# Patient Record
Sex: FEMALE | Race: WHITE | Employment: OTHER | URBAN - METROPOLITAN AREA
[De-identification: names, ages, dates, MRNs, and addresses within clinical notes are randomized per-mention and may not be internally consistent; named-entity substitution may affect disease eponyms.]

---

## 2017-01-05 ENCOUNTER — TELEPHONE (OUTPATIENT)
Dept: ORTHOPEDIC SURGERY | Age: 67
End: 2017-01-05

## 2017-01-12 ENCOUNTER — TELEPHONE (OUTPATIENT)
Dept: CARDIOLOGY CLINIC | Age: 67
End: 2017-01-12

## 2017-01-12 ENCOUNTER — TELEPHONE (OUTPATIENT)
Dept: ORTHOPEDIC SURGERY | Age: 67
End: 2017-01-12

## 2017-01-25 ENCOUNTER — HOSPITAL ENCOUNTER (OUTPATIENT)
Dept: OTHER | Age: 67
Discharge: HOME | End: 2017-01-25
Attending: PHYSICAL MEDICINE & REHABILITATION | Admitting: PHYSICAL MEDICINE & REHABILITATION

## 2017-01-25 DIAGNOSIS — M62.838 SPASM OF MUSCLE: ICD-10-CM

## 2017-01-25 DIAGNOSIS — M48.061 BILATERAL STENOSIS OF LATERAL RECESS OF LUMBAR SPINE: ICD-10-CM

## 2017-02-20 ENCOUNTER — OFFICE VISIT (OUTPATIENT)
Dept: INTERNAL MEDICINE CLINIC | Age: 67
End: 2017-02-20

## 2017-02-20 VITALS
HEART RATE: 84 BPM | WEIGHT: 253 LBS | BODY MASS INDEX: 42.15 KG/M2 | SYSTOLIC BLOOD PRESSURE: 122 MMHG | HEIGHT: 65 IN | DIASTOLIC BLOOD PRESSURE: 70 MMHG

## 2017-02-20 DIAGNOSIS — G47.33 OSA (OBSTRUCTIVE SLEEP APNEA): Chronic | ICD-10-CM

## 2017-02-20 DIAGNOSIS — M17.12 PRIMARY OSTEOARTHRITIS OF LEFT KNEE: Chronic | ICD-10-CM

## 2017-02-20 DIAGNOSIS — I27.24 CTEPH (CHRONIC THROMBOEMBOLIC PULMONARY HYPERTENSION) (HCC): ICD-10-CM

## 2017-02-20 DIAGNOSIS — M51.37 DISC DISEASE, DEGENERATIVE, LUMBAR OR LUMBOSACRAL: Chronic | ICD-10-CM

## 2017-02-20 DIAGNOSIS — M62.838 MUSCLE SPASMS OF BOTH LOWER EXTREMITIES: ICD-10-CM

## 2017-02-20 DIAGNOSIS — I27.20 PULMONARY HTN (HCC): ICD-10-CM

## 2017-02-20 DIAGNOSIS — I10 ESSENTIAL HYPERTENSION: Primary | Chronic | ICD-10-CM

## 2017-02-20 DIAGNOSIS — G25.81 RESTLESS LEG SYNDROME: ICD-10-CM

## 2017-02-20 DIAGNOSIS — E66.01 OBESITY, CLASS III, BMI 40-49.9 (MORBID OBESITY) (HCC): ICD-10-CM

## 2017-02-20 DIAGNOSIS — E03.9 ACQUIRED HYPOTHYROIDISM: Chronic | ICD-10-CM

## 2017-02-20 DIAGNOSIS — I26.99 PULMONARY EMBOLISM, OTHER: ICD-10-CM

## 2017-02-20 PROBLEM — M51.379 DISC DISEASE, DEGENERATIVE, LUMBAR OR LUMBOSACRAL: Chronic | Status: ACTIVE | Noted: 2017-02-20

## 2017-02-20 PROCEDURE — 99214 OFFICE O/P EST MOD 30 MIN: CPT | Performed by: INTERNAL MEDICINE

## 2017-02-20 RX ORDER — LEVOTHYROXINE AND LIOTHYRONINE 38; 9 UG/1; UG/1
TABLET ORAL
Qty: 90 TABLET | Refills: 3 | Status: SHIPPED | OUTPATIENT
Start: 2017-02-20 | End: 2017-03-20 | Stop reason: SDUPTHER

## 2017-02-20 RX ORDER — AMOXICILLIN 500 MG
1 CAPSULE ORAL DAILY
COMMUNITY
End: 2018-08-01

## 2017-02-20 RX ORDER — VITAMIN B COMPLEX
1 TABLET ORAL DAILY
COMMUNITY
End: 2020-01-23

## 2017-02-20 RX ORDER — TIOTROPIUM BROMIDE INHALATION SPRAY 1.56 UG/1
SPRAY, METERED RESPIRATORY (INHALATION)
Refills: 6 | COMMUNITY
Start: 2017-02-12 | End: 2017-05-01 | Stop reason: SINTOL

## 2017-02-20 RX ORDER — LISINOPRIL AND HYDROCHLOROTHIAZIDE 25; 20 MG/1; MG/1
TABLET ORAL
Qty: 90 TABLET | Refills: 3 | Status: SHIPPED | OUTPATIENT
Start: 2017-02-20 | End: 2017-08-04 | Stop reason: SDUPTHER

## 2017-02-20 RX ORDER — ROPINIROLE 0.5 MG/1
0.5 TABLET, FILM COATED ORAL DAILY
Qty: 90 TABLET | Refills: 3 | Status: SHIPPED | OUTPATIENT
Start: 2017-02-20 | End: 2017-02-20 | Stop reason: SDUPTHER

## 2017-02-20 RX ORDER — ROPINIROLE 1 MG/1
2 TABLET, FILM COATED ORAL NIGHTLY
Qty: 180 TABLET | Refills: 3 | Status: SHIPPED | OUTPATIENT
Start: 2017-02-20 | End: 2017-07-11 | Stop reason: SDUPTHER

## 2017-02-20 RX ORDER — AVOCADO OIL
OIL (ML) MISCELLANEOUS
COMMUNITY
End: 2018-05-16 | Stop reason: ALTCHOICE

## 2017-02-20 RX ORDER — TIZANIDINE 4 MG/1
2 TABLET ORAL
Refills: 1 | COMMUNITY
Start: 2017-01-18 | End: 2017-08-04 | Stop reason: ALTCHOICE

## 2017-02-20 RX ORDER — PRAZOSIN HYDROCHLORIDE 2 MG/1
2 CAPSULE ORAL NIGHTLY
COMMUNITY
End: 2018-05-23 | Stop reason: ALTCHOICE

## 2017-02-20 ASSESSMENT — ENCOUNTER SYMPTOMS: BACK PAIN: 1

## 2017-02-21 ENCOUNTER — OFFICE VISIT (OUTPATIENT)
Dept: SLEEP MEDICINE | Age: 67
End: 2017-02-21

## 2017-02-21 VITALS
HEART RATE: 97 BPM | WEIGHT: 251 LBS | DIASTOLIC BLOOD PRESSURE: 68 MMHG | SYSTOLIC BLOOD PRESSURE: 130 MMHG | OXYGEN SATURATION: 98 % | HEIGHT: 65 IN | BODY MASS INDEX: 41.82 KG/M2

## 2017-02-21 DIAGNOSIS — G25.81 RLS (RESTLESS LEGS SYNDROME): Chronic | ICD-10-CM

## 2017-02-21 DIAGNOSIS — G47.33 OSA (OBSTRUCTIVE SLEEP APNEA): Primary | Chronic | ICD-10-CM

## 2017-02-21 DIAGNOSIS — E66.01 MORBID OBESITY, UNSPECIFIED OBESITY TYPE (HCC): Chronic | ICD-10-CM

## 2017-02-21 DIAGNOSIS — J45.30 MILD PERSISTENT ASTHMA WITHOUT COMPLICATION: Chronic | ICD-10-CM

## 2017-02-21 DIAGNOSIS — K21.9 GERD WITHOUT ESOPHAGITIS: Chronic | ICD-10-CM

## 2017-02-21 DIAGNOSIS — E03.9 HYPOTHYROIDISM, UNSPECIFIED TYPE: Chronic | ICD-10-CM

## 2017-02-21 DIAGNOSIS — I27.20 PULMONARY HTN (HCC): Chronic | ICD-10-CM

## 2017-02-21 PROCEDURE — 99214 OFFICE O/P EST MOD 30 MIN: CPT | Performed by: NURSE PRACTITIONER

## 2017-02-21 ASSESSMENT — ENCOUNTER SYMPTOMS
ABDOMINAL DISTENTION: 0
APNEA: 0
SHORTNESS OF BREATH: 0
ABDOMINAL PAIN: 0
COUGH: 0
RHINORRHEA: 0
SINUS PRESSURE: 0

## 2017-02-21 ASSESSMENT — SLEEP AND FATIGUE QUESTIONNAIRES
HOW LIKELY ARE YOU TO NOD OFF OR FALL ASLEEP WHILE WATCHING TV: 1
HOW LIKELY ARE YOU TO NOD OFF OR FALL ASLEEP WHILE SITTING QUIETLY AFTER LUNCH WITHOUT ALCOHOL: 1
HOW LIKELY ARE YOU TO NOD OFF OR FALL ASLEEP WHILE SITTING INACTIVE IN A PUBLIC PLACE: 1
HOW LIKELY ARE YOU TO NOD OFF OR FALL ASLEEP WHILE LYING DOWN TO REST IN THE AFTERNOON WHEN CIRCUMSTANCES PERMIT: 2
HOW LIKELY ARE YOU TO NOD OFF OR FALL ASLEEP IN A CAR, WHILE STOPPED FOR A FEW MINUTES IN TRAFFIC: 0
HOW LIKELY ARE YOU TO NOD OFF OR FALL ASLEEP WHILE SITTING AND READING: 1
HOW LIKELY ARE YOU TO NOD OFF OR FALL ASLEEP WHILE SITTING AND TALKING TO SOMEONE: 0
ESS TOTAL SCORE: 9
HOW LIKELY ARE YOU TO NOD OFF OR FALL ASLEEP WHEN YOU ARE A PASSENGER IN A CAR FOR AN HOUR WITHOUT A BREAK: 3

## 2017-02-24 ENCOUNTER — HOSPITAL ENCOUNTER (OUTPATIENT)
Dept: PHYSICAL THERAPY | Age: 67
Discharge: HOME | End: 2017-02-28
Admitting: PHYSICAL MEDICINE & REHABILITATION

## 2017-02-24 DIAGNOSIS — M48.061 SPINAL STENOSIS OF LUMBAR REGION: ICD-10-CM

## 2017-02-27 ENCOUNTER — OFFICE VISIT (OUTPATIENT)
Dept: ORTHOPEDIC SURGERY | Age: 67
End: 2017-02-27

## 2017-02-27 VITALS
BODY MASS INDEX: 41.82 KG/M2 | HEIGHT: 65 IN | DIASTOLIC BLOOD PRESSURE: 76 MMHG | HEART RATE: 83 BPM | SYSTOLIC BLOOD PRESSURE: 124 MMHG | WEIGHT: 251 LBS

## 2017-02-27 DIAGNOSIS — Z96.643 HISTORY OF TOTAL HIP ARTHROPLASTY, BILATERAL: Primary | ICD-10-CM

## 2017-02-27 PROCEDURE — 73522 X-RAY EXAM HIPS BI 3-4 VIEWS: CPT | Performed by: PHYSICIAN ASSISTANT

## 2017-02-27 PROCEDURE — 99212 OFFICE O/P EST SF 10 MIN: CPT | Performed by: PHYSICIAN ASSISTANT

## 2017-02-28 PROBLEM — Z96.649 HISTORY OF TOTAL HIP ARTHROPLASTY: Status: ACTIVE | Noted: 2017-02-28

## 2017-03-02 ENCOUNTER — HOSPITAL ENCOUNTER (OUTPATIENT)
Dept: OTHER | Age: 67
Discharge: HOME | End: 2017-03-02
Attending: INTERNAL MEDICINE | Admitting: INTERNAL MEDICINE

## 2017-03-02 ENCOUNTER — OFFICE VISIT (OUTPATIENT)
Dept: PULMONOLOGY | Age: 67
End: 2017-03-02

## 2017-03-02 ENCOUNTER — TELEPHONE (OUTPATIENT)
Dept: PULMONOLOGY | Age: 67
End: 2017-03-02

## 2017-03-02 VITALS
WEIGHT: 250 LBS | DIASTOLIC BLOOD PRESSURE: 61 MMHG | OXYGEN SATURATION: 96 % | HEART RATE: 83 BPM | RESPIRATION RATE: 96 BRPM | HEIGHT: 65 IN | SYSTOLIC BLOOD PRESSURE: 113 MMHG | BODY MASS INDEX: 41.65 KG/M2

## 2017-03-02 DIAGNOSIS — R06.02 SOB (SHORTNESS OF BREATH): ICD-10-CM

## 2017-03-02 DIAGNOSIS — G47.33 OSA (OBSTRUCTIVE SLEEP APNEA): Chronic | ICD-10-CM

## 2017-03-02 DIAGNOSIS — I26.99 PULMONARY EMBOLISM, OTHER: Primary | ICD-10-CM

## 2017-03-02 LAB
ALBUMIN SERPL-MCNC: 4.3 G/DL (ref 3.4–5)
ANION GAP SERPL CALCULATED.3IONS-SCNC: 20 MMOL/L (ref 3–16)
BUN BLDV-MCNC: 22 MG/DL (ref 7–20)
CALCIUM SERPL-MCNC: 9.4 MG/DL (ref 8.3–10.6)
CHLORIDE BLD-SCNC: 99 MMOL/L (ref 99–110)
CO2: 18 MMOL/L (ref 21–32)
CREAT SERPL-MCNC: 0.9 MG/DL (ref 0.6–1.2)
GFR AFRICAN AMERICAN: >60
GFR NON-AFRICAN AMERICAN: >60
GLUCOSE BLD-MCNC: 106 MG/DL (ref 70–99)
PHOSPHORUS: 3.6 MG/DL (ref 2.5–4.9)
POTASSIUM SERPL-SCNC: 3.5 MMOL/L (ref 3.5–5.1)
SODIUM BLD-SCNC: 137 MMOL/L (ref 136–145)
T4 FREE: 1.1 NG/DL (ref 0.9–1.8)
TSH SERPL DL<=0.05 MIU/L-ACNC: 2.21 UIU/ML (ref 0.27–4.2)

## 2017-03-02 PROCEDURE — 99214 OFFICE O/P EST MOD 30 MIN: CPT | Performed by: INTERNAL MEDICINE

## 2017-03-02 RX ORDER — COCONUT OIL
OIL (ML) MISCELLANEOUS
COMMUNITY
End: 2017-08-28

## 2017-03-02 RX ORDER — ASCORBIC ACID 500 MG
1000 TABLET ORAL 4 TIMES DAILY
COMMUNITY
End: 2018-05-16 | Stop reason: ALTCHOICE

## 2017-03-02 RX ORDER — GYMNEMA LEAF 100 %
POWDER (GRAM) MISCELLANEOUS
COMMUNITY
End: 2019-05-14

## 2017-03-20 ENCOUNTER — TELEPHONE (OUTPATIENT)
Dept: CARDIOLOGY CLINIC | Age: 67
End: 2017-03-20

## 2017-03-20 DIAGNOSIS — E03.9 ACQUIRED HYPOTHYROIDISM: Chronic | ICD-10-CM

## 2017-03-20 RX ORDER — LEVOTHYROXINE AND LIOTHYRONINE 38; 9 UG/1; UG/1
TABLET ORAL
Qty: 90 TABLET | Refills: 0 | Status: SHIPPED | OUTPATIENT
Start: 2017-03-20 | End: 2018-04-24 | Stop reason: SDUPTHER

## 2017-03-21 ENCOUNTER — OFFICE VISIT (OUTPATIENT)
Dept: CARDIOLOGY CLINIC | Age: 67
End: 2017-03-21

## 2017-03-21 VITALS
HEART RATE: 72 BPM | SYSTOLIC BLOOD PRESSURE: 134 MMHG | DIASTOLIC BLOOD PRESSURE: 68 MMHG | WEIGHT: 248 LBS | RESPIRATION RATE: 16 BRPM | OXYGEN SATURATION: 98 % | BODY MASS INDEX: 41.32 KG/M2 | HEIGHT: 65 IN

## 2017-03-21 DIAGNOSIS — Z86.711 HISTORY OF PULMONARY EMBOLISM: ICD-10-CM

## 2017-03-21 DIAGNOSIS — G47.33 OSA (OBSTRUCTIVE SLEEP APNEA): Chronic | ICD-10-CM

## 2017-03-21 DIAGNOSIS — I10 ESSENTIAL HYPERTENSION: Chronic | ICD-10-CM

## 2017-03-21 DIAGNOSIS — R00.0 TACHYCARDIA: Primary | ICD-10-CM

## 2017-03-21 PROCEDURE — 99214 OFFICE O/P EST MOD 30 MIN: CPT | Performed by: NURSE PRACTITIONER

## 2017-03-23 ENCOUNTER — TELEPHONE (OUTPATIENT)
Dept: CARDIOLOGY CLINIC | Age: 67
End: 2017-03-23

## 2017-03-31 ENCOUNTER — OFFICE VISIT (OUTPATIENT)
Dept: CARDIOLOGY CLINIC | Age: 67
End: 2017-03-31

## 2017-03-31 VITALS
BODY MASS INDEX: 41.07 KG/M2 | HEART RATE: 70 BPM | DIASTOLIC BLOOD PRESSURE: 70 MMHG | SYSTOLIC BLOOD PRESSURE: 116 MMHG | WEIGHT: 246.5 LBS | OXYGEN SATURATION: 92 % | HEIGHT: 65 IN

## 2017-03-31 DIAGNOSIS — I27.20 PULMONARY HTN (HCC): ICD-10-CM

## 2017-03-31 DIAGNOSIS — I10 ESSENTIAL HYPERTENSION: Chronic | ICD-10-CM

## 2017-03-31 DIAGNOSIS — R00.0 TACHYCARDIA: ICD-10-CM

## 2017-03-31 DIAGNOSIS — E66.01 OBESITY, CLASS III, BMI 40-49.9 (MORBID OBESITY) (HCC): ICD-10-CM

## 2017-03-31 DIAGNOSIS — I27.24 CTEPH (CHRONIC THROMBOEMBOLIC PULMONARY HYPERTENSION) (HCC): Primary | ICD-10-CM

## 2017-03-31 DIAGNOSIS — G47.33 OSA (OBSTRUCTIVE SLEEP APNEA): Chronic | ICD-10-CM

## 2017-03-31 PROCEDURE — 99214 OFFICE O/P EST MOD 30 MIN: CPT | Performed by: INTERNAL MEDICINE

## 2017-04-06 ENCOUNTER — TELEPHONE (OUTPATIENT)
Dept: PULMONOLOGY | Age: 67
End: 2017-04-06

## 2017-04-06 ENCOUNTER — NURSE ONLY (OUTPATIENT)
Dept: CARDIOLOGY CLINIC | Age: 67
End: 2017-04-06

## 2017-04-06 DIAGNOSIS — R00.0 TACHYCARDIA: Primary | ICD-10-CM

## 2017-04-06 DIAGNOSIS — R06.02 SHORTNESS OF BREATH: Primary | ICD-10-CM

## 2017-04-06 DIAGNOSIS — I26.99 PULMONARY EMBOLISM, OTHER: ICD-10-CM

## 2017-04-13 ENCOUNTER — TELEPHONE (OUTPATIENT)
Dept: CARDIOLOGY CLINIC | Age: 67
End: 2017-04-13

## 2017-04-14 ENCOUNTER — TELEPHONE (OUTPATIENT)
Dept: PULMONOLOGY | Age: 67
End: 2017-04-14

## 2017-04-14 ENCOUNTER — HOSPITAL ENCOUNTER (OUTPATIENT)
Dept: CT IMAGING | Age: 67
Discharge: HOME | End: 2017-04-14
Attending: INTERNAL MEDICINE | Admitting: INTERNAL MEDICINE

## 2017-04-14 ENCOUNTER — HOSPITAL ENCOUNTER (OUTPATIENT)
Dept: OTHER | Age: 67
Discharge: HOME | End: 2017-04-14
Attending: NEUROLOGICAL SURGERY | Admitting: NEUROLOGICAL SURGERY

## 2017-04-14 DIAGNOSIS — R06.02 SOB (SHORTNESS OF BREATH): ICD-10-CM

## 2017-04-14 DIAGNOSIS — I26.99 PULMONARY EMBOLISM, OTHER: ICD-10-CM

## 2017-04-14 LAB
ALBUMIN SERPL-MCNC: 4.6 G/DL (ref 3.4–5)
ANION GAP SERPL CALCULATED.3IONS-SCNC: 17 MMOL/L (ref 3–16)
BUN BLDV-MCNC: 21 MG/DL (ref 7–20)
CALCIUM SERPL-MCNC: 9.7 MG/DL (ref 8.3–10.6)
CHLORIDE BLD-SCNC: 99 MMOL/L (ref 99–110)
CO2: 20 MMOL/L (ref 21–32)
CREAT SERPL-MCNC: 0.7 MG/DL (ref 0.6–1.2)
GFR AFRICAN AMERICAN: >60
GFR NON-AFRICAN AMERICAN: >60
GLUCOSE BLD-MCNC: 119 MG/DL (ref 70–99)
PHOSPHORUS: 2.7 MG/DL (ref 2.5–4.9)
POTASSIUM SERPL-SCNC: 3.7 MMOL/L (ref 3.5–5.1)
SODIUM BLD-SCNC: 136 MMOL/L (ref 136–145)

## 2017-04-20 PROCEDURE — 93224 XTRNL ECG REC UP TO 48 HRS: CPT | Performed by: INTERNAL MEDICINE

## 2017-05-01 ENCOUNTER — HOSPITAL ENCOUNTER (OUTPATIENT)
Dept: OTHER | Age: 67
Discharge: HOME | End: 2017-05-01
Attending: OBSTETRICS & GYNECOLOGY | Admitting: OBSTETRICS & GYNECOLOGY

## 2017-05-01 VITALS
WEIGHT: 238 LBS | RESPIRATION RATE: 14 BRPM | DIASTOLIC BLOOD PRESSURE: 68 MMHG | BODY MASS INDEX: 39.65 KG/M2 | SYSTOLIC BLOOD PRESSURE: 145 MMHG | HEIGHT: 65 IN | HEART RATE: 83 BPM

## 2017-05-04 ENCOUNTER — OFFICE VISIT (OUTPATIENT)
Dept: INTERNAL MEDICINE CLINIC | Age: 67
End: 2017-05-04

## 2017-05-04 ENCOUNTER — OFFICE VISIT (OUTPATIENT)
Dept: CARDIOLOGY CLINIC | Age: 67
End: 2017-05-04

## 2017-05-04 VITALS
HEIGHT: 65 IN | HEART RATE: 76 BPM | DIASTOLIC BLOOD PRESSURE: 62 MMHG | SYSTOLIC BLOOD PRESSURE: 120 MMHG | BODY MASS INDEX: 41.15 KG/M2 | WEIGHT: 247 LBS

## 2017-05-04 VITALS
HEART RATE: 76 BPM | HEIGHT: 65 IN | WEIGHT: 245 LBS | TEMPERATURE: 97.6 F | BODY MASS INDEX: 40.82 KG/M2 | SYSTOLIC BLOOD PRESSURE: 112 MMHG | DIASTOLIC BLOOD PRESSURE: 68 MMHG

## 2017-05-04 DIAGNOSIS — I27.24 CTEPH (CHRONIC THROMBOEMBOLIC PULMONARY HYPERTENSION) (HCC): Primary | ICD-10-CM

## 2017-05-04 DIAGNOSIS — G47.33 OSA (OBSTRUCTIVE SLEEP APNEA): Chronic | ICD-10-CM

## 2017-05-04 DIAGNOSIS — I10 ESSENTIAL HYPERTENSION: Primary | Chronic | ICD-10-CM

## 2017-05-04 DIAGNOSIS — I26.99 PULMONARY EMBOLISM, OTHER: ICD-10-CM

## 2017-05-04 DIAGNOSIS — E03.9 ACQUIRED HYPOTHYROIDISM: Chronic | ICD-10-CM

## 2017-05-04 DIAGNOSIS — I10 ESSENTIAL HYPERTENSION: Chronic | ICD-10-CM

## 2017-05-04 DIAGNOSIS — J40 BRONCHITIS: ICD-10-CM

## 2017-05-04 DIAGNOSIS — E66.01 OBESITY, CLASS III, BMI 40-49.9 (MORBID OBESITY) (HCC): ICD-10-CM

## 2017-05-04 DIAGNOSIS — I27.20 PULMONARY HTN (HCC): ICD-10-CM

## 2017-05-04 PROBLEM — R00.0 TACHYCARDIA: Status: RESOLVED | Noted: 2017-03-31 | Resolved: 2017-05-04

## 2017-05-04 PROBLEM — M51.37 DISC DISEASE, DEGENERATIVE, LUMBAR OR LUMBOSACRAL: Chronic | Status: RESOLVED | Noted: 2017-02-20 | Resolved: 2017-05-04

## 2017-05-04 PROBLEM — M51.379 DISC DISEASE, DEGENERATIVE, LUMBAR OR LUMBOSACRAL: Chronic | Status: RESOLVED | Noted: 2017-02-20 | Resolved: 2017-05-04

## 2017-05-04 PROBLEM — Z96.649 HISTORY OF TOTAL HIP ARTHROPLASTY: Status: RESOLVED | Noted: 2017-02-28 | Resolved: 2017-05-04

## 2017-05-04 PROCEDURE — 94640 AIRWAY INHALATION TREATMENT: CPT | Performed by: INTERNAL MEDICINE

## 2017-05-04 PROCEDURE — 99213 OFFICE O/P EST LOW 20 MIN: CPT | Performed by: INTERNAL MEDICINE

## 2017-05-04 PROCEDURE — 96372 THER/PROPH/DIAG INJ SC/IM: CPT | Performed by: INTERNAL MEDICINE

## 2017-05-04 PROCEDURE — 99214 OFFICE O/P EST MOD 30 MIN: CPT | Performed by: INTERNAL MEDICINE

## 2017-05-04 RX ORDER — METHYLPREDNISOLONE ACETATE 80 MG/ML
80 INJECTION, SUSPENSION INTRA-ARTICULAR; INTRALESIONAL; INTRAMUSCULAR; SOFT TISSUE ONCE
Status: COMPLETED | OUTPATIENT
Start: 2017-05-04 | End: 2017-05-04

## 2017-05-04 RX ORDER — AZITHROMYCIN 250 MG/1
TABLET, FILM COATED ORAL
Qty: 6 TABLET | Refills: 0 | Status: SHIPPED | OUTPATIENT
Start: 2017-05-04 | End: 2017-05-24 | Stop reason: ALTCHOICE

## 2017-05-04 RX ORDER — ALBUTEROL SULFATE 2.5 MG/3ML
2.5 SOLUTION RESPIRATORY (INHALATION) EVERY 4 HOURS PRN
Qty: 25 VIAL | Refills: 2 | Status: SHIPPED | OUTPATIENT
Start: 2017-05-04 | End: 2017-09-27 | Stop reason: SDUPTHER

## 2017-05-04 RX ORDER — ALBUTEROL SULFATE 2.5 MG/3ML
2.5 SOLUTION RESPIRATORY (INHALATION) ONCE
Status: COMPLETED | OUTPATIENT
Start: 2017-05-04 | End: 2017-05-04

## 2017-05-04 RX ADMIN — METHYLPREDNISOLONE ACETATE 80 MG: 80 INJECTION, SUSPENSION INTRA-ARTICULAR; INTRALESIONAL; INTRAMUSCULAR; SOFT TISSUE at 12:07

## 2017-05-04 RX ADMIN — ALBUTEROL SULFATE 2.5 MG: 2.5 SOLUTION RESPIRATORY (INHALATION) at 12:06

## 2017-05-11 ENCOUNTER — TELEPHONE (OUTPATIENT)
Dept: INTERNAL MEDICINE CLINIC | Age: 67
End: 2017-05-11

## 2017-05-12 RX ORDER — POTASSIUM CHLORIDE 750 MG/1
10 TABLET, EXTENDED RELEASE ORAL DAILY
Qty: 90 TABLET | Refills: 1 | Status: SHIPPED | OUTPATIENT
Start: 2017-05-12 | End: 2017-08-28 | Stop reason: SDUPTHER

## 2017-05-12 RX ORDER — POTASSIUM CHLORIDE 750 MG/1
10 TABLET, EXTENDED RELEASE ORAL DAILY
COMMUNITY
End: 2017-05-12 | Stop reason: SDUPTHER

## 2017-05-12 RX ORDER — AZITHROMYCIN 250 MG/1
TABLET, FILM COATED ORAL
Qty: 6 TABLET | Refills: 0 | Status: SHIPPED | OUTPATIENT
Start: 2017-05-12 | End: 2017-05-24 | Stop reason: ALTCHOICE

## 2017-05-19 ENCOUNTER — PATIENT MESSAGE (OUTPATIENT)
Dept: PULMONOLOGY | Age: 67
End: 2017-05-19

## 2017-05-19 DIAGNOSIS — G25.81 RLS (RESTLESS LEGS SYNDROME): Primary | ICD-10-CM

## 2017-05-23 ENCOUNTER — TELEPHONE (OUTPATIENT)
Dept: INTERNAL MEDICINE CLINIC | Age: 67
End: 2017-05-23

## 2017-05-23 ENCOUNTER — TELEPHONE (OUTPATIENT)
Dept: CARDIOLOGY CLINIC | Age: 67
End: 2017-05-23

## 2017-05-24 ENCOUNTER — TELEPHONE (OUTPATIENT)
Dept: INTERNAL MEDICINE CLINIC | Age: 67
End: 2017-05-24

## 2017-05-24 ENCOUNTER — OFFICE VISIT (OUTPATIENT)
Dept: INTERNAL MEDICINE CLINIC | Age: 67
End: 2017-05-24

## 2017-05-24 VITALS
HEART RATE: 96 BPM | WEIGHT: 248 LBS | DIASTOLIC BLOOD PRESSURE: 80 MMHG | BODY MASS INDEX: 41.32 KG/M2 | SYSTOLIC BLOOD PRESSURE: 126 MMHG | HEIGHT: 65 IN

## 2017-05-24 DIAGNOSIS — I10 ESSENTIAL HYPERTENSION: Primary | Chronic | ICD-10-CM

## 2017-05-24 DIAGNOSIS — H69.82 EUSTACHIAN TUBE DYSFUNCTION, LEFT: Primary | ICD-10-CM

## 2017-05-24 DIAGNOSIS — J32.0 CHRONIC MAXILLARY SINUSITIS: Chronic | ICD-10-CM

## 2017-05-24 PROCEDURE — 99213 OFFICE O/P EST LOW 20 MIN: CPT | Performed by: INTERNAL MEDICINE

## 2017-05-24 PROCEDURE — 96372 THER/PROPH/DIAG INJ SC/IM: CPT | Performed by: INTERNAL MEDICINE

## 2017-05-24 RX ORDER — MONTELUKAST SODIUM 10 MG/1
10 TABLET ORAL DAILY
Qty: 30 TABLET | Refills: 3 | Status: SHIPPED | OUTPATIENT
Start: 2017-05-24 | End: 2017-08-04 | Stop reason: SDUPTHER

## 2017-05-24 RX ORDER — METHYLPREDNISOLONE ACETATE 80 MG/ML
80 INJECTION, SUSPENSION INTRA-ARTICULAR; INTRALESIONAL; INTRAMUSCULAR; SOFT TISSUE ONCE
Status: COMPLETED | OUTPATIENT
Start: 2017-05-24 | End: 2017-05-24

## 2017-05-24 RX ORDER — METHYLPREDNISOLONE ACETATE 80 MG/ML
80 INJECTION, SUSPENSION INTRA-ARTICULAR; INTRALESIONAL; INTRAMUSCULAR; SOFT TISSUE ONCE
Qty: 1 ML | Refills: 0
Start: 2017-05-24 | End: 2017-05-24 | Stop reason: CLARIF

## 2017-05-24 RX ADMIN — METHYLPREDNISOLONE ACETATE 80 MG: 80 INJECTION, SUSPENSION INTRA-ARTICULAR; INTRALESIONAL; INTRAMUSCULAR; SOFT TISSUE at 12:11

## 2017-06-08 ENCOUNTER — OFFICE VISIT (OUTPATIENT)
Dept: PULMONOLOGY | Age: 67
End: 2017-06-08

## 2017-06-08 VITALS
RESPIRATION RATE: 18 BRPM | WEIGHT: 245 LBS | DIASTOLIC BLOOD PRESSURE: 64 MMHG | SYSTOLIC BLOOD PRESSURE: 116 MMHG | HEIGHT: 65 IN | BODY MASS INDEX: 40.82 KG/M2 | OXYGEN SATURATION: 96 % | HEART RATE: 71 BPM

## 2017-06-08 DIAGNOSIS — I27.20 PULMONARY HTN (HCC): ICD-10-CM

## 2017-06-08 DIAGNOSIS — I26.99 PULMONARY EMBOLISM, OTHER: Primary | ICD-10-CM

## 2017-06-08 PROCEDURE — 99214 OFFICE O/P EST MOD 30 MIN: CPT | Performed by: INTERNAL MEDICINE

## 2017-06-13 ENCOUNTER — TELEPHONE (OUTPATIENT)
Dept: RHEUMATOLOGY | Age: 67
End: 2017-06-13

## 2017-06-13 DIAGNOSIS — I10 ESSENTIAL HYPERTENSION: Primary | Chronic | ICD-10-CM

## 2017-06-20 ENCOUNTER — HOSPITAL ENCOUNTER (OUTPATIENT)
Dept: OTHER | Age: 67
Discharge: HOME | End: 2017-06-20
Attending: INTERNAL MEDICINE | Admitting: INTERNAL MEDICINE

## 2017-06-20 ENCOUNTER — HOSPITAL ENCOUNTER (OUTPATIENT)
Dept: CT IMAGING | Age: 67
Discharge: HOME | End: 2017-06-20
Attending: INTERNAL MEDICINE | Admitting: INTERNAL MEDICINE

## 2017-06-20 DIAGNOSIS — J32.0 CHRONIC MAXILLARY SINUSITIS: ICD-10-CM

## 2017-06-20 DIAGNOSIS — G25.81 RLS (RESTLESS LEGS SYNDROME): ICD-10-CM

## 2017-06-20 LAB
A/G RATIO: 1.6 (ref 1.1–2.2)
ALBUMIN SERPL-MCNC: 4.2 G/DL (ref 3.4–5)
ALP BLD-CCNC: 76 U/L (ref 40–129)
ALT SERPL-CCNC: 25 U/L (ref 10–40)
ANION GAP SERPL CALCULATED.3IONS-SCNC: 15 MMOL/L (ref 3–16)
AST SERPL-CCNC: 16 U/L (ref 15–37)
BILIRUB SERPL-MCNC: 0.4 MG/DL (ref 0–1)
BUN BLDV-MCNC: 22 MG/DL (ref 7–20)
CALCIUM SERPL-MCNC: 9.4 MG/DL (ref 8.3–10.6)
CHLORIDE BLD-SCNC: 98 MMOL/L (ref 99–110)
CO2: 23 MMOL/L (ref 21–32)
CREAT SERPL-MCNC: 0.8 MG/DL (ref 0.6–1.2)
FERRITIN: 138.1 NG/ML (ref 15–150)
GFR AFRICAN AMERICAN: >60
GFR NON-AFRICAN AMERICAN: >60
GLOBULIN: 2.6 G/DL
GLUCOSE BLD-MCNC: 126 MG/DL (ref 70–99)
IRON SATURATION: 25 % (ref 15–50)
IRON: 81 UG/DL (ref 37–145)
POTASSIUM SERPL-SCNC: 3.7 MMOL/L (ref 3.5–5.1)
SODIUM BLD-SCNC: 136 MMOL/L (ref 136–145)
TOTAL IRON BINDING CAPACITY: 328 UG/DL (ref 260–445)
TOTAL PROTEIN: 6.8 G/DL (ref 6.4–8.2)

## 2017-06-21 ENCOUNTER — HOSPITAL ENCOUNTER (OUTPATIENT)
Dept: MAMMOGRAPHY | Age: 67
Discharge: HOME | End: 2017-06-21
Attending: OBSTETRICS & GYNECOLOGY | Admitting: OBSTETRICS & GYNECOLOGY

## 2017-06-21 ENCOUNTER — HOSPITAL ENCOUNTER (OUTPATIENT)
Dept: PHYSICAL THERAPY | Age: 67
Discharge: HOME | End: 2017-06-30
Admitting: PHYSICAL MEDICINE & REHABILITATION

## 2017-06-21 DIAGNOSIS — M48.061 SPINAL STENOSIS OF LUMBAR REGION: ICD-10-CM

## 2017-06-21 DIAGNOSIS — Z12.31 VISIT FOR SCREENING MAMMOGRAM: ICD-10-CM

## 2017-06-23 ENCOUNTER — OFFICE VISIT (OUTPATIENT)
Dept: INTERNAL MEDICINE CLINIC | Age: 67
End: 2017-06-23

## 2017-06-23 VITALS
WEIGHT: 242 LBS | SYSTOLIC BLOOD PRESSURE: 110 MMHG | BODY MASS INDEX: 39.96 KG/M2 | DIASTOLIC BLOOD PRESSURE: 66 MMHG | HEART RATE: 80 BPM

## 2017-06-23 DIAGNOSIS — J32.0 CHRONIC MAXILLARY SINUSITIS: Primary | ICD-10-CM

## 2017-06-23 PROCEDURE — 99212 OFFICE O/P EST SF 10 MIN: CPT | Performed by: INTERNAL MEDICINE

## 2017-07-11 DIAGNOSIS — G25.81 RESTLESS LEG SYNDROME: ICD-10-CM

## 2017-07-11 RX ORDER — ROPINIROLE 1 MG/1
2 TABLET, FILM COATED ORAL NIGHTLY
Qty: 180 TABLET | Refills: 0 | Status: SHIPPED | OUTPATIENT
Start: 2017-07-11 | End: 2017-07-17 | Stop reason: SDUPTHER

## 2017-07-12 ENCOUNTER — HOSPITAL ENCOUNTER (OUTPATIENT)
Dept: OTHER | Age: 67
Discharge: HOME | End: 2017-07-12
Attending: OBSTETRICS & GYNECOLOGY | Admitting: OBSTETRICS & GYNECOLOGY

## 2017-07-12 VITALS — TEMPERATURE: 97.8 F | SYSTOLIC BLOOD PRESSURE: 104 MMHG | DIASTOLIC BLOOD PRESSURE: 68 MMHG

## 2017-07-14 ENCOUNTER — TELEPHONE (OUTPATIENT)
Dept: SLEEP MEDICINE | Age: 67
End: 2017-07-14

## 2017-07-14 DIAGNOSIS — G25.81 RESTLESS LEG SYNDROME: ICD-10-CM

## 2017-07-17 RX ORDER — ROPINIROLE 1 MG/1
2 TABLET, FILM COATED ORAL NIGHTLY
Qty: 180 TABLET | Refills: 0 | Status: SHIPPED | OUTPATIENT
Start: 2017-07-17 | End: 2017-09-11 | Stop reason: SDUPTHER

## 2017-07-31 ENCOUNTER — TELEPHONE (OUTPATIENT)
Dept: SLEEP MEDICINE | Age: 67
End: 2017-07-31

## 2017-08-04 ENCOUNTER — OFFICE VISIT (OUTPATIENT)
Dept: INTERNAL MEDICINE CLINIC | Age: 67
End: 2017-08-04

## 2017-08-04 VITALS
HEART RATE: 72 BPM | WEIGHT: 240 LBS | HEIGHT: 65 IN | BODY MASS INDEX: 39.99 KG/M2 | DIASTOLIC BLOOD PRESSURE: 76 MMHG | SYSTOLIC BLOOD PRESSURE: 128 MMHG

## 2017-08-04 DIAGNOSIS — M17.12 PRIMARY OSTEOARTHRITIS OF LEFT KNEE: Chronic | ICD-10-CM

## 2017-08-04 DIAGNOSIS — H69.82 EUSTACHIAN TUBE DYSFUNCTION, LEFT: ICD-10-CM

## 2017-08-04 DIAGNOSIS — J32.0 CHRONIC MAXILLARY SINUSITIS: Chronic | ICD-10-CM

## 2017-08-04 DIAGNOSIS — E66.01 OBESITY, CLASS III, BMI 40-49.9 (MORBID OBESITY) (HCC): Primary | ICD-10-CM

## 2017-08-04 DIAGNOSIS — I10 ESSENTIAL HYPERTENSION: Chronic | ICD-10-CM

## 2017-08-04 DIAGNOSIS — M25.561 CHRONIC PAIN OF RIGHT KNEE: ICD-10-CM

## 2017-08-04 DIAGNOSIS — G89.29 CHRONIC PAIN OF RIGHT KNEE: ICD-10-CM

## 2017-08-04 DIAGNOSIS — E03.9 ACQUIRED HYPOTHYROIDISM: Chronic | ICD-10-CM

## 2017-08-04 LAB
CHOLESTEROL, TOTAL: 212 MG/DL (ref 0–199)
HDLC SERPL-MCNC: 55 MG/DL (ref 40–60)
LDL CHOLESTEROL CALCULATED: 116 MG/DL
T4 FREE: 1 NG/DL (ref 0.9–1.8)
TRIGL SERPL-MCNC: 203 MG/DL (ref 0–150)
TSH SERPL DL<=0.05 MIU/L-ACNC: 0.55 UIU/ML (ref 0.27–4.2)
VLDLC SERPL CALC-MCNC: 41 MG/DL

## 2017-08-04 PROCEDURE — 99214 OFFICE O/P EST MOD 30 MIN: CPT | Performed by: INTERNAL MEDICINE

## 2017-08-04 RX ORDER — CELECOXIB 200 MG/1
CAPSULE ORAL
Qty: 90 CAPSULE | Refills: 3 | Status: SHIPPED | OUTPATIENT
Start: 2017-08-04 | End: 2018-05-16 | Stop reason: ALTCHOICE

## 2017-08-04 RX ORDER — MAGNESIUM 200 MG
TABLET ORAL
COMMUNITY
End: 2017-08-28

## 2017-08-04 RX ORDER — LISINOPRIL AND HYDROCHLOROTHIAZIDE 25; 20 MG/1; MG/1
TABLET ORAL
Qty: 90 TABLET | Refills: 3 | Status: SHIPPED | OUTPATIENT
Start: 2017-08-04 | End: 2018-01-16 | Stop reason: SDUPTHER

## 2017-08-04 RX ORDER — MONTELUKAST SODIUM 10 MG/1
10 TABLET ORAL DAILY
Qty: 90 TABLET | Refills: 3 | Status: SHIPPED | OUTPATIENT
Start: 2017-08-04 | End: 2018-04-13 | Stop reason: SDUPTHER

## 2017-08-04 RX ORDER — METHOCARBAMOL 500 MG/1
500 TABLET, FILM COATED ORAL 2 TIMES DAILY PRN
COMMUNITY
End: 2018-05-23 | Stop reason: ALTCHOICE

## 2017-08-06 RX ORDER — ATORVASTATIN CALCIUM 20 MG/1
20 TABLET, FILM COATED ORAL DAILY
Qty: 30 TABLET | Refills: 11 | Status: ON HOLD | OUTPATIENT
Start: 2017-08-06 | End: 2017-09-18

## 2017-08-10 ENCOUNTER — HOSPITAL ENCOUNTER (OUTPATIENT)
Dept: OTHER | Age: 67
Discharge: HOME | End: 2017-08-10
Attending: INTERNAL MEDICINE | Admitting: INTERNAL MEDICINE

## 2017-08-10 DIAGNOSIS — G89.29 CHRONIC PAIN OF RIGHT KNEE: ICD-10-CM

## 2017-08-10 DIAGNOSIS — M25.561 CHRONIC PAIN OF RIGHT KNEE: ICD-10-CM

## 2017-08-10 DIAGNOSIS — M17.12 PRIMARY OSTEOARTHRITIS OF LEFT KNEE: Chronic | ICD-10-CM

## 2017-08-21 ENCOUNTER — TELEPHONE (OUTPATIENT)
Dept: SLEEP MEDICINE | Age: 67
End: 2017-08-21

## 2017-08-28 ENCOUNTER — HOSPITAL ENCOUNTER (OUTPATIENT)
Dept: NON INVASIVE DIAGNOSTICS | Age: 67
Discharge: HOME | End: 2017-08-28
Attending: INTERNAL MEDICINE | Admitting: INTERNAL MEDICINE

## 2017-08-28 ENCOUNTER — OFFICE VISIT (OUTPATIENT)
Dept: CARDIOLOGY CLINIC | Age: 67
End: 2017-08-28

## 2017-08-28 VITALS
HEIGHT: 65 IN | WEIGHT: 243 LBS | DIASTOLIC BLOOD PRESSURE: 56 MMHG | SYSTOLIC BLOOD PRESSURE: 110 MMHG | BODY MASS INDEX: 40.48 KG/M2 | HEART RATE: 72 BPM

## 2017-08-28 DIAGNOSIS — I27.82 CHRONIC PULMONARY EMBOLISM (HCC): ICD-10-CM

## 2017-08-28 DIAGNOSIS — I10 ESSENTIAL HYPERTENSION: Chronic | ICD-10-CM

## 2017-08-28 DIAGNOSIS — I27.20 PULMONARY HTN (HCC): Primary | ICD-10-CM

## 2017-08-28 DIAGNOSIS — I26.99 OTHER PULMONARY EMBOLISM WITHOUT ACUTE COR PULMONALE, UNSPECIFIED CHRONICITY (HCC): ICD-10-CM

## 2017-08-28 LAB
LV EF: 60 %
LVEF MODALITY: NORMAL

## 2017-08-28 PROCEDURE — 99215 OFFICE O/P EST HI 40 MIN: CPT | Performed by: INTERNAL MEDICINE

## 2017-08-28 RX ORDER — POTASSIUM CHLORIDE 750 MG/1
10 TABLET, EXTENDED RELEASE ORAL DAILY
Qty: 90 TABLET | Refills: 3 | Status: SHIPPED | OUTPATIENT
Start: 2017-08-28 | End: 2017-12-01 | Stop reason: SDUPTHER

## 2017-08-30 ENCOUNTER — HOSPITAL ENCOUNTER (OUTPATIENT)
Dept: GENERAL RADIOLOGY | Age: 67
Discharge: HOME | End: 2017-08-30

## 2017-08-30 DIAGNOSIS — I27.20 PULMONARY HTN (HCC): ICD-10-CM

## 2017-08-30 DIAGNOSIS — R52 PAIN: ICD-10-CM

## 2017-08-30 DIAGNOSIS — I26.99 OTHER PULMONARY EMBOLISM WITHOUT ACUTE COR PULMONALE, UNSPECIFIED CHRONICITY (HCC): ICD-10-CM

## 2017-08-30 DIAGNOSIS — R06.02 SHORTNESS OF BREATH: ICD-10-CM

## 2017-08-30 DIAGNOSIS — I27.20 PULMONARY HTN (HCC): Primary | ICD-10-CM

## 2017-08-30 DIAGNOSIS — I10 ESSENTIAL HYPERTENSION: Chronic | ICD-10-CM

## 2017-08-30 LAB
A/G RATIO: 1.6 (ref 1.1–2.2)
ALBUMIN SERPL-MCNC: 4.2 G/DL (ref 3.4–5)
ALP BLD-CCNC: 81 U/L (ref 40–129)
ALT SERPL-CCNC: 23 U/L (ref 10–40)
ANION GAP SERPL CALCULATED.3IONS-SCNC: 17 MMOL/L (ref 3–16)
AST SERPL-CCNC: 15 U/L (ref 15–37)
BILIRUB SERPL-MCNC: <0.2 MG/DL (ref 0–1)
BUN BLDV-MCNC: 19 MG/DL (ref 7–20)
CALCIUM SERPL-MCNC: 9.3 MG/DL (ref 8.3–10.6)
CHLORIDE BLD-SCNC: 100 MMOL/L (ref 99–110)
CO2: 20 MMOL/L (ref 21–32)
CREAT SERPL-MCNC: 0.7 MG/DL (ref 0.6–1.2)
GFR AFRICAN AMERICAN: >60
GFR NON-AFRICAN AMERICAN: >60
GLOBULIN: 2.6 G/DL
GLUCOSE BLD-MCNC: 108 MG/DL (ref 70–99)
HCT VFR BLD CALC: 37.8 % (ref 36–48)
HEMOGLOBIN: 12.6 G/DL (ref 12–16)
MCH RBC QN AUTO: 32.8 PG (ref 26–34)
MCHC RBC AUTO-ENTMCNC: 33.4 G/DL (ref 31–36)
MCV RBC AUTO: 98.3 FL (ref 80–100)
PDW BLD-RTO: 13.4 % (ref 12.4–15.4)
PLATELET # BLD: 239 K/UL (ref 135–450)
PMV BLD AUTO: 9.7 FL (ref 5–10.5)
POTASSIUM SERPL-SCNC: 4.2 MMOL/L (ref 3.5–5.1)
PRO-BNP: 102 PG/ML (ref 0–124)
RBC # BLD: 3.85 M/UL (ref 4–5.2)
SODIUM BLD-SCNC: 137 MMOL/L (ref 136–145)
TOTAL PROTEIN: 6.8 G/DL (ref 6.4–8.2)
WBC # BLD: 5.8 K/UL (ref 4–11)

## 2017-08-30 RX ADMIN — Medication 10 MILLICURIE: at 13:15

## 2017-08-30 RX ADMIN — Medication 6 MILLICURIE: at 13:15

## 2017-09-11 ENCOUNTER — OFFICE VISIT (OUTPATIENT)
Dept: SLEEP MEDICINE | Age: 67
End: 2017-09-11

## 2017-09-11 VITALS
BODY MASS INDEX: 41.32 KG/M2 | SYSTOLIC BLOOD PRESSURE: 113 MMHG | WEIGHT: 242 LBS | HEIGHT: 64 IN | OXYGEN SATURATION: 97 % | HEART RATE: 90 BPM | DIASTOLIC BLOOD PRESSURE: 69 MMHG

## 2017-09-11 DIAGNOSIS — J45.30 MILD PERSISTENT ASTHMA WITHOUT COMPLICATION: Chronic | ICD-10-CM

## 2017-09-11 DIAGNOSIS — G47.33 OSA (OBSTRUCTIVE SLEEP APNEA): Primary | Chronic | ICD-10-CM

## 2017-09-11 DIAGNOSIS — E03.9 HYPOTHYROIDISM, UNSPECIFIED TYPE: Chronic | ICD-10-CM

## 2017-09-11 DIAGNOSIS — I27.20 PULMONARY HTN (HCC): Chronic | ICD-10-CM

## 2017-09-11 DIAGNOSIS — G25.81 RESTLESS LEG SYNDROME: Chronic | ICD-10-CM

## 2017-09-11 DIAGNOSIS — K21.9 GERD WITHOUT ESOPHAGITIS: Chronic | ICD-10-CM

## 2017-09-11 DIAGNOSIS — E66.01 MORBID OBESITY, UNSPECIFIED OBESITY TYPE (HCC): Chronic | ICD-10-CM

## 2017-09-11 PROCEDURE — 99214 OFFICE O/P EST MOD 30 MIN: CPT | Performed by: NURSE PRACTITIONER

## 2017-09-11 RX ORDER — ROPINIROLE 0.5 MG/1
0.5 TABLET, FILM COATED ORAL NIGHTLY
Qty: 90 TABLET | Refills: 1 | Status: SHIPPED | OUTPATIENT
Start: 2017-09-11 | End: 2018-03-12 | Stop reason: SDUPTHER

## 2017-09-11 RX ORDER — ROPINIROLE 1 MG/1
1 TABLET, FILM COATED ORAL NIGHTLY
Qty: 90 TABLET | Refills: 1 | Status: SHIPPED | OUTPATIENT
Start: 2017-09-11 | End: 2018-03-12 | Stop reason: SDUPTHER

## 2017-09-11 ASSESSMENT — ENCOUNTER SYMPTOMS
APNEA: 0
RHINORRHEA: 0
SHORTNESS OF BREATH: 0
ABDOMINAL PAIN: 0
SINUS PRESSURE: 0
ABDOMINAL DISTENTION: 0
COUGH: 0

## 2017-09-11 ASSESSMENT — SLEEP AND FATIGUE QUESTIONNAIRES
HOW LIKELY ARE YOU TO NOD OFF OR FALL ASLEEP WHILE SITTING QUIETLY AFTER LUNCH WITHOUT ALCOHOL: 0
HOW LIKELY ARE YOU TO NOD OFF OR FALL ASLEEP WHILE LYING DOWN TO REST IN THE AFTERNOON WHEN CIRCUMSTANCES PERMIT: 0
HOW LIKELY ARE YOU TO NOD OFF OR FALL ASLEEP WHILE WATCHING TV: 0
HOW LIKELY ARE YOU TO NOD OFF OR FALL ASLEEP WHILE SITTING AND TALKING TO SOMEONE: 0
HOW LIKELY ARE YOU TO NOD OFF OR FALL ASLEEP IN A CAR, WHILE STOPPED FOR A FEW MINUTES IN TRAFFIC: 0
HOW LIKELY ARE YOU TO NOD OFF OR FALL ASLEEP WHILE SITTING INACTIVE IN A PUBLIC PLACE: 0
HOW LIKELY ARE YOU TO NOD OFF OR FALL ASLEEP WHILE SITTING AND READING: 0
HOW LIKELY ARE YOU TO NOD OFF OR FALL ASLEEP WHEN YOU ARE A PASSENGER IN A CAR FOR AN HOUR WITHOUT A BREAK: 0
NECK CIRCUMFERENCE (INCHES): 0
ESS TOTAL SCORE: 0

## 2017-09-14 ENCOUNTER — OFFICE VISIT (OUTPATIENT)
Dept: ORTHOPEDIC SURGERY | Age: 67
End: 2017-09-14

## 2017-09-14 ENCOUNTER — HOSPITAL ENCOUNTER (OUTPATIENT)
Dept: SLEEP MEDICINE | Age: 67
Discharge: HOME | End: 2017-09-15
Attending: NURSE PRACTITIONER | Admitting: NURSE PRACTITIONER

## 2017-09-14 VITALS
TEMPERATURE: 97.6 F | HEIGHT: 64 IN | DIASTOLIC BLOOD PRESSURE: 76 MMHG | HEART RATE: 75 BPM | WEIGHT: 242 LBS | SYSTOLIC BLOOD PRESSURE: 121 MMHG | BODY MASS INDEX: 41.32 KG/M2 | RESPIRATION RATE: 16 BRPM

## 2017-09-14 DIAGNOSIS — M25.561 PAIN IN BOTH KNEES, UNSPECIFIED CHRONICITY: Primary | ICD-10-CM

## 2017-09-14 DIAGNOSIS — E03.9 HYPOTHYROIDISM, UNSPECIFIED TYPE: Chronic | ICD-10-CM

## 2017-09-14 DIAGNOSIS — G25.81 RESTLESS LEG SYNDROME: Chronic | ICD-10-CM

## 2017-09-14 DIAGNOSIS — M17.0 PRIMARY OSTEOARTHRITIS OF BOTH KNEES: ICD-10-CM

## 2017-09-14 DIAGNOSIS — E66.01 MORBID OBESITY, UNSPECIFIED OBESITY TYPE (HCC): Chronic | ICD-10-CM

## 2017-09-14 DIAGNOSIS — I27.20 PULMONARY HTN (HCC): Chronic | ICD-10-CM

## 2017-09-14 DIAGNOSIS — G47.33 OSA (OBSTRUCTIVE SLEEP APNEA): Chronic | ICD-10-CM

## 2017-09-14 DIAGNOSIS — K21.9 GERD WITHOUT ESOPHAGITIS: Chronic | ICD-10-CM

## 2017-09-14 DIAGNOSIS — M25.562 PAIN IN BOTH KNEES, UNSPECIFIED CHRONICITY: Primary | ICD-10-CM

## 2017-09-14 DIAGNOSIS — J45.30 MILD PERSISTENT ASTHMA WITHOUT COMPLICATION: Chronic | ICD-10-CM

## 2017-09-14 PROCEDURE — 20610 DRAIN/INJ JOINT/BURSA W/O US: CPT | Performed by: PHYSICIAN ASSISTANT

## 2017-09-14 PROCEDURE — 95810 POLYSOM 6/> YRS 4/> PARAM: CPT | Performed by: INTERNAL MEDICINE

## 2017-09-14 PROCEDURE — 99214 OFFICE O/P EST MOD 30 MIN: CPT | Performed by: PHYSICIAN ASSISTANT

## 2017-09-19 ENCOUNTER — TELEPHONE (OUTPATIENT)
Dept: SLEEP MEDICINE | Age: 67
End: 2017-09-19

## 2017-09-19 PROBLEM — M17.0 PRIMARY OSTEOARTHRITIS OF BOTH KNEES: Status: ACTIVE | Noted: 2017-09-19

## 2017-09-27 ENCOUNTER — TELEPHONE (OUTPATIENT)
Dept: INTERNAL MEDICINE CLINIC | Age: 67
End: 2017-09-27

## 2017-09-27 RX ORDER — ALBUTEROL SULFATE 2.5 MG/3ML
2.5 SOLUTION RESPIRATORY (INHALATION) EVERY 4 HOURS PRN
Qty: 25 VIAL | Refills: 2 | Status: SHIPPED | OUTPATIENT
Start: 2017-09-27 | End: 2018-06-27

## 2017-10-02 ENCOUNTER — HOSPITAL ENCOUNTER (OUTPATIENT)
Dept: OTHER | Age: 67
Discharge: HOME | End: 2017-10-02
Admitting: OBSTETRICS & GYNECOLOGY

## 2017-10-02 VITALS — HEART RATE: 78 BPM | DIASTOLIC BLOOD PRESSURE: 82 MMHG | TEMPERATURE: 98 F | SYSTOLIC BLOOD PRESSURE: 150 MMHG

## 2017-10-02 NOTE — IP AVS SNAPSHOT
After Visit Summary  (Discharge Instructions)    Medication List for Home    Based on the information you provided to us as well as any changes during this visit, the following is your updated medication list.  Compare this with your prescription bottles at home. If you have any questions or concerns, contact your primary care physician's office.              Daily Medication List (This medication list can be shared with any healthcare provider who is helping you manage your medications)      ASK your doctor about these medications if you have questions        Last Dose    Next Dose Due AM NOON PM NIGHT    ADRENAL PO   Take by mouth Raw Adrenal Complex 350 mg 6 x's a day                                         AFRIN NASAL SPRAY NA   by Nasal route                                         albuterol (2.5 MG/3ML) 0.083% nebulizer solution   Commonly known as:  PROVENTIL   Take 3 mLs by nebulization every 4 hours as needed for Wheezing                                         ALIGN 4 MG Caps   Take by mouth daily                                         Avocado Oil Oil   by Does not apply route                                         azelastine 0.1 % nasal spray   Commonly known as:  ASTELIN   1 spray by Nasal route 2 times daily Use in each nostril as directed                                         beclomethasone 80 MCG/ACT inhaler   Commonly known as:  QVAR   Inhale 1 puff into the lungs 2 times daily                                         celecoxib 200 MG capsule   Commonly known as:  CELEBREX   TAKE 1 CAPSULE BY MOUTH DAILY                                         clonazePAM 1 MG tablet   Commonly known as:  KLONOPIN   TAKE 1 TABLET BY MOUTH TWICE DAILY AS NEEDED                                         CoQ10 100 MG Caps   Take by mouth                                         EPIPEN 2-MIR 0.3 MG/0.3ML Soaj injection   Generic drug:  EPINEPHrine                                         Fish Oil 1200 MG Caps rOPINIRole 1 MG tablet   Commonly known as:  REQUIP   Take 1 tablet by mouth nightly                                         thyroid 60 MG tablet   Commonly known as:  ARMOUR THYROID   TAKE 1 TABLET BY MOUTH DAILY                                         UNABLE TO FIND   Strontium Citrate 340 mg                                         VIOS AEROSOL DELIVERY SYSTEM Misc   USE EVERY 4 HOURS AS NEEDED FOR WHEEZING                                         VITAMIN B COMPLEX PO   Take by mouth                                         vitamin C 500 MG tablet   Take 1,000 mg by mouth 4 times daily                                         Vitamin D3 5000 units Tabs   Take 1 each by mouth daily                                                 Allergies as of 10/2/2017        Reactions    Cephalexin Hives, Itching    Cephalosporins Hives, Itching    Food Diarrhea    Milk , shellfish , gluten. ..may have bouts of diarrhea, constipation or flatulus    Other     Environmental -cats,dogs,dust    Sulfa Antibiotics     Can take Celebrex      Immunizations as of 10/2/2017     Name Date Dose VIS Date Route    Influenza Virus Vaccine 10/5/2011 -- -- --    Influenza, Intradermal, Preservative free 10/8/2015 0.1 mL 8/7/2015 Intradermal    Influenza, Intradermal, Preservative free 10/7/2014 0.5 mL 6/30/2011 Intradermal    Influenza, Intradermal, Preservative free 9/16/2013 0.1ml 6/30/2011 Intradermal    Influenza, Intradermal, Preservative free 10/2/2012 0.5ml 6/30/2011 Intradermal    Influenza, Quadv, 3 yrs and older, IM, Preservative Free 10/5/2016 0.5 mL 8/7/2015 Intramuscular    Pneumococcal Conjugate 7-valent 10/20/2012 -- -- --    Pneumococcal Polysaccharide (Edhrrmcmx68) 8/5/2016 0.5 mL 4/24/2015 Intramuscular    Td 7/30/2007 -- -- --    Tdap (Boostrix, Adacel) 10/2/2012 0.5 mL 1/24/2012 Intramuscular      Last Vitals          Most Recent Value    Temp  98 °F (36.7 °C)    Pulse  78    Resp      BP  (!)  150/82 After Visit Summary    This summary was created for you. Thank you for entrusting your care to us. The following information includes details about your hospital/visit stay along with steps you should take to help with your recovery once you leave the hospital.  In this packet, you will find information about the topics listed below:    · Instructions about your medications including a list of your home medications  · A summary of your hospital visit  · Follow-up appointments once you have left the hospital  · Your care plan at home      You may receive a survey regarding the care you received during your stay. Your input is valuable to us. We encourage you to complete and return your survey in the envelope provided. We hope you will choose us in the future for your healthcare needs. Patient Information     Patient Name ZAY Callahan 1950      Care Provided at:     Name Address Phone       [address] [address] [phone]            Your Visit    Here you will find information about your visit, including the reason for your visit. Please take this sheet with you when you visit your doctor or other health care provider in the future. It will help determine the best possible medical care for you at that time. If you have any questions once you leave the hospital, please call the department phone number listed below. Why you were here     Your primary diagnosis was:  Not on File      Visit Information     Date & Time Department Dept. Phone    10/2/2017 812 [facility] [phone]       Follow-up Appointments    Below is a list of your follow-up and future appointments. This may not be a complete list as you may have made appointments directly with providers that we are not aware of or your providers may have made some for you. Please call your providers to confirm appointments. It is important to keep your appointments. Please bring your current insurance card, photo ID, co-pay, and all medication bottles to your appointment. If self-pay, payment is expected at the time of service. Future Appointments     10/4/2017 1:30 PM     Appointment with Garo Saunders PT at [address] ([phone])   [address] [facility] [address] [facility]       10/6/2017 1:30 PM     Appointment with Uday Vasquez PT at [address] ([phone])   [address] [facility] [address] [facility]       10/9/2017 1:30 PM     Appointment with Uday Vasquez PT at [address] ([phone])   [phone]       10/13/2017 1:30 PM     Appointment with Garo Saunders PT at [address] ([phone])   [phone]       10/16/2017 1:30 PM     Appointment with Garo Saunders PT at [address] ([phone])   [phone]       10/18/2017 2:00 PM     Appointment with Uday Vasquez PT at [address] ([phone])   [address] [address]       10/23/2017 1:30 PM     Appointment with Garo Saunders PT at [address] ([phone])   [address] [address]       10/26/2017 9:00 AM     Appointment with Bert Jones MD at [address] ([phone])   Please arrive 15 minutes prior to appointment time, bring insurance card and photo ID.    [address] [address]       10/26/2017 11:25 AM     Appointment with Almaz Dueñas CNP; SCHEDULE, [facility] at [address] ([phone])   Please arrive 15 minutes prior to appointment time, bring insurance card and photo ID.    [address]   [address]       10/27/2017 2:00 PM     Appointment with Uday Vasquez PT at [address] [facility] [address] ([phone])   [phone] 10/30/2017 2:00 PM     Appointment with Prince Son PT at [address] ([phone])   [phone]       11/3/2017 2:00 PM     Appointment with Prince Son PT at [address] [phone])   [address] [address]       11/6/2017 2:00 PM     Appointment with Prince Son PT at [address] ([phone])   [address]       2/2/2018 10:45 AM     Appointment with Columba Tanner MD at [facility] Internal Medicine ([phone])   Please arrive 15 minutes prior to appointment, bring photo ID and insurance card. [address] [place] [address]       3/12/2018 11:40 AM     Appointment with Nahun Still CNP at [facility] Sleep Medicine ([phone])   Please arrive 15 minutes prior to appointment, bring photo ID and insurance card. [address] [facility]         Preventive Care        Date Due    Colonoscopy 6/4/2012    Zoster Vaccine 10/5/2012    Yearly Flu Vaccine (1) 9/1/2017    Diabetes Screening 4/12/2019    Mammograms are recommended every 2 years for low/average risk patients aged 48 - 69, and every year for high risk patients per updated national guidelines. However these guidelines can be individualized by your provider. 6/21/2019    Pap Smear 3/24/2020    Cholesterol Screening 8/4/2022    Tetanus Combination Vaccine (2 - Td) 10/2/2022                 Care Plan Once You Return Home    This section includes instructions you will need to follow once you leave the hospital.  Your care team will discuss these with you, so you and those caring for you know how to best care for your health needs at home. This section may also include educational information about certain health topics that may be of help to you.           Discharge [address] Physician Orders and Discharge Instructions  [facility] [address] [address]  Telephone: [phone]    NAME:  Ten Yo OF BIRTH:  1950  MEDICAL RECORD NUMBER:  [id number]  DATE:  10/2/2017    You have completed treatment number: 3    Discharge Instructions for [facility] Internal Procedure      Do not insert anything into the vagina for 48 hours after treatment. Refrain from vaginal sexual activity for 48 hours after treatment. You may resume normal activity as tolerated immediately after procedure.     No heavy lifting or strenuous exercise for 48 hours after treatment. [facility] Information: Should you experience any significant changes in your health or have questions about your care or procedure, please contact the [facility] at [phone] option #4 Ger AMBROSE, RN) [address] [place] [address] 8:00 am - 4:30 pm.  If you need help with your care outside these hours and cannot wait until we are again available, contact your Primary Care Physician or go to the hospital emergency room. Patient verbalized understanding of Discharge Instructions    Return Appointment:      Your treatments are completed no follow treatments are scheduled at this time   You may contact your physician if further follow-up is needed. Electronically signed by Helayne Closs, RN on 10/2/2017 at 12:37 PM     Important information for a smoker       SMOKING: QUIT SMOKING. THIS IS THE MOST IMPORTANT ACTION YOU CAN TAKE TO IMPROVE YOUR CURRENT AND FUTURE HEALTH. Call the [address] [place] [address] at [place] NOW ([phone])    Smoking harms nonsmokers. When nonsmokers are around people who smoke, they absorb nicotine, carbon monoxide, and other ingredients of tobacco smoke.      DO NOT SMOKE AROUND CHILDREN     Children exposed to secondhand smoke are at an increased risk of:  Sudden Infant Death Syndrome (SIDS), acute respiratory infections, inflammation of the middle ear, and severe asthma. Over a longer time, it causes heart disease and lung cancer. There is no safe level of exposure to secondhand smoke. MyChart Signup     Our records indicate that you have an active MyChart account. You can view your After Visit Summary by going to [email or web address] and logging in with your [employer] username and password. If you don't have a [employer] username and password but a parent or guardian has access to your record, the parent or guardian should login with their own [employer]meeet username and password and access your record to view the After Visit Summary. Additional Information  If you have questions, please contact the physician practice where you receive care. Remember, [employer]et is NOT to be used for urgent needs. For medical emergencies, dial 911. For questions regarding your [employer] account call [phone]. If you have a clinical question, please call your doctor's office. View your information online  ? Review your current list of  medications, immunization, and allergies. ? Review your future test results online . ? Review your discharge instructions provided by your caregivers at discharge    Certain functionality such as prescription refills, scheduling appointments or sending messages to your provider are not activated if your provider does not use [employer] in his/her office    For questions regarding your MyChart account call [phone]. If you have a clinical question, please call your doctor's office. The information on all pages of the After Visit Summary has been reviewed with me, the patient and/or responsible adult, by my health care provider(s). I had the opportunity to ask questions regarding this information. I understand I should dispose of my armband safely at home to protect my health information.  A complete copy of the After Visit Summary

## 2017-10-02 NOTE — IP AVS SNAPSHOT
After Visit Summary  (Discharge Instructions)    Medication List for Home    Based on the information you provided to us as well as any changes during this visit, the following is your updated medication list.  Compare this with your prescription bottles at home. If you have any questions or concerns, contact your primary care physician's office.              Daily Medication List (This medication list can be shared with any healthcare provider who is helping you manage your medications)      ASK your doctor about these medications if you have questions        Last Dose    Next Dose Due AM NOON PM NIGHT    ADRENAL PO   Take by mouth Raw Adrenal Complex 350 mg 6 x's a day                                         AFRIN NASAL SPRAY NA   by Nasal route                                         albuterol (2.5 MG/3ML) 0.083% nebulizer solution   Commonly known as:  PROVENTIL   Take 3 mLs by nebulization every 4 hours as needed for Wheezing                                         ALIGN 4 MG Caps   Take by mouth daily                                         Avocado Oil Oil   by Does not apply route                                         azelastine 0.1 % nasal spray   Commonly known as:  ASTELIN   1 spray by Nasal route 2 times daily Use in each nostril as directed                                         beclomethasone 80 MCG/ACT inhaler   Commonly known as:  QVAR   Inhale 1 puff into the lungs 2 times daily                                         celecoxib 200 MG capsule   Commonly known as:  CELEBREX   TAKE 1 CAPSULE BY MOUTH DAILY                                         clonazePAM 1 MG tablet   Commonly known as:  KLONOPIN   TAKE 1 TABLET BY MOUTH TWICE DAILY AS NEEDED                                         CoQ10 100 MG Caps   Take by mouth                                         EPIPEN 2-MIR 0.3 MG/0.3ML Soaj injection   Generic drug:  EPINEPHrine                                         Fish Oil 1200 MG Caps Take by mouth                                         fluticasone 50 MCG/ACT nasal spray   Commonly known as:  FLONASE   1 spray by Nasal route 2 times daily                                         furosemide 20 MG tablet   Commonly known as:  LASIX   Take 1 tablet by mouth daily as needed (Ankle swelling)                                         GINGER PO   Take by mouth                                         Gymnema Sylvestris Leaf Powd   by Does not apply route 400 mg am/pm                                         Licorice Extr   by Does not apply route Licorice root liquid 1 qtts qid                                         lisinopril-hydrochlorothiazide 20-25 MG per tablet   Commonly known as:  PRINZIDE;ZESTORETIC   TAKE 1 TABLET BY MOUTH DAILY                                         Lutein-Zeaxanthin 25-5 MG Caps   Take 1 tablet by mouth daily.                                          Magnesium Malate Powd   by Does not apply route daily                                         methocarbamol 500 MG tablet   Commonly known as:  ROBAXIN   Take 500 mg by mouth 4 times daily                                         montelukast 10 MG tablet   Commonly known as:  SINGULAIR   Take 1 tablet by mouth daily                                         NATTOKINASE PO   Take by mouth daily                                         potassium chloride 10 MEQ extended release tablet   Commonly known as:  KLOR-CON M   Take 1 tablet by mouth daily                                         prazosin 2 MG capsule   Commonly known as:  MINIPRESS   Take 1 mg by mouth nightly                                         Progesterone 40 % Crea   by Does not apply route                                         rivaroxaban 20 MG Tabs tablet   Commonly known as:  XARELTO   Take 1 tablet by mouth daily                                         rOPINIRole 0.5 MG tablet   Commonly known as:  REQUIP   Take 1 tablet by mouth nightly rOPINIRole 1 MG tablet   Commonly known as:  REQUIP   Take 1 tablet by mouth nightly                                         thyroid 60 MG tablet   Commonly known as:  ARMOUR THYROID   TAKE 1 TABLET BY MOUTH DAILY                                         UNABLE TO FIND   Strontium Citrate 340 mg                                         VIOS AEROSOL DELIVERY SYSTEM Misc   USE EVERY 4 HOURS AS NEEDED FOR WHEEZING                                         VITAMIN B COMPLEX PO   Take by mouth                                         vitamin C 500 MG tablet   Take 1,000 mg by mouth 4 times daily                                         Vitamin D3 5000 units Tabs   Take 1 each by mouth daily                                                 Allergies as of 10/2/2017        Reactions    Cephalexin Hives, Itching    Cephalosporins Hives, Itching    Food Diarrhea    Milk , shellfish , gluten. ..may have bouts of diarrhea, constipation or flatulus    Other     Environmental -cats,dogs,dust    Sulfa Antibiotics     Can take Celebrex      Immunizations as of 10/2/2017     Name Date Dose VIS Date Route    Influenza Virus Vaccine 10/5/2011 -- -- --    Influenza, Intradermal, Preservative free 10/8/2015 0.1 mL 8/7/2015 Intradermal    Influenza, Intradermal, Preservative free 10/7/2014 0.5 mL 6/30/2011 Intradermal    Influenza, Intradermal, Preservative free 9/16/2013 0.1ml 6/30/2011 Intradermal    Influenza, Intradermal, Preservative free 10/2/2012 0.5ml 6/30/2011 Intradermal    Influenza, Quadv, 3 yrs and older, IM, Preservative Free 10/5/2016 0.5 mL 8/7/2015 Intramuscular    Pneumococcal Conjugate 7-valent 10/20/2012 -- -- --    Pneumococcal Polysaccharide (Fonazsrfm99) 8/5/2016 0.5 mL 4/24/2015 Intramuscular    Td 7/30/2007 -- -- --    Tdap (Boostrix, Adacel) 10/2/2012 0.5 mL 1/24/2012 Intramuscular      Last Vitals          Most Recent Value    Temp  98 °F (36.7 °C)    Pulse  78    Resp      BP  (!)  150/82 It is important to keep your appointments. Please bring your current insurance card, photo ID, co-pay, and all medication bottles to your appointment. If self-pay, payment is expected at the time of service. Future Appointments     10/4/2017 1:30 PM     Appointment with Ernie Guo, PT at [address] ([phone])   [address] [facility]       10/6/2017 1:30 PM     Appointment with William Harris, PT at [address] ([phone])   [address] [facility] [address]       10/9/2017 1:30 PM     Appointment with William Harris, PT at [address] ([phone])   [phone]       10/13/2017 1:30 PM     Appointment with Ernie Guo, PT at [address] ([phone])   [phone]       10/16/2017 1:30 PM     Appointment with Ernie Guo, PT at [address] ([phone])   [address] [address]       10/18/2017 2:00 PM     Appointment with William Harris, PT at [address] ([phone])   [phone]       10/23/2017 1:30 PM     Appointment with Ernie Guo PT at [address] ([phone])   [phone]       10/26/2017 9:00 AM     Appointment with Mae Gray MD at [address] ([phone])   Please arrive 15 minutes prior to appointment time, bring insurance card and photo ID.    [address] [address]       10/26/2017 11:25 AM     Appointment with Jewell Elkins CNP; SCHEDULE, [facility] at [address] ([phone])   Please arrive 15 minutes prior to appointment time, bring insurance card and photo ID.    [address]   [address]       10/27/2017 2:00 PM     Appointment with Wililam Harris PT at [address] [facility] [address] ([phone])   [phone]

## 2017-10-26 ENCOUNTER — OFFICE VISIT (OUTPATIENT)
Dept: ORTHOPEDIC SURGERY | Age: 67
End: 2017-10-26

## 2017-10-26 VITALS
RESPIRATION RATE: 15 BRPM | HEART RATE: 67 BPM | TEMPERATURE: 98.1 F | WEIGHT: 242 LBS | SYSTOLIC BLOOD PRESSURE: 104 MMHG | DIASTOLIC BLOOD PRESSURE: 64 MMHG | BODY MASS INDEX: 41.32 KG/M2 | HEIGHT: 64 IN

## 2017-10-26 DIAGNOSIS — M17.0 PRIMARY OSTEOARTHRITIS OF BOTH KNEES: Primary | ICD-10-CM

## 2017-10-26 PROCEDURE — 1123F ACP DISCUSS/DSCN MKR DOCD: CPT | Performed by: PHYSICIAN ASSISTANT

## 2017-10-26 PROCEDURE — 1090F PRES/ABSN URINE INCON ASSESS: CPT | Performed by: PHYSICIAN ASSISTANT

## 2017-10-26 PROCEDURE — G8484 FLU IMMUNIZE NO ADMIN: HCPCS | Performed by: PHYSICIAN ASSISTANT

## 2017-10-26 PROCEDURE — 99212 OFFICE O/P EST SF 10 MIN: CPT | Performed by: PHYSICIAN ASSISTANT

## 2017-10-26 PROCEDURE — 4040F PNEUMOC VAC/ADMIN/RCVD: CPT | Performed by: PHYSICIAN ASSISTANT

## 2017-10-26 PROCEDURE — 1036F TOBACCO NON-USER: CPT | Performed by: PHYSICIAN ASSISTANT

## 2017-10-26 PROCEDURE — G8427 DOCREV CUR MEDS BY ELIG CLIN: HCPCS | Performed by: PHYSICIAN ASSISTANT

## 2017-10-26 PROCEDURE — G8417 CALC BMI ABV UP PARAM F/U: HCPCS | Performed by: PHYSICIAN ASSISTANT

## 2017-10-26 PROCEDURE — 3014F SCREEN MAMMO DOC REV: CPT | Performed by: PHYSICIAN ASSISTANT

## 2017-10-26 PROCEDURE — G8400 PT W/DXA NO RESULTS DOC: HCPCS | Performed by: PHYSICIAN ASSISTANT

## 2017-10-26 PROCEDURE — 3017F COLORECTAL CA SCREEN DOC REV: CPT | Performed by: PHYSICIAN ASSISTANT

## 2017-10-27 NOTE — PROGRESS NOTES
Subjective:      Patient ID: Rao Finch is a 74 y.o.  female. Chief Complaint   Patient presents with    Follow-up     bilateral knee pain. last injection 09/19/2017        HPI: She is here for follow up on bilateral knee(s). She states symptoms have improved with the recent cortisone injection performed on 9/19/2017. Pain is on average 0/10 on the right hand 2/10 on the left. Pain is worse with increased activity/ weight bearing. Pain improves with rest/ elevation. Review of Systems:   Negative for fever or chills. Negative for significant numbness or tingling in lower extremity.       Past Medical History:   Diagnosis Date    Allergic     Anesthesia complication     family hx-father-at at age 80 having hip replacement revision surgery-had tia's x2 while under anes-but also had hx chf-had dificuly with speech when coming out from anes    Anxiety     Arthritis     left ankle, bilateral hands    Arthritis of left hip 2/2/2016    Arthritis of right hip 4/19/2016    Asthma     At risk for falls     uses a cane    CTEPH (chronic thromboembolic pulmonary hypertension) 8/26/2016    Depression     pt stated r/t bad marriage/alcoholic spouse    Disc disease, degenerative, lumbar or lumbosacral 2/20/2017    History of total hip arthroplasty 2/28/2017    Hypertension     Leg cramps     patient states very severe, requires immediate potassium administration    Migraines, basilar     last migraine 10 years ago    Mild persistent asthma without complication 9/84/8355    HUSSAIN (obstructive sleep apnea) 10/14/2013    Osteoarthritis, hip, bilateral 2016    Bilateral hip arthroplasty    Panic attacks     Pneumonia 2015    Primary osteoarthritis of left knee 12/15/2016    Pulmonary emboli ([address] 8/4/2016    Pulmonary HTN 7/21/2016    Restless leg syndrome     Rhinitis, chronic 3/26/2014    Sleep apnea     wears CPAP @11    Stress incontinence     Thyroid disease     hypothyroid    Wears glasses periodically. The patient expresses understanding of these issues and questions were answered. Discussed Visco supplementation. She will call if she decides to proceed with this option for her knees. Follow Up:  Call or return to clinic prn if these symptoms worsen or fail to improve as anticipated.

## 2017-10-31 ENCOUNTER — TELEPHONE (OUTPATIENT)
Dept: ORTHOPEDIC SURGERY | Age: 67
End: 2017-10-31

## 2017-10-31 NOTE — TELEPHONE ENCOUNTER
Pt is calling about getting the injections set up for her knee in November.   I was not sure if we have to order the Euflexa and approve it thru insurance  If we could just let her know  [phone]

## 2017-11-01 ENCOUNTER — HOSPITAL ENCOUNTER (OUTPATIENT)
Dept: OTHER | Age: 67
Discharge: HOME | End: 2017-11-30
Attending: PHYSICAL MEDICINE & REHABILITATION | Admitting: PHYSICAL MEDICINE & REHABILITATION

## 2017-11-01 ENCOUNTER — HOSPITAL ENCOUNTER (OUTPATIENT)
Dept: OTHER | Age: 67
Discharge: HOME | End: 2017-11-30
Attending: INTERNAL MEDICINE | Admitting: INTERNAL MEDICINE

## 2017-11-02 RX ORDER — RIVAROXABAN 20 MG/1
20 TABLET, FILM COATED ORAL DAILY
Qty: 90 TABLET | Refills: 1 | Status: SHIPPED | OUTPATIENT
Start: 2017-11-02 | End: 2018-02-23 | Stop reason: HOSPADM

## 2017-11-07 ENCOUNTER — OFFICE VISIT (OUTPATIENT)
Dept: INTERNAL MEDICINE CLINIC | Age: 67
End: 2017-11-07

## 2017-11-07 ENCOUNTER — TELEPHONE (OUTPATIENT)
Dept: CARDIOLOGY CLINIC | Age: 67
End: 2017-11-07

## 2017-11-07 ENCOUNTER — HOSPITAL ENCOUNTER (OUTPATIENT)
Dept: OTHER | Age: 67
Discharge: HOME | End: 2017-11-07
Attending: INTERNAL MEDICINE | Admitting: INTERNAL MEDICINE

## 2017-11-07 VITALS
HEART RATE: 76 BPM | WEIGHT: 248 LBS | DIASTOLIC BLOOD PRESSURE: 82 MMHG | BODY MASS INDEX: 42.57 KG/M2 | SYSTOLIC BLOOD PRESSURE: 134 MMHG

## 2017-11-07 DIAGNOSIS — Z79.01 ANTICOAGULANT LONG-TERM USE: ICD-10-CM

## 2017-11-07 DIAGNOSIS — K62.5 RECTAL BLEEDING: Primary | ICD-10-CM

## 2017-11-07 DIAGNOSIS — I26.99 OTHER PULMONARY EMBOLISM WITHOUT ACUTE COR PULMONALE, UNSPECIFIED CHRONICITY (HCC): ICD-10-CM

## 2017-11-07 LAB
ANION GAP SERPL CALCULATED.3IONS-SCNC: 15 MMOL/L (ref 3–16)
BASOPHILS ABSOLUTE: 0.1 K/UL (ref 0–0.2)
BASOPHILS RELATIVE PERCENT: 0.9 %
BUN BLDV-MCNC: 21 MG/DL (ref 7–20)
CALCIUM SERPL-MCNC: 9.9 MG/DL (ref 8.3–10.6)
CHLORIDE BLD-SCNC: 102 MMOL/L (ref 99–110)
CO2: 22 MMOL/L (ref 21–32)
CREAT SERPL-MCNC: 0.7 MG/DL (ref 0.6–1.2)
EOSINOPHILS ABSOLUTE: 0.1 K/UL (ref 0–0.6)
EOSINOPHILS RELATIVE PERCENT: 1.9 %
GFR AFRICAN AMERICAN: >60
GFR NON-AFRICAN AMERICAN: >60
GLUCOSE BLD-MCNC: 93 MG/DL (ref 70–99)
HCT VFR BLD CALC: 39.3 % (ref 36–48)
HEMOGLOBIN: 13.1 G/DL (ref 12–16)
LYMPHOCYTES ABSOLUTE: 1.5 K/UL (ref 1–5.1)
LYMPHOCYTES RELATIVE PERCENT: 22.8 %
MCH RBC QN AUTO: 31.9 PG (ref 26–34)
MCHC RBC AUTO-ENTMCNC: 33.3 G/DL (ref 31–36)
MCV RBC AUTO: 95.9 FL (ref 80–100)
MONOCYTES ABSOLUTE: 0.7 K/UL (ref 0–1.3)
MONOCYTES RELATIVE PERCENT: 9.7 %
NEUTROPHILS ABSOLUTE: 4.4 K/UL (ref 1.7–7.7)
NEUTROPHILS RELATIVE PERCENT: 64.7 %
PDW BLD-RTO: 14 % (ref 12.4–15.4)
PLATELET # BLD: 240 K/UL (ref 135–450)
PMV BLD AUTO: 9.4 FL (ref 5–10.5)
POTASSIUM SERPL-SCNC: 4.3 MMOL/L (ref 3.5–5.1)
RBC # BLD: 4.1 M/UL (ref 4–5.2)
SODIUM BLD-SCNC: 139 MMOL/L (ref 136–145)
WBC # BLD: 6.7 K/UL (ref 4–11)

## 2017-11-07 PROCEDURE — 3014F SCREEN MAMMO DOC REV: CPT | Performed by: INTERNAL MEDICINE

## 2017-11-07 PROCEDURE — 3017F COLORECTAL CA SCREEN DOC REV: CPT | Performed by: INTERNAL MEDICINE

## 2017-11-07 PROCEDURE — 1036F TOBACCO NON-USER: CPT | Performed by: INTERNAL MEDICINE

## 2017-11-07 PROCEDURE — G8417 CALC BMI ABV UP PARAM F/U: HCPCS | Performed by: INTERNAL MEDICINE

## 2017-11-07 PROCEDURE — 1123F ACP DISCUSS/DSCN MKR DOCD: CPT | Performed by: INTERNAL MEDICINE

## 2017-11-07 PROCEDURE — 99213 OFFICE O/P EST LOW 20 MIN: CPT | Performed by: INTERNAL MEDICINE

## 2017-11-07 PROCEDURE — 1090F PRES/ABSN URINE INCON ASSESS: CPT | Performed by: INTERNAL MEDICINE

## 2017-11-07 PROCEDURE — G8400 PT W/DXA NO RESULTS DOC: HCPCS | Performed by: INTERNAL MEDICINE

## 2017-11-07 PROCEDURE — G8427 DOCREV CUR MEDS BY ELIG CLIN: HCPCS | Performed by: INTERNAL MEDICINE

## 2017-11-07 PROCEDURE — 4040F PNEUMOC VAC/ADMIN/RCVD: CPT | Performed by: INTERNAL MEDICINE

## 2017-11-07 PROCEDURE — G8484 FLU IMMUNIZE NO ADMIN: HCPCS | Performed by: INTERNAL MEDICINE

## 2017-11-07 ASSESSMENT — ENCOUNTER SYMPTOMS
ABDOMINAL PAIN: 1
HEARTBURN: 0
VOMITING: 0
CONSTIPATION: 0
BLOOD IN STOOL: 1
DIARRHEA: 1
NAUSEA: 0

## 2017-11-07 NOTE — PROGRESS NOTES
3/26/2014    Sleep apnea     wears CPAP @11    Stress incontinence     Thyroid disease     hypothyroid    Wears glasses        Review of Systems   Constitutional: Negative for malaise/fatigue and weight loss. Gastrointestinal: Positive for abdominal pain, blood in stool, diarrhea and melena. Negative for constipation, heartburn, nausea and vomiting. Neurological: Negative for weakness. Physical Exam   Constitutional: She is oriented to person, place, and time. She appears well-developed and well-nourished. No distress. HENT:   Head: Normocephalic and atraumatic. Pulmonary/Chest: Effort normal.   Abdominal: Soft. Bowel sounds are normal. She exhibits no distension and no mass. There is tenderness (Diffuse). There is no rebound and no guarding. No hernia. Neurological: She is alert and oriented to person, place, and time. No cranial nerve deficit. Skin: She is not diaphoretic. Psychiatric: She has a normal mood and affect. Her behavior is normal. Judgment and thought content normal.   Vitals reviewed. Assessment/Plan:  Mykel Mcgraw was seen today for rectal bleeding.     Diagnoses and all orders for this visit:    Rectal bleeding  -     CBC Auto Differential  -     Basic Metabolic Panel    Anticoagulant long-term use  -     CBC Auto Differential  -     Basic Metabolic Panel    Other pulmonary embolism without acute cor pulmonale, unspecified chronicity (HCC)      Adelina Hart MD

## 2017-11-07 NOTE — TELEPHONE ENCOUNTER
Last night she started with diarrhea about 9pm and had it every hour for about 5 hours and it was bloody . It stopped about 2am and she hasnt had bloody stool since . Her last dose of xarelto was Sunday night because she was scared to take it last night . Should she take her xarelto tonight ?  Please call patient to advise

## 2017-11-08 ENCOUNTER — OFFICE VISIT (OUTPATIENT)
Dept: ORTHOPEDIC SURGERY | Age: 67
End: 2017-11-08

## 2017-11-08 ENCOUNTER — TELEPHONE (OUTPATIENT)
Dept: INTERNAL MEDICINE CLINIC | Age: 67
End: 2017-11-08

## 2017-11-08 VITALS
SYSTOLIC BLOOD PRESSURE: 134 MMHG | HEART RATE: 66 BPM | BODY MASS INDEX: 42.34 KG/M2 | HEIGHT: 64 IN | RESPIRATION RATE: 16 BRPM | TEMPERATURE: 98 F | DIASTOLIC BLOOD PRESSURE: 78 MMHG | WEIGHT: 248 LBS

## 2017-11-08 DIAGNOSIS — M17.0 PRIMARY OSTEOARTHRITIS OF BOTH KNEES: Primary | ICD-10-CM

## 2017-11-08 PROCEDURE — 20610 DRAIN/INJ JOINT/BURSA W/O US: CPT | Performed by: PHYSICIAN ASSISTANT

## 2017-11-10 ENCOUNTER — TELEPHONE (OUTPATIENT)
Dept: CARDIOLOGY CLINIC | Age: 67
End: 2017-11-10

## 2017-11-10 DIAGNOSIS — I27.20 PULMONARY HTN (HCC): Primary | ICD-10-CM

## 2017-11-13 NOTE — TELEPHONE ENCOUNTER
Took her xarelto for 2 days and started bleeding again . She has not taken xarelto since Friday . Is still waiting to get a call from [facility] about what to do and what to take .

## 2017-11-14 NOTE — TELEPHONE ENCOUNTER
I spoke with pt and relayed message per ERICKA. She verbalized understanding. She said that she had those tests on 11/7/2017 , does she still need them in 3-4 weeks?

## 2017-11-15 ENCOUNTER — OFFICE VISIT (OUTPATIENT)
Dept: ORTHOPEDIC SURGERY | Age: 67
End: 2017-11-15

## 2017-11-15 DIAGNOSIS — M17.0 PRIMARY OSTEOARTHRITIS OF BOTH KNEES: Primary | ICD-10-CM

## 2017-11-15 PROCEDURE — 20610 DRAIN/INJ JOINT/BURSA W/O US: CPT | Performed by: PHYSICIAN ASSISTANT

## 2017-11-15 NOTE — PROGRESS NOTES
Subjective:    She  is here for repeat Euflexxa injection #2 for  Bilateral  knees. Objective:   not currently breastfeeding. There is a mild joint effusion noted of the left and right knee. There is mild pain with range of motion testing. There is no significant  instability noted. Assessment:    ICD-10-CM ICD-9-CM    1. Primary osteoarthritis of both knees M17.0 715.16 08601 - MD DRAIN/INJECT LARGE JOINT/BURSA      EUFLEXXA INJ PER DOSE       Plan:  Proceed with repeat injection in the series of 3 injections. PROCEDURE NOTE:  PRE-PROCEDURE DIAGNOSIS: DJD knee  POST-PROCEDURE DIAGNOSIS: DJD knee  With the patient's permission, her left and right knee was prepped in standard sterile fashion with  Alcohol. The prefilled injection of the preferred visco supplementation ( Euflexxa 20 mg/ 2 ML ) was injected into the left and right  lateral joint space compartment without difficulty. The patient tolerated the procedure(s) well without difficulty. A band-aid was applied. POST-PROCEDURE INSTRUCTIONS GIVEN TO PATIENT:   She was advised to ice the knee for 15-20 minutes to relieve any injection site related pain. Decrease activity for the next 24 to 48 hours. May use prescription or OTC pain relievers as needed    FOLLOW-UP:   As directed or call or return to clinic if these symptoms worsen or fail to improve as anticipated. If at any time you are concerned you may contact the office for further instructions or care.

## 2017-11-16 ENCOUNTER — TELEPHONE (OUTPATIENT)
Dept: INTERNAL MEDICINE CLINIC | Age: 67
End: 2017-11-16

## 2017-11-16 NOTE — TELEPHONE ENCOUNTER
Offered patient appointment in the afternoon. Patient was not friendly about it and expected us to work her in during a time she felt was convenient for her. Appointment was made for Wednesday.

## 2017-11-16 NOTE — TELEPHONE ENCOUNTER
Patient states she was seen at [facility] yesterday. She has 11 stitches in forehead. She is asking to come in for f/u and stitch removal on Tuesday. She has another appointment that day 10 am at [facility] and asked to come after that. Patient also asked if she should put neosporin on forehead wound.

## 2017-11-20 ENCOUNTER — TELEPHONE (OUTPATIENT)
Dept: ORTHOPEDIC SURGERY | Age: 67
End: 2017-11-20

## 2017-11-20 NOTE — TELEPHONE ENCOUNTER
Patient states that she had a fall last Weds and suffered a concussion and deep bruising ( where she went to try and break the fall) on both knees, and hands. She states that she is due for her 3rd Euflexxa injection tomorrow- she wants to know of she should have the injection done tomorrow considering the deep bruising ( she takes xarelto also)    She wants to know what SDK thinks- if he wants her to wait that is fine, if he thinks she should get the injection still she will have to get transport because she cannot drive right now do to the concussion.     Pls call patient at [phone]

## 2017-11-22 ENCOUNTER — OFFICE VISIT (OUTPATIENT)
Dept: INTERNAL MEDICINE CLINIC | Age: 67
End: 2017-11-22

## 2017-11-22 VITALS
BODY MASS INDEX: 40.6 KG/M2 | DIASTOLIC BLOOD PRESSURE: 82 MMHG | SYSTOLIC BLOOD PRESSURE: 136 MMHG | HEART RATE: 76 BPM | WEIGHT: 244 LBS

## 2017-11-22 DIAGNOSIS — S09.90XD TRAUMATIC INJURY OF HEAD, SUBSEQUENT ENCOUNTER: Primary | ICD-10-CM

## 2017-11-22 DIAGNOSIS — Z48.89 SUTURE CHECK: ICD-10-CM

## 2017-11-22 PROCEDURE — 3017F COLORECTAL CA SCREEN DOC REV: CPT | Performed by: INTERNAL MEDICINE

## 2017-11-22 PROCEDURE — 4040F PNEUMOC VAC/ADMIN/RCVD: CPT | Performed by: INTERNAL MEDICINE

## 2017-11-22 PROCEDURE — G8484 FLU IMMUNIZE NO ADMIN: HCPCS | Performed by: INTERNAL MEDICINE

## 2017-11-22 PROCEDURE — 1090F PRES/ABSN URINE INCON ASSESS: CPT | Performed by: INTERNAL MEDICINE

## 2017-11-22 PROCEDURE — G8417 CALC BMI ABV UP PARAM F/U: HCPCS | Performed by: INTERNAL MEDICINE

## 2017-11-22 PROCEDURE — G8427 DOCREV CUR MEDS BY ELIG CLIN: HCPCS | Performed by: INTERNAL MEDICINE

## 2017-11-22 PROCEDURE — G8400 PT W/DXA NO RESULTS DOC: HCPCS | Performed by: INTERNAL MEDICINE

## 2017-11-22 PROCEDURE — 99212 OFFICE O/P EST SF 10 MIN: CPT | Performed by: INTERNAL MEDICINE

## 2017-11-22 PROCEDURE — 1123F ACP DISCUSS/DSCN MKR DOCD: CPT | Performed by: INTERNAL MEDICINE

## 2017-11-22 PROCEDURE — 1036F TOBACCO NON-USER: CPT | Performed by: INTERNAL MEDICINE

## 2017-11-22 PROCEDURE — 3014F SCREEN MAMMO DOC REV: CPT | Performed by: INTERNAL MEDICINE

## 2017-11-27 ENCOUNTER — NURSE ONLY (OUTPATIENT)
Dept: INTERNAL MEDICINE CLINIC | Age: 67
End: 2017-11-27

## 2017-11-27 DIAGNOSIS — Z48.02 VISIT FOR SUTURE REMOVAL: Primary | ICD-10-CM

## 2017-11-29 ENCOUNTER — OFFICE VISIT (OUTPATIENT)
Dept: ORTHOPEDIC SURGERY | Age: 67
End: 2017-11-29

## 2017-11-29 DIAGNOSIS — M17.0 PRIMARY OSTEOARTHRITIS OF BOTH KNEES: Primary | ICD-10-CM

## 2017-11-29 PROCEDURE — 20610 DRAIN/INJ JOINT/BURSA W/O US: CPT | Performed by: PHYSICIAN ASSISTANT

## 2017-12-01 ENCOUNTER — HOSPITAL ENCOUNTER (OUTPATIENT)
Dept: PHYSICAL THERAPY | Age: 67
Discharge: HOME | End: 2017-12-31
Admitting: PHYSICAL MEDICINE & REHABILITATION

## 2017-12-01 ENCOUNTER — HOSPITAL ENCOUNTER (OUTPATIENT)
Dept: OTHER | Age: 67
Discharge: HOME | End: 2017-12-31
Attending: INTERNAL MEDICINE | Admitting: INTERNAL MEDICINE

## 2017-12-01 ENCOUNTER — HOSPITAL ENCOUNTER (OUTPATIENT)
Dept: OTHER | Age: 67
Discharge: HOME | End: 2017-12-01
Attending: PHYSICAL MEDICINE & REHABILITATION | Admitting: PHYSICAL MEDICINE & REHABILITATION

## 2017-12-01 DIAGNOSIS — M48.061 SPINAL STENOSIS OF LUMBAR REGION: ICD-10-CM

## 2017-12-01 DIAGNOSIS — I27.20 PULMONARY HTN (HCC): ICD-10-CM

## 2017-12-01 DIAGNOSIS — I26.99 OTHER PULMONARY EMBOLISM WITHOUT ACUTE COR PULMONALE, UNSPECIFIED CHRONICITY (HCC): ICD-10-CM

## 2017-12-01 DIAGNOSIS — I10 ESSENTIAL HYPERTENSION: Chronic | ICD-10-CM

## 2017-12-01 RX ORDER — POTASSIUM CHLORIDE 750 MG/1
10 TABLET, EXTENDED RELEASE ORAL DAILY
Qty: 90 TABLET | Refills: 3 | Status: SHIPPED | OUTPATIENT
Start: 2017-12-01 | End: 2018-05-03 | Stop reason: SDUPTHER

## 2017-12-01 NOTE — PROGRESS NOTES
Physical Therapy  Cancellation/No-show Note  Patient Name:  Memory Dakin  :  1950   Date:  2017  Cancelled visits to date: 5  No-shows to date: 1    Patient status for today's appointment patient:  []  Cancelled 3/27, 3/29, 4/10, 17, , ,  pool, ,   []  Rescheduled appointment  [x]  No-show      Reason given by patient:  []  Patient ill  []  Conflicting appointment  []  No transportation    []  Conflict with work  [x]  No reason given  []  Other:     Comments:   Received injection in her knee from orthopedic and not from MD says to reduce physical activity for 24-48 hour.  appointment canceled by PT  Phone call information:   []  Phone call made today to patient at _ time at number provided:      []  Patient answered, conversation as follows:    []  Patient did not answer, message left as follows:  [x]  Phone call not made today    Electronically signed by:  Urbano Garcia, PT DPT

## 2017-12-01 NOTE — TELEPHONE ENCOUNTER
Per pt request due to change in prescription coverage    RX APPROVAL:  Last OV  8/28/17; plan, and labs reviewed

## 2017-12-21 ENCOUNTER — TELEPHONE (OUTPATIENT)
Dept: INTERNAL MEDICINE CLINIC | Age: 67
End: 2017-12-21

## 2017-12-21 NOTE — TELEPHONE ENCOUNTER
Patient is requesting an appointment today for cough, wheezing and congestion that she has had since before Thanksgiving. She states she has no fever. Patient has been taking singulair and using inhalers. Patient has been advised that Dr Jayy Flaherty is not in the office. Patient uses [address] [place] [address] on [address] [place] [address] #[phone]. There are no appointments available with nurse practitioner today. Please advise.

## 2018-01-01 ENCOUNTER — HOSPITAL ENCOUNTER (OUTPATIENT)
Dept: OTHER | Age: 68
Discharge: HOME | End: 2018-01-31
Attending: PHYSICAL MEDICINE & REHABILITATION | Admitting: PHYSICAL MEDICINE & REHABILITATION

## 2018-01-01 ENCOUNTER — HOSPITAL ENCOUNTER (OUTPATIENT)
Dept: OTHER | Age: 68
Discharge: HOME | End: 2018-01-31
Attending: INTERNAL MEDICINE | Admitting: INTERNAL MEDICINE

## 2018-01-16 ENCOUNTER — TELEPHONE (OUTPATIENT)
Dept: INTERNAL MEDICINE CLINIC | Age: 68
End: 2018-01-16

## 2018-01-16 DIAGNOSIS — I10 ESSENTIAL HYPERTENSION: Chronic | ICD-10-CM

## 2018-01-16 RX ORDER — LISINOPRIL AND HYDROCHLOROTHIAZIDE 25; 20 MG/1; MG/1
TABLET ORAL
Qty: 90 TABLET | Refills: 3 | Status: SHIPPED | OUTPATIENT
Start: 2018-01-16 | End: 2018-05-16 | Stop reason: ALTCHOICE

## 2018-01-16 NOTE — TELEPHONE ENCOUNTER
Pt called requesting refill on Lisinopril-hydrochlorothiazide due to changing insurance.     [facility] n#[phone]

## 2018-01-23 ENCOUNTER — OFFICE VISIT (OUTPATIENT)
Dept: ORTHOPEDIC SURGERY | Age: 68
End: 2018-01-23

## 2018-01-23 VITALS
TEMPERATURE: 98.1 F | HEART RATE: 78 BPM | WEIGHT: 248 LBS | DIASTOLIC BLOOD PRESSURE: 78 MMHG | SYSTOLIC BLOOD PRESSURE: 131 MMHG | HEIGHT: 64 IN | BODY MASS INDEX: 42.34 KG/M2

## 2018-01-23 DIAGNOSIS — M25.562 ACUTE PAIN OF BOTH KNEES: ICD-10-CM

## 2018-01-23 DIAGNOSIS — M75.102 TEAR OF LEFT ROTATOR CUFF, UNSPECIFIED TEAR EXTENT: ICD-10-CM

## 2018-01-23 DIAGNOSIS — M17.12 ARTHRITIS OF LEFT KNEE: ICD-10-CM

## 2018-01-23 DIAGNOSIS — M17.11 ARTHRITIS OF KNEE, RIGHT: Primary | ICD-10-CM

## 2018-01-23 DIAGNOSIS — M25.512 ACUTE PAIN OF LEFT SHOULDER: ICD-10-CM

## 2018-01-23 DIAGNOSIS — M25.561 ACUTE PAIN OF BOTH KNEES: ICD-10-CM

## 2018-01-23 PROCEDURE — 4040F PNEUMOC VAC/ADMIN/RCVD: CPT | Performed by: PHYSICIAN ASSISTANT

## 2018-01-23 PROCEDURE — G8484 FLU IMMUNIZE NO ADMIN: HCPCS | Performed by: PHYSICIAN ASSISTANT

## 2018-01-23 PROCEDURE — 3017F COLORECTAL CA SCREEN DOC REV: CPT | Performed by: PHYSICIAN ASSISTANT

## 2018-01-23 PROCEDURE — G8427 DOCREV CUR MEDS BY ELIG CLIN: HCPCS | Performed by: PHYSICIAN ASSISTANT

## 2018-01-23 PROCEDURE — 1036F TOBACCO NON-USER: CPT | Performed by: PHYSICIAN ASSISTANT

## 2018-01-23 PROCEDURE — 1090F PRES/ABSN URINE INCON ASSESS: CPT | Performed by: PHYSICIAN ASSISTANT

## 2018-01-23 PROCEDURE — G8400 PT W/DXA NO RESULTS DOC: HCPCS | Performed by: PHYSICIAN ASSISTANT

## 2018-01-23 PROCEDURE — G8417 CALC BMI ABV UP PARAM F/U: HCPCS | Performed by: PHYSICIAN ASSISTANT

## 2018-01-23 PROCEDURE — 3014F SCREEN MAMMO DOC REV: CPT | Performed by: PHYSICIAN ASSISTANT

## 2018-01-23 PROCEDURE — 99214 OFFICE O/P EST MOD 30 MIN: CPT | Performed by: PHYSICIAN ASSISTANT

## 2018-01-23 PROCEDURE — 1123F ACP DISCUSS/DSCN MKR DOCD: CPT | Performed by: PHYSICIAN ASSISTANT

## 2018-01-23 RX ORDER — DIAZEPAM 5 MG/1
10 TABLET ORAL PRN
Qty: 1 TABLET | Refills: 0 | Status: SHIPPED | OUTPATIENT
Start: 2018-01-23 | End: 2018-01-24

## 2018-01-23 NOTE — PROGRESS NOTES
narrowed. There is evidence of AC joint arthritis. 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 Diagnosis:        ICD-10-CM ICD-9-CM    1. Arthritis of knee, right M17.11 716.96    2. Arthritis of left knee M17.12 716.96    3. Tear of left rotator cuff, unspecified tear extent M75.102 840.4 MRI Shoulder Left W JT WO Contrast   4. Acute pain of both knees M25.561 338.19 XR KNEE LEFT (1-2 VIEWS)    M25.562 719.46 XR KNEE RIGHT (1-2 VIEWS)      XR Knee Bilateral Standing   5. Acute pain of left shoulder M25.512 719.41 XR SHOULDER LEFT (MIN 2 VIEWS)        Assessment/ Plan:      The natural history of the patient's diagnosis as well as the treatment options were discussed in full and questions were answered. Risks and benefits of the treatment options also reviewed in detail. She has stable hip arthroplasties which are doing well. She has advanced osteoarthritis of both knees. Inserted treatment has not been successful in long-term pain relief. This point time she is interested in pursuing a right total knee replacement. I did spend 30 minutes in the office today with greater than 50% of this time reviewing diagnostic tests,  discussion concerning their diagnosis and treatment options both surgical and non surgical. The standard recovery time, need for probable physical therapy post op, risks and benefits of the proposed surgical procedure- right total knee arthroplasty. Risks of the proposed procedure were discussed including but not limited to infection, bleeding, neurovascular injury, failure of the surgical procedure, continued pain, deep vein thrombosis, pulmonary embolism and the need for future surgery. She will need to see her pulmonologist as well as her primary care physician for preoperative clearance as she is at high risk for undergoing joint replacement surgery with a prior history of DVT and PE. She probably has a huge rotator cuff tear related to her fall on 11/15/2017.   I

## 2018-01-23 NOTE — LETTER
[facility]  Surgery Scheduling Form:  [facility]  DEMOGRAPHICS:                                                                                                                Patient Name:  Brenda David  Patient :  1950   Patient SS#:      Patient Phone:  [phone] (home) [phone] (work)                         Patient Address:  [address]  [address] [facility]    PCP:  Madhuri Hatch MD  Insurance:    Payor/Plan Subscr  Sex Relation Sub. Ins. ID Effective Group Num   1. [address] P 1950 Female  [id number] 10/1/17                                    [address] [address]   2. [address] P 1950 Female  [id number] 10/1/17 [id number]                                   [address]     DIAGNOSIS & PROCEDURE:                                                                                              Diagnosis:   Right arthritis knee  Operation:  Right Total Knee Replacement  Location:  [facility]  Surgeon:  Javon Johnston. Amanda Gaming MD    SCHEDULING INFORMATION:                                                                                         .  Surgeon's Scheduling Instruction:  elective    Requested Date:    OR Time:   Patient Arrival Time:      OR Time Required:  90  Minutes  Anesthesia:  Choice  Equipment:  Cassandra gender flex, advanced  Mini C-Arm:  No   Standard C-Arm:  No  Status:  Same day admit  PAT Required:  Yes  Comments: NO femoral nerve block   ALLERGIES:Cephalexin; Cephalosporins; Food; Other; and Sulfa antibiotics                     Kenton Joaquin MD      18 11:54 AM  BILLING INFORMATION:                                                                                                   Procedure:       CPT Code Modifier                                      H&P AT:       PCP  XX                      URGENT CARE                    Kira Alexander  RIGHT TOTAL KNEE REPLACEMENT 1950     PHYSICIANS ORDERS  HEIGHT:   5'4       WEIGHT:   248      ALLERGIES:Cephalexin; Cephalosporins; Food; Other; and Sulfa antibiotics                                                       SURG                                  JET   __________________________________________________________________  PRE-OP ORDERS:  ? CBC WITH DIFFERENTIAL                                                ? TYPE AND SCREEN                                                            ? HgB A1C                                                                               ? EKG                                                                                        ? NASAL CULUTRE MRSA  ? UAR/if positive repeat UAR on admission  ? BMP           ALBUMIN AND PREALBUMIN           VITAMIN D LEVELS  ? COAG PROFILE  ? SED RATE  ? PT/OT EVAL AND TEACHING  ? INSTRUCT PT TO STOP ALL NSAIDS, ASPIRIN, BLOOD THINNERS 7 DAYS PRIOR SURGERY  DAY OF SURGERY  ? CEFAZOLIN 2 GM IVPB; IF PATIENT WEIGHS > 80 KG AND SERUM CREATININE <2.5 mg/dl, GIVE 2 GM DOSE WITHIN 1 HOUR OF INCISION. ? IF THE PRE-OP NASAL CULTURE FOR MRSA WAS POSITIVE:   REPEAT NASAL SWAB ON ADMISSION AND ADMINISTER VANCOMYCIN 1 GM IVPB, REDUCE THE DOSE OF VANCOMYCIN  MG IVPB IF PT < 55 KG OR SERUM CREATININE > 2mg/dl; also to get Cefazolin 2 GM or wt based  ? All patients will receive preop Cefazolin 2 GM unless patient has a documented anaphylactic response to Cefazolin, if so then vancomycin wt based and clindamycin 900 mg IVPB  ? APPLY KNEE HIGH ANTI-EMBOLIC AND PNEUMO-BOOTS TO UNOPERATIVE  LEG  ? CELEBREX  200 MG  ORALLY  DAY OF SURGERY  ?  ROXICODONE 10MG  ORALLY DAY OF SURGERY  OTHER ORDERS:_______________________________________________________PHYSICIAN SIGNATURE: __ 1/23/18                                                                                                       11:54 AM  ________________________DATE: Supplemental Confidentiality & Payment Agreement & Supplemental HIPAA Notice for Shared Medical Appointments        During shared medical appointments, you will hear about other participants health issues and personal information. As a matter of trust, it is your duty to keep everything you hear confidential.  Nothing that identifies a participant in any way (including job, ethnicity, [employer], etc.) can be shared outside of this group setting. I have read and agree to the following statements:        · I agree to meet with a group of patients and my doctor. I understand that I have the choice to be seen by my physician in this group or individually and that I may leave the group visit anytime I feel uncomfortable. · I understand that discussions will occur regarding individual identifiable health information during the shared medical appointment and I will maintain the confidentiality of [facility] health information or other information heard during the appointment. · I am committed to maintaining this confidentiality even if I am no longer participating in shared medical appointments. · I understand that the information I share with the physician and staff will be heard by the other participants and there is a possibility that the information disclosed in the shared medical appointment may be re-disclosed by other participants. I have been notified of this potential disclosure and I voluntarily agree to participate in the shared medical appointment. · I know that I dont have to share any personal information with the group or health care providers unless, I choose to do so. · Like any doctors appointment, I agree to be responsible for the bill and / or co-payment associated with this physician appointment.               Shared Medical Appointment              THIS SUPPLEMENTAL NOTICE SUPPLEMENTS AND DOES NOT REPLACE THE [facility] CONSENT, PATIENT RESPONSIBILITY AND NOTICE OF PRIVACY PRACTICE. Patrice Nyhan    1950        Patients Signature: ____________________________________________________         DATE: ____/____/___      Merlin Sitter / Support Persons Signature (if applicable) _________________________     DATE: __/__/__    **Each person will be asked to sign this commitment before each Shared Medical Appointment. Thank You ! SURGERY SCHEDULING TIMES                                                                                                                                                      Ravindra Nyhan                                                                                       1950                                                                           SURGERY DATE: ___/___/___                                                                      SURGERY TIME:  ___:___ AM/PM                                                                      ARRIVAL TIME:   ___:___  AM/PM                                                                                                                        **PLEASE REPORT TO THE                                                       INFORMATION DESK IN THE MAIN LOBBY                                                          OF THE HOSPITAL.         [facility]

## 2018-01-24 ENCOUNTER — TELEPHONE (OUTPATIENT)
Dept: ORTHOPEDIC SURGERY | Age: 68
End: 2018-01-24

## 2018-01-25 ENCOUNTER — TELEPHONE (OUTPATIENT)
Dept: ORTHOPEDIC SURGERY | Age: 68
End: 2018-01-25

## 2018-01-25 NOTE — TELEPHONE ENCOUNTER
Called pt to schedule surg and she said she wanted to see Lashanda Sheridan first for an inj and then possibly schedule for June, but she was a little apprehensive. I told her when she comes in for inj,, I will schedule the surg.

## 2018-01-26 ENCOUNTER — HOSPITAL ENCOUNTER (OUTPATIENT)
Dept: OTHER | Age: 68
Discharge: HOME | End: 2018-01-26
Attending: ALLERGY & IMMUNOLOGY | Admitting: ALLERGY & IMMUNOLOGY

## 2018-01-26 DIAGNOSIS — J45.901 EXACERBATION OF ASTHMA, UNSPECIFIED ASTHMA SEVERITY, UNSPECIFIED WHETHER PERSISTENT: ICD-10-CM

## 2018-01-30 ENCOUNTER — OFFICE VISIT (OUTPATIENT)
Dept: ORTHOPEDIC SURGERY | Age: 68
End: 2018-01-30

## 2018-01-30 VITALS
TEMPERATURE: 98.5 F | HEART RATE: 68 BPM | DIASTOLIC BLOOD PRESSURE: 70 MMHG | WEIGHT: 248 LBS | BODY MASS INDEX: 42.34 KG/M2 | HEIGHT: 64 IN | SYSTOLIC BLOOD PRESSURE: 113 MMHG

## 2018-01-30 DIAGNOSIS — M17.0 PRIMARY OSTEOARTHRITIS OF BOTH KNEES: Primary | ICD-10-CM

## 2018-01-30 DIAGNOSIS — M75.102 TEAR OF LEFT ROTATOR CUFF, UNSPECIFIED TEAR EXTENT: ICD-10-CM

## 2018-01-30 PROCEDURE — 3014F SCREEN MAMMO DOC REV: CPT | Performed by: PHYSICIAN ASSISTANT

## 2018-01-30 PROCEDURE — 3017F COLORECTAL CA SCREEN DOC REV: CPT | Performed by: PHYSICIAN ASSISTANT

## 2018-01-30 PROCEDURE — 1090F PRES/ABSN URINE INCON ASSESS: CPT | Performed by: PHYSICIAN ASSISTANT

## 2018-01-30 PROCEDURE — 20610 DRAIN/INJ JOINT/BURSA W/O US: CPT | Performed by: PHYSICIAN ASSISTANT

## 2018-01-30 PROCEDURE — 1036F TOBACCO NON-USER: CPT | Performed by: PHYSICIAN ASSISTANT

## 2018-01-30 PROCEDURE — 99213 OFFICE O/P EST LOW 20 MIN: CPT | Performed by: PHYSICIAN ASSISTANT

## 2018-01-30 PROCEDURE — G8427 DOCREV CUR MEDS BY ELIG CLIN: HCPCS | Performed by: PHYSICIAN ASSISTANT

## 2018-01-30 PROCEDURE — 1123F ACP DISCUSS/DSCN MKR DOCD: CPT | Performed by: PHYSICIAN ASSISTANT

## 2018-01-30 PROCEDURE — G8417 CALC BMI ABV UP PARAM F/U: HCPCS | Performed by: PHYSICIAN ASSISTANT

## 2018-01-30 PROCEDURE — 4040F PNEUMOC VAC/ADMIN/RCVD: CPT | Performed by: PHYSICIAN ASSISTANT

## 2018-01-30 PROCEDURE — G8400 PT W/DXA NO RESULTS DOC: HCPCS | Performed by: PHYSICIAN ASSISTANT

## 2018-01-30 PROCEDURE — G8484 FLU IMMUNIZE NO ADMIN: HCPCS | Performed by: PHYSICIAN ASSISTANT

## 2018-01-30 NOTE — PROGRESS NOTES
Misc Natural Products (ADRENAL PO) Take by mouth Raw Adrenal Complex 350 mg 6 x's a day      Licorice EXTR by Does not apply route Licorice root liquid 1 qtts qid      NATTOKINASE PO Take by mouth daily      Progesterone 40 % CREA by Does not apply route      B Complex Vitamins (VITAMIN B COMPLEX PO) Take by mouth      Coenzyme Q10 (COQ10) 100 MG CAPS Take by mouth      Omega-3 Fatty Acids (FISH OIL) 1200 MG CAPS Take by mouth      Avocado Oil OIL by Does not apply route      prazosin (MINIPRESS) 2 MG capsule Take 1 mg by mouth nightly       furosemide (LASIX) 20 MG tablet Take 1 tablet by mouth daily as needed (Ankle swelling) 30 tablet 3    Magnesium Malate POWD by Does not apply route daily      EPIPEN 2-MIR 0.3 MG/0.3ML SOAJ injection       Probiotic Product (ALIGN) 4 MG CAPS Take by mouth daily      clonazePAM (KLONOPIN) 1 MG tablet TAKE 1 TABLET BY MOUTH TWICE DAILY AS NEEDED (Patient taking differently: 1/2 tablet) 60 tablet 0    fluticasone (FLONASE) 50 MCG/ACT nasal spray 1 spray by Nasal route 2 times daily      azelastine (ASTELIN) 0.1 % nasal spray 1 spray by Nasal route 2 times daily Use in each nostril as directed      beclomethasone (QVAR) 80 MCG/ACT inhaler Inhale 1 puff into the lungs 2 times daily       Cholecalciferol (VITAMIN D3) 5000 UNITS TABS Take 1 each by mouth daily       Lutein-Zeaxanthin 25-5 MG CAPS Take 1 tablet by mouth daily. No current facility-administered medications for this visit. Objective:   She is alert, oriented x 3, pleasant, well nourished, developed and in no acute distress. /70   Pulse 68   Temp 98.5 °F (36.9 °C) (Temporal)   Ht 5' 4\" (1.626 m)   Wt 248 lb (112.5 kg)   BMI 42.57 kg/m²      KNEE EXAM:  Examination of the bilateral knee shows: The alignment of the knee is significant varus. There is not erythema. There mild soft tissue swelling. There is mild effusion.   ROM-  Extension 0          -   Flexion  120 There moderate pain associated with ROM testing. Medial joint line is tender to palpation. Lateral joint line is somewhat tender to palpation. Retro patellar crepitus is present. There is is crepitus along the joint line with ROM testing. Varus Stress testing does produce pain,                                     does not show laxity. Valgus Stress testing does not produce pain,                                       does not show laxity. Patellar Compression testing does produce pain. Extensor Mechanism is  intact. Examination of the left shoulder shows: There is no deformity. There is no erythema. There is no  soft tissue swelling. Deltoid region is  tender to palpation. Shoulder AROM-      FF 80  ABD 80      Examination of the upper extremities are intact with sensation to light touch. Motor testing  5/5 in all major motor groups including hand intrinsics. Radial, Median and Ulnar nerves are intact. Vasquez's Sign absent. Examination of the upper extremities shows intact perfusion to all extremities. No cyanosis. Digits are warm to touch. Capillary refill is less than 2 seconds. No edema noted. Examination of the skin over the upper extremities:  Reveals the skin to be intact without lacerations or abrasions. There are no significant erythema, rashes or skin lesions. X Rays: not performed in the office today:   Diagnosis:        ICD-10-CM ICD-9-CM    1. Primary osteoarthritis of both knees M17.0 715.16 71324 - MT DRAIN/INJECT LARGE JOINT/BURSA      MT TRIAMCINOLONE ACETONIDE INJ   2. Tear of left rotator cuff, unspecified tear extent M75.102 840.4         Assessment/ Plan:      The natural history of the patient's diagnosis as well as the treatment options were discussed in full and questions were answered. Risks and benefits of the treatment options also reviewed in detail. Plan is to repeat cortisone injection to both knees today.   She understands that she needs to be a at least 3 months out from her injection before knee arthroplasty can be performed. She will call central scheduling at [facility] to set up open MRI of the shoulder. Risk and benefits of corticosteroid intra-articular injection was discussed today. All questions were answered to her satisfaction. She verbally consented to proceed with intra-articular injection today. Cortisone Injection                                                       PROCEDURE NOTE:     Pre op Diagnosis:  bilateral knee pain     Post op Diagnosis: Same  With the patient's permission, her bilateral knee was prepped  in standard sterile fashion with  Alcohol and 2 cc of 0.25% Marcaine and 1 cc of Kenalog 40 mg was injected into the bilateral lateral compartment  without difficulty. The patient tolerated this well without difficulty. A band-aid was applied. The patient was advised to ice the knee for 15-20 minutes to relieve any injection site related pain. She has agreed to discuss elective total knee arthroplasty with her pulmonologist.          Follow Up: With Dr. Navjot Alfaro after MRI of the shoulder. Call or return to clinic prn if these symptoms worsen or fail to improve as anticipated.

## 2018-02-01 ENCOUNTER — HOSPITAL ENCOUNTER (OUTPATIENT)
Dept: OTHER | Age: 68
Discharge: HOME | End: 2018-02-28
Attending: PHYSICAL MEDICINE & REHABILITATION | Admitting: PHYSICAL MEDICINE & REHABILITATION

## 2018-02-02 ENCOUNTER — TELEPHONE (OUTPATIENT)
Dept: INTERNAL MEDICINE CLINIC | Age: 68
End: 2018-02-02

## 2018-02-15 ENCOUNTER — OFFICE VISIT (OUTPATIENT)
Dept: ORTHOPEDIC SURGERY | Age: 68
End: 2018-02-15

## 2018-02-15 VITALS
HEIGHT: 64 IN | WEIGHT: 248 LBS | DIASTOLIC BLOOD PRESSURE: 75 MMHG | BODY MASS INDEX: 42.34 KG/M2 | SYSTOLIC BLOOD PRESSURE: 116 MMHG | HEART RATE: 69 BPM | TEMPERATURE: 98.1 F

## 2018-02-15 DIAGNOSIS — M75.102 TEAR OF LEFT ROTATOR CUFF, UNSPECIFIED TEAR EXTENT: Primary | ICD-10-CM

## 2018-02-15 PROCEDURE — 3014F SCREEN MAMMO DOC REV: CPT | Performed by: ORTHOPAEDIC SURGERY

## 2018-02-15 PROCEDURE — 1123F ACP DISCUSS/DSCN MKR DOCD: CPT | Performed by: ORTHOPAEDIC SURGERY

## 2018-02-15 PROCEDURE — G8427 DOCREV CUR MEDS BY ELIG CLIN: HCPCS | Performed by: ORTHOPAEDIC SURGERY

## 2018-02-15 PROCEDURE — 1036F TOBACCO NON-USER: CPT | Performed by: ORTHOPAEDIC SURGERY

## 2018-02-15 PROCEDURE — G8417 CALC BMI ABV UP PARAM F/U: HCPCS | Performed by: ORTHOPAEDIC SURGERY

## 2018-02-15 PROCEDURE — 4040F PNEUMOC VAC/ADMIN/RCVD: CPT | Performed by: ORTHOPAEDIC SURGERY

## 2018-02-15 PROCEDURE — G8400 PT W/DXA NO RESULTS DOC: HCPCS | Performed by: ORTHOPAEDIC SURGERY

## 2018-02-15 PROCEDURE — 3017F COLORECTAL CA SCREEN DOC REV: CPT | Performed by: ORTHOPAEDIC SURGERY

## 2018-02-15 PROCEDURE — 99212 OFFICE O/P EST SF 10 MIN: CPT | Performed by: ORTHOPAEDIC SURGERY

## 2018-02-15 PROCEDURE — 1090F PRES/ABSN URINE INCON ASSESS: CPT | Performed by: ORTHOPAEDIC SURGERY

## 2018-02-15 PROCEDURE — G8484 FLU IMMUNIZE NO ADMIN: HCPCS | Performed by: ORTHOPAEDIC SURGERY

## 2018-02-18 NOTE — PROGRESS NOTES
Patient is 72-year-old female we follow for left shoulder pain who was sent for MRI to rule out rotator cuff tear. Patient obtained an MRI on 2/8/2018. She's here for further evaluation and follow-up. Patient continues to have pain and symptoms related to rotator cuff pathology. She has no history of injury or surgery to her left shoulder in the past.    Patient Active Problem List   Diagnosis    Acquired hypothyroidism    Essential hypertension    HUSSAIN (obstructive sleep apnea)    Obesity, Class III, BMI 40-49.9 (morbid obesity) ([facility] [place] [address]    Pulmonary HTN    Pulmonary emboli (HCC)    Chronic maxillary sinusitis    Primary osteoarthritis of both knees    Arthritis of knee, right    Arthritis of left knee    Tear of left rotator cuff     Prior to Visit Medications    Medication Sig Taking? Authorizing Provider   lisinopril-hydrochlorothiazide (PRINZIDE;ZESTORETIC) 20-25 MG per tablet TAKE 1 TABLET BY MOUTH DAILY  Julio Burgos MD   potassium chloride (KLOR-CON M) 10 MEQ extended release tablet Take 1 tablet by mouth daily  Francisco Priest MD   HYDROcodone-acetaminophen (NORCO) 5-325 MG per tablet Take 1 tablet by mouth every 8 hours as needed for Pain .   sEter Jonas PA-C   montelukast (SINGULAIR) 10 MG tablet TAKE 1 TABLET BY MOUTH DAILY  Julio Burgos MD   ARMOUR THYROID 60 MG tablet TAKE 1 TABLET BY MOUTH DAILY  Julio Burgos MD   XARELTO 20 MG TABS tablet TAKE 1 TABLET BY MOUTH DAILY  Francisco Priest MD   albuterol (PROVENTIL) (2.5 MG/3ML) 0.083% nebulizer solution Take 3 mLs by nebulization every 4 hours as needed for Wheezing  Julio Burgos MD   Nebulizers ([facility]) [facility] USE EVERY 4 HOURS AS NEEDED FOR WHEEZING  Julio Burgos MD   rOPINIRole (REQUIP) 0.5 MG tablet Take 1 tablet by mouth nightly  Patient taking differently: Take 0.5 mg by mouth nightly   Rena Navas CNP   rOPINIRole (REQUIP) 1 MG tablet Take 1 tablet by mouth nightly  Rena Pickard CNP

## 2018-02-22 ENCOUNTER — OFFICE VISIT (OUTPATIENT)
Dept: ORTHOPEDIC SURGERY | Age: 68
End: 2018-02-22

## 2018-02-22 VITALS
HEIGHT: 64 IN | BODY MASS INDEX: 42.34 KG/M2 | SYSTOLIC BLOOD PRESSURE: 143 MMHG | WEIGHT: 248 LBS | HEART RATE: 71 BPM | DIASTOLIC BLOOD PRESSURE: 77 MMHG

## 2018-02-22 DIAGNOSIS — G89.29 CHRONIC LEFT SHOULDER PAIN: ICD-10-CM

## 2018-02-22 DIAGNOSIS — M25.512 CHRONIC LEFT SHOULDER PAIN: ICD-10-CM

## 2018-02-22 DIAGNOSIS — M75.102 TEAR OF LEFT ROTATOR CUFF, UNSPECIFIED TEAR EXTENT: Primary | ICD-10-CM

## 2018-02-22 PROCEDURE — 99214 OFFICE O/P EST MOD 30 MIN: CPT | Performed by: ORTHOPAEDIC SURGERY

## 2018-02-22 PROCEDURE — G8427 DOCREV CUR MEDS BY ELIG CLIN: HCPCS | Performed by: ORTHOPAEDIC SURGERY

## 2018-02-22 PROCEDURE — 4040F PNEUMOC VAC/ADMIN/RCVD: CPT | Performed by: ORTHOPAEDIC SURGERY

## 2018-02-22 PROCEDURE — 1123F ACP DISCUSS/DSCN MKR DOCD: CPT | Performed by: ORTHOPAEDIC SURGERY

## 2018-02-22 PROCEDURE — 3014F SCREEN MAMMO DOC REV: CPT | Performed by: ORTHOPAEDIC SURGERY

## 2018-02-22 PROCEDURE — G8484 FLU IMMUNIZE NO ADMIN: HCPCS | Performed by: ORTHOPAEDIC SURGERY

## 2018-02-22 PROCEDURE — G8417 CALC BMI ABV UP PARAM F/U: HCPCS | Performed by: ORTHOPAEDIC SURGERY

## 2018-02-22 PROCEDURE — 1090F PRES/ABSN URINE INCON ASSESS: CPT | Performed by: ORTHOPAEDIC SURGERY

## 2018-02-22 PROCEDURE — G8400 PT W/DXA NO RESULTS DOC: HCPCS | Performed by: ORTHOPAEDIC SURGERY

## 2018-02-22 PROCEDURE — 3017F COLORECTAL CA SCREEN DOC REV: CPT | Performed by: ORTHOPAEDIC SURGERY

## 2018-02-22 PROCEDURE — 20610 DRAIN/INJ JOINT/BURSA W/O US: CPT | Performed by: ORTHOPAEDIC SURGERY

## 2018-02-22 PROCEDURE — 1036F TOBACCO NON-USER: CPT | Performed by: ORTHOPAEDIC SURGERY

## 2018-02-22 ASSESSMENT — ENCOUNTER SYMPTOMS
GASTROINTESTINAL NEGATIVE: 1
WHEEZING: 1
SHORTNESS OF BREATH: 1
EYES NEGATIVE: 1

## 2018-02-22 NOTE — PROGRESS NOTES
Subjective:      Patient ID: Jim Vasquez is a 74 y.o. female. HPI  Jim Vasquez presents today for evaluation of her left shoulder. She was sent over by Dr. Tala Benitez. She initially injured her shoulder back in November. She slipped and fell. She has had an MRI scan. She has not had other treatment. She is in pain management and takes Robaxin and Celebrex. She has pain at about 4 out of 10. It's worse at the end of the day. She has to sleep on pillows at night. She routinely has to sleep in a recliner also. She is left-hand dominant. After her hip arthroplasty she did get a pulmonary embolus. She's hoping to come off her blood thinner soon. Review of Systems   Constitutional: Negative. HENT: Negative. Eyes: Negative. Respiratory: Positive for shortness of breath and wheezing. States she has been cleared from a blood clot in her lung. Cardiovascular: Negative. Gastrointestinal: Negative. Endocrine: Negative. Genitourinary: Negative. Musculoskeletal: Positive for arthralgias and neck pain. Allergic/Immunologic: Positive for environmental allergies. Negative for food allergies. Hematological: Bruises/bleeds easily. Psychiatric/Behavioral:        States she recently lost her mom and has been dealing with that loss. Objective:   Physical Exam  General Exam:    Vitals: Blood pressure (!) 143/77, pulse 71, height 5' 4\" (1.626 m), weight 248 lb (112.5 kg), not currently breastfeeding. She is morbidly obese. Constitutional: Patient is adequately groomed with no evidence of malnutrition  Mental Status: The patient is oriented to time, place and person. The patient's mood and affect are appropriate. Gait:  Patient walks with a waddling, antalgic gait  Lymphatic: The lymphatic examination bilaterally reveals all areas to be without enlargement or induration. Vascular: Examination reveals no swelling or calf tenderness.   Peripheral pulses are palpable and complex medical history it is reasonable to start with a cortisone injection and therapy. If that is not successful we can debate whether surgery is indicated. Procedure: Under sterile technique, the left shoulder was injected into the subacromial space with 80 mg of Depo-Medrol and 40 mg of lidocaine. She tolerated that well. She'll start therapy. Follow-up with me in a couple of months. This note was created using voice recognition software. It has been proofread, but occasionally errors remain. Please disregard these errors. They will be corrected as they are noted.

## 2018-02-23 ENCOUNTER — OFFICE VISIT (OUTPATIENT)
Dept: CARDIOLOGY CLINIC | Age: 68
End: 2018-02-23

## 2018-02-23 VITALS
HEIGHT: 65 IN | BODY MASS INDEX: 41.15 KG/M2 | HEART RATE: 64 BPM | WEIGHT: 247 LBS | SYSTOLIC BLOOD PRESSURE: 116 MMHG | DIASTOLIC BLOOD PRESSURE: 64 MMHG

## 2018-02-23 DIAGNOSIS — I26.99 OTHER PULMONARY EMBOLISM WITHOUT ACUTE COR PULMONALE, UNSPECIFIED CHRONICITY (HCC): Primary | ICD-10-CM

## 2018-02-23 DIAGNOSIS — I10 ESSENTIAL HYPERTENSION: Chronic | ICD-10-CM

## 2018-02-23 DIAGNOSIS — G47.33 OSA (OBSTRUCTIVE SLEEP APNEA): Chronic | ICD-10-CM

## 2018-02-23 PROCEDURE — 99214 OFFICE O/P EST MOD 30 MIN: CPT | Performed by: INTERNAL MEDICINE

## 2018-02-23 PROCEDURE — 4040F PNEUMOC VAC/ADMIN/RCVD: CPT | Performed by: INTERNAL MEDICINE

## 2018-02-23 PROCEDURE — G8417 CALC BMI ABV UP PARAM F/U: HCPCS | Performed by: INTERNAL MEDICINE

## 2018-02-23 PROCEDURE — 3014F SCREEN MAMMO DOC REV: CPT | Performed by: INTERNAL MEDICINE

## 2018-02-23 PROCEDURE — G8484 FLU IMMUNIZE NO ADMIN: HCPCS | Performed by: INTERNAL MEDICINE

## 2018-02-23 PROCEDURE — 1123F ACP DISCUSS/DSCN MKR DOCD: CPT | Performed by: INTERNAL MEDICINE

## 2018-02-23 PROCEDURE — 3017F COLORECTAL CA SCREEN DOC REV: CPT | Performed by: INTERNAL MEDICINE

## 2018-02-23 PROCEDURE — 1036F TOBACCO NON-USER: CPT | Performed by: INTERNAL MEDICINE

## 2018-02-23 PROCEDURE — G8400 PT W/DXA NO RESULTS DOC: HCPCS | Performed by: INTERNAL MEDICINE

## 2018-02-23 PROCEDURE — 1090F PRES/ABSN URINE INCON ASSESS: CPT | Performed by: INTERNAL MEDICINE

## 2018-02-23 PROCEDURE — G8427 DOCREV CUR MEDS BY ELIG CLIN: HCPCS | Performed by: INTERNAL MEDICINE

## 2018-03-01 ENCOUNTER — HOSPITAL ENCOUNTER (OUTPATIENT)
Dept: OTHER | Age: 68
Discharge: HOME | End: 2018-03-31
Attending: INTERNAL MEDICINE | Admitting: INTERNAL MEDICINE

## 2018-03-01 ENCOUNTER — HOSPITAL ENCOUNTER (OUTPATIENT)
Dept: OTHER | Age: 68
Discharge: HOME | End: 2018-03-31
Attending: PHYSICAL MEDICINE & REHABILITATION | Admitting: PHYSICAL MEDICINE & REHABILITATION

## 2018-03-02 ENCOUNTER — HOSPITAL ENCOUNTER (OUTPATIENT)
Dept: PHYSICAL THERAPY | Age: 68
Discharge: HOME | End: 2018-03-31
Attending: ORTHOPAEDIC SURGERY | Admitting: ORTHOPAEDIC SURGERY

## 2018-03-12 ENCOUNTER — OFFICE VISIT (OUTPATIENT)
Dept: SLEEP MEDICINE | Age: 68
End: 2018-03-12

## 2018-03-12 VITALS
HEART RATE: 66 BPM | DIASTOLIC BLOOD PRESSURE: 64 MMHG | BODY MASS INDEX: 40.82 KG/M2 | SYSTOLIC BLOOD PRESSURE: 128 MMHG | HEIGHT: 65 IN | WEIGHT: 245 LBS | OXYGEN SATURATION: 97 %

## 2018-03-12 DIAGNOSIS — I27.20 PULMONARY HTN (HCC): Chronic | ICD-10-CM

## 2018-03-12 DIAGNOSIS — G25.81 RESTLESS LEG SYNDROME: Chronic | ICD-10-CM

## 2018-03-12 DIAGNOSIS — J45.30 MILD PERSISTENT ASTHMA WITHOUT COMPLICATION: Chronic | ICD-10-CM

## 2018-03-12 DIAGNOSIS — K21.9 GERD WITHOUT ESOPHAGITIS: Chronic | ICD-10-CM

## 2018-03-12 DIAGNOSIS — G47.33 OSA (OBSTRUCTIVE SLEEP APNEA): Primary | Chronic | ICD-10-CM

## 2018-03-12 DIAGNOSIS — E66.01 MORBID OBESITY, UNSPECIFIED OBESITY TYPE (HCC): Chronic | ICD-10-CM

## 2018-03-12 DIAGNOSIS — E03.9 HYPOTHYROIDISM, UNSPECIFIED TYPE: Chronic | ICD-10-CM

## 2018-03-12 PROCEDURE — G8484 FLU IMMUNIZE NO ADMIN: HCPCS | Performed by: NURSE PRACTITIONER

## 2018-03-12 PROCEDURE — 1123F ACP DISCUSS/DSCN MKR DOCD: CPT | Performed by: NURSE PRACTITIONER

## 2018-03-12 PROCEDURE — G8417 CALC BMI ABV UP PARAM F/U: HCPCS | Performed by: NURSE PRACTITIONER

## 2018-03-12 PROCEDURE — 4040F PNEUMOC VAC/ADMIN/RCVD: CPT | Performed by: NURSE PRACTITIONER

## 2018-03-12 PROCEDURE — G8427 DOCREV CUR MEDS BY ELIG CLIN: HCPCS | Performed by: NURSE PRACTITIONER

## 2018-03-12 PROCEDURE — 3014F SCREEN MAMMO DOC REV: CPT | Performed by: NURSE PRACTITIONER

## 2018-03-12 PROCEDURE — 1036F TOBACCO NON-USER: CPT | Performed by: NURSE PRACTITIONER

## 2018-03-12 PROCEDURE — 99214 OFFICE O/P EST MOD 30 MIN: CPT | Performed by: NURSE PRACTITIONER

## 2018-03-12 PROCEDURE — 1090F PRES/ABSN URINE INCON ASSESS: CPT | Performed by: NURSE PRACTITIONER

## 2018-03-12 PROCEDURE — 3017F COLORECTAL CA SCREEN DOC REV: CPT | Performed by: NURSE PRACTITIONER

## 2018-03-12 PROCEDURE — G8400 PT W/DXA NO RESULTS DOC: HCPCS | Performed by: NURSE PRACTITIONER

## 2018-03-12 RX ORDER — ROPINIROLE 1 MG/1
1 TABLET, FILM COATED ORAL NIGHTLY
Qty: 90 TABLET | Refills: 1 | Status: SHIPPED | OUTPATIENT
Start: 2018-03-12 | End: 2018-09-18 | Stop reason: SDUPTHER

## 2018-03-12 RX ORDER — ROPINIROLE 0.5 MG/1
0.5 TABLET, FILM COATED ORAL NIGHTLY
Qty: 90 TABLET | Refills: 1 | Status: ON HOLD | OUTPATIENT
Start: 2018-03-12 | End: 2018-04-26 | Stop reason: HOSPADM

## 2018-03-12 ASSESSMENT — SLEEP AND FATIGUE QUESTIONNAIRES
HOW LIKELY ARE YOU TO NOD OFF OR FALL ASLEEP WHILE SITTING INACTIVE IN A PUBLIC PLACE: 1
HOW LIKELY ARE YOU TO NOD OFF OR FALL ASLEEP WHILE SITTING AND READING: 1
HOW LIKELY ARE YOU TO NOD OFF OR FALL ASLEEP WHILE SITTING AND TALKING TO SOMEONE: 0
HOW LIKELY ARE YOU TO NOD OFF OR FALL ASLEEP WHILE SITTING QUIETLY AFTER LUNCH WITHOUT ALCOHOL: 1
HOW LIKELY ARE YOU TO NOD OFF OR FALL ASLEEP WHEN YOU ARE A PASSENGER IN A CAR FOR AN HOUR WITHOUT A BREAK: 2
HOW LIKELY ARE YOU TO NOD OFF OR FALL ASLEEP WHILE WATCHING TV: 1
ESS TOTAL SCORE: 6
HOW LIKELY ARE YOU TO NOD OFF OR FALL ASLEEP IN A CAR, WHILE STOPPED FOR A FEW MINUTES IN TRAFFIC: 0
HOW LIKELY ARE YOU TO NOD OFF OR FALL ASLEEP WHILE LYING DOWN TO REST IN THE AFTERNOON WHEN CIRCUMSTANCES PERMIT: 0

## 2018-03-12 ASSESSMENT — ENCOUNTER SYMPTOMS
RHINORRHEA: 0
ABDOMINAL PAIN: 0
COUGH: 0
APNEA: 0
SHORTNESS OF BREATH: 0
ABDOMINAL DISTENTION: 0
SINUS PRESSURE: 0

## 2018-03-12 NOTE — PROGRESS NOTES
hip    TONSILLECTOMY  1972       Family History   Problem Relation Age of Onset    Alcohol Abuse Sister     Arthritis Sister     Stroke Maternal Grandmother     Thyroid Disease Maternal Grandmother     Thyroid Disease Maternal Grandfather     Heart Disease Maternal Grandfather     Alcohol Abuse Maternal Grandfather     Substance Abuse Maternal Grandfather     Hypertension Mother     Thyroid Disease Mother     Anxiety Disorder Mother     Arthritis Mother     Cataracts Mother     Heart Disease Mother     Asthma Mother     Thyroid Disease Father     Heart Failure Father     Heart Disease Father     Arthritis Brother     High Cholesterol Brother     Heart Disease Maternal Uncle     Heart Disease Paternal Uncle     Cancer Paternal Uncle     Alcohol Abuse Paternal Grandfather     Substance Abuse Paternal Grandfather        Vitals:  Weight BMI   Wt Readings from Last 3 Encounters:   03/12/18 245 lb (111.1 kg)   02/23/18 247 lb (112 kg)   02/22/18 248 lb (112.5 kg)    Body mass index is 40.77 kg/m². BP HR SaO2   BP Readings from Last 3 Encounters:   03/12/18 128/64   02/23/18 116/64   02/22/18 (!) 143/77    Pulse Readings from Last 3 Encounters:   03/12/18 66   02/23/18 64   02/22/18 71    SpO2 Readings from Last 3 Encounters:   03/12/18 97%   11/15/17 97%   09/11/17 97%        Assessment:     1. HUSSAIN (obstructive sleep apnea) Stable    2. Pulmonary HTN Stable    3. Mild persistent asthma without complication Stable    4. Hypothyroidism, unspecified type Stable    5. GERD without esophagitis Stable    6. Morbid obesity, unspecified obesity type ([address] Stable    7. Restless leg syndrome Stable        The chronic medical conditions listed are directly related to the primary diagnosis listed above. The management of the primary diagnosis affects the secondary diagnosis and vice versa.   Plan:   - Educated patient and reviewed compliance download with pt.    -Supplies and parts as needed for her

## 2018-04-01 ENCOUNTER — HOSPITAL ENCOUNTER (OUTPATIENT)
Dept: OTHER | Age: 68
Discharge: HOME | End: 2018-04-30
Attending: ORTHOPAEDIC SURGERY | Admitting: ORTHOPAEDIC SURGERY

## 2018-04-01 ENCOUNTER — HOSPITAL ENCOUNTER (OUTPATIENT)
Dept: OTHER | Age: 68
Discharge: HOME | End: 2018-04-30
Attending: INTERNAL MEDICINE | Admitting: INTERNAL MEDICINE

## 2018-04-13 ENCOUNTER — TELEPHONE (OUTPATIENT)
Dept: RHEUMATOLOGY | Age: 68
End: 2018-04-13

## 2018-04-13 DIAGNOSIS — J32.0 CHRONIC MAXILLARY SINUSITIS: Chronic | ICD-10-CM

## 2018-04-13 DIAGNOSIS — H69.82 EUSTACHIAN TUBE DYSFUNCTION, LEFT: ICD-10-CM

## 2018-04-13 RX ORDER — MONTELUKAST SODIUM 10 MG/1
10 TABLET ORAL DAILY
Qty: 90 TABLET | Refills: 1 | Status: SHIPPED | OUTPATIENT
Start: 2018-04-13 | End: 2019-04-01

## 2018-04-24 ENCOUNTER — OFFICE VISIT (OUTPATIENT)
Dept: ORTHOPEDIC SURGERY | Age: 68
End: 2018-04-24

## 2018-04-24 ENCOUNTER — TELEPHONE (OUTPATIENT)
Dept: INTERNAL MEDICINE CLINIC | Age: 68
End: 2018-04-24

## 2018-04-24 VITALS
HEIGHT: 64 IN | WEIGHT: 248 LBS | HEART RATE: 80 BPM | SYSTOLIC BLOOD PRESSURE: 150 MMHG | BODY MASS INDEX: 42.34 KG/M2 | DIASTOLIC BLOOD PRESSURE: 80 MMHG

## 2018-04-24 DIAGNOSIS — G89.29 CHRONIC LEFT SHOULDER PAIN: ICD-10-CM

## 2018-04-24 DIAGNOSIS — M75.102 TEAR OF LEFT ROTATOR CUFF, UNSPECIFIED TEAR EXTENT: Primary | ICD-10-CM

## 2018-04-24 DIAGNOSIS — M25.512 CHRONIC LEFT SHOULDER PAIN: ICD-10-CM

## 2018-04-24 PROCEDURE — G8417 CALC BMI ABV UP PARAM F/U: HCPCS | Performed by: ORTHOPAEDIC SURGERY

## 2018-04-24 PROCEDURE — G8400 PT W/DXA NO RESULTS DOC: HCPCS | Performed by: ORTHOPAEDIC SURGERY

## 2018-04-24 PROCEDURE — 99212 OFFICE O/P EST SF 10 MIN: CPT | Performed by: ORTHOPAEDIC SURGERY

## 2018-04-24 PROCEDURE — 4040F PNEUMOC VAC/ADMIN/RCVD: CPT | Performed by: ORTHOPAEDIC SURGERY

## 2018-04-24 PROCEDURE — 1036F TOBACCO NON-USER: CPT | Performed by: ORTHOPAEDIC SURGERY

## 2018-04-24 PROCEDURE — 1123F ACP DISCUSS/DSCN MKR DOCD: CPT | Performed by: ORTHOPAEDIC SURGERY

## 2018-04-24 PROCEDURE — G8427 DOCREV CUR MEDS BY ELIG CLIN: HCPCS | Performed by: ORTHOPAEDIC SURGERY

## 2018-04-24 PROCEDURE — 3017F COLORECTAL CA SCREEN DOC REV: CPT | Performed by: ORTHOPAEDIC SURGERY

## 2018-04-24 PROCEDURE — 1090F PRES/ABSN URINE INCON ASSESS: CPT | Performed by: ORTHOPAEDIC SURGERY

## 2018-04-24 RX ORDER — LEVOTHYROXINE AND LIOTHYRONINE 38; 9 UG/1; UG/1
TABLET ORAL
Qty: 30 TABLET | Refills: 5 | Status: SHIPPED | OUTPATIENT
Start: 2018-04-24 | End: 2018-04-24 | Stop reason: SDUPTHER

## 2018-04-24 RX ORDER — LEVOTHYROXINE AND LIOTHYRONINE 38; 9 UG/1; UG/1
TABLET ORAL
Qty: 90 TABLET | Refills: 1 | Status: SHIPPED | OUTPATIENT
Start: 2018-04-24 | End: 2018-10-01

## 2018-04-25 PROBLEM — I48.92 ATRIAL FLUTTER (HCC): Status: ACTIVE | Noted: 2018-04-25

## 2018-05-01 ENCOUNTER — HOSPITAL ENCOUNTER (OUTPATIENT)
Dept: OTHER | Age: 68
Discharge: HOME | End: 2018-05-31
Attending: INTERNAL MEDICINE | Admitting: INTERNAL MEDICINE

## 2018-05-01 ENCOUNTER — HOSPITAL ENCOUNTER (OUTPATIENT)
Dept: OTHER | Age: 68
Discharge: HOME | End: 2018-05-31
Attending: ORTHOPAEDIC SURGERY | Admitting: ORTHOPAEDIC SURGERY

## 2018-05-03 ENCOUNTER — OFFICE VISIT (OUTPATIENT)
Dept: CARDIOLOGY CLINIC | Age: 68
End: 2018-05-03

## 2018-05-03 VITALS
HEART RATE: 68 BPM | HEIGHT: 65 IN | BODY MASS INDEX: 39.79 KG/M2 | SYSTOLIC BLOOD PRESSURE: 106 MMHG | DIASTOLIC BLOOD PRESSURE: 64 MMHG | WEIGHT: 238.8 LBS

## 2018-05-03 DIAGNOSIS — E66.01 OBESITY, CLASS III, BMI 40-49.9 (MORBID OBESITY) (HCC): ICD-10-CM

## 2018-05-03 DIAGNOSIS — I10 ESSENTIAL HYPERTENSION: Chronic | ICD-10-CM

## 2018-05-03 DIAGNOSIS — I48.91 ATRIAL FIBRILLATION WITH RAPID VENTRICULAR RESPONSE (HCC): ICD-10-CM

## 2018-05-03 DIAGNOSIS — I27.20 PULMONARY HTN (HCC): Chronic | ICD-10-CM

## 2018-05-03 DIAGNOSIS — I26.99 OTHER PULMONARY EMBOLISM WITHOUT ACUTE COR PULMONALE, UNSPECIFIED CHRONICITY (HCC): ICD-10-CM

## 2018-05-03 DIAGNOSIS — G47.33 OSA (OBSTRUCTIVE SLEEP APNEA): Chronic | ICD-10-CM

## 2018-05-03 DIAGNOSIS — R06.02 SOB (SHORTNESS OF BREATH): ICD-10-CM

## 2018-05-03 DIAGNOSIS — I48.91 ATRIAL FIBRILLATION WITH RAPID VENTRICULAR RESPONSE (HCC): Primary | ICD-10-CM

## 2018-05-03 LAB
ANION GAP SERPL CALCULATED.3IONS-SCNC: 17 MMOL/L (ref 3–16)
BUN BLDV-MCNC: 19 MG/DL (ref 7–20)
CALCIUM SERPL-MCNC: 9.6 MG/DL (ref 8.3–10.6)
CHLORIDE BLD-SCNC: 97 MMOL/L (ref 99–110)
CO2: 23 MMOL/L (ref 21–32)
CREAT SERPL-MCNC: <0.5 MG/DL (ref 0.6–1.2)
GFR AFRICAN AMERICAN: >60
GFR NON-AFRICAN AMERICAN: >60
GLUCOSE BLD-MCNC: 107 MG/DL (ref 70–99)
POTASSIUM SERPL-SCNC: 4.1 MMOL/L (ref 3.5–5.1)
PRO-BNP: 76 PG/ML (ref 0–124)
SODIUM BLD-SCNC: 137 MMOL/L (ref 136–145)

## 2018-05-03 PROCEDURE — 1036F TOBACCO NON-USER: CPT | Performed by: INTERNAL MEDICINE

## 2018-05-03 PROCEDURE — 99215 OFFICE O/P EST HI 40 MIN: CPT | Performed by: INTERNAL MEDICINE

## 2018-05-03 PROCEDURE — 1090F PRES/ABSN URINE INCON ASSESS: CPT | Performed by: INTERNAL MEDICINE

## 2018-05-03 PROCEDURE — 1123F ACP DISCUSS/DSCN MKR DOCD: CPT | Performed by: INTERNAL MEDICINE

## 2018-05-03 PROCEDURE — G8427 DOCREV CUR MEDS BY ELIG CLIN: HCPCS | Performed by: INTERNAL MEDICINE

## 2018-05-03 PROCEDURE — 3017F COLORECTAL CA SCREEN DOC REV: CPT | Performed by: INTERNAL MEDICINE

## 2018-05-03 PROCEDURE — G8417 CALC BMI ABV UP PARAM F/U: HCPCS | Performed by: INTERNAL MEDICINE

## 2018-05-03 PROCEDURE — 4040F PNEUMOC VAC/ADMIN/RCVD: CPT | Performed by: INTERNAL MEDICINE

## 2018-05-03 PROCEDURE — G8400 PT W/DXA NO RESULTS DOC: HCPCS | Performed by: INTERNAL MEDICINE

## 2018-05-03 RX ORDER — POTASSIUM CHLORIDE 750 MG/1
TABLET, EXTENDED RELEASE ORAL
Qty: 120 TABLET | Refills: 3 | Status: SHIPPED | OUTPATIENT
Start: 2018-05-03 | End: 2018-05-16 | Stop reason: SDUPTHER

## 2018-05-03 RX ORDER — FUROSEMIDE 20 MG/1
TABLET ORAL
Qty: 30 TABLET | Refills: 30 | Status: SHIPPED | OUTPATIENT
Start: 2018-05-03 | End: 2018-05-16 | Stop reason: SDUPTHER

## 2018-05-04 RX ORDER — FUROSEMIDE 20 MG/1
TABLET ORAL
Qty: 12 TABLET | Refills: 30 | OUTPATIENT
Start: 2018-05-04

## 2018-05-04 RX ORDER — POTASSIUM CHLORIDE 750 MG/1
TABLET, EXTENDED RELEASE ORAL
Qty: 180 TABLET | Refills: 3 | OUTPATIENT
Start: 2018-05-04

## 2018-05-11 ENCOUNTER — OFFICE VISIT (OUTPATIENT)
Dept: INTERNAL MEDICINE CLINIC | Age: 68
End: 2018-05-11

## 2018-05-11 VITALS
BODY MASS INDEX: 39.49 KG/M2 | SYSTOLIC BLOOD PRESSURE: 114 MMHG | HEART RATE: 76 BPM | DIASTOLIC BLOOD PRESSURE: 70 MMHG | HEIGHT: 65 IN | WEIGHT: 237 LBS

## 2018-05-11 DIAGNOSIS — R60.0 LOCALIZED EDEMA: ICD-10-CM

## 2018-05-11 DIAGNOSIS — E03.9 ACQUIRED HYPOTHYROIDISM: Chronic | ICD-10-CM

## 2018-05-11 DIAGNOSIS — I48.91 ATRIAL FIBRILLATION WITH RAPID VENTRICULAR RESPONSE (HCC): Primary | ICD-10-CM

## 2018-05-11 DIAGNOSIS — I10 ESSENTIAL HYPERTENSION: Chronic | ICD-10-CM

## 2018-05-11 PROBLEM — M17.12 ARTHRITIS OF LEFT KNEE: Status: RESOLVED | Noted: 2018-01-23 | Resolved: 2018-05-11

## 2018-05-11 PROBLEM — M17.0 PRIMARY OSTEOARTHRITIS OF BOTH KNEES: Status: RESOLVED | Noted: 2017-09-19 | Resolved: 2018-05-11

## 2018-05-11 PROBLEM — M75.102 TEAR OF LEFT ROTATOR CUFF: Status: RESOLVED | Noted: 2018-01-23 | Resolved: 2018-05-11

## 2018-05-11 PROBLEM — M17.11 ARTHRITIS OF KNEE, RIGHT: Status: RESOLVED | Noted: 2018-01-23 | Resolved: 2018-05-11

## 2018-05-11 PROBLEM — J32.0 CHRONIC MAXILLARY SINUSITIS: Chronic | Status: RESOLVED | Noted: 2017-05-24 | Resolved: 2018-05-11

## 2018-05-11 PROBLEM — I48.92 ATRIAL FLUTTER (HCC): Status: RESOLVED | Noted: 2018-04-25 | Resolved: 2018-05-11

## 2018-05-11 PROCEDURE — G8400 PT W/DXA NO RESULTS DOC: HCPCS | Performed by: INTERNAL MEDICINE

## 2018-05-11 PROCEDURE — G8417 CALC BMI ABV UP PARAM F/U: HCPCS | Performed by: INTERNAL MEDICINE

## 2018-05-11 PROCEDURE — 1123F ACP DISCUSS/DSCN MKR DOCD: CPT | Performed by: INTERNAL MEDICINE

## 2018-05-11 PROCEDURE — G8427 DOCREV CUR MEDS BY ELIG CLIN: HCPCS | Performed by: INTERNAL MEDICINE

## 2018-05-11 PROCEDURE — 1036F TOBACCO NON-USER: CPT | Performed by: INTERNAL MEDICINE

## 2018-05-11 PROCEDURE — 1090F PRES/ABSN URINE INCON ASSESS: CPT | Performed by: INTERNAL MEDICINE

## 2018-05-11 PROCEDURE — 3017F COLORECTAL CA SCREEN DOC REV: CPT | Performed by: INTERNAL MEDICINE

## 2018-05-11 PROCEDURE — 99214 OFFICE O/P EST MOD 30 MIN: CPT | Performed by: INTERNAL MEDICINE

## 2018-05-11 PROCEDURE — 4040F PNEUMOC VAC/ADMIN/RCVD: CPT | Performed by: INTERNAL MEDICINE

## 2018-05-11 ASSESSMENT — ENCOUNTER SYMPTOMS
SHORTNESS OF BREATH: 0
CHEST TIGHTNESS: 0

## 2018-05-11 ASSESSMENT — PATIENT HEALTH QUESTIONNAIRE - PHQ9
SUM OF ALL RESPONSES TO PHQ9 QUESTIONS 1 & 2: 0
1. LITTLE INTEREST OR PLEASURE IN DOING THINGS: 0
2. FEELING DOWN, DEPRESSED OR HOPELESS: 0
SUM OF ALL RESPONSES TO PHQ QUESTIONS 1-9: 0

## 2018-05-16 ENCOUNTER — TELEPHONE (OUTPATIENT)
Dept: CARDIOLOGY CLINIC | Age: 68
End: 2018-05-16

## 2018-05-16 DIAGNOSIS — I48.91 ATRIAL FIBRILLATION WITH RAPID VENTRICULAR RESPONSE (HCC): ICD-10-CM

## 2018-05-16 DIAGNOSIS — I10 ESSENTIAL HYPERTENSION: Chronic | ICD-10-CM

## 2018-05-16 DIAGNOSIS — I27.20 PULMONARY HTN (HCC): Chronic | ICD-10-CM

## 2018-05-16 DIAGNOSIS — G47.33 OSA (OBSTRUCTIVE SLEEP APNEA): Chronic | ICD-10-CM

## 2018-05-16 DIAGNOSIS — E66.01 OBESITY, CLASS III, BMI 40-49.9 (MORBID OBESITY) (HCC): ICD-10-CM

## 2018-05-16 DIAGNOSIS — I26.99 OTHER PULMONARY EMBOLISM WITHOUT ACUTE COR PULMONALE, UNSPECIFIED CHRONICITY (HCC): ICD-10-CM

## 2018-05-16 DIAGNOSIS — R06.02 SOB (SHORTNESS OF BREATH): ICD-10-CM

## 2018-05-16 RX ORDER — LISINOPRIL 10 MG/1
10 TABLET ORAL DAILY
Qty: 90 TABLET | Refills: 3
Start: 2018-05-16 | End: 2018-05-18 | Stop reason: SDUPTHER

## 2018-05-16 RX ORDER — FUROSEMIDE 20 MG/1
TABLET ORAL
Qty: 60 TABLET | Refills: 60
Start: 2018-05-16 | End: 2018-05-18 | Stop reason: SDUPTHER

## 2018-05-16 RX ORDER — DILTIAZEM HYDROCHLORIDE 120 MG/1
120 CAPSULE, COATED, EXTENDED RELEASE ORAL NIGHTLY
Qty: 30 CAPSULE | Refills: 3
Start: 2018-05-16 | End: 2018-05-18 | Stop reason: SDUPTHER

## 2018-05-16 RX ORDER — HYDROCHLOROTHIAZIDE 25 MG/1
25 TABLET ORAL DAILY
Qty: 90 TABLET | Refills: 3
Start: 2018-05-16 | End: 2019-05-31 | Stop reason: SDUPTHER

## 2018-05-16 RX ORDER — DILTIAZEM HYDROCHLORIDE 120 MG/1
120 CAPSULE, EXTENDED RELEASE ORAL DAILY
Qty: 90 CAPSULE | Refills: 3 | OUTPATIENT
Start: 2018-05-16

## 2018-05-16 RX ORDER — POTASSIUM CHLORIDE 750 MG/1
TABLET, EXTENDED RELEASE ORAL
Qty: 120 TABLET | Refills: 3
Start: 2018-05-16 | End: 2018-05-18 | Stop reason: SDUPTHER

## 2018-05-16 RX ORDER — DILTIAZEM HYDROCHLORIDE 120 MG/1
120 CAPSULE, COATED, EXTENDED RELEASE ORAL DAILY
Qty: 30 CAPSULE | Refills: 3 | Status: SHIPPED | OUTPATIENT
Start: 2018-05-16 | End: 2018-05-16 | Stop reason: SDUPTHER

## 2018-05-17 ENCOUNTER — TELEPHONE (OUTPATIENT)
Dept: CARDIOLOGY CLINIC | Age: 68
End: 2018-05-17

## 2018-05-18 DIAGNOSIS — R06.02 SOB (SHORTNESS OF BREATH): ICD-10-CM

## 2018-05-18 DIAGNOSIS — I48.91 ATRIAL FIBRILLATION WITH RAPID VENTRICULAR RESPONSE (HCC): ICD-10-CM

## 2018-05-18 DIAGNOSIS — G47.33 OSA (OBSTRUCTIVE SLEEP APNEA): Chronic | ICD-10-CM

## 2018-05-18 DIAGNOSIS — I27.20 PULMONARY HTN (HCC): Chronic | ICD-10-CM

## 2018-05-18 DIAGNOSIS — I10 ESSENTIAL HYPERTENSION: Chronic | ICD-10-CM

## 2018-05-18 DIAGNOSIS — E66.01 OBESITY, CLASS III, BMI 40-49.9 (MORBID OBESITY) (HCC): ICD-10-CM

## 2018-05-18 DIAGNOSIS — I26.99 OTHER PULMONARY EMBOLISM WITHOUT ACUTE COR PULMONALE, UNSPECIFIED CHRONICITY (HCC): ICD-10-CM

## 2018-05-18 RX ORDER — DILTIAZEM HYDROCHLORIDE 120 MG/1
120 CAPSULE, COATED, EXTENDED RELEASE ORAL NIGHTLY
Qty: 90 CAPSULE | Refills: 1 | Status: SHIPPED | OUTPATIENT
Start: 2018-05-18 | End: 2018-10-01

## 2018-05-18 RX ORDER — POTASSIUM CHLORIDE 750 MG/1
TABLET, EXTENDED RELEASE ORAL
Qty: 45 TABLET | Refills: 1 | Status: SHIPPED | OUTPATIENT
Start: 2018-05-18 | End: 2018-06-28 | Stop reason: SDUPTHER

## 2018-05-18 RX ORDER — LISINOPRIL 10 MG/1
10 TABLET ORAL DAILY
Qty: 90 TABLET | Refills: 1 | Status: SHIPPED | OUTPATIENT
Start: 2018-05-18 | End: 2018-10-29 | Stop reason: SDUPTHER

## 2018-05-18 RX ORDER — FUROSEMIDE 20 MG/1
TABLET ORAL
Qty: 45 TABLET | Refills: 1 | Status: SHIPPED | OUTPATIENT
Start: 2018-05-18 | End: 2018-05-23 | Stop reason: SDUPTHER

## 2018-05-23 ENCOUNTER — OFFICE VISIT (OUTPATIENT)
Dept: CARDIOLOGY CLINIC | Age: 68
End: 2018-05-23

## 2018-05-23 VITALS
SYSTOLIC BLOOD PRESSURE: 130 MMHG | BODY MASS INDEX: 39.65 KG/M2 | DIASTOLIC BLOOD PRESSURE: 70 MMHG | HEART RATE: 68 BPM | HEIGHT: 65 IN | WEIGHT: 238 LBS

## 2018-05-23 DIAGNOSIS — I10 ESSENTIAL HYPERTENSION: Chronic | ICD-10-CM

## 2018-05-23 DIAGNOSIS — I26.99 OTHER PULMONARY EMBOLISM WITHOUT ACUTE COR PULMONALE, UNSPECIFIED CHRONICITY (HCC): ICD-10-CM

## 2018-05-23 DIAGNOSIS — I10 BENIGN ESSENTIAL HTN: Chronic | ICD-10-CM

## 2018-05-23 DIAGNOSIS — I48.91 ATRIAL FIBRILLATION WITH RAPID VENTRICULAR RESPONSE (HCC): ICD-10-CM

## 2018-05-23 DIAGNOSIS — E66.01 OBESITY, CLASS III, BMI 40-49.9 (MORBID OBESITY) (HCC): ICD-10-CM

## 2018-05-23 DIAGNOSIS — I48.91 ATRIAL FIBRILLATION WITH RVR (HCC): Primary | ICD-10-CM

## 2018-05-23 DIAGNOSIS — G47.33 OSA (OBSTRUCTIVE SLEEP APNEA): Chronic | ICD-10-CM

## 2018-05-23 DIAGNOSIS — I27.20 PULMONARY HTN (HCC): Chronic | ICD-10-CM

## 2018-05-23 DIAGNOSIS — R06.02 SOB (SHORTNESS OF BREATH): ICD-10-CM

## 2018-05-23 LAB
GLUCOSE BLD-MCNC: 98 MG/DL (ref 70–99)
PERFORMED ON: NORMAL

## 2018-05-23 PROCEDURE — G8400 PT W/DXA NO RESULTS DOC: HCPCS | Performed by: INTERNAL MEDICINE

## 2018-05-23 PROCEDURE — 4040F PNEUMOC VAC/ADMIN/RCVD: CPT | Performed by: INTERNAL MEDICINE

## 2018-05-23 PROCEDURE — 3017F COLORECTAL CA SCREEN DOC REV: CPT | Performed by: INTERNAL MEDICINE

## 2018-05-23 PROCEDURE — 93225 XTRNL ECG REC<48 HRS REC: CPT | Performed by: INTERNAL MEDICINE

## 2018-05-23 PROCEDURE — 1090F PRES/ABSN URINE INCON ASSESS: CPT | Performed by: INTERNAL MEDICINE

## 2018-05-23 PROCEDURE — 1123F ACP DISCUSS/DSCN MKR DOCD: CPT | Performed by: INTERNAL MEDICINE

## 2018-05-23 PROCEDURE — G8417 CALC BMI ABV UP PARAM F/U: HCPCS | Performed by: INTERNAL MEDICINE

## 2018-05-23 PROCEDURE — 1036F TOBACCO NON-USER: CPT | Performed by: INTERNAL MEDICINE

## 2018-05-23 PROCEDURE — G8427 DOCREV CUR MEDS BY ELIG CLIN: HCPCS | Performed by: INTERNAL MEDICINE

## 2018-05-23 PROCEDURE — 99214 OFFICE O/P EST MOD 30 MIN: CPT | Performed by: INTERNAL MEDICINE

## 2018-05-23 RX ORDER — FUROSEMIDE 20 MG/1
TABLET ORAL
Qty: 45 TABLET | Refills: 1
Start: 2018-05-23 | End: 2019-02-21 | Stop reason: SDUPTHER

## 2018-05-23 RX ORDER — FLUTICASONE PROPIONATE 50 MCG
1 SPRAY, SUSPENSION (ML) NASAL DAILY
COMMUNITY
End: 2018-05-23 | Stop reason: ALTCHOICE

## 2018-06-01 ENCOUNTER — HOSPITAL ENCOUNTER (OUTPATIENT)
Dept: OTHER | Age: 68
Discharge: HOME | End: 2018-06-30
Attending: ORTHOPAEDIC SURGERY | Admitting: ORTHOPAEDIC SURGERY

## 2018-06-01 ENCOUNTER — HOSPITAL ENCOUNTER (OUTPATIENT)
Dept: OTHER | Age: 68
Discharge: HOME | End: 2018-06-30
Attending: INTERNAL MEDICINE | Admitting: INTERNAL MEDICINE

## 2018-06-01 ENCOUNTER — HOSPITAL ENCOUNTER (OUTPATIENT)
Dept: OTHER | Age: 68
Discharge: HOME | End: 2018-06-01
Attending: PHYSICAL MEDICINE & REHABILITATION | Admitting: PHYSICAL MEDICINE & REHABILITATION

## 2018-06-01 ENCOUNTER — HOSPITAL ENCOUNTER (OUTPATIENT)
Dept: PHYSICAL THERAPY | Age: 68
Discharge: HOME | End: 2018-05-31
Admitting: PHYSICAL MEDICINE & REHABILITATION

## 2018-06-01 ENCOUNTER — HOSPITAL ENCOUNTER (OUTPATIENT)
Dept: PHYSICAL THERAPY | Age: 68
Discharge: HOME | End: 2018-06-30
Admitting: PHYSICAL MEDICINE & REHABILITATION

## 2018-06-01 ASSESSMENT — PAIN DESCRIPTION - DESCRIPTORS: DESCRIPTORS: ACHING;SHARP

## 2018-06-01 ASSESSMENT — PAIN DESCRIPTION - LOCATION: LOCATION: LEG;KNEE

## 2018-06-01 ASSESSMENT — PAIN DESCRIPTION - FREQUENCY: FREQUENCY: CONTINUOUS

## 2018-06-01 ASSESSMENT — PAIN SCALES - GENERAL: PAINLEVEL_OUTOF10: 2

## 2018-06-01 ASSESSMENT — PAIN DESCRIPTION - PROGRESSION: CLINICAL_PROGRESSION: NOT CHANGED

## 2018-06-01 ASSESSMENT — PAIN DESCRIPTION - ORIENTATION: ORIENTATION: RIGHT;LEFT

## 2018-06-01 ASSESSMENT — PAIN DESCRIPTION - PAIN TYPE: TYPE: CHRONIC PAIN

## 2018-06-01 ASSESSMENT — PAIN DESCRIPTION - ONSET: ONSET: AWAKENED FROM SLEEP

## 2018-06-01 NOTE — FLOWSHEET NOTE
pain  [] Patient limited by other medical complications  [] Other:     Prognosis: [x] Good [] Fair  [] Poor    Patient Requires Follow-up: [x] Yes  [] No    Plan:   [] Continue per plan of care [] Alter current plan (see comments)  [x] Plan of care initiated [] Hold pending MD visit [] Discharge    Plan for Next Session:  LE endurance, spinal stability, core strength, stair training/gait training, flexibility, balance, AROM knees    Electronically signed by:  Roberta Tolliver PT, DPT
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
## 2018-06-04 ENCOUNTER — OFFICE VISIT (OUTPATIENT)
Dept: SLEEP MEDICINE | Age: 68
End: 2018-06-04

## 2018-06-04 VITALS
OXYGEN SATURATION: 98 % | SYSTOLIC BLOOD PRESSURE: 119 MMHG | BODY MASS INDEX: 39.65 KG/M2 | HEIGHT: 65 IN | HEART RATE: 78 BPM | WEIGHT: 238 LBS | DIASTOLIC BLOOD PRESSURE: 61 MMHG

## 2018-06-04 DIAGNOSIS — E03.9 ACQUIRED HYPOTHYROIDISM: Chronic | ICD-10-CM

## 2018-06-04 DIAGNOSIS — E66.9 NON MORBID OBESITY, UNSPECIFIED OBESITY TYPE: ICD-10-CM

## 2018-06-04 DIAGNOSIS — G25.81 RESTLESS LEG SYNDROME: Chronic | ICD-10-CM

## 2018-06-04 DIAGNOSIS — G47.33 OSA (OBSTRUCTIVE SLEEP APNEA): Primary | Chronic | ICD-10-CM

## 2018-06-04 DIAGNOSIS — I10 BENIGN ESSENTIAL HTN: Chronic | ICD-10-CM

## 2018-06-04 LAB
ANION GAP SERPL CALCULATED.3IONS-SCNC: 21 MMOL/L (ref 3–16)
BUN BLDV-MCNC: 24 MG/DL (ref 7–20)
CALCIUM SERPL-MCNC: 9.7 MG/DL (ref 8.3–10.6)
CHLORIDE BLD-SCNC: 97 MMOL/L (ref 99–110)
CO2: 19 MMOL/L (ref 21–32)
CREAT SERPL-MCNC: 0.5 MG/DL (ref 0.6–1.2)
GFR AFRICAN AMERICAN: >60
GFR NON-AFRICAN AMERICAN: >60
GLUCOSE BLD-MCNC: 102 MG/DL (ref 70–99)
POTASSIUM SERPL-SCNC: 3.6 MMOL/L (ref 3.5–5.1)
PRO-BNP: 68 PG/ML (ref 0–124)
SODIUM BLD-SCNC: 137 MMOL/L (ref 136–145)

## 2018-06-04 PROCEDURE — 3017F COLORECTAL CA SCREEN DOC REV: CPT | Performed by: INTERNAL MEDICINE

## 2018-06-04 PROCEDURE — 1036F TOBACCO NON-USER: CPT | Performed by: INTERNAL MEDICINE

## 2018-06-04 PROCEDURE — 1123F ACP DISCUSS/DSCN MKR DOCD: CPT | Performed by: INTERNAL MEDICINE

## 2018-06-04 PROCEDURE — G8427 DOCREV CUR MEDS BY ELIG CLIN: HCPCS | Performed by: INTERNAL MEDICINE

## 2018-06-04 PROCEDURE — 4040F PNEUMOC VAC/ADMIN/RCVD: CPT | Performed by: INTERNAL MEDICINE

## 2018-06-04 PROCEDURE — 1090F PRES/ABSN URINE INCON ASSESS: CPT | Performed by: INTERNAL MEDICINE

## 2018-06-04 PROCEDURE — G8417 CALC BMI ABV UP PARAM F/U: HCPCS | Performed by: INTERNAL MEDICINE

## 2018-06-04 PROCEDURE — 99214 OFFICE O/P EST MOD 30 MIN: CPT | Performed by: INTERNAL MEDICINE

## 2018-06-04 PROCEDURE — G8400 PT W/DXA NO RESULTS DOC: HCPCS | Performed by: INTERNAL MEDICINE

## 2018-06-04 ASSESSMENT — ENCOUNTER SYMPTOMS
CHOKING: 0
APNEA: 0
SHORTNESS OF BREATH: 0
RHINORRHEA: 0
COUGH: 0

## 2018-06-04 ASSESSMENT — SLEEP AND FATIGUE QUESTIONNAIRES
HOW LIKELY ARE YOU TO NOD OFF OR FALL ASLEEP WHILE SITTING INACTIVE IN A PUBLIC PLACE: 1
HOW LIKELY ARE YOU TO NOD OFF OR FALL ASLEEP WHILE LYING DOWN TO REST IN THE AFTERNOON WHEN CIRCUMSTANCES PERMIT: 0
HOW LIKELY ARE YOU TO NOD OFF OR FALL ASLEEP WHILE SITTING AND READING: 1
HOW LIKELY ARE YOU TO NOD OFF OR FALL ASLEEP IN A CAR, WHILE STOPPED FOR A FEW MINUTES IN TRAFFIC: 0
HOW LIKELY ARE YOU TO NOD OFF OR FALL ASLEEP WHILE SITTING AND TALKING TO SOMEONE: 0
HOW LIKELY ARE YOU TO NOD OFF OR FALL ASLEEP WHILE WATCHING TV: 2
HOW LIKELY ARE YOU TO NOD OFF OR FALL ASLEEP WHILE SITTING QUIETLY AFTER LUNCH WITHOUT ALCOHOL: 2
HOW LIKELY ARE YOU TO NOD OFF OR FALL ASLEEP WHEN YOU ARE A PASSENGER IN A CAR FOR AN HOUR WITHOUT A BREAK: 2
ESS TOTAL SCORE: 8

## 2018-06-05 PROCEDURE — 93227 XTRNL ECG REC<48 HR R&I: CPT | Performed by: INTERNAL MEDICINE

## 2018-06-13 ENCOUNTER — TELEPHONE (OUTPATIENT)
Dept: CARDIOLOGY CLINIC | Age: 68
End: 2018-06-13

## 2018-06-13 NOTE — TELEPHONE ENCOUNTER
Nothing alarming was seen on the holter monitor  I will have Dr Franky Putnam review the holter on her return next week

## 2018-06-13 NOTE — TELEPHONE ENCOUNTER
Holter monitor results have been scanned and under \"Cardiology tab\".     Please advise message for patient

## 2018-06-22 ENCOUNTER — TELEPHONE (OUTPATIENT)
Dept: INTERNAL MEDICINE CLINIC | Age: 68
End: 2018-06-22

## 2018-06-22 ENCOUNTER — OFFICE VISIT (OUTPATIENT)
Dept: INTERNAL MEDICINE CLINIC | Age: 68
End: 2018-06-22

## 2018-06-22 VITALS
BODY MASS INDEX: 39.15 KG/M2 | HEIGHT: 65 IN | HEART RATE: 80 BPM | WEIGHT: 235 LBS | TEMPERATURE: 98.2 F | DIASTOLIC BLOOD PRESSURE: 80 MMHG | SYSTOLIC BLOOD PRESSURE: 136 MMHG

## 2018-06-22 DIAGNOSIS — J40 BRONCHITIS: Primary | ICD-10-CM

## 2018-06-22 DIAGNOSIS — R05.9 COUGH: ICD-10-CM

## 2018-06-22 PROCEDURE — 4040F PNEUMOC VAC/ADMIN/RCVD: CPT | Performed by: INTERNAL MEDICINE

## 2018-06-22 PROCEDURE — 1090F PRES/ABSN URINE INCON ASSESS: CPT | Performed by: INTERNAL MEDICINE

## 2018-06-22 PROCEDURE — G8417 CALC BMI ABV UP PARAM F/U: HCPCS | Performed by: INTERNAL MEDICINE

## 2018-06-22 PROCEDURE — G8400 PT W/DXA NO RESULTS DOC: HCPCS | Performed by: INTERNAL MEDICINE

## 2018-06-22 PROCEDURE — 99213 OFFICE O/P EST LOW 20 MIN: CPT | Performed by: INTERNAL MEDICINE

## 2018-06-22 PROCEDURE — G8427 DOCREV CUR MEDS BY ELIG CLIN: HCPCS | Performed by: INTERNAL MEDICINE

## 2018-06-22 PROCEDURE — 1036F TOBACCO NON-USER: CPT | Performed by: INTERNAL MEDICINE

## 2018-06-22 PROCEDURE — 3017F COLORECTAL CA SCREEN DOC REV: CPT | Performed by: INTERNAL MEDICINE

## 2018-06-22 PROCEDURE — 1123F ACP DISCUSS/DSCN MKR DOCD: CPT | Performed by: INTERNAL MEDICINE

## 2018-06-22 RX ORDER — LEVALBUTEROL INHALATION SOLUTION 0.63 MG/3ML
1 SOLUTION RESPIRATORY (INHALATION) EVERY 8 HOURS PRN
Qty: 25 VIAL | Refills: 2 | Status: SHIPPED | OUTPATIENT
Start: 2018-06-22 | End: 2018-06-22 | Stop reason: SDUPTHER

## 2018-06-22 RX ORDER — LEVOFLOXACIN 500 MG/1
500 TABLET, FILM COATED ORAL DAILY
Qty: 10 TABLET | Refills: 0 | Status: SHIPPED | OUTPATIENT
Start: 2018-06-22 | End: 2018-06-27

## 2018-06-22 RX ORDER — PROMETHAZINE HYDROCHLORIDE AND CODEINE PHOSPHATE 6.25; 1 MG/5ML; MG/5ML
5 SYRUP ORAL NIGHTLY PRN
Qty: 120 ML | Refills: 0 | Status: SHIPPED | OUTPATIENT
Start: 2018-06-22 | End: 2018-06-27

## 2018-06-26 ENCOUNTER — TELEPHONE (OUTPATIENT)
Dept: RHEUMATOLOGY | Age: 68
End: 2018-06-26

## 2018-06-28 ENCOUNTER — OFFICE VISIT (OUTPATIENT)
Dept: CARDIOLOGY CLINIC | Age: 68
End: 2018-06-28

## 2018-06-28 VITALS
HEIGHT: 65 IN | RESPIRATION RATE: 16 BRPM | SYSTOLIC BLOOD PRESSURE: 124 MMHG | WEIGHT: 234.12 LBS | OXYGEN SATURATION: 96 % | DIASTOLIC BLOOD PRESSURE: 64 MMHG | HEART RATE: 64 BPM | BODY MASS INDEX: 39 KG/M2

## 2018-06-28 DIAGNOSIS — I48.91 ATRIAL FIBRILLATION WITH RVR (HCC): Primary | ICD-10-CM

## 2018-06-28 DIAGNOSIS — I48.91 ATRIAL FIBRILLATION WITH RAPID VENTRICULAR RESPONSE (HCC): ICD-10-CM

## 2018-06-28 DIAGNOSIS — I10 BENIGN ESSENTIAL HTN: Chronic | ICD-10-CM

## 2018-06-28 DIAGNOSIS — I27.20 PULMONARY HTN (HCC): Chronic | ICD-10-CM

## 2018-06-28 DIAGNOSIS — I10 ESSENTIAL HYPERTENSION: Chronic | ICD-10-CM

## 2018-06-28 DIAGNOSIS — G47.33 OSA (OBSTRUCTIVE SLEEP APNEA): Chronic | ICD-10-CM

## 2018-06-28 DIAGNOSIS — R06.02 SOB (SHORTNESS OF BREATH): ICD-10-CM

## 2018-06-28 DIAGNOSIS — E66.01 OBESITY, CLASS III, BMI 40-49.9 (MORBID OBESITY) (HCC): ICD-10-CM

## 2018-06-28 DIAGNOSIS — R07.9 CHEST PAIN, UNSPECIFIED TYPE: ICD-10-CM

## 2018-06-28 DIAGNOSIS — E66.9 NON MORBID OBESITY, UNSPECIFIED OBESITY TYPE: ICD-10-CM

## 2018-06-28 DIAGNOSIS — I26.99 OTHER PULMONARY EMBOLISM WITHOUT ACUTE COR PULMONALE, UNSPECIFIED CHRONICITY (HCC): ICD-10-CM

## 2018-06-28 PROCEDURE — 3017F COLORECTAL CA SCREEN DOC REV: CPT | Performed by: INTERNAL MEDICINE

## 2018-06-28 PROCEDURE — 93000 ELECTROCARDIOGRAM COMPLETE: CPT | Performed by: INTERNAL MEDICINE

## 2018-06-28 PROCEDURE — 99215 OFFICE O/P EST HI 40 MIN: CPT | Performed by: INTERNAL MEDICINE

## 2018-06-28 PROCEDURE — 1090F PRES/ABSN URINE INCON ASSESS: CPT | Performed by: INTERNAL MEDICINE

## 2018-06-28 PROCEDURE — 1123F ACP DISCUSS/DSCN MKR DOCD: CPT | Performed by: INTERNAL MEDICINE

## 2018-06-28 PROCEDURE — 1036F TOBACCO NON-USER: CPT | Performed by: INTERNAL MEDICINE

## 2018-06-28 PROCEDURE — G8417 CALC BMI ABV UP PARAM F/U: HCPCS | Performed by: INTERNAL MEDICINE

## 2018-06-28 PROCEDURE — G8427 DOCREV CUR MEDS BY ELIG CLIN: HCPCS | Performed by: INTERNAL MEDICINE

## 2018-06-28 PROCEDURE — G8400 PT W/DXA NO RESULTS DOC: HCPCS | Performed by: INTERNAL MEDICINE

## 2018-06-28 PROCEDURE — 4040F PNEUMOC VAC/ADMIN/RCVD: CPT | Performed by: INTERNAL MEDICINE

## 2018-06-28 RX ORDER — CALCIUM CARBONATE 260MG(650)
1 TABLET,CHEWABLE ORAL DAILY
Qty: 90 TABLET | Refills: 3
Start: 2018-06-28

## 2018-06-28 RX ORDER — MAGNESIUM 200 MG
1000 TABLET ORAL DAILY
COMMUNITY
End: 2018-10-01

## 2018-06-28 RX ORDER — POTASSIUM CHLORIDE 750 MG/1
TABLET, EXTENDED RELEASE ORAL
Qty: 45 TABLET | Refills: 3 | Status: SHIPPED | OUTPATIENT
Start: 2018-06-28 | End: 2018-11-09 | Stop reason: SDUPTHER

## 2018-06-29 ENCOUNTER — HOSPITAL ENCOUNTER (OUTPATIENT)
Dept: PHYSICAL THERAPY | Age: 68
Discharge: HOME | End: 2018-07-31
Admitting: PHYSICAL MEDICINE & REHABILITATION

## 2018-06-29 NOTE — FLOWSHEET NOTE
Physical Therapy Daily Treatment Note  Date:  2018    Patient Name:  Rock Aj    :  1950  MRN: [id number]  Restrictions/Precautions:  B hip replacement, anterior hip replacements, precautions still in place. Medical/Treatment Diagnosis Information:   Diagnosis: lumbar stenosis, muscle spasm, scoliosis  Treatment Diagnosis: decreased hip strength, impaired posture, poor endurance in B LEs, decreased core strength, and impaired gait mechanics    Tracking Information:  Physician Information Referring Practitioner: Dr. Rocco Arroyo of Care Sent Date: 18 Signed Received: Y, 18   Visit Count / Total Visits      Insurance Approved Visits  [employer] Barbette Torres Approved Dates:     Insurance Information PT Insurance Information: Medicare (med Barbette Torres), secondary: Ryan   Progress Note/G-codes   []  Yes  [x]  No Next Due: #10     Pain level: 3/10 right knee, 2/10 widespread     Subjective:  Pt states she went to the ER on Wednesday night with a lot of A-Fib issues, and is very upset about it. Says that they want to do an ablation, but is scared because the last time she underwent anesthesia she got blood clots in lungs. Pt states she has been doing a lot of swimming, and says she can move her knees better (demonstrated alternating hip flexion), and can do it without pain. Also notices that her posture is a lot better. Says she wakes up with LBP and does standing lat stretch and heat pad for 15 mins, and it loosens up     Objective:      Observation: . Pt greeted in waiting room as she was being consoled by a cardiac nurse, pt was crying and very upset. : Pt 7 min late to session. Test measurements:      Exercises:  Exercise/Equipment Resistance/Repetitions Other comments   Nustep  . Not done 2* NUSTEP not available   : Pt unable to complete bike d/t knee ROM.     mat 90/90 knee ext 2 x 5 HEP   Stairs  HSS 2x45\" B  Hip flexor stretching 2 x 45 sec    IB/HR     Stair endurance, spinal stability, core strength, stair training/gait training, flexibility, balance, AROM knees.  Update HEP for aquatic exercises when able    Electronically signed by:  Sinan Lockhart PT

## 2018-07-01 ENCOUNTER — HOSPITAL ENCOUNTER (OUTPATIENT)
Dept: OTHER | Age: 68
Discharge: HOME | End: 2018-07-31
Attending: INTERNAL MEDICINE | Admitting: INTERNAL MEDICINE

## 2018-07-01 ENCOUNTER — HOSPITAL ENCOUNTER (OUTPATIENT)
Dept: OTHER | Age: 68
Discharge: HOME | End: 2018-07-01
Attending: PHYSICAL MEDICINE & REHABILITATION | Admitting: PHYSICAL MEDICINE & REHABILITATION

## 2018-07-02 ENCOUNTER — TELEPHONE (OUTPATIENT)
Dept: ENDOCRINOLOGY | Age: 68
End: 2018-07-02

## 2018-07-02 DIAGNOSIS — E55.9 VITAMIN D DEFICIENCY: ICD-10-CM

## 2018-07-02 DIAGNOSIS — E03.9 HYPOTHYROIDISM, UNSPECIFIED TYPE: Primary | ICD-10-CM

## 2018-07-02 NOTE — TELEPHONE ENCOUNTER
Please advise she can have blood work dne I have placed orders   She has seen prior endo at  ( not me )

## 2018-07-18 ENCOUNTER — TELEPHONE (OUTPATIENT)
Dept: CARDIOLOGY CLINIC | Age: 68
End: 2018-07-18

## 2018-07-25 ENCOUNTER — HOSPITAL ENCOUNTER (OUTPATIENT)
Dept: OTHER | Age: 68
Discharge: HOME | End: 2018-07-25
Attending: INTERNAL MEDICINE | Admitting: INTERNAL MEDICINE

## 2018-07-25 DIAGNOSIS — E03.9 HYPOTHYROIDISM, UNSPECIFIED TYPE: ICD-10-CM

## 2018-07-25 DIAGNOSIS — E55.9 VITAMIN D DEFICIENCY: ICD-10-CM

## 2018-07-25 LAB
A/G RATIO: 1.6 (ref 1.1–2.2)
ALBUMIN SERPL-MCNC: 4.3 G/DL (ref 3.4–5)
ALP BLD-CCNC: 96 U/L (ref 40–129)
ALT SERPL-CCNC: 18 U/L (ref 10–40)
ANION GAP SERPL CALCULATED.3IONS-SCNC: 14 MMOL/L (ref 3–16)
ANTI-THYROGLOB ABS: 13 IU/ML
AST SERPL-CCNC: 17 U/L (ref 15–37)
BILIRUB SERPL-MCNC: 0.4 MG/DL (ref 0–1)
BUN BLDV-MCNC: 14 MG/DL (ref 7–20)
CALCIUM SERPL-MCNC: 9.6 MG/DL (ref 8.3–10.6)
CHLORIDE BLD-SCNC: 102 MMOL/L (ref 99–110)
CO2: 23 MMOL/L (ref 21–32)
CREAT SERPL-MCNC: 0.6 MG/DL (ref 0.6–1.2)
GFR AFRICAN AMERICAN: >60
GFR NON-AFRICAN AMERICAN: >60
GLOBULIN: 2.7 G/DL
GLUCOSE BLD-MCNC: 112 MG/DL (ref 70–99)
POTASSIUM SERPL-SCNC: 4.1 MMOL/L (ref 3.5–5.1)
PRO-BNP: 74 PG/ML (ref 0–124)
SODIUM BLD-SCNC: 139 MMOL/L (ref 136–145)
T3 TOTAL: 1.34 NG/ML (ref 0.8–2)
T4 FREE: 1.2 NG/DL (ref 0.9–1.8)
THYROID PEROXIDASE (TPO) ABS: 20 IU/ML
TOTAL PROTEIN: 7 G/DL (ref 6.4–8.2)
TSH SERPL DL<=0.05 MIU/L-ACNC: 1.92 UIU/ML (ref 0.27–4.2)
VITAMIN D 25-HYDROXY: 76.4 NG/ML

## 2018-08-01 ENCOUNTER — OFFICE VISIT (OUTPATIENT)
Dept: ENDOCRINOLOGY | Age: 68
End: 2018-08-01

## 2018-08-01 ENCOUNTER — HOSPITAL ENCOUNTER (OUTPATIENT)
Dept: OTHER | Age: 68
Discharge: HOME | End: 2018-08-31
Attending: INTERNAL MEDICINE | Admitting: INTERNAL MEDICINE

## 2018-08-01 ENCOUNTER — HOSPITAL ENCOUNTER (OUTPATIENT)
Dept: OTHER | Age: 68
Discharge: HOME | End: 2018-08-31
Attending: PHYSICAL MEDICINE & REHABILITATION | Admitting: PHYSICAL MEDICINE & REHABILITATION

## 2018-08-01 VITALS
HEART RATE: 76 BPM | OXYGEN SATURATION: 100 % | DIASTOLIC BLOOD PRESSURE: 82 MMHG | HEIGHT: 65 IN | BODY MASS INDEX: 39.39 KG/M2 | WEIGHT: 236.4 LBS | SYSTOLIC BLOOD PRESSURE: 124 MMHG

## 2018-08-01 DIAGNOSIS — G47.33 OSA (OBSTRUCTIVE SLEEP APNEA): Chronic | ICD-10-CM

## 2018-08-01 DIAGNOSIS — I26.99 OTHER PULMONARY EMBOLISM WITHOUT ACUTE COR PULMONALE, UNSPECIFIED CHRONICITY (HCC): ICD-10-CM

## 2018-08-01 DIAGNOSIS — I48.91 ATRIAL FIBRILLATION WITH RVR (HCC): ICD-10-CM

## 2018-08-01 DIAGNOSIS — I10 BENIGN ESSENTIAL HTN: Chronic | ICD-10-CM

## 2018-08-01 DIAGNOSIS — E03.9 HYPOTHYROIDISM, UNSPECIFIED TYPE: Primary | ICD-10-CM

## 2018-08-01 DIAGNOSIS — R73.03 PREDIABETES: ICD-10-CM

## 2018-08-01 PROCEDURE — 1101F PT FALLS ASSESS-DOCD LE1/YR: CPT | Performed by: INTERNAL MEDICINE

## 2018-08-01 PROCEDURE — 3017F COLORECTAL CA SCREEN DOC REV: CPT | Performed by: INTERNAL MEDICINE

## 2018-08-01 PROCEDURE — 99204 OFFICE O/P NEW MOD 45 MIN: CPT | Performed by: INTERNAL MEDICINE

## 2018-08-01 PROCEDURE — 1036F TOBACCO NON-USER: CPT | Performed by: INTERNAL MEDICINE

## 2018-08-01 PROCEDURE — G8417 CALC BMI ABV UP PARAM F/U: HCPCS | Performed by: INTERNAL MEDICINE

## 2018-08-01 PROCEDURE — 1090F PRES/ABSN URINE INCON ASSESS: CPT | Performed by: INTERNAL MEDICINE

## 2018-08-01 PROCEDURE — G8427 DOCREV CUR MEDS BY ELIG CLIN: HCPCS | Performed by: INTERNAL MEDICINE

## 2018-08-01 PROCEDURE — 1123F ACP DISCUSS/DSCN MKR DOCD: CPT | Performed by: INTERNAL MEDICINE

## 2018-08-01 PROCEDURE — G8400 PT W/DXA NO RESULTS DOC: HCPCS | Performed by: INTERNAL MEDICINE

## 2018-08-01 PROCEDURE — 4040F PNEUMOC VAC/ADMIN/RCVD: CPT | Performed by: INTERNAL MEDICINE

## 2018-08-01 RX ORDER — FLUTICASONE PROPIONATE 50 MCG
1 SPRAY, SUSPENSION (ML) NASAL DAILY
COMMUNITY
End: 2018-11-09
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
## 2018-08-02 ENCOUNTER — TELEPHONE (OUTPATIENT)
Dept: CARDIOLOGY CLINIC | Age: 68
End: 2018-08-02

## 2018-08-06 ENCOUNTER — TELEPHONE (OUTPATIENT)
Dept: CARDIOLOGY CLINIC | Age: 68
End: 2018-08-06

## 2018-08-06 NOTE — TELEPHONE ENCOUNTER
She saw an endocrinologist about her thyroid , and was told her thyroid med she had been taking was too strong . She was told to hold her thyroid med for 2 months and then come back for another blood test . She is cancelling her ablation until she gets her thyroid thing under control.

## 2018-08-07 ENCOUNTER — TELEPHONE (OUTPATIENT)
Dept: CARDIOLOGY CLINIC | Age: 68
End: 2018-08-07

## 2018-08-24 ENCOUNTER — OFFICE VISIT (OUTPATIENT)
Dept: CARDIOLOGY CLINIC | Age: 68
End: 2018-08-24

## 2018-08-24 VITALS
HEIGHT: 65 IN | BODY MASS INDEX: 38.82 KG/M2 | HEART RATE: 76 BPM | WEIGHT: 233 LBS | SYSTOLIC BLOOD PRESSURE: 116 MMHG | DIASTOLIC BLOOD PRESSURE: 60 MMHG

## 2018-08-24 DIAGNOSIS — I10 BENIGN ESSENTIAL HTN: Chronic | ICD-10-CM

## 2018-08-24 DIAGNOSIS — I48.91 ATRIAL FIBRILLATION WITH RVR (HCC): Primary | ICD-10-CM

## 2018-08-24 DIAGNOSIS — I26.99 OTHER PULMONARY EMBOLISM WITHOUT ACUTE COR PULMONALE, UNSPECIFIED CHRONICITY (HCC): ICD-10-CM

## 2018-08-24 DIAGNOSIS — G47.33 OSA (OBSTRUCTIVE SLEEP APNEA): Chronic | ICD-10-CM

## 2018-08-24 PROCEDURE — G8427 DOCREV CUR MEDS BY ELIG CLIN: HCPCS | Performed by: INTERNAL MEDICINE

## 2018-08-24 PROCEDURE — 3017F COLORECTAL CA SCREEN DOC REV: CPT | Performed by: INTERNAL MEDICINE

## 2018-08-24 PROCEDURE — G8417 CALC BMI ABV UP PARAM F/U: HCPCS | Performed by: INTERNAL MEDICINE

## 2018-08-24 PROCEDURE — 99214 OFFICE O/P EST MOD 30 MIN: CPT | Performed by: INTERNAL MEDICINE

## 2018-08-24 PROCEDURE — 1101F PT FALLS ASSESS-DOCD LE1/YR: CPT | Performed by: INTERNAL MEDICINE

## 2018-08-24 PROCEDURE — G8400 PT W/DXA NO RESULTS DOC: HCPCS | Performed by: INTERNAL MEDICINE

## 2018-08-24 PROCEDURE — 1036F TOBACCO NON-USER: CPT | Performed by: INTERNAL MEDICINE

## 2018-08-24 PROCEDURE — 1123F ACP DISCUSS/DSCN MKR DOCD: CPT | Performed by: INTERNAL MEDICINE

## 2018-08-24 PROCEDURE — 1090F PRES/ABSN URINE INCON ASSESS: CPT | Performed by: INTERNAL MEDICINE

## 2018-08-24 PROCEDURE — 4040F PNEUMOC VAC/ADMIN/RCVD: CPT | Performed by: INTERNAL MEDICINE

## 2018-08-24 NOTE — PROGRESS NOTES
[facility]   Advanced Heart Failure/Pulmonary Hypertension  Cardiac Follow up       Mykel Calzada  YOB: 1950   Date of Visit:  8/24/18  Chief Complaint   Patient presents with    Congestive Heart Failure     No cardiac complaints     History of present illness: Mykel Calzada is a 74 y.o. female with past medical history significant for HTN, HUSSAIN on CPAP, multiple PE on Xarelto (8/2016). She  Original had a PE was treated with xarelto for recommended time and then off it and her RHC revealed pressures no RH elevated pressures. Afterwards she developed AFib and restarted on xarelto. She continues on xarelto and treatment for afib. Echos in July and August (2016) showed RVSP 106-112 without evidence of enlargement in right side of heart. A RHC was done 9/2/16 and PA pressure was 24, no evidence of pulmonary hypertension. Since then her RVSP has decreased and got back to normal. We discussed that we are questioning if the elevated ones by echo may have been due to a false positive test for her. She was newly seen by Dr [place] Larkin 8/1 and he is managing her hypothyroidism. [place]ell the thyroid medication was stopped. She was noted to have negative thyroid Abs ( July 2018)  Her TSH has not been suppressed on the current dose of [place] thyroid and the endocrinologist (Dr. [place] Larkin) doesn't think that too much thyroid hormone replacement is the cause of her irregular heart rhythm but the patient wanted to stop thyroid hormone replacement. Today, since off Lowell thyroid medication her O2 sats are better and she is sleeping better. She wore a cardiac monitor in June. She denies chest ,pain, sob, edema it's improved. Overall she is doing pretty well. She has completed cardiac rehab abd she goes to exercise phase 3. She has not had any atrial fib. She reports that she wears her cpap religiously.     Past Medical History:   Diagnosis Date    Allergic     Anesthesia complication     family TONSILLECTOMY  1972     Social History   Substance Use Topics    Smoking status: Former Smoker     Packs/day: 0.50     Years: 5.00     Quit date: 10/5/2013    Smokeless tobacco: Never Used      Comment: smoked for a total of 5yr total    Alcohol use No       Review of Systems:   Constitutional: No significant change in weight, fatigue or weakness. HEENT: No change in vision or ringing in the ears. Respiratory: No LUGO, PND, orthopnea or cough. Cardiovascular: See HPI   GI: No n/v, abdominal pain or changes in bowel habits. No melena, no hematochezia  : No dysuria or hematuria. Skin: No rash or new skin lesions. Musculoskeletal:   Neurological: No lightheadedness, dizziness, syncope or TIA-like symptoms. Psychiatric: No anxiety, insomnia or depression    Physical Exam:  Vitals:    08/24/18 1333   BP: 116/60   Pulse: 76   Weight: 233 lb (105.7 kg)   Height: 5' 5\" (1.651 m)   Stable BP. General:  Awake, alert, oriented in NAD  Skin:  Warm and dry. No unusual bruising or rash  HEENT: Normocephalic and atraumatic. Oral mucosa moist, no cyanosis. Neck:  Supple. No JVP appreciated  Chest:  Normal effort. Clear to auscultation  Cardiovascular:  RRR, S1/S2, no murmur/gallop/rub  Abdomen:  Soft, nontender, +bowel sounds  Extremities:  No edema  Neurological: No focal deficits  Psychological: Normal mood and affect    Current Outpatient Prescriptions   Medication Sig Dispense Refill    NONFORMULARY       NONFORMULARY       NONFORMULARY       NONFORMULARY       Cyanocobalamin (VITAMIN B-12) 1000 MCG SUBL Place 1,000 mcg under the tongue daily      Magnesium Citrate 100 MG TABS Take 1 tablet by mouth daily 90 tablet 3    rivaroxaban (XARELTO) 20 MG TABS tablet Take 1 tablet by mouth daily (with breakfast) 90 tablet 3    potassium chloride (KLOR-CON M) 10 MEQ extended release tablet Take 1 tab twice weekly with lasix.  (Patient taking differently: 10 mEq daily Take 1 tab twice weekly with lasix.) 45 CALCIUM 9.6 07/25/2018      Lab Results   Component Value Date    TRIG 203 08/04/2017    HDL 55 08/04/2017    LDLCALC 116 08/04/2017    LABVLDL 41 08/04/2017       Diagnostics:  Echo 4/26/18  Left ventricular cavity size is normal.  There is mild left ventricular hypertrophy. Ejection fraction is visually estimated to be 55-60%. Normal diastolic function. Mild tricuspid regurgitation with RVSP of 36 mm/hg    VQ scan 8/30/17  Low Probability for Pulmonary Embolus. Echo 8/28/17  Normalleft ventricle size and systolic function with an estimated ejection fraction of 60%. Mild mitral regurgitation is present. There is mild-moderate tricuspid regurgitation with RVSP estimated at 88 mmHg. This is suggestive of severe pulmonary hypertension. Mild pulmonic regurgitation present. Echo 9/1/2016:  Technically limited examination to evaluate RVSP. Overall left ventricular function is normal.  Normal right ventricular size and function. Mild tricuspid regurgitation with RVSP estimated at 95 mmHg. No evidence of any pericardial effusion. [address] 9/1/2016:  Pressures were as follows:  RA: 9 mmHg  RV:  38/12 mmHg  PA:  33/16, mean 24 mmHg  PCWP:  14 mmHg  Thermodilution CO: 7.45   Thermodilution CI:  3.42  Adiel CO:  9.72  Adiel CI:  4.46  PA Sat:  74%  PVR:  107 dynes     Impression:  1. Minimal pulmonary hypertension with mean PA pressure 24  2. Normal filling pressures  3. Normal cardiac output  4. No evidence of PAH at this time. Echo 8/28/17  Normal left ventricle size and systolic function with an estimated ejection  fraction of 60%. Mild mitral regurgitation is present. There is mild-moderate tricuspid regurgitation with RVSP estimated at 88  mmHg. This is suggestive of severe pulmonary hypertension. Mild pulmonic regurgitation present. Assessment:  1. Tachycardia. Prolonged episode of tachycardia (100s). Asymptomatic. Regular rhythm on exam:  Resolved. Regular rhythm and rate.     2. History of

## 2018-08-27 ENCOUNTER — TELEPHONE (OUTPATIENT)
Dept: SLEEP MEDICINE | Age: 68
End: 2018-08-27

## 2018-08-27 NOTE — TELEPHONE ENCOUNTER
Patient called today. Said she switched to [facility] for DME in January, was given small order of supplies @ that time when picked up machine but has been unable to get additional supplies. When she spoke w/[facility]E, was told that they do not have rx for supplies from our office and her order is on hold. Would like the rx to be sent or sent again if previously sent and also would like the order to include the \"Griffith FX mask for her\" as part of her supplies. If she needs to be called back, she can be reached @ [phone] (home) and [phone] (cell).

## 2018-09-01 ENCOUNTER — HOSPITAL ENCOUNTER (OUTPATIENT)
Dept: OTHER | Age: 68
Discharge: HOME | End: 2018-09-01
Attending: INTERNAL MEDICINE | Admitting: INTERNAL MEDICINE

## 2018-09-05 RX ORDER — DILTIAZEM HYDROCHLORIDE 120 MG/1
120 CAPSULE, EXTENDED RELEASE ORAL DAILY
Qty: 90 CAPSULE | Refills: 3 | Status: SHIPPED | OUTPATIENT
Start: 2018-09-05 | End: 2019-09-05 | Stop reason: SDUPTHER

## 2018-09-10 ENCOUNTER — HOSPITAL ENCOUNTER (OUTPATIENT)
Dept: OTHER | Age: 68
Discharge: HOME | End: 2018-09-10
Attending: CHIROPRACTOR | Admitting: CHIROPRACTOR

## 2018-09-10 DIAGNOSIS — M99.03 LUMBAR REGION SOMATIC DYSFUNCTION: ICD-10-CM

## 2018-09-10 DIAGNOSIS — M54.2 CERVICALGIA: ICD-10-CM

## 2018-09-10 DIAGNOSIS — M54.5 LOW BACK PAIN, UNSPECIFIED BACK PAIN LATERALITY, UNSPECIFIED CHRONICITY, WITH SCIATICA PRESENCE UNSPECIFIED: ICD-10-CM

## 2018-09-10 DIAGNOSIS — M99.01 CERVICAL (NECK) REGION SOMATIC DYSFUNCTION: ICD-10-CM

## 2018-09-10 DIAGNOSIS — M99.02 SEGMENTAL AND SOMATIC DYSFUNCTION OF THORACIC REGION: ICD-10-CM

## 2018-09-10 DIAGNOSIS — M54.6 PAIN IN THORACIC SPINE: ICD-10-CM

## 2018-09-17 ENCOUNTER — TELEPHONE (OUTPATIENT)
Dept: SLEEP MEDICINE | Age: 68
End: 2018-09-17

## 2018-09-18 RX ORDER — ROPINIROLE 1 MG/1
1 TABLET, FILM COATED ORAL NIGHTLY
Qty: 90 TABLET | Refills: 1 | Status: SHIPPED | OUTPATIENT
Start: 2018-09-18 | End: 2018-10-08 | Stop reason: SDUPTHER

## 2018-09-27 ENCOUNTER — HOSPITAL ENCOUNTER (OUTPATIENT)
Age: 68
Discharge: HOME | End: 2018-09-27
Payer: MEDICARE

## 2018-09-27 DIAGNOSIS — E03.9 HYPOTHYROIDISM, UNSPECIFIED TYPE: ICD-10-CM

## 2018-09-27 DIAGNOSIS — R73.03 PREDIABETES: ICD-10-CM

## 2018-09-27 DIAGNOSIS — I27.20 PULMONARY HTN (HCC): Chronic | ICD-10-CM

## 2018-09-27 DIAGNOSIS — R06.02 SOB (SHORTNESS OF BREATH): ICD-10-CM

## 2018-09-27 DIAGNOSIS — G47.33 OSA (OBSTRUCTIVE SLEEP APNEA): Chronic | ICD-10-CM

## 2018-09-27 DIAGNOSIS — I48.91 ATRIAL FIBRILLATION WITH RAPID VENTRICULAR RESPONSE (HCC): ICD-10-CM

## 2018-09-27 DIAGNOSIS — I10 ESSENTIAL HYPERTENSION: Chronic | ICD-10-CM

## 2018-09-27 DIAGNOSIS — E66.01 OBESITY, CLASS III, BMI 40-49.9 (MORBID OBESITY) (HCC): ICD-10-CM

## 2018-09-27 LAB
ANION GAP SERPL CALCULATED.3IONS-SCNC: 17 MMOL/L (ref 3–16)
ANTI-THYROGLOB ABS: 11 IU/ML
BUN BLDV-MCNC: 18 MG/DL (ref 7–20)
CALCIUM SERPL-MCNC: 9.9 MG/DL (ref 8.3–10.6)
CHLORIDE BLD-SCNC: 96 MMOL/L (ref 99–110)
CHOLESTEROL, TOTAL: 220 MG/DL (ref 0–199)
CO2: 24 MMOL/L (ref 21–32)
CREAT SERPL-MCNC: 0.6 MG/DL (ref 0.6–1.2)
GFR AFRICAN AMERICAN: >60
GFR NON-AFRICAN AMERICAN: >60
GLUCOSE BLD-MCNC: 96 MG/DL (ref 70–99)
HDLC SERPL-MCNC: 64 MG/DL (ref 40–60)
LDL CHOLESTEROL CALCULATED: 125 MG/DL
POTASSIUM SERPL-SCNC: 4.1 MMOL/L (ref 3.5–5.1)
PRO-BNP: 71 PG/ML (ref 0–124)
SODIUM BLD-SCNC: 137 MMOL/L (ref 136–145)
T3 TOTAL: 1.36 NG/ML (ref 0.8–2)
T4 FREE: 1.1 NG/DL (ref 0.9–1.8)
THYROID PEROXIDASE (TPO) ABS: 10 IU/ML
TRIGL SERPL-MCNC: 155 MG/DL (ref 0–150)
TSH SERPL DL<=0.05 MIU/L-ACNC: 1.96 UIU/ML (ref 0.27–4.2)
VLDLC SERPL CALC-MCNC: 31 MG/DL

## 2018-09-27 PROCEDURE — 86376 MICROSOMAL ANTIBODY EACH: CPT

## 2018-09-27 PROCEDURE — 84439 ASSAY OF FREE THYROXINE: CPT

## 2018-09-27 PROCEDURE — 83036 HEMOGLOBIN GLYCOSYLATED A1C: CPT

## 2018-09-27 PROCEDURE — 83880 ASSAY OF NATRIURETIC PEPTIDE: CPT

## 2018-09-27 PROCEDURE — 86800 THYROGLOBULIN ANTIBODY: CPT

## 2018-09-27 PROCEDURE — 80048 BASIC METABOLIC PNL TOTAL CA: CPT

## 2018-09-27 PROCEDURE — 84443 ASSAY THYROID STIM HORMONE: CPT

## 2018-09-27 PROCEDURE — 84480 ASSAY TRIIODOTHYRONINE (T3): CPT

## 2018-09-27 PROCEDURE — 80061 LIPID PANEL: CPT

## 2018-09-27 PROCEDURE — 36415 COLL VENOUS BLD VENIPUNCTURE: CPT

## 2018-09-28 LAB
ESTIMATED AVERAGE GLUCOSE: 122.6 MG/DL
HBA1C MFR BLD: 5.9 %

## 2018-10-01 ENCOUNTER — OFFICE VISIT (OUTPATIENT)
Dept: ENDOCRINOLOGY | Age: 68
End: 2018-10-01
Payer: MEDICARE

## 2018-10-01 VITALS
DIASTOLIC BLOOD PRESSURE: 86 MMHG | SYSTOLIC BLOOD PRESSURE: 116 MMHG | HEART RATE: 73 BPM | OXYGEN SATURATION: 98 % | BODY MASS INDEX: 39.45 KG/M2 | HEIGHT: 65 IN | WEIGHT: 236.8 LBS

## 2018-10-01 DIAGNOSIS — E55.9 VITAMIN D DEFICIENCY: ICD-10-CM

## 2018-10-01 DIAGNOSIS — E04.1 BENIGN THYROID CYST: ICD-10-CM

## 2018-10-01 DIAGNOSIS — R94.6 ABNORMAL THYROID FUNCTION TEST: Primary | ICD-10-CM

## 2018-10-01 DIAGNOSIS — R73.03 PREDIABETES: ICD-10-CM

## 2018-10-01 PROCEDURE — 4040F PNEUMOC VAC/ADMIN/RCVD: CPT | Performed by: INTERNAL MEDICINE

## 2018-10-01 PROCEDURE — 1090F PRES/ABSN URINE INCON ASSESS: CPT | Performed by: INTERNAL MEDICINE

## 2018-10-01 PROCEDURE — 1123F ACP DISCUSS/DSCN MKR DOCD: CPT | Performed by: INTERNAL MEDICINE

## 2018-10-01 PROCEDURE — G8484 FLU IMMUNIZE NO ADMIN: HCPCS | Performed by: INTERNAL MEDICINE

## 2018-10-01 PROCEDURE — G8400 PT W/DXA NO RESULTS DOC: HCPCS | Performed by: INTERNAL MEDICINE

## 2018-10-01 PROCEDURE — 1101F PT FALLS ASSESS-DOCD LE1/YR: CPT | Performed by: INTERNAL MEDICINE

## 2018-10-01 PROCEDURE — G8427 DOCREV CUR MEDS BY ELIG CLIN: HCPCS | Performed by: INTERNAL MEDICINE

## 2018-10-01 PROCEDURE — 1036F TOBACCO NON-USER: CPT | Performed by: INTERNAL MEDICINE

## 2018-10-01 PROCEDURE — 99214 OFFICE O/P EST MOD 30 MIN: CPT | Performed by: INTERNAL MEDICINE

## 2018-10-01 PROCEDURE — 3017F COLORECTAL CA SCREEN DOC REV: CPT | Performed by: INTERNAL MEDICINE

## 2018-10-01 PROCEDURE — G8417 CALC BMI ABV UP PARAM F/U: HCPCS | Performed by: INTERNAL MEDICINE

## 2018-10-01 NOTE — PROGRESS NOTES
SUBJECTIVE:  Aldo Mcbride is a 74 y.o. female was initially referred for  hypothyroidism. Patient was diagnosed with Hypothyroidism in 2007 at her routine blood work she doesn't want to be on synthetic compounds so she has been on [place] her thyroid fx tests are within normal limits but she feels that her recent episodes of afib were due to taking thyroid hormone supplements When Y reviewed her labs at the time of diagnosis her thyroid fx tests were with normal limits . I am not sure of Thyroid hormone replacement was started due to symptoms as there is no abnormal elevated TSH noted in the labs  Available to me   I reviewed her labs from  since 2006  Current complaints: fatigue, palpitations, goiter  History of obstructive symptoms: difficulty swallowing No, changes in voice/hoarseness No.  History of radiation to patient's neck: NO  Recent iodine exposure: No  Family history includes no thyroid abnormalities.   Family history of thyroid cancer: No    She has sleep apnea and has been wearing CPAP and she felt better on it   She has restless leg syndrome and is on Requip   She has hypertension and is on Lisinopril , cardizem ,HCTZ she also takes Lasix for CHF after her lung clot   She has blood clots after her hip surgery 2016 and now is on Xeralto   She had A Fib in 2017 she had 3 episodes which she reverted back in normal sinus rhythm after diltiazem     She had holter monitor which didn't show any arrythmia since she stopped [place] thyroid   She follows with Dr Darlene Moses     Past Medical History:   Diagnosis Date    Allergic     Anesthesia complication     family hx-father-at at age 80 having hip replacement revision surgery-had tia's x2 while under anes-but also had hx chf-had dificuly with speech when coming out from anes    Anxiety     Arthritis     left ankle, bilateral hands    Arthritis of left hip 2/2/2016    Arthritis of right hip 4/19/2016    Asthma     At risk for falls     uses a cane   

## 2018-10-05 ENCOUNTER — TELEPHONE (OUTPATIENT)
Dept: CARDIOLOGY CLINIC | Age: 68
End: 2018-10-05

## 2018-10-05 NOTE — TELEPHONE ENCOUNTER
Today at rehab they noticed she had gained 4 pounds in the last 2 days so the told her to call ERICKA. Her left ankle is visibly swollen so she thinks she should take a lasix. Since it is after 3pm can she wait until tomorrow to take the lasix ? She doesn't want to be up all night and she isnt having any SOB. Her b/p 120/70 HR 70 and oxygen level is at 98%.  Please call to advise

## 2018-10-08 RX ORDER — ROPINIROLE 1 MG/1
1 TABLET, FILM COATED ORAL NIGHTLY
Qty: 90 TABLET | Refills: 1 | Status: SHIPPED | OUTPATIENT
Start: 2018-10-08 | End: 2019-03-04 | Stop reason: ALTCHOICE

## 2018-10-19 ENCOUNTER — HOSPITAL ENCOUNTER (OUTPATIENT)
Age: 68
Discharge: HOME | End: 2018-10-19

## 2018-10-19 PROCEDURE — 9900000038 HC CARDIAC REHAB PHASE 3 - MONTHLY

## 2018-10-26 ENCOUNTER — OFFICE VISIT (OUTPATIENT)
Dept: INTERNAL MEDICINE CLINIC | Age: 68
End: 2018-10-26
Payer: MEDICARE

## 2018-10-26 VITALS
DIASTOLIC BLOOD PRESSURE: 70 MMHG | HEART RATE: 66 BPM | SYSTOLIC BLOOD PRESSURE: 120 MMHG | RESPIRATION RATE: 16 BRPM | WEIGHT: 234.6 LBS | TEMPERATURE: 98 F | BODY MASS INDEX: 39.04 KG/M2 | OXYGEN SATURATION: 98 %

## 2018-10-26 DIAGNOSIS — R49.0 HOARSENESS OF VOICE: ICD-10-CM

## 2018-10-26 DIAGNOSIS — Z12.11 COLON CANCER SCREENING: ICD-10-CM

## 2018-10-26 DIAGNOSIS — E03.9 ACQUIRED HYPOTHYROIDISM: Chronic | ICD-10-CM

## 2018-10-26 DIAGNOSIS — I48.91 ATRIAL FIBRILLATION WITH RVR (HCC): ICD-10-CM

## 2018-10-26 DIAGNOSIS — Z78.0 POST-MENOPAUSAL: ICD-10-CM

## 2018-10-26 DIAGNOSIS — G25.81 RESTLESS LEG SYNDROME: Chronic | ICD-10-CM

## 2018-10-26 DIAGNOSIS — Z23 NEED FOR SHINGLES VACCINE: ICD-10-CM

## 2018-10-26 DIAGNOSIS — I10 BENIGN ESSENTIAL HTN: Primary | Chronic | ICD-10-CM

## 2018-10-26 PROCEDURE — 1090F PRES/ABSN URINE INCON ASSESS: CPT | Performed by: INTERNAL MEDICINE

## 2018-10-26 PROCEDURE — G8427 DOCREV CUR MEDS BY ELIG CLIN: HCPCS | Performed by: INTERNAL MEDICINE

## 2018-10-26 PROCEDURE — G8484 FLU IMMUNIZE NO ADMIN: HCPCS | Performed by: INTERNAL MEDICINE

## 2018-10-26 PROCEDURE — G8400 PT W/DXA NO RESULTS DOC: HCPCS | Performed by: INTERNAL MEDICINE

## 2018-10-26 PROCEDURE — G8417 CALC BMI ABV UP PARAM F/U: HCPCS | Performed by: INTERNAL MEDICINE

## 2018-10-26 PROCEDURE — 1101F PT FALLS ASSESS-DOCD LE1/YR: CPT | Performed by: INTERNAL MEDICINE

## 2018-10-26 PROCEDURE — 99214 OFFICE O/P EST MOD 30 MIN: CPT | Performed by: INTERNAL MEDICINE

## 2018-10-26 PROCEDURE — 4040F PNEUMOC VAC/ADMIN/RCVD: CPT | Performed by: INTERNAL MEDICINE

## 2018-10-26 PROCEDURE — 1123F ACP DISCUSS/DSCN MKR DOCD: CPT | Performed by: INTERNAL MEDICINE

## 2018-10-26 PROCEDURE — 3017F COLORECTAL CA SCREEN DOC REV: CPT | Performed by: INTERNAL MEDICINE

## 2018-10-26 PROCEDURE — 1036F TOBACCO NON-USER: CPT | Performed by: INTERNAL MEDICINE

## 2018-10-26 RX ORDER — ROPINIROLE 0.5 MG/1
0.5 TABLET, FILM COATED ORAL 3 TIMES DAILY
Qty: 90 TABLET | Refills: 3 | Status: SHIPPED | OUTPATIENT
Start: 2018-10-26 | End: 2018-10-30 | Stop reason: SDUPTHER

## 2018-10-26 RX ORDER — CETIRIZINE HYDROCHLORIDE, PSEUDOEPHEDRINE HYDROCHLORIDE 5; 120 MG/1; MG/1
1 TABLET, FILM COATED, EXTENDED RELEASE ORAL 2 TIMES DAILY
Qty: 20 TABLET | Refills: 0 | Status: SHIPPED | OUTPATIENT
Start: 2018-10-26 | End: 2018-11-05

## 2018-10-26 ASSESSMENT — PATIENT HEALTH QUESTIONNAIRE - PHQ9
1. LITTLE INTEREST OR PLEASURE IN DOING THINGS: 0
SUM OF ALL RESPONSES TO PHQ9 QUESTIONS 1 & 2: 0
SUM OF ALL RESPONSES TO PHQ QUESTIONS 1-9: 0
SUM OF ALL RESPONSES TO PHQ QUESTIONS 1-9: 0
2. FEELING DOWN, DEPRESSED OR HOPELESS: 0

## 2018-10-26 NOTE — PROGRESS NOTES
SUBJECTIVE:    Cherelle Lynch returns for follow up of Her hypothyroidism. She has not been having symptoms related to Her thyroid condition. She is  taking Her medications as prescribed. Side effects related to Her condition are none. Her condition severity is mild. She has had Her condition for years. Currently, the patient is seeing endocrinology for management of her hypothyroidism. PHQ Scores 10/26/2018 5/11/2018 4/27/2017   PHQ2 Score 0 0 0   PHQ9 Score 0 0 0     Interpretation of Total Score Depression Severity: 1-4 = Minimal depression, 5-9 = Mild depression, 10-14 = Moderate depression, 15-19 = Moderately severe depression, 20-27 = Severe depression    OBJECTIVE:    Vitals:    10/26/18 1108   BP: 120/70   Pulse: 66   Resp: 16   Temp: 98 °F (36.7 °C)   SpO2: 98%     Physical Exam   Constitutional: She is oriented to person, place, and time. She appears well-developed and well-nourished. No distress. HENT:   Head: Normocephalic and atraumatic. Pulmonary/Chest: Effort normal.   Neurological: She is alert and oriented to person, place, and time. No cranial nerve deficit. Skin: She is not diaphoretic. Psychiatric: She has a normal mood and affect. Her behavior is normal. Judgment and thought content normal.   Vitals reviewed.       Current Outpatient Prescriptions:     rOPINIRole (REQUIP) 1 MG tablet, Take 1 tablet by mouth nightly, Disp: 90 tablet, Rfl: 1    CARTIA  MG extended release capsule, TAKE 1 CAPSULE BY MOUTH DAILY, Disp: 90 capsule, Rfl: 3    fluticasone (FLONASE) 50 MCG/ACT nasal spray, 1 spray by Nasal route daily, Disp: , Rfl:     NONFORMULARY, , Disp: , Rfl:     NONFORMULARY, , Disp: , Rfl:     NONFORMULARY, , Disp: , Rfl:     NONFORMULARY, , Disp: , Rfl:     Magnesium Citrate 100 MG TABS, Take 1 tablet by mouth daily, Disp: 90 tablet, Rfl: 3    rivaroxaban (XARELTO) 20 MG TABS tablet, Take 1 tablet by mouth daily (with breakfast), Disp: 90 tablet, Rfl: 3   (ZYRTEC-D ALLERGY & CONGESTION) 5-120 MG per extended release tablet; Take 1 tablet by mouth 2 times daily for 10 days    Need for shingles vaccine  -     zoster recombinant adjuvanted vaccine [facility]) 50 MCG/0.5ML SUSR injection;  Inject 0.5 mLs into the muscle once for 1 dose      David Pritchard MD

## 2018-10-29 ENCOUNTER — NURSE ONLY (OUTPATIENT)
Dept: CARDIOLOGY CLINIC | Age: 68
End: 2018-10-29
Payer: MEDICARE

## 2018-10-29 DIAGNOSIS — I48.91 ATRIAL FIBRILLATION WITH RVR (HCC): Primary | ICD-10-CM

## 2018-10-29 DIAGNOSIS — I10 BENIGN ESSENTIAL HTN: Chronic | ICD-10-CM

## 2018-10-29 PROCEDURE — 93225 XTRNL ECG REC<48 HRS REC: CPT | Performed by: INTERNAL MEDICINE

## 2018-10-30 DIAGNOSIS — G25.81 RESTLESS LEG SYNDROME: Chronic | ICD-10-CM

## 2018-10-30 NOTE — TELEPHONE ENCOUNTER
Last OV   10/26/2018    Next OV   4/26/2019    RX written at visit  10/26/2018  For #[phone]   Pt requesting #[phone]

## 2018-10-31 LAB
CONTROL: NORMAL
HEMOCCULT STL QL: NEGATIVE

## 2018-10-31 RX ORDER — ROPINIROLE 0.5 MG/1
TABLET, FILM COATED ORAL
Qty: 270 TABLET | Refills: 3 | Status: SHIPPED | OUTPATIENT
Start: 2018-10-31 | End: 2019-03-04 | Stop reason: ALTCHOICE

## 2018-11-01 DIAGNOSIS — Z12.11 COLON CANCER SCREENING: ICD-10-CM

## 2018-11-01 PROCEDURE — 82274 ASSAY TEST FOR BLOOD FECAL: CPT | Performed by: INTERNAL MEDICINE

## 2018-11-06 PROCEDURE — 93227 XTRNL ECG REC<48 HR R&I: CPT | Performed by: INTERNAL MEDICINE

## 2018-11-08 ENCOUNTER — TELEPHONE (OUTPATIENT)
Dept: CARDIOLOGY CLINIC | Age: 68
End: 2018-11-08

## 2018-11-09 ENCOUNTER — HOSPITAL ENCOUNTER (OUTPATIENT)
Age: 68
Discharge: HOME | End: 2018-11-09
Payer: MEDICARE

## 2018-11-09 ENCOUNTER — OFFICE VISIT (OUTPATIENT)
Dept: CARDIOLOGY CLINIC | Age: 68
End: 2018-11-09
Payer: MEDICARE

## 2018-11-09 VITALS
BODY MASS INDEX: 38.29 KG/M2 | DIASTOLIC BLOOD PRESSURE: 60 MMHG | OXYGEN SATURATION: 96 % | HEIGHT: 65 IN | SYSTOLIC BLOOD PRESSURE: 126 MMHG | RESPIRATION RATE: 15 BRPM | HEART RATE: 64 BPM | WEIGHT: 229.8 LBS

## 2018-11-09 DIAGNOSIS — I48.91 ATRIAL FIBRILLATION WITH RVR (HCC): ICD-10-CM

## 2018-11-09 DIAGNOSIS — R06.02 SOB (SHORTNESS OF BREATH): ICD-10-CM

## 2018-11-09 DIAGNOSIS — G47.33 OSA (OBSTRUCTIVE SLEEP APNEA): Chronic | ICD-10-CM

## 2018-11-09 DIAGNOSIS — I10 ESSENTIAL HYPERTENSION: Chronic | ICD-10-CM

## 2018-11-09 DIAGNOSIS — I48.91 ATRIAL FIBRILLATION WITH RAPID VENTRICULAR RESPONSE (HCC): ICD-10-CM

## 2018-11-09 DIAGNOSIS — I26.99 OTHER PULMONARY EMBOLISM WITHOUT ACUTE COR PULMONALE, UNSPECIFIED CHRONICITY (HCC): ICD-10-CM

## 2018-11-09 DIAGNOSIS — E66.01 OBESITY, CLASS III, BMI 40-49.9 (MORBID OBESITY) (HCC): ICD-10-CM

## 2018-11-09 DIAGNOSIS — I10 BENIGN ESSENTIAL HTN: Chronic | ICD-10-CM

## 2018-11-09 DIAGNOSIS — I48.0 PAROXYSMAL ATRIAL FIBRILLATION (HCC): Primary | ICD-10-CM

## 2018-11-09 DIAGNOSIS — I27.20 PULMONARY HTN (HCC): Chronic | ICD-10-CM

## 2018-11-09 DIAGNOSIS — I48.0 PAROXYSMAL ATRIAL FIBRILLATION (HCC): ICD-10-CM

## 2018-11-09 DIAGNOSIS — I27.20 PULMONARY HYPERTENSION (HCC): Chronic | ICD-10-CM

## 2018-11-09 DIAGNOSIS — R07.9 CHEST PAIN, UNSPECIFIED TYPE: ICD-10-CM

## 2018-11-09 DIAGNOSIS — E66.9 NON MORBID OBESITY, UNSPECIFIED OBESITY TYPE: ICD-10-CM

## 2018-11-09 LAB
A/G RATIO: 1.6 (ref 1.1–2.2)
ALBUMIN SERPL-MCNC: 4.5 G/DL (ref 3.4–5)
ALP BLD-CCNC: 94 U/L (ref 40–129)
ALT SERPL-CCNC: 22 U/L (ref 10–40)
ANION GAP SERPL CALCULATED.3IONS-SCNC: 15 MMOL/L (ref 3–16)
AST SERPL-CCNC: 18 U/L (ref 15–37)
BASOPHILS ABSOLUTE: 0.1 K/UL (ref 0–0.2)
BASOPHILS RELATIVE PERCENT: 1.3 %
BILIRUB SERPL-MCNC: 0.4 MG/DL (ref 0–1)
BUN BLDV-MCNC: 20 MG/DL (ref 7–20)
CALCIUM SERPL-MCNC: 9.7 MG/DL (ref 8.3–10.6)
CHLORIDE BLD-SCNC: 100 MMOL/L (ref 99–110)
CHOLESTEROL, TOTAL: 217 MG/DL (ref 0–199)
CO2: 22 MMOL/L (ref 21–32)
CREAT SERPL-MCNC: 0.6 MG/DL (ref 0.6–1.2)
EOSINOPHILS ABSOLUTE: 0.1 K/UL (ref 0–0.6)
EOSINOPHILS RELATIVE PERCENT: 1.3 %
GFR AFRICAN AMERICAN: >60
GFR NON-AFRICAN AMERICAN: >60
GLOBULIN: 2.8 G/DL
GLUCOSE BLD-MCNC: 108 MG/DL (ref 70–99)
HCT VFR BLD CALC: 40.9 % (ref 36–48)
HDLC SERPL-MCNC: 67 MG/DL (ref 40–60)
HEMOGLOBIN: 13.9 G/DL (ref 12–16)
LDL CHOLESTEROL CALCULATED: 132 MG/DL
LYMPHOCYTES ABSOLUTE: 1.3 K/UL (ref 1–5.1)
LYMPHOCYTES RELATIVE PERCENT: 24.2 %
MCH RBC QN AUTO: 32.8 PG (ref 26–34)
MCHC RBC AUTO-ENTMCNC: 34 G/DL (ref 31–36)
MCV RBC AUTO: 96.4 FL (ref 80–100)
MONOCYTES ABSOLUTE: 0.6 K/UL (ref 0–1.3)
MONOCYTES RELATIVE PERCENT: 10.5 %
NEUTROPHILS ABSOLUTE: 3.3 K/UL (ref 1.7–7.7)
NEUTROPHILS RELATIVE PERCENT: 62.7 %
PDW BLD-RTO: 13.8 % (ref 12.4–15.4)
PLATELET # BLD: 247 K/UL (ref 135–450)
PMV BLD AUTO: 9.5 FL (ref 5–10.5)
POTASSIUM SERPL-SCNC: 4.1 MMOL/L (ref 3.5–5.1)
PRO-BNP: 52 PG/ML (ref 0–124)
RBC # BLD: 4.24 M/UL (ref 4–5.2)
SODIUM BLD-SCNC: 137 MMOL/L (ref 136–145)
TOTAL PROTEIN: 7.3 G/DL (ref 6.4–8.2)
TRIGL SERPL-MCNC: 91 MG/DL (ref 0–150)
VLDLC SERPL CALC-MCNC: 18 MG/DL
WBC # BLD: 5.3 K/UL (ref 4–11)

## 2018-11-09 PROCEDURE — 3017F COLORECTAL CA SCREEN DOC REV: CPT | Performed by: INTERNAL MEDICINE

## 2018-11-09 PROCEDURE — 80061 LIPID PANEL: CPT

## 2018-11-09 PROCEDURE — G8400 PT W/DXA NO RESULTS DOC: HCPCS | Performed by: INTERNAL MEDICINE

## 2018-11-09 PROCEDURE — 1123F ACP DISCUSS/DSCN MKR DOCD: CPT | Performed by: INTERNAL MEDICINE

## 2018-11-09 PROCEDURE — 83880 ASSAY OF NATRIURETIC PEPTIDE: CPT

## 2018-11-09 PROCEDURE — G8427 DOCREV CUR MEDS BY ELIG CLIN: HCPCS | Performed by: INTERNAL MEDICINE

## 2018-11-09 PROCEDURE — G8417 CALC BMI ABV UP PARAM F/U: HCPCS | Performed by: INTERNAL MEDICINE

## 2018-11-09 PROCEDURE — G8484 FLU IMMUNIZE NO ADMIN: HCPCS | Performed by: INTERNAL MEDICINE

## 2018-11-09 PROCEDURE — 85025 COMPLETE CBC W/AUTO DIFF WBC: CPT

## 2018-11-09 PROCEDURE — 1090F PRES/ABSN URINE INCON ASSESS: CPT | Performed by: INTERNAL MEDICINE

## 2018-11-09 PROCEDURE — 36415 COLL VENOUS BLD VENIPUNCTURE: CPT

## 2018-11-09 PROCEDURE — 1101F PT FALLS ASSESS-DOCD LE1/YR: CPT | Performed by: INTERNAL MEDICINE

## 2018-11-09 PROCEDURE — 99214 OFFICE O/P EST MOD 30 MIN: CPT | Performed by: INTERNAL MEDICINE

## 2018-11-09 PROCEDURE — 4040F PNEUMOC VAC/ADMIN/RCVD: CPT | Performed by: INTERNAL MEDICINE

## 2018-11-09 PROCEDURE — 1036F TOBACCO NON-USER: CPT | Performed by: INTERNAL MEDICINE

## 2018-11-09 PROCEDURE — 80053 COMPREHEN METABOLIC PANEL: CPT

## 2018-11-09 RX ORDER — POTASSIUM CHLORIDE 750 MG/1
TABLET, EXTENDED RELEASE ORAL
Qty: 120 TABLET | Refills: 3 | Status: SHIPPED | OUTPATIENT
Start: 2018-11-09 | End: 2018-11-09 | Stop reason: SDUPTHER

## 2018-11-09 RX ORDER — POTASSIUM CHLORIDE 750 MG/1
TABLET, EXTENDED RELEASE ORAL
Qty: 180 TABLET | Refills: 3 | Status: SHIPPED | OUTPATIENT
Start: 2018-11-09 | End: 2019-12-15 | Stop reason: SDUPTHER

## 2018-11-09 NOTE — PROGRESS NOTES
blood work seems to remain stable. She reports that she wears her cpap consistently. Denies sob, orthopnea, PND, edema or palpitations.  .    Past Medical History:   Diagnosis Date    Allergic     Anesthesia complication     family hx-father-at at age 80 having hip replacement revision surgery-had tia's x2 while under anes-but also had hx chf-had dificuly with speech when coming out from anes    Anxiety     Arthritis     left ankle, bilateral hands    Arthritis of left hip 2/2/2016    Arthritis of right hip 4/19/2016    Asthma     At risk for falls     uses a cane    Atrial fibrillation with rapid ventricular response (HCC)     Atrial flutter ([address] 4/25/2018    Chronic maxillary sinusitis 5/24/2017    CTEPH (chronic thromboembolic pulmonary hypertension) ([address] 8/26/2016    Depression     pt stated r/t bad marriage/alcoholic spouse    Disc disease, degenerative, lumbar or lumbosacral 2/20/2017    History of total hip arthroplasty 2/28/2017    Hypertension     Leg cramps     patient states very severe, requires immediate potassium administration    Migraines, basilar     last migraine 10 years ago    Mild persistent asthma without complication 1/61/6057    Obesity, Class III, BMI 40-49.9 (morbid obesity) ([address] 4/19/2016    HUSSAIN (obstructive sleep apnea) 10/14/2013    Osteoarthritis, hip, bilateral 2016    Bilateral hip arthroplasty    Panic attacks     Pneumonia 2015    Primary osteoarthritis of both knees 9/19/2017    Primary osteoarthritis of left knee 12/15/2016    Pulmonary emboli ([address] 8/4/2016    Pulmonary HTN ([address] 7/21/2016    Restless leg syndrome     Rhinitis, chronic 3/26/2014    Sleep apnea     wears CPAP @11    Stress incontinence     Tear of left rotator cuff 1/23/2018    Thyroid disease     hypothyroid    Wears glasses      Past Surgical History:   Procedure Laterality Date    BACK SURGERY  2004    LUMBAR FUSION    CATARACT REMOVAL Bilateral     COLONOSCOPY      EYE  09/27/2018    K 4.1 09/27/2018    CL 96 09/27/2018    CO2 24 09/27/2018    BUN 18 09/27/2018    CREATININE 0.6 09/27/2018    GLUCOSE 96 09/27/2018    GLUCOSE 100 03/27/2012    CALCIUM 9.9 09/27/2018      Lab Results   Component Value Date    TRIG 155 09/27/2018    HDL 64 09/27/2018    LDLCALC 125 09/27/2018    LABVLDL 31 09/27/2018       Diagnostics:  Echo 4/26/18  Left ventricular cavity size is normal.  There is mild left ventricular hypertrophy. Ejection fraction is visually estimated to be 55-60%. Normal diastolic function. Mild tricuspid regurgitation with RVSP of 36 mm/hg    VQ scan 8/30/17  Low Probability for Pulmonary Embolus. Echo 8/28/17  Normalleft ventricle size and systolic function with an estimated ejection fraction of 60%. Mild mitral regurgitation is present. There is mild-moderate tricuspid regurgitation with RVSP estimated at 88 mmHg. This is suggestive of severe pulmonary hypertension. Mild pulmonic regurgitation present. Echo 9/1/2016:  Technically limited examination to evaluate RVSP. Overall left ventricular function is normal.  Normal right ventricular size and function. Mild tricuspid regurgitation with RVSP estimated at 95 mmHg. No evidence of any pericardial effusion. [facility] 9/1/2016:  Pressures were as follows:  RA: 9 mmHg  RV:  38/12 mmHg  PA:  33/16, mean 24 mmHg  PCWP:  14 mmHg  Thermodilution CO: 7.45   Thermodilution CI:  3.42  Adiel CO:  9.72  Adiel CI:  4.46  PA Sat:  74%  PVR:  107 dynes     Impression:  1. Minimal pulmonary hypertension with mean PA pressure 24  2. Normal filling pressures  3. Normal cardiac output  4. No evidence of PAH at this time. Echo 8/28/17  Normal left ventricle size and systolic function with an estimated ejection  fraction of 60%. Mild mitral regurgitation is present. There is mild-moderate tricuspid regurgitation with RVSP estimated at 88  mmHg. This is suggestive of severe pulmonary hypertension.   Mild pulmonic

## 2018-11-20 ENCOUNTER — OFFICE VISIT (OUTPATIENT)
Dept: ORTHOPEDIC SURGERY | Age: 68
End: 2018-11-20
Payer: MEDICARE

## 2018-11-20 VITALS
WEIGHT: 229 LBS | SYSTOLIC BLOOD PRESSURE: 135 MMHG | DIASTOLIC BLOOD PRESSURE: 67 MMHG | BODY MASS INDEX: 38.15 KG/M2 | HEIGHT: 65 IN | TEMPERATURE: 97.7 F | HEART RATE: 65 BPM

## 2018-11-20 DIAGNOSIS — M17.0 PRIMARY OSTEOARTHRITIS OF BOTH KNEES: Primary | ICD-10-CM

## 2018-11-20 DIAGNOSIS — Z96.643 HISTORY OF TOTAL HIP ARTHROPLASTY, BILATERAL: ICD-10-CM

## 2018-11-20 PROCEDURE — G8484 FLU IMMUNIZE NO ADMIN: HCPCS | Performed by: PHYSICIAN ASSISTANT

## 2018-11-20 PROCEDURE — 4040F PNEUMOC VAC/ADMIN/RCVD: CPT | Performed by: PHYSICIAN ASSISTANT

## 2018-11-20 PROCEDURE — 3017F COLORECTAL CA SCREEN DOC REV: CPT | Performed by: PHYSICIAN ASSISTANT

## 2018-11-20 PROCEDURE — 1090F PRES/ABSN URINE INCON ASSESS: CPT | Performed by: PHYSICIAN ASSISTANT

## 2018-11-20 PROCEDURE — G8400 PT W/DXA NO RESULTS DOC: HCPCS | Performed by: PHYSICIAN ASSISTANT

## 2018-11-20 PROCEDURE — 99212 OFFICE O/P EST SF 10 MIN: CPT | Performed by: PHYSICIAN ASSISTANT

## 2018-11-20 PROCEDURE — 1123F ACP DISCUSS/DSCN MKR DOCD: CPT | Performed by: PHYSICIAN ASSISTANT

## 2018-11-20 PROCEDURE — G8427 DOCREV CUR MEDS BY ELIG CLIN: HCPCS | Performed by: PHYSICIAN ASSISTANT

## 2018-11-20 PROCEDURE — 1036F TOBACCO NON-USER: CPT | Performed by: PHYSICIAN ASSISTANT

## 2018-11-20 PROCEDURE — G8417 CALC BMI ABV UP PARAM F/U: HCPCS | Performed by: PHYSICIAN ASSISTANT

## 2018-11-20 PROCEDURE — 1101F PT FALLS ASSESS-DOCD LE1/YR: CPT | Performed by: PHYSICIAN ASSISTANT

## 2018-11-20 NOTE — PROGRESS NOTES
Ht 5' 5\" (1.651 m)   Wt 229 lb (103.9 kg)   BMI 38.11 kg/m²      Examination left and right knee:  Mild varus deformity. Mild discomfort with active and passive range of motion. No varus, valgus or anterior instability. Examination of the left and right hip:  No pain, crepitus or instability with range of motion. No pain with weight bearing. X Rays: performed in the office today:   AP standing, lateral and sunrise x-ray left and right knee shows significant medial compartment narrowing, near bone-on-bone bilaterally. Moderate patellofemoral degenerative changes. AP Pelvis, AP and Frog Leg Lateral  Left and Right Hip: There is a bilateral prosthetic total hip arthroplasty present. The alignment is satisfactory. There are no signs of failure, dislocation or loosening. Diagnosis:        ICD-10-CM    1. Primary osteoarthritis of both knees M17.0 XR KNEE LEFT (1-2 VIEWS)     XR KNEE RIGHT (1-2 VIEWS)     XR Knee Bilateral Standing   2. History of total hip arthroplasty, bilateral Z96.643 XR HIP 3-4 VW W PELVIS BILATERAL        Assessment/ Plan:      The natural history of the patient's diagnosis as well as the treatment options were discussed in full and questions were answered. Risks and benefits of the treatment options also reviewed in detail. She has end-stage osteoarthritis of both knees. Symptoms improved with weight loss, but her eating habits, herbal anti-inflammatory medications. She has stable bilateral hip arthroplasties. Follow Up: Annually with x-rays and clinical exam of both hips. P.r.n. for the knees. Call or return to clinic prn if these symptoms worsen or fail to improve as anticipated.

## 2018-12-03 ENCOUNTER — OFFICE VISIT (OUTPATIENT)
Dept: PULMONOLOGY | Age: 68
End: 2018-12-03
Payer: MEDICARE

## 2018-12-03 VITALS
DIASTOLIC BLOOD PRESSURE: 70 MMHG | BODY MASS INDEX: 38.32 KG/M2 | HEIGHT: 65 IN | WEIGHT: 230 LBS | OXYGEN SATURATION: 97 % | SYSTOLIC BLOOD PRESSURE: 122 MMHG | HEART RATE: 88 BPM

## 2018-12-03 DIAGNOSIS — I10 BENIGN ESSENTIAL HTN: Chronic | ICD-10-CM

## 2018-12-03 DIAGNOSIS — I48.0 PAROXYSMAL ATRIAL FIBRILLATION (HCC): ICD-10-CM

## 2018-12-03 DIAGNOSIS — G25.81 RESTLESS LEG SYNDROME: Chronic | ICD-10-CM

## 2018-12-03 DIAGNOSIS — E66.9 NON MORBID OBESITY, UNSPECIFIED OBESITY TYPE: Chronic | ICD-10-CM

## 2018-12-03 DIAGNOSIS — G47.33 OSA (OBSTRUCTIVE SLEEP APNEA): Chronic | ICD-10-CM

## 2018-12-03 PROCEDURE — G8417 CALC BMI ABV UP PARAM F/U: HCPCS | Performed by: INTERNAL MEDICINE

## 2018-12-03 PROCEDURE — G8427 DOCREV CUR MEDS BY ELIG CLIN: HCPCS | Performed by: INTERNAL MEDICINE

## 2018-12-03 PROCEDURE — 99214 OFFICE O/P EST MOD 30 MIN: CPT | Performed by: INTERNAL MEDICINE

## 2018-12-03 PROCEDURE — G8484 FLU IMMUNIZE NO ADMIN: HCPCS | Performed by: INTERNAL MEDICINE

## 2018-12-03 PROCEDURE — 3017F COLORECTAL CA SCREEN DOC REV: CPT | Performed by: INTERNAL MEDICINE

## 2018-12-03 PROCEDURE — 1101F PT FALLS ASSESS-DOCD LE1/YR: CPT | Performed by: INTERNAL MEDICINE

## 2018-12-03 PROCEDURE — 4040F PNEUMOC VAC/ADMIN/RCVD: CPT | Performed by: INTERNAL MEDICINE

## 2018-12-03 PROCEDURE — 1036F TOBACCO NON-USER: CPT | Performed by: INTERNAL MEDICINE

## 2018-12-03 PROCEDURE — 1123F ACP DISCUSS/DSCN MKR DOCD: CPT | Performed by: INTERNAL MEDICINE

## 2018-12-03 PROCEDURE — G8400 PT W/DXA NO RESULTS DOC: HCPCS | Performed by: INTERNAL MEDICINE

## 2018-12-03 PROCEDURE — 1090F PRES/ABSN URINE INCON ASSESS: CPT | Performed by: INTERNAL MEDICINE

## 2018-12-03 RX ORDER — PRAMIPEXOLE DIHYDROCHLORIDE 0.5 MG/1
TABLET ORAL
Qty: 60 TABLET | Refills: 3 | Status: SHIPPED | OUTPATIENT
Start: 2018-12-03 | End: 2019-03-04 | Stop reason: SDUPTHER

## 2018-12-03 ASSESSMENT — ENCOUNTER SYMPTOMS
RHINORRHEA: 0
COUGH: 0
SHORTNESS OF BREATH: 0
APNEA: 0
CHOKING: 0

## 2018-12-03 ASSESSMENT — SLEEP AND FATIGUE QUESTIONNAIRES
HOW LIKELY ARE YOU TO NOD OFF OR FALL ASLEEP WHILE SITTING AND TALKING TO SOMEONE: 0
HOW LIKELY ARE YOU TO NOD OFF OR FALL ASLEEP WHILE WATCHING TV: 1
HOW LIKELY ARE YOU TO NOD OFF OR FALL ASLEEP WHEN YOU ARE A PASSENGER IN A CAR FOR AN HOUR WITHOUT A BREAK: 1
HOW LIKELY ARE YOU TO NOD OFF OR FALL ASLEEP WHILE SITTING QUIETLY AFTER LUNCH WITHOUT ALCOHOL: 0
HOW LIKELY ARE YOU TO NOD OFF OR FALL ASLEEP WHILE SITTING INACTIVE IN A PUBLIC PLACE: 0
HOW LIKELY ARE YOU TO NOD OFF OR FALL ASLEEP IN A CAR, WHILE STOPPED FOR A FEW MINUTES IN TRAFFIC: 0
ESS TOTAL SCORE: 3
HOW LIKELY ARE YOU TO NOD OFF OR FALL ASLEEP WHILE LYING DOWN TO REST IN THE AFTERNOON WHEN CIRCUMSTANCES PERMIT: 0
HOW LIKELY ARE YOU TO NOD OFF OR FALL ASLEEP WHILE SITTING AND READING: 1

## 2018-12-03 NOTE — LETTER
[facility]  [address] [facility] [address]  Phone: [phone]  Fax: [phone]    December 3, 2018       Patient: Prateek Martinez   MR Number: [id number]   YOB: 1950   Date of Visit: 12/3/2018       Brennon Corado was seen for a follow up visit today. Here is my assessment and plan as well as an attached copy of her visit today:    HUSSAIN (obstructive sleep apnea)  Chronic- Stable. Reviewed compliance download with pt. Supplies and parts as needed for her machine. These are medically necessary. Continue medications per her PCP and other physicians. Limit caffeine use after 3pm.       Benign essential HTN  Chronic- Stable. Cont meds per PCP and other physicians. Acquired hypothyroidism  Chronic- Stable. Cont meds per PCP and other physicians. Restless leg syndrome  Chronic- worsening. Will try changing to mirapex . 5 mg twice a night. May need higher dose      Non morbid obesity, unspecified obesity type  Chronic-Stable. Encouraged her to work on weight loss through diet and exercise. Paroxysmal atrial fibrillation (HCC)  Chronic- Stable. Cont meds per PCP and other physicians. If you have questions or concerns, please do not hesitate to call me. I look forward to following Elma Carney along with you.     Sincerely,    Kena Sharma MD    CC providers:  Eduar Garvin MD  [address]  [address] [facility] [address] , [place]  [address] [place], [place] [address]a  In [address] [place] [phone]  VIA Mail

## 2019-01-14 ENCOUNTER — PATIENT MESSAGE (OUTPATIENT)
Dept: CARDIOLOGY CLINIC | Age: 69
End: 2019-01-14

## 2019-01-14 ENCOUNTER — TELEPHONE (OUTPATIENT)
Dept: CARDIOLOGY CLINIC | Age: 69
End: 2019-01-14

## 2019-01-27 DIAGNOSIS — R06.02 SOB (SHORTNESS OF BREATH): ICD-10-CM

## 2019-01-27 DIAGNOSIS — I10 ESSENTIAL HYPERTENSION: Chronic | ICD-10-CM

## 2019-01-27 DIAGNOSIS — I48.91 ATRIAL FIBRILLATION WITH RAPID VENTRICULAR RESPONSE (HCC): ICD-10-CM

## 2019-01-27 DIAGNOSIS — E66.01 OBESITY, CLASS III, BMI 40-49.9 (MORBID OBESITY) (HCC): ICD-10-CM

## 2019-01-27 DIAGNOSIS — G47.33 OSA (OBSTRUCTIVE SLEEP APNEA): Chronic | ICD-10-CM

## 2019-01-27 DIAGNOSIS — I26.99 OTHER PULMONARY EMBOLISM WITHOUT ACUTE COR PULMONALE, UNSPECIFIED CHRONICITY (HCC): ICD-10-CM

## 2019-01-27 DIAGNOSIS — I27.20 PULMONARY HTN (HCC): Chronic | ICD-10-CM

## 2019-01-28 RX ORDER — LISINOPRIL 10 MG/1
10 TABLET ORAL DAILY
Qty: 90 TABLET | Refills: 1 | Status: SHIPPED | OUTPATIENT
Start: 2019-01-28 | End: 2019-08-20

## 2019-01-31 ENCOUNTER — OFFICE VISIT (OUTPATIENT)
Dept: INTERNAL MEDICINE CLINIC | Age: 69
End: 2019-01-31
Payer: MEDICARE

## 2019-01-31 VITALS
SYSTOLIC BLOOD PRESSURE: 132 MMHG | DIASTOLIC BLOOD PRESSURE: 68 MMHG | OXYGEN SATURATION: 99 % | HEART RATE: 77 BPM | TEMPERATURE: 97.6 F | BODY MASS INDEX: 40.1 KG/M2 | WEIGHT: 241 LBS

## 2019-01-31 DIAGNOSIS — J01.40 ACUTE NON-RECURRENT PANSINUSITIS: Primary | ICD-10-CM

## 2019-01-31 DIAGNOSIS — R05.9 COUGH: ICD-10-CM

## 2019-01-31 PROCEDURE — G8427 DOCREV CUR MEDS BY ELIG CLIN: HCPCS | Performed by: NURSE PRACTITIONER

## 2019-01-31 PROCEDURE — G8417 CALC BMI ABV UP PARAM F/U: HCPCS | Performed by: NURSE PRACTITIONER

## 2019-01-31 PROCEDURE — G8400 PT W/DXA NO RESULTS DOC: HCPCS | Performed by: NURSE PRACTITIONER

## 2019-01-31 PROCEDURE — 1036F TOBACCO NON-USER: CPT | Performed by: NURSE PRACTITIONER

## 2019-01-31 PROCEDURE — 1090F PRES/ABSN URINE INCON ASSESS: CPT | Performed by: NURSE PRACTITIONER

## 2019-01-31 PROCEDURE — 3017F COLORECTAL CA SCREEN DOC REV: CPT | Performed by: NURSE PRACTITIONER

## 2019-01-31 PROCEDURE — 1101F PT FALLS ASSESS-DOCD LE1/YR: CPT | Performed by: NURSE PRACTITIONER

## 2019-01-31 PROCEDURE — G8484 FLU IMMUNIZE NO ADMIN: HCPCS | Performed by: NURSE PRACTITIONER

## 2019-01-31 PROCEDURE — 99213 OFFICE O/P EST LOW 20 MIN: CPT | Performed by: NURSE PRACTITIONER

## 2019-01-31 PROCEDURE — 4040F PNEUMOC VAC/ADMIN/RCVD: CPT | Performed by: NURSE PRACTITIONER

## 2019-01-31 PROCEDURE — 1123F ACP DISCUSS/DSCN MKR DOCD: CPT | Performed by: NURSE PRACTITIONER

## 2019-01-31 RX ORDER — PROMETHAZINE HYDROCHLORIDE AND CODEINE PHOSPHATE 6.25; 1 MG/5ML; MG/5ML
5 SYRUP ORAL EVERY 4 HOURS PRN
Qty: 118 ML | Refills: 0 | Status: SHIPPED | OUTPATIENT
Start: 2019-01-31 | End: 2019-02-03

## 2019-01-31 RX ORDER — AMOXICILLIN 875 MG/1
875 TABLET, COATED ORAL 2 TIMES DAILY
Qty: 20 TABLET | Refills: 0 | Status: SHIPPED | OUTPATIENT
Start: 2019-01-31 | End: 2019-01-31 | Stop reason: SDUPTHER

## 2019-01-31 RX ORDER — AMOXICILLIN 875 MG/1
875 TABLET, COATED ORAL 2 TIMES DAILY
Qty: 20 TABLET | Refills: 0 | Status: SHIPPED | OUTPATIENT
Start: 2019-01-31 | End: 2019-02-13 | Stop reason: ALTCHOICE

## 2019-01-31 ASSESSMENT — ENCOUNTER SYMPTOMS
SHORTNESS OF BREATH: 1
EYES NEGATIVE: 1
RHINORRHEA: 1
COUGH: 1

## 2019-02-04 ENCOUNTER — PATIENT MESSAGE (OUTPATIENT)
Dept: CARDIOLOGY CLINIC | Age: 69
End: 2019-02-04

## 2019-02-04 DIAGNOSIS — E78.5 HYPERLIPIDEMIA, UNSPECIFIED HYPERLIPIDEMIA TYPE: ICD-10-CM

## 2019-02-04 DIAGNOSIS — I48.0 PAROXYSMAL ATRIAL FIBRILLATION (HCC): ICD-10-CM

## 2019-02-04 DIAGNOSIS — R06.02 SHORTNESS OF BREATH: ICD-10-CM

## 2019-02-04 DIAGNOSIS — I27.20 PULMONARY HYPERTENSION (HCC): Primary | Chronic | ICD-10-CM

## 2019-02-11 ENCOUNTER — HOSPITAL ENCOUNTER (OUTPATIENT)
Age: 69
Discharge: HOME | End: 2019-02-11
Payer: MEDICARE

## 2019-02-11 DIAGNOSIS — R06.02 SHORTNESS OF BREATH: ICD-10-CM

## 2019-02-11 DIAGNOSIS — I48.0 PAROXYSMAL ATRIAL FIBRILLATION (HCC): ICD-10-CM

## 2019-02-11 DIAGNOSIS — I27.20 PULMONARY HYPERTENSION (HCC): Chronic | ICD-10-CM

## 2019-02-11 DIAGNOSIS — E78.5 HYPERLIPIDEMIA, UNSPECIFIED HYPERLIPIDEMIA TYPE: ICD-10-CM

## 2019-02-11 LAB
A/G RATIO: 1.4 (ref 1.1–2.2)
ALBUMIN SERPL-MCNC: 4.3 G/DL (ref 3.4–5)
ALP BLD-CCNC: 88 U/L (ref 40–129)
ALT SERPL-CCNC: 21 U/L (ref 10–40)
ANION GAP SERPL CALCULATED.3IONS-SCNC: 17 MMOL/L (ref 3–16)
AST SERPL-CCNC: 16 U/L (ref 15–37)
BILIRUB SERPL-MCNC: 0.3 MG/DL (ref 0–1)
BUN BLDV-MCNC: 17 MG/DL (ref 7–20)
CALCIUM SERPL-MCNC: 10.1 MG/DL (ref 8.3–10.6)
CHLORIDE BLD-SCNC: 96 MMOL/L (ref 99–110)
CHOLESTEROL, TOTAL: 230 MG/DL (ref 0–199)
CO2: 21 MMOL/L (ref 21–32)
CREAT SERPL-MCNC: 0.5 MG/DL (ref 0.6–1.2)
GFR AFRICAN AMERICAN: >60
GFR NON-AFRICAN AMERICAN: >60
GLOBULIN: 3 G/DL
GLUCOSE BLD-MCNC: 103 MG/DL (ref 70–99)
HCT VFR BLD CALC: 40.9 % (ref 36–48)
HDLC SERPL-MCNC: 64 MG/DL (ref 40–60)
HEMOGLOBIN: 13.7 G/DL (ref 12–16)
LDL CHOLESTEROL CALCULATED: 136 MG/DL
MCH RBC QN AUTO: 32.1 PG (ref 26–34)
MCHC RBC AUTO-ENTMCNC: 33.4 G/DL (ref 31–36)
MCV RBC AUTO: 96.1 FL (ref 80–100)
PDW BLD-RTO: 14 % (ref 12.4–15.4)
PLATELET # BLD: 260 K/UL (ref 135–450)
PMV BLD AUTO: 9.5 FL (ref 5–10.5)
POTASSIUM SERPL-SCNC: 4.1 MMOL/L (ref 3.5–5.1)
PRO-BNP: 130 PG/ML (ref 0–124)
RBC # BLD: 4.26 M/UL (ref 4–5.2)
SODIUM BLD-SCNC: 134 MMOL/L (ref 136–145)
TOTAL PROTEIN: 7.3 G/DL (ref 6.4–8.2)
TRIGL SERPL-MCNC: 150 MG/DL (ref 0–150)
VLDLC SERPL CALC-MCNC: 30 MG/DL
WBC # BLD: 6.3 K/UL (ref 4–11)

## 2019-02-11 PROCEDURE — 80061 LIPID PANEL: CPT

## 2019-02-11 PROCEDURE — 80053 COMPREHEN METABOLIC PANEL: CPT

## 2019-02-11 PROCEDURE — 36415 COLL VENOUS BLD VENIPUNCTURE: CPT

## 2019-02-11 PROCEDURE — 83880 ASSAY OF NATRIURETIC PEPTIDE: CPT

## 2019-02-11 PROCEDURE — 85027 COMPLETE CBC AUTOMATED: CPT

## 2019-02-12 ENCOUNTER — TELEPHONE (OUTPATIENT)
Dept: INTERNAL MEDICINE CLINIC | Age: 69
End: 2019-02-12

## 2019-02-12 RX ORDER — PREDNISONE 10 MG/1
10 TABLET ORAL DAILY
Qty: 5 TABLET | Refills: 0 | Status: SHIPPED | OUTPATIENT
Start: 2019-02-12 | End: 2019-02-13

## 2019-02-13 ENCOUNTER — OFFICE VISIT (OUTPATIENT)
Dept: CARDIOLOGY CLINIC | Age: 69
End: 2019-02-13
Payer: MEDICARE

## 2019-02-13 VITALS
WEIGHT: 238 LBS | DIASTOLIC BLOOD PRESSURE: 64 MMHG | HEART RATE: 68 BPM | HEIGHT: 66 IN | BODY MASS INDEX: 38.25 KG/M2 | SYSTOLIC BLOOD PRESSURE: 120 MMHG

## 2019-02-13 DIAGNOSIS — R06.02 SOB (SHORTNESS OF BREATH): ICD-10-CM

## 2019-02-13 DIAGNOSIS — G47.33 OSA (OBSTRUCTIVE SLEEP APNEA): Chronic | ICD-10-CM

## 2019-02-13 DIAGNOSIS — I10 BENIGN ESSENTIAL HTN: Chronic | ICD-10-CM

## 2019-02-13 DIAGNOSIS — E78.00 PURE HYPERCHOLESTEROLEMIA: ICD-10-CM

## 2019-02-13 DIAGNOSIS — I48.0 PAROXYSMAL ATRIAL FIBRILLATION (HCC): Primary | ICD-10-CM

## 2019-02-13 PROCEDURE — 1090F PRES/ABSN URINE INCON ASSESS: CPT | Performed by: INTERNAL MEDICINE

## 2019-02-13 PROCEDURE — 4040F PNEUMOC VAC/ADMIN/RCVD: CPT | Performed by: INTERNAL MEDICINE

## 2019-02-13 PROCEDURE — 1036F TOBACCO NON-USER: CPT | Performed by: INTERNAL MEDICINE

## 2019-02-13 PROCEDURE — 99214 OFFICE O/P EST MOD 30 MIN: CPT | Performed by: INTERNAL MEDICINE

## 2019-02-13 PROCEDURE — 1101F PT FALLS ASSESS-DOCD LE1/YR: CPT | Performed by: INTERNAL MEDICINE

## 2019-02-13 PROCEDURE — 3017F COLORECTAL CA SCREEN DOC REV: CPT | Performed by: INTERNAL MEDICINE

## 2019-02-13 PROCEDURE — 1123F ACP DISCUSS/DSCN MKR DOCD: CPT | Performed by: INTERNAL MEDICINE

## 2019-02-13 PROCEDURE — G8417 CALC BMI ABV UP PARAM F/U: HCPCS | Performed by: INTERNAL MEDICINE

## 2019-02-13 PROCEDURE — G8400 PT W/DXA NO RESULTS DOC: HCPCS | Performed by: INTERNAL MEDICINE

## 2019-02-13 PROCEDURE — G8427 DOCREV CUR MEDS BY ELIG CLIN: HCPCS | Performed by: INTERNAL MEDICINE

## 2019-02-13 PROCEDURE — G8484 FLU IMMUNIZE NO ADMIN: HCPCS | Performed by: INTERNAL MEDICINE

## 2019-02-17 ENCOUNTER — PATIENT MESSAGE (OUTPATIENT)
Dept: CARDIOLOGY CLINIC | Age: 69
End: 2019-02-17

## 2019-02-19 ENCOUNTER — HOSPITAL ENCOUNTER (OUTPATIENT)
Dept: CT IMAGING | Age: 69
Discharge: HOME | End: 2019-02-19
Payer: MEDICARE

## 2019-02-19 DIAGNOSIS — G47.33 OSA (OBSTRUCTIVE SLEEP APNEA): Chronic | ICD-10-CM

## 2019-02-19 DIAGNOSIS — I48.0 PAROXYSMAL ATRIAL FIBRILLATION (HCC): ICD-10-CM

## 2019-02-19 DIAGNOSIS — E78.00 PURE HYPERCHOLESTEROLEMIA: ICD-10-CM

## 2019-02-19 PROCEDURE — 75571 CT HRT W/O DYE W/CA TEST: CPT

## 2019-02-21 DIAGNOSIS — R06.02 SOB (SHORTNESS OF BREATH): ICD-10-CM

## 2019-02-21 DIAGNOSIS — E66.01 OBESITY, CLASS III, BMI 40-49.9 (MORBID OBESITY) (HCC): ICD-10-CM

## 2019-02-21 DIAGNOSIS — I10 ESSENTIAL HYPERTENSION: Chronic | ICD-10-CM

## 2019-02-21 DIAGNOSIS — G47.33 OSA (OBSTRUCTIVE SLEEP APNEA): Chronic | ICD-10-CM

## 2019-02-21 DIAGNOSIS — I48.91 ATRIAL FIBRILLATION WITH RAPID VENTRICULAR RESPONSE (HCC): ICD-10-CM

## 2019-02-21 DIAGNOSIS — I26.99 OTHER PULMONARY EMBOLISM WITHOUT ACUTE COR PULMONALE, UNSPECIFIED CHRONICITY (HCC): ICD-10-CM

## 2019-02-21 DIAGNOSIS — I27.20 PULMONARY HTN (HCC): Chronic | ICD-10-CM

## 2019-02-21 RX ORDER — FUROSEMIDE 20 MG/1
TABLET ORAL
Qty: 90 TABLET | Refills: 3 | Status: ON HOLD | OUTPATIENT
Start: 2019-02-21 | End: 2020-01-13 | Stop reason: HOSPADM

## 2019-02-22 ENCOUNTER — TELEPHONE (OUTPATIENT)
Dept: CARDIOLOGY CLINIC | Age: 69
End: 2019-02-22

## 2019-02-22 RX ORDER — SIMVASTATIN 20 MG
20 TABLET ORAL NIGHTLY
Qty: 30 TABLET | Refills: 0 | Status: SHIPPED | OUTPATIENT
Start: 2019-02-22 | End: 2019-02-22 | Stop reason: SDUPTHER

## 2019-02-25 RX ORDER — SIMVASTATIN 20 MG
TABLET ORAL
Qty: 90 TABLET | Refills: 3 | Status: SHIPPED | OUTPATIENT
Start: 2019-02-25 | End: 2019-03-04 | Stop reason: SINTOL

## 2019-02-26 ENCOUNTER — PATIENT MESSAGE (OUTPATIENT)
Dept: CARDIOLOGY CLINIC | Age: 69
End: 2019-02-26

## 2019-03-04 ENCOUNTER — OFFICE VISIT (OUTPATIENT)
Dept: PULMONOLOGY | Age: 69
End: 2019-03-04
Payer: MEDICARE

## 2019-03-04 VITALS
OXYGEN SATURATION: 99 % | HEIGHT: 66 IN | DIASTOLIC BLOOD PRESSURE: 80 MMHG | WEIGHT: 242 LBS | HEART RATE: 79 BPM | BODY MASS INDEX: 38.89 KG/M2 | SYSTOLIC BLOOD PRESSURE: 148 MMHG

## 2019-03-04 DIAGNOSIS — G25.81 RESTLESS LEG SYNDROME: Chronic | ICD-10-CM

## 2019-03-04 DIAGNOSIS — E66.9 NON MORBID OBESITY, UNSPECIFIED OBESITY TYPE: Chronic | ICD-10-CM

## 2019-03-04 DIAGNOSIS — I48.0 PAROXYSMAL ATRIAL FIBRILLATION (HCC): Chronic | ICD-10-CM

## 2019-03-04 DIAGNOSIS — G47.33 OSA (OBSTRUCTIVE SLEEP APNEA): Chronic | ICD-10-CM

## 2019-03-04 DIAGNOSIS — I10 BENIGN ESSENTIAL HTN: Chronic | ICD-10-CM

## 2019-03-04 PROBLEM — E78.00 PURE HYPERCHOLESTEROLEMIA: Chronic | Status: ACTIVE | Noted: 2019-02-13

## 2019-03-04 PROBLEM — Z96.643 HISTORY OF TOTAL HIP ARTHROPLASTY, BILATERAL: Status: RESOLVED | Noted: 2017-02-28 | Resolved: 2019-03-04

## 2019-03-04 PROCEDURE — G8400 PT W/DXA NO RESULTS DOC: HCPCS | Performed by: INTERNAL MEDICINE

## 2019-03-04 PROCEDURE — 1036F TOBACCO NON-USER: CPT | Performed by: INTERNAL MEDICINE

## 2019-03-04 PROCEDURE — 1123F ACP DISCUSS/DSCN MKR DOCD: CPT | Performed by: INTERNAL MEDICINE

## 2019-03-04 PROCEDURE — 1090F PRES/ABSN URINE INCON ASSESS: CPT | Performed by: INTERNAL MEDICINE

## 2019-03-04 PROCEDURE — G8427 DOCREV CUR MEDS BY ELIG CLIN: HCPCS | Performed by: INTERNAL MEDICINE

## 2019-03-04 PROCEDURE — 3017F COLORECTAL CA SCREEN DOC REV: CPT | Performed by: INTERNAL MEDICINE

## 2019-03-04 PROCEDURE — G8417 CALC BMI ABV UP PARAM F/U: HCPCS | Performed by: INTERNAL MEDICINE

## 2019-03-04 PROCEDURE — 99214 OFFICE O/P EST MOD 30 MIN: CPT | Performed by: INTERNAL MEDICINE

## 2019-03-04 PROCEDURE — G8484 FLU IMMUNIZE NO ADMIN: HCPCS | Performed by: INTERNAL MEDICINE

## 2019-03-04 PROCEDURE — 4040F PNEUMOC VAC/ADMIN/RCVD: CPT | Performed by: INTERNAL MEDICINE

## 2019-03-04 PROCEDURE — 1101F PT FALLS ASSESS-DOCD LE1/YR: CPT | Performed by: INTERNAL MEDICINE

## 2019-03-04 RX ORDER — PRAMIPEXOLE DIHYDROCHLORIDE 0.5 MG/1
TABLET ORAL
Qty: 60 TABLET | Refills: 5 | Status: SHIPPED | OUTPATIENT
Start: 2019-03-04 | End: 2019-09-03

## 2019-03-04 ASSESSMENT — SLEEP AND FATIGUE QUESTIONNAIRES
HOW LIKELY ARE YOU TO NOD OFF OR FALL ASLEEP WHILE SITTING AND TALKING TO SOMEONE: 0
HOW LIKELY ARE YOU TO NOD OFF OR FALL ASLEEP WHILE SITTING AND READING: 2
HOW LIKELY ARE YOU TO NOD OFF OR FALL ASLEEP WHILE LYING DOWN TO REST IN THE AFTERNOON WHEN CIRCUMSTANCES PERMIT: 0
HOW LIKELY ARE YOU TO NOD OFF OR FALL ASLEEP WHILE WATCHING TV: 2
ESS TOTAL SCORE: 8
HOW LIKELY ARE YOU TO NOD OFF OR FALL ASLEEP IN A CAR, WHILE STOPPED FOR A FEW MINUTES IN TRAFFIC: 1
HOW LIKELY ARE YOU TO NOD OFF OR FALL ASLEEP WHILE SITTING INACTIVE IN A PUBLIC PLACE: 0
HOW LIKELY ARE YOU TO NOD OFF OR FALL ASLEEP WHEN YOU ARE A PASSENGER IN A CAR FOR AN HOUR WITHOUT A BREAK: 2
HOW LIKELY ARE YOU TO NOD OFF OR FALL ASLEEP WHILE SITTING QUIETLY AFTER LUNCH WITHOUT ALCOHOL: 1

## 2019-03-04 ASSESSMENT — ENCOUNTER SYMPTOMS
CHOKING: 0
COUGH: 0
RHINORRHEA: 0
APNEA: 0
SHORTNESS OF BREATH: 0

## 2019-03-25 ENCOUNTER — TELEPHONE (OUTPATIENT)
Dept: ENDOCRINOLOGY | Age: 69
End: 2019-03-25

## 2019-03-25 DIAGNOSIS — E78.2 MIXED HYPERLIPIDEMIA: Primary | ICD-10-CM

## 2019-03-29 ENCOUNTER — HOSPITAL ENCOUNTER (OUTPATIENT)
Age: 69
Discharge: HOME | End: 2019-03-29
Payer: MEDICARE

## 2019-03-29 DIAGNOSIS — E03.9 HYPOTHYROIDISM, UNSPECIFIED TYPE: ICD-10-CM

## 2019-03-29 DIAGNOSIS — I10 ESSENTIAL HYPERTENSION: Chronic | ICD-10-CM

## 2019-03-29 DIAGNOSIS — R06.02 SOB (SHORTNESS OF BREATH): ICD-10-CM

## 2019-03-29 DIAGNOSIS — G47.33 OSA (OBSTRUCTIVE SLEEP APNEA): Chronic | ICD-10-CM

## 2019-03-29 DIAGNOSIS — I27.20 PULMONARY HTN (HCC): Chronic | ICD-10-CM

## 2019-03-29 DIAGNOSIS — R94.6 ABNORMAL THYROID FUNCTION TEST: ICD-10-CM

## 2019-03-29 DIAGNOSIS — E78.00 PURE HYPERCHOLESTEROLEMIA: ICD-10-CM

## 2019-03-29 DIAGNOSIS — I48.91 ATRIAL FIBRILLATION WITH RAPID VENTRICULAR RESPONSE (HCC): ICD-10-CM

## 2019-03-29 DIAGNOSIS — R73.03 PREDIABETES: ICD-10-CM

## 2019-03-29 DIAGNOSIS — E55.9 VITAMIN D DEFICIENCY: ICD-10-CM

## 2019-03-29 DIAGNOSIS — E78.2 MIXED HYPERLIPIDEMIA: ICD-10-CM

## 2019-03-29 DIAGNOSIS — E66.01 OBESITY, CLASS III, BMI 40-49.9 (MORBID OBESITY) (HCC): ICD-10-CM

## 2019-03-29 LAB
A/G RATIO: 1.5 (ref 1.1–2.2)
ALBUMIN SERPL-MCNC: 4 G/DL (ref 3.4–5)
ALP BLD-CCNC: 83 U/L (ref 40–129)
ALT SERPL-CCNC: 21 U/L (ref 10–40)
ANION GAP SERPL CALCULATED.3IONS-SCNC: 11 MMOL/L (ref 3–16)
AST SERPL-CCNC: 17 U/L (ref 15–37)
BILIRUB SERPL-MCNC: <0.2 MG/DL (ref 0–1)
BUN BLDV-MCNC: 17 MG/DL (ref 7–20)
C-REACTIVE PROTEIN: 2.5 MG/L (ref 0–5.1)
CALCIUM SERPL-MCNC: 9 MG/DL (ref 8.3–10.6)
CHLORIDE BLD-SCNC: 105 MMOL/L (ref 99–110)
CHOLESTEROL, FASTING: 168 MG/DL (ref 0–199)
CO2: 21 MMOL/L (ref 21–32)
CREAT SERPL-MCNC: 0.5 MG/DL (ref 0.6–1.2)
ESTIMATED AVERAGE GLUCOSE: 128.4 MG/DL
FOLATE: >20 NG/ML (ref 4.78–24.2)
GFR AFRICAN AMERICAN: >60
GFR NON-AFRICAN AMERICAN: >60
GLOBULIN: 2.6 G/DL
GLUCOSE BLD-MCNC: 100 MG/DL (ref 70–99)
HBA1C MFR BLD: 6.1 %
HDLC SERPL-MCNC: 55 MG/DL (ref 40–60)
HOMOCYSTEINE: 9 UMOL/L (ref 0–10)
LDL CHOLESTEROL CALCULATED: 96 MG/DL
POTASSIUM SERPL-SCNC: 4.3 MMOL/L (ref 3.5–5.1)
PRO-BNP: 83 PG/ML (ref 0–124)
SODIUM BLD-SCNC: 137 MMOL/L (ref 136–145)
T3 TOTAL: 1.22 NG/ML (ref 0.8–2)
T4 FREE: 1.1 NG/DL (ref 0.9–1.8)
TOTAL PROTEIN: 6.6 G/DL (ref 6.4–8.2)
TRIGLYCERIDE, FASTING: 83 MG/DL (ref 0–150)
TSH SERPL DL<=0.05 MIU/L-ACNC: 1.53 UIU/ML (ref 0.27–4.2)
VITAMIN D 25-HYDROXY: 71.4 NG/ML
VLDLC SERPL CALC-MCNC: 17 MG/DL

## 2019-03-29 PROCEDURE — 83880 ASSAY OF NATRIURETIC PEPTIDE: CPT

## 2019-03-29 PROCEDURE — 86140 C-REACTIVE PROTEIN: CPT

## 2019-03-29 PROCEDURE — 82746 ASSAY OF FOLIC ACID SERUM: CPT

## 2019-03-29 PROCEDURE — 36415 COLL VENOUS BLD VENIPUNCTURE: CPT

## 2019-03-29 PROCEDURE — 82306 VITAMIN D 25 HYDROXY: CPT

## 2019-03-29 PROCEDURE — 83036 HEMOGLOBIN GLYCOSYLATED A1C: CPT

## 2019-03-29 PROCEDURE — 83090 ASSAY OF HOMOCYSTEINE: CPT

## 2019-03-29 PROCEDURE — 84443 ASSAY THYROID STIM HORMONE: CPT

## 2019-03-29 PROCEDURE — 84439 ASSAY OF FREE THYROXINE: CPT

## 2019-03-29 PROCEDURE — 80061 LIPID PANEL: CPT

## 2019-03-29 PROCEDURE — 84480 ASSAY TRIIODOTHYRONINE (T3): CPT

## 2019-03-29 PROCEDURE — 80053 COMPREHEN METABOLIC PANEL: CPT

## 2019-04-01 ENCOUNTER — OFFICE VISIT (OUTPATIENT)
Dept: ENDOCRINOLOGY | Age: 69
End: 2019-04-01
Payer: MEDICARE

## 2019-04-01 ENCOUNTER — PROCEDURE VISIT (OUTPATIENT)
Dept: ENDOCRINOLOGY | Age: 69
End: 2019-04-01
Payer: MEDICARE

## 2019-04-01 VITALS
DIASTOLIC BLOOD PRESSURE: 80 MMHG | SYSTOLIC BLOOD PRESSURE: 134 MMHG | WEIGHT: 230 LBS | HEART RATE: 68 BPM | OXYGEN SATURATION: 98 % | BODY MASS INDEX: 36.96 KG/M2 | HEIGHT: 66 IN

## 2019-04-01 DIAGNOSIS — M17.0 PRIMARY OSTEOARTHRITIS OF BOTH KNEES: ICD-10-CM

## 2019-04-01 DIAGNOSIS — R73.03 PREDIABETES: ICD-10-CM

## 2019-04-01 DIAGNOSIS — E04.1 THYROID NODULE: Primary | ICD-10-CM

## 2019-04-01 DIAGNOSIS — E78.2 MIXED HYPERLIPIDEMIA: ICD-10-CM

## 2019-04-01 PROCEDURE — G8417 CALC BMI ABV UP PARAM F/U: HCPCS | Performed by: INTERNAL MEDICINE

## 2019-04-01 PROCEDURE — 4040F PNEUMOC VAC/ADMIN/RCVD: CPT | Performed by: INTERNAL MEDICINE

## 2019-04-01 PROCEDURE — G8427 DOCREV CUR MEDS BY ELIG CLIN: HCPCS | Performed by: INTERNAL MEDICINE

## 2019-04-01 PROCEDURE — 1090F PRES/ABSN URINE INCON ASSESS: CPT | Performed by: INTERNAL MEDICINE

## 2019-04-01 PROCEDURE — 99214 OFFICE O/P EST MOD 30 MIN: CPT | Performed by: INTERNAL MEDICINE

## 2019-04-01 PROCEDURE — 1123F ACP DISCUSS/DSCN MKR DOCD: CPT | Performed by: INTERNAL MEDICINE

## 2019-04-01 PROCEDURE — 76536 US EXAM OF HEAD AND NECK: CPT | Performed by: INTERNAL MEDICINE

## 2019-04-01 PROCEDURE — 1036F TOBACCO NON-USER: CPT | Performed by: INTERNAL MEDICINE

## 2019-04-01 PROCEDURE — G8400 PT W/DXA NO RESULTS DOC: HCPCS | Performed by: INTERNAL MEDICINE

## 2019-04-01 PROCEDURE — 3017F COLORECTAL CA SCREEN DOC REV: CPT | Performed by: INTERNAL MEDICINE

## 2019-04-01 RX ORDER — CETIRIZINE HYDROCHLORIDE 10 MG/1
10 TABLET ORAL EVERY OTHER DAY
COMMUNITY
End: 2020-01-23

## 2019-04-01 NOTE — PROGRESS NOTES
SUBJECTIVE:  Dayanara Doty is a 79 y.o. female was initially referred for  Hypothyroidism but she was taken off [place] thyroid in august 2018 and she stayed euthyroid  She also has prediabetes and left lobe thyroid cyst .    Patient was diagnosed with Hypothyroidism in 2007 at her routine blood work she doesn't want to be on synthetic compounds so was  on [place] her thyroid fx tests are within normal limits but she feels that her recent episodes of afib were due to taking thyroid hormone supplements. At the time of diagnosis her thyroid fx tests were with normal limits . I am not sure of why Thyroid hormone replacement was started due to symptoms as there is no abnormal elevated TSH noted in the labs  Available to me   I reviewed her labs from  since 2006    Current complaints: fatigue, palpitations, goiter  History of obstructive symptoms: difficulty swallowing No, changes in voice/hoarseness No.  History of radiation to patient's neck: NO  Recent iodine exposure: No  Family history includes no thyroid abnormalities.   Family history of thyroid cancer: No    She has sleep apnea and has been wearing CPAP and she felt better on it   She has restless leg syndrome and is on Requip   She has hypertension and is on Lisinopril , cardizem ,HCTZ she also takes Lasix for CHF after her lung clot   She has blood clots after her hip surgery 2016 and now is on Xeralto   She had A Fib in 2017 she had 3 episodes which she reverted back in normal sinus rhythm after diltiazem     She had holter monitor which didn't show any arrythmia since she stopped [place] thyroid   She follows with Dr Giuseppe Bhandari for her cardiac issues  Since last visit she has been working on her diet and exercise and was able to use 10 pounds and her cholesterol has improved since then    Past Medical History:   Diagnosis Date    Allergic     Anesthesia complication     family hx-father-at at age 80 having hip replacement revision surgery-had tia's x2 while under anes-but also had hx chf-had dificuly with speech when coming out from [address]     left ankle, bilateral hands    Arthritis of left hip 2/2/2016    Arthritis of right hip 4/19/2016    Asthma     At risk for falls     uses a cane    Atrial fibrillation with rapid ventricular response (HCC)     Atrial flutter (HCC) 4/25/2018    Chronic maxillary sinusitis 5/24/2017    CTEPH (chronic thromboembolic pulmonary hypertension) ([address] 8/26/2016    Depression     pt stated r/t bad marriage/alcoholic spouse    Disc disease, degenerative, lumbar or lumbosacral 2/20/2017    History of total hip arthroplasty 2/28/2017    Hypertension     Leg cramps     patient states very severe, requires immediate potassium administration    Migraines, basilar     last migraine 10 years ago    Mild persistent asthma without complication 4/90/2136    Obesity, Class III, BMI 40-49.9 (morbid obesity) ([address] 4/19/2016    HUSSAIN (obstructive sleep apnea) 10/14/2013    Osteoarthritis, hip, bilateral 2016    Bilateral hip arthroplasty    Panic attacks     Pneumonia 2015    Primary osteoarthritis of both knees 9/19/2017    Primary osteoarthritis of left knee 12/15/2016    Pulmonary emboli ([address] 8/4/2016    Pulmonary HTN ([address] 7/21/2016    Pure hypercholesterolemia 2/13/2019    Restless leg syndrome     Rhinitis, chronic 3/26/2014    Sleep apnea     wears CPAP @11    Stress incontinence     Tear of left rotator cuff 1/23/2018    Thyroid disease     hypothyroid    Wears glasses      Patient Active Problem List    Diagnosis Date Noted    Pure hypercholesterolemia 02/13/2019    Paroxysmal atrial fibrillation ([address] 11/09/2018    Primary osteoarthritis of both knees 09/19/2017    Non morbid obesity, unspecified obesity type 04/19/2016    Benign essential HTN 10/14/2013    HUSSAIN (obstructive sleep apnea) 10/14/2013    Restless leg syndrome 07/11/2011    Acquired hypothyroidism 07/11/2011     Past Surgical History:   Procedure Laterality Date    BACK SURGERY      LUMBAR FUSION    CATARACT REMOVAL Bilateral     COLONOSCOPY      EYE SURGERY Right 2010    retinal tear repaired   [facility]    right ring finger severe laceration, fracture    HIP ARTHROPLASTY Right 16    HYSTERECTOMY      JOINT REPLACEMENT Left 16    left hip    TONSILLECTOMY  1972     Family History   Problem Relation Age of Onset    Alcohol Abuse Sister     Arthritis Sister     Stroke Maternal Grandmother     Thyroid Disease Maternal Grandmother     Thyroid Disease Maternal Grandfather     Heart Disease Maternal Grandfather     Alcohol Abuse Maternal Grandfather     Substance Abuse Maternal Grandfather     Hypertension Mother     Thyroid Disease Mother     Anxiety Disorder Mother     Arthritis Mother     Cataracts Mother     Heart Disease Mother     Asthma Mother     Thyroid Disease Father     Heart Failure Father     Heart Disease Father     Arthritis Brother     High Cholesterol Brother     Heart Disease Maternal Uncle     Heart Disease Paternal Uncle     Cancer Paternal Uncle     Alcohol Abuse Paternal Grandfather     Substance Abuse Paternal Grandfather      Social History     Socioeconomic History    Marital status:      Spouse name: None    Number of children: 3    Years of education: 15    Highest education level: None   Occupational History    Occupation: retired    Social Needs    Financial resource strain: None    Food insecurity:     Worry: None     Inability: None    Transportation needs:     Medical: None     Non-medical: None   Tobacco Use    Smoking status: Former Smoker     Packs/day: 0.50     Years: 5.00     Pack years: 2.50     Last attempt to quit: 10/5/2013     Years since quittin.4    Smokeless tobacco: Never Used    Tobacco comment: smoked for a total of 5yr total   Substance and Sexual Activity    Alcohol use: No    Drug use:  No  Sexual activity: Never   Lifestyle    Physical activity:     Days per week: None     Minutes per session: None    Stress: None   Relationships    Social connections:     Talks on phone: None     Gets together: None     Attends [employer] service: None     Active member of club or organization: None     Attends meetings of clubs or organizations: None     Relationship status: None    Intimate partner violence:     Fear of current or ex partner: None     Emotionally abused: None     Physically abused: None     Forced sexual activity: None   Other Topics Concern    None   Social History Narrative    None     Current Outpatient Medications   Medication Sig Dispense Refill    cetirizine (ZYRTEC) 10 MG tablet Take 10 mg by mouth daily      NONFORMULARY Indications: collagen       pramipexole (MIRAPEX) 0.5 MG tablet 1 tab q 5 PM and 1 tab qHS 60 tablet 5    furosemide (LASIX) 20 MG tablet 1 tablet (20mg) up to three times weekly as needed.  90 tablet 3    rivaroxaban (XARELTO) 20 MG TABS tablet Take 1 tablet by mouth Daily with supper 90 tablet 3    lisinopril (PRINIVIL;ZESTRIL) 10 MG tablet TAKE 1 TABLET BY MOUTH DAILY 90 tablet 1    potassium chloride (KLOR-CON M) 10 MEQ extended release tablet TAKE 1 TABLET BY MOUTH DAILY AND 1 EXTRA FOR LEG CRAMPS 180 tablet 3    CARTIA  MG extended release capsule TAKE 1 CAPSULE BY MOUTH DAILY 90 capsule 3    NONFORMULARY       NONFORMULARY       NONFORMULARY       Magnesium Citrate 100 MG TABS Take 1 tablet by mouth daily 90 tablet 3    rivaroxaban (XARELTO) 20 MG TABS tablet Take 1 tablet by mouth daily (with breakfast) 90 tablet 3    hydrochlorothiazide (HYDRODIURIL) 25 MG tablet Take 1 tablet by mouth daily 90 tablet 3    beclomethasone (QVAR) 80 MCG/ACT inhaler Inhale 2 puffs into the lungs 2 times daily (Patient taking differently: Inhale 1 puff into the lungs as needed ) 1 Inhaler 3    Gymnema Sylvestris Leaf POWD by Does not apply route 400 mg am/pm      Licorice EXTR by Does not apply route Licorice root liquid 1 qtts qid      Coenzyme Q10 (COQ10) 100 MG CAPS Take 1 capsule by mouth daily       EPIPEN 2-MIR 0.3 MG/0.3ML SOAJ injection        No current facility-administered medications for this visit. Allergies   Allergen Reactions    Atorvastatin Other (See Comments)     Muscle Pain, all statins     Simvastatin Other (See Comments)     Muscle Pain    Cephalexin Hives and Itching    Cephalosporins Hives and Itching    Food Diarrhea     Milk , shellfish , gluten. ..may have bouts of diarrhea, constipation or flatulus    Other      Environmental -cats,dogs,dust    Sulfa Antibiotics      Can take Celebrex, Pt denies 8/1/18         Review of Systems:  Constitutional: no fatigue, no fever, no recent weight gain, no recent weight loss, no changes in appetite  Eyes: no eye pain, no change in vision, no eye redness, no eye irritation, no double vision  Ears, nose, throat: no nasal congestion, no sore throat, no earache, no decrease in hearing, no hoarseness, no dry mouth, no sinus problems, no difficulty swallowing, no neck lumps, no dental problems, no mouth sores, no ringing in ears  Pulmonary: no shortness of breath, no wheezing, no dyspnea on exertion, no cough  Cardiovascular: no chest pain, no lower extremity edema, no orthopnea, no intermittent leg claudication, no palpitations  Gastrointestinal: no abdominal pain, no nausea, no vomiting, no diarrhea, no constipation, no heartburn, no bloating  Genitourinary: no dysuria, no urinary incontinence, no urinary hesitancy, no change in urinary frequency, no feelings of urinary urgency, no nocturia  Musculoskeletal: no joint swelling, no joint stiffness, no joint pain, no muscle cramps, no muscle pain, no bone pain, no fractures  Integument/Breast: no hair loss, but no skin rashes, no skin lesions, no itching, no dry skin, no breast pain, no breast mass, no skin hives, no skin discoloration, no nipple discharge  Neurological: no numbness, no tingling, no weakness, no confusion, no headaches, no dizziness, no fainting, no tremors, no decrease in memory, no balance problems  Psychiatric: no anxiety, no depression, no insomnia, no change in personality, no emotional problems, no stress  Hematologic/Lymphatic: no tendency for easy bleeding, no swollen lymph nodes, no tendency for easy bruising  Immunology: no seasonal allergies, no frequent infections, no frequent illnesses  Endocrine: no temperature intolerance, no hot flashes, no hand tremor    OBJECTIVE:   /80   Pulse 68   Ht 5' 6\" (1.676 m)   Wt 230 lb (104.3 kg)   SpO2 98%   BMI 37.12 kg/m²   Wt Readings from Last 3 Encounters:   04/01/19 230 lb (104.3 kg)   03/04/19 242 lb (109.8 kg)   02/13/19 238 lb (108 kg)       Physical Exam:  Constitutional: no acute distress, well appearing, well nourished  Psychiatric: oriented to person, place and time, judgement, insight and normal, recent and remote memory and intact and mood, affect are normal  Skin: skin and subcutaneous tissue is normal without mass, normal turgor  Head and Face: examination of head and face revealed no abnormalities  Eyes: no lid or conjunctival swelling, no erythema or discharge, pupils are normal, equal, round, and reactive to light  Ears/Nose: external inspection of ears and nose revealed no abnormalities, hearing is grossly normal  Oropharynx/Mouth/Face: lips, tongue and gums are normal with no lesions, the voice quality was normal  Neck: neck is supple and symmetric, with midline trachea and no masses, thyroid is normal   Pulmonary: no increased work of breathing or signs of respiratory distress, lungs are clear to auscultation  Cardiovascular: normal heart rate and rhythm, normal S1   Musculoskeletal: normal gait and station, exam of the digits and nails are normal  Neurological: normal coordination, normal general cortical function    Lab Review:  Lab Results   Component Value Date    TSH 1.53 03/29/2019    TSH 1.96 09/27/2018    TSH 1.92 07/25/2018     Lab Results   Component Value Date    T4FREE 1.1 03/29/2019        ASSESSMENT/PLAN:  1. Hypothyroidism, unspecified type  She is noted to have negative thyroid Abs ( July 2018)   She has been off [place] since august 1 , 2018 and her thyroid functions test have stayed stable  She will have repeat labs in 6 months   Call us sooner if notes worsening of symptoms       Left lobe thyroid cyst left middle lobe cyst 0.5 cm aprox  She had a detailed ultrasound done today separate report to follow. Her left lobe thyroid cyst has stayed stable in size from when it was initially diagnosed in October 2018  Repeat uls in one year    2. Prediabetes Aic 5.9 >>6.1  Reviewed recent labs some of the levels are diabetic range   Advised to start with carb restriction and repeat labs in 6 months   We discussed weight loss and crab restriction in detail   --Patient had requested CRP, folic acid and homocystine level to be drawn all these results were within normal limits and were discussed with the patient in detail    Obesity   I had a lengthy discussion with the patient regarding caloric restriction as well as stepping up exercise. We discussed caloric goal in detail. Also discussed with patient the utility of mobile phone apps like \" my fitness Pal \" or \"Lose It \". Patient verbalized understanding and agrees to work on this aspect. she has no stigma of  [place] disease or untreated thyroid disorder. Encouraged patient to continue working on diet and exercise as she was able to lose 10 pounds since her last visit    3. Atrial fibrillation with RVR ([address]  Follows with Dr Jose Gaffney and Dr Alejandra Jin   Had 3 episodes and was not hyperthyroid in any one of those episodes   She is scheduled to have ablation in a few weeks     4.  Benign essential HTN  Stable   Continue with current regimen     HYPERLIPIDEMIA ldl less than 100 now  ldl improved since she has been watchingher dite and exercise   She couldn't tolerate statins     5. HUSSAIN (obstructive sleep apnea)  Feels improved with CPAP   Continue     6. Other pulmonary embolism without acute cor pulmonale, unspecified chronicity (HCC)  On Anticoagulation       Reviewed and/or ordered clinical lab results Yes  Reviewed and/or ordered radiology tests No   Reviewed and/or ordered other diagnostic tests Yes  Made a decision to obtain old records Yes  Reviewed and summarized old records Yes      Deepali Degrooto was counseled regarding symptoms of current diagnosis, course and complications of disease if inadequately treated, side effects of medications, diagnosis, treatment options, and prognosis, risks, benefits, complications, and alternatives of treatment, labs, imaging and other studies and treatment targets and goals. She understands instructions and counseling. Total time spent 40 + minutes , more than 50 % if this time was spent on face to face counseling. Patient had a lot of questions about risk factor modification and the diagnosis of prediabetes was discussed in detail with the patient as well we discussed carb restriction    Return in about 3 months (around 7/1/2019).

## 2019-04-02 ENCOUNTER — HOSPITAL ENCOUNTER (OUTPATIENT)
Dept: GENERAL RADIOLOGY | Age: 69
Discharge: HOME | End: 2019-04-02
Payer: MEDICARE

## 2019-04-02 ENCOUNTER — TELEPHONE (OUTPATIENT)
Dept: CARDIOLOGY CLINIC | Age: 69
End: 2019-04-02

## 2019-04-02 DIAGNOSIS — Z78.0 POST-MENOPAUSAL: ICD-10-CM

## 2019-04-02 PROCEDURE — 77080 DXA BONE DENSITY AXIAL: CPT

## 2019-04-04 NOTE — TELEPHONE ENCOUNTER
Jimi Boyd from [address] [place] [address] Ne calling again to see is we received paperwork. Please call him to verify that we received it.

## 2019-04-26 ENCOUNTER — OFFICE VISIT (OUTPATIENT)
Dept: INTERNAL MEDICINE CLINIC | Age: 69
End: 2019-04-26
Payer: MEDICARE

## 2019-04-26 VITALS
SYSTOLIC BLOOD PRESSURE: 126 MMHG | HEART RATE: 64 BPM | WEIGHT: 237 LBS | HEIGHT: 66 IN | DIASTOLIC BLOOD PRESSURE: 80 MMHG | BODY MASS INDEX: 38.09 KG/M2

## 2019-04-26 DIAGNOSIS — Z23 NEED FOR PNEUMOCOCCAL VACCINE: ICD-10-CM

## 2019-04-26 DIAGNOSIS — E78.00 PURE HYPERCHOLESTEROLEMIA: Chronic | ICD-10-CM

## 2019-04-26 DIAGNOSIS — J30.1 SEASONAL ALLERGIC RHINITIS DUE TO POLLEN: ICD-10-CM

## 2019-04-26 DIAGNOSIS — E03.9 ACQUIRED HYPOTHYROIDISM: Chronic | ICD-10-CM

## 2019-04-26 DIAGNOSIS — I10 BENIGN ESSENTIAL HTN: Primary | Chronic | ICD-10-CM

## 2019-04-26 DIAGNOSIS — I48.0 PAROXYSMAL ATRIAL FIBRILLATION (HCC): Chronic | ICD-10-CM

## 2019-04-26 DIAGNOSIS — K76.0 HEPATIC STEATOSIS: ICD-10-CM

## 2019-04-26 DIAGNOSIS — G47.33 OSA (OBSTRUCTIVE SLEEP APNEA): Chronic | ICD-10-CM

## 2019-04-26 PROCEDURE — G8399 PT W/DXA RESULTS DOCUMENT: HCPCS | Performed by: INTERNAL MEDICINE

## 2019-04-26 PROCEDURE — 99214 OFFICE O/P EST MOD 30 MIN: CPT | Performed by: INTERNAL MEDICINE

## 2019-04-26 PROCEDURE — G8427 DOCREV CUR MEDS BY ELIG CLIN: HCPCS | Performed by: INTERNAL MEDICINE

## 2019-04-26 PROCEDURE — 3017F COLORECTAL CA SCREEN DOC REV: CPT | Performed by: INTERNAL MEDICINE

## 2019-04-26 PROCEDURE — 1123F ACP DISCUSS/DSCN MKR DOCD: CPT | Performed by: INTERNAL MEDICINE

## 2019-04-26 PROCEDURE — G8417 CALC BMI ABV UP PARAM F/U: HCPCS | Performed by: INTERNAL MEDICINE

## 2019-04-26 PROCEDURE — 1090F PRES/ABSN URINE INCON ASSESS: CPT | Performed by: INTERNAL MEDICINE

## 2019-04-26 PROCEDURE — 4040F PNEUMOC VAC/ADMIN/RCVD: CPT | Performed by: INTERNAL MEDICINE

## 2019-04-26 PROCEDURE — 1036F TOBACCO NON-USER: CPT | Performed by: INTERNAL MEDICINE

## 2019-04-26 RX ORDER — AZELASTINE 1 MG/ML
1 SPRAY, METERED NASAL 2 TIMES DAILY
Qty: 2 BOTTLE | Refills: 1 | Status: SHIPPED | OUTPATIENT
Start: 2019-04-26 | End: 2021-02-22 | Stop reason: SDUPTHER

## 2019-04-26 RX ORDER — SIMVASTATIN 20 MG
TABLET ORAL
COMMUNITY
Start: 2019-03-19 | End: 2019-04-26 | Stop reason: CLARIF

## 2019-04-26 ASSESSMENT — PATIENT HEALTH QUESTIONNAIRE - PHQ9
SUM OF ALL RESPONSES TO PHQ9 QUESTIONS 1 & 2: 0
SUM OF ALL RESPONSES TO PHQ QUESTIONS 1-9: 0
2. FEELING DOWN, DEPRESSED OR HOPELESS: 0
SUM OF ALL RESPONSES TO PHQ QUESTIONS 1-9: 0
1. LITTLE INTEREST OR PLEASURE IN DOING THINGS: 0

## 2019-04-26 NOTE — PROGRESS NOTES
3    lisinopril (PRINIVIL;ZESTRIL) 10 MG tablet, TAKE 1 TABLET BY MOUTH DAILY, Disp: 90 tablet, Rfl: 1    potassium chloride (KLOR-CON M) 10 MEQ extended release tablet, TAKE 1 TABLET BY MOUTH DAILY AND 1 EXTRA FOR LEG CRAMPS, Disp: 180 tablet, Rfl: 3    CARTIA  MG extended release capsule, TAKE 1 CAPSULE BY MOUTH DAILY, Disp: 90 capsule, Rfl: 3    NONFORMULARY, , Disp: , Rfl:     NONFORMULARY, , Disp: , Rfl:     NONFORMULARY, , Disp: , Rfl:     Magnesium Citrate 100 MG TABS, Take 1 tablet by mouth daily, Disp: 90 tablet, Rfl: 3    hydrochlorothiazide (HYDRODIURIL) 25 MG tablet, Take 1 tablet by mouth daily, Disp: 90 tablet, Rfl: 3    Gymnema Sylvestris Leaf POWD, by Does not apply route 400 mg am/pm, Disp: , Rfl:     Licorice EXTR, by Does not apply route Licorice root liquid 1 qtts qid, Disp: , Rfl:     Coenzyme Q10 (COQ10) 100 MG CAPS, Take 1 capsule by mouth daily , Disp: , Rfl:     EPIPEN 2-MIR 0.3 MG/0.3ML SOAJ injection, , Disp: , Rfl:     beclomethasone (QVAR) 80 MCG/ACT inhaler, Inhale 2 puffs into the lungs 2 times daily (Patient taking differently: Inhale 1 puff into the lungs as needed ), Disp: 1 Inhaler, Rfl: 3    Lab Results   Component Value Date    WBC 6.3 02/11/2019    HGB 13.7 02/11/2019    HCT 40.9 02/11/2019    MCV 96.1 02/11/2019     02/11/2019     Lab Results   Component Value Date     03/29/2019    K 4.3 03/29/2019     03/29/2019    CO2 21 03/29/2019    BUN 17 03/29/2019    CREATININE 0.5 (L) 03/29/2019    GLUCOSE 100 (H) 03/29/2019    CALCIUM 9.0 03/29/2019    PROT 6.6 03/29/2019    LABALBU 4.0 03/29/2019    BILITOT <0.2 03/29/2019    ALKPHOS 83 03/29/2019    AST 17 03/29/2019    ALT 21 03/29/2019    LABGLOM >60 03/29/2019    GFRAA >60 03/29/2019    AGRATIO 1.5 03/29/2019    GLOB 2.6 03/29/2019     Lab Results   Component Value Date    CHOL 230 (H) 02/11/2019    CHOL 217 (H) 11/09/2018    CHOL 220 (H) 09/27/2018     Lab Results   Component Value Date TRIG 150 02/11/2019    TRIG 91 11/09/2018    TRIG 155 (H) 09/27/2018     Lab Results   Component Value Date    HDL 55 03/29/2019    HDL 64 (H) 02/11/2019    HDL 67 (H) 11/09/2018     Lab Results   Component Value Date    LDLCALC 96 03/29/2019    LDLCALC 136 (H) 02/11/2019    LDLCALC 132 (H) 11/09/2018     Lab Results   Component Value Date    LABVLDL 17 03/29/2019    LABVLDL 30 02/11/2019    LABVLDL 18 11/09/2018     No results found for: CHOLHDLRATIO    Assessment/Plan:  Evelyne Maza was seen today for hypothyroidism and hypertension. Diagnoses and all orders for this visit:    Benign essential HTN  Comments:  Chronic, controlled. Acquired hypothyroidism  Comments:  Her thyroid function is normal.    Pure hypercholesterolemia  Comments:  Chronic, controlled. Hepatic steatosis  Comments: This is related to the fact that she has morbid obesity but the patient has done a great job at losing weight. HUSSAIN (obstructive sleep apnea)  Comments: This is a contributor to her obesity.     Paroxysmal atrial fibrillation (HCC)    Need for pneumococcal vaccine  -     PREVNAR 13 IM (Pneumococcal conjugate vaccine 13-valent)    Seasonal allergic rhinitis due to pollen  -     azelastine (ASTELIN) 0.1 % nasal spray; 1 spray by Nasal route 2 times daily 1 Spray in each nostril      Naveed Rivas MD

## 2019-04-29 ENCOUNTER — PATIENT MESSAGE (OUTPATIENT)
Dept: INTERNAL MEDICINE CLINIC | Age: 69
End: 2019-04-29

## 2019-05-08 ENCOUNTER — HOSPITAL ENCOUNTER (OUTPATIENT)
Age: 69
Discharge: HOME | End: 2019-05-08
Payer: MEDICARE

## 2019-05-08 DIAGNOSIS — R73.03 PREDIABETES: ICD-10-CM

## 2019-05-08 DIAGNOSIS — E78.2 MIXED HYPERLIPIDEMIA: ICD-10-CM

## 2019-05-08 DIAGNOSIS — E04.1 THYROID NODULE: ICD-10-CM

## 2019-05-08 DIAGNOSIS — I10 BENIGN ESSENTIAL HTN: Chronic | ICD-10-CM

## 2019-05-08 DIAGNOSIS — R06.02 SOB (SHORTNESS OF BREATH): ICD-10-CM

## 2019-05-08 LAB
A/G RATIO: 1.7 (ref 1.1–2.2)
ALBUMIN SERPL-MCNC: 4.5 G/DL (ref 3.4–5)
ALP BLD-CCNC: 89 U/L (ref 40–129)
ALT SERPL-CCNC: 22 U/L (ref 10–40)
ANION GAP SERPL CALCULATED.3IONS-SCNC: 15 MMOL/L (ref 3–16)
AST SERPL-CCNC: 17 U/L (ref 15–37)
BILIRUB SERPL-MCNC: 0.5 MG/DL (ref 0–1)
BUN BLDV-MCNC: 21 MG/DL (ref 7–20)
CALCIUM SERPL-MCNC: 9.2 MG/DL (ref 8.3–10.6)
CHLORIDE BLD-SCNC: 101 MMOL/L (ref 99–110)
CHOLESTEROL, TOTAL: 205 MG/DL (ref 0–199)
CO2: 21 MMOL/L (ref 21–32)
CREAT SERPL-MCNC: 0.5 MG/DL (ref 0.6–1.2)
ESTIMATED AVERAGE GLUCOSE: 125.5 MG/DL
GFR AFRICAN AMERICAN: >60
GFR NON-AFRICAN AMERICAN: >60
GLOBULIN: 2.7 G/DL
GLUCOSE BLD-MCNC: 117 MG/DL (ref 70–99)
HBA1C MFR BLD: 6 %
HDLC SERPL-MCNC: 69 MG/DL (ref 40–60)
LDL CHOLESTEROL CALCULATED: 115 MG/DL
POTASSIUM SERPL-SCNC: 4.1 MMOL/L (ref 3.5–5.1)
PRO-BNP: 46 PG/ML (ref 0–124)
SODIUM BLD-SCNC: 137 MMOL/L (ref 136–145)
TOTAL PROTEIN: 7.2 G/DL (ref 6.4–8.2)
TRIGL SERPL-MCNC: 104 MG/DL (ref 0–150)
TSH SERPL DL<=0.05 MIU/L-ACNC: 1.86 UIU/ML (ref 0.27–4.2)
VITAMIN B-12: 946 PG/ML (ref 211–911)
VLDLC SERPL CALC-MCNC: 21 MG/DL

## 2019-05-08 PROCEDURE — 83880 ASSAY OF NATRIURETIC PEPTIDE: CPT

## 2019-05-08 PROCEDURE — 80053 COMPREHEN METABOLIC PANEL: CPT

## 2019-05-08 PROCEDURE — 83036 HEMOGLOBIN GLYCOSYLATED A1C: CPT

## 2019-05-08 PROCEDURE — 80061 LIPID PANEL: CPT

## 2019-05-08 PROCEDURE — 82607 VITAMIN B-12: CPT

## 2019-05-08 PROCEDURE — 36415 COLL VENOUS BLD VENIPUNCTURE: CPT

## 2019-05-08 PROCEDURE — 84443 ASSAY THYROID STIM HORMONE: CPT

## 2019-05-14 ENCOUNTER — OFFICE VISIT (OUTPATIENT)
Dept: CARDIOLOGY CLINIC | Age: 69
End: 2019-05-14
Payer: MEDICARE

## 2019-05-14 VITALS
WEIGHT: 238.4 LBS | HEART RATE: 60 BPM | SYSTOLIC BLOOD PRESSURE: 114 MMHG | OXYGEN SATURATION: 96 % | BODY MASS INDEX: 39.72 KG/M2 | DIASTOLIC BLOOD PRESSURE: 60 MMHG | HEIGHT: 65 IN

## 2019-05-14 DIAGNOSIS — I48.0 PAROXYSMAL ATRIAL FIBRILLATION (HCC): Chronic | ICD-10-CM

## 2019-05-14 DIAGNOSIS — E78.00 PURE HYPERCHOLESTEROLEMIA: Primary | Chronic | ICD-10-CM

## 2019-05-14 DIAGNOSIS — I50.32 CHRONIC DIASTOLIC HEART FAILURE (HCC): ICD-10-CM

## 2019-05-14 DIAGNOSIS — Z86.711 HX OF PULMONARY EMBOLUS: ICD-10-CM

## 2019-05-14 DIAGNOSIS — I10 BENIGN ESSENTIAL HTN: Chronic | ICD-10-CM

## 2019-05-14 PROCEDURE — 1036F TOBACCO NON-USER: CPT | Performed by: INTERNAL MEDICINE

## 2019-05-14 PROCEDURE — 1123F ACP DISCUSS/DSCN MKR DOCD: CPT | Performed by: INTERNAL MEDICINE

## 2019-05-14 PROCEDURE — 1090F PRES/ABSN URINE INCON ASSESS: CPT | Performed by: INTERNAL MEDICINE

## 2019-05-14 PROCEDURE — G8427 DOCREV CUR MEDS BY ELIG CLIN: HCPCS | Performed by: INTERNAL MEDICINE

## 2019-05-14 PROCEDURE — 3017F COLORECTAL CA SCREEN DOC REV: CPT | Performed by: INTERNAL MEDICINE

## 2019-05-14 PROCEDURE — G8417 CALC BMI ABV UP PARAM F/U: HCPCS | Performed by: INTERNAL MEDICINE

## 2019-05-14 PROCEDURE — 99214 OFFICE O/P EST MOD 30 MIN: CPT | Performed by: INTERNAL MEDICINE

## 2019-05-14 PROCEDURE — G8399 PT W/DXA RESULTS DOCUMENT: HCPCS | Performed by: INTERNAL MEDICINE

## 2019-05-14 PROCEDURE — 4040F PNEUMOC VAC/ADMIN/RCVD: CPT | Performed by: INTERNAL MEDICINE

## 2019-05-14 NOTE — PATIENT INSTRUCTIONS
Plan:  1. Labs before next visit with ECHO  2. Research Zetia and let us know if you want a script. 3.  See Dr. Rachael Gould in 4-6 months.

## 2019-05-14 NOTE — PROGRESS NOTES
[address]   Advanced Heart Failure/Pulmonary Hypertension  Cardiac Follow up       Aly Watt  YOB: 1950   Date of Visit:  5/14/19  Chief Complaint   Patient presents with    Hypertension     History of present illness: Aly Watt is a 79 y.o. female with past medical history significant for HTN, HUSSAIN on CPAP, multiple PE on Xarelto (8/2016). She original had a PE was treated with xarelto for recommended time and then off it and her RHC revealed pressures no RH elevated pressures. Afterwards she developed AFib and restarted on xarelto. She continues on xarelto and treatment for afib. Echos in July and August (2016) showed RVSP 106-112 without evidence of enlargement in right side of heart. RHC was done 9/2/16 and PA pressure was 24, no evidence of pulmonary hypertension. Since then, her RVSP has decreased and got back to normal. We discussed that we are questioning if the elevated ones by echo may have been due to a false positive test for her. She was seen by Dr Albert Riojas 8/1 and he is managing her hypothyroidism. [place], the thyroid medication was stopped. She was noted to have negative thyroid ultrasound ( July 2018). She wears her CPAP consistently for her HUSSAIN. Today she states she had a reaction to Simvastatin and Livalo. States she had muscle pain. Discussed adding Zetia to her current statin therapy and she will take some time to research it and get back with me. She is wearing her CPAP. Her ankles are not swollen. She is taking [place] everyday and a second one if needed. She has Lasix to use instead of HCTZ as a PRN. She states she is Prediabetic and has not lost weight. She has not felt any more A-fib episodes.         Past Medical History:   Diagnosis Date    Allergic     Anesthesia complication     family hx-father-at at age 80 having hip replacement revision surgery-had tia's x2 while under anes-but also had hx chf-had dificuly with speech when coming out Packs/day: 0.50     Years: 5.00     Pack years: 2.50     Last attempt to quit: 10/5/2013     Years since quittin.6    Smokeless tobacco: Never Used    Tobacco comment: smoked for a total of 5yr total   Substance Use Topics    Alcohol use: No       Review of Systems:   Constitutional: No significant change in weight, fatigue or weakness. HEENT: No change in vision or ringing in the ears. Respiratory: No LUGO, PND, orthopnea or cough. Cardiovascular: See HPI   GI: No n/v, abdominal pain or changes in bowel habits. No melena, no hematochezia  : No dysuria or hematuria. Skin: No rash or new skin lesions. Musculoskeletal:   Neurological: No lightheadedness, dizziness, syncope or TIA-like symptoms. Psychiatric: No anxiety, insomnia or depression    Physical Exam:  Vitals:    19 0918   BP: 114/60   Pulse: 60   SpO2: 96%   Weight: 238 lb 6.4 oz (108.1 kg)   Height: 5' 5\" (1.651 m)     Constitutional and General Appearance:   WD/WN in NAD  HEENT:  NC/AT  GENET  No problems with hearing  Respiratory:  · Normal excursion and expansion without use of accessory muscles  · Resp Auscultation: Normal breath sounds without dullness  Cardiovascular:  · The apical impulses not displaced  · Heart tones are crisp and normal  · Cervical veins are not engorged  · The carotid upstroke is normal in amplitude and contour without delay or bruit  · JVP less than 8 cm H2O  RRR with nl S1 and S2 without m,r,g  · Peripheral pulses are symmetrical and full  · There is no clubbing, cyanosis of the extremities.   · No edema  · Femoral Arteries: 2+ and equal  · Pedal Pulses: 2+ and equal   Abdomen:  · No masses or tenderness  · Liver/Spleen: No Abnormalities Noted  Neurological/Psychiatric:  · Alert and oriented in all spheres  · Moves all extremities well  · Exhibits normal gait balance and coordination  · No abnormalities of mood, affect, memory, mentation, or behavior are noted      Current Outpatient Medications Medication Sig Dispense Refill    diclofenac (VOLTAREN) 50 MG EC tablet Take 1 tablet by mouth 3 times daily (with meals) (Patient taking differently: Take 50 mg by mouth daily ) 60 tablet 3    azelastine (ASTELIN) 0.1 % nasal spray 1 spray by Nasal route 2 times daily 1 Spray in each nostril 2 Bottle 1    cetirizine (ZYRTEC) 10 MG tablet Take 10 mg by mouth every other day       pramipexole (MIRAPEX) 0.5 MG tablet 1 tab q 5 PM and 1 tab qHS 60 tablet 5    rivaroxaban (XARELTO) 20 MG TABS tablet Take 1 tablet by mouth Daily with supper 90 tablet 3    lisinopril (PRINIVIL;ZESTRIL) 10 MG tablet TAKE 1 TABLET BY MOUTH DAILY 90 tablet 1    potassium chloride (KLOR-CON M) 10 MEQ extended release tablet TAKE 1 TABLET BY MOUTH DAILY AND 1 EXTRA FOR LEG CRAMPS 180 tablet 3    CARTIA  MG extended release capsule TAKE 1 CAPSULE BY MOUTH DAILY 90 capsule 3    NONFORMULARY       Magnesium Citrate 100 MG TABS Take 1 tablet by mouth daily 90 tablet 3    hydrochlorothiazide (HYDRODIURIL) 25 MG tablet Take 1 tablet by mouth daily 90 tablet 3    beclomethasone (QVAR) 80 MCG/ACT inhaler Inhale 2 puffs into the lungs 2 times daily (Patient taking differently: Inhale 1 puff into the lungs as needed ) 1 Inhaler 3    Licorice EXTR by Does not apply route Licorice root liquid 1 qtts qid      Coenzyme Q10 (COQ10) 100 MG CAPS Take 1 capsule by mouth daily       diclofenac (VOLTAREN) 50 MG EC tablet Take 1 tablet by mouth 3 times daily (with meals) 60 tablet 3    furosemide (LASIX) 20 MG tablet 1 tablet (20mg) up to three times weekly as needed. 90 tablet 3    EPIPEN 2-MIR 0.3 MG/0.3ML SOAJ injection        No current facility-administered medications for this visit.         Labs:   Lab Results   Component Value Date    WBC 6.3 02/11/2019    HGB 13.7 02/11/2019    HCT 40.9 02/11/2019    MCV 96.1 02/11/2019     02/11/2019     Lab Results   Component Value Date     05/08/2019    K 4.1 05/08/2019     88  mmHg. This is suggestive of severe pulmonary hypertension. Mild pulmonic regurgitation present. 1-20-15 Nuclear Stress test  Conclusions        Summary    Normal myocardial perfusion study.    Normal LV function. Labs were reviewed including labs from other hospital systems through [facility]. Cardiac testing was reviewed including echos, nuclear scans, cardiac catheterization, including from other hospital systems through [facility]. Assessment:  1. Pure hypercholesterolemia    2. Benign essential HTN    3. Paroxysmal atrial fibrillation (HCC)    4. Hx of pulmonary embolus    5. Chronic diastolic heart failure ([facility] [address]        1. Pure hypercholesterolemia 5-8-19  . Discussed adding Zetia. Patient will research it. Total Chol  205, HDL 69. Trig  104. Mother had CAD. CT calcium score 51. Could not tolerate statin or Livalo. 2. Benign essential HTN : /60   Pulse 60   Ht 5' 5\" (1.651 m)   Wt 238 lb 6.4 oz (108.1 kg)   SpO2 96%   BMI 39.67 kg/m²   Controlled. 3. Paroxysmal atrial fibrillation [facility]): Has intermittent AF episodes. May take a second Diltiazem 120mg if needed. Continue Xarelto. She has not felt any new episodes. 4. Hx of pulmonary embolus:  Continues on xarelto      5. Chronic diastolic heart failure (HCC) :  Compensated by exam.  No SOB or chest pain. Plan:  1. Labs before next visit with ECHO  2. Research Zetia and let us know if you want a script. 3.  See Dr. Chaim Ruiz in 4-6 months. Scribe's attestation: This note was scribed in the presence of Moni Ventura M.D. By Damion March RN     The scribe's documentation has been prepared under my direction and personally reviewed by me in its entirety. I confirm that the note above accurately reflects all work, treatment, procedures, and medical decision making performed by me.         1.  May take an extra Diltiazem 120mg if heart rate is uncontrolled one hour after

## 2019-05-23 ENCOUNTER — OFFICE VISIT (OUTPATIENT)
Dept: ENDOCRINOLOGY | Age: 69
End: 2019-05-23
Payer: MEDICARE

## 2019-05-23 DIAGNOSIS — R73.03 PREDIABETES: Primary | ICD-10-CM

## 2019-05-23 PROCEDURE — 97802 MEDICAL NUTRITION INDIV IN: CPT | Performed by: DIETITIAN, REGISTERED

## 2019-05-23 NOTE — PROGRESS NOTES
 Hx of pulmonary embolus    Chronic diastolic heart failure (HCC)       Current Outpatient Medications   Medication Sig Dispense Refill    diclofenac (VOLTAREN) 50 MG EC tablet Take 1 tablet by mouth 3 times daily (with meals) (Patient taking differently: Take 50 mg by mouth daily ) 60 tablet 3    diclofenac (VOLTAREN) 50 MG EC tablet Take 1 tablet by mouth 3 times daily (with meals) 60 tablet 3    azelastine (ASTELIN) 0.1 % nasal spray 1 spray by Nasal route 2 times daily 1 Spray in each nostril 2 Bottle 1    cetirizine (ZYRTEC) 10 MG tablet Take 10 mg by mouth every other day       pramipexole (MIRAPEX) 0.5 MG tablet 1 tab q 5 PM and 1 tab qHS 60 tablet 5    furosemide (LASIX) 20 MG tablet 1 tablet (20mg) up to three times weekly as needed. 90 tablet 3    rivaroxaban (XARELTO) 20 MG TABS tablet Take 1 tablet by mouth Daily with supper 90 tablet 3    lisinopril (PRINIVIL;ZESTRIL) 10 MG tablet TAKE 1 TABLET BY MOUTH DAILY 90 tablet 1    potassium chloride (KLOR-CON M) 10 MEQ extended release tablet TAKE 1 TABLET BY MOUTH DAILY AND 1 EXTRA FOR LEG CRAMPS 180 tablet 3    CARTIA  MG extended release capsule TAKE 1 CAPSULE BY MOUTH DAILY 90 capsule 3    NONFORMULARY       Magnesium Citrate 100 MG TABS Take 1 tablet by mouth daily 90 tablet 3    hydrochlorothiazide (HYDRODIURIL) 25 MG tablet Take 1 tablet by mouth daily 90 tablet 3    beclomethasone (QVAR) 80 MCG/ACT inhaler Inhale 2 puffs into the lungs 2 times daily (Patient taking differently: Inhale 1 puff into the lungs as needed ) 1 Inhaler 3    Licorice EXTR by Does not apply route Licorice root liquid 1 qtts qid      Coenzyme Q10 (COQ10) 100 MG CAPS Take 1 capsule by mouth daily       EPIPEN 2-MIR 0.3 MG/0.3ML SOAJ injection        No current facility-administered medications for this visit.           NUTRITION ASSESSMENT    Biochemical Data:    Lab Results   Component Value Date    LABA1C 6.0 05/08/2019     Lab Results   Component Value Date    .5 05/08/2019       Lab Results   Component Value Date    CHOL 205 (H) 05/08/2019    CHOL 230 (H) 02/11/2019    CHOL 217 (H) 11/09/2018     Lab Results   Component Value Date    TRIG 104 05/08/2019    TRIG 150 02/11/2019    TRIG 91 11/09/2018     Lab Results   Component Value Date    HDL 69 (H) 05/08/2019    HDL 55 03/29/2019    HDL 64 (H) 02/11/2019     Lab Results   Component Value Date    LDLCALC 115 (H) 05/08/2019    LDLCALC 96 03/29/2019    LDLCALC 136 (H) 02/11/2019     Lab Results   Component Value Date    LABVLDL 21 05/08/2019    LABVLDL 17 03/29/2019    LABVLDL 30 02/11/2019     No results found for: CHOLHDLRATIO    Lab Results   Component Value Date    WBC 6.3 02/11/2019    HGB 13.7 02/11/2019    HCT 40.9 02/11/2019    MCV 96.1 02/11/2019     02/11/2019       Lab Results   Component Value Date    CREATININE 0.5 (L) 05/08/2019    BUN 21 (H) 05/08/2019     05/08/2019    K 4.1 05/08/2019     05/08/2019    CO2 21 05/08/2019       Anthropometric Measurements: Wt: 238 lb  Wt Readings from Last 3 Encounters:   05/14/19 238 lb 6.4 oz (108.1 kg)   04/26/19 237 lb (107.5 kg)   04/01/19 230 lb (104.3 kg)      BMI: 39.67 kg/m2  BMI Readings from Last 3 Encounters:   05/14/19 39.67 kg/m²   04/26/19 38.25 kg/m²   04/01/19 37.12 kg/m²     Patient's stated goal weight: 175 lb  7% Weight loss goal weight: 221 lb.     Food and Nutrition History:   Nutrition Awareness/Previous DSMES: none  Beverage consumption: unsweet tea  Frequency of Meals Eaten away from home:weekly  Alcohol consumption: no  Food Availability Problems: secure  Usual Food consumption: 3 meals with a irregular meals schedule, small and large portions     Physical Activity History:   Physical activity: walking 2 miles daily, meditates and stretches with a   Frequency of activity: daily/weekly  Duration of activity:   Obstacles to activity: none    Diabetes Medications:   Knows name and dose of prescribed medications

## 2019-05-31 DIAGNOSIS — I26.99 OTHER PULMONARY EMBOLISM WITHOUT ACUTE COR PULMONALE, UNSPECIFIED CHRONICITY (HCC): ICD-10-CM

## 2019-05-31 DIAGNOSIS — E66.01 OBESITY, CLASS III, BMI 40-49.9 (MORBID OBESITY) (HCC): ICD-10-CM

## 2019-05-31 DIAGNOSIS — I48.91 ATRIAL FIBRILLATION WITH RAPID VENTRICULAR RESPONSE (HCC): ICD-10-CM

## 2019-05-31 DIAGNOSIS — R06.02 SOB (SHORTNESS OF BREATH): ICD-10-CM

## 2019-05-31 DIAGNOSIS — I10 ESSENTIAL HYPERTENSION: Chronic | ICD-10-CM

## 2019-05-31 DIAGNOSIS — G47.33 OSA (OBSTRUCTIVE SLEEP APNEA): Chronic | ICD-10-CM

## 2019-05-31 DIAGNOSIS — I27.20 PULMONARY HTN (HCC): Chronic | ICD-10-CM

## 2019-05-31 RX ORDER — HYDROCHLOROTHIAZIDE 25 MG/1
25 TABLET ORAL DAILY
Qty: 90 TABLET | Refills: 0 | Status: ON HOLD | OUTPATIENT
Start: 2019-05-31 | End: 2020-01-13 | Stop reason: HOSPADM

## 2019-07-21 ENCOUNTER — PATIENT MESSAGE (OUTPATIENT)
Dept: CARDIOLOGY CLINIC | Age: 69
End: 2019-07-21

## 2019-08-01 ENCOUNTER — HOSPITAL ENCOUNTER (OUTPATIENT)
Age: 69
Discharge: HOME | End: 2019-08-01
Payer: MEDICARE

## 2019-08-01 DIAGNOSIS — E78.00 PURE HYPERCHOLESTEROLEMIA: Chronic | ICD-10-CM

## 2019-08-01 DIAGNOSIS — I50.32 CHRONIC DIASTOLIC HEART FAILURE (HCC): ICD-10-CM

## 2019-08-01 DIAGNOSIS — I10 BENIGN ESSENTIAL HTN: Chronic | ICD-10-CM

## 2019-08-01 LAB
A/G RATIO: 1.9 (ref 1.1–2.2)
ALBUMIN SERPL-MCNC: 4.3 G/DL (ref 3.4–5)
ALP BLD-CCNC: 89 U/L (ref 40–129)
ALT SERPL-CCNC: 24 U/L (ref 10–40)
ANION GAP SERPL CALCULATED.3IONS-SCNC: 13 MMOL/L (ref 3–16)
AST SERPL-CCNC: 16 U/L (ref 15–37)
BILIRUB SERPL-MCNC: 0.3 MG/DL (ref 0–1)
BUN BLDV-MCNC: 14 MG/DL (ref 7–20)
CALCIUM SERPL-MCNC: 9.6 MG/DL (ref 8.3–10.6)
CHLORIDE BLD-SCNC: 103 MMOL/L (ref 99–110)
CHOLESTEROL, TOTAL: 175 MG/DL (ref 0–199)
CO2: 22 MMOL/L (ref 21–32)
CREAT SERPL-MCNC: 0.6 MG/DL (ref 0.6–1.2)
GFR AFRICAN AMERICAN: >60
GFR NON-AFRICAN AMERICAN: >60
GLOBULIN: 2.3 G/DL
GLUCOSE BLD-MCNC: 123 MG/DL (ref 70–99)
HCT VFR BLD CALC: 38.9 % (ref 36–48)
HDLC SERPL-MCNC: 55 MG/DL (ref 40–60)
HEMOGLOBIN: 13.2 G/DL (ref 12–16)
LDL CHOLESTEROL CALCULATED: 89 MG/DL
MCH RBC QN AUTO: 32.7 PG (ref 26–34)
MCHC RBC AUTO-ENTMCNC: 34 G/DL (ref 31–36)
MCV RBC AUTO: 96.3 FL (ref 80–100)
PDW BLD-RTO: 14.3 % (ref 12.4–15.4)
PLATELET # BLD: 246 K/UL (ref 135–450)
PMV BLD AUTO: 9 FL (ref 5–10.5)
POTASSIUM SERPL-SCNC: 4.3 MMOL/L (ref 3.5–5.1)
PRO-BNP: 189 PG/ML (ref 0–124)
RBC # BLD: 4.04 M/UL (ref 4–5.2)
SODIUM BLD-SCNC: 138 MMOL/L (ref 136–145)
TOTAL PROTEIN: 6.6 G/DL (ref 6.4–8.2)
TRIGL SERPL-MCNC: 153 MG/DL (ref 0–150)
VLDLC SERPL CALC-MCNC: 31 MG/DL
WBC # BLD: 5.4 K/UL (ref 4–11)

## 2019-08-01 PROCEDURE — 83880 ASSAY OF NATRIURETIC PEPTIDE: CPT

## 2019-08-01 PROCEDURE — 85027 COMPLETE CBC AUTOMATED: CPT

## 2019-08-01 PROCEDURE — 80061 LIPID PANEL: CPT

## 2019-08-01 PROCEDURE — 80053 COMPREHEN METABOLIC PANEL: CPT

## 2019-08-01 PROCEDURE — 36415 COLL VENOUS BLD VENIPUNCTURE: CPT

## 2019-08-16 NOTE — PROGRESS NOTES
filling pressures  3. Normal cardiac output  4. No evidence of PAH at this time. Echo 8/28/17  Normal left ventricle size and systolic function with an estimated ejection  fraction of 60%. Mild mitral regurgitation is present. There is mild-moderate tricuspid regurgitation with RVSP estimated at 88  mmHg. This is suggestive of severe pulmonary hypertension. Mild pulmonic regurgitation present. 1-20-15 Nuclear Stress test  Conclusions        Summary    Normal myocardial perfusion study.    Normal LV function. Labs were reviewed including labs from other hospital systems through [facility]. Cardiac testing was reviewed including echos, nuclear scans, cardiac catheterization, including from other hospital systems through [facility]. Assessment:  1. Chronic diastolic heart failure ([address]    2. Benign essential HTN    3. HUSSAIN (obstructive sleep apnea)    4. Paroxysmal atrial fibrillation (HCC)    5. Pure hypercholesterolemia        1. Chronic diastolic heart failure (HCC) :  EF today 55% and RVSP 40mmHg. Compensated by exam.  No SOB or chest pain. She has increase her daily exercise. 2. Benign essential HTN:  BP (!) 98/54   Pulse 66   Ht 5' 4\" (1.626 m)   Wt 230 lb (104.3 kg)   SpO2 98%   BMI 39.48 kg/m²  Decrease lisinopril to 5mg daily. 3. HUSSAIN (obstructive sleep apnea):  Wearing CPAP every night. 4. Paroxysmal atrial fibrillation ([address]):  Taking Xarelto daily. May take a second Diltiazem 120mg if needed. Continue Xarelto. She has not felt any new episodes. 5. Pure hypercholesterolemia:  8-1-19 , HDL 55, LDL 89, . She has been eating better and exercising in the pool 5000 steps 5 days a week. She decided not to start Zetia. Mother had CAD. CT calcium score 51. Could not tolerate statin or Livalo. Plan:  1. Decrease Lisinopril to 5mg daily. 2.  Continue to exercise and eat healthy. 3.  See Dr. Dexter Rodriguez in 6 months   4. Continue Labs once a month. Scribe's attestation: This note was scribed in the presence of Jo Ann Gomez M.D. By Sara Mccann RN      The scribe's documentation has been prepared under my direction and personally reviewed by me in its entirety. I confirm that the note above accurately reflects all work, treatment, procedures, and medical decision making performed by me. May take an extra Diltiazem 120mg if heart rate is uncontrolled one hour after first dose. QUALITY MEASURES  1. Tobacco Cessation Counseling: N/A  2. BP retake if >140/90: N/A  3. Communication to PCP: Office note forwarded/faxed to PCP  4. CAD antiplatelet therapy: N/A  5. CAD lipid lowering therapy: N/A  6. HF A. Fib on anticoagulation: N/A     Thank you for allowing me to participate in the care of your patient.     Jo Ann Gomez M.D., [facility] [place]

## 2019-08-20 ENCOUNTER — OFFICE VISIT (OUTPATIENT)
Dept: CARDIOLOGY CLINIC | Age: 69
End: 2019-08-20
Payer: MEDICARE

## 2019-08-20 ENCOUNTER — HOSPITAL ENCOUNTER (OUTPATIENT)
Dept: NON INVASIVE DIAGNOSTICS | Age: 69
Discharge: HOME | End: 2019-08-20
Payer: MEDICARE

## 2019-08-20 VITALS
DIASTOLIC BLOOD PRESSURE: 54 MMHG | BODY MASS INDEX: 39.27 KG/M2 | HEIGHT: 64 IN | HEART RATE: 66 BPM | WEIGHT: 230 LBS | SYSTOLIC BLOOD PRESSURE: 98 MMHG | OXYGEN SATURATION: 98 %

## 2019-08-20 DIAGNOSIS — I50.32 CHRONIC DIASTOLIC HEART FAILURE (HCC): ICD-10-CM

## 2019-08-20 DIAGNOSIS — I26.99 OTHER PULMONARY EMBOLISM WITHOUT ACUTE COR PULMONALE, UNSPECIFIED CHRONICITY (HCC): ICD-10-CM

## 2019-08-20 DIAGNOSIS — I10 BENIGN ESSENTIAL HTN: Chronic | ICD-10-CM

## 2019-08-20 DIAGNOSIS — E78.00 PURE HYPERCHOLESTEROLEMIA: Chronic | ICD-10-CM

## 2019-08-20 DIAGNOSIS — I50.32 CHRONIC DIASTOLIC HEART FAILURE (HCC): Primary | ICD-10-CM

## 2019-08-20 DIAGNOSIS — E66.01 OBESITY, CLASS III, BMI 40-49.9 (MORBID OBESITY) (HCC): ICD-10-CM

## 2019-08-20 DIAGNOSIS — G47.33 OSA (OBSTRUCTIVE SLEEP APNEA): Chronic | ICD-10-CM

## 2019-08-20 DIAGNOSIS — R06.02 SOB (SHORTNESS OF BREATH): ICD-10-CM

## 2019-08-20 DIAGNOSIS — I48.0 PAROXYSMAL ATRIAL FIBRILLATION (HCC): Chronic | ICD-10-CM

## 2019-08-20 LAB
LEFT VENTRICULAR EJECTION FRACTION HIGH VALUE: 60 %
LEFT VENTRICULAR EJECTION FRACTION HIGH VALUE: NORMAL %
LEFT VENTRICULAR EJECTION FRACTION MODE: NORMAL
LEFT VENTRICULAR EJECTION FRACTION MODE: NORMAL
LV EF: 55 %
LV EF: 58 %
LVEF MODALITY: NORMAL

## 2019-08-20 PROCEDURE — 4040F PNEUMOC VAC/ADMIN/RCVD: CPT | Performed by: INTERNAL MEDICINE

## 2019-08-20 PROCEDURE — 1123F ACP DISCUSS/DSCN MKR DOCD: CPT | Performed by: INTERNAL MEDICINE

## 2019-08-20 PROCEDURE — 99214 OFFICE O/P EST MOD 30 MIN: CPT | Performed by: INTERNAL MEDICINE

## 2019-08-20 PROCEDURE — 1036F TOBACCO NON-USER: CPT | Performed by: INTERNAL MEDICINE

## 2019-08-20 PROCEDURE — 93306 TTE W/DOPPLER COMPLETE: CPT

## 2019-08-20 PROCEDURE — G8417 CALC BMI ABV UP PARAM F/U: HCPCS | Performed by: INTERNAL MEDICINE

## 2019-08-20 PROCEDURE — G8427 DOCREV CUR MEDS BY ELIG CLIN: HCPCS | Performed by: INTERNAL MEDICINE

## 2019-08-20 PROCEDURE — 1090F PRES/ABSN URINE INCON ASSESS: CPT | Performed by: INTERNAL MEDICINE

## 2019-08-20 PROCEDURE — G8399 PT W/DXA RESULTS DOCUMENT: HCPCS | Performed by: INTERNAL MEDICINE

## 2019-08-20 PROCEDURE — 3017F COLORECTAL CA SCREEN DOC REV: CPT | Performed by: INTERNAL MEDICINE

## 2019-08-20 RX ORDER — LISINOPRIL 5 MG/1
5 TABLET ORAL DAILY
Qty: 90 TABLET | Refills: 3 | Status: ON HOLD | OUTPATIENT
Start: 2019-08-20 | End: 2020-01-13 | Stop reason: HOSPADM

## 2019-08-22 PROBLEM — I26.99 OTHER PULMONARY EMBOLISM WITHOUT ACUTE COR PULMONALE (HCC): Status: ACTIVE | Noted: 2019-08-22

## 2019-08-28 ENCOUNTER — TELEPHONE (OUTPATIENT)
Dept: CARDIOLOGY CLINIC | Age: 69
End: 2019-08-28

## 2019-09-03 ENCOUNTER — OFFICE VISIT (OUTPATIENT)
Dept: PULMONOLOGY | Age: 69
End: 2019-09-03
Payer: MEDICARE

## 2019-09-03 VITALS
HEART RATE: 67 BPM | WEIGHT: 238 LBS | DIASTOLIC BLOOD PRESSURE: 80 MMHG | HEIGHT: 66 IN | BODY MASS INDEX: 38.25 KG/M2 | SYSTOLIC BLOOD PRESSURE: 132 MMHG | OXYGEN SATURATION: 97 %

## 2019-09-03 DIAGNOSIS — I10 BENIGN ESSENTIAL HTN: Chronic | ICD-10-CM

## 2019-09-03 DIAGNOSIS — G25.81 RESTLESS LEG SYNDROME: Chronic | ICD-10-CM

## 2019-09-03 DIAGNOSIS — G47.33 OSA (OBSTRUCTIVE SLEEP APNEA): Primary | Chronic | ICD-10-CM

## 2019-09-03 DIAGNOSIS — I48.0 PAROXYSMAL ATRIAL FIBRILLATION (HCC): Chronic | ICD-10-CM

## 2019-09-03 DIAGNOSIS — E66.9 NON MORBID OBESITY, UNSPECIFIED OBESITY TYPE: Chronic | ICD-10-CM

## 2019-09-03 PROCEDURE — 4040F PNEUMOC VAC/ADMIN/RCVD: CPT | Performed by: INTERNAL MEDICINE

## 2019-09-03 PROCEDURE — 3017F COLORECTAL CA SCREEN DOC REV: CPT | Performed by: INTERNAL MEDICINE

## 2019-09-03 PROCEDURE — G8427 DOCREV CUR MEDS BY ELIG CLIN: HCPCS | Performed by: INTERNAL MEDICINE

## 2019-09-03 PROCEDURE — G8399 PT W/DXA RESULTS DOCUMENT: HCPCS | Performed by: INTERNAL MEDICINE

## 2019-09-03 PROCEDURE — 1123F ACP DISCUSS/DSCN MKR DOCD: CPT | Performed by: INTERNAL MEDICINE

## 2019-09-03 PROCEDURE — G8417 CALC BMI ABV UP PARAM F/U: HCPCS | Performed by: INTERNAL MEDICINE

## 2019-09-03 PROCEDURE — 1090F PRES/ABSN URINE INCON ASSESS: CPT | Performed by: INTERNAL MEDICINE

## 2019-09-03 PROCEDURE — 99214 OFFICE O/P EST MOD 30 MIN: CPT | Performed by: INTERNAL MEDICINE

## 2019-09-03 PROCEDURE — 1036F TOBACCO NON-USER: CPT | Performed by: INTERNAL MEDICINE

## 2019-09-03 RX ORDER — PRAMIPEXOLE DIHYDROCHLORIDE 0.5 MG/1
TABLET ORAL
Qty: 90 TABLET | Refills: 5 | Status: SHIPPED | OUTPATIENT
Start: 2019-09-03 | End: 2020-02-09 | Stop reason: SDUPTHER

## 2019-09-03 ASSESSMENT — ENCOUNTER SYMPTOMS
CHOKING: 0
APNEA: 0
COUGH: 0
RHINORRHEA: 0
SHORTNESS OF BREATH: 0

## 2019-09-03 ASSESSMENT — SLEEP AND FATIGUE QUESTIONNAIRES
HOW LIKELY ARE YOU TO NOD OFF OR FALL ASLEEP WHILE SITTING QUIETLY AFTER LUNCH WITHOUT ALCOHOL: 1
HOW LIKELY ARE YOU TO NOD OFF OR FALL ASLEEP WHILE SITTING AND TALKING TO SOMEONE: 0
HOW LIKELY ARE YOU TO NOD OFF OR FALL ASLEEP WHEN YOU ARE A PASSENGER IN A CAR FOR AN HOUR WITHOUT A BREAK: 1
HOW LIKELY ARE YOU TO NOD OFF OR FALL ASLEEP WHILE LYING DOWN TO REST IN THE AFTERNOON WHEN CIRCUMSTANCES PERMIT: 0
HOW LIKELY ARE YOU TO NOD OFF OR FALL ASLEEP WHILE SITTING AND READING: 2
HOW LIKELY ARE YOU TO NOD OFF OR FALL ASLEEP WHILE SITTING INACTIVE IN A PUBLIC PLACE: 0
ESS TOTAL SCORE: 6
HOW LIKELY ARE YOU TO NOD OFF OR FALL ASLEEP IN A CAR, WHILE STOPPED FOR A FEW MINUTES IN TRAFFIC: 0
HOW LIKELY ARE YOU TO NOD OFF OR FALL ASLEEP WHILE WATCHING TV: 2

## 2019-09-03 NOTE — PROGRESS NOTES
Tate Appiah MD, [facility], [address]  [facility]  3rd Floor, [facility], [facility] Kwadwo GRANADOS [phone]   [address] [place] [address] [address], . [address] [phone]       [place] [id number] [phone]    Assessment/Plan:     HUSSAIN (obstructive sleep apnea)  Chronic- Stable. Reviewed compliance download with pt. Supplies and parts as needed for her machine. These are medically necessary. Continue medications per her PCP and other physicians. Limit caffeine use after 3pm.    Paroxysmal atrial fibrillation (HCC)  Chronic- Stable. Cont meds per PCP and other physicians. Benign essential HTN  Chronic- Stable. Cont meds per PCP and other physicians. Restless leg syndrome  Chronic- Stable. Cont mirapex. Can try playing with dosing and timing. Will trying increasing dose of mirapex as needed. Non morbid obesity, unspecified obesity type  Chronic-Stable. Encouraged her to work on weight loss through diet and exercise. Diagnoses of HUSSAIN (obstructive sleep apnea), Paroxysmal atrial fibrillation (HCC), Benign essential HTN, Restless leg syndrome, and Non morbid obesity, unspecified obesity type were pertinent to this visit. The chronic medical conditions listed are directly related to the primary diagnosis listed above. The management of the primary diagnosis affects the secondary diagnosis and vice versa. 6month f/u. Orders Placed This Encounter   Medications    pramipexole (MIRAPEX) 0.5 MG tablet     Si.5 tab q 5 PM and 1.5 tab qHS     Dispense:  90 tablet     Refill:  5     Subjective:     Patient ID: Odessa Bowles is a 79 y.o. female.     Chief Complaint   Patient presents with    Sleep Apnea     Subjective   HPI:    Machine Modem/Download Info:  Compliance (hours/night): 6.9 hrs/night  Download AHI (/hour): 0.4 /HR  Average CPAP 2/28/2017    Hypertension     Leg cramps     patient states very severe, requires immediate potassium administration    Migraines, basilar     last migraine 10 years ago    Mild persistent asthma without complication 9/37/7567    Obesity, Class III, BMI 40-49.9 (morbid obesity) ([address] 4/19/2016    HUSSAIN (obstructive sleep apnea) 10/14/2013    Osteoarthritis, hip, bilateral 2016    Bilateral hip arthroplasty    Panic attacks     Pneumonia 2015    Primary osteoarthritis of both knees 9/19/2017    Primary osteoarthritis of left knee 12/15/2016    Pulmonary emboli ([address] 8/4/2016    Pulmonary HTN ([address] 7/21/2016    Pure hypercholesterolemia 2/13/2019    Restless leg syndrome     Rhinitis, chronic 3/26/2014    Sleep apnea     wears CPAP @11    Stress incontinence     Tear of left rotator cuff 1/23/2018    Thyroid disease     hypothyroid    Wears glasses        Past Surgical History:   Procedure Laterality Date    BACK SURGERY  2004    LUMBAR FUSION    CATARACT REMOVAL Bilateral     COLONOSCOPY      EYE SURGERY Right 2010    retinal tear repaired   [facility]    right ring finger severe laceration, fracture    HIP ARTHROPLASTY Right 4-19-16    HYSTERECTOMY  1993    JOINT REPLACEMENT Left 2/2/16    left hip    TONSILLECTOMY  1972       Family History   Problem Relation Age of Onset    Alcohol Abuse Sister     Arthritis Sister     Stroke Maternal Grandmother     Thyroid Disease Maternal Grandmother     Thyroid Disease Maternal Grandfather     Heart Disease Maternal Grandfather     Alcohol Abuse Maternal Grandfather     Substance Abuse Maternal Grandfather     Hypertension Mother     Thyroid Disease Mother     Anxiety Disorder Mother     Arthritis Mother     Cataracts Mother     Heart Disease Mother     Asthma Mother     Thyroid Disease Father     Heart Failure Father     Heart Disease Father     Arthritis Brother     High Cholesterol Brother     Heart Disease Maternal Uncle     Heart Disease Paternal Uncle     Cancer Paternal Uncle     Alcohol Abuse Paternal Grandfather     Substance Abuse Paternal Grandfather            ROS:  Review of Systems   Constitutional: Negative. HENT: Negative for congestion, ear pain and rhinorrhea. Respiratory: Negative for apnea, cough, choking and shortness of breath. Cardiovascular: Negative for chest pain, palpitations and leg swelling. Musculoskeletal: Negative for neck pain. Vitals:  Weight BMI   Wt Readings from Last 3 Encounters:   09/03/19 238 lb (108 kg)   08/20/19 230 lb (104.3 kg)   05/14/19 238 lb 6.4 oz (108.1 kg)    Body mass index is 38.41 kg/m².      BP HR SaO2   BP Readings from Last 3 Encounters:   09/03/19 132/80   08/20/19 (!) 98/54   05/14/19 114/60    Pulse Readings from Last 3 Encounters:   09/03/19 67   08/20/19 66   05/14/19 60    SpO2 Readings from Last 3 Encounters:   09/03/19 97%   08/20/19 98%   05/14/19 96%          Electronically signed by Nathaniel Leavitt MD on 9/3/2019 at 11:21 AM

## 2019-09-09 RX ORDER — DILTIAZEM HYDROCHLORIDE 120 MG/1
120 CAPSULE, COATED, EXTENDED RELEASE ORAL DAILY
Qty: 135 CAPSULE | Refills: 3 | Status: ON HOLD | OUTPATIENT
Start: 2019-09-09 | End: 2020-01-13 | Stop reason: HOSPADM

## 2019-09-13 ENCOUNTER — TELEPHONE (OUTPATIENT)
Dept: CARDIOLOGY CLINIC | Age: 69
End: 2019-09-13

## 2019-09-16 ENCOUNTER — TELEPHONE (OUTPATIENT)
Dept: ENDOCRINOLOGY | Age: 69
End: 2019-09-16

## 2019-09-16 DIAGNOSIS — E03.9 ACQUIRED HYPOTHYROIDISM: Primary | Chronic | ICD-10-CM

## 2019-09-19 ENCOUNTER — OFFICE VISIT (OUTPATIENT)
Dept: ENDOCRINOLOGY | Age: 69
End: 2019-09-19
Payer: MEDICARE

## 2019-09-19 DIAGNOSIS — R73.03 PREDIABETES: ICD-10-CM

## 2019-09-19 PROCEDURE — 97803 MED NUTRITION INDIV SUBSEQ: CPT | Performed by: DIETITIAN, REGISTERED

## 2019-09-19 NOTE — PROGRESS NOTES
Medical Nutrition Therapy for Diabetes    Helena Wan  September 19, 2019      Patient Care Team:  Lupillo Sanders MD as PCP - General (Internal Medicine)  Vale Jade MD as PCP - Hematology/Oncology (Hematology and Oncology)  Lupillo Sanders MD as PCP - [facility] Empaneled Provider  Avelina Huerta MD as Consulting Physician (Sleep Medicine)  Jessica Crooks MD as Referring Physician (Cardiology)  BRICE Barger CNP as Nurse Practitioner (Sleep Medicine)    Reason for visit: PreDiabetes    ASSESSMENT/PLAN:   NUTRITION DIAGNOSIS    #1 Problem: Altered Nutrition-Related Laboratory Values (NC-2.2)  Related to: Endocrine/Diabetes   As Evidenced by: Elevated Plasma glucose and/or HgbA1c levels         #2 Problem: Overweight/Obesity (NC-3.3)  Related to: Excessive energy intake or physical inactivity  As Evidenced by: BMI more than normative standard for age and sex (BMI=38.41kg/m2)    #3 Problem: Problem: Inconsistent Carbohydrate and Fiber Intake  Related to: Food- and nutrition-related knowledge deficit concerning appropriate amount of carbohydrate and fiber intake  As evidenced by: patient food recall        NUTRITION INTERVENTION  Nutrition Prescription: 30 grams carbohydrate per meal with protein and non-starch vegetables    Diabetes Education/Counseling included:  Reviewed pathophysiology of pre-diabetes and diabetes. Related this info to rational for 3 meals and consistent eating schedule and pattern. Interventions:  Encouraged [place] guidelines for food selection and portion limitation. Recommended improved activity schedule for remainder of the year and winter/spring. Suggested patient begin to make herself her priority.     NUTRITION MONITORING AND EVALUATION  30 grams carbohydrate per meal  Increase activity       Patient Active Problem List   Diagnosis    Restless leg syndrome    Acquired hypothyroidism    Benign essential HTN    HUSSAIN (obstructive sleep apnea)    Obesity, Class III, BMI

## 2019-10-16 ENCOUNTER — OFFICE VISIT (OUTPATIENT)
Dept: INTERNAL MEDICINE CLINIC | Age: 69
End: 2019-10-16
Payer: MEDICARE

## 2019-10-16 VITALS
HEART RATE: 64 BPM | DIASTOLIC BLOOD PRESSURE: 68 MMHG | SYSTOLIC BLOOD PRESSURE: 128 MMHG | BODY MASS INDEX: 39.05 KG/M2 | HEIGHT: 66 IN | WEIGHT: 243 LBS

## 2019-10-16 DIAGNOSIS — Z12.11 COLON CANCER SCREENING: ICD-10-CM

## 2019-10-16 DIAGNOSIS — Z12.39 BREAST CANCER SCREENING: ICD-10-CM

## 2019-10-16 DIAGNOSIS — Z00.00 ROUTINE GENERAL MEDICAL EXAMINATION AT A HEALTH CARE FACILITY: Primary | ICD-10-CM

## 2019-10-16 DIAGNOSIS — Z23 NEED FOR SHINGLES VACCINE: ICD-10-CM

## 2019-10-16 PROCEDURE — G8482 FLU IMMUNIZE ORDER/ADMIN: HCPCS | Performed by: INTERNAL MEDICINE

## 2019-10-16 PROCEDURE — 4040F PNEUMOC VAC/ADMIN/RCVD: CPT | Performed by: INTERNAL MEDICINE

## 2019-10-16 PROCEDURE — 3017F COLORECTAL CA SCREEN DOC REV: CPT | Performed by: INTERNAL MEDICINE

## 2019-10-16 PROCEDURE — 1123F ACP DISCUSS/DSCN MKR DOCD: CPT | Performed by: INTERNAL MEDICINE

## 2019-10-16 PROCEDURE — G0438 PPPS, INITIAL VISIT: HCPCS | Performed by: INTERNAL MEDICINE

## 2019-10-16 ASSESSMENT — LIFESTYLE VARIABLES
HAS A RELATIVE, FRIEND, DOCTOR, OR ANOTHER HEALTH PROFESSIONAL EXPRESSED CONCERN ABOUT YOUR DRINKING OR SUGGESTED YOU CUT DOWN: 0
HOW MANY STANDARD DRINKS CONTAINING ALCOHOL DO YOU HAVE ON A TYPICAL DAY: 0
AUDIT-C TOTAL SCORE: 1
HOW OFTEN DURING THE LAST YEAR HAVE YOU NEEDED AN ALCOHOLIC DRINK FIRST THING IN THE MORNING TO GET YOURSELF GOING AFTER A NIGHT OF HEAVY DRINKING: 0
HAVE YOU OR SOMEONE ELSE BEEN INJURED AS A RESULT OF YOUR DRINKING: 0
HOW OFTEN DO YOU HAVE SIX OR MORE DRINKS ON ONE OCCASION: 0
HOW OFTEN DURING THE LAST YEAR HAVE YOU FOUND THAT YOU WERE NOT ABLE TO STOP DRINKING ONCE YOU HAD STARTED: 0
HOW OFTEN DO YOU HAVE A DRINK CONTAINING ALCOHOL: 1
HOW OFTEN DURING THE LAST YEAR HAVE YOU HAD A FEELING OF GUILT OR REMORSE AFTER DRINKING: 0
AUDIT TOTAL SCORE: 1
HOW OFTEN DURING THE LAST YEAR HAVE YOU BEEN UNABLE TO REMEMBER WHAT HAPPENED THE NIGHT BEFORE BECAUSE YOU HAD BEEN DRINKING: 0
HOW OFTEN DURING THE LAST YEAR HAVE YOU FAILED TO DO WHAT WAS NORMALLY EXPECTED FROM YOU BECAUSE OF DRINKING: 0

## 2019-10-16 ASSESSMENT — PATIENT HEALTH QUESTIONNAIRE - PHQ9
SUM OF ALL RESPONSES TO PHQ QUESTIONS 1-9: 0
SUM OF ALL RESPONSES TO PHQ QUESTIONS 1-9: 0

## 2019-10-23 ENCOUNTER — HOSPITAL ENCOUNTER (OUTPATIENT)
Age: 69
Discharge: HOME | End: 2019-10-23
Payer: MEDICARE

## 2019-10-23 DIAGNOSIS — E03.9 ACQUIRED HYPOTHYROIDISM: Chronic | ICD-10-CM

## 2019-10-23 LAB
C-REACTIVE PROTEIN: 2.9 MG/L (ref 0–5.1)
HOMOCYSTEINE: 8 UMOL/L (ref 0–10)
TSH SERPL DL<=0.05 MIU/L-ACNC: 2.37 UIU/ML (ref 0.27–4.2)
VITAMIN D 25-HYDROXY: 71.6 NG/ML

## 2019-10-23 PROCEDURE — 36415 COLL VENOUS BLD VENIPUNCTURE: CPT

## 2019-10-23 PROCEDURE — 86140 C-REACTIVE PROTEIN: CPT

## 2019-10-23 PROCEDURE — 83036 HEMOGLOBIN GLYCOSYLATED A1C: CPT

## 2019-10-23 PROCEDURE — 82306 VITAMIN D 25 HYDROXY: CPT

## 2019-10-23 PROCEDURE — 84443 ASSAY THYROID STIM HORMONE: CPT

## 2019-10-23 PROCEDURE — 83090 ASSAY OF HOMOCYSTEINE: CPT

## 2019-10-24 LAB
ESTIMATED AVERAGE GLUCOSE: 116.9 MG/DL
HBA1C MFR BLD: 5.7 %

## 2019-11-11 ENCOUNTER — PATIENT MESSAGE (OUTPATIENT)
Dept: CARDIOLOGY CLINIC | Age: 69
End: 2019-11-11

## 2019-11-14 RX ORDER — LISINOPRIL 10 MG/1
10 TABLET ORAL DAILY
Qty: 90 TABLET | Refills: 1 | Status: ON HOLD | OUTPATIENT
Start: 2019-11-14 | End: 2020-01-13 | Stop reason: HOSPADM

## 2019-11-19 ENCOUNTER — HOSPITAL ENCOUNTER (EMERGENCY)
Age: 69
Discharge: HOME | End: 2019-11-19
Attending: EMERGENCY MEDICINE
Payer: MEDICARE

## 2019-11-19 ENCOUNTER — TELEPHONE (OUTPATIENT)
Dept: CARDIOLOGY CLINIC | Age: 69
End: 2019-11-19

## 2019-11-19 VITALS
HEIGHT: 65 IN | OXYGEN SATURATION: 97 % | RESPIRATION RATE: 10 BRPM | WEIGHT: 241 LBS | TEMPERATURE: 97.9 F | BODY MASS INDEX: 40.15 KG/M2 | DIASTOLIC BLOOD PRESSURE: 51 MMHG | SYSTOLIC BLOOD PRESSURE: 120 MMHG | HEART RATE: 89 BPM

## 2019-11-19 DIAGNOSIS — I48.0 PAROXYSMAL ATRIAL FIBRILLATION (HCC): Primary | ICD-10-CM

## 2019-11-19 LAB
ANION GAP SERPL CALCULATED.3IONS-SCNC: 16 MMOL/L (ref 3–16)
BASOPHILS ABSOLUTE: 0.1 K/UL (ref 0–0.2)
BASOPHILS RELATIVE PERCENT: 1.1 %
BUN BLDV-MCNC: 18 MG/DL (ref 7–20)
CALCIUM SERPL-MCNC: 9.4 MG/DL (ref 8.3–10.6)
CHLORIDE BLD-SCNC: 100 MMOL/L (ref 99–110)
CO2: 20 MMOL/L (ref 21–32)
CREAT SERPL-MCNC: 0.7 MG/DL (ref 0.6–1.2)
EKG ATRIAL RATE: 122 BPM
EKG ATRIAL RATE: 84 BPM
EKG DIAGNOSIS: NORMAL
EKG DIAGNOSIS: NORMAL
EKG P AXIS: 61 DEGREES
EKG P-R INTERVAL: 148 MS
EKG Q-T INTERVAL: 324 MS
EKG Q-T INTERVAL: 372 MS
EKG QRS DURATION: 78 MS
EKG QRS DURATION: 80 MS
EKG QTC CALCULATION (BAZETT): 432 MS
EKG QTC CALCULATION (BAZETT): 439 MS
EKG R AXIS: 11 DEGREES
EKG R AXIS: 4 DEGREES
EKG T AXIS: 53 DEGREES
EKG T AXIS: 57 DEGREES
EKG VENTRICULAR RATE: 107 BPM
EKG VENTRICULAR RATE: 84 BPM
EOSINOPHILS ABSOLUTE: 0.1 K/UL (ref 0–0.6)
EOSINOPHILS RELATIVE PERCENT: 1.1 %
GFR AFRICAN AMERICAN: >60
GFR NON-AFRICAN AMERICAN: >60
GLUCOSE BLD-MCNC: 118 MG/DL (ref 70–99)
HCT VFR BLD CALC: 40.8 % (ref 36–48)
HEMOGLOBIN: 13.8 G/DL (ref 12–16)
LYMPHOCYTES ABSOLUTE: 1.7 K/UL (ref 1–5.1)
LYMPHOCYTES RELATIVE PERCENT: 27.7 %
MAGNESIUM: 2.3 MG/DL (ref 1.8–2.4)
MCH RBC QN AUTO: 32.1 PG (ref 26–34)
MCHC RBC AUTO-ENTMCNC: 33.7 G/DL (ref 31–36)
MCV RBC AUTO: 95.2 FL (ref 80–100)
MONOCYTES ABSOLUTE: 0.6 K/UL (ref 0–1.3)
MONOCYTES RELATIVE PERCENT: 9.5 %
NEUTROPHILS ABSOLUTE: 3.8 K/UL (ref 1.7–7.7)
NEUTROPHILS RELATIVE PERCENT: 60.6 %
PDW BLD-RTO: 13.8 % (ref 12.4–15.4)
PLATELET # BLD: 256 K/UL (ref 135–450)
PMV BLD AUTO: 9 FL (ref 5–10.5)
POTASSIUM REFLEX MAGNESIUM: 3.5 MMOL/L (ref 3.5–5.1)
RBC # BLD: 4.29 M/UL (ref 4–5.2)
SODIUM BLD-SCNC: 136 MMOL/L (ref 136–145)
TROPONIN: <0.01 NG/ML
WBC # BLD: 6.3 K/UL (ref 4–11)

## 2019-11-19 PROCEDURE — 80048 BASIC METABOLIC PNL TOTAL CA: CPT

## 2019-11-19 PROCEDURE — 83735 ASSAY OF MAGNESIUM: CPT

## 2019-11-19 PROCEDURE — 93010 ELECTROCARDIOGRAM REPORT: CPT | Performed by: INTERNAL MEDICINE

## 2019-11-19 PROCEDURE — 84484 ASSAY OF TROPONIN QUANT: CPT

## 2019-11-19 PROCEDURE — 99284 EMERGENCY DEPT VISIT MOD MDM: CPT

## 2019-11-19 PROCEDURE — 93005 ELECTROCARDIOGRAM TRACING: CPT | Performed by: EMERGENCY MEDICINE

## 2019-11-19 PROCEDURE — 85025 COMPLETE CBC W/AUTO DIFF WBC: CPT

## 2019-11-19 ASSESSMENT — PAIN SCALES - GENERAL: PAINLEVEL_OUTOF10: 6

## 2019-11-21 ENCOUNTER — OFFICE VISIT (OUTPATIENT)
Dept: ENDOCRINOLOGY | Age: 69
End: 2019-11-21
Payer: MEDICARE

## 2019-11-21 VITALS
HEART RATE: 75 BPM | OXYGEN SATURATION: 98 % | SYSTOLIC BLOOD PRESSURE: 138 MMHG | DIASTOLIC BLOOD PRESSURE: 86 MMHG | WEIGHT: 246 LBS | HEIGHT: 65 IN | BODY MASS INDEX: 40.98 KG/M2

## 2019-11-21 DIAGNOSIS — I48.0 PAROXYSMAL ATRIAL FIBRILLATION (HCC): Chronic | ICD-10-CM

## 2019-11-21 DIAGNOSIS — I50.32 CHRONIC DIASTOLIC HEART FAILURE (HCC): ICD-10-CM

## 2019-11-21 DIAGNOSIS — E03.9 ACQUIRED HYPOTHYROIDISM: Primary | Chronic | ICD-10-CM

## 2019-11-21 DIAGNOSIS — R73.03 PREDIABETES: Primary | ICD-10-CM

## 2019-11-21 DIAGNOSIS — R73.03 PREDIABETES: ICD-10-CM

## 2019-11-21 DIAGNOSIS — E66.01 OBESITY, CLASS III, BMI 40-49.9 (MORBID OBESITY) (HCC): ICD-10-CM

## 2019-11-21 PROCEDURE — 3017F COLORECTAL CA SCREEN DOC REV: CPT | Performed by: INTERNAL MEDICINE

## 2019-11-21 PROCEDURE — 1036F TOBACCO NON-USER: CPT | Performed by: INTERNAL MEDICINE

## 2019-11-21 PROCEDURE — 1090F PRES/ABSN URINE INCON ASSESS: CPT | Performed by: INTERNAL MEDICINE

## 2019-11-21 PROCEDURE — G8417 CALC BMI ABV UP PARAM F/U: HCPCS | Performed by: INTERNAL MEDICINE

## 2019-11-21 PROCEDURE — 97803 MED NUTRITION INDIV SUBSEQ: CPT | Performed by: DIETITIAN, REGISTERED

## 2019-11-21 PROCEDURE — 4040F PNEUMOC VAC/ADMIN/RCVD: CPT | Performed by: INTERNAL MEDICINE

## 2019-11-21 PROCEDURE — G8399 PT W/DXA RESULTS DOCUMENT: HCPCS | Performed by: INTERNAL MEDICINE

## 2019-11-21 PROCEDURE — G8482 FLU IMMUNIZE ORDER/ADMIN: HCPCS | Performed by: INTERNAL MEDICINE

## 2019-11-21 PROCEDURE — 99215 OFFICE O/P EST HI 40 MIN: CPT | Performed by: INTERNAL MEDICINE

## 2019-11-21 PROCEDURE — 1123F ACP DISCUSS/DSCN MKR DOCD: CPT | Performed by: INTERNAL MEDICINE

## 2019-11-21 PROCEDURE — G8427 DOCREV CUR MEDS BY ELIG CLIN: HCPCS | Performed by: INTERNAL MEDICINE

## 2019-12-15 DIAGNOSIS — I48.91 ATRIAL FIBRILLATION WITH RAPID VENTRICULAR RESPONSE (HCC): ICD-10-CM

## 2019-12-15 DIAGNOSIS — I27.20 PULMONARY HTN (HCC): Chronic | ICD-10-CM

## 2019-12-15 DIAGNOSIS — G47.33 OSA (OBSTRUCTIVE SLEEP APNEA): Chronic | ICD-10-CM

## 2019-12-15 DIAGNOSIS — R07.9 CHEST PAIN, UNSPECIFIED TYPE: ICD-10-CM

## 2019-12-15 DIAGNOSIS — E66.9 NON MORBID OBESITY, UNSPECIFIED OBESITY TYPE: ICD-10-CM

## 2019-12-15 DIAGNOSIS — I48.0 PAROXYSMAL ATRIAL FIBRILLATION (HCC): ICD-10-CM

## 2019-12-15 DIAGNOSIS — I10 BENIGN ESSENTIAL HTN: Chronic | ICD-10-CM

## 2019-12-15 DIAGNOSIS — I10 ESSENTIAL HYPERTENSION: Chronic | ICD-10-CM

## 2019-12-15 DIAGNOSIS — E66.01 OBESITY, CLASS III, BMI 40-49.9 (MORBID OBESITY) (HCC): ICD-10-CM

## 2019-12-15 DIAGNOSIS — R06.02 SOB (SHORTNESS OF BREATH): ICD-10-CM

## 2019-12-15 DIAGNOSIS — I26.99 OTHER PULMONARY EMBOLISM WITHOUT ACUTE COR PULMONALE, UNSPECIFIED CHRONICITY (HCC): ICD-10-CM

## 2019-12-15 DIAGNOSIS — I48.91 ATRIAL FIBRILLATION WITH RVR (HCC): ICD-10-CM

## 2019-12-17 RX ORDER — POTASSIUM CHLORIDE 750 MG/1
TABLET, EXTENDED RELEASE ORAL
Qty: 180 TABLET | Refills: 0 | OUTPATIENT
Start: 2019-12-17

## 2020-01-12 ENCOUNTER — APPOINTMENT (OUTPATIENT)
Dept: GENERAL RADIOLOGY | Age: 70
End: 2020-01-12
Payer: MEDICARE

## 2020-01-12 ENCOUNTER — HOSPITAL ENCOUNTER (OUTPATIENT)
Age: 70
Setting detail: OBSERVATION
Discharge: HOME | End: 2020-01-13
Attending: EMERGENCY MEDICINE | Admitting: INTERNAL MEDICINE
Payer: MEDICARE

## 2020-01-12 PROBLEM — I48.91 ATRIAL FIBRILLATION (HCC): Status: ACTIVE | Noted: 2020-01-12

## 2020-01-12 LAB
ANION GAP SERPL CALCULATED.3IONS-SCNC: 16 MMOL/L (ref 3–16)
BASOPHILS ABSOLUTE: 0.1 K/UL (ref 0–0.2)
BASOPHILS RELATIVE PERCENT: 0.8 %
BUN BLDV-MCNC: 21 MG/DL (ref 7–20)
CALCIUM SERPL-MCNC: 9.3 MG/DL (ref 8.3–10.6)
CHLORIDE BLD-SCNC: 99 MMOL/L (ref 99–110)
CO2: 20 MMOL/L (ref 21–32)
CREAT SERPL-MCNC: 0.6 MG/DL (ref 0.6–1.2)
EKG ATRIAL RATE: 91 BPM
EKG DIAGNOSIS: NORMAL
EKG Q-T INTERVAL: 320 MS
EKG QRS DURATION: 74 MS
EKG QTC CALCULATION (BAZETT): 452 MS
EKG R AXIS: 3 DEGREES
EKG T AXIS: 69 DEGREES
EKG VENTRICULAR RATE: 120 BPM
EOSINOPHILS ABSOLUTE: 0 K/UL (ref 0–0.6)
EOSINOPHILS RELATIVE PERCENT: 0.4 %
GFR AFRICAN AMERICAN: >60
GFR NON-AFRICAN AMERICAN: >60
GLUCOSE BLD-MCNC: 119 MG/DL (ref 70–99)
HCT VFR BLD CALC: 43.6 % (ref 36–48)
HEMOGLOBIN: 14.6 G/DL (ref 12–16)
LYMPHOCYTES ABSOLUTE: 2.8 K/UL (ref 1–5.1)
LYMPHOCYTES RELATIVE PERCENT: 30.3 %
MCH RBC QN AUTO: 31.5 PG (ref 26–34)
MCHC RBC AUTO-ENTMCNC: 33.5 G/DL (ref 31–36)
MCV RBC AUTO: 94.1 FL (ref 80–100)
MONOCYTES ABSOLUTE: 0.8 K/UL (ref 0–1.3)
MONOCYTES RELATIVE PERCENT: 9.1 %
NEUTROPHILS ABSOLUTE: 5.5 K/UL (ref 1.7–7.7)
NEUTROPHILS RELATIVE PERCENT: 59.4 %
PDW BLD-RTO: 14.4 % (ref 12.4–15.4)
PLATELET # BLD: 279 K/UL (ref 135–450)
PMV BLD AUTO: 8.3 FL (ref 5–10.5)
POTASSIUM SERPL-SCNC: 3.8 MMOL/L (ref 3.5–5.1)
RBC # BLD: 4.63 M/UL (ref 4–5.2)
SODIUM BLD-SCNC: 135 MMOL/L (ref 136–145)
TROPONIN: <0.01 NG/ML
WBC # BLD: 9.3 K/UL (ref 4–11)

## 2020-01-12 PROCEDURE — 6370000000 HC RX 637 (ALT 250 FOR IP): Performed by: INTERNAL MEDICINE

## 2020-01-12 PROCEDURE — 96366 THER/PROPH/DIAG IV INF ADDON: CPT

## 2020-01-12 PROCEDURE — 85025 COMPLETE CBC W/AUTO DIFF WBC: CPT

## 2020-01-12 PROCEDURE — 80048 BASIC METABOLIC PNL TOTAL CA: CPT

## 2020-01-12 PROCEDURE — G0378 HOSPITAL OBSERVATION PER HR: HCPCS

## 2020-01-12 PROCEDURE — 2580000003 HC RX 258: Performed by: EMERGENCY MEDICINE

## 2020-01-12 PROCEDURE — 71045 X-RAY EXAM CHEST 1 VIEW: CPT

## 2020-01-12 PROCEDURE — 96365 THER/PROPH/DIAG IV INF INIT: CPT

## 2020-01-12 PROCEDURE — 94761 N-INVAS EAR/PLS OXIMETRY MLT: CPT

## 2020-01-12 PROCEDURE — 94640 AIRWAY INHALATION TREATMENT: CPT

## 2020-01-12 PROCEDURE — 93005 ELECTROCARDIOGRAM TRACING: CPT | Performed by: EMERGENCY MEDICINE

## 2020-01-12 PROCEDURE — 2500000003 HC RX 250 WO HCPCS: Performed by: EMERGENCY MEDICINE

## 2020-01-12 PROCEDURE — 99285 EMERGENCY DEPT VISIT HI MDM: CPT

## 2020-01-12 PROCEDURE — 84484 ASSAY OF TROPONIN QUANT: CPT

## 2020-01-12 PROCEDURE — 93010 ELECTROCARDIOGRAM REPORT: CPT | Performed by: INTERNAL MEDICINE

## 2020-01-12 PROCEDURE — 96376 TX/PRO/DX INJ SAME DRUG ADON: CPT

## 2020-01-12 RX ORDER — AZELASTINE 1 MG/ML
1 SPRAY, METERED NASAL 2 TIMES DAILY
Status: DISCONTINUED | OUTPATIENT
Start: 2020-01-12 | End: 2020-01-13 | Stop reason: HOSPADM

## 2020-01-12 RX ORDER — FLUTICASONE PROPIONATE 110 UG/1
4 AEROSOL, METERED RESPIRATORY (INHALATION) 2 TIMES DAILY
Status: DISCONTINUED | OUTPATIENT
Start: 2020-01-12 | End: 2020-01-13 | Stop reason: HOSPADM

## 2020-01-12 RX ORDER — CALCIUM CARBONATE 260MG(650)
1 TABLET,CHEWABLE ORAL DAILY
Status: DISCONTINUED | OUTPATIENT
Start: 2020-01-12 | End: 2020-01-12

## 2020-01-12 RX ORDER — PREDNISONE 20 MG/1
20 TABLET ORAL DAILY
Status: DISCONTINUED | OUTPATIENT
Start: 2020-01-13 | End: 2020-01-13 | Stop reason: HOSPADM

## 2020-01-12 RX ORDER — VITAMIN B COMPLEX
1 TABLET ORAL DAILY
Status: DISCONTINUED | OUTPATIENT
Start: 2020-01-12 | End: 2020-01-12

## 2020-01-12 RX ORDER — PREDNISONE 1 MG/1
5 TABLET ORAL DAILY
COMMUNITY
End: 2020-01-23

## 2020-01-12 RX ORDER — SODIUM CHLORIDE 0.9 % (FLUSH) 0.9 %
10 SYRINGE (ML) INJECTION PRN
Status: DISCONTINUED | OUTPATIENT
Start: 2020-01-12 | End: 2020-01-13 | Stop reason: HOSPADM

## 2020-01-12 RX ORDER — POTASSIUM CHLORIDE 20 MEQ/1
10 TABLET, EXTENDED RELEASE ORAL DAILY
Status: DISCONTINUED | OUTPATIENT
Start: 2020-01-12 | End: 2020-01-13 | Stop reason: HOSPADM

## 2020-01-12 RX ORDER — ALBUTEROL SULFATE 90 UG/1
2 AEROSOL, METERED RESPIRATORY (INHALATION) EVERY 6 HOURS PRN
Status: DISCONTINUED | OUTPATIENT
Start: 2020-01-12 | End: 2020-01-13 | Stop reason: HOSPADM

## 2020-01-12 RX ORDER — SODIUM CHLORIDE 0.9 % (FLUSH) 0.9 %
10 SYRINGE (ML) INJECTION EVERY 12 HOURS SCHEDULED
Status: DISCONTINUED | OUTPATIENT
Start: 2020-01-12 | End: 2020-01-13 | Stop reason: HOSPADM

## 2020-01-12 RX ORDER — ONDANSETRON 2 MG/ML
4 INJECTION INTRAMUSCULAR; INTRAVENOUS EVERY 6 HOURS PRN
Status: DISCONTINUED | OUTPATIENT
Start: 2020-01-12 | End: 2020-01-13 | Stop reason: HOSPADM

## 2020-01-12 RX ORDER — PRAMIPEXOLE DIHYDROCHLORIDE 0.25 MG/1
0.75 TABLET ORAL
Status: DISCONTINUED | OUTPATIENT
Start: 2020-01-12 | End: 2020-01-13 | Stop reason: HOSPADM

## 2020-01-12 RX ORDER — CETIRIZINE HYDROCHLORIDE 10 MG/1
10 TABLET ORAL EVERY OTHER DAY
Status: DISCONTINUED | OUTPATIENT
Start: 2020-01-12 | End: 2020-01-13 | Stop reason: HOSPADM

## 2020-01-12 RX ORDER — DILTIAZEM HYDROCHLORIDE 5 MG/ML
10 INJECTION INTRAVENOUS ONCE
Status: COMPLETED | OUTPATIENT
Start: 2020-01-12 | End: 2020-01-12

## 2020-01-12 RX ORDER — DILTIAZEM HYDROCHLORIDE 240 MG/1
240 CAPSULE, COATED, EXTENDED RELEASE ORAL DAILY
Status: DISCONTINUED | OUTPATIENT
Start: 2020-01-12 | End: 2020-01-13 | Stop reason: HOSPADM

## 2020-01-12 RX ORDER — LISINOPRIL 10 MG/1
10 TABLET ORAL DAILY
Status: DISCONTINUED | OUTPATIENT
Start: 2020-01-12 | End: 2020-01-13 | Stop reason: HOSPADM

## 2020-01-12 RX ADMIN — Medication 4 PUFF: at 21:21

## 2020-01-12 RX ADMIN — PREDNISONE 25 MG: 20 TABLET ORAL at 15:05

## 2020-01-12 RX ADMIN — CETIRIZINE HYDROCHLORIDE 10 MG: 10 TABLET, FILM COATED ORAL at 15:06

## 2020-01-12 RX ADMIN — LISINOPRIL 10 MG: 10 TABLET ORAL at 15:06

## 2020-01-12 RX ADMIN — RIVAROXABAN 20 MG: 20 TABLET, FILM COATED ORAL at 17:59

## 2020-01-12 RX ADMIN — DILTIAZEM HYDROCHLORIDE 10 MG: 5 INJECTION INTRAVENOUS at 10:08

## 2020-01-12 RX ADMIN — AZELASTINE HYDROCHLORIDE 1 SPRAY: 137 SPRAY, METERED NASAL at 20:44

## 2020-01-12 RX ADMIN — AZELASTINE HYDROCHLORIDE 1 SPRAY: 137 SPRAY, METERED NASAL at 15:05

## 2020-01-12 RX ADMIN — POTASSIUM CHLORIDE 10 MEQ: 1500 TABLET, EXTENDED RELEASE ORAL at 15:07

## 2020-01-12 RX ADMIN — PRAMIPEXOLE DIHYDROCHLORIDE 0.75 MG: 0.25 TABLET ORAL at 15:05

## 2020-01-12 RX ADMIN — PRAMIPEXOLE DIHYDROCHLORIDE 0.75 MG: 0.25 TABLET ORAL at 20:44

## 2020-01-12 RX ADMIN — DILTIAZEM HYDROCHLORIDE 5 MG/HR: 5 INJECTION INTRAVENOUS at 10:29

## 2020-01-12 RX ADMIN — DILTIAZEM HYDROCHLORIDE 240 MG: 240 CAPSULE, COATED, EXTENDED RELEASE ORAL at 15:05

## 2020-01-12 ASSESSMENT — PAIN DESCRIPTION - LOCATION: LOCATION: CHEST

## 2020-01-12 ASSESSMENT — PAIN SCALES - GENERAL
PAINLEVEL_OUTOF10: 0
PAINLEVEL_OUTOF10: 2
PAINLEVEL_OUTOF10: 0
PAINLEVEL_OUTOF10: 0

## 2020-01-12 ASSESSMENT — PAIN DESCRIPTION - PAIN TYPE: TYPE: ACUTE PAIN

## 2020-01-12 ASSESSMENT — PAIN DESCRIPTION - DESCRIPTORS: DESCRIPTORS: PRESSURE

## 2020-01-12 ASSESSMENT — PAIN DESCRIPTION - ORIENTATION: ORIENTATION: MID

## 2020-01-12 NOTE — ED NOTES
Patient is awake in bed, she is alert and oriented, her respirations are easy and unlabored at rest and with conversation. Patient states sensation of chest pressure has decreased. IV cardizem infusing as ordered, patient heart rate atrial fib with a rate in the 80-90's. Patient stated she feels dehydrated, she was at an event yesterday and only had 16 oz of water in an eight hour period along with 2 glasses of wine. Patient has call light within reach, she denies needs at this time.      China Nicholson RN  01/12/20 [id number]

## 2020-01-12 NOTE — ED NOTES
Patient ambulated to bathroom and back to bed. Patient heart rate in 120's while ambulating, returning to 80's-100's upon returning to bed. Patient reports dizziness with position changes, nurse remained with patient while out of bed for patient safety. Patient provided a warm blanket and cup of water. IV diltiazem infusing as ordered. Patient denies further needs at this time.      Brandy Carter RN  01/12/20 [id number]

## 2020-01-12 NOTE — H&P
HOSPITALISTS HISTORY AND PHYSICAL    1/12/2020 1:42 PM    Patient Information:  Beth Salinas is a 81 y.o. female [id number]  PCP:  Cyril Harrell MD (Tel: [phone] )    Chief complaint:    Chief Complaint   Patient presents with    Dizziness     WITH RAPID HEART RATE SINCE 0300. HX OF A-FIB        History of Present Illness:  Sandy Whitley is a 81 y.o. female who presents because of palpitations. She has a history of afib/flutter, PE, chronic CHF, obstructive sleep apnea and is on Xarelto and [place]. She noticed the palpitations at 3 AM.  She had gone to an event in [address] [facility]. She had 2 glasses of wine over the course of about 4 hours. She says her chest does not hurt but there is a heavy sensation to it. She has some shortness of breath. She has had A. fib multiple times in the past but typically will convert on its own or with the addition of a extra dose of Cartia. She says this is the longest she has ever had A. fib. She did sleep for several hours during the night and woke up with persistent symptoms. She needed to sit up because of the pressure feeling in her chest while she was sleeping. Her last episode of A. fib was a month ago and was brief. She has spoken with Dr. Ray Mckenzie about the possibility of ablation but at the time of their conversation, she had too many stressful things going on in her life with family to consider procedure like that.     Started on Cardizem drip preceded by bolus heart rate controlled but continues to be in atrial fibrillation     Patient is currently on tapering dose of steroids for asthma exacerbation          REVIEW OF SYSTEMS:       14 point system analysis completed and negative unless noted above      Past Medical History:   has a past medical history of Allergic, Anesthesia complication, Anxiety, tablet by mouth Daily with supper 90 tablet 3    hydrochlorothiazide (HYDRODIURIL) 25 MG tablet Take 1 tablet by mouth daily 90 tablet 0    diclofenac (VOLTAREN) 50 MG EC tablet Take 1 tablet by mouth 3 times daily (with meals) 60 tablet 3    azelastine (ASTELIN) 0.1 % nasal spray 1 spray by Nasal route 2 times daily 1 Spray in each nostril 2 Bottle 1    cetirizine (ZYRTEC) 10 MG tablet Take 10 mg by mouth every other day       furosemide (LASIX) 20 MG tablet 1 tablet (20mg) up to three times weekly as needed. (Patient taking differently: Take 20 mg by mouth twice a week ) 90 tablet 3    NONFORMULARY       Magnesium Citrate 100 MG TABS Take 1 tablet by mouth daily 90 tablet 3    beclomethasone (QVAR) 80 MCG/ACT inhaler Inhale 2 puffs into the lungs 2 times daily (Patient taking differently: Inhale 1 puff into the lungs as needed ) 1 Inhaler 3    Coenzyme Q10 (COQ10) 100 MG CAPS Take 1 capsule by mouth daily       EPIPEN 2-MIR 0.3 MG/0.3ML SOAJ injection       lisinopril (PRINIVIL;ZESTRIL) 5 MG tablet Take 1 tablet by mouth daily 90 tablet 3    diclofenac (VOLTAREN) 50 MG EC tablet Take 1 tablet by mouth 3 times daily (with meals) (Patient taking differently: Take 50 mg by mouth daily ) 60 tablet 3       Allergies: Allergies   Allergen Reactions    Atorvastatin Other (See Comments)     Muscle Pain, all statins     Simvastatin Other (See Comments)     Muscle Pain    Cephalexin Hives and Itching    Cephalosporins Hives and Itching    Food Diarrhea     Milk , shellfish , gluten. ..may have bouts of diarrhea, constipation or flatulus    Other      Environmental -cats,dogs,dust    Sulfa Antibiotics      Can take Celebrex, Pt denies 8/1/18        Social History:  Patient Lives    reports that she quit smoking about 6 years ago. She has a 2.50 pack-year smoking history. She has never used smokeless tobacco. She reports current alcohol use. She reports that she does not use drugs.      Family History:  family history includes Alcohol Abuse in her maternal grandfather, paternal grandfather, and sister; Anxiety Disorder in her mother; Arthritis in her brother, mother, and sister; Asthma in her mother; Cancer in her paternal uncle; Cataracts in her mother; Heart Disease in her father, maternal grandfather, maternal uncle, mother, and paternal uncle; Heart Failure in her father; High Cholesterol in her brother; Hypertension in her mother; Stroke in her maternal grandmother; Substance Abuse in her maternal grandfather and paternal grandfather; Thyroid Disease in her father, maternal grandfather, maternal grandmother, and mother. ,     Physical Exam:  /73   Pulse 108   Temp 97.2 °F (36.2 °C) (Temporal)   Resp 18   Ht 5' 4\" (1.626 m)   Wt 240 lb 8 oz (109.1 kg)   SpO2 97%   BMI 41.28 kg/m²     General appearance:  Appears comfortable. Well nourished  Eyes: Sclera clear, pupils equal  ENT: Moist mucus membranes, no thrush. Trachea midline. Cardiovascular: Irregular regular heart rate  Respiratory: Clear to auscultation bilaterally, no wheeze, good inspiratory effort  Gastrointestinal: Abdomen soft, non-tender, not distended, normal bowel sounds  Musculoskeletal: No cyanosis in digits, neck supple  Neurology: Cranial nerves grossly intact. Alert and oriented in time, place and person. No speech or motor deficits  Psychiatry: Appropriate affect.  Not agitated  Skin: Warm, dry, normal turgor, no rash  Brisk capillary refill, peripheral pulses palpable   Labs:  CBC:   Lab Results   Component Value Date    WBC 9.3 01/12/2020    RBC 4.63 01/12/2020    HGB 14.6 01/12/2020    HCT 43.6 01/12/2020    MCV 94.1 01/12/2020    MCH 31.5 01/12/2020    MCHC 33.5 01/12/2020    RDW 14.4 01/12/2020     01/12/2020    MPV 8.3 01/12/2020     BMP:    Lab Results   Component Value Date     01/12/2020    K 3.8 01/12/2020    K 3.5 11/19/2019    CL 99 01/12/2020    CO2 20 01/12/2020    BUN 21 01/12/2020 CREATININE 0.6 01/12/2020    CALCIUM 9.3 01/12/2020    GFRAA >60 01/12/2020    GFRAA >60 04/22/2013    LABGLOM >60 01/12/2020    LABGLOM 87 03/27/2012    GLUCOSE 119 01/12/2020    GLUCOSE 100 03/27/2012     XR CHEST PORTABLE   Final Result   No acute findings. EKG: Atrial  Fibrillation. Problem List  Active Problems:    Atrial fibrillation ([address]  Resolved Problems:    * No resolved hospital problems. *        Assessment/Plan:     Atrial fibrillation with rapid ventricular rate patient has chronic atrial fibrillation.   Increase Cardizem CD to 240 mg taper and stop Cardizem drip cardiology consulted patient on anticoagulation    History of pulmonary embolism in  the past.    History of pulmonary hypertension resolved post right heart cath    Rest of the comorbid condition including hypertension and Parkinson's disease stable    Treated for asthma exacerbation on tapering dose of steroids will continue    No history of CAD    DVT prophylaxis xarelto  Code status Full   Diet cardiac     Kimberly Sales MD    1/12/2020 1:42 PM

## 2020-01-12 NOTE — ED PROVIDER NOTES
[facility] Emergency Department      Pt Name: Lilia Salcedo  MRN: [id number]  [place] 1950  Date of evaluation: 1/12/2020  Provider: Miriam Barajas MD  CHIEF COMPLAINT  Chief Complaint   Patient presents with    Dizziness     WITH RAPID HEART RATE SINCE 0300. HX OF A-FIB     HPI  Lilia Salcedo is a 81 y.o. female who presents because of palpitations. She has a history of afib/flutter, PE, chronic CHF, obstructive sleep apnea and is on Xarelto and [place]. She noticed the palpitations at 3 AM.  She had gone to an [facility] [address] [facility]. She had 2 glasses of wine over the course of about 4 hours. She says her chest does not hurt but there is a heavy sensation to it. She has some shortness of breath. She has had A. fib multiple times in the past but typically will convert on its own or with the addition of a extra dose of Cartia. She says this is the longest she has ever had A. fib. She did sleep for several hours during the night and woke up with persistent symptoms. She needed to sit up because of the pressure feeling in her chest while she was sleeping. Her last episode of A. fib was a month ago and was brief. She has spoken with Dr. Dewey Galeana about the possibility of ablation but at the time of their conversation, she had too many stressful things going on in her life with family to consider procedure like that. REVIEW OF SYSTEMS:  No fever, no new cough,  chronic mild pedal edema, no abdominal pain Pertinent positives and negatives as per the HPI. All other review of systems reviewed and negative. Nursing notes reviewed.     PAST MEDICAL HISTORY  Past Medical History:   Diagnosis Date    Allergic     Anesthesia complication     family hx-father-at at age 80 having hip replacement revision surgery-had tia's x2 while under anes-but also had hx chf-had dificuly with speech when coming out from anes    Anxiety     Arthritis     left ankle, bilateral hands    Arthritis of left hip 2/2/2016    Arthritis of right hip 4/19/2016    Asthma     At risk for falls     uses a cane    Atrial fibrillation with rapid ventricular response (HCC)     Atrial flutter ([address] 4/25/2018    Chronic maxillary sinusitis 5/24/2017    CTEPH (chronic thromboembolic pulmonary hypertension) ([address] 8/26/2016    Depression     pt stated r/t bad marriage/alcoholic spouse    Disc disease, degenerative, lumbar or lumbosacral 2/20/2017    Hepatic steatosis 4/26/2019    History of total hip arthroplasty 2/28/2017    Hypertension     Leg cramps     patient states very severe, requires immediate potassium administration    Migraines, basilar     last migraine 10 years ago    Mild persistent asthma without complication 7/79/1503    Obesity, Class III, BMI 40-49.9 (morbid obesity) ([address] 4/19/2016    HUSSAIN (obstructive sleep apnea) 10/14/2013    Osteoarthritis, hip, bilateral 2016    Bilateral hip arthroplasty    Panic attacks     Pneumonia 2015    Primary osteoarthritis of both knees 9/19/2017    Primary osteoarthritis of left knee 12/15/2016    Pulmonary emboli ([address] 8/4/2016    Pulmonary HTN ([address] 7/21/2016    Pure hypercholesterolemia 2/13/2019    Restless leg syndrome     Rhinitis, chronic 3/26/2014    Sleep apnea     wears CPAP @11    Stress incontinence     Tear of left rotator cuff 1/23/2018    Thyroid disease     hypothyroid    Wears glasses      SURGICAL HISTORY  Past Surgical History:   Procedure Laterality Date    BACK SURGERY  2004    LUMBAR FUSION    CATARACT REMOVAL Bilateral     COLONOSCOPY      EYE SURGERY Right 2010    retinal tear repaired   [facility]    right ring finger severe laceration, fracture    HIP ARTHROPLASTY Right 4-19-16    HYSTERECTOMY  1993    JOINT REPLACEMENT Left 2/2/16    left hip    TONSILLECTOMY  1972     MEDICATIONS:  No current facility-administered medications on file prior to encounter.       Current Outpatient Medications on File Prior to Encounter   Medication Sig Dispense Refill    potassium chloride (KLOR-CON M) 10 MEQ extended release tablet Take 1 tablet by mouth daily 180 tablet 0    lisinopril (PRINIVIL;ZESTRIL) 10 MG tablet Take 1 tablet by mouth daily 90 tablet 1    diltiazem (CARTIA XT) 120 MG extended release capsule Take 1 capsule by mouth daily Pt may take one additional tablet if needed for tachycardia 135 capsule 3    pramipexole (MIRAPEX) 0.5 MG tablet 1.5 tab q 5 PM and 1.5 tab qHS 90 tablet 5    rivaroxaban (XARELTO) 20 MG TABS tablet Take 1 tablet by mouth Daily with supper 90 tablet 3    hydrochlorothiazide (HYDRODIURIL) 25 MG tablet Take 1 tablet by mouth daily 90 tablet 0    diclofenac (VOLTAREN) 50 MG EC tablet Take 1 tablet by mouth 3 times daily (with meals) 60 tablet 3    azelastine (ASTELIN) 0.1 % nasal spray 1 spray by Nasal route 2 times daily 1 Spray in each nostril 2 Bottle 1    cetirizine (ZYRTEC) 10 MG tablet Take 10 mg by mouth every other day       furosemide (LASIX) 20 MG tablet 1 tablet (20mg) up to three times weekly as needed. (Patient taking differently: Take 20 mg by mouth twice a week ) 90 tablet 3    NONFORMULARY       Magnesium Citrate 100 MG TABS Take 1 tablet by mouth daily 90 tablet 3    beclomethasone (QVAR) 80 MCG/ACT inhaler Inhale 2 puffs into the lungs 2 times daily (Patient taking differently: Inhale 1 puff into the lungs as needed ) 1 Inhaler 3    Coenzyme Q10 (COQ10) 100 MG CAPS Take 1 capsule by mouth daily       EPIPEN 2-MIR 0.3 MG/0.3ML SOAJ injection       lisinopril (PRINIVIL;ZESTRIL) 5 MG tablet Take 1 tablet by mouth daily 90 tablet 3    diclofenac (VOLTAREN) 50 MG EC tablet Take 1 tablet by mouth 3 times daily (with meals) (Patient taking differently: Take 50 mg by mouth daily ) 60 tablet 3     ALLERGIES  Atorvastatin; Simvastatin; Cephalexin; Cephalosporins; Food;  Other; and Sulfa antibiotics  FAMILY HISTORY:  Family History review, discussions about patient condition, consultation time, documentation time. Critical care due to the patient's arrhythmia. CONSULTATIONS:  Hospitalist Dr Melissa Krishnamurthy: Debi De La Cruz is a 81 y.o. female who presented because of palpitations. She has atrial fibrillation with RVR. She received bolus and is on a drip. Her rate is improving. She is otherwise stable, oxygenating well. I discussed the case in full with the admitting physician. Differential Diagnosis: arrhythmia, dehydration, electrolyte abnormality, adverse medication reaction, other    FINAL IMPRESSION:    1. Atrial fibrillation with RVR ([address]      (Please note that I used voice recognition software to generate this note.   Occasionally words are mistranscribed despite my efforts to edit errors.)        Gaby Gould MD  01/12/20 5941

## 2020-01-13 VITALS
SYSTOLIC BLOOD PRESSURE: 100 MMHG | HEIGHT: 64 IN | OXYGEN SATURATION: 97 % | TEMPERATURE: 97.5 F | HEART RATE: 55 BPM | DIASTOLIC BLOOD PRESSURE: 57 MMHG | BODY MASS INDEX: 41.79 KG/M2 | RESPIRATION RATE: 18 BRPM | WEIGHT: 244.8 LBS

## 2020-01-13 LAB
A/G RATIO: 1.6 (ref 1.1–2.2)
ALBUMIN SERPL-MCNC: 3.6 G/DL (ref 3.4–5)
ALP BLD-CCNC: 66 U/L (ref 40–129)
ALT SERPL-CCNC: 24 U/L (ref 10–40)
ANION GAP SERPL CALCULATED.3IONS-SCNC: 12 MMOL/L (ref 3–16)
AST SERPL-CCNC: 14 U/L (ref 15–37)
BILIRUB SERPL-MCNC: 0.3 MG/DL (ref 0–1)
BUN BLDV-MCNC: 20 MG/DL (ref 7–20)
CALCIUM SERPL-MCNC: 8.7 MG/DL (ref 8.3–10.6)
CHLORIDE BLD-SCNC: 100 MMOL/L (ref 99–110)
CO2: 22 MMOL/L (ref 21–32)
CREAT SERPL-MCNC: 0.6 MG/DL (ref 0.6–1.2)
EKG ATRIAL RATE: 62 BPM
EKG DIAGNOSIS: NORMAL
EKG P AXIS: 41 DEGREES
EKG P-R INTERVAL: 138 MS
EKG Q-T INTERVAL: 414 MS
EKG QRS DURATION: 74 MS
EKG QTC CALCULATION (BAZETT): 420 MS
EKG R AXIS: 18 DEGREES
EKG T AXIS: 62 DEGREES
EKG VENTRICULAR RATE: 62 BPM
GFR AFRICAN AMERICAN: >60
GFR NON-AFRICAN AMERICAN: >60
GLOBULIN: 2.2 G/DL
GLUCOSE BLD-MCNC: 108 MG/DL (ref 70–99)
HCT VFR BLD CALC: 37.9 % (ref 36–48)
HEMOGLOBIN: 12.8 G/DL (ref 12–16)
MCH RBC QN AUTO: 32.1 PG (ref 26–34)
MCHC RBC AUTO-ENTMCNC: 33.7 G/DL (ref 31–36)
MCV RBC AUTO: 95.2 FL (ref 80–100)
PDW BLD-RTO: 14.5 % (ref 12.4–15.4)
PLATELET # BLD: 237 K/UL (ref 135–450)
PMV BLD AUTO: 8.6 FL (ref 5–10.5)
POTASSIUM REFLEX MAGNESIUM: 4 MMOL/L (ref 3.5–5.1)
RBC # BLD: 3.98 M/UL (ref 4–5.2)
SODIUM BLD-SCNC: 134 MMOL/L (ref 136–145)
TOTAL PROTEIN: 5.8 G/DL (ref 6.4–8.2)
WBC # BLD: 7.6 K/UL (ref 4–11)

## 2020-01-13 PROCEDURE — G0378 HOSPITAL OBSERVATION PER HR: HCPCS

## 2020-01-13 PROCEDURE — 93005 ELECTROCARDIOGRAM TRACING: CPT | Performed by: INTERNAL MEDICINE

## 2020-01-13 PROCEDURE — 6370000000 HC RX 637 (ALT 250 FOR IP): Performed by: INTERNAL MEDICINE

## 2020-01-13 PROCEDURE — 93010 ELECTROCARDIOGRAM REPORT: CPT | Performed by: INTERNAL MEDICINE

## 2020-01-13 PROCEDURE — 80053 COMPREHEN METABOLIC PANEL: CPT

## 2020-01-13 PROCEDURE — 96366 THER/PROPH/DIAG IV INF ADDON: CPT

## 2020-01-13 PROCEDURE — 85027 COMPLETE CBC AUTOMATED: CPT

## 2020-01-13 PROCEDURE — 92960 CARDIOVERSION ELECTRIC EXT: CPT | Performed by: INTERNAL MEDICINE

## 2020-01-13 PROCEDURE — 94761 N-INVAS EAR/PLS OXIMETRY MLT: CPT

## 2020-01-13 PROCEDURE — 94640 AIRWAY INHALATION TREATMENT: CPT

## 2020-01-13 PROCEDURE — 2700000000 HC OXYGEN THERAPY PER DAY

## 2020-01-13 PROCEDURE — 92960 CARDIOVERSION ELECTRIC EXT: CPT

## 2020-01-13 PROCEDURE — 36415 COLL VENOUS BLD VENIPUNCTURE: CPT

## 2020-01-13 PROCEDURE — 99220 PR INITIAL OBSERVATION CARE/DAY 70 MINUTES: CPT | Performed by: INTERNAL MEDICINE

## 2020-01-13 PROCEDURE — 2500000003 HC RX 250 WO HCPCS

## 2020-01-13 PROCEDURE — 7100000010 HC PHASE II RECOVERY - FIRST 15 MIN

## 2020-01-13 PROCEDURE — 2580000003 HC RX 258: Performed by: INTERNAL MEDICINE

## 2020-01-13 RX ORDER — ALBUTEROL SULFATE 90 UG/1
2 AEROSOL, METERED RESPIRATORY (INHALATION) EVERY 6 HOURS PRN
Qty: 1 INHALER | Refills: 3 | Status: SHIPPED | OUTPATIENT
Start: 2020-01-13 | End: 2020-02-20

## 2020-01-13 RX ORDER — PROPAFENONE HYDROCHLORIDE 225 MG/1
225 CAPSULE, EXTENDED RELEASE ORAL EVERY 12 HOURS SCHEDULED
Status: DISCONTINUED | OUTPATIENT
Start: 2020-01-13 | End: 2020-01-13

## 2020-01-13 RX ORDER — FLUTICASONE PROPIONATE 110 UG/1
4 AEROSOL, METERED RESPIRATORY (INHALATION) 2 TIMES DAILY
Qty: 1 INHALER | Refills: 3 | Status: SHIPPED | OUTPATIENT
Start: 2020-01-13 | End: 2020-02-20

## 2020-01-13 RX ORDER — DILTIAZEM HYDROCHLORIDE 240 MG/1
240 CAPSULE, COATED, EXTENDED RELEASE ORAL DAILY
Qty: 30 CAPSULE | Refills: 3 | Status: SHIPPED | OUTPATIENT
Start: 2020-01-14 | End: 2020-01-23

## 2020-01-13 RX ADMIN — PROPAFENONE HYDROCHLORIDE 225 MG: 225 CAPSULE, EXTENDED RELEASE ORAL at 11:26

## 2020-01-13 RX ADMIN — DILTIAZEM HYDROCHLORIDE 240 MG: 240 CAPSULE, COATED, EXTENDED RELEASE ORAL at 08:34

## 2020-01-13 RX ADMIN — Medication 10 ML: at 08:35

## 2020-01-13 RX ADMIN — POTASSIUM CHLORIDE 10 MEQ: 1500 TABLET, EXTENDED RELEASE ORAL at 08:34

## 2020-01-13 RX ADMIN — Medication 2 PUFF: at 09:06

## 2020-01-13 RX ADMIN — AZELASTINE HYDROCHLORIDE 1 SPRAY: 137 SPRAY, METERED NASAL at 08:34

## 2020-01-13 RX ADMIN — PREDNISONE 20 MG: 20 TABLET ORAL at 08:34

## 2020-01-13 RX ADMIN — MAGNESIUM HYDROXIDE 30 ML: 400 SUSPENSION ORAL at 05:36

## 2020-01-13 ASSESSMENT — PAIN SCALES - GENERAL: PAINLEVEL_OUTOF10: 0

## 2020-01-13 NOTE — PROGRESS NOTES
Patient remained bradycardic with heart rates 50s. Probably cannot tolerate Rythmol. D/c Rythmol. If heart rates improve, will consider Rythmol.      Kapil Shetty MD, MPH  [facility]   Office: [phone]

## 2020-01-13 NOTE — CONSULTS
cramps     patient states very severe, requires immediate potassium administration    Migraines, basilar     last migraine 10 years ago    Mild persistent asthma without complication 6/00/8920    Obesity, Class III, BMI 40-49.9 (morbid obesity) ([address] 4/19/2016    HUSSAIN (obstructive sleep apnea) 10/14/2013    Osteoarthritis, hip, bilateral 2016    Bilateral hip arthroplasty    Panic attacks     Pneumonia 2015    Primary osteoarthritis of both knees 9/19/2017    Primary osteoarthritis of left knee 12/15/2016    Pulmonary emboli ([address] 8/4/2016    Pulmonary HTN ([address] 7/21/2016    Pure hypercholesterolemia 2/13/2019    Restless leg syndrome     Rhinitis, chronic 3/26/2014    Sleep apnea     wears CPAP @11    Stress incontinence     Tear of left rotator cuff 1/23/2018    Thyroid disease     hypothyroid    Wears glasses         Past Surgical History:   Procedure Laterality Date    BACK SURGERY  2004    LUMBAR FUSION    CATARACT REMOVAL Bilateral     COLONOSCOPY      EYE SURGERY Right 2010    retinal tear repaired   [facility]    right ring finger severe laceration, fracture    HIP ARTHROPLASTY Right 4-19-16    HYSTERECTOMY  1993    JOINT REPLACEMENT Left 2/2/16    left hip    TONSILLECTOMY  1972       Allergies   Allergen Reactions    Atorvastatin Other (See Comments)     Muscle Pain, all statins     Simvastatin Other (See Comments)     Muscle Pain    Cephalexin Hives and Itching    Cephalosporins Hives and Itching    Food Diarrhea     Milk , shellfish , gluten. ..may have bouts of diarrhea, constipation or flatulus    Other      Environmental -cats,dogs,dust    Sulfa Antibiotics      Can take Celebrex, Pt denies 8/1/18       Social History:  Reviewed. reports that she quit smoking about 6 years ago. She has a 2.50 pack-year smoking history. She has never used smokeless tobacco. She reports current alcohol use. She reports that she does not use drugs.      Family

## 2020-01-13 NOTE — DISCHARGE SUMMARY
[facility]    Patient Demographics    PatientLinda Fu  Date of Birth. 1950  MRN. [id number]     Primary care provider. Danna Muñoz MD  (Tel: [phone])    Admit date: 1/12/2020    Discharge date (blank if same as Note Date): Note Date: 1/13/2020     Reason for Hospitalization. Chief Complaint   Patient presents with    Dizziness     WITH RAPID HEART RATE SINCE 0300. HX OF A-FIB         Significant Findings. Active Problems:    Atrial fibrillation ([address]  Resolved Problems:    * No resolved hospital problems. *       Problems and results from this hospitalization that need follow up. 1. None     Significant test results and incidental findings. 1.   XR CHEST PORTABLE   Final Result   No acute findings. Invasive procedures and treatments. 1. None    Problem-based Hospital Course. Atrial fibrillation with rapid ventricular rate patient has chronic atrial fibrillation. Increase Cardizem CD to 240 mg  cardiology consulted patient on anticoagulation patient had cardioversion done today. Currently in sinus rhythm Rythmol not started secondary to bradycardia patient needs outpatient ablation     History of pulmonary embolism in  the past.     History of pulmonary hypertension resolved post right heart cath     Rest of the comorbid condition including hypertension and Parkinson's disease stable     Treated for asthma exacerbation on tapering dose of steroids will continue     No history of CAD       Consults. IP CONSULT TO HOSPITALIST  IP CONSULT TO CARDIOLOGY    Physical examination on discharge day. BP (!) 100/57   Pulse 52   Temp 97.5 °F (36.4 °C) (Temporal)   Resp 18   Ht 5' 4\" (1.626 m)   Wt 244 lb 12.8 oz (111 kg)   SpO2 97%   BMI 42.02 kg/m²   General appearance. Alert. Looks comfortable. HEENT. Sclera clear. Moist mucus membranes. Cardiovascular. Regular rate and rhythm, normal S1, S2. No murmur. Respiratory.  Not using accessory muscles. Clear to auscultation bilaterally, no wheeze. Gastrointestinal. Abdomen soft, non-tender, not distended, normal bowel sounds  Neurology. Facial symmetry. No speech deficits. Moving all extremities equally. Extremities. No edema in lower extremities. Skin. Warm, dry, normal turgor        Condition at time of discharge stable    Medication instructions provided to patient at discharge. Medication List      START taking these medications    albuterol sulfate  (90 Base) MCG/ACT inhaler  Inhale 2 puffs into the lungs every 6 hours as needed for Wheezing     fluticasone 110 MCG/ACT inhaler  Commonly known as:  FLOVENT HFA  Inhale 4 puffs into the lungs 2 times daily  Replaces:  beclomethasone 80 MCG/ACT inhaler        CHANGE how you take these medications    diclofenac 50 MG EC tablet  Commonly known as:  VOLTAREN  Take 1 tablet by mouth 3 times daily (with meals)  What changed:    · when to take this  · Another medication with the same name was removed. Continue taking this medication, and follow the directions you see here.      diltiazem 240 MG extended release capsule  Commonly known as:  CARDIZEM CD  Take 1 capsule by mouth daily  Start taking on:  January 14, 2020  What changed:    · medication strength  · how much to take  · additional instructions        CONTINUE taking these medications    azelastine 0.1 % nasal spray  Commonly known as:  ASTELIN  1 spray by Nasal route 2 times daily 1 Spray in each nostril     cetirizine 10 MG tablet  Commonly known as:  ZYRTEC     CoQ10 100 MG Caps     EPIPEN 2-MIR 0.3 MG/0.3ML [place] injection  Generic drug:  EPINEPHrine     Magnesium Citrate 100 MG Tabs  Take 1 tablet by mouth daily     NONFORMULARY     potassium chloride 10 MEQ extended release tablet  Commonly known as:  KLOR-CON M  Take 1 tablet by mouth daily     pramipexole 0.5 MG tablet  Commonly known as:  MIRAPEX  1.5 tab q 5 PM and 1.5 tab qHS     predniSONE 5 MG tablet  Commonly known as:  DELTASONE     rivaroxaban 20 MG Tabs tablet  Commonly known as:  XARELTO  Take 1 tablet by mouth Daily with supper     tiotropium 18 MCG inhalation capsule  Commonly known as:  SPIRIVA        STOP taking these medications    beclomethasone 80 MCG/ACT inhaler  Commonly known as:  QVAR  Replaced by:  fluticasone 110 MCG/ACT inhaler     furosemide 20 MG tablet  Commonly known as:  LASIX     hydrochlorothiazide 25 MG tablet  Commonly known as:  HYDRODIURIL     lisinopril 10 MG tablet  Commonly known as:  PRINIVIL;ZESTRIL     lisinopril 5 MG tablet  Commonly known as:  PRINIVIL;ZESTRIL           Where to Get Your Medications      These medications were sent to [address], [address] [place] [address]    Phone:  [phone]   · albuterol sulfate  (90 Base) MCG/ACT inhaler  · diltiazem 240 MG extended release capsule  · fluticasone 110 MCG/ACT inhaler         Discharge recommendations given to patient. Follow Up. pcp  in 1 week   Disposition. home  Activity. activity as tolerated  Diet: DIET CARDIAC;      Spent 27  minutes in discharge process.     Brant Carver MD     1/13/2020 2:09 PM

## 2020-01-13 NOTE — PROGRESS NOTES
MCOT device instructions given to patient and family. Device not placed on patient per their wishes- to be placed by patient/family after discharge. Pt verbalized understanding.

## 2020-01-13 NOTE — PROGRESS NOTES
Pt admitted to 3 A from ED. Assessment complete. A&o x4. resp even/unlabored on ra, no sob. Tele reading afib 110. cardizem drip infusing at 5 ml/hr. VSS. Pt up to br with assist x1, pt c/o light headedness upon standing. Pt denies pain, pt needs met.  Bed low, call bell in reach, instructed to call for assist.

## 2020-01-13 NOTE — PLAN OF CARE
Problem: Falls - Risk of:  Goal: Will remain free from falls  Description  Will remain free from falls  1/13/2020 0813 by Janette Lezama RN  Outcome: Ongoing  Note:   High fall risk. Patient refusing bed alarm. Patient becomes dizzy upon standing.  Needs steadying assist.      Problem: Pain:  Goal: Control of acute pain  Description  Control of acute pain  Outcome: Ongoing

## 2020-01-13 NOTE — PROGRESS NOTES
CLINICAL PHARMACY NOTE: MEDS TO [address] [facility] Select Patient?: No  Total # of Prescriptions Filled: 2   The following medications were delivered to the patient:  · flovent 110mcg  · Diltiazem ER Coated 240mg  Total # of Interventions Completed: 2  Time Spent (min): 30    Additional Documentation:  Transferred RX's for flovent, cardizem and albuterol from [facility].  Patient refused albuterol  Medications picked up by Je Meneses in [address] [facility]

## 2020-01-13 NOTE — CARE COORDINATION
Patient admitted as Observation/OPIB with an anticipated short hospitalization length of stay. Chart reviewed and it appears that patient has minimal needs for discharge at this time. Discussed with patients nurse and requested that case management be notified if discharge needs are identified. *Case management will continue to follow progress and update discharge plan as needed.     Adrienne Magallanes RN, BSN  [phone]

## 2020-01-13 NOTE — PROGRESS NOTES
Patient bradycardic on tele monitor. RN checked on patient, patient dizzy with generalized \"weird\" feeling. Cardiology notified, waiting for response.

## 2020-01-13 NOTE — FLOWSHEET NOTE
Patient back to room from cath lab cardioversion. AOx4, VSS. Sinus rhythm on tele.       01/13/20 1015   Vital Signs   Pulse 61   Heart Rate Source Monitor   Resp 18   BP (!) 100/56   BP Location Right upper arm   Oxygen Therapy   SpO2 93 %   O2 Device None (Room air)

## 2020-01-13 NOTE — PROGRESS NOTES
The pt has refused to wear our CPAP and doesn't even want it in the room, pt has agreed to wear a nasal cannula at night while sleeping.

## 2020-01-13 NOTE — PROGRESS NOTES
tablet 1 tablet (20mg) up to three times weekly as needed.   Patient taking differently: Take 20 mg by mouth twice a week  2/21/19  Yes Tammi Hernandez MD   NONFORMULARY    Yes Historical Provider, MD   Magnesium Citrate 100 MG TABS Take 1 tablet by mouth daily 6/28/18  Yes Tammi Hernandez MD   beclomethasone (QVAR) 80 MCG/ACT inhaler Inhale 2 puffs into the lungs 2 times daily  Patient taking differently: Inhale 1 puff into the lungs as needed  4/26/18  Yes Donald Banks MD   Coenzyme Q10 (COQ10) 100 MG CAPS Take 1 capsule by mouth daily    Yes Historical Provider, MD   EPIPEN 2-MIR 0.3 MG/0.3ML SOAJ injection  9/29/16  Yes Historical Provider, MD   lisinopril (PRINIVIL;ZESTRIL) 5 MG tablet Take 1 tablet by mouth daily 8/20/19   Tammi Hernandez MD   diclofenac (VOLTAREN) 50 MG EC tablet Take 1 tablet by mouth 3 times daily (with meals)  Patient taking differently: Take 50 mg by mouth daily  5/1/19   Alondra Krishnamurthy MD     Past Medical History:   Diagnosis Date    Allergic     Anesthesia complication     family hx-father-at at age 80 having hip replacement revision surgery-had tia's x2 while under anes-but also had hx chf-had dificuly with speech when coming out from [address]     left ankle, bilateral hands    Arthritis of left hip 2/2/2016    Arthritis of right hip 4/19/2016    Asthma     At risk for falls     uses a cane    Atrial fibrillation with rapid ventricular response ([address]     Atrial flutter ([address] 4/25/2018    Chronic maxillary sinusitis 5/24/2017    CTEPH (chronic thromboembolic pulmonary hypertension) ([address] 8/26/2016    Depression     pt stated r/t bad marriage/alcoholic spouse    Disc disease, degenerative, lumbar or lumbosacral 2/20/2017    Hepatic steatosis 4/26/2019    History of total hip arthroplasty 2/28/2017    Hypertension     Leg cramps     patient states very severe, requires immediate potassium administration    Migraines, basilar     last migraine 10 years ago    Mild persistent asthma without complication 0/63/2990    Obesity, Class III, BMI 40-49.9 (morbid obesity) ([facility] 4/19/2016    HUSSAIN (obstructive sleep apnea) 10/14/2013    Osteoarthritis, hip, bilateral 2016    Bilateral hip arthroplasty    Panic attacks     Pneumonia 2015    Primary osteoarthritis of both knees 9/19/2017    Primary osteoarthritis of left knee 12/15/2016    Pulmonary emboli ([address] 8/4/2016    Pulmonary HTN ([address] 7/21/2016    Pure hypercholesterolemia 2/13/2019    Restless leg syndrome     Rhinitis, chronic 3/26/2014    Sleep apnea     wears CPAP @11    Stress incontinence     Tear of left rotator cuff 1/23/2018    Thyroid disease     hypothyroid    Wears glasses      Past Surgical History:   Procedure Laterality Date    BACK SURGERY  2004    LUMBAR FUSION    CATARACT REMOVAL Bilateral     COLONOSCOPY      EYE SURGERY Right 2010    retinal tear repaired   [facility]    right ring finger severe laceration, fracture    HIP ARTHROPLASTY Right 4-19-16    HYSTERECTOMY  1993    JOINT REPLACEMENT Left 2/2/16    left hip    TONSILLECTOMY  1972     Allergies   Allergen Reactions    Atorvastatin Other (See Comments)     Muscle Pain, all statins     Simvastatin Other (See Comments)     Muscle Pain    Cephalexin Hives and Itching    Cephalosporins Hives and Itching    Food Diarrhea     Milk , shellfish , gluten. ..may have bouts of diarrhea, constipation or flatulus    Other      Environmental -cats,dogs,dust    Sulfa Antibiotics      Can take Celebrex, Pt denies 8/1/18       Pre-Sedation Documentation and Exam:   I have personally completed a history, physical exam & review of systems for this patient (see notes).     Mallampati Airway Assessment:  Class II     Prior History of Anesthesia Complications:   None    ASA Classification:  Class 3 - A patient with severe systemic disease that limits activity but is not incapacitating    Sedation/ Anesthesia Plan:

## 2020-01-13 NOTE — PROGRESS NOTES
Shift assessment complete. Evening medications given, see MAR for details. Pt alert and oriented x4. Pt refuses bed alarm. Non-skid socks on. Pt denies any pain or needs at this time. Pt resting in bed with no signs of distress. Cardizem gtt infusing. Will continue to monitor. Call light within reach.

## 2020-01-13 NOTE — PROGRESS NOTES
Patient s/p successful cardioversion. Patient had long post conversion pauses. She also has history of syncope after conversion at home, suggesting sick sinus syndrome    Discussed PPM with patient. Plan for discharge home with monitoring. Follow up in EP office.      Vonnie Gandhi MD, MPH  [place] [address]   Office: [phone]

## 2020-01-14 ENCOUNTER — CARE COORDINATION (OUTPATIENT)
Dept: CARE COORDINATION | Age: 70
End: 2020-01-14

## 2020-01-14 ENCOUNTER — TELEPHONE (OUTPATIENT)
Dept: CARDIOLOGY CLINIC | Age: 70
End: 2020-01-14

## 2020-01-14 ENCOUNTER — TELEPHONE (OUTPATIENT)
Dept: INTERNAL MEDICINE CLINIC | Age: 70
End: 2020-01-14

## 2020-01-14 NOTE — TELEPHONE ENCOUNTER
Pt was also d/c Lisinopril. Her blood pressure this morning was 167/92. Was started on 240mg Diltiazem QD--evening (hasn't taken yet and doesn't want to take anything until advised. Please contact pt this evening.

## 2020-01-14 NOTE — TELEPHONE ENCOUNTER
Patient requested a call back to go over medications that she was instructed to stop taking per hospital stay and is concerned about the reactions it's causing. Patient is now holding fluid and her blood pressure is still on the low side. Patient had a cardioversion recently to correct Afib. She is on the wait list for her HFU appointment and would like to see Dr. Lilli Khan sooner than Wed. 1/22/20. If able to accommodate please contact patient.  Thanks

## 2020-01-14 NOTE — TELEPHONE ENCOUNTER
Pt calling to speak to ERICKA welch and RN regarding recent hospital d/c instructions. The pt was taken off the HCTZ while IP. Now, the pt is home, still not on the medication and has increased weight gain of 3 lbs. Pt wants to know if she should re-start the medication. Please call the pt to advise.

## 2020-01-15 ENCOUNTER — TELEPHONE (OUTPATIENT)
Dept: CARDIOLOGY CLINIC | Age: 70
End: 2020-01-15

## 2020-01-16 ENCOUNTER — TELEPHONE (OUTPATIENT)
Dept: CARDIOLOGY CLINIC | Age: 70
End: 2020-01-16

## 2020-01-20 NOTE — PROGRESS NOTES
[facility]   Advanced Heart Failure/Pulmonary Hypertension  Cardiac Follow up       Berlin Bangura  YOB: 1950     Date of Visit:  1/23/20     Chief Complaint   Patient presents with    Congestive Heart Failure    Dizziness     History of present illness: Berlin Bangura is a 81 y.o. female with past medical history significant for HTN, HUSSAIN on CPAP, multiple PE on Xarelto (8/2016). She original had a PE was treated with xarelto for recommended time and then off it and her RHC revealed pressures no RH elevated pressures. Afterwards she developed AFib and restarted on xarelto. She continues on xarelto and treatment for afib. Echos in July and August (2016) showed RVSP 106-112 without evidence of enlargement in right side of heart. RHC was done 9/2/16 and PA pressure was 24, no evidence of pulmonary hypertension. Since then, her RVSP has decreased and got back to normal. We discussed that we are questioning if the elevated ones by echo may have been due to a false positive test for her. She was seen by Dr Kayla Lira 8/1 and he is managing her hypothyroidism. [place], the thyroid medication was stopped. She was noted to have negative thyroid ultrasound ( July 2018). She wears her CPAP consistently for her HUSSAIN. States she had a reaction to Simvastatin and Livalo with muscle pain. She states she is Prediabetic and has not lost weight. She was admitted 1/13/2020 with palpitations/light-headedness along with SOB and chest pressure. She underwent successful DCCV and was put on Rhythmol -- this caused her HR to drop into the 40's which made her feel bad. She was discharged on diltiazem 240mg qd which causes her to feel fatigued and dizzy. She remains on Xarelto. Her lisinopril was stopped. Today, she is here to discuss symptoms that she believes is caused from diltiazem. She c/o ongoing  symptoms of dizziness and fatigue; she is using her cane because she feels off balance at times.  She had been taking Lasix twice a week prn and HCTZ every day but these were stopped when she left the hospital. She states she has actually been taking the lisinopril although they wanted her to stop it. She is wearing an event monitor. She has chronic SOB with activity. She denies exertional chest pain, PND, palpitations. She has LE edema; she is up 5# since 1/13/2020 but believes she is holding a lot of water.     Past Medical History:   Diagnosis Date    Allergic     Anesthesia complication     family hx-father-at at age 80 having hip replacement revision surgery-had tia's x2 while under anes-but also had hx chf-had dificuly with speech when coming out from [address]     left ankle, bilateral hands    Arthritis of left hip 2/2/2016    Arthritis of right hip 4/19/2016    Asthma     At risk for falls     uses a cane    Atrial fibrillation with rapid ventricular response (HCC)     Atrial flutter ([address] 4/25/2018    Chronic maxillary sinusitis 5/24/2017    CTEPH (chronic thromboembolic pulmonary hypertension) ([address] 8/26/2016    Depression     pt stated r/t bad marriage/alcoholic spouse    Disc disease, degenerative, lumbar or lumbosacral 2/20/2017    Hepatic steatosis 4/26/2019    History of total hip arthroplasty 2/28/2017    Hypertension     Leg cramps     patient states very severe, requires immediate potassium administration    Migraines, basilar     last migraine 10 years ago    Mild persistent asthma without complication 1/01/0281    Obesity, Class III, BMI 40-49.9 (morbid obesity) ([address] 4/19/2016    HUSSAIN (obstructive sleep apnea) 10/14/2013    Osteoarthritis, hip, bilateral 2016    Bilateral hip arthroplasty    Panic attacks     Pneumonia 2015    Primary osteoarthritis of both knees 9/19/2017    Primary osteoarthritis of left knee 12/15/2016    Pulmonary emboli ([address] 8/4/2016    Pulmonary HTN ([address] 7/21/2016    Pure hypercholesterolemia 2/13/2019    Restless leg syndrome engorged  · The carotid upstroke is normal in amplitude and contour without delay or bruit  · JVP 10-11 cm H2O  RRR with nl S1 and S2 without m,r,g  · Peripheral pulses are symmetrical and full  · There is no clubbing, cyanosis of the extremities.   · Trace bilateral edema  · Femoral Arteries: 2+ and equal  · Pedal Pulses: 2+ and equal   Abdomen:  · No masses or tenderness  · Liver/Spleen: No Abnormalities Noted  Neurological/Psychiatric:  · Alert and oriented in all spheres  · Moves all extremities well  · Exhibits normal gait balance and coordination  · No abnormalities of mood, affect, memory, mentation, or behavior are noted      Current Outpatient Medications   Medication Sig Dispense Refill    lisinopril (PRINIVIL;ZESTRIL) 10 MG tablet Take 10 mg by mouth daily      hydrochlorothiazide (HYDRODIURIL) 25 MG tablet Take 25 mg by mouth daily      furosemide (LASIX) 20 MG tablet Take 10 mg by mouth Twice a Week      CPAP Machine MISC by Does not apply route      albuterol sulfate  (90 Base) MCG/ACT inhaler Inhale 2 puffs into the lungs every 6 hours as needed for Wheezing 1 Inhaler 3    fluticasone (FLOVENT HFA) 110 MCG/ACT inhaler Inhale 4 puffs into the lungs 2 times daily 1 Inhaler 3    diltiazem (CARDIZEM CD) 240 MG extended release capsule Take 1 capsule by mouth daily 30 capsule 3    tiotropium (SPIRIVA) 18 MCG inhalation capsule Inhale 18 mcg into the lungs daily X 10 days      potassium chloride (KLOR-CON M) 10 MEQ extended release tablet Take 1 tablet by mouth daily (Patient taking differently: Take 10 mEq by mouth daily One additional tablet when taking furosemide) 180 tablet 0    pramipexole (MIRAPEX) 0.5 MG tablet 1.5 tab q 5 PM and 1.5 tab qHS 90 tablet 5    rivaroxaban (XARELTO) 20 MG TABS tablet Take 1 tablet by mouth Daily with supper 90 tablet 3    azelastine (ASTELIN) 0.1 % nasal spray 1 spray by Nasal route 2 times daily 1 Spray in each nostril 2 Bottle 1    Magnesium Citrate

## 2020-01-22 ENCOUNTER — NURSE TRIAGE (OUTPATIENT)
Dept: OTHER | Facility: CLINIC | Age: 70
End: 2020-01-22

## 2020-01-22 NOTE — PROGRESS NOTES
[address]   Electrophysiology  [facility] Molly, APRN-CNP  Attending EP: Dr. Perez Carias    Date: 2/20/2020  I had the privilege of visiting Lilia Salcedo in the office. Chief Complaint:   Chief Complaint   Patient presents with    1 Month Follow-Up     hospital 3 weeks ago    Atrial Fibrillation     having medication issues    Hypertension     History of Present Illness: History obtained from patient and medical record. Lilia Salcedo is 81 y.o. female with a past medical history of atrial fibrillation, HUSSAIN, obesity, dCHF, HTN, and PE. In January of 2020, she presented to the hospital with palpitations and was found to be in atrial fibrillation with RVR. She has PAF and normally converts to NSR spontaneously. She is symptomatic with her afib with symptoms of lightheadedness, SOB, and chest pressure. On 1/13/20, she was cardioverted and found to have long conversion pauses post procedure (Hx of syncope after conversion at home). Discharged with home monitoring.     -Interval history: Today, Lilia Salcedo is being seen for follow-up. She is in sinus rhythm per EKG. She has numerous questions/concerns regarding her medications, vital signs, and upcoming ablation. She feels well overall. No recent episodes of atrial fibrillation. Her blood pressure has been mildly elevated since reducing her cardizem dose due to bradycardia. Today it is 138/74. She is anxious about the ablation and the recovery process. She is compliant with her medications and CPAP. She had her mask adjusted this morning by sleep medicine. Pt recently began using a phone jewel called \"Noom\" to help assist in her weight loss and has lost 6 lbs. Denies having chest pain, palpitations, shortness of breath, orthopnea/PND, cough, or dizziness at the time of this visit. With regard to medication therapy the patient has been compliant with prescribed regimen. They have tolerated therapy to date. Allergies:   Allergies   Allergen Reactions History:  Past Medical History:   Diagnosis Date    Allergic     Anesthesia complication     family hx-father-at at age 80 having hip replacement revision surgery-had tia's x2 while under anes-but also had hx chf-had dificuly with speech when coming out from [address]     left ankle, bilateral hands    Arthritis of left hip 2/2/2016    Arthritis of right hip 4/19/2016    Asthma     At risk for falls     uses a cane    Atrial fibrillation with rapid ventricular response (HCC)     Atrial flutter ([address] 4/25/2018    Chronic maxillary sinusitis 5/24/2017    CTEPH (chronic thromboembolic pulmonary hypertension) ([address] 8/26/2016    Depression     pt stated r/t bad marriage/alcoholic spouse    Disc disease, degenerative, lumbar or lumbosacral 2/20/2017    Hepatic steatosis 4/26/2019    History of total hip arthroplasty 2/28/2017    Hypertension     Leg cramps     patient states very severe, requires immediate potassium administration    Migraines, basilar     last migraine 10 years ago    Mild persistent asthma without complication 9/29/9251    Obesity, Class III, BMI 40-49.9 (morbid obesity) ([address] 4/19/2016    HUSSAIN (obstructive sleep apnea) 10/14/2013    Osteoarthritis, hip, bilateral 2016    Bilateral hip arthroplasty    Panic attacks     Pneumonia 2015    Primary osteoarthritis of both knees 9/19/2017    Primary osteoarthritis of left knee 12/15/2016    Pulmonary emboli ([address] 8/4/2016    Pulmonary HTN ([address] 7/21/2016    Pure hypercholesterolemia 2/13/2019    Restless leg syndrome     Rhinitis, chronic 3/26/2014    Sleep apnea     wears CPAP @11    Stress incontinence     Tear of left rotator cuff 1/23/2018    Thyroid disease     hypothyroid    Wears glasses      Past Surgical History:    has a past surgical history that includes Cataract removal (Bilateral); Finger surgery (1988); eye surgery (Right, 2010); back surgery (2004); Tonsillectomy (1972);  Hysterectomy (1993); Colonoscopy; joint replacement (Left, 2/2/16); and Hip Arthroplasty (Right, 4-19-16). Social History:  Reviewed. reports that she quit smoking about 6 years ago. She has a 2.50 pack-year smoking history. She has never used smokeless tobacco. She reports current alcohol use. She reports that she does not use drugs. Family History:  Reviewed. family history includes Alcohol Abuse in her maternal grandfather, paternal grandfather, and sister; Anxiety Disorder in her mother; Arthritis in her brother, mother, and sister; Asthma in her mother; Cancer in her paternal uncle; Cataracts in her mother; Heart Disease in her father, maternal grandfather, maternal uncle, mother, and paternal uncle; Heart Failure in her father; High Cholesterol in her brother; Hypertension in her mother; Stroke in her maternal grandmother; Substance Abuse in her maternal grandfather and paternal grandfather; Thyroid Disease in her father, maternal grandfather, maternal grandmother, and mother. Review of System:  · Constitutional: Negative for fever, night sweats, chills, weight changes, or weakness  · Skin: Negative for rash, dry skin, pruritus, bruising, bleeding, blood clots, or changes in skin pigment  · HEENT: Negative for vision changes, ringing in the ears, sore throat, dysphagia, or swollen lymph nodes  · Respiratory: Reviewed in HPI  · Cardiovascular: Reviewed in HPI  · Gastrointestinal: Negative for abdominal pain, N/V/D, constipation, or black/tarry stools  · Genito-Urinary: Negative for dysuria, incontinence, urgency, or hematuria  · Musculoskeletal: Negative for joint swelling, muscle pain, or injuries  · Neurological/Psych: Negative for confusion, seizures, dizziness, headaches, balance issues or TIA-like symptoms. No anxiety, depression, or insomnia    Physical Examination:  Vitals:    02/20/20 1007   BP: 138/74   Pulse:        Wt Readings from Last 3 Encounters:   02/20/20 241 lb (109.3 kg)   02/03/20 239 lb 9.6 prevent long term effects of untreated HUSSAIN    4. Obesity  - Body mass index is 41.37 kg/m². - Complicating assessment and treatment. Placing patient at risk for multiple co-morbidities as well as early death and contributing to the patient's presentation  - Discussed weight loss and patient was encouraged to reduce calorie intake and increase exercise    5. Hx of PE   - On Xarelto    Plan:  1. Increase lisinopril 20 mg every night  2. Continue to monitor blood pressure  3. Scheduled for ablation on 4/1/20. Do not eat or drink after midnight. Do not take xarelto the night prior to your ablation    F/U: Follow-up with NPSR in 3 months  -[place] [address] at [phone] with any questions    Diet & Exercise:   The patient is counseled to follow a low salt diet to assure blood pressure remains controlled for cardiovascular risk factor modification   The patient is counseled to avoid excess caffeine, and energy drinks as this may exacerbated ectopy and arrhythmia   The patient is counseled to lose weight to control cardiovascular risk factors   Exercise program discussed: To improve overall cardiovascular health, the patient is instructed to increase cardiovascular related activities with a goal of 150 min/week of moderate level activity or 10,000 steps per day. Encouraged to perform as much activity as tolerated    Quality Metrics  1. Tobacco Cessation Counseling: N/A  2. Retake of BP if >140/90: Yes   3. Documentation to PCP: Note sent to PCP office visit  4. CAD patient on anti-platelet: N/A   5.   CAD patient on STATIN therapy: N/A   6. Patient with history of CHF and atrial fibrillation on anticoagulation: Yes (Eliquis)      I have addressed the patient's cardiac risk factors and adjusted pharmacologic treatment as needed. In addition, I have reinforced the need for patient directed risk factor modification.     I independently reviewed the MCOT and ECG    All questions and concerns were

## 2020-01-22 NOTE — TELEPHONE ENCOUNTER
Reason for Disposition   Health Information question, no triage required and triager able to answer question    Protocols used: INFORMATION ONLY CALL-ADULT-AH    Received call from Evans in [facility]. Patient calling to cancel her appointment to see the PCP today due to feeling nauseated and light headed since her medications were changed when she was hospitalized on 1/12/20 for A Fib. The patient has been contacting the cardiology office. She is having such lightheadedness that she is using a cane She does have an appointment to see her Cardiologist Dr. Leonard Hale tomorrow. This writer did encouraged the patient to call her Cardiologist office to speak to a provider prior to her appointment tomorrow. The patient voices understanding of the need to speak to the MD who ordered the cardiac medication change. Call lamar Coronado in [facility] to schedule appointment for after her cardiology appointment as a follow up for her ED visit.

## 2020-01-23 ENCOUNTER — OFFICE VISIT (OUTPATIENT)
Dept: CARDIOLOGY CLINIC | Age: 70
End: 2020-01-23
Payer: MEDICARE

## 2020-01-23 ENCOUNTER — HOSPITAL ENCOUNTER (OUTPATIENT)
Age: 70
Discharge: HOME | End: 2020-01-23
Payer: MEDICARE

## 2020-01-23 VITALS
HEART RATE: 58 BPM | HEIGHT: 64 IN | BODY MASS INDEX: 42.51 KG/M2 | WEIGHT: 249 LBS | OXYGEN SATURATION: 98 % | SYSTOLIC BLOOD PRESSURE: 114 MMHG | DIASTOLIC BLOOD PRESSURE: 66 MMHG

## 2020-01-23 LAB
ANION GAP SERPL CALCULATED.3IONS-SCNC: 14 MMOL/L (ref 3–16)
BUN BLDV-MCNC: 13 MG/DL (ref 7–20)
CALCIUM SERPL-MCNC: 9.3 MG/DL (ref 8.3–10.6)
CHLORIDE BLD-SCNC: 103 MMOL/L (ref 99–110)
CO2: 20 MMOL/L (ref 21–32)
CREAT SERPL-MCNC: <0.5 MG/DL (ref 0.6–1.2)
GFR AFRICAN AMERICAN: >60
GFR NON-AFRICAN AMERICAN: >60
GLUCOSE BLD-MCNC: 104 MG/DL (ref 70–99)
POTASSIUM SERPL-SCNC: 4 MMOL/L (ref 3.5–5.1)
PRO-BNP: 200 PG/ML (ref 0–124)
SODIUM BLD-SCNC: 137 MMOL/L (ref 136–145)

## 2020-01-23 PROCEDURE — 1036F TOBACCO NON-USER: CPT | Performed by: INTERNAL MEDICINE

## 2020-01-23 PROCEDURE — 83880 ASSAY OF NATRIURETIC PEPTIDE: CPT

## 2020-01-23 PROCEDURE — 1123F ACP DISCUSS/DSCN MKR DOCD: CPT | Performed by: INTERNAL MEDICINE

## 2020-01-23 PROCEDURE — G8399 PT W/DXA RESULTS DOCUMENT: HCPCS | Performed by: INTERNAL MEDICINE

## 2020-01-23 PROCEDURE — G8427 DOCREV CUR MEDS BY ELIG CLIN: HCPCS | Performed by: INTERNAL MEDICINE

## 2020-01-23 PROCEDURE — 36415 COLL VENOUS BLD VENIPUNCTURE: CPT

## 2020-01-23 PROCEDURE — G8482 FLU IMMUNIZE ORDER/ADMIN: HCPCS | Performed by: INTERNAL MEDICINE

## 2020-01-23 PROCEDURE — 1090F PRES/ABSN URINE INCON ASSESS: CPT | Performed by: INTERNAL MEDICINE

## 2020-01-23 PROCEDURE — 4040F PNEUMOC VAC/ADMIN/RCVD: CPT | Performed by: INTERNAL MEDICINE

## 2020-01-23 PROCEDURE — 3017F COLORECTAL CA SCREEN DOC REV: CPT | Performed by: INTERNAL MEDICINE

## 2020-01-23 PROCEDURE — 99215 OFFICE O/P EST HI 40 MIN: CPT | Performed by: INTERNAL MEDICINE

## 2020-01-23 PROCEDURE — G8417 CALC BMI ABV UP PARAM F/U: HCPCS | Performed by: INTERNAL MEDICINE

## 2020-01-23 PROCEDURE — 80048 BASIC METABOLIC PNL TOTAL CA: CPT

## 2020-01-23 RX ORDER — DILTIAZEM HYDROCHLORIDE 60 MG/1
60 TABLET, FILM COATED ORAL DAILY PRN
Qty: 30 TABLET | Refills: 5 | Status: SHIPPED | OUTPATIENT
Start: 2020-01-23 | End: 2020-04-15

## 2020-01-23 RX ORDER — HYDROCHLOROTHIAZIDE 25 MG/1
25 TABLET ORAL DAILY
COMMUNITY
End: 2020-03-09

## 2020-01-23 RX ORDER — DILTIAZEM HYDROCHLORIDE 180 MG/1
180 CAPSULE, COATED, EXTENDED RELEASE ORAL DAILY
Qty: 90 CAPSULE | Refills: 3 | Status: ON HOLD
Start: 2020-01-23 | End: 2020-03-29 | Stop reason: HOSPADM

## 2020-01-23 RX ORDER — DILTIAZEM HYDROCHLORIDE 60 MG/1
60 TABLET, FILM COATED ORAL DAILY PRN
Qty: 30 TABLET | Refills: 5
Start: 2020-01-23 | End: 2020-01-23 | Stop reason: SDUPTHER

## 2020-01-23 RX ORDER — LISINOPRIL 10 MG/1
10 TABLET ORAL NIGHTLY
COMMUNITY
End: 2020-02-20 | Stop reason: DRUGHIGH

## 2020-01-23 RX ORDER — FUROSEMIDE 20 MG/1
10 TABLET ORAL
COMMUNITY
End: 2020-01-31

## 2020-01-23 RX ORDER — DILTIAZEM HYDROCHLORIDE 180 MG/1
180 CAPSULE, COATED, EXTENDED RELEASE ORAL DAILY
Qty: 90 CAPSULE | Refills: 3
Start: 2020-01-23 | End: 2020-01-23 | Stop reason: SDUPTHER

## 2020-01-27 ENCOUNTER — CARE COORDINATION (OUTPATIENT)
Dept: CARE COORDINATION | Age: 70
End: 2020-01-27

## 2020-01-27 NOTE — CARE COORDINATION
medications from pt or caregiver? No   Questions addressed? Not Applicable                Has all of medication and/or prescriptions   Yes  Taking Medications? Yes  Can you swallow your pills? Yes    Is the patient having any trouble with ADL's or IADL's? Yes Patient says she is experiencing dizziness  Is the patient able to move around as expected? No: Patient says she is using her cane in the home (this is new per patient)  Needs Equipment?   No    Link to services in community?:  No   Which services:  N/A  Plan:  Follow at next appointment 1/31/2020

## 2020-01-31 ENCOUNTER — OFFICE VISIT (OUTPATIENT)
Dept: INTERNAL MEDICINE CLINIC | Age: 70
End: 2020-01-31
Payer: MEDICARE

## 2020-01-31 VITALS
WEIGHT: 247 LBS | HEART RATE: 81 BPM | DIASTOLIC BLOOD PRESSURE: 72 MMHG | BODY MASS INDEX: 42.4 KG/M2 | SYSTOLIC BLOOD PRESSURE: 126 MMHG

## 2020-01-31 PROCEDURE — G8510 SCR DEP NEG, NO PLAN REQD: HCPCS | Performed by: INTERNAL MEDICINE

## 2020-01-31 PROCEDURE — 3017F COLORECTAL CA SCREEN DOC REV: CPT | Performed by: INTERNAL MEDICINE

## 2020-01-31 PROCEDURE — G8482 FLU IMMUNIZE ORDER/ADMIN: HCPCS | Performed by: INTERNAL MEDICINE

## 2020-01-31 PROCEDURE — G8399 PT W/DXA RESULTS DOCUMENT: HCPCS | Performed by: INTERNAL MEDICINE

## 2020-01-31 PROCEDURE — 1090F PRES/ABSN URINE INCON ASSESS: CPT | Performed by: INTERNAL MEDICINE

## 2020-01-31 PROCEDURE — 1036F TOBACCO NON-USER: CPT | Performed by: INTERNAL MEDICINE

## 2020-01-31 PROCEDURE — 4040F PNEUMOC VAC/ADMIN/RCVD: CPT | Performed by: INTERNAL MEDICINE

## 2020-01-31 PROCEDURE — 99213 OFFICE O/P EST LOW 20 MIN: CPT | Performed by: INTERNAL MEDICINE

## 2020-01-31 PROCEDURE — G8417 CALC BMI ABV UP PARAM F/U: HCPCS | Performed by: INTERNAL MEDICINE

## 2020-01-31 PROCEDURE — G8427 DOCREV CUR MEDS BY ELIG CLIN: HCPCS | Performed by: INTERNAL MEDICINE

## 2020-01-31 PROCEDURE — 1123F ACP DISCUSS/DSCN MKR DOCD: CPT | Performed by: INTERNAL MEDICINE

## 2020-01-31 RX ORDER — FUROSEMIDE 20 MG/1
20 TABLET ORAL DAILY
Qty: 30 TABLET | Refills: 3 | Status: ON HOLD | OUTPATIENT
Start: 2020-01-31 | End: 2020-03-29 | Stop reason: SDUPTHER

## 2020-01-31 RX ORDER — LEVOFLOXACIN 500 MG/1
500 TABLET, FILM COATED ORAL DAILY
Qty: 10 TABLET | Refills: 0 | Status: ON HOLD | OUTPATIENT
Start: 2020-01-31 | End: 2020-02-03 | Stop reason: HOSPADM

## 2020-01-31 ASSESSMENT — PATIENT HEALTH QUESTIONNAIRE - PHQ9
1. LITTLE INTEREST OR PLEASURE IN DOING THINGS: 0
SUM OF ALL RESPONSES TO PHQ9 QUESTIONS 1 & 2: 1
2. FEELING DOWN, DEPRESSED OR HOPELESS: 1
SUM OF ALL RESPONSES TO PHQ QUESTIONS 1-9: 1
SUM OF ALL RESPONSES TO PHQ QUESTIONS 1-9: 1

## 2020-02-02 ENCOUNTER — HOSPITAL ENCOUNTER (OUTPATIENT)
Age: 70
Setting detail: OBSERVATION
Discharge: HOME | End: 2020-02-03
Attending: EMERGENCY MEDICINE | Admitting: INTERNAL MEDICINE
Payer: MEDICARE

## 2020-02-02 ENCOUNTER — APPOINTMENT (OUTPATIENT)
Dept: GENERAL RADIOLOGY | Age: 70
End: 2020-02-02
Payer: MEDICARE

## 2020-02-02 PROBLEM — I48.91 A-FIB (HCC): Status: ACTIVE | Noted: 2020-02-02

## 2020-02-02 LAB
A/G RATIO: 1.5 (ref 1.1–2.2)
ALBUMIN SERPL-MCNC: 4 G/DL (ref 3.4–5)
ALP BLD-CCNC: 79 U/L (ref 40–129)
ALT SERPL-CCNC: 24 U/L (ref 10–40)
ANION GAP SERPL CALCULATED.3IONS-SCNC: 17 MMOL/L (ref 3–16)
AST SERPL-CCNC: 19 U/L (ref 15–37)
BILIRUB SERPL-MCNC: <0.2 MG/DL (ref 0–1)
BUN BLDV-MCNC: 20 MG/DL (ref 7–20)
CALCIUM SERPL-MCNC: 9.5 MG/DL (ref 8.3–10.6)
CHLORIDE BLD-SCNC: 100 MMOL/L (ref 99–110)
CO2: 21 MMOL/L (ref 21–32)
CREAT SERPL-MCNC: 0.5 MG/DL (ref 0.6–1.2)
EKG ATRIAL RATE: 234 BPM
EKG DIAGNOSIS: NORMAL
EKG Q-T INTERVAL: 338 MS
EKG QRS DURATION: 84 MS
EKG QTC CALCULATION (BAZETT): 408 MS
EKG R AXIS: 2 DEGREES
EKG T AXIS: 44 DEGREES
EKG VENTRICULAR RATE: 88 BPM
GFR AFRICAN AMERICAN: >60
GFR NON-AFRICAN AMERICAN: >60
GLOBULIN: 2.6 G/DL
GLUCOSE BLD-MCNC: 132 MG/DL (ref 70–99)
HCT VFR BLD CALC: 42.1 % (ref 36–48)
HEMOGLOBIN: 14.1 G/DL (ref 12–16)
MCH RBC QN AUTO: 32.3 PG (ref 26–34)
MCHC RBC AUTO-ENTMCNC: 33.4 G/DL (ref 31–36)
MCV RBC AUTO: 96.7 FL (ref 80–100)
PDW BLD-RTO: 14.9 % (ref 12.4–15.4)
PLATELET # BLD: 277 K/UL (ref 135–450)
PMV BLD AUTO: 9.1 FL (ref 5–10.5)
POTASSIUM REFLEX MAGNESIUM: 3.7 MMOL/L (ref 3.5–5.1)
RBC # BLD: 4.36 M/UL (ref 4–5.2)
REASON FOR REJECTION: NORMAL
REJECTED TEST: NORMAL
SODIUM BLD-SCNC: 138 MMOL/L (ref 136–145)
TOTAL PROTEIN: 6.6 G/DL (ref 6.4–8.2)
TROPONIN: <0.01 NG/ML
WBC # BLD: 7.1 K/UL (ref 4–11)

## 2020-02-02 PROCEDURE — 71045 X-RAY EXAM CHEST 1 VIEW: CPT

## 2020-02-02 PROCEDURE — 85027 COMPLETE CBC AUTOMATED: CPT

## 2020-02-02 PROCEDURE — 93005 ELECTROCARDIOGRAM TRACING: CPT | Performed by: EMERGENCY MEDICINE

## 2020-02-02 PROCEDURE — G0378 HOSPITAL OBSERVATION PER HR: HCPCS

## 2020-02-02 PROCEDURE — 94640 AIRWAY INHALATION TREATMENT: CPT

## 2020-02-02 PROCEDURE — 93010 ELECTROCARDIOGRAM REPORT: CPT | Performed by: INTERNAL MEDICINE

## 2020-02-02 PROCEDURE — 2580000003 HC RX 258: Performed by: INTERNAL MEDICINE

## 2020-02-02 PROCEDURE — 99285 EMERGENCY DEPT VISIT HI MDM: CPT

## 2020-02-02 PROCEDURE — 99215 OFFICE O/P EST HI 40 MIN: CPT | Performed by: INTERNAL MEDICINE

## 2020-02-02 PROCEDURE — 6370000000 HC RX 637 (ALT 250 FOR IP): Performed by: INTERNAL MEDICINE

## 2020-02-02 PROCEDURE — 80053 COMPREHEN METABOLIC PANEL: CPT

## 2020-02-02 PROCEDURE — 94761 N-INVAS EAR/PLS OXIMETRY MLT: CPT

## 2020-02-02 PROCEDURE — 84484 ASSAY OF TROPONIN QUANT: CPT

## 2020-02-02 PROCEDURE — 6360000002 HC RX W HCPCS: Performed by: INTERNAL MEDICINE

## 2020-02-02 PROCEDURE — 36415 COLL VENOUS BLD VENIPUNCTURE: CPT

## 2020-02-02 RX ORDER — HYDROCHLOROTHIAZIDE 25 MG/1
25 TABLET ORAL DAILY
Status: DISCONTINUED | OUTPATIENT
Start: 2020-02-02 | End: 2020-02-02

## 2020-02-02 RX ORDER — AMIODARONE HYDROCHLORIDE 200 MG/1
200 TABLET ORAL 3 TIMES DAILY
Status: DISCONTINUED | OUTPATIENT
Start: 2020-02-02 | End: 2020-02-03 | Stop reason: HOSPADM

## 2020-02-02 RX ORDER — ONDANSETRON 2 MG/ML
4 INJECTION INTRAMUSCULAR; INTRAVENOUS EVERY 6 HOURS PRN
Status: DISCONTINUED | OUTPATIENT
Start: 2020-02-02 | End: 2020-02-03 | Stop reason: HOSPADM

## 2020-02-02 RX ORDER — SODIUM CHLORIDE 0.9 % (FLUSH) 0.9 %
10 SYRINGE (ML) INJECTION EVERY 12 HOURS SCHEDULED
Status: DISCONTINUED | OUTPATIENT
Start: 2020-02-02 | End: 2020-02-03 | Stop reason: HOSPADM

## 2020-02-02 RX ORDER — FUROSEMIDE 20 MG/1
20 TABLET ORAL DAILY
Status: DISCONTINUED | OUTPATIENT
Start: 2020-02-02 | End: 2020-02-03 | Stop reason: HOSPADM

## 2020-02-02 RX ORDER — LISINOPRIL 10 MG/1
10 TABLET ORAL NIGHTLY
Status: DISCONTINUED | OUTPATIENT
Start: 2020-02-02 | End: 2020-02-03 | Stop reason: HOSPADM

## 2020-02-02 RX ORDER — SODIUM CHLORIDE 0.9 % (FLUSH) 0.9 %
10 SYRINGE (ML) INJECTION PRN
Status: DISCONTINUED | OUTPATIENT
Start: 2020-02-02 | End: 2020-02-03 | Stop reason: HOSPADM

## 2020-02-02 RX ORDER — DILTIAZEM HYDROCHLORIDE 180 MG/1
180 CAPSULE, COATED, EXTENDED RELEASE ORAL DAILY
Status: DISCONTINUED | OUTPATIENT
Start: 2020-02-02 | End: 2020-02-02

## 2020-02-02 RX ORDER — POLYETHYLENE GLYCOL 3350 17 G/17G
17 POWDER, FOR SOLUTION ORAL DAILY PRN
Status: DISCONTINUED | OUTPATIENT
Start: 2020-02-02 | End: 2020-02-03 | Stop reason: HOSPADM

## 2020-02-02 RX ORDER — POTASSIUM CHLORIDE 20 MEQ/1
10 TABLET, EXTENDED RELEASE ORAL DAILY
Status: DISCONTINUED | OUTPATIENT
Start: 2020-02-02 | End: 2020-02-03 | Stop reason: HOSPADM

## 2020-02-02 RX ORDER — BUDESONIDE 0.5 MG/2ML
1 INHALANT ORAL 2 TIMES DAILY
Status: DISCONTINUED | OUTPATIENT
Start: 2020-02-02 | End: 2020-02-03 | Stop reason: HOSPADM

## 2020-02-02 RX ORDER — PRAMIPEXOLE DIHYDROCHLORIDE 0.25 MG/1
0.75 TABLET ORAL NIGHTLY
Status: DISCONTINUED | OUTPATIENT
Start: 2020-02-02 | End: 2020-02-03 | Stop reason: HOSPADM

## 2020-02-02 RX ORDER — DILTIAZEM HYDROCHLORIDE 180 MG/1
180 CAPSULE, COATED, EXTENDED RELEASE ORAL
Status: DISCONTINUED | OUTPATIENT
Start: 2020-02-02 | End: 2020-02-03 | Stop reason: HOSPADM

## 2020-02-02 RX ORDER — AZELASTINE 1 MG/ML
1 SPRAY, METERED NASAL 2 TIMES DAILY
Status: DISCONTINUED | OUTPATIENT
Start: 2020-02-02 | End: 2020-02-03 | Stop reason: HOSPADM

## 2020-02-02 RX ORDER — HYDROCHLOROTHIAZIDE 25 MG/1
25 TABLET ORAL NIGHTLY
Status: DISCONTINUED | OUTPATIENT
Start: 2020-02-02 | End: 2020-02-03 | Stop reason: HOSPADM

## 2020-02-02 RX ORDER — FLUTICASONE PROPIONATE 220 UG/1
2 AEROSOL, METERED RESPIRATORY (INHALATION) 2 TIMES DAILY
Status: DISCONTINUED | OUTPATIENT
Start: 2020-02-02 | End: 2020-02-02 | Stop reason: CLARIF

## 2020-02-02 RX ORDER — PRAMIPEXOLE DIHYDROCHLORIDE 0.25 MG/1
0.75 TABLET ORAL
Status: DISCONTINUED | OUTPATIENT
Start: 2020-02-02 | End: 2020-02-03 | Stop reason: HOSPADM

## 2020-02-02 RX ORDER — SENNA PLUS 8.6 MG/1
1 TABLET ORAL DAILY PRN
Status: DISCONTINUED | OUTPATIENT
Start: 2020-02-02 | End: 2020-02-03 | Stop reason: HOSPADM

## 2020-02-02 RX ADMIN — BUDESONIDE 1000 MCG: 0.5 SUSPENSION RESPIRATORY (INHALATION) at 20:57

## 2020-02-02 RX ADMIN — PRAMIPEXOLE DIHYDROCHLORIDE 0.75 MG: 0.25 TABLET ORAL at 21:32

## 2020-02-02 RX ADMIN — Medication 10 ML: at 10:33

## 2020-02-02 RX ADMIN — AMIODARONE HYDROCHLORIDE 200 MG: 200 TABLET ORAL at 14:58

## 2020-02-02 RX ADMIN — POTASSIUM CHLORIDE 10 MEQ: 1500 TABLET, EXTENDED RELEASE ORAL at 10:27

## 2020-02-02 RX ADMIN — FUROSEMIDE 20 MG: 20 TABLET ORAL at 10:28

## 2020-02-02 RX ADMIN — AZELASTINE HYDROCHLORIDE 1 SPRAY: 137 SPRAY, METERED NASAL at 21:35

## 2020-02-02 RX ADMIN — AMIODARONE HYDROCHLORIDE 200 MG: 200 TABLET ORAL at 10:27

## 2020-02-02 RX ADMIN — LISINOPRIL 10 MG: 10 TABLET ORAL at 21:32

## 2020-02-02 RX ADMIN — HYDROCHLOROTHIAZIDE 25 MG: 25 TABLET ORAL at 21:33

## 2020-02-02 RX ADMIN — RIVAROXABAN 20 MG: 20 TABLET, FILM COATED ORAL at 18:42

## 2020-02-02 RX ADMIN — TIOTROPIUM BROMIDE INHALATION SPRAY 2 PUFF: 3.12 SPRAY, METERED RESPIRATORY (INHALATION) at 20:57

## 2020-02-02 RX ADMIN — Medication 10 ML: at 21:33

## 2020-02-02 RX ADMIN — DILTIAZEM HYDROCHLORIDE 180 MG: 180 CAPSULE, COATED, EXTENDED RELEASE ORAL at 18:41

## 2020-02-02 RX ADMIN — PRAMIPEXOLE DIHYDROCHLORIDE 0.75 MG: 0.25 TABLET ORAL at 19:33

## 2020-02-02 RX ADMIN — AMIODARONE HYDROCHLORIDE 200 MG: 200 TABLET ORAL at 21:33

## 2020-02-02 ASSESSMENT — PAIN SCALES - GENERAL
PAINLEVEL_OUTOF10: 0
PAINLEVEL_OUTOF10: 6
PAINLEVEL_OUTOF10: 5

## 2020-02-02 ASSESSMENT — PAIN DESCRIPTION - DESCRIPTORS: DESCRIPTORS: PRESSURE

## 2020-02-02 ASSESSMENT — PAIN DESCRIPTION - LOCATION
LOCATION: CHEST
LOCATION: CHEST

## 2020-02-02 ASSESSMENT — PAIN DESCRIPTION - ORIENTATION: ORIENTATION: MID

## 2020-02-02 ASSESSMENT — PAIN DESCRIPTION - PAIN TYPE: TYPE: ACUTE PAIN

## 2020-02-02 NOTE — PROGRESS NOTES
Patient found to be back in SR on telemetry, patient feels better as well no longer with chest pressure, tele strip on chart.

## 2020-02-02 NOTE — ED NOTES
RN to pt room for discharge of pt after pt marked ready for discharge. Pt states she is still dizzy, concerned to be at home with ongoing dizziness, wants to speak to doctor about her continued concerns. Dr. Brando Baez asked to speak with pt. After MD speaks with pt states she will now be admitted to hospital. RN to pt room; pt wishing to walk to bathroom; pt states dizziness is minimal at this time. Pt ambulated to restroom with stand by assist, gait steady. Pt returned to room and placed back on monitor, bed in low position, side rails up x 2. Pt/ family updated on poc.       Lizandro Damon RN  02/02/20 [id number]

## 2020-02-02 NOTE — CONSULTS
[facility]   Electrophysiology Consultation   Date: 2/2/2020  Reason for Consultation: Atrial fibrillation   Consult Requesting Physician: Sahil Diaz MD     Chief Complaint   Patient presents with    Chest Pain     Pt feels she is in AFib d/t to pressure in her chest.  Feels like people standing on her chest     HPI: Piero Gonzalez is a 81 y.o. female with Hx of paroxysmal atrial fibrillation who I saw in 2018 and was fine until a few weeks ago when she came back with Atrial fibrillation and was cardioverted .  Did not tolerate propafenone due to bradycardia    Last night she had another episode of Atrial fibrillation, symptomatic    She has Hx of PE and on xarelto for it    Past Medical History:   Diagnosis Date    Allergic     Anesthesia complication     family hx-father-at at age 80 having hip replacement revision surgery-had tia's x2 while under anes-but also had hx chf-had dificuly with speech when coming out from [address]     left ankle, bilateral hands    Arthritis of left hip 2/2/2016    Arthritis of right hip 4/19/2016    Asthma     At risk for falls     uses a cane    Atrial fibrillation with rapid ventricular response (HCC)     Atrial flutter ([address] 4/25/2018    Chronic maxillary sinusitis 5/24/2017    CTEPH (chronic thromboembolic pulmonary hypertension) ([address] 8/26/2016    Depression     pt stated r/t bad marriage/alcoholic spouse    Disc disease, degenerative, lumbar or lumbosacral 2/20/2017    Hepatic steatosis 4/26/2019    History of total hip arthroplasty 2/28/2017    Hypertension     Leg cramps     patient states very severe, requires immediate potassium administration    Migraines, basilar     last migraine 10 years ago    Mild persistent asthma without complication 2/95/5551    Obesity, Class III, BMI 40-49.9 (morbid obesity) ([address] 4/19/2016    HUSSAIN (obstructive sleep apnea) 10/14/2013    Osteoarthritis, hip, bilateral 2016    Bilateral hip arthroplasty    Panic attacks     Pneumonia 2015    Primary osteoarthritis of both knees 9/19/2017    Primary osteoarthritis of left knee 12/15/2016    Pulmonary emboli (HCC) 8/4/2016    Pulmonary HTN ([address] 7/21/2016    Pure hypercholesterolemia 2/13/2019    Restless leg syndrome     Rhinitis, chronic 3/26/2014    Sleep apnea     wears CPAP @11    Stress incontinence     Tear of left rotator cuff 1/23/2018    Thyroid disease     hypothyroid    Wears glasses         Past Surgical History:   Procedure Laterality Date    BACK SURGERY  2004    LUMBAR FUSION    CATARACT REMOVAL Bilateral     COLONOSCOPY      EYE SURGERY Right 2010    retinal tear repaired   [facility]    right ring finger severe laceration, fracture    HIP ARTHROPLASTY Right 4-19-16    HYSTERECTOMY  1993    JOINT REPLACEMENT Left 2/2/16    left hip    TONSILLECTOMY  1972       Allergies   Allergen Reactions    Atorvastatin Other (See Comments)     Muscle Pain, all statins     Simvastatin Other (See Comments)     Muscle Pain    Cephalexin Hives and Itching    Cephalosporins Hives and Itching    Food Diarrhea     Milk , shellfish , gluten. ..may have bouts of diarrhea, constipation or flatulus    Other      Environmental -cats,dogs,dust    Sulfa Antibiotics      Can take Celebrex, Pt denies 8/1/18       Social History:  Reviewed. reports that she quit smoking about 6 years ago. She has a 2.50 pack-year smoking history. She has never used smokeless tobacco. She reports current alcohol use. She reports that she does not use drugs. Family History:  Reviewed. family history includes Alcohol Abuse in her maternal grandfather, paternal grandfather, and sister; Anxiety Disorder in her mother; Arthritis in her brother, mother, and sister; Asthma in her mother; Cancer in her paternal uncle; Cataracts in her mother; Heart Disease in her father, maternal grandfather, maternal uncle, mother, and paternal uncle;  Heart Failure in her father; High Cholesterol in her brother; Hypertension in her mother; Stroke in her maternal grandmother; Substance Abuse in her maternal grandfather and paternal grandfather; Thyroid Disease in her father, maternal grandfather, maternal grandmother, and mother. Review of System:  All other systems reviewed and are negative except for that noted above. Pertinent negatives are:     · General: negative for fever, chills   · Ophthalmic ROS: negative for - eye pain or loss of vision  · ENT ROS: negative for - headaches, sore throat   · Respiratory: negative for - cough, sputum  · Cardiovascular: Reviewed in HPI  · Gastrointestinal: negative for - abdominal pain, diarrhea, N/V  · Hematology: negative for - bleeding, blood clots, bruising or jaundice  · Genito-Urinary:  negative for - Dysuria or incontinence  · Musculoskeletal: negative for - Joint swelling, muscle pain  · Neurological: negative for - confusion, dizziness, headaches   · Psychiatric: No anxiety, no depression. · Dermatological: negative for - rash    Physical Examination:  Vitals:    20 1445   BP: 137/79   Pulse: 120   Resp: 16   Temp: 97.2 °F (36.2 °C)   SpO2: 98%      No intake/output data recorded. Wt Readings from Last 3 Encounters:   20 239 lb (108.4 kg)   20 247 lb (112 kg)   20 249 lb (112.9 kg)     Temp  Av.5 °F (36.4 °C)  Min: 97 °F (36.1 °C)  Max: 98 °F (36.7 °C)  Pulse  Av.2  Min: 80  Max: 130  BP  Min: 104/75  Max: 203/109  SpO2  Av.8 %  Min: 95 %  Max: 98 %  No intake or output data in the 24 hours ending 20 1513    · Telemetry: Atrial fibrillation   · Constitutional: Oriented. No distress. · Head: Normocephalic and atraumatic. · Mouth/Throat: Oropharynx is clear and moist.   · Eyes: Conjunctivae normal. EOM are normal.   · Neck: Neck supple. No rigidity. No JVD present. · Cardiovascular: Normal rate, irregular rhythm, S1&S2.     · Pulmonary/Chest: Bilateral respiratory sounds. No wheezes, No rhonchi. · Abdominal: Soft. Bowel sounds present. No distension, No tenderness. · Musculoskeletal: No tenderness. No edema    · Lymphadenopathy: Has no cervical adenopathy. · Neurological: Alert and oriented. Cranial nerve appears intact, No Gross deficit   · Skin: Skin is warm and dry. No rash noted. · Psychiatric: Has a normal behavior     Labs, diagnostic and imaging results reviewed. Reviewed. Recent Labs     02/02/20  0501      K 3.7      CO2 21   BUN 20   CREATININE 0.5*     Recent Labs     02/02/20  0424   WBC 7.1   HGB 14.1   HCT 42.1   MCV 96.7        Lab Results   Component Value Date    CKTOTAL 145 09/23/2013    TROPONINI <0.01 02/02/2020     No results found for: BNP  Lab Results   Component Value Date    PROTIME 21.5 06/27/2018    PROTIME 20.0 05/16/2018    PROTIME 10.7 04/25/2018    INR 1.89 06/27/2018    INR 1.77 05/16/2018    INR 0.95 04/25/2018     Lab Results   Component Value Date    CHOL 175 08/01/2019    HDL 55 08/01/2019    TRIG 153 08/01/2019       ECG: Atrial fibrillation   Echo: Conclusions      Summary   -Left ventricular cavity size is normal.   -There is mild left ventricular hypertrophy.   -Ejection fraction is visually estimated to be 55-60%.    -No wall motion abnormalities.   -Normal diastolic function.   -Trivial mitral regurgitation.   -Mild tricuspid regurgitation       Cath:     Scheduled Meds:   furosemide  20 mg Oral Daily    lisinopril  10 mg Oral Nightly    potassium chloride  10 mEq Oral Daily    rivaroxaban  20 mg Oral Dinner    sodium chloride flush  10 mL Intravenous 2 times per day    amiodarone  200 mg Oral TID    hydrochlorothiazide  25 mg Oral Nightly    diltiazem  180 mg Oral Dinner     Continuous Infusions:  PRN Meds:.sodium chloride flush, ondansetron, senna     Patient Active Problem List    Diagnosis Date Noted    A-fib [facility]) 02/02/2020    Atrial fibrillation ([facility] 01/12/2020    Other pulmonary embolism without acute cor pulmonale (HCC) 08/22/2019    Hx of pulmonary embolus 05/14/2019    Chronic diastolic heart failure ([facility] 05/14/2019    Hepatic steatosis 04/26/2019    Pure hypercholesterolemia 02/13/2019    [facility]ox[facility]mal [facility] fibrillation ([facility] 11/09/2018    Primary osteoarthritis of both knees 09/19/2017    SOB (shortness of breath) 09/29/2016    Non morbid obesity, unspecified obesity type 04/19/2016    Obesity, Class III, BMI 40-49.9 (morbid obesity) ([facility] 02/02/2016    Benign essential HTN 10/14/2013    HUSSAIN (obstructive sleep apnea) 10/14/2013    Restless leg syndrome 07/11/2011    Acquired hypothyroidism 07/11/2011      Active Hospital Problems    Diagnosis Date Noted   [facility]) [I48.91] 02/02/2020       Assessment:       Plan:    - paroxysmal atrial fibrillation     Recurrent and symptomatic   Will start on amiodarone for now to help maintain sinus    Will do cardioversion tomorrow   We had previously discussed options and she wants to proceed with ablation    - HTN   BP has been ok mostly   Will adjust meds    - PE   On xarelto    - HUSSAIN   CPAP    - Obesity   Excessive weight is complicating assessment and treatment. It is placing patient at risk for multiple co-morbidities as well as early death and contributing to the patient's presentation. - discussed weight management with diet and exercise        Thank you for allowing me to participate in the care of Louella Angelucci     NOTE: This report was transcribed using voice recognition software. Every effort was made to ensure accuracy, however, inadvertent computerized transcription errors may be present.

## 2020-02-02 NOTE — H&P
for itching,rash  Psychiatric: Negative for depression,anxiety, agitation. Endocrine: Negative for polydipsia,polyuria,heat /cold intolerance. Past Medical History:   has a past medical history of Allergic, Anesthesia complication, Anxiety, Arthritis, Arthritis of left hip, Arthritis of right hip, Asthma, At risk for falls, Atrial fibrillation with rapid ventricular response (HCC), Atrial flutter ([address] Chronic maxillary sinusitis, CTEPH (chronic thromboembolic pulmonary hypertension) ([address] Depression, Disc disease, degenerative, lumbar or lumbosacral, Hepatic steatosis, History of total hip arthroplasty, Hypertension, Leg cramps, Migraines, basilar, Mild persistent asthma without complication, Obesity, Class III, BMI 40-49.9 (morbid obesity) ([address] HUSSAIN (obstructive sleep apnea), Osteoarthritis, hip, bilateral, Panic attacks, Pneumonia, Primary osteoarthritis of both knees, Primary osteoarthritis of left knee, Pulmonary emboli ([address] Pulmonary HTN ([address] Pure hypercholesterolemia, Restless leg syndrome, Rhinitis, chronic, Sleep apnea, Stress incontinence, Tear of left rotator cuff, Thyroid disease, and Wears glasses. Past Surgical History:   has a past surgical history that includes Cataract removal (Bilateral); Finger surgery (1988); eye surgery (Right, 2010); back surgery (2004); Tonsillectomy (1972); Hysterectomy (1993); Colonoscopy; joint replacement (Left, 2/2/16); and Hip Arthroplasty (Right, 4-19-16). Medications:  No current facility-administered medications on file prior to encounter.       Current Outpatient Medications on File Prior to Encounter   Medication Sig Dispense Refill    furosemide (LASIX) 20 MG tablet Take 1 tablet by mouth daily 30 tablet 3    lisinopril (PRINIVIL;ZESTRIL) 10 MG tablet Take 10 mg by mouth nightly       hydrochlorothiazide (HYDRODIURIL) 25 MG tablet Take 25 mg by mouth daily      CPAP Machine MISC by Does not apply route      diltiazem (CARDIZEM CD) 180 MG extended release capsule Take 1 capsule by mouth daily 90 capsule 3    diltiazem (CARDIZEM) 60 MG tablet Take 1 tablet by mouth daily as needed (for palpitations) 30 tablet 5    fluticasone (FLOVENT HFA) 110 MCG/ACT inhaler Inhale 4 puffs into the lungs 2 times daily 1 Inhaler 3    tiotropium (SPIRIVA) 18 MCG inhalation capsule Inhale 18 mcg into the lungs daily X 10 days      potassium chloride (KLOR-CON M) 10 MEQ extended release tablet Take 1 tablet by mouth daily (Patient taking differently: Take 10 mEq by mouth daily One additional tablet when taking furosemide) 180 tablet 0    pramipexole (MIRAPEX) 0.5 MG tablet 1.5 tab q 5 PM and 1.5 tab qHS 90 tablet 5    rivaroxaban (XARELTO) 20 MG TABS tablet Take 1 tablet by mouth Daily with supper 90 tablet 3    azelastine (ASTELIN) 0.1 % nasal spray 1 spray by Nasal route 2 times daily 1 Spray in each nostril 2 Bottle 1    Magnesium Citrate 100 MG TABS Take 1 tablet by mouth daily 90 tablet 3    EPIPEN 2-MIR 0.3 MG/0.3ML SOAJ injection       levofloxacin (LEVAQUIN) 500 MG tablet Take 1 tablet by mouth daily for 10 days 10 tablet 0    albuterol sulfate  (90 Base) MCG/ACT inhaler Inhale 2 puffs into the lungs every 6 hours as needed for Wheezing 1 Inhaler 3       Allergies: Allergies   Allergen Reactions    Atorvastatin Other (See Comments)     Muscle Pain, all statins     Simvastatin Other (See Comments)     Muscle Pain    Cephalexin Hives and Itching    Cephalosporins Hives and Itching    Food Diarrhea     Milk , shellfish , gluten. ..may have bouts of diarrhea, constipation or flatulus    Other      Environmental -cats,dogs,dust    Sulfa Antibiotics      Can take Celebrex, Pt denies 8/1/18        Social History:  Patient Lives at home   reports that she quit smoking about 6 years ago. She has a 2.50 pack-year smoking history. She has never used smokeless tobacco. She reports current alcohol use. She reports that she does not use drugs.

## 2020-02-02 NOTE — ED PROVIDER NOTES
eMERGENCY dEPARTMENT eNCOUnter      [facility]    Chief Complaint   Patient presents with    Chest Pain     Pt feels she is in AFib d/t to pressure in her chest.  Feels like people standing on her chest       HPI    Dessa Schlatter is a 81 y.o. female who presents with fluttering in her chest.  This patient has known atrial fibrillation. She took several doses of Cardizem before leaving the house tonight. She did not have any lightheadedness, dizziness, radiation of any discomfort, or diaphoresis. Sensation not worse with exertion or better with rest.  No exacerbating or relieving factors.   She does not have a history of coronary disease    PAST MEDICAL HISTORY    Past Medical History:   Diagnosis Date    Allergic     Anesthesia complication     family hx-father-at at age 80 having hip replacement revision surgery-had tia's x2 while under anes-but also had hx chf-had dificuly with speech when coming out from anes    Anxiety     Arthritis     left ankle, bilateral hands    Arthritis of left hip 2/2/2016    Arthritis of right hip 4/19/2016    Asthma     At risk for falls     uses a cane    Atrial fibrillation with rapid ventricular response (HCC)     Atrial flutter ([address] 4/25/2018    Chronic maxillary sinusitis 5/24/2017    CTEPH (chronic thromboembolic pulmonary hypertension) ([address] 8/26/2016    Depression     pt stated r/t bad marriage/alcoholic spouse    Disc disease, degenerative, lumbar or lumbosacral 2/20/2017    Hepatic steatosis 4/26/2019    History of total hip arthroplasty 2/28/2017    Hypertension     Leg cramps     patient states very severe, requires immediate potassium administration    Migraines, basilar     last migraine 10 years ago    Mild persistent asthma without complication 6/97/7644    Obesity, Class III, BMI 40-49.9 (morbid obesity) ([address] 4/19/2016    HUSSAIN (obstructive sleep apnea) 10/14/2013    Osteoarthritis, hip, bilateral 2016    Bilateral hip arthroplasty    Panic attacks     Pneumonia 2015    Primary osteoarthritis of both knees 9/19/2017    Primary osteoarthritis of left knee 12/15/2016    Pulmonary emboli ([facility] 8/4/2016    Pulmonary HTN ([facility] 7/21/2016    Pure hypercholesterolemia 2/13/2019    Restless leg syndrome     Rhinitis, chronic 3/26/2014    Sleep apnea     wears CPAP @11    Stress incontinence     Tear of left rotator cuff 1/23/2018    Thyroid disease     hypothyroid    Wears glasses        SURGICAL HISTORY    Past Surgical History:   Procedure Laterality Date    BACK SURGERY  2004    LUMBAR FUSION    CATARACT REMOVAL Bilateral     COLONOSCOPY      EYE SURGERY Right 2010    retinal tear repaired   [facility]    right ring finger severe laceration, fracture    HIP ARTHROPLASTY Right 4-19-16    HYSTERECTOMY  1993    JOINT REPLACEMENT Left 2/2/16    left hip    TONSILLECTOMY  1972       CURRENT MEDICATIONS    Current Outpatient Rx   Medication Sig Dispense Refill    furosemide (LASIX) 20 MG tablet Take 1 tablet by mouth daily 30 tablet 3    lisinopril (PRINIVIL;ZESTRIL) 10 MG tablet Take 10 mg by mouth nightly       hydrochlorothiazide (HYDRODIURIL) 25 MG tablet Take 25 mg by mouth daily      CPAP Machine MISC by Does not apply route      diltiazem (CARDIZEM CD) 180 MG extended release capsule Take 1 capsule by mouth daily 90 capsule 3    diltiazem (CARDIZEM) 60 MG tablet Take 1 tablet by mouth daily as needed (for palpitations) 30 tablet 5    fluticasone (FLOVENT HFA) 110 MCG/ACT inhaler Inhale 4 puffs into the lungs 2 times daily 1 Inhaler 3    tiotropium (SPIRIVA) 18 MCG inhalation capsule Inhale 18 mcg into the lungs daily X 10 days      potassium chloride (KLOR-CON M) 10 MEQ extended release tablet Take 1 tablet by mouth daily (Patient taking differently: Take 10 mEq by mouth daily One additional tablet when taking furosemide) 180 tablet 0    pramipexole (MIRAPEX) 0.5 MG tablet 1.5 tab q 5 PM and 1.5 tab qHS 90 PHYSICAL EXAM    VITAL SIGNS: BP (!) 140/71   Pulse 90   Temp 98 °F (36.7 °C) (Infrared)   Resp 16   Wt 240 lb (108.9 kg)   SpO2 97%   BMI 41.20 kg/m²    Constitutional:  Well developed, Well nourished, No acute distress, Non-toxic appearance. HENT:  Normocephalic, Atraumatic, Bilateral external ears normal, Oropharynx moist, No oral exudates, Nose normal. Neck- Normal range of motion, No tenderness, Supple, No stridor. Eyes:  PERRL, EOMI, Conjunctiva normal, No discharge. Respiratory:  Normal breath sounds, No respiratory distress, No wheezing, No chest tenderness. Cardiovascular: Irregularly irregular with no murmur gallop or rub  GI:  Bowel sounds normal, Soft, No tenderness, No masses, No pulsatile masses. Musculoskeletal:  Intact distal pulses, No edema, No tenderness, No cyanosis, No clubbing. Good range of motion in all major joints. No tenderness to palpation or major deformities noted. Back- No tenderness. Integument:  Warm, Dry, No erythema, No rash. Lymphatic:  No lymphadenopathy noted. Neurologic:  Alert & oriented x 3, Normal motor function, Normal sensory function, No focal deficits noted.    Psychiatric:  Affect normal, Judgment normal, Mood normal.     EKG    Atrial fibrillation with no ST elevation or depression    RADIOLOGY    No orders to display     Labs Reviewed   COMPREHENSIVE METABOLIC PANEL W/ REFLEX TO MG FOR LOW K - Abnormal; Notable for the following components:       Result Value    Anion Gap 17 (*)     Glucose 132 (*)     CREATININE 0.5 (*)     All other components within normal limits    Narrative:     Performed at:  [facility] [address], [place], [address]   Phone [phone]   CBC    Narrative:     Filomena Jordi  [facility] tel. [phone],  Rejected Test CMPX/Called to:Tegan Velazquez RN, 02/02/2020 04:39, by Nicky Sandhu  Performed at:  [facility] [address], [place], [facility] [place]   Phone [phone]    Narrative:     Cheryl Le  [facility] tel. [phone],  Rejected Test CMPX/Called to:Tegan Velazquez RN, 02/02/2020 04:39, by Toan Smith  Performed at:  [facility]  [address] [facility],  [place], [address]   Phone [phone]   TROPONIN    Narrative:     Performed at:  [facility]  [address] [facility],  [place], [address]   Phone [phone]         PROCEDURES        ED Lizet Rain [address] studies reviewed. (See chart for details)  This patient comes in with atrial fibrillation. She does not have any other anginal equivalent symptoms starting this does not represent acute coronary syndrome. Her rate is controlled and she is anticoagulated so no intervention is indicated for her atrial fibrillation. I want her to follow-up with her cardiologist and return should she have increased chest pain, shortness of breath, or any other symptoms. Particularly if it is worse with exertion. She does not complain of shortness of breath I do not suspect pulmonary embolism. Again, she is already anticoagulated as well. She does not have infectious symptoms to suggest pneumonia. At the time of discharge, the patient stated that she was feeling dizzy and was still very concerned about going home because she lives alone and she still occasionally has some pressure in her chest.  I am talking to the hospitalist for admission. FINAL IMPRESSION    1.  Paroxysmal atrial fibrillation ([facility]            Gabriela Orellana MD  02/02/20 4560       Gabriela Orellana MD  02/02/20 1048

## 2020-02-03 VITALS
TEMPERATURE: 97.6 F | SYSTOLIC BLOOD PRESSURE: 102 MMHG | OXYGEN SATURATION: 97 % | BODY MASS INDEX: 41.13 KG/M2 | RESPIRATION RATE: 18 BRPM | HEART RATE: 66 BPM | WEIGHT: 239.6 LBS | DIASTOLIC BLOOD PRESSURE: 58 MMHG

## 2020-02-03 PROCEDURE — 94761 N-INVAS EAR/PLS OXIMETRY MLT: CPT

## 2020-02-03 PROCEDURE — 6370000000 HC RX 637 (ALT 250 FOR IP): Performed by: INTERNAL MEDICINE

## 2020-02-03 PROCEDURE — 94640 AIRWAY INHALATION TREATMENT: CPT

## 2020-02-03 PROCEDURE — 99226 PR SBSQ OBSERVATION CARE/DAY 35 MINUTES: CPT | Performed by: INTERNAL MEDICINE

## 2020-02-03 PROCEDURE — G0378 HOSPITAL OBSERVATION PER HR: HCPCS

## 2020-02-03 PROCEDURE — 2580000003 HC RX 258: Performed by: INTERNAL MEDICINE

## 2020-02-03 PROCEDURE — 6360000002 HC RX W HCPCS: Performed by: INTERNAL MEDICINE

## 2020-02-03 RX ORDER — AMIODARONE HYDROCHLORIDE 200 MG/1
TABLET ORAL
Qty: 90 TABLET | Refills: 3 | Status: SHIPPED | OUTPATIENT
Start: 2020-02-03 | End: 2020-02-20 | Stop reason: DRUGHIGH

## 2020-02-03 RX ADMIN — AZELASTINE HYDROCHLORIDE 1 SPRAY: 137 SPRAY, METERED NASAL at 09:56

## 2020-02-03 RX ADMIN — POTASSIUM CHLORIDE 10 MEQ: 1500 TABLET, EXTENDED RELEASE ORAL at 09:54

## 2020-02-03 RX ADMIN — AMIODARONE HYDROCHLORIDE 200 MG: 200 TABLET ORAL at 09:54

## 2020-02-03 RX ADMIN — BUDESONIDE 1000 MCG: 0.5 SUSPENSION RESPIRATORY (INHALATION) at 09:21

## 2020-02-03 RX ADMIN — FUROSEMIDE 20 MG: 20 TABLET ORAL at 09:54

## 2020-02-03 RX ADMIN — Medication 10 ML: at 09:55

## 2020-02-03 RX ADMIN — TIOTROPIUM BROMIDE INHALATION SPRAY 2 PUFF: 3.12 SPRAY, METERED RESPIRATORY (INHALATION) at 09:21

## 2020-02-03 RX ADMIN — POLYETHYLENE GLYCOL 3350 17 G: 17 POWDER, FOR SOLUTION ORAL at 05:32

## 2020-02-03 ASSESSMENT — PAIN SCALES - GENERAL
PAINLEVEL_OUTOF10: 0

## 2020-02-03 NOTE — PROGRESS NOTES
CLINICAL PHARMACY NOTE: MEDS TO [address] [facility] Patient?: No  Total # of Prescriptions Filled: 1   The following medications were delivered to the patient:  · Amiodarone 200mg  Total # of Interventions Completed: 0  Time Spent (min): 30    Additional Documentation:  Medication picked up by friend Jefferson Amaya in [id number] [facility]

## 2020-02-03 NOTE — PROGRESS NOTES
[address]   Electrophysiology Progress Note     Admit Date: 2020     Reason for follow up: Atrial fibrillation      HPI and Interval History:   Patient seen and examined. Clinical notes reviewed. Telemetry reviewed. No new complaint today. No major events overnight. Denies having chest pain, shortness of breath, dyspnea on exertion, Orthopnea, PND at the time of this visit. Review of System:  All other systems reviewed and are negative except for that noted above. Pertinent negatives are:     · General: negative for fever, chills   · Ophthalmic ROS: negative for - eye pain or loss of vision  · ENT ROS: negative for - headaches, sore throat   · Respiratory: negative for - cough, sputum  · Cardiovascular: Reviewed in HPI  · Gastrointestinal: negative for - abdominal pain, diarrhea, N/V  · Hematology: negative for - bleeding, blood clots, bruising or jaundice  · Genito-Urinary:  negative for - Dysuria or incontinence  · Musculoskeletal: negative for - Joint swelling, muscle pain  · Neurological: negative for - confusion, dizziness, headaches   · Psychiatric: No anxiety, no depression. · Dermatological: negative for - rash      Physical Examination:  Vitals:    20 0521   BP: 130/69   Pulse: 72   Resp: 18   Temp: 97.4 °F (36.3 °C)   SpO2: 98%      No intake/output data recorded. Wt Readings from Last 3 Encounters:   20 239 lb 9.6 oz (108.7 kg)   20 247 lb (112 kg)   20 249 lb (112.9 kg)     Temp  Av.3 °F (36.3 °C)  Min: 97 °F (36.1 °C)  Max: 97.8 °F (36.6 °C)  Pulse  Av.8  Min: 68  Max: 130  BP  Min: 96/57  Max: 203/109  SpO2  Av.8 %  Min: 97 %  Max: 98 %  No intake or output data in the 24 hours ending 20 0718    · Telemetry: Sinus rhythm   · Constitutional: Oriented. No distress. · Head: Normocephalic and atraumatic. · Mouth/Throat: Oropharynx is clear and moist.   · Eyes: Conjunctivae normal. EOM are normal.   · Neck: Neck supple.  No 30-39. 9)     Atrial fibrillation ([facility] 01/12/2020    Other pulmonary embolism without acute cor pulmonale (HCC) 08/22/2019    Hx of pulmonary embolus 05/14/2019    Chronic diastolic heart failure ([facility] 05/14/2019    Hepatic steatosis 04/26/2019    Pure hypercholesterolemia 02/13/2019    Paroxysmal atrial fibrillation ([facility] 11/09/2018    Primary osteoarthritis of both knees 09/19/2017    SOB (shortness of breath) 09/29/2016    Non morbid obesity, unspecified obesity type 04/19/2016    Obesity, Class III, BMI 40-49.9 (morbid obesity) ([facility] 02/02/2016    Benign essential HTN 10/14/2013    HUSSAIN (obstructive sleep apnea) 10/14/2013    Restless leg syndrome 07/11/2011    Acquired hypothyroidism 07/11/2011      Active Hospital Problems    Diagnosis Date Noted    A-fib [facility]) [I48.91] 02/02/2020    Obesity (BMI 30-39. 9) [E66.9]        Assessment:       Plan:     - paroxysmal atrial fibrillation                 Recurrent and symptomatic             converted to sinus   amiodarone 200 mg tid for 1 week, then 200 mg bid for 1 week and then 200 mg qd afterwards              We had previously discussed options and she wants to proceed with ablation     - HTN              BP has been ok mostly              Will adjust meds     - PE              On xarelto     - HUSSAIN              CPAP     - Obesity              Excessive weight is complicating assessment and treatment. It is placing patient at risk for multiple co-morbidities as well as early death and contributing to the patient's presentation. - discussed weight management with diet and exercise       She can be discharged home          NOTE: This report was transcribed using voice recognition software. Every effort was made to ensure accuracy, however, inadvertent computerized transcription errors may be present.

## 2020-02-03 NOTE — DISCHARGE SUMMARY
Hospital Medicine Discharge Summary    Patient: Delia Strauss     Gender: female  : 1950   Age: 81 y.o. MRN: [id number]    Admitting Physician: Kristi Oswald MD  Discharge Physician: Kristi Oswald MD     Code Status: Full Code     Admit Date: 2020   Discharge Date:   2/3/20    Disposition:  Home      Discharge Diagnoses: Active Hospital Problems    Diagnosis Date Noted   [facility]) [I48.91] 2020    Obesity (BMI 30-39. 9) [E66.9]    Chest pain with PAF with RVR:      Condition at Discharge:  Stable    Hospital Course:   Was admitted to the hospital with A. fib with rapid ventricular rate associated with chest pain. Patient enzymes remain negative. Patient was started on amiodarone and converted to sinus rhythm. Patient chest pain resolved. Patient was seen by EP today and was discharged on a amnio drawn loading dose. Patient will follow up with EP for possible ablation in the future. Patient is on Xarelto for anticoagulation    Discharge Exam:    BP (!) 143/78   Pulse 60   Temp 97.6 °F (36.4 °C) (Oral)   Resp 18   Wt 239 lb 9.6 oz (108.7 kg)   SpO2 98%   BMI 41.13 kg/m²   General appearance:  Appears comfortable. AAOx3  HEENT: atraumatic, Pupils equal, muscous membranes moist, no masses appreciated  Cardiovascular: Regular rate and rhythm no murmurs appreciated  Respiratory: CTAB no wheezing  Gastrointestinal: Abdomen soft, non-tender, BS+  EXT: no edema  Neurology: no gross focal deficts  Psychiatry: Appropriate affect.  Not agitated  Skin: Warm, dry, no rashes appreciated    Discharge Medications:   Current Discharge Medication List      START taking these medications    Details   amiodarone (CORDARONE) 200 MG tablet 200 mg TID x 7 days then 200mg BID x 7 days then 200mg Daily  Qty: 90 tablet, Refills: 3           Current Discharge Medication List        Current Discharge Medication List      CONTINUE these medications which have NOT CHANGED    Details furosemide (LASIX) 20 MG tablet Take 1 tablet by mouth daily  Qty: 30 tablet, Refills: 3    Associated Diagnoses: Benign essential HTN      lisinopril (PRINIVIL;ZESTRIL) 10 MG tablet Take 10 mg by mouth nightly       hydrochlorothiazide (HYDRODIURIL) 25 MG tablet Take 25 mg by mouth daily      CPAP Machine MISC by Does not apply route      diltiazem (CARDIZEM CD) 180 MG extended release capsule Take 1 capsule by mouth daily  Qty: 90 capsule, Refills: 3      diltiazem (CARDIZEM) 60 MG tablet Take 1 tablet by mouth daily as needed (for palpitations)  Qty: 30 tablet, Refills: 5      fluticasone (FLOVENT HFA) 110 MCG/ACT inhaler Inhale 4 puffs into the lungs 2 times daily  Qty: 1 Inhaler, Refills: 3      tiotropium (SPIRIVA) 18 MCG inhalation capsule Inhale 18 mcg into the lungs daily X 10 days      potassium chloride (KLOR-CON M) 10 MEQ extended release tablet Take 1 tablet by mouth daily  Qty: 180 tablet, Refills: 0    Comments: **Patient requests 90 days supply**  Associated Diagnoses: Essential hypertension; Pulmonary HTN ([facility] Other pulmonary embolism without acute cor pulmonale, unspecified chronicity ([facility] HUSSAIN (obstructive sleep apnea); Obesity, Class III, BMI 40-49.9 (morbid obesity) ([facility] Atrial fibrillation with rapid ventricular response (HCC); SOB (shortness of breath); Atrial fibrillation with RVR ([facility] Chest pain, unspecified type;  Benign essential HTN; Non morbid obesity, unspecified obesity type; Paroxysmal atrial fibrillation (HCC)      pramipexole (MIRAPEX) 0.5 MG tablet 1.5 tab q 5 PM and 1.5 tab qHS  Qty: 90 tablet, Refills: 5    Associated Diagnoses: Restless leg syndrome      rivaroxaban (XARELTO) 20 MG TABS tablet Take 1 tablet by mouth Daily with supper  Qty: 90 tablet, Refills: 3      azelastine (ASTELIN) 0.1 % nasal spray 1 spray by Nasal route 2 times daily 1 Spray in each nostril  Qty: 2 Bottle, Refills: 1    Associated Diagnoses: Seasonal allergic rhinitis due to pollen      Magnesium pneumothorax. The heart and mediastinal contours are within normal limits. No acute bony findings are identified. No acute process. Xr Chest Portable    Result Date: 1/12/2020  EXAMINATION: ONE XRAY VIEW OF THE CHEST 1/12/2020 10:08 am COMPARISON: 5/16/2018 HISTORY: ORDERING SYSTEM PROVIDED HISTORY: CP TECHNOLOGIST PROVIDED HISTORY: Reason for exam:->CP Reason for Exam: Dizziness (WITH RAPID HEART RATE SINCE 0300. HX OF A-FIB Acuity: Acute Type of Exam: Initial FINDINGS: Lungs are clear. Borderline cardiomegaly. No pulmonary edema. No acute findings.          Signed:    Anibal Lowe MD   2/3/2020

## 2020-02-03 NOTE — PLAN OF CARE
Problem: Falls - Risk of:  Goal: Will remain free from falls  Description  Will remain free from falls  Outcome: Ongoing     Problem: Cardiac:  Goal: Ability to maintain an adequate cardiac output will improve  Description  Ability to maintain an adequate cardiac output will improve  Outcome: Ongoing  Goal: Complications related to the disease process, condition or treatment will be avoided or minimized  Description  Complications related to the disease process, condition or treatment will be avoided or minimized  Outcome: Ongoing

## 2020-02-04 ENCOUNTER — CARE COORDINATION (OUTPATIENT)
Dept: CARE COORDINATION | Age: 70
End: 2020-02-04

## 2020-02-04 ENCOUNTER — TELEPHONE (OUTPATIENT)
Dept: INTERNAL MEDICINE CLINIC | Age: 70
End: 2020-02-04

## 2020-02-04 DIAGNOSIS — K11.20 SIALOADENITIS: Primary | ICD-10-CM

## 2020-02-04 DIAGNOSIS — H66.003 NON-RECURRENT ACUTE SUPPURATIVE OTITIS MEDIA OF BOTH EARS WITHOUT SPONTANEOUS RUPTURE OF TYMPANIC MEMBRANES: ICD-10-CM

## 2020-02-04 RX ORDER — BENZONATATE 200 MG/1
200 CAPSULE ORAL 3 TIMES DAILY PRN
Qty: 30 CAPSULE | Refills: 0 | Status: SHIPPED | OUTPATIENT
Start: 2020-02-04 | End: 2020-02-11

## 2020-02-04 NOTE — CARE COORDINATION
Danae Hadd[address] IP Discharge Follow up Call    Date of discharge: 2/3/2020  Facility: [facility]  Non-face-to-face services provided:  Scheduled appointment with Specialist-Cardiologist 2/20/2020    Reason for Hospital Visit:  A-Fibb Chest Pain  Discharged with Home Health?:  No    Date of first visit after discharge:  N/A  Status:     improved    Did you receive a discharge summary with list of medication from the hospital? Yes  Review of Instructions:     Understands what to report/when to return?:  Yes   Understands discharge instructions?:  Yes   Following discharge instructions?:  Yes   If not why? Is there any lingering symptoms? No  Are you eating and drinking OK? Yes  Any other problems i.e. Constipation, other symptoms? No  Are there any new complaints of pain? No  If you have a wound is the dressing clean, dry, and intact? N/A  Understands wound care regimen? N/A  Are there any other complaints/concerns that you wish to tell your provider? Needs antibiotic compatible with amidarone  FU appts/Provider:    Future Appointments   Date Time Provider Abhishek Uribe   2/20/2020 10:00 AM BRICE Colvin CNP FF Cardio [facility]   3/12/2020  9:00 AM Jett Sanchez MD FF Cardio [facility]   4/15/2020 11:15 AM MD KANU Tovar IM MMA   4/24/2020 11:00 AM BRICE Gutierrez CNP FF SLEEP MED [facility]   4/27/2020  8:15 AM MD KANU Tovar IM MMA   5/14/2020 10:00 AM Rachel Landry MD FF ENDO [facility]   5/14/2020 10:30 AM Maurizio Cronin RD, LD FF ENDO [facility]       New Medications at discharge?:     Yes                      Medication Reconciliation by phone -     Each medication was reviewed (both pre and post hospitalization)  Yes    Were there discrepancies in medications? No  If YES, were discrepancies addressed? Not Applicable    Understands Medications? Yes   Questions about medications from pt or caregiver? No   Questions addressed?  Not Applicable

## 2020-02-06 ENCOUNTER — TELEPHONE (OUTPATIENT)
Dept: CARDIOLOGY CLINIC | Age: 70
End: 2020-02-06

## 2020-02-06 NOTE — TELEPHONE ENCOUNTER
Letter completed and forwarded to Destiney Deleon to schedule patient for ablation with Dr. Hailee Jimenes.

## 2020-02-10 RX ORDER — POTASSIUM CHLORIDE 750 MG/1
10 TABLET, EXTENDED RELEASE ORAL DAILY
Qty: 180 TABLET | Refills: 0 | Status: SHIPPED | OUTPATIENT
Start: 2020-02-10 | End: 2020-03-09 | Stop reason: ALTCHOICE

## 2020-02-10 RX ORDER — PRAMIPEXOLE DIHYDROCHLORIDE 0.5 MG/1
TABLET ORAL
Qty: 90 TABLET | Refills: 5 | Status: SHIPPED | OUTPATIENT
Start: 2020-02-10 | End: 2020-03-31 | Stop reason: SDUPTHER

## 2020-02-11 ENCOUNTER — TELEPHONE (OUTPATIENT)
Dept: CARDIOLOGY CLINIC | Age: 70
End: 2020-02-11

## 2020-02-11 NOTE — TELEPHONE ENCOUNTER
She is wearing an MCOT that is due to be taken off on Thursday. She is asking to take monitor off today because it is really bothering her skin. Please call today.

## 2020-02-12 ENCOUNTER — TELEPHONE (OUTPATIENT)
Dept: CARDIOLOGY CLINIC | Age: 70
End: 2020-02-12

## 2020-02-14 ENCOUNTER — TELEPHONE (OUTPATIENT)
Dept: CARDIOLOGY CLINIC | Age: 70
End: 2020-02-14

## 2020-02-17 PROCEDURE — 93228 REMOTE 30 DAY ECG REV/REPORT: CPT | Performed by: INTERNAL MEDICINE

## 2020-02-20 ENCOUNTER — OFFICE VISIT (OUTPATIENT)
Dept: CARDIOLOGY CLINIC | Age: 70
End: 2020-02-20
Payer: MEDICARE

## 2020-02-20 VITALS
DIASTOLIC BLOOD PRESSURE: 74 MMHG | SYSTOLIC BLOOD PRESSURE: 138 MMHG | HEART RATE: 68 BPM | HEIGHT: 64 IN | BODY MASS INDEX: 41.15 KG/M2 | WEIGHT: 241 LBS

## 2020-02-20 PROCEDURE — 3017F COLORECTAL CA SCREEN DOC REV: CPT | Performed by: NURSE PRACTITIONER

## 2020-02-20 PROCEDURE — 93000 ELECTROCARDIOGRAM COMPLETE: CPT | Performed by: NURSE PRACTITIONER

## 2020-02-20 PROCEDURE — 4040F PNEUMOC VAC/ADMIN/RCVD: CPT | Performed by: NURSE PRACTITIONER

## 2020-02-20 PROCEDURE — 99214 OFFICE O/P EST MOD 30 MIN: CPT | Performed by: NURSE PRACTITIONER

## 2020-02-20 PROCEDURE — 1123F ACP DISCUSS/DSCN MKR DOCD: CPT | Performed by: NURSE PRACTITIONER

## 2020-02-20 PROCEDURE — G8417 CALC BMI ABV UP PARAM F/U: HCPCS | Performed by: NURSE PRACTITIONER

## 2020-02-20 PROCEDURE — 1036F TOBACCO NON-USER: CPT | Performed by: NURSE PRACTITIONER

## 2020-02-20 PROCEDURE — G8399 PT W/DXA RESULTS DOCUMENT: HCPCS | Performed by: NURSE PRACTITIONER

## 2020-02-20 PROCEDURE — G8427 DOCREV CUR MEDS BY ELIG CLIN: HCPCS | Performed by: NURSE PRACTITIONER

## 2020-02-20 PROCEDURE — 1090F PRES/ABSN URINE INCON ASSESS: CPT | Performed by: NURSE PRACTITIONER

## 2020-02-20 PROCEDURE — G8482 FLU IMMUNIZE ORDER/ADMIN: HCPCS | Performed by: NURSE PRACTITIONER

## 2020-02-20 RX ORDER — AMIODARONE HYDROCHLORIDE 200 MG/1
200 TABLET ORAL DAILY
Qty: 30 TABLET | Refills: 3 | Status: ON HOLD
Start: 2020-02-20 | End: 2020-03-29 | Stop reason: HOSPADM

## 2020-02-20 RX ORDER — LISINOPRIL 10 MG/1
20 TABLET ORAL DAILY
Qty: 30 TABLET | Refills: 1 | Status: SHIPPED
Start: 2020-02-20 | End: 2020-03-11 | Stop reason: SDUPTHER

## 2020-02-26 ENCOUNTER — CARE COORDINATION (OUTPATIENT)
Dept: CARE COORDINATION | Age: 70
End: 2020-02-26

## 2020-02-26 NOTE — CARE COORDINATION
daily 1/23/20  Yes Mel Becker MD   diltiazem (CARDIZEM) 60 MG tablet Take 1 tablet by mouth daily as needed (for palpitations) 1/23/20  Yes Mel Becker MD   tiotropium (SPIRIVA) 18 MCG inhalation capsule Inhale 18 mcg into the lungs daily X 10 days   Yes Historical Provider, MD   rivaroxaban (XARELTO) 20 MG TABS tablet Take 1 tablet by mouth Daily with supper 8/20/19  Yes Mel Becker MD   azelastine (ASTELIN) 0.1 % nasal spray 1 spray by Nasal route 2 times daily 1 Spray in each nostril 4/26/19  Yes Stalin Miranda MD   Magnesium Citrate 100 MG TABS Take 1 tablet by mouth daily 6/28/18  Yes Mel Becker MD   EPIPEN 2-MIR 0.3 MG/0.3ML SOAJ injection  9/29/16  Yes Historical Provider, MD       Future Appointments   Date Time Provider [facility]   3/12/2020  9:00 AM Mel Becker MD FF Cardio [facility]   4/1/2020  8:00 AM [facility] CARDIAC CATH LAB ROOM 3 [facility] CATH [facility]   4/15/2020 11:15 AM MD KANU Valentine IM MMA   4/24/2020 11:00 AM BRICE Kimball CNP FF SLEEP MED [facility]   4/27/2020  8:15 AM MD KANU Valentine IM MMA   5/14/2020 10:00 AM Rolando Renee MD FF ENDO [facility]   5/14/2020 10:30 AM Jayda Cronin RD, LD FF ENDO [facility]   5/15/2020 11:30 AM BRICE Robledo CNP FF Cardio MMA

## 2020-03-04 NOTE — TELEPHONE ENCOUNTER
Sample requested:  XARELTO    Strength: 20 MG    Dosage: Once daily    Patient's call back number: [phone]     Would like to  on Monday while here for an appt.  Please call to advise

## 2020-03-05 ENCOUNTER — HOSPITAL ENCOUNTER (OUTPATIENT)
Age: 70
Discharge: HOME | End: 2020-03-05
Payer: MEDICARE

## 2020-03-05 LAB
ANION GAP SERPL CALCULATED.3IONS-SCNC: 14 MMOL/L (ref 3–16)
BUN BLDV-MCNC: 15 MG/DL (ref 7–20)
CALCIUM SERPL-MCNC: 9.7 MG/DL (ref 8.3–10.6)
CHLORIDE BLD-SCNC: 101 MMOL/L (ref 99–110)
CO2: 23 MMOL/L (ref 21–32)
CREAT SERPL-MCNC: 0.7 MG/DL (ref 0.6–1.2)
GFR AFRICAN AMERICAN: >60
GFR NON-AFRICAN AMERICAN: >60
GLUCOSE BLD-MCNC: 96 MG/DL (ref 70–99)
POTASSIUM SERPL-SCNC: 4.1 MMOL/L (ref 3.5–5.1)
PRO-BNP: 336 PG/ML (ref 0–124)
SODIUM BLD-SCNC: 138 MMOL/L (ref 136–145)
T4 FREE: 1.2 NG/DL (ref 0.9–1.8)
TSH SERPL DL<=0.05 MIU/L-ACNC: 2.37 UIU/ML (ref 0.27–4.2)

## 2020-03-05 PROCEDURE — 84439 ASSAY OF FREE THYROXINE: CPT

## 2020-03-05 PROCEDURE — 80048 BASIC METABOLIC PNL TOTAL CA: CPT

## 2020-03-05 PROCEDURE — 36415 COLL VENOUS BLD VENIPUNCTURE: CPT

## 2020-03-05 PROCEDURE — 84443 ASSAY THYROID STIM HORMONE: CPT

## 2020-03-05 PROCEDURE — 83880 ASSAY OF NATRIURETIC PEPTIDE: CPT

## 2020-03-05 NOTE — PROGRESS NOTES
[address]   Advanced Heart Failure/Pulmonary Hypertension  Cardiac Follow up       Hayde Gatica  YOB: 1950     Date of Visit:  3/9/20     Chief Complaint   Patient presents with    Hypertension     History of present illness: Hayde Gatica is a 81 y.o. female with past medical history significant for HTN, HUSSAIN on CPAP, multiple PE on Xarelto (8/2016). She original had a PE was treated with xarelto for recommended time and then off it and her RHC revealed pressures no RH elevated pressures. Afterwards she developed AFib and restarted on xarelto. She continues on xarelto and treatment for afib. Echos in July and August (2016) showed RVSP 106-112 without evidence of enlargement in right side of heart. RHC was done 9/2/16 and PA pressure was 24, no evidence of pulmonary hypertension. Since then, her RVSP has decreased and got back to normal. We discussed that we are questioning if the elevated ones by echo may have been due to a false positive test for her. She was seen by Dr Mitchel Chase 8/1 and he is managing her hypothyroidism. [place], the thyroid medication was stopped. She was noted to have negative thyroid ultrasound ( July 2018). She wears her CPAP consistently for her HUSSAIN. States she had a reaction to Simvastatin and Livalo with muscle pain. She states she is Prediabetic and has not lost weight. She was admitted 1/13/2020 with palpitations/light-headedness along with SOB and chest pressure. She underwent successful DCCV and was put on Rhythmol -- this caused her HR to drop into the 40's which made her feel bad. She was discharged on diltiazem 240mg qd which causes her to feel fatigued and dizzy. She remains on Xarelto. Her lisinopril was stopped. Today she states she is getting an ablation on April 1st per Dr. Cassius Arzola. She is getting comfortable with the idea of the procedure. She is taking Cardizem 60mg and Amiodarone. Labs reviewed.   Now taking Lasix everyday for the last 2 weeks and HCTZ. She is taking potassium daily. ProBNP 200>336. She has many questions that were answered today.           Past Medical History:   Diagnosis Date    Allergic     Anesthesia complication     family hx-father-at at age 80 having hip replacement revision surgery-had tia's x2 while under anes-but also had hx chf-had dificuly with speech when coming out from [address]     left ankle, bilateral hands    Arthritis of left hip 2/2/2016    Arthritis of right hip 4/19/2016    Asthma     At risk for falls     uses a cane    Atrial fibrillation with rapid ventricular response (HCC)     Atrial flutter ([address] 4/25/2018    Chronic maxillary sinusitis 5/24/2017    CTEPH (chronic thromboembolic pulmonary hypertension) ([address] 8/26/2016    Depression     pt stated r/t bad marriage/alcoholic spouse    Disc disease, degenerative, lumbar or lumbosacral 2/20/2017    Hepatic steatosis 4/26/2019    History of total hip arthroplasty 2/28/2017    Hypertension     Leg cramps     patient states very severe, requires immediate potassium administration    Migraines, basilar     last migraine 10 years ago    Mild persistent asthma without complication 8/35/3225    Obesity, Class III, BMI 40-49.9 (morbid obesity) ([address] 4/19/2016    HUSSAIN (obstructive sleep apnea) 10/14/2013    Osteoarthritis, hip, bilateral 2016    Bilateral hip arthroplasty    Panic attacks     Pneumonia 2015    Primary osteoarthritis of both knees 9/19/2017    Primary osteoarthritis of left knee 12/15/2016    Pulmonary emboli ([address] 8/4/2016    Pulmonary HTN ([address] 7/21/2016    Pure hypercholesterolemia 2/13/2019    Restless leg syndrome     Rhinitis, chronic 3/26/2014    Sleep apnea     wears CPAP @11    Stress incontinence     Tear of left rotator cuff 1/23/2018    Thyroid disease     hypothyroid    Wears glasses      Past Surgical History:   Procedure Laterality Date    BACK SURGERY  2004    LUMBAR FUSION  CATARACT REMOVAL Bilateral     COLONOSCOPY      EYE SURGERY Right     retinal tear repaired    FINGER SURGERY  1988    right ring finger severe laceration, fracture    HIP ARTHROPLASTY Right 16    HYSTERECTOMY      JOINT REPLACEMENT Left 16    left hip    TONSILLECTOMY  1972     Social History     Tobacco Use    Smoking status: Former Smoker     Packs/day: 0.50     Years: 5.00     Pack years: 2.50     Last attempt to quit: 10/5/2013     Years since quittin.4    Smokeless tobacco: Never Used    Tobacco comment: smoked for a total of 5yr total   Substance Use Topics    Alcohol use: Yes     Comment: RARE       Review of Systems:   Constitutional: No significant change in weight, fatigue or weakness. HEENT: No change in vision or ringing in the ears. Respiratory: No LUGO, PND, orthopnea or cough. Cardiovascular: See HPI   GI: No n/v, abdominal pain or changes in bowel habits. No melena, no hematochezia  : No dysuria or hematuria. Skin: No rash or new skin lesions. Musculoskeletal:   Neurological: No lightheadedness, dizziness, syncope or TIA-like symptoms. Psychiatric: No anxiety, insomnia or depression    Physical Exam:  Vitals:    20 0751   BP: 110/62   Pulse: 67   SpO2: 96%   Weight: 250 lb (113.4 kg)   Height: 5' 4\" (1.626 m)     Constitutional and General Appearance:   WD/WN in NAD  HEENT:  NC/AT  GENET  No problems with hearing  Respiratory:  · Normal excursion and expansion without use of accessory muscles  · Resp Auscultation: Normal breath sounds without dullness  Cardiovascular:  · The apical impulses not displaced  · Heart tones are crisp and normal  · Cervical veins are not engorged  · The carotid upstroke is normal in amplitude and contour without delay or bruit  · JVP 10-11 cm H2O  RRR with nl S1 and S2 without m,r,g  · Peripheral pulses are symmetrical and full  · There is no clubbing, cyanosis of the extremities.   · bilateral edema 1+  · Femoral 96.7 02/02/2020     02/02/2020     Lab Results   Component Value Date     03/05/2020    K 4.1 03/05/2020    K 3.7 02/02/2020     03/05/2020    CO2 23 03/05/2020    BUN 15 03/05/2020    CREATININE 0.7 03/05/2020    GLUCOSE 96 03/05/2020    GLUCOSE 100 03/27/2012    CALCIUM 9.7 03/05/2020      Lab Results   Component Value Date    TRIG 153 08/01/2019    HDL 55 08/01/2019    LDLCALC 89 08/01/2019    LABVLDL 31 08/01/2019     Diagnostic Testing:    ECHO 8/20/2019   -Left ventricular cavity size is normal.   -There is mild left ventricular hypertrophy.   -Ejection fraction is visually estimated to be 55-60%. -No wall motion abnormalities.   -Normal diastolic function.   -Trivial mitral regurgitation.   -Mild tricuspid regurgitation     Calcium CT Score 2/19/2019   Total Agatston calcium score of 51.    24 hour holter and it revealed SR/PVCs, no afib or arrhythmias. Echo 4/26/18  Left ventricular cavity size is normal.  There is mild left ventricular hypertrophy. Ejection fraction is visually estimated to be 55-60%. Normal diastolic function. Mild tricuspid regurgitation with RVSP of 36 mm/hg    VQ scan 8/30/17  Low Probability for Pulmonary Embolus. Echo 8/28/17  Normalleft ventricle size and systolic function with an estimated ejection fraction of 60%. Mild mitral regurgitation is present. There is mild-moderate tricuspid regurgitation with RVSP estimated at 88 mmHg. This is suggestive of severe pulmonary hypertension. Mild pulmonic regurgitation present. Echo 9/1/2016:  Technically limited examination to evaluate RVSP. Overall left ventricular function is normal.  Normal right ventricular size and function. Mild tricuspid regurgitation with RVSP estimated at 95 mmHg. No evidence of any pericardial effusion.     [address] 9/1/2016:  Pressures were as follows:  RA: 9 mmHg  RV:  38/12 mmHg  PA:  33/16, mean 24 mmHg  PCWP:  14 mmHg  Thermodilution CO: 7.45   Thermodilution CI:  3.42  Adiel CO: 9.72  Adiel CI:  4.46  PA Sat:  74%  PVR:  107 dynes     Impression:  1. Minimal pulmonary hypertension with mean PA pressure 24  2. Normal filling pressures  3. Normal cardiac output  4. No evidence of PAH at this time. Echo 8/28/17  Normal left ventricle size and systolic function with an estimated ejection  fraction of 60%. Mild mitral regurgitation is present. There is mild-moderate tricuspid regurgitation with RVSP estimated at 88  mmHg. This is suggestive of severe pulmonary hypertension. Mild pulmonic regurgitation present. 1-20-15 Nuclear Stress test  Conclusions        Summary    Normal myocardial perfusion study.    Normal LV function. Labs were reviewed including labs from other hospital systems through [facility]. Cardiac testing was reviewed including echos, nuclear scans, cardiac catheterization, including from other hospital systems through [facility]. Assessment:  1. Chronic diastolic heart failure (HCC)    2. Paroxysmal atrial fibrillation ([address]    3. Benign essential HTN    4. HUSSAIN (obstructive sleep apnea)    5. Essential hypertension    6. SOB (shortness of breath)        1. Chronic diastolic heart failure ([address]):  Stable, compensated. ECHO 8/2019> EF 55-60% and RVSP 40mmHg. Remains on lisinopril, Lasix. Add spironolactone and take HCTZ prn.       2. Paroxysmal atrial fibrillation ([facility]):  Rate controlled on Xarelto. DCCV 1/13/2020 (Dr. Hyun Ponce)      3. Benign essential HTN: /62   Pulse 67   Ht 5' 4\" (1.626 m)   Wt 250 lb (113.4 kg)   SpO2 96%   BMI 42.91 kg/m²   Controlled. 4. HUSSAIN (obstructive sleep apnea): On CPAP. SOB:  Denies SOB today. Pure hypercholesterolemia:  8/1/19> , HDL 55, LDL 89, . She has been eating better and exercising in the pool 5000 steps 5 days a week. She decided not to start Zetia. Mother had CAD. CT calcium score 51. Could not tolerate statin or Livalo. Plan:  1.   Take to drink no more than 64oz a day. 2.  Continue Lasix daily until your Ablation. Take Lasix 40mg x 3 days. 3.  Start Spironolactone 25mg daily. 4.  Stop HCTZ and Potassium. 5.  If you feel like you are holding onto Fluid, take one HCTZ tablet daily until you feel like fluid is back down. 6.  See Dr. Pablo Adan in 6-8 weeks       Scribe's attestation: This note was scribed in the presence of Brennon Petersen M.D. by Ahmet Alexandre RN     The scribe's documentation has been prepared under my direction and personally reviewed by me in its entirety. I confirm that the note above accurately reflects all work, treatment, procedures, and medical decision making performed by me. QUALITY MEASURES  1. Tobacco Cessation Counseling: N/A  2. BP retake if >140/90: N/A  3. Communication to PCP: Office note forwarded/faxed to PCP  4. CAD antiplatelet therapy: N/A  5. CAD lipid lowering therapy: N/A  6. HF A. Fib on anticoagulation: N/A     Thank you for allowing me to participate in the care of your patient.     Brennon Petersen M.D., [facility]

## 2020-03-09 ENCOUNTER — OFFICE VISIT (OUTPATIENT)
Dept: CARDIOLOGY CLINIC | Age: 70
End: 2020-03-09
Payer: MEDICARE

## 2020-03-09 VITALS
BODY MASS INDEX: 42.68 KG/M2 | HEIGHT: 64 IN | HEART RATE: 67 BPM | DIASTOLIC BLOOD PRESSURE: 62 MMHG | WEIGHT: 250 LBS | SYSTOLIC BLOOD PRESSURE: 110 MMHG | OXYGEN SATURATION: 96 %

## 2020-03-09 PROCEDURE — 1090F PRES/ABSN URINE INCON ASSESS: CPT | Performed by: INTERNAL MEDICINE

## 2020-03-09 PROCEDURE — 99214 OFFICE O/P EST MOD 30 MIN: CPT | Performed by: INTERNAL MEDICINE

## 2020-03-09 PROCEDURE — 1036F TOBACCO NON-USER: CPT | Performed by: INTERNAL MEDICINE

## 2020-03-09 PROCEDURE — G8427 DOCREV CUR MEDS BY ELIG CLIN: HCPCS | Performed by: INTERNAL MEDICINE

## 2020-03-09 PROCEDURE — 4040F PNEUMOC VAC/ADMIN/RCVD: CPT | Performed by: INTERNAL MEDICINE

## 2020-03-09 PROCEDURE — G8417 CALC BMI ABV UP PARAM F/U: HCPCS | Performed by: INTERNAL MEDICINE

## 2020-03-09 PROCEDURE — G8399 PT W/DXA RESULTS DOCUMENT: HCPCS | Performed by: INTERNAL MEDICINE

## 2020-03-09 PROCEDURE — G8482 FLU IMMUNIZE ORDER/ADMIN: HCPCS | Performed by: INTERNAL MEDICINE

## 2020-03-09 PROCEDURE — 1123F ACP DISCUSS/DSCN MKR DOCD: CPT | Performed by: INTERNAL MEDICINE

## 2020-03-09 PROCEDURE — 3017F COLORECTAL CA SCREEN DOC REV: CPT | Performed by: INTERNAL MEDICINE

## 2020-03-09 RX ORDER — FLUTICASONE PROPIONATE 110 UG/1
1 AEROSOL, METERED RESPIRATORY (INHALATION) 2 TIMES DAILY
COMMUNITY
End: 2021-02-22 | Stop reason: SDUPTHER

## 2020-03-09 RX ORDER — HYDROCHLOROTHIAZIDE 25 MG/1
25 TABLET ORAL DAILY PRN
Qty: 90 TABLET | Refills: 3
Start: 2020-03-09 | End: 2020-04-02 | Stop reason: ALTCHOICE

## 2020-03-09 RX ORDER — SPIRONOLACTONE 25 MG/1
25 TABLET ORAL DAILY
Qty: 90 TABLET | Refills: 3 | Status: SHIPPED | OUTPATIENT
Start: 2020-03-09 | End: 2020-04-23 | Stop reason: SDUPTHER

## 2020-03-11 ENCOUNTER — PATIENT MESSAGE (OUTPATIENT)
Dept: CARDIOLOGY CLINIC | Age: 70
End: 2020-03-11

## 2020-03-11 NOTE — TELEPHONE ENCOUNTER
Pended the correct mg (20mg) for a 90 day fill per pt request     Please advise   Last appt on 03/09/2020

## 2020-03-12 RX ORDER — LISINOPRIL 20 MG/1
20 TABLET ORAL DAILY
Qty: 90 TABLET | Refills: 1 | Status: ON HOLD | OUTPATIENT
Start: 2020-03-12 | End: 2020-03-29 | Stop reason: SDUPTHER

## 2020-03-25 ENCOUNTER — TELEPHONE (OUTPATIENT)
Dept: CARDIOLOGY CLINIC | Age: 70
End: 2020-03-25

## 2020-03-25 NOTE — TELEPHONE ENCOUNTER
Lenny Potter, can you please address this?     Appointment notes on her scheduled procedure:  STEPHANE,AFIB ABLATION W/aNESTHESIA / DR. HELTON//hospital calling to move up appt

## 2020-03-25 NOTE — TELEPHONE ENCOUNTER
Has ablation schedule for this Friday with ALISSON. She wants to be called to let her know that her procedure is going to go on as planned or not .

## 2020-03-27 ENCOUNTER — ANESTHESIA EVENT (OUTPATIENT)
Dept: CARDIAC CATH/INVASIVE PROCEDURES | Age: 70
DRG: 274 | End: 2020-03-27
Payer: MEDICARE

## 2020-03-27 ENCOUNTER — ANESTHESIA (OUTPATIENT)
Dept: CARDIAC CATH/INVASIVE PROCEDURES | Age: 70
DRG: 274 | End: 2020-03-27
Payer: MEDICARE

## 2020-03-27 VITALS
TEMPERATURE: 96.8 F | RESPIRATION RATE: 18 BRPM | OXYGEN SATURATION: 99 % | SYSTOLIC BLOOD PRESSURE: 100 MMHG | DIASTOLIC BLOOD PRESSURE: 54 MMHG

## 2020-03-27 PROBLEM — I48.0 PAF (PAROXYSMAL ATRIAL FIBRILLATION) (HCC): Status: ACTIVE | Noted: 2020-03-27

## 2020-03-27 PROCEDURE — 2580000003 HC RX 258: Performed by: NURSE ANESTHETIST, CERTIFIED REGISTERED

## 2020-03-27 PROCEDURE — 6360000002 HC RX W HCPCS: Performed by: NURSE ANESTHETIST, CERTIFIED REGISTERED

## 2020-03-27 PROCEDURE — 6370000000 HC RX 637 (ALT 250 FOR IP): Performed by: NURSE ANESTHETIST, CERTIFIED REGISTERED

## 2020-03-27 PROCEDURE — 2500000003 HC RX 250 WO HCPCS: Performed by: NURSE ANESTHETIST, CERTIFIED REGISTERED

## 2020-03-27 RX ORDER — LIDOCAINE HYDROCHLORIDE 40 MG/ML
SOLUTION TOPICAL PRN
Status: DISCONTINUED | OUTPATIENT
Start: 2020-03-27 | End: 2020-03-27 | Stop reason: SDUPTHER

## 2020-03-27 RX ORDER — ATROPINE SULFATE 0.4 MG/ML
AMPUL (ML) INJECTION PRN
Status: DISCONTINUED | OUTPATIENT
Start: 2020-03-27 | End: 2020-03-27 | Stop reason: SDUPTHER

## 2020-03-27 RX ORDER — SODIUM CHLORIDE 9 MG/ML
INJECTION, SOLUTION INTRAVENOUS CONTINUOUS PRN
Status: DISCONTINUED | OUTPATIENT
Start: 2020-03-27 | End: 2020-03-27 | Stop reason: SDUPTHER

## 2020-03-27 RX ORDER — NEOSTIGMINE METHYLSULFATE 5 MG/5 ML
SYRINGE (ML) INTRAVENOUS PRN
Status: DISCONTINUED | OUTPATIENT
Start: 2020-03-27 | End: 2020-03-27 | Stop reason: SDUPTHER

## 2020-03-27 RX ORDER — ONDANSETRON 2 MG/ML
INJECTION INTRAMUSCULAR; INTRAVENOUS PRN
Status: DISCONTINUED | OUTPATIENT
Start: 2020-03-27 | End: 2020-03-27 | Stop reason: SDUPTHER

## 2020-03-27 RX ORDER — DEXAMETHASONE SODIUM PHOSPHATE 4 MG/ML
INJECTION, SOLUTION INTRA-ARTICULAR; INTRALESIONAL; INTRAMUSCULAR; INTRAVENOUS; SOFT TISSUE PRN
Status: DISCONTINUED | OUTPATIENT
Start: 2020-03-27 | End: 2020-03-27 | Stop reason: SDUPTHER

## 2020-03-27 RX ORDER — HEPARIN SODIUM 1000 [USP'U]/ML
INJECTION, SOLUTION INTRAVENOUS; SUBCUTANEOUS PRN
Status: DISCONTINUED | OUTPATIENT
Start: 2020-03-27 | End: 2020-03-27 | Stop reason: SDUPTHER

## 2020-03-27 RX ORDER — GLYCOPYRROLATE 1 MG/5 ML
SYRINGE (ML) INTRAVENOUS PRN
Status: DISCONTINUED | OUTPATIENT
Start: 2020-03-27 | End: 2020-03-27 | Stop reason: SDUPTHER

## 2020-03-27 RX ORDER — FENTANYL CITRATE 50 UG/ML
INJECTION, SOLUTION INTRAMUSCULAR; INTRAVENOUS PRN
Status: DISCONTINUED | OUTPATIENT
Start: 2020-03-27 | End: 2020-03-27 | Stop reason: SDUPTHER

## 2020-03-27 RX ORDER — ROCURONIUM BROMIDE 10 MG/ML
INJECTION, SOLUTION INTRAVENOUS PRN
Status: DISCONTINUED | OUTPATIENT
Start: 2020-03-27 | End: 2020-03-27 | Stop reason: SDUPTHER

## 2020-03-27 RX ORDER — FUROSEMIDE 10 MG/ML
INJECTION INTRAMUSCULAR; INTRAVENOUS PRN
Status: DISCONTINUED | OUTPATIENT
Start: 2020-03-27 | End: 2020-03-27 | Stop reason: SDUPTHER

## 2020-03-27 RX ORDER — SUCCINYLCHOLINE/SOD CL,ISO/PF 200MG/10ML
SYRINGE (ML) INTRAVENOUS PRN
Status: DISCONTINUED | OUTPATIENT
Start: 2020-03-27 | End: 2020-03-27 | Stop reason: SDUPTHER

## 2020-03-27 RX ORDER — MIDAZOLAM HYDROCHLORIDE 1 MG/ML
INJECTION INTRAMUSCULAR; INTRAVENOUS PRN
Status: DISCONTINUED | OUTPATIENT
Start: 2020-03-27 | End: 2020-03-27 | Stop reason: SDUPTHER

## 2020-03-27 RX ORDER — LIDOCAINE HYDROCHLORIDE 20 MG/ML
INJECTION, SOLUTION EPIDURAL; INFILTRATION; INTRACAUDAL; PERINEURAL PRN
Status: DISCONTINUED | OUTPATIENT
Start: 2020-03-27 | End: 2020-03-27 | Stop reason: SDUPTHER

## 2020-03-27 RX ORDER — PROPOFOL 10 MG/ML
INJECTION, EMULSION INTRAVENOUS PRN
Status: DISCONTINUED | OUTPATIENT
Start: 2020-03-27 | End: 2020-03-27 | Stop reason: SDUPTHER

## 2020-03-27 RX ADMIN — FUROSEMIDE 10 MG: 10 INJECTION, SOLUTION INTRAMUSCULAR; INTRAVENOUS at 13:27

## 2020-03-27 RX ADMIN — SODIUM CHLORIDE: 9 INJECTION, SOLUTION INTRAVENOUS at 11:08

## 2020-03-27 RX ADMIN — PHENYLEPHRINE HYDROCHLORIDE 20 MCG/MIN: 10 INJECTION INTRAVENOUS at 11:44

## 2020-03-27 RX ADMIN — MIDAZOLAM 2 MG: 1 INJECTION INTRAMUSCULAR; INTRAVENOUS at 11:23

## 2020-03-27 RX ADMIN — PHENYLEPHRINE HYDROCHLORIDE 100 MCG: 10 INJECTION INTRAVENOUS at 14:21

## 2020-03-27 RX ADMIN — ROCURONIUM BROMIDE 20 MG: 10 INJECTION, SOLUTION INTRAVENOUS at 11:39

## 2020-03-27 RX ADMIN — PHENYLEPHRINE HYDROCHLORIDE 100 MCG: 10 INJECTION INTRAVENOUS at 11:29

## 2020-03-27 RX ADMIN — PHENYLEPHRINE HYDROCHLORIDE 100 MCG: 10 INJECTION INTRAVENOUS at 12:31

## 2020-03-27 RX ADMIN — HEPARIN SODIUM 3000 UNITS: 1000 INJECTION, SOLUTION INTRAVENOUS; SUBCUTANEOUS at 12:27

## 2020-03-27 RX ADMIN — PHENYLEPHRINE HYDROCHLORIDE 100 MCG: 10 INJECTION INTRAVENOUS at 11:45

## 2020-03-27 RX ADMIN — HEPARIN SODIUM 13000 UNITS: 1000 INJECTION, SOLUTION INTRAVENOUS; SUBCUTANEOUS at 12:09

## 2020-03-27 RX ADMIN — Medication 0.2 MG: at 14:17

## 2020-03-27 RX ADMIN — PHENYLEPHRINE HYDROCHLORIDE 100 MCG: 10 INJECTION INTRAVENOUS at 11:38

## 2020-03-27 RX ADMIN — ROCURONIUM BROMIDE 10 MG: 10 INJECTION, SOLUTION INTRAVENOUS at 11:28

## 2020-03-27 RX ADMIN — PHENYLEPHRINE HYDROCHLORIDE 100 MCG: 10 INJECTION INTRAVENOUS at 14:15

## 2020-03-27 RX ADMIN — FENTANYL CITRATE 50 MCG: 50 INJECTION INTRAMUSCULAR; INTRAVENOUS at 11:28

## 2020-03-27 RX ADMIN — Medication 120 MG: at 11:30

## 2020-03-27 RX ADMIN — PHENYLEPHRINE HYDROCHLORIDE 100 MCG: 10 INJECTION INTRAVENOUS at 14:23

## 2020-03-27 RX ADMIN — FUROSEMIDE 10 MG: 10 INJECTION, SOLUTION INTRAMUSCULAR; INTRAVENOUS at 14:19

## 2020-03-27 RX ADMIN — ATROPINE SULFATE 0.1 MG: 0.4 INJECTION, SOLUTION INTRAMUSCULAR; INTRAVENOUS; SUBCUTANEOUS at 14:26

## 2020-03-27 RX ADMIN — DEXAMETHASONE SODIUM PHOSPHATE 4 MG: 4 INJECTION, SOLUTION INTRAMUSCULAR; INTRAVENOUS at 11:37

## 2020-03-27 RX ADMIN — PROPOFOL 20 MG: 10 INJECTION, EMULSION INTRAVENOUS at 12:36

## 2020-03-27 RX ADMIN — ATROPINE SULFATE 0.1 MG: 0.4 INJECTION, SOLUTION INTRAMUSCULAR; INTRAVENOUS; SUBCUTANEOUS at 14:23

## 2020-03-27 RX ADMIN — LIDOCAINE HYDROCHLORIDE 4 ML: 40 SOLUTION TOPICAL at 11:31

## 2020-03-27 RX ADMIN — Medication 1 MG: at 14:17

## 2020-03-27 RX ADMIN — PROPOFOL 30 MG: 10 INJECTION, EMULSION INTRAVENOUS at 13:55

## 2020-03-27 RX ADMIN — Medication 0.2 MG: at 14:14

## 2020-03-27 RX ADMIN — FENTANYL CITRATE 50 MCG: 50 INJECTION INTRAMUSCULAR; INTRAVENOUS at 11:35

## 2020-03-27 RX ADMIN — HEPARIN SODIUM 4000 UNITS: 1000 INJECTION, SOLUTION INTRAVENOUS; SUBCUTANEOUS at 12:45

## 2020-03-27 RX ADMIN — ISOPROTERENOL HYDROCHLORIDE 10 MCG/MIN: 0.2 INJECTION, SOLUTION INTRAMUSCULAR; INTRAVENOUS at 13:51

## 2020-03-27 RX ADMIN — Medication 2 MG: at 14:14

## 2020-03-27 RX ADMIN — ONDANSETRON 4 MG: 2 INJECTION INTRAMUSCULAR; INTRAVENOUS at 11:37

## 2020-03-27 RX ADMIN — HEPARIN SODIUM 2000 UNITS: 1000 INJECTION, SOLUTION INTRAVENOUS; SUBCUTANEOUS at 13:26

## 2020-03-27 RX ADMIN — PHENYLEPHRINE HYDROCHLORIDE 100 MCG: 10 INJECTION INTRAVENOUS at 14:14

## 2020-03-27 RX ADMIN — SODIUM CHLORIDE: 9 INJECTION, SOLUTION INTRAVENOUS at 12:14

## 2020-03-27 RX ADMIN — LIDOCAINE HYDROCHLORIDE 100 MG: 20 INJECTION, SOLUTION EPIDURAL; INFILTRATION; INTRACAUDAL; PERINEURAL at 11:28

## 2020-03-27 RX ADMIN — PHENYLEPHRINE HYDROCHLORIDE 100 MCG: 10 INJECTION INTRAVENOUS at 12:41

## 2020-03-27 RX ADMIN — ATROPINE SULFATE 0.1 MG: 0.4 INJECTION, SOLUTION INTRAMUSCULAR; INTRAVENOUS; SUBCUTANEOUS at 14:29

## 2020-03-27 RX ADMIN — PHENYLEPHRINE HYDROCHLORIDE 50 MCG: 10 INJECTION INTRAVENOUS at 13:53

## 2020-03-27 RX ADMIN — ATROPINE SULFATE 0.1 MG: 0.4 INJECTION, SOLUTION INTRAMUSCULAR; INTRAVENOUS; SUBCUTANEOUS at 14:21

## 2020-03-27 RX ADMIN — PROPOFOL 200 MG: 10 INJECTION, EMULSION INTRAVENOUS at 11:28

## 2020-03-27 ASSESSMENT — PULMONARY FUNCTION TESTS
PIF_VALUE: 19
PIF_VALUE: 19
PIF_VALUE: 9
PIF_VALUE: 19
PIF_VALUE: 17
PIF_VALUE: 18
PIF_VALUE: 20
PIF_VALUE: 19
PIF_VALUE: 22
PIF_VALUE: 20
PIF_VALUE: 19
PIF_VALUE: 19
PIF_VALUE: 22
PIF_VALUE: 18
PIF_VALUE: 7
PIF_VALUE: 1
PIF_VALUE: 16
PIF_VALUE: 20
PIF_VALUE: 18
PIF_VALUE: 19
PIF_VALUE: 19
PIF_VALUE: 18
PIF_VALUE: 16
PIF_VALUE: 16
PIF_VALUE: 17
PIF_VALUE: 2
PIF_VALUE: 19
PIF_VALUE: 19
PIF_VALUE: 18
PIF_VALUE: 18
PIF_VALUE: 19
PIF_VALUE: 31
PIF_VALUE: 19
PIF_VALUE: 18
PIF_VALUE: 19
PIF_VALUE: 20
PIF_VALUE: 19
PIF_VALUE: 2
PIF_VALUE: 18
PIF_VALUE: 20
PIF_VALUE: 18
PIF_VALUE: 20
PIF_VALUE: 19
PIF_VALUE: 2
PIF_VALUE: 19
PIF_VALUE: 3
PIF_VALUE: 17
PIF_VALUE: 19
PIF_VALUE: 20
PIF_VALUE: 19
PIF_VALUE: 20
PIF_VALUE: 17
PIF_VALUE: 19
PIF_VALUE: 17
PIF_VALUE: 18
PIF_VALUE: 18
PIF_VALUE: 17
PIF_VALUE: 18
PIF_VALUE: 1
PIF_VALUE: 19
PIF_VALUE: 18
PIF_VALUE: 19
PIF_VALUE: 20
PIF_VALUE: 1
PIF_VALUE: 17
PIF_VALUE: 17
PIF_VALUE: 19
PIF_VALUE: 18
PIF_VALUE: 17
PIF_VALUE: 19
PIF_VALUE: 20
PIF_VALUE: 19
PIF_VALUE: 5
PIF_VALUE: 3
PIF_VALUE: 19
PIF_VALUE: 17
PIF_VALUE: 19
PIF_VALUE: 20
PIF_VALUE: 18
PIF_VALUE: 18
PIF_VALUE: 19
PIF_VALUE: 9
PIF_VALUE: 0
PIF_VALUE: 19
PIF_VALUE: 17
PIF_VALUE: 19
PIF_VALUE: 16
PIF_VALUE: 18
PIF_VALUE: 19
PIF_VALUE: 18
PIF_VALUE: 18
PIF_VALUE: 19
PIF_VALUE: 17
PIF_VALUE: 18
PIF_VALUE: 19
PIF_VALUE: 17
PIF_VALUE: 20
PIF_VALUE: 17
PIF_VALUE: 19
PIF_VALUE: 18
PIF_VALUE: 19
PIF_VALUE: 19
PIF_VALUE: 18
PIF_VALUE: 19
PIF_VALUE: 26
PIF_VALUE: 5
PIF_VALUE: 19
PIF_VALUE: 19
PIF_VALUE: 20
PIF_VALUE: 16
PIF_VALUE: 19
PIF_VALUE: 19
PIF_VALUE: 18
PIF_VALUE: 19
PIF_VALUE: 18
PIF_VALUE: 21
PIF_VALUE: 19
PIF_VALUE: 18
PIF_VALUE: 17
PIF_VALUE: 18
PIF_VALUE: 19
PIF_VALUE: 19
PIF_VALUE: 18
PIF_VALUE: 19
PIF_VALUE: 3
PIF_VALUE: 3
PIF_VALUE: 20
PIF_VALUE: 19
PIF_VALUE: 19
PIF_VALUE: 18
PIF_VALUE: 19
PIF_VALUE: 19
PIF_VALUE: 17
PIF_VALUE: 19
PIF_VALUE: 19
PIF_VALUE: 4
PIF_VALUE: 16
PIF_VALUE: 19
PIF_VALUE: 20
PIF_VALUE: 19
PIF_VALUE: 19
PIF_VALUE: 17
PIF_VALUE: 17
PIF_VALUE: 4
PIF_VALUE: 19
PIF_VALUE: 18
PIF_VALUE: 19

## 2020-03-27 ASSESSMENT — LIFESTYLE VARIABLES: SMOKING_STATUS: 0

## 2020-03-27 NOTE — ANESTHESIA POSTPROCEDURE EVALUATION
Department of Anesthesiology  Postprocedure Note    Patient: Corrine Rg  MRN: [id number]  YOB: 1950  Date of evaluation: 3/27/2020  Time:  3:02 PM     Procedure Summary     Date:  03/27/20 Room / Location:  [facility] Cath Lab; [facility] Echocardiography    Anesthesia Start:  2168 Anesthesia Stop:  2960    Procedure:  ECHO STEPHANE IN CARDIAC CATH Diagnosis:       Paroxysmal atrial fibrillation      PAF (paroxysmal atrial fibrillation) ([facility])      PAF (paroxysmal atrial fibrillation) ([address]    Scheduled Providers:   Responsible Provider:  Katya Saldivar MD    Anesthesia Type:  general ASA Status:  3          Anesthesia Type: general    Roxie Phase I: Roxie Score: 8    Roxie Phase II:      Last vitals: Reviewed and per EMR flowsheets.        Anesthesia Post Evaluation    Patient location during evaluation: PACU  Patient participation: complete - patient participated  Level of consciousness: awake and alert  Airway patency: patent  Nausea & Vomiting: no vomiting and no nausea  Complications: no  Cardiovascular status: hemodynamically stable  Respiratory status: acceptable  Hydration status: stable

## 2020-03-27 NOTE — ANESTHESIA PRE PROCEDURE
Department of Anesthesiology  Preprocedure Note       Name:  Nasir Arevalo   Age:  81 y.o.  :  1950                                          MRN:  [id number]         Date:  3/27/2020      Surgeon: * Surgery not found *    Procedure: ECHO STEPHANE IN CARDIAC CATH    Medications prior to admission:   Prior to Admission medications    Medication Sig Start Date End Date Taking?  Authorizing Provider   lisinopril (PRINIVIL;ZESTRIL) 20 MG tablet Take 1 tablet by mouth daily 3/12/20   Tobias Smallwood MD   fluticasone (FLOVENT HFA) 110 MCG/ACT inhaler Inhale 1 puff into the lungs 2 times daily    Historical Provider, MD   spironolactone (ALDACTONE) 25 MG tablet Take 1 tablet by mouth daily 3/9/20   Tobias Smallwood MD   hydroCHLOROthiazide (HYDRODIURIL) 25 MG tablet Take 1 tablet by mouth daily as needed (Take with Lasix for leg swelling and SOB) 3/9/20   Tobias Smallwood MD   amiodarone (CORDARONE) 200 MG tablet 1 tablet daily 20   Rolley Bamberger, APRN - CNP   pramipexole (MIRAPEX) 0.5 MG tablet 1.5 tab q 5 PM and 1.5 tab qHS 2/10/20   Jordana Street MD   furosemide (LASIX) 20 MG tablet Take 1 tablet by mouth daily 20   Maty Tolbert MD   CPAP Machine MISC by Does not apply route    Historical Provider, MD   diltiazem (CARDIZEM CD) 180 MG extended release capsule Take 1 capsule by mouth daily  Patient not taking: Reported on 3/9/2020 1/23/20   Tobias Smallwood MD   diltiazem (CARDIZEM) 60 MG tablet Take 1 tablet by mouth daily as needed (for palpitations) 20   Tobias Smallwood MD   tiotropium (SPIRIVA) 18 MCG inhalation capsule Inhale 18 mcg into the lungs daily X 10 days    Historical Provider, MD   rivaroxaban (XARELTO) 20 MG TABS tablet Take 1 tablet by mouth Daily with supper 19   Tobias Smallwood MD   azelastine (ASTELIN) 0.1 % nasal spray 1 spray by Nasal route 2 times daily 1 Spray in each nostril 19   Maty Tolbert MD   Magnesium Citrate 100 MG TABS Take 1 tablet by mouth daily 18   Yessica Lugo constipation or flatulus    Other      Environmental -cats,dogs,dust    Sulfa Antibiotics      Can take Celebrex, Pt denies 8/1/18       Problem List:    Patient Active Problem List   Diagnosis Code    Restless leg syndrome G25.81    Acquired hypothyroidism E03.9    Benign essential HTN I10    HUSSAIN (obstructive sleep apnea) G47.33    Obesity, Class III, BMI 40-49.9 (morbid obesity) (HCC) E66.01    Non morbid obesity, unspecified obesity type E66.9    SOB (shortness of breath) R06.02    Primary osteoarthritis of both knees M17.0    Paroxysmal atrial fibrillation (HCC) I48.0    Pure hypercholesterolemia E78.00    Hepatic steatosis K76.0    Hx of pulmonary embolus Z86.711    Chronic diastolic heart failure (HCC) I50.32    Other pulmonary embolism without acute cor pulmonale (HCC) I26.99    Atrial fibrillation (HCC) I48.91    A-fib (HCC) I48.91    Obesity (BMI 30-39. 9) E66.9       Past Medical History:        Diagnosis Date    Allergic     Anesthesia complication     family hx-father-at at age 80 having hip replacement revision surgery-had tia's x2 while under anes-but also had hx chf-had dificuly with speech when coming out from anes    Anxiety     Arthritis     left ankle, bilateral hands    Arthritis of left hip 2/2/2016    Arthritis of right hip 4/19/2016    Asthma     At risk for falls     uses a cane    Atrial fibrillation with rapid ventricular response (HCC)     Atrial flutter ([address] 4/25/2018    Chronic maxillary sinusitis 5/24/2017    CTEPH (chronic thromboembolic pulmonary hypertension) ([address] 8/26/2016    Depression     pt stated r/t bad marriage/alcoholic spouse    Disc disease, degenerative, lumbar or lumbosacral 2/20/2017    Hepatic steatosis 4/26/2019    History of total hip arthroplasty 2/28/2017    Hypertension     Leg cramps     patient states very severe, requires immediate potassium administration    Migraines, basilar     last migraine 10 years ago    Mild 819, Ef 55-60, no RWMA, currently well compensated, no s/s of vol overload): diastolic, pulmonary hypertension (cor pulm hx 2/2 mult pes, RVSP back to wnl):, hyperlipidemia    (-) past MI, CAD (neg stress 2015), CABG/stent and  angina    ECG reviewed  Rhythm: regular  Rate: normal  Echocardiogram reviewed                  Neuro/Psych:   (+) headaches: migraine headaches, depression/anxiety    (-) seizures, TIA and CVA           GI/Hepatic/Renal:   (+) liver disease (steatosis):, morbid obesity     (-) GERD and no renal disease       Endo/Other:    (+) hypothyroidism: arthritis: OA., .    (-) diabetes mellitus               Abdominal:           Vascular:   + PE (h/o multiple, maintained on xarelto as outpt). Anesthesia Plan      general     ASA 3       Induction: intravenous. MIPS: Postoperative opioids intended and Prophylactic antiemetics administered. Anesthetic plan and risks discussed with patient. Plan discussed with CRNA.                   Aidee Goode MD   3/27/2020

## 2020-03-28 PROBLEM — R00.1 SYMPTOMATIC BRADYCARDIA: Status: ACTIVE | Noted: 2020-03-28

## 2020-03-28 PROCEDURE — 2500000003 HC RX 250 WO HCPCS

## 2020-03-30 ENCOUNTER — CARE COORDINATION (OUTPATIENT)
Dept: CASE MANAGEMENT | Age: 70
End: 2020-03-30

## 2020-03-30 PROCEDURE — 1111F DSCHRG MED/CURRENT MED MERGE: CPT | Performed by: INTERNAL MEDICINE

## 2020-03-30 NOTE — CARE COORDINATION
[facility] Transitions Initial Follow Up Call    Call within 2 business days of discharge: Yes    Patient: Naomy Benitez Patient : 1950   MRN: [id number]  Reason for Admission: s/p ablation  Discharge Date: 3/29/20 RARS: Readmission Risk Score: 14      Last Discharge [address] [address]       Complaint Diagnosis Description Type Department Provider    3/27/20  Benign essential HTN Admission (Discharged) Sabino Ahn MD           Spoke with: [address] [place] [address] [address] [facility]    Non-face-to-face services provided:  Obtained and reviewed discharge summary and/or continuity of care documents    Care Transitions 24 Hour Call    Do you have any ongoing symptoms?:  No  Do you have a copy of your discharge instructions?:  Yes  Do you have all of your prescriptions and are they filled?:  Yes  Have you been contacted by a [address]?:  No  Have you scheduled your follow up appointment?:  Yes  How are you going to get to your appointment?:  Car - family or friend to transport  Were you discharged with any Home Care or Post Acute Services:  No  Patient Home Equipment:  CPAP  Do you have support at home?:  Alone  Do you feel like you have everything you need to keep you well at home?:  Yes  Are you an active caregiver in your home?:  No  Care Transitions Interventions  No Identified Needs       Patient contacted regarding recent discharge and COVID-19 risk   Care Transition Nurse contacted the patient by telephone to perform post discharge assessment. Verified name and  with patient as identifiers. Patient has following risk factors of: CHF. CTN reviewed discharge instructions, medical action plan and red flags related to discharge diagnosis. Reviewed and educated them on any new and changed medications related to discharge diagnosis as well as reviewed all other meds. Advised obtaining a 90-day supply of all daily and as-needed medications.      Education provided regarding infection

## 2020-03-31 ENCOUNTER — TELEPHONE (OUTPATIENT)
Dept: CARDIOLOGY CLINIC | Age: 70
End: 2020-03-31

## 2020-03-31 RX ORDER — PRAMIPEXOLE DIHYDROCHLORIDE 0.5 MG/1
TABLET ORAL
Qty: 90 TABLET | Refills: 5 | Status: SHIPPED | OUTPATIENT
Start: 2020-03-31 | End: 2020-10-15

## 2020-04-01 ENCOUNTER — TELEPHONE (OUTPATIENT)
Dept: INTERNAL MEDICINE CLINIC | Age: 70
End: 2020-04-01

## 2020-04-01 ENCOUNTER — TELEPHONE (OUTPATIENT)
Dept: CARDIOLOGY CLINIC | Age: 70
End: 2020-04-01

## 2020-04-01 RX ORDER — DOXYCYCLINE HYCLATE 100 MG
100 TABLET ORAL 2 TIMES DAILY
Qty: 6 TABLET | Refills: 0 | Status: SHIPPED | OUTPATIENT
Start: 2020-04-01 | End: 2020-04-04

## 2020-04-01 RX ORDER — DOXYCYCLINE HYCLATE 100 MG
100 TABLET ORAL 2 TIMES DAILY
Qty: 6 TABLET | Refills: 0 | Status: SHIPPED | OUTPATIENT
Start: 2020-04-01 | End: 2020-04-01 | Stop reason: SDUPTHER

## 2020-04-01 NOTE — TELEPHONE ENCOUNTER
She had an ablation last Friday and thinks she has a bladder infection from the catheter . She is having frequency, urgency, urine is cloudy,and  has an odor . Will ALISSON call in ATB for her ? She uses [facility] [place] [facility] . Please call patient to let her know if ALISSON will call in rx .

## 2020-04-01 NOTE — TELEPHONE ENCOUNTER
UTI symptoms. Pt needs medications.     Pharmacy:  [facility] [address], [address] [phone] Ko Joshi [phone]    Please call pt

## 2020-04-02 ENCOUNTER — TELEPHONE (OUTPATIENT)
Dept: CARDIOLOGY CLINIC | Age: 70
End: 2020-04-02

## 2020-04-02 ENCOUNTER — TELEMEDICINE (OUTPATIENT)
Dept: CARDIOLOGY CLINIC | Age: 70
End: 2020-04-02
Payer: MEDICARE

## 2020-04-02 VITALS
BODY MASS INDEX: 41.5 KG/M2 | HEART RATE: 72 BPM | SYSTOLIC BLOOD PRESSURE: 115 MMHG | HEIGHT: 64 IN | WEIGHT: 243.1 LBS | OXYGEN SATURATION: 98 % | DIASTOLIC BLOOD PRESSURE: 71 MMHG

## 2020-04-02 PROBLEM — I48.0 PAF (PAROXYSMAL ATRIAL FIBRILLATION) (HCC): Status: RESOLVED | Noted: 2020-03-27 | Resolved: 2020-04-02

## 2020-04-02 PROBLEM — I48.91 ATRIAL FIBRILLATION (HCC): Status: RESOLVED | Noted: 2020-01-12 | Resolved: 2020-04-02

## 2020-04-02 PROBLEM — I48.91 A-FIB (HCC): Status: RESOLVED | Noted: 2020-02-02 | Resolved: 2020-04-02

## 2020-04-02 PROCEDURE — 99214 OFFICE O/P EST MOD 30 MIN: CPT | Performed by: NURSE PRACTITIONER

## 2020-04-02 ASSESSMENT — ENCOUNTER SYMPTOMS
GASTROINTESTINAL NEGATIVE: 1
SHORTNESS OF BREATH: 1

## 2020-04-02 NOTE — PATIENT INSTRUCTIONS
Instructions:   1. Medications: continue lasix and spironolactone everyday and stop potassium, call if you gain or lose 3lb or more overnight  2. Labs: this week  3.  Follow up: Jarod Howard on 4/23, Dr. Jerry Kennedy in May

## 2020-04-03 ENCOUNTER — VIRTUAL VISIT (OUTPATIENT)
Dept: CARDIOLOGY CLINIC | Age: 70
End: 2020-04-03
Payer: MEDICARE

## 2020-04-03 VITALS
WEIGHT: 244.7 LBS | HEART RATE: 65 BPM | TEMPERATURE: 97.3 F | OXYGEN SATURATION: 99 % | BODY MASS INDEX: 42 KG/M2 | DIASTOLIC BLOOD PRESSURE: 76 MMHG | SYSTOLIC BLOOD PRESSURE: 137 MMHG

## 2020-04-03 PROCEDURE — 99214 OFFICE O/P EST MOD 30 MIN: CPT | Performed by: INTERNAL MEDICINE

## 2020-04-03 NOTE — PROGRESS NOTES
[address]   Electrophysiology    Date: 4/3/2020     No chief complaint on file. HPI: Satish Tristan is a 81 y.o. with a history of paroxsymal atrial fib, PE and is on xarelto, dCHF and HUSSAIN. She has had multiple admissions in the past for atrial fib, last cardioverted on 1/13/20. She was discharged on Rythmol which caused her HR to drop into the 40's and was symptomatic. Her most recent admission was 2/2/20. She presented to the ED after taking several doses of cardizem, but remained in atrial fib. She was started on amiodarone and converted to sinus rhythm.      3/27/20 EP study with RF ablation of atrial fib and ablation of CTI dependent flutter as a separate mechanism. On 4/2/20 she reported that she was back in atrial fib with rates ranging from 80 to 130. She did not want to go to the ED. She was instructed to resume her amiodarone and cardizem. Interval history:  Deuce Capellan states she took her medication as directed yesterday. Her HR now is 65 and she feels she is in regular rhythm. She reports that at 1:33 am her heart rate dropped to 47 and could feel she converted at that time. Through out the night she was going in and out of atrial fib. She decided to take just 100mg of amiodarone at 6:00am this morning.       She feels she is in Atrial fibrillation and in the middle of visit she converted again      Past Medical History:   Diagnosis Date    Allergic     Anesthesia complication     family hx-father-at at age 80 having hip replacement revision surgery-had tia's x2 while under anes-but also had hx chf-had dificuly with speech when coming out from [address]     left ankle, bilateral hands    Arthritis of left hip 2/2/2016    Arthritis of right hip 4/19/2016    Asthma     At risk for falls     uses a cane    Atrial fibrillation with rapid ventricular response ([address]     Atrial flutter ([address] 4/25/2018    Chronic maxillary sinusitis 5/24/2017    allergic to milk. She does not drink milk, but consumes milk in other products and consumes dairy products. Had one reactions to shellfish years ago and now can tolerate one serving of shellfish once per week. Avoids gluten as much as she can, but does not have celiac disease.)    Other      Environmental -cats,dogs,dust    Shellfish-Derived Products     Sulfa Antibiotics      Can take Celebrex, Pt denies 8/1/18       Social History:   reports that she quit smoking about 6 years ago. She has a 2.50 pack-year smoking history. She has never used smokeless tobacco. She reports current alcohol use. She reports that she does not use drugs. Family History:  family history includes Alcohol Abuse in her maternal grandfather, paternal grandfather, and sister; Anxiety Disorder in her mother; Arthritis in her brother, mother, and sister; Asthma in her mother; Cancer in her paternal uncle; Cataracts in her mother; Heart Disease in her father, maternal grandfather, maternal uncle, mother, and paternal uncle; Heart Failure in her father; High Cholesterol in her brother; Hypertension in her mother; Stroke in her maternal grandmother; Substance Abuse in her maternal grandfather and paternal grandfather; Thyroid Disease in her father, maternal grandfather, maternal grandmother, and mother. Reviewed. Denies family history of sudden cardiac death, arrhythmia, premature CAD    Review of System:  All other systems reviewed and are negative except for that noted above.  Pertinent negatives are:   General: negative for fever, chills   Ophthalmic ROS: negative for - eye pain or loss of vision  ENT ROS: negative for - headaches, sore throat   Respiratory: negative for - cough, sputum  Cardiovascular: Reviewed in HPI  Gastrointestinal: negative for - abdominal pain, diarrhea, N/V  Hematology: negative for - bleeding, blood clots, bruising or jaundice  Genito-Urinary:  negative for - Dysuria or incontinence  Musculoskeletal: This report was transcribed using voice recognition software. Every effort was made to ensure accuracy, however, inadvertent computerized transcription errors may be present. Rachelle Alberts MD, [facility]   Office: [phone]    Scribe attestation: This note was scribed in the presence of Rachelle Alberts MD by Kwadwo Jimenez RN    I, Dr. Rachelle Alberts personally performed the services described in this documentation as scribed by RN in my presence, and it is both accurate and complete.       Ivy Darling is a 81 y.o. female being evaluated by a Virtual Visit (video visit, Doxseverino) encounter to address concerns as mentioned above. A caregiver was present when appropriate. Due to this being a TeleHealth encounter ([facility] emergency), evaluation of the following organ systems was limited: Vitals/Constitutional/EENT/Resp/CV/GI//MS/Neuro/Skin/Heme-Lymph-Imm. Pursuant to the emergency declaration under the [address] [place] [address] authority and the [employer] Act, this Virtual Visit was conducted with patient's (and/or legal guardian's) consent, to reduce the patient's risk of exposure to COVID-19 and provide necessary medical care. The patient (and/or legal guardian) has also been advised to contact this office for worsening conditions or problems, and seek emergency medical treatment and/or call 911 if deemed necessary. Services were provided through a video synchronous discussion virtually to substitute for in-person clinic visit. Patient and provider were located at their individual homes. --Rachelle Alberts MD on 4/3/2020 at 10:37 AM    An electronic signature was used to authenticate this note.

## 2020-04-06 ENCOUNTER — TELEPHONE (OUTPATIENT)
Dept: CARDIOLOGY CLINIC | Age: 70
End: 2020-04-06

## 2020-04-06 ENCOUNTER — CARE COORDINATION (OUTPATIENT)
Dept: CASE MANAGEMENT | Age: 70
End: 2020-04-06

## 2020-04-07 NOTE — TELEPHONE ENCOUNTER
Last OV with MXA 4/3/2020     1. Paroxysmal atrial fibrillation (HCC)                 S/p ablation of Atrial fibrillation and atrial flutter              Was doing well but has had some paroxysms over the last 24 hours.                 We resumed cardizem and amiodarone and will monitor.  Will stop them in 1-2 months to see if there is any recurrence

## 2020-04-07 NOTE — TELEPHONE ENCOUNTER
Pt asking to adjust medications states over the weekend junctional rhythm, if she holds the medication she goes into afib. Pt is having a lot of dizziness. Please call pt to discuss.

## 2020-04-13 ENCOUNTER — CARE COORDINATION (OUTPATIENT)
Dept: CASE MANAGEMENT | Age: 70
End: 2020-04-13

## 2020-04-13 NOTE — CARE COORDINATION
[facility] Transitions Follow Up Call    2020    Patient: Man Baird  Patient : 1950   MRN: [id number]  Reason for Admission: COVIDRISK atrial fib with RVR;s/p ablation  Discharge Date: 3/29/20 RARS: Readmission Risk Score: 15         Spoke with: [address] [facility] Transitions Subsequent and Final Call    Subsequent and Final Calls  Do you have any ongoing symptoms?:  No  Have your medications changed?:  No  Do you have any questions related to your medications?:  No  Do you currently have any active services?:  No  Do you have any needs or concerns that I can assist you with?:  No  Identified Barriers:  None  Care Transitions Interventions  Other Interventions: Follow Up: Patient reports that she has not had any afib or junctional rhythm. She is following up with MD's as ordered and taking all of her medications as prescribed. CTN will continue with outreach follow up calls.      Future Appointments   Date Time Provider Abhishek Uribe   4/15/2020 11:15 AM MD KANU Garcia [place] [facility]   2020 10:00 AM BRICE Yoder - CNP [facility] Cardio [facility]   2020 11:00 AM Tiffanie R Francene Pallas, APRN - CNP [facility] SLEEP [facility]   2020  8:30 AM Kala Knox MD [facility] Cardio [facility]   2020 10:00 AM Thiago Brumfield MD [facility] ENDO [facility]   2020 10:30 AM Shauna Cronin RD, LD [facility] ENDO [facility]   2020 11:15 AM Katarina Manuel APRN - CNP [facility] Cardio [facility]       Carlie Galicia RN

## 2020-04-15 ENCOUNTER — VIRTUAL VISIT (OUTPATIENT)
Dept: INTERNAL MEDICINE CLINIC | Age: 70
End: 2020-04-15
Payer: MEDICARE

## 2020-04-15 VITALS
WEIGHT: 246.2 LBS | TEMPERATURE: 97.2 F | SYSTOLIC BLOOD PRESSURE: 119 MMHG | DIASTOLIC BLOOD PRESSURE: 64 MMHG | HEART RATE: 66 BPM | BODY MASS INDEX: 42.26 KG/M2

## 2020-04-15 PROCEDURE — 3017F COLORECTAL CA SCREEN DOC REV: CPT | Performed by: INTERNAL MEDICINE

## 2020-04-15 PROCEDURE — 1090F PRES/ABSN URINE INCON ASSESS: CPT | Performed by: INTERNAL MEDICINE

## 2020-04-15 PROCEDURE — G8427 DOCREV CUR MEDS BY ELIG CLIN: HCPCS | Performed by: INTERNAL MEDICINE

## 2020-04-15 PROCEDURE — 1123F ACP DISCUSS/DSCN MKR DOCD: CPT | Performed by: INTERNAL MEDICINE

## 2020-04-15 PROCEDURE — 99213 OFFICE O/P EST LOW 20 MIN: CPT | Performed by: INTERNAL MEDICINE

## 2020-04-15 PROCEDURE — 1111F DSCHRG MED/CURRENT MED MERGE: CPT | Performed by: INTERNAL MEDICINE

## 2020-04-15 PROCEDURE — G8399 PT W/DXA RESULTS DOCUMENT: HCPCS | Performed by: INTERNAL MEDICINE

## 2020-04-15 PROCEDURE — 4040F PNEUMOC VAC/ADMIN/RCVD: CPT | Performed by: INTERNAL MEDICINE

## 2020-04-15 RX ORDER — AMIODARONE HYDROCHLORIDE 200 MG/1
100 TABLET ORAL DAILY
COMMUNITY
Start: 2020-02-20 | End: 2020-04-23 | Stop reason: SINTOL

## 2020-04-20 ENCOUNTER — TELEPHONE (OUTPATIENT)
Dept: CARDIOLOGY CLINIC | Age: 70
End: 2020-04-20

## 2020-04-20 NOTE — TELEPHONE ENCOUNTER
Pt states yesterday morning HR dropped in low to middle 40s . After supper HR dropped and stayed in the 40's and had extreme dizziness. Currently  it is 52 with dizziness.  Please call pt to advise

## 2020-04-21 ENCOUNTER — CARE COORDINATION (OUTPATIENT)
Dept: CASE MANAGEMENT | Age: 70
End: 2020-04-21

## 2020-04-22 NOTE — PROGRESS NOTES
[address]   Electrophysiology  BRICE Castro-CNP  Attending EP: Dr. Adiel Lugo    Date: 4/23/2020  I had the privilege of visiting Rohini Shankar in the office. Chief Complaint:   Chief Complaint   Patient presents with    Atrial Fibrillation    Dizziness     History of Present Illness: History obtained from patient and medical record. Rohini Shankar is 81 y.o. female with a past medical history of HUSSAIN, obesity, dCHF, HTN, PE, and atrial fibrillation. In January of 2020, she presented to the hospital with palpitations and was found to be in atrial fibrillation with RVR. She has PAF and normally converts to NSR spontaneously. She is symptomatic with her afib with symptoms of lightheadedness, SOB, and chest pressure. On 1/13/20, she was cardioverted and found to have long conversion pauses post procedure (Hx of syncope after conversion at home). Elected for afib ablation. She is s/p RFA of afib with PVI and ablation of CTI flutter (3/27/20, Dr. Abraham Howard). She had junctional rhtyhm with dizziness and hypotension after ablation and Cardizem and amiodarone were stopped. She had recurrence of afib and her amiodarone was resumed. Interval history: Today, Rohini Shankar is being seen for afib follow up. Complaining of dizziness and low HR earlier this week and her amiodarone was stopped. HR was 40's intermittently over the weekend and accompanied by dizziness. Last dose of amiodarone was Sunday 4/20. Instructed to resume Cardizem when HR >70. First dose of diltiazem 120 mg was last night at 5 pm for HR over 70. Symptoms have improved slowly. She remains dizzy with position changes only and gets up slowly. Denies palpitations, syncope, or CP. Reports increase BLE edema, weight up a few lbs at home, she had salty meal last night. She has had more energy and felt better since ablation despite dizziness.     Denies having chest pain, palpitations, shortness of breath, orthopnea/PND, cough, or dizziness at the time of this visit. With regard to medication therapy the patient has been compliant with prescribed regimen. She has not tolerated therapy to date. Allergies: Allergies   Allergen Reactions    Atorvastatin Other (See Comments)     Muscle Pain, all statins     Simvastatin Other (See Comments)     Muscle Pain    Cephalexin Hives and Itching    Cephalosporins Hives and Itching    Food Diarrhea     Milk , shellfish , gluten. ..may have bouts of diarrhea, constipation or flatulus. (RD spoke to pt regarding \"milk allergy\", pt is not allergic to milk. She does not drink milk, but consumes milk in other products and consumes dairy products. Had one reactions to shellfish years ago and now can tolerate one serving of shellfish once per week. Avoids gluten as much as she can, but does not have celiac disease.)    Other      Environmental -cats,dogs,dust    Shellfish-Derived Products     Sulfa Antibiotics      Can take Celebrex, Pt denies 8/1/18     Home Medications:  Prior to Visit Medications    Medication Sig Taking?  Authorizing Provider   amiodarone (CORDARONE) 200 MG tablet 100 mg   Historical Provider, MD   pramipexole (MIRAPEX) 0.5 MG tablet 1.5 tab q 5 PM and 1.5 tab qHS  Portia Huff MD   lisinopril (PRINIVIL;ZESTRIL) 20 MG tablet Take 0.5 tablets by mouth nightly  BRICE Brumfield CNP   furosemide (LASIX) 20 MG tablet Take 1 tablet (20 mg) every other day  Patient taking differently: Take 20 mg by mouth   BRICE Brumfield CNP   pantoprazole (PROTONIX) 40 MG tablet Take 1 tablet by mouth every morning (before breakfast)  BRICE Brumfield CNP   fluticasone (FLOVENT HFA) 110 MCG/ACT inhaler Inhale 1 puff into the lungs 2 times daily  Historical Provider, MD   spironolactone (ALDACTONE) 25 MG tablet Take 1 tablet by mouth daily  Ann Pickard MD   CPAP Machine MISC by Does not apply route  Historical Provider, MD   tiotropium (SPIRIVA) 18 MCG inhalation capsule Pure hypercholesterolemia 2/13/2019    Restless leg syndrome     Rhinitis, chronic 3/26/2014    Sleep apnea     wears CPAP @11    Stress incontinence     Tear of left rotator cuff 1/23/2018    Thyroid disease     hypothyroid    Wears glasses      Past Surgical History:    has a past surgical history that includes Cataract removal (Bilateral); Finger surgery (1988); eye surgery (Right, 2010); back surgery (2004); Tonsillectomy (1972); Hysterectomy (1993); Colonoscopy; joint replacement (Left, 2/2/16); and Hip Arthroplasty (Right, 4-19-16). Social History:  Reviewed. reports that she quit smoking about 6 years ago. She has a 2.50 pack-year smoking history. She has never used smokeless tobacco. She reports current alcohol use. She reports that she does not use drugs. Family History:  Reviewed. family history includes Alcohol Abuse in her maternal grandfather, paternal grandfather, and sister; Anxiety Disorder in her mother; Arthritis in her brother, mother, and sister; Asthma in her mother; Cancer in her paternal uncle; Cataracts in her mother; Heart Disease in her father, maternal grandfather, maternal uncle, mother, and paternal uncle; Heart Failure in her father; High Cholesterol in her brother; Hypertension in her mother; Stroke in her maternal grandmother; Substance Abuse in her maternal grandfather and paternal grandfather; Thyroid Disease in her father, maternal grandfather, maternal grandmother, and mother. Denies family history of sudden cardiac death, arrhythmia, premature CAD    Review of System:  · Constitutional: No fevers, chills, weight changes or weakness  · HEENT: No visual changes. No mouth sores or sore throat. · Cardiovascular: denies chest pain, denies dyspnea on exertion, denies palpitations or denies loss of consciousness. No cough, hemoptysis, denies pleuritic pain, or phlebitis. + dizziness, bradycardia  · Respiratory: denies cough or wheezing. No hematemesis. · Gastrointestinal: No abdominal pain, blood in stools. · Genitourinary: No dysuria, urgency or hematuria. · Musculoskeletal: denies gait disturbance, No muscle weakness. · Integumentary: No rash or pruritis. · Neurological: No headache, change in muscle strength, numbness or tingling. · Psychiatric: No confusion, anxiety, or depression. · Endocrine: No temperature intolerance. No excessive thirst, fluid intake, or urination. · Hem/Lymph: No abnormal bruising or bleeding, blood clots or swollen lymph nodes. Physical Examination:  There were no vitals filed for this visit. Wt Readings from Last 3 Encounters:   04/15/20 246 lb 3.2 oz (111.7 kg)   04/03/20 244 lb 11.2 oz (111 kg)   04/02/20 243 lb 1.6 oz (110.3 kg)       Constitutional: Cooperative and in no apparent distress, and appears well nourished  Skin: Warm and pink; no pallor, cyanosis, bruising, or clubbing  HEENT: Symmetric and normocephalic. PERRL, EOM intact. Conjunctiva pink with clear sclera. Mucus membranes pink and moist. Teeth intact. Thyroid smooth without nodules or goiter  Respiratory: Respirations symmetric and unlabored. Lungs clear to auscultation bilaterally, no wheezing, rhonchi, or crackles  Cardiovascular:  regular rate and rhythm. S1 & S2 present without murmurs, rubs, or gallops. Peripheral pulses 2+, capillary refill < 3 seconds. negative elevation of JVP. No peripheral edema  Gastrointestinal: Abdomen soft and round. Bowel sounds normoactive in all quadrants without tenderness or masses. Musculoskeletal: Bilateral upper and lower extremity strength 5/5 with full ROM. Neurological/Psych: Awake and orientated to person, place and time. Calm affect, appropriate mood.      Pertinent labs, diagnostic, device, and imaging results reviewed as a part of this visit    LABS    CBC:   Lab Results   Component Value Date    WBC 10.5 03/28/2020    HGB 13.0 03/28/2020    HCT 38.7 03/28/2020    MCV 96.1 03/28/2020     03/28/2020 BMP:   Lab Results   Component Value Date    CREATININE 0.9 2020    BUN 21 (H) 2020     (L) 2020    K 4.4 2020    CL 99 2020    CO2 20 (L) 2020     CrCl cannot be calculated (Unknown ideal weight.). No results found for: BNP    Thyroid:   Lab Results   Component Value Date    TSH 2.37 2020    X1BXLCI 1.22 2019     Lipid Panel:   Lab Results   Component Value Date    CHOL 175 2019    HDL 55 2019    TRIG 153 2019     LFTs:  Lab Results   Component Value Date    ALT 33 2020    AST 20 2020    ALKPHOS 78 2020    BILITOT 0.5 2020     Coags:   Lab Results   Component Value Date    PROTIME 12.0 2020    INR 1.03 2020    APTT 48.3 (H) 2018     EC2020 SR HR 60, , QRS 96, QTc 412    STEPHANE: 3/27/20  Summary   No thrombus noted   Overall left ventricular function is normal.   The left atrial size is normal.   There is no evidence of mass or thrombus in the left atrium or appendage. No evidence of tricuspid regurgitation. Moderate tricuspid regurgitation. TV gradient 25 mmHg  Limited Echo: 3/28/20  Summary   Normal left ventricle size, wall thickness and systolic function with an   estimated ejection fraction of 55-60%. No evidence of pericardial effusion. Echo: 19  Summary   -Left ventricular cavity size is normal.   -There is mild left ventricular hypertrophy.   -Ejection fraction is visually estimated to be 55-60%. -No wall motion abnormalities.   -Normal diastolic function.   -Trivial mitral regurgitation.   -Mild tricuspid regurgitation  GXT: 1/20/15  Summary    Normal myocardial perfusion study.    Normal LV function.      Assessment:  Paroxysmal Atrial Fibrillation  - ECG shows SR  - Amiodarone stopped  and Cardizem 120 mg daily started yesterday  - Dizziness improved since stopping amiodarone  - Reports improved energy level since ablaiton  - [id number] Vasc score and is instructed to increase cardiovascular related activities with a goal of 150 min/week of moderate level activity or 10,000 steps per day. Encouraged to perform as much activity as tolerated    Quality Metrics  1. Tobacco Cessation Counseling: Nonsmoker, N/A   2. Retake of BP if >140/90: N/A  3. Documentation to PCP: Note sent to PCP office visit  4. CAD patient on anti-platelet: N/A  5.   CAD patient on STATIN therapy: N/A  6. Patient with history of CHF and atrial fibrillation on anticoagulation: yes     I have addressed the patient's cardiac risk factors and adjusted pharmacologic treatment as needed. In addition, I have reinforced the need for patient directed risk factor modification. I independently reviewed the ECG    All questions and concerns were addressed with the patient. Alternatives to treatment were discussed. Thank you for allowing to us to participate in the care of Stalin Saavedra.     BRICE Tovar-CNP  [address]   Office: [phone]

## 2020-04-23 ENCOUNTER — OFFICE VISIT (OUTPATIENT)
Dept: CARDIOLOGY CLINIC | Age: 70
End: 2020-04-23
Payer: MEDICARE

## 2020-04-23 VITALS
SYSTOLIC BLOOD PRESSURE: 126 MMHG | BODY MASS INDEX: 42.1 KG/M2 | DIASTOLIC BLOOD PRESSURE: 72 MMHG | WEIGHT: 246.6 LBS | HEIGHT: 64 IN

## 2020-04-23 PROCEDURE — G8427 DOCREV CUR MEDS BY ELIG CLIN: HCPCS | Performed by: NURSE PRACTITIONER

## 2020-04-23 PROCEDURE — 99214 OFFICE O/P EST MOD 30 MIN: CPT | Performed by: NURSE PRACTITIONER

## 2020-04-23 PROCEDURE — G8399 PT W/DXA RESULTS DOCUMENT: HCPCS | Performed by: NURSE PRACTITIONER

## 2020-04-23 PROCEDURE — 1123F ACP DISCUSS/DSCN MKR DOCD: CPT | Performed by: NURSE PRACTITIONER

## 2020-04-23 PROCEDURE — 93000 ELECTROCARDIOGRAM COMPLETE: CPT | Performed by: NURSE PRACTITIONER

## 2020-04-23 PROCEDURE — G8417 CALC BMI ABV UP PARAM F/U: HCPCS | Performed by: NURSE PRACTITIONER

## 2020-04-23 PROCEDURE — 3017F COLORECTAL CA SCREEN DOC REV: CPT | Performed by: NURSE PRACTITIONER

## 2020-04-23 PROCEDURE — 4040F PNEUMOC VAC/ADMIN/RCVD: CPT | Performed by: NURSE PRACTITIONER

## 2020-04-23 PROCEDURE — 1036F TOBACCO NON-USER: CPT | Performed by: NURSE PRACTITIONER

## 2020-04-23 PROCEDURE — 1090F PRES/ABSN URINE INCON ASSESS: CPT | Performed by: NURSE PRACTITIONER

## 2020-04-23 PROCEDURE — 1111F DSCHRG MED/CURRENT MED MERGE: CPT | Performed by: NURSE PRACTITIONER

## 2020-04-23 RX ORDER — FUROSEMIDE 20 MG/1
20 TABLET ORAL DAILY
Qty: 90 TABLET | Refills: 3 | Status: SHIPPED | OUTPATIENT
Start: 2020-04-23 | End: 2020-07-23 | Stop reason: SDUPTHER

## 2020-04-23 RX ORDER — SPIRONOLACTONE 25 MG/1
25 TABLET ORAL DAILY
Qty: 90 TABLET | Refills: 3 | Status: SHIPPED | OUTPATIENT
Start: 2020-04-23 | End: 2021-03-30

## 2020-04-23 RX ORDER — DILTIAZEM HYDROCHLORIDE 120 MG/1
120 CAPSULE, COATED, EXTENDED RELEASE ORAL DAILY
Qty: 30 CAPSULE | Refills: 3 | Status: SHIPPED
Start: 2020-04-23 | End: 2020-05-04 | Stop reason: ALTCHOICE

## 2020-04-24 ENCOUNTER — TELEPHONE (OUTPATIENT)
Dept: PULMONOLOGY | Age: 70
End: 2020-04-24

## 2020-04-24 ENCOUNTER — CARE COORDINATION (OUTPATIENT)
Dept: CASE MANAGEMENT | Age: 70
End: 2020-04-24

## 2020-04-24 ENCOUNTER — VIRTUAL VISIT (OUTPATIENT)
Dept: PULMONOLOGY | Age: 70
End: 2020-04-24
Payer: MEDICARE

## 2020-04-24 PROCEDURE — G8427 DOCREV CUR MEDS BY ELIG CLIN: HCPCS | Performed by: NURSE PRACTITIONER

## 2020-04-24 PROCEDURE — 3017F COLORECTAL CA SCREEN DOC REV: CPT | Performed by: NURSE PRACTITIONER

## 2020-04-24 PROCEDURE — 99442 PR PHYS/QHP TELEPHONE EVALUATION 11-20 MIN: CPT | Performed by: NURSE PRACTITIONER

## 2020-04-24 PROCEDURE — 1123F ACP DISCUSS/DSCN MKR DOCD: CPT | Performed by: NURSE PRACTITIONER

## 2020-04-24 PROCEDURE — G8399 PT W/DXA RESULTS DOCUMENT: HCPCS | Performed by: NURSE PRACTITIONER

## 2020-04-24 PROCEDURE — 4040F PNEUMOC VAC/ADMIN/RCVD: CPT | Performed by: NURSE PRACTITIONER

## 2020-04-24 PROCEDURE — 1090F PRES/ABSN URINE INCON ASSESS: CPT | Performed by: NURSE PRACTITIONER

## 2020-04-24 PROCEDURE — 1111F DSCHRG MED/CURRENT MED MERGE: CPT | Performed by: NURSE PRACTITIONER

## 2020-04-24 ASSESSMENT — SLEEP AND FATIGUE QUESTIONNAIRES
HOW LIKELY ARE YOU TO NOD OFF OR FALL ASLEEP WHILE SITTING AND TALKING TO SOMEONE: 1
HOW LIKELY ARE YOU TO NOD OFF OR FALL ASLEEP IN A CAR, WHILE STOPPED FOR A FEW MINUTES IN TRAFFIC: 0
HOW LIKELY ARE YOU TO NOD OFF OR FALL ASLEEP WHILE SITTING INACTIVE IN A PUBLIC PLACE: 0
HOW LIKELY ARE YOU TO NOD OFF OR FALL ASLEEP WHEN YOU ARE A PASSENGER IN A CAR FOR AN HOUR WITHOUT A BREAK: 1
HOW LIKELY ARE YOU TO NOD OFF OR FALL ASLEEP WHILE WATCHING TV: 1
HOW LIKELY ARE YOU TO NOD OFF OR FALL ASLEEP WHILE SITTING AND READING: 1
HOW LIKELY ARE YOU TO NOD OFF OR FALL ASLEEP WHILE SITTING QUIETLY AFTER LUNCH WITHOUT ALCOHOL: 1
HOW LIKELY ARE YOU TO NOD OFF OR FALL ASLEEP WHILE LYING DOWN TO REST IN THE AFTERNOON WHEN CIRCUMSTANCES PERMIT: 0
ESS TOTAL SCORE: 5

## 2020-04-24 NOTE — PROGRESS NOTES
Brenda Mariano MD, FAASM, [facility]  Salinas Ortiz, MSN, RN, CNP     [address] [place] [address] SLEEP MEDICINE  [address] [address]  Dept: [phone]  Dept Fax: : [phone] [facility],[address] [facility] MEDICINE  [address] [place] [id number] [phone]    Subjective:     Patient ID: Adarsh Solomon is a 81 y.o. female. Chief Complaint   Patient presents with    Sleep Apnea       HPI:      Sleep Medicine Video Visit    Pursuant to the emergency declaration under the [address] [place] [address], [facility] waiver authority and the [employer] Act this Telephone Visit was insisted, with patient's consent, to reduce the patient's risk of exposure to COVID-19 and provide continuity of care for an established patient. Services were provided through a synchronous discussion over a telephone and/or Video chat to substitute for in-person clinic visit, and coded as such. Machine Modem/Download Info:                                        PAP Mask  Mask Type: Nasal pillows     [place] - Total score: 5    Follow-up :     Last Visit : September 2019      Patient reports the listed chronic Co-morbidities: Obesity, CHF, Afib, HTN, RLS   are well controlled and stable at this time. Subjective Health Changes: Ablation 3/27/2020     Over Night Oximetry: [] Yes  [] No  [x] NA [] WNL   Using O2: [] Yes  [] No  [x] NA   Patient is compliant with the machine  [x] Yes  [] No per patient    Feeling rested when using the machine   [x] Yes  [] No     Pressure is comfortable with inspiration and expiration  [x] Yes  [] No     Noticed changes in pressure   [] Yes  [] No  [x] NA   Mask is fitting well  [x] Yes  [] No   Noting Mask Air Leak  [] Yes  [x] No   Having painful Aerophagia  [] Yes  [x] No   Nocturia   0-1  per night.    Having  HA upon waking  [] Yes  [x] No   Dry mouth upon waking   Dry Nose  Dry Eyes  [x] Yes  [] No   Congestion upon waking   [] Yes  [x] No    Nose Bleeds  [] Yes  [x] No   Using Sleep Aides  Mirapex A   Understands how to change humidification and/or tubing temperature for comfort while at home  [x] Yes  [] No     Difficulties falling asleep  [] Yes  [x] No   Difficulties staying asleep  [] Yes  [x] No   Approximate time to bed  11pm-12am   Approximate wake time  7-8am   Taking Naps  occasional   If taking naps usual length  10-20 min    If taking naps using the machine  [] Yes  [x] No  [] NA [] With and With out    Drowsy when driving  [] Yes  [x] No     Does patient carry a DOT/CDL  [] Yes  [x] No     Does patient carry FAA/[employer] License   [] Yes  [x] No      Any concerns noted with the machine at this time  [] Yes  [x] No        Diagnosis Orders   1. HUSSAIN (obstructive sleep apnea)     2. Obesity (BMI 30-39.9)     3. Chronic diastolic heart failure (HCC)     4. Paroxysmal atrial fibrillation ([facility] [address]     5. Benign essential HTN     6. Restless leg syndrome         The chronic medical conditions listed are directly related to the primary diagnosis listed above. The management of the primary diagnosis affects the secondary diagnosis and vice versa. Assessment/Plan:     Obesity (BMI 30-39. 9)  Chronic-Stable. Encouraged her to work on weight loss through diet and exercise. Chronic diastolic heart failure (HCC)  Chronic- Stable. Cont meds per PCP and other physicians. Paroxysmal atrial fibrillation (HCC)  Chronic- Stable. Cont meds per PCP and other physicians. Benign essential HTN  Chronic- Stable. Cont meds per PCP and other physicians. Restless leg syndrome  Chronic- Stable. Cont meds per PCP and other physicians. HUSSAIN (obstructive sleep apnea)  Not able Review compliance download with pt. Supplies and parts as needed for her machine. These are medically necessary.   Continue medications per her

## 2020-04-24 NOTE — ASSESSMENT & PLAN NOTE
Not able Review compliance download with pt. Supplies and parts as needed for her machine. These are medically necessary. Continue medications per her PCP and other physicians. Limit caffeine use after 3pm.  Encouraged her to work on weight loss through diet and exercise. Diagnoses of Obesity (BMI 30-39.9), Chronic diastolic heart failure ([address] Paroxysmal atrial fibrillation ([address] Benign essential HTN, and Restless leg syndrome were pertinent to this visit. The chronic medical conditions listed are directly related to the primary diagnosis listed above. The management of the primary diagnosis affects the secondary diagnosis and vice versa.

## 2020-04-27 ENCOUNTER — TELEPHONE (OUTPATIENT)
Dept: CARDIOLOGY CLINIC | Age: 70
End: 2020-04-27

## 2020-04-27 ENCOUNTER — CARE COORDINATION (OUTPATIENT)
Dept: CASE MANAGEMENT | Age: 70
End: 2020-04-27

## 2020-04-27 NOTE — TELEPHONE ENCOUNTER
Spoke to the pt's with NPAL message below. She verbalized understanding and agreed. The pt would like to be seen by NPAL on 5/4/2020. She has an OV with ERICKA at 8:30 that day. The pt states with her dizziness she only wants to come to the hospital one time. She is having someone bring her to the OV with ERICKA. Can she be added on to the beginning of your schedule on 5/4/2020?

## 2020-04-29 ENCOUNTER — CARE COORDINATION (OUTPATIENT)
Dept: CASE MANAGEMENT | Age: 70
End: 2020-04-29

## 2020-04-30 NOTE — PROGRESS NOTES
Jose Maria Roche MD   azelastine (ASTELIN) 0.1 % nasal spray 1 spray by Nasal route 2 times daily 1 Spray in each nostril  Sena De La Cruz MD   Magnesium Citrate 100 MG TABS Take 1 tablet by mouth daily  Tiffanie Obrien MD   EPIPEN 2-MIR 0.3 MG/0.3ML SOAJ injection   Historical Provider, MD      Past Medical History:  Past Medical History:   Diagnosis Date    Allergic     Anesthesia complication     family hx-father-at at age 80 having hip replacement revision surgery-had tia's x2 while under anes-but also had hx chf-had dificuly with speech when coming out from [address]     left ankle, bilateral hands    Arthritis of left hip 2/2/2016    Arthritis of right hip 4/19/2016    Asthma     At risk for falls     uses a cane    Atrial fibrillation with rapid ventricular response ([address]     Atrial flutter ([address] 4/25/2018    Chronic maxillary sinusitis 5/24/2017    CTEPH (chronic thromboembolic pulmonary hypertension) ([address] 8/26/2016    Depression     pt stated r/t bad marriage/alcoholic spouse    Disc disease, degenerative, lumbar or lumbosacral 2/20/2017    Hepatic steatosis 4/26/2019    History of total hip arthroplasty 2/28/2017    Hypertension     Leg cramps     patient states very severe, requires immediate potassium administration    Migraines, basilar     last migraine 10 years ago    Mild persistent asthma without complication 6/47/8983    Obesity, Class III, BMI 40-49.9 (morbid obesity) ([address] 4/19/2016    HUSSAIN (obstructive sleep apnea) 10/14/2013    Osteoarthritis, hip, bilateral 2016    Bilateral hip arthroplasty    Panic attacks     Pneumonia 2015    Primary osteoarthritis of both knees 9/19/2017    Primary osteoarthritis of left knee 12/15/2016    Pulmonary emboli ([address] 8/4/2016    Pulmonary HTN ([address] 7/21/2016    Pure hypercholesterolemia 2/13/2019    Restless leg syndrome     Rhinitis, chronic 3/26/2014    Sleep apnea     wears CPAP @11    Stress incontinence     walker at times due to dizziness. No muscle weakness. · Integumentary: No rash or pruritis. · Neurological: No headache, change in muscle strength, numbness or tingling. · Psychiatric: No confusion, anxiety, or depression. · Endocrine: No temperature intolerance. No excessive thirst, fluid intake, or urination. · Hem/Lymph: No abnormal bruising or bleeding, blood clots or swollen lymph nodes. Physical Examination:  There were no vitals filed for this visit. Wt Readings from Last 3 Encounters:   04/23/20 246 lb 9.6 oz (111.9 kg)   04/15/20 246 lb 3.2 oz (111.7 kg)   04/03/20 244 lb 11.2 oz (111 kg)     Constitutional: Cooperative and in no apparent distress, and appears well nourished  Skin: Warm and pink; no pallor, cyanosis, bruising, or clubbing  HEENT: Symmetric and normocephalic. PERRL, EOM intact. Conjunctiva pink with clear sclera. Mucus membranes pink and moist. Teeth intact. Thyroid smooth without nodules or goiter  Respiratory: Respirations symmetric and unlabored. Lungs clear to auscultation bilaterally, no wheezing, rhonchi, or crackles  Cardiovascular:  regular rate and rhythm. S1 & S2 present without murmurs, rubs, or gallops. Peripheral pulses 2+, capillary refill < 3 seconds. negative elevation of JVP. Trace peripheral edema. Gastrointestinal: Abdomen soft and round. Bowel sounds normoactive in all quadrants without tenderness or masses. Musculoskeletal: Bilateral upper and lower extremity strength 5/5 with full ROM. Neurological/Psych: Awake and orientated to person, place and time. Calm affect, appropriate mood.      Pertinent labs, diagnostic, device, and imaging results reviewed as a part of this visit    LABS    CBC:   Lab Results   Component Value Date    WBC 10.5 03/28/2020    HGB 13.0 03/28/2020    HCT 38.7 03/28/2020    MCV 96.1 03/28/2020     03/28/2020     BMP:   Lab Results   Component Value Date    CREATININE 0.9 03/28/2020    BUN 21 (H) 03/28/2020     (L) discussed with patient. Risk factor modification recommended              - S/p RFA of afib w/ PVI (3/27/20, Dr. Panda)   - Discussed possible need for repeat ablation dur to intolerance of rate/rythm medicaitons  Dizziness   - SR on ECG   - Bradycardia improved at home and HR typically >60   - Continues to have intermittent dizziness with position changes, however it is significantly improved   - Amiodarone stopped 2 weeks ago  HFpEF              - Follows with Dr. Sergo Lyles  HTN-goal <130/80   - Controlled   - Continue current mediations    - Encouraged patient to check BP at home, log and bring to office visits  - Discussed lifestyle modifications, weight loss, low sodium diet  Hx of PE              - On AC  HUSSAIN   - Adherent and wears CPAP nightly    - Discussed long term effects of unmanaged HUSSAIN on heart   Plan  - PRN Cardizem 30 mg for tachycardia/palpitations  - Advance activity with walker  - Check your heart rate at home, if it is <120 or >45 please call the office     F/U: Follow-up with [facility] as scheduled  -Follow up with device clinic as scheduled  -Call [facility] at [phone] with any questions    Diet & Exercise:   The patient is counseled to follow a low salt diet to assure blood pressure remains controlled for cardiovascular risk factor modification   The patient is counseled to avoid excess caffeine, and energy drinks as this may exacerbated ectopy and arrhythmia   The patient is counseled to lose weight to control cardiovascular risk factors   Exercise program discussed: To improve overall cardiovascular health, the patient is instructed to increase cardiovascular related activities with a goal of 150 min/week of moderate level activity or 10,000 steps per day. Encouraged to perform as much activity as tolerated    Quality Metrics  1. Tobacco Cessation Counseling: Nonsmoker, N/A   2. Retake of BP if >140/90: N/A  3. Documentation to PCP: Note sent to PCP office visit  4.    CAD

## 2020-05-01 ENCOUNTER — PATIENT MESSAGE (OUTPATIENT)
Dept: ENDOCRINOLOGY | Age: 70
End: 2020-05-01

## 2020-05-01 ENCOUNTER — TELEPHONE (OUTPATIENT)
Dept: CARDIOLOGY CLINIC | Age: 70
End: 2020-05-01

## 2020-05-04 ENCOUNTER — HOSPITAL ENCOUNTER (OUTPATIENT)
Age: 70
Discharge: HOME | End: 2020-05-04
Payer: MEDICARE

## 2020-05-04 ENCOUNTER — OFFICE VISIT (OUTPATIENT)
Dept: CARDIOLOGY CLINIC | Age: 70
End: 2020-05-04
Payer: MEDICARE

## 2020-05-04 VITALS
BODY MASS INDEX: 42.34 KG/M2 | WEIGHT: 248 LBS | HEIGHT: 64 IN | OXYGEN SATURATION: 98 % | HEART RATE: 64 BPM | DIASTOLIC BLOOD PRESSURE: 66 MMHG | SYSTOLIC BLOOD PRESSURE: 104 MMHG

## 2020-05-04 VITALS
DIASTOLIC BLOOD PRESSURE: 66 MMHG | SYSTOLIC BLOOD PRESSURE: 104 MMHG | HEIGHT: 64 IN | HEART RATE: 64 BPM | WEIGHT: 248 LBS | BODY MASS INDEX: 42.34 KG/M2

## 2020-05-04 LAB
A/G RATIO: 1.8 (ref 1.1–2.2)
ALBUMIN SERPL-MCNC: 4.8 G/DL (ref 3.4–5)
ALP BLD-CCNC: 80 U/L (ref 40–129)
ALT SERPL-CCNC: 28 U/L (ref 10–40)
ANION GAP SERPL CALCULATED.3IONS-SCNC: 15 MMOL/L (ref 3–16)
AST SERPL-CCNC: 18 U/L (ref 15–37)
BILIRUB SERPL-MCNC: 0.4 MG/DL (ref 0–1)
BUN BLDV-MCNC: 19 MG/DL (ref 7–20)
CALCIUM SERPL-MCNC: 10.3 MG/DL (ref 8.3–10.6)
CHLORIDE BLD-SCNC: 99 MMOL/L (ref 99–110)
CHOLESTEROL, TOTAL: 208 MG/DL (ref 0–199)
CO2: 25 MMOL/L (ref 21–32)
CREAT SERPL-MCNC: 0.6 MG/DL (ref 0.6–1.2)
GFR AFRICAN AMERICAN: >60
GFR NON-AFRICAN AMERICAN: >60
GLOBULIN: 2.6 G/DL
GLUCOSE BLD-MCNC: 119 MG/DL (ref 70–99)
HCT VFR BLD CALC: 41.6 % (ref 36–48)
HDLC SERPL-MCNC: 66 MG/DL (ref 40–60)
HEMOGLOBIN: 13.7 G/DL (ref 12–16)
LDL CHOLESTEROL CALCULATED: 124 MG/DL
MCH RBC QN AUTO: 31.8 PG (ref 26–34)
MCHC RBC AUTO-ENTMCNC: 33 G/DL (ref 31–36)
MCV RBC AUTO: 96.1 FL (ref 80–100)
PDW BLD-RTO: 14.1 % (ref 12.4–15.4)
PLATELET # BLD: 235 K/UL (ref 135–450)
PMV BLD AUTO: 9.3 FL (ref 5–10.5)
POTASSIUM SERPL-SCNC: 4.6 MMOL/L (ref 3.5–5.1)
PRO-BNP: 66 PG/ML (ref 0–124)
RBC # BLD: 4.33 M/UL (ref 4–5.2)
SODIUM BLD-SCNC: 139 MMOL/L (ref 136–145)
TOTAL PROTEIN: 7.4 G/DL (ref 6.4–8.2)
TRIGL SERPL-MCNC: 92 MG/DL (ref 0–150)
TSH REFLEX: 1.8 UIU/ML (ref 0.27–4.2)
VITAMIN D 25-HYDROXY: 82.3 NG/ML
VLDLC SERPL CALC-MCNC: 18 MG/DL
WBC # BLD: 5.6 K/UL (ref 4–11)

## 2020-05-04 PROCEDURE — 99214 OFFICE O/P EST MOD 30 MIN: CPT | Performed by: NURSE PRACTITIONER

## 2020-05-04 PROCEDURE — G8427 DOCREV CUR MEDS BY ELIG CLIN: HCPCS | Performed by: NURSE PRACTITIONER

## 2020-05-04 PROCEDURE — 82306 VITAMIN D 25 HYDROXY: CPT

## 2020-05-04 PROCEDURE — 85027 COMPLETE CBC AUTOMATED: CPT

## 2020-05-04 PROCEDURE — 83036 HEMOGLOBIN GLYCOSYLATED A1C: CPT

## 2020-05-04 PROCEDURE — 1036F TOBACCO NON-USER: CPT | Performed by: NURSE PRACTITIONER

## 2020-05-04 PROCEDURE — 1090F PRES/ABSN URINE INCON ASSESS: CPT | Performed by: NURSE PRACTITIONER

## 2020-05-04 PROCEDURE — 1123F ACP DISCUSS/DSCN MKR DOCD: CPT | Performed by: NURSE PRACTITIONER

## 2020-05-04 PROCEDURE — 80053 COMPREHEN METABOLIC PANEL: CPT

## 2020-05-04 PROCEDURE — 80061 LIPID PANEL: CPT

## 2020-05-04 PROCEDURE — 99215 OFFICE O/P EST HI 40 MIN: CPT | Performed by: INTERNAL MEDICINE

## 2020-05-04 PROCEDURE — 4040F PNEUMOC VAC/ADMIN/RCVD: CPT | Performed by: INTERNAL MEDICINE

## 2020-05-04 PROCEDURE — 3017F COLORECTAL CA SCREEN DOC REV: CPT | Performed by: INTERNAL MEDICINE

## 2020-05-04 PROCEDURE — 83880 ASSAY OF NATRIURETIC PEPTIDE: CPT

## 2020-05-04 PROCEDURE — G8417 CALC BMI ABV UP PARAM F/U: HCPCS | Performed by: INTERNAL MEDICINE

## 2020-05-04 PROCEDURE — G8427 DOCREV CUR MEDS BY ELIG CLIN: HCPCS | Performed by: INTERNAL MEDICINE

## 2020-05-04 PROCEDURE — 1090F PRES/ABSN URINE INCON ASSESS: CPT | Performed by: INTERNAL MEDICINE

## 2020-05-04 PROCEDURE — G8399 PT W/DXA RESULTS DOCUMENT: HCPCS | Performed by: INTERNAL MEDICINE

## 2020-05-04 PROCEDURE — 3017F COLORECTAL CA SCREEN DOC REV: CPT | Performed by: NURSE PRACTITIONER

## 2020-05-04 PROCEDURE — G8417 CALC BMI ABV UP PARAM F/U: HCPCS | Performed by: NURSE PRACTITIONER

## 2020-05-04 PROCEDURE — 4040F PNEUMOC VAC/ADMIN/RCVD: CPT | Performed by: NURSE PRACTITIONER

## 2020-05-04 PROCEDURE — 1123F ACP DISCUSS/DSCN MKR DOCD: CPT | Performed by: INTERNAL MEDICINE

## 2020-05-04 PROCEDURE — G8399 PT W/DXA RESULTS DOCUMENT: HCPCS | Performed by: NURSE PRACTITIONER

## 2020-05-04 PROCEDURE — 1036F TOBACCO NON-USER: CPT | Performed by: INTERNAL MEDICINE

## 2020-05-04 PROCEDURE — 36415 COLL VENOUS BLD VENIPUNCTURE: CPT

## 2020-05-04 PROCEDURE — 84443 ASSAY THYROID STIM HORMONE: CPT

## 2020-05-04 RX ORDER — M-VIT,TX,IRON,MINS/CALC/FOLIC 27MG-0.4MG
1 TABLET ORAL DAILY
COMMUNITY

## 2020-05-04 NOTE — PROGRESS NOTES
[address]   Advanced Heart Failure/Pulmonary Hypertension  Cardiac Follow up       Neil De La Torre  YOB: 1950     Date of Visit:  5/4/20     Chief Complaint   Patient presents with    Congestive Heart Failure    Dizziness     History of present illness: Neil De La Torre is a 81 y.o. female with past medical history significant for HTN, HUSSAIN on CPAP, multiple PE on Xarelto (8/2016). She original had a PE was treated with xarelto for recommended time and then off it and her RHC revealed pressures no RH elevated pressures. Afterwards she developed AFib and restarted on xarelto. She continues on xarelto and treatment for afib. Echos in July and August (2016) showed RVSP 106-112 without evidence of enlargement in right side of heart. RHC was done 9/2/16 and PA pressure was 24, no evidence of pulmonary hypertension. Since then, her RVSP has decreased and got back to normal. We discussed that we are questioning if the elevated ones by echo may have been due to a false positive test for her. She was seen by Dr Tate Rebolledo 8/1 and he is managing her hypothyroidism. [place], the thyroid medication was stopped. She was noted to have negative thyroid ultrasound ( July 2018). She wears her CPAP consistently for her HUSSAIN. States she had a reaction to Simvastatin and Livalo with muscle pain. She states she is Prediabetic and has not lost weight. She was admitted 1/13/2020 with palpitations/light-headedness along with SOB and chest pressure. She underwent successful DCCV and was put on Rhythmol -- this caused her HR to drop into the 40's which made her feel bad. She was discharged on diltiazem 240mg qd which causes her to feel fatigued and dizzy. She remains on Xarelto. Her lisinopril was stopped. She is taking Cardizem 60mg and Amiodarone. Labs reviewed. She is taking potassium daily. ProBNP 200>336. She has many questions that were answered today. She had an AF ablation on 3-.   On 4/2/20 limited examination to evaluate RVSP. Overall left ventricular function is normal.  Normal right ventricular size and function. Mild tricuspid regurgitation with RVSP estimated at 95 mmHg. No evidence of any pericardial effusion. [address] 9/1/2016:  Pressures were as follows:  RA: 9 mmHg  RV:  38/12 mmHg  PA:  33/16, mean 24 mmHg  PCWP:  14 mmHg  Thermodilution CO: 7.45   Thermodilution CI:  3.42  Adiel CO:  9.72  Adiel CI:  4.46  PA Sat:  74%  PVR:  107 dynes     Impression:  1. Minimal pulmonary hypertension with mean PA pressure 24  2. Normal filling pressures  3. Normal cardiac output  4. No evidence of PAH at this time. Echo 8/28/17  Normal left ventricle size and systolic function with an estimated ejection  fraction of 60%. Mild mitral regurgitation is present. There is mild-moderate tricuspid regurgitation with RVSP estimated at 88  mmHg. This is suggestive of severe pulmonary hypertension. Mild pulmonic regurgitation present. 1-20-15 Nuclear Stress test  Conclusions        Summary    Normal myocardial perfusion study.    Normal LV function. Labs were reviewed including labs from other hospital systems through [facility]. Cardiac testing was reviewed including echos, nuclear scans, cardiac catheterization, including from other hospital systems through [facility]. Assessment:  1. Chronic heart failure with preserved ejection fraction (HCC)    2. Paroxysmal atrial fibrillation ([address]    3. Benign essential HTN    4. HUSSAIN (obstructive sleep apnea)    5. Symptomatic bradycardia    6. SOB (shortness of breath)    7. High risk medication use    8. Pure hypercholesterolemia    9. Vitamin D deficiency    10. Screening for diabetes mellitus    11. Hyperglycemia        1. Chronic heart failure with preserved ejection fraction (HCC) :  Compensated by exam.  No heart failure symptoms. -ECHO 8/2019> EF 55-60% and RVSP 40mmHg.     Remains on lisinopril, Lasix, spironolactone and take HCTZ

## 2020-05-05 LAB
ESTIMATED AVERAGE GLUCOSE: 119.8 MG/DL
HBA1C MFR BLD: 5.8 %

## 2020-05-12 NOTE — TELEPHONE ENCOUNTER
Spoke to patient. Patient states she is not currently experiencing dizzy spells and they have gotten much better since her cardiologist adjusted her meds after her surgery. Pt stated her BP was dropping with the meds and now that she is not taking certain meds she is feeling much better. Pt also states she is in frequent contact with her cardiologist regarding this issue. Pt made aware of normal TSH and Vit D levels. Pt also rescheduled appt with Dr. Jd Damno for the end of June. Pt stated she would like to wait until she can have the [address] done before her appt. Pt also cancelled her appt with Gardenia Mullins (diabetes educator). Pt states she feels good and she does not need to meet with her. Pt declines making a f/u appt with Gardenia Mullins at this time. Also explained to patient if feeling dizzy or if any other medical issue arises to call the office immediately. [employer] messages should be used for non-emergent questions. Patient verbalized understanding and no other questions or concerns at this time.

## 2020-05-27 ENCOUNTER — TELEPHONE (OUTPATIENT)
Dept: CARDIOLOGY CLINIC | Age: 70
End: 2020-05-27

## 2020-05-27 NOTE — TELEPHONE ENCOUNTER
Increase lasix to 40 mg daily until she looses 5 pounds  She should get some labs done in a week  Fluids <64 and <2000 mg sodium  thanks

## 2020-06-10 ENCOUNTER — HOSPITAL ENCOUNTER (OUTPATIENT)
Age: 70
Discharge: HOME | End: 2020-06-10
Payer: MEDICARE

## 2020-06-10 LAB
ANION GAP SERPL CALCULATED.3IONS-SCNC: 17 MMOL/L (ref 3–16)
BUN BLDV-MCNC: 17 MG/DL (ref 7–20)
CALCIUM SERPL-MCNC: 9.9 MG/DL (ref 8.3–10.6)
CHLORIDE BLD-SCNC: 97 MMOL/L (ref 99–110)
CO2: 20 MMOL/L (ref 21–32)
CREAT SERPL-MCNC: 0.8 MG/DL (ref 0.6–1.2)
GFR AFRICAN AMERICAN: >60
GFR NON-AFRICAN AMERICAN: >60
GLUCOSE BLD-MCNC: 118 MG/DL (ref 70–99)
POTASSIUM SERPL-SCNC: 4 MMOL/L (ref 3.5–5.1)
PRO-BNP: 109 PG/ML (ref 0–124)
SODIUM BLD-SCNC: 134 MMOL/L (ref 136–145)

## 2020-06-10 PROCEDURE — 36415 COLL VENOUS BLD VENIPUNCTURE: CPT

## 2020-06-10 PROCEDURE — 83880 ASSAY OF NATRIURETIC PEPTIDE: CPT

## 2020-06-10 PROCEDURE — 80048 BASIC METABOLIC PNL TOTAL CA: CPT

## 2020-06-18 ENCOUNTER — TELEPHONE (OUTPATIENT)
Dept: CARDIOLOGY CLINIC | Age: 70
End: 2020-06-18

## 2020-06-19 ENCOUNTER — TELEPHONE (OUTPATIENT)
Dept: CARDIOLOGY CLINIC | Age: 70
End: 2020-06-19

## 2020-06-19 NOTE — TELEPHONE ENCOUNTER
She is going in and out of afib. Rate is controlled. She shaky, nauseated and chest pressure. This is how she felt in the past when she was in afib. She reports her BP is 110/60 and HR is 94. Instructed to take 30 mg Cardizem now and then up to 30 mg Cardizem tid if symptomatic in afib. Hold if HR <70 or SBP <100. If symptoms become severe then go to emergency room.

## 2020-06-24 ENCOUNTER — OFFICE VISIT (OUTPATIENT)
Dept: ENDOCRINOLOGY | Age: 70
End: 2020-06-24
Payer: MEDICARE

## 2020-06-24 PROCEDURE — 99214 OFFICE O/P EST MOD 30 MIN: CPT | Performed by: INTERNAL MEDICINE

## 2020-06-24 PROCEDURE — G8399 PT W/DXA RESULTS DOCUMENT: HCPCS | Performed by: INTERNAL MEDICINE

## 2020-06-24 PROCEDURE — 3017F COLORECTAL CA SCREEN DOC REV: CPT | Performed by: INTERNAL MEDICINE

## 2020-06-24 PROCEDURE — 1123F ACP DISCUSS/DSCN MKR DOCD: CPT | Performed by: INTERNAL MEDICINE

## 2020-06-24 PROCEDURE — 1090F PRES/ABSN URINE INCON ASSESS: CPT | Performed by: INTERNAL MEDICINE

## 2020-06-24 PROCEDURE — G8427 DOCREV CUR MEDS BY ELIG CLIN: HCPCS | Performed by: INTERNAL MEDICINE

## 2020-06-24 PROCEDURE — 4040F PNEUMOC VAC/ADMIN/RCVD: CPT | Performed by: INTERNAL MEDICINE

## 2020-06-24 NOTE — PATIENT INSTRUCTIONS
INTRODUCTION -- The following instructions will guide you in the proper collection of a 24-hour urine specimen. In some instances, you will be asked to collect two or three consecutive 24-hour urine samples. INSTRUCTIONS  ? You should collect every drop of urine during each 24-hour period. It does not matter how much or little urine is passed each time, as long as every drop is collected. ? Begin the urine collection in the morning after you wake up, after you have emptied your bladder for the first time. ? Urinate (empty the bladder) for the first time and flush it down the toilet. Note the exact time (eg, 6:15 AM). You will begin the urine collection at this time. ?Collect every drop of urine during the day and night in an empty collection bottle. Store the bottle at room temperature or in the refrigerator. ?If you need to have a bowel movement, any urine passed with the bowel movement should be collected. Try not to include feces with the urine collection. If feces does get mixed in, do not try to remove the feces from the urine collection bottle. ? Finish by collecting the first urine passed the next morning, adding it to the collection bottle. This should be within ten minutes before or after the time of the first morning void on the first day (which was flushed). In this example, you would try to void between 6:05 and 6:25 on the second day. If you need to urinate one hour before the final collection time, drink a full glass of water so that you can void again at the appropriate time. If you have to urinate 20 minutes before, try to hold the urine until the proper time. Please note the exact time of the final collection, even if it is not the same time as when collection began on day 1. STORAGE -- The bottle(s) may be kept at room temperature for a day or two, but should be kept cool or refrigerated for longer periods of time.   WHERE TO GET MORE INFORMATION -- Your healthcare provider is the best source

## 2020-06-24 NOTE — PROGRESS NOTES
SUBJECTIVE:  Haydee Rivas is a 81 y.o. female seen for prediabetes, thyroid cyst, hyperlipidemia and history of hypothyroidism but she was taken off Benson thyroid in august 2018 and she stayed euthyroid  She also has prediabetes and left lobe thyroid cyst .    Patient was diagnosed with Hypothyroidism in 2007 at her routine blood work she doesn't want to be on synthetic compounds so was  on Benson her thyroid fx tests are within normal limits but she feels that her recent episodes of afib were due to taking thyroid hormone supplements. At the time of diagnosis her thyroid fx tests were with normal limits . I am not sure of why Thyroid hormone replacement was started due to symptoms as there is no abnormal elevated TSH noted in the labs  Available to me   I reviewed her labs from  since 2006    Current complaints: fatigue, palpitations, goiter  History of obstructive symptoms: difficulty swallowing No, changes in voice/hoarseness No.  History of radiation to patient's neck: NO  Recent iodine exposure: No  Family history includes no thyroid abnormalities.   Family history of thyroid cancer: No    She has sleep apnea and has been wearing CPAP and she felt better on it   She has restless leg syndrome and is on Requip   She has hypertension and is on Lisinopril , cardizem ,HCTZ she also takes Lasix for CHF after her lung clot   She has blood clots after her hip surgery 2016 and now is on Xeralto   She had A Fib in 2017 she had 3 episodes which she reverted back in normal sinus rhythm after diltiazem     Interim history  She started agree diet where she monitored her carbohydrates and reduce the amount of carbs and was able to bring down her A1c from 6.0-5.7  She has not been able to lose any weight  She worries about her weight gain  She went through ablation for A fib in march 2020   Past Medical History:   Diagnosis Date    Allergic     Anesthesia complication     family hx-father-at at age 80 having hip replacement revision surgery-had tia's x2 while under anes-but also had hx chf-had dificuly with speech when coming out from [address]     left ankle, bilateral hands    Arthritis of left hip 2/2/2016    Arthritis of right hip 4/19/2016    Asthma     At risk for falls     uses a cane    Atrial fibrillation with rapid ventricular response (HCC)     Atrial flutter (HCC) 4/25/2018    Chronic maxillary sinusitis 5/24/2017    CTEPH (chronic thromboembolic pulmonary hypertension) ([address] 8/26/2016    Depression     pt stated r/t bad marriage/alcoholic spouse    Disc disease, degenerative, lumbar or lumbosacral 2/20/2017    Hepatic steatosis 4/26/2019    History of total hip arthroplasty 2/28/2017    Hypertension     Leg cramps     patient states very severe, requires immediate potassium administration    Migraines, basilar     last migraine 10 years ago    Mild persistent asthma without complication 3/48/9177    Obesity, Class III, BMI 40-49.9 (morbid obesity) ([address] 4/19/2016    HUSSAIN (obstructive sleep apnea) 10/14/2013    Osteoarthritis, hip, bilateral 2016    Bilateral hip arthroplasty    Panic attacks     Pneumonia 2015    Primary osteoarthritis of both knees 9/19/2017    Primary osteoarthritis of left knee 12/15/2016    Pulmonary emboli ([address] 8/4/2016    Pulmonary HTN ([address] 7/21/2016    Pure hypercholesterolemia 2/13/2019    Restless leg syndrome     Rhinitis, chronic 3/26/2014    Sleep apnea     wears CPAP @11    Stress incontinence     Tear of left rotator cuff 1/23/2018    Thyroid disease     hypothyroid    Wears glasses      Patient Active Problem List    Diagnosis Date Noted    Symptomatic bradycardia 03/28/2020    Obesity (BMI 30-39. 9)     Other pulmonary embolism without acute cor pulmonale (HCC) 08/22/2019    Hx of pulmonary embolus 05/14/2019    Chronic diastolic heart failure ([address] 05/14/2019    Hepatic steatosis 04/26/2019    Pure None   Tobacco Use    Smoking status: Former Smoker     Packs/day: 0.50     Years: 5.00     Pack years: 2.50     Last attempt to quit: 10/5/2013     Years since quittin.7    Smokeless tobacco: Never Used    Tobacco comment: smoked for a total of 5yr total   Substance and Sexual Activity    Alcohol use: Yes     Comment: RARE    Drug use: No    Sexual activity: Never   Lifestyle    Physical activity     Days per week: None     Minutes per session: None    Stress: None   Relationships    Social connections     Talks on phone: None     Gets together: None     Attends Jainism service: None     Active member of club or organization: None     Attends meetings of clubs or organizations: None     Relationship status: None    Intimate partner violence     Fear of current or ex partner: None     Emotionally abused: None     Physically abused: None     Forced sexual activity: None   Other Topics Concern    None   Social History Narrative    None     Current Outpatient Medications   Medication Sig Dispense Refill    Multiple Vitamins-Minerals (THERAPEUTIC MULTIVITAMIN-MINERALS) tablet Take 1 tablet by mouth daily      dilTIAZem (CARDIZEM) 30 MG tablet Take 1 tablet by mouth 4 times daily as needed (Fast heart beat >100 and palpitations) 120 tablet 1    furosemide (LASIX) 20 MG tablet Take 1 tablet by mouth daily (Patient taking differently: Take 40 mg by mouth daily Pt taking 20 mg daily.) 90 tablet 3    spironolactone (ALDACTONE) 25 MG tablet Take 1 tablet by mouth daily 90 tablet 3    pramipexole (MIRAPEX) 0.5 MG tablet 1.5 tab q 5 PM and 1.5 tab qHS 90 tablet 5    lisinopril (PRINIVIL;ZESTRIL) 20 MG tablet Take 0.5 tablets by mouth nightly (Patient taking differently: Take 20 mg by mouth nightly Pt takes 1 tab daily.) 90 tablet 1    fluticasone (FLOVENT HFA) 110 MCG/ACT inhaler Inhale 1 puff into the lungs 2 times daily      CPAP Machine MISC by Does not apply route      tiotropium (SPIRIVA) 18 MCG

## 2020-07-10 ENCOUNTER — OFFICE VISIT (OUTPATIENT)
Dept: CARDIOLOGY CLINIC | Age: 70
End: 2020-07-10
Payer: MEDICARE

## 2020-07-10 ENCOUNTER — HOSPITAL ENCOUNTER (OUTPATIENT)
Age: 70
Discharge: HOME | End: 2020-07-10
Payer: MEDICARE

## 2020-07-10 VITALS
WEIGHT: 248 LBS | DIASTOLIC BLOOD PRESSURE: 70 MMHG | HEART RATE: 76 BPM | BODY MASS INDEX: 42.34 KG/M2 | HEIGHT: 64 IN | SYSTOLIC BLOOD PRESSURE: 126 MMHG

## 2020-07-10 LAB
A/G RATIO: 1.7 (ref 1.1–2.2)
ALBUMIN SERPL-MCNC: 4.5 G/DL (ref 3.4–5)
ALP BLD-CCNC: 78 U/L (ref 40–129)
ALT SERPL-CCNC: 27 U/L (ref 10–40)
ANION GAP SERPL CALCULATED.3IONS-SCNC: 16 MMOL/L (ref 3–16)
ANTI-THYROGLOB ABS: <10 IU/ML
AST SERPL-CCNC: 21 U/L (ref 15–37)
BILIRUB SERPL-MCNC: 0.3 MG/DL (ref 0–1)
BUN BLDV-MCNC: 22 MG/DL (ref 7–20)
CALCIUM SERPL-MCNC: 9.6 MG/DL (ref 8.3–10.6)
CHLORIDE BLD-SCNC: 98 MMOL/L (ref 99–110)
CO2: 21 MMOL/L (ref 21–32)
CORTISOL - AM: 11.4 UG/DL (ref 4.3–22.4)
CREAT SERPL-MCNC: 0.7 MG/DL (ref 0.6–1.2)
GFR AFRICAN AMERICAN: >60
GFR NON-AFRICAN AMERICAN: >60
GLOBULIN: 2.7 G/DL
GLUCOSE BLD-MCNC: 114 MG/DL (ref 70–99)
HCT VFR BLD CALC: 39.6 % (ref 36–48)
HEMOGLOBIN: 13.3 G/DL (ref 12–16)
IRON SATURATION: 17 % (ref 15–50)
IRON: 61 UG/DL (ref 37–145)
MAGNESIUM: 2.1 MG/DL (ref 1.8–2.4)
MCH RBC QN AUTO: 32.2 PG (ref 26–34)
MCHC RBC AUTO-ENTMCNC: 33.6 G/DL (ref 31–36)
MCV RBC AUTO: 95.9 FL (ref 80–100)
PDW BLD-RTO: 14.2 % (ref 12.4–15.4)
PLATELET # BLD: 223 K/UL (ref 135–450)
PMV BLD AUTO: 9.5 FL (ref 5–10.5)
POTASSIUM SERPL-SCNC: 4.3 MMOL/L (ref 3.5–5.1)
PRO-BNP: 85 PG/ML (ref 0–124)
RBC # BLD: 4.12 M/UL (ref 4–5.2)
SODIUM BLD-SCNC: 135 MMOL/L (ref 136–145)
T3 TOTAL: 1.28 NG/ML (ref 0.8–2)
THYROID PEROXIDASE (TPO) ABS: 7 IU/ML
TOTAL IRON BINDING CAPACITY: 369 UG/DL (ref 260–445)
TOTAL PROTEIN: 7.2 G/DL (ref 6.4–8.2)
WBC # BLD: 5.3 K/UL (ref 4–11)

## 2020-07-10 PROCEDURE — 82024 ASSAY OF ACTH: CPT

## 2020-07-10 PROCEDURE — 83880 ASSAY OF NATRIURETIC PEPTIDE: CPT

## 2020-07-10 PROCEDURE — 85027 COMPLETE CBC AUTOMATED: CPT

## 2020-07-10 PROCEDURE — 1123F ACP DISCUSS/DSCN MKR DOCD: CPT | Performed by: INTERNAL MEDICINE

## 2020-07-10 PROCEDURE — 83550 IRON BINDING TEST: CPT

## 2020-07-10 PROCEDURE — 83540 ASSAY OF IRON: CPT

## 2020-07-10 PROCEDURE — 99214 OFFICE O/P EST MOD 30 MIN: CPT | Performed by: INTERNAL MEDICINE

## 2020-07-10 PROCEDURE — 80053 COMPREHEN METABOLIC PANEL: CPT

## 2020-07-10 PROCEDURE — 4040F PNEUMOC VAC/ADMIN/RCVD: CPT | Performed by: INTERNAL MEDICINE

## 2020-07-10 PROCEDURE — 36415 COLL VENOUS BLD VENIPUNCTURE: CPT

## 2020-07-10 PROCEDURE — 83735 ASSAY OF MAGNESIUM: CPT

## 2020-07-10 PROCEDURE — G8399 PT W/DXA RESULTS DOCUMENT: HCPCS | Performed by: INTERNAL MEDICINE

## 2020-07-10 PROCEDURE — 1090F PRES/ABSN URINE INCON ASSESS: CPT | Performed by: INTERNAL MEDICINE

## 2020-07-10 PROCEDURE — G8427 DOCREV CUR MEDS BY ELIG CLIN: HCPCS | Performed by: INTERNAL MEDICINE

## 2020-07-10 PROCEDURE — 86800 THYROGLOBULIN ANTIBODY: CPT

## 2020-07-10 PROCEDURE — G8417 CALC BMI ABV UP PARAM F/U: HCPCS | Performed by: INTERNAL MEDICINE

## 2020-07-10 PROCEDURE — 1036F TOBACCO NON-USER: CPT | Performed by: INTERNAL MEDICINE

## 2020-07-10 PROCEDURE — 86376 MICROSOMAL ANTIBODY EACH: CPT

## 2020-07-10 PROCEDURE — 84480 ASSAY TRIIODOTHYRONINE (T3): CPT

## 2020-07-10 PROCEDURE — 3017F COLORECTAL CA SCREEN DOC REV: CPT | Performed by: INTERNAL MEDICINE

## 2020-07-10 PROCEDURE — 82533 TOTAL CORTISOL: CPT

## 2020-07-10 PROCEDURE — 82530 CORTISOL FREE: CPT

## 2020-07-10 NOTE — PROGRESS NOTES
amiodarone and cardizem. Today she is here for follow up of chronic systolic heart failure. She had one episode of A fib after her last visit. The episode lasted about 20 minutes. She now takes Diltiazem 30 mg in the evening with Xarelto but she can take it up to four times a day as needed. Her shortness of breath improved after her PE resolved, however she has noticed more shortness of breath since ablation for Atrial fib and atrial flutter by Dr. Andrew Elaine on 3/27/20. She does not have ILR currently. She admits she has not been monitoring edema as much as has not taken additional furosemide 20 mg as needed for worsening edema. She is compliant with CPAP machine. Prior to Visit Medications    Medication Sig Taking? Authorizing Provider   dilTIAZem (CARDIZEM) 30 MG tablet Take 1 tablet by mouth 4 times daily as needed (Fast heart beat >100 and palpitations) Yes BRICE Wheatley CNP   furosemide (LASIX) 20 MG tablet Take 1 tablet by mouth daily  Patient taking differently: Take 20 mg by mouth daily Pt taking 20 mg daily.  Yes BRICE Wheatley CNP   spironolactone (ALDACTONE) 25 MG tablet Take 1 tablet by mouth daily Yes BRICE Wheatley CNP   pramipexole (MIRAPEX) 0.5 MG tablet 1.5 tab q 5 PM and 1.5 tab qHS Yes Rajani Chase MD   lisinopril (PRINIVIL;ZESTRIL) 20 MG tablet Take 0.5 tablets by mouth nightly  Patient taking differently: Take 20 mg by mouth nightly  Yes BRICE Wheatley CNP   fluticasone (FLOVENT HFA) 110 MCG/ACT inhaler Inhale 1 puff into the lungs 2 times daily Yes Historical Provider, MD   CPAP Machine MISC by Does not apply route Yes Historical Provider, MD   tiotropium (SPIRIVA) 18 MCG inhalation capsule Inhale 18 mcg into the lungs daily X 10 days Yes Historical Provider, MD   rivaroxaban (XARELTO) 20 MG TABS tablet Take 1 tablet by mouth Daily with supper Yes Valerie Brady MD   azelastine (ASTELIN) 0.1 % nasal spray 1 spray by Nasal route 2 times daily 1 Spray in each nostril Yes Barnie Libman, MD   Magnesium Citrate 100 MG TABS Take 1 tablet by mouth daily Yes Yaneth Roe MD   Multiple Vitamins-Minerals (THERAPEUTIC MULTIVITAMIN-MINERALS) tablet Take 1 tablet by mouth daily  Historical Provider, MD Zenaida Kellogg 2-MIR 0.3 MG/0.3ML SOAJ injection   Historical Provider, MD       Social History     Tobacco Use    Smoking status: Former Smoker     Packs/day: 0.50     Years: 5.00     Pack years: 2.50     Last attempt to quit: 10/5/2013     Years since quittin.7    Smokeless tobacco: Never Used    Tobacco comment: smoked for a total of 5yr total   Substance Use Topics    Alcohol use: Yes     Comment: RARE    Drug use:  No            Past Medical History:   Diagnosis Date    Allergic     Anesthesia complication     family hx-father-at at age 80 having hip replacement revision surgery-had tia's x2 while under anes-but also had hx chf-had dificuly with speech when coming out from anes    Anxiety     Arthritis     left ankle, bilateral hands    Arthritis of left hip 2016    Arthritis of right hip 2016    Asthma     At risk for falls     uses a cane    Atrial fibrillation with rapid ventricular response (HCC)     Atrial flutter ([address] 2018    Chronic maxillary sinusitis 2017    CTEPH (chronic thromboembolic pulmonary hypertension) ([address] 2016    Depression     pt stated r/t bad marriage/alcoholic spouse    Disc disease, degenerative, lumbar or lumbosacral 2017    Hepatic steatosis 2019    History of total hip arthroplasty 2017    Hypertension     Leg cramps     patient states very severe, requires immediate potassium administration    Migraines, basilar     last migraine 10 years ago    Mild persistent asthma without complication     Obesity, Class III, BMI 40-49.9 (morbid obesity) ([address] 2016    HUSSAIN (obstructive sleep apnea) 10/14/2013    Osteoarthritis, hip, bilateral 2016    Bilateral hip arthroplasty    Panic attacks     Pneumonia     Primary osteoarthritis of both knees 2017    Primary osteoarthritis of left knee 12/15/2016    [facility]monary emboli [facility] 2016    Pulmonary HTN ([facility] 2016    Pure hypercholesterolemia 2019    Restless leg syndrome     Rhinitis, chronic 3/26/2014    Sleep apnea     wears CPAP @11    Stress incontinence     Tear of left rotator cuff 2018    Thyroid disease     hypothyroid    Wears glasses      Past Surgical History:   Procedure Laterality Date    ATRIAL ABLATION SURGERY      BACK SURGERY      LUMBAR FUSION    CATARACT REMOVAL Bilateral     COLONOSCOPY      EYE SURGERY Right     retinal tear repaired   [facility]    right ring finger severe laceration, fracture    HIP ARTHROPLASTY Right 16    HYSTERECTOMY      JOINT REPLACEMENT Left 16    left hip    TONSILLECTOMY  1972     Social History     Tobacco Use    Smoking status: Former Smoker     Packs/day: 0.50     Years: 5.00     Pack years: 2.50     Last attempt to quit: 10/5/2013     Years since quittin.7    Smokeless tobacco: Never Used    Tobacco comment: smoked for a total of 5yr total   Substance Use Topics    Alcohol use: Yes     Comment: RARE       Review of Systems:   Constitutional: No significant change in weight, fatigue or weakness. HEENT: No change in vision or ringing in the ears. Respiratory: +LUGO, no PND, orthopnea or cough. Cardiovascular: See HPI   GI: No n/v, abdominal pain or changes in bowel habits. No melena, no hematochezia  : No dysuria or hematuria. Skin: No rash or new skin lesions. Musculoskeletal:   Neurological: No lightheadedness, dizziness, syncope or TIA-like symptoms.   Psychiatric: No anxiety, insomnia or depression    Physical Exam:  Vitals:    07/10/20 0751   BP: 126/70   Pulse: 76   Weight: 248 lb (112.5 kg)   Height: 5' 4\" (1.626 m)     Constitutional and General Appearance: Stable   WD/WN in NAD  HEENT:  NC/AT  GENET  No problems with hearing  Respiratory:  · Normal excursion and expansion without use of accessory muscles  · Resp Auscultation: Normal breath sounds without dullness  Cardiovascular:  · The apical impulses not displaced  · Heart tones are crisp and normal  · Cervical veins are not engorged  · The carotid upstroke is normal in amplitude and contour without delay or bruit  · JVP < 8 cm H2O  RRR with nl S1 and S2 without m,r,g  · Peripheral pulses are symmetrical and full  · There is no clubbing, cyanosis of the extremities. · Trace edema 1+  · Femoral Arteries: 2+ and equal  · Pedal Pulses: 2+ and equal   Abdomen:  · No masses or tenderness  · Liver/Spleen: No Abnormalities Noted  Neurological/Psychiatric:  · Alert and oriented in all spheres  · Moves all extremities well  · Exhibits normal gait balance and coordination  · No abnormalities of mood, affect, memory, mentation, or behavior are noted    Labs were reviewed including labs from other hospital systems through [facility]. Cardiac testing was reviewed including echos, nuclear scans, cardiac catheterization, including from other hospital systems through [facility]. Labs:   Lab Results   Component Value Date    WBC 5.6 05/04/2020    HGB 13.7 05/04/2020    HCT 41.6 05/04/2020    MCV 96.1 05/04/2020     05/04/2020     Lab Results   Component Value Date     06/10/2020    K 4.0 06/10/2020    K 3.7 02/02/2020    CL 97 06/10/2020    CO2 20 06/10/2020    BUN 17 06/10/2020    CREATININE 0.8 06/10/2020    GLUCOSE 118 06/10/2020    GLUCOSE 100 03/27/2012    CALCIUM 9.9 06/10/2020      Lab Results   Component Value Date    TRIG 92 05/04/2020    HDL 66 05/04/2020    [address] 124 05/04/2020    LABVLDL 18 05/04/2020     Diagnostic Testing:  Procedure performed:  3-  · Electrophysiology study with left atrial recording and mapping   · 3-D electroanatomical mapping of the left atrium and  right atium. · Electrophysiological study(EPS) and induction after IV drug infusion  · Transseptal puncture through an intact septum . · Intracardiac echocardiography. · Radiofrequency ablation of atrial fibrillation and pulmonary veins isolation   · Ablation of CTI depndent flutter as a separate mechanism  · Ultrasound for access    ECHO 8/20/2019   -Left ventricular cavity size is normal.   -There is mild left ventricular hypertrophy.   -Ejection fraction is visually estimated to be 55-60%. -No wall motion abnormalities.   -Normal diastolic function.   -Trivial mitral regurgitation.   -Mild tricuspid regurgitation     Calcium CT Score 2/19/2019   Total Agatston calcium score of 51.    24 hour holter and it revealed SR/PVCs, no afib or arrhythmias. Echo 4/26/18  Left ventricular cavity size is normal.  There is mild left ventricular hypertrophy. Ejection fraction is visually estimated to be 55-60%. Normal diastolic function. Mild tricuspid regurgitation with RVSP of 36 mm/hg    VQ scan 8/30/17  Low Probability for Pulmonary Embolus. Echo 8/28/17  Normalleft ventricle size and systolic function with an estimated ejection fraction of 60%. Mild mitral regurgitation is present. There is mild-moderate tricuspid regurgitation with RVSP estimated at 88 mmHg. This is suggestive of severe pulmonary hypertension. Mild pulmonic regurgitation present. Echo 9/1/2016:  Technically limited examination to evaluate RVSP. Overall left ventricular function is normal.  Normal right ventricular size and function. Mild tricuspid regurgitation with RVSP estimated at 95 mmHg. No evidence of any pericardial effusion. [address] 9/1/2016:  Pressures were as follows:  RA: 9 mmHg  RV:  38/12 mmHg  PA:  33/16, mean 24 mmHg  PCWP:  14 mmHg  Thermodilution CO: 7.45   Thermodilution CI:  3.42  Adiel CO:  9.72  Adiel CI:  4.46  PA Sat:  74%  PVR:  107 dynes     Impression:  1. Minimal pulmonary hypertension with mean PA pressure 24  2. Normal filling pressures  3. Normal cardiac output  4. No evidence of PAH at this time. Echo 8/28/17  Normal left ventricle size and systolic function with an estimated ejection  fraction of 60%. Mild mitral regurgitation is present. There is mild-moderate tricuspid regurgitation with RVSP estimated at 88  mmHg. This is suggestive of severe pulmonary hypertension. Mild pulmonic regurgitation present. 1-20-15 Nuclear Stress test  Conclusions        Summary    Normal myocardial perfusion study.    Normal LV function. Labs were reviewed including labs from other hospital systems through [facility]. Cardiac testing was reviewed including echos, nuclear scans, cardiac catheterization, including from other hospital systems through [facility]. Assessment:  1. Chronic heart failure with preserved ejection fraction (HCC)    2. Paroxysmal atrial fibrillation ([address]    3. Essential hypertension    4. HUSSAIN (obstructive sleep apnea)    5. SOB (shortness of breath)        1. Chronic heart failure with preserved ejection fraction (HCC) :  Compensated by exam.    Has worsening shortness of breath although appears compensated on exam.   Check labs - CBC, Mg++, Fe-TIBC, CMP, BNP     -ECHO 8/2019> EF 55-60% and RVSP 40mmHg. Remains on lisinopril, Lasix 20 mg daily with extra dose as needed, spironolactone      2. Paroxysmal atrial fibrillation ([address]):  Continues on Xarelto. DCCV 1/13/2020 (Dr. Blayne Sal)   -Had an Ablation of atrial fib on 3- per Dr. Urszula Kathleen.   - noticed more short of breath since Ablation. Unclear if SOB is related to recurrent AF episodes. Recommend that she discuss ILR implant with Dr. Urszula Kathleen  - rhythm is regular on exam      3. Benign essential HTN:  /70   Pulse 76   Ht 5' 4\" (1.626 m)   Wt 248 lb (112.5 kg)   BMI 42.57 kg/m²   Controlled. 4. HUSSAIN (obstructive sleep apnea) :  Wearing CPAP faithfully.        5. Symptomatic bradycardia:  NSR today rate of 76 bpm today         Pure hypercholesterolemia:  5/4/20 > , HDL 66, , TG 92. She decided not to start Zetia. Mother had CAD. CT calcium score 51. Could not tolerate statin or Livalo. Plan:  1. No change in medications. May adjust meds based on lab results  2. Monitor edema closely and take additional furosemide 20 mg if needed for worsening edema  3. Labs today - Mg++, CBC, Fe & TIBC, CMP, BNP  4. Discuss loop recorder implant with Dr. Pepper Rivas  5. Follow up in four months       QUALITY MEASURES  1. Tobacco Cessation Counseling: N/A  2. BP retake if >140/90: N/A  3. Communication to PCP: Office note forwarded/faxed to PCP  4. CAD antiplatelet therapy: N/A  5. CAD lipid lowering therapy: N/A  6. HF A. Fib on anticoagulation: N/A     Thank you for allowing me to participate in the care of your patient. Deepali Corral M.D., [facility]    Scribe's attestation: This note was scribed in the presence of Deepali Corral M.D. by Kendrick Garner RN    The scribe's documentation has been prepared under my direction and personally reviewed by me in its entirety. I confirm that the note above accurately reflects all work, treatment, procedures, and medical decision making performed by me.

## 2020-07-10 NOTE — LETTER
[address] [place] [address] [address]  Phone: [phone]  Fax: [phone]    Lei Jose MD        August 5, 2020     Colette Nissen, [address]    Patient: Mariya Cowan  MR Number: [id number]  YOB: 1950  Date of Visit: 7/10/2020    Dear Dr. Colette Nissen:    Below are the relevant portions of my assessment and plan of care. [address]   Advanced Heart Failure/Pulmonary Hypertension  Cardiac Follow up       Mariya Cowan  YOB: 1950     Date of Visit:  7/10/20     Chief Complaint   Patient presents with    Congestive Heart Failure    Shortness of Breath     History of present illness: Mariya Cowan is a 81 y.o. female with past medical history significant for HTN, HUSSAIN on CPAP, multiple PE on Xarelto (8/2016). She original had a PE was treated with xarelto for recommended time and then off it and her RHC revealed pressures no RH elevated pressures. Afterwards she developed AFib and restarted on xarelto. She continues on xarelto and treatment for afib. Echos in July and August (2016) showed RVSP 106-112 without evidence of enlargement in right side of heart. RHC was done 9/2/16 and PA pressure was 24, no evidence of pulmonary hypertension. Since then, her RVSP has decreased and got back to normal. We discussed that we are questioning if the elevated ones by echo may have been due to a false positive test for her. She was seen by Dr Morgan Frazier 8/1 and he is managing her hypothyroidism. Gill, the thyroid medication was stopped. She was noted to have negative thyroid ultrasound ( July 2018). She wears her CPAP consistently for her HUSSAIN. States she had a reaction to Simvastatin and Livalo with muscle pain. She states she is Prediabetic and has not lost weight.       She was admitted 1/13/2020 with palpitations/light-headedness along with SOB and chest pressure. She underwent successful DCCV and was put on Rhythmol -- this caused her HR to drop into the 40's which made her feel bad. She was discharged on diltiazem 240mg qd which causes her to feel fatigued and dizzy. She remains on Xarelto. Her lisinopril was stopped. She had an AF ablation on 3-. On 4/2/20 she reported that she was back in atrial fib with rates ranging from 80 to 130. She did not want to go to the ED. She was instructed to resume her amiodarone and cardizem. Today she is here for follow up of chronic systolic heart failure. She had one episode of A fib after her last visit. The episode lasted about 20 minutes. She now takes Diltiazem 30 mg in the evening with Xarelto but she can take it up to four times a day as needed. Her shortness of breath improved after her PE resolved, however she has noticed more shortness of breath since ablation for Atrial fib and atrial flutter by Dr. Lauren Gunn on 3/27/20. She does not have ILR currently. She admits she has not been monitoring edema as much as has not taken additional furosemide 20 mg as needed for worsening edema. She is compliant with CPAP machine. Prior to Visit Medications    Medication Sig Taking? Authorizing Provider   dilTIAZem (CARDIZEM) 30 MG tablet Take 1 tablet by mouth 4 times daily as needed (Fast heart beat >100 and palpitations) Yes BRICE Moreno CNP   furosemide (LASIX) 20 MG tablet Take 1 tablet by mouth daily  Patient taking differently: Take 20 mg by mouth daily Pt taking 20 mg daily.  Yes BRICE Moreno CNP   spironolactone (ALDACTONE) 25 MG tablet Take 1 tablet by mouth daily Yes BRICE Moreno CNP   pramipexole (MIRAPEX) 0.5 MG tablet 1.5 tab q 5 PM and 1.5 tab qHS Yes Coty Cole MD   lisinopril (PRINIVIL;ZESTRIL) 20 MG tablet Take 0.5 tablets by mouth nightly  Patient taking differently: Take 20 mg by mouth nightly  Yes BRICE Moreno CNP  Disc disease, degenerative, lumbar or lumbosacral 2017    Hepatic steatosis 2019    History of total hip arthroplasty 2017    Hypertension     Leg cramps     patient states very severe, requires immediate potassium administration    Migraines, basilar     last migraine 10 years ago    Mild persistent asthma without complication     Obesity, Class III, BMI 40-49.9 (morbid obesity) ([address] 2016    HUSSAIN (obstructive sleep apnea) 10/14/2013    Osteoarthritis, hip, bilateral     Bilateral hip arthroplasty    Panic attacks     Pneumonia     Primary osteoarthritis of both knees 2017    Primary osteoarthritis of left knee 12/15/2016    Pulmonary emboli ([address] 2016    Pulmonary HTN ([address] 2016    Pure hypercholesterolemia 2019    Restless leg syndrome     Rhinitis, chronic 3/26/2014    Sleep apnea     wears CPAP @11    Stress incontinence     Tear of left rotator cuff 2018    Thyroid disease     hypothyroid    Wears glasses      Past Surgical History:   Procedure Laterality Date    ATRIAL ABLATION SURGERY      BACK SURGERY      LUMBAR FUSION    CATARACT REMOVAL Bilateral     COLONOSCOPY      EYE SURGERY Right     retinal tear repaired   [facility]    right ring finger severe laceration, fracture    HIP ARTHROPLASTY Right 16    HYSTERECTOMY  1993    JOINT REPLACEMENT Left 16    left hip    TONSILLECTOMY  1972     Social History     Tobacco Use    Smoking status: Former Smoker     Packs/day: 0.50     Years: 5.00     Pack years: 2.50     Last attempt to quit: 10/5/2013     Years since quittin.7    Smokeless tobacco: Never Used    Tobacco comment: smoked for a total of 5yr total   Substance Use Topics    Alcohol use: Yes     Comment: RARE       Review of Systems:   Constitutional: No significant change in weight, fatigue or weakness. HEENT: No change in vision or ringing in the ears.  06/10/2020    K 4.0 06/10/2020    K 3.7 02/02/2020    CL 97 06/10/2020    CO2 20 06/10/2020    BUN 17 06/10/2020    CREATININE 0.8 06/10/2020    GLUCOSE 118 06/10/2020    GLUCOSE 100 03/27/2012    CALCIUM 9.9 06/10/2020      Lab Results   Component Value Date    TRIG 92 05/04/2020    HDL 66 05/04/2020    LDLCALC 124 05/04/2020    LABVLDL 18 05/04/2020     Diagnostic Testing:  Procedure performed:  3-  · Electrophysiology study with left atrial recording and mapping   · 3-D electroanatomical mapping of the left atrium and  right atium. · Electrophysiological study(EPS) and induction after IV drug infusion  · Transseptal puncture through an intact septum . · Intracardiac echocardiography. · Radiofrequency ablation of atrial fibrillation and pulmonary veins isolation   · Ablation of CTI depndent flutter as a separate mechanism  · Ultrasound for access    ECHO 8/20/2019   -Left ventricular cavity size is normal.   -There is mild left ventricular hypertrophy.   -Ejection fraction is visually estimated to be 55-60%. -No wall motion abnormalities.   -Normal diastolic function.   -Trivial mitral regurgitation.   -Mild tricuspid regurgitation     Calcium CT Score 2/19/2019   Total Agatston calcium score of 51.    24 hour holter and it revealed SR/PVCs, no afib or arrhythmias. Echo 4/26/18  Left ventricular cavity size is normal.  There is mild left ventricular hypertrophy. Ejection fraction is visually estimated to be 55-60%. Normal diastolic function. Mild tricuspid regurgitation with RVSP of 36 mm/hg    VQ scan 8/30/17  Low Probability for Pulmonary Embolus. Echo 8/28/17  Normalleft ventricle size and systolic function with an estimated ejection fraction of 60%. Mild mitral regurgitation is present. There is mild-moderate tricuspid regurgitation with RVSP estimated at 88 mmHg. This is suggestive of severe pulmonary hypertension. Mild pulmonic regurgitation present.     Echo 9/1/2016: Technically limited examination to evaluate RVSP. Overall left ventricular function is normal.  Normal right ventricular size and function. Mild tricuspid regurgitation with RVSP estimated at 95 mmHg. No evidence of any pericardial effusion. [address] 9/1/2016:  Pressures were as follows:  RA: 9 mmHg  RV:  38/12 mmHg  PA:  33/16, mean 24 mmHg  PCWP:  14 mmHg  Thermodilution CO: 7.45   Thermodilution CI:  3.42  Adiel CO:  9.72  Adiel CI:  4.46  PA Sat:  74%  PVR:  107 dynes     Impression:  1. Minimal pulmonary hypertension with mean PA pressure 24  2. Normal filling pressures  3. Normal cardiac output  4. No evidence of PAH at this time. Echo 8/28/17  Normal left ventricle size and systolic function with an estimated ejection  fraction of 60%. Mild mitral regurgitation is present. There is mild-moderate tricuspid regurgitation with RVSP estimated at 88  mmHg. This is suggestive of severe pulmonary hypertension. Mild pulmonic regurgitation present. 1-20-15 Nuclear Stress test  Conclusions        Summary    Normal myocardial perfusion study.    Normal LV function. Labs were reviewed including labs from other hospital systems through [facility]. Cardiac testing was reviewed including echos, nuclear scans, cardiac catheterization, including from other hospital systems through [facility]. Assessment:  1. Chronic heart failure with preserved ejection fraction (HCC)    2. Paroxysmal atrial fibrillation ([address]    3. Essential hypertension    4. HUSSAIN (obstructive sleep apnea)    5. SOB (shortness of breath)        1. Chronic heart failure with preserved ejection fraction (HCC) :  Compensated by exam.    Has worsening shortness of breath although appears compensated on exam.   Check labs - CBC, Mg++, Fe-TIBC, CMP, BNP     -ECHO 8/2019> EF 55-60% and RVSP 40mmHg.     Remains on lisinopril, Lasix 20 mg daily with extra dose as needed, spironolactone 2. Paroxysmal atrial fibrillation ([facility]):  Continues on Xarelto. DCCV 1/13/2020 (Dr. Buzz Kaufman)   -Had an Ablation of atrial fib on 3- per Dr. Lisa John.   - noticed more short of breath since Ablation. Unclear if SOB is related to recurrent AF episodes. Recommend that she discuss ILR implant with Dr. Lisa John  - rhythm is regular on exam      3. Benign essential HTN:  /70   Pulse 76   Ht 5' 4\" (1.626 m)   Wt 248 lb (112.5 kg)   BMI 42.57 kg/m²   Controlled. 4. HUSSAIN (obstructive sleep apnea) :  Wearing CPAP faithfully. 5. Symptomatic bradycardia:  NSR today rate of 76 bpm today         Pure hypercholesterolemia:  5/4/20 > , HDL 66, , TG 92. She decided not to start Zetia. Mother had CAD. CT calcium score 51. Could not tolerate statin or Livalo. Plan:  1. No change in medications. May adjust meds based on lab results  2. Monitor edema closely and take additional furosemide 20 mg if needed for worsening edema  3. Labs today - Mg++, CBC, Fe & TIBC, CMP, BNP  4. Discuss loop recorder implant with Dr. Lisa John  5. Follow up in four months       QUALITY MEASURES  1. Tobacco Cessation Counseling: N/A  2. BP retake if >140/90: N/A  3. Communication to PCP: Office note forwarded/faxed to PCP  4. CAD antiplatelet therapy: N/A  5. CAD lipid lowering therapy: N/A  6. HF A. Fib on anticoagulation: N/A     Thank you for allowing me to participate in the care of your patient. Alison Schofield M.D., [facility]    Scribe's attestation: This note was scribed in the presence of Alison Schofield M.D. by Juwan Hutchins RN    The scribe's documentation has been prepared under my direction and personally reviewed by me in its entirety. I confirm that the note above accurately reflects all work, treatment, procedures, and medical decision making performed by me. If you have questions, please do not hesitate to call me. I look forward to following Nicolasa Asencio along with you.     Sincerely, Henry Bailey MD

## 2020-07-14 LAB
ADRENOCORTICOTROPIC HORMONE: 9 PG/ML (ref 6–58)
CORTISOL (UR), FREE: 21.7 UG/D
CORTISOL URINE, FREE (/G CRT): 13.86 UG/G CRT
CORTISOL,F,UG/L,U: 7.76 UG/L
CREATININE 24 HOUR URINE: 1568 MG/D (ref 500–1400)
CREATININE URINE: 56 MG/DL
HOURS COLLECTED: 2800
INTERPRETATION: ABNORMAL
URINE TOTAL VOLUME: 44

## 2020-07-23 ENCOUNTER — OFFICE VISIT (OUTPATIENT)
Dept: CARDIOLOGY CLINIC | Age: 70
End: 2020-07-23
Payer: MEDICARE

## 2020-07-23 VITALS
DIASTOLIC BLOOD PRESSURE: 80 MMHG | RESPIRATION RATE: 18 BRPM | BODY MASS INDEX: 42.14 KG/M2 | WEIGHT: 246.8 LBS | HEART RATE: 83 BPM | SYSTOLIC BLOOD PRESSURE: 128 MMHG | HEIGHT: 64 IN

## 2020-07-23 PROCEDURE — G8417 CALC BMI ABV UP PARAM F/U: HCPCS | Performed by: NURSE PRACTITIONER

## 2020-07-23 PROCEDURE — 1123F ACP DISCUSS/DSCN MKR DOCD: CPT | Performed by: NURSE PRACTITIONER

## 2020-07-23 PROCEDURE — 3017F COLORECTAL CA SCREEN DOC REV: CPT | Performed by: NURSE PRACTITIONER

## 2020-07-23 PROCEDURE — 93000 ELECTROCARDIOGRAM COMPLETE: CPT | Performed by: NURSE PRACTITIONER

## 2020-07-23 PROCEDURE — G8427 DOCREV CUR MEDS BY ELIG CLIN: HCPCS | Performed by: NURSE PRACTITIONER

## 2020-07-23 PROCEDURE — 4040F PNEUMOC VAC/ADMIN/RCVD: CPT | Performed by: NURSE PRACTITIONER

## 2020-07-23 PROCEDURE — 99214 OFFICE O/P EST MOD 30 MIN: CPT | Performed by: NURSE PRACTITIONER

## 2020-07-23 PROCEDURE — 1036F TOBACCO NON-USER: CPT | Performed by: NURSE PRACTITIONER

## 2020-07-23 PROCEDURE — 1090F PRES/ABSN URINE INCON ASSESS: CPT | Performed by: NURSE PRACTITIONER

## 2020-07-23 PROCEDURE — G8399 PT W/DXA RESULTS DOCUMENT: HCPCS | Performed by: NURSE PRACTITIONER

## 2020-07-23 RX ORDER — FUROSEMIDE 40 MG/1
40 TABLET ORAL DAILY
Qty: 90 TABLET | Refills: 0 | Status: SHIPPED | OUTPATIENT
Start: 2020-07-23 | End: 2020-11-16

## 2020-07-23 NOTE — PATIENT INSTRUCTIONS
- PRN Cardizem 30 mg for tachycardia/palpitations  - Check your heart rate at home, if it is <120 or >45 please call the office   - Increase lasix 40 mg daily  - BMP 1-2 weeks  - Follow up with heart failure nurse practitioner first available  - Call office if symptoms not improved or go to ED if symptoms become severe  - Weigh yourself daily, if you gain more than 3 lbs in a day or 5 lbs in a week call the office

## 2020-07-23 NOTE — PROGRESS NOTES
[facility]   Electrophysiology  BRICE Bocanegra-CNP  Attending EP: Dr. Mena Stroud  Date: 7/23/2020  I had the privilege of visiting Toño Kim in the office. Chief Complaint:   Chief Complaint   Patient presents with    Atrial Fibrillation    3 Month Follow-Up     History of Present Illness: History obtained from patient and medical record. Toño Kim is 81 y.o. female with a past medical history of HUSSAIN, obesity, dCHF, HTN, PE, and atrial fibrillation. In January of 2020, she presented to the hospital with palpitations and was found to be in atrial fibrillation with RVR. She has PAF and normally converts to NSR spontaneously. She is symptomatic with her afib with symptoms of lightheadedness, SOB, and chest pressure. On 1/13/20, she was cardioverted and found to have long conversion pauses post procedure (Hx of syncope after conversion at home). Elected for afib ablation. She is s/p RFA of afib with PVI and ablation of CTI flutter (3/27/20, Dr. Luz Turner). She had junctional rhtyhm with dizziness and hypotension after ablation and Cardizem and amiodarone were stopped. She had recurrence of afib and her amiodarone was resumed and later d/c'd due to recurrent dizziness. Interval history: Today, Toño Kim is being seen for afib and CHF. In the past she has had junctional rhythm and dizziness with amiodarone and Cardizem and her medications have been adjusted. Now she is doing well with diltiazem as needed for HR >70 and palpitations, she takes 1-2 time daily without recurrent dizziness. She is in SR on ECG today. She has had one episode pf afib in mid-June lasting about 15 minutes. No other known recurrence. Today, she is complaining of gradually worsening SOB  x 1-2 months accompanied with increased BLE swelling. She feels her activity level is limited and had a hard time walking in to her apt today. She follows with Dr. Frida Lovett for pulmonary HTN and dCHF.   She sometimes misses a dose of lasix if she has appts and then she will take it 2 times daily the next day. She used to follow with pulmonology, however she has not in years. Denies having chest pain, palpitations, , or dizziness at the time of this visit. With regard to medication therapy the patient has not been compliant with prescribed regimen. She has tolerated therapy to date. Allergies: Allergies   Allergen Reactions    Atorvastatin Other (See Comments)     Muscle Pain, all statins     Simvastatin Other (See Comments)     Muscle Pain    Cephalexin Hives and Itching    Cephalosporins Hives and Itching    Food Diarrhea     Milk , shellfish , gluten. ..may have bouts of diarrhea, constipation or flatulus. (RD spoke to pt regarding \"milk allergy\", pt is not allergic to milk. She does not drink milk, but consumes milk in other products and consumes dairy products. Had one reactions to shellfish years ago and now can tolerate one serving of shellfish once per week. Avoids gluten as much as she can, but does not have celiac disease.)    Other      Environmental -cats,dogs,dust    Shellfish-Derived Products     Sulfa Antibiotics      Can take Celebrex, Pt denies 8/1/18     Home Medications:  Prior to Visit Medications    Medication Sig Taking? Authorizing Provider   Multiple Vitamins-Minerals (THERAPEUTIC MULTIVITAMIN-MINERALS) tablet Take 1 tablet by mouth daily  Historical Provider, MD   dilTIAZem (CARDIZEM) 30 MG tablet Take 1 tablet by mouth 4 times daily as needed (Fast heart beat >100 and palpitations)  BRICE French CNP   furosemide (LASIX) 20 MG tablet Take 1 tablet by mouth daily  Patient taking differently: Take 20 mg by mouth daily Pt taking 20 mg daily.   BRICE French CNP   spironolactone (ALDACTONE) 25 MG tablet Take 1 tablet by mouth daily  BRICE French CNP   pramipexole (MIRAPEX) 0.5 MG tablet 1.5 tab q 5 PM and 1.5 tab qHS  Shaylee Song MD   lisinopril (PRINIVIL;ZESTRIL) 20 MG tablet Take 0.5 tablets by mouth nightly  Patient taking differently: Take 20 mg by mouth nightly   Josesito Elmore APRN - CNP   fluticasone (FLOVENT HFA) 110 MCG/ACT inhaler Inhale 1 puff into the lungs 2 times daily  Historical Provider, MD   CPAP Machine MISC by Does not apply route  Historical Provider, MD   tiotropium (SPIRIVA) 18 MCG inhalation capsule Inhale 18 mcg into the lungs daily X 10 days  Historical Provider, MD   rivaroxaban (XARELTO) 20 MG TABS tablet Take 1 tablet by mouth Daily with supper  Maxx Monson MD   azelastine (ASTELIN) 0.1 % nasal spray 1 spray by Nasal route 2 times daily 1 Spray in each nostril  Andreia Evans MD   Magnesium Citrate 100 MG TABS Take 1 tablet by mouth daily  Maxx Monson MD   EPIPEN 2-MIR 0.3 MG/0.3ML SOAJ injection   Historical Provider, MD      Past Medical History:  Past Medical History:   Diagnosis Date    Allergic     Anesthesia complication     family hx-father-at at age 80 having hip replacement revision surgery-had tia's x2 while under anes-but also had hx chf-had dificuly with speech when coming out from [address]     left ankle, bilateral hands    Arthritis of left hip 2/2/2016    Arthritis of right hip 4/19/2016    Asthma     At risk for falls     uses a cane    Atrial fibrillation with rapid ventricular response ([address]     Atrial flutter ([address] 4/25/2018    Chronic maxillary sinusitis 5/24/2017    CTEPH (chronic thromboembolic pulmonary hypertension) ([address] 8/26/2016    Depression     pt stated r/t bad marriage/alcoholic spouse    Disc disease, degenerative, lumbar or lumbosacral 2/20/2017    Hepatic steatosis 4/26/2019    History of total hip arthroplasty 2/28/2017    Hypertension     Leg cramps     patient states very severe, requires immediate potassium administration    Migraines, basilar     last migraine 10 years ago    Mild persistent asthma without complication 0/58/2580    Obesity, Class III, BMI 40-49.9 (morbid obesity) ([facility] 4/19/2016    HUSSAIN (obstructive sleep apnea) 10/14/2013    Osteoarthritis, hip, bilateral 2016    Bilateral hip arthroplasty    Panic attacks     Pneumonia 2015    Primary osteoarthritis of both knees 9/19/2017    Primary osteoarthritis of left knee 12/15/2016    Pulmonary emboli ([facility] 8/4/2016    Pulmonary HTN ([facility] 7/21/2016    Pure hypercholesterolemia 2/13/2019    Restless leg syndrome     Rhinitis, chronic 3/26/2014    Sleep apnea     wears CPAP @11    Stress incontinence     Tear of left rotator cuff 1/23/2018    Thyroid disease     hypothyroid    Wears glasses      Past Surgical History:    has a past surgical history that includes Cataract removal (Bilateral); Finger surgery (1988); eye surgery (Right, 2010); back surgery (2004); Tonsillectomy (1972); Hysterectomy (1993); Colonoscopy; joint replacement (Left, 2/2/16); Hip Arthroplasty (Right, 4-19-16); and Atrial ablation surgery. Social History:  Reviewed. reports that she quit smoking about 6 years ago. She has a 2.50 pack-year smoking history. She has never used smokeless tobacco. She reports current alcohol use. She reports that she does not use drugs. Family History:  Reviewed. family history includes Alcohol Abuse in her maternal grandfather, paternal grandfather, and sister; Anxiety Disorder in her mother; Arthritis in her brother, mother, and sister; Asthma in her mother; Cancer in her paternal uncle; Cataracts in her mother; Heart Disease in her father, maternal grandfather, maternal uncle, mother, and paternal uncle; Heart Failure in her father; High Cholesterol in her brother; Hypertension in her mother; Stroke in her maternal grandmother; Substance Abuse in her maternal grandfather and paternal grandfather; Thyroid Disease in her father, maternal grandfather, maternal grandmother, and mother.    Denies family history of sudden cardiac death, arrhythmia, premature CAD    Review of System:  · Constitutional: No fevers, chills, weight changes or weakness  · HEENT: No visual changes. No mouth sores or sore throat. · Cardiovascular: denies chest pain, reports dyspnea on exertion, admits to palpitations or denies loss of consciousness. No cough, hemoptysis, denies pleuritic pain, or phlebitis. · Respiratory: denies cough or wheezing. No hematemesis. · Gastrointestinal: No abdominal pain, blood in stools. · Genitourinary: No dysuria, urgency or hematuria. · Musculoskeletal: denies gait disturbance, No muscle weakness. · Integumentary: No rash or pruritis. · Neurological: No headache, change in muscle strength, numbness or tingling. · Psychiatric: No confusion, anxiety, or depression. · Endocrine: No temperature intolerance. No excessive thirst, fluid intake, or urination. · Hem/Lymph: No abnormal bruising or bleeding, blood clots or swollen lymph nodes. Physical Examination:  There were no vitals filed for this visit. Wt Readings from Last 3 Encounters:   07/10/20 248 lb (112.5 kg)   05/04/20 248 lb (112.5 kg)   05/04/20 248 lb (112.5 kg)     Constitutional: Cooperative and in no apparent distress, and appears well nourished  Skin: Warm and pink; no pallor, cyanosis, bruising, or clubbing  HEENT: Symmetric and normocephalic. PERRL, EOM intact. Conjunctiva pink with clear sclera. Mucus membranes pink and moist. Teeth intact. Thyroid smooth without nodules or goiter  Respiratory: Respirations symmetric and unlabored. Lungs clear to auscultation bilaterally, no wheezing, rhonchi, or crackles  Cardiovascular:  regular rate and rhythm. S1 & S2 present without murmurs, rubs, or gallops. Peripheral pulses 2+, capillary refill < 3 seconds. negative elevation of JVP. + 1+ peripheral edema  Gastrointestinal: Abdomen soft and round. Bowel sounds normoactive in all quadrants without tenderness or masses.   Musculoskeletal: Bilateral upper and lower extremity strength 5/5 with full ROM.  Neurological/Psych: Awake and orientated to person, place and time. Calm affect, appropriate mood. Pertinent labs, diagnostic, device, and imaging results reviewed as a part of this visit    LABS    CBC:   Lab Results   Component Value Date    WBC 5.3 07/10/2020    HGB 13.3 07/10/2020    HCT 39.6 07/10/2020    MCV 95.9 07/10/2020     07/10/2020     BMP:   Lab Results   Component Value Date    CREATININE 0.7 07/10/2020    BUN 22 (H) 07/10/2020     (L) 07/10/2020    K 4.3 07/10/2020    CL 98 (L) 07/10/2020    CO2 21 07/10/2020     CrCl cannot be calculated (Unknown ideal weight.). No results found for: BNP    Thyroid:   Lab Results   Component Value Date    TSH 2.37 2020    M9OZIFS 1.28 07/10/2020     Lipid Panel:   Lab Results   Component Value Date    CHOL 208 2020    HDL 66 2020    TRIG 92 2020     LFTs:  Lab Results   Component Value Date    ALT 27 07/10/2020    AST 21 07/10/2020    ALKPHOS 78 07/10/2020    BILITOT 0.3 07/10/2020     Coags:   Lab Results   Component Value Date    PROTIME 12.0 2020    INR 1.03 2020    APTT 48.3 (H) 2018     EC2020 SR HR 76, , QRS 88, QTc 378    STEPHANE: 3/27/20  Summary   No thrombus noted   Overall left ventricular function is normal.   The left atrial size is normal.   There is no evidence of mass or thrombus in the left atrium or appendage. No evidence of tricuspid regurgitation. Moderate tricuspid regurgitation. TV gradient 25 mmHg  Limited Echo: 3/28/20  Summary   Normal left ventricle size, wall thickness and systolic function with an   estimated ejection fraction of 55-60%. No evidence of pericardial effusion. Echo: 19  Summary   -Left ventricular cavity size is normal.   -There is mild left ventricular hypertrophy.   -Ejection fraction is visually estimated to be 55-60%.    -No wall motion abnormalities.   -Normal diastolic function.   -Trivial mitral regurgitation.   -Mild tricuspid regurgitation  GXT: 1/20/15  Summary    Normal myocardial perfusion study.    Normal LV function. Assessment:  Paroxysmal Atrial Fibrillation  - ECG today shows SR  - Did not tolerate amiodarone or Cardizem due to dizziness and bradycardia  - [id number] Vasc score and anticoagulation discussed. High risk for stroke and thromboembolism. Anticoagulation is recommended.   ~ On Xarelto, no s/s of bleeding  - Afib risk factors including age, HTN, obesity, inactivity and HUSSAIN were discussed with patient.  Risk factor modification recommended              - S/p RFA of afib w/ PVI (3/27/20, Dr. Tim Sampson)   - Discussed possible need for repeat ablation dur to intolerance of rate/rythm medicaitons  Dizziness   - SR on ECG   - Denies recurrence, and tolerating prn Cardizem  Chronic HFpEF/Pulmonary HTN   - Reports increasing SOB and BLE edema, lungs CTA   - On lasix 20 mg daily and aldactone              - Follows with Dr. Shonna Love  HTN-goal <130/80   - Controlled   - Continue current mediations    - Encouraged patient to check BP at home, log and bring to office visits  - Discussed lifestyle modifications, weight loss, low sodium diet  Hx of PE              - On AC  HUSSAIN   - Adherent to therapy and Wears device 6-8 hours per night   - Discussed long term effects of unmanaged HUSSAIN on heart   Plan  - Continue PRN Cardizem 30 mg for tachycardia/palpitations  - Check your heart rate at home, if it is <120 or >45 please call the office   - Increase lasix 40 mg daily  - BMP 1-2 weeks  - Follow up with HF NP 1-2 weeks    F/U: Follow-up with [facility] 6 months  -Follow up with device clinic as scheduled  -Call Priscila Daly at [phone] with any questions    Diet & Exercise:   The patient is counseled to follow a low salt diet to assure blood pressure remains controlled for cardiovascular risk factor modification   The patient is counseled to avoid excess caffeine, and energy drinks as this may exacerbated ectopy and arrhythmia   The patient is counseled to lose weight to control cardiovascular risk factors   Exercise program discussed: To improve overall cardiovascular health, the patient is instructed to increase cardiovascular related activities with a goal of 150 min/week of moderate level activity or 10,000 steps per day. Encouraged to perform as much activity as tolerated    Quality Metrics  1. Tobacco Cessation Counseling: Nonsmoker, N/A   2. Retake of BP if >140/90: N/A  3. Documentation to PCP: Note sent to PCP office visit  4. CAD patient on anti-platelet: N/A  5.   CAD patient on STATIN therapy: N/A  6. Patient with history of CHF and atrial fibrillation on anticoagulation: yes     I have addressed the patient's cardiac risk factors and adjusted pharmacologic treatment as needed. In addition, I have reinforced the need for patient directed risk factor modification. I independently reviewed the ECG    All questions and concerns were addressed with the patient. Alternatives to treatment were discussed. Thank you for allowing to us to participate in the care of Griselda Chapman.     Danielle Garcias, BRICE-CNP  [address]   Office: [phone]

## 2020-07-28 ENCOUNTER — PATIENT MESSAGE (OUTPATIENT)
Dept: CARDIOLOGY CLINIC | Age: 70
End: 2020-07-28

## 2020-07-28 NOTE — TELEPHONE ENCOUNTER
From: Melinda Acosta  To: Toni Snowden, APRN - CNP  Sent: 7/28/2020 12:25 PM EDT  Subject: Non-Urgent Medical Question    Debbi Cai,   When i was there to see you last week I did not ask about a \"loop\" recorder being installed in my chest, that I remember. Dr. Karo Oneal suggested this when she and I spoke. I don't know if this is a good idea or not. I can tell you that I get \"weird\" stuff in my chest from time to time. it goes away and i ignore it. Don't knwo what it is but it makes me feel heavy and sluggish and scared. What do you think?    Thanks, Lorin Arnold

## 2020-07-29 ENCOUNTER — TELEPHONE (OUTPATIENT)
Dept: CARDIOLOGY CLINIC | Age: 70
End: 2020-07-29

## 2020-07-29 NOTE — TELEPHONE ENCOUNTER
Pt states she started back in junctional  rythem this morning. Her HR is in the upper 50's. Please call to discuss.

## 2020-07-29 NOTE — LETTER
[facility]  [facility] Procedure Sheet    7/29/2020  Julio Vega  1950    EP Procedures     Pacemaker implant (single/dual)  EP Study    ICD implant (single/dual)  Atrial flutter ablation (STEPHANE Y/N)    Biv implant ICD  Cryoablation    Biv implant PPM  Atrial fibrillation ablation (STEPHANE Yes)    Generator Change (PPM/ICD/BiV)  SVT ablation    Lead revision (RV/LA/RA) (<1 month)  VT ablation      Lead extraction +/- upgrade (BiV/PPM/ICD)  VT Ischemic/ non-ischemic   XXX Loop implant  VT RVOT    Cardioversion  VT Left sided    STEPHANE  AVN ablation     Equipment     Medtronic   [place] Mapping System    [employer]  [address] [address] [employer]  CryoAblation    [employer]ron[employer]  Laser Lead Extraction    Special Equipment       EP Procedures Scheduling Request    # hours Requested     Specific Day    Anesthesia    CT surgery backup    Location [facility]     Pre-Procedure Labs / Imaging     PT/INR  Type & cross    CBC  Units PRBC    BMP/Mg  Units FFP    Venogram  CXR    Echo  Cardiac CTA for Pulmonary vein mapping     NP INITIALS: SR  Patient Instructions    Dx:PAF, Junctional rhythm    You will be contacted by scheduling in 7-10 business days to schedule your procedure

## 2020-07-29 NOTE — TELEPHONE ENCOUNTER
Called pt. She reports she went into JR with HR in the 50s this morning. Pt reports she felt very sluggish and tired this morning. She felt dizzy and lightheaded, particularly with standing. She reports her lowest HR was 47 BPM.     Instructed her to remain off cardizem unless HR >100. Will discuss event monitor vs ILR implant with Dr. Reji Ayoub. She is scheduled for follow up with [facility] tomorrow, will have labs drawn tomorrow. Pt VU with POC.      BRICE Tsai-CNP

## 2020-07-29 NOTE — PROGRESS NOTES
[address]   Congestive Heart Failure    PrimaryCare Doctor:  Dileep Sanchez MD    Chief Complaint:  edema    History of Present Illness:  Bertram Fuentes is a 81 y.o. female with PMH HTN, HUSSAIN on CPAP, multiple PE on Xarelto, AF, DCCV,ablation and HFpEF who presents today for CHF f/u. EPOV 7/23: may need repeat ablation, lasix increased to 40mg/day, but she is now alternating 20 and 40 due to severe leg cramps  Today she c/o intermittent dyspnea, continued edema and reports her wt has been  243-252 this week  she denies chest pain, palpitations, orthopnea, PND, exertional chest pressure/discomfort, fatigue, early saiety, syncope. We spent some time today discussing sodium and fluid guidelines for fluid management and diuretic dosing. ER Visit: No  Recent Hospitalization: No    Baseline Weight: 237-240  Wt Readings from Last 3 Encounters:   07/23/20 246 lb 12.8 oz (111.9 kg)   07/10/20 248 lb (112.5 kg)   05/04/20 248 lb (112.5 kg)          EF: 55-60%  Cardiac Imaging:  Procedure performed:  3-  · Electrophysiology study with left atrial recording and mapping   · 3-D electroanatomical mapping of the left atrium and  right atium.    · Electrophysiological study(EPS) and induction after IV drug infusion  · Transseptal puncture through an intact septum . · Intracardiac echocardiography. · Radiofrequency ablation of atrial fibrillation and pulmonary veins isolation   · Ablation of CTI depndent flutter as a separate mechanism  · Ultrasound for access     ECHO 8/20/2019   -Left ventricular cavity size is normal.   -There is mild left ventricular hypertrophy.   -Ejection fraction is visually estimated to be 55-60%.  -No wall motion abnormalities.   -Normal diastolic function.   -Trivial mitral regurgitation.   -Mild tricuspid regurgitation     Calcium CT Score 2/19/2019   Total Agatston calcium score of 51.     24 hour holter and it revealed SR/PVCs, no afib or arrhythmias.    [facility] 9/1/2016:  Pressures were as follows:  RA: 9 mmHg  RV:  38/12 mmHg  PA:  33/16, mean 24 mmHg  PCWP:  14 mmHg  Thermodilution CO: 7.45   Thermodilution CI:  3.42  Adiel CO:  9.72  Adiel CI:  4.46  PA Sat:  74%  PVR:  107 dynes      Impression:  1.  Minimal pulmonary hypertension with mean PA pressure 24  2.  Normal filling pressures  3.  Normal cardiac output  4.  No evidence of PAH at this time. Activity: at baseline  Can you walk 1-2 blocks or do a moderate amount of house/yard work? Yes      NYHA Class: II       Sodium Restrictions: 2g  Fluid Restrictions: 48-64 oz/day  Sodium and fluid restriction compliance: poor, educated today, drinks lots of fluids to prevent leg cramps    Pt Education: The patient has received education on the following topics: dietary sodium restriction, heart failure medications, the importance of physical activity, symptom management and weight monitoring     HUSSAIN Yes Treated: Yes  Referral:  No      Past Medical History:   has a past medical history of Allergic, Anesthesia complication, Anxiety, Arthritis, Arthritis of left hip, Arthritis of right hip, Asthma, At risk for falls, Atrial fibrillation with rapid ventricular response ([address] Atrial flutter ([address] Chronic maxillary sinusitis, CTEPH (chronic thromboembolic pulmonary hypertension) ([address] Depression, Disc disease, degenerative, lumbar or lumbosacral, Hepatic steatosis, History of total hip arthroplasty, Hypertension, Leg cramps, Migraines, basilar, Mild persistent asthma without complication, Obesity, Class III, BMI 40-49.9 (morbid obesity) ([address] HUSSAIN (obstructive sleep apnea), Osteoarthritis, hip, bilateral, Panic attacks, Pneumonia, Primary osteoarthritis of both knees, Primary osteoarthritis of left knee, Pulmonary emboli ([address] Pulmonary HTN ([address] Pure hypercholesterolemia, Restless leg syndrome, Rhinitis, chronic, Sleep apnea, Stress incontinence, Tear of left rotator cuff, Thyroid disease, and Wears glasses.   Surgical History:   has a past surgical history that includes Cataract removal (Bilateral); Finger surgery (1988); eye surgery (Right, 2010); back surgery (2004); Tonsillectomy (1972); Hysterectomy (1993); Colonoscopy; joint replacement (Left, 2/2/16); Hip Arthroplasty (Right, 4-19-16); and Atrial ablation surgery. Social History:   reports that she quit smoking about 6 years ago. She has a 2.50 pack-year smoking history. She has never used smokeless tobacco. She reports current alcohol use. She reports that she does not use drugs. Family History:   Family History   Problem Relation Age of Onset    Alcohol Abuse Sister     Arthritis Sister     Stroke Maternal Grandmother     Thyroid Disease Maternal Grandmother     Thyroid Disease Maternal Grandfather     Heart Disease Maternal Grandfather     Alcohol Abuse Maternal Grandfather     Substance Abuse Maternal Grandfather     Hypertension Mother     Thyroid Disease Mother     Anxiety Disorder Mother     Arthritis Mother     Cataracts Mother     Heart Disease Mother     Asthma Mother     Thyroid Disease Father     Heart Failure Father     Heart Disease Father     Arthritis Brother     High Cholesterol Brother     Heart Disease Maternal Uncle     Heart Disease Paternal Uncle     Cancer Paternal Uncle     Alcohol Abuse Paternal Grandfather     Substance Abuse Paternal Grandfather        HomeMedications:  Prior to Admission medications    Medication Sig Start Date End Date Taking?  Authorizing Provider   furosemide (LASIX) 40 MG tablet Take 1 tablet by mouth daily 7/23/20   Dohunter White APRN - CNP   Multiple Vitamins-Minerals (THERAPEUTIC MULTIVITAMIN-MINERALS) tablet Take 1 tablet by mouth daily    Historical Provider, MD   dilTIAZem (CARDIZEM) 30 MG tablet Take 1 tablet by mouth 4 times daily as needed (Fast heart beat >100 and palpitations) 5/4/20   DoBRICE Ernandez - CNP   spironolactone (ALDACTONE) 25 MG tablet Take 1 tablet by mouth daily 4/23/20   BRICE Casiano CNP   pramipexole (MIRAPEX) 0.5 MG tablet 1.5 tab q 5 PM and 1.5 tab qHS 3/31/20   Dante Mae MD   lisinopril (PRINIVIL;ZESTRIL) 20 MG tablet Take 0.5 tablets by mouth nightly  Patient taking differently: Take 20 mg by mouth nightly  3/29/20   BRICE Casiano CNP   fluticasone (FLOVENT HFA) 110 MCG/ACT inhaler Inhale 1 puff into the lungs 2 times daily    Historical Provider, MD   CPAP Machine MISC by Does not apply route    Historical Provider, MD   tiotropium (SPIRIVA) 18 MCG inhalation capsule Inhale 18 mcg into the lungs daily X 10 days    Historical Provider, MD   rivaroxaban (XARELTO) 20 MG TABS tablet Take 1 tablet by mouth Daily with supper 8/20/19   Sarbjit Llanes MD   azelastine (ASTELIN) 0.1 % nasal spray 1 spray by Nasal route 2 times daily 1 Spray in each nostril 4/26/19   Dileep Sanchez MD   Magnesium Citrate 100 MG TABS Take 1 tablet by mouth daily 6/28/18   Sarbjit Llanes MD   EPIPEN 2-MIR 0.3 MG/0.3ML SOAJ injection  9/29/16   Historical Provider, MD        Allergies:  Atorvastatin; Simvastatin; Cephalexin; Cephalosporins; Food; Other; Shellfish-derived products; and Sulfa antibiotics     ROS:   Review of Systems   Constitutional: Negative. Respiratory: Positive for shortness of breath. Cardiovascular: Positive for leg swelling. Gastrointestinal: Negative. Genitourinary: Negative. Musculoskeletal: Positive for myalgias. Skin: Negative. Neurological: Negative. Hematological: Negative. Psychiatric/Behavioral: Negative. Physical Examination:    Vitals:    07/30/20 0947   BP: 120/62   Site: Left Upper Arm   Position: Sitting   Cuff Size: Large Adult   Pulse: 81   SpO2: 97%   Weight: 246 lb (111.6 kg)   Height: 5' 4\" (1.626 m)           Physical Exam  Constitutional:       Appearance: Normal appearance. Cardiovascular:      Rate and Rhythm: Normal rate and regular rhythm. Pulses: Normal pulses.       Heart sounds: Normal heart sounds. Pulmonary:      Effort: Pulmonary effort is normal.      Breath sounds: Normal breath sounds. Musculoskeletal:      Right lower leg: Edema present. Left lower leg: Edema present. Comments: Trace edema   Skin:     General: Skin is warm and dry. Neurological:      Mental Status: She is alert. Psychiatric:         Mood and Affect: Mood normal.         Behavior: Behavior normal.         Thought Content:  Thought content normal.         Judgment: Judgment normal.         Lab Data:    CBC:   Lab Results   Component Value Date    WBC 5.3 07/10/2020    WBC 5.6 05/04/2020    WBC 10.5 03/28/2020    RBC 4.12 07/10/2020    RBC 4.33 05/04/2020    RBC 4.03 03/28/2020    HGB 13.3 07/10/2020    HGB 13.7 05/04/2020    HGB 13.0 03/28/2020    HCT 39.6 07/10/2020    HCT 41.6 05/04/2020    HCT 38.7 03/28/2020    MCV 95.9 07/10/2020    MCV 96.1 05/04/2020    MCV 96.1 03/28/2020    RDW 14.2 07/10/2020    RDW 14.1 05/04/2020    RDW 14.6 03/28/2020     07/10/2020     05/04/2020     03/28/2020     BMP:  Lab Results   Component Value Date     07/10/2020     06/10/2020     05/04/2020    K 4.3 07/10/2020    K 4.0 06/10/2020    K 4.6 05/04/2020    K 3.7 02/02/2020    K 4.0 01/13/2020    K 3.5 11/19/2019    CL 98 07/10/2020    CL 97 06/10/2020    CL 99 05/04/2020    CO2 21 07/10/2020    CO2 20 06/10/2020    CO2 25 05/04/2020    PHOS 2.7 04/14/2017    PHOS 3.6 03/02/2017    PHOS 4.4 03/27/2012    BUN 22 07/10/2020    BUN 17 06/10/2020    BUN 19 05/04/2020    CREATININE 0.7 07/10/2020    CREATININE 0.8 06/10/2020    CREATININE 0.6 05/04/2020     BNP:   Lab Results   Component Value Date    PROBNP 85 07/10/2020    PROBNP 109 06/10/2020    PROBNP 66 05/04/2020     Iron Studies:  No components found for: FE,  TIBC,  FERRITIN  Iron Deficiency Anemia:  No IV Iron Therapy:  No  2017 ACC/AHA HF Guidelines:   intravenous iron replacement in patients with New York Heart Association (NYHA) class II and III HF and iron deficiency (ferritin <100 ng/ml or 100-300 ng/ml if transferrin saturation <20%), to improve functional status and QoL. Assessment/Plan:    1. Chronic diastolic heart failure (HCC) - continue lasix and arturo, try bid dosing at smaller mg to see if leg cramps less, check labs today   2. Benign essential HTN - controlled on ACE and cardizem   3. Paroxysmal atrial fibrillation (HCC) - s/p ablation, pt to have ILR implant when insurance clears. contitue cardizem and AC     *more than 50 percent of the face-to-face portion of the office visit was spent counseling and coordinating care including:   Diagnostic results, impressions, or recommended diagnostic studies  Prognosis  Risks and benefits of management (treatment) options  Risk factor reduction  Patient and family education. Instructions:   1. Medications: continue current medications, try alternating dosages of lasix with BID dosing  2. Labs:today  3. Lifestyle Recommendations: Weigh yourself every day in the morning after urination, call [employer] if wt increases 2-3lb in one day or 5lb in one week, Limit sodium to 3000mg/day and fluids to 2L or 64oz/day. 4. Follow up: as scheduled        [place] CHF Resource Line: [phone]  Heart Failure Websites:   www.heart. org  Www.cardiosmart. org    Websites with Low Sodium Recipes:   www.mayoclinic.org/healthy-lifestyle/recipes/low-sodium-recipes  www.[facility] [facility]/recipes    Smartphone fritz's that can help you keep track of symptoms, weights, and medications:  HF Storylines  HF Path  My HF  CareZone  Point of Care Heart Failure  WOWME  H2O Overload    Nutrition Fritz to track calories, carbohydrates and sodium content of food:   CalorieKing  MyFitnessPal    I appreciate the opportunity of cooperating in the care of this individual.    Charley Khalil CNP, 7/29/2020,1:27 PM    QUALITY MEASURES  1. Tobacco Cessation Counseling: NA  2. Retake of BP if >140/90:   NA  3.  Documentation to PCP/referring for new patient:  Sent to PCP at close of office visit  4. CAD patient on anti-platelet: NA  5. CAD patient on STATIN therapy:  NA  6. Patient with CHF and aFib on anticoagulation:  Yes   7. Patient Education:Yes   8. BB for LVSD :  NA   9. ACE/ARB for LVSD:  NA   10.  Left Ventricular Ejection Fraction (LVEF) Assessment:  Yes

## 2020-07-29 NOTE — TELEPHONE ENCOUNTER
Discussed with Dr. Andrew Elaine. Diagnostic options including event monitor, loop recorder implant and/or EP study were discussed with patient. Risks, benefits and alternative of each treatment option were explained. All questions answered. She opted for implantation of loop recorder for long-term monitoring.     Will forward to EP scheduling    Bettye Martinez, APRN-CNP

## 2020-07-30 ENCOUNTER — OFFICE VISIT (OUTPATIENT)
Dept: CARDIOLOGY CLINIC | Age: 70
End: 2020-07-30
Payer: MEDICARE

## 2020-07-30 VITALS
HEIGHT: 64 IN | BODY MASS INDEX: 42 KG/M2 | OXYGEN SATURATION: 97 % | DIASTOLIC BLOOD PRESSURE: 62 MMHG | SYSTOLIC BLOOD PRESSURE: 120 MMHG | HEART RATE: 81 BPM | WEIGHT: 246 LBS

## 2020-07-30 PROCEDURE — G8399 PT W/DXA RESULTS DOCUMENT: HCPCS | Performed by: NURSE PRACTITIONER

## 2020-07-30 PROCEDURE — 1090F PRES/ABSN URINE INCON ASSESS: CPT | Performed by: NURSE PRACTITIONER

## 2020-07-30 PROCEDURE — 4040F PNEUMOC VAC/ADMIN/RCVD: CPT | Performed by: NURSE PRACTITIONER

## 2020-07-30 PROCEDURE — G8427 DOCREV CUR MEDS BY ELIG CLIN: HCPCS | Performed by: NURSE PRACTITIONER

## 2020-07-30 PROCEDURE — 1123F ACP DISCUSS/DSCN MKR DOCD: CPT | Performed by: NURSE PRACTITIONER

## 2020-07-30 PROCEDURE — 1036F TOBACCO NON-USER: CPT | Performed by: NURSE PRACTITIONER

## 2020-07-30 PROCEDURE — G8417 CALC BMI ABV UP PARAM F/U: HCPCS | Performed by: NURSE PRACTITIONER

## 2020-07-30 PROCEDURE — 3017F COLORECTAL CA SCREEN DOC REV: CPT | Performed by: NURSE PRACTITIONER

## 2020-07-30 PROCEDURE — 99214 OFFICE O/P EST MOD 30 MIN: CPT | Performed by: NURSE PRACTITIONER

## 2020-07-30 ASSESSMENT — ENCOUNTER SYMPTOMS
GASTROINTESTINAL NEGATIVE: 1
SHORTNESS OF BREATH: 1

## 2020-07-30 NOTE — PATIENT INSTRUCTIONS
Instructions:   1. Medications: continue current medications, try alternating dosages of lasix  2. Labs: 2 weeks  3. Lifestyle Recommendations: Weigh yourself every day in the morning after urination, call Silvestre Cordero if wt increases 2-3lb in one day or 5lb in one week, Limit sodium to 3000mg/day and fluids to 2L or 64oz/day. 4. Follow up: as scheduled        [place] CHF Resource Line: [phone]  Heart Failure Websites:   www.heart. org  Www.cardiosmart. org    Websites with Low Sodium Recipes:   www.mayoclinic.org/healthy-lifestyle/recipes/low-sodium-recipes  www.[employer]/recipes    Smartphone fritz's that can help you keep track of symptoms, weights, and medications:  HF Storylines  HF Path  My HF  CareZone  Point of Care Heart Failure  WOWME  H2O Overload    Nutrition Fritz to track calories, carbohydrates and sodium content of food:   CalorieKing  MyRichard

## 2020-08-10 ENCOUNTER — TELEPHONE (OUTPATIENT)
Dept: CARDIOLOGY CLINIC | Age: 70
End: 2020-08-10

## 2020-08-10 NOTE — TELEPHONE ENCOUNTER
Pt calling wants to know if we have any samples of Xarelto 20 mg? If not she needs it called in for a refill along with the the other Rx listed.  Pls call to advise Thank you        Medication Refill    Medication needing refilled:lisinopril (PRINIVIL;ZESTRIL) 20 MG tablet, rivaroxaban (XARELTO) 20 MG TABS tablet    Doseage of the medication:    How are you taking this medication (QD, BID, TID, QID, PRN):    30 or 90 day supply called in:90    Which Pharmacy are we sending the medication to?: [address], [address] [facility]

## 2020-08-17 ENCOUNTER — TELEPHONE (OUTPATIENT)
Dept: CARDIOLOGY CLINIC | Age: 70
End: 2020-08-17

## 2020-08-17 ENCOUNTER — HOSPITAL ENCOUNTER (OUTPATIENT)
Dept: CARDIAC CATH/INVASIVE PROCEDURES | Age: 70
Discharge: HOME | End: 2020-08-17
Attending: INTERNAL MEDICINE | Admitting: INTERNAL MEDICINE
Payer: MEDICARE

## 2020-08-17 VITALS
BODY MASS INDEX: 42.23 KG/M2 | TEMPERATURE: 97.8 F | DIASTOLIC BLOOD PRESSURE: 75 MMHG | RESPIRATION RATE: 19 BRPM | HEIGHT: 64 IN | SYSTOLIC BLOOD PRESSURE: 150 MMHG | HEART RATE: 75 BPM

## 2020-08-17 PROCEDURE — C1764 EVENT RECORDER, CARDIAC: HCPCS

## 2020-08-17 PROCEDURE — 33285 INSJ SUBQ CAR RHYTHM MNTR: CPT | Performed by: INTERNAL MEDICINE

## 2020-08-17 PROCEDURE — 33285 INSJ SUBQ CAR RHYTHM MNTR: CPT

## 2020-08-17 NOTE — PROCEDURES
[address]     Electrophysiology Procedure Note       Date of Procedure: 8/17/2020  Patient's Name: Neto Alonzo  YOB: 1950   Medical Record Number: [id number]  Procedure Performed by: Humberto Guillaume MD    Procedures performed:    · Loop recorder implantation      Indication of the procedure:  , Palpitation   Neto Alonzo is a 81 y.o. female with   palpitations , Atrial fibrillation     Details of procedure:   Procedure's risks, benefits and alternatives of procedure were explained to patient. All questions were answered. Patient understood and informed consent was obtained. The patient was brought to the electrophysiology lab in a fasting nonsedated state. Patient was prepped and draped in usual sterile fashion. After injection of 2% lidocaine in the left 4th intercostal area, a 5 mm small incision was made. Using ILR insertion device, a small subcutaneous tunnel was created and the loop recorder was inserted under skin. The skin was covered with Steri-Strips. Blood loss<5 cc  No complications. Device information:   The device is Reveal LINQ SN# [id number] Med[employer] Implant date: 8/17/2020  R wave 0.37mv  The device was programmed to detect:   VT: 380 ms 16 beats   Bradycardia: 2000 ms     Plan:   The patient will have usual post-implant care. Patient can be discharged home if remains stable with follow up in arrhythmia clinic.

## 2020-08-17 NOTE — H&P
[facility]   Electrophysiology  Reason for follow up: Atrial fibrillation      HPI and Interval History:   Patient seen and examined. Clinical notes reviewed. Telemetry reviewed. No new complaint today. No major events overnight. Denies having chest pain, shortness of breath, dyspnea on exertion, Orthopnea, PND at the time of this visit. Review of System:  All other systems reviewed and are negative except for that noted above. Pertinent negatives are:     · General: negative for fever, chills   · Ophthalmic ROS: negative for - eye pain or loss of vision  · ENT ROS: negative for - headaches, sore throat   · Respiratory: negative for - cough, sputum  · Cardiovascular: Reviewed in HPI  · Gastrointestinal: negative for - abdominal pain, diarrhea, N/V  · Hematology: negative for - bleeding, blood clots, bruising or jaundice  · Genito-Urinary:  negative for - Dysuria or incontinence  · Musculoskeletal: negative for - Joint swelling, muscle pain  · Neurological: negative for - confusion, dizziness, headaches   · Psychiatric: No anxiety, no depression. · Dermatological: negative for - rash      Physical Examination:  Vitals:    20 0521   BP: 130/69   Pulse: 72   Resp: 18   Temp: 97.4 °F (36.3 °C)   SpO2: 98%      No intake/output data recorded. Wt Readings from Last 3 Encounters:   20 239 lb 9.6 oz (108.7 kg)   20 247 lb (112 kg)   20 249 lb (112.9 kg)     Temp  Av.3 °F (36.3 °C)  Min: 97 °F (36.1 °C)  Max: 97.8 °F (36.6 °C)  Pulse  Av.8  Min: 68  Max: 130  BP  Min: 96/57  Max: 203/109  SpO2  Av.8 %  Min: 97 %  Max: 98 %  No intake or output data in the 24 hours ending 20 0718    · Telemetry: Sinus rhythm   · Constitutional: Oriented. No distress. · Head: Normocephalic and atraumatic. · Mouth/Throat: Oropharynx is clear and moist.   · Eyes: Conjunctivae normal. EOM are normal.   · Neck: Neck supple. No rigidity. No JVD present.     · Cardiovascular: Normal rate, regular rhythm, S1&S2. · Pulmonary/Chest: Bilateral respiratory sounds. No wheezes, No rhonchi. · Abdominal: Soft. Bowel sounds present. No distension, No tenderness. · Musculoskeletal: No tenderness. No edema    · Lymphadenopathy: Has no cervical adenopathy. · Neurological: Alert and oriented. Cranial nerve appears intact, No Gross deficit   · Skin: Skin is warm and dry. No rash noted. · Psychiatric: Has a normal behavior     Labs, diagnostic and imaging results reviewed. Reviewed. Recent Labs     02/02/20  0501      K 3.7      CO2 21   BUN 20   CREATININE 0.5*     Recent Labs     02/02/20  0424   WBC 7.1   HGB 14.1   HCT 42.1   MCV 96.7        Lab Results   Component Value Date    CKTOTAL 145 09/23/2013    TROPONINI <0.01 02/02/2020     CrCl cannot be calculated (Unknown ideal weight.). No results found for: BNP  Lab Results   Component Value Date    PROTIME 21.5 06/27/2018    PROTIME 20.0 05/16/2018    PROTIME 10.7 04/25/2018    INR 1.89 06/27/2018    INR 1.77 05/16/2018    INR 0.95 04/25/2018     Lab Results   Component Value Date    CHOL 175 08/01/2019    HDL 55 08/01/2019    TRIG 153 08/01/2019       Scheduled Meds:   furosemide  20 mg Oral Daily    lisinopril  10 mg Oral Nightly    potassium chloride  10 mEq Oral Daily    rivaroxaban  20 mg Oral Dinner    sodium chloride flush  10 mL Intravenous 2 times per day    amiodarone  200 mg Oral TID    hydrochlorothiazide  25 mg Oral Nightly    diltiazem  180 mg Oral Dinner    tiotropium  2 puff Inhalation Daily    budesonide  1 mg Nebulization BID    pramipexole  0.75 mg Oral Dinner    pramipexole  0.75 mg Oral Nightly    azelastine  1 spray Each Nostril BID     Continuous Infusions:  PRN Meds:sodium chloride flush, ondansetron, senna, polyethylene glycol     Patient Active Problem List    Diagnosis Date Noted    A-fib ([facility] 02/02/2020    Obesity (BMI 30-39. 9)     [facility] 01/12/2020  Other pulmonary embolism without acute cor pulmonale (HCC) 08/22/2019    Hx of pulmonary embolus 05/14/2019    Chronic diastolic heart failure ([facility] 05/14/2019    Hepatic steatosis 04/26/2019    Pure hypercholesterolemia 02/13/2019    Paroxysmal atrial fibrillation (HCC) 11/09/2018    Primary osteoarthritis of both knees 09/19/2017    SOB (shortness of breath) 09/29/2016    Non morbid obesity, unspecified obesity type 04/19/2016    Obesity, Class III, BMI 40-49.9 (morbid obesity) ([facility] 02/02/2016    Benign essential HTN 10/14/2013    HUSSAIN (obstructive sleep apnea) 10/14/2013    Restless leg syndrome 07/11/2011    Acquired hypothyroidism 07/11/2011      Active Hospital Problems    Diagnosis Date Noted    A-fib [facility]) [I48.91] 02/02/2020    Obesity (BMI 30-39. 9) [E66.9]        Assessment:       Plan:     - paroxysmal atrial fibrillation                 Implantable Loop recorder          - HTN              BP has been ok mostly              Will adjust meds     - PE              On xarelto     - HUSSAIN              CPAP

## 2020-08-17 NOTE — TELEPHONE ENCOUNTER
Please see message from 8/10. Lex Marcial was in the office today. I checked for samples up here and there were none. She only has enough xarelto left for today and tomorrow. .  Thanks

## 2020-08-20 PROBLEM — Z45.09 ENCOUNTER FOR ELECTRONIC ANALYSIS OF REVEAL EVENT RECORDER: Status: ACTIVE | Noted: 2020-08-20

## 2020-08-24 ENCOUNTER — TELEPHONE (OUTPATIENT)
Dept: CARDIOLOGY CLINIC | Age: 70
End: 2020-08-24

## 2020-08-24 ENCOUNTER — HOSPITAL ENCOUNTER (OUTPATIENT)
Age: 70
Discharge: HOME | End: 2020-08-24
Payer: MEDICARE

## 2020-08-24 ENCOUNTER — NURSE ONLY (OUTPATIENT)
Dept: CARDIOLOGY CLINIC | Age: 70
End: 2020-08-24
Payer: MEDICARE

## 2020-08-24 LAB
ANION GAP SERPL CALCULATED.3IONS-SCNC: 14 MMOL/L (ref 3–16)
BUN BLDV-MCNC: 18 MG/DL (ref 7–20)
CALCIUM SERPL-MCNC: 9.8 MG/DL (ref 8.3–10.6)
CHLORIDE BLD-SCNC: 102 MMOL/L (ref 99–110)
CO2: 21 MMOL/L (ref 21–32)
CREAT SERPL-MCNC: 0.7 MG/DL (ref 0.6–1.2)
GFR AFRICAN AMERICAN: >60
GFR NON-AFRICAN AMERICAN: >60
GLUCOSE BLD-MCNC: 107 MG/DL (ref 70–99)
POTASSIUM SERPL-SCNC: 4.3 MMOL/L (ref 3.5–5.1)
SODIUM BLD-SCNC: 137 MMOL/L (ref 136–145)

## 2020-08-24 PROCEDURE — 93291 INTERROG DEV EVAL SCRMS IP: CPT | Performed by: INTERNAL MEDICINE

## 2020-08-24 PROCEDURE — 36415 COLL VENOUS BLD VENIPUNCTURE: CPT

## 2020-08-24 PROCEDURE — 80048 BASIC METABOLIC PNL TOTAL CA: CPT

## 2020-08-24 NOTE — TELEPHONE ENCOUNTER
Spoke to patient about xarelto samples. She will try her PCP office and we will put her on a list to call back when we get samples she pays $300 out of pocket for a 30 day supply.

## 2020-08-24 NOTE — PROGRESS NOTES
Patient comes in for interrogation of their implanted loop recorder. Interrogation shows NSR. Observation stated to reenter all patient information shown as ???, information entered. All parameters turned on. Pt remains on Xarelto. Patients incision is healing nicely. We will follow the patient remotely.

## 2020-08-24 NOTE — TELEPHONE ENCOUNTER
Sample requested: rivaroxaban (XARELTO)       Strength: 20 MG TABS tablet      Dosage:  Take 1 tablet by mouth Daily with supper     Patient's call back number: [phone]    [facility]

## 2020-08-25 ENCOUNTER — TELEPHONE (OUTPATIENT)
Dept: CARDIOLOGY CLINIC | Age: 70
End: 2020-08-25

## 2020-08-25 NOTE — TELEPHONE ENCOUNTER
Please call patient and let her know that her blood work was normal.      Danielle Garcias, BRICE-CNP

## 2020-08-25 NOTE — TELEPHONE ENCOUNTER
----- Message from BRICE Olvera CNP sent at 8/25/2020  8:57 AM EDT -----  Labs look good, no change in meds.  Brenda Holland

## 2020-09-01 ENCOUNTER — TELEPHONE (OUTPATIENT)
Dept: CARDIOLOGY CLINIC | Age: 70
End: 2020-09-01

## 2020-09-01 NOTE — TELEPHONE ENCOUNTER
Spoke to the pt-advised to send a transmission, bur she said she didn't know how. Will ask anca EP clfi, to help the pt with this.

## 2020-09-01 NOTE — TELEPHONE ENCOUNTER
Her interrogation was normal no episodes.   She is short of breath and having decrease in O2 sats may need to be evaluated by heart failure exasperation

## 2020-09-01 NOTE — TELEPHONE ENCOUNTER
For most of August she has been having increased SOB and her oxygen has been in the low 90's instead of high 90's. When she gets SOB she gets a little lightheaded and feels her heart pounding . What should she do ?

## 2020-09-04 ASSESSMENT — ENCOUNTER SYMPTOMS
SHORTNESS OF BREATH: 1
GASTROINTESTINAL NEGATIVE: 1

## 2020-09-04 NOTE — PROGRESS NOTES
[facility]   Congestive Heart Failure    PrimaryCare Doctor:  Becki Walsh MD    Chief Complaint:  edema    History of Present Illness:  Addison Schaefer is a 81 y.o. female with PMH HTN, HUSSAIN on CPAP, multiple PE on Xarelto, AF, DCCV,ablation and HFpEF who presents today for CHF f/u. Today she c/o increased LUGO over the past month, increased edema and wt. For the past 3 days she took 40/day with some improvement in sx. She denies chest pain, palpitations, orthopnea, PND, exertional chest pressure/discomfort, fatigue, early saiety, syncope. Has been taking kcl 10/day    ER Visit: No  Recent Hospitalization: No    Baseline Weight: 237-240  Wt Readings from Last 3 Encounters:   07/30/20 246 lb (111.6 kg)   07/23/20 246 lb 12.8 oz (111.9 kg)   07/10/20 248 lb (112.5 kg)          EF: 55-60%  Cardiac Imaging:  Procedure performed:  3-  · Electrophysiology study with left atrial recording and mapping   · 3-D electroanatomical mapping of the left atrium and  right atium.    · Electrophysiological study(EPS) and induction after IV drug infusion  · Transseptal puncture through an intact septum . · Intracardiac echocardiography. · Radiofrequency ablation of atrial fibrillation and pulmonary veins isolation   · Ablation of CTI depndent flutter as a separate mechanism  · Ultrasound for access     ECHO 8/20/2019   -Left ventricular cavity size is normal.   -There is mild left ventricular hypertrophy.   -Ejection fraction is visually estimated to be 55-60%.  -No wall motion abnormalities.   -Normal diastolic function.   -Trivial mitral regurgitation.   -Mild tricuspid regurgitation     Calcium CT Score 2/19/2019   Total Agatston calcium score of 51.     24 hour holter and it revealed SR/PVCs, no afib or arrhythmias.    [facility] 9/1/2016:  Pressures were as follows:  RA: 9 mmHg  RV:  38/12 mmHg  PA:  33/16, mean 24 mmHg  PCWP:  14 mmHg  Thermodilution CO: 7.45   Thermodilution CI:  3.42  Adiel CO:  9.72  Adiel CI:  4.46  PA Sat:  74%  PVR:  107 dynes      Impression:  1.  Minimal pulmonary hypertension with mean PA pressure 24  2.  Normal filling pressures  3.  Normal cardiac output  4.  No evidence of PAH at this time. Activity: at baseline  Can you walk 1-2 blocks or do a moderate amount of house/yard work? Yes      NYHA Class: II       Sodium Restrictions: 2g  Fluid Restrictions: 48-64 oz/day  Sodium and fluid restriction compliance: poor, educated today, drinks lots of fluids to prevent leg cramps    Pt Education: The patient has received education on the following topics: dietary sodium restriction, heart failure medications, the importance of physical activity, symptom management and weight monitoring     HUSSAIN Yes Treated: Yes  Referral:  No      Past Medical History:   has a past medical history of Allergic, Anesthesia complication, Anxiety, Arthritis, Arthritis of left hip, Arthritis of right hip, Asthma, At risk for falls, Atrial fibrillation with rapid ventricular response ([address] Atrial flutter ([address] Chronic maxillary sinusitis, CTEPH (chronic thromboembolic pulmonary hypertension) ([address] Depression, Disc disease, degenerative, lumbar or lumbosacral, Hepatic steatosis, History of total hip arthroplasty, Hypertension, Leg cramps, Migraines, basilar, Mild persistent asthma without complication, Obesity, Class III, BMI 40-49.9 (morbid obesity) ([address] HUSSAIN (obstructive sleep apnea), Osteoarthritis, hip, bilateral, Panic attacks, Pneumonia, Primary osteoarthritis of both knees, Primary osteoarthritis of left knee, Pulmonary emboli ([address] Pulmonary HTN ([address] Pure hypercholesterolemia, Restless leg syndrome, Rhinitis, chronic, Sleep apnea, Stress incontinence, Tear of left rotator cuff, Thyroid disease, and Wears glasses. Surgical History:   has a past surgical history that includes Cataract removal (Bilateral); Finger surgery (1988); eye surgery (Right, 2010); back surgery (2004);  Tonsillectomy (1972); Hysterectomy (1993); Colonoscopy; joint replacement (Left, 2/2/16); Hip Arthroplasty (Right, 4-19-16); and Atrial ablation surgery. Social History:   reports that she quit smoking about 6 years ago. She has a 2.50 pack-year smoking history. She has never used smokeless tobacco. She reports current alcohol use. She reports that she does not use drugs. Family History:   Family History   Problem Relation Age of Onset    Alcohol Abuse Sister     Arthritis Sister     Stroke Maternal Grandmother     Thyroid Disease Maternal Grandmother     Thyroid Disease Maternal Grandfather     Heart Disease Maternal Grandfather     Alcohol Abuse Maternal Grandfather     Substance Abuse Maternal Grandfather     Hypertension Mother     Thyroid Disease Mother     Anxiety Disorder Mother     Arthritis Mother     Cataracts Mother     Heart Disease Mother     Asthma Mother     Thyroid Disease Father     Heart Failure Father     Heart Disease Father     Arthritis Brother     High Cholesterol Brother     Heart Disease Maternal Uncle     Heart Disease Paternal Uncle     Cancer Paternal Uncle     Alcohol Abuse Paternal Grandfather     Substance Abuse Paternal Grandfather        HomeMedications:  Prior to Admission medications    Medication Sig Start Date End Date Taking?  Authorizing Provider   rivaroxaban (XARELTO) 20 MG TABS tablet Take 1 tablet by mouth Daily with supper 8/17/20   BRICE Georges CNP   Handicap Placard MISC by Does not apply route Exp 5 years 7/30/20   BRICE Singh CNP   furosemide (LASIX) 40 MG tablet Take 1 tablet by mouth daily  Patient taking differently: Take 40 mg by mouth every other day  7/23/20   BRICE Huitron CNP   Multiple Vitamins-Minerals (THERAPEUTIC MULTIVITAMIN-MINERALS) tablet Take 1 tablet by mouth daily    Historical Provider, MD   dilTIAZem (CARDIZEM) 30 MG tablet Take 1 tablet by mouth 4 times daily as needed (Fast heart beat >100 and palpitations) 5/4/20   BRICE Farias CNP   spironolactone (ALDACTONE) 25 MG tablet Take 1 tablet by mouth daily 4/23/20   BRICE Farias CNP   pramipexole (MIRAPEX) 0.5 MG tablet 1.5 tab q 5 PM and 1.5 tab qHS 3/31/20   Maria Alejandra Singh MD   lisinopril (PRINIVIL;ZESTRIL) 20 MG tablet Take 0.5 tablets by mouth nightly  Patient taking differently: Take 20 mg by mouth nightly  3/29/20   BRICE Farias CNP   fluticasone (FLOVENT HFA) 110 MCG/ACT inhaler Inhale 1 puff into the lungs 2 times daily    Historical Provider, MD   CPAP Machine MISC by Does not apply route    Historical Provider, MD   tiotropium (SPIRIVA) 18 MCG inhalation capsule Inhale 18 mcg into the lungs daily X 10 days    Historical Provider, MD   azelastine (ASTELIN) 0.1 % nasal spray 1 spray by Nasal route 2 times daily 1 Spray in each nostril 4/26/19   Barnie Libman, MD   Magnesium Citrate 100 MG TABS Take 1 tablet by mouth daily 6/28/18   Yaneth Roe MD   EPIPEN 2-MIR 0.3 MG/0.3ML SOAJ injection  9/29/16   Historical Provider, MD        Allergies:  Atorvastatin; Simvastatin; Cephalexin; Cephalosporins; Food; Other; Shellfish-derived products; and Sulfa antibiotics     ROS:   Review of Systems   Constitutional: Negative. Respiratory: Positive for shortness of breath. Cardiovascular: Positive for leg swelling. Gastrointestinal: Negative. Genitourinary: Negative. Musculoskeletal: Positive for myalgias. Skin: Negative. Neurological: Negative. Hematological: Negative. Psychiatric/Behavioral: Negative. Physical Examination:    Vitals:    09/08/20 1302   BP: 110/68   Site: Left Upper Arm   Position: Sitting   Cuff Size: Large Adult   Pulse: 93   SpO2: 98%   Weight: 244 lb (110.7 kg)   Height: 5' 4\" (1.626 m)           Physical Exam  Constitutional:       Appearance: Normal appearance. Cardiovascular:      Rate and Rhythm: Normal rate and regular rhythm. Pulses: Normal pulses.       Heart sounds: Normal heart sounds. Pulmonary:      Effort: Pulmonary effort is normal.      Breath sounds: Normal breath sounds. Musculoskeletal:      Right lower leg: Edema present. Left lower leg: Edema present. Comments: Trace edema   Skin:     General: Skin is warm and dry. Neurological:      Mental Status: She is alert. Psychiatric:         Mood and Affect: Mood normal.         Behavior: Behavior normal.         Thought Content:  Thought content normal.         Judgment: Judgment normal.         Lab Data:    CBC:   Lab Results   Component Value Date    WBC 5.3 07/10/2020    WBC 5.6 05/04/2020    WBC 10.5 03/28/2020    RBC 4.12 07/10/2020    RBC 4.33 05/04/2020    RBC 4.03 03/28/2020    HGB 13.3 07/10/2020    HGB 13.7 05/04/2020    HGB 13.0 03/28/2020    HCT 39.6 07/10/2020    HCT 41.6 05/04/2020    HCT 38.7 03/28/2020    MCV 95.9 07/10/2020    MCV 96.1 05/04/2020    MCV 96.1 03/28/2020    RDW 14.2 07/10/2020    RDW 14.1 05/04/2020    RDW 14.6 03/28/2020     07/10/2020     05/04/2020     03/28/2020     BMP:  Lab Results   Component Value Date     08/24/2020     07/10/2020     06/10/2020    K 4.3 08/24/2020    K 4.3 07/10/2020    K 4.0 06/10/2020    K 3.7 02/02/2020    K 4.0 01/13/2020    K 3.5 11/19/2019     08/24/2020    CL 98 07/10/2020    CL 97 06/10/2020    CO2 21 08/24/2020    CO2 21 07/10/2020    CO2 20 06/10/2020    PHOS 2.7 04/14/2017    PHOS 3.6 03/02/2017    PHOS 4.4 03/27/2012    BUN 18 08/24/2020    BUN 22 07/10/2020    BUN 17 06/10/2020    CREATININE 0.7 08/24/2020    CREATININE 0.7 07/10/2020    CREATININE 0.8 06/10/2020     BNP:   Lab Results   Component Value Date    PROBNP 85 07/10/2020    PROBNP 109 06/10/2020    PROBNP 66 05/04/2020     Iron Studies:  No components found for: FE,  TIBC,  FERRITIN  Iron Deficiency Anemia:  No    IV Iron Therapy:  No  2017 ACC/AHA HF Guidelines:   intravenous iron replacement in patients with New York Heart Association (NYHA) class II and III HF and iron deficiency (ferritin <100 ng/ml or 100-300 ng/ml if transferrin saturation <20%), to improve functional status and QoL. Assessment/Plan:    1. Chronic diastolic heart failure ([address] - continue higher dose of lasix and kcl, labs in one week   2. Benign essential HTN - controlled on ACE - may try lower dose           Instructions:   1. Medications: take lasix 40/day, decrease lisinopril to 10/day, keep taking potassium 10 once a day until I call you about labs  2. Labs:one week  3. Lifestyle Recommendations: Weigh yourself every day in the morning after urination, call Nicole De Leon if wt increases 2-3lb in one day or 5lb in one week, Limit sodium to 3000mg/day and fluids to 2L or 64oz/day. 4. Follow up: as scheduled      Rod: [phone]      I appreciate the opportunity of cooperating in the care of this individual.    Karthikeyan White CNP, 9/4/2020,12:40 PM    QUALITY MEASURES  1. Tobacco Cessation Counseling: NA  2. Retake of BP if >140/90:   NA  3. Documentation to PCP/referring for new patient:  Sent to PCP at close of office visit  4. CAD patient on anti-platelet: NA  5. CAD patient on STATIN therapy:  NA  6. Patient with CHF and aFib on anticoagulation:  Yes   7. Patient Education:Yes   8. BB for LVSD :  NA   9. ACE/ARB for LVSD:  NA   10.  Left Ventricular Ejection Fraction (LVEF) Assessment:  Yes

## 2020-09-08 ENCOUNTER — OFFICE VISIT (OUTPATIENT)
Dept: CARDIOLOGY CLINIC | Age: 70
End: 2020-09-08
Payer: MEDICARE

## 2020-09-08 ENCOUNTER — HOSPITAL ENCOUNTER (OUTPATIENT)
Dept: GENERAL RADIOLOGY | Age: 70
Discharge: HOME | End: 2020-09-08
Payer: MEDICARE

## 2020-09-08 ENCOUNTER — HOSPITAL ENCOUNTER (OUTPATIENT)
Age: 70
Discharge: HOME | End: 2020-09-08
Payer: MEDICARE

## 2020-09-08 VITALS
BODY MASS INDEX: 41.66 KG/M2 | HEART RATE: 93 BPM | OXYGEN SATURATION: 98 % | DIASTOLIC BLOOD PRESSURE: 68 MMHG | SYSTOLIC BLOOD PRESSURE: 110 MMHG | WEIGHT: 244 LBS | HEIGHT: 64 IN

## 2020-09-08 PROCEDURE — 1123F ACP DISCUSS/DSCN MKR DOCD: CPT | Performed by: NURSE PRACTITIONER

## 2020-09-08 PROCEDURE — 71046 X-RAY EXAM CHEST 2 VIEWS: CPT

## 2020-09-08 PROCEDURE — 4040F PNEUMOC VAC/ADMIN/RCVD: CPT | Performed by: NURSE PRACTITIONER

## 2020-09-08 PROCEDURE — G8417 CALC BMI ABV UP PARAM F/U: HCPCS | Performed by: NURSE PRACTITIONER

## 2020-09-08 PROCEDURE — 3017F COLORECTAL CA SCREEN DOC REV: CPT | Performed by: NURSE PRACTITIONER

## 2020-09-08 PROCEDURE — G8399 PT W/DXA RESULTS DOCUMENT: HCPCS | Performed by: NURSE PRACTITIONER

## 2020-09-08 PROCEDURE — 1090F PRES/ABSN URINE INCON ASSESS: CPT | Performed by: NURSE PRACTITIONER

## 2020-09-08 PROCEDURE — 99214 OFFICE O/P EST MOD 30 MIN: CPT | Performed by: NURSE PRACTITIONER

## 2020-09-08 PROCEDURE — G8427 DOCREV CUR MEDS BY ELIG CLIN: HCPCS | Performed by: NURSE PRACTITIONER

## 2020-09-08 PROCEDURE — 1036F TOBACCO NON-USER: CPT | Performed by: NURSE PRACTITIONER

## 2020-09-17 ENCOUNTER — TELEPHONE (OUTPATIENT)
Dept: INTERNAL MEDICINE CLINIC | Age: 70
End: 2020-09-17

## 2020-09-17 NOTE — TELEPHONE ENCOUNTER
----- Message from Augie Leach sent at 9/16/2020  4:24 PM EDT -----  Subject: Message to Provider    QUESTIONS  Information for Provider? pt would like to know if there are any samples   of a Marjorje Crosser and if so would she be able to get any. She is currently   paying out of pocket for them.  ---------------------------------------------------------------------------  --------------  CALL BACK INFO  What is the best way for the office to contact you? OK to leave message on   voicemail  Preferred Call Back Phone Number? [phone]  ---------------------------------------------------------------------------  --------------  SCRIPT ANSWERS  Relationship to Patient?  Self

## 2020-09-18 ENCOUNTER — HOSPITAL ENCOUNTER (OUTPATIENT)
Age: 70
Discharge: HOME | End: 2020-09-18
Payer: MEDICARE

## 2020-09-18 LAB
ANION GAP SERPL CALCULATED.3IONS-SCNC: 16 MMOL/L (ref 3–16)
BUN BLDV-MCNC: 28 MG/DL (ref 7–20)
CALCIUM SERPL-MCNC: 9.8 MG/DL (ref 8.3–10.6)
CHLORIDE BLD-SCNC: 104 MMOL/L (ref 99–110)
CO2: 17 MMOL/L (ref 21–32)
CREAT SERPL-MCNC: 0.9 MG/DL (ref 0.6–1.2)
GFR AFRICAN AMERICAN: >60
GFR NON-AFRICAN AMERICAN: >60
GLUCOSE BLD-MCNC: 99 MG/DL (ref 70–99)
POTASSIUM SERPL-SCNC: 4.3 MMOL/L (ref 3.5–5.1)
PRO-BNP: 119 PG/ML (ref 0–124)
SODIUM BLD-SCNC: 137 MMOL/L (ref 136–145)

## 2020-09-18 PROCEDURE — 83880 ASSAY OF NATRIURETIC PEPTIDE: CPT

## 2020-09-18 PROCEDURE — 80048 BASIC METABOLIC PNL TOTAL CA: CPT

## 2020-09-18 PROCEDURE — 36415 COLL VENOUS BLD VENIPUNCTURE: CPT

## 2020-09-21 ENCOUNTER — TELEPHONE (OUTPATIENT)
Dept: CARDIOLOGY CLINIC | Age: 70
End: 2020-09-21

## 2020-09-21 NOTE — TELEPHONE ENCOUNTER
She was talking to MA today and call was dropped . [place] tower . Kennyth Parson call back after 4:15?

## 2020-09-28 ENCOUNTER — VIRTUAL VISIT (OUTPATIENT)
Dept: INTERNAL MEDICINE CLINIC | Age: 70
End: 2020-09-28
Payer: MEDICARE

## 2020-09-28 ENCOUNTER — NURSE ONLY (OUTPATIENT)
Dept: CARDIOLOGY CLINIC | Age: 70
End: 2020-09-28
Payer: MEDICARE

## 2020-09-28 ENCOUNTER — NURSE TRIAGE (OUTPATIENT)
Dept: OTHER | Facility: CLINIC | Age: 70
End: 2020-09-28

## 2020-09-28 PROBLEM — J45.30 MILD PERSISTENT ASTHMA WITHOUT COMPLICATION: Status: ACTIVE | Noted: 2020-09-28

## 2020-09-28 PROCEDURE — 3017F COLORECTAL CA SCREEN DOC REV: CPT | Performed by: INTERNAL MEDICINE

## 2020-09-28 PROCEDURE — G8427 DOCREV CUR MEDS BY ELIG CLIN: HCPCS | Performed by: INTERNAL MEDICINE

## 2020-09-28 PROCEDURE — 1123F ACP DISCUSS/DSCN MKR DOCD: CPT | Performed by: INTERNAL MEDICINE

## 2020-09-28 PROCEDURE — G8417 CALC BMI ABV UP PARAM F/U: HCPCS | Performed by: INTERNAL MEDICINE

## 2020-09-28 PROCEDURE — 1090F PRES/ABSN URINE INCON ASSESS: CPT | Performed by: INTERNAL MEDICINE

## 2020-09-28 PROCEDURE — G8399 PT W/DXA RESULTS DOCUMENT: HCPCS | Performed by: INTERNAL MEDICINE

## 2020-09-28 PROCEDURE — G2066 INTER DEVC REMOTE 30D: HCPCS | Performed by: INTERNAL MEDICINE

## 2020-09-28 PROCEDURE — 1036F TOBACCO NON-USER: CPT | Performed by: INTERNAL MEDICINE

## 2020-09-28 PROCEDURE — 4040F PNEUMOC VAC/ADMIN/RCVD: CPT | Performed by: INTERNAL MEDICINE

## 2020-09-28 PROCEDURE — 99213 OFFICE O/P EST LOW 20 MIN: CPT | Performed by: INTERNAL MEDICINE

## 2020-09-28 PROCEDURE — 93298 REM INTERROG DEV EVAL SCRMS: CPT | Performed by: INTERNAL MEDICINE

## 2020-09-28 RX ORDER — PREDNISONE 20 MG/1
20 TABLET ORAL DAILY
Qty: 5 TABLET | Refills: 0 | Status: SHIPPED | OUTPATIENT
Start: 2020-09-28 | End: 2020-10-02 | Stop reason: ALTCHOICE

## 2020-09-28 RX ORDER — AZITHROMYCIN 250 MG/1
250 TABLET, FILM COATED ORAL SEE ADMIN INSTRUCTIONS
Qty: 6 TABLET | Refills: 0 | Status: SHIPPED | OUTPATIENT
Start: 2020-09-28 | End: 2020-10-02 | Stop reason: ALTCHOICE

## 2020-09-28 ASSESSMENT — ENCOUNTER SYMPTOMS
SHORTNESS OF BREATH: 0
COUGH: 0

## 2020-09-28 NOTE — TELEPHONE ENCOUNTER
Patient called [address] contact center [facility]) to schedule appointment with red flag complaint; left message for  Nurse Access for triage by (Cold transfer). Pt calls to report symptoms of sinus pain/pressure. Pt states pain started on Thursday. Rates pain as severe. Pain is on forehead, cheeks, nasal, teeth and neck glands. Reports nasal discharge is green in color. States she is feeling unsteady on her feet and having ear pain as well. Denies fevers at this time. Informed of disposition. Care advice as documented. Instructed to call back with worsening symptoms. [facility] appointment as recommended. Attention Provider: Thank you for allowing me to participate in the care of your patient. The patient was connected to triage in response to information provided to the [facility]. Please do not respond through this encounter as the response is not directed to a shared pool. Reason for Disposition   SEVERE sinus pain    Answer Assessment - Initial Assessment Questions  1. LOCATION: \"Where does it hurt? \"       Forehead, cheeks, teeth, neck glands (right)  2. ONSET: \"When did the sinus pain start? \"  (e.g., hours, days)       9/24/20  3. SEVERITY: \"How bad is the pain? \"   (Scale 1-10; mild, moderate or severe)    - MILD (1-3): doesn't interfere with normal activities     - MODERATE (4-7): interferes with normal activities (e.g., work or school) or awakens from sleep    - SEVERE (8-10): excruciating pain and patient unable to do any normal activities         severe  4. RECURRENT SYMPTOM: \"Have you ever had sinus problems before? \" If so, ask: \"When was the last time? \" and \"What happened that time? \"       Yes  5. NASAL CONGESTION: \"Is the nose blocked? \" If so, ask, \"Can you open it or must you breathe through the mouth? \"      Blocked nose  6. NASAL DISCHARGE: \"Do you have discharge from your nose? \" If so ask, \"What color? \"      Green and thick  7.  FEVER: \"Do you have a

## 2020-09-28 NOTE — PROGRESS NOTES
2020      TELEHEALTH EVALUATION -- Audio/Visual (During DNC-60 public health emergency)    HPI:    Addison Schaefer (:  1950) has requested an audio/video evaluation for the following concern(s):    Sinus congestion and drainage which is green in color. There is no associated fever or chills. Patient has no cough associated with her symptoms. She has been having symptoms for the last several days. She has been using over-the-counter medications without relief of her symptoms. PHQ Scores 2020 10/16/2019 2019 10/26/2018 2018 2017   PHQ2 Score 1 0 0 0 0 0   PHQ9 Score 1 0 0 0 0 0     Interpretation of Total Score Depression Severity: 1-4 = Minimal depression, 5-9 = Mild depression, 10-14 = Moderate depression, 15-19 = Moderately severe depression, 20-27 = Severe depression    Review of Systems   Constitutional: Negative for chills and fatigue. HENT: Negative for congestion. Respiratory: Negative for cough and shortness of breath. Cardiovascular: Negative for chest pain and palpitations. Prior to Visit Medications    Medication Sig Taking?  Authorizing Provider   rivaroxaban (XARELTO) 20 MG TABS tablet Take 1 tablet by mouth daily (with breakfast) Yes Becki Walsh MD   rivaroxaban (XARELTO) 20 MG TABS tablet Take 1 tablet by mouth Daily with supper Yes BRICE Hernandez CNP   Handicap Placard MISC by Does not apply route Exp 5 years Yes BRICE Lauren CNP   furosemide (LASIX) 40 MG tablet Take 1 tablet by mouth daily  Patient taking differently: Take 40 mg by mouth every other day  Yes BRICE Bingham CNP   Multiple Vitamins-Minerals (THERAPEUTIC MULTIVITAMIN-MINERALS) tablet Take 1 tablet by mouth daily Yes Historical Provider, MD   dilTIAZem (CARDIZEM) 30 MG tablet Take 1 tablet by mouth 4 times daily as needed (Fast heart beat >100 and palpitations) Yes BRICE Bingham CNP   spironolactone (ALDACTONE) 25 MG tablet Take 1 tablet by mouth daily Yes BRICE Solorio CNP   pramipexole (MIRAPEX) 0.5 MG tablet 1.5 tab q 5 PM and 1.5 tab qHS Yes Hernesto Delatorre MD   lisinopril (PRINIVIL;ZESTRIL) 20 MG tablet Take 0.5 tablets by mouth nightly  Patient taking differently: Take 20 mg by mouth nightly  Yes BRICE Solorio CNP   fluticasone (FLOVENT HFA) 110 MCG/ACT inhaler Inhale 1 puff into the lungs 2 times daily Yes Historical Provider, MD   CPAP Machine MISC by Does not apply route Yes Historical Provider, MD   tiotropium (SPIRIVA) 18 MCG inhalation capsule Inhale 18 mcg into the lungs daily X 10 days Yes Historical Provider, MD   azelastine (ASTELIN) 0.1 % nasal spray 1 spray by Nasal route 2 times daily 1 Spray in each nostril Yes Anju Reddy MD   Magnesium Citrate 100 MG TABS Take 1 tablet by mouth daily Yes Sridevi Rosales MD   EPIPEN 2-MIR 0.3 MG/0.3ML SOAJ injection  Yes Historical Provider, MD       Social History     Tobacco Use    Smoking status: Former Smoker     Packs/day: 0.50     Years: 5.00     Pack years: 2.50     Last attempt to quit: 10/5/2013     Years since quittin.9    Smokeless tobacco: Never Used    Tobacco comment: smoked for a total of 5yr total   Substance Use Topics    Alcohol use: Yes     Comment: RARE    Drug use: No          PHYSICAL EXAMINATION:  There were no vitals filed for this visit. Physical Exam  Vitals signs reviewed. Constitutional:       Appearance: Normal appearance. HENT:      Head: Normocephalic and atraumatic. Eyes:      Extraocular Movements: Extraocular movements intact. Conjunctiva/sclera: Conjunctivae normal.      Pupils: Pupils are equal, round, and reactive to light. Pulmonary:      Effort: Pulmonary effort is normal.   Neurological:      General: No focal deficit present. Mental Status: She is alert and oriented to person, place, and time. Cranial Nerves: No cranial nerve deficit.    Psychiatric:         Mood and Affect: Mood normal.         Behavior: Behavior normal.         Thought Content: Thought content normal.         Judgment: Judgment normal.         ASSESSMENT/PLAN:    Smooth Cole was seen today for otalgia, head congestion and sinusitis. Diagnoses and all orders for this visit:    Acute non-recurrent maxillary sinusitis  -     azithromycin (ZITHROMAX) 250 MG tablet; Take 1 tablet by mouth See Admin Instructions for 5 days 500mg on day 1 followed by 250mg on days 2 - 5  -     predniSONE (DELTASONE) 20 MG tablet; Take 1 tablet by mouth daily for 5 days    Mild persistent asthma without complication  -     predniSONE (DELTASONE) 20 MG tablet; Take 1 tablet by mouth daily for 5 days      Return for As scheduled. Ana M Escudero is a 81 y.o. female being evaluated by a Virtual Visit (video visit) encounter to address concerns as mentioned above. A caregiver was present when appropriate. Due to this being a TeleHealth encounter (During QZIRK-14 public health emergency), evaluation of the following organ systems was limited: Vitals/Constitutional/EENT/Resp/CV/GI//MS/Neuro/Skin/Heme-Lymph-Imm. Pursuant to the emergency declaration under the [address] [place] [address], [address] [place] [address] authority and the [employer] and [employer] Act, this Virtual Visit was conducted with patient's (and/or legal guardian's) consent, to reduce the patient's risk of exposure to COVID-19 and provide necessary medical care. The patient (and/or legal guardian) has also been advised to contact this office for worsening conditions or problems, and seek emergency medical treatment and/or call 911 if deemed necessary. Patient identification was verified at the start of the visit: Yes    Total time spent on this encounter: Not billed by time    Services were provided through a video synchronous discussion virtually to substitute for in-person clinic visit. Patient and provider were located at their individual homes.     --Cayla Allan Jenny Nye MD on 9/28/2020 at 3:47 PM    An electronic signature was used to authenticate this note.

## 2020-09-29 ENCOUNTER — TELEPHONE (OUTPATIENT)
Dept: CARDIOLOGY CLINIC | Age: 70
End: 2020-09-29

## 2020-09-29 NOTE — TELEPHONE ENCOUNTER
Yes, should be fine. Just skip them tomorrow. Drink more fluid today if she gets dizzy or lightheaded.   ERICKA

## 2020-09-29 NOTE — TELEPHONE ENCOUNTER
Pt just realized she had her morning pills twice today.  Was the Lasix 40 mg and spironolactone 25 mg pls  Pt wants to know if this is going to be okay? call to advise thank you

## 2020-10-01 RX ORDER — POTASSIUM CHLORIDE 750 MG/1
TABLET, EXTENDED RELEASE ORAL
Qty: 90 TABLET | Refills: 0 | Status: SHIPPED | OUTPATIENT
Start: 2020-10-01 | End: 2021-01-05

## 2020-10-02 ENCOUNTER — OFFICE VISIT (OUTPATIENT)
Dept: INTERNAL MEDICINE CLINIC | Age: 70
End: 2020-10-02
Payer: MEDICARE

## 2020-10-02 ENCOUNTER — TELEPHONE (OUTPATIENT)
Dept: INTERNAL MEDICINE CLINIC | Age: 70
End: 2020-10-02

## 2020-10-02 VITALS
DIASTOLIC BLOOD PRESSURE: 82 MMHG | TEMPERATURE: 97.3 F | HEART RATE: 72 BPM | WEIGHT: 245 LBS | SYSTOLIC BLOOD PRESSURE: 120 MMHG | BODY MASS INDEX: 42.05 KG/M2 | OXYGEN SATURATION: 99 %

## 2020-10-02 PROCEDURE — G8484 FLU IMMUNIZE NO ADMIN: HCPCS | Performed by: INTERNAL MEDICINE

## 2020-10-02 PROCEDURE — 1036F TOBACCO NON-USER: CPT | Performed by: INTERNAL MEDICINE

## 2020-10-02 PROCEDURE — 3017F COLORECTAL CA SCREEN DOC REV: CPT | Performed by: INTERNAL MEDICINE

## 2020-10-02 PROCEDURE — G8427 DOCREV CUR MEDS BY ELIG CLIN: HCPCS | Performed by: INTERNAL MEDICINE

## 2020-10-02 PROCEDURE — 1090F PRES/ABSN URINE INCON ASSESS: CPT | Performed by: INTERNAL MEDICINE

## 2020-10-02 PROCEDURE — G8417 CALC BMI ABV UP PARAM F/U: HCPCS | Performed by: INTERNAL MEDICINE

## 2020-10-02 PROCEDURE — 4040F PNEUMOC VAC/ADMIN/RCVD: CPT | Performed by: INTERNAL MEDICINE

## 2020-10-02 PROCEDURE — G8399 PT W/DXA RESULTS DOCUMENT: HCPCS | Performed by: INTERNAL MEDICINE

## 2020-10-02 PROCEDURE — 99213 OFFICE O/P EST LOW 20 MIN: CPT | Performed by: INTERNAL MEDICINE

## 2020-10-02 PROCEDURE — 1123F ACP DISCUSS/DSCN MKR DOCD: CPT | Performed by: INTERNAL MEDICINE

## 2020-10-02 RX ORDER — AMOXICILLIN AND CLAVULANATE POTASSIUM 875; 125 MG/1; MG/1
1 TABLET, FILM COATED ORAL 2 TIMES DAILY
Qty: 20 TABLET | Refills: 0 | Status: SHIPPED | OUTPATIENT
Start: 2020-10-02 | End: 2020-10-12 | Stop reason: ALTCHOICE

## 2020-10-02 RX ORDER — PREDNISONE 10 MG/1
TABLET ORAL
Qty: 30 TABLET | Refills: 0 | Status: SHIPPED | OUTPATIENT
Start: 2020-10-02 | End: 2020-10-12 | Stop reason: ALTCHOICE

## 2020-10-02 RX ORDER — LEVOFLOXACIN 500 MG/1
500 TABLET, FILM COATED ORAL DAILY
Qty: 7 TABLET | Refills: 0 | Status: SHIPPED | OUTPATIENT
Start: 2020-10-02 | End: 2020-10-02

## 2020-10-02 NOTE — TELEPHONE ENCOUNTER
----- Message from Renetta Arnold sent at 10/1/2020  4:04 PM EDT -----  Subject: Message to Provider    QUESTIONS  Information for Provider? Pt says she has been taking medication   prescribed since Monday for her sinus infection but is not feeling any   better. Her medication runs out tomorrow and she would like to know if Dr. Bora Aguilar would refill. Ear pain   sore throat   head pressure   low fever.  ---------------------------------------------------------------------------  --------------  CALL BACK INFO  What is the best way for the office to contact you? OK to leave message on   voicemail  Preferred Call Back Phone Number? [phone]  ---------------------------------------------------------------------------  --------------  SCRIPT ANSWERS  Relationship to Patient?  Self

## 2020-10-02 NOTE — PROGRESS NOTES
SUBJECTIVE:    HPI:     Joni Peterson comes to the office today for sore throat and right ear pain. She has been having symptoms for the last  3 weeks. Associated symptoms include no fever but she has sinus pressure. She has been taking prednisone and azithromycin for relief of her symptoms. However, they did not help her. OBJECTIVE:    Review of Systems  Vitals:    10/02/20 1541   BP: 120/82   Pulse: 72   Temp: 97.3 °F (36.3 °C)   SpO2: 99%     Physical Exam  Vitals signs reviewed. Constitutional:       General: She is not in acute distress. Appearance: She is well-developed. She is not diaphoretic. HENT:      Head: Normocephalic and atraumatic. Right Ear: Ear canal and external ear normal. There is no impacted cerumen. Left Ear: Tympanic membrane, ear canal and external ear normal.      Ears:      Comments: Dull TM on the right     Nose: Congestion and rhinorrhea present. Mouth/Throat:      Mouth: Mucous membranes are moist.      Pharynx: No oropharyngeal exudate. Pulmonary:      Effort: Pulmonary effort is normal.   Lymphadenopathy:      Cervical: Cervical adenopathy present. Neurological:      Mental Status: She is alert and oriented to person, place, and time. Cranial Nerves: No cranial nerve deficit. Psychiatric:         Behavior: Behavior normal.         Thought Content: Thought content normal.         Judgment: Judgment normal.         Current Outpatient Medications:     levoFLOXacin (LEVAQUIN) 500 MG tablet, Take 1 tablet by mouth daily for 7 days, Disp: 7 tablet, Rfl: 0    predniSONE (DELTASONE) 10 MG tablet, Take 4 tablets daily for 3 days, then 3 tablets daily for 3 days, then 2 tablets daily for 3 days, then 1 tablet daily for 3 days. , Disp: 30 tablet, Rfl: 0    potassium chloride (KLOR-CON M) 10 MEQ extended release tablet, TAKE ONE TABLET BY MOUTH DAILY, Disp: 90 tablet, Rfl: 0    rivaroxaban (XARELTO) 20 MG TABS tablet, Take 1 tablet by mouth Daily with supper, Disp: 90 tablet, Rfl: 3    Handicap Placard [facility], by Does not apply route Exp 5 years, Disp: 1 each, Rfl: 0    furosemide (LASIX) 40 MG tablet, Take 1 tablet by mouth daily (Patient taking differently: Take 40 mg by mouth every other day ), Disp: 90 tablet, Rfl: 0    Multiple Vitamins-Minerals (THERAPEUTIC MULTIVITAMIN-MINERALS) tablet, Take 1 tablet by mouth daily, Disp: , Rfl:     dilTIAZem (CARDIZEM) 30 MG tablet, Take 1 tablet by mouth 4 times daily as needed (Fast heart beat >100 and palpitations), Disp: 120 tablet, Rfl: 1    spironolactone (ALDACTONE) 25 MG tablet, Take 1 tablet by mouth daily, Disp: 90 tablet, Rfl: 3    pramipexole (MIRAPEX) 0.5 MG tablet, 1.5 tab q 5 PM and 1.5 tab qHS, Disp: 90 tablet, Rfl: 5    lisinopril (PRINIVIL;ZESTRIL) 20 MG tablet, Take 0.5 tablets by mouth nightly (Patient taking differently: Take 20 mg by mouth nightly ), Disp: 90 tablet, Rfl: 1    fluticasone (FLOVENT HFA) 110 MCG/ACT inhaler, Inhale 1 puff into the lungs 2 times daily, Disp: , Rfl:     CPAP Machine MISC, by Does not apply route, Disp: , Rfl:     tiotropium (SPIRIVA) 18 MCG inhalation capsule, Inhale 18 mcg into the lungs daily X 10 days, Disp: , Rfl:     azelastine (ASTELIN) 0.1 % nasal spray, 1 spray by Nasal route 2 times daily 1 Spray in each nostril, Disp: 2 Bottle, Rfl: 1    Magnesium Citrate 100 MG TABS, Take 1 tablet by mouth daily, Disp: 90 tablet, Rfl: 3    EPIPEN 2-MIR 0.3 MG/0.3ML SOAJ injection, , Disp: , Rfl:     Assessment/Plan:  Sahil Graham was seen today for head congestion and otalgia. Diagnoses and all orders for this visit:    Acute non-recurrent maxillary sinusitis  -     levoFLOXacin (LEVAQUIN) 500 MG tablet; Take 1 tablet by mouth daily for 7 days  -     predniSONE (DELTASONE) 10 MG tablet; Take 4 tablets daily for 3 days, then 3 tablets daily for 3 days, then 2 tablets daily for 3 days, then 1 tablet daily for 3 days. -     CT SINUS WO CONTRAST;  Future        Trev Humphries Edwin Jaeegr MD

## 2020-10-02 NOTE — TELEPHONE ENCOUNTER
No.  She needs to be seen. It was a stretch the other day on giving her antibiotics. She looked great and I need to examine her.      Gallito Flower MD  FACP

## 2020-10-07 ENCOUNTER — HOSPITAL ENCOUNTER (OUTPATIENT)
Dept: CT IMAGING | Age: 70
Discharge: HOME | End: 2020-10-07
Payer: MEDICARE

## 2020-10-07 PROCEDURE — 70486 CT MAXILLOFACIAL W/O DYE: CPT

## 2020-10-12 ENCOUNTER — OFFICE VISIT (OUTPATIENT)
Dept: INTERNAL MEDICINE CLINIC | Age: 70
End: 2020-10-12
Payer: MEDICARE

## 2020-10-12 VITALS
TEMPERATURE: 97.5 F | WEIGHT: 248 LBS | OXYGEN SATURATION: 98 % | BODY MASS INDEX: 42.57 KG/M2 | DIASTOLIC BLOOD PRESSURE: 62 MMHG | HEART RATE: 76 BPM | SYSTOLIC BLOOD PRESSURE: 122 MMHG

## 2020-10-12 PROCEDURE — 90694 VACC AIIV4 NO PRSRV 0.5ML IM: CPT | Performed by: INTERNAL MEDICINE

## 2020-10-12 PROCEDURE — G0008 ADMIN INFLUENZA VIRUS VAC: HCPCS | Performed by: INTERNAL MEDICINE

## 2020-10-12 RX ORDER — PREDNISONE 10 MG/1
10 TABLET ORAL DAILY
Qty: 7 TABLET | Refills: 0 | Status: SHIPPED | OUTPATIENT
Start: 2020-10-12 | End: 2020-10-19

## 2020-10-12 RX ORDER — AMOXICILLIN AND CLAVULANATE POTASSIUM 875; 125 MG/1; MG/1
1 TABLET, FILM COATED ORAL 2 TIMES DAILY
Qty: 14 TABLET | Refills: 0 | Status: SHIPPED | OUTPATIENT
Start: 2020-10-12 | End: 2020-10-19

## 2020-10-12 NOTE — PROGRESS NOTES
SUBJECTIVE:    HPI:     Kristian Chino comes to the office today for upper respiratory infection. She has been having symptoms for the last  2 weeks. Associated symptoms include headache. She has been taking Augmentin and prednisone for relief of her symptoms. Her recent CT of the sinuses was negative for significant disease. OBJECTIVE:    Review of Systems  Vitals:    10/12/20 0915   BP: 122/62   Pulse: 76   Temp: 97.5 °F (36.4 °C)   SpO2: 98%     Physical Exam  Vitals signs reviewed. Constitutional:       General: She is not in acute distress. Appearance: She is well-developed. She is not diaphoretic. HENT:      Head: Normocephalic and atraumatic. Pulmonary:      Effort: Pulmonary effort is normal.   Neurological:      Mental Status: She is alert and oriented to person, place, and time. Cranial Nerves: No cranial nerve deficit. Psychiatric:         Behavior: Behavior normal.         Thought Content:  Thought content normal.         Judgment: Judgment normal.         Current Outpatient Medications:     dilTIAZem (CARDIZEM) 30 MG tablet, TAKE ONE TABLET BY MOUTH FOUR TIMES A DAY AS NEEDED FOR FAST HEARTBEAT GREATER THAN 100 AND PALPITATIONS, Disp: 120 tablet, Rfl: 0    potassium chloride (KLOR-CON M) 10 MEQ extended release tablet, TAKE ONE TABLET BY MOUTH DAILY, Disp: 90 tablet, Rfl: 0    rivaroxaban (XARELTO) 20 MG TABS tablet, Take 1 tablet by mouth Daily with supper, Disp: 90 tablet, Rfl: 3    Handicap Placard MISC, by Does not apply route Exp 5 years, Disp: 1 each, Rfl: 0    furosemide (LASIX) 40 MG tablet, Take 1 tablet by mouth daily (Patient taking differently: Take 40 mg by mouth every other day ), Disp: 90 tablet, Rfl: 0    Multiple Vitamins-Minerals (THERAPEUTIC MULTIVITAMIN-MINERALS) tablet, Take 1 tablet by mouth daily, Disp: , Rfl:     spironolactone (ALDACTONE) 25 MG tablet, Take 1 tablet by mouth daily, Disp: 90 tablet, Rfl: 3    pramipexole (MIRAPEX) 0.5 MG tablet, 1.5 tab q 5 PM and 1.5 tab qHS, Disp: 90 tablet, Rfl: 5    lisinopril (PRINIVIL;ZESTRIL) 20 MG tablet, Take 0.5 tablets by mouth nightly (Patient taking differently: Take 20 mg by mouth nightly ), Disp: 90 tablet, Rfl: 1    fluticasone (FLOVENT HFA) 110 MCG/ACT inhaler, Inhale 1 puff into the lungs 2 times daily, Disp: , Rfl:     CPAP Machine MISC, by Does not apply route, Disp: , Rfl:     tiotropium (SPIRIVA) 18 MCG inhalation capsule, Inhale 18 mcg into the lungs daily X 10 days, Disp: , Rfl:     azelastine (ASTELIN) 0.1 % nasal spray, 1 spray by Nasal route 2 times daily 1 Spray in each nostril, Disp: 2 Bottle, Rfl: 1    Magnesium Citrate 100 MG TABS, Take 1 tablet by mouth daily, Disp: 90 tablet, Rfl: 3    EPIPEN 2-MIR 0.3 MG/0.3ML SOAJ injection, , Disp: , Rfl:     Assessment/Plan:  Katherine Saldaña was seen today for hyperlipidemia and hypertension. Diagnoses and all orders for this visit:    Chronic maxillary sinusitis  -     amoxicillin-clavulanate (AUGMENTIN) 875-125 MG per tablet; Take 1 tablet by mouth 2 times daily for 7 days  -     predniSONE (DELTASONE) 10 MG tablet;  Take 1 tablet by mouth daily for 7 days        Ayan Priest MD

## 2020-10-15 ENCOUNTER — TELEPHONE (OUTPATIENT)
Dept: PULMONOLOGY | Age: 70
End: 2020-10-15

## 2020-10-15 RX ORDER — PRAMIPEXOLE DIHYDROCHLORIDE 0.5 MG/1
TABLET ORAL
Qty: 45 TABLET | Refills: 0 | Status: SHIPPED | OUTPATIENT
Start: 2020-10-15 | End: 2020-10-30 | Stop reason: SDUPTHER

## 2020-10-15 NOTE — LETTER
[address] [facility]   Phone: [phone]  Fax: [phone]    Gabby Puentes MD        10/16/2020    Patient: Chadd Aleman   YOB: 1950           To Whom It May Concern: It is my medical opinion that Chadd Aleman requires a disability parking placard for the following reasons:  She cannot walk 200 feet without stopping to rest.  Duration of need: 5 years    If you have any questions or concerns, please don't hesitate to call.     Sincerely,             Gabby Puentes MD

## 2020-10-15 NOTE — TELEPHONE ENCOUNTER
Patient needs a refill on her Mirapex faxed to [facility] on [facility] [address] Kait Lee 4/24/20  Next appt 10/30/20

## 2020-10-15 NOTE — TELEPHONE ENCOUNTER
Pt would like a script for a placard said hers never came in mail so she would like another one so she can take it to the [address]

## 2020-10-19 ENCOUNTER — TELEPHONE (OUTPATIENT)
Dept: INTERNAL MEDICINE CLINIC | Age: 70
End: 2020-10-19

## 2020-10-19 NOTE — TELEPHONE ENCOUNTER
----- Message from Susan Dow sent at 10/19/2020  9:25 AM EDT -----  Subject: Message to Provider    QUESTIONS  Information for Provider? pt says the meds she was given for sinus   infection aren't strong enough . pt will start antibiotic that he had   recommended and pt will be needing chacho script for prednisone and she   will need it by tomorrow morning for the inflammation/sinus in ears. ---------------------------------------------------------------------------  --------------  Jennifer MESSER  What is the best way for the office to contact you? OK to leave message on   voicemail  Preferred Call Back Phone Number? [phone]  ---------------------------------------------------------------------------  --------------  SCRIPT ANSWERS  Relationship to Patient?  Self

## 2020-10-20 NOTE — TELEPHONE ENCOUNTER
Discussed with patient. She hadn't seen the my chart message sent to her. Advised for her to respond to Dr. Nya Escobar through there for discussion.

## 2020-10-30 ENCOUNTER — VIRTUAL VISIT (OUTPATIENT)
Dept: PULMONOLOGY | Age: 70
End: 2020-10-30
Payer: MEDICARE

## 2020-10-30 PROCEDURE — G8399 PT W/DXA RESULTS DOCUMENT: HCPCS | Performed by: NURSE PRACTITIONER

## 2020-10-30 PROCEDURE — G8427 DOCREV CUR MEDS BY ELIG CLIN: HCPCS | Performed by: NURSE PRACTITIONER

## 2020-10-30 PROCEDURE — 1123F ACP DISCUSS/DSCN MKR DOCD: CPT | Performed by: NURSE PRACTITIONER

## 2020-10-30 PROCEDURE — 3017F COLORECTAL CA SCREEN DOC REV: CPT | Performed by: NURSE PRACTITIONER

## 2020-10-30 PROCEDURE — 99214 OFFICE O/P EST MOD 30 MIN: CPT | Performed by: NURSE PRACTITIONER

## 2020-10-30 PROCEDURE — 1090F PRES/ABSN URINE INCON ASSESS: CPT | Performed by: NURSE PRACTITIONER

## 2020-10-30 PROCEDURE — 4040F PNEUMOC VAC/ADMIN/RCVD: CPT | Performed by: NURSE PRACTITIONER

## 2020-10-30 RX ORDER — PRAMIPEXOLE DIHYDROCHLORIDE 0.5 MG/1
TABLET ORAL
Qty: 135 TABLET | Refills: 1 | Status: SHIPPED | OUTPATIENT
Start: 2020-10-30 | End: 2021-01-26 | Stop reason: SDUPTHER

## 2020-10-30 ASSESSMENT — SLEEP AND FATIGUE QUESTIONNAIRES
HOW LIKELY ARE YOU TO NOD OFF OR FALL ASLEEP WHEN YOU ARE A PASSENGER IN A CAR FOR AN HOUR WITHOUT A BREAK: 3
HOW LIKELY ARE YOU TO NOD OFF OR FALL ASLEEP WHILE SITTING AND READING: 2
HOW LIKELY ARE YOU TO NOD OFF OR FALL ASLEEP WHILE SITTING QUIETLY AFTER LUNCH WITHOUT ALCOHOL: 1
HOW LIKELY ARE YOU TO NOD OFF OR FALL ASLEEP IN A CAR, WHILE STOPPED FOR A FEW MINUTES IN TRAFFIC: 0
HOW LIKELY ARE YOU TO NOD OFF OR FALL ASLEEP WHILE WATCHING TV: 1
HOW LIKELY ARE YOU TO NOD OFF OR FALL ASLEEP WHILE LYING DOWN TO REST IN THE AFTERNOON WHEN CIRCUMSTANCES PERMIT: 1
HOW LIKELY ARE YOU TO NOD OFF OR FALL ASLEEP WHILE SITTING INACTIVE IN A PUBLIC PLACE: 0
HOW LIKELY ARE YOU TO NOD OFF OR FALL ASLEEP WHILE SITTING AND TALKING TO SOMEONE: 0
ESS TOTAL SCORE: 8

## 2020-10-30 NOTE — PROGRESS NOTES
Rick Meneses MD, FAASM, [facility]  Emre Solorio, MSN, RN, CNP     [address] [facility] SLEEP MEDICINE  [address] [facility]  [place] [address]  Dept: [phone]  Dept Fax: : [address] MEDICINE  [address] [place] [id number] [phone]    Subjective:     Patient ID: Brenna Kay is a 78 y.o. female. Chief Complaint   Patient presents with    Sleep Apnea       HPI:      Sleep Medicine Video Visit    Pursuant to the emergency declaration under the [facility], [facility] waiver authority and the [employer] Act this Telephone Visit was insisted, with patient's consent, to reduce the patient's risk of exposure to COVID-19 and provide continuity of care for an established patient. Services were provided through a synchronous discussion over a telephone and/or Video chat to substitute for in-person clinic visit, and coded as such. While patient is at home. Machine Modem/Download Info:  Compliance (hours/night): 5 hrs/night  Download AHI (/hour): 0.4 /HR  Average CPAP Pressure : 12.5 cmH2O           APAP - Settings  Pressure Min: 11 cmH2O  Pressure Max: 20 cmH2O                 Comfort Settings  Humidity Level (0-8): 3  Flex/EPR (0-3): 2 PAP Mask  Mask Type: Nasal pillows     Carpenter - Total score: 8    Follow-up :     Last Visit : April 2020      Patient reports the listed chronic Co-morbidities: RLS, Afib, CHF, HTN, Asthma, Obesity    are well controlled and stable at this time.      Subjective Health Changes: None      Over Night Oximetry: [] Yes  [] No  [x] NA [] WNL   Using O2: [] Yes  [] No  [x] NA   Patient is compliant with the machine  [x] Yes  [] No   Feeling rested when using the machine   [x] Yes  [] No     Pressure is comfortable with inspiration and expiration  [x] Yes  [] No Noticed changes in pressure   [] Yes  [] No  [x] NA   Mask is fitting well  [x] Yes  [] No   Noting Mask Air Leak  [] Yes  [x] No   Having painful Aerophagia  [] Yes  [x] No   Nocturia   1-2  per night. Having  HA upon waking  [x] Yes  [] No   Dry mouth upon waking   Dry Nose  Dry Eyes  [] Yes  [x] No   Congestion upon waking   [x] Yes  [] No    Nose Bleeds  [] Yes  [x] No   Using Sleep Aides  Mirapex nightly    Understands how to change humidification and/or tubing temperature for comfort while at home  [x] Yes  [] No     Difficulties falling asleep  [] Yes  [x] No   Difficulties staying asleep  [] Yes  [x] No   Approximate time to bed  11pm-12am   Approximate wake time  6:30-8am   Taking Naps  occasional   If taking naps usual length  120 minutes   If taking naps using the machine  [] Yes  [x] No  [] NA [] With and With out    Drowsy when driving  [] Yes  [x] No     Does patient carry a DOT/CDL  [] Yes  [x] No     Does patient carry FAA/[employer] License   [] Yes  [x] No      Any concerns noted with the machine at this time  [] Yes  [x] No        Diagnosis Orders   1. HUSSAIN (obstructive sleep apnea)     2. Restless leg syndrome     3. Paroxysmal atrial fibrillation (HCC)     4. Obesity (BMI 30-39.9)     5. Chronic diastolic heart failure ([address]     6. Benign essential HTN     7. Mild persistent asthma without complication         The chronic medical conditions listed are directly related to the primary diagnosis listed above. The management of the primary diagnosis affects the secondary diagnosis and vice versa. Assessment/Plan:     Restless leg syndrome  Chronic- Stable. Cont meds per PCP and other physicians. Paroxysmal atrial fibrillation (HCC)  Chronic- Stable. Cont meds per PCP and other physicians. Obesity (BMI 30-39. 9)  Chronic-Stable. Encouraged her to work on weight loss through diet and exercise. Chronic diastolic heart failure (HCC)  Chronic- Stable.   Cont meds per PCP and other physicians. Benign essential HTN  Chronic- Stable. Cont meds per PCP and other physicians. Mild persistent asthma without complication  Chronic- Stable. Cont meds per PCP and other physicians. HUSSAIN (obstructive sleep apnea)  Reviewed compliance download with pt. Supplies and parts as needed for her machine. These are medically necessary. Continue medications per her PCP and other physicians. Limit caffeine use after 3pm.  Encouraged her to work on weight loss through diet and exercise. Diagnoses of Restless leg syndrome, Paroxysmal atrial fibrillation (HCC), Obesity (BMI 30-39.9), Chronic diastolic heart failure ([address] Benign essential HTN, and Mild persistent asthma without complication were pertinent to this visit. The chronic medical conditions listed are directly related to the primary diagnosis listed above. The management of the primary diagnosis affects the secondary diagnosis and vice versa. The primary encounter diagnosis was HUSSAIN (obstructive sleep apnea). Diagnoses of Restless leg syndrome, Paroxysmal atrial fibrillation (HCC), Obesity (BMI 30-39.9), Chronic diastolic heart failure ([address] Benign essential HTN, and Mild persistent asthma without complication were also pertinent to this visit. The chronic medical conditions listed are directly related to the primary diagnosis listed above. The management of the primary diagnosis affects the secondary diagnosis and vice versa. - Educated patient and reviewed compliance download with pt.    -Supplies and parts as needed for her machine, these are medically necessary.    - Patient using [address] for supplies  -Continue medications per her PCP and other physicians.   -Limit caffeine use after 3pm.    -Encouraged her to work on weight loss through diet and exercise. -  Patient able to access video feed. Visit completed via video chat communications.  25 min spent with patient.   - Education on wearing with naps, refills, and cleaning the machine   -F/U: 6 month. No orders of the defined types were placed in this encounter. Orders Placed This Encounter   Medications    pramipexole (MIRAPEX) 0.5 MG tablet     Si.5 tab q 5 PM and 1.5 tab qHS     Dispense:  135 tablet     Refill:  1       No orders of the defined types were placed in this encounter.       Cathryn Donovan, SEAN, RN, CNP

## 2020-10-30 NOTE — ASSESSMENT & PLAN NOTE
Reviewed compliance download with pt. Supplies and parts as needed for her machine. These are medically necessary. Continue medications per her PCP and other physicians. Limit caffeine use after 3pm.  Encouraged her to work on weight loss through diet and exercise. Diagnoses of Restless leg syndrome, Paroxysmal atrial fibrillation (HCC), Obesity (BMI 30-39.9), Chronic diastolic heart failure ([address] Benign essential HTN, and Mild persistent asthma without complication were pertinent to this visit. The chronic medical conditions listed are directly related to the primary diagnosis listed above. The management of the primary diagnosis affects the secondary diagnosis and vice versa.

## 2020-10-30 NOTE — PROGRESS NOTES
Brenna Kay         : 1950    Diagnosis: [x] HUSSAIN (G47.33) [] CSA (G47.31) [] Apnea (G47.30)   Length of Need: [x] 12 Months [] 99 Months [] Other:    Machine (MANDI!): [x] Respironics Dream Station      Auto [] ResMed AirSense     Auto [] Other:     []  CPAP () [] Bilevel ()   Mode: [] Auto [] Spontaneous    Mode: [] Auto [] Spontaneous                            Comfort Settings:   - Ramp Pressure:  cmH2O                                        - Ramp time: 15 min                                     -  Flex/EPR - 3 full time                                    - For ResMed Bilevel (TiMax-4 sec   TiMin- 0.2 sec)     Humidifier: [x] Heated ()        [x] Water chamber replacement ()/ 1 per 6 months        Mask:   [x] Nasal () /1 per 3 months [] Full Face () /1 per 3 months   [x] Patient choice -Size and fit mask [] Patient Choice - Size and fit mask   [] Dispense:  [] Dispense:    [x] Headgear () / 1 per 3 months [] Headgear () / 1 per 3 months   [] Replacement Nasal Cushion ()/2 per month [] Interface Replacement ()/1 per month   [x] Replacement Nasal Pillows ()/2 per month         Tubing: [x] Heated ()/1 per 3 months    [] Standard ()/1 per 3 months [] Other:           Filters: [x] Non-disposable ()/1 per 6 months     [x] Ultra-Fine, Disposable ()/2 per month        Miscellaneous: [] Chin Strap ()/ 1 per 6 months [] O2 bleed-in:       LPM   [] Oximetry on CPAP/Bilevel []  Other:    [x] Modem: ()         Start Order Date: 10/30/20    MEDICAL JUSTIFICATION:  I, the undersigned, certify that the above prescribed supplies are medically necessary for this patients wellbeing. In my opinion, the supplies are both reasonable and necessary in reference to accepted standards of medicalpractice in treatment of this patients condition.     BRICE Burr - CNP      NPI: [id number]       Order Signed Date:

## 2020-11-02 ENCOUNTER — TELEPHONE (OUTPATIENT)
Dept: CARDIOLOGY CLINIC | Age: 70
End: 2020-11-02

## 2020-11-02 ENCOUNTER — NURSE ONLY (OUTPATIENT)
Dept: CARDIOLOGY CLINIC | Age: 70
End: 2020-11-02
Payer: MEDICARE

## 2020-11-02 PROCEDURE — 93298 REM INTERROG DEV EVAL SCRMS: CPT | Performed by: INTERNAL MEDICINE

## 2020-11-02 PROCEDURE — G2066 INTER DEVC REMOTE 30D: HCPCS | Performed by: INTERNAL MEDICINE

## 2020-11-02 NOTE — TELEPHONE ENCOUNTER
Spoke to patient and she is in the donut hole, she would have to pay out of pocket about $200 and is need of samples. She would like to pick them up on 11/11/20 appointment date with ERICKA. Would like 1 month. At this time we are out of samples. I left patient a detailed message.

## 2020-11-02 NOTE — TELEPHONE ENCOUNTER
Medication Samples    Medication: Xarelto    Dosage of the medication:20mg    How are you taking this medication (QD, BID, TID, QID, PRN): QD     in the office or Mail to your home?  in office    Please advise patient. [phone]. Thank you.

## 2020-11-03 ENCOUNTER — TELEPHONE (OUTPATIENT)
Dept: INTERNAL MEDICINE CLINIC | Age: 70
End: 2020-11-03

## 2020-11-04 NOTE — PROGRESS NOTES
[employer]   Advanced Heart Failure/Pulmonary Hypertension  Cardiac Follow up       Manny Colindres  YOB: 1950     Date of Visit:  11/11/20     Chief Complaint   Patient presents with    Follow-up     4 month f/u; device check    Congestive Heart Failure     low 98/55    Other     knee pain both legs, not sleeping due to pain    Shortness of Breath     since ablation, weight gain at least 20 pds. History of present illness: Manny Colindres is a 78 y.o. female with past medical history significant for HTN, HUSSAIN on CPAP, multiple PE on Xarelto (8/2016). She original had a PE was treated with xarelto for recommended time and then off it and her RHC revealed pressures no RH elevated pressures. Afterwards she developed AFib and restarted on xarelto. She continues on xarelto and treatment for afib. Echos in July and August (2016) showed RVSP 106-112 without evidence of enlargement in right side of heart. RHC was done 9/2/16 and PA pressure was 24, no evidence of pulmonary hypertension. Since then, her RVSP has decreased and got back to normal. We discussed that we are questioning if the elevated ones by echo may have been due to a false positive test for her. She was seen by Dr Yvette Chou 8/1 and he is managing her hypothyroidism. Ophelia, the thyroid medication was stopped. She was noted to have negative thyroid ultrasound ( July 2018). She wears her CPAP consistently for her HUSSAIN. States she had a reaction to Simvastatin and Livalo with muscle pain. She states she is Prediabetic and has not lost weight. She was admitted 1/13/2020 with palpitations/light-headedness along with SOB and chest pressure. She underwent successful DCCV and was put on Rhythmol -- this caused her HR to drop into the 40's which made her feel bad. She was discharged on diltiazem 240mg qd which causes her to feel fatigued and dizzy. She remains on Xarelto. Her lisinopril was stopped.     She had an AF ablation on 3/27/2020. On 4/2/20 she reported that she was back in atrial fib with rates ranging from 80 to 130. She did not want to go to the ED. She was instructed to resume her amiodarone and cardizem. ILR implanted 8/17/20. Today, she is here for regular follow up. She reports that her breathing seems more labored especially at night when she \"slows down\"; also her B/P is low in the evenings 90's/60's. Her O2 sats run low 90's/high 80's. This has all been since the ablation. She is on steroid inhalers for her asthma. Sometimes she is SOB with walking any distance, breathes heavy. Her weight is stable by our scale. She denies exertional chest pain, PND, palpitations, light-headedness, edema. She states she has had no a. fib recently. She reports bad pains in both knees and is unsure what she can take to help alleviate it. Prior to Visit Medications    Medication Sig Taking?  Authorizing Provider   rivaroxaban (XARELTO) 20 MG TABS tablet Take 1 tablet by mouth daily (with breakfast) Yes Alf Joshua MD   pramipexole (MIRAPEX) 0.5 MG tablet 1.5 tab q 5 PM and 1.5 tab qHS Yes Tiffanie R Kehrt, APRN - CNP   dilTIAZem (CARDIZEM) 30 MG tablet TAKE ONE TABLET BY MOUTH FOUR TIMES A DAY AS NEEDED FOR FAST HEARTBEAT GREATER THAN 100 AND PALPITATIONS Yes BRICE Presley CNP   potassium chloride (KLOR-CON M) 10 MEQ extended release tablet TAKE ONE TABLET BY MOUTH DAILY Yes BRICE Kilgore CNP   rivaroxaban (XARELTO) 20 MG TABS tablet Take 1 tablet by mouth Daily with supper Yes BRICE Presley CNP   Handicap Placard MISC by Does not apply route Exp 5 years Yes BRICE Kilgore CNP   furosemide (LASIX) 40 MG tablet Take 1 tablet by mouth daily Yes BRICE Fu CNP   Multiple Vitamins-Minerals (THERAPEUTIC MULTIVITAMIN-MINERALS) tablet Take 1 tablet by mouth daily Yes Historical Provider, MD   spironolactone (ALDACTONE) 25 MG tablet Take 1 tablet by mouth daily Yes Oswaldo Menjivar, APRN - CNP lisinopril (PRINIVIL;ZESTRIL) 20 MG tablet Take 0.5 tablets by mouth nightly  Patient taking differently: Take 20 mg by mouth nightly  Yes BRICE Lester CNP   fluticasone (FLOVENT HFA) 110 MCG/ACT inhaler Inhale 1 puff into the lungs 2 times daily Yes Historical Provider, MD   CPAP Machine MISC by Does not apply route Yes Historical Provider, MD   tiotropium (SPIRIVA) 18 MCG inhalation capsule Inhale 18 mcg into the lungs daily X 10 days Yes Historical Provider, MD   azelastine (ASTELIN) 0.1 % nasal spray 1 spray by Nasal route 2 times daily 1 Spray in each nostril Yes Camron Ledesma MD   Magnesium Citrate 100 MG TABS Take 1 tablet by mouth daily Yes Wally Arrington MD   EPIPEN 2-MIR 0.3 MG/0.3ML SOAJ injection  Yes Historical Provider, MD       Social History     Tobacco Use    Smoking status: Former Smoker     Packs/day: 0.50     Years: 5.00     Pack years: 2.50     Last attempt to quit: 10/5/2013     Years since quittin.1    Smokeless tobacco: Never Used    Tobacco comment: smoked for a total of 5yr total   Substance Use Topics    Alcohol use: Yes     Comment: RARE    Drug use:  No            Past Medical History:   Diagnosis Date    Allergic     Anesthesia complication     family hx-father-at at age 80 having hip replacement revision surgery-had tia's x2 while under anes-but also had hx chf-had dificuly with speech when coming out from anes    Anxiety     Arthritis     left ankle, bilateral hands    Arthritis of left hip 2016    Arthritis of right hip 2016    Asthma     At risk for falls     uses a cane    Atrial fibrillation with rapid ventricular response (HCC)     Atrial flutter ([address] 2018    Chronic maxillary sinusitis 2017    CTEPH (chronic thromboembolic pulmonary hypertension) ([address] 2016    Depression     pt stated r/t bad marriage/alcoholic spouse    Disc disease, degenerative, lumbar or lumbosacral 2017    Hepatic steatosis 2019    History of total hip arthroplasty 2017    Hypertension     Leg cramps     patient states very severe, requires immediate potassium administration    Migraines, basilar     last migraine 10 years ago    Mild persistent asthma without complication 2532    Obesity, Class III, BMI 40-49.9 (morbid obesity) ([address] 2016    HUSSAIN (obstructive sleep apnea) 10/14/2013    Osteoarthritis, hip, bilateral     Bilateral hip arthroplasty    Panic attacks     Pneumonia     Primary osteoarthritis of both knees 2017    Primary osteoarthritis of left knee 12/15/2016    Pulmonary emboli ([address] 2016    Pulmonary HTN ([address] 2016    Pure hypercholesterolemia 2019    Restless leg syndrome     Rhinitis, chronic 3/26/2014    Sleep apnea     wears CPAP @11    Stress incontinence     Tear of left rotator cuff 2018    Thyroid disease     hypothyroid    Wears glasses      Past Surgical History:   Procedure Laterality Date    ATRIAL ABLATION SURGERY      BACK SURGERY      LUMBAR FUSION    CATARACT REMOVAL Bilateral     COLONOSCOPY      EYE SURGERY Right     retinal tear repaired   [facility]    right ring finger severe laceration, fracture    HIP ARTHROPLASTY Right 16    HYSTERECTOMY      JOINT REPLACEMENT Left 16    left hip    TONSILLECTOMY  1972     Social History     Tobacco Use    Smoking status: Former Smoker     Packs/day: 0.50     Years: 5.00     Pack years: 2.50     Last attempt to quit: 10/5/2013     Years since quittin.1    Smokeless tobacco: Never Used    Tobacco comment: smoked for a total of 5yr total   Substance Use Topics    Alcohol use: Yes     Comment: RARE       Review of Systems:   Constitutional: No significant change in weight, fatigue or weakness. HEENT: No change in vision or ringing in the ears. Respiratory: +LUGO, no PND, orthopnea or cough.    Cardiovascular: See HPI   GI: No n/v, abdominal pain or changes in bowel habits. No melena, no hematochezia  : No dysuria or hematuria. Skin: No rash or new skin lesions. Musculoskeletal:   Neurological: No lightheadedness, dizziness, syncope or TIA-like symptoms. Psychiatric: No anxiety, insomnia or depression    Physical Exam:  Vitals:    11/11/20 0941   BP: 110/64   Site: Right Upper Arm   Position: Sitting   Cuff Size: Large Adult   Pulse: 84   SpO2: 96%   Weight: 248 lb (112.5 kg)   Height: 5' 4\" (1.626 m)     Constitutional and General Appearance: Stable   WD/WN in NAD  HEENT:  NC/AT  GENET  No problems with hearing  Respiratory:  · Normal excursion and expansion without use of accessory muscles  · Resp Auscultation: Normal breath sounds without dullness  Cardiovascular:  · The apical impulses not displaced  · Heart tones are crisp and normal  · Cervical veins are not engorged  · The carotid upstroke is normal in amplitude and contour without delay or bruit  · JVP < 8 cm H2O  RRR with nl S1 and S2 without m,r,g  · Peripheral pulses are symmetrical and full  · There is no clubbing, cyanosis of the extremities. · Trace edema 1+  · Femoral Arteries: 2+ and equal  · Pedal Pulses: 2+ and equal   Abdomen:  · No masses or tenderness  · Liver/Spleen: No Abnormalities Noted  Neurological/Psychiatric:  · Alert and oriented in all spheres  · Moves all extremities well  · Exhibits normal gait balance and coordination  · No abnormalities of mood, affect, memory, mentation, or behavior are noted    Labs were reviewed including labs from other hospital systems through [facility]. Cardiac testing was reviewed including echos, nuclear scans, cardiac catheterization, including from other hospital systems through [facility].       Labs:   Lab Results   Component Value Date    WBC 5.3 07/10/2020    HGB 13.3 07/10/2020    HCT 39.6 07/10/2020    MCV 95.9 07/10/2020     07/10/2020     Lab Results   Component Value Date     09/18/2020    K 4.3 09/18/2020    K 3.7 02/02/2020     09/18/2020    CO2 17 09/18/2020    BUN 28 09/18/2020    CREATININE 0.9 09/18/2020    GLUCOSE 99 09/18/2020    GLUCOSE 100 03/27/2012    CALCIUM 9.8 09/18/2020      Lab Results   Component Value Date    TRIG 92 05/04/2020    HDL 66 05/04/2020    LDLCALC 124 05/04/2020    LABVLDL 18 05/04/2020     Diagnostic Testing:  Procedure performed:  3-  · Electrophysiology study with left atrial recording and mapping   · 3-D electroanatomical mapping of the left atrium and  right atium. · Electrophysiological study(EPS) and induction after IV drug infusion  · Transseptal puncture through an intact septum . · Intracardiac echocardiography. · Radiofrequency ablation of atrial fibrillation and pulmonary veins isolation   · Ablation of CTI depndent flutter as a separate mechanism  · Ultrasound for access    ECHO 8/20/2019   -Left ventricular cavity size is normal.   -There is mild left ventricular hypertrophy.   -Ejection fraction is visually estimated to be 55-60%. -No wall motion abnormalities.   -Normal diastolic function.   -Trivial mitral regurgitation.   -Mild tricuspid regurgitation     Calcium CT Score 2/19/2019   Total Agatston calcium score of 51.    24 hour holter and it revealed SR/PVCs, no afib or arrhythmias. Echo 4/26/18  Left ventricular cavity size is normal.  There is mild left ventricular hypertrophy. Ejection fraction is visually estimated to be 55-60%. Normal diastolic function. Mild tricuspid regurgitation with RVSP of 36 mm/hg    VQ scan 8/30/17  Low Probability for Pulmonary Embolus. Echo 8/28/17  Normalleft ventricle size and systolic function with an estimated ejection fraction of 60%. Mild mitral regurgitation is present. There is mild-moderate tricuspid regurgitation with RVSP estimated at 88 mmHg. This is suggestive of severe pulmonary hypertension. Mild pulmonic regurgitation present.     Echo 9/1/2016:  Technically limited examination to evaluate 2. Paroxysmal atrial fibrillation: HR regular & controlled. Continues on Xarelto. -ILR placed 8/17/20  -Had an Ablation of atrial fib on 3/27/2020 per Dr. Vini Moran. -DCCV 1/13/2020 (Dr. Layo Benitez)   -She has noticed more short of breath since ablation. Unclear if SOB is related to recurrent AF episodes. 3. Benign essential HTN: Stable. She reports 90's/60's in the evenings. Reduce Lisinopril from 20mg to 10mg qd. /64 (Site: Right Upper Arm, Position: Sitting, Cuff Size: Large Adult)   Pulse 84   Ht 5' 4\" (1.626 m)   Wt 248 lb (112.5 kg)   SpO2 96%   BMI 42.57 kg/m²          4. HUSSAIN (obstructive sleep apnea):  Wearing CPAP faithfully. 5. Symptomatic bradycardia:  NSR today rate of  84. Pure hypercholesterolemia:  5/4/20 > , HDL 66, , TG 92. She decided not to start Zetia. Mother had CAD. CT calcium score 51. Could not tolerate statin or Livalo. Plan:  1. Reduce Lisinopril from 20mg to 10mg qd given lowish blood pressure  2. BMP/BNP/CBC q2 months. Also needs lipid panel/A1c with next blood draw. 3. Recommend ice packs for her knee pain (she already has done this). Recommend discussing with PCP about taking gabapentin or non-cardiac pain meds. 4. RTO March 2021 with ECHO same day      QUALITY MEASURES  1. Tobacco Cessation Counseling: N/A  2. BP retake if >140/90: N/A  3. Communication to PCP: Office note forwarded/faxed to PCP  4. CAD antiplatelet therapy: N/A  5. CAD lipid lowering therapy: N/A  6. HF A. Fib on anticoagulation: N/A     Thank you for allowing me to participate in the care of your patient. Jayda Vidal M.D., [facility] [place]    Scribe's attestation: This note was scribed in the presence of Dr. Marissa Quinones MD, by Marlon Contreras RN. The scribe's documentation has been prepared under my direction and personally reviewed by me in its entirety.   I confirm that the note above accurately reflects all work, treatment, procedures, and medical decision making performed by me. Spent 50 minutes with patient reviewing chart, examining patient, and developing plan of care. I have spent  40  minutes of face to face time with the patient with more than 50%  spent  counseling and coordinating care for atrial fibrillation, diastolic HF, sleep apnea, and planning for echo next OV.

## 2020-11-11 ENCOUNTER — NURSE ONLY (OUTPATIENT)
Dept: CARDIOLOGY CLINIC | Age: 70
End: 2020-11-11
Payer: MEDICARE

## 2020-11-11 ENCOUNTER — OFFICE VISIT (OUTPATIENT)
Dept: CARDIOLOGY CLINIC | Age: 70
End: 2020-11-11
Payer: MEDICARE

## 2020-11-11 VITALS
DIASTOLIC BLOOD PRESSURE: 64 MMHG | HEART RATE: 84 BPM | OXYGEN SATURATION: 96 % | HEIGHT: 64 IN | WEIGHT: 248 LBS | SYSTOLIC BLOOD PRESSURE: 110 MMHG | BODY MASS INDEX: 42.34 KG/M2

## 2020-11-11 PROCEDURE — 4040F PNEUMOC VAC/ADMIN/RCVD: CPT | Performed by: INTERNAL MEDICINE

## 2020-11-11 PROCEDURE — 3017F COLORECTAL CA SCREEN DOC REV: CPT | Performed by: INTERNAL MEDICINE

## 2020-11-11 PROCEDURE — G8427 DOCREV CUR MEDS BY ELIG CLIN: HCPCS | Performed by: INTERNAL MEDICINE

## 2020-11-11 PROCEDURE — G8399 PT W/DXA RESULTS DOCUMENT: HCPCS | Performed by: INTERNAL MEDICINE

## 2020-11-11 PROCEDURE — 1090F PRES/ABSN URINE INCON ASSESS: CPT | Performed by: INTERNAL MEDICINE

## 2020-11-11 PROCEDURE — 93285 PRGRMG DEV EVAL SCRMS IP: CPT | Performed by: INTERNAL MEDICINE

## 2020-11-11 PROCEDURE — 1036F TOBACCO NON-USER: CPT | Performed by: INTERNAL MEDICINE

## 2020-11-11 PROCEDURE — G8484 FLU IMMUNIZE NO ADMIN: HCPCS | Performed by: INTERNAL MEDICINE

## 2020-11-11 PROCEDURE — 99215 OFFICE O/P EST HI 40 MIN: CPT | Performed by: INTERNAL MEDICINE

## 2020-11-11 PROCEDURE — G8417 CALC BMI ABV UP PARAM F/U: HCPCS | Performed by: INTERNAL MEDICINE

## 2020-11-11 PROCEDURE — 1123F ACP DISCUSS/DSCN MKR DOCD: CPT | Performed by: INTERNAL MEDICINE

## 2020-11-11 RX ORDER — LISINOPRIL 10 MG/1
10 TABLET ORAL NIGHTLY
Qty: 90 TABLET | Refills: 3
Start: 2020-11-11 | End: 2021-01-04 | Stop reason: SDUPTHER

## 2020-11-11 NOTE — PROGRESS NOTES
Patient comes in for interrogation of their implanted loop recorder. Interrogation shows no arrhythmias. Implanted for AF management/palpitations. Patient remains on Xarelto and Cardizem. Today we turned AT/AF detection to AF only and episodes to All. Please see interrogation for more detail. We will continue to follow the Patient remotely.

## 2020-11-11 NOTE — PATIENT INSTRUCTIONS
1. Decrease Lisinopril to 10mg every day. 2. Lab work every 2 months. Needs fasting labs with next blood draw. 3. Ask Dr. Shena Quinteros about taking gabapentin for knee pain.

## 2020-11-13 ENCOUNTER — OFFICE VISIT (OUTPATIENT)
Dept: INTERNAL MEDICINE CLINIC | Age: 70
End: 2020-11-13
Payer: MEDICARE

## 2020-11-13 VITALS
SYSTOLIC BLOOD PRESSURE: 102 MMHG | HEIGHT: 64 IN | BODY MASS INDEX: 42.17 KG/M2 | DIASTOLIC BLOOD PRESSURE: 60 MMHG | HEART RATE: 76 BPM | OXYGEN SATURATION: 98 % | TEMPERATURE: 97.7 F | WEIGHT: 247 LBS

## 2020-11-13 DIAGNOSIS — I50.32 CHRONIC DIASTOLIC HEART FAILURE (HCC): ICD-10-CM

## 2020-11-13 DIAGNOSIS — E78.00 PURE HYPERCHOLESTEROLEMIA: Chronic | ICD-10-CM

## 2020-11-13 DIAGNOSIS — R06.02 SOB (SHORTNESS OF BREATH): ICD-10-CM

## 2020-11-13 DIAGNOSIS — I10 BENIGN ESSENTIAL HTN: ICD-10-CM

## 2020-11-13 DIAGNOSIS — I48.0 PAROXYSMAL ATRIAL FIBRILLATION (HCC): ICD-10-CM

## 2020-11-13 DIAGNOSIS — Z13.1 DIABETES MELLITUS SCREENING: ICD-10-CM

## 2020-11-13 LAB
ANION GAP SERPL CALCULATED.3IONS-SCNC: 10 MMOL/L (ref 3–16)
BASOPHILS ABSOLUTE: 0.1 K/UL (ref 0–0.2)
BASOPHILS RELATIVE PERCENT: 0.8 %
BUN BLDV-MCNC: 26 MG/DL (ref 7–20)
CALCIUM SERPL-MCNC: 9.5 MG/DL (ref 8.3–10.6)
CHLORIDE BLD-SCNC: 101 MMOL/L (ref 99–110)
CHOLESTEROL, TOTAL: 191 MG/DL (ref 0–199)
CO2: 21 MMOL/L (ref 21–32)
CREAT SERPL-MCNC: 0.7 MG/DL (ref 0.6–1.2)
EOSINOPHILS ABSOLUTE: 0.1 K/UL (ref 0–0.6)
EOSINOPHILS RELATIVE PERCENT: 1.1 %
GFR AFRICAN AMERICAN: >60
GFR NON-AFRICAN AMERICAN: >60
GLUCOSE BLD-MCNC: 112 MG/DL (ref 70–99)
HCT VFR BLD CALC: 38.9 % (ref 36–48)
HDLC SERPL-MCNC: 61 MG/DL (ref 40–60)
HEMOGLOBIN: 13 G/DL (ref 12–16)
LDL CHOLESTEROL CALCULATED: 107 MG/DL
LYMPHOCYTES ABSOLUTE: 1.2 K/UL (ref 1–5.1)
LYMPHOCYTES RELATIVE PERCENT: 19.5 %
MCH RBC QN AUTO: 31.8 PG (ref 26–34)
MCHC RBC AUTO-ENTMCNC: 33.5 G/DL (ref 31–36)
MCV RBC AUTO: 95.2 FL (ref 80–100)
MONOCYTES ABSOLUTE: 0.6 K/UL (ref 0–1.3)
MONOCYTES RELATIVE PERCENT: 9.3 %
NEUTROPHILS ABSOLUTE: 4.4 K/UL (ref 1.7–7.7)
NEUTROPHILS RELATIVE PERCENT: 69.3 %
PDW BLD-RTO: 14 % (ref 12.4–15.4)
PLATELET # BLD: 269 K/UL (ref 135–450)
PMV BLD AUTO: 9.1 FL (ref 5–10.5)
POTASSIUM SERPL-SCNC: 4.4 MMOL/L (ref 3.5–5.1)
RBC # BLD: 4.08 M/UL (ref 4–5.2)
SODIUM BLD-SCNC: 132 MMOL/L (ref 136–145)
TRIGL SERPL-MCNC: 113 MG/DL (ref 0–150)
VLDLC SERPL CALC-MCNC: 23 MG/DL
WBC # BLD: 6.3 K/UL (ref 4–11)

## 2020-11-13 PROCEDURE — G8431 POS CLIN DEPRES SCRN F/U DOC: HCPCS | Performed by: INTERNAL MEDICINE

## 2020-11-13 PROCEDURE — G8484 FLU IMMUNIZE NO ADMIN: HCPCS | Performed by: INTERNAL MEDICINE

## 2020-11-13 PROCEDURE — 1123F ACP DISCUSS/DSCN MKR DOCD: CPT | Performed by: INTERNAL MEDICINE

## 2020-11-13 PROCEDURE — 3017F COLORECTAL CA SCREEN DOC REV: CPT | Performed by: INTERNAL MEDICINE

## 2020-11-13 PROCEDURE — 4040F PNEUMOC VAC/ADMIN/RCVD: CPT | Performed by: INTERNAL MEDICINE

## 2020-11-13 PROCEDURE — G0439 PPPS, SUBSEQ VISIT: HCPCS | Performed by: INTERNAL MEDICINE

## 2020-11-13 RX ORDER — SEMAGLUTIDE 1.34 MG/ML
0.5 INJECTION, SOLUTION SUBCUTANEOUS WEEKLY
Qty: 1 PEN | Refills: 0 | COMMUNITY
Start: 2020-11-13 | End: 2021-01-24

## 2020-11-13 RX ORDER — SEMAGLUTIDE 1.34 MG/ML
0.5 INJECTION, SOLUTION SUBCUTANEOUS WEEKLY
Qty: 1 PEN | Refills: 0
Start: 2020-11-13 | End: 2021-04-12

## 2020-11-13 RX ORDER — BUPROPION HYDROCHLORIDE 150 MG/1
150 TABLET ORAL EVERY MORNING
Qty: 30 TABLET | Refills: 5 | Status: SHIPPED | OUTPATIENT
Start: 2020-11-13 | End: 2020-12-28

## 2020-11-13 ASSESSMENT — COLUMBIA-SUICIDE SEVERITY RATING SCALE - C-SSRS
1. WITHIN THE PAST MONTH, HAVE YOU WISHED YOU WERE DEAD OR WISHED YOU COULD GO TO SLEEP AND NOT WAKE UP?: NO
2. HAVE YOU ACTUALLY HAD ANY THOUGHTS OF KILLING YOURSELF?: NO
6. HAVE YOU EVER DONE ANYTHING, STARTED TO DO ANYTHING, OR PREPARED TO DO ANYTHING TO END YOUR LIFE?: NO

## 2020-11-13 ASSESSMENT — PATIENT HEALTH QUESTIONNAIRE - PHQ9
SUM OF ALL RESPONSES TO PHQ QUESTIONS 1-9: 13
4. FEELING TIRED OR HAVING LITTLE ENERGY: 3
1. LITTLE INTEREST OR PLEASURE IN DOING THINGS: 0
9. THOUGHTS THAT YOU WOULD BE BETTER OFF DEAD, OR OF HURTING YOURSELF: 0
2. FEELING DOWN, DEPRESSED OR HOPELESS: 3
SUM OF ALL RESPONSES TO PHQ QUESTIONS 1-9: 13
5. POOR APPETITE OR OVEREATING: 2
3. TROUBLE FALLING OR STAYING ASLEEP: 3
SUM OF ALL RESPONSES TO PHQ9 QUESTIONS 1 & 2: 3
7. TROUBLE CONCENTRATING ON THINGS, SUCH AS READING THE NEWSPAPER OR WATCHING TELEVISION: 1
SUM OF ALL RESPONSES TO PHQ QUESTIONS 1-9: 13
8. MOVING OR SPEAKING SO SLOWLY THAT OTHER PEOPLE COULD HAVE NOTICED. OR THE OPPOSITE, BEING SO FIGETY OR RESTLESS THAT YOU HAVE BEEN MOVING AROUND A LOT MORE THAN USUAL: 1
10. IF YOU CHECKED OFF ANY PROBLEMS, HOW DIFFICULT HAVE THESE PROBLEMS MADE IT FOR YOU TO DO YOUR WORK, TAKE CARE OF THINGS AT HOME, OR GET ALONG WITH OTHER PEOPLE: 2
6. FEELING BAD ABOUT YOURSELF - OR THAT YOU ARE A FAILURE OR HAVE LET YOURSELF OR YOUR FAMILY DOWN: 0

## 2020-11-13 ASSESSMENT — LIFESTYLE VARIABLES
HAS A RELATIVE, FRIEND, DOCTOR, OR ANOTHER HEALTH PROFESSIONAL EXPRESSED CONCERN ABOUT YOUR DRINKING OR SUGGESTED YOU CUT DOWN: 0
AUDIT TOTAL SCORE: 2
HOW OFTEN DURING THE LAST YEAR HAVE YOU NEEDED AN ALCOHOLIC DRINK FIRST THING IN THE MORNING TO GET YOURSELF GOING AFTER A NIGHT OF HEAVY DRINKING: 0
HAVE YOU OR SOMEONE ELSE BEEN INJURED AS A RESULT OF YOUR DRINKING: 0
HOW OFTEN DURING THE LAST YEAR HAVE YOU FOUND THAT YOU WERE NOT ABLE TO STOP DRINKING ONCE YOU HAD STARTED: 0
HOW OFTEN DURING THE LAST YEAR HAVE YOU BEEN UNABLE TO REMEMBER WHAT HAPPENED THE NIGHT BEFORE BECAUSE YOU HAD BEEN DRINKING: 0
AUDIT-C TOTAL SCORE: 2
HOW OFTEN DURING THE LAST YEAR HAVE YOU HAD A FEELING OF GUILT OR REMORSE AFTER DRINKING: 0
HOW OFTEN DO YOU HAVE A DRINK CONTAINING ALCOHOL: 2
HOW OFTEN DO YOU HAVE SIX OR MORE DRINKS ON ONE OCCASION: 0
HOW MANY STANDARD DRINKS CONTAINING ALCOHOL DO YOU HAVE ON A TYPICAL DAY: 0
HOW OFTEN DURING THE LAST YEAR HAVE YOU FAILED TO DO WHAT WAS NORMALLY EXPECTED FROM YOU BECAUSE OF DRINKING: 0

## 2020-11-13 NOTE — PATIENT INSTRUCTIONS
Personalized Preventive Plan for Stef Mcmahon - 11/13/2020  Medicare offers a range of preventive health benefits. Some of the tests and screenings are paid in full while other may be subject to a deductible, co-insurance, and/or copay. Some of these benefits include a comprehensive review of your medical history including lifestyle, illnesses that may run in your family, and various assessments and screenings as appropriate. After reviewing your medical record and screening and assessments performed today your provider may have ordered immunizations, labs, imaging, and/or referrals for you. A list of these orders (if applicable) as well as your Preventive Care list are included within your After Visit Summary for your review. Other Preventive Recommendations:    · A preventive eye exam performed by an eye specialist is recommended every 1-2 years to screen for glaucoma; cataracts, macular degeneration, and other eye disorders. · A preventive dental visit is recommended every 6 months. · Try to get at least 150 minutes of exercise per week or 10,000 steps per day on a pedometer . · Order or download the FREE \"Exercise & Physical Activity: Your Everyday Guide\" from [employer]. Call 3-443.473.2206 or search [employer] online. · You need 8792-3305 mg of calcium and 6122-1672 IU of vitamin D per day. It is possible to meet your calcium requirement with diet alone, but a vitamin D supplement is usually necessary to meet this goal.  · When exposed to the sun, use a sunscreen that protects against both UVA and UVB radiation with an SPF of 30 or greater. Reapply every 2 to 3 hours or after sweating, drying off with a towel, or swimming. · Always wear a seat belt when traveling in a car. Always wear a helmet when riding a bicycle or motorcycle.

## 2020-11-13 NOTE — PROGRESS NOTES
8/26/2016    Depression     pt stated r/t bad marriage/alcoholic spouse    Disc disease, degenerative, lumbar or lumbosacral 2/20/2017    Hepatic steatosis 4/26/2019    History of total hip arthroplasty 2/28/2017    Hypertension     Leg cramps     patient states very severe, requires immediate potassium administration    Migraines, basilar     last migraine 10 years ago    Mild persistent asthma without complication 7/60/2732    Obesity, Class III, BMI 40-49.9 (morbid obesity) ([address] 4/19/2016    HUSSAIN (obstructive sleep apnea) 10/14/2013    Osteoarthritis, hip, bilateral 2016    Bilateral hip arthroplasty    Panic attacks     Pneumonia 2015    Primary osteoarthritis of both knees 9/19/2017    Primary osteoarthritis of left knee 12/15/2016    Pulmonary emboli ([address] 8/4/2016    Pulmonary HTN ([address] 7/21/2016    Pure hypercholesterolemia 2/13/2019    Restless leg syndrome     Rhinitis, chronic 3/26/2014    Sleep apnea     wears CPAP @11    Stress incontinence     Tear of left rotator cuff 1/23/2018    Thyroid disease     hypothyroid    Wears glasses        Past Surgical History:   Procedure Laterality Date    ATRIAL ABLATION SURGERY      BACK SURGERY  2004    LUMBAR FUSION    CATARACT REMOVAL Bilateral     COLONOSCOPY      EYE SURGERY Right 2010    retinal tear repaired   [facility]    right ring finger severe laceration, fracture    HIP ARTHROPLASTY Right 4-19-16    HYSTERECTOMY  1993    JOINT REPLACEMENT Left 2/2/16    left hip    TONSILLECTOMY  1972       Family History   Problem Relation Age of Onset    Alcohol Abuse Sister     Arthritis Sister     Stroke Maternal Grandmother     Thyroid Disease Maternal Grandmother     Thyroid Disease Maternal Grandfather     Heart Disease Maternal Grandfather     Alcohol Abuse Maternal Grandfather     Substance Abuse Maternal Grandfather     Hypertension Mother     Thyroid Disease Mother     Anxiety Disorder Mother    Leroy Arthritis Mother     Cataracts Mother     Heart Disease Mother     Asthma Mother     Thyroid Disease Father     Heart Failure Father     Heart Disease Father     Arthritis Brother     High Cholesterol Brother     Heart Disease Maternal Uncle     Heart Disease Paternal Uncle     Cancer Paternal Uncle     Alcohol Abuse Paternal Grandfather     Substance Abuse Paternal Grandfather        CareTeam (Including outside providers/suppliers regularly involved in providing care):   Patient Care Team:  Feliciano Bull MD as PCP - General (Internal Medicine)  Brianna Lan MD as PCP - Hematology/Oncology (Hematology and Oncology)  Feliciano Bull MD as PCP - [facility]  Alondra Villafana MD as Consulting Physician (Sleep Medicine)  Leno Thornton MD as Referring Physician (Cardiology)  BRICE Burr - CNP as Nurse Practitioner (Sleep Medicine)  Constantino Ma RN as [address] [place] [address] Readings from Last 3 Encounters:   11/13/20 247 lb (112 kg)   11/11/20 248 lb (112.5 kg)   10/12/20 248 lb (112.5 kg)     Vitals:    11/13/20 1438   BP: 102/60   Pulse: 76   Temp: 97.7 °F (36.5 °C)   TempSrc: Infrared   SpO2: 98%   Weight: 247 lb (112 kg)   Height: 5' 4\" (1.626 m)     Body mass index is 42.4 kg/m². Based upon direct observation of the patient, evaluation of cognition reveals recent and remote memory intact. Physical Exam  Vitals signs reviewed. Constitutional:       General: She is not in acute distress. Appearance: She is well-developed. She is not diaphoretic. HENT:      Head: Normocephalic and atraumatic. Cardiovascular:      Rate and Rhythm: Normal rate and regular rhythm. Pulses: Normal pulses. Heart sounds: Normal heart sounds. No murmur. No friction rub. No gallop. Pulmonary:      Effort: Pulmonary effort is normal. No respiratory distress. Breath sounds: Normal breath sounds. No stridor. No wheezing, rhonchi or rales.    Chest:      Chest wall: No tenderness. Neurological:      Mental Status: She is alert and oriented to person, place, and time. Cranial Nerves: No cranial nerve deficit. Psychiatric:         Behavior: Behavior normal.         Thought Content: Thought content normal.         Judgment: Judgment normal.       Patient's complete Health Risk Assessment and screening values have been reviewed and are found in Flowsheets. The following problems were reviewed today and where indicated follow up appointments were made and/or referrals ordered. Positive Risk Factor Screenings with Interventions:     Depression:  PHQ-2 Score: 3  PHQ-9 Total Score: 13    Severity:1-4 = minimal depression, 5-9 = mild depression, 10-14 = moderate depression, 15-19 = moderately severe depression, 20-27 = severe depression  Depression Interventions:  · Medication prescribed- Wellbutrin    General Health and ACP:  General  In general, how would you say your health is?: Good  In the past 7 days, have you experienced any of the following?  New or Increased Pain, New or Increased Fatigue, Loneliness, Social Isolation, Stress or Anger?: (!) New or Increased Pain, Loneliness, Anger, Stress, Social Isolation  Do you get the social and emotional support that you need?: Yes  Do you have a Living Will?: Yes  Advance Directives     Power of  Living Will ACP-Advance Directive ACP-Power of     Not on File [place] on 03/27/20 Filed [address] [facility] Risk Interventions:  · Pain issues: home exercises provided, physical therapy referral ordered  · Loneliness: patient's comments regarding inadequate social support: Pandemic related  · Social isolation: patient's comments regarding inadequate social support: Pandemic related  · Stress: patient's comments regarding reasons for stress and/or anger: Pandemic Related  · Anger: patient's comments regarding reasons for stress and/or anger: Pandemic related    Health Habits/Nutrition:  Health Habits/Nutrition  Do you exercise for at least 20 minutes 2-3 times per week?: (!) No  Have you lost any weight without trying in the past 3 months?: No  Do you eat fewer than 2 meals per day?: No  Have you seen a dentist within the past year?: Yes  Body mass index: (!) 42.39  Health Habits/Nutrition Interventions:  · Inadequate physical activity:  patient is not ready to increase his/her physical activity level at this time    ADL:  ADLs  In the past 7 days, did you need help from others to perform any of the following everyday activities? Eating, dressing, grooming, bathing, toileting, or walking/balance?: None  In the past 7 days, did you need help from others to take care of any of the following?  Laundry, housekeeping, banking/finances, shopping, telephone use, food preparation, transportation, or taking medications?: (!) Housekeeping  ADL Interventions:  · Patient declines any further evaluation/treatment for this issue    Personalized Preventive Plan   Current Health Maintenance Status  Immunization History   Administered Date(s) Administered    Influenza Virus Vaccine 10/05/2011    Influenza, High Dose (Fluzone 65 yrs and older) 10/04/2019    Influenza, Intradermal, Preservative free 10/02/2012, 09/16/2013, 10/07/2014, 10/08/2015    Influenza, Quadv, IM, PF (6 mo and older Fluzone, Flulaval, Fluarix, and 3 yrs and older Afluria) 10/05/2016    Influenza, Quadv, adjuvanted, 65 yrs +, IM, PF (Fluad) 10/12/2020    Pneumococcal Conjugate 13-valent ([id number]) 09/01/2017    Pneumococcal Conjugate 7-valent (Prevnar7) 10/20/2012    Pneumococcal Polysaccharide ([id number]) 08/05/2016    Td, unspecified formulation 07/30/2007    Tdap (Boostrix, Adacel) 10/02/2012    Zoster Live (Zostavax) 07/19/2017, 08/01/2017        Health Maintenance   Topic Date Due    Shingles Vaccine (2 of 3) 09/26/2017    Breast cancer screen  06/21/2019    Annual Wellness Visit (AWV)  08/20/2019    A1C test (Diabetic or Prediabetic)  05/04/2021    TSH testing  05/04/2021    Pneumococcal 65+ years Vaccine (2 of 2 - PPSV23) 08/05/2021    Potassium monitoring  09/18/2021    Creatinine monitoring  09/18/2021    DTaP/Tdap/Td vaccine (2 - Td) 10/02/2022    Colon cancer screen fecal DNA test (Cologuard)  11/14/2022    Lipid screen  05/04/2025    DEXA (modify frequency per FRAX score)  Completed    Flu vaccine  Completed    Hepatitis C screen  Completed    Hepatitis A vaccine  Aged Out    Hepatitis B vaccine  Aged Out    Hib vaccine  Aged Out    Meningococcal (ACWY) vaccine  Aged Out     Recommendations for [place] Due: see orders and patient instructions/AVS.  . Recommended screening schedule for the next 5-10 years is provided to the patient in written form: see Patient Angela Tristan was seen today for medicare awv. Diagnoses and all orders for this visit:    Routine general medical examination at a health care facility    Morbid obesity ([facility]  -     Semaglutide,0.25 or 0.5MG/DOS, (OZEMPIC, 0.25 OR 0.5 MG/DOSE,) 2 MG/1.5ML SOPN; Inject 0.5 mg into the skin once a week Sample  -     Semaglutide,0.25 or 0.5MG/DOS, (OZEMPIC, 0.25 OR 0.5 MG/DOSE,) 2 MG/1.5ML SOPN; Inject 0.5 mg into the skin once a week    Benign essential HTN  Comments:  Chronic, controlled. Paroxysmal atrial fibrillation (HCC)  Comments:  On Xarelto    Orders:  -     rivaroxaban (XARELTO) 20 MG TABS tablet; Take 1 tablet by mouth daily (with breakfast)    Chronic diastolic heart failure [facility])  Comments:  Patient is seeing cardiology. Acquired hypothyroidism  Comments:  Chronic, controlled. Positive depression screening  -     Positive Screen for Clinical Depression with a Documented Follow-up Plan   -     buPROPion (WELLBUTRIN XL) 150 MG extended release tablet; Take 1 tablet by mouth every morning    Primary osteoarthritis of both knees  -     Cancel: XR KNEE LEFT (1-2 VIEWS); Future  -     XR KNEE LEFT (1-2 VIEWS);  Future  -     XR KNEE RIGHT

## 2020-11-14 LAB
ESTIMATED AVERAGE GLUCOSE: 128.4 MG/DL
HBA1C MFR BLD: 6.1 %
PRO-BNP: 132 PG/ML (ref 0–124)

## 2020-11-16 ENCOUNTER — TELEPHONE (OUTPATIENT)
Dept: CARDIOLOGY CLINIC | Age: 70
End: 2020-11-16

## 2020-11-16 NOTE — TELEPHONE ENCOUNTER
----- Message from BRICE Tate CNP sent at 11/16/2020  8:39 AM EST -----  Labs look good, no change in meds.  Melba Almanza

## 2020-12-03 ENCOUNTER — NURSE ONLY (OUTPATIENT)
Dept: CARDIOLOGY CLINIC | Age: 70
End: 2020-12-03

## 2020-12-03 ENCOUNTER — TELEPHONE (OUTPATIENT)
Dept: CARDIOLOGY CLINIC | Age: 70
End: 2020-12-03

## 2020-12-03 ENCOUNTER — VIRTUAL VISIT (OUTPATIENT)
Dept: CARDIOLOGY CLINIC | Age: 70
End: 2020-12-03
Payer: MEDICARE

## 2020-12-03 PROCEDURE — 1123F ACP DISCUSS/DSCN MKR DOCD: CPT | Performed by: INTERNAL MEDICINE

## 2020-12-03 PROCEDURE — 99214 OFFICE O/P EST MOD 30 MIN: CPT | Performed by: INTERNAL MEDICINE

## 2020-12-03 PROCEDURE — G8399 PT W/DXA RESULTS DOCUMENT: HCPCS | Performed by: INTERNAL MEDICINE

## 2020-12-03 PROCEDURE — G8427 DOCREV CUR MEDS BY ELIG CLIN: HCPCS | Performed by: INTERNAL MEDICINE

## 2020-12-03 PROCEDURE — 3017F COLORECTAL CA SCREEN DOC REV: CPT | Performed by: INTERNAL MEDICINE

## 2020-12-03 PROCEDURE — 4040F PNEUMOC VAC/ADMIN/RCVD: CPT | Performed by: INTERNAL MEDICINE

## 2020-12-03 PROCEDURE — 1090F PRES/ABSN URINE INCON ASSESS: CPT | Performed by: INTERNAL MEDICINE

## 2020-12-03 NOTE — PROGRESS NOTES
Patient sends manual remote transmission for VV with Dr. Duane Cruz. Carelink remote Linq report shows no arrhythmias/episodes. Implanted for AF management. Patient remains on Xarelto. Please see interrogation for more detail. We will continue to follow the Patient remotely.

## 2020-12-03 NOTE — PROGRESS NOTES
[address]   Electrophysiology    Date: 12/3/2020     Chief Complaint   Patient presents with    3 Month Follow-Up        HPI: Mich Jimenez is a 78 y.o. with a history of paroxsymal atrial fib, PE and is on xarelto, dCHF and HUSSAIN. She has had multiple admissions in the past for atrial fib, last cardioverted on 1/13/20. She was discharged on Rythmol which caused her HR to drop into the 40's and was symptomatic. Her most recent admission was 2/2/20. She presented to the ED after taking several doses of cardizem, but remained in atrial fib. She was started on amiodarone and converted to sinus rhythm.      3/27/20 EP study with RF ablation of atrial fib and ablation of CTI dependent flutter as a separate mechanism. On 4/2/20 she reported that she was back in atrial fib with rates ranging from 80 to 130. She did not want to go to the ED. She was instructed to resume her amiodarone and cardizem. 08/07/20 loop recorder implant. Interval history:    Peter Quezada presents today for virtual visit to follow for her atrial fibrillation. She has had no symptoms of atrial fibrillation. She only feels her heart rate go up when she is carrying heavy stops such as bags of groceries .     Past Medical History:   Diagnosis Date    Allergic     Anesthesia complication     family hx-father-at at age 80 having hip replacement revision surgery-had tia's x2 while under anes-but also had hx chf-had dificuly with speech when coming out from anes    Anxiety     Arthritis     left ankle, bilateral hands    Arthritis of left hip 2/2/2016    Arthritis of right hip 4/19/2016    Asthma     At risk for falls     uses a cane    Atrial fibrillation with rapid ventricular response (HCC)     Atrial flutter ([address] 4/25/2018    Chronic maxillary sinusitis 5/24/2017    CTEPH (chronic thromboembolic pulmonary hypertension) ([address] 8/26/2016    Depression     pt stated r/t bad marriage/alcoholic spouse    Disc disease, degenerative, lumbar or lumbosacral 2/20/2017    Hepatic steatosis 4/26/2019    History of total hip arthroplasty 2/28/2017    Hypertension     Leg cramps     patient states very severe, requires immediate potassium administration    Migraines, basilar     last migraine 10 years ago    Mild persistent asthma without complication 4/20/2994    Obesity, Class III, BMI 40-49.9 (morbid obesity) ([address] 4/19/2016    HUSSAIN (obstructive sleep apnea) 10/14/2013    Osteoarthritis, hip, bilateral 2016    Bilateral hip arthroplasty    Panic attacks     Pneumonia 2015    Primary osteoarthritis of both knees 9/19/2017    Primary osteoarthritis of left knee 12/15/2016    Pulmonary emboli ([address] 8/4/2016    Pulmonary HTN ([address] 7/21/2016    Pure hypercholesterolemia 2/13/2019    Restless leg syndrome     Rhinitis, chronic 3/26/2014    Sleep apnea     wears CPAP @11    Stress incontinence     Tear of left rotator cuff 1/23/2018    Thyroid disease     hypothyroid    Wears glasses         Past Surgical History:   Procedure Laterality Date    ATRIAL ABLATION SURGERY      BACK SURGERY  2004    LUMBAR FUSION    CATARACT REMOVAL Bilateral     COLONOSCOPY      EYE SURGERY Right 2010    retinal tear repaired   [facility]    right ring finger severe laceration, fracture    HIP ARTHROPLASTY Right 4-19-16    HYSTERECTOMY  1993    JOINT REPLACEMENT Left 2/2/16    left hip    TONSILLECTOMY  1972       Allergies: Allergies   Allergen Reactions    Atorvastatin Other (See Comments)     Muscle Pain, all statins     Simvastatin Other (See Comments)     Muscle Pain    Cephalexin Hives and Itching    Cephalosporins Hives and Itching    Food Diarrhea     Milk , shellfish , gluten. ..may have bouts of diarrhea, constipation or flatulus. (RD spoke to pt regarding \"milk allergy\", pt is not allergic to milk. She does not drink milk, but consumes milk in other products and consumes dairy products.   Had one reactions to shellfish years ago and now can tolerate one serving of shellfish once per week. Avoids gluten as much as she can, but does not have celiac disease.)    Other      Environmental -cats,dogs,dust    Shellfish-Derived Products     Sulfa Antibiotics      Can take Celebrex, Pt denies 8/1/18       Social History:   reports that she quit smoking about 7 years ago. She has a 2.50 pack-year smoking history. She has never used smokeless tobacco. She reports current alcohol use. She reports that she does not use drugs. Family History:  family history includes Alcohol Abuse in her maternal grandfather, paternal grandfather, and sister; Anxiety Disorder in her mother; Arthritis in her brother, mother, and sister; Asthma in her mother; Cancer in her paternal uncle; Cataracts in her mother; Heart Disease in her father, maternal grandfather, maternal uncle, mother, and paternal uncle; Heart Failure in her father; High Cholesterol in her brother; Hypertension in her mother; Stroke in her maternal grandmother; Substance Abuse in her maternal grandfather and paternal grandfather; Thyroid Disease in her father, maternal grandfather, maternal grandmother, and mother. Reviewed. Denies family history of sudden cardiac death, arrhythmia, premature CAD    Review of System:  All other systems reviewed and are negative except for that noted above.  Pertinent negatives are:   General: negative for fever, chills   Ophthalmic ROS: negative for - eye pain or loss of vision  ENT ROS: negative for - headaches, sore throat   Respiratory: negative for - cough, sputum  Cardiovascular: Reviewed in HPI  Gastrointestinal: negative for - abdominal pain, diarrhea, N/V  Hematology: negative for - bleeding, blood clots, bruising or jaundice  Genito-Urinary:  negative for - Dysuria or incontinence  Musculoskeletal: negative for - Joint swelling, muscle pain  Neurological: negative for - confusion, dizziness, headaches   Psychiatric: No anxiety, no depression. Dermatological: negative for - rash    Physical Examination:  There were no vitals filed for this visit. Wt Readings from Last 3 Encounters:   11/13/20 247 lb (112 kg)   11/11/20 248 lb (112.5 kg)   10/12/20 248 lb (112.5 kg)       Constitutional: Oriented. No distress. Head: Normocephalic and atraumatic. Eyes: Conjunctivae normal. EOM are normal.      Neurological: Alert and oriented. No rash noted. Psychiatric: Has a normal behavior     Labs, diagnostic and imaging results reviewed. Lab Results   Component Value Date    TSHREFLEX 1.80 05/04/2020    TSH 2.37 03/05/2020    TSH 2.37 10/23/2019    CREATININE 0.7 11/13/2020    CREATININE 0.9 09/18/2020    AST 21 07/10/2020    ALT 27 07/10/2020     Reviewed. Loop recorder interrogation 12/3/20      ECG: none today. Virtual visit          Echo: 3/28/20  Summary   Normal left ventricle size, wall thickness and systolic function with an   estimated ejection fraction of 55-60%. No evidence of pericardial effusion. Echo:  8/20/19  Summary   -Left ventricular cavity size is normal.   -There is mild left ventricular hypertrophy.   -Ejection fraction is visually estimated to be 55-60%. -No wall motion abnormalities.   -Normal diastolic function.   -Trivial mitral regurgitation.   -Mild tricuspid regurgitation    Nuclear stress:  1/2/15  Summary    Normal myocardial perfusion study.    Normal LV function.          Medication:  Current Outpatient Medications   Medication Sig Dispense Refill    furosemide (LASIX) 40 MG tablet TAKE ONE TABLET BY MOUTH DAILY 90 tablet 1    buPROPion (WELLBUTRIN XL) 150 MG extended release tablet Take 1 tablet by mouth every morning 30 tablet 5    Semaglutide,0.25 or 0.5MG/DOS, (OZEMPIC, 0.25 OR 0.5 MG/DOSE,) 2 MG/1.5ML SOPN Inject 0.5 mg into the skin once a week Sample 1 pen 0    lisinopril (PRINIVIL;ZESTRIL) 10 MG tablet Take 1 tablet by mouth nightly 90 tablet 3    pramipexole (MIRAPEX) 0.5 MG tablet 1.5 tab q 5 PM and 1.5 tab qHS 135 tablet 1    dilTIAZem (CARDIZEM) 30 MG tablet TAKE ONE TABLET BY MOUTH FOUR TIMES A DAY AS NEEDED FOR FAST HEARTBEAT GREATER THAN 100 AND PALPITATIONS 120 tablet 0    potassium chloride (KLOR-CON M) 10 MEQ extended release tablet TAKE ONE TABLET BY MOUTH DAILY 90 tablet 0    rivaroxaban (XARELTO) 20 MG TABS tablet Take 1 tablet by mouth Daily with supper 90 tablet 3    Handicap Placard MISC by Does not apply route Exp 5 years 1 each 0    Multiple Vitamins-Minerals (THERAPEUTIC MULTIVITAMIN-MINERALS) tablet Take 1 tablet by mouth daily      spironolactone (ALDACTONE) 25 MG tablet Take 1 tablet by mouth daily 90 tablet 3    fluticasone (FLOVENT HFA) 110 MCG/ACT inhaler Inhale 1 puff into the lungs 2 times daily      CPAP Machine MISC by Does not apply route      tiotropium (SPIRIVA) 18 MCG inhalation capsule Inhale 18 mcg into the lungs daily X 10 days      azelastine (ASTELIN) 0.1 % nasal spray 1 spray by Nasal route 2 times daily 1 Spray in each nostril 2 Bottle 1    Magnesium Citrate 100 MG TABS Take 1 tablet by mouth daily (Patient taking differently: Take 250 tablets by mouth daily ) 90 tablet 3    EPIPEN 2-MIR 0.3 MG/0.3ML SOAJ injection       rivaroxaban (XARELTO) 20 MG TABS tablet Take 1 tablet by mouth daily (with breakfast) (Patient not taking: Reported on 12/3/2020) 21 tablet 0    Semaglutide,0.25 or 0.5MG/DOS, (OZEMPIC, 0.25 OR 0.5 MG/DOSE,) 2 MG/1.5ML SOPN Inject 0.5 mg into the skin once a week (Patient not taking: Reported on 12/3/2020) 1 pen 0    rivaroxaban (XARELTO) 20 MG TABS tablet Take 1 tablet by mouth daily (with breakfast) (Patient not taking: Reported on 12/3/2020) 42 tablet 0     No current facility-administered medications for this visit. Assessment and plan:     Paroxysmal atrial fibrillation   EKG 12/3/20  None today. virtual visit  S/p Loop recorder implant 08/17/2020.  Today shows sinus rhythm 0% AF burden  S/p ablation of Atrial fibrillation and atrial flutter 03/27/2020    Xarelto 20 mg BID  Cardizem 30mg. Taking one a day. Symptomatic bradycardia  Usually post conversion. Some junctional rhythm was seen after ablation      HUSSAIN (obstructive sleep apnea)  Compliant with CPAP      Hx of pulmonary embolus  xarelto 20mg daily     HTN  Lisinopril 10mg daily  Spironalactone 25mg daily    Implantable Loop recorder   The CIED was interrogated and programmed and I supervised and reviewed all the data. All findings and changes are in device interrogation sheet and reflect my personal interpretation and changes and is scanned to Epic. All recorded episodes of sinus tachycardia      Plan  No changes to medication  FU in 6 months   Echo scheduled 02/11/2021              Thank you for allowing me to participate in the care of Amada Alamo. Further evaluation will be based upon the patient's clinical course and testing results. All questions and concerns were addressed to the patient/family. Alternatives to my treatment were discussed. I have discussed the above stated plan and the patient verbalized understanding and agreed with the plan. NOTE: This report was transcribed using voice recognition software. Every effort was made to ensure accuracy, however, inadvertent computerized transcription errors may be present. Gladis Don MD, Siddhartha Blevins [address] [facility]   Office: [phone]      Scribe attestation: This note was scribed in the presence of Gladis Don MD by Wolf Land RN    Amada Alamo is a 78 y.o. female being evaluated by a Virtual Visit (video visit) encounter to address concerns as mentioned above. A caregiver was present when appropriate. Due to this being a TeleHealth encounter (During [place] [id number] public [facility] emergency), evaluation of the following organ systems was limited: Vitals/Constitutional/EENT/Resp/CV/GI//MS/Neuro/Skin/Heme-Lymph-Imm.   Pursuant to the emergency declaration under the [facility], [address] [place] [address] authority and the [employer] Act, this Virtual Visit was conducted with patient's (and/or legal guardian's) consent, to reduce the patient's risk of exposure to COVID-19 and provide necessary medical care. The patient (and/or legal guardian) has also been advised to contact this office for worsening conditions or problems, and seek emergency medical treatment and/or call 911 if deemed necessary. Patient identification was verified at the start of the visit: Yes    Total time spent for this encounter: Not billed by time    Services were provided through a video synchronous discussion virtually to substitute for in-person clinic visit. Patient and provider were located at their individual homes. --Lora Stubbs MD on 12/3/2020 at 4:01 PM    An electronic signature was used to authenticate this note.     I, Dr. Lora Stubbs personally performed the services described in this documentation as scribed by RN in my presence, and it is both accurate and complete.

## 2020-12-03 NOTE — TELEPHONE ENCOUNTER
Asking if her appt today can be changed to a virtual appt ?  Not comfortable coming to the hospital . Please call

## 2020-12-07 ENCOUNTER — TELEPHONE (OUTPATIENT)
Dept: CARDIOLOGY CLINIC | Age: 70
End: 2020-12-07

## 2020-12-07 NOTE — TELEPHONE ENCOUNTER
Calling to see if the form for  Her genetic testing was received ? She is fAXING IT AGAIN TO ma ROOM.

## 2020-12-08 RX ORDER — LISINOPRIL 10 MG/1
TABLET ORAL
Qty: 90 TABLET | Refills: 0 | OUTPATIENT
Start: 2020-12-08

## 2020-12-15 ENCOUNTER — TELEPHONE (OUTPATIENT)
Dept: INTERNAL MEDICINE CLINIC | Age: 70
End: 2020-12-15

## 2020-12-15 NOTE — TELEPHONE ENCOUNTER
Pt requesting samples of rivaroxaban (XARELTO) 20 MG. Her friend, Estela Primrose, has an appt in the office today and can pick the up for her.

## 2020-12-28 ENCOUNTER — OFFICE VISIT (OUTPATIENT)
Dept: INTERNAL MEDICINE CLINIC | Age: 70
End: 2020-12-28
Payer: MEDICARE

## 2020-12-28 VITALS
HEIGHT: 64 IN | HEART RATE: 78 BPM | OXYGEN SATURATION: 99 % | BODY MASS INDEX: 41.48 KG/M2 | SYSTOLIC BLOOD PRESSURE: 112 MMHG | WEIGHT: 243 LBS | DIASTOLIC BLOOD PRESSURE: 62 MMHG | TEMPERATURE: 97.3 F

## 2020-12-28 DIAGNOSIS — M17.0 PRIMARY OSTEOARTHRITIS OF BOTH KNEES: ICD-10-CM

## 2020-12-28 LAB
AMPHETAMINE SCREEN, URINE: NORMAL
BARBITURATE SCREEN URINE: NORMAL
BENZODIAZEPINE SCREEN, URINE: NORMAL
CANNABINOID SCREEN URINE: NORMAL
COCAINE METABOLITE SCREEN URINE: NORMAL
Lab: NORMAL
METHADONE SCREEN, URINE: NORMAL
OPIATE SCREEN URINE: NORMAL
OXYCODONE URINE: NORMAL
PH UA: 6
PHENCYCLIDINE SCREEN URINE: NORMAL
PROPOXYPHENE SCREEN: NORMAL

## 2020-12-28 PROCEDURE — G8427 DOCREV CUR MEDS BY ELIG CLIN: HCPCS | Performed by: INTERNAL MEDICINE

## 2020-12-28 PROCEDURE — 99213 OFFICE O/P EST LOW 20 MIN: CPT | Performed by: INTERNAL MEDICINE

## 2020-12-28 PROCEDURE — G8399 PT W/DXA RESULTS DOCUMENT: HCPCS | Performed by: INTERNAL MEDICINE

## 2020-12-28 PROCEDURE — 1123F ACP DISCUSS/DSCN MKR DOCD: CPT | Performed by: INTERNAL MEDICINE

## 2020-12-28 PROCEDURE — G8510 SCR DEP NEG, NO PLAN REQD: HCPCS | Performed by: INTERNAL MEDICINE

## 2020-12-28 PROCEDURE — 4040F PNEUMOC VAC/ADMIN/RCVD: CPT | Performed by: INTERNAL MEDICINE

## 2020-12-28 PROCEDURE — 3017F COLORECTAL CA SCREEN DOC REV: CPT | Performed by: INTERNAL MEDICINE

## 2020-12-28 PROCEDURE — G8484 FLU IMMUNIZE NO ADMIN: HCPCS | Performed by: INTERNAL MEDICINE

## 2020-12-28 PROCEDURE — 1090F PRES/ABSN URINE INCON ASSESS: CPT | Performed by: INTERNAL MEDICINE

## 2020-12-28 PROCEDURE — G8417 CALC BMI ABV UP PARAM F/U: HCPCS | Performed by: INTERNAL MEDICINE

## 2020-12-28 PROCEDURE — 1036F TOBACCO NON-USER: CPT | Performed by: INTERNAL MEDICINE

## 2020-12-28 RX ORDER — HYDROCODONE BITARTRATE AND ACETAMINOPHEN 5; 325 MG/1; MG/1
1 TABLET ORAL EVERY 4 HOURS PRN
Qty: 30 TABLET | Refills: 0 | Status: SHIPPED | OUTPATIENT
Start: 2020-12-28 | End: 2021-01-02

## 2020-12-28 ASSESSMENT — PATIENT HEALTH QUESTIONNAIRE - PHQ9
2. FEELING DOWN, DEPRESSED OR HOPELESS: 0
SUM OF ALL RESPONSES TO PHQ QUESTIONS 1-9: 0
1. LITTLE INTEREST OR PLEASURE IN DOING THINGS: 0
SUM OF ALL RESPONSES TO PHQ QUESTIONS 1-9: 0
SUM OF ALL RESPONSES TO PHQ9 QUESTIONS 1 & 2: 0
SUM OF ALL RESPONSES TO PHQ QUESTIONS 1-9: 0

## 2020-12-28 NOTE — PROGRESS NOTES
SUBJECTIVE:  Patient Shabnam Kellogg is a 78 y.o. y.o. female     HPI    Mirian Sosa returns for follow up of hypertension. Patient has been taking Her medications as prescribed. Patient's blood pressureis  controlled. Side effects related to taking the medications include orthostatic lightheadedness on occasion. Review of Systems   Musculoskeletal: Positive for arthralgias (left shoulder and both knees. ). OBJECTIVE:    /62   Pulse 78   Temp 97.3 °F (36.3 °C) (Infrared)   Ht 5' 4\" (1.626 m)   Wt 243 lb (110.2 kg)   SpO2 99%   BMI 41.71 kg/m²      Physical Exam  Vitals signs reviewed. Constitutional:       General: She is not in acute distress. Appearance: She is well-developed. She is not diaphoretic. HENT:      Head: Normocephalic and atraumatic. Pulmonary:      Effort: Pulmonary effort is normal.   Musculoskeletal: Normal range of motion. General: No swelling. Right lower leg: No edema. Left lower leg: No edema. Neurological:      Mental Status: She is alert and oriented to person, place, and time. Cranial Nerves: No cranial nerve deficit. Psychiatric:         Behavior: Behavior normal.         Thought Content:  Thought content normal.         Judgment: Judgment normal.          Current Outpatient Medications:     rivaroxaban (XARELTO) 20 MG TABS tablet, Take 1 tablet by mouth daily (with breakfast), Disp: 28 tablet, Rfl: 0    furosemide (LASIX) 40 MG tablet, TAKE ONE TABLET BY MOUTH DAILY, Disp: 90 tablet, Rfl: 1    Semaglutide,0.25 or 0.5MG/DOS, (OZEMPIC, 0.25 OR 0.5 MG/DOSE,) 2 MG/1.5ML SOPN, Inject 0.5 mg into the skin once a week Sample, Disp: 1 pen, Rfl: 0    rivaroxaban (XARELTO) 20 MG TABS tablet, Take 1 tablet by mouth daily (with breakfast), Disp: 21 tablet, Rfl: 0    Semaglutide,0.25 or 0.5MG/DOS, (OZEMPIC, 0.25 OR 0.5 MG/DOSE,) 2 MG/1.5ML SOPN, Inject 0.5 mg into the skin once a week, Disp: 1 pen, Rfl: 0   lisinopril (PRINIVIL;ZESTRIL) 10 MG tablet, Take 1 tablet by mouth nightly, Disp: 90 tablet, Rfl: 3    rivaroxaban (XARELTO) 20 MG TABS tablet, Take 1 tablet by mouth daily (with breakfast), Disp: 42 tablet, Rfl: 0    pramipexole (MIRAPEX) 0.5 MG tablet, 1.5 tab q 5 PM and 1.5 tab qHS, Disp: 135 tablet, Rfl: 1    dilTIAZem (CARDIZEM) 30 MG tablet, TAKE ONE TABLET BY MOUTH FOUR TIMES A DAY AS NEEDED FOR FAST HEARTBEAT GREATER THAN 100 AND PALPITATIONS, Disp: 120 tablet, Rfl: 0    potassium chloride (KLOR-CON M) 10 MEQ extended release tablet, TAKE ONE TABLET BY MOUTH DAILY, Disp: 90 tablet, Rfl: 0    rivaroxaban (XARELTO) 20 MG TABS tablet, Take 1 tablet by mouth Daily with supper, Disp: 90 tablet, Rfl: 3    Handicap Placard MISC, by Does not apply route Exp 5 years, Disp: 1 each, Rfl: 0    Multiple Vitamins-Minerals (THERAPEUTIC MULTIVITAMIN-MINERALS) tablet, Take 1 tablet by mouth daily, Disp: , Rfl:     spironolactone (ALDACTONE) 25 MG tablet, Take 1 tablet by mouth daily, Disp: 90 tablet, Rfl: 3    fluticasone (FLOVENT HFA) 110 MCG/ACT inhaler, Inhale 1 puff into the lungs 2 times daily, Disp: , Rfl:     CPAP Machine MISC, by Does not apply route, Disp: , Rfl:     tiotropium (SPIRIVA) 18 MCG inhalation capsule, Inhale 18 mcg into the lungs daily X 10 days, Disp: , Rfl:     azelastine (ASTELIN) 0.1 % nasal spray, 1 spray by Nasal route 2 times daily 1 Spray in each nostril, Disp: 2 Bottle, Rfl: 1    Magnesium Citrate 100 MG TABS, Take 1 tablet by mouth daily (Patient taking differently: Take 250 tablets by mouth daily ), Disp: 90 tablet, Rfl: 3    EPIPEN 2-MIR 0.3 MG/0.3ML SOAJ injection, , Disp: , Rfl:     Assessment/Plan:  Reg Yates was seen today for depression and shoulder pain. Diagnoses and all orders for this visit:    Benign essential HTN  Comments:  Chronic, controlled. Obesity, Class III, BMI 40-49.9 (morbid obesity) ([facility] [address]  Comments:  Continue Semaglutide. Primary osteoarthritis of both knees  -     HYDROcodone-acetaminophen (NORCO) 5-325 MG per tablet; Take 1 tablet by mouth every 4 hours as needed for Pain for up to 5 days. Intended supply: 5 days. Take lowest dose possible to manage pain  -     DRUG SCREEN MULTI URINE;  Future  -     Jayne Krishnamurthy MD, Pain Management, [facility]  -     [facility] [place]    Osteoarthritis of left shoulder due to rotator cuff injury  -     [facility] Physical [facility] - Adrian Denton MD

## 2021-01-04 ENCOUNTER — TELEPHONE (OUTPATIENT)
Dept: CARDIOLOGY CLINIC | Age: 71
End: 2021-01-04

## 2021-01-04 RX ORDER — LISINOPRIL 10 MG/1
10 TABLET ORAL NIGHTLY
Qty: 90 TABLET | Refills: 3 | Status: SHIPPED | OUTPATIENT
Start: 2021-01-04 | End: 2021-07-09 | Stop reason: SDUPTHER

## 2021-01-04 NOTE — TELEPHONE ENCOUNTER
Called and spoke to patient. She was taking the Diltiazem daily instead of taking it PRN as instructed. I explained the correct instruction for the medication and assured her that we could get her a refill as she is completely out of the medication. She is also out of the Lisinopril 10 MG. Please see pended meds.

## 2021-01-04 NOTE — TELEPHONE ENCOUNTER
Spoke to the pt-advised to  two sample bottles of Xarelto 20 mg, Lot [id number], Exp 9/'22, at the [facility].

## 2021-01-04 NOTE — TELEPHONE ENCOUNTER
Sample requested: Xarelto    Strength: 20 mg     Dosage: 1 tab daily    Patient's call back number: [phone]

## 2021-01-05 RX ORDER — LISINOPRIL 10 MG/1
TABLET ORAL
Qty: 90 TABLET | Refills: 0 | OUTPATIENT
Start: 2021-01-05

## 2021-01-05 RX ORDER — POTASSIUM CHLORIDE 750 MG/1
TABLET, EXTENDED RELEASE ORAL
Qty: 90 TABLET | Refills: 0 | Status: SHIPPED | OUTPATIENT
Start: 2021-01-05 | End: 2021-03-30

## 2021-01-05 NOTE — TELEPHONE ENCOUNTER
Prescription refill    Last OV:12/03/20    Last Refill:10/01/20    Labs:11/13/20    Future Appt:03/15/21

## 2021-01-06 ENCOUNTER — HOSPITAL ENCOUNTER (OUTPATIENT)
Dept: PHYSICAL THERAPY | Age: 71
Setting detail: THERAPIES SERIES
Discharge: HOME | End: 2021-01-06
Payer: MEDICARE

## 2021-01-06 PROCEDURE — 97530 THERAPEUTIC ACTIVITIES: CPT

## 2021-01-06 PROCEDURE — 97161 PT EVAL LOW COMPLEX 20 MIN: CPT

## 2021-01-06 PROCEDURE — 97110 THERAPEUTIC EXERCISES: CPT

## 2021-01-06 NOTE — FLOWSHEET NOTE
[facility]  Outpatient Physical Therapy  Phone: [phone]   Fax: [phone]    Physical Therapy Daily Treatment Note  Date:  2021    Patient Name:  Jung Cooper    :  1950  MRN: [id number]  Medical/Treatment Diagnosis Information:  · Diagnosis: Primary osteoarthritis of both knees; Osteoarthritis of left shoulder due to rotator cuff injury  · Treatment Diagnosis: B knee pain, decreased ROM and strength L Shoulder, impaired posture, impaired balance, core and hip weakness, decreased LE flexibility  Insurance/Certification information:  PT Insurance Information: Medicare  Physician Information:  Referring Practitioner: Arnoldo Echavarria MD  Plan of care signed (Y/N): []  Yes [x]  No     Date of Patient follow up with Physician:      Progress Report: []  Yes  [x]  No     Date Range for reporting period:  Beginnin21  Ending:     Progress report due (10 Rx/or 30 days whichever is less): visit #10 or 82     Recertification due (POC duration/ or 90 days whichever is less): visit #12 or 21     Visit # Insurance Allowable Auth required?  Date Range    Medical necessity []  Yes  [x]  No      Latex Allergy:  [x]NO      []YES  Preferred Language for Healthcare:   [x]English       []other:    Functional Scale:        Date assessed:  LEFS: raw score = 26/80; dysfunction = 67%  21   Oswestry raw score = 38; dysfunction = 61%    Pain level: 4-8/10     SUBJECTIVE:  See eval    OBJECTIVE: See eval      RESTRICTIONS/PRECAUTIONS: Asthma, A-fib, Prior JENNIFER, Lumbar fusions    Exercises/Interventions:     Therapeutic Exercises (75657) Resistance / level Sets/sec Reps Notes   Nustep       IB       HSS  HFS       Pulleys       Standing hip 3 way                                   Therapeutic Activities (99462)       Cabinet reaching                                   Neuromuscular Re-ed (30341)       Static balance       Walking on uneven surfaces       [employer] [] (24749) Therapist is in constant attendance of 2 or more patients providing skilled therapy interventions, but not providing any significant amount of measurable one-on-one time to either patient, for improvements in  [] LE / lumbar: LE, proximal hip, and core control in self care, mobility, lifting, ambulation and eccentric single leg control. [] UE / cervical: cervical, scapular, scapulothoracic and UE control with self care, reaching, carrying, lifting, house/yardwork, driving, computer work. NMR and Therapeutic Activities:    [] (69249 or 98239) Provided verbal/tactile cueing for activities related to improving balance, coordination, kinesthetic sense, posture, motor skill, proprioception and motor activation to allow for proper function of   [] LE: / Lumbar core, proximal hip and LE with self care and ADLs  [] UE / Cervical: cervical, postural, scapular, scapulothoracic and UE control with self care, carrying, lifting, driving, computer work.   [] (22006) Gait Re-education- Provided training and instruction to the patient for proper LE, core and proximal hip recruitment and positioning and eccentric body weight control with ambulation re-education including up and down stairs     Home Management Training / Self Care:  [x] (18114) Provided self-care/home management training related to activities of daily living and compensatory training, and/or use of adaptive equipment for improvement with: ADLs and compensatory training, meal preparation, safety procedures and instruction in use of adaptive equipment, including bathing, grooming, dressing, personal hygiene, basic household cleaning and chores.      Home Exercise Program:    [x] (45829) Reviewed/Progressed HEP activities related to strengthening, flexibility, endurance, ROM of   [] LE / Lumbar: core, proximal hip and LE for functional self-care, mobility, lifting and ambulation/stair navigation [] Progression has been slowed due to co-morbidities. [x] Plan just implemented, too soon to assess goals progression <30days   [] Goals require adjustment due to lack of progress  [] Patient is not progressing as expected and requires additional follow up with physician  [] Other    Persisting Functional Limitations/Impairments:  [x]Sleeping [x]Sitting               [x]Standing [x]Transfers        [x]Walking [x]Kneeling               [x]Stairs [x]Squatting / bending   [x]ADLs [x]Reaching  [x]Lifting  [x]Housework  [x]Driving []Job related tasks  []Sports/Recreation []Other:        ASSESSMENT:  Pt is a 78year old female referred to therapy for L RTC tear/shoulder OA and OA in B knees. Pt presents to therapy with multiple areas of deficit, including impaired posture, decreased ROM and strength of the L shoulder, LE weakness, impaired gait and balance. These deficits are all contributing to a decrease in the patient's ability to perform her normal activities without pain or difficulty. Extensive education today including importance of hydration, stretching frequently while sewing, a more appropriate step stool that has a handle, and possible neural tension in UEs. Pt to benefit from skilled PT services to address these deficits and return pt to PLOF. Treatment/Activity Tolerance:  [x] Patient able to complete tx [] Patient limited by fatigue  [] Patient limited by pain  [] Patient limited by other medical complications  [] Other:     Prognosis: [x] Good [] Fair  [] Poor    Patient Requires Follow-up: [x] Yes  [] No    Plan for next treatment session: neural tension testing and stretching!!! PLAN: See eval. PT 2x / week for 6 weeks.    [] Continue per plan of care [] Alter current plan (see comments)  [x] Plan of care initiated [] Hold pending MD visit [] Discharge    Electronically signed by: Geena Holloway PT, DPT Note: If patient does not return for scheduled/ recommended follow up visits, this note will serve as a discharge from care along with most recent update on progress.
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
## 2021-01-08 ENCOUNTER — HOSPITAL ENCOUNTER (OUTPATIENT)
Dept: PHYSICAL THERAPY | Age: 71
Setting detail: THERAPIES SERIES
Discharge: HOME | End: 2021-01-08
Payer: MEDICARE

## 2021-01-08 PROCEDURE — 97110 THERAPEUTIC EXERCISES: CPT

## 2021-01-08 PROCEDURE — 97112 NEUROMUSCULAR REEDUCATION: CPT

## 2021-01-08 NOTE — FLOWSHEET NOTE
[facility]  Outpatient Physical Therapy  Phone: [phone]   Fax: [phone]    Physical Therapy Daily Treatment Note  Date:  2021    Patient Name:  James Copeland    :  1950  MRN: [id number]  Medical/Treatment Diagnosis Information:  · Diagnosis: Primary osteoarthritis of both knees; Osteoarthritis of left shoulder due to rotator cuff injury  · Treatment Diagnosis: B knee pain, decreased ROM and strength L Shoulder, impaired posture, impaired balance, core and hip weakness, decreased LE flexibility  Insurance/Certification information:  PT Insurance Information: Medicare  Physician Information:  Referring Practitioner: Alejandro Rene MD  Plan of care signed (Y/N): []  Yes [x]  No     Date of Patient follow up with Physician:      Progress Report: []  Yes  [x]  No     Date Range for reporting period:  Beginnin21  Ending:     Progress report due (10 Rx/or 30 days whichever is less): visit #10 or      Recertification due (POC duration/ or 90 days whichever is less): visit #12 or 21     Visit # Insurance Allowable Auth required? Date Range    Medical necessity []  Yes  [x]  No      Latex Allergy:  [x]NO      []YES  Preferred Language for Healthcare:   [x]English       []other:    Functional Scale:        Date assessed:  LEFS: raw score = 26/80; dysfunction = 67%  21   Oswestry raw score = 38; dysfunction = 61%    Pain level: 4/10     SUBJECTIVE:  Pt reports she has been completing her HEP. When she woke up today, pain was 6/10 but most mornings it is 7-8/10.      OBJECTIVE:     RESTRICTIONS/PRECAUTIONS: Asthma, A-fib, Prior JENNIFER, Lumbar fusions    Exercises/Interventions:     Therapeutic Exercises (26242) Resistance / level Sets/sec Reps Notes   Nustep L 4  6 min      IB       HSS  HFS  2/30 sec B  2/30 sec B     Pulleys - flexion  3 min      Standing hip 3 way - 1 10 R/L Standing on 2 in step for extension Therapeutic Activities (50958)       Cabinet reaching                                   Neuromuscular Re-ed (41258)       Static balance:  DLS  NBOS  NBOS EC  Tandem    Airex  Airex  Airex  Airex   15 sec  30 sec  30 sec  30 sec       2  2   No UE  No UE  Fingertip support  Fingertip support   Walking on uneven surfaces       Biodex                            Manual Intervention (68689)                                                     Pt. Education:  -patient educated on diagnosis, prognosis and expectations for rehab  -all patient questions were answered  1/6: Extensive education today including importance of hydration, stretching frequently while sewing, a more appropriate step stool that has a handle, and possible neural tension in UEs. Home Exercise Program:  Access Code: [email or web address]   URL: [email or web address]   Date: 01/06/2021   Prepared by: Alvia La Grange     Exercises   · Hooklying Gluteal Sets - 10 reps - 2 sets - 2x daily - 7x weekly   · Seated Scapular Retraction - 10 reps - 2 sets - 3-4x daily - 7x weekly   · Seated Hamstring Stretch - 3 reps - 3 sets - 30 seconds hold - 3x daily - 7x weekly   · Shoulder Flexion Wall Slide with Towel - 10 reps - 2 sets - 2x daily - 7x weekly   · Hip Flexor Stretch on Step - 2 reps - 1 sets - 30 seconds hold - 2x daily - 7x weekly      Access Code: [email or web address]   URL: [employer]/   Date: 01/08/2021   Prepared by: Alvia La Grange     Exercises   Standing Hip Flexion AROM - 10 reps - 2 sets - 1x daily - 7x weekly   Standing Hip Abduction - 10 reps - 2 sets - 1x daily - 7x weekly   Standing Hip Extension - 10 reps - 2 sets - 1x daily - 7x weekly       Therapeutic Exercise and NMR EXR  [x] (83640) Provided verbal/tactile cueing for activities related to strengthening, flexibility, endurance, ROM for improvements in  [x] LE / Lumbar: LE, proximal hip, and core control with self care, mobility, lifting, ambulation. [x] UE / Cervical: cervical, postural, scapular, scapulothoracic and UE control with self care, reaching, carrying, lifting, house/yardwork, driving, computer work. [x] (79091) Provided verbal/tactile cueing for activities related to improving balance, coordination, kinesthetic sense, posture, motor skill, proprioception to assist with   [x] LE / lumbar: LE, proximal hip, and core control in self care, mobility, lifting, ambulation and eccentric single leg control. [x] UE / cervical: cervical, scapular, scapulothoracic and UE control with self care, reaching, carrying, lifting, house/yardwork, driving, computer work.   [] (68711) Therapist is in constant attendance of 2 or more patients providing skilled therapy interventions, but not providing any significant amount of measurable one-on-one time to either patient, for improvements in  [] LE / lumbar: LE, proximal hip, and core control in self care, mobility, lifting, ambulation and eccentric single leg control. [] UE / cervical: cervical, scapular, scapulothoracic and UE control with self care, reaching, carrying, lifting, house/yardwork, driving, computer work.      NMR and Therapeutic Activities:    [] (34651 or 85487) Provided verbal/tactile cueing for activities related to improving balance, coordination, kinesthetic sense, posture, motor skill, proprioception and motor activation to allow for proper function of   [] LE: / Lumbar core, proximal hip and LE with self care and ADLs  [] UE / Cervical: cervical, postural, scapular, scapulothoracic and UE control with self care, carrying, lifting, driving, computer work.   [] (78477) Gait Re-education- Provided training and instruction to the patient for proper LE, core and proximal hip recruitment and positioning and eccentric body weight control with ambulation re-education including up and down stairs     Home Management Training / Self Care: [x] (78319) Provided self-care/home management training related to activities of daily living and compensatory training, and/or use of adaptive equipment for improvement with: ADLs and compensatory training, meal preparation, safety procedures and instruction in use of adaptive equipment, including bathing, grooming, dressing, personal hygiene, basic household cleaning and chores. Home Exercise Program:    [x] (24584) Reviewed/Progressed HEP activities related to strengthening, flexibility, endurance, ROM of   [x] LE / Lumbar: core, proximal hip and LE for functional self-care, mobility, lifting and ambulation/stair navigation   [] UE / Cervical: cervical, postural, scapular, scapulothoracic and UE control with self care, reaching, carrying, lifting, house/yardwork, driving, computer work  [] (59551)Reviewed/Progressed HEP activities related to improving balance, coordination, kinesthetic sense, posture, motor skill, proprioception of   [] LE: core, proximal hip and LE for self care, mobility, lifting, and ambulation/stair navigation    [] UE / Cervical: cervical, postural,  scapular, scapulothoracic and UE control with self care, reaching, carrying, lifting, house/yardwork, driving, computer work    Manual Treatments:  PROM / STM / Oscillations-Mobs:  G-I, II, III, IV (PA's, Inf., Post.)  [] (30136) Provided manual therapy to mobilize LE, proximal hip and/or LS spine soft tissue/joints for the purpose of modulating pain, promoting relaxation,  increasing ROM, reducing/eliminating soft tissue swelling/inflammation/restriction, improving soft tissue extensibility and allowing for proper ROM for normal function with   [] LE / lumbar: self care, mobility, lifting and ambulation. [] UE / Cervical: self care, reaching, carrying, lifting, house/yardwork, driving, computer work.      Modalities: [] (69364) Vasopneumatic compression: Utilized vasopneumatic compression to decrease edema / swelling for the purpose of improving mobility and quad tone / recruitment which will allow for increased overall function including but not limited to self-care, transfers, ambulation, and ascending / descending stairs. Modalities:      Charges:  Timed Code Treatment Minutes: 40   Total Treatment Minutes: 40     [] EVAL - LOW (68376)   [] EVAL - MOD (31615)  [] EVAL - HIGH (82616)  [] RE-EVAL (91294)  [x] JH(05523) x   2    [] Ionto  [x] NMR (15368) x 1      [] Vaso  [] Manual (73992) x       [] Ultrasound  [] TA x        [] Mech Traction (32530)  [] Aquatic Therapy x     [] ES (un) (42849):   [] Home Management Training x   [] ES(attended) (75524)   [] Dry Needling 1-2 muscles (18002):  [] Dry Needling 3+ muscles (998544)  [] Group:      [] Other:     GOALS:   Patient stated goal: move better and move correctly. []? Progressing: []? Met: []? Not Met: []? Adjusted     Therapist goals for Patient:   Short Term Goals: To be achieved in: 2 weeks  1. Independent in HEP and progression per patient tolerance, in order to prevent re-injury. []? Progressing: []? Met: []? Not Met: []? Adjusted  2. Patient will have a decrease in pain to facilitate improvement in movement, function, and ADLs as indicated by Functional Deficits. []? Progressing: []? Met: []? Not Met: []? Adjusted     Long Term Goals: To be achieved in: 6-8 weeks  1. Disability index score of 30% or less for the LEFS and QuickDASH to assist with reaching prior level of function. []? Progressing: []? Met: []? Not Met: []? Adjusted  2. Patient will demonstrate increased shoulder flexion and abduction AROM to 120 deg to allow for pt to put items away in cabinets. []? Progressing: []? Met: []? Not Met: []?  Adjusted 3. Patient will demonstrate an increase in Strength to at least 4+/5 as well as good proximal hip strength and control to allow for proper functional mobility as indicated by patients Functional Deficits. []? Progressing: []? Met: []? Not Met: []? Adjusted  4. Patient will return to functional activities including maintained upright posture with ambulation during 6 MWT without increased symptoms or restriction. []? Progressing: []? Met: []? Not Met: []? Adjusted  5. Patient to report improved ability to complete all ADLs and household chores without rest breaks due to pain or fatigue. []? Progressing: []? Met: []? Not Met: []? Adjusted         Overall Progression Towards Functional goals/ Treatment Progress Update:  [] Patient is progressing as expected towards functional goals listed. [] Progression is slowed due to complexities/Impairments listed. [] Progression has been slowed due to co-morbidities. [x] Plan just implemented, too soon to assess goals progression <30days   [] Goals require adjustment due to lack of progress  [] Patient is not progressing as expected and requires additional follow up with physician  [] Other    Persisting Functional Limitations/Impairments:  [x]Sleeping [x]Sitting               [x]Standing [x]Transfers        [x]Walking [x]Kneeling               [x]Stairs [x]Squatting / bending   [x]ADLs [x]Reaching  [x]Lifting  [x]Housework  [x]Driving []Job related tasks  []Sports/Recreation []Other:        ASSESSMENT:  Pt took 2 rest breaks this session but was able to complete all tasks. Discussed ways to adjust HEP at home to reduce low back strain and hamstring cramping. Will assess neural tension next visit.    Treatment/Activity Tolerance:  [x] Patient able to complete tx [] Patient limited by fatigue  [] Patient limited by pain  [] Patient limited by other medical complications  [] Other:     Prognosis: [x] Good [] Fair  [] Poor    Patient Requires Follow-up: [x] Yes  [] No Plan for next treatment session: neural tension testing and stretching!!! PLAN: See eval. PT 2x / week for 6 weeks. [x] Continue per plan of care [] Alter current plan (see comments)  [] Plan of care initiated [] Hold pending MD visit [] Discharge    Electronically signed by: Laura Donald PT, DPT    Note: If patient does not return for scheduled/ recommended follow up visits, this note will serve as a discharge from care along with most recent update on progress.

## 2021-01-12 ENCOUNTER — HOSPITAL ENCOUNTER (OUTPATIENT)
Dept: PHYSICAL THERAPY | Age: 71
Setting detail: THERAPIES SERIES
Discharge: HOME | End: 2021-01-12
Payer: MEDICARE

## 2021-01-12 PROCEDURE — 97112 NEUROMUSCULAR REEDUCATION: CPT

## 2021-01-12 PROCEDURE — 97530 THERAPEUTIC ACTIVITIES: CPT

## 2021-01-12 PROCEDURE — 97110 THERAPEUTIC EXERCISES: CPT

## 2021-01-12 NOTE — FLOWSHEET NOTE
[facility]  Outpatient Physical Therapy  Phone: [phone]   Fax: [phone]    Physical Therapy Daily Treatment Note  Date:  2021    Patient Name:  Rashawn Augustine    :  1950  MRN: [id number]  Medical/Treatment Diagnosis Information:  · Diagnosis: Primary osteoarthritis of both knees; Osteoarthritis of left shoulder due to rotator cuff injury  · Treatment Diagnosis: B knee pain, decreased ROM and strength L Shoulder, impaired posture, impaired balance, core and hip weakness, decreased LE flexibility  Insurance/Certification information:  PT Insurance Information: Medicare  Physician Information:  Referring Practitioner: Dyllan Hurley MD  Plan of care signed (Y/N): []  Yes [x]  No     Date of Patient follow up with Physician:      Progress Report: []  Yes  [x]  No     Date Range for reporting period:  Beginnin21  Ending:     Progress report due (10 Rx/or 30 days whichever is less): visit #10 or      Recertification due (POC duration/ or 90 days whichever is less): visit #12 or 21     Visit # Insurance Allowable Auth required? Date Range   3/12 Medical necessity []  Yes  [x]  No      Latex Allergy:  [x]NO      []YES  Preferred Language for Healthcare:   [x]English       []other:    Functional Scale:        Date assessed:  LEFS: raw score = 26/80; dysfunction = 67%  21   Oswestry raw score = 38; dysfunction = 61%    Pain level: 4/10     SUBJECTIVE:  Pt has noticed that things are getting easier at home.      OBJECTIVE:     RESTRICTIONS/PRECAUTIONS: Asthma, A-fib, Prior JENNIFER, Lumbar fusions    Exercises/Interventions:     Therapeutic Exercises (53246) Resistance / level Sets/sec Reps Notes   Nustep L 5  6 min      IB       HSS  HFS  2/30 sec B  2/30 sec B     Pulleys - flexion  3 min      Standing hip 3 way - Standing on 2 in step for extension                               Therapeutic Activities (61237)       Cabinet reaching 1st shelf    2nd shelf 1 2 7    7 7 cones used   Forward step ups  6 in 1 10 B     Lateral step ups 6 in 1 10 B                  Neuromuscular Re-ed (39518)       Static balance:  DLS  NBOS  NBOS EC  Tandem   NBOS + head nods  NBOS + head shakes   Airex  Airex  Airex  Airex  Airex  Airex     30 sec  30 sec  30 sec  30 sec       2  2   No UE  No UE  Fingertip support  Fingertip support   Walking on uneven surfaces       Biodex       W>Y at wall   5     Push ups At wall  10                   Manual Intervention (70857)                                                     Pt. Education:  -patient educated on diagnosis, prognosis and expectations for rehab  -all patient questions were answered  1/6: Extensive education today including importance of hydration, stretching frequently while sewing, a more appropriate step stool that has a handle, and possible neural tension in UEs. Home Exercise Program:  Access Code: [email or web address]   URL: [employer]/   Date: 01/06/2021   Prepared by: Kaley Sandra     Exercises   · Hooklying Gluteal Sets - 10 reps - 2 sets - 2x daily - 7x weekly   · Seated Scapular Retraction - 10 reps - 2 sets - 3-4x daily - 7x weekly   · Seated Hamstring Stretch - 3 reps - 3 sets - 30 seconds hold - 3x daily - 7x weekly   · Shoulder Flexion Wall Slide with Towel - 10 reps - 2 sets - 2x daily - 7x weekly   · Hip Flexor Stretch on Step - 2 reps - 1 sets - 30 seconds hold - 2x daily - 7x weekly      Access Code: [email or web address]   URL: [employer] [address] [employer]/   Date: 01/08/2021   Prepared by: Kaley Sandra     Exercises   Standing Hip Flexion AROM - 10 reps - 2 sets - 1x daily - 7x weekly   Standing Hip Abduction - 10 reps - 2 sets - 1x daily - 7x weekly   Standing Hip Extension - 10 reps - 2 sets - 1x daily - 7x weekly       Therapeutic Exercise and NMR EXR  [x] (55971) Provided verbal/tactile cueing for activities related to strengthening, flexibility, endurance, ROM for improvements in [x] LE / Lumbar: LE, proximal hip, and core control with self care, mobility, lifting, ambulation. [x] UE / Cervical: cervical, postural, scapular, scapulothoracic and UE control with self care, reaching, carrying, lifting, house/yardwork, driving, computer work. [x] (96993) Provided verbal/tactile cueing for activities related to improving balance, coordination, kinesthetic sense, posture, motor skill, proprioception to assist with   [x] LE / lumbar: LE, proximal hip, and core control in self care, mobility, lifting, ambulation and eccentric single leg control. [x] UE / cervical: cervical, scapular, scapulothoracic and UE control with self care, reaching, carrying, lifting, house/yardwork, driving, computer work.   [] (81739) Therapist is in constant attendance of 2 or more patients providing skilled therapy interventions, but not providing any significant amount of measurable one-on-one time to either patient, for improvements in  [] LE / lumbar: LE, proximal hip, and core control in self care, mobility, lifting, ambulation and eccentric single leg control. [] UE / cervical: cervical, scapular, scapulothoracic and UE control with self care, reaching, carrying, lifting, house/yardwork, driving, computer work.      NMR and Therapeutic Activities:    [x] (00007 or 33720) Provided verbal/tactile cueing for activities related to improving balance, coordination, kinesthetic sense, posture, motor skill, proprioception and motor activation to allow for proper function of   [x] LE: / Lumbar core, proximal hip and LE with self care and ADLs  [x] UE / Cervical: cervical, postural, scapular, scapulothoracic and UE control with self care, carrying, lifting, driving, computer work.   [] (55631) Gait Re-education- Provided training and instruction to the patient for proper LE, core and proximal hip recruitment and positioning and eccentric body weight control with ambulation re-education including up and down stairs [] (31530) Vasopneumatic compression: Utilized vasopneumatic compression to decrease edema / swelling for the purpose of improving mobility and quad tone / recruitment which will allow for increased overall function including but not limited to self-care, transfers, ambulation, and ascending / descending stairs. Modalities:      Charges:  Timed Code Treatment Minutes: 45   Total Treatment Minutes: 45     [] EVAL - LOW (88173)   [] EVAL - MOD (06139)  [] EVAL - HIGH (39750)  [] RE-EVAL (70429)  [x] PG(46969) x   1    [] Ionto  [x] NMR (31045) x 1      [] Vaso  [] Manual (49675) x       [] Ultrasound  [x] TA x  1      [] Mech Traction (79201)  [] Aquatic Therapy x     [] ES (un) (16274):   [] Home Management Training x   [] ES(attended) (79488)   [] Dry Needling 1-2 muscles (12816):  [] Dry Needling 3+ muscles (269850)  [] Group:      [] Other:     GOALS:   Patient stated goal: move better and move correctly. []? Progressing: []? Met: []? Not Met: []? Adjusted     Therapist goals for Patient:   Short Term Goals: To be achieved in: 2 weeks  1. Independent in HEP and progression per patient tolerance, in order to prevent re-injury. []? Progressing: []? Met: []? Not Met: []? Adjusted  2. Patient will have a decrease in pain to facilitate improvement in movement, function, and ADLs as indicated by Functional Deficits. []? Progressing: []? Met: []? Not Met: []? Adjusted     Long Term Goals: To be achieved in: 6-8 weeks  1. Disability index score of 30% or less for the LEFS and QuickDASH to assist with reaching prior level of function. []? Progressing: []? Met: []? Not Met: []? Adjusted  2. Patient will demonstrate increased shoulder flexion and abduction AROM to 120 deg to allow for pt to put items away in cabinets. []? Progressing: []? Met: []? Not Met: []?  Adjusted 3. Patient will demonstrate an increase in Strength to at least 4+/5 as well as good proximal hip strength and control to allow for proper functional mobility as indicated by patients Functional Deficits. []? Progressing: []? Met: []? Not Met: []? Adjusted  4. Patient will return to functional activities including maintained upright posture with ambulation during 6 MWT without increased symptoms or restriction. []? Progressing: []? Met: []? Not Met: []? Adjusted  5. Patient to report improved ability to complete all ADLs and household chores without rest breaks due to pain or fatigue. []? Progressing: []? Met: []? Not Met: []? Adjusted         Overall Progression Towards Functional goals/ Treatment Progress Update:  [] Patient is progressing as expected towards functional goals listed. [] Progression is slowed due to complexities/Impairments listed. [] Progression has been slowed due to co-morbidities. [x] Plan just implemented, too soon to assess goals progression <30days   [] Goals require adjustment due to lack of progress  [] Patient is not progressing as expected and requires additional follow up with physician  [] Other    Persisting Functional Limitations/Impairments:  [x]Sleeping [x]Sitting               [x]Standing [x]Transfers        [x]Walking [x]Kneeling               [x]Stairs [x]Squatting / bending   [x]ADLs [x]Reaching  [x]Lifting  [x]Housework  [x]Driving []Job related tasks  []Sports/Recreation []Other:        ASSESSMENT:  Pt reports overall pain decreased a little after the session but her shoulder pain was elevated. She demo's decreased L shoulder OH ROM for functional acitivites but she has found some ways to compensate. Pt to continue to benefit from skilled therapy to address her remaining deficits and improve function.    Treatment/Activity Tolerance:  [x] Patient able to complete tx [] Patient limited by fatigue

## 2021-01-14 ENCOUNTER — HOSPITAL ENCOUNTER (OUTPATIENT)
Dept: PHYSICAL THERAPY | Age: 71
Setting detail: THERAPIES SERIES
Discharge: HOME | End: 2021-01-14
Payer: MEDICARE

## 2021-01-14 PROCEDURE — 97110 THERAPEUTIC EXERCISES: CPT

## 2021-01-14 PROCEDURE — 97530 THERAPEUTIC ACTIVITIES: CPT

## 2021-01-14 PROCEDURE — 97112 NEUROMUSCULAR REEDUCATION: CPT

## 2021-01-14 NOTE — FLOWSHEET NOTE
[facility]  Outpatient Physical Therapy  Phone: [phone]   Fax: [phone]    Physical Therapy Daily Treatment Note  Date:  2021    Patient Name:  Katherine Diamond    :  1950  MRN: [id number]  Medical/Treatment Diagnosis Information:  · Diagnosis: Primary osteoarthritis of both knees; Osteoarthritis of left shoulder due to rotator cuff injury  · Treatment Diagnosis: B knee pain, decreased ROM and strength L Shoulder, impaired posture, impaired balance, core and hip weakness, decreased LE flexibility  Insurance/Certification information:  PT Insurance Information: Medicare  Physician Information:  Referring Practitioner: Amada Almanza MD  Plan of care signed (Y/N): []  Yes [x]  No     Date of Patient follow up with Physician:      Progress Report: []  Yes  [x]  No     Date Range for reporting period:  Beginnin21  Ending:     Progress report due (10 Rx/or 30 days whichever is less): visit #10 or 42     Recertification due (POC duration/ or 90 days whichever is less): visit #12 or 21     Visit # Insurance Allowable Auth required? Date Range    Medical necessity []  Yes  [x]  No      Latex Allergy:  [x]NO      []YES  Preferred Language for Healthcare:   [x]English       []other:    Functional Scale:        Date assessed:  LEFS: raw score = 26/80; dysfunction = 67%  21   Oswestry raw score = 38; dysfunction = 61%    Pain level: 4/10     SUBJECTIVE:  Pt ambulates to OP PT and demonstrates improved gait mechanics with upright posture and decreased LE ER. Pt states she had increased energy after previous session and was able to go to grocery store. Has been using pulleys at home to improve L UE ROM.  F/u with MD Hetal tomorrow     OBJECTIVE:     RESTRICTIONS/PRECAUTIONS: Asthma, A-fib, Prior JENNIFER, Lumbar fusions    Exercises/Interventions:     Therapeutic Exercises (78406) Resistance / level Sets/sec Reps Notes   Nustep L 5  6 min Standing Hip Abduction - 10 reps - 2 sets - 1x daily - 7x weekly   Standing Hip Extension - 10 reps - 2 sets - 1x daily - 7x weekly       Therapeutic Exercise and NMR EXR  [x] (04108) Provided verbal/tactile cueing for activities related to strengthening, flexibility, endurance, ROM for improvements in  [x] LE / Lumbar: LE, proximal hip, and core control with self care, mobility, lifting, ambulation. [x] UE / Cervical: cervical, postural, scapular, scapulothoracic and UE control with self care, reaching, carrying, lifting, house/yardwork, driving, computer work. [x] (12169) Provided verbal/tactile cueing for activities related to improving balance, coordination, kinesthetic sense, posture, motor skill, proprioception to assist with   [x] LE / lumbar: LE, proximal hip, and core control in self care, mobility, lifting, ambulation and eccentric single leg control. [x] UE / cervical: cervical, scapular, scapulothoracic and UE control with self care, reaching, carrying, lifting, house/yardwork, driving, computer work.   [] (43367) Therapist is in constant attendance of 2 or more patients providing skilled therapy interventions, but not providing any significant amount of measurable one-on-one time to either patient, for improvements in  [] LE / lumbar: LE, proximal hip, and core control in self care, mobility, lifting, ambulation and eccentric single leg control. [] UE / cervical: cervical, scapular, scapulothoracic and UE control with self care, reaching, carrying, lifting, house/yardwork, driving, computer work.      NMR and Therapeutic Activities:    [x] (43879 or 39385) Provided verbal/tactile cueing for activities related to improving balance, coordination, kinesthetic sense, posture, motor skill, proprioception and motor activation to allow for proper function of   [x] LE: / Lumbar core, proximal hip and LE with self care and ADLs [x] UE / Cervical: cervical, postural, scapular, scapulothoracic and UE control with self care, carrying, lifting, driving, computer work.   [] (24173) Gait Re-education- Provided training and instruction to the patient for proper LE, core and proximal hip recruitment and positioning and eccentric body weight control with ambulation re-education including up and down stairs     Home Management Training / Self Care:  [] (80763) Provided self-care/home management training related to activities of daily living and compensatory training, and/or use of adaptive equipment for improvement with: ADLs and compensatory training, meal preparation, safety procedures and instruction in use of adaptive equipment, including bathing, grooming, dressing, personal hygiene, basic household cleaning and chores.      Home Exercise Program:    [x] (97626) Reviewed/Progressed HEP activities related to strengthening, flexibility, endurance, ROM of   [] LE / Lumbar: core, proximal hip and LE for functional self-care, mobility, lifting and ambulation/stair navigation   [] UE / Cervical: cervical, postural, scapular, scapulothoracic and UE control with self care, reaching, carrying, lifting, house/yardwork, driving, computer work  [] (55441)Reviewed/Progressed HEP activities related to improving balance, coordination, kinesthetic sense, posture, motor skill, proprioception of   [] LE: core, proximal hip and LE for self care, mobility, lifting, and ambulation/stair navigation    [] UE / Cervical: cervical, postural,  scapular, scapulothoracic and UE control with self care, reaching, carrying, lifting, house/yardwork, driving, computer work    Manual Treatments:  PROM / STM / Oscillations-Mobs:  G-I, II, III, IV (PA's, Inf., Post.) [] (25692) Provided manual therapy to mobilize LE, proximal hip and/or LS spine soft tissue/joints for the purpose of modulating pain, promoting relaxation,  increasing ROM, reducing/eliminating soft tissue swelling/inflammation/restriction, improving soft tissue extensibility and allowing for proper ROM for normal function with   [] LE / lumbar: self care, mobility, lifting and ambulation. [] UE / Cervical: self care, reaching, carrying, lifting, house/yardwork, driving, computer work. Modalities:  [] (85951) Vasopneumatic compression: Utilized vasopneumatic compression to decrease edema / swelling for the purpose of improving mobility and quad tone / recruitment which will allow for increased overall function including but not limited to self-care, transfers, ambulation, and ascending / descending stairs. Modalities:      Charges:  Timed Code Treatment Minutes: 45   Total Treatment Minutes: 45     [] EVAL - LOW (32874)   [] EVAL - MOD (90832)  [] EVAL - HIGH (72663)  [] RE-EVAL (68141)  [x] GL(09221) x   1    [] Ionto  [x] NMR (26410) x 1      [] Vaso  [] Manual (33921) x       [] Ultrasound  [x] TA x  1      [] Mech Traction (62535)  [] Aquatic Therapy x     [] ES (un) (37136):   [] Home Management Training x   [] ES(attended) (84463)   [] Dry Needling 1-2 muscles (22440):  [] Dry Needling 3+ muscles (206589)  [] Group:      [] Other:     GOALS:   Patient stated goal: move better and move correctly. []? Progressing: []? Met: []? Not Met: []? Adjusted     Therapist goals for Patient:   Short Term Goals: To be achieved in: 2 weeks  1. Independent in HEP and progression per patient tolerance, in order to prevent re-injury. []? Progressing: []? Met: []? Not Met: []? Adjusted  2. Patient will have a decrease in pain to facilitate improvement in movement, function, and ADLs as indicated by Functional Deficits. []? Progressing: []? Met: []? Not Met: []?  Adjusted    Long Term Goals: To be achieved in: 6-8 weeks  1. Disability index score of 30% or less for the LEFS and QuickDASH to assist with reaching prior level of function. []? Progressing: []? Met: []? Not Met: []? Adjusted  2. Patient will demonstrate increased shoulder flexion and abduction AROM to 120 deg to allow for pt to put items away in cabinets. []? Progressing: []? Met: []? Not Met: []? Adjusted  3. Patient will demonstrate an increase in Strength to at least 4+/5 as well as good proximal hip strength and control to allow for proper functional mobility as indicated by patients Functional Deficits. []? Progressing: []? Met: []? Not Met: []? Adjusted  4. Patient will return to functional activities including maintained upright posture with ambulation during 6 MWT without increased symptoms or restriction. []? Progressing: []? Met: []? Not Met: []? Adjusted  5. Patient to report improved ability to complete all ADLs and household chores without rest breaks due to pain or fatigue. []? Progressing: []? Met: []? Not Met: []? Adjusted         Overall Progression Towards Functional goals/ Treatment Progress Update:  [] Patient is progressing as expected towards functional goals listed. [] Progression is slowed due to complexities/Impairments listed. [] Progression has been slowed due to co-morbidities.   [x] Plan just implemented, too soon to assess goals progression <30days   [] Goals require adjustment due to lack of progress  [] Patient is not progressing as expected and requires additional follow up with physician  [] Other    Persisting Functional Limitations/Impairments:  [x]Sleeping [x]Sitting               [x]Standing [x]Transfers        [x]Walking [x]Kneeling               [x]Stairs [x]Squatting / bending   [x]ADLs [x]Reaching  [x]Lifting  [x]Housework  [x]Driving []Job related tasks  []Sports/Recreation []Other: ASSESSMENT:  Pt given HEP handout for Wall Push ups with special instruction to keep elbows closer to her body as well as instruction for appropriate ROM to perform to improve outcomes. Pt had mild increase in R knee pain on last rep of FSU and required increased rest break to decrease symptoms. Pt was able to complete rest of exercise Rx after break was taken. Pt demonstrates improved gait mechanics with improved trunk posture as well as decreased B LE ER compensation this date. Pt pleased with response to skilled therapy. Pt demonstrates L GH AROM flexion to 145 degrees this date and Manual stretching was held 2/2 improvements in OH motion. Pt to continue to benefit from skilled therapy to address her remaining deficits and improve function. Treatment/Activity Tolerance:  [x] Patient able to complete tx  [] Patient limited by fatigue  [] Patient limited by pain  [] Patient limited by other medical complications  [] Other:     Prognosis: [x] Good [] Fair  [] Poor    Patient Requires Follow-up: [x] Yes  [] No    Plan for next treatment session: neural tension testing and stretching!!! PLAN: See eval. PT 2x / week for 6 weeks. [x] Continue per plan of care [] Alter current plan (see comments)  [] Plan of care initiated [] Hold pending MD visit [] Discharge    Electronically signed by: Deric Vasquez PTA, [id number]     Note: If patient does not return for scheduled/ recommended follow up visits, this note will serve as a discharge from care along with most recent update on progress.

## 2021-01-15 ENCOUNTER — OFFICE VISIT (OUTPATIENT)
Dept: ORTHOPEDIC SURGERY | Age: 71
End: 2021-01-15
Payer: MEDICARE

## 2021-01-15 VITALS — HEIGHT: 64 IN | BODY MASS INDEX: 40.46 KG/M2 | TEMPERATURE: 97.3 F | WEIGHT: 237 LBS

## 2021-01-15 DIAGNOSIS — M17.0 PRIMARY OSTEOARTHRITIS OF BOTH KNEES: ICD-10-CM

## 2021-01-15 DIAGNOSIS — M25.562 CHRONIC PAIN OF BOTH KNEES: Primary | ICD-10-CM

## 2021-01-15 DIAGNOSIS — Z01.818 PRE-OP TESTING: ICD-10-CM

## 2021-01-15 DIAGNOSIS — G89.29 CHRONIC PAIN OF BOTH KNEES: Primary | ICD-10-CM

## 2021-01-15 DIAGNOSIS — M17.11 PRIMARY OSTEOARTHRITIS OF RIGHT KNEE: ICD-10-CM

## 2021-01-15 DIAGNOSIS — M25.561 CHRONIC PAIN OF BOTH KNEES: Primary | ICD-10-CM

## 2021-01-15 PROCEDURE — 99214 OFFICE O/P EST MOD 30 MIN: CPT | Performed by: ORTHOPAEDIC SURGERY

## 2021-01-15 PROCEDURE — 1090F PRES/ABSN URINE INCON ASSESS: CPT | Performed by: ORTHOPAEDIC SURGERY

## 2021-01-15 PROCEDURE — 4040F PNEUMOC VAC/ADMIN/RCVD: CPT | Performed by: ORTHOPAEDIC SURGERY

## 2021-01-15 PROCEDURE — G8417 CALC BMI ABV UP PARAM F/U: HCPCS | Performed by: ORTHOPAEDIC SURGERY

## 2021-01-15 PROCEDURE — 1123F ACP DISCUSS/DSCN MKR DOCD: CPT | Performed by: ORTHOPAEDIC SURGERY

## 2021-01-15 PROCEDURE — 1036F TOBACCO NON-USER: CPT | Performed by: ORTHOPAEDIC SURGERY

## 2021-01-15 PROCEDURE — G8484 FLU IMMUNIZE NO ADMIN: HCPCS | Performed by: ORTHOPAEDIC SURGERY

## 2021-01-15 PROCEDURE — G8399 PT W/DXA RESULTS DOCUMENT: HCPCS | Performed by: ORTHOPAEDIC SURGERY

## 2021-01-15 PROCEDURE — G8427 DOCREV CUR MEDS BY ELIG CLIN: HCPCS | Performed by: ORTHOPAEDIC SURGERY

## 2021-01-15 PROCEDURE — 3017F COLORECTAL CA SCREEN DOC REV: CPT | Performed by: ORTHOPAEDIC SURGERY

## 2021-01-15 RX ORDER — HYDROCODONE BITARTRATE AND ACETAMINOPHEN 5; 325 MG/1; MG/1
1 TABLET ORAL PRN
Status: ON HOLD | COMMUNITY
End: 2021-03-17 | Stop reason: HOSPADM
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
## 2021-01-15 NOTE — LETTER
[facility]  Surgery Scheduling Form:  [facility]    DEMOGRAPHICS:                                                                                                              .    Patient Name:  Kaitlynn Song  Patient :  1950   Patient SS#:      Patient Phone:  [phone] (home)  Alt. Patient Phone:    Patient Address:  [address] [address]  [address] [address]    PCP:  Romayne Riser, MD  Payor/Plan Subscr  Sex Relation Sub. Ins. ID Effective Group Num   1. [address] P 1950 Female Self [id number] 10/1/17                                    [address] [address]   2. [address] P 1950 Female Self [id number] 10/1/17 [id number]                                   [address]     DIAGNOSIS & PROCEDURE:                                                                                            .    Diagnosis:   Right knee osteoarthritis M17.11  Operation:  Right Robotic assisted total knee arthroplasty [id number]  Location:  [facility]   Provider:  Moiz Varela M.D.    [facility] INFORMATION:                                                                                         .    Requested Date:  3-16-21    OR Time:  8:30                      Patient Arrival Time:  6:30  OR Time Required:  90 Minutes  Anesthesia:  general   Equipment: Demetria Lesches and Nephestefanía ADVANCED  Mini C-Arm:  No   Standard C-Arm:  No  Status: OP 23 hours  PAT Required:  Yes  Comments: Allergies: Atorvastatin, Simvastatin, Cephalexin, Cephalosporins, Food, Other, Shellfish-derived products, and Sulfa antibiotics   Body mass index is 40.68 kg/m².           01/15/21            BILLING INFORMATION:                                                                                                    .    Procedure:       CPT Code Modifier  Right Robotic assisted total knee arthroplasty                   ORTHOPAEDIC SURGERY PRE-SURGICAL PHYSICIAN Palmira Collard Marton Galeazzi  1950  Date of Surgery: 3-16-21 Right Robotic assisted total knee arthroplasty   History & Physical:  [ ] By Surgeon   By PCP Annamarie ]  Referrals:  [ ] Medical/Cardiac Clearance by _____________________________  Annamarie ] Joint Replacement Class  Annamarie ] Physical Therapy  [ ] Crutch Walking Instructions   Weight Bearing Status _____________  Annamarie ] Occupational Therapy  [ ] Smoking Cessation Instructions  [ ] Other ________________________________________________    Pre Admission Testing:  [ ] Hemoglobin & Hematocrit      Annamarie ] Comprehensive Metabolic Panel  Annamarie ] CBC with differential              Annamarie ] Type & Screen  [ ] CBC without differential       [ ] Type & Crossmatch 2 units-if total hip  Annamarie ] PT/INR                                   [ ] Autologous Blood _____ units  Annamarie ] PTT                                      Annamarie ]  EKG  [ ] Urinalysis                               Annamarie ]  25 Hydroxy Vitamin D  Annamarie ] Urinalysis with C & S    [X] Hemoglobin A1C   [ ] Basic Metabolic Panel         Annamarie ] MRSA-nasal  Annamarie  ] Other Anesthesia Guidelines     Orders to be initiated upon admission to same day surgery:  Annamarie ] Fasting blood sugar by fingerstick [ Dixon Boom  Annamarie ] PT/INR (pt takes Warafin/Coumadin)     [ ] Interscalene Right or Left  Annamarie ] HCG __X__ Urine _____ Serum              [ ] Femoral   Right or Left  [ ] Other ______________________________________________    IV Fluids and Medications:  1. Place IV catherer for IV access. The RN may use 0.1ml of 1% Lidocaine SQ      Per site for IV starts up to maximum of 0.5ml.  2. Infuse Lactated Ringers IV fluid at 50ml per hour. For diabetic or has renal             impaired patient, infuse 0.9% Normal Saline at 50ml/hr. 3. Cefazolin 1g IV if <80kg OR 2g if 80-120kg OR 3g if >120kg, given within one hour of incision time. For those allergic to Cephalosporins, give Clindamycin 600mg IV within 1 hour of incision time.  If the pre-op nasal culture for MRSA/MSSA was positive, add Vancomycin 1 gram IVPB, reduce dose of Vancomycin to 500 mg IVPB if PT < 55 kg or serum creatinine > 2 mg/dl (Vancomycin must be administered over 1 hour).   4. Other medications: Celebrex 400mg pre-op    Additional Orders:  Doyle.Lick ] Knee high anti-embolism stockings and antithrombic compression pumps (apply in Pre-op)                            Physican Signature:Date: 1/15/2021 Time: 4:11 PM

## 2021-01-19 ENCOUNTER — HOSPITAL ENCOUNTER (OUTPATIENT)
Dept: PHYSICAL THERAPY | Age: 71
Setting detail: THERAPIES SERIES
Discharge: HOME | End: 2021-01-19
Payer: MEDICARE

## 2021-01-19 PROCEDURE — 97530 THERAPEUTIC ACTIVITIES: CPT

## 2021-01-19 PROCEDURE — 97110 THERAPEUTIC EXERCISES: CPT

## 2021-01-19 PROCEDURE — 97112 NEUROMUSCULAR REEDUCATION: CPT

## 2021-01-19 NOTE — FLOWSHEET NOTE
[facility]  Outpatient Physical Therapy  Phone: [phone]   Fax: [phone]    Physical Therapy Daily Treatment Note  Date:  2021    Patient Name:  Katherine Diamond    :  1950  MRN: [id number]  Medical/Treatment Diagnosis Information:  · Diagnosis: Primary osteoarthritis of both knees; Osteoarthritis of left shoulder due to rotator cuff injury  · Treatment Diagnosis: B knee pain, decreased ROM and strength L Shoulder, impaired posture, impaired balance, core and hip weakness, decreased LE flexibility  Insurance/Certification information:  PT Insurance Information: Medicare  Physician Information:  Referring Practitioner: Amada Almanza MD  Plan of care signed (Y/N): []  Yes [x]  No     Date of Patient follow up with Physician:      Progress Report: []  Yes  [x]  No     Date Range for reporting period:  Beginnin21  Ending:     Progress report due (10 Rx/or 30 days whichever is less): visit #10 or 3/2/29     Recertification due (POC duration/ or 90 days whichever is less): visit #12 or 21     Visit # Insurance Allowable Auth required? Date Range    Medical necessity []  Yes  [x]  No      Latex Allergy:  [x]NO      []YES  Preferred Language for Healthcare:   [x]English       []other:    Functional Scale:        Date assessed:  LEFS: raw score = 26/80; dysfunction = 67%  21   Oswestry raw score = 38; dysfunction = 61%    Pain level: 5/10     SUBJECTIVE:  Pt reports she is tired from watching her grandchildren. Pt reports pain is up a little today because she didn't sleep well. Pt states the knees have been very sore.      OBJECTIVE:     RESTRICTIONS/PRECAUTIONS: Asthma, A-fib, Prior JENNIFER, Lumbar fusions  *Pt scheduled R knee replacement for 3/16/21*    Exercises/Interventions:     Therapeutic Exercises (88168) Resistance / level Sets/sec Reps Notes   Nustep L 5  5 min      IB  2/30 sec B      HSS  HFS  2/30 sec B  2/30 sec B Standing Hip Abduction - 10 reps - 2 sets - 1x daily - 7x weekly   Standing Hip Extension - 10 reps - 2 sets - 1x daily - 7x weekly       Therapeutic Exercise and NMR EXR  [x] (66609) Provided verbal/tactile cueing for activities related to strengthening, flexibility, endurance, ROM for improvements in  [x] LE / Lumbar: LE, proximal hip, and core control with self care, mobility, lifting, ambulation. [x] UE / Cervical: cervical, postural, scapular, scapulothoracic and UE control with self care, reaching, carrying, lifting, house/yardwork, driving, computer work. [x] (44572) Provided verbal/tactile cueing for activities related to improving balance, coordination, kinesthetic sense, posture, motor skill, proprioception to assist with   [x] LE / lumbar: LE, proximal hip, and core control in self care, mobility, lifting, ambulation and eccentric single leg control. [x] UE / cervical: cervical, scapular, scapulothoracic and UE control with self care, reaching, carrying, lifting, house/yardwork, driving, computer work.   [] (39795) Therapist is in constant attendance of 2 or more patients providing skilled therapy interventions, but not providing any significant amount of measurable one-on-one time to either patient, for improvements in  [] LE / lumbar: LE, proximal hip, and core control in self care, mobility, lifting, ambulation and eccentric single leg control. [] UE / cervical: cervical, scapular, scapulothoracic and UE control with self care, reaching, carrying, lifting, house/yardwork, driving, computer work.      NMR and Therapeutic Activities:    [x] (76944 or 99819) Provided verbal/tactile cueing for activities related to improving balance, coordination, kinesthetic sense, posture, motor skill, proprioception and motor activation to allow for proper function of   [x] LE: / Lumbar core, proximal hip and LE with self care and ADLs [x] UE / Cervical: cervical, postural, scapular, scapulothoracic and UE control with self care, carrying, lifting, driving, computer work.   [] (79584) Gait Re-education- Provided training and instruction to the patient for proper LE, core and proximal hip recruitment and positioning and eccentric body weight control with ambulation re-education including up and down stairs     Home Management Training / Self Care:  [] (23024) Provided self-care/home management training related to activities of daily living and compensatory training, and/or use of adaptive equipment for improvement with: ADLs and compensatory training, meal preparation, safety procedures and instruction in use of adaptive equipment, including bathing, grooming, dressing, personal hygiene, basic household cleaning and chores.      Home Exercise Program:    [x] (48862) Reviewed/Progressed HEP activities related to strengthening, flexibility, endurance, ROM of   [x] LE / Lumbar: core, proximal hip and LE for functional self-care, mobility, lifting and ambulation/stair navigation   [] UE / Cervical: cervical, postural, scapular, scapulothoracic and UE control with self care, reaching, carrying, lifting, house/yardwork, driving, computer work  [] (82993)Reviewed/Progressed HEP activities related to improving balance, coordination, kinesthetic sense, posture, motor skill, proprioception of   [] LE: core, proximal hip and LE for self care, mobility, lifting, and ambulation/stair navigation    [] UE / Cervical: cervical, postural,  scapular, scapulothoracic and UE control with self care, reaching, carrying, lifting, house/yardwork, driving, computer work    Manual Treatments:  PROM / STM / Oscillations-Mobs:  G-I, II, III, IV (PA's, Inf., Post.) [] (35768) Provided manual therapy to mobilize LE, proximal hip and/or LS spine soft tissue/joints for the purpose of modulating pain, promoting relaxation,  increasing ROM, reducing/eliminating soft tissue swelling/inflammation/restriction, improving soft tissue extensibility and allowing for proper ROM for normal function with   [] LE / lumbar: self care, mobility, lifting and ambulation. [] UE / Cervical: self care, reaching, carrying, lifting, house/yardwork, driving, computer work. Modalities:  [] (82674) Vasopneumatic compression: Utilized vasopneumatic compression to decrease edema / swelling for the purpose of improving mobility and quad tone / recruitment which will allow for increased overall function including but not limited to self-care, transfers, ambulation, and ascending / descending stairs. Modalities:      Charges:  Timed Code Treatment Minutes: 45   Total Treatment Minutes: 45     [] EVAL - LOW (19560)   [] EVAL - MOD (84105)  [] EVAL - HIGH (81207)  [] RE-EVAL (23629)  [x] OV(04283) x   1    [] Ionto  [x] NMR (22498) x 1      [] Vaso  [] Manual (02008) x       [] Ultrasound  [x] TA x  1      [] Mech Traction (17780)  [] Aquatic Therapy x     [] ES (un) (02592):   [] Home Management Training x   [] ES(attended) (37004)   [] Dry Needling 1-2 muscles (66939):  [] Dry Needling 3+ muscles (905222)  [] Group:      [] Other:     GOALS:   Patient stated goal: move better and move correctly. []? Progressing: []? Met: []? Not Met: []? Adjusted     Therapist goals for Patient:   Short Term Goals: To be achieved in: 2 weeks  1. Independent in HEP and progression per patient tolerance, in order to prevent re-injury. []? Progressing: []? Met: []? Not Met: []? Adjusted  2. Patient will have a decrease in pain to facilitate improvement in movement, function, and ADLs as indicated by Functional Deficits. []? Progressing: []? Met: []? Not Met: []?  Adjusted    Long Term Goals: To be achieved in: 6-8 weeks  1. Disability index score of 30% or less for the LEFS and QuickDASH to assist with reaching prior level of function. []? Progressing: []? Met: []? Not Met: []? Adjusted  2. Patient will demonstrate increased shoulder flexion and abduction AROM to 120 deg to allow for pt to put items away in cabinets. []? Progressing: []? Met: []? Not Met: []? Adjusted  3. Patient will demonstrate an increase in Strength to at least 4+/5 as well as good proximal hip strength and control to allow for proper functional mobility as indicated by patients Functional Deficits. []? Progressing: []? Met: []? Not Met: []? Adjusted  4. Patient will return to functional activities including maintained upright posture with ambulation during 6 MWT without increased symptoms or restriction. []? Progressing: []? Met: []? Not Met: []? Adjusted  5. Patient to report improved ability to complete all ADLs and household chores without rest breaks due to pain or fatigue. []? Progressing: []? Met: []? Not Met: []? Adjusted         Overall Progression Towards Functional goals/ Treatment Progress Update:  [] Patient is progressing as expected towards functional goals listed. [] Progression is slowed due to complexities/Impairments listed. [] Progression has been slowed due to co-morbidities.   [x] Plan just implemented, too soon to assess goals progression <30days   [] Goals require adjustment due to lack of progress  [] Patient is not progressing as expected and requires additional follow up with physician  [] Other    Persisting Functional Limitations/Impairments:  [x]Sleeping [x]Sitting               [x]Standing [x]Transfers        [x]Walking [x]Kneeling               [x]Stairs [x]Squatting / bending   [x]ADLs [x]Reaching  [x]Lifting  [x]Housework  [x]Driving []Job related tasks  []Sports/Recreation []Other: ASSESSMENT:  Pt encouraged to begin a walking program prior to surgery. PT also suggested to ascend/descend stairs facing forward, even if she has to use a step-to pattern. Pt already reporting improvements in endurance since beginning therapy. Will continue with strengthening and balance training to improve functional mobility. Treatment/Activity Tolerance:  [x] Patient able to complete tx  [] Patient limited by fatigue  [] Patient limited by pain  [] Patient limited by other medical complications  [] Other:     Prognosis: [x] Good [] Fair  [] Poor    Patient Requires Follow-up: [x] Yes  [] No    Plan for next treatment session: neural tension testing and stretching!!! PLAN: See eval. PT 2x / week for 6 weeks. [x] Continue per plan of care [] Alter current plan (see comments)  [] Plan of care initiated [] Hold pending MD visit [] Discharge    Electronically signed by: Amira Ramirez PT DPT     Note: If patient does not return for scheduled/ recommended follow up visits, this note will serve as a discharge from care along with most recent update on progress.

## 2021-01-20 ENCOUNTER — TELEPHONE (OUTPATIENT)
Dept: CARDIOLOGY CLINIC | Age: 71
End: 2021-01-20

## 2021-01-20 NOTE — TELEPHONE ENCOUNTER
Spoke to the pt-advised we are out of Xarelto 20 mg samples. We will call back when available. 1/26/21  Left a message for the pt-advised to  two saample bottles of Xarelto 20 mg, Lot [id number], Exp. 12/'22, at the [place] office.

## 2021-01-20 NOTE — TELEPHONE ENCOUNTER
Sample requested: xarelto    Strength: 20    Dosage:     Patient's call back number: asking to  tomorrow 1/21/21

## 2021-01-21 ENCOUNTER — HOSPITAL ENCOUNTER (OUTPATIENT)
Dept: PHYSICAL THERAPY | Age: 71
Setting detail: THERAPIES SERIES
Discharge: HOME | End: 2021-01-21
Payer: MEDICARE

## 2021-01-21 NOTE — PROGRESS NOTES
Physical Therapy  Cancellation/No-show Note  Patient Name:  Farshad Barnhart  :  1950   Date:  2021  Cancelled visits to date: 1  No-shows to date: 0    Patient status for today's appointment patient:  [x]  Cancelled 21  []  Rescheduled appointment  []  No-show     Reason given by patient:  [x]  Patient ill  []  Conflicting appointment  []  No transportation    []  Conflict with work  []  No reason given  []  Other:     Comments:      Phone call information:   []  Phone call made today to patient at _ time at number provided:      []  Patient answered, conversation as follows:    []  Patient did not answer, message left as follows:  [x]  Phone call not made today    Electronically signed by:  Renu Kelley PT

## 2021-01-22 ENCOUNTER — TELEPHONE (OUTPATIENT)
Dept: CARDIOLOGY CLINIC | Age: 71
End: 2021-01-22

## 2021-01-22 NOTE — TELEPHONE ENCOUNTER
We have not received the results. I notified [employer] ([phone]) and the rep sent a message to [facility] to refax. She had used [phone]; I also gave them [phone].

## 2021-01-22 NOTE — TELEPHONE ENCOUNTER
Mika Johnston calling wanting to know if ERICKA received the fax sent earlier today of the results of the lab for the Genetic Testing?  Pls call to advise Thank you

## 2021-01-25 ENCOUNTER — TELEPHONE (OUTPATIENT)
Dept: PULMONOLOGY | Age: 71
End: 2021-01-25

## 2021-01-25 NOTE — TELEPHONE ENCOUNTER
You can tell her that I am passing it on to her EP physician, as it has more relevance for them than for me.   ERICKA

## 2021-01-26 ENCOUNTER — HOSPITAL ENCOUNTER (OUTPATIENT)
Dept: PHYSICAL THERAPY | Age: 71
Setting detail: THERAPIES SERIES
Discharge: HOME | End: 2021-01-26
Payer: MEDICARE

## 2021-01-26 ENCOUNTER — TELEPHONE (OUTPATIENT)
Dept: INTERNAL MEDICINE CLINIC | Age: 71
End: 2021-01-26

## 2021-01-26 ENCOUNTER — TELEPHONE (OUTPATIENT)
Dept: CARDIOLOGY CLINIC | Age: 71
End: 2021-01-26

## 2021-01-26 PROCEDURE — 97530 THERAPEUTIC ACTIVITIES: CPT

## 2021-01-26 PROCEDURE — 97110 THERAPEUTIC EXERCISES: CPT

## 2021-01-26 PROCEDURE — 97161 PT EVAL LOW COMPLEX 20 MIN: CPT

## 2021-01-26 RX ORDER — PRAMIPEXOLE DIHYDROCHLORIDE 0.5 MG/1
TABLET ORAL
Qty: 180 TABLET | Refills: 0 | Status: SHIPPED | OUTPATIENT
Start: 2021-01-26 | End: 2021-04-02 | Stop reason: SDUPTHER

## 2021-01-26 NOTE — TELEPHONE ENCOUNTER
Tried returning call. No answer. Voicemail is full.   Offering appointment for pre-op on 2/22 at 11:15 am.

## 2021-01-26 NOTE — PLAN OF CARE
[facility]  [address], [address] [place] [address]  Phone: [phone]   Fax: [phone]                                                       Physical Therapy Certification    Dear Referring Practitioner: Elizabeth Roberson MD,    We had the pleasure of evaluating the following patient for physical therapy services at [address] [place] [address]. A summary of our findings can be found in the initial assessment below. This includes our plan of care. If you have any questions or concerns regarding these findings, please do not hesitate to contact me at the office phone number checked above. Thank you for the referral.       Physician Signature:_______________________________Date:__________________  By signing above (or electronic signature), therapists plan is approved by physician      Patient: Brittany Gusman   : 1950   MRN: [id number]  Referring Physician: Referring Practitioner: Elizabeth Roberson MD      Evaluation Date: 2021      Medical Diagnosis Information:  Diagnosis: M17.11 (ICD-10-CM) - Primary osteoarthritis of right knee; Z01.818 (ICD-10-CM) - Pre-op testing   Treatment Diagnosis: Bilat knee pain (R more affected), decreased knee ROM, decreased LE strength and flexiblity, decreased balance, abnormal gait, decreased walking endurance                                         Insurance information: PT Insurance Information: Medicare     Precautions/ Contra-indications:  PMHx: Asthma, A-fib, HTN, Anxiety, Depression, Bilat JENNIFER 2016, Lumbar fusions , pulmonary embolism 2016, L rotator cuff tear  Latex Allergy:  [x]NO      []YES  Preferred Language for Healthcare:   [x]English       []Other:    C-SSRS Triggered by Intake questionnaire (Past 2 wk assessment ):   [x] No, Questionnaire did not trigger screening.   [] Yes, Patient intake triggered C-SSRS Screening [] Completed, no further action required. [] Completed, PCP notified via Epic    SUBJECTIVE: Patient reports she is ready to get her R TKA. She also has a history of L knee pain as well. She may do the L knee, but will wait at least a year. Is currently working in therapy on her hip and shoulders. Plans to stay with her friend, Zachery Longoria, post operatively. TKA scheduled for 3/16/21    Relevant Medical History: PMHx: Asthma, A-fib, HTN, Anxiety, Depression, Bilat JENNIFER 2016, Lumbar fusions 2004, pulmonary embolism 2016, L rotator cuff tear  Functional Outcome: LEFS: raw score = 31; dysfunction = 61.25%    Pain Scale: 5/10  Easing factors: seated rest, Advil  Provocative factors: prolonged standing and walking    Type: [x]Constant   []Intermittent  []Radiating []Localized []other:     Numbness/Tingling: Denies    Occupation/School: Retired, enjoys spending time with friends and grandchildren    Living Status: Will you live with someone who can care for you after surgery? Pt lives alone; plans to stay with friend Zachery Longoria post op   Where is the bathroom located in your post-surgery place of residence? 1st floor; handicap height toilet   Where is the bedroom located in your post-surgery place of residence? st floor   Do you use community supports (home help, meals-on-wheels, district nurse)? No;  0-1 x/wk/2+ x/wk   How may stairs will you have to climb to get in to your place of residence? 6 stairs at front entrance, level entry   Have you had a fall in the past year? No    Prior Level of Function:Prior to this injury / incident, pt was independent with ADLs and IADLs,    Do you use ambulatory aids?  No AD; has SPC, walking sticks, RW, Rolator   How far on average can you walk? 2 blocks+/1-2 blocks/mostly house bound; is able to with walker   What is you physical activity level?  highly active/active/somewhat active/sedentary      OBJECTIVE:   Palpation: NA    Quad: Fair to good quad set Functional Mobility/Transfers: pain and difficulty with supine to/from sit transfers and bed mobility    Posture:  Forward trunk lean, decreased R LE WB, R knee flexion in standing    Bandages/Dressings/Incisions: NA    Gait: (include devices/WB status): no AD, forward trunk lean throughout, decreased R stance time, R trunk lean in R stance phase, excessive knee flexion in R stance, decreased stride length and gait speed, observed SOB with 100 ft of walking    Dermatomes Normal Abnormal Comments-Not Tested   inguinal area (L1)       anterior mid-thigh (L2)      distal ant thigh/med knee (L3)      medial lower leg and foot (L4)      lateral lower leg and foot (L5)      posterior calf (S1)      medial calcaneus (S2)          Myotomes Normal Abnormal Comments- Not tested    Hip flexion (L1-L2)      Knee extension (L2-L4)      Dorsiflexion (L4-L5)      Great Toe Ext (L5)      Ankle Eversion (S1-S2)      Ankle PF(S1-S2)          Reflexes Normal Abnormal Comments-Not tested   S1-2 Seated achilles      S1-2 Prone knee bend      L3-4 Patellar tendon      Clonus      Babinski           PROM AROM    L R L R   Knee Flexion   4 deg from neutral 12 deg from neutral   Knee Extension   102 deg 105 deg       Strength (0-5) Left Right   Hip Flexion - supine     Hip Flexion - seated 4+ 4   Hip Abduction - sidelyling 4-/# 4-/#   Hip Adduction     Hip Extension     Quads 5# 4*#   Hamstrings 5 4+*        Flexibility     Hamstrings (90/90) Mod restirctions Mod restrictions   ITB Glorianne Alec)     Quads (Ely's)     Hip Flexor Farheen Kaska)          Girth     Mid patella     Suprapatellar         Joint mobility: patellofemoral    []Normal    [x]Hypo   []Hyper         Functional Testing  Sx Date 3/16/21 Prehab Date 1/26/21 Post-op Re-Eval Date  4 week F/U date  8 week F/U date  D/C Date       TUG (sec) 11.8 sec       30 second sit to stand (reps) 11 reps 6 minute walk (m) 121.9 m (test discontinued at 4:02 remaining secondary to SOB and knee pain)          Balance:    Narrowed HENRRY (sec) 10+ sec       Semi Tandem (sec)   10+ B             Tandem (sec)  3 sec                 SLS (sec)  3 sec            Knee AROM L R L R L R L R L R   Flexion 4 12           Extension 102 105              Knee Ext: L R L R L R L R L R   MMT (of 5) 5 4           Knee Ext (#)                Hip Abd:  L R L R L R L R L R   MMT (of 5) 4- 4-           Hip Abd (#)                LEFS (raw) 31                              [x] Patient history, allergies, meds reviewed. Medical chart reviewed. See intake form. Review Of Systems (ROS):  [x]Performed Review of systems (Integumentary, CardioPulmonary, Neurological) by intake and observation. Intake form has been scanned into medical record. Patient has been instructed to contact their primary care physician regarding ROS issues if not already being addressed at this time.       Co-morbidities/Complexities (which will affect course of rehabilitation):   []None           Arthritic conditions   []Rheumatoid arthritis (M05.9)  [x]Osteoarthritis (M19.91)   Cardiovascular conditions   [x]Hypertension (I10)  []Hyperlipidemia (E78.5)  []Angina pectoris (I20)  []Atherosclerosis (I70)   Musculoskeletal conditions   []Disc pathology   []Congenital spine pathologies   []Prior surgical intervention  []Osteoporosis (M81.8)  []Osteopenia (M85.8)   Endocrine conditions   []Hypothyroid (E03.9)  []Hyperthyroid Gastrointestinal conditions   []Constipation (I20.33)   Metabolic conditions   []Morbid obesity (E66.01)  []Diabetes type 1(E10.65) or 2 (E11.65)   []Neuropathy (G60.9)     Pulmonary conditions   [x]Asthma (J45)  []Coughing   []COPD (J44.9)   Psychological Disorders  [x]Anxiety (F41.9)  [x]Depression (F32.9)   []Other:   [x]Other:  Lumbar fusion 2004  Bilat hip JENNIFER 2016  Hx of PE with JENNIFER Barriers to/and or personal factors that will affect rehab potential:              []Age  []Sex    []Smoker              []Motivation/Lack of Motivation                        [x]Co-Morbidities              []Cognitive Function, education/learning barriers              []Environmental, home barriers              []profession/work barriers  []past PT/medical experience  []other:  Justification:     Falls Risk Assessment (30 days):   [x] Falls Risk assessed and no intervention required. [] Falls Risk assessed and Patient requires intervention due to being higher risk   TUG score (>12s at risk):     [] Falls education provided, including         ASSESSMENT: Pt is a 77 yo female referred to PT for pre-hab R TKA scheduled to be performed 3/16/21. She presents with deficits in extension and flexion ROM, posterior lateral hip and quad strength, gross LE flexibility, patellar jt mobility, balance, gait pattern, and walking endurance. She will benefit from skilled therapy prior to TKA to address these deficits, achieve goals, and maximize post op status.      Functional Impairments:     [x]Noted lumbar/proximal hip/LE joint hypomobility   [x]Decreased LE functional ROM   [x]Decreased core/proximal hip strength and neuromuscular control   [x]Decreased LE functional strength   [x]Reduced balance/proprioceptive control   []other:      Functional Activity Limitations (from functional questionnaire and intake)   [x]Reduced ability to tolerate prolonged functional positions   [x]Reduced ability or difficulty with changes of positions or transfers between positions   [x]Reduced ability to maintain good posture and demonstrate good body mechanics with sitting, bending, and lifting   [x]Reduced ability to sleep   [] Reduced ability or tolerance with driving and/or computer work   [x]Reduced ability to perform lifting, carrying tasks   [x]Reduced ability to squat   [x]Reduced ability to forward bend [x]Reduced ability to ambulate prolonged functional periods/distances/surfaces   [x]Reduced ability to ascend/descend stairs   [x]Reduced ability to run, hop, cut or jump   []other:    Participation Restrictions   [x]Reduced participation in self care activities   [x]Reduced participation in home management activities   []Reduced participation in work activities   [x]Reduced participation in social activities. [x]Reduced participation in sport/recreation activities. Classification :    []Signs/symptoms consistent with post-surgical status including decreased ROM, strength and function.    []Signs/symptoms consistent with joint sprain/strain   []Signs/symptoms consistent with patella-femoral syndrome   [x]Signs/symptoms consistent with knee OA/hip OA   []Signs/symptoms consistent with internal derangement of knee/Hip   []Signs/symptoms consistent with functional hip weakness/NMR control      []Signs/symptoms consistent with tendinitis/tendinosis    []signs/symptoms consistent with pathology which may benefit from Dry needling      []other:      Prognosis/Rehab Potential:      []Excellent   [x]Good    []Fair   []Poor    Tolerance of evaluation/treatment:    []Excellent   [x]Good    []Fair   []Poor    Physical Therapy Evaluation Complexity Justification  [x] A history of present problem with:  [] no personal factors and/or comorbidities that impact the plan of care;  []1-2 personal factors and/or comorbidities that impact the plan of care  [x]3 personal factors and/or comorbidities that impact the plan of care  [x] An examination of body systems using standardized tests and measures addressing any of the following: body structures and functions (impairments), activity limitations, and/or participation restrictions;:  [] a total of 1-2 or more elements   [x] a total of 3 or more elements   [] a total of 4 or more elements   [x] A clinical presentation with:  [x] stable and/or uncomplicated characteristics [] evolving clinical presentation with changing characteristics  [] unstable and unpredictable characteristics;   [x] Clinical decision making of [x] low , [] moderate, [] high complexity using standardized patient assessment instrument and/or measurable assessment of functional outcome. [x] EVAL (LOW) 15756 (typically 30 minutes face-to-face)  [] EVAL (MOD) 56705 (typically 30 minutes face-to-face)  [] EVAL (HIGH) 46746 (typically 45 minutes face-to-face)  [] RE-EVAL     PLAN:   Frequency/Duration:  1-2 days per week for 4 Weeks:  Interventions:  [x]  Therapeutic exercise including: strength training, ROM, for Lower extremity and core   [x]  NMR activation and proprioception for LE, Glutes and Core   [x]  Manual therapy as indicated for LE, Hip and spine to include: Dry Needling/IASTM, STM, PROM, Gr I-IV mobilizations, manipulation. [x] Modalities as needed that may include: thermal agents, E-stim, Biofeedback, US, iontophoresis as indicated  [x] Patient education on joint protection, postural re-education, activity modification, progression of HEP. HEP instruction: written HEP instructions provided and discussed. Patient education:  Patient was thoroughly educated on this date regarding prehabilitation goals, importance of PT sessions in improving overall ROM, strength and stability prior to surgery, and how prehabilitation will facilitate improved post-operative outcomes. The patient was educated on and instructed in HEP as listed. The patient was given a detailed handout for exercises to initiate in the hospital post-operatively as well as at home. The discharge plan from the hospital was reviewed with the patient; specifically, to reduce length of hospital stay and to minimize time before reinitiating outpatient physical therapy after surgery. Education regarding early mobility post-operatively in the hospital and emphasis on working with both physical therapy and nursing staff to achieve ambulation goal of 150 feet was provided. The patient was highly encouraged to attend joint class in hospital prior to surgery for further instructions on pre and post-surgical care. Also, education regarding goals and expectations was provided; specifically, knee flexion range of motion greater than or equal to 90 degrees and 0 degrees knee extension to promote improved gait mechanics and ambulation. The patient was educated about all applicable post-op restrictions and precautions. The patient was encouraged to utilize ice/cold pack after surgery to address pain, minimize swelling as often as possible. It is in my medical opinion that this patient is clear from all physical barriers prior to consideration for surgery, activity modifications prior to and post operatively have been discussed with this patient as well as discharge planning and is cleared for surgery from physical therapy perspective. GOALS:  Patient stated goal: \"prepare for surgery\"  [] Progressing: [] Met: [] Not Met: [] Adjusted    Therapist goals for Patient:   Short Term Goals:  To be achieved in: 1 visit 1. Independent in HEP and progression per patient tolerance, in order to prevent re-injury. [] Progressing: [] Met: [] Not Met: [] Adjusted  2. Patient will have a decrease in pain to facilitate improvement in movement, function, and ADLs as indicated by Functional Deficits. [] Progressing: [] Met: [] Not Met: [] Adjusted  3. All patient questions regarding expectations for rehab following upcoming surgery are answered.    [] Progressing: [] Met: [] Not Met: [] Adjusted    Long Term Goals: long term goals to be determined at re-eval following upcoming surgery    Electronically signed by:  Nishi Alonzo, PT, DPT

## 2021-01-26 NOTE — TELEPHONE ENCOUNTER
----- Message from Vidyamonica Rico sent at 1/26/2021 11:17 AM EST -----  Subject: Message to Provider    QUESTIONS  Information for Provider? pt is requesting samples of Xarelto because the   pt is having a hard time trying to afford the medicine.  ---------------------------------------------------------------------------  --------------  CALL BACK INFO  What is the best way for the office to contact you? OK to leave message on   voicemail  Preferred Call Back Phone Number? [phone]  ---------------------------------------------------------------------------  --------------  SCRIPT ANSWERS  Relationship to Patient?  Self

## 2021-01-26 NOTE — TELEPHONE ENCOUNTER
----- Message from Michelle Mcnulty sent at 1/26/2021 11:14 AM EST -----  Subject: Appointment Request    Reason for Call: Routine Pre-Op    QUESTIONS  Type of Appointment? Established Patient  Reason for appointment request? No appointments available during search  Additional Information for Provider? pt needs to schedule pre op   appointment for surgery on March 16th with Dr. Cyndi Lemus at [facility]   on Right knee replacement. EKG is not needed going to cardiologist. please   advise.  ---------------------------------------------------------------------------  --------------  [address]  What is the best way for the office to contact you? OK to leave message on   voicemail  Preferred Call Back Phone Number? [phone]  ---------------------------------------------------------------------------  --------------  SCRIPT ANSWERS  Relationship to Patient? Self  Appointment reason? Symptomatic  Select script based on patient symptoms? Adult Pre-Op   Do you have question for your provider that need to be answered prior to   scheduling your pre-op appointment? No  Have you been diagnosed with   tested for   or told that you are suspected of having COVID-19 (Coronavirus)? No  Have you had a fever or taken medication to treat a fever within the past   3 days? No  Have you had a cough   shortness of breath or flu-like symptoms within the past 3 days? No  Do you currently have flu-like symptoms including fever or chills   cough   shortness of breath   or difficulty breathing   or new loss of taste or smell? No  (Service Expert  click yes below to proceed with [employer] As Usual   Scheduling)?  Yes

## 2021-01-26 NOTE — FLOWSHEET NOTE
[facility]  Outpatient Physical Therapy  Phone: [phone]   Fax: [phone]    Physical Therapy Daily Treatment Note  Date:  2021    Patient Name:  Steve Ram    :  1950  MRN: [id number]  Medical/Treatment Diagnosis Information:  · Diagnosis: Primary osteoarthritis of both knees; Osteoarthritis of left shoulder due to rotator cuff injury  · Treatment Diagnosis: B knee pain, decreased ROM and strength L Shoulder, impaired posture, impaired balance, core and hip weakness, decreased LE flexibility  Insurance/Certification information:  PT Insurance Information: Medicare  Physician Information:  Referring Practitioner: Zully Chance MD  Plan of care signed (Y/N): []  Yes [x]  No     Date of Patient follow up with Physician:      Progress Report: []  Yes  [x]  No     Date Range for reporting period:  Beginnin21  Ending:     Progress report due (10 Rx/or 30 days whichever is less): visit #10 or      Recertification due (POC duration/ or 90 days whichever is less): visit #12 or 21     Visit # Insurance Allowable Auth required? Date Range    Medical necessity []  Yes  [x]  No      Latex Allergy:  [x]NO      []YES  Preferred Language for Healthcare:   [x]English       []other:    Functional Scale:        Date assessed:  LEFS: raw score = 26/80; dysfunction = 67%  21   Oswestry raw score = 38; dysfunction = 61%    Pain level: 4/10, 6/10 R knee      SUBJECTIVE: Pt is in a little more pain today, which could be from just having her Prehab eval for the R knee replacement. She does feel like she is getting more strength. She does feel like she is sleeping better when doing the lunge stretch before bed.       OBJECTIVE:     RESTRICTIONS/PRECAUTIONS: Asthma, A-fib, Prior JENNIFER, Lumbar fusions  *Pt scheduled R knee replacement for 3/16/21*    Exercises/Interventions:     Therapeutic Exercises (96675) Resistance / level Sets/sec Reps Notes Nustep L 5  5 min      IB  3/30 sec B      HSS  HFS  2/30 sec B  2/30 sec B     Pulleys - flexion/scaption   3 min   1/14- Scaption performed standing    Standing hip 3 way - Standing on 2 in step for extension   Wall walks    X 5 L                          Therapeutic Activities (77381)       Cabinet reaching 7 cones used   Forward step ups  6 in 1 10 L; 8 R    Lateral step ups 6 in L only    Stair climbing           Neuromuscular Re-ed (35875)       Static balance:  DLS  NBOS  NBOS EC  Tandem EO and EC   NBOS + head nods  NBOS + head shakes   Airex  Airex  Airex  Airex  Airex  Airex   No UE  No UE  Fingertip support  Fingertip support   Walking on uneven surfaces       Biodex       W>Y at wall   5    Push ups At wall  10                   Manual Intervention (28457)                                                     Pt. Education:  -patient educated on diagnosis, prognosis and expectations for rehab  -all patient questions were answered  1/6: Extensive education today including importance of hydration, stretching frequently while sewing, a more appropriate step stool that has a handle, and possible neural tension in UEs.  1/19: Pt encouraged to begin a walking program prior to surgery    Home Exercise Program:  Access Code: [email or web address]   URL: [email or web address]   Date: 01/06/2021   Prepared by: Lei Mahmood     Exercises   · Hooklying Gluteal Sets - 10 reps - 2 sets - 2x daily - 7x weekly   · Seated Scapular Retraction - 10 reps - 2 sets - 3-4x daily - 7x weekly   · Seated Hamstring Stretch - 3 reps - 3 sets - 30 seconds hold - 3x daily - 7x weekly   · Shoulder Flexion Wall Slide with Towel - 10 reps - 2 sets - 2x daily - 7x weekly   · Hip Flexor Stretch on Step - 2 reps - 1 sets - 30 seconds hold - 2x daily - 7x weekly      Access Code: [email or web address]   URL: [email or web address]   Date: 01/08/2021   Prepared by: Lei Mahmood     Exercises Standing Hip Flexion AROM - 10 reps - 2 sets - 1x daily - 7x weekly   Standing Hip Abduction - 10 reps - 2 sets - 1x daily - 7x weekly   Standing Hip Extension - 10 reps - 2 sets - 1x daily - 7x weekly       Therapeutic Exercise and NMR EXR  [x] (11102) Provided verbal/tactile cueing for activities related to strengthening, flexibility, endurance, ROM for improvements in  [x] LE / Lumbar: LE, proximal hip, and core control with self care, mobility, lifting, ambulation. [x] UE / Cervical: cervical, postural, scapular, scapulothoracic and UE control with self care, reaching, carrying, lifting, house/yardwork, driving, computer work. [x] (09267) Provided verbal/tactile cueing for activities related to improving balance, coordination, kinesthetic sense, posture, motor skill, proprioception to assist with   [x] LE / lumbar: LE, proximal hip, and core control in self care, mobility, lifting, ambulation and eccentric single leg control. [x] UE / cervical: cervical, scapular, scapulothoracic and UE control with self care, reaching, carrying, lifting, house/yardwork, driving, computer work.   [] (16583) Therapist is in constant attendance of 2 or more patients providing skilled therapy interventions, but not providing any significant amount of measurable one-on-one time to either patient, for improvements in  [] LE / lumbar: LE, proximal hip, and core control in self care, mobility, lifting, ambulation and eccentric single leg control. [] UE / cervical: cervical, scapular, scapulothoracic and UE control with self care, reaching, carrying, lifting, house/yardwork, driving, computer work.      NMR and Therapeutic Activities:    [x] (03239 or 59908) Provided verbal/tactile cueing for activities related to improving balance, coordination, kinesthetic sense, posture, motor skill, proprioception and motor activation to allow for proper function of   [x] LE: / Lumbar core, proximal hip and LE with self care and ADLs [x] UE / Cervical: cervical, postural, scapular, scapulothoracic and UE control with self care, carrying, lifting, driving, computer work.   [] (03229) Gait Re-education- Provided training and instruction to the patient for proper LE, core and proximal hip recruitment and positioning and eccentric body weight control with ambulation re-education including up and down stairs     Home Management Training / Self Care:  [] (54473) Provided self-care/home management training related to activities of daily living and compensatory training, and/or use of adaptive equipment for improvement with: ADLs and compensatory training, meal preparation, safety procedures and instruction in use of adaptive equipment, including bathing, grooming, dressing, personal hygiene, basic household cleaning and chores.      Home Exercise Program:    [x] (75685) Reviewed/Progressed HEP activities related to strengthening, flexibility, endurance, ROM of   [x] LE / Lumbar: core, proximal hip and LE for functional self-care, mobility, lifting and ambulation/stair navigation   [] UE / Cervical: cervical, postural, scapular, scapulothoracic and UE control with self care, reaching, carrying, lifting, house/yardwork, driving, computer work  [] (85269)Reviewed/Progressed HEP activities related to improving balance, coordination, kinesthetic sense, posture, motor skill, proprioception of   [] LE: core, proximal hip and LE for self care, mobility, lifting, and ambulation/stair navigation    [] UE / Cervical: cervical, postural,  scapular, scapulothoracic and UE control with self care, reaching, carrying, lifting, house/yardwork, driving, computer work    Manual Treatments:  PROM / STM / Oscillations-Mobs:  G-I, II, III, IV (PA's, Inf., Post.) [] (06049) Provided manual therapy to mobilize LE, proximal hip and/or LS spine soft tissue/joints for the purpose of modulating pain, promoting relaxation,  increasing ROM, reducing/eliminating soft tissue swelling/inflammation/restriction, improving soft tissue extensibility and allowing for proper ROM for normal function with   [] LE / lumbar: self care, mobility, lifting and ambulation. [] UE / Cervical: self care, reaching, carrying, lifting, house/yardwork, driving, computer work. Modalities:  [] (82541) Vasopneumatic compression: Utilized vasopneumatic compression to decrease edema / swelling for the purpose of improving mobility and quad tone / recruitment which will allow for increased overall function including but not limited to self-care, transfers, ambulation, and ascending / descending stairs. Modalities:      Charges:  Timed Code Treatment Minutes: 36   Total Treatment Minutes: 36     [] EVAL - LOW (72399)   [] EVAL - MOD (01182)  [] EVAL - HIGH (85170)  [] RE-EVAL (24792)  [x] SANDEEP(66675) x   1    [] Ionto  [] NMR (91977) x       [] Vaso  [] Manual (63830) x       [] Ultrasound  [x] TA x  1      [] Mech Traction (04841)  [] Aquatic Therapy x     [] ES (un) (57896):   [] Home Management Training x   [] ES(attended) (05517)   [] Dry Needling 1-2 muscles (57160):  [] Dry Needling 3+ muscles (352733)  [] Group:      [] Other:     GOALS:   Patient stated goal: move better and move correctly. []? Progressing: []? Met: []? Not Met: []? Adjusted     Therapist goals for Patient:   Short Term Goals: To be achieved in: 2 weeks  1. Independent in HEP and progression per patient tolerance, in order to prevent re-injury. []? Progressing: []? Met: []? Not Met: []? Adjusted  2. Patient will have a decrease in pain to facilitate improvement in movement, function, and ADLs as indicated by Functional Deficits. []? Progressing: []? Met: []? Not Met: []?  Adjusted    Long Term Goals: To be achieved in: 6-8 weeks  1. Disability index score of 30% or less for the LEFS and QuickDASH to assist with reaching prior level of function. []? Progressing: []? Met: []? Not Met: []? Adjusted  2. Patient will demonstrate increased shoulder flexion and abduction AROM to 120 deg to allow for pt to put items away in cabinets. []? Progressing: []? Met: []? Not Met: []? Adjusted  3. Patient will demonstrate an increase in Strength to at least 4+/5 as well as good proximal hip strength and control to allow for proper functional mobility as indicated by patients Functional Deficits. []? Progressing: []? Met: []? Not Met: []? Adjusted  4. Patient will return to functional activities including maintained upright posture with ambulation during 6 MWT without increased symptoms or restriction. []? Progressing: []? Met: []? Not Met: []? Adjusted  5. Patient to report improved ability to complete all ADLs and household chores without rest breaks due to pain or fatigue. []? Progressing: []? Met: []? Not Met: []? Adjusted         Overall Progression Towards Functional goals/ Treatment Progress Update:  [] Patient is progressing as expected towards functional goals listed. [] Progression is slowed due to complexities/Impairments listed. [] Progression has been slowed due to co-morbidities.   [x] Plan just implemented, too soon to assess goals progression <30days   [] Goals require adjustment due to lack of progress  [] Patient is not progressing as expected and requires additional follow up with physician  [] Other    Persisting Functional Limitations/Impairments:  [x]Sleeping [x]Sitting               [x]Standing [x]Transfers        [x]Walking [x]Kneeling               [x]Stairs [x]Squatting / bending   [x]ADLs [x]Reaching  [x]Lifting  [x]Housework  [x]Driving []Job related tasks  []Sports/Recreation []Other: ASSESSMENT:  Pt requested shorter session due to fatigue from prehab eval. Endurance continues to be a big limiting factor for patient, and she tends to LOWER [facility]. Will continue to progress patient as able with UE/LE strength and ROM. Treatment/Activity Tolerance:  [x] Patient able to complete tx  [] Patient limited by fatigue  [] Patient limited by pain  [] Patient limited by other medical complications  [] Other:     Prognosis: [x] Good [] Fair  [] Poor    Patient Requires Follow-up: [x] Yes  [] No    Plan for next treatment session: neural tension testing and stretching!!! PLAN: See eval. PT 2x / week for 6 weeks. [x] Continue per plan of care [] Alter current plan (see comments)  [] Plan of care initiated [] Hold pending MD visit [] Discharge    Electronically signed by: Edgard Montero PT DPT     Note: If patient does not return for scheduled/ recommended follow up visits, this note will serve as a discharge from care along with most recent update on progress.

## 2021-01-26 NOTE — FLOWSHEET NOTE
[facility] Outpatient Physical Therapy  Phone: [phone]   Fax: [phone]    Physical Therapy Daily Treatment Note  Date:  2021    Patient Name:  Shiela Mcgee    :  1950  MRN: [id number]  Medical/Treatment Diagnosis Information:  Diagnosis: M17.11 (ICD-10-CM) - Primary osteoarthritis of right knee; Z01.818 (ICD-10-CM) - Pre-op testing  Treatment Diagnosis: Bilat knee pain (R more affected), decreased knee ROM, decreased LE strength and flexiblity, decreased balance, abnormal gait, decreased walking endurance  Insurance/Certification information:  PT Insurance Information: Medicare  Physician Information:  Referring Practitioner: Adolphus Babinski, MD  Plan of care signed (Y/N):     Date of Patient follow up with Physician: pending, no follow up    Functional scale[de-identified] LEFS raw score = 31; dysfunction = 61.25%    Progress Report: []  Yes  [x]  No     Date Range for reporting period:  Beginning 21  Ending    Progress report due (10 Rx/or 30 days whichever is less): visit #10 or  (date)     Recertification due (POC duration/ or 90 days whichever is less): visit #8 or 21 (date)     Visit # Insurance Allowable Auth required?  Date Range   1 Med nec []  Yes  [x]  No         Latex Allergy:  [x]NO      []YES  Preferred Language for Healthcare:   [x]English       []other:      Pain level:  5/10     SUBJECTIVE:  See eval    OBJECTIVE: See eval      RESTRICTIONS/PRECAUTIONS: PMHx: Asthma, A-fib, HTN, Anxiety, Depression, Bilat JENNIFER 2016, Lumbar fusions 2004, pulmonary embolism 2016, L rotator cuff tear    Exercises/Interventions:     Therapeutic Exercises (08375) Resistance / level Sets/sec Reps Notes   Bike or Stepper       IB gastroc stretch  HR       Gastroc stretch  30\" 2    Seated hamstring stretch  30\" 2    Supine heel slide    Pt unwilling to attempt at eval dt hamstring cramping   Quad Set  2/10\" 10    SLR    Unable to perform   LAQ  2 10 Hip abduction       Clamshell              Therapeutic Activities (64462)                                          Neuromuscular Re-ed (99382)                                                 Manual Intervention ([id number])                                                     Pt. Education:  -patient educated on diagnosis, prognosis and expectations for rehab  -all patient questions were answered    HEP instruction:  Access Code: [email or web address]   URL: [email or web address]   Date: 01/26/2021   Prepared by: Iwona Mendes     Exercises   Gastroc Stretch on Wall - 3 reps - 1 sets - 30 hold - 1x daily - 7x weekly   Seated Hamstring Stretch - 3 reps - 1 sets - 30 hold - 1x daily - 7x weekly   Supine Quad Set - 10 reps - 2 sets - 10 hold - 1x daily - 7x weekly   Supine Heel Slides - 10 reps - 2 sets - 10 hold - 1x daily - 7x weekly   Seated Long Arc Quad - 10 reps - 2 sets - 1x daily - 7x weekly       Therapeutic Exercise and NMR EXR  [x] (20268) Provided verbal/tactile cueing for activities related to strengthening, flexibility, endurance, ROM for improvements in  [x] LE / Lumbar: LE, proximal hip, and core control with self care, mobility, lifting, ambulation. [] UE / Cervical: cervical, postural, scapular, scapulothoracic and UE control with self care, reaching, carrying, lifting, house/yardwork, driving, computer work.  [] (09811) Provided verbal/tactile cueing for activities related to improving balance, coordination, kinesthetic sense, posture, motor skill, proprioception to assist with   [] LE / lumbar: LE, proximal hip, and core control in self care, mobility, lifting, ambulation and eccentric single leg control. [] UE / cervical: cervical, scapular, scapulothoracic and UE control with self care, reaching, carrying, lifting, house/yardwork, driving, computer work. [] (86940) Therapist is in constant attendance of 2 or more patients providing skilled therapy interventions, but not providing any significant amount of measurable one-on-one time to either patient, for improvements in  [] LE / lumbar: LE, proximal hip, and core control in self care, mobility, lifting, ambulation and eccentric single leg control. [] UE / cervical: cervical, scapular, scapulothoracic and UE control with self care, reaching, carrying, lifting, house/yardwork, driving, computer work. NMR and Therapeutic Activities:    [] (49121 or 99974) Provided verbal/tactile cueing for activities related to improving balance, coordination, kinesthetic sense, posture, motor skill, proprioception and motor activation to allow for proper function of   [] LE: / Lumbar core, proximal hip and LE with self care and ADLs  [] UE / Cervical: cervical, postural, scapular, scapulothoracic and UE control with self care, carrying, lifting, driving, computer work.   [] (01198) Gait Re-education- Provided training and instruction to the patient for proper LE, core and proximal hip recruitment and positioning and eccentric body weight control with ambulation re-education including up and down stairs     Home Management Training / Self Care:  [] (55252) Provided self-care/home management training related to activities of daily living and compensatory training, and/or use of adaptive equipment for improvement with: ADLs and compensatory training, meal preparation, safety procedures and instruction in use of adaptive equipment, including bathing, grooming, dressing, personal hygiene, basic household cleaning and chores.      Home Exercise Program:    [x] (38059) Reviewed/Progressed HEP activities related to strengthening, flexibility, endurance, ROM of   [] LE / Lumbar: core, proximal hip and LE for functional self-care, mobility, lifting and ambulation/stair navigation [] UE / Cervical: cervical, postural, scapular, scapulothoracic and UE control with self care, reaching, carrying, lifting, house/yardwork, driving, computer work  [] (36844)Reviewed/Progressed HEP activities related to improving balance, coordination, kinesthetic sense, posture, motor skill, proprioception of   [] LE: core, proximal hip and LE for self care, mobility, lifting, and ambulation/stair navigation    [] UE / Cervical: cervical, postural,  scapular, scapulothoracic and UE control with self care, reaching, carrying, lifting, house/yardwork, driving, computer work    Manual Treatments:  PROM / STM / Oscillations-Mobs:  G-I, II, III, IV (PA's, Inf., Post.)  [] (23057) Provided manual therapy to mobilize LE, proximal hip and/or LS spine soft tissue/joints for the purpose of modulating pain, promoting relaxation,  increasing ROM, reducing/eliminating soft tissue swelling/inflammation/restriction, improving soft tissue extensibility and allowing for proper ROM for normal function with   [] LE / lumbar: self care, mobility, lifting and ambulation. [] UE / Cervical: self care, reaching, carrying, lifting, house/yardwork, driving, computer work. Modalities:  [] (28635) Vasopneumatic compression: Utilized vasopneumatic compression to decrease edema / swelling for the purpose of improving mobility and quad tone / recruitment which will allow for increased overall function including but not limited to self-care, transfers, ambulation, and ascending / descending stairs.        Modalities:      Charges:  Timed Code Treatment Minutes: 15   Total Treatment Minutes: 45     [x] EVAL - LOW (40118)   [] EVAL - MOD (21025)  [] EVAL - HIGH (05806)  [] RE-EVAL (74644)  [x] GB(75399) x  1     [] Ionto  [] NMR (85525) x       [] Vaso  [] Manual (58247) x       [] Ultrasound  [] TA x        [] Mech Traction (93326)  [] Aquatic Therapy x     [] ES (un) (14360):   [] Home Management Training x  [] ES(attended) (43609) Plan for next treatment session:    PLAN: See eval. PT 1-2x / week for 4 weeks. [] Continue per plan of care [] Alter current plan (see comments)  [x] Plan of care initiated [] Hold pending MD visit [] Discharge    Electronically signed by: Librado Sanders PT, DPT    Note: If patient does not return for scheduled/ recommended follow up visits, this note will serve as a discharge from care along with most recent update on progress.

## 2021-01-28 ENCOUNTER — HOSPITAL ENCOUNTER (OUTPATIENT)
Dept: PHYSICAL THERAPY | Age: 71
Setting detail: THERAPIES SERIES
Discharge: HOME | End: 2021-01-28
Payer: MEDICARE

## 2021-01-28 NOTE — PROGRESS NOTES
Physical Therapy  Cancellation/No-show Note-Prehab Episode  Patient Name:  Maria G Huang  :  1950   Date:  2021  Cancelled visits to date: 1  No-shows to date: 0    Patient status for today's appointment patient:  [x]  Cancelled 21  []  Rescheduled appointment  []  No-show     Reason given by patient:  []  Patient ill  []  Conflicting appointment  []  No transportation    []  Conflict with work  []  No reason given  [x]  Other:     Comments:  Weather    Phone call information:   []  Phone call made today to patient at _ time at number provided:      []  Patient answered, conversation as follows:    []  Patient did not answer, message left as follows:  [x]  Phone call not made today    Electronically signed by:  Angel Escobedo PT

## 2021-02-02 ENCOUNTER — HOSPITAL ENCOUNTER (OUTPATIENT)
Dept: PHYSICAL THERAPY | Age: 71
Setting detail: THERAPIES SERIES
Discharge: HOME | End: 2021-02-02
Payer: MEDICARE

## 2021-02-02 NOTE — PROGRESS NOTES
Physical Therapy  Cancellation/No-show Note-Prehab Episode  Patient Name:  Marjan Fox  :  1950   Date:  2021  Cancelled visits to date: 2  No-shows to date: 0    Patient status for today's appointment patient:  [x]  Cancelled 21, 21  []  Rescheduled appointment  []  No-show     Reason given by patient:  []  Patient ill  []  Conflicting appointment  []  No transportation    []  Conflict with work  []  No reason given  [x]  Other:     Comments:  Pt very tired today, she did not sleep last night    Phone call information:   []  Phone call made today to patient at _ time at number provided:      []  Patient answered, conversation as follows:    []  Patient did not answer, message left as follows:  [x]  Phone call not made today    Electronically signed by:  Edgard Montero, PT DPT

## 2021-02-04 ENCOUNTER — HOSPITAL ENCOUNTER (OUTPATIENT)
Dept: PHYSICAL THERAPY | Age: 71
Setting detail: THERAPIES SERIES
Discharge: HOME | End: 2021-02-04
Payer: MEDICARE

## 2021-02-04 PROCEDURE — 97112 NEUROMUSCULAR REEDUCATION: CPT

## 2021-02-04 PROCEDURE — 97530 THERAPEUTIC ACTIVITIES: CPT

## 2021-02-04 PROCEDURE — 97110 THERAPEUTIC EXERCISES: CPT

## 2021-02-04 NOTE — FLOWSHEET NOTE
[facility]  Outpatient Physical Therapy  Phone: [phone]   Fax: [phone]    Physical Therapy Daily Treatment Note  Date:  2021    Patient Name:  Chris Palacios    :  1950  MRN: [id number]  Medical/Treatment Diagnosis Information:  · Diagnosis: Primary osteoarthritis of both knees; Osteoarthritis of left shoulder due to rotator cuff injury  · Treatment Diagnosis: B knee pain, decreased ROM and strength L Shoulder, impaired posture, impaired balance, core and hip weakness, decreased LE flexibility  Insurance/Certification information:  PT Insurance Information: Medicare  Physician Information:  Referring Practitioner: Evangelina Chahal MD  Plan of care signed (Y/N): []  Yes [x]  No     Date of Patient follow up with Physician:      Progress Report: []  Yes  [x]  No     Date Range for reporting period:  Beginnin21  Ending:     Progress report due (10 Rx/or 30 days whichever is less): visit #10 or 3/9/56     Recertification due (POC duration/ or 90 days whichever is less): visit #12 or 21     Visit # Insurance Allowable Auth required? Date Range    Medical necessity []  Yes  [x]  No      Latex Allergy:  [x]NO      []YES  Preferred Language for Healthcare:   [x]English       []other:    Functional Scale:        Date assessed:  LEFS: raw score = 26/80; dysfunction = 67%  21   Oswestry raw score = 38; dysfunction = 61%    Pain level: 4/10, 6/10 R knee      SUBJECTIVE: Pt has been using topical analgesic on bilateral knees over the past week. Pt feels like LE endurance is gradually improving, though not much improvements in pain.    OBJECTIVE:     RESTRICTIONS/PRECAUTIONS: Asthma, A-fib, Prior JENNIFER, Lumbar fusions  *Pt scheduled R knee replacement for 3/16/21*    Exercises/Interventions:     Therapeutic Exercises (87887) Resistance / level Sets/sec Reps Notes   Nustep L 5  5 min      IB  3/30 sec B      HSS  HFS  2/30 sec B  2/30 sec B Pulleys - flexion/scaption   3 min   1/14- Scaption performed standing    Standing hip 3 way - Standing on 2 in step for extension   Wall walks    X 5 L     Quantum Knee Ext  20#  2 10 x     Quantum Hamstring Curl  20# 2 10 x  Small ROM          Therapeutic Activities (44123)       Cabinet reaching 7 cones used   Forward step ups  6 in 1 10 L;  2/4- Pain in R knee, added quad ext strength   Lateral step ups 6 in L only    Stair climbing           Neuromuscular Re-ed (45430)       Static balance:  DLS  NBOS  NBOS EC  Tandem EO and EC   NBOS + head nods  NBOS + head shakes   Airex  Airex  Airex  Airex  Airex  Airex 30 sec30 sec30 sec22  No UE  No UE  Fingertip support  Fingertip support   Walking on uneven surfaces       Biodex       W>Y at wall   5    Push ups At wall  10                   Manual Intervention (39126)                                                     Pt. Education:  -patient educated on diagnosis, prognosis and expectations for rehab  -all patient questions were answered  1/6: Extensive education today including importance of hydration, stretching frequently while sewing, a more appropriate step stool that has a handle, and possible neural tension in UEs.  1/19: Pt encouraged to begin a walking program prior to surgery    Home Exercise Program:  Access Code: [email or web address]   URL: [email or web address]   Date: 01/06/2021   Prepared by: Halina Hatchet     Exercises   · Hooklying Gluteal Sets - 10 reps - 2 sets - 2x daily - 7x weekly   · Seated Scapular Retraction - 10 reps - 2 sets - 3-4x daily - 7x weekly   · Seated Hamstring Stretch - 3 reps - 3 sets - 30 seconds hold - 3x daily - 7x weekly   · Shoulder Flexion Wall Slide with Towel - 10 reps - 2 sets - 2x daily - 7x weekly   · Hip Flexor Stretch on Step - 2 reps - 1 sets - 30 seconds hold - 2x daily - 7x weekly      Access Code: [email or web address]   URL: [employer]/   Date: 01/08/2021   Prepared by: Halina Hatchet     Exercises [x] UE / Cervical: cervical, postural, scapular, scapulothoracic and UE control with self care, carrying, lifting, driving, computer work.   [] (98706) Gait Re-education- Provided training and instruction to the patient for proper LE, core and proximal hip recruitment and positioning and eccentric body weight control with ambulation re-education including up and down stairs     Home Management Training / Self Care:  [] (81825) Provided self-care/home management training related to activities of daily living and compensatory training, and/or use of adaptive equipment for improvement with: ADLs and compensatory training, meal preparation, safety procedures and instruction in use of adaptive equipment, including bathing, grooming, dressing, personal hygiene, basic household cleaning and chores.      Home Exercise Program:    [x] (73624) Reviewed/Progressed HEP activities related to strengthening, flexibility, endurance, ROM of   [x] LE / Lumbar: core, proximal hip and LE for functional self-care, mobility, lifting and ambulation/stair navigation   [] UE / Cervical: cervical, postural, scapular, scapulothoracic and UE control with self care, reaching, carrying, lifting, house/yardwork, driving, computer work  [] (59515)Reviewed/Progressed HEP activities related to improving balance, coordination, kinesthetic sense, posture, motor skill, proprioception of   [] LE: core, proximal hip and LE for self care, mobility, lifting, and ambulation/stair navigation    [] UE / Cervical: cervical, postural,  scapular, scapulothoracic and UE control with self care, reaching, carrying, lifting, house/yardwork, driving, computer work    Manual Treatments:  PROM / STM / Oscillations-Mobs:  G-I, II, III, IV (PA's, Inf., Post.) [] (08640) Provided manual therapy to mobilize LE, proximal hip and/or LS spine soft tissue/joints for the purpose of modulating pain, promoting relaxation,  increasing ROM, reducing/eliminating soft tissue swelling/inflammation/restriction, improving soft tissue extensibility and allowing for proper ROM for normal function with   [] LE / lumbar: self care, mobility, lifting and ambulation. [] UE / Cervical: self care, reaching, carrying, lifting, house/yardwork, driving, computer work. Modalities:  [] (92645) Vasopneumatic compression: Utilized vasopneumatic compression to decrease edema / swelling for the purpose of improving mobility and quad tone / recruitment which will allow for increased overall function including but not limited to self-care, transfers, ambulation, and ascending / descending stairs. Modalities:      Charges:  Timed Code Treatment Minutes: 40   Total Treatment Minutes: 40     [] EVAL - LOW (34489)   [] EVAL - MOD (23459)  [] EVAL - HIGH (22437)  [] RE-EVAL (48649)  [x] LE(76399) x   1    [] Ionto  [x] NMR (39874) x 1      [] Vaso  [] Manual (14033) x       [] Ultrasound  [x] TA x  1      [] Mech Traction (60918)  [] Aquatic Therapy x     [] ES (un) (42840):   [] Home Management Training x   [] ES(attended) (56776)   [] Dry Needling 1-2 muscles (96976):  [] Dry Needling 3+ muscles (883332)  [] Group:      [] Other:     GOALS:   Patient stated goal: move better and move correctly. []? Progressing: []? Met: []? Not Met: []? Adjusted     Therapist goals for Patient:   Short Term Goals: To be achieved in: 2 weeks  1. Independent in HEP and progression per patient tolerance, in order to prevent re-injury. []? Progressing: []? Met: []? Not Met: []? Adjusted  2. Patient will have a decrease in pain to facilitate improvement in movement, function, and ADLs as indicated by Functional Deficits. []? Progressing: []? Met: []? Not Met: []?  Adjusted    Long Term Goals: To be achieved in: 6-8 weeks  1. Disability index score of 30% or less for the LEFS and QuickDASH to assist with reaching prior level of function. []? Progressing: []? Met: []? Not Met: []? Adjusted  2. Patient will demonstrate increased shoulder flexion and abduction AROM to 120 deg to allow for pt to put items away in cabinets. []? Progressing: []? Met: []? Not Met: []? Adjusted  3. Patient will demonstrate an increase in Strength to at least 4+/5 as well as good proximal hip strength and control to allow for proper functional mobility as indicated by patients Functional Deficits. []? Progressing: []? Met: []? Not Met: []? Adjusted  4. Patient will return to functional activities including maintained upright posture with ambulation during 6 MWT without increased symptoms or restriction. []? Progressing: []? Met: []? Not Met: []? Adjusted  5. Patient to report improved ability to complete all ADLs and household chores without rest breaks due to pain or fatigue. []? Progressing: []? Met: []? Not Met: []? Adjusted         Overall Progression Towards Functional goals/ Treatment Progress Update:  [] Patient is progressing as expected towards functional goals listed. [] Progression is slowed due to complexities/Impairments listed. [] Progression has been slowed due to co-morbidities.   [x] Plan just implemented, too soon to assess goals progression <30days   [] Goals require adjustment due to lack of progress  [] Patient is not progressing as expected and requires additional follow up with physician  [] Other    Persisting Functional Limitations/Impairments:  [x]Sleeping [x]Sitting               [x]Standing [x]Transfers        [x]Walking [x]Kneeling               [x]Stairs [x]Squatting / bending   [x]ADLs [x]Reaching  [x]Lifting  [x]Housework  [x]Driving []Job related tasks  []Sports/Recreation []Other: ASSESSMENT:  Initiated machine strengthening this date to improve bilateral quad and hamstring strength and endurance as FSU caused increased pain on R knee. Pt demonstrates improved LE stability on uneven surfaces this date and required min HHA with EC balance activities 2/2 proprioceptive deficits present. Endurance continues to be a big limiting factor for patient, and she tends to [employer]. Will continue to progress patient as able with UE/LE strength and ROM. Treatment/Activity Tolerance:  [x] Patient able to complete tx  [] Patient limited by fatigue  [] Patient limited by pain  [] Patient limited by other medical complications  [] Other:     Prognosis: [x] Good [] Fair  [] Poor    Patient Requires Follow-up: [x] Yes  [] No    Plan for next treatment session: neural tension testing and stretching!!! PLAN: See eval. PT 2x / week for 6 weeks. [x] Continue per plan of care [] Alter current plan (see comments)  [] Plan of care initiated [] Hold pending MD visit [] Discharge    Electronically signed by: Mir Spann PTA, [id number]     Note: If patient does not return for scheduled/ recommended follow up visits, this note will serve as a discharge from care along with most recent update on progress.

## 2021-02-08 ENCOUNTER — HOSPITAL ENCOUNTER (OUTPATIENT)
Dept: PHYSICAL THERAPY | Age: 71
Setting detail: THERAPIES SERIES
Discharge: HOME | End: 2021-02-08
Payer: MEDICARE

## 2021-02-08 PROCEDURE — 97112 NEUROMUSCULAR REEDUCATION: CPT

## 2021-02-08 PROCEDURE — 97530 THERAPEUTIC ACTIVITIES: CPT

## 2021-02-08 PROCEDURE — 97110 THERAPEUTIC EXERCISES: CPT

## 2021-02-08 NOTE — PROGRESS NOTES
The patient will attend class on___virtual  email sent to ST____________  The patent will get ordered PATs on_week of 2/22__________________________________  The patient will see PCP within 30 days of scheduled surgery__britany 2/22____________  COVID_3/10________      Dr Gretchen Barron 3/1  ECHO 3/1

## 2021-02-08 NOTE — PROGRESS NOTES
Name_______________________________________Printed:_______________1100______________   1. The instructions given regarding when and if a patient needs to stop oral intake prior to surgery varies. Follow the specific instructions you were given                  _x__Nothing to eat or to drink after Midnight the night before. no eras                   ____Carbo loading or ERAS instructions will be given to select patients-if you have been given those instructions -please do the following                           The evening before your surgery after dinner before midnight drink 40 ounces of gatorade. If you are diabetic use sugar free. The morning of surgery drink 40 ounces of water. This needs to be finished 3 hours prior to your surgery start time. 2. Take the following pills with a small sip of water on the morning of surgery____pain med as needed  inhaler cardiazem_______________________________________________                  Do not take blood pressure medications ending in pril or sartan the jessica prior to surgery or the morning of surgery_LISINOPRIL   3. Aspirin, Ibuprofen, Advil, Naproxen, Vitamin E and other Anti-inflammatory products and supplements should be stopped for 5 -7days before surgery or as directed by your physician. 4. Check with your Doctor regarding stopping Plavix, Coumadin,Eliquis, Lovenox,Effient,Pradaxa,Xarelto, Fragmin or other blood thinners and follow their instructions. Follow DRs instructions on XARELTO   5. Do not smoke, and do not drink any alcoholic beverages 24 hours prior to surgery. This includes NA Beer. Refrain from the usage of any recreational drugs. 6. You may brush your teeth and gargle the morning of surgery.   DO NOT SWALLOW WATER 7. You MUST make arrangements for a responsible adult to stay on site while you are here and take you home after your surgery. You will not be allowed to leave alone or drive yourself home. It is strongly suggested someone stay with you the first 24 hrs. Your surgery will be cancelled if you do not have a ride home. 8. A parent/legal guardian must accompany a child scheduled for surgery and plan to stay at the hospital until the child is discharged. Please do not bring other children with you. 9. Please wear simple, loose fitting clothing to the hospital.  Hector Baker not bring valuables (money, credit cards, checkbooks, etc.) Do not wear any makeup (including no eye makeup) or nail polish on your fingers or toes. 10. DO NOT wear any jewelry or piercings on day of surgery. All body piercing jewelry must be removed. 11. If you have ___dentures, they will be removed before going to the OR; we will provide you a container. If you wear ___contact lenses or ___glasses, they will be removed; please bring a case for them. 12. Please see your family doctor/pediatrician for a history & physical and/or concerning medications. Bring any test results/reports from your physician's office. PCP__________________Phone___________H&P Appt. Date________             13 If you  have a Living Will and Durable Power of  for Healthcare, please bring in a copy. 15. Notify your Surgeon if you develop any illness between now and surgery  time, cough, cold, fever, sore throat, nausea, vomiting, etc.  Please notify your surgeon if you experience dizziness, shortness of breath or blurred vision between now & the time of your surgery             15. DO NOT shave your operative site 96 hours prior to surgery. For face & neck surgery, men may use an electric razor 48 hours prior to surgery. 16. Shower the night before or morning of surgery using an antibacterial soap or as you have been instructed. 17. To provide excellent care visitors will be limited to one in the room at any given time. 18.  Please bring picture ID and insurance card. 19.  Visit our web site for additional information:  [email or web address]              20.During flu season no children under the age of 15 are permitted in the hospital for the safety of all patients. 21. If you take a long acting insulin in the evening only  take half of your usual  dose the night  before your procedure              22. If you use a c-pap please bring DOS if staying overnight,             23.For your convenience [facility] has a pharmacy on site to fill your prescriptions. 24. If you use oxygen and have a portable tank please bring it  with you the DOS             25. Bring a complete list of all your medications with name and dose include any supplements. 26. Other___will  soap when comes for PATs_______________________________________   *Please call pre admission testing if you any further questions   [facility] [address]    [phone]  [place]  [phone]   [facility]    __ Mathieu Priest _____  _x_ Scheduled _3/10__ Where mff___   __ Other __________      PVWFSLD POLICY(subject to change) There is a one visitor policy at [facility] for all surgeries and endoscopies. Whether the visitor can stay or will be asked to wait in the car will depend on the current policy and if social distancing can be maintained. The policy is subject to change at any time. Please make sure the visitor has a cell phone that is on,charged and able to accept calls, as this may be the way that the staff communicates with them. Pain management is NO VISITOR policyThe patients ride is expected to remain in the car with a cell phone for communication. If the ride is leaving the hospital grounds please make sure they are back in time for pickup. Have the patient inform the staff on arrival what their rides plans are while the patient is in the facility. At the MAIN there is one visitor allowed. Please note that the visitor policy is subject to change. All above information reviewed with patient in person or by phone. Patient verbalizes understanding. All questions and concerns addressed.                                                                                                  Patient/Rep__per phone/pt__________________                                                                                                                                    PRE OP INSTRUCTIONS

## 2021-02-08 NOTE — FLOWSHEET NOTE
[facility]  Outpatient Physical Therapy  Phone: [phone]   Fax: [phone]    Physical Therapy Daily Treatment Note  Date:  2021    Patient Name:  Jung Cooper    :  1950  MRN: [id number]  Medical/Treatment Diagnosis Information:  · Diagnosis: Primary osteoarthritis of both knees; Osteoarthritis of left shoulder due to rotator cuff injury  · Treatment Diagnosis: B knee pain, decreased ROM and strength L Shoulder, impaired posture, impaired balance, core and hip weakness, decreased LE flexibility  Insurance/Certification information:  PT Insurance Information: Medicare  Physician Information:  Referring Practitioner: Arnoldo Echavarria MD  Plan of care signed (Y/N): []  Yes [x]  No     Date of Patient follow up with Physician:      Progress Report: []  Yes  [x]  No     Date Range for reporting period:  Beginnin21  Ending:     Progress report due (10 Rx/or 30 days whichever is less): visit #10 or 5/3/75     Recertification due (POC duration/ or 90 days whichever is less): visit #12 or 21     Visit # Insurance Allowable Auth required? Date Range    Medical necessity []  Yes  [x]  No      Latex Allergy:  [x]NO      []YES  Preferred Language for Healthcare:   [x]English       []other:    Functional Scale:        Date assessed:  LEFS: raw score = 26/80; dysfunction = 67%  21   Oswestry raw score = 38; dysfunction = 61%    Pain level: 4/10, 6/10 R knee      SUBJECTIVE: Pt has noticed that she is able to consistently do about 2 miles of walking now and she is also standing up much straighter. She still can't say that her pain levels are lower.        OBJECTIVE:     RESTRICTIONS/PRECAUTIONS: Asthma, A-fib, Prior JENNIFER, Lumbar fusions  *Pt scheduled R knee replacement for 3/16/21*    Exercises/Interventions:     Therapeutic Exercises (81930) Resistance / level Sets/sec Reps Notes   Nustep L 5  5 min      IB  HR  2/30 sec B   2   15    HSS  HFS  2/30 sec B  30 sec B     Pulleys - flexion/scaption   3 min   1/14- Scaption performed standing    Standing hip 3 way - Standing on 2 in step for extension   Wall walks    X 5 L     Quantum Knee Ext  20#     Quantum Hamstring Curl  20# Small ROM   TG - squats  2 10                  Therapeutic Activities (20351)       Cabinet reaching 7 cones used   Forward step ups  6 in    4 in 1    1 10 L    10 R 2/4- Pain in R knee, added quad ext strength   Lateral step ups 4 in  4 in 1  1 10   10 L only   R only    Stair climbing                         Neuromuscular Re-ed (14358)       Static balance:  DLS  NBOS  NBOS EC  Tandem EO    NBOS + head nods  NBOS + head shakes   Airex  Airex  Airex  Airex  Airex  Airex 30 sec2  No UE  No UE  Fingertip support  Fingertip support   Walking on uneven surfaces       Biodex       W>Y at wall/ Wall [place]   5    Push ups At wall  10                                 Manual Intervention (24302)                                                     Pt. Education:  -patient educated on diagnosis, prognosis and expectations for rehab  -all patient questions were answered  1/6: Extensive education today including importance of hydration, stretching frequently while sewing, a more appropriate step stool that has a handle, and possible neural tension in UEs.  1/19: Pt encouraged to begin a walking program prior to surgery    Home Exercise Program:  Access Code: [email or web address]   URL: [email or web address]   Date: 01/06/2021   Prepared by: Connie Daily     Exercises   · Hooklying Gluteal Sets - 10 reps - 2 sets - 2x daily - 7x weekly   · Seated Scapular Retraction - 10 reps - 2 sets - 3-4x daily - 7x weekly   · Seated Hamstring Stretch - 3 reps - 3 sets - 30 seconds hold - 3x daily - 7x weekly   · Shoulder Flexion Wall Slide with Towel - 10 reps - 2 sets - 2x daily - 7x weekly   · Hip Flexor Stretch on Step - 2 reps - 1 sets - 30 seconds hold - 2x daily - 7x weekly      Access Code: [email or web address]   URL: [email or web address]   Date: 01/08/2021   Prepared by: Sharda Casey   Standing Hip Flexion AROM - 10 reps - 2 sets - 1x daily - 7x weekly   Standing Hip Abduction - 10 reps - 2 sets - 1x daily - 7x weekly   Standing Hip Extension - 10 reps - 2 sets - 1x daily - 7x weekly       Therapeutic Exercise and NMR EXR  [x] (43921) Provided verbal/tactile cueing for activities related to strengthening, flexibility, endurance, ROM for improvements in  [x] LE / Lumbar: LE, proximal hip, and core control with self care, mobility, lifting, ambulation. [x] UE / Cervical: cervical, postural, scapular, scapulothoracic and UE control with self care, reaching, carrying, lifting, house/yardwork, driving, computer work. [x] (22025) Provided verbal/tactile cueing for activities related to improving balance, coordination, kinesthetic sense, posture, motor skill, proprioception to assist with   [x] LE / lumbar: LE, proximal hip, and core control in self care, mobility, lifting, ambulation and eccentric single leg control. [x] UE / cervical: cervical, scapular, scapulothoracic and UE control with self care, reaching, carrying, lifting, house/yardwork, driving, computer work.   [] (69161) Therapist is in constant attendance of 2 or more patients providing skilled therapy interventions, but not providing any significant amount of measurable one-on-one time to either patient, for improvements in  [] LE / lumbar: LE, proximal hip, and core control in self care, mobility, lifting, ambulation and eccentric single leg control. [] UE / cervical: cervical, scapular, scapulothoracic and UE control with self care, reaching, carrying, lifting, house/yardwork, driving, computer work.      NMR and Therapeutic Activities:    [x] (41001 or 15321) Provided verbal/tactile cueing for activities related to improving balance, coordination, kinesthetic sense, posture, motor skill, proprioception and motor activation to allow for proper function of   [x] LE: / Lumbar core, proximal hip and LE with self care and ADLs  [x] UE / Cervical: cervical, postural, scapular, scapulothoracic and UE control with self care, carrying, lifting, driving, computer work.   [] (46674) Gait Re-education- Provided training and instruction to the patient for proper LE, core and proximal hip recruitment and positioning and eccentric body weight control with ambulation re-education including up and down stairs     Home Management Training / Self Care:  [] (31410) Provided self-care/home management training related to activities of daily living and compensatory training, and/or use of adaptive equipment for improvement with: ADLs and compensatory training, meal preparation, safety procedures and instruction in use of adaptive equipment, including bathing, grooming, dressing, personal hygiene, basic household cleaning and chores.      Home Exercise Program:    [x] (07797) Reviewed/Progressed HEP activities related to strengthening, flexibility, endurance, ROM of   [x] LE / Lumbar: core, proximal hip and LE for functional self-care, mobility, lifting and ambulation/stair navigation   [] UE / Cervical: cervical, postural, scapular, scapulothoracic and UE control with self care, reaching, carrying, lifting, house/yardwork, driving, computer work  [] (79673)Reviewed/Progressed HEP activities related to improving balance, coordination, kinesthetic sense, posture, motor skill, proprioception of   [] LE: core, proximal hip and LE for self care, mobility, lifting, and ambulation/stair navigation    [] UE / Cervical: cervical, postural,  scapular, scapulothoracic and UE control with self care, reaching, carrying, lifting, house/yardwork, driving, computer work    Manual Treatments:  PROM / STM / Oscillations-Mobs:  G-I, II, III, IV (PA's, Inf., Post.)  [] (37201) Provided manual therapy to mobilize LE, proximal hip and/or LS spine soft tissue/joints for the purpose of modulating pain, promoting relaxation,  increasing ROM, reducing/eliminating soft tissue swelling/inflammation/restriction, improving soft tissue extensibility and allowing for proper ROM for normal function with   [] LE / lumbar: self care, mobility, lifting and ambulation. [] UE / Cervical: self care, reaching, carrying, lifting, house/yardwork, driving, computer work. Modalities:  [] (44056) Vasopneumatic compression: Utilized vasopneumatic compression to decrease edema / swelling for the purpose of improving mobility and quad tone / recruitment which will allow for increased overall function including but not limited to self-care, transfers, ambulation, and ascending / descending stairs. Modalities:      Charges:  Timed Code Treatment Minutes: 42   Total Treatment Minutes: 42     [] EVAL - LOW (87610)   [] EVAL - MOD (96686)  [] EVAL - HIGH (41039)  [] RE-EVAL (41629)  [x] KT(73016) x   1    [] Ionto  [x] NMR (74952) x 1      [] Vaso  [] Manual (23534) x       [] Ultrasound  [x] TA x  1      [] Mech Traction (87170)  [] Aquatic Therapy x     [] ES (un) (36104):   [] Home Management Training x   [] ES(attended) (80519)   [] Dry Needling 1-2 muscles (56419):  [] Dry Needling 3+ muscles (268779)  [] Group:      [] Other:     GOALS:   Patient stated goal: move better and move correctly. []? Progressing: []? Met: []? Not Met: []? Adjusted     Therapist goals for Patient:   Short Term Goals: To be achieved in: 2 weeks  1. Independent in HEP and progression per patient tolerance, in order to prevent re-injury. []? Progressing: []? Met: []? Not Met: []? Adjusted  2. Patient will have a decrease in pain to facilitate improvement in movement, function, and ADLs as indicated by Functional Deficits. []? Progressing: []? Met: []? Not Met: []? Adjusted     Long Term Goals: To be achieved in: 6-8 weeks  1.  Disability index score of 30% or less for the LEFS and QuickDASH to assist with reaching prior level of function. []? Progressing: []? Met: []? Not Met: []? Adjusted  2. Patient will demonstrate increased shoulder flexion and abduction AROM to 120 deg to allow for pt to put items away in cabinets. []? Progressing: []? Met: []? Not Met: []? Adjusted  3. Patient will demonstrate an increase in Strength to at least 4+/5 as well as good proximal hip strength and control to allow for proper functional mobility as indicated by patients Functional Deficits. []? Progressing: []? Met: []? Not Met: []? Adjusted  4. Patient will return to functional activities including maintained upright posture with ambulation during 6 MWT without increased symptoms or restriction. []? Progressing: []? Met: []? Not Met: []? Adjusted  5. Patient to report improved ability to complete all ADLs and household chores without rest breaks due to pain or fatigue. []? Progressing: []? Met: []? Not Met: []? Adjusted         Overall Progression Towards Functional goals/ Treatment Progress Update:  [] Patient is progressing as expected towards functional goals listed. [] Progression is slowed due to complexities/Impairments listed. [] Progression has been slowed due to co-morbidities. [x] Plan just implemented, too soon to assess goals progression <30days   [] Goals require adjustment due to lack of progress  [] Patient is not progressing as expected and requires additional follow up with physician  [] Other    Persisting Functional Limitations/Impairments:  [x]Sleeping [x]Sitting               [x]Standing [x]Transfers        [x]Walking [x]Kneeling               [x]Stairs [x]Squatting / bending   [x]ADLs [x]Reaching  [x]Lifting  [x]Housework  [x]Driving []Job related tasks  []Sports/Recreation []Other:        ASSESSMENT:  Decreased step height with forward step up for R LE and pt was then able to perform with better form and slightly less pain. Pt has also improved static balance.  Pt has 3 remaining visits and then may DC due to upcoming knee replacement. Treatment/Activity Tolerance:  [x] Patient able to complete tx  [] Patient limited by fatigue  [] Patient limited by pain  [] Patient limited by other medical complications  [] Other:     Prognosis: [x] Good [] Fair  [] Poor    Patient Requires Follow-up: [x] Yes  [] No    Plan for next treatment session: neural tension testing and stretching!!! PLAN: See eval. PT 2x / week for 6 weeks. [x] Continue per plan of care [] Alter current plan (see comments)  [] Plan of care initiated [] Hold pending MD visit [] Discharge    Electronically signed by: Kelsey Dawn PT DPT     Note: If patient does not return for scheduled/ recommended follow up visits, this note will serve as a discharge from care along with most recent update on progress.

## 2021-02-09 ENCOUNTER — APPOINTMENT (OUTPATIENT)
Dept: PHYSICAL THERAPY | Age: 71
End: 2021-02-09
Payer: MEDICARE

## 2021-02-11 ENCOUNTER — APPOINTMENT (OUTPATIENT)
Dept: PHYSICAL THERAPY | Age: 71
End: 2021-02-11
Payer: MEDICARE

## 2021-02-12 ENCOUNTER — APPOINTMENT (OUTPATIENT)
Dept: PHYSICAL THERAPY | Age: 71
End: 2021-02-12
Payer: MEDICARE

## 2021-02-16 ENCOUNTER — HOSPITAL ENCOUNTER (OUTPATIENT)
Dept: PHYSICAL THERAPY | Age: 71
Setting detail: THERAPIES SERIES
Discharge: HOME | End: 2021-02-16
Payer: MEDICARE

## 2021-02-16 NOTE — PROGRESS NOTES
Physical Therapy  Cancellation/No-show Note-Prehab Episode  Patient Name:  Chris Palacios  :  1950   Date:  2021  Cancelled visits to date: 3  No-shows to date: 0    Patient status for today's appointment patient:  [x]  Cancelled 21, 21, 21  []  Rescheduled appointment  []  No-show     Reason given by patient:  []  Patient ill  []  Conflicting appointment  []  No transportation    []  Conflict with work  []  No reason given  [x]  Other:     Comments:  Snowy weather    Phone call information:   []  Phone call made today to patient at _ time at number provided:      []  Patient answered, conversation as follows:    []  Patient did not answer, message left as follows:  [x]  Phone call not made today    Electronically signed by:  Thi Burciaga, PT DPT

## 2021-02-22 ENCOUNTER — OFFICE VISIT (OUTPATIENT)
Dept: INTERNAL MEDICINE CLINIC | Age: 71
End: 2021-02-22
Payer: MEDICARE

## 2021-02-22 ENCOUNTER — TELEPHONE (OUTPATIENT)
Dept: ORTHOPEDIC SURGERY | Age: 71
End: 2021-02-22

## 2021-02-22 VITALS
BODY MASS INDEX: 39.61 KG/M2 | OXYGEN SATURATION: 99 % | TEMPERATURE: 97.2 F | WEIGHT: 232 LBS | HEIGHT: 64 IN | DIASTOLIC BLOOD PRESSURE: 60 MMHG | HEART RATE: 72 BPM | SYSTOLIC BLOOD PRESSURE: 108 MMHG

## 2021-02-22 DIAGNOSIS — E66.01 OBESITY, CLASS III, BMI 40-49.9 (MORBID OBESITY) (HCC): ICD-10-CM

## 2021-02-22 DIAGNOSIS — E03.9 ACQUIRED HYPOTHYROIDISM: Chronic | ICD-10-CM

## 2021-02-22 DIAGNOSIS — Z01.818 PRE-OP EXAMINATION: Primary | ICD-10-CM

## 2021-02-22 DIAGNOSIS — I50.32 CHRONIC DIASTOLIC HEART FAILURE (HCC): ICD-10-CM

## 2021-02-22 DIAGNOSIS — Z13.31 POSITIVE DEPRESSION SCREENING: ICD-10-CM

## 2021-02-22 DIAGNOSIS — I26.99 PULMONARY EMBOLISM WITHOUT ACUTE COR PULMONALE, UNSPECIFIED CHRONICITY, UNSPECIFIED PULMONARY EMBOLISM TYPE (HCC): ICD-10-CM

## 2021-02-22 DIAGNOSIS — I48.0 PAROXYSMAL ATRIAL FIBRILLATION (HCC): ICD-10-CM

## 2021-02-22 DIAGNOSIS — J30.1 SEASONAL ALLERGIC RHINITIS DUE TO POLLEN: ICD-10-CM

## 2021-02-22 DIAGNOSIS — J45.30 MILD PERSISTENT ASTHMA WITHOUT COMPLICATION: ICD-10-CM

## 2021-02-22 DIAGNOSIS — E66.01 CLASS 2 SEVERE OBESITY DUE TO EXCESS CALORIES WITH SERIOUS COMORBIDITY AND BODY MASS INDEX (BMI) OF 39.0 TO 39.9 IN ADULT (HCC): ICD-10-CM

## 2021-02-22 PROCEDURE — G8399 PT W/DXA RESULTS DOCUMENT: HCPCS | Performed by: INTERNAL MEDICINE

## 2021-02-22 PROCEDURE — 4040F PNEUMOC VAC/ADMIN/RCVD: CPT | Performed by: INTERNAL MEDICINE

## 2021-02-22 PROCEDURE — 93000 ELECTROCARDIOGRAM COMPLETE: CPT | Performed by: INTERNAL MEDICINE

## 2021-02-22 PROCEDURE — 1123F ACP DISCUSS/DSCN MKR DOCD: CPT | Performed by: INTERNAL MEDICINE

## 2021-02-22 PROCEDURE — 1090F PRES/ABSN URINE INCON ASSESS: CPT | Performed by: INTERNAL MEDICINE

## 2021-02-22 PROCEDURE — G8427 DOCREV CUR MEDS BY ELIG CLIN: HCPCS | Performed by: INTERNAL MEDICINE

## 2021-02-22 PROCEDURE — G8484 FLU IMMUNIZE NO ADMIN: HCPCS | Performed by: INTERNAL MEDICINE

## 2021-02-22 PROCEDURE — 1036F TOBACCO NON-USER: CPT | Performed by: INTERNAL MEDICINE

## 2021-02-22 PROCEDURE — 99214 OFFICE O/P EST MOD 30 MIN: CPT | Performed by: INTERNAL MEDICINE

## 2021-02-22 PROCEDURE — G8417 CALC BMI ABV UP PARAM F/U: HCPCS | Performed by: INTERNAL MEDICINE

## 2021-02-22 PROCEDURE — 3017F COLORECTAL CA SCREEN DOC REV: CPT | Performed by: INTERNAL MEDICINE

## 2021-02-22 RX ORDER — AZELASTINE 1 MG/ML
1 SPRAY, METERED NASAL 2 TIMES DAILY
Qty: 1 BOTTLE | Refills: 2 | Status: SHIPPED | OUTPATIENT
Start: 2021-02-22

## 2021-02-22 RX ORDER — FLUTICASONE PROPIONATE 110 UG/1
1 AEROSOL, METERED RESPIRATORY (INHALATION) 2 TIMES DAILY
Qty: 1 INHALER | Refills: 2 | Status: SHIPPED | OUTPATIENT
Start: 2021-02-22 | End: 2022-07-13

## 2021-02-22 RX ORDER — AZELASTINE 1 MG/ML
2 SPRAY, METERED NASAL 2 TIMES DAILY
Qty: 4 BOTTLE | Refills: 1 | Status: SHIPPED | OUTPATIENT
Start: 2021-02-22 | End: 2021-03-29

## 2021-02-22 RX ORDER — BUPROPION HYDROCHLORIDE 150 MG/1
150 TABLET ORAL EVERY MORNING
Qty: 30 TABLET | Refills: 5 | Status: SHIPPED | OUTPATIENT
Start: 2021-02-22 | End: 2022-01-17 | Stop reason: SDUPTHER

## 2021-02-22 ASSESSMENT — ENCOUNTER SYMPTOMS
ANAL BLEEDING: 0
VOICE CHANGE: 0
RHINORRHEA: 0
CHEST TIGHTNESS: 0
BLOOD IN STOOL: 0
COUGH: 0
SHORTNESS OF BREATH: 0
VOMITING: 0
TROUBLE SWALLOWING: 0
SORE THROAT: 0
WHEEZING: 0
CONSTIPATION: 0
STRIDOR: 0
NAUSEA: 0
ABDOMINAL PAIN: 0
SINUS PRESSURE: 0
DIARRHEA: 0
CHOKING: 0

## 2021-02-22 ASSESSMENT — PATIENT HEALTH QUESTIONNAIRE - PHQ9
SUM OF ALL RESPONSES TO PHQ QUESTIONS 1-9: 0
2. FEELING DOWN, DEPRESSED OR HOPELESS: 0
SUM OF ALL RESPONSES TO PHQ9 QUESTIONS 1 & 2: 0

## 2021-02-22 NOTE — Clinical Note
Due to previous perioperative pulmonary embolism, I would recommend that she not be off of her anticoagulation any more than 24 hours after surgery and I recommend that you consider putting her in the hospital for short-term anticoagulation at least 24 hours before her surgery.

## 2021-02-22 NOTE — PROGRESS NOTES
Jerry Virgen is a 78 y.o.  female who comes for a preoperative exam.  She  is referred by Dr. Nigel Canela for perioperative risk determination for upcoming surgery for right total knee replacement.       Past Medical History:   Diagnosis Date    Allergic     Anesthesia complication     family hx-father-at at age 80 having hip replacement revision surgery-had tia's x2 while under anes-but also had hx chf-had dificuly with speech when coming out from anes    Anxiety     Arthritis     left ankle, bilateral hands    Arthritis of left hip 2/2/2016    Arthritis of right hip 4/19/2016    Asthma     At risk for falls     uses a cane    Atrial fibrillation with rapid ventricular response (HCC)     Atrial flutter ([address] 4/25/2018    Chronic maxillary sinusitis 5/24/2017    CTEPH (chronic thromboembolic pulmonary hypertension) ([address] 8/26/2016    Depression     pt stated r/t bad marriage/alcoholic spouse    Diabetes mellitus ([address]     borderline    Disc disease, degenerative, lumbar or lumbosacral 2/20/2017    Hepatic steatosis 4/26/2019    History of total hip arthroplasty 2/28/2017    Hypertension     Leg cramps     patient states very severe, requires immediate potassium administration    Migraines, basilar     last migraine 10 years ago    Mild persistent asthma without complication 5/31/2709    Obesity, Class III, BMI 40-49.9 (morbid obesity) ([address] 4/19/2016    HUSSAIN (obstructive sleep apnea) 10/14/2013    Osteoarthritis, hip, bilateral 2016    Bilateral hip arthroplasty    Panic attacks     Pneumonia 2015    Primary osteoarthritis of both knees 9/19/2017    Primary osteoarthritis of left knee 12/15/2016    Pulmonary emboli ([address] 8/4/2016    Pulmonary HTN ([address] 7/21/2016    Pure hypercholesterolemia 2/13/2019    Restless leg syndrome     Rhinitis, chronic 3/26/2014    Sleep apnea     wears CPAP @11    Stress incontinence     Tear of left rotator cuff 1/23/2018    Thyroid disease hypothyroid    Wears glasses      Past Surgical History:   Procedure Laterality Date    ATRIAL ABLATION SURGERY      BACK SURGERY  2004    LUMBAR FUSION    CATARACT REMOVAL Bilateral     COLONOSCOPY      EYE SURGERY Right 2010    retinal tear repaired   [facility]    right ring finger severe laceration, fracture    HIP ARTHROPLASTY Right 16    HYSTERECTOMY      JOINT REPLACEMENT Left 16    left hip    TONSILLECTOMY  1972     Family History   Problem Relation Age of Onset    Alcohol Abuse Sister     Arthritis Sister     Stroke Maternal Grandmother     Thyroid Disease Maternal Grandmother     Thyroid Disease Maternal Grandfather     Heart Disease Maternal Grandfather     Alcohol Abuse Maternal Grandfather     Substance Abuse Maternal Grandfather     Hypertension Mother     Thyroid Disease Mother     Anxiety Disorder Mother     Arthritis Mother     Cataracts Mother     Heart Disease Mother     Asthma Mother     Thyroid Disease Father     Heart Failure Father     Heart Disease Father     Arthritis Brother     High Cholesterol Brother     Heart Disease Maternal Uncle     Heart Disease Paternal Uncle     Cancer Paternal Uncle     Alcohol Abuse Paternal Grandfather     Substance Abuse Paternal Grandfather      Social History     Socioeconomic History    Marital status:      Spouse name: Not on file    Number of children: 3    Years of education: 15    Highest education level: Not on file   Occupational History    Occupation: retired    Social Needs    Financial resource strain: Not on file    Food insecurity     Worry: Not on file     Inability: Not on file   Nuzzel needs     Medical: Not on file     Non-medical: Not on file   Tobacco Use    Smoking status: Former Smoker     Packs/day: 0.50     Years: 5.00     Pack years: 2.50     Quit date: 10/5/2013     Years since quittin.3    Smokeless tobacco: Never Used    Tobacco comment: smoked for a total of 5yr total   Substance and Sexual Activity    Alcohol use: Yes     Comment: RARE    Drug use: No    Sexual activity: Never   Lifestyle    Physical activity     Days per week: Not on file     Minutes per session: Not on file    Stress: Not on file   Relationships    Social connections     Talks on phone: Not on file     Gets together: Not on file     Attends [employer] service: Not on file     Active member of club or organization: Not on file     Attends meetings of clubs or organizations: Not on file     Relationship status: Not on file    Intimate partner violence     Fear of current or ex partner: Not on file     Emotionally abused: Not on file     Physically abused: Not on file     Forced sexual activity: Not on file   Other Topics Concern    Not on file   Social History Narrative    Not on file       Current Outpatient Medications:     fluticasone (FLOVENT HFA) 110 MCG/ACT inhaler, Inhale 1 puff into the lungs 2 times daily, Disp: 1 Inhaler, Rfl: 2    azelastine (ASTELIN) 0.1 % nasal spray, 1 spray by Nasal route 2 times daily 1 Spray in each nostril, Disp: 1 Bottle, Rfl: 2    rivaroxaban (XARELTO) 20 MG TABS tablet, Take 1 tablet by mouth daily (with breakfast), Disp: 35 tablet, Rfl: 0    Semaglutide,0.25 or 0.5MG/DOS, (OZEMPIC, 0.25 OR 0.5 MG/DOSE,) 2 MG/1.5ML SOPN, Inject 0.5 mg into the skin once a week, Disp: 3 pen, Rfl: 0    pramipexole (MIRAPEX) 0.5 MG tablet, 1.5 tab q 5 PM and 1.5 tab qHS, Disp: 180 tablet, Rfl: 0    rivaroxaban (XARELTO) 20 MG TABS tablet, Take 1 tablet by mouth daily (with breakfast), Disp: 42 tablet, Rfl: 0    Tens Unit MISC, by Does not apply route, Disp: , Rfl:     potassium chloride (KLOR-CON M) 10 MEQ extended release tablet, TAKE ONE TABLET BY MOUTH DAILY, Disp: 90 tablet, Rfl: 0    dilTIAZem (CARDIZEM) 30 MG tablet, TAKE ONE TABLET BY MOUTH FOUR TIMES A DAY AS NEEDED FOR FAST HEARTBEAT GREATER THAN 100 AND PALPITATIONS (Patient taking differently: Take 30 mg by mouth daily ), Disp: 120 tablet, Rfl: 5    lisinopril (PRINIVIL;ZESTRIL) 10 MG tablet, Take 1 tablet by mouth nightly, Disp: 90 tablet, Rfl: 3    furosemide (LASIX) 40 MG tablet, TAKE ONE TABLET BY MOUTH DAILY, Disp: 90 tablet, Rfl: 1    Semaglutide,0.25 or 0.5MG/DOS, (OZEMPIC, 0.25 OR 0.5 MG/DOSE,) 2 MG/1.5ML SOPN, Inject 0.5 mg into the skin once a week Sample, Disp: 1 pen, Rfl: 0    rivaroxaban (XARELTO) 20 MG TABS tablet, Take 1 tablet by mouth Daily with supper, Disp: 90 tablet, Rfl: 3    Handicap Placard MISC, by Does not apply route Exp 5 years, Disp: 1 each, Rfl: 0    spironolactone (ALDACTONE) 25 MG tablet, Take 1 tablet by mouth daily, Disp: 90 tablet, Rfl: 3    CPAP Machine MISC, by Does not apply route, Disp: , Rfl:     Magnesium Citrate 100 MG TABS, Take 1 tablet by mouth daily (Patient taking differently: Take 250 tablets by mouth daily ), Disp: 90 tablet, Rfl: 3    EPIPEN 2-MIR 0.3 MG/0.3ML SOAJ injection, , Disp: , Rfl:     HYDROcodone-acetaminophen (NORCO) 5-325 MG per tablet, Take 1 tablet by mouth as needed for Pain., Disp: , Rfl:     Multiple Vitamins-Minerals (THERAPEUTIC MULTIVITAMIN-MINERALS) tablet, Take 1 tablet by mouth daily, Disp: , Rfl:   Allergies   Allergen Reactions    Atorvastatin Other (See Comments)     Muscle Pain, all statins     Simvastatin Other (See Comments)     Muscle Pain    Cephalexin Hives and Itching    Cephalosporins Hives and Itching    Food Diarrhea     Milk , shellfish , gluten. ..may have bouts of diarrhea, constipation or flatulus. (RD spoke to pt regarding \"milk allergy\", pt is not allergic to milk. She does not drink milk, but consumes milk in other products and consumes dairy products. Had one reactions to shellfish years ago and now can tolerate one serving of shellfish once per week.   Avoids gluten as much as she can, but does not have celiac disease.)    Other      Environmental -cats,dogs,dust   [facility] Shellfish-Derived Products      hives    Sulfa Antibiotics      Can take Celebrex, Pt denies 8/1/18     Vitals:    02/22/21 1122   BP: 108/60   Pulse: 72   Temp: 97.2 °F (36.2 °C)   SpO2: 99%     Review of Systems   Constitutional: Negative for chills, diaphoresis, fatigue, fever and unexpected weight change. HENT: Negative for congestion, ear pain, nosebleeds, postnasal drip, rhinorrhea, sinus pressure, sneezing, sore throat, tinnitus, trouble swallowing and voice change. Respiratory: Negative for cough, choking, chest tightness, shortness of breath, wheezing and stridor. Cardiovascular: Negative for chest pain, palpitations and leg swelling. Gastrointestinal: Negative for abdominal pain, anal bleeding, blood in stool, constipation, diarrhea, nausea and vomiting. Genitourinary: Negative for difficulty urinating, dysuria, frequency, hematuria and urgency. Neurological: Negative for dizziness, seizures, syncope, weakness and light-headedness. Psychiatric/Behavioral: Negative for agitation, behavioral problems, confusion, sleep disturbance and suicidal ideas. The patient is not nervous/anxious. Physical Exam  Vitals signs reviewed. Constitutional:       General: She is not in acute distress. Appearance: Normal appearance. She is well-developed. She is not diaphoretic. HENT:      Head: Normocephalic and atraumatic. Right Ear: External ear normal.      Left Ear: External ear normal.      Nose: No nasal deformity or rhinorrhea. Mouth/Throat:      Mouth: No lacerations or oral lesions. Dentition: Does not have dentures. Pharynx: No oropharyngeal exudate or uvula swelling. Neck:      Musculoskeletal: Full passive range of motion without pain, normal range of motion and neck supple. No edema or neck rigidity. Thyroid: No thyroid mass or thyromegaly. Vascular: Normal carotid pulses. No carotid bruit, hepatojugular reflux or JVD.       Trachea: Trachea normal. No

## 2021-02-23 ENCOUNTER — HOSPITAL ENCOUNTER (OUTPATIENT)
Dept: PHYSICAL THERAPY | Age: 71
Setting detail: THERAPIES SERIES
Discharge: HOME | End: 2021-02-23
Payer: MEDICARE

## 2021-02-23 ENCOUNTER — TELEPHONE (OUTPATIENT)
Dept: ORTHOPEDIC SURGERY | Age: 71
End: 2021-02-23

## 2021-02-23 NOTE — PROGRESS NOTES
Physical Therapy  Cancellation/No-show Note-Prehab Episode  Patient Name:  Emily Cook  :  1950   Date:  2021  Cancelled visits to date: 4  No-shows to date: 0    Patient status for today's appointment patient:  [x]  Cancelled 21, 21, 21, 21  []  Rescheduled appointment  []  No-show     Reason given by patient:  []  Patient ill  []  Conflicting appointment  []  No transportation    []  Conflict with work  [x]  No reason given  []  Other:     Comments:      Phone call information:   [x]  Phone call made today to patient at _ time at number provided:  [phone]    []  Patient answered, conversation as follows:    [x]  Patient did not answer, message left as follows:  []  Phone call not made today    Electronically signed by:  Darlene Starr, PT DPT

## 2021-02-26 NOTE — TELEPHONE ENCOUNTER
I called patient about signing up for [facility]. She expressed concerns about needing to be admitted to hospital before TKA surgery since she has a history of pulmonary embolism after last hip surgery with Dr. Dana Banda. Would like a follow up call from office if she will get this pre-admission. Patient also wanted Dr. Marco Campoverde to know she has recently lost 16lbs and is currently walking about 3 miles a day to stay active.

## 2021-03-01 ENCOUNTER — HOSPITAL ENCOUNTER (OUTPATIENT)
Dept: PHYSICAL THERAPY | Age: 71
Setting detail: THERAPIES SERIES
Discharge: HOME | End: 2021-03-01
Payer: MEDICARE

## 2021-03-01 ENCOUNTER — HOSPITAL ENCOUNTER (OUTPATIENT)
Dept: NON INVASIVE DIAGNOSTICS | Age: 71
Discharge: HOME | End: 2021-03-01
Payer: MEDICARE

## 2021-03-01 ENCOUNTER — OFFICE VISIT (OUTPATIENT)
Dept: CARDIOLOGY CLINIC | Age: 71
End: 2021-03-01
Payer: MEDICARE

## 2021-03-01 VITALS
HEIGHT: 64 IN | SYSTOLIC BLOOD PRESSURE: 122 MMHG | BODY MASS INDEX: 39.61 KG/M2 | DIASTOLIC BLOOD PRESSURE: 68 MMHG | HEART RATE: 75 BPM | WEIGHT: 232 LBS | OXYGEN SATURATION: 98 %

## 2021-03-01 DIAGNOSIS — I10 BENIGN ESSENTIAL HTN: ICD-10-CM

## 2021-03-01 DIAGNOSIS — R00.1 SYMPTOMATIC BRADYCARDIA: ICD-10-CM

## 2021-03-01 DIAGNOSIS — G47.33 OSA (OBSTRUCTIVE SLEEP APNEA): ICD-10-CM

## 2021-03-01 DIAGNOSIS — I50.32 CHRONIC DIASTOLIC HEART FAILURE (HCC): ICD-10-CM

## 2021-03-01 DIAGNOSIS — R06.02 SOB (SHORTNESS OF BREATH): ICD-10-CM

## 2021-03-01 DIAGNOSIS — Z01.810 PREOP CARDIOVASCULAR EXAM: Primary | ICD-10-CM

## 2021-03-01 DIAGNOSIS — I48.0 PAROXYSMAL ATRIAL FIBRILLATION (HCC): ICD-10-CM

## 2021-03-01 LAB
LV EF: 58 %
LVEF MODALITY: NORMAL

## 2021-03-01 PROCEDURE — 4040F PNEUMOC VAC/ADMIN/RCVD: CPT | Performed by: INTERNAL MEDICINE

## 2021-03-01 PROCEDURE — 93306 TTE W/DOPPLER COMPLETE: CPT

## 2021-03-01 PROCEDURE — 3017F COLORECTAL CA SCREEN DOC REV: CPT | Performed by: INTERNAL MEDICINE

## 2021-03-01 PROCEDURE — 99215 OFFICE O/P EST HI 40 MIN: CPT | Performed by: INTERNAL MEDICINE

## 2021-03-01 PROCEDURE — G8417 CALC BMI ABV UP PARAM F/U: HCPCS | Performed by: INTERNAL MEDICINE

## 2021-03-01 PROCEDURE — 97110 THERAPEUTIC EXERCISES: CPT

## 2021-03-01 PROCEDURE — 1036F TOBACCO NON-USER: CPT | Performed by: INTERNAL MEDICINE

## 2021-03-01 PROCEDURE — G8399 PT W/DXA RESULTS DOCUMENT: HCPCS | Performed by: INTERNAL MEDICINE

## 2021-03-01 PROCEDURE — G8484 FLU IMMUNIZE NO ADMIN: HCPCS | Performed by: INTERNAL MEDICINE

## 2021-03-01 PROCEDURE — G8427 DOCREV CUR MEDS BY ELIG CLIN: HCPCS | Performed by: INTERNAL MEDICINE

## 2021-03-01 PROCEDURE — 1123F ACP DISCUSS/DSCN MKR DOCD: CPT | Performed by: INTERNAL MEDICINE

## 2021-03-01 PROCEDURE — 1090F PRES/ABSN URINE INCON ASSESS: CPT | Performed by: INTERNAL MEDICINE

## 2021-03-01 NOTE — PATIENT INSTRUCTIONS
Plan:  1. Lovenox 100mg subq twice a day. 2.  Last Dose of Xarelto on 3/12/21. 3.  Loxenox 100mg subq on 3/14 twice a Day and 3/15 twice a day  4. Hold Lasix and potassium on 3/16. 5.  See Dr. Trudy Martinez in 4 months. Patient Education        enoxaparin  Pronunciation:  ee NOX a PAR rin  Brand:  Lovenox  What is the most important information I should know about enoxaparin? Enoxaparin can cause a very serious blood clot around your spinal cord if you undergo a spinal tap or receive spinal anesthesia (epidural), especially if you have a genetic spinal defect, a history of spinal surgery or repeated spinal taps, or if you are using other drugs that can affect blood clotting, including blood thinners or NSAIDs (ibuprofen, Advil, Aleve, and others). This type of blood clot can lead to long-term or permanent paralysis. Get emergency medical help if you have symptoms of a spinal cord blood clot such as back pain, numbness or muscle weakness in your lower body, or loss of bladder or bowel control. What is enoxaparin? Enoxaparin is an anticoagulant that helps prevent the formation of blood clots. Enoxaparin is used to treat or prevent a type of blood clot called deep vein thrombosis (DVT), which can lead to blood clots in the lungs (pulmonary embolism). A DVT can occur after certain types of surgery, or in people who are bed-ridden due to a prolonged illness. Enoxaparin is also used to prevent blood vessel complications in people with certain types of angina (chest pain) or heart attack. Enoxaparin may also be used for purposes not listed in this medication guide. What should I discuss with my healthcare provider before using enoxaparin?   You should not use this medicine if you are allergic to enoxaparin, heparin, benzyl alcohol, or pork products, or if you have:  · active or uncontrolled bleeding; or · if you had decreased platelets in your blood after testing positive for a certain antibody while using enoxaparin within the past 100 days. Enoxaparin may cause you to bleed more easily, especially if you have:   · a bleeding disorder that is inherited or caused by disease;  · hemorrhagic stroke;  · an infection of the lining of your heart (also called bacterial endocarditis);  · stomach or intestinal bleeding or ulcer; or  · recent brain, spine, or eye surgery. Enoxaparin can cause a very serious blood clot around your spinal cord if you undergo a spinal tap or receive spinal anesthesia (epidural). This type of blood clot could cause long-term or permanent paralysis, and may be more likely to occur if:    · you have a spinal cord injury;  · you have a spinal catheter in place or if a catheter has been recently removed;  · you have a history of spinal surgery or repeated spinal taps;  · you have recently had a spinal tap or epidural anesthesia;  · you take aspirin or an NSAID (nonsteroidal anti-inflammatory drug)--ibuprofen (Advil, Motrin), naproxen (Aleve), diclofenac, indomethacin, meloxicam, and others; or  · you are using a blood thinner (warfarin, Coumadin) or other medicines to treat or prevent blood clots. Tell your doctor if you have ever had:  · a bleeding disorder such as hemophilia;  · kidney or liver disease;  · uncontrolled high blood pressure;  · eye problems caused by diabetes;  · a stomach ulcer; or  · low blood platelets after receiving heparin. Tell your doctor if you are pregnant. If you use enoxaparin during pregnancy, make sure your doctor knows if you have a mechanical heart valve. It may not be safe to breast-feed while using this medicine. Ask your doctor about any risk. How should I use enoxaparin? Follow all directions on your prescription label and read all medication guides or instruction sheets. Use the medicine exactly as directed. What should I avoid while using enoxaparin? Avoid activities that may increase your risk of bleeding or injury. Use extra care to prevent bleeding while shaving or brushing your teeth. What are the possible side effects of enoxaparin? Get emergency medical help if you have signs of an allergic reaction: hives; itching or burning skin; difficult breathing; swelling of your face, lips, tongue, or throat. Also seek emergency medical attention if you have symptoms of a spinal blood clot: back pain, numbness or muscle weakness in your lower body, or loss of bladder or bowel control. Call your doctor at once if you have:  · unusual bleeding, or any bleeding that will not stop;  · easy bruising, purple or red spots under your skin;  · nosebleeds, bleeding gums;  · abnormal vaginal bleeding, blood in your urine or stools;  · coughing up blood or vomit that looks like coffee grounds;  · signs of bleeding in the brain --sudden weakness (especially on one side of the body), sudden severe headache, problems with speech or vision; or  · low red blood cells (anemia) --pale skin, unusual tiredness, feeling light-headed or short of breath, cold hands and feet. Common side effects may include:  · nausea, diarrhea;  · anemia;  · confusion; or  · pain, bruising, redness, or irritation where the medicine was injected. This is not a complete list of side effects and others may occur. Call your doctor for medical advice about side effects. You may report side effects to FDA at 4-937-FDA-9062. What other drugs will affect enoxaparin? Tell your doctor about all your other medicines, especially other medicines to treat or prevent blood clots, such as:  · abciximab, anagrelide, cilostazol, clopidogrel, dipyridamole, eptifibatide, ticlopidine, tirofiban;  · alteplase, reteplase, tenecteplase, urokinase;  · apixaban, argatroban, bivalirudin, dabigatran, desirudin, fondaparinux, lepirudin, rivaroxaban, tinzaparin; or  · heparin. This list is not complete. Other drugs may affect enoxaparin, including prescription and over-the-counter medicines, vitamins, and herbal products. Not all possible drug interactions are listed here. Where can I get more information? Your doctor or pharmacist can provide more information about enoxaparin. Remember, keep this and all other medicines out of the reach of children, never share your medicines with others, and use this medication only for the indication prescribed.

## 2021-03-01 NOTE — DISCHARGE SUMMARY
[facility]  Outpatient Physical Therapy  Phone: [phone]   Fax: [phone]   Physical Therapy Discharge Summary    Dear   Gordo Gatica MD,     We had the pleasure of treating the following patient for physical therapy services at [facility] Outpatient Physical Therapy. A summary of our findings can be found in the discharge summary below. If you have any questions or concerns regarding these findings, please do not hesitate to contact me at the office phone number checked above.   Thank you for the referral.     Physician Signature:________________________________Date:__________________  By signing above (or electronic signature), therapists plan is approved by physician      Functional Outcome:  LEFS raw score = 35 ; dysfunction = 56%                                      Oswestry raw score = 18; dysfunction = 36%    TUG (sec) 11.13 sec   30 second sit to stand (reps) 10 reps - no UE assist   6 minute walk (m) 288 m (discontinued with 45 sec remaining due to SOB)     Balance:   Narrowed HENRRY (sec) 10+ sec   Semi Tandem (sec)    10+ B   Tandem (sec)  9 sec R  4 sec L    SLS (sec)  9 sec R  2 sec L     Strength (0-5) / Myotomes Left Right   Hip Flexion - supine       Hip Flexion - seated (L1-2) 5 5   Hip Abduction       Hip ER 4 4   Hip IR 4 4   Quads (L2-4) 5 5   Hamstrings 5 5   Ankle Dorsiflexion (L4-5) 5 5           Shoulder Flexion 4- 5   Shoulder Scaption NT 5   Shoulder Abduction 4- 5   Shoulder ER 4- 5   Shoulder IR  4- 5    * L shoulder MMT all limited by pain        Flexibility       Hamstrings (90/90) NT  NT      AROM     L R   Shoulder Flexion 155 deg WFL   Shoulder Abd 155 deg [facility]   Shoulder ER Base of neck     Shoulder IR Lumbar spine           Overall Response to Treatment:   []Patient is responding well to treatment and improvement is noted with regards  to goals   []Patient should continue to improve in reasonable time if they continue HEP []Patient has plateaued and is no longer responding to skilled PT intervention    []Patient is getting worse and would benefit from return to referring MD   []Patient unable to adhere to initial POC   [x]Other: Pt has decided to DC from PT today as she is having a R TKR in 2 weeks. Functional measurements listed above. She improved in overall endurance as demonstrated with 6MWT score. She also improved in static balance and LE strength. Date range of Visits: 21 - 3/1/21  Total Visits: 9    Recommendation:    [x] Discharge to [facility]. Follow up with PT PRN. Physical Therapy Daily Treatment Note  Date:  3/1/2021    Patient Name:  Maria G Huang    :  1950  MRN: [id number]  Medical/Treatment Diagnosis Information:  · Diagnosis: Primary osteoarthritis of both knees; Osteoarthritis of left shoulder due to rotator cuff injury  · Treatment Diagnosis: B knee pain, decreased ROM and strength L Shoulder, impaired posture, impaired balance, core and hip weakness, decreased LE flexibility  Insurance/Certification information:  PT Insurance Information: Medicare  Physician Information:  Referring Practitioner: Cecelia Erickson MD  Plan of care signed (Y/N): []  Yes [x]  No     Date of Patient follow up with Physician:      Progress Report: [x]  Yes  []  No     Date Range for reporting period:  Beginnin21  Ending: 3/1/21    Progress report due (10 Rx/or 30 days whichever is less): visit #10 or 67     Recertification due (POC duration/ or 90 days whichever is less): visit #12 or 21     Visit # Insurance Allowable Auth required?  Date Range    Medical necessity []  Yes  [x]  No      Latex Allergy:  [x]NO      []YES  Preferred Language for Healthcare:   [x]English       []other:    Functional Scale:        Date assessed:  LEFS: raw score = 26/80; dysfunction = 67%  21   Oswestry raw score = 38; dysfunction = 61%    Pain level: 4/10 L shoulder, 5/10 R knee , 2/10 L knee     SUBJECTIVE: Pt reports today will be her last appointment as she is having R knee replacement on 3/16/21. She is going to go into the hospital on 3/15 to have a heparin tx to avoid blood clots. Pt had PEs following hip replacement.        OBJECTIVE:     RESTRICTIONS/PRECAUTIONS: Asthma, A-fib, Prior JENNIFER, Lumbar fusions  *Pt scheduled R knee replacement for 3/16/21*    Exercises/Interventions:     Therapeutic Exercises (43732) Resistance / level Sets/sec Reps Notes   Nustep     IB  HR  2/30 sec B   2   15    HSS  HFS  30 sec B  30 sec B     Pulleys - flexion/scaption   1/14- Scaption performed standing    Standing hip 3 way - Standing on 2 in step for extension   Wall walks    X 5 L     Quantum Knee Ext  20#     Quantum Hamstring Curl  20# Small ROM   TG - squats            Functional testing for DC, issued outcome measures, discussed therapy options following TKR  X 40 min            Therapeutic Activities (30288)       Cabinet reaching 7 cones used   Forward step ups  6 in    4 in 2/4- Pain in R knee, added quad ext strength   Lateral step ups 4 in  4 in L only   R only    Stair climbing                         Neuromuscular Re-ed (32581)       Static balance:  DLS  NBOS  NBOS EC  Tandem EO    NBOS + head nods  NBOS + head shakes   Airex  Airex  Airex  Airex  Airex  Airex 30 sec2  No UE  No UE  Fingertip support  Fingertip support   Walking on uneven surfaces       Biodex       W>Y at wall/ Wall [place]      Push ups At wall                                 Manual Intervention (81691)                                                     Pt. Education:  -patient educated on diagnosis, prognosis and expectations for rehab  -all patient questions were answered  1/6: Extensive education today including importance of hydration, stretching frequently while sewing, a more appropriate step stool that has a handle, and possible neural tension in UEs.  1/19: Pt encouraged to begin a walking program prior to surgery    Home Exercise Program:  Access Code: [email or web address]   URL: [employer]/   Date: 01/06/2021   Prepared by: James Buzzmalissa     Exercises   · Hooklying Gluteal Sets - 10 reps - 2 sets - 2x daily - 7x weekly   · Seated Scapular Retraction - 10 reps - 2 sets - 3-4x daily - 7x weekly   · Seated Hamstring Stretch - 3 reps - 3 sets - 30 seconds hold - 3x daily - 7x weekly   · Shoulder Flexion Wall Slide with Towel - 10 reps - 2 sets - 2x daily - 7x weekly   · Hip Flexor Stretch on Step - 2 reps - 1 sets - 30 seconds hold - 2x daily - 7x weekly      Access Code: [email or web address]   URL: [employer]/   Date: 01/08/2021   Prepared by: James Monroy     Exercises   Standing Hip Flexion AROM - 10 reps - 2 sets - 1x daily - 7x weekly   Standing Hip Abduction - 10 reps - 2 sets - 1x daily - 7x weekly   Standing Hip Extension - 10 reps - 2 sets - 1x daily - 7x weekly       Therapeutic Exercise and NMR EXR  [x] (05233) Provided verbal/tactile cueing for activities related to strengthening, flexibility, endurance, ROM for improvements in  [x] LE / Lumbar: LE, proximal hip, and core control with self care, mobility, lifting, ambulation. [x] UE / Cervical: cervical, postural, scapular, scapulothoracic and UE control with self care, reaching, carrying, lifting, house/yardwork, driving, computer work. [x] (80843) Provided verbal/tactile cueing for activities related to improving balance, coordination, kinesthetic sense, posture, motor skill, proprioception to assist with   [x] LE / lumbar: LE, proximal hip, and core control in self care, mobility, lifting, ambulation and eccentric single leg control.    [x] UE / cervical: cervical, scapular, scapulothoracic and UE control with self care, reaching, carrying, lifting, house/yardwork, driving, computer work.   [] (83245) Therapist is in constant attendance of 2 or more patients providing skilled therapy interventions, but not providing any significant amount of measurable one-on-one time to either coordination, kinesthetic sense, posture, motor skill, proprioception of   [] LE: core, proximal hip and LE for self care, mobility, lifting, and ambulation/stair navigation    [] UE / Cervical: cervical, postural,  scapular, scapulothoracic and UE control with self care, reaching, carrying, lifting, house/yardwork, driving, computer work    Manual Treatments:  PROM / STM / Oscillations-Mobs:  G-I, II, III, IV (PA's, Inf., Post.)  [] (22681) Provided manual therapy to mobilize LE, proximal hip and/or LS spine soft tissue/joints for the purpose of modulating pain, promoting relaxation,  increasing ROM, reducing/eliminating soft tissue swelling/inflammation/restriction, improving soft tissue extensibility and allowing for proper ROM for normal function with   [] LE / lumbar: self care, mobility, lifting and ambulation. [] UE / Cervical: self care, reaching, carrying, lifting, house/yardwork, driving, computer work. Modalities:  [] (26257) Vasopneumatic compression: Utilized vasopneumatic compression to decrease edema / swelling for the purpose of improving mobility and quad tone / recruitment which will allow for increased overall function including but not limited to self-care, transfers, ambulation, and ascending / descending stairs. Modalities:      Charges:  Timed Code Treatment Minutes: 45   Total Treatment Minutes: 45     [] EVAL - LOW (41105)   [] EVAL - MOD (19307)  [] EVAL - HIGH (85943)  [] RE-EVAL (75175)  [x] NJ(31484) x   3    [] Ionto  [x] NMR (96857) x      [] Vaso  [] Manual (54748) x       [] Ultrasound  [] TA x        [] Mech Traction (93876)  [] Aquatic Therapy x     [] ES (un) (38180):   [] Home Management Training x   [] ES(attended) (20495)   [] Dry Needling 1-2 muscles (81556):  [] Dry Needling 3+ muscles (041756)  [] Group:      [] Other:     GOALS:   Patient stated goal: move better and move correctly. []? Progressing: []? Met: []? Not Met: []?  Adjusted     Therapist goals for Patient:   Short Term Goals: To be achieved in: 2 weeks  1. Independent in HEP and progression per patient tolerance, in order to prevent re-injury. []? Progressing: [x]? Met: []? Not Met: []? Adjusted  2. Patient will have a decrease in pain to facilitate improvement in movement, function, and ADLs as indicated by Functional Deficits. []? Progressing: [x]? Met: []? Not Met: []? Adjusted     Long Term Goals: To be achieved in: 6-8 weeks  1. Disability index score of 30% or less for the LEFS and QuickDASH to assist with reaching prior level of function. []? Progressing: []? Met: [x]? Not Met: []? Adjusted  2. Patient will demonstrate increased shoulder flexion and abduction AROM to 120 deg to allow for pt to put items away in cabinets. []? Progressing: [x]? Met: []? Not Met: []? Adjusted  3. Patient will demonstrate an increase in Strength to at least 4+/5 as well as good proximal hip strength and control to allow for proper functional mobility as indicated by patients Functional Deficits. []? Progressing: []? Met: [x]? Not Met: []? Adjusted  4. Patient will return to functional activities including maintained upright posture with ambulation during 6 MWT without increased symptoms or restriction. -Met for posture, but not able to complete 6 MWT  []? Progressing: [x]? Met: []? Not Met: []? Adjusted  5. Patient to report improved ability to complete all ADLs and household chores without rest breaks due to pain or fatigue. []? Progressing: [x]? Met: []? Not Met: []? Adjusted         Overall Progression Towards Functional goals/ Treatment Progress Update:  [] Patient is progressing as expected towards functional goals listed. [] Progression is slowed due to complexities/Impairments listed. [] Progression has been slowed due to co-morbidities.   [x] Plan just implemented, too soon to assess goals progression <30days   [] Goals require adjustment due to lack of progress  [] Patient is not progressing as expected and requires additional follow up with physician  [] Other    Persisting Functional Limitations/Impairments:  [x]Sleeping [x]Sitting               [x]Standing [x]Transfers        [x]Walking [x]Kneeling               [x]Stairs [x]Squatting / bending   [x]ADLs [x]Reaching  [x]Lifting  [x]Housework  [x]Driving []Job related tasks  []Sports/Recreation []Other:        ASSESSMENT:  See above    Treatment/Activity Tolerance:  [x] Patient able to complete tx  [] Patient limited by fatigue  [] Patient limited by pain  [] Patient limited by other medical complications  [] Other:     Prognosis: [x] Good [] Fair  [] Poor    Patient Requires Follow-up: [] Yes  [x] No    Plan for next treatment session: neural tension testing and stretching!!! PLAN: See anupam PT 2x / week for 6 weeks. [] Continue per plan of care [] Alter current plan (see comments)  [] Plan of care initiated [] Hold pending MD visit [x] Discharge    Electronically signed by: Gail Savage PT DPT     Note: If patient does not return for scheduled/ recommended follow up visits, this note will serve as a discharge from care along with most recent update on progress.

## 2021-03-01 NOTE — PROGRESS NOTES
[facility] ([place]   Advanced Heart Failure/Pulmonary Hypertension  Cardiac Follow up       Rudi Smith  YOB: 1950     Date of Visit:  3/1/21     Chief Complaint   Patient presents with    Congestive Heart Failure     History of present illness: Rudi Smith is a 78 y.o. female with past medical history significant for HTN, HUSSAIN on CPAP, multiple PE on Xarelto (8/2016). She original had a PE was treated with xarelto for recommended time and then off it and her RHC revealed pressures no RH elevated pressures. Afterwards she developed AFib and restarted on xarelto. She continues on xarelto and treatment for afib. Echos in July and August (2016) showed RVSP 106-112 without evidence of enlargement in right side of heart. RHC was done 9/2/16 and PA pressure was 24, no evidence of pulmonary hypertension. Since then, her RVSP has decreased and got back to normal. We discussed that we are questioning if the elevated ones by echo may have been due to a false positive test for her. She was seen by Dr Alice Zuniga 8/1 and he is managing her hypothyroidism. [place], the thyroid medication was stopped. She was noted to have negative thyroid ultrasound ( July 2018). She wears her CPAP consistently for her HUSSAIN. States she had a reaction to Simvastatin and Livalo with muscle pain. She states she is Prediabetic and has not lost weight. She was admitted 1/13/2020 with palpitations/light-headedness along with SOB and chest pressure. She underwent successful DCCV and was put on Rhythmol -- this caused her HR to drop into the 40's which made her feel bad. She was discharged on diltiazem 240mg qd which causes her to feel fatigued and dizzy. She remains on Xarelto. Her lisinopril was stopped. She had an AF ablation on 3/27/2020. On 4/2/20 she reported that she was back in atrial fib with rates ranging from 80 to 130. She did not want to go to the ED.   She was instructed to resume her amiodarone and cardizem. ILR implanted 8/17/20. Today, she is here for regular follow up and an Echo. She got her 2nd Vaccine. She was in a trial vaccine called Darien Liang. Her Echo today showed EF today 55-60%. RVSP 48mmHg. She is having a right knee replacement on 3/16/21 with Dr. Reg Castro. She is asking about being  admitting to the hospital one day early and getting IV blood thinner. Discussed Lovenox prior to surgery. Denies chest pain, shortness of breath, syncope, orthopnea, palpitations, or dizziness. Prior to Visit Medications    Medication Sig Taking?  Authorizing Provider   fluticasone (FLOVENT HFA) 110 MCG/ACT inhaler Inhale 1 puff into the lungs 2 times daily Yes Jamil Schwab MD   azelastine (ASTELIN) 0.1 % nasal spray 1 spray by Nasal route 2 times daily 1 Spray in each nostril Yes Jamil Schwab MD   buPROPion (WELLBUTRIN XL) 150 MG extended release tablet Take 1 tablet by mouth every morning Yes Jamil Schwab MD   pramipexole (MIRAPEX) 0.5 MG tablet 1.5 tab q 5 PM and 1.5 tab qHS Yes Tiffanie R Kehrt, APRN - CNP   potassium chloride (KLOR-CON M) 10 MEQ extended release tablet TAKE ONE TABLET BY MOUTH DAILY Yes BRICE Michael CNP   dilTIAZem (CARDIZEM) 30 MG tablet TAKE ONE TABLET BY MOUTH FOUR TIMES A DAY AS NEEDED FOR FAST HEARTBEAT GREATER THAN 100 AND PALPITATIONS  Patient taking differently: Take 30 mg by mouth daily  Yes BRICE López CNP   lisinopril (PRINIVIL;ZESTRIL) 10 MG tablet Take 1 tablet by mouth nightly Yes BRICE López CNP   furosemide (LASIX) 40 MG tablet TAKE ONE TABLET BY MOUTH DAILY Yes BRICE Hawley CNP   Semaglutide,0.25 or 0.5MG/DOS, (OZEMPIC, 0.25 OR 0.5 MG/DOSE,) 2 MG/1.5ML SOPN Inject 0.5 mg into the skin once a week Sample Yes Jamil Schwab MD   rivaroxaban (XARELTO) 20 MG TABS tablet Take 1 tablet by mouth Daily with supper Yes Blanca Smack, APRN - CNP   Multiple Vitamins-Minerals (THERAPEUTIC MULTIVITAMIN-MINERALS) tablet Take 1 tablet by mouth daily Yes Historical Provider, MD   spironolactone (ALDACTONE) 25 MG tablet Take 1 tablet by mouth daily Yes BRICE Martinez CNP   CPAP Machine MISC by Does not apply route Yes Historical Provider, MD   Magnesium Citrate 100 MG TABS Take 1 tablet by mouth daily  Patient taking differently: Take 250 tablets by mouth daily  Yes Houston Hall MD   azelastine (ASTELIN) 0.1 % nasal spray 2 sprays by Nasal route 2 times daily Use in each nostril as directed  Emilia Dalal MD   HYDROcodone-acetaminophen (NORCO) 5-325 MG per tablet Take 1 tablet by mouth as needed for Pain. Historical Provider, MD   Tens Unit [address] [facility] by Does not apply route  Historical Provider, MD   Handicap Placard MISC by Does not apply route Exp 5 years  Clemetine RecordsBRICE - CNP   EPIPEN 2-MIR 0.3 MG/0.3ML SOAJ injection   Historical Provider, MD       Social History     Tobacco Use    Smoking status: Former Smoker     Packs/day: 0.50     Years: 5.00     Pack years: 2.50     Quit date: 10/5/2013     Years since quittin.4    Smokeless tobacco: Never Used    Tobacco comment: smoked for a total of 5yr total   Substance Use Topics    Alcohol use: Yes     Comment: RARE    Drug use:  No            Past Medical History:   Diagnosis Date    Allergic     Anesthesia complication     family hx-father-at at age 80 having hip replacement revision surgery-had tia's x2 while under anes-but also had hx chf-had dificuly with speech when coming out from anes    Anxiety     Arthritis     left ankle, bilateral hands    Arthritis of left hip 2016    Arthritis of right hip 2016    Asthma     At risk for falls     uses a cane    Atrial fibrillation with rapid ventricular response (HCC)     Atrial flutter ([facility] 2018    Chronic maxillary sinusitis 2017    CTEPH (chronic thromboembolic pulmonary hypertension) ([facility] 2016    Depression     pt stated r/t bad marriage/alcoholic spouse    Diabetes mellitus ([address] borderline    Disc disease, degenerative, lumbar or lumbosacral 2017    Hepatic steatosis 2019    History of total hip arthroplasty 2017    Hypertension     Leg cramps     patient states very severe, requires immediate potassium administration    Migraines, basilar     last migraine 10 years ago    Mild persistent asthma without complication     Obesity, Class III, BMI 40-49.9 (morbid obesity) ([address] 2016    HUSSAIN (obstructive sleep apnea) 10/14/2013    Osteoarthritis, hip, bilateral     Bilateral hip arthroplasty    Panic attacks     Pneumonia     Primary osteoarthritis of both knees 2017    Primary osteoarthritis of left knee 12/15/2016    Pulmonary emboli ([address] 2016    Pulmonary HTN ([address] 2016    Pure hypercholesterolemia 2019    Restless leg syndrome     Rhinitis, chronic 3/26/2014    Sleep apnea     wears CPAP @11    Stress incontinence     Tear of left rotator cuff 2018    Thyroid disease     hypothyroid    Wears glasses      Past Surgical History:   Procedure Laterality Date    ATRIAL ABLATION SURGERY      BACK SURGERY      LUMBAR FUSION    CATARACT REMOVAL Bilateral     COLONOSCOPY      EYE SURGERY Right     retinal tear repaired   [facility]    right ring finger severe laceration, fracture    HIP ARTHROPLASTY Right 16    HYSTERECTOMY  1993    JOINT REPLACEMENT Left 16    left hip    TONSILLECTOMY  1972     Social History     Tobacco Use    Smoking status: Former Smoker     Packs/day: 0.50     Years: 5.00     Pack years: 2.50     Quit date: 10/5/2013     Years since quittin.4    Smokeless tobacco: Never Used    Tobacco comment: smoked for a total of 5yr total   Substance Use Topics    Alcohol use: Yes     Comment: RARE       Review of Systems:   Constitutional: No significant change in weight, fatigue or weakness. HEENT: No change in vision or ringing in the ears.   Respiratory: +LUGO, no PND, orthopnea or cough. Cardiovascular: See HPI   GI: No n/v, abdominal pain or changes in bowel habits. No melena, no hematochezia  : No dysuria or hematuria. Skin: No rash or new skin lesions. Musculoskeletal:   Neurological: No lightheadedness, dizziness, syncope or TIA-like symptoms. Psychiatric: No anxiety, insomnia or depression    Physical Exam:  Vitals:    03/01/21 1630   BP: 122/68   Pulse: 75   SpO2: 98%   Weight: 232 lb (105.2 kg)   Height: 5' 4\" (1.626 m)     Constitutional and General Appearance: Stable   WD/WN in NAD  HEENT:  NC/AT  GENET  No problems with hearing  Respiratory:  · Normal excursion and expansion without use of accessory muscles  · Resp Auscultation: Normal breath sounds without dullness  Cardiovascular:  · The apical impulses not displaced  · Heart tones are crisp and normal  · Cervical veins are not engorged  · The carotid upstroke is normal in amplitude and contour without delay or bruit  · JVP < 8 cm H2O  RRR with nl S1 and S2 without m,r,g  · Peripheral pulses are symmetrical and full  · There is no clubbing, cyanosis of the extremities. · Trace edema 1+  · Femoral Arteries: 2+ and equal  · Pedal Pulses: 2+ and equal   Abdomen:  · No masses or tenderness  · Liver/Spleen: No Abnormalities Noted  Neurological/Psychiatric:  · Alert and oriented in all spheres  · Moves all extremities well  · Exhibits normal gait balance and coordination  · No abnormalities of mood, affect, memory, mentation, or behavior are noted    Labs were reviewed including labs from other hospital systems through [facility]. Cardiac testing was reviewed including echos, nuclear scans, cardiac catheterization, including from other hospital systems through [facility].       Labs:   Lab Results   Component Value Date    WBC 6.3 11/13/2020    HGB 13.0 11/13/2020    HCT 38.9 11/13/2020    MCV 95.2 11/13/2020     11/13/2020     Lab Results   Component Value Date     11/13/2020 K 4.4 11/13/2020    K 3.7 02/02/2020     11/13/2020    CO2 21 11/13/2020    BUN 26 11/13/2020    CREATININE 0.7 11/13/2020    GLUCOSE 112 11/13/2020    GLUCOSE 100 03/27/2012    CALCIUM 9.5 11/13/2020      Lab Results   Component Value Date    TRIG 113 11/13/2020    HDL 61 11/13/2020    LDLCALC 107 11/13/2020    LABVLDL 23 11/13/2020     Diagnostic Testing:  Procedure performed:  3-  · Electrophysiology study with left atrial recording and mapping   · 3-D electroanatomical mapping of the left atrium and  right atium. · Electrophysiological study(EPS) and induction after IV drug infusion  · Transseptal puncture through an intact septum . · Intracardiac echocardiography. · Radiofrequency ablation of atrial fibrillation and pulmonary veins isolation   · Ablation of CTI depndent flutter as a separate mechanism  · Ultrasound for access    ECHO 8/20/2019   -Left ventricular cavity size is normal.   -There is mild left ventricular hypertrophy.   -Ejection fraction is visually estimated to be 55-60%. -No wall motion abnormalities.   -Normal diastolic function.   -Trivial mitral regurgitation.   -Mild tricuspid regurgitation     Calcium CT Score 2/19/2019   Total Agatston calcium score of 51.    24 hour holter and it revealed SR/PVCs, no afib or arrhythmias. Echo 4/26/18  Left ventricular cavity size is normal.  There is mild left ventricular hypertrophy. Ejection fraction is visually estimated to be 55-60%. Normal diastolic function. Mild tricuspid regurgitation with RVSP of 36 mm/hg    VQ scan 8/30/17  Low Probability for Pulmonary Embolus. Echo 8/28/17  Normalleft ventricle size and systolic function with an estimated ejection fraction of 60%. Mild mitral regurgitation is present. There is mild-moderate tricuspid regurgitation with RVSP estimated at 88 mmHg. This is suggestive of severe pulmonary hypertension. Mild pulmonic regurgitation present.     Echo 9/1/2016:  Technically limited examination to evaluate RVSP. Overall left ventricular function is normal.  Normal right ventricular size and function. Mild tricuspid regurgitation with RVSP estimated at 95 mmHg. No evidence of any pericardial effusion. [address] 9/1/2016:  Pressures were as follows:  RA: 9 mmHg  RV:  38/12 mmHg  PA:  33/16, mean 24 mmHg  PCWP:  14 mmHg  Thermodilution CO: 7.45   Thermodilution CI:  3.42  Adiel CO:  9.72  Adiel CI:  4.46  PA Sat:  74%  PVR:  107 dynes     Impression:  1. Minimal pulmonary hypertension with mean PA pressure 24  2. Normal filling pressures  3. Normal cardiac output  4. No evidence of PAH at this time. Echo 8/28/17  Normal left ventricle size and systolic function with an estimated ejection  fraction of 60%. Mild mitral regurgitation is present. There is mild-moderate tricuspid regurgitation with RVSP estimated at 88  mmHg. This is suggestive of severe pulmonary hypertension. Mild pulmonic regurgitation present. 1-20-15 Nuclear Stress test  Conclusions        Summary    Normal myocardial perfusion study.    Normal LV function. Labs were reviewed including labs from other hospital systems through [facility]. Cardiac testing was reviewed including echos, nuclear scans, cardiac catheterization, including from other hospital systems through [facility]. Assessment:  1. Preop cardiovascular exam    2. Paroxysmal atrial fibrillation (HCC)    3. HUSSAIN (obstructive sleep apnea)    4. Symptomatic bradycardia    5. Benign essential HTN    6. Chronic diastolic heart failure ([address]    7. SOB (shortness of breath)        1. Chronic heart failure with preserved ejection fraction:   ~Compensated by exam.  ~Continue Lisinopril, Lasix, Spironolactone, and Cardizem. 2. Paroxysmal atrial fibrillation: HR regular & controlled. Continues on Xarelto. -ILR placed 8/17/20  -Had an Ablation of atrial fib on 3/27/2020 per Dr. Thais Arnold.    -DCCV 1/13/2020 (Dr. Jeanmarie Morales)   -She feels no AF episodes since her Ablation 3/27/20     3. Benign essential HTN: Stable. She reports 90's/60's in the evenings. Reduce Lisinopril from 20mg to 10mg qd. /68   Pulse 75   Ht 5' 4\" (1.626 m)   Wt 232 lb (105.2 kg)   SpO2 98%   BMI 39.82 kg/m²          4. HUSSAIN (obstructive sleep apnea):  Wearing CPAP faithfully. 5. Symptomatic bradycardia:  NSR today rate of  84. Pure hypercholesterolemia:  5/4/20 > , HDL 66, , TG 92. She decided not to start Zetia. Mother had CAD. CT calcium score 51. Could not tolerate statin or Livalo. ~ 11/13/2020 , HDL 61, , . Some improvement noted in LDL and TC. Plan:  1. Okay for her to proceed with surgery  2. Lovenox 100mg subq twice a day. 3.  Last Dose of Xarelto on 3/12/21. 4.  Loxenox 100mg subq on 3/14 twice a Day and 3/15 twice a day. (She will call the office if cost is too high)   5. Hold Lasix and potassium on 3/16. 6.  See Dr. Rene Haro in 4 months. 7.  Message sent to Mykel Nash and Hetal about the above plan. Scribe's attestation: This note was scribed in the presence of Snitia Tolliver M.D. by Love Olmos RN     The scribe's documentation has been prepared under my direction and personally reviewed by me in its entirety. I confirm that the note above accurately reflects all work, treatment, procedures, and medical decision making performed by me. BMP/BNP/CBC every 2 months. QUALITY MEASURES  1. Tobacco Cessation Counseling: N/A  2. BP retake if >140/90: N/A  3. Communication to PCP: Office note forwarded/faxed to PCP  4. CAD antiplatelet therapy: N/A  5. CAD lipid lowering therapy: N/A  6. HF A. Fib on anticoagulation: N/A     Time Based Itemization  A total of 40 minutes was spent on today's patient encounter.   If applicable, non-patient-facing activities:  ( x)Preparing to see the patient and reviewing records  (x ) Individual interpretation of results  (x ) Discussion or coordination of care with other health care professionals (Dr. Yanna Soriano, Dr. Violeta Dove)   ( x) Ordering of unique tests, medications, or procedures  ( x) Documentation within the EHR    Thank you for allowing me to participate in the care of your patient.     Melissa Sales M.D., [facility]

## 2021-03-02 ENCOUNTER — HOSPITAL ENCOUNTER (OUTPATIENT)
Age: 71
Discharge: HOME | End: 2021-03-02
Payer: MEDICARE

## 2021-03-02 ENCOUNTER — ANESTHESIA EVENT (OUTPATIENT)
Dept: OPERATING ROOM | Age: 71
End: 2021-03-02
Payer: MEDICARE

## 2021-03-02 ENCOUNTER — TELEPHONE (OUTPATIENT)
Dept: CARDIOLOGY CLINIC | Age: 71
End: 2021-03-02

## 2021-03-02 ENCOUNTER — TELEPHONE (OUTPATIENT)
Dept: ORTHOPEDIC SURGERY | Age: 71
End: 2021-03-02

## 2021-03-02 DIAGNOSIS — E03.9 ACQUIRED HYPOTHYROIDISM: Chronic | ICD-10-CM

## 2021-03-02 DIAGNOSIS — Z01.818 PRE-OP TESTING: ICD-10-CM

## 2021-03-02 DIAGNOSIS — M17.11 PRIMARY OSTEOARTHRITIS OF RIGHT KNEE: ICD-10-CM

## 2021-03-02 LAB
A/G RATIO: 1.5 (ref 1.1–2.2)
ABO/RH: NORMAL
ALBUMIN SERPL-MCNC: 4.1 G/DL (ref 3.4–5)
ALP BLD-CCNC: 80 U/L (ref 40–129)
ALT SERPL-CCNC: 22 U/L (ref 10–40)
ANION GAP SERPL CALCULATED.3IONS-SCNC: 14 MMOL/L (ref 3–16)
ANTIBODY SCREEN: NORMAL
APTT: 39.7 SEC (ref 24.2–36.2)
AST SERPL-CCNC: 20 U/L (ref 15–37)
BACTERIA: ABNORMAL /HPF
BASOPHILS ABSOLUTE: 0.1 K/UL (ref 0–0.2)
BASOPHILS RELATIVE PERCENT: 1 %
BILIRUB SERPL-MCNC: 0.3 MG/DL (ref 0–1)
BILIRUBIN URINE: NEGATIVE
BLOOD, URINE: ABNORMAL
BUN BLDV-MCNC: 18 MG/DL (ref 7–20)
CALCIUM SERPL-MCNC: 9.5 MG/DL (ref 8.3–10.6)
CHLORIDE BLD-SCNC: 100 MMOL/L (ref 99–110)
CLARITY: CLEAR
CO2: 23 MMOL/L (ref 21–32)
COLOR: YELLOW
CREAT SERPL-MCNC: 0.8 MG/DL (ref 0.6–1.2)
EOSINOPHILS ABSOLUTE: 0.1 K/UL (ref 0–0.6)
EOSINOPHILS RELATIVE PERCENT: 2.1 %
EPITHELIAL CELLS, UA: 2 /HPF (ref 0–5)
GFR AFRICAN AMERICAN: >60
GFR NON-AFRICAN AMERICAN: >60
GLOBULIN: 2.8 G/DL
GLUCOSE BLD-MCNC: 98 MG/DL (ref 70–99)
GLUCOSE URINE: NEGATIVE MG/DL
HCT VFR BLD CALC: 38.8 % (ref 36–48)
HEMOGLOBIN: 13.2 G/DL (ref 12–16)
HYALINE CASTS: 1 /LPF (ref 0–8)
INR BLD: 1.39 (ref 0.86–1.14)
KETONES, URINE: NEGATIVE MG/DL
LEUKOCYTE ESTERASE, URINE: NEGATIVE
LYMPHOCYTES ABSOLUTE: 0.9 K/UL (ref 1–5.1)
LYMPHOCYTES RELATIVE PERCENT: 12.9 %
MCH RBC QN AUTO: 32 PG (ref 26–34)
MCHC RBC AUTO-ENTMCNC: 34 G/DL (ref 31–36)
MCV RBC AUTO: 94.2 FL (ref 80–100)
MICROSCOPIC EXAMINATION: YES
MONOCYTES ABSOLUTE: 0.5 K/UL (ref 0–1.3)
MONOCYTES RELATIVE PERCENT: 8.1 %
NEUTROPHILS ABSOLUTE: 5.1 K/UL (ref 1.7–7.7)
NEUTROPHILS RELATIVE PERCENT: 75.9 %
NITRITE, URINE: NEGATIVE
PDW BLD-RTO: 14.4 % (ref 12.4–15.4)
PH UA: 6 (ref 5–8)
PLATELET # BLD: 243 K/UL (ref 135–450)
PMV BLD AUTO: 9.1 FL (ref 5–10.5)
POTASSIUM SERPL-SCNC: 4.2 MMOL/L (ref 3.5–5.1)
PRO-BNP: 58 PG/ML (ref 0–124)
PROTEIN UA: NEGATIVE MG/DL
PROTHROMBIN TIME: 16.2 SEC (ref 10–13.2)
RBC # BLD: 4.11 M/UL (ref 4–5.2)
RBC UA: 1 /HPF (ref 0–4)
SODIUM BLD-SCNC: 137 MMOL/L (ref 136–145)
SPECIFIC GRAVITY UA: 1.02 (ref 1–1.03)
T4 FREE: 1 NG/DL (ref 0.9–1.8)
TOTAL PROTEIN: 6.9 G/DL (ref 6.4–8.2)
TSH SERPL DL<=0.05 MIU/L-ACNC: 1.44 UIU/ML (ref 0.27–4.2)
URINE REFLEX TO CULTURE: ABNORMAL
URINE TYPE: ABNORMAL
UROBILINOGEN, URINE: 0.2 E.U./DL
VITAMIN D 25-HYDROXY: 66.5 NG/ML
WBC # BLD: 6.7 K/UL (ref 4–11)
WBC UA: 0 /HPF (ref 0–5)

## 2021-03-02 PROCEDURE — 85730 THROMBOPLASTIN TIME PARTIAL: CPT

## 2021-03-02 PROCEDURE — 86900 BLOOD TYPING SEROLOGIC ABO: CPT

## 2021-03-02 PROCEDURE — 86850 RBC ANTIBODY SCREEN: CPT

## 2021-03-02 PROCEDURE — 86901 BLOOD TYPING SEROLOGIC RH(D): CPT

## 2021-03-02 PROCEDURE — 36415 COLL VENOUS BLD VENIPUNCTURE: CPT

## 2021-03-02 PROCEDURE — 84443 ASSAY THYROID STIM HORMONE: CPT

## 2021-03-02 PROCEDURE — 87081 CULTURE SCREEN ONLY: CPT

## 2021-03-02 PROCEDURE — 83880 ASSAY OF NATRIURETIC PEPTIDE: CPT

## 2021-03-02 PROCEDURE — 85610 PROTHROMBIN TIME: CPT

## 2021-03-02 PROCEDURE — 81001 URINALYSIS AUTO W/SCOPE: CPT

## 2021-03-02 PROCEDURE — 80053 COMPREHEN METABOLIC PANEL: CPT

## 2021-03-02 PROCEDURE — 83036 HEMOGLOBIN GLYCOSYLATED A1C: CPT

## 2021-03-02 PROCEDURE — 85025 COMPLETE CBC W/AUTO DIFF WBC: CPT

## 2021-03-02 PROCEDURE — 82306 VITAMIN D 25 HYDROXY: CPT

## 2021-03-02 PROCEDURE — 84439 ASSAY OF FREE THYROXINE: CPT

## 2021-03-02 NOTE — TELEPHONE ENCOUNTER
Please look at [employer] [facility] &  PTT  do you want lab work repeated morning of surgery?   Patient is on Xarelto

## 2021-03-02 NOTE — TELEPHONE ENCOUNTER
patient saw heart doctor yesterday.  Patient has questions re: treatment of PE/Afib and treatment with Lovenox or being admitted prior to surgery

## 2021-03-02 NOTE — PROGRESS NOTES
Dr Clay [facility] office called asked to review pt/inr ptt results dated 3/2/2021

## 2021-03-02 NOTE — PROGRESS NOTES
Spoke to patient -she is at hospital now to get her ordered PATs -states she will watch virtual class video today-patient has questions/concerns about her PE/A FIB-DR Yan office notified she would like direction on that issue

## 2021-03-02 NOTE — TELEPHONE ENCOUNTER
CARDIAC CLEARANCE     What type of procedure are you having? Knee replacement     Which physician is performing your procedure? Dr Madyson Winkler     When is your procedure scheduled for? 3/16/21    Where are you having this procedure?  [facility]     Are you taking Blood Thinners?xarelto   If so what? (Name/dose/frequesncy)     Does the surgeon want you to stop your blood thinner? If so for how long?     Phone Number and Contact Name for Physicians office:    Fax number to send information:  [phone] or just put in epic     lovenox ordered yesterday at appt

## 2021-03-02 NOTE — TELEPHONE ENCOUNTER
The lovenox plan from cardiology will provide the anticoagulation we need so she will not have to be admitted to the hospital prior to surgery. The lovenox plan is usually what we do for patients anticoagulated for Afib and for prior PE history. She will start her Xarelto the morning after surgery is complete.

## 2021-03-03 LAB
ESTIMATED AVERAGE GLUCOSE: 111.2 MG/DL
HBA1C MFR BLD: 5.5 %

## 2021-03-04 ENCOUNTER — TELEPHONE (OUTPATIENT)
Dept: CARDIOLOGY CLINIC | Age: 71
End: 2021-03-04

## 2021-03-04 NOTE — TELEPHONE ENCOUNTER
Pt calling ERICKA told her to call back if the Lovenox is too expensive at [facility] and ERICKA would send the RX to [facility] outpatient Pharmacy. Pt states it is too expensive for her. Also, pt would like to know if she could get the surgical soap from the outpatient pharmacy too?  Pls call to advise Thank you

## 2021-03-04 NOTE — TELEPHONE ENCOUNTER
Lovenox Script sent to [facility] outpt pharmacy. Spoke to pt and she is aware. Also informed that the surgical soap is over the counter at [facility], [facility] etc. Pt given the phone number to call for Mika Rahman to check to see when Lovenox will be ready.

## 2021-03-05 LAB — MRSA CULTURE ONLY: NORMAL

## 2021-03-08 ENCOUNTER — TELEPHONE (OUTPATIENT)
Dept: ORTHOPEDIC SURGERY | Age: 71
End: 2021-03-08

## 2021-03-08 NOTE — PROGRESS NOTES
Patient sent link to watch Joint Education Class  Emailed joint education materials to patient about medication education, points to remember, pre-surgical checklist, and pre-op showering instruction. Received an e-mail confirmation of patient viewing video. Pre-test and post-test done. Patient is aware that may contact me for any questions. My work email address and phone number given. Pre-test:5/5  Post-test:5/5    DOS: 3/16/2021  Dr Sidney Zavala Nurse Navigator  [phone]  [email or web address]

## 2021-03-08 NOTE — TELEPHONE ENCOUNTER
FYI: patient wanted to make you aware that she has a lot of metal in her back.   PAT will also discuss with anesthesia re: spinal.

## 2021-03-10 ENCOUNTER — OFFICE VISIT (OUTPATIENT)
Dept: PRIMARY CARE CLINIC | Age: 71
End: 2021-03-10
Payer: MEDICARE

## 2021-03-10 DIAGNOSIS — Z01.812 PRE-OPERATIVE LABORATORY EXAMINATION: Primary | ICD-10-CM

## 2021-03-10 PROCEDURE — G8417 CALC BMI ABV UP PARAM F/U: HCPCS | Performed by: NURSE PRACTITIONER

## 2021-03-10 PROCEDURE — 99211 OFF/OP EST MAY X REQ PHY/QHP: CPT | Performed by: NURSE PRACTITIONER

## 2021-03-10 PROCEDURE — G8428 CUR MEDS NOT DOCUMENT: HCPCS | Performed by: NURSE PRACTITIONER

## 2021-03-11 LAB — SARS-COV-2: NOT DETECTED

## 2021-03-16 ENCOUNTER — APPOINTMENT (OUTPATIENT)
Dept: GENERAL RADIOLOGY | Age: 71
End: 2021-03-16
Attending: ORTHOPAEDIC SURGERY
Payer: MEDICARE

## 2021-03-16 ENCOUNTER — HOSPITAL ENCOUNTER (OUTPATIENT)
Age: 71
LOS: 1 days | Discharge: HOME | End: 2021-03-17
Attending: ORTHOPAEDIC SURGERY | Admitting: ORTHOPAEDIC SURGERY
Payer: MEDICARE

## 2021-03-16 ENCOUNTER — ANESTHESIA (OUTPATIENT)
Dept: OPERATING ROOM | Age: 71
End: 2021-03-16
Payer: MEDICARE

## 2021-03-16 VITALS
TEMPERATURE: 97.3 F | OXYGEN SATURATION: 99 % | RESPIRATION RATE: 7 BRPM | SYSTOLIC BLOOD PRESSURE: 149 MMHG | DIASTOLIC BLOOD PRESSURE: 69 MMHG

## 2021-03-16 DIAGNOSIS — M17.11 PRIMARY OSTEOARTHRITIS OF RIGHT KNEE: Primary | ICD-10-CM

## 2021-03-16 LAB
ABO/RH: NORMAL
ANTIBODY SCREEN: NORMAL
APTT: 36.7 SEC (ref 24.2–36.2)
GLUCOSE BLD-MCNC: 103 MG/DL (ref 70–99)
GLUCOSE BLD-MCNC: 109 MG/DL (ref 70–99)
GLUCOSE BLD-MCNC: 134 MG/DL (ref 70–99)
INR BLD: 0.98 (ref 0.86–1.14)
PERFORMED ON: ABNORMAL
PROTHROMBIN TIME: 11.4 SEC (ref 10–13.2)

## 2021-03-16 PROCEDURE — 6370000000 HC RX 637 (ALT 250 FOR IP): Performed by: PHYSICIAN ASSISTANT

## 2021-03-16 PROCEDURE — 7100000000 HC PACU RECOVERY - FIRST 15 MIN: Performed by: ORTHOPAEDIC SURGERY

## 2021-03-16 PROCEDURE — 86900 BLOOD TYPING SEROLOGIC ABO: CPT

## 2021-03-16 PROCEDURE — 85730 THROMBOPLASTIN TIME PARTIAL: CPT

## 2021-03-16 PROCEDURE — 97165 OT EVAL LOW COMPLEX 30 MIN: CPT

## 2021-03-16 PROCEDURE — 6360000002 HC RX W HCPCS: Performed by: ANESTHESIOLOGY

## 2021-03-16 PROCEDURE — 86850 RBC ANTIBODY SCREEN: CPT

## 2021-03-16 PROCEDURE — 94761 N-INVAS EAR/PLS OXIMETRY MLT: CPT

## 2021-03-16 PROCEDURE — 6360000002 HC RX W HCPCS: Performed by: ORTHOPAEDIC SURGERY

## 2021-03-16 PROCEDURE — 85610 PROTHROMBIN TIME: CPT

## 2021-03-16 PROCEDURE — 3700000001 HC ADD 15 MINUTES (ANESTHESIA): Performed by: ORTHOPAEDIC SURGERY

## 2021-03-16 PROCEDURE — 20985 CPTR-ASST DIR MS PX: CPT | Performed by: ORTHOPAEDIC SURGERY

## 2021-03-16 PROCEDURE — 94150 VITAL CAPACITY TEST: CPT

## 2021-03-16 PROCEDURE — 3600000014 HC SURGERY LEVEL 4 ADDTL 15MIN: Performed by: ORTHOPAEDIC SURGERY

## 2021-03-16 PROCEDURE — 2500000003 HC RX 250 WO HCPCS: Performed by: ORTHOPAEDIC SURGERY

## 2021-03-16 PROCEDURE — 2580000003 HC RX 258: Performed by: PHYSICIAN ASSISTANT

## 2021-03-16 PROCEDURE — 27447 TOTAL KNEE ARTHROPLASTY: CPT | Performed by: PHYSICIAN ASSISTANT

## 2021-03-16 PROCEDURE — 6360000002 HC RX W HCPCS: Performed by: NURSE ANESTHETIST, CERTIFIED REGISTERED

## 2021-03-16 PROCEDURE — C1776 JOINT DEVICE (IMPLANTABLE): HCPCS | Performed by: ORTHOPAEDIC SURGERY

## 2021-03-16 PROCEDURE — 86901 BLOOD TYPING SEROLOGIC RH(D): CPT

## 2021-03-16 PROCEDURE — 2500000003 HC RX 250 WO HCPCS: Performed by: PHYSICIAN ASSISTANT

## 2021-03-16 PROCEDURE — 2709999900 HC NON-CHARGEABLE SUPPLY: Performed by: ORTHOPAEDIC SURGERY

## 2021-03-16 PROCEDURE — 97161 PT EVAL LOW COMPLEX 20 MIN: CPT

## 2021-03-16 PROCEDURE — 6360000002 HC RX W HCPCS: Performed by: PHYSICIAN ASSISTANT

## 2021-03-16 PROCEDURE — 2720000010 HC SURG SUPPLY STERILE: Performed by: ORTHOPAEDIC SURGERY

## 2021-03-16 PROCEDURE — 3600000004 HC SURGERY LEVEL 4 BASE: Performed by: ORTHOPAEDIC SURGERY

## 2021-03-16 PROCEDURE — APPNB45 APP NON BILLABLE 31-45 MINUTES: Performed by: NURSE PRACTITIONER

## 2021-03-16 PROCEDURE — 2580000003 HC RX 258: Performed by: ORTHOPAEDIC SURGERY

## 2021-03-16 PROCEDURE — C1713 ANCHOR/SCREW BN/BN,TIS/BN: HCPCS | Performed by: ORTHOPAEDIC SURGERY

## 2021-03-16 PROCEDURE — 94640 AIRWAY INHALATION TREATMENT: CPT

## 2021-03-16 PROCEDURE — 2700000000 HC OXYGEN THERAPY PER DAY

## 2021-03-16 PROCEDURE — 3700000000 HC ANESTHESIA ATTENDED CARE: Performed by: ORTHOPAEDIC SURGERY

## 2021-03-16 PROCEDURE — 27447 TOTAL KNEE ARTHROPLASTY: CPT | Performed by: ORTHOPAEDIC SURGERY

## 2021-03-16 PROCEDURE — 36415 COLL VENOUS BLD VENIPUNCTURE: CPT

## 2021-03-16 PROCEDURE — 7100000001 HC PACU RECOVERY - ADDTL 15 MIN: Performed by: ORTHOPAEDIC SURGERY

## 2021-03-16 PROCEDURE — 6370000000 HC RX 637 (ALT 250 FOR IP): Performed by: ORTHOPAEDIC SURGERY

## 2021-03-16 PROCEDURE — 2580000003 HC RX 258: Performed by: NURSE ANESTHETIST, CERTIFIED REGISTERED

## 2021-03-16 PROCEDURE — 2500000003 HC RX 250 WO HCPCS: Performed by: NURSE ANESTHETIST, CERTIFIED REGISTERED

## 2021-03-16 PROCEDURE — 73560 X-RAY EXAM OF KNEE 1 OR 2: CPT

## 2021-03-16 PROCEDURE — 99024 POSTOP FOLLOW-UP VISIT: CPT | Performed by: NURSE PRACTITIONER

## 2021-03-16 PROCEDURE — 64447 NJX AA&/STRD FEMORAL NRV IMG: CPT | Performed by: ANESTHESIOLOGY

## 2021-03-16 DEVICE — JOURNEY II BCS FEMORAL OXINIUM                                    RIGHT SIZE 5
Type: IMPLANTABLE DEVICE | Site: KNEE | Status: FUNCTIONAL
Brand: JOURNEY

## 2021-03-16 DEVICE — JOURNEY TIBIAL BASEPLATE NONPOROUS                                    RIGHT SIZE 5
Type: IMPLANTABLE DEVICE | Site: KNEE | Status: FUNCTIONAL
Brand: JOURNEY

## 2021-03-16 DEVICE — JOURNEY BCS PATELLA RESURFACING                                    ROUND 32 MM STANDARD
Type: IMPLANTABLE DEVICE | Site: KNEE | Status: FUNCTIONAL
Brand: JOURNEY

## 2021-03-16 DEVICE — JOURNEY II BCS XLPE ARTICULAR                                    INSERT SIZE 5-6 RIGHT 10MM
Type: IMPLANTABLE DEVICE | Site: KNEE | Status: FUNCTIONAL
Brand: JOURNEY

## 2021-03-16 DEVICE — RALLY HV AB ALL IN ONE SYSTEM 70 GRAMS
Type: IMPLANTABLE DEVICE | Site: KNEE | Status: FUNCTIONAL
Brand: RALLY

## 2021-03-16 RX ORDER — SODIUM CHLORIDE 9 MG/ML
INJECTION, SOLUTION INTRAVENOUS CONTINUOUS
Status: DISCONTINUED | OUTPATIENT
Start: 2021-03-16 | End: 2021-03-17 | Stop reason: HOSPADM

## 2021-03-16 RX ORDER — DEXTROSE MONOHYDRATE 50 MG/ML
100 INJECTION, SOLUTION INTRAVENOUS PRN
Status: DISCONTINUED | OUTPATIENT
Start: 2021-03-16 | End: 2021-03-17 | Stop reason: HOSPADM

## 2021-03-16 RX ORDER — SODIUM CHLORIDE, SODIUM LACTATE, POTASSIUM CHLORIDE, CALCIUM CHLORIDE 600; 310; 30; 20 MG/100ML; MG/100ML; MG/100ML; MG/100ML
INJECTION, SOLUTION INTRAVENOUS CONTINUOUS PRN
Status: DISCONTINUED | OUTPATIENT
Start: 2021-03-16 | End: 2021-03-16 | Stop reason: SDUPTHER

## 2021-03-16 RX ORDER — PRAMIPEXOLE DIHYDROCHLORIDE 0.25 MG/1
0.5 TABLET ORAL SEE ADMIN INSTRUCTIONS
Status: DISCONTINUED | OUTPATIENT
Start: 2021-03-16 | End: 2021-03-16

## 2021-03-16 RX ORDER — LISINOPRIL 10 MG/1
10 TABLET ORAL NIGHTLY
Status: DISCONTINUED | OUTPATIENT
Start: 2021-03-16 | End: 2021-03-17 | Stop reason: HOSPADM

## 2021-03-16 RX ORDER — HYDRALAZINE HYDROCHLORIDE 20 MG/ML
5 INJECTION INTRAMUSCULAR; INTRAVENOUS EVERY 10 MIN PRN
Status: DISCONTINUED | OUTPATIENT
Start: 2021-03-16 | End: 2021-03-16 | Stop reason: HOSPADM

## 2021-03-16 RX ORDER — INSULIN LISPRO 100 [IU]/ML
0-6 INJECTION, SOLUTION INTRAVENOUS; SUBCUTANEOUS NIGHTLY
Status: DISCONTINUED | OUTPATIENT
Start: 2021-03-16 | End: 2021-03-17 | Stop reason: HOSPADM

## 2021-03-16 RX ORDER — INSULIN LISPRO 100 [IU]/ML
0-12 INJECTION, SOLUTION INTRAVENOUS; SUBCUTANEOUS
Status: DISCONTINUED | OUTPATIENT
Start: 2021-03-16 | End: 2021-03-17 | Stop reason: HOSPADM

## 2021-03-16 RX ORDER — SODIUM CHLORIDE 0.9 % (FLUSH) 0.9 %
10 SYRINGE (ML) INJECTION EVERY 12 HOURS SCHEDULED
Status: DISCONTINUED | OUTPATIENT
Start: 2021-03-16 | End: 2021-03-17 | Stop reason: HOSPADM

## 2021-03-16 RX ORDER — FLUTICASONE PROPIONATE 110 UG/1
1 AEROSOL, METERED RESPIRATORY (INHALATION) 2 TIMES DAILY
Status: DISCONTINUED | OUTPATIENT
Start: 2021-03-16 | End: 2021-03-17 | Stop reason: HOSPADM

## 2021-03-16 RX ORDER — HYDROMORPHONE HYDROCHLORIDE 1 MG/ML
0.5 INJECTION, SOLUTION INTRAMUSCULAR; INTRAVENOUS; SUBCUTANEOUS
Status: DISCONTINUED | OUTPATIENT
Start: 2021-03-16 | End: 2021-03-17 | Stop reason: HOSPADM

## 2021-03-16 RX ORDER — OXYCODONE HYDROCHLORIDE AND ACETAMINOPHEN 5; 325 MG/1; MG/1
1 TABLET ORAL
Status: DISCONTINUED | OUTPATIENT
Start: 2021-03-16 | End: 2021-03-16 | Stop reason: HOSPADM

## 2021-03-16 RX ORDER — OXYCODONE HYDROCHLORIDE 5 MG/1
10 TABLET ORAL EVERY 4 HOURS PRN
Status: DISCONTINUED | OUTPATIENT
Start: 2021-03-16 | End: 2021-03-17 | Stop reason: HOSPADM

## 2021-03-16 RX ORDER — SENNA AND DOCUSATE SODIUM 50; 8.6 MG/1; MG/1
1 TABLET, FILM COATED ORAL 2 TIMES DAILY
Status: DISCONTINUED | OUTPATIENT
Start: 2021-03-16 | End: 2021-03-17 | Stop reason: HOSPADM

## 2021-03-16 RX ORDER — PROPOFOL 10 MG/ML
INJECTION, EMULSION INTRAVENOUS PRN
Status: DISCONTINUED | OUTPATIENT
Start: 2021-03-16 | End: 2021-03-16 | Stop reason: SDUPTHER

## 2021-03-16 RX ORDER — SODIUM CHLORIDE 9 MG/ML
INJECTION, SOLUTION INTRAVENOUS CONTINUOUS
Status: DISCONTINUED | OUTPATIENT
Start: 2021-03-16 | End: 2021-03-16

## 2021-03-16 RX ORDER — FENTANYL CITRATE 50 UG/ML
100 INJECTION, SOLUTION INTRAMUSCULAR; INTRAVENOUS ONCE
Status: COMPLETED | OUTPATIENT
Start: 2021-03-16 | End: 2021-03-16

## 2021-03-16 RX ORDER — SODIUM CHLORIDE 0.9 % (FLUSH) 0.9 %
10 SYRINGE (ML) INJECTION PRN
Status: DISCONTINUED | OUTPATIENT
Start: 2021-03-16 | End: 2021-03-17 | Stop reason: HOSPADM

## 2021-03-16 RX ORDER — ONDANSETRON 2 MG/ML
4 INJECTION INTRAMUSCULAR; INTRAVENOUS
Status: DISCONTINUED | OUTPATIENT
Start: 2021-03-16 | End: 2021-03-16 | Stop reason: HOSPADM

## 2021-03-16 RX ORDER — HYDROMORPHONE HCL 110MG/55ML
0.5 PATIENT CONTROLLED ANALGESIA SYRINGE INTRAVENOUS EVERY 5 MIN PRN
Status: DISCONTINUED | OUTPATIENT
Start: 2021-03-16 | End: 2021-03-16 | Stop reason: HOSPADM

## 2021-03-16 RX ORDER — FENTANYL CITRATE 50 UG/ML
INJECTION, SOLUTION INTRAMUSCULAR; INTRAVENOUS PRN
Status: DISCONTINUED | OUTPATIENT
Start: 2021-03-16 | End: 2021-03-16 | Stop reason: SDUPTHER

## 2021-03-16 RX ORDER — TRANEXAMIC ACID 100 MG/ML
INJECTION, SOLUTION INTRAVENOUS
Status: COMPLETED | OUTPATIENT
Start: 2021-03-16 | End: 2021-03-16

## 2021-03-16 RX ORDER — FLUTICASONE PROPIONATE 50 MCG
1 SPRAY, SUSPENSION (ML) NASAL DAILY
Status: DISCONTINUED | OUTPATIENT
Start: 2021-03-17 | End: 2021-03-17 | Stop reason: HOSPADM

## 2021-03-16 RX ORDER — MIDAZOLAM HYDROCHLORIDE 2 MG/2ML
2 INJECTION, SOLUTION INTRAMUSCULAR; INTRAVENOUS ONCE
Status: COMPLETED | OUTPATIENT
Start: 2021-03-16 | End: 2021-03-16

## 2021-03-16 RX ORDER — CYCLOSPORINE 0.5 MG/ML
1 EMULSION OPHTHALMIC 2 TIMES DAILY
COMMUNITY
End: 2021-04-12 | Stop reason: ALTCHOICE

## 2021-03-16 RX ORDER — WATER FOR INJ.,BACTERIOSTATIC
VIAL (ML) INJECTION
Status: COMPLETED | OUTPATIENT
Start: 2021-03-16 | End: 2021-03-16

## 2021-03-16 RX ORDER — OXYCODONE HYDROCHLORIDE 5 MG/1
5 TABLET ORAL EVERY 4 HOURS PRN
Status: DISCONTINUED | OUTPATIENT
Start: 2021-03-16 | End: 2021-03-17 | Stop reason: HOSPADM

## 2021-03-16 RX ORDER — POTASSIUM CHLORIDE 750 MG/1
10 TABLET, FILM COATED, EXTENDED RELEASE ORAL DAILY
Status: DISCONTINUED | OUTPATIENT
Start: 2021-03-16 | End: 2021-03-17 | Stop reason: HOSPADM

## 2021-03-16 RX ORDER — SPIRONOLACTONE 25 MG/1
25 TABLET ORAL DAILY
Status: DISCONTINUED | OUTPATIENT
Start: 2021-03-16 | End: 2021-03-17 | Stop reason: HOSPADM

## 2021-03-16 RX ORDER — LIDOCAINE HYDROCHLORIDE 20 MG/ML
INJECTION, SOLUTION EPIDURAL; INFILTRATION; INTRACAUDAL; PERINEURAL PRN
Status: DISCONTINUED | OUTPATIENT
Start: 2021-03-16 | End: 2021-03-16 | Stop reason: SDUPTHER

## 2021-03-16 RX ORDER — CALCIUM CARBONATE 260MG(650)
1 TABLET,CHEWABLE ORAL DAILY
Status: DISCONTINUED | OUTPATIENT
Start: 2021-03-16 | End: 2021-03-16 | Stop reason: RX

## 2021-03-16 RX ORDER — MEPERIDINE HYDROCHLORIDE 25 MG/ML
12.5 INJECTION INTRAMUSCULAR; INTRAVENOUS; SUBCUTANEOUS EVERY 5 MIN PRN
Status: DISCONTINUED | OUTPATIENT
Start: 2021-03-16 | End: 2021-03-16 | Stop reason: HOSPADM

## 2021-03-16 RX ORDER — INSULIN LISPRO 100 [IU]/ML
0.08 INJECTION, SOLUTION INTRAVENOUS; SUBCUTANEOUS
Status: DISCONTINUED | OUTPATIENT
Start: 2021-03-16 | End: 2021-03-17 | Stop reason: HOSPADM

## 2021-03-16 RX ORDER — BUPROPION HYDROCHLORIDE 150 MG/1
150 TABLET ORAL EVERY MORNING
Status: DISCONTINUED | OUTPATIENT
Start: 2021-03-17 | End: 2021-03-17 | Stop reason: HOSPADM

## 2021-03-16 RX ORDER — MAGNESIUM SULFATE HEPTAHYDRATE 500 MG/ML
INJECTION, SOLUTION INTRAMUSCULAR; INTRAVENOUS PRN
Status: DISCONTINUED | OUTPATIENT
Start: 2021-03-16 | End: 2021-03-16 | Stop reason: SDUPTHER

## 2021-03-16 RX ORDER — ROPIVACAINE HYDROCHLORIDE 5 MG/ML
INJECTION, SOLUTION EPIDURAL; INFILTRATION; PERINEURAL
Status: COMPLETED | OUTPATIENT
Start: 2021-03-16 | End: 2021-03-16

## 2021-03-16 RX ORDER — SODIUM CHLORIDE 9 MG/ML
INJECTION, SOLUTION INTRAVENOUS CONTINUOUS PRN
Status: DISCONTINUED | OUTPATIENT
Start: 2021-03-16 | End: 2021-03-16 | Stop reason: SDUPTHER

## 2021-03-16 RX ORDER — LIDOCAINE HYDROCHLORIDE 10 MG/ML
0.5 INJECTION, SOLUTION EPIDURAL; INFILTRATION; INTRACAUDAL; PERINEURAL ONCE
Status: DISCONTINUED | OUTPATIENT
Start: 2021-03-16 | End: 2021-03-16 | Stop reason: HOSPADM

## 2021-03-16 RX ORDER — CLINDAMYCIN PHOSPHATE 900 MG/50ML
900 INJECTION INTRAVENOUS
Status: COMPLETED | OUTPATIENT
Start: 2021-03-16 | End: 2021-03-16

## 2021-03-16 RX ORDER — SUCCINYLCHOLINE CHLORIDE 20 MG/ML
INJECTION INTRAMUSCULAR; INTRAVENOUS PRN
Status: DISCONTINUED | OUTPATIENT
Start: 2021-03-16 | End: 2021-03-16 | Stop reason: SDUPTHER

## 2021-03-16 RX ORDER — DEXAMETHASONE SODIUM PHOSPHATE 4 MG/ML
INJECTION, SOLUTION INTRA-ARTICULAR; INTRALESIONAL; INTRAMUSCULAR; INTRAVENOUS; SOFT TISSUE PRN
Status: DISCONTINUED | OUTPATIENT
Start: 2021-03-16 | End: 2021-03-16 | Stop reason: SDUPTHER

## 2021-03-16 RX ORDER — FLUTICASONE PROPIONATE 50 MCG
1 SPRAY, SUSPENSION (ML) NASAL DAILY
COMMUNITY

## 2021-03-16 RX ORDER — AZELASTINE 1 MG/ML
2 SPRAY, METERED NASAL 2 TIMES DAILY
Status: DISCONTINUED | OUTPATIENT
Start: 2021-03-16 | End: 2021-03-17 | Stop reason: HOSPADM

## 2021-03-16 RX ORDER — CELECOXIB 200 MG/1
400 CAPSULE ORAL ONCE
Status: COMPLETED | OUTPATIENT
Start: 2021-03-16 | End: 2021-03-16

## 2021-03-16 RX ORDER — BUPIVACAINE HYDROCHLORIDE 2.5 MG/ML
INJECTION, SOLUTION EPIDURAL; INFILTRATION; INTRACAUDAL
Status: COMPLETED | OUTPATIENT
Start: 2021-03-16 | End: 2021-03-16

## 2021-03-16 RX ORDER — CLINDAMYCIN PHOSPHATE 900 MG/50ML
900 INJECTION INTRAVENOUS EVERY 8 HOURS
Status: COMPLETED | OUTPATIENT
Start: 2021-03-16 | End: 2021-03-17

## 2021-03-16 RX ORDER — DEXTROSE MONOHYDRATE 25 G/50ML
12.5 INJECTION, SOLUTION INTRAVENOUS PRN
Status: DISCONTINUED | OUTPATIENT
Start: 2021-03-16 | End: 2021-03-17 | Stop reason: HOSPADM

## 2021-03-16 RX ORDER — POLYVINYL ALCOHOL 14 MG/ML
1 SOLUTION/ DROPS OPHTHALMIC PRN
Status: DISCONTINUED | OUTPATIENT
Start: 2021-03-16 | End: 2021-03-17 | Stop reason: HOSPADM

## 2021-03-16 RX ORDER — ONDANSETRON 2 MG/ML
4 INJECTION INTRAMUSCULAR; INTRAVENOUS EVERY 6 HOURS PRN
Status: DISCONTINUED | OUTPATIENT
Start: 2021-03-16 | End: 2021-03-17 | Stop reason: HOSPADM

## 2021-03-16 RX ORDER — HYDROMORPHONE HYDROCHLORIDE 1 MG/ML
0.25 INJECTION, SOLUTION INTRAMUSCULAR; INTRAVENOUS; SUBCUTANEOUS
Status: DISCONTINUED | OUTPATIENT
Start: 2021-03-16 | End: 2021-03-17 | Stop reason: HOSPADM

## 2021-03-16 RX ORDER — FUROSEMIDE 40 MG/1
40 TABLET ORAL DAILY
Status: DISCONTINUED | OUTPATIENT
Start: 2021-03-16 | End: 2021-03-17 | Stop reason: HOSPADM

## 2021-03-16 RX ORDER — ROCURONIUM BROMIDE 10 MG/ML
INJECTION, SOLUTION INTRAVENOUS PRN
Status: DISCONTINUED | OUTPATIENT
Start: 2021-03-16 | End: 2021-03-16 | Stop reason: SDUPTHER

## 2021-03-16 RX ORDER — LABETALOL HYDROCHLORIDE 5 MG/ML
5 INJECTION, SOLUTION INTRAVENOUS EVERY 10 MIN PRN
Status: DISCONTINUED | OUTPATIENT
Start: 2021-03-16 | End: 2021-03-16 | Stop reason: HOSPADM

## 2021-03-16 RX ORDER — PRAMIPEXOLE DIHYDROCHLORIDE 0.25 MG/1
0.75 TABLET ORAL
Status: DISCONTINUED | OUTPATIENT
Start: 2021-03-16 | End: 2021-03-17 | Stop reason: HOSPADM

## 2021-03-16 RX ORDER — AZELASTINE 1 MG/ML
1 SPRAY, METERED NASAL 2 TIMES DAILY
Status: DISCONTINUED | OUTPATIENT
Start: 2021-03-16 | End: 2021-03-16 | Stop reason: ALTCHOICE

## 2021-03-16 RX ORDER — ONDANSETRON 2 MG/ML
INJECTION INTRAMUSCULAR; INTRAVENOUS PRN
Status: DISCONTINUED | OUTPATIENT
Start: 2021-03-16 | End: 2021-03-16 | Stop reason: SDUPTHER

## 2021-03-16 RX ORDER — NICOTINE POLACRILEX 4 MG
15 LOZENGE BUCCAL PRN
Status: DISCONTINUED | OUTPATIENT
Start: 2021-03-16 | End: 2021-03-17 | Stop reason: HOSPADM

## 2021-03-16 RX ORDER — ACETAMINOPHEN 325 MG/1
650 TABLET ORAL EVERY 6 HOURS
Status: DISCONTINUED | OUTPATIENT
Start: 2021-03-16 | End: 2021-03-17 | Stop reason: HOSPADM

## 2021-03-16 RX ORDER — KETAMINE HCL IN NACL, ISO-OSM 100MG/10ML
SYRINGE (ML) INJECTION PRN
Status: DISCONTINUED | OUTPATIENT
Start: 2021-03-16 | End: 2021-03-16 | Stop reason: SDUPTHER

## 2021-03-16 RX ADMIN — LIDOCAINE HYDROCHLORIDE 100 MG: 20 INJECTION, SOLUTION EPIDURAL; INFILTRATION; INTRACAUDAL; PERINEURAL at 13:00

## 2021-03-16 RX ADMIN — MAGNESIUM SULFATE HEPTAHYDRATE 1 G: 500 INJECTION, SOLUTION INTRAMUSCULAR; INTRAVENOUS at 13:15

## 2021-03-16 RX ADMIN — CELECOXIB 400 MG: 200 CAPSULE ORAL at 11:53

## 2021-03-16 RX ADMIN — LISINOPRIL 10 MG: 10 TABLET ORAL at 20:28

## 2021-03-16 RX ADMIN — HYDROMORPHONE HYDROCHLORIDE 0.5 MG: 1 INJECTION, SOLUTION INTRAMUSCULAR; INTRAVENOUS; SUBCUTANEOUS at 20:40

## 2021-03-16 RX ADMIN — ROPIVACAINE HYDROCHLORIDE 30 ML: 5 INJECTION, SOLUTION EPIDURAL; INFILTRATION; PERINEURAL at 12:18

## 2021-03-16 RX ADMIN — Medication 30 MG: at 13:15

## 2021-03-16 RX ADMIN — SODIUM CHLORIDE, POTASSIUM CHLORIDE, SODIUM LACTATE AND CALCIUM CHLORIDE: 600; 310; 30; 20 INJECTION, SOLUTION INTRAVENOUS at 13:12

## 2021-03-16 RX ADMIN — PRAMIPEXOLE DIHYDROCHLORIDE 0.75 MG: 0.25 TABLET ORAL at 20:28

## 2021-03-16 RX ADMIN — SODIUM CHLORIDE: 9 INJECTION, SOLUTION INTRAVENOUS at 12:57

## 2021-03-16 RX ADMIN — FENTANYL CITRATE 25 MCG: 50 INJECTION, SOLUTION INTRAMUSCULAR; INTRAVENOUS at 15:09

## 2021-03-16 RX ADMIN — Medication 1 PUFF: at 20:01

## 2021-03-16 RX ADMIN — ACETAMINOPHEN 650 MG: 325 TABLET ORAL at 18:04

## 2021-03-16 RX ADMIN — STANDARDIZED SENNA CONCENTRATE AND DOCUSATE SODIUM 1 TABLET: 8.6; 5 TABLET ORAL at 20:28

## 2021-03-16 RX ADMIN — MIDAZOLAM 2 MG: 1 INJECTION INTRAMUSCULAR; INTRAVENOUS at 12:11

## 2021-03-16 RX ADMIN — PRAMIPEXOLE DIHYDROCHLORIDE 0.75 MG: 0.25 TABLET ORAL at 23:02

## 2021-03-16 RX ADMIN — SODIUM CHLORIDE: 9 INJECTION, SOLUTION INTRAVENOUS at 18:10

## 2021-03-16 RX ADMIN — CLINDAMYCIN PHOSPHATE 900 MG: 900 INJECTION, SOLUTION INTRAVENOUS at 20:29

## 2021-03-16 RX ADMIN — DEXAMETHASONE SODIUM PHOSPHATE 4 MG: 4 INJECTION, SOLUTION INTRAMUSCULAR; INTRAVENOUS at 13:02

## 2021-03-16 RX ADMIN — SUCCINYLCHOLINE CHLORIDE 140 MG: 20 INJECTION, SOLUTION INTRAMUSCULAR; INTRAVENOUS at 13:02

## 2021-03-16 RX ADMIN — AZELASTINE 2 SPRAY: 1 SPRAY, METERED NASAL at 23:04

## 2021-03-16 RX ADMIN — PROPOFOL 50 MG: 10 INJECTION, EMULSION INTRAVENOUS at 13:02

## 2021-03-16 RX ADMIN — DILTIAZEM HYDROCHLORIDE 30 MG: 30 TABLET, FILM COATED ORAL at 18:04

## 2021-03-16 RX ADMIN — FENTANYL CITRATE 100 MCG: 50 INJECTION, SOLUTION INTRAMUSCULAR; INTRAVENOUS at 12:11

## 2021-03-16 RX ADMIN — POTASSIUM CHLORIDE 10 MEQ: 750 TABLET, FILM COATED, EXTENDED RELEASE ORAL at 18:04

## 2021-03-16 RX ADMIN — HYDROMORPHONE HYDROCHLORIDE 0.5 MG: 2 INJECTION, SOLUTION INTRAMUSCULAR; INTRAVENOUS; SUBCUTANEOUS at 16:23

## 2021-03-16 RX ADMIN — OXYCODONE 10 MG: 5 TABLET ORAL at 23:02

## 2021-03-16 RX ADMIN — ONDANSETRON 4 MG: 2 INJECTION INTRAMUSCULAR; INTRAVENOUS at 13:02

## 2021-03-16 RX ADMIN — ROCURONIUM BROMIDE 50 MG: 10 INJECTION, SOLUTION INTRAVENOUS at 13:15

## 2021-03-16 RX ADMIN — FUROSEMIDE 40 MG: 40 TABLET ORAL at 18:03

## 2021-03-16 RX ADMIN — FENTANYL CITRATE 100 MCG: 50 INJECTION, SOLUTION INTRAMUSCULAR; INTRAVENOUS at 13:40

## 2021-03-16 RX ADMIN — SUGAMMADEX 200 MG: 100 INJECTION, SOLUTION INTRAVENOUS at 15:05

## 2021-03-16 RX ADMIN — PROPOFOL 100 MG: 10 INJECTION, EMULSION INTRAVENOUS at 13:01

## 2021-03-16 RX ADMIN — ENOXAPARIN SODIUM 110 MG: 100 INJECTION SUBCUTANEOUS at 20:28

## 2021-03-16 RX ADMIN — SPIRONOLACTONE 25 MG: 25 TABLET ORAL at 18:04

## 2021-03-16 RX ADMIN — SODIUM CHLORIDE: 9 INJECTION, SOLUTION INTRAVENOUS at 12:00

## 2021-03-16 RX ADMIN — CLINDAMYCIN IN 5 PERCENT DEXTROSE 900 MG: 18 INJECTION, SOLUTION INTRAVENOUS at 12:53

## 2021-03-16 RX ADMIN — FENTANYL CITRATE 50 MCG: 50 INJECTION, SOLUTION INTRAMUSCULAR; INTRAVENOUS at 15:11

## 2021-03-16 RX ADMIN — ROCURONIUM BROMIDE 20 MG: 10 INJECTION, SOLUTION INTRAVENOUS at 14:00

## 2021-03-16 RX ADMIN — POLYVINYL ALCOHOL 1 DROP: 14 SOLUTION/ DROPS OPHTHALMIC at 23:40

## 2021-03-16 RX ADMIN — FENTANYL CITRATE 25 MCG: 50 INJECTION, SOLUTION INTRAMUSCULAR; INTRAVENOUS at 15:05

## 2021-03-16 RX ADMIN — OXYCODONE 10 MG: 5 TABLET ORAL at 18:04

## 2021-03-16 RX ADMIN — Medication 30 MG: at 14:15

## 2021-03-16 RX ADMIN — ACETAMINOPHEN 650 MG: 325 TABLET ORAL at 23:02

## 2021-03-16 ASSESSMENT — PAIN DESCRIPTION - DESCRIPTORS
DESCRIPTORS: THROBBING

## 2021-03-16 ASSESSMENT — PULMONARY FUNCTION TESTS
PIF_VALUE: 21
PIF_VALUE: 3
PIF_VALUE: 3
PIF_VALUE: 21
PIF_VALUE: 22
PIF_VALUE: 3
PIF_VALUE: 22
PIF_VALUE: 19
PIF_VALUE: 22
PIF_VALUE: 21
PIF_VALUE: 21
PIF_VALUE: 18
PIF_VALUE: 22
PIF_VALUE: 21
PIF_VALUE: 22
PIF_VALUE: 21
PIF_VALUE: 22
PIF_VALUE: 3
PIF_VALUE: 22
PIF_VALUE: 3
PIF_VALUE: 3
PIF_VALUE: 22
PIF_VALUE: 22
PIF_VALUE: 3
PIF_VALUE: 21
PIF_VALUE: 18
PIF_VALUE: 22
PIF_VALUE: 3
PIF_VALUE: 19
PIF_VALUE: 22
PIF_VALUE: 3
PIF_VALUE: 22
PIF_VALUE: 21
PIF_VALUE: 1
PIF_VALUE: 22
PIF_VALUE: 3
PIF_VALUE: 22
PIF_VALUE: 1
PIF_VALUE: 22
PIF_VALUE: 3
PIF_VALUE: 22
PIF_VALUE: 21
PIF_VALUE: 21
PIF_VALUE: 23
PIF_VALUE: 3
PIF_VALUE: 22
PIF_VALUE: 22
PIF_VALUE: 20
PIF_VALUE: 3
PIF_VALUE: 22
PIF_VALUE: 20
PIF_VALUE: 22
PIF_VALUE: 22

## 2021-03-16 ASSESSMENT — PAIN SCALES - GENERAL
PAINLEVEL_OUTOF10: 7
PAINLEVEL_OUTOF10: 4
PAINLEVEL_OUTOF10: 6
PAINLEVEL_OUTOF10: 5

## 2021-03-16 ASSESSMENT — PAIN - FUNCTIONAL ASSESSMENT: PAIN_FUNCTIONAL_ASSESSMENT: 0-10

## 2021-03-16 ASSESSMENT — PAIN DESCRIPTION - PROGRESSION: CLINICAL_PROGRESSION: GRADUALLY IMPROVING

## 2021-03-16 ASSESSMENT — PAIN DESCRIPTION - ORIENTATION
ORIENTATION: RIGHT

## 2021-03-16 ASSESSMENT — LIFESTYLE VARIABLES: SMOKING_STATUS: 0

## 2021-03-16 ASSESSMENT — PAIN DESCRIPTION - LOCATION: LOCATION: KNEE

## 2021-03-16 ASSESSMENT — PAIN DESCRIPTION - FREQUENCY: FREQUENCY: CONTINUOUS

## 2021-03-16 ASSESSMENT — PAIN DESCRIPTION - PAIN TYPE
TYPE: SURGICAL PAIN

## 2021-03-16 NOTE — H&P
Patient seen and examined. I have reviewed the history and physical and examined the patient and find no relevant changes. Surgery plan reviewed. BP (!) 141/76   Pulse 83   Temp 98 °F (36.7 °C) (Temporal)   Resp 18   Ht 5' 4\" (1.626 m)   Wt 233 lb 9.6 oz (106 kg)   SpO2 98%   BMI 40.10 kg/m²       The patient was counseled at length about the risks of manjula Covid-19 during their perioperative period and any recovery window from their procedure. The patient was made aware that manjula Covid-19  may worsen their prognosis for recovering from their procedure  and lend to a higher morbidity and/or mortality risk. All material risks, benefits, and reasonable alternatives including postponing the procedure were discussed. The patient does wish to proceed with the procedure at this time. Site marked and consent verified. All questions answered and antibiotic verified. Ready for right robotic assisted total knee arthroplasty    Jayy Del Rio.  Hetal [address] [place] [address] and Sports Medicine  03/16/21  11:38 AM

## 2021-03-16 NOTE — PROGRESS NOTES
Physical Therapy    Facility/Department: [address] ORTHO/NEURO NURSING  Initial Assessment    NAME: Moiz Rudolph  : 1950  MRN: [id number]    Date of Service: 3/16/2021    Discharge Recommendations: Moiz Rudolph scored a 18/24 on the AM-PAC short mobility form. Current research shows that an AM-PAC score of 18 or greater is typically associated with a discharge to the patient's home setting. Based on the patient's AM-PAC score and their current functional mobility deficits, it is recommended that the patient have 2-3 sessions per week of Physical Therapy at d/c to increase the patient's independence. At this time, this patient demonstrates the endurance and safety to discharge home with HHPT and a follow up treatment frequency of 2-3x/wk. Please see assessment section for further patient specific details. HOME HEALTH CARE: LEVEL 3 SAFETY  - Initial home health evaluation to occur within 24-48 hours, in patient home   - Therapy evaluations in home within 24-48 hours of discharge; including DME and home safety   - Frontload therapy 5 days, then 3x a week   - Therapy to evaluate if patient has [address] needs for personal care   -  evaluation within 24-48 hours, includes evaluation of resources and insurance to determine [place], [place], LTC, and Medicaid options     If patient discharges prior to next session this note will serve as a discharge summary. Please see below for the latest assessment towards goals. PT Equipment Recommendations  Equipment Needed: No  Other: has RW at home    Assessment   Body structures, Functions, Activity limitations: Decreased functional mobility ; Decreased ROM; Decreased strength;Decreased endurance;Decreased balance  Assessment: Pt presents with the above deficits s/p R TKA impairing her ability to perform functional mobility safely and independently. Pt with PLOF of independence and currently requires CGA with RW for mobility at this time.  Pt would benefit from acute PT services to address deficits. Treatment Diagnosis: impaired gait, transfers, and balance  Prognosis: Good  Decision Making: Low Complexity  Clinical Presentation: stable  PT Education: Goals;PT Role;Plan of Care;Home Exercise Program;Precautions;Transfer Training;Weight-bearing Education; Family Education;Orientation;General Safety;Gait Training;Functional Mobility Training  Patient Education: d/c recommendations, HEP provided/reviewed--pt verbalizing understanding  Barriers to Learning: none  REQUIRES PT FOLLOW UP: Yes  Activity Tolerance  Activity Tolerance: Patient Tolerated treatment well       Patient Diagnosis(es): There were no encounter diagnoses. has a past medical history of Allergic, Anesthesia complication, Anxiety, Arthritis, Arthritis of left hip, Arthritis of right hip, Asthma, At risk for falls, Atrial fibrillation with rapid ventricular response (HCC), Atrial flutter ([address] Chronic maxillary sinusitis, CTEPH (chronic thromboembolic pulmonary hypertension) ([address] Depression, Diabetes mellitus ([address] Disc disease, degenerative, lumbar or lumbosacral, Hepatic steatosis, History of total hip arthroplasty, Hypertension, Leg cramps, Migraines, basilar, Mild persistent asthma without complication, Obesity, Class III, BMI 40-49.9 (morbid obesity) ([address] HUSSAIN (obstructive sleep apnea), Osteoarthritis, hip, bilateral, Panic attacks, Pneumonia, Primary osteoarthritis of both knees, Primary osteoarthritis of left knee, Pulmonary emboli ([address] Pulmonary HTN ([address] Pure hypercholesterolemia, Restless leg syndrome, Rhinitis, chronic, Sleep apnea, Stress incontinence, Tear of left rotator cuff, Thyroid disease, and Wears glasses. has a past surgical history that includes Cataract removal (Bilateral); Finger surgery (1988); eye surgery (Right, 2010); back surgery (2004); Tonsillectomy (1972); Hysterectomy (1993); Colonoscopy; joint replacement (Left, 2/2/16);  Hip Arthroplasty (Right, Comment: grossly 4/5  Tone RLE  RLE Tone: Normotonic  Tone LLE  LLE Tone: Normotonic  Motor Control  Gross Motor?: WFL  Sensation  Overall Sensation Status: WFL  Bed mobility  Supine to Sit: Stand by assistance  Scooting: Stand by assistance  Transfers  Sit to Stand: Contact guard assistance(x1 EOB, x1 toilet)  Stand to sit: Contact guard assistance  Comment: One VC for safe hand placement when transferring with RW. Ambulation  Ambulation?: Yes  WB Status: WBAT  Ambulation 1  Surface: level tile  Device: Rolling Walker  Assistance: Contact guard assistance  Quality of Gait: slightly widened Cliff, decreased nilsa, decreased R step length  Distance: 20'  Comments: Pt required slightly increased time to perform, no LOB. Stairs/Curb  Stairs?: No     Balance  Posture: Good  Sitting - Static: Good  Sitting - Dynamic: Good  Standing - Static: Fair;+(with RW)  Standing - Dynamic: Fair;+(with RW)        Plan   Plan  Times per week: 7x (BID)  Times per day: Twice a day  Current Treatment Recommendations: Strengthening, Balance Training, ROM, Functional Mobility Training, Transfer Training, Stair training, Gait Training, Endurance Training, Neuromuscular Re-education, Positioning, Modalities, Equipment Evaluation, Education, & procurement, Patient/Caregiver Education & Training, Home Exercise Program, Safety Education & Training  Safety Devices  Type of devices:  All fall risk precautions in place, Call light within reach, Chair alarm in place, Gait belt, Patient at risk for falls, Nurse notified, Left in chair  Restraints  Initially in place: No    G-Code       OutComes Score       AM-PAC Score  AM-PAC Inpatient Mobility Raw Score : 18 (03/16/21 1742)  AM-PAC Inpatient T-Scale Score : 43.63 (03/16/21 1742)  Mobility Inpatient CMS 0-100% Score: 46.58 (03/16/21 1742)  Mobility Inpatient CMS G-Code Modifier : CK (03/16/21 1742)          Goals  Short term goals  Time Frame for Short term goals: upon d/c  Short term goal 1: Pt will perform bed mobility MOD I  Short term goal 2: Pt will perform transfers with RW MOD I  Short term goal 3: Pt will ambulate 150' with RW MOD I  Short term goal 4: Pt will negotiate 4 stairs with HR and supervision  Short term goal 5: Pt will perform car transfer with RW and supervision  Short term goal 6: Pt will achieve 90 degrees AROM of R knee flexion  Patient Goals   Patient goals : pt did not state       Therapy Time   Individual Concurrent Group Co-treatment   Time In 1655         Time Out 1725         Minutes 30              Timed Code Treatment Minutes:   0    Total Treatment Minutes:  30  Time spent with pt shared with OT as pt is under observation status. As such, pt is being billed 1 unit for evaluation.     [id number] [facility], [address] [place] [address]

## 2021-03-16 NOTE — PROGRESS NOTES
Patient alert, VSS and O2 sat maintained on RA. Pain controlled. RLE dressing CDI, neuro checks wnl. Patient tolerating PO. Patient meets all phase 1 discharge criteria, seen by anesthesia. Will transfer to [address] [place] [address] in stable condition.

## 2021-03-16 NOTE — PROGRESS NOTES
Patient transferred from OR to PACU, VSS and O2 sat maintained on 6L via simple mask. Denies pain. RLE dressing CDI, cooling pack placed. Neuro check WNL. Xray notified of need for xray. Dr. Temo Stern and Citlali Grimes NP at bedside. Continue to monitor.

## 2021-03-16 NOTE — OP NOTE
Operative Note      Patient: Areli Miller  YOB: 1950  MRN: [id number]    Date of Procedure: 3/16/2021    Pre-Op Diagnosis: M17.11  RIGHT KNEE OSTEOARTHRITIS    Post-Op Diagnosis: Same       Procedure(s):  RIGHT ROBOTIC ASSISTED TOTAL KNEE ARTHROPLASTY (63074, 43350) - PRESSLEY & NEPH ADVANCED    Surgeon(s):  Neyda Garcia MD    Assistant:   Surgical Assistant: Zahra Pacheco Assistant: Singh Thomas  Physician Assistant: Lesvia Mcarthur PA-C    Anesthesia: General    Estimated Blood Loss (mL): less than 565     Complications: None    Specimens:   * No specimens in log *    Implants:  Implant Name Type Inv. Item Serial No.  Lot No. LRB No. Used Action   CEMENT B1 70GM HI VISC ALL IN 1 SYS AB RALLY  CEMENT B1 70GM HI VISC ALL IN 1 SYS AB RAL  [employer]- [id number] Right 1 Implanted   COMPONENT PAT [id number] THK9MM STD KNEE RND NP BEATRIS BICRUCIATE  COMPONENT PAT [id number] THK9MM STD KNEE RND NP BEATRIS BICRUCIATE  [employer]- [id number] Right 1 Implanted   COMPONENT FEM SZ 5 R KNEE OXINIUM BI CRUCE STBL JOURNEY II  COMPONENT FEM SZ 5 R KNEE OXINIUM BI CRUCE STBL JOURNEY II  [employer]- [id number] Right 1 Implanted   BASEPLATE TIB SZ 5 [id number] ML74MM R KNEE NP BEATRIS JOURNEY  BASEPLATE TIB SZ 5 [id number] ML74MM R KNEE NP [place]  [employer]TH[employer]- [id number] Right 1 Implanted   INSERT TIB SZ 5-6 [id number] R KNEE XLPE BI CRUCE ARTC  INSERT TIB SZ 5-6 [id number] R KNEE XLPE BI [facility] AND NEPHEW Debi Hinton [id number] Right 1 Implanted         Drains: * No LDAs found *    Findings: right knee severe osteoarthritis    Detailed Description of Procedure:   Operative Report: Indications: The patient is a 78 y. o.female is here for elective right total knee arthroplasty. She has failed conservative measures as outlined in the office notes.   She has been informed of the risks / benefits / rehab and potential complications the tibial baseplate was then pinned into position and drilled and punched as per technique. 60 mL of a mixture of orthopaedic analgesic mixture diluted in saline was carefully infiltrated into the posterior capsule and medial / lateral gutter regions with a 22 gauge needle. Care was taken to ensure removal of posterior femoral osteophytes as well. The cut surfaces of the bone were cleansed first with dilute chlorhexidine followed by pulsatile lavage. The cement was mixed on the back table as per technique. Final components were then cemented in position in a stepwise fashion with excess cement removed at each step. Knee was held in full extension with the trial polyethylene inserted and the patella was clamped into position. The cement was allowed to cure. A solution of dilute chlorhexidine was placed in the wound and allowed to sit for 2-3 minutes to aid in bacterial control. It was then removed with suction and the entire joint was again cleansed with pulsatile lavage. The knee was then taken out of extension position and any excess cement was carefully removed with a quarter-inch osteotome. The knee continued show excellent tracking with good range of motion and stability to varus and valgus stress. The trial polyethylene was removed. Care was taken to ensure all posterior cement was removed and the knee was thoroughly irrigated with pulsatile lavage. The final 10 mm polyethylene surface was then snapped into position without difficulty and again showed similar stability, range of motion, and patellar tracking as trials. The tourniquet was then deflated and hemostasis was achieved. 30 mL of orthopedic analgesia mixture diluted in saline was carefully infiltrated into the distal femur periosteum and anterior capsular tissue. The joint was closed with running #2 Stratfix. Skin was closed with interrupted 2-0 Vicryl and running 3-0 Monocryl.   Dermabond and Prineo dressing were applied and allowed

## 2021-03-16 NOTE — ANESTHESIA POSTPROCEDURE EVALUATION
Department of Anesthesiology  Postprocedure Note    Patient: Katherine Diamond  MRN: [id number]  YOB: 1950  Date of evaluation: 3/16/2021  Time:  3:47 PM     Procedure Summary     Date: 03/16/21 Room / Location: [address] [place] [address]    Anesthesia Start: 2031 Anesthesia Stop: 3324    Procedure: RIGHT ROBOTIC ASSISTED TOTAL KNEE ARTHROPLASTY (60302, 37102) - PRESSLEY & NEPHEW ADVANCED (Right Knee) Diagnosis: (M17.11  RIGHT KNEE OSTEOARTHRITIS)    Surgeons: Jo Khan MD Responsible Provider: Debby Cornejo MD    Anesthesia Type: general ASA Status: 3          Anesthesia Type: general    Roxie Phase I: Roxie Score: 10    Roxie Phase II:      Last vitals: Reviewed and per EMR flowsheets.        Anesthesia Post Evaluation    Patient location during evaluation: PACU  Patient participation: complete - patient participated  Level of consciousness: awake and alert  Pain score: 2  Airway patency: patent  Nausea & Vomiting: no vomiting  Complications: no  Cardiovascular status: blood pressure returned to baseline  Respiratory status: acceptable  Hydration status: euvolemic

## 2021-03-16 NOTE — ANESTHESIA PROCEDURE NOTES
Peripheral Block    Patient location during procedure: pre-op  Start time: 3/16/2021 12:17 PM  End time: 3/16/2021 12:19 PM  Staffing  Performed: anesthesiologist   Anesthesiologist: Gustavo Mehta MD  Preanesthetic Checklist  Completed: patient identified, IV checked, site marked, risks and benefits discussed, surgical consent, monitors and equipment checked, pre-op evaluation, timeout performed, anesthesia consent given, oxygen available and patient being monitored  Peripheral Block  Patient position: supine  Prep: ChloraPrep  Patient monitoring: cardiac monitor, continuous pulse ox, frequent blood pressure checks and IV access  Block type: Femoral  Laterality: right  Injection technique: single-shot  Guidance: ultrasound guided  Local infiltration: lidocaine  Infiltration strength: 1 %  Dose: 3 mL  Adductor canal  Provider prep: mask and sterile gloves  Local infiltration: lidocaine  Needle  Needle type: combined needle/nerve stimulator   Needle gauge: 21 G  Needle length: 10 cm  Needle localization: ultrasound guidance  Assessment  Injection assessment: negative aspiration for heme, no paresthesia on injection and local visualized surrounding nerve on ultrasound  Paresthesia pain: none  Slow fractionated injection: yes  Hemodynamics: stable  Additional Notes  U/S 82059.  (1) Under ultrasound guidance, a  gauge needle was inserted and placed in close proximity to the  nerve.  (2) Ultrasound was also used to visualize the spread of the anesthetic in close proximity to the nerve being blocked. (3) The nerve appeared anatomically normal, and (4 there were no apparent abnormal pathological findings on the image that were readily visible and related to the nerve being blocked. (5) A permanent ultrasound image was saved in the patient's record.           Medications Administered  Ropivacaine (NAROPIN) injection 0.5%, 30 mL  Reason for block: post-op pain management and at surgeon's request

## 2021-03-16 NOTE — PROGRESS NOTES
Patient resting comfortably in preop awaiting surgery. States that a friend Kierra Alva) is here in the waiting room. Anesthesia at bedside for nerve block. Patient tolerating well. Patient verbalizes understanding of procedure. Consent present and laterality is verified by surgeon's initials. H&P present and updated today by Surgeon. Perioperative sequence of events explained to patient and she verbalizes understanding of same. Handoff report received from Keegan Nolen RN.

## 2021-03-16 NOTE — PROGRESS NOTES
[facility]   3/29/2021 11:00 AM Mykel Smith PA-C [facility]   4/5/2021 11:30 AM BRICE Sorenson - CNP [facility] Cardio [facility]   5/3/2021 12:00 PM BRICE Braxton - CNP [facility] SLEEP MED [facility]   6/7/2021  1:30 PM Romayne Riser, MD [facility]   7/8/2021  2:30 PM Adriane Garcia MD [facility] Cardio MMA       HEATH Marie  3/16/2021  3:16 PM

## 2021-03-16 NOTE — PROGRESS NOTES
Patient called in this AM states fell asleep and did not take her dose of LOVENOX last jessica-spoke to DR Michele-she advised not to take it this AM and to make sure all were aware-called pre-op informed of situation and asked to advise anesthesia of the day. Spoke to Britney Suero at [address] [place] [address] office and informed-she will make him aware.

## 2021-03-16 NOTE — PROGRESS NOTES
Occupational Therapy   Occupational Therapy Initial Assessment  Date: 3/16/2021   Patient Name: Kaitlynn Song  MRN: [id number]     : 1950    Date of Service: 3/16/2021    Discharge Recommendations:  Kaitlynn Song scored a 18/24 on the AM-PAC ADL Inpatient form. Current research shows that an AM-PAC score of 18 or greater is typically associated with a discharge to the patient's home setting. Based on the patient's AM-PAC score, and their current ADL deficits, it is recommended that the patient have 2-3 sessions per week of Occupational Therapy at d/c to increase the patient's independence. At this time, this patient demonstrates the endurance and safety to discharge home with home health (home vs OP services) and a follow up treatment frequency of 2-3x/wk. Please see assessment section for further patient specific details. If patient discharges prior to next session this note will serve as a discharge summary. Please see below for the latest assessment towards goals. HOME HEALTH CARE: LEVEL 1 STANDARD    - Initial home health evaluation to occur within 24-48 hours, in patient home   - Therapy to evaluate with goal of regaining prior level of functioning   - Therapy to evaluate if patient has [address] needs for personal care       OT Equipment Recommendations  Equipment Needed: No(pt has needed DME)    Assessment   Performance deficits / Impairments: Decreased functional mobility ; Decreased ADL status; Decreased high-level IADLs  Assessment: pt not at baseline level of functioning and would benefit from ongoing skilled OT services in order to return to PLOF  Treatment Diagnosis: R TKA  Prognosis: Good  Decision Making: Low Complexity  History: pt lives alone, planning to discharge to friend's home.  independent at baseline  Assistance / Modification: assist of 1  Patient Education: eval, POC, discharge, precautions, WB status, ADL transfers-pt independent with education  REQUIRES OT FOLLOW UP: Yes  Activity Tolerance  Activity Tolerance: Patient Tolerated treatment well  Safety Devices  Safety Devices in place: Yes  Type of devices: All fall risk precautions in place;Nurse notified;Call light within reach; Left in chair;Chair alarm in place; Patient at risk for falls  Restraints  Initially in place: No           Patient Diagnosis(es): There were no encounter diagnoses. has a past medical history of Allergic, Anesthesia complication, Anxiety, Arthritis, Arthritis of left hip, Arthritis of right hip, Asthma, At risk for falls, Atrial fibrillation with rapid ventricular response (HCC), Atrial flutter ([address] Chronic maxillary sinusitis, CTEPH (chronic thromboembolic pulmonary hypertension) ([address] Depression, Diabetes mellitus ([address] Disc disease, degenerative, lumbar or lumbosacral, Hepatic steatosis, History of total hip arthroplasty, Hypertension, Leg cramps, Migraines, basilar, Mild persistent asthma without complication, Obesity, Class III, BMI 40-49.9 (morbid obesity) ([address] HUSSAIN (obstructive sleep apnea), Osteoarthritis, hip, bilateral, Panic attacks, Pneumonia, Primary osteoarthritis of both knees, Primary osteoarthritis of left knee, Pulmonary emboli ([address] Pulmonary HTN ([address] Pure hypercholesterolemia, Restless leg syndrome, Rhinitis, chronic, Sleep apnea, Stress incontinence, Tear of left rotator cuff, Thyroid disease, and Wears glasses. has a past surgical history that includes Cataract removal (Bilateral); Finger surgery (1988); eye surgery (Right, 2010); back surgery (2004); Tonsillectomy (1972); Hysterectomy (1993); Colonoscopy; joint replacement (Left, 2/2/16); Hip Arthroplasty (Right, 4-19-16); and Atrial ablation surgery.     Treatment Diagnosis: R TKA      Restrictions  Restrictions/Precautions  Restrictions/Precautions: Weight Bearing, Fall Risk(high fall risk)  Lower Extremity Weight Bearing Restrictions  Right Lower Extremity Weight Bearing: Weight Bearing As Tolerated  Position Activity Restriction  Other position/activity restrictions: s/p: R TKA    Subjective   General  Chart Reviewed: Yes  Family / Caregiver Present: No  Diagnosis: R TKA  Subjective  Subjective: pt supine upon arrival and agreeable to OT eval, denies pain at rest. HERIBERTO present  Patient Currently in Pain: Denies  Vital Signs  Temp: 97.3 °F (36.3 °C)  Temp Source: Oral  Pulse: 84  Heart Rate Source: Monitor  Resp: 16  BP: 135/78  BP Location: Right upper arm  MAP (mmHg): 97  Patient Position: Up in chair  Level of Consciousness: Alert (0)  MEWS Score: 1  Patient Currently in Pain: Denies  Oxygen Therapy  SpO2: 99 %  Pulse Oximeter Device Mode: Intermittent  Pulse Oximeter Device Location: Finger  O2 Device: None (Room air)  O2 Flow Rate (L/min): 6 L/min  Social/Functional History  Social/Functional History  Lives With: (pt discharging to friend, [facility]'s home)  Type of Home: House  Home Layout: One level  Home Access: Level entry  Bathroom Shower/Tub: Walk-in shower  Bathroom Toilet: Handicap height  Bathroom Equipment: Built-in shower seat, Grab bars around toilet  Bathroom Accessibility: Accessible  Home Equipment: Rolling walker  ADL Assistance: Independent  Homemaking Assistance: Independent  Ambulation Assistance: Independent  Transfer Assistance: Independent  Active : Yes  IADL Comments: sewing masks, making angels  Additional Comments: pt denies falls       Objective   Vision: Impaired  Vision Exceptions: Wears glasses at all times  Hearing: Within functional limits    Orientation  Overall Orientation Status: Within Normal Limits  Observation/Palpation  Posture: Fair  Observation: HERIBERTO present on RLE  Balance  Sitting Balance: Stand by assistance  Standing Balance: Contact guard assistance  Standing Balance  Time: ~5-7 minutes total  Activity: mobility to/from bathroom, in room with RW  Functional Mobility  Functional - Mobility Device: Rolling Walker  Activity: Other  Assist Level: Contact guard assistance Toilet Transfers  Toilet - Technique: Ambulating  Equipment Used: Standard toilet  Toilet Transfer: Contact guard assistance  ADL  Feeding: Setup  Grooming: Contact guard assistance  Toileting: Stand by assistance  Additional Comments: pt walked to/from bathroom with CGA. pt able to void, urine left in hat.  CGA for hand washing at sink  Tone RUE  RUE Tone: Normotonic  Tone LUE  LUE Tone: Normotonic  Coordination  Movements Are Fluid And Coordinated: Yes     Bed mobility  Supine to Sit: Stand by assistance  Scooting: Stand by assistance  Transfers  Sit to stand: Contact guard assistance  Stand to sit: Contact guard assistance     Cognition  Overall Cognitive Status: WFL  Perception  Overall Perceptual Status: WFL     Sensation  Overall Sensation Status: WFL        LUE AROM (degrees)  LUE AROM : WFL  RUE AROM (degrees)  RUE AROM : WFL  LUE Strength  Gross LUE Strength: WFL  RUE Strength  Gross RUE Strength: WFL                   Plan   Plan  Times per week: 7x  Times per day: Daily  Current Treatment Recommendations: Strengthening, Functional Mobility Training, Self-Care / ADL, Patient/Caregiver Education & Training      AM-PAC Score        -[facility] Inpatient Daily Activity Raw Score: 18 (03/16/21 1740)  AM-PAC Inpatient ADL T-Scale Score : 38.66 (03/16/21 1740)  ADL Inpatient CMS 0-100% Score: 46.65 (03/16/21 1740)  ADL Inpatient CMS G-Code Modifier : CK (03/16/21 1740)    Goals  Short term goals  Time Frame for Short term goals: discharge  Short term goal 1: bed mobility mod I  Short term goal 2: functional mobility with RW mod I for ADL/IADL tasks  Short term goal 3: functional ADL transfer mod I  Short term goal 4: UB/LB ADLs mod I  Short term goal 5: toileting mod I       Therapy Time   Individual Concurrent Group Co-treatment   Time In 1655         Time Out 1725         Minutes 30           Timed Code Treatment Minutes:   15 minutes    Total Treatment Minutes:  30 minutes      Colton Nunes OTR/L [id number]

## 2021-03-16 NOTE — ANESTHESIA PRE PROCEDURE
Department of Anesthesiology  Preprocedure Note       Name:  Genora Severin   Age:  78 y.o.  :  1950                                          MRN:  [id number]         Date:  3/16/2021      Surgeon: Giovanna Jin):  Jessica Riley MD    Procedure: RIGHT ROBOTIC ASSISTED TOTAL KNEE ARTHROPLASTY (35790, [id number]) Sean Anton & NEPHEW ADVANCED (Right Knee)    Medications prior to admission:   Prior to Admission medications    Medication Sig Start Date End Date Taking? Authorizing Provider   enoxaparin (LOVENOX) 100 MG/ML injection Inject 1.1 mLs into the skin 2 times daily 3/4/21   Felice Gandhi MD   fluticasone [facility]) 110 MCG/ACT inhaler Inhale 1 puff into the lungs 2 times daily 21   Caio Winkler MD   azelastine (ASTELIN) 0.1 % nasal spray 1 spray by Nasal route 2 times daily 1 Spray in each nostril 21   Caio Winkler MD   azelastine (ASTELIN) 0.1 % nasal spray 2 sprays by Nasal route 2 times daily Use in each nostril as directed 21   Caio Winkler MD   buPROPion (WELLBUTRIN XL) 150 MG extended release tablet Take 1 tablet by mouth every morning 2/22/21 3/24/21  Caio Winkler MD   pramipexole (MIRAPEX) 0.5 MG tablet 1.5 tab q 5 PM and 1.5 tab qHS 21   Tiffanie R Kehrt, APRN - CNP   HYDROcodone-acetaminophen (NORCO) 5-325 MG per tablet Take 1 tablet by mouth as needed for Pain.     Historical Provider, MD   Tens Unit [address] [facility] by Does not apply route    Historical Provider, MD   potassium chloride (KLOR-CON M) 10 MEQ extended release tablet TAKE ONE TABLET BY MOUTH DAILY 21   BRICE Curry CNP   dilTIAZem (CARDIZEM) 30 MG tablet TAKE ONE TABLET BY MOUTH FOUR TIMES A DAY AS NEEDED FOR FAST HEARTBEAT GREATER THAN 100 AND PALPITATIONS  Patient taking differently: Take 30 mg by mouth daily  21   BRICE Swain CNP   lisinopril (PRINIVIL;ZESTRIL) 10 MG tablet Take 1 tablet by mouth nightly 21   BRICE Swain - CNP   furosemide (LASIX) 40 MG tablet TAKE ONE TABLET BY MOUTH DAILY 11/16/20   BRICE Nicole - CNP   Semaglutide,0.25 or 0.5MG/DOS, (OZEMPIC, 0.25 OR 0.5 MG/DOSE,) 2 MG/1.5ML SOPN Inject 0.5 mg into the skin once a week Sample 11/13/20   Merlynn Gaucher, MD   rivaroxaban Donnel Leather) 20 MG TABS tablet Take 1 tablet by mouth Daily with supper 8/17/20   Marti Moser APRN - CNP   Handicap Placard MISC by Does not apply route Exp 5 years 7/30/20   Kristi GOLDIE BenitezN - CNP   Multiple Vitamins-Minerals (THERAPEUTIC MULTIVITAMIN-MINERALS) tablet Take 1 tablet by mouth daily    Historical Provider, MD   spironolactone (ALDACTONE) 25 MG tablet Take 1 tablet by mouth daily 4/23/20   BRICE Nicole - CNP   CPAP Machine MISC by Does not apply route    Historical Provider, MD   Magnesium Citrate 100 MG TABS Take 1 tablet by mouth daily  Patient taking differently: Take 250 tablets by mouth daily  6/28/18   Neno Johnson MD   EPIPEN 2-MIR 0.3 MG/0.3ML SOAJ injection  9/29/16   Historical Provider, MD       Current medications:    No current facility-administered medications for this visit. No current outpatient medications on file. Facility-Administered Medications Ordered in Other Visits   Medication Dose Route Frequency Provider Last Rate Last Admin    0.9 % sodium chloride infusion   Intravenous Continuous Johny Ortiz MD        lidocaine PF 1 % injection 0.5 mL  0.5 mL Intradermal Once Johny Ortiz MD        celecoxib (CELEBREX) capsule 400 mg  400 mg Oral Once Johny Ortiz MD        clindamycin (CLEOCIN) 900 mg in dextrose 5 % 50 mL IVPB  900 mg Intravenous On Call to [facility], MD        ortho mix injection   Injection On Call Johny Ortiz MD        tranexamic acid (CYKLOKAPRON) irrigation 3,000 mg  3,000 mg Irrigation Once Johny Ortiz MD           Allergies:     Allergies   Allergen Reactions    Atorvastatin Other (See Comments)     Muscle Pain, all statins     Simvastatin Other (See Comments)     Muscle Pain    Cephalexin Hives and Itching    Cephalosporins Hives and Itching    Food Diarrhea     Milk , shellfish , gluten. ..may have bouts of diarrhea, constipation or flatulus. (RD spoke to pt regarding \"milk allergy\", pt is not allergic to milk. She does not drink milk, but consumes milk in other products and consumes dairy products. Had one reactions to shellfish years ago and now can tolerate one serving of shellfish once per week. Avoids gluten as much as she can, but does not have celiac disease.)    Other      Environmental -cats,dogs,dust    Shellfish-Derived Products      hives    Sulfa Antibiotics      Can take Celebrex, Pt denies 8/1/18       Problem List:    Patient Active Problem List   Diagnosis Code    Restless leg syndrome G25.81    Acquired hypothyroidism E03.9    Benign essential HTN I10    HUSSAIN (obstructive sleep apnea) G47.33    Obesity, Class III, BMI 40-49.9 (morbid obesity) (HCC) E66.01    Primary osteoarthritis of both knees M17.0    Paroxysmal atrial fibrillation (HCC) I48.0    Pure hypercholesterolemia E78.00    Hepatic steatosis K76.0    Hx of pulmonary embolus Z86.711    Chronic diastolic heart failure (HCC) I50.32    Pulmonary emboli (HCC) I26.99    Obesity (BMI 30-39. 9) E66.9    Symptomatic bradycardia R00.1    Encounter for electronic analysis of reveal event recorder Z45.09    Mild persistent asthma without complication H50.87       Past Medical History:        Diagnosis Date    Allergic     Anesthesia complication     family hx-father-at at age 80 having hip replacement revision surgery-had tia's x2 while under anes-but also had hx chf-had dificuly with speech when coming out from anes    Anxiety     Arthritis     left ankle, bilateral hands    Arthritis of left hip 2/2/2016    Arthritis of right hip 4/19/2016    Asthma     At risk for falls     uses a cane    Atrial fibrillation with rapid ventricular response (HCC)     Atrial flutter ([facility] [address] 2018    Chronic maxillary sinusitis 2017    CTEPH (chronic thromboembolic pulmonary hypertension) ([address] 2016    Depression     pt stated r/t bad marriage/alcoholic spouse    Diabetes mellitus ([address]     borderline    Disc disease, degenerative, lumbar or lumbosacral 2017    Hepatic steatosis 2019    History of total hip arthroplasty 2017    Hypertension     Leg cramps     patient states very severe, requires immediate potassium administration    Migraines, basilar     last migraine 10 years ago    Mild persistent asthma without complication 3/58/8617    Obesity, Class III, BMI 40-49.9 (morbid obesity) ([address] 2016    HUSSAIN (obstructive sleep apnea) 10/14/2013    Osteoarthritis, hip, bilateral     Bilateral hip arthroplasty    Panic attacks     Pneumonia     Primary osteoarthritis of both knees 2017    Primary osteoarthritis of left knee 12/15/2016    Pulmonary emboli ([address] 2016    Pulmonary HTN ([address] 2016    Pure hypercholesterolemia 2019    Restless leg syndrome     Rhinitis, chronic 3/26/2014    Sleep apnea     wears CPAP @11    Stress incontinence     Tear of left rotator cuff 2018    Thyroid disease     hypothyroid    Wears glasses        Past Surgical History:        Procedure Laterality Date    ATRIAL ABLATION SURGERY      BACK SURGERY      LUMBAR FUSION    CATARACT REMOVAL Bilateral     COLONOSCOPY      EYE SURGERY Right     retinal tear repaired   [facility]    right ring finger severe laceration, fracture    HIP ARTHROPLASTY Right 16    HYSTERECTOMY  1993    JOINT REPLACEMENT Left 16    left hip    TONSILLECTOMY         Social History:    Social History     Tobacco Use    Smoking status: Former Smoker     Packs/day: 0.50     Years: 5.00     Pack years: 2.50     Quit date: 10/5/2013     Years since quittin.4    Smokeless tobacco: Never Used    Tobacco comment: smoked for a total of 5yr total   Substance Use Topics    Alcohol use: Yes     Comment: RARE                                Counseling given: Not Answered  Comment: smoked for a total of 5yr total      Vital Signs (Current): There were no vitals filed for this visit. BP Readings from Last 3 Encounters:   03/01/21 122/68   02/22/21 108/60   12/28/20 112/62       NPO Status:                                                                                 BMI:   Wt Readings from Last 3 Encounters:   02/08/21 237 lb (107.5 kg)   03/01/21 232 lb (105.2 kg)   02/22/21 232 lb (105.2 kg)     There is no height or weight on file to calculate BMI.    CBC:   Lab Results   Component Value Date    WBC 6.7 03/02/2021    RBC 4.11 03/02/2021    HGB 13.2 03/02/2021    HCT 38.8 03/02/2021    MCV 94.2 03/02/2021    RDW 14.4 03/02/2021     03/02/2021       CMP:   Lab Results   Component Value Date     03/02/2021    K 4.2 03/02/2021    K 3.7 02/02/2020     03/02/2021    CO2 23 03/02/2021    BUN 18 03/02/2021    CREATININE 0.8 03/02/2021    GFRAA >60 03/02/2021    GFRAA >60 04/22/2013    AGRATIO 1.5 03/02/2021    LABGLOM >60 03/02/2021    LABGLOM 87 03/27/2012    GLUCOSE 98 03/02/2021    GLUCOSE 100 03/27/2012    PROT 6.9 03/02/2021    PROT 7.0 08/24/2012    CALCIUM 9.5 03/02/2021    BILITOT 0.3 03/02/2021    ALKPHOS 80 03/02/2021    AST 20 03/02/2021    ALT 22 03/02/2021       POC Tests: No results for input(s): POCGLU, POCNA, POCK, POCCL, POCBUN, POCHEMO, POCHCT in the last 72 hours.     Coags:   Lab Results   Component Value Date    PROTIME 16.2 03/02/2021    INR 1.39 03/02/2021    APTT 39.7 03/02/2021       HCG (If Applicable): No results found for: PREGTESTUR, PREGSERUM, HCG, HCGQUANT     ABGs: No results found for: PHART, PO2ART, MHA8ZMK, PLM7JOH, BEART, W9WCZSSS     Type & Screen (If Applicable):  No results found for: LABABO, [address]    Anesthesia Evaluation Patient summary reviewed and Nursing notes reviewed no history of anesthetic complications:   Airway: Mallampati: II  TM distance: >3 FB   Neck ROM: full  Mouth opening: > = 3 FB Dental: normal exam         Pulmonary:normal exam  breath sounds clear to auscultation  (+) sleep apnea: on CPAP,  asthma (well controlled on maintenance inhalers, no recent rescue use or exacerbations):     (-) not a current smoker (former)                           Cardiovascular:  Exercise tolerance: good (>4 METS),   (+) hypertension:, dysrhythmias (PAF, rate/rhythm controlled and ac as outpt): atrial fibrillation, CHF (echo 819, Ef 55-60, no RWMA, currently well compensated, no s/s of vol overload): diastolic, pulmonary hypertension (cor pulm hx 2/2 mult pes, RVSP back to wnl):, hyperlipidemia    (-) past MI, CAD (neg stress 2015), CABG/stent and  angina    ECG reviewed  Rhythm: regular  Rate: normal  Echocardiogram reviewed                  Neuro/Psych:   (+) headaches: migraine headaches, psychiatric history: stable with treatmentdepression/anxiety    (-) seizures, TIA and CVA            ROS comment: S/p back surgery fusion and cages GI/Hepatic/Renal:   (+) liver disease (steatosis):, morbid obesity     (-) GERD and no renal disease       Endo/Other:    (+) hypothyroidism: arthritis: OA., .    (-) diabetes mellitus               Abdominal:           Vascular:   + PE (h/o multiple, maintained on xarelto as outpt). Anesthesia Plan      general     ASA 3       Induction: intravenous. MIPS: Postoperative opioids intended and Prophylactic antiemetics administered. Anesthetic plan and risks discussed with patient. Plan discussed with CRNA.                   Jeanne Mckay MD   3/16/2021

## 2021-03-17 VITALS
BODY MASS INDEX: 41.01 KG/M2 | TEMPERATURE: 98.1 F | DIASTOLIC BLOOD PRESSURE: 63 MMHG | WEIGHT: 240.2 LBS | HEIGHT: 64 IN | RESPIRATION RATE: 16 BRPM | OXYGEN SATURATION: 96 % | SYSTOLIC BLOOD PRESSURE: 113 MMHG | HEART RATE: 77 BPM

## 2021-03-17 LAB
ANION GAP SERPL CALCULATED.3IONS-SCNC: 8 MMOL/L (ref 3–16)
BUN BLDV-MCNC: 16 MG/DL (ref 7–20)
CALCIUM SERPL-MCNC: 9 MG/DL (ref 8.3–10.6)
CHLORIDE BLD-SCNC: 104 MMOL/L (ref 99–110)
CO2: 24 MMOL/L (ref 21–32)
CREAT SERPL-MCNC: 0.8 MG/DL (ref 0.6–1.2)
GFR AFRICAN AMERICAN: >60
GFR NON-AFRICAN AMERICAN: >60
GLUCOSE BLD-MCNC: 130 MG/DL (ref 70–99)
GLUCOSE BLD-MCNC: 137 MG/DL (ref 70–99)
HCT VFR BLD CALC: 34.9 % (ref 36–48)
HEMOGLOBIN: 11.5 G/DL (ref 12–16)
MCH RBC QN AUTO: 31.1 PG (ref 26–34)
MCHC RBC AUTO-ENTMCNC: 32.9 G/DL (ref 31–36)
MCV RBC AUTO: 94.6 FL (ref 80–100)
PDW BLD-RTO: 14.5 % (ref 12.4–15.4)
PERFORMED ON: ABNORMAL
PLATELET # BLD: 244 K/UL (ref 135–450)
PMV BLD AUTO: 7.8 FL (ref 5–10.5)
POTASSIUM REFLEX MAGNESIUM: 4.8 MMOL/L (ref 3.5–5.1)
RBC # BLD: 3.69 M/UL (ref 4–5.2)
SODIUM BLD-SCNC: 136 MMOL/L (ref 136–145)
WBC # BLD: 10 K/UL (ref 4–11)

## 2021-03-17 PROCEDURE — 80048 BASIC METABOLIC PNL TOTAL CA: CPT

## 2021-03-17 PROCEDURE — 2580000003 HC RX 258: Performed by: PHYSICIAN ASSISTANT

## 2021-03-17 PROCEDURE — 6370000000 HC RX 637 (ALT 250 FOR IP): Performed by: PHYSICIAN ASSISTANT

## 2021-03-17 PROCEDURE — 97116 GAIT TRAINING THERAPY: CPT

## 2021-03-17 PROCEDURE — APPNB45 APP NON BILLABLE 31-45 MINUTES: Performed by: NURSE PRACTITIONER

## 2021-03-17 PROCEDURE — 99024 POSTOP FOLLOW-UP VISIT: CPT | Performed by: NURSE PRACTITIONER

## 2021-03-17 PROCEDURE — 85027 COMPLETE CBC AUTOMATED: CPT

## 2021-03-17 PROCEDURE — 94640 AIRWAY INHALATION TREATMENT: CPT

## 2021-03-17 PROCEDURE — 97535 SELF CARE MNGMENT TRAINING: CPT

## 2021-03-17 PROCEDURE — 6360000002 HC RX W HCPCS: Performed by: PHYSICIAN ASSISTANT

## 2021-03-17 PROCEDURE — 97530 THERAPEUTIC ACTIVITIES: CPT

## 2021-03-17 PROCEDURE — 2500000003 HC RX 250 WO HCPCS: Performed by: PHYSICIAN ASSISTANT

## 2021-03-17 PROCEDURE — 36415 COLL VENOUS BLD VENIPUNCTURE: CPT

## 2021-03-17 RX ORDER — OXYCODONE HYDROCHLORIDE AND ACETAMINOPHEN 5; 325 MG/1; MG/1
1-2 TABLET ORAL EVERY 6 HOURS PRN
Qty: 42 TABLET | Refills: 0 | Status: SHIPPED | OUTPATIENT
Start: 2021-03-17 | End: 2021-03-23 | Stop reason: SDUPTHER

## 2021-03-17 RX ADMIN — STANDARDIZED SENNA CONCENTRATE AND DOCUSATE SODIUM 1 TABLET: 8.6; 5 TABLET ORAL at 10:17

## 2021-03-17 RX ADMIN — OXYCODONE 10 MG: 5 TABLET ORAL at 03:45

## 2021-03-17 RX ADMIN — SODIUM CHLORIDE: 9 INJECTION, SOLUTION INTRAVENOUS at 10:19

## 2021-03-17 RX ADMIN — ENOXAPARIN SODIUM 110 MG: 100 INJECTION SUBCUTANEOUS at 10:13

## 2021-03-17 RX ADMIN — Medication 10 ML: at 10:22

## 2021-03-17 RX ADMIN — FLUTICASONE PROPIONATE 1 SPRAY: 50 SPRAY, METERED NASAL at 10:16

## 2021-03-17 RX ADMIN — ACETAMINOPHEN 650 MG: 325 TABLET ORAL at 06:36

## 2021-03-17 RX ADMIN — OXYCODONE 10 MG: 5 TABLET ORAL at 08:07

## 2021-03-17 RX ADMIN — AZELASTINE 2 SPRAY: 1 SPRAY, METERED NASAL at 10:13

## 2021-03-17 RX ADMIN — BUPROPION HYDROCHLORIDE 150 MG: 150 TABLET, FILM COATED, EXTENDED RELEASE ORAL at 10:16

## 2021-03-17 RX ADMIN — CLINDAMYCIN PHOSPHATE 900 MG: 900 INJECTION, SOLUTION INTRAVENOUS at 06:36

## 2021-03-17 RX ADMIN — POTASSIUM CHLORIDE 10 MEQ: 750 TABLET, FILM COATED, EXTENDED RELEASE ORAL at 10:17

## 2021-03-17 ASSESSMENT — PAIN DESCRIPTION - DESCRIPTORS
DESCRIPTORS: THROBBING

## 2021-03-17 ASSESSMENT — PAIN DESCRIPTION - ONSET
ONSET: ON-GOING

## 2021-03-17 ASSESSMENT — PAIN DESCRIPTION - LOCATION
LOCATION: KNEE

## 2021-03-17 ASSESSMENT — PAIN DESCRIPTION - ORIENTATION
ORIENTATION: RIGHT

## 2021-03-17 ASSESSMENT — PAIN DESCRIPTION - PAIN TYPE
TYPE: SURGICAL PAIN

## 2021-03-17 ASSESSMENT — PAIN DESCRIPTION - PROGRESSION
CLINICAL_PROGRESSION: NOT CHANGED
CLINICAL_PROGRESSION: GRADUALLY IMPROVING

## 2021-03-17 ASSESSMENT — PAIN SCALES - GENERAL
PAINLEVEL_OUTOF10: 0
PAINLEVEL_OUTOF10: 4
PAINLEVEL_OUTOF10: 7

## 2021-03-17 ASSESSMENT — PAIN DESCRIPTION - FREQUENCY
FREQUENCY: CONTINUOUS

## 2021-03-17 NOTE — PROGRESS NOTES
Occupational Therapy  Facility/Department: [address] ORTHO/NEURO NURSING  Daily Treatment Note  NAME: Octavio Finney  : 1950  MRN: [id number]    Date of Service: 3/17/2021    Discharge Recommendations:     OT Equipment Recommendations  Equipment Needed: Yes  Mobility Devices: ADL Assistive Devices  ADL Assistive Devices: Sock-Aid Hard;Reacher     Ocatvio Finney scored a 20/24 on the AM-PAC ADL Inpatient form. Current research shows that an AM-PAC score of 18 or greater is typically associated with a discharge to the patient's home setting. Based on the patient's AM-PAC score, and their current ADL deficits, it is recommended that the patient have 2-3 sessions per week of Occupational Therapy at d/c to increase the patient's independence. At this time, this patient demonstrates the endurance and safety to discharge home with  (home  services) and a follow up treatment frequency of 2-3x/wk. Please see assessment section for further patient specific details. If patient discharges prior to next session this note will serve as a discharge summary. Please see below for the latest assessment towards goals. Assessment   Performance deficits / Impairments: Decreased functional mobility ; Decreased ADL status; Decreased high-level IADLs  Assessment: pt not at baseline level of functioning and would benefit from ongoing skilled OT services in order to return to PLOF  Treatment Diagnosis: R TKA  History: pt lives alone, planning to discharge to friend's home. independent at baseline  OT Education: OT Role;Plan of Care;Precautions; ADL Adaptive Strategies;Transfer Training;Equipment;IADL Safety  Patient Education: eval, POC, discharge, precautions, WB status, ADL transfers-pt independent with education  REQUIRES OT FOLLOW UP: Yes  Activity Tolerance  Activity Tolerance: Patient Tolerated treatment well  Safety Devices  Safety Devices in place: Yes  Type of devices:  All fall risk precautions in place;Nurse notified;Call light within reach; Patient at risk for falls; Left in bed;Bed alarm in place         Patient Diagnosis(es): There were no encounter diagnoses. has a past medical history of Allergic, Anesthesia complication, Anxiety, Arthritis, Arthritis of left hip, Arthritis of right hip, Asthma, At risk for falls, Atrial fibrillation with rapid ventricular response (HCC), Atrial flutter ([address] Chronic maxillary sinusitis, CTEPH (chronic thromboembolic pulmonary hypertension) ([address] Depression, Diabetes mellitus ([address] Disc disease, degenerative, lumbar or lumbosacral, Hepatic steatosis, History of total hip arthroplasty, Hypertension, Leg cramps, Migraines, basilar, Mild persistent asthma without complication, Obesity, Class III, BMI 40-49.9 (morbid obesity) ([address] HUSSAIN (obstructive sleep apnea), Osteoarthritis, hip, bilateral, Panic attacks, Pneumonia, Primary osteoarthritis of both knees, Primary osteoarthritis of left knee, Pulmonary emboli ([address] Pulmonary HTN ([address] Pure hypercholesterolemia, Restless leg syndrome, Rhinitis, chronic, Sleep apnea, Stress incontinence, Tear of left rotator cuff, Thyroid disease, and Wears glasses. has a past surgical history that includes Cataract removal (Bilateral); Finger surgery (1988); eye surgery (Right, 2010); back surgery (2004); Tonsillectomy (1972); Hysterectomy (1993); Colonoscopy; joint replacement (Left, 2/2/16); Hip Arthroplasty (Right, 4-19-16); and Atrial ablation surgery. Restrictions  Restrictions/Precautions  Restrictions/Precautions: Weight Bearing, Fall Risk(high fall risk)  Lower Extremity Weight Bearing Restrictions  Right Lower Extremity Weight Bearing: Weight Bearing As Tolerated  Position Activity Restriction  Other position/activity restrictions: s/p: R TKA  Subjective   General  Chart Reviewed: Yes  Family / Caregiver Present: No  Diagnosis: R TKA  Subjective  Subjective: Pt up in chair and agreeable to OT.   Pain Assessment  Pain Assessment: 0-10  Pain Level: 7  Pain Type: Surgical pain  Pain Orientation: Right  Pre Treatment Pain Screening  Intervention List: Patient able to continue with treatment;Nurse/Physician notified;Nurse called to administer meds  Vital Signs  Patient Currently in Pain: Yes   Orientation  Orientation  Overall Orientation Status: Within Functional Limits  Objective    ADL  Equipment Provided: Reacher;Sock aid  Feeding: Independent  UE Dressing: Setup  LE Dressing: Increased time to complete;Stand by assistance(using reacher to don pants and underwear)        Balance  Sitting Balance: Independent  Standing Balance: Stand by assistance  Functional Mobility  Functional - Mobility Device: Rolling Walker  Activity: Other  Assist Level: Stand by assistance  Toilet Transfers  Toilet - Technique: Ambulating  Equipment Used: Standard toilet  Toilet Transfer: Stand by assistance     Transfers  Sit to stand: Stand by assistance  Stand to sit: Stand by assistance  Transfer Comments: cues for safety of hand placement            Cognition  Overall Cognitive Status: [facility]                  Plan   Plan  Times per week: 7x  Times per day: Daily  Current Treatment Recommendations: Strengthening, Functional Mobility Training, Self-Care / ADL, Patient/Caregiver Education & Training, Safety Education & Training, Balance Training, Endurance Training    AM-PAC Score        AM-[facility] Inpatient Daily Activity Raw Score: 20 (03/17/21 0846)  AM-PAC Inpatient ADL T-Scale Score : 42.03 (03/17/21 0846)  ADL Inpatient CMS 0-100% Score: 38.32 (03/17/21 0846)  ADL Inpatient CMS G-Code Modifier : Helen Mosley (03/17/21 0846)    Goals  Short term goals  Time Frame for Short term goals: discharge  Short term goal 1: bed mobility mod I  Short term goal 2: functional mobility with RW mod I for ADL/IADL tasks  Short term goal 3: functional ADL transfer mod I  Short term goal 4: UB/LB ADLs mod I  Short term goal 5: toileting mod I       Therapy Time   Individual Concurrent Group Co-treatment   Time In 7425         Time Out 0820         Minutes 45         Timed Code Treatment Minutes: 1 [facility] , OTR/L

## 2021-03-17 NOTE — PLAN OF CARE
Problem: Metabolic:  Goal: Ability to maintain appropriate glucose levels will improve  Description: Ability to maintain appropriate glucose levels will improve  Outcome: Ongoing     Problem: Nutritional:  Goal: Maintenance of adequate nutrition will improve  Description: Maintenance of adequate nutrition will improve  Outcome: Ongoing     Problem: Cardiac:  Goal: Cardiovascular alteration will improve  Description: Cardiovascular alteration will improve  Outcome: Ongoing     Problem: Physical Regulation:  Goal: Complications related to the disease process, condition or treatment will be avoided or minimized  Description: Complications related to the disease process, condition or treatment will be avoided or minimized  Outcome: Ongoing     Problem: Discharge Planning:  Goal: Discharged to appropriate level of care  Description: Discharged to appropriate level of care  Outcome: Ongoing     Problem: Mobility - Impaired:  Goal: Mobility will improve  Description: Mobility will improve  Outcome: Ongoing     Problem: Infection - Surgical Site:  Goal: Will show no infection signs and symptoms  Description: Will show no infection signs and symptoms  Outcome: Ongoing     Problem: Pain - Acute:  Goal: Pain level will decrease  Description: Pain level will decrease  Outcome: Ongoing     Problem: Falls - Risk of:  Goal: Will remain free from falls  Description: Will remain free from falls  Outcome: Ongoing  Goal: Absence of physical injury  Description: Absence of physical injury  Outcome: Ongoing     Problem: Musculor/Skeletal Functional Status  Goal: Highest potential functional level  Outcome: Ongoing  Goal: Absence of falls  Outcome: Ongoing

## 2021-03-17 NOTE — PROGRESS NOTES
CLINICAL PHARMACY NOTE: MEDS TO [address] Select Patient?: Yes  Total # of Prescriptions Filled: 1   The following medications were delivered to the patient:  · Percocet 5-325mg  Total # of Interventions Completed: 0  Time Spent (min): 15    Additional Documentation:    Delivered to Patient  Tiff Mckeon CPhT

## 2021-03-17 NOTE — PROGRESS NOTES
Physical Therapy  Facility/Department: [address] ORTHO/NEURO NURSING  Daily Treatment Note  NAME: Eliecer Ayala  : 1950  MRN: [id number]    Date of Service: 3/17/2021    Discharge Recommendations:    Eliecer Ayala scored a 20/24 on the AM-PAC short mobility form. Current research shows that an AM-PAC score of 18 or greater is typically associated with a discharge to the patient's home setting. Based on the patient's AM-PAC score and their current functional mobility deficits, it is recommended that the patient have 2-3 sessions per week of Physical Therapy at d/c to increase the patient's independence. At this time, this patient demonstrates the endurance and safety to discharge home with home services and a follow up treatment frequency of 2-3x/wk. Please see assessment section for further patient specific details. If patient discharges prior to next session this note will serve as a discharge summary. Please see below for the latest assessment towards goals. HOME HEALTH CARE: LEVEL 1 STANDARD    - Initial home health evaluation to occur within 24-48 hours, in patient home   - Therapy to evaluate with goal of regaining prior level of functioning   - Therapy to evaluate if patient has [address] needs for personal care  PT Equipment Recommendations  Equipment Needed: No  Other: has RW at home    Assessment   Body structures, Functions, Activity limitations: Decreased functional mobility ; Decreased ROM; Decreased strength;Decreased endurance;Decreased balance  Assessment: Pt presents with the above deficits s/p R TKA impairing her ability to perform functional mobility safely and independently. Pt with PLOF of independence and currently requires Supervision with RW for mobility at this time. Pt would benefit from acute PT services to address deficits.   Treatment Diagnosis: impaired gait, transfers, and balance  Prognosis: Good  Decision Making: Low Complexity  Clinical Presentation: stable  PT Education: Goals;PT Role;Plan of Care;Home Exercise Program;Precautions;Transfer Training;Weight-bearing Education; Family Education;Orientation;General Safety;Gait Training;Functional Mobility Training  Patient Education: d/c recommendations, HEP provided/reviewed--pt verbalizing understanding  Barriers to Learning: none  REQUIRES PT FOLLOW UP: Yes  Activity Tolerance  Activity Tolerance: Patient Tolerated treatment well     Patient Diagnosis(es): The encounter diagnosis was Primary osteoarthritis of right knee. has a past medical history of Allergic, Anesthesia complication, Anxiety, Arthritis, Arthritis of left hip, Arthritis of right hip, Asthma, At risk for falls, Atrial fibrillation with rapid ventricular response (HCC), Atrial flutter ([address] Chronic maxillary sinusitis, CTEPH (chronic thromboembolic pulmonary hypertension) ([address] Depression, Diabetes mellitus ([address] Disc disease, degenerative, lumbar or lumbosacral, Hepatic steatosis, History of total hip arthroplasty, Hypertension, Leg cramps, Migraines, basilar, Mild persistent asthma without complication, Obesity, Class III, BMI 40-49.9 (morbid obesity) ([address] HUSSAIN (obstructive sleep apnea), Osteoarthritis, hip, bilateral, Panic attacks, Pneumonia, Primary osteoarthritis of both knees, Primary osteoarthritis of left knee, Pulmonary emboli ([address] Pulmonary HTN ([address] Pure hypercholesterolemia, Restless leg syndrome, Rhinitis, chronic, Sleep apnea, Stress incontinence, Tear of left rotator cuff, Thyroid disease, and Wears glasses. has a past surgical history that includes Cataract removal (Bilateral); Finger surgery (1988); eye surgery (Right, 2010); back surgery (2004); Tonsillectomy (1972); Hysterectomy (1993); Colonoscopy; joint replacement (Left, 2/2/16); Hip Arthroplasty (Right, 4-19-16); Atrial ablation surgery; and Total knee arthroplasty (Right, 3/16/2021).     Restrictions  Restrictions/Precautions  Restrictions/Precautions: Weight Bearing, Fall Risk(high fall risk)  Lower Extremity Weight Bearing Restrictions  Right Lower Extremity Weight Bearing: Weight Bearing As Tolerated  Position Activity Restriction  Other position/activity restrictions: s/p: R TKA  Subjective   General  Chart Reviewed: Yes  Response To Previous Treatment: Not applicable  Family / Caregiver Present: Yes  Subjective  Subjective: Pt agreeable to PT reporting 6/10 pain at this time. Just recieved pain meds prior to start of tx. General Comment  Comments: Pt supine in bed upon arrival.          Orientation  Orientation  Overall Orientation Status: Within Functional Limits  Cognition      Objective   Bed mobility  Supine to Sit: Supervision  Scooting: Supervision  Transfers  Sit to Stand: Supervision  Stand to sit: Supervision  Car Transfer: Supervision  Comment: good safety awareness  Ambulation  Ambulation?: Yes  WB Status: WBAT  Ambulation 1  Surface: level tile  Device: Rolling Walker  Assistance: Stand by assistance  Quality of Gait: slightly widened Cliff, decreased nilsa, decreased R step length  Distance: 180'  Comments: Pt required slightly increased time to perform, no LOB.   Stairs/Curb  Stairs?: Yes  Stairs  # Steps : 8  Stairs Height: 6\"  Rails: Left ascending  Device: Rolling walker(4 with L HR, 4 with L HR and RW)  Assistance: Supervision  Comment: step to pattern     Balance  Posture: Good  Sitting - Static: Good  Sitting - Dynamic: Good  Standing - Static: Fair;+  Standing - Dynamic: Fair;+  Exercises  Straight Leg Raise: x10 RLE  Quad Sets: x10 RLE  Heelslides: x10 RLE  Knee Long Arc Quad: x10 RLE  Ankle Pumps: x20 RLE  Comments: educated on ther ex - pt verbalized understanding         Comment: unwrapped ACE; applied ice pack          AM-PAC Score  AM-PAC Inpatient Mobility Raw Score : 20 (03/17/21 1004)  AM-PAC Inpatient T-Scale Score : 47.67 (03/17/21 1004)  Mobility Inpatient CMS 0-100% Score: 35.83 (03/17/21 1004)  Mobility Inpatient CMS G-Code Modifier : CJ

## 2021-03-17 NOTE — PROGRESS NOTES
2030: Assessment complete, VSS, pulses intact in [place], ace wrap dressing to RLE CDI, ice machine in place, pt reports RLE still tingling, pt able to wiggle toes, pt states pain Is increasing, will give dilaudid, see MAR, discussed care plan, pt mutually agrees, pt up in the chair, alarm in place, call light and belongings in reach, will continue monitoring. 2200: pt tot he BR and return to bed, tolerated well, will give oyx IR for pain, see MAR, ice machine refilled, no other needs at this time, will continue monitoring. 0140: pt to the BR and return to bed, tingling has subsided, pt voiding large amt clear yellow urine, will stop IVF. 0340: gave oxy IR for R knee pain, see MAR.    3734: pt up to the BR, teeth brushed, pt up to the chair, tolerated well.   Argenis Cornelius

## 2021-03-17 NOTE — DISCHARGE SUMMARY
Patient ID:  James Copeland  [id number]  1950    Admit date: 3/16/2021    Discharge date: 3/17/2021 12:47 PM    Attending Physician: Delano Garza MD     Admission Diagnoses:   Primary osteoarthritis of right knee [M17.11]    Discharge Diagnoses: Active Problems:    Primary osteoarthritis of right knee  Resolved Problems:    * No resolved hospital problems.  *    Past Medical History:   Diagnosis Date    Allergic     Anesthesia complication     family hx-father-at at age 80 having hip replacement revision surgery-had tia's x2 while under anes-but also had hx chf-had dificuly with speech when coming out from anes    Anxiety     Arthritis     left ankle, bilateral hands    Arthritis of left hip 2/2/2016    Arthritis of right hip 4/19/2016    Asthma     At risk for falls     uses a cane    Atrial fibrillation with rapid ventricular response (HCC)     Atrial flutter ([address] 4/25/2018    Chronic maxillary sinusitis 5/24/2017    CTEPH (chronic thromboembolic pulmonary hypertension) ([address] 8/26/2016    Depression     pt stated r/t bad marriage/alcoholic spouse    Diabetes mellitus ([address]     borderline    Disc disease, degenerative, lumbar or lumbosacral 2/20/2017    Hepatic steatosis 4/26/2019    History of total hip arthroplasty 2/28/2017    Hypertension     Leg cramps     patient states very severe, requires immediate potassium administration    Migraines, basilar     last migraine 10 years ago    Mild persistent asthma without complication 2/36/9393    Obesity, Class III, BMI 40-49.9 (morbid obesity) ([address] 4/19/2016    HUSSAIN (obstructive sleep apnea) 10/14/2013    Osteoarthritis, hip, bilateral 2016    Bilateral hip arthroplasty    Panic attacks     Pneumonia 2015    Primary osteoarthritis of both knees 9/19/2017    Primary osteoarthritis of left knee 12/15/2016    Pulmonary emboli ([address] 8/4/2016    Pulmonary HTN ([address] 7/21/2016    Pure hypercholesterolemia 2/13/2019    Restless leg syndrome     Rhinitis, chronic 3/26/2014    Sleep apnea     wears CPAP @11    Stress incontinence     Tear of left rotator cuff 1/23/2018    Thyroid disease     hypothyroid    Wears glasses        Indication for Admission: Emily Cook is a 78 y.o. female who presented with right knee osteoarthritis that was not responsive to conservative measures. she carries a history of HUSSAIN on CPAP, A. fib, history of PE on home Xarelto, hepatic cytosis, HTN, HLD, thyroid disease, anxiety/depression, and borderline DM2. Operations/Procedures Performed:   1. right total knee arthroplasty    Hospital Course: Patient admitted on 3/16/2021 and underwent abovementioned procedure(s) on 3/16/21. Tolerated the procedure well with no complications. Please see full operative report for further details regarding the operation. Postoperatively transferred to the floor in stable condition. Pain controlled post-op with IV/oral pain medication. Diet was advanced and tolerated this well. At time of discharge, the patient was tolerating oral food and hydration, voiding spontaneously, had return of bowel function, was ambulating without difficulty, and pain was controlled on oral medications. The patient was determined to be suitable for discharge and the patient felt comfortable with that decision. Consults:  Physical and Occupational Therapy, Social Work    Significant Diagnostic Studies:   Stable postop films. Discharge Exam:  /63   Pulse 77   Temp 98.1 °F (36.7 °C) (Oral)   Resp 16   Ht 5' 4\" (1.626 m)   Wt 240 lb 3.2 oz (109 kg)   SpO2 96%   BMI 41.23 kg/m²     Gen - NAD, AOx3   - voiding spontaneously  Ext -range of motion 0-85.   Operative leg NVI  Discharge condition:  Stable    Discharge Medications:  ALL MEDICATIONS HAVE BEEN REVIEWED:  Discharge Medication List as of 3/17/2021 10:29 AM      START taking these medications    Details   oxyCODONE-acetaminophen (PERCOCET) 5-325 MG per tablet Take 1-2 tablets by mouth every 6 hours as needed for Pain for up to 7 days.  Take lowest dose possible to manage pain, Disp-42 tablet, R-0Normal         CONTINUE these medications which have NOT CHANGED    Details   fluticasone (FLONASE) 50 MCG/ACT nasal spray 1 spray by Each Nostril route dailyHistorical Med      cycloSPORINE (RESTASIS) 0.05 % ophthalmic emulsion Place 1 drop into both eyes 2 times dailyHistorical Med      fluticasone (FLOVENT HFA) 110 MCG/ACT inhaler Inhale 1 puff into the lungs 2 times daily, Disp-1 Inhaler, R-2Normal      !! azelastine (ASTELIN) 0.1 % nasal spray 2 sprays by Nasal route 2 times daily Use in each nostril as directed, Disp-4 Bottle, R-1Normal      buPROPion (WELLBUTRIN XL) 150 MG extended release tablet Take 1 tablet by mouth every morning, Disp-30 tablet, R-5Normal      pramipexole (MIRAPEX) 0.5 MG tablet 1.5 tab q 5 PM and 1.5 tab qHS, Disp-180 tablet, R-0Normal      Tens Unit [facility] Historical Med      potassium chloride (KLOR-CON M) 10 MEQ extended release tablet TAKE ONE TABLET BY MOUTH DAILY, Disp-90 tablet, R-0Normal      dilTIAZem (CARDIZEM) 30 MG tablet TAKE ONE TABLET BY MOUTH FOUR TIMES A DAY AS NEEDED FOR FAST HEARTBEAT GREATER THAN 100 AND PALPITATIONS, Disp-120 tablet, R-5Normal      lisinopril (PRINIVIL;ZESTRIL) 10 MG tablet Take 1 tablet by mouth nightly, Disp-90 tablet, R-3Normal      furosemide (LASIX) 40 MG tablet TAKE ONE TABLET BY MOUTH DAILY, Disp-90 tablet,R-1Normal      Handicap Placard [facility] Starting Thu 7/30/2020, Disp-1 each,R-0, PrintExp 5 years      spironolactone (ALDACTONE) 25 MG tablet Take 1 tablet by mouth daily, Disp-90 tablet, R-3Normal      CPAP Machine [facility]ISC Historical Med      !! azelastine (ASTELIN) 0.1 % nasal spray 1 spray by Nasal route 2 times daily 1 Spray in each nostril, Disp-1 Bottle, R-2Normal      Semaglutide,0.25 or 0.5MG/DOS, (OZEMPIC, 0.25 OR 0.5 MG/DOSE,) 2 MG/1.5ML SOPN Inject 0.5 mg into the skin once a week Sample, Disp-1 pen,R-0NO PRINT rivaroxaban (XARELTO) 20 MG TABS tablet Take 1 tablet by mouth Daily with supper, Disp-90 tablet,R-3Normal      Multiple Vitamins-Minerals (THERAPEUTIC MULTIVITAMIN-MINERALS) tablet Take 1 tablet by mouth dailyHistorical Med      Magnesium Citrate 100 MG TABS Take 1 tablet by mouth daily, Disp-90 tablet, R-3NO PRINT      EPIPEN 2-MIR 0.3 MG/0.3ML SOAJ injection MANDI       !! - Potential duplicate medications found. Please discuss with provider. STOP taking these medications       enoxaparin (LOVENOX) 100 MG/ML injection Comments:   Reason for Stopping:         HYDROcodone-acetaminophen ([employer]) 5-325 MG per tablet Comments:   Reason for Stopping:             Due to patient's orthopaedic surgical procedure/condition, patient may require pain medication for up to 6-8 weeks. Disposition: home with home PT    Patient Instructions: Activity: Ambulate with assistive device every hour while awake. Work on home exercises. Wound Care: Keep jessi in place x1 week then remove and leave Prineo in place until follow-up office visit.   Anticoagulation: Lovenox this morning and resume Xarelto at 5 PM today    Follow-Up:  Future Appointments   Date Time Provider Abhishek Uribe   3/29/2021 10:15 AM MD KANU Carroll IM MMA   3/29/2021 11:00 AM Aisha Clement PA-C FF Ortho [facility]   4/5/2021 11:30 AM SCHEDULE, [place] DEVICE CHECK FF Cardio MMA   4/5/2021 11:30 AM BRICE Dominguez CNP FF Cardio MMA   5/3/2021 12:00 PM BRICE Goodman CNP FF SLEEP MED [facility]   6/7/2021  1:30 PM MD KANU Carroll IM MMA   7/8/2021  2:30 PM MD ANABELLE Love Cardio MMA       HEATH Velázquez  3/17/2021  12:51 PM    CC: PCP

## 2021-03-17 NOTE — CARE COORDINATION
[place]/Miguel Angel received a referral to this patient for a rolling walker. Rolling walker has been delivered to the patients room. Thank you for the referral.  Electronically signed by Jan Mojica on 3/17/2021 at 11:00 AM  Cell ph# [phone]    NOTE: After 5:00 pm, Weekends, Holidays: Call [employer] at [phone] to coordinate delivery of home medical equipment.

## 2021-03-17 NOTE — PROGRESS NOTES
[address] Received home care referral.Aware of discharge with home care. Home care orders sent to [facility]. Discharge planner notified.

## 2021-03-17 NOTE — PROGRESS NOTES
Shift assessment and AM vitals complete, VSS. AM meds given. Pt doing well, neuro check WNL. BP on lower side re-started IV fluids, will re-check. Dressing site on R knee if CDI. The care plan and education has been reviewed and mutually agreed upon with the patient. Patient remains free from falls or accidental injury. Room free from clutter. All fall precautions in place. Bed in lowest position with wheels locked and alarm on, call light and belongings within reach, and door open.

## 2021-03-17 NOTE — PROGRESS NOTES
Incentive Spirometry education and demonstration completed by Respiratory Therapy Yes      Response to education: Excellent     Teaching Time: 10 minutes    Minimum Predicted Vital Capacity - 547 mL. Patient's Actual Vital Capacity - 400 mL.  Turning over to Nursing for routine follow-up No.    Comments: none    Electronically signed by Silas Doty RCP on 3/17/2021 at 12:16 AM

## 2021-03-17 NOTE — CARE COORDINATION
Discharge Planning Assessment  Rn/SW discharge planner met w/patient/family member to discuss reason for admission, current living situation, and potential needs at the time of discharge. Demographics/Insurance verified Yes    Current type of dwellin level home-level entry    Living arrangements: home alone    Level of function/support:independent w/all ADL's    PCP: Cassandra Taveras    Last Visit to PCP: w/in the month    DME: rolling walker    Active with any community resources/agencies/skilled home care: stated no services in the home    Medication compliance issues: stated no issues    Financial issues that could impact healthcare: stated no issues    Transportation at time of d/c (Discussed w/pt/family that on the day of discharge home, tentative time of discharge will be between 10am and noon): friend    Discussed and provided facilities of choice if transition to a skilled nursing facility is required a the time of discharge-N/A. Tentative discharge plan: met w/pt today prior to dc-pt stated she is agreeable to home care-no preference on agency. Referral made to [facility]-accepted. Pt has no further needs per CM.     Electronically signed by WASHINGTON Land  [phone]

## 2021-03-17 NOTE — PROGRESS NOTES
[facility] Orthopedic Surgery   Progress Note    CHIEF COMPLAINT/DIAGNOSIS: S/p RIGHT Total Knee Arthroplasty (3/16/21)    SUBJECTIVE: The patient is sitting up in bed about to work with therapy. She feels pain is well controlled. Denies numbness or tingling. Feels ready to d/c home this afternoon. OBJECTIVE  Physical    VITALS:  BP (!) 93/52   Pulse 72   Temp 97.5 °F (36.4 °C) (Oral)   Resp 16   Ht 5' 4\" (1.626 m)   Wt 240 lb 3.2 oz (109 kg)   SpO2 96%   BMI 41.23 kg/m²     GENERAL: Alert and oriented x3, in no acute distress. MUSCULOSKELETAL: Able to dorsi and plantarflex the ankle without issue. INCISION:  EHRIBERTO is working well, c/d/i.  ROM: right knee ROM 0-85  Sensory:  Intact to light touch in peroneal and tibial distributions. Vascular:   2+ DP pulses with brisk cap refill;  calf soft and nontender    Data    ALL MEDICATIONS HAVE BEEN REVIEWED    CBC:   Recent Labs     03/17/21  0618   WBC 10.0   HGB 11.5*   HCT 34.9*        BMP:   Recent Labs     03/17/21  0618      K 4.8      CO2 24   BUN 16   CREATININE 0.8     INR:   Recent Labs     03/16/21  1125   INR 0.98       ASSESSMENT:  S/p RIGHT Total Knee Arthroplasty (3/16/21), POD#1  HUSSAIN on CPAP  A-fib/HX PE on home   Chronic diastolic heart failure  Asthma  Depression  Obesity  Acute blood loss anemia as expected    PLAN:   - WB status:  WBAT; reviewed post op precautions  - DVT prophylaxis: Lovenox this AM; Resume Xarelto this evening 5pm.   - PT/OT  - Acute blood loss anemia as expected: 11.5/34.9  - Pain Control: Current regimen. Due to orthopaedic surgical procedure/condition, patient may require pain medication for up to 6-8 weeks. - Dispo: home with home PT today.     Follow-up with Abe Morgan as scheduled on 3/29.  [phone]  Future Appointments   Date Time Provider Abhishek Uribe   3/29/2021 10:15 AM MD KANU Carroll IM [facility]   3/29/2021 11:00 AM Aisha Clement PA-C FF Ortho [facility]   4/5/2021 11:30 AM Berry Trent, BRICE - CNP FF Cardio [facility]   5/3/2021 12:00 PM Rena Carrasco APRN - CNP FF SLEEP MED [facility]   6/7/2021  1:30 PM MD KANU Cool [facility]   7/8/2021  2:30 PM Abdiaziz Hernadez MD FF Cardio [facility]       BRICE Davila-CNP  3/17/2021  8:14 AM

## 2021-03-18 ENCOUNTER — TELEPHONE (OUTPATIENT)
Dept: ORTHOPEDIC SURGERY | Age: 71
End: 2021-03-18

## 2021-03-18 ENCOUNTER — TELEPHONE (OUTPATIENT)
Dept: INTERNAL MEDICINE CLINIC | Age: 71
End: 2021-03-18

## 2021-03-18 RX ORDER — BACLOFEN 10 MG/1
10 TABLET ORAL 2 TIMES DAILY
Qty: 30 TABLET | Refills: 0 | Status: SHIPPED | OUTPATIENT
Start: 2021-03-18 | End: 2021-04-05 | Stop reason: SDUPTHER

## 2021-03-18 NOTE — TELEPHONE ENCOUNTER
Spoke with Patient regarding post discharge from hospital. Feeling terrible    Incision status: no drainage, odor, or redness noted per patient    Edema/Swelling:  :mild    Wearing Teds: yes    Pain level and status: is very uncomfortable, states is taking 2 percocet every 6 hours with little relief,  Reporting muscle spasms. Use of pain medications: percocet ; Patient stated they are taking their pain medication as prescribed. Use of ice therapy: Yes     Blood thinner: Hildegard Avers ; Verified with patient that they are taking their anticoagulant as prescribed 1 a day. Bowels: No,     Home Care Agency active: yes; Claims [facility] is coming at 4pm,  Believes dressing is on too tight. Has extra dressing, will have pt change. Do you have all of your medications: Yes    Changes in medications: no    Using Incentive Spirometer?: Yes    Ortho Vitals: N/A      No other questions/concerns at this time. Follow up appointment confirmed. Encouraged patient to call Orthopedic Nurse Chato Andrews (#[phone]) or Orthopedic office if has any questions/concerns.        Follow up appointments:    Future Appointments   Date Time Provider Abhishek Uribe   3/29/2021 10:15 AM MD KANU Cool IM [facility]   3/29/2021 11:00 AM Colton Nunes PA-C FF Ortho [facility]   4/5/2021 11:30 AM SCHEDULE, [place] DEVICE CHECK FF Cardio MMA   4/5/2021 11:30 AM BRICE Dover CNP FF Cardio [facility]   5/3/2021 12:00 PM BRICE Marshall CNP FF SLEEP MED [facility]   6/7/2021  1:30 PM MD KANU Cool IM [facility]   7/8/2021  2:30 PM Abdiaziz Hernadez MD FF Cardio [facility]

## 2021-03-19 ENCOUNTER — TELEPHONE (OUTPATIENT)
Dept: ORTHOPEDIC SURGERY | Age: 71
End: 2021-03-19

## 2021-03-19 ENCOUNTER — TELEPHONE (OUTPATIENT)
Dept: INPATIENT UNIT | Age: 71
End: 2021-03-19

## 2021-03-19 NOTE — TELEPHONE ENCOUNTER
Prescription Refill     Medication Name:  [id number]: [address] [phone]  Patient Contact Number:  [phone]

## 2021-03-19 NOTE — TELEPHONE ENCOUNTER
Spoke to patient and per Dr Pilar Anderson we will send refill to pharmacy for due date of 3/24/ so she does not have to call back for it. Also, per Dr Pilar Anderson, she will hold off taking her lisinopril this weekend due to low BP of 84/53.

## 2021-03-19 NOTE — TELEPHONE ENCOUNTER
Nurse Navigator called patient for follow up call    Patient having a lot of pain yesterday. Filled prescription for Baclofen, states pain is much better, rates 4/10 instead of 9/10 it was yesterday. States she slept good last night and \"feels a million times better\". Also reports [facility] has been really helpful and she has a PT visit this afternoon. Pt has no questions or concerns. Instructed pt to call RN or Orthopaedic office with any issues or concerns.     Marcelino Ciprodriguez  Orthopedic Nurse Navigator  Phone number: [phone]    Follow up appointments:    Future Appointments   Date Time Provider Abhishek Uribe   3/29/2021 10:15 AM MD KANU Bhatti IM [facility]   3/29/2021 11:00 AM Dore Barthel, PA-C FF Ortho [facility]   4/5/2021 11:30 AM Rian [place] DEVICE CHECK FF Cardio [facility]   4/5/2021 11:30 AM BRICE Rasmussen CNP FF Cardio [facility]   5/3/2021 12:00 PM BRICE Patrick CNP FF SLEEP MED [facility]   6/7/2021  1:30 PM MD KANU Bhatti IM [facility]   7/8/2021  2:30 PM Caty Medellin MD FF Cardio MMA

## 2021-03-22 NOTE — TELEPHONE ENCOUNTER
Glad she let us know. But due to the possibility of post-op complications and the proximity of her surgery, she needs to call Dr. Wilner Voss.

## 2021-03-23 ENCOUNTER — TELEPHONE (OUTPATIENT)
Dept: ORTHOPEDIC SURGERY | Age: 71
End: 2021-03-23

## 2021-03-23 DIAGNOSIS — M17.11 PRIMARY OSTEOARTHRITIS OF RIGHT KNEE: ICD-10-CM

## 2021-03-23 RX ORDER — OXYCODONE HYDROCHLORIDE AND ACETAMINOPHEN 5; 325 MG/1; MG/1
1-2 TABLET ORAL EVERY 6 HOURS PRN
Qty: 42 TABLET | Refills: 0 | Status: SHIPPED | OUTPATIENT
Start: 2021-03-23 | End: 2021-03-29 | Stop reason: SDUPTHER

## 2021-03-23 NOTE — TELEPHONE ENCOUNTER
Prescription Refill     Medication Name:  [address]: [address]  Patient Contact Number:  [phone]

## 2021-03-23 NOTE — TELEPHONE ENCOUNTER
Called and informed pt of refill and to pay attention to the directions as they may change the further out she is from surgery. She thanked me and stated that she doesn't want the medication long term but needs it currently for the surgery. I informed her I understood, just giving a reminder.

## 2021-03-26 ENCOUNTER — TELEPHONE (OUTPATIENT)
Dept: ORTHOPEDIC SURGERY | Age: 71
End: 2021-03-26

## 2021-03-26 DIAGNOSIS — M17.11 PRIMARY OSTEOARTHRITIS OF RIGHT KNEE: Primary | ICD-10-CM

## 2021-03-26 NOTE — TELEPHONE ENCOUNTER
Called pt to see where she wants to go for her outpatient PT. No answer and no VM set up. PLEASE get name of therapy location she'd like to go to if she calls back.

## 2021-03-26 NOTE — TELEPHONE ENCOUNTER
General Question     Subject: PATIENT UPDATE  Patient and /or Facility Request:Brian Alexander   Contact Number: [phone]    [facility] CALLING TO UPDATE PATIENT. DO NOT NEED A CALL BACK, PATIENT HAS REMOVED DRESSING. INCISION HAS NO LEAKAGE, INCISION IS CLEAN AND DRY, INCISION LOOKS GOOD. PATIENT WOULD LIKE TO GET THE REFERRAL ORDER SUBMITTED TO START HER OUT PATIENT SURGERY.   PLEASE CALLBACK PATIENT REGARDING HER OUT PATIENT PT AT THE ABOVE NUMBER

## 2021-03-28 ENCOUNTER — APPOINTMENT (OUTPATIENT)
Dept: CT IMAGING | Age: 71
End: 2021-03-28
Payer: MEDICARE

## 2021-03-28 ENCOUNTER — TELEPHONE (OUTPATIENT)
Dept: ORTHOPEDIC SURGERY | Age: 71
End: 2021-03-28

## 2021-03-28 ENCOUNTER — HOSPITAL ENCOUNTER (EMERGENCY)
Age: 71
Discharge: HOME | End: 2021-03-28
Attending: EMERGENCY MEDICINE
Payer: MEDICARE

## 2021-03-28 VITALS
WEIGHT: 240 LBS | DIASTOLIC BLOOD PRESSURE: 46 MMHG | RESPIRATION RATE: 22 BRPM | TEMPERATURE: 97.3 F | HEART RATE: 72 BPM | OXYGEN SATURATION: 98 % | BODY MASS INDEX: 40.97 KG/M2 | HEIGHT: 64 IN | SYSTOLIC BLOOD PRESSURE: 119 MMHG

## 2021-03-28 DIAGNOSIS — R06.02 SHORTNESS OF BREATH: Primary | ICD-10-CM

## 2021-03-28 LAB
A/G RATIO: 1.4 (ref 1.1–2.2)
ALBUMIN SERPL-MCNC: 3.9 G/DL (ref 3.4–5)
ALP BLD-CCNC: 81 U/L (ref 40–129)
ALT SERPL-CCNC: 17 U/L (ref 10–40)
ANION GAP SERPL CALCULATED.3IONS-SCNC: 11 MMOL/L (ref 3–16)
APTT: 27.6 SEC (ref 24.2–36.2)
AST SERPL-CCNC: 15 U/L (ref 15–37)
BASOPHILS ABSOLUTE: 0 K/UL (ref 0–0.2)
BASOPHILS RELATIVE PERCENT: 0.5 %
BILIRUB SERPL-MCNC: 0.4 MG/DL (ref 0–1)
BUN BLDV-MCNC: 13 MG/DL (ref 7–20)
CALCIUM SERPL-MCNC: 9.1 MG/DL (ref 8.3–10.6)
CHLORIDE BLD-SCNC: 104 MMOL/L (ref 99–110)
CO2: 21 MMOL/L (ref 21–32)
CREAT SERPL-MCNC: 0.6 MG/DL (ref 0.6–1.2)
EOSINOPHILS ABSOLUTE: 0.2 K/UL (ref 0–0.6)
EOSINOPHILS RELATIVE PERCENT: 2.5 %
GFR AFRICAN AMERICAN: >60
GFR NON-AFRICAN AMERICAN: >60
GLOBULIN: 2.7 G/DL
GLUCOSE BLD-MCNC: 103 MG/DL (ref 70–99)
HCT VFR BLD CALC: 30.6 % (ref 36–48)
HEMOGLOBIN: 9.9 G/DL (ref 12–16)
INR BLD: 1.2 (ref 0.86–1.14)
LYMPHOCYTES ABSOLUTE: 0.9 K/UL (ref 1–5.1)
LYMPHOCYTES RELATIVE PERCENT: 11.7 %
MCH RBC QN AUTO: 30.6 PG (ref 26–34)
MCHC RBC AUTO-ENTMCNC: 32.4 G/DL (ref 31–36)
MCV RBC AUTO: 94.3 FL (ref 80–100)
MONOCYTES ABSOLUTE: 0.6 K/UL (ref 0–1.3)
MONOCYTES RELATIVE PERCENT: 8 %
NEUTROPHILS ABSOLUTE: 6 K/UL (ref 1.7–7.7)
NEUTROPHILS RELATIVE PERCENT: 77.3 %
PDW BLD-RTO: 14.6 % (ref 12.4–15.4)
PLATELET # BLD: 453 K/UL (ref 135–450)
PMV BLD AUTO: 7.8 FL (ref 5–10.5)
POTASSIUM REFLEX MAGNESIUM: 3.9 MMOL/L (ref 3.5–5.1)
PRO-BNP: 290 PG/ML (ref 0–124)
PROTHROMBIN TIME: 13.9 SEC (ref 10–13.2)
RBC # BLD: 3.25 M/UL (ref 4–5.2)
SODIUM BLD-SCNC: 136 MMOL/L (ref 136–145)
TOTAL PROTEIN: 6.6 G/DL (ref 6.4–8.2)
TROPONIN: <0.01 NG/ML
WBC # BLD: 7.7 K/UL (ref 4–11)

## 2021-03-28 PROCEDURE — 84484 ASSAY OF TROPONIN QUANT: CPT

## 2021-03-28 PROCEDURE — 6360000004 HC RX CONTRAST MEDICATION: Performed by: EMERGENCY MEDICINE

## 2021-03-28 PROCEDURE — 85730 THROMBOPLASTIN TIME PARTIAL: CPT

## 2021-03-28 PROCEDURE — 93005 ELECTROCARDIOGRAM TRACING: CPT | Performed by: EMERGENCY MEDICINE

## 2021-03-28 PROCEDURE — 71260 CT THORAX DX C+: CPT

## 2021-03-28 PROCEDURE — 80053 COMPREHEN METABOLIC PANEL: CPT

## 2021-03-28 PROCEDURE — 85610 PROTHROMBIN TIME: CPT

## 2021-03-28 PROCEDURE — 99284 EMERGENCY DEPT VISIT MOD MDM: CPT

## 2021-03-28 PROCEDURE — 85025 COMPLETE CBC W/AUTO DIFF WBC: CPT

## 2021-03-28 PROCEDURE — 36415 COLL VENOUS BLD VENIPUNCTURE: CPT

## 2021-03-28 PROCEDURE — 83880 ASSAY OF NATRIURETIC PEPTIDE: CPT

## 2021-03-28 RX ORDER — ALBUTEROL SULFATE 90 UG/1
AEROSOL, METERED RESPIRATORY (INHALATION)
Qty: 1 INHALER | Refills: 0 | Status: SHIPPED | OUTPATIENT
Start: 2021-03-28 | End: 2022-02-14

## 2021-03-28 RX ADMIN — IOPAMIDOL 75 ML: 755 INJECTION, SOLUTION INTRAVENOUS at 13:26

## 2021-03-28 ASSESSMENT — PAIN DESCRIPTION - LOCATION: LOCATION: KNEE

## 2021-03-28 ASSESSMENT — PAIN DESCRIPTION - DESCRIPTORS: DESCRIPTORS: ACHING

## 2021-03-28 ASSESSMENT — ENCOUNTER SYMPTOMS
CONSTIPATION: 0
CHEST TIGHTNESS: 1
STRIDOR: 0
ABDOMINAL PAIN: 0
COLOR CHANGE: 0
VOMITING: 0
NAUSEA: 0
WHEEZING: 0
COUGH: 0
SHORTNESS OF BREATH: 1
BACK PAIN: 0
DIARRHEA: 0

## 2021-03-28 NOTE — TELEPHONE ENCOUNTER
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

## 2021-03-28 NOTE — ED NOTES
Discharge instructions received & reviewed. One prescription provided. Assisted with dressing & to the wc without incident. VSS & alert at this time.        Jose Guadalupe Benitez RN  03/28/21 1135

## 2021-03-28 NOTE — ED NOTES
Arrived by [facility] rt SOB that began 2 days ago. Recent total knee on 16th; hx of blood clots & Ortho wanted pt to be seen. Karina Wong. Sat 100% on RA with no distress at this time. Monitors in place.      Thuy Dalton RN  03/28/21 3196

## 2021-03-28 NOTE — ED PROVIDER NOTES
[facility]        Pt Name: Brittany Gusman  MRN: [id number]  [place] 1950  Date of evaluation: 3/28/2021  Provider: EVERETTE Mueller  PCP: Arcelia Ibanez MD     I have seen and evaluated this patient with my supervising physician Enid Melgar, *. CHIEF COMPLAINT       Chief Complaint   Patient presents with    Shortness of Breath     Arrived by  EMS rt SOB that began 2 days ago. Recent total knee on 16th; hx of blood clots & Ortho wanted pt to be seen. Pratik Sepulveda. Sat 100% on RA with no distress at this time. HISTORY OF PRESENT ILLNESS   (Location, Timing/Onset, Context/Setting, Quality, Duration, Modifying Factors, Severity, Associated Signs and Symptoms)  Note limiting factors. Brittany Gusman is a 78 y.o. female with past medical history of atrial flutter, pulmonary hypertension, hypertension, obesity, obstructive sleep apnea, hyperlipidemia, diabetes, previous PE anticoagulated on Xarelto who presents to the ED by EMS with complaint of shortness of breath. Patient states increased shortness of breath that began 2 days ago. Patient states shortness of breath worsened with exertion. States also worse when she lies flat. Patient is when she lies flat she has pressure to her chest diffusely that she rates as a 4/10. Denies any other chest pain. Denies pleuritic pain, orthopnea, pedal edema or calf tenderness. Denies any cough, hemoptysis, headache, lightheadedness/dizziness or becoming diaphoretic. Denies any near-syncope or syncopal episodes. Denies fever chills. Denies rashes or lesions. Denies abdominal pain, nausea/vomiting, urine symptoms or changes in bowel movements. Patient states she did have total knee replacement on the right done by orthopedic surgeon, Dr. Kun Perdomo, on 3/16/2021. Patient states she called the orthopedic office and they wanted her evaluated for potential PE.   They Disp-90 tablet,R-3Normal      Handicap Placard MISC Starting Thu 2020, Disp-1 each,R-0, PrintExp 5 years      Multiple Vitamins-Minerals (THERAPEUTIC MULTIVITAMIN-MINERALS) tablet Take 1 tablet by mouth dailyHistorical Med      spironolactone (ALDACTONE) 25 MG tablet Take 1 tablet by mouth daily, Disp-90 tablet, R-3Normal      CPAP Machine MISC Historical Med      Magnesium Citrate 100 MG TABS Take 1 tablet by mouth daily, Disp-90 tablet, R-3NO PRINT      EPIPEN 2-MIR 0.3 MG/0.3ML SOAJ injection MANDI       !! - Potential duplicate medications found. Please discuss with provider. ALLERGIES     Atorvastatin, Simvastatin, Cephalexin, Cephalosporins, Food, Other, Shellfish-derived products, and Sulfa antibiotics    FAMILYHISTORY       Family History   Problem Relation Age of Onset    Alcohol Abuse Sister     Arthritis Sister     Stroke Maternal Grandmother     Thyroid Disease Maternal Grandmother     Thyroid Disease Maternal Grandfather     Heart Disease Maternal Grandfather     Alcohol Abuse Maternal Grandfather     Substance Abuse Maternal Grandfather     Hypertension Mother     Thyroid Disease Mother     Anxiety Disorder Mother     Arthritis Mother     Cataracts Mother     Heart Disease Mother     Asthma Mother     Thyroid Disease Father     Heart Failure Father     Heart Disease Father     Arthritis Brother     High Cholesterol Brother     Heart Disease Maternal Uncle     Heart Disease Paternal Uncle     Cancer Paternal Uncle     Alcohol Abuse Paternal Grandfather     Substance Abuse Paternal Grandfather           SOCIAL HISTORY       Social History     Tobacco Use    Smoking status: Former Smoker     Packs/day: 0.50     Years: 5.00     Pack years: 2.50     Quit date: 10/5/2013     Years since quittin.4    Smokeless tobacco: Never Used    Tobacco comment: smoked for a total of 5yr total   Substance Use Topics    Alcohol use: Yes     Comment: RARE    Drug use:  No SCREENINGS             PHYSICAL EXAM    (up to 7 for level 4, 8 or more for level 5)     ED Triage Vitals [03/28/21 1115]   BP Temp Temp Source Pulse Resp SpO2 Height Weight   (!) 145/57 97.3 °F (36.3 °C) Oral 71 18 100 % 5' 4\" (1.626 m) 240 lb (108.9 kg)       Physical Exam  Constitutional:       General: She is not in acute distress. Appearance: Normal appearance. She is well-developed. She is not ill-appearing, toxic-appearing or diaphoretic. HENT:      Head: Normocephalic and atraumatic. Right Ear: External ear normal.      Left Ear: External ear normal.   Eyes:      General:         Right eye: No discharge. Left eye: No discharge. Neck:      Musculoskeletal: Normal range of motion and neck supple. Cardiovascular:      Rate and Rhythm: Normal rate and regular rhythm. Pulses: Normal pulses. Heart sounds: Normal heart sounds. No murmur. No friction rub. No gallop. Comments: 2+ radial pulses bilaterally. No pedal edema. No calf tenderness. No JVD. Pulmonary:      Effort: Pulmonary effort is normal. No respiratory distress. Breath sounds: No stridor. Rhonchi present. No wheezing or rales. Comments: Rhonchorous lung sounds throughout. No conversational dyspnea noted. No accessory muscle use is noted. No supplemental oxygen requirement. Chest:      Chest wall: No tenderness. Abdominal:      General: Abdomen is flat. Bowel sounds are normal. There is no distension. Palpations: Abdomen is soft. There is no mass. Tenderness: There is no abdominal tenderness. There is no right CVA tenderness, left CVA tenderness, guarding or rebound. Hernia: No hernia is present. Musculoskeletal: Normal range of motion. Skin:     General: Skin is warm and dry. Coloration: Skin is not pale. Findings: No erythema or rash. Neurological:      Mental Status: She is alert and oriented to person, place, and time.    Psychiatric:         Behavior: Behavior normal.         DIAGNOSTIC RESULTS   LABS:    Labs Reviewed   CBC WITH AUTO DIFFERENTIAL - Abnormal; Notable for the following components:       Result Value    RBC 3.25 (*)     Hemoglobin 9.9 (*)     Hematocrit 30.6 (*)     Platelets 204 (*)     Lymphocytes Absolute 0.9 (*)     All other components within normal limits    Narrative:     Performed at:  [facility] [address]   Phone [phone]   COMPREHENSIVE METABOLIC PANEL W/ REFLEX TO MG FOR LOW K - Abnormal; Notable for the following components:    Glucose 103 (*)     All other components within normal limits    Narrative:     Performed at:  [facility] [address]   Phone [phone]   BRAIN NATRIURETIC PEPTIDE - Abnormal; Notable for the following components:    Pro- (*)     All other components within normal limits    Narrative:     Performed at:  [facility] [address]   Phone [phone]   PROTIME-INR - Abnormal; Notable for the following components:    Protime 13.9 (*)     INR 1.20 (*)     All other components within normal limits    Narrative:     Performed at:  [facility] [address]   Phone [phone]   TROPONIN    Narrative:     Performed at:  [facility] [address]   Phone [phone]   APTT    Narrative:     Performed at:  [facility] [address]   Phone [phone]       All other labs were within normal range or not returned as of this dictation. EKG: All EKG's are interpreted by the Emergency Department Physician in the absence of a cardiologist.  Please see their note for interpretation of EKG.       RADIOLOGY:   Non-plain film images such as CT, Ultrasound and MRI suspicion for ACS, PE, dissection, AAA, pneumonia, pneumothorax, respiratory stress, GERD, anxiety, CHF, COPD, asthma, surgical abdomen or other emergent etiology at this time. FINAL IMPRESSION      1. Shortness of breath          DISPOSITION/PLAN   DISPOSITION Decision To Discharge 03/28/2021 02:14:54 PM      PATIENT REFERREDTO:  Erasto Lockhart MD  [address]  [phone]    Schedule an appointment as soon as possible for a visit   For a Re-check in  3-5    days. Abdiaziz Hernadez MD  [address] [place] [address]  [phone]    Schedule an appointment as soon as possible for a visit   For a Re-check in   2-3   days.       DISCHARGE MEDICATIONS:  Discharge Medication List as of 3/28/2021  2:20 PM          DISCONTINUED MEDICATIONS:  Discharge Medication List as of 3/28/2021  2:20 PM                 (Please note that portions of this note were completed with a voice recognition program.  Efforts were made to edit the dictations but occasionally words are mis-transcribed.)    EVERETTE Saha (electronically signed)          EVERETTE Munoz  03/28/21 1912

## 2021-03-28 NOTE — ED PROVIDER NOTES
Cataract removal (Bilateral); Finger surgery (1988); eye surgery (Right, 2010); back surgery (2004); Tonsillectomy (1972); Hysterectomy (1993); Colonoscopy; joint replacement (Left, 2/2/16); Hip Arthroplasty (Right, 4-19-16); Atrial ablation surgery; and Total knee arthroplasty (Right, 3/16/2021). No current facility-administered medications on file prior to encounter. Current Outpatient Medications on File Prior to Encounter   Medication Sig Dispense Refill    oxyCODONE-acetaminophen (PERCOCET) 5-325 MG per tablet Take 1-2 tablets by mouth every 6 hours as needed for Pain for up to 7 days.  Take lowest dose possible to manage pain 42 tablet 0    baclofen (LIORESAL) 10 MG tablet Take 1 tablet by mouth 2 times daily 30 tablet 0    fluticasone (FLONASE) 50 MCG/ACT nasal spray 1 spray by Each Nostril route daily      cycloSPORINE (RESTASIS) 0.05 % ophthalmic emulsion Place 1 drop into both eyes 2 times daily      fluticasone (FLOVENT HFA) 110 MCG/ACT inhaler Inhale 1 puff into the lungs 2 times daily 1 Inhaler 2    azelastine (ASTELIN) 0.1 % nasal spray 1 spray by Nasal route 2 times daily 1 Spray in each nostril 1 Bottle 2    azelastine (ASTELIN) 0.1 % nasal spray 2 sprays by Nasal route 2 times daily Use in each nostril as directed 4 Bottle 1    buPROPion (WELLBUTRIN XL) 150 MG extended release tablet Take 1 tablet by mouth every morning 30 tablet 5    pramipexole (MIRAPEX) 0.5 MG tablet 1.5 tab q 5 PM and 1.5 tab qHS 180 tablet 0    Tens Unit MISC by Does not apply route      potassium chloride (KLOR-CON M) 10 MEQ extended release tablet TAKE ONE TABLET BY MOUTH DAILY 90 tablet 0    dilTIAZem (CARDIZEM) 30 MG tablet TAKE ONE TABLET BY MOUTH FOUR TIMES A DAY AS NEEDED FOR FAST HEARTBEAT GREATER THAN 100 AND PALPITATIONS (Patient taking differently: Take 30 mg by mouth daily ) 120 tablet 5    lisinopril (PRINIVIL;ZESTRIL) 10 MG tablet Take 1 tablet by mouth nightly 90 tablet 3    furosemide (LASIX) 40 MG tablet TAKE ONE TABLET BY MOUTH DAILY 90 tablet 1    Semaglutide,0.25 or 0.5MG/DOS, (OZEMPIC, 0.25 OR 0.5 MG/DOSE,) 2 MG/1.5ML SOPN Inject 0.5 mg into the skin once a week Sample 1 pen 0    rivaroxaban (XARELTO) 20 MG TABS tablet Take 1 tablet by mouth Daily with supper 90 tablet 3    Handicap Placard MISC by Does not apply route Exp 5 years 1 each 0    Multiple Vitamins-Minerals (THERAPEUTIC MULTIVITAMIN-MINERALS) tablet Take 1 tablet by mouth daily      spironolactone (ALDACTONE) 25 MG tablet Take 1 tablet by mouth daily 90 tablet 3    CPAP Machine MISC by Does not apply route      Magnesium Citrate 100 MG TABS Take 1 tablet by mouth daily (Patient taking differently: Take 250 tablets by mouth daily ) 90 tablet 3    EPIPEN 2-MIR 0.3 MG/0.3ML SOAJ injection        PHYSICAL EXAM  Vitals: BP (!) 145/57   Pulse 71   Temp 97.3 °F (36.3 °C) (Oral)   Resp 18   Ht 5' 4\" (1.626 m)   Wt 240 lb (108.9 kg)   SpO2 100%   BMI 41.20 kg/m²   Constitutional:  78 y.o. female alert  HENT:  Atraumatic, oral mucosa moist  Neck:  No visible JVD, supple  Chest/Lungs:  Respiratory effort normal, bibasilar rales mild  Abdomen:  Non-distended, soft, NT  Back:  No gross deformity  Extremities:  Normal tone and perfusion, edema of right knee    Medical Decision Making and Plan: Briefly, this is an 78 y. o.female who presented with sob. Had knee replacement done on 3/16, hx of PEs but treated with Xarelto. Also has a history of allergies at this time of year. After evaluation, the patient is comfortably breathing. Based on clinical presentation and diagnostics we do not believe she is experiencing symptoms from acute coronary syndrome, congestive heart failure, aortic dissection, pulmonary embolism, pneumothorax, myocarditis, Boerhaave syndrome, pericardial tamponade, acute abdomen, profound anemia or metabolic abnormality, etc. Emily Cook was given appropriate discharge instructions.   Referral to follow up provider. For further details of Nancy Yañez [facility] Emergency Department encounter, please see documentation by advanced practice provider EVERETTE Jones.     Labs Reviewed   CBC WITH AUTO DIFFERENTIAL - Abnormal; Notable for the following components:       Result Value    RBC 3.25 (*)     Hemoglobin 9.9 (*)     Hematocrit 30.6 (*)     Platelets 751 (*)     Lymphocytes Absolute 0.9 (*)     All other components within normal limits    Narrative:     Performed at:  [facility] [address]   Phone [phone]   COMPREHENSIVE METABOLIC PANEL W/ REFLEX TO MG FOR LOW K - Abnormal; Notable for the following components:    Glucose 103 (*)     All other components within normal limits    Narrative:     Performed at:  [facility] [address]   Phone [phone]  - Abnormal; Notable for the following components:    Pro- (*)     All other components within normal limits    Narrative:     Performed at:  [facility] [address]   Phone [phone] - Abnormal; Notable for the following components:    Protime 13.9 (*)     INR 1.20 (*)     All other components within normal limits    Narrative:     Performed at:  [facility] [address]   Phone [phone]   TROPONIN    Narrative:     Performed at:  [facility] [address]   Phone [phone]   APTT    Narrative:     Performed at:  [facility] [address]   Phone [phone]     Previous HGB:    Hemoglobin   Date/Time Value Ref Range Status   03/28/2021 12:27 PM 9.9 (L) 12.0 - 16.0 g/dL Final   03/17/2021 06:18 AM 11.5 (L) 12.0 - 16.0 g/dL Final   03/02/2021 10:10 AM 13.2 12.0 - 16.0 g/dL Final     RADIOLOGY:     CT CHEST PULMONARY EMBOLISM W CONTRAST   Final Result   No acute pulmonary embolus is identified. No acute cardiopulmonary disease. EKG:  Read by me in the absence of a cardiologist shows: Sinus rhythm, rate sixty-seven, intervals normal, Axis XIV degrees, no acute injury pattern, minimal change when compared to prior study from 2/22/2021    New Prescriptions    ALBUTEROL SULFATE  (90 BASE) MCG/ACT INHALER    Use 2 puffs 4 times daily for 7 days then as needed for wheezing. Dispense with Spacer and instruct in use. At patient's preference may use 60 dose MDI. May Sub Pro-Air or Proventil as needed per insurance. FOLLOW UP:    Kunal Alonzo MD  [address]  [phone]    Schedule an appointment as soon as possible for a visit   For a Re-check in  3-5    days. Nohelia Wayne MD  [address] [place] [address]  [phone]    Schedule an appointment as soon as possible for a visit   For a Re-check in   2-3   days. FINAL IMPRESSION:    1.  Shortness of breath       DISPOSITION Decision To Discharge 03/28/2021 02:14:54 PM       Meir Zimmerman MD  03/28/21 3511

## 2021-03-29 ENCOUNTER — TELEPHONE (OUTPATIENT)
Dept: CARDIOLOGY CLINIC | Age: 71
End: 2021-03-29

## 2021-03-29 ENCOUNTER — CARE COORDINATION (OUTPATIENT)
Dept: CARE COORDINATION | Age: 71
End: 2021-03-29

## 2021-03-29 ENCOUNTER — OFFICE VISIT (OUTPATIENT)
Dept: ORTHOPEDIC SURGERY | Age: 71
End: 2021-03-29

## 2021-03-29 VITALS — WEIGHT: 240 LBS | TEMPERATURE: 97.3 F | HEIGHT: 64 IN | BODY MASS INDEX: 40.97 KG/M2

## 2021-03-29 DIAGNOSIS — M17.11 PRIMARY OSTEOARTHRITIS OF RIGHT KNEE: Primary | ICD-10-CM

## 2021-03-29 LAB
EKG ATRIAL RATE: 67 BPM
EKG DIAGNOSIS: NORMAL
EKG P AXIS: 41 DEGREES
EKG P-R INTERVAL: 128 MS
EKG Q-T INTERVAL: 392 MS
EKG QRS DURATION: 86 MS
EKG QTC CALCULATION (BAZETT): 414 MS
EKG R AXIS: 14 DEGREES
EKG T AXIS: 23 DEGREES
EKG VENTRICULAR RATE: 67 BPM

## 2021-03-29 PROCEDURE — 93010 ELECTROCARDIOGRAM REPORT: CPT | Performed by: INTERNAL MEDICINE

## 2021-03-29 PROCEDURE — 99024 POSTOP FOLLOW-UP VISIT: CPT | Performed by: PHYSICIAN ASSISTANT

## 2021-03-29 RX ORDER — OXYCODONE HYDROCHLORIDE AND ACETAMINOPHEN 5; 325 MG/1; MG/1
1-2 TABLET ORAL EVERY 6 HOURS PRN
Qty: 42 TABLET | Refills: 0 | Status: SHIPPED | OUTPATIENT
Start: 2021-03-30 | End: 2021-04-07 | Stop reason: SDUPTHER

## 2021-03-29 NOTE — TELEPHONE ENCOUNTER
Pt calling was in ER yesterday and told her to call ERICKA today, she has SOB, had a CT chest, BNP High, lower lobes diminished, CHF \"acting Up\". Needs to know what she needs to do?  Pls call to advise Thank you

## 2021-03-29 NOTE — CARE COORDINATION
Mail box is not set up  Patient contacted regarding recent discharge and COVID-19 risk. Discussed COVID-19 related testing which was not done at this time. Test results were not done. Patient informed of results, if available? Yes     Care Transition Nurse/ Ambulatory Care Manager contacted the patient by telephone to perform post discharge assessment. Verified name and  with patient as identifiers. Patient has following risk factors of: HTN. CTN/ACM reviewed discharge instructions, medical action plan and red flags related to discharge diagnosis. Reviewed and educated them on any new and changed medications related to discharge diagnosis. Advised obtaining a 90-day supply of all daily and as-needed medications. Patient seen in the ED for SOB. Patient had knee surgery on 3/16, and ortho wants to be sure patient does not have any blood clots. Patient says she was told her BNP was elevated. Patient says her lasix was increased until she looses 3 lbs. Patient says she is doing much better today. Education provided regarding infection prevention, and signs and symptoms of COVID-19 and when to seek medical attention with patient who verbalized understanding. Discussed exposure protocols and quarantine from [address] you at higher risk for severe illness  and given an opportunity for questions and concerns. The patient agrees to contact the COVID-19 hotline [phone] or PCP office for questions related to their healthcare. CTN/ACM provided contact information for future reference. From CDC: Are you at higher risk for severe illness?  Wash your hands often.  Avoid close contact (6 feet, which is about two arm lengths) with people who are sick.  Put distance between yourself and other people if COVID-19 is spreading in your community.  Clean and disinfect frequently touched surfaces.  Avoid all cruise travel and non-essential air travel.    Call your healthcare professional if you have concerns about COVID-19 and your underlying condition or if you are sick. For more information on steps you can take to protect yourself, see CDC's How to Protect Yourself    Patient agreed to Care Coordination. Ambulatory Care Coordination Note  3/29/2021  CM Risk Score: 3  Charlson 10 Year Mortality Risk Score: 79%     ACC: Deep Rico RN    Summary Note: Establish Care with Care Coordination. Ambulatory Care Coordination Assessment    Care Coordination Protocol  Week 1 - Initial Assessment     Do you have all of your prescriptions and are they filled?: Yes  Barriers to medication adherence: None  Are you able to afford your medications?: Yes  How often do you have trouble taking your medications the way you have been told to take them?: I always take them as prescribed. Do you have Home O2 Therapy?: Yes   CPAP Use: CPAP      Ability to seek help/take action for Emergent Urgent situations i.e. fire, crime, inclement weather or health crisis. : Independent  Ability to ambulate to restroom: Independent  Ability handle personal hygeine needs (bathing/dressing/grooming): Independent  Ability to manage Medications: Independent  Ability to prepare Food Preparation: Independent  Ability to maintain home (clean home, laundry): Independent  Ability to drive and/or has transportation: Independent  Ability to do shopping: Independent  Ability to manage finances: Independent  Is patient able to live independently?: Yes     Current Housing: Private Residence           Frequent urination at night?: Yes  Do you use rails/bars?: Yes  Do you have a non-slip tub mat?: Yes     Are you experiencing loss of meaning?: No  Are you experiencing loss of hope and peace?: No     Suggested Interventions and Community Resources                  Prior to Admission medications    Medication Sig Start Date End Date Taking?  Authorizing Provider   oxyCODONE-acetaminophen (PERCOCET) 5-325 MG per tablet Take

## 2021-03-29 NOTE — PROGRESS NOTES
Patient Name: James Copeland  Medical Record Number: [id number]  YOB: 1950  Date of Encounter: 3/29/2021     Chief Complaint   Patient presents with    Post-Op Check     RIGHT ROBOTIC ASSISTED TOTAL KNEE ARTHROPLASTY; DOS 3/16/21. Total Knee Follow-up  James Copeland is here for 2 weeks post right total knee arthroplasty follow-up. Pain is controlled with oxycodone and baclofen. The patient denies fever, wound drainage, increasing redness, pus, increasing pain, increasing swelling. Post op problems reported: Patient went to the emergency department yesterday for shortness of breath. CTA pulmonary is negative for PE. She has been in contact with her cardiologist and had her dose of Lasix increased. She is ambulating using a walker. She is working with home physical therapy. Patient is still taking Vitamin D supplementation. DVT prophylaxis is Xarelto. The patient's  past medical history, medications, allergies,  family history, social history, and review of systems have been reviewed, and dated and are recorded in the chart under the 'MEDIA\" tab. Physical Exam:   Ms. James Copeland appears well, she is in no apparent distress, she demonstrates appropriate mood & affect. She is alert and oriented to person, place and time. Temp 97.3 °F (36.3 °C) (Infrared)   Ht 5' 4\" (1.626 m)   Wt 240 lb (108.9 kg)   BMI 41.20 kg/m²     Right Knee: She has mild swelling which is resolving as expected. The incision is clean, dry, intact and nontender and without erythema or induration. Range of motion from -10 to 80 degrees. She has moderate pain with range of motion of the knee. Patient has 4-/5 motor strength with flexion and extension of the right knee. She is able to do straight leg raises without difficulty. Patient is wearing her bilateral ELIZABETH stockings. There is minimal right lower extremity edema. She is neurovascularly intact distally.      Radiology:  X-rays obtained and reviewed in office:  Views: 2 views right knee. Impression: Stable total knee arthroplasty with satisfactory alignment. There is no evidence of loosening or subsidence. Orders:  Orders Placed This Encounter   Procedures    XR KNEE RIGHT (1-2 VIEWS)     Impression:   Status post right total knee arthroplasty and doing very well. Plan: At this time, she will continue physical therapy. She is given a refill of oxycodone and advised to call when she needs a refill baclofen. Patient went to the emergency department yesterday for shortness of breath and CTA pulmonary was negative for PE. She has been working with her cardiologist and has had her dose of Lasix adjusted. Follow up will be in 3 week(s) and no radiology should be warranted. Patient will return to the office sooner for worsening or changes in symptoms. Steve Ram was informed of the results of any imaging. We discussed her postop course to date and a time was given to answer questions. She understand and accepts this course of care. Electronically signed by Sonam Sosa PA-C on 9/32/8714  Board Certified [facility]    Please note that portions of this note were completed with a voice recognition program.  Efforts were made to edit the dictations but occasionally words are mis-transcribed.

## 2021-03-29 NOTE — TELEPHONE ENCOUNTER
I spoke with pt and relayed message per ERICKA. She states that she is taking 40 mg daily She will follow instructions as directed. She also wants to know if she should go back to the monthly labs work? BNP, BMP?

## 2021-03-29 NOTE — TELEPHONE ENCOUNTER
They probably gave her too much fluid with her surgery. She needs to double her lasix. Chart says 40 mg. If taking 40 qd, increase to 80 until down 3 or so pounds, then back to original dose. If not taking 40 regularly, start taking 40 regularly and back to original dose when loses 3 pounds.   ERICKA

## 2021-03-30 RX ORDER — SPIRONOLACTONE 25 MG/1
TABLET ORAL
Qty: 90 TABLET | Refills: 2 | Status: SHIPPED | OUTPATIENT
Start: 2021-03-30 | End: 2022-02-17

## 2021-03-30 RX ORDER — POTASSIUM CHLORIDE 750 MG/1
TABLET, EXTENDED RELEASE ORAL
Qty: 90 TABLET | Refills: 0 | Status: SHIPPED | OUTPATIENT
Start: 2021-03-30 | End: 2021-06-01

## 2021-03-30 NOTE — TELEPHONE ENCOUNTER
Prescription refill    Last OV:03/01/2021    Last Refill:01/05/2021    Labs:03/28/21    Future Appt:07/09/2021

## 2021-03-31 ENCOUNTER — TELEPHONE (OUTPATIENT)
Dept: CARDIOLOGY CLINIC | Age: 71
End: 2021-03-31

## 2021-03-31 NOTE — TELEPHONE ENCOUNTER
Pt calling asking if she needs to do anything special for the testing for her appt on 04/05? And does ERICKA needs to see pt for hos follow up?   pls call to advise thank you

## 2021-03-31 NOTE — TELEPHONE ENCOUNTER
Call placed to patient who was not available for return call. Dr Lang Sharp will the patient need to follow for sob. Unable to reach out to the patient. Please advise thanks.

## 2021-04-01 ENCOUNTER — TELEPHONE (OUTPATIENT)
Dept: ORTHOPEDIC SURGERY | Age: 71
End: 2021-04-01

## 2021-04-01 ENCOUNTER — TELEPHONE (OUTPATIENT)
Dept: PULMONOLOGY | Age: 71
End: 2021-04-01

## 2021-04-01 NOTE — TELEPHONE ENCOUNTER
Alida from [employer] [facility] [employer] need verbal for conitnual home care physical therapy.   pls call [phone]

## 2021-04-01 NOTE — TELEPHONE ENCOUNTER
Last office visit was on 10/30/2020  Next office visit is on  05/03/2021     Patient had knee replacement  that is why her appointment pushed back.

## 2021-04-01 NOTE — TELEPHONE ENCOUNTER
Patient needs a refilll on her Mirapex. She will run out on Sunday. She uses [facility] on [address]. Please call her when that is done at [phone]. Last OV 10/30/2020  Next OV 05/03/2021    Patient had knee replacement and that is why her appointment was pushed back a little.

## 2021-04-02 ENCOUNTER — TELEPHONE (OUTPATIENT)
Dept: CARDIOLOGY CLINIC | Age: 71
End: 2021-04-02

## 2021-04-02 RX ORDER — PRAMIPEXOLE DIHYDROCHLORIDE 0.5 MG/1
TABLET ORAL
Qty: 30 TABLET | Refills: 0 | Status: SHIPPED | OUTPATIENT
Start: 2021-04-02 | End: 2021-04-20 | Stop reason: SDUPTHER

## 2021-04-02 NOTE — TELEPHONE ENCOUNTER
Patient is staying at a friend's house and does not have her monitor to do a remote transmission for at least another week. Should she get her equipment from home? Please call and advise.   jeronimo

## 2021-04-02 NOTE — TELEPHONE ENCOUNTER
She has appointment on Monday with Vidal Michel. Will check device at that time at her appointment.     Low Norwood, BRICE-CNP

## 2021-04-02 NOTE — TELEPHONE ENCOUNTER
Spoke with the patient and advised her of the message below . She voiced understanding .  Call complete

## 2021-04-02 NOTE — TELEPHONE ENCOUNTER
Cut the lisinopril in half. I do not want to stop it especially since she is having the fluid issues. Try to reduce narcotics if she is taking them (these lower blood pressure).   ERICKA

## 2021-04-02 NOTE — TELEPHONE ENCOUNTER
Sonia Bradley calling, her BP has been consistently low (ranging in the mid-high 80'2/mid-high 40's) and today it was at 99/52 and that was with exercise. She was told to hold off on the lisinopril for a few days after her knee replacement (which she did) but went back on them again. She is asking if she should stop them again until after she has fully recovered from the replacement surgery? Please call to advise. Thank you.

## 2021-04-05 ENCOUNTER — NURSE ONLY (OUTPATIENT)
Dept: CARDIOLOGY CLINIC | Age: 71
End: 2021-04-05
Payer: MEDICARE

## 2021-04-05 ENCOUNTER — OFFICE VISIT (OUTPATIENT)
Dept: CARDIOLOGY CLINIC | Age: 71
End: 2021-04-05
Payer: MEDICARE

## 2021-04-05 ENCOUNTER — HOSPITAL ENCOUNTER (OUTPATIENT)
Age: 71
Discharge: HOME | End: 2021-04-05
Payer: MEDICARE

## 2021-04-05 VITALS
HEART RATE: 78 BPM | HEIGHT: 64 IN | SYSTOLIC BLOOD PRESSURE: 134 MMHG | BODY MASS INDEX: 38.99 KG/M2 | DIASTOLIC BLOOD PRESSURE: 77 MMHG | WEIGHT: 228.4 LBS | OXYGEN SATURATION: 98 %

## 2021-04-05 DIAGNOSIS — G47.33 OSA (OBSTRUCTIVE SLEEP APNEA): ICD-10-CM

## 2021-04-05 DIAGNOSIS — Z45.09 ENCOUNTER FOR ELECTRONIC ANALYSIS OF REVEAL EVENT RECORDER: ICD-10-CM

## 2021-04-05 DIAGNOSIS — I48.0 PAROXYSMAL ATRIAL FIBRILLATION (HCC): Chronic | ICD-10-CM

## 2021-04-05 DIAGNOSIS — I50.32 CHRONIC DIASTOLIC HEART FAILURE (HCC): ICD-10-CM

## 2021-04-05 DIAGNOSIS — I48.0 PAROXYSMAL ATRIAL FIBRILLATION (HCC): Primary | ICD-10-CM

## 2021-04-05 DIAGNOSIS — R00.1 SYMPTOMATIC BRADYCARDIA: ICD-10-CM

## 2021-04-05 DIAGNOSIS — I10 BENIGN ESSENTIAL HTN: ICD-10-CM

## 2021-04-05 DIAGNOSIS — M17.11 PRIMARY OSTEOARTHRITIS OF RIGHT KNEE: Primary | ICD-10-CM

## 2021-04-05 DIAGNOSIS — I50.32 CHRONIC DIASTOLIC HEART FAILURE (HCC): Primary | ICD-10-CM

## 2021-04-05 LAB
ANION GAP SERPL CALCULATED.3IONS-SCNC: 16 MMOL/L (ref 3–16)
BUN BLDV-MCNC: 19 MG/DL (ref 7–20)
CALCIUM SERPL-MCNC: 9.7 MG/DL (ref 8.3–10.6)
CHLORIDE BLD-SCNC: 96 MMOL/L (ref 99–110)
CO2: 22 MMOL/L (ref 21–32)
CREAT SERPL-MCNC: 0.7 MG/DL (ref 0.6–1.2)
GFR AFRICAN AMERICAN: >60
GFR NON-AFRICAN AMERICAN: >60
GLUCOSE BLD-MCNC: 100 MG/DL (ref 70–99)
POTASSIUM SERPL-SCNC: 4.4 MMOL/L (ref 3.5–5.1)
PRO-BNP: 122 PG/ML (ref 0–124)
SODIUM BLD-SCNC: 134 MMOL/L (ref 136–145)

## 2021-04-05 PROCEDURE — G8417 CALC BMI ABV UP PARAM F/U: HCPCS | Performed by: NURSE PRACTITIONER

## 2021-04-05 PROCEDURE — G8399 PT W/DXA RESULTS DOCUMENT: HCPCS | Performed by: NURSE PRACTITIONER

## 2021-04-05 PROCEDURE — 36415 COLL VENOUS BLD VENIPUNCTURE: CPT

## 2021-04-05 PROCEDURE — 83880 ASSAY OF NATRIURETIC PEPTIDE: CPT

## 2021-04-05 PROCEDURE — 3017F COLORECTAL CA SCREEN DOC REV: CPT | Performed by: NURSE PRACTITIONER

## 2021-04-05 PROCEDURE — 4040F PNEUMOC VAC/ADMIN/RCVD: CPT | Performed by: NURSE PRACTITIONER

## 2021-04-05 PROCEDURE — 1123F ACP DISCUSS/DSCN MKR DOCD: CPT | Performed by: NURSE PRACTITIONER

## 2021-04-05 PROCEDURE — 99214 OFFICE O/P EST MOD 30 MIN: CPT | Performed by: NURSE PRACTITIONER

## 2021-04-05 PROCEDURE — 80048 BASIC METABOLIC PNL TOTAL CA: CPT

## 2021-04-05 PROCEDURE — 1090F PRES/ABSN URINE INCON ASSESS: CPT | Performed by: NURSE PRACTITIONER

## 2021-04-05 PROCEDURE — G8427 DOCREV CUR MEDS BY ELIG CLIN: HCPCS | Performed by: NURSE PRACTITIONER

## 2021-04-05 PROCEDURE — 1036F TOBACCO NON-USER: CPT | Performed by: NURSE PRACTITIONER

## 2021-04-05 PROCEDURE — 1111F DSCHRG MED/CURRENT MED MERGE: CPT | Performed by: NURSE PRACTITIONER

## 2021-04-05 RX ORDER — BACLOFEN 10 MG/1
10 TABLET ORAL 2 TIMES DAILY
Qty: 30 TABLET | Refills: 0 | Status: SHIPPED | OUTPATIENT
Start: 2021-04-05 | End: 2021-06-07 | Stop reason: ALTCHOICE

## 2021-04-05 NOTE — PROGRESS NOTES
Allergies: Allergies   Allergen Reactions    Atorvastatin Other (See Comments)     Muscle Pain, all statins     Simvastatin Other (See Comments)     Muscle Pain    Cephalexin Hives and Itching    Cephalosporins Hives and Itching    Food Diarrhea     Milk , shellfish , gluten. ..may have bouts of diarrhea, constipation or flatulus. (RD spoke to pt regarding \"milk allergy\", pt is not allergic to milk. She does not drink milk, but consumes milk in other products and consumes dairy products. Had one reactions to shellfish years ago and now can tolerate one serving of shellfish once per week. Avoids gluten as much as she can, but does not have celiac disease.)    Other      Environmental -cats,dogs,dust    Shellfish-Derived Products      hives    Sulfa Antibiotics      Can take Celebrex, Pt denies 8/1/18     Home Medications:  Prior to Visit Medications    Medication Sig Taking? Authorizing Provider   pramipexole (MIRAPEX) 0.5 MG tablet 1.5 tab q 5 PM and 1.5 tab qHS  Tiffanie R Kehrt, APRN - CNP   potassium chloride (KLOR-CON M) 10 MEQ extended release tablet TAKE ONE TABLET BY MOUTH DAILY  Kesha Oliveira MD   spironolactone (ALDACTONE) 25 MG tablet TAKE ONE TABLET BY MOUTH DAILY  Kesha Oliveira MD   oxyCODONE-acetaminophen (PERCOCET) 5-325 MG per tablet Take 1-2 tablets by mouth every 6 hours as needed for Pain for up to 7 days. Take lowest dose possible to manage pain  Jill Stokes PA-C   albuterol sulfate  (90 Base) MCG/ACT inhaler Use 2 puffs 4 times daily for 7 days then as needed for wheezing. Dispense with Spacer and instruct in use. At patient's preference may use 60 dose MDI. May Sub Pro-Air or Proventil as needed per insurance.   [employer]ER[employer] Duncan   baclofen (LIORESAL) 10 MG tablet Take 1 tablet by mouth 2 times daily  Elvis Rebolledo MD   fluticasone [facility]) 50 MCG/ACT nasal spray 1 spray by Each Nostril route daily  Historical Provider, MD   cycloSPORINE (RESTASIS) 0.05 % ophthalmic emulsion Place 1 drop into both eyes 2 times daily  Historical Provider, MD   fluticasone (FLOVENT HFA) 110 MCG/ACT inhaler Inhale 1 puff into the lungs 2 times daily  Brian Savage MD   azelastine (ASTELIN) 0.1 % nasal spray 1 spray by Nasal route 2 times daily 1 Spray in each nostril  Brian Savage MD   buPROPion (WELLBUTRIN XL) 150 MG extended release tablet Take 1 tablet by mouth every morning  Brian Savage MD   Tens Unit MISC by Does not apply route  Historical Provider, MD   dilTIAZem (CARDIZEM) 30 MG tablet TAKE ONE TABLET BY MOUTH FOUR TIMES A DAY AS NEEDED FOR FAST HEARTBEAT GREATER THAN 100 AND PALPITATIONS  Patient taking differently: Take 30 mg by mouth daily   BRICE Mihcelle CNP   lisinopril (PRINIVIL;ZESTRIL) 10 MG tablet Take 1 tablet by mouth nightly  BRICE Michelle CNP   furosemide (LASIX) 40 MG tablet TAKE ONE TABLET BY MOUTH DAILY  Patient taking differently: 80 mg   Prudence HerbBRICE - CNP   Semaglutide,0.25 or 0.5MG/DOS, (OZEMPIC, 0.25 OR 0.5 MG/DOSE,) 2 MG/1.5ML SOPN Inject 0.5 mg into the skin once a week Sample  Brian Savage MD   rivaroxaban (XARELTO) 20 MG TABS tablet Take 1 tablet by mouth Daily with supper  Pattie Hyde APRN - ANA   Handicap Placard MISC by Does not apply route Exp 5 years  Jurtyson BRICE - CNP   Multiple Vitamins-Minerals (THERAPEUTIC MULTIVITAMIN-MINERALS) tablet Take 1 tablet by mouth daily  Historical Provider, MD   CPAP Machine MISC by Does not apply route  Malina Provider, MD   Magnesium Citrate 100 MG TABS Take 1 tablet by mouth daily  Patient taking differently: Take 250 tablets by mouth daily   Jono Camargo MD   EPIPEN 2-MIR 0.3 MG/0.3ML SOAJ injection   Historical Provider, MD      Past Medical History:  Past Medical History:   Diagnosis Date    Allergic     Anesthesia complication     family hx-father-at at age 80 having hip replacement revision surgery-had tia's x2 while under anes-but also had hx chf-had dificuly with speech when coming out from [address]     left ankle, bilateral hands    Arthritis of left hip 2/2/2016    Arthritis of right hip 4/19/2016    Asthma     At risk for falls     uses a cane    Atrial fibrillation with rapid ventricular response (HCC)     Atrial flutter (HCC) 4/25/2018    Chronic maxillary sinusitis 5/24/2017    CTEPH (chronic thromboembolic pulmonary hypertension) ([address] 8/26/2016    Depression     pt stated r/t bad marriage/alcoholic spouse    Diabetes mellitus ([address]     borderline    Disc disease, degenerative, lumbar or lumbosacral 2/20/2017    Hepatic steatosis 4/26/2019    History of total hip arthroplasty 2/28/2017    Hypertension     Leg cramps     patient states very severe, requires immediate potassium administration    Migraines, basilar     last migraine 10 years ago    Mild persistent asthma without complication 7/32/6952    Obesity, Class III, BMI 40-49.9 (morbid obesity) ([address] 4/19/2016    HUSSAIN (obstructive sleep apnea) 10/14/2013    Osteoarthritis, hip, bilateral 2016    Bilateral hip arthroplasty    Panic attacks     Pneumonia 2015    Primary osteoarthritis of both knees 9/19/2017    Primary osteoarthritis of left knee 12/15/2016    Pulmonary emboli ([address] 8/4/2016    Pulmonary HTN ([address] 7/21/2016    Pure hypercholesterolemia 2/13/2019    Restless leg syndrome     Rhinitis, chronic 3/26/2014    Sleep apnea     wears CPAP @11    Stress incontinence     Tear of left rotator cuff 1/23/2018    Thyroid disease     hypothyroid    Wears glasses      Past Surgical History:    has a past surgical history that includes Cataract removal (Bilateral); Finger surgery (1988); eye surgery (Right, 2010); back surgery (2004); Tonsillectomy (1972); Hysterectomy (1993); Colonoscopy; joint replacement (Left, 2/2/16); Hip Arthroplasty (Right, 4-19-16); Atrial ablation surgery; and Total knee arthroplasty (Right, 3/16/2021).    Social History:  Reviewed. reports that she quit smoking about 7 years ago. She has a 2.50 pack-year smoking history. She has never used smokeless tobacco. She reports current alcohol use. She reports that she does not use drugs. Family History:  Reviewed. family history includes Alcohol Abuse in her maternal grandfather, paternal grandfather, and sister; Anxiety Disorder in her mother; Arthritis in her brother, mother, and sister; Asthma in her mother; Cancer in her paternal uncle; Cataracts in her mother; Heart Disease in her father, maternal grandfather, maternal uncle, mother, and paternal uncle; Heart Failure in her father; High Cholesterol in her brother; Hypertension in her mother; Stroke in her maternal grandmother; Substance Abuse in her maternal grandfather and paternal grandfather; Thyroid Disease in her father, maternal grandfather, maternal grandmother, and mother. Denies family history of sudden cardiac death, arrhythmia, premature CAD    Review of System:  · Constitutional: No weight changes or weakness  · HEENT: No visual changes. No mouth sores or sore throat. · Cardiovascular: denies chest pain, denies dyspnea on exertion, denies palpitations or denies loss of consciousness. No cough, hemoptysis, denies pleuritic pain, or phlebitis. denies dizziness. · Respiratory: denies cough or wheezing. · Gastrointestinal: Negative, No blood in stools. · Genitourinary: No hematuria. · Neurological: No focal weakness  · Psychiatric: No confusion, anxiety, or depression. · Hem/Lymph: Denies abnormal bruising or bleeding. Physical Examination:  There were no vitals filed for this visit. Wt Readings from Last 3 Encounters:   03/29/21 240 lb (108.9 kg)   03/28/21 240 lb (108.9 kg)   03/17/21 240 lb 3.2 oz (109 kg)      Constitutional: Cooperative and in no apparent distress, and appears well nourished   Skin: Warm and pink; no cyanosis or bruising   HEENT: Symmetric and normocephalic.  Conjunctiva pink with clear sclera. Mucus membranes pink and moist. No visible masses/goiter   Respiratory: Respirations symmetric and unlabored. Lungs clear to auscultation bilaterally, no wheezing, rhonchi, or crackles.  Cardiovascular:  regular rate and rhythm. S1 & S2 present, negative for murmur. negative elevation of JVP. No peripheral edema.  Musculoskeletal:  No focal weakness.  Neurological/Psych: Awake and orientated to person, place and time. Calm affect, appropriate mood. Pertinent labs, diagnostic, device, and imaging results reviewed as a part of this visit    LABS    CBC:   Lab Results   Component Value Date    WBC 7.7 2021    HGB 9.9 (L) 2021    HCT 30.6 (L) 2021    MCV 94.3 2021     (H) 2021     BMP:   Lab Results   Component Value Date    CREATININE 0.6 2021    BUN 13 2021     2021    K 3.9 2021     2021    CO2 21 2021     CrCl cannot be calculated (Unknown ideal weight.). No results found for: BNP    Thyroid:   Lab Results   Component Value Date    TSH 1.44 2021    N8NLRUP 1.28 07/10/2020     Lipid Panel:   Lab Results   Component Value Date    CHOL 191 2020    HDL 61 2020    TRIG 113 2020     LFTs:  Lab Results   Component Value Date    ALT 17 2021    AST 15 2021    ALKPHOS 81 2021    BILITOT 0.4 2021     Coags:   Lab Results   Component Value Date    PROTIME 13.9 (H) 2021    INR 1.20 (H) 2021    APTT 27.6 2021     EC2021 SR HR 81, , QRS 90, QTc 382     STEPHANE: 3/27/20  Summary   No thrombus noted   Overall left ventricular function is normal.   The left atrial size is normal.   There is no evidence of mass or thrombus in the left atrium or appendage. No evidence of tricuspid regurgitation. Moderate tricuspid regurgitation.  TV gradient 25 mmHg  Limited Echo: 3/28/20  Summary   Normal left ventricle size, wall thickness and systolic function with an   estimated ejection fraction of 55-60%. No evidence of pericardial effusion. Echo: 3/1/2021    Summary   -Normal left ventricle size, wall thickness and systolic function with an   estimated ejection fraction of 55-60%. -No regional wall motion abnormalities are seen. - E/e' = 12.5. Grade II diastolic dysfunction with elevated LV filling   pressures.   -The aortic valve appears sclerotic but opens well.   -Trivial mitral regurgitation.   -Mild tricuspid regurgitation.   -Mild pulmonic regurgitation present. -IVC size is normal (<2.1 cm) but collapses < 50% with respiration   consistent with elevated RA pressure (8 mmHg). -Estimated pulmonary artery systolic pressure is elevated at 48 mmHg   assuming a right atrial pressure of 8 mmHg. 8/20/19  Summary   -Left ventricular cavity size is normal.   -There is mild left ventricular hypertrophy.   -Ejection fraction is visually estimated to be 55-60%. -No wall motion abnormalities.   -Normal diastolic function.   -Trivial mitral regurgitation.   -Mild tricuspid regurgitation  GXT: 1/20/15  Summary    Normal myocardial perfusion study.    Normal LV function. Assessment:  Paroxysmal Atrial Fibrillation  - ECG today shows SR  - On diltiazem 30 mg as needed  - [id number] Vasc score and anticoagulation discussed. High risk for stroke and thromboembolism. Anticoagulation is recommended.   ~ On Xarelto, no s/s of bleeding  - Afib risk factors including age, HTN, obesity, inactivity and HUSSAIN were discussed with patient. Risk factor modification recommended              - S/p RFA of afib w/ PVI (3/27/20, Dr. Daksha Sánchez)   - No new episodes on ILR  Implantable Loop Recorder  - S/p ILR insertion on 8/7/2020  -The CIED was interrogated and programmed and I supervised and reviewed all the data.  All findings and changes are in device interrogation sheat and reflect my personal interpretation and changes and is scanned to Epic  - Device shows: no afib episodes - Follow up with device clinic as scheduled  Chronic HFpEF/Pulmonary HTN   - Appears compensated   - On lasix 20 mg daily and aldactone              - Follows with Dr. Cassandra Niño  HTN-goal <130/80   - Controlled   - Continue current mediations    - Encouraged patient to check BP at home, log and bring to office visits  - Discussed lifestyle modifications, weight loss, low sodium diet  Hx of PE              - On AC  HUSSAIN   - Adherent to therapy and Wears device 6-8 hours per night   - Discussed long term effects of unmanaged HUSSAIN on heart   Plan  - Continue diltiazem as needed  - Daily weights, sodium and fluid restriction  - Call office if symptoms develop    F/U: Follow-up with EP in 6 months   -Follow up with device clinic as scheduled  -[facility] at [phone] with any questions    Diet & Exercise:   The patient is counseled to follow a low salt diet to assure blood pressure remains controlled for cardiovascular risk factor modification   The patient is counseled to avoid excess caffeine, and energy drinks as this may exacerbated ectopy and arrhythmia   The patient is counseled to lose weight to control cardiovascular risk factors   Exercise program discussed: To improve overall cardiovascular health, the patient is instructed to increase cardiovascular related activities with a goal of 150 min/week of moderate level activity or 10,000 steps per day. Encouraged to perform as much activity as tolerated      I have addressed the patient's cardiac risk factors and adjusted pharmacologic treatment as needed. In addition, I have reinforced the need for patient directed risk factor modification. I independently reviewed the ECG    All questions and concerns were addressed with the patient. Alternatives to treatment were discussed. Thank you for allowing to us to participate in the care of Estee Zaman.     Danial Tsang, BRICE-CNP  [facility]   Office: [phone]

## 2021-04-05 NOTE — PATIENT INSTRUCTIONS
- No medication changes  - Call office if you develop symptoms  - Weigh yourself daily, if you gain more than 3 lbs in a day or 5 lbs in a week call the office  - Check your blood pressure at home, if your top number blood pressure is >140/90 or <90/50 please call the office  - Check your heart rate at home, if it is <50 or >110 please call the office   - Follow up with Dr. Melissa Gallagher in 6 months

## 2021-04-06 ENCOUNTER — TELEPHONE (OUTPATIENT)
Dept: CARDIOLOGY CLINIC | Age: 71
End: 2021-04-06

## 2021-04-06 NOTE — TELEPHONE ENCOUNTER
----- Message from BRICE Ospina CNP sent at 4/6/2021  8:55 AM EDT -----  Labs look great, no change in meds.  Sumaya Sy

## 2021-04-07 ENCOUNTER — TELEPHONE (OUTPATIENT)
Dept: ORTHOPEDIC SURGERY | Age: 71
End: 2021-04-07

## 2021-04-07 DIAGNOSIS — M17.11 PRIMARY OSTEOARTHRITIS OF RIGHT KNEE: ICD-10-CM

## 2021-04-07 RX ORDER — OXYCODONE HYDROCHLORIDE AND ACETAMINOPHEN 5; 325 MG/1; MG/1
1-2 TABLET ORAL EVERY 6 HOURS PRN
Qty: 42 TABLET | Refills: 0 | Status: SHIPPED | OUTPATIENT
Start: 2021-04-07 | End: 2021-04-14 | Stop reason: SDUPTHER

## 2021-04-07 NOTE — TELEPHONE ENCOUNTER
Prescription Refill     Medication Name:  [address]: [facility] # [phone]  Patient Contact Number: [phone]

## 2021-04-09 ENCOUNTER — TELEPHONE (OUTPATIENT)
Dept: INTERNAL MEDICINE CLINIC | Age: 71
End: 2021-04-09

## 2021-04-09 NOTE — TELEPHONE ENCOUNTER
----- Message from [facility] sent at 3/31/2021  5:00 PM EDT -----  Subject: Appointment Request    Reason for Call: Urgent (Patient Request) ED Follow Up Visit    QUESTIONS  Type of Appointment? Established Patient  Reason for appointment request? No appointments available during search  Additional Information for Provider? PT called in to request f/u appt from   ER visit on 03/28 for shortness of breath. PT needs to be seen in 3-5 days   per discharge papers. No appts show on my end for vv for provider. Please   call PT back to schedule. PT may call on 04/01 to schedule vv. Screened   red-shortness of breath  ---------------------------------------------------------------------------  --------------  CALL BACK INFO  What is the best way for the office to contact you? OK to leave message on   voicemail  Preferred Call Back Phone Number? [phone]  ---------------------------------------------------------------------------  --------------  SCRIPT ANSWERS  Relationship to Patient? Self  Appointment reason? Well Care/Follow Ups  Select a Well Care/Follow Ups appointment reason? Adult ED Follow Up   [Emergency Room   Emergency Department]  (Patient requests to see provider urgently. )? Yes  Do you have any questions for your primary care provider that need to be   answered prior to your appointment? No  Have you been diagnosed with   tested for   or told that you are suspected of having COVID-19 (Coronavirus)? Yes  Did your symptoms begin or have you been tested for COVID-19 in the last   10 days? No  Have you had a fever or taken medication to treat a fever within the past   3 days? No  Have you had a cough   shortness of breath or flu-like symptoms within the past 3 days?  Yes

## 2021-04-12 ENCOUNTER — OFFICE VISIT (OUTPATIENT)
Dept: INTERNAL MEDICINE CLINIC | Age: 71
End: 2021-04-12
Payer: MEDICARE

## 2021-04-12 VITALS
HEART RATE: 82 BPM | TEMPERATURE: 97.4 F | BODY MASS INDEX: 38.41 KG/M2 | HEIGHT: 64 IN | DIASTOLIC BLOOD PRESSURE: 62 MMHG | OXYGEN SATURATION: 98 % | WEIGHT: 225 LBS | SYSTOLIC BLOOD PRESSURE: 114 MMHG

## 2021-04-12 DIAGNOSIS — I26.99 PULMONARY EMBOLISM WITHOUT ACUTE COR PULMONALE, UNSPECIFIED CHRONICITY, UNSPECIFIED PULMONARY EMBOLISM TYPE (HCC): ICD-10-CM

## 2021-04-12 DIAGNOSIS — E66.01 OBESITY, CLASS III, BMI 40-49.9 (MORBID OBESITY) (HCC): Primary | ICD-10-CM

## 2021-04-12 DIAGNOSIS — E66.01 MORBID OBESITY (HCC): ICD-10-CM

## 2021-04-12 DIAGNOSIS — E03.9 ACQUIRED HYPOTHYROIDISM: Chronic | ICD-10-CM

## 2021-04-12 PROCEDURE — 99213 OFFICE O/P EST LOW 20 MIN: CPT | Performed by: INTERNAL MEDICINE

## 2021-04-12 PROCEDURE — G8427 DOCREV CUR MEDS BY ELIG CLIN: HCPCS | Performed by: INTERNAL MEDICINE

## 2021-04-12 PROCEDURE — G8417 CALC BMI ABV UP PARAM F/U: HCPCS | Performed by: INTERNAL MEDICINE

## 2021-04-12 PROCEDURE — 1111F DSCHRG MED/CURRENT MED MERGE: CPT | Performed by: INTERNAL MEDICINE

## 2021-04-12 PROCEDURE — 4040F PNEUMOC VAC/ADMIN/RCVD: CPT | Performed by: INTERNAL MEDICINE

## 2021-04-12 PROCEDURE — 1036F TOBACCO NON-USER: CPT | Performed by: INTERNAL MEDICINE

## 2021-04-12 PROCEDURE — 1123F ACP DISCUSS/DSCN MKR DOCD: CPT | Performed by: INTERNAL MEDICINE

## 2021-04-12 PROCEDURE — 3017F COLORECTAL CA SCREEN DOC REV: CPT | Performed by: INTERNAL MEDICINE

## 2021-04-12 PROCEDURE — G8399 PT W/DXA RESULTS DOCUMENT: HCPCS | Performed by: INTERNAL MEDICINE

## 2021-04-12 PROCEDURE — 1090F PRES/ABSN URINE INCON ASSESS: CPT | Performed by: INTERNAL MEDICINE

## 2021-04-12 RX ORDER — SEMAGLUTIDE 1.34 MG/ML
0.5 INJECTION, SOLUTION SUBCUTANEOUS WEEKLY
Qty: 1 PEN | Refills: 0
Start: 2021-04-12 | End: 2021-05-04 | Stop reason: SDUPTHER

## 2021-04-12 ASSESSMENT — PATIENT HEALTH QUESTIONNAIRE - PHQ9: 2. FEELING DOWN, DEPRESSED OR HOPELESS: 0

## 2021-04-12 NOTE — PROGRESS NOTES
Chief Complaint   Patient presents with    Obesity    Post-Op Check     Vitals:    04/12/21 1112   BP: 114/62   Pulse: 82   Temp: 97.4 °F (36.3 °C)   SpO2: 98%       PHQ Scores 4/12/2021 2/22/2021 12/28/2020 11/13/2020 10/2/2020 1/31/2020 10/16/2019   PHQ2 Score 0 0 0 3 0 1 0   PHQ9 Score 0 0 0 13 0 1 0     Interpretation of Total Score Depression Severity: 1-4 = Minimal depression, 5-9 = Mild depression, 10-14 = Moderate depression, 15-19 = Moderately severe depression, 20-27 = Severe depression    Physical Exam  Vitals signs reviewed. Constitutional:       General: She is not in acute distress. Appearance: She is well-developed. She is not diaphoretic. HENT:      Head: Normocephalic and atraumatic. Pulmonary:      Effort: Pulmonary effort is normal.   Neurological:      Mental Status: She is alert and oriented to person, place, and time. Cranial Nerves: No cranial nerve deficit. Psychiatric:         Behavior: Behavior normal.         Thought Content: Thought content normal.         Judgment: Judgment normal.     Assessment/Plan:  Anali Simpson was seen today for obesity and post-op check.     Diagnoses and all orders for this visit:    Obesity, Class III, BMI 40-49.9 (morbid obesity) ([facility])  -     Semaglutide,0.25 or 0.5MG/DOS, (OZEMPIC, 0.25 OR 0.5 MG/DOSE,) 2 MG/1.5ML SOPN; Inject 0.5 mg into the skin once a week Sample    Acquired hypothyroidism  Comments:  Chronic, controlled    Morbid obesity ([facility] [address]  -     Semaglutide,0.25 or 0.5MG/DOS, (OZEMPIC, 0.25 OR 0.5 MG/DOSE,) 2 MG/1.5ML SOPN; Inject 0.5 mg into the skin once a week Sample    Pulmonary embolism without acute cor pulmonale, unspecified chronicity, unspecified pulmonary embolism type ([facility] [address]  Comments:  Xarelto is continuing         MD Kane Yuen

## 2021-04-13 ENCOUNTER — CARE COORDINATION (OUTPATIENT)
Dept: CARE COORDINATION | Age: 71
End: 2021-04-13

## 2021-04-13 ENCOUNTER — HOSPITAL ENCOUNTER (OUTPATIENT)
Dept: PHYSICAL THERAPY | Age: 71
Setting detail: THERAPIES SERIES
Discharge: HOME | End: 2021-04-13
Payer: MEDICARE

## 2021-04-13 PROCEDURE — 97110 THERAPEUTIC EXERCISES: CPT

## 2021-04-13 PROCEDURE — 97161 PT EVAL LOW COMPLEX 20 MIN: CPT

## 2021-04-13 PROCEDURE — 97140 MANUAL THERAPY 1/> REGIONS: CPT

## 2021-04-13 NOTE — CARE COORDINATION
Not Available [phone]  No Answer [phone]  Plan follow up Care Coordination next week  Resolved COVID Encounter

## 2021-04-13 NOTE — PLAN OF CARE
[facility]era, [address] [facility], [address] [place] [address]  Phone: [phone]   Fax: [phone]                                                       Physical Therapy Certification    Dear Referring Practitioner: Brenda Arguello MD,    We had the pleasure of evaluating the following patient for physical therapy services at [address] [place] [address]. A summary of our findings can be found in the initial assessment below. This includes our plan of care. If you have any questions or concerns regarding these findings, please do not hesitate to contact me at the office phone number checked above. Thank you for the referral.       Physician Signature:_______________________________Date:__________________  By signing above (or electronic signature), therapists plan is approved by physician      Patient: Jamaal Yip   : 1950   MRN: [id number]  Referring Physician: Referring Practitioner: Brenda Arguello MD      Evaluation Date: 2021      Medical Diagnosis Information:  Diagnosis: s/p R TKR 3/16/2021   Treatment Diagnosis: R decreased knee ROM, decreased R LE strength and flexiblity, decreased balance, abnormal gait, decreased walking endurance                                         Insurance information: PT Insurance Information: Medicare     Precautions/ Contra-indications:B THR ; hx of PE, CHF  Latex Allergy:  [x]NO      []YES  Preferred Language for Healthcare:   [x]English       []Other:    C-SSRS Triggered by Intake questionnaire (Past 2 wk assessment ):   [x] No, Questionnaire did not trigger screening.   [] Yes, Patient intake triggered C-SSRS Screening     [] Completed, no further action required. [] Completed, PCP notified via Epic    SUBJECTIVE: Patient stated complaint: patient presents to PT clinic following a R TKR on 3/16/2021.   She had ~8 visits of home health PT after hospital discharge and states her best knee measurements were 3 from full extension and 97 degrees flexion. She presents to the clinic amb. With a rollator with B Hip flexion and excessive  UE lean. She lives alone in 1 floor condo with 5 steps to enter/exit building with 1 rail. She does have friends to drive her to and from PT. She states she has been doing her exercises faithfully and was last seen in PT by Home PT on Friday. She states she still has some muscle spasms which were present even before surgery. She still takes pain meds at night and right before PT,       TKA : r3/16/2021    Relevant Medical History:  Functional Outcome: LEFS: raw score = 22; dysfunction = 72.5%    Pain Scale: 3-7/10  Easing factors: ice, rest  Provocative factors: knee movement; gait/stairs    Type: [x] Constant   []Intermittent  []Radiating []Localized []other:     Numbness/Tingling: denies    Occupation/School:retired    OBJECTIVE:   Palpation: tender R knee joint line, entire quad tender, distal HS tendons    Quad: Fair    Functional Mobility/Transfers: mod I    Posture: stands with excessive hi[p AB    Bandages/Dressings/Incisions: none; scar 85% healed with scab on distal end of scar    Gait: (include devices/WB status) Amb.  With rollator with B hip flexion with excessive UE WB; flat foot initial contact on R    Dermatomes Normal Abnormal Comments   inguinal area (L1)  X     anterior mid-thigh (L2) x     distal ant thigh/med knee (L3) X     medial lower leg and foot (L4) x     lateral lower leg and foot (L5) x     posterior calf (S1) x     medial calcaneus (S2) X         Myotomes NT due to Sx Normal Abnormal Comments   Hip flexion (L1-L2)      Knee extension (L2-L4)      Dorsiflexion (L4-L5)      Great Toe Ext (L5)      Ankle Eversion (S1-S2)      Ankle PF(S1-S2)          ReflexesNT due to Sx Normal Abnormal Comments   S1-2 Seated achilles      S1-2 Prone knee bend      L3-4 Patellar tendon      Clonus Babinski           PROM AROM    L R L R   Knee Flexion 110 99 107 98   Knee Extension 8 9 10 12       Strength (0-5) Left Right   Hip Flexion - supine 4/5 Quad lag with spasm   Hip Flexion - seated 4/5 3-/5   Hip Abduction - sidelyling 9.9 # 12.4#   Hip Adduction NT NT   Hip Extension NT NT   Quads 26.8# 16.6#   Hamstrings 5/5 3+/5        Flexibility     Hamstrings (90/90) Mod tight Mod tight   ITB (Javi) NT Mod tight   Quads (Ely's) NT 60   Hip Flexor (Leonard) NT 10 degrees tightness        Girth     Mid patella NT    Suprapatellar         Joint mobility: patellofemoral    []Normal    [x]Hypo   []Hyper         Patient in OrthoVitals?: [] Yes [] No    Functional Testing  Sx Date Prehab Date   NA Post-op Eval  and 4 week F/U   Date 4/13 8 week F/U date D/C date D/C Date       TUG (sec)  15.04\"      30 second sit to stand (reps)  0 (9 with hands)      6 minute walk (m)  NT due to muscle spasms         Balance:    Narrowed HENRRY (sec)  10      Semi Tandem (sec)  10 L   10 R      Tandem (sec)  2.91\"  1.45\"      SLS (sec)  0 L /R         Knee AROM L R L R L R L R L R   Flexion   107 98         Extension   10 12            Knee Ext: L R L R L R L R L R   MMT (of 5)   5/5 4-/5         Knee Ext (#) 26.8  26.8 16.6            Hip Abd:  L R L R L R L R L R   MMT (of 5)   3+/5 2/5         Hip Abd (#)   9.9 12.4            LEFS (raw)  22      OV entered  no                             [x] Patient history, allergies, meds reviewed. Medical chart reviewed. See intake form. Review Of Systems (ROS):  [x]Performed Review of systems (Integumentary, CardioPulmonary, Neurological) by intake and observation. Intake form has been scanned into medical record. Patient has been instructed to contact their primary care physician regarding ROS issues if not already being addressed at this time.     Co-morbidities/Complexities (which will affect course of rehabilitation):   []None        []Hx of COVID   Arthritic conditions   []Rheumatoid arthritis (M05.9)  [x]Osteoarthritis (M19.91)  []Gout   Cardiovascular conditions   [x]Hypertension (I10)  []Hyperlipidemia (E78.5)  []Angina pectoris (I20)  []Atherosclerosis (I70)  []Pacemaker  []Hx of CABG/stent/  cardiac surgeries   Musculoskeletal conditions   []Disc pathology   []Congenital spine pathologies   []Osteoporosis (M81.8)  []Osteopenia (M85.8)  []Scoliosis       Endocrine conditions   []Hypothyroid (E03.9)  []Hyperthyroid Gastrointestinal conditions   []Constipation (A57.43)   Metabolic conditions   []Morbid obesity (E66.01)  []Diabetes type 1(E10.65) or 2 (E11.65)   [x]Neuropathy (G60.9)     Cardio/Pulmonary conditions   []Asthma (J45)  []Coughing   []COPD (J44.9)  [x]CHF  []A-fib   Psychological Disorders  []Anxiety (F41.9)  []Depression (F32.9)   []Other:   Developmental Disorders  []Autism (F84.0)  []CP (G80)  []Down Syndrome (Q90.9)  []Developmental delay     Neurological conditions  []Prior Stroke (I69.30)  []Parkinson's (G20)  []Encephalopathy (G93.40)  []MS (G35)  []Post-polio (G14)  []SCI  []TBI  []ALS Other conditions  []Fibromyalgia (M79.7)  []Vertigo  []Syncope  []Kidney Failure  []Cancer      []currently undergoing                treatment  []Pregnancy  []Incontinence   Prior surgeries  [x]involved limb  []previous spinal surgery  [] section birth  []hysterectomy  []bowel / bladder surgery  [x]other relevant surgeries  B THR 2016   []Other:              Barriers to/and or personal factors that will affect rehab potential:              []Age  []Sex    []Smoker              []Motivation/Lack of Motivation                        [x]Co-Morbidities; OA l knee              []Cognitive Function, education/learning barriers              []Environmental, home barriers              []profession/work barriers  [x]past PT/medical experience  []other:  Justification: Patient may require longer PT POC to regain /return to PLOF with possible need for aquatic PT after R knee healed    Falls including decreased ROM, strength and function. []Signs/symptoms consistent with joint sprain/strain   []Signs/symptoms consistent with patella-femoral syndrome   [x]Signs/symptoms consistent with knee OA/hip OA; L knee oA []Signs/symptoms consistent with internal derangement of knee/Hip   []Signs/symptoms consistent with functional hip weakness/NMR control      []Signs/symptoms consistent with tendinitis/tendinosis    []signs/symptoms consistent with pathology which may benefit from Dry needling      []other:      Prognosis/Rehab Potential:      []Excellent   [x]Good    []Fair   []Poor    Tolerance of evaluation/treatment:    []Excellent   []Good    [x]Fair   []Poor    Physical Therapy Evaluation Complexity Justification  [x] A history of present problem with:  [] no personal factors and/or comorbidities that impact the plan of care;  [x]1-2 personal factors and/or comorbidities that impact the plan of care  []3 personal factors and/or comorbidities that impact the plan of care  [x] An examination of body systems using standardized tests and measures addressing any of the following: body structures and functions (impairments), activity limitations, and/or participation restrictions;:  [] a total of 1-2 or more elements   [] a total of 3 or more elements   [x] a total of 4 or more elements   [x] A clinical presentation with:  [] stable and/or uncomplicated characteristics   [] evolving clinical presentation with changing characteristics  [] unstable and unpredictable characteristics;   [x] Clinical decision making of [x] low , [] moderate, [] high complexity using standardized patient assessment instrument and/or measurable assessment of functional outcome.     [x] EVAL (LOW) 24040 (typically 30 minutes face-to-face)  [] EVAL (MOD) 46930 (typically 30 minutes face-to-face)  [] EVAL (HIGH) 24580 (typically 45 minutes face-to-face)  [] RE-EVAL     PLAN:   Frequency/Duration:  2-3 days per week for 8-10 Weeks:  Interventions:  [x]  Therapeutic exercise including: strength training, ROM, for Lower extremity and core   [x]  NMR activation and proprioception for LE, Glutes and Core   [x]  Manual therapy as indicated for LE, Hip and spine to include: Dry Needling/IASTM, STM, PROM, Gr I-IV mobilizations, manipulation. [x] Modalities as needed that may include: thermal agents, E-stim, Biofeedback, US, iontophoresis as indicated  [x] Patient education on joint protection, postural re-education, activity modification, progression of HEP. HEP instruction: Pt provided with written HEP instructions. GOALS:  Patient stated goal: walk without A device and perform steps without rail  [] Progressing: [] Met: [] Not Met: [] Adjusted    Therapist goals for Patient:   Short Term Goals: To be achieved in: 2 weeks  1. Independent in HEP and progression per patient tolerance, in order to prevent re-injury. [] Progressing: [] Met: [] Not Met: [] Adjusted  2. Patient will have a decrease in pain to facilitate improvement in movement, function, and ADLs as indicated by Functional Deficits. [] Progressing: [] Met: [] Not Met: [] Adjusted    Long Term Goals: To be achieved in: 7-9 weeks  1. Disability index score of 40-50% or less for the LEFS to assist with reaching prior level of function. [] Progressing: [] Met: [] Not Met: [] Adjusted  2. Patient will demonstrate increased AROM to 5-110  to allow for proper joint functioning as indicated by patients ability to perform sit to stand transfer without stiffness with L=R WBing.   [] Progressing: [] Met: [] Not Met: [] Adjusted  3. Patient will demonstrate an increase in Strength to at least 4-4.5/5 as well as good proximal hip strength and control to allow for proper functional mobility as indicated by patients ability to ascend/descend steps 6\" with 1 rail reciprocal on R   [] Progressing: [] Met: [] Not Met: [] Adjusted  4.  Patient will return to functional activities

## 2021-04-13 NOTE — FLOWSHEET NOTE
[facility]  Phone: [phone]   Fax: [phone]    Physical Therapy Treatment Note/ Progress Report:     Date:  2021    Patient Name:  Laura Vasques    :  1950  MRN: [id number]  Restrictions/Precautions:    Medical/Treatment Diagnosis Information:  · Diagnosis: s/p R TKR 3/16/2021  · Treatment Diagnosis: R decreased knee ROM, decreased R LE strength and flexiblity, decreased balance, abnormal gait, decreased walking endurance  Insurance/Certification information:  PT Insurance Information: Medicare  Physician Information:  Referring Practitioner: José Antonio Hernandez MD  Plan of care signed (Y/N): []  Yes [x]  No     Date of Patient follow up with Physician:      Progress Report: [x]  Yes Eval  []  No     Date Range for reporting period:  Beginnin2021  Ending:     Progress report due (10 Rx/or 30 days whichever is less): visit #71 or 3/32  Recertification due (POC duration/ or 90 days whichever is less): 2021    Visit # Insurance Allowable Auth required?  Date Range   - Medicare/[facility] []  Yes  [x]  No      Latex Allergy:  [x]NO      []YES  Preferred Language for Healthcare:   [x]English       []other:    Functional Scale:        Date assessed:  LEFS: raw score = 22; dysfunction = 72.5%   2021    Pain level:  5-7/10     SUBJECTIVE:  See eval    OBJECTIVE: See eval      RESTRICTIONS/PRECAUTIONSOA L knee    Exercises/Interventions:     Therapeutic Exercise (29181)  Resistance / level Sets/sec Reps Notes / Cues   Supine gluteal sets  \" 10    Quad sets with towel behind knee B  \" 10    Long Sit HS stretch  \" 4    Towel gastroc/HS stretch in long it  \" 3    Ankle pumps  1 10    SLR flexion   1  1 3 AAROM     3 eccentric only with A                                Therapeutic Activities (13710)       21 Pt was educated on PT POC, Diagnosis, Prognosis, pathomechanics as well as frequency and duration of scheduling future physical therapy appointments. Time was also taken on this day to answer all patient questions and participation in PT. X3' and intermittently                           Neuromuscular Re-ed (60638)                            Manual Intervention (82933)       Knee mobs/PROM  X8' flexion in sitting and extension in long sit prmarily     Tib/Fem Mobs Seated R knee distractions Grade # X3'     Patella Mobs Superior/inferior X2'     Ankle mobs       Quad stretch off EOB  1/10\" 3 4/13; stopped due to spasms              Modalities:     Pt. Education:  -pt educated on diagnosis, prognosis and expectations for rehab  -all pt questions were answered    Home Exercise Program:  To be issued on next visit    Therapeutic Exercise and NMR EXR  [] (17113) Provided verbal/tactile cueing for activities related to strengthening, flexibility, endurance, ROM for improvements in LE, proximal hip, and core control with self care, mobility, lifting, ambulation.  [] (98712) Provided verbal/tactile cueing for activities related to improving balance, coordination, kinesthetic sense, posture, motor skill, proprioception  to assist with LE, proximal hip, and core control in self care, mobility, lifting, ambulation and eccentric single leg control.   [] (99780) Therapist is in constant attendance of 2 or more patients providing skilled therapy interventions, but not providing any significant amount of measurable one-on-one time to either patient, for improvements in LE, proximal hip, and core control in self care, mobility, lifting, ambulation and eccentric single leg control.      NMR and Therapeutic Activities:    [] (56388 or 28259) Provided verbal/tactile cueing for activities related to improving balance, coordination, kinesthetic sense, posture, motor skill, proprioception and motor activation to allow for proper function of core, proximal hip and LE with self care and ADLs  [] (02513) Gait Re-education- Provided training and instruction to the patient for proper LE, core and proximal hip recruitment and positioning and eccentric body weight control with ambulation re-education including up and down stairs     Home Exercise Program:    [x] (49363) Reviewed/Progressed HEP activities related to strengthening, flexibility, endurance, ROM of core, proximal hip and LE for functional self-care, mobility, lifting and ambulation/stair navigation   [] (90090)Reviewed/Progressed HEP activities related to improving balance, coordination, kinesthetic sense, posture, motor skill, proprioception of core, proximal hip and LE for self care, mobility, lifting, and ambulation/stair navigation      Manual Treatments:  PROM / STM / Oscillations-Mobs:  G-I, II, III, IV (PA's, Inf., Post.)  [] (00967) Provided manual therapy to mobilize LE, proximal hip and/or LS spine soft tissue/joints for the purpose of modulating pain, promoting relaxation,  increasing ROM, reducing/eliminating soft tissue swelling/inflammation/restriction, improving soft tissue extensibility and allowing for proper ROM for normal function with self care, mobility, lifting and ambulation. Modalities:  [] (89783) Vasopneumatic compression: Utilized vasopneumatic compression to decrease edema / swelling for the purpose of improving mobility and quad tone / recruitment which will allow for increased overall function including but not limited to self-care, transfers, ambulation, and ascending / descending stairs.        Charges:  Timed Code Treatment Minutes: 25   Total Treatment Minutes: 45     [x] EVAL - LOW (90194)   [] EVAL - MOD (87482)  [] EVAL - HIGH (23981)  [] RE-EVAL (94826)  [x] HS(77702) x  1     [] Ionto  [] NMR (33229) x       [] Vaso  [x] Manual (37068) x  1     [] Ultrasound  [] TA x        [] Mech Traction (27208)  [] Aquatic Therapy x     [] ES (un) (51765):   [] Home Management Training x  [] ES(attended) (53506)   [] Dry Needling 1-2 muscles (97198):  [] Dry Needling 3+ muscles (362284  [] Group:      [] Other:     GOALS:   Patient stated goal: walk without A device and perform steps without rail  [] Progressing: [] Met: [] Not Met: [] Adjusted    Therapist goals for Patient:   Short Term Goals: To be achieved in: 2 weeks  1. Independent in HEP and progression per patient tolerance, in order to prevent re-injury. [] Progressing: [] Met: [] Not Met: [] Adjusted  2. Patient will have a decrease in pain to facilitate improvement in movement, function, and ADLs as indicated by Functional Deficits. [] Progressing: [] Met: [] Not Met: [] Adjusted    Long Term Goals: To be achieved in: 7-9 weeks  1. Disability index score of 40-50% or less for the LEFS to assist with reaching prior level of function. [] Progressing: [] Met: [] Not Met: [] Adjusted  2. Patient will demonstrate increased AROM to 5-110  to allow for proper joint functioning as indicated by patients ability to perform sit to stand transfer without stiffness with L=R WBing.   [] Progressing: [] Met: [] Not Met: [] Adjusted  3. Patient will demonstrate an increase in Strength to at least 4-4.5/5 as well as good proximal hip strength and control to allow for proper functional mobility as indicated by patients ability to ascend/descend steps 6\" with 1 rail reciprocal on R   [] Progressing: [] Met: [] Not Met: [] Adjusted  4. Patient will return to functional activities including walking without assistive device with SPC or non A device with proper heel to toe gait without increased symptoms or restriction. [] Progressing: [] Met: [] Not Met: [] Adjusted  5.patient to be able to perform TUG test in 10\" or less with least restrictive A device by discharge. [] Progressing: [] Met: [] Not Met: [] Adjusted     Overall Progression Towards Functional goals/ Treatment Progress Update:  [] Patient is progressing as expected towards functional goals listed.     [] Progression is slowed due to complexities/Impairments listed. [] Progression has been slowed due to co-morbidities. [x] Plan just implemented, too soon to assess goals progression <30days   [] Goals require adjustment due to lack of progress  [] Patient is not progressing as expected and requires additional follow up with physician  [] Other    Persisting Functional Limitations/Impairments:  []Sitting []Standing   []Walking []Stairs   []Transfers []ADLs   []Squatting/bending []Kneeling  []Housework []Job related tasks  []Driving []Sports/Recreation   []Sleeping []Other:    ASSESSMENT:  See eval  Treatment/Activity Tolerance:  [x] Pt able to complete treatment [] Patient limited by fatique  [x] Patient limited by pain  [] Patient limited by other medical complications  [] Other:     Prognosis:  [x] Good [] Fair  [] Poor    Patient Requires Follow-up: [x] Yes  [] No    Return to Play:    [x]  N/A   []  Stage 1: Intro to Strength   []  Stage 2: Return to Run and Strength   []  Stage 3: Return to Jump and Strength   []  Stage 4: Dynamic Strength and Agility   []  Stage 5: Sport Specific Training     []  Ready to Return to Play, Meets All Above Stages   []  Not Ready for Return to Sports   Comments:            PLAN: See eval. PT 2-3x / week for 7-9 weeks. [] Continue per plan of care [] Alter current plan (see comments)  [x] Plan of care initiated [] Hold pending MD visit [] Discharge    Electronically signed by: Leslie Munroe PT, MSPT      Note: If patient does not return for scheduled/ recommended follow up visits, this note will serve as a discharge from care along with most recent update on progress.

## 2021-04-14 ENCOUNTER — TELEPHONE (OUTPATIENT)
Dept: ORTHOPEDIC SURGERY | Age: 71
End: 2021-04-14

## 2021-04-14 DIAGNOSIS — M17.11 PRIMARY OSTEOARTHRITIS OF RIGHT KNEE: ICD-10-CM

## 2021-04-14 RX ORDER — OXYCODONE HYDROCHLORIDE AND ACETAMINOPHEN 5; 325 MG/1; MG/1
1 TABLET ORAL EVERY 6 HOURS PRN
Qty: 35 TABLET | Refills: 0 | Status: SHIPPED | OUTPATIENT
Start: 2021-04-14 | End: 2021-04-23 | Stop reason: SDUPTHER

## 2021-04-14 NOTE — TELEPHONE ENCOUNTER
PATIENT IS NEEDING REFILL ON PERCOSET. PATIENT CAN BE REACHED BACK AT [phone].     [facility]: [phone]

## 2021-04-15 ENCOUNTER — HOSPITAL ENCOUNTER (OUTPATIENT)
Dept: PHYSICAL THERAPY | Age: 71
Setting detail: THERAPIES SERIES
Discharge: HOME | End: 2021-04-15
Payer: MEDICARE

## 2021-04-15 PROCEDURE — 97140 MANUAL THERAPY 1/> REGIONS: CPT

## 2021-04-15 PROCEDURE — 97110 THERAPEUTIC EXERCISES: CPT

## 2021-04-15 PROCEDURE — 97112 NEUROMUSCULAR REEDUCATION: CPT

## 2021-04-15 NOTE — TELEPHONE ENCOUNTER
I spoke to pt and let her know. Patient also states she would like to let Theador Sorrel know how wonderful she is. Patient would like [place] to call her.

## 2021-04-15 NOTE — FLOWSHEET NOTE
[facility]  Phone: [phone]   Fax: [phone]    Physical Therapy Treatment Note/ Progress Report:     Date:  4/15/2021    Patient Name:  Celina Oneill    :  1950  MRN: [id number]  Restrictions/Precautions:    Medical/Treatment Diagnosis Information:  · Diagnosis: s/p R TKR 3/16/2021  · Treatment Diagnosis: R decreased knee ROM, decreased R LE strength and flexiblity, decreased balance, abnormal gait, decreased walking endurance  Insurance/Certification information:  PT Insurance Information: Medicare  Physician Information:  Referring Practitioner: Jeannie Hadley MD  Plan of care signed (Y/N): []  Yes [x]  No     Date of Patient follow up with Physician:      Progress Report: [x]  Yes Eval  []  No     Date Range for reporting period:  Beginnin2021  Ending:     Progress report due (10 Rx/or 30 days whichever is less): visit #03 or   Recertification due (POC duration/ or 90 days whichever is less): 2021    Visit # Insurance Allowable Auth required? Date Range   - Medicare/[facility] []  Yes  [x]  No      Latex Allergy:  [x]NO      []YES  Preferred Language for Healthcare:   [x]English       []other:    Functional Scale:        Date assessed:  LEFS: raw score = 22; dysfunction = 72.5%   2021    Pain level:  5-7/10     SUBJECTIVE:  Pt doing well and working hard at home getting knee straight. Still taking pain meds, took pain pill prior to PT today.      OBJECTIVE:   4/15: knee extension: resting at lacking 5 degrees, able to achieve full extension with manual OP   Knee flexion w/ heel slide and over EOB: 90       RESTRICTIONS/PRECAUTIONSOA L knee    Exercises/Interventions:     Therapeutic Exercise (82733)  Resistance / level Sets/sec Reps Notes / Cues   Upright bike Warm up semi circles 5'  Added 4/15   Supine gluteal sets  /5\" 10    Quad sets with towel behind knee B  /\" 10 Cueing to limit gluteal co contraction   Long Sit HS stretch  1/20\" 4    Towel gastroc/HS stretch in long it  1/20\" 3    Ankle pumps  1 10    SLR flexion   1  1 3 AAROM     3 eccentric only with A    ERMI Next visit                            Therapeutic Activities (56899)       4/13/21 Pt was educated on PT POC, Diagnosis, Prognosis, pathomechanics as well as frequency and duration of scheduling future physical therapy appointments. Time was also taken on this day to answer all patient questions and participation in PT. X3' and intermittently                           Neuromuscular Re-ed (06797)       TKE w theraband blue 2 10 Added 4/15   SAQ  2 10 added 4/15   LAQ  2 10 Added 4/15                                      Manual Intervention (42503)       Knee mobs/PROM  X8' flexion in sitting and extension in long sit prmarily     Tib/Fem Mobs Seated R knee distractions Grade # X3'     Patella Mobs Superior/inferior X2'     Ankle mobs       Quad stretch off EOB  1/10\" 3 4/13; stopped due to spasms              Modalities:     Pt. Education:  -pt educated on diagnosis, prognosis and expectations for rehab  -all pt questions were answered    Home Exercise Program:  Access Code: [email or web address]/Date: 04/15/2021Prepared by: Arik Bhatia Sitting Calf Stretch with Strap - 3 x daily - 7 x weekly - 1 sets - 3 reps - 30 seconds hold   Seated Table Hamstring Stretch - 3 x daily - 7 x weekly - 1 sets - 3 reps - 30 seconds hold   Supine Knee Extension Strengthening - 2 x daily - 7 x weekly - 2 sets - 10 reps - 3 seconds hold   Seated Long Arc Quad - 2 x daily - 7 x weekly - 2 sets - 10 reps - 3 seconds hold   Standing Terminal Knee Extension with Resistance - 2 x daily - 7 x weekly - 2 sets - 10 reps - 3 seconds hold      Therapeutic Exercise and NMR EXR  [] (63346) Provided verbal/tactile cueing for activities related to strengthening, flexibility, endurance, ROM for improvements in LE, proximal hip, and core control with self care, mobility, lifting, ambulation.  [] (60085) Provided verbal/tactile cueing for activities related to improving balance, coordination, kinesthetic sense, posture, motor skill, proprioception  to assist with LE, proximal hip, and core control in self care, mobility, lifting, ambulation and eccentric single leg control.   [] (99812) Therapist is in constant attendance of 2 or more patients providing skilled therapy interventions, but not providing any significant amount of measurable one-on-one time to either patient, for improvements in LE, proximal hip, and core control in self care, mobility, lifting, ambulation and eccentric single leg control.      NMR and Therapeutic Activities:    [] (23845 or 68329) Provided verbal/tactile cueing for activities related to improving balance, coordination, kinesthetic sense, posture, motor skill, proprioception and motor activation to allow for proper function of core, proximal hip and LE with self care and ADLs  [] (27010) Gait Re-education- Provided training and instruction to the patient for proper LE, core and proximal hip recruitment and positioning and eccentric body weight control with ambulation re-education including up and down stairs     Home Exercise Program:    [x] (99914) Reviewed/Progressed HEP activities related to strengthening, flexibility, endurance, ROM of core, proximal hip and LE for functional self-care, mobility, lifting and ambulation/stair navigation   [] (91310)Reviewed/Progressed HEP activities related to improving balance, coordination, kinesthetic sense, posture, motor skill, proprioception of core, proximal hip and LE for self care, mobility, lifting, and ambulation/stair navigation      Manual Treatments:  PROM / STM / Oscillations-Mobs:  G-I, II, III, IV (PA's, Inf., Post.)  [] (03659) Provided manual therapy to mobilize LE, proximal hip and/or LS spine soft tissue/joints for the purpose of modulating pain, promoting relaxation,  increasing ROM, reducing/eliminating soft tissue swelling/inflammation/restriction, improving soft tissue extensibility and allowing for proper ROM for normal function with self care, mobility, lifting and ambulation. Modalities:  [] (51177) Vasopneumatic compression: Utilized vasopneumatic compression to decrease edema / swelling for the purpose of improving mobility and quad tone / recruitment which will allow for increased overall function including but not limited to self-care, transfers, ambulation, and ascending / descending stairs. 4/15:   Ice x10 min    Charges:  Timed Code Treatment Minutes: 45   Total Treatment Minutes: 60     [] EVAL - LOW (18698)   [] EVAL - MOD (00862)  [] EVAL - HIGH (75786)  [] RE-EVAL (93676)  [x] AQ(82298) x  1     [] Ionto  [x] NMR (08980) x 1      [] Vaso  [x] Manual (72671) x  1     [] Ultrasound  [] TA x        [] Mech Traction (05345)  [] Aquatic Therapy x      [] ES (un) (42401):   [] Home Management Training x  [] ES(attended) (60106)   [] Dry Needling 1-2 muscles (91509):  [] Dry Needling 3+ muscles (192222  [] Group:      [] Other:     GOALS:   Patient stated goal: walk without A device and perform steps without rail  [] Progressing: [] Met: [] Not Met: [] Adjusted    Therapist goals for Patient:   Short Term Goals: To be achieved in: 2 weeks  1. Independent in HEP and progression per patient tolerance, in order to prevent re-injury. [] Progressing: [] Met: [] Not Met: [] Adjusted  2. Patient will have a decrease in pain to facilitate improvement in movement, function, and ADLs as indicated by Functional Deficits. [] Progressing: [] Met: [] Not Met: [] Adjusted    Long Term Goals: To be achieved in: 7-9 weeks  1. Disability index score of 40-50% or less for the LEFS to assist with reaching prior level of function. [] Progressing: [] Met: [] Not Met: [] Adjusted  2.  Patient will demonstrate increased AROM to 5-110  to allow for proper joint functioning as indicated by patients ability to perform sit to stand transfer without stiffness with L=R WBing.   [] Progressing: [] Met: [] Not Met: [] Adjusted  3. Patient will demonstrate an increase in Strength to at least 4-4.5/5 as well as good proximal hip strength and control to allow for proper functional mobility as indicated by patients ability to ascend/descend steps 6\" with 1 rail reciprocal on R   [] Progressing: [] Met: [] Not Met: [] Adjusted  4. Patient will return to functional activities including walking without assistive device with SPC or non A device with proper heel to toe gait without increased symptoms or restriction. [] Progressing: [] Met: [] Not Met: [] Adjusted  5.patient to be able to perform TUG test in 10\" or less with least restrictive A device by discharge. [] Progressing: [] Met: [] Not Met: [] Adjusted     Overall Progression Towards Functional goals/ Treatment Progress Update:  [] Patient is progressing as expected towards functional goals listed. [] Progression is slowed due to complexities/Impairments listed. [] Progression has been slowed due to co-morbidities.   [x] Plan just implemented, too soon to assess goals progression <30days   [] Goals require adjustment due to lack of progress  [] Patient is not progressing as expected and requires additional follow up with physician  [] Other    Persisting Functional Limitations/Impairments:  []Sitting []Standing   []Walking []Stairs   []Transfers []ADLs   []Squatting/bending []Kneeling  []Housework []Job related tasks  []Driving []Sports/Recreation   []Sleeping []Other:    ASSESSMENT:  Pt demonstrates improved extension this date compared to previous visit with ability to achieve full extension with manual overpressure, however, demonstrates decreased knee flexion this date compared to eval.  Discussed with pt importance of working on knee flexion to improve function and will plan to add ERMI next visit to focus on improving flexion motion. Pt demonstrates improving quad set with cueing to limit gluteal co contraction. Upgrades made to routine this date as noted on flow sheet for focus on improving quad strength and stability. Pt performed exercises without report of increased pain but was challenged due to weakness and limited endurance. Continue to upgrade routine as tolerated to progress toward goals and improve function. Treatment/Activity Tolerance:  [x] Pt able to complete treatment [] Patient limited by fatique  [x] Patient limited by pain  [] Patient limited by other medical complications  [] Other:     Prognosis:  [x] Good [] Fair  [] Poor    Patient Requires Follow-up: [x] Yes  [] No    Return to Play:    [x]  N/A   []  Stage 1: Intro to Strength   []  Stage 2: Return to Run and Strength   []  Stage 3: Return to Jump and Strength   []  Stage 4: Dynamic Strength and Agility   []  Stage 5: Sport Specific Training   []  Ready to Return to Play, Meets All Above Stages   []  Not Ready for Return to Sports   Comments:            PLAN: See eval. PT 2-3x / week for 7-9 weeks. [] Continue per plan of care [] Alter current plan (see comments)  [x] Plan of care initiated [] Hold pending MD visit [] Discharge    Electronically signed by: Yadi Slade      Note: If patient does not return for scheduled/ recommended follow up visits, this note will serve as a discharge from care along with most recent update on progress.

## 2021-04-17 ENCOUNTER — HOSPITAL ENCOUNTER (OUTPATIENT)
Dept: PHYSICAL THERAPY | Age: 71
Setting detail: THERAPIES SERIES
Discharge: HOME | End: 2021-04-17
Payer: MEDICARE

## 2021-04-17 PROCEDURE — 97140 MANUAL THERAPY 1/> REGIONS: CPT

## 2021-04-17 PROCEDURE — 97110 THERAPEUTIC EXERCISES: CPT

## 2021-04-17 NOTE — FLOWSHEET NOTE
[facility]  Phone: [phone]   Fax: [phone]    Physical Therapy Treatment Note/ Progress Report:     Date:  2021    Patient Name:  Faith Goldman    :  1950  MRN: [id number]  Restrictions/Precautions:    Medical/Treatment Diagnosis Information:  · Diagnosis: s/p R TKR 3/16/2021  · Treatment Diagnosis: R decreased knee ROM, decreased R LE strength and flexiblity, decreased balance, abnormal gait, decreased walking endurance  Insurance/Certification information:  PT Insurance Information: Medicare  Physician Information:  Referring Practitioner: Bindu Enriquez MD  Plan of care signed (Y/N): []  Yes [x]  No     Date of Patient follow up with Physician:      Progress Report: []  Yes  [x]  No     Date Range for reporting period:  Beginnin2021  Ending:     Progress report due (10 Rx/or 30 days whichever is less): visit #39 or   Recertification due (POC duration/ or 90 days whichever is less): 2021    Visit # Insurance Allowable Auth required? Date Range   3/18- Medicare/[facility] []  Yes  [x]  No      Latex Allergy:  [x]NO      []YES  Preferred Language for Healthcare:   [x]English       []other:    Functional Scale:        Date assessed:  LEFS: raw score = 22; dysfunction = 72.5%   2021    Pain level:  5/10     SUBJECTIVE:  Pt did take a pain pill this morning, but only about 30 min ago. She has been trying to walk everyday, at least 1 mile total every day. She feels more stiff this morning, which could partly be due to the fact that she hasn't moved around a lot yet.      OBJECTIVE:   : Knee flexion w/ heel slide and OP: 94 deg; did not allow therapist to stretch into extension  4/15: knee extension: resting at lacking 5 degrees, able to achieve full extension with manual OP   Knee flexion w/ heel slide and over EOB: 90       RESTRICTIONS/PRECAUTIONSOA L knee    Exercises/Interventions:     Therapeutic Exercise (43135)  Resistance / level Sets/sec Reps Notes / Cues   Upright bike      Sci[employer] StepOne Warm up semi circles       5 min  Added 4/15      Progressed from seat 23>seat 19   IB - gastroc  30\"  3    HR/TR  3 10    HSS  30\"  2 R    HFS  30\"  2 R           Supine gluteal sets     Quad sets with towel behind knee B  Cueing to limit gluteal co contraction   Long Sit HS stretch     Towel gastroc/HS stretch in long it     Ankle pumps     SLR flexion      ERMI  10\" 6                         Therapeutic Activities (22641)       4/13/21 Pt was educated on PT POC, Diagnosis, Prognosis, pathomechanics as well as frequency and duration of scheduling future physical therapy appointments. Time was also taken on this day to answer all patient questions and participation in PT. Neuromuscular Re-ed (28454)       TKE w theraband blue 2 10 Added 4/15   SAQ  2 10 added 4/15   LAQ   2 10 Added 4/15                                      Manual Intervention (71180)       Knee mobs/PROM  X8' flexion in sitting and      Tib/Fem Mobs Seated R knee distractions Grade #    Patella Mobs Superior/inferior    Ankle mobs     Quad stretch off EOB  4/13; stopped due to spasms              Modalities:   4/15:   Ice x10 min  4/17: Pt did not have time for ice, ride was here to pick her up. Pt stated she would ice at home    Pt. Education:  -pt educated on diagnosis, prognosis and expectations for rehab  -all pt questions were answered    Home Exercise Program:  Access Code: [email or web address]: [email or web address]/Date: 04/15/2021Prepared by: Mandeep Mejía Sitting Calf Stretch with Strap - 3 x daily - 7 x weekly - 1 sets - 3 reps - 30 seconds hold   Seated Table Hamstring Stretch - 3 x daily - 7 x weekly - 1 sets - 3 reps - 30 seconds hold   Supine Knee Extension Strengthening - 2 x daily - 7 x weekly - 2 sets - 10 reps - 3 seconds hold   Seated Long Arc Quad - 2 x daily - 7 x weekly - 2 sets - 10 reps - 3 seconds hold   Standing Terminal Knee Extension with Resistance - 2 x daily - 7 x weekly - 2 sets - 10 reps - 3 seconds hold      Therapeutic Exercise and NMR EXR  [] (22769) Provided verbal/tactile cueing for activities related to strengthening, flexibility, endurance, ROM for improvements in LE, proximal hip, and core control with self care, mobility, lifting, ambulation.  [] (87661) Provided verbal/tactile cueing for activities related to improving balance, coordination, kinesthetic sense, posture, motor skill, proprioception  to assist with LE, proximal hip, and core control in self care, mobility, lifting, ambulation and eccentric single leg control.   [] (98790) Therapist is in constant attendance of 2 or more patients providing skilled therapy interventions, but not providing any significant amount of measurable one-on-one time to either patient, for improvements in LE, proximal hip, and core control in self care, mobility, lifting, ambulation and eccentric single leg control.      NMR and Therapeutic Activities:    [] (30869 or 62710) Provided verbal/tactile cueing for activities related to improving balance, coordination, kinesthetic sense, posture, motor skill, proprioception and motor activation to allow for proper function of core, proximal hip and LE with self care and ADLs  [] (17790) Gait Re-education- Provided training and instruction to the patient for proper LE, core and proximal hip recruitment and positioning and eccentric body weight control with ambulation re-education including up and down stairs     Home Exercise Program:    [x] (40574) Reviewed/Progressed HEP activities related to strengthening, flexibility, endurance, ROM of core, proximal hip and LE for functional self-care, mobility, lifting and ambulation/stair navigation   [] (50958)Reviewed/Progressed HEP activities related to improving balance, coordination, kinesthetic sense, posture, motor skill, proprioception of core, proximal hip EOB, and after measuring in supine with heel slide + OP, pt became very tearful and did not want therapist to stretch into knee extension. Added EMRI this date to improve knee flexion ROM. Pt encouraged to take pain medication early than she did today so she can have overall better tolerance of session and pt agreeable. Will continue to progress ROM and strength as able. Treatment/Activity Tolerance:  [x] Pt able to complete treatment [] Patient limited by fatique  [x] Patient limited by pain  [] Patient limited by other medical complications  [] Other:     Prognosis:  [x] Good [] Fair  [] Poor    Patient Requires Follow-up: [x] Yes  [] No    Return to Play:    [x]  N/A   []  Stage 1: Intro to Strength   []  Stage 2: Return to Run and Strength   []  Stage 3: Return to Jump and Strength   []  Stage 4: Dynamic Strength and Agility   []  Stage 5: Sport Specific Training   []  Ready to Return to Play, Meets All Above Stages   []  Not Ready for Return to Sports   Comments:            PLAN: See eval. PT 2-3x / week for 7-9 weeks. [x] Continue per plan of care [] Alter current plan (see comments)  [] Plan of care initiated [] Hold pending MD visit [] Discharge    Electronically signed by: Christos Rushing PT DPT      Note: If patient does not return for scheduled/ recommended follow up visits, this note will serve as a discharge from care along with most recent update on progress.

## 2021-04-19 ENCOUNTER — HOSPITAL ENCOUNTER (OUTPATIENT)
Dept: PHYSICAL THERAPY | Age: 71
Setting detail: THERAPIES SERIES
Discharge: HOME | End: 2021-04-19
Payer: MEDICARE

## 2021-04-19 PROCEDURE — 97140 MANUAL THERAPY 1/> REGIONS: CPT

## 2021-04-19 PROCEDURE — 97110 THERAPEUTIC EXERCISES: CPT

## 2021-04-19 RX ORDER — PRAMIPEXOLE DIHYDROCHLORIDE 0.5 MG/1
TABLET ORAL
Qty: 42 TABLET | Refills: 0 | OUTPATIENT
Start: 2021-04-19

## 2021-04-19 NOTE — TELEPHONE ENCOUNTER
Received refill request from [facility] for Mirapex. Spoke with patient. She states she was given #30 on 04/02/21 which is only a 10 day supply. She will run out tomorrow. She is requesting enough to hold her over until her next appt on 05/03/21.

## 2021-04-19 NOTE — TELEPHONE ENCOUNTER
No.  She was only given #30 which is only 10 days worth (that is what pharmacy requested). She actually needs 14 more days to hold her over until her next appt.

## 2021-04-19 NOTE — FLOWSHEET NOTE
related to improving balance, coordination, kinesthetic sense, posture, motor skill, proprioception of core, proximal hip and LE for self care, mobility, lifting, and ambulation/stair navigation      Manual Treatments:  PROM / STM / Oscillations-Mobs:  G-I, II, III, IV (PA's, Inf., Post.)  [x] (96875) Provided manual therapy to mobilize LE, proximal hip and/or LS spine soft tissue/joints for the purpose of modulating pain, promoting relaxation,  increasing ROM, reducing/eliminating soft tissue swelling/inflammation/restriction, improving soft tissue extensibility and allowing for proper ROM for normal function with self care, mobility, lifting and ambulation. Modalities:  [] (07424) Vasopneumatic compression: Utilized vasopneumatic compression to decrease edema / swelling for the purpose of improving mobility and quad tone / recruitment which will allow for increased overall function including but not limited to self-care, transfers, ambulation, and ascending / descending stairs. Charges:  Timed Code Treatment Minutes: 42   Total Treatment Minutes: 42     [] EVAL - LOW (04422)   [] EVAL - MOD (94301)  [] EVAL - HIGH (50795)  [] RE-EVAL (95561)  [x] YT(33119) x  2     [] Ionto  [] NMR (50006) x       [] Vaso  [x] Manual (07818) x  1     [] Ultrasound  [] TA x        [] Mech Traction (70189)  [] Aquatic Therapy x      [] ES (un) (08507):   [] Home Management Training x  [] ES(attended) (98285)   [] Dry Needling 1-2 muscles (26981):  [] Dry Needling 3+ muscles (743976  [] Group:      [] Other:     GOALS:   Patient stated goal: walk without A device and perform steps without rail  [] Progressing: [] Met: [] Not Met: [] Adjusted    Therapist goals for Patient:   Short Term Goals: To be achieved in: 2 weeks  1. Independent in HEP and progression per patient tolerance, in order to prevent re-injury. [] Progressing: [] Met: [] Not Met: [] Adjusted  2.  Patient will have a decrease in pain to facilitate improvement in movement, function, and ADLs as indicated by Functional Deficits. [] Progressing: [] Met: [] Not Met: [] Adjusted    Long Term Goals: To be achieved in: 7-9 weeks  1. Disability index score of 40-50% or less for the LEFS to assist with reaching prior level of function. [] Progressing: [] Met: [] Not Met: [] Adjusted  2. Patient will demonstrate increased AROM to 5-110  to allow for proper joint functioning as indicated by patients ability to perform sit to stand transfer without stiffness with L=R WBing.   [] Progressing: [] Met: [] Not Met: [] Adjusted  3. Patient will demonstrate an increase in Strength to at least 4-4.5/5 as well as good proximal hip strength and control to allow for proper functional mobility as indicated by patients ability to ascend/descend steps 6\" with 1 rail reciprocal on R   [] Progressing: [] Met: [] Not Met: [] Adjusted  4. Patient will return to functional activities including walking without assistive device with SPC or non A device with proper heel to toe gait without increased symptoms or restriction. [] Progressing: [] Met: [] Not Met: [] Adjusted  5.patient to be able to perform TUG test in 10\" or less with least restrictive A device by discharge. [] Progressing: [] Met: [] Not Met: [] Adjusted     Overall Progression Towards Functional goals/ Treatment Progress Update:  [] Patient is progressing as expected towards functional goals listed. [] Progression is slowed due to complexities/Impairments listed. [] Progression has been slowed due to co-morbidities.   [x] Plan just implemented, too soon to assess goals progression <30days   [] Goals require adjustment due to lack of progress  [] Patient is not progressing as expected and requires additional follow up with physician  [] Other    Persisting Functional Limitations/Impairments:  []Sitting []Standing   []Walking []Stairs   []Transfers []ADLs   []Squatting/bending []Kneeling  []Housework []Job related tasks  []Driving []Sports/Recreation   []Sleeping []Other:    ASSESSMENT:  Pt improving active and PROM this date with decreased pain expressed when compared to previous visit. Pt challenged with supine SLR and required Min/ModA from clinician 2/2 HF weakness. Pt educated on improving and maintaining gait mechanics to eliminate LE ER compensations and improve quad facilitation during toe off phase. Will continue to progress ROM and strength as able. Treatment/Activity Tolerance:  [x] Pt able to complete treatment [] Patient limited by fatique  [x] Patient limited by pain  [] Patient limited by other medical complications  [] Other:     Prognosis:  [x] Good [] Fair  [] Poor    Patient Requires Follow-up: [x] Yes  [] No    Return to Play:    [x]  N/A   []  Stage 1: Intro to Strength   []  Stage 2: Return to Run and Strength   []  Stage 3: Return to Jump and Strength   []  Stage 4: Dynamic Strength and Agility   []  Stage 5: Sport Specific Training   []  Ready to Return to Play, Meets All Above Stages   []  Not Ready for Return to Sports   Comments:            PLAN: See eval. PT 2-3x / week for 7-9 weeks. [x] Continue per plan of care [] Alter current plan (see comments)  [] Plan of care initiated [] Hold pending MD visit [] Discharge    Electronically signed by: Cooper Mercedes [facility], [id number]       Note: If patient does not return for scheduled/ recommended follow up visits, this note will serve as a discharge from care along with most recent update on progress.

## 2021-04-20 RX ORDER — PRAMIPEXOLE DIHYDROCHLORIDE 0.5 MG/1
TABLET ORAL
Qty: 31 TABLET | Refills: 0 | Status: SHIPPED | OUTPATIENT
Start: 2021-04-20 | End: 2021-05-03 | Stop reason: SDUPTHER

## 2021-04-21 ENCOUNTER — HOSPITAL ENCOUNTER (OUTPATIENT)
Dept: PHYSICAL THERAPY | Age: 71
Setting detail: THERAPIES SERIES
Discharge: HOME | End: 2021-04-21
Payer: MEDICARE

## 2021-04-23 ENCOUNTER — HOSPITAL ENCOUNTER (OUTPATIENT)
Dept: PHYSICAL THERAPY | Age: 71
Setting detail: THERAPIES SERIES
Discharge: HOME | End: 2021-04-23
Payer: MEDICARE

## 2021-04-23 ENCOUNTER — OFFICE VISIT (OUTPATIENT)
Dept: ORTHOPEDIC SURGERY | Age: 71
End: 2021-04-23

## 2021-04-23 VITALS — BODY MASS INDEX: 38.41 KG/M2 | HEIGHT: 64 IN | WEIGHT: 225 LBS

## 2021-04-23 DIAGNOSIS — M17.11 PRIMARY OSTEOARTHRITIS OF RIGHT KNEE: ICD-10-CM

## 2021-04-23 PROCEDURE — 97110 THERAPEUTIC EXERCISES: CPT

## 2021-04-23 PROCEDURE — 97140 MANUAL THERAPY 1/> REGIONS: CPT

## 2021-04-23 PROCEDURE — 99024 POSTOP FOLLOW-UP VISIT: CPT | Performed by: PHYSICIAN ASSISTANT

## 2021-04-23 RX ORDER — OXYCODONE HYDROCHLORIDE AND ACETAMINOPHEN 5; 325 MG/1; MG/1
1 TABLET ORAL EVERY 6 HOURS PRN
Qty: 35 TABLET | Refills: 0 | Status: SHIPPED | OUTPATIENT
Start: 2021-04-23 | End: 2021-05-03 | Stop reason: SDUPTHER

## 2021-04-23 NOTE — FLOWSHEET NOTE
Derian Haley  Phone: [phone]   Fax: [phone]    Physical Therapy Treatment Note/ Progress Report:     Date:  2021    Patient Name:  Kiki Mooney    :  1950  MRN: [id number]  Restrictions/Precautions:    Medical/Treatment Diagnosis Information:  · Diagnosis: s/p R TKR 3/16/2021  · Treatment Diagnosis: R decreased knee ROM, decreased R LE strength and flexiblity, decreased balance, abnormal gait, decreased walking endurance  Insurance/Certification information:  PT Insurance Information: Medicare  Physician Information:  Referring Practitioner: Tatum Robledo MD  Plan of care signed (Y/N): []  Yes [x]  No     Date of Patient follow up with Physician:      Progress Report: []  Yes  [x]  No     Date Range for reporting period:  Beginnin2021  Ending:     Progress report due (10 Rx/or 30 days whichever is less): visit #88 or   Recertification due (POC duration/ or 90 days whichever is less): 2021    Visit # Insurance Allowable Auth required? Date Range   - Medicare/[facility] []  Yes  [x]  No      Latex Allergy:  [x]NO      []YES  Preferred Language for Healthcare:   [x]English       []other:    Functional Scale:        Date assessed:  LEFS: raw score = 22; dysfunction = 72.5%   2021    Pain level:  4/10     SUBJECTIVE:  Pt feels like she is doing better and states she is doing stretches at home.      OBJECTIVE:  Measure ROM next visit   : Pt was 7 minutes late today  : AROM R Knee Flex: 94 at EOB  PROM R Knee Flex: 100 at EOB   AROM R Knee: Ext: Lacking 4 in                    supine   : Knee flexion w/ heel slide and OP: 94 deg; did not allow therapist to stretch into extension  4/15: knee extension: resting at lacking 5 degrees, able to achieve full extension with manual OP   Knee flexion w/ heel slide and over EOB: 90       RESTRICTIONS/PRECAUTIONSOA L knee    Exercises/Interventions: Therapeutic Exercise (81647)  Resistance / level Sets/sec Reps Notes / Cues   bike-recumbent       SciFit StepOne Full Rev 3'      5 min  Added 4/15      Progressed from seat 23>seat 19   IB - gastroc  30\"  3    Step Stretch  8\"  2 10 4/19-1 set with heel down           1 set with heel raise    HR/TR  3 10    HSS  30\"  2 R    HFS  30\"  2 R           Supine gluteal sets     Quad sets with towel behind knee B  Cueing to limit gluteal co contraction   Long Sit HS stretch     Towel gastroc/HS stretch in long it     Ankle pumps     SLR flexion   2    54/19-Min/Mod A from clinician  4/23- decreased reps due to pain                        Therapeutic Activities (71540)       4/13/21 Pt was educated on PT POC, Diagnosis, Prognosis, pathomechanics as well as frequency and duration of scheduling future physical therapy appointments. Time was also taken on this day to answer all patient questions and participation in PT. Neuromuscular Re-ed (98984)       SAQ  2/3\"H 10 added 4/15   LAQ   2 10 Added 4/15                                      Manual Intervention (59269)       Knee mobs/PROM  X8' flexion in sitting and      Tib/Fem Mobs Seated R knee distractions Grade # X3'   Patella Mobs Superior/inferior X2'   Ankle mobs     Quad stretch off EOB  1/10\" 3 4/13; stopped due to spasms              Modalities:   4/15:   Ice x10 min  4/17: Pt did not have time for ice, ride was here to pick her up. Pt stated she would ice at home    Pt. Education:  -pt educated on diagnosis, prognosis and expectations for rehab  -all pt questions were answered    Home Exercise Program:  Access Code: [email or web address]: [email or web address]: 04/15/2021Prepared by: Marcelo Mirza Sitting Calf Stretch with Strap - 3 x daily - 7 x weekly - 1 sets - 3 reps - 30 seconds hold   Seated Table Hamstring Stretch - 3 x daily - 7 x weekly - 1 sets - 3 reps - 30 seconds hold   Supine Knee Extension Strengthening - 2 x daily - 7 x weekly - 2 sets - 10 reps - 3 seconds hold   Seated Long Arc Quad - 2 x daily - 7 x weekly - 2 sets - 10 reps - 3 seconds hold   Standing Terminal Knee Extension with Resistance - 2 x daily - 7 x weekly - 2 sets - 10 reps - 3 seconds hold      Therapeutic Exercise and NMR EXR  [] (27844) Provided verbal/tactile cueing for activities related to strengthening, flexibility, endurance, ROM for improvements in LE, proximal hip, and core control with self care, mobility, lifting, ambulation.  [] (02493) Provided verbal/tactile cueing for activities related to improving balance, coordination, kinesthetic sense, posture, motor skill, proprioception  to assist with LE, proximal hip, and core control in self care, mobility, lifting, ambulation and eccentric single leg control.   [] (45927) Therapist is in constant attendance of 2 or more patients providing skilled therapy interventions, but not providing any significant amount of measurable one-on-one time to either patient, for improvements in LE, proximal hip, and core control in self care, mobility, lifting, ambulation and eccentric single leg control.      NMR and Therapeutic Activities:    [] (92488 or 00393) Provided verbal/tactile cueing for activities related to improving balance, coordination, kinesthetic sense, posture, motor skill, proprioception and motor activation to allow for proper function of core, proximal hip and LE with self care and ADLs  [] (12070) Gait Re-education- Provided training and instruction to the patient for proper LE, core and proximal hip recruitment and positioning and eccentric body weight control with ambulation re-education including up and down stairs     Home Exercise Program:    [x] (39983) Reviewed/Progressed HEP activities related to strengthening, flexibility, endurance, ROM of core, proximal hip and LE for functional self-care, mobility, lifting and ambulation/stair navigation   [] (31655)Reviewed/Progressed HEP activities related to improving balance, coordination, kinesthetic sense, posture, motor skill, proprioception of core, proximal hip and LE for self care, mobility, lifting, and ambulation/stair navigation      Manual Treatments:  PROM / STM / Oscillations-Mobs:  G-I, II, III, IV (PA's, Inf., Post.)  [x] (13108) Provided manual therapy to mobilize LE, proximal hip and/or LS spine soft tissue/joints for the purpose of modulating pain, promoting relaxation,  increasing ROM, reducing/eliminating soft tissue swelling/inflammation/restriction, improving soft tissue extensibility and allowing for proper ROM for normal function with self care, mobility, lifting and ambulation. Modalities:  [] (46290) Vasopneumatic compression: Utilized vasopneumatic compression to decrease edema / swelling for the purpose of improving mobility and quad tone / recruitment which will allow for increased overall function including but not limited to self-care, transfers, ambulation, and ascending / descending stairs. Charges:  Timed Code Treatment Minutes: 45   Total Treatment Minutes: 45     [] EVAL - LOW (75298)   [] EVAL - MOD (59905)  [] EVAL - HIGH (99841)  [] RE-EVAL (93622)  [x] ON(97962) x  2     [] Ionto  [] NMR (86535) x       [] Vaso  [x] Manual (29149) x  1     [] Ultrasound  [] TA x        [] Mech Traction (79463)  [] Aquatic Therapy x      [] ES (un) (97978):   [] Home Management Training x  [] ES(attended) (86067)   [] Dry Needling 1-2 muscles (29998):  [] Dry Needling 3+ muscles (660449  [] Group:      [] Other:     GOALS:   Patient stated goal: walk without A device and perform steps without rail  [] Progressing: [] Met: [] Not Met: [] Adjusted    Therapist goals for Patient:   Short Term Goals: To be achieved in: 2 weeks  1. Independent in HEP and progression per patient tolerance, in order to prevent re-injury. [] Progressing: [] Met: [] Not Met: [] Adjusted  2.  Patient will have a decrease in pain to facilitate improvement in movement, function, and ADLs as indicated by Functional Deficits. [] Progressing: [] Met: [] Not Met: [] Adjusted    Long Term Goals: To be achieved in: 7-9 weeks  1. Disability index score of 40-50% or less for the LEFS to assist with reaching prior level of function. [] Progressing: [] Met: [] Not Met: [] Adjusted  2. Patient will demonstrate increased AROM to 5-110  to allow for proper joint functioning as indicated by patients ability to perform sit to stand transfer without stiffness with L=R WBing.   [] Progressing: [] Met: [] Not Met: [] Adjusted  3. Patient will demonstrate an increase in Strength to at least 4-4.5/5 as well as good proximal hip strength and control to allow for proper functional mobility as indicated by patients ability to ascend/descend steps 6\" with 1 rail reciprocal on R   [] Progressing: [] Met: [] Not Met: [] Adjusted  4. Patient will return to functional activities including walking without assistive device with SPC or non A device with proper heel to toe gait without increased symptoms or restriction. [] Progressing: [] Met: [] Not Met: [] Adjusted  5.patient to be able to perform TUG test in 10\" or less with least restrictive A device by discharge. [] Progressing: [] Met: [] Not Met: [] Adjusted     Overall Progression Towards Functional goals/ Treatment Progress Update:  [] Patient is progressing as expected towards functional goals listed. [] Progression is slowed due to complexities/Impairments listed. [] Progression has been slowed due to co-morbidities.   [x] Plan just implemented, too soon to assess goals progression <30days   [] Goals require adjustment due to lack of progress  [] Patient is not progressing as expected and requires additional follow up with physician  [] Other    Persisting Functional Limitations/Impairments:  []Sitting []Standing   []Walking []Stairs   []Transfers []ADLs   []Squatting/bending []Kneeling  []Housework []Job related tasks  []Driving []Sports/Recreation   []Sleeping []Other:    ASSESSMENT: Pt was late this visit and continues to present with decreased pain at this visit and was able to complete treatment with no complaint. Still challenged with supine SLR requiring ModA this treatment session and performed reduced amount reps due to increase in pain. Required cueing for quad activation during SAQ and not compensating with glutes in order to increase strength and improve knee extension. Continue with skilled therapy to reduce pain and improve strength and mobility in order to return to functional activities without restrictions. Treatment/Activity Tolerance:  [x] Pt able to complete treatment [] Patient limited by fatique  [x] Patient limited by pain  [] Patient limited by other medical complications  [] Other:     Prognosis:  [x] Good [] Fair  [] Poor    Patient Requires Follow-up: [x] Yes  [] No    Return to Play:    [x]  N/A   []  Stage 1: Intro to Strength   []  Stage 2: Return to Run and Strength   []  Stage 3: Return to Jump and Strength   []  Stage 4: Dynamic Strength and Agility   []  Stage 5: Sport Specific Training   []  Ready to Return to Play, Meets All Above Stages   []  Not Ready for Return to Sports   Comments:            PLAN: See eval. PT 2-3x / week for 7-9 weeks. [x] Continue per plan of care [] Alter current plan (see comments)  [] Plan of care initiated [] Hold pending MD visit [] Discharge    Electronically signed by: Pameal Guo [facility], [address] [place] [address], [facility]    Session observed and directed by Margarita Tejada [facility] [id number]    Note: If patient does not return for scheduled/ recommended follow up visits, this note will serve as a discharge from care along with most recent update on progress.

## 2021-04-23 NOTE — PROGRESS NOTES
Patient Name: Joan Jett  Medical Record Number: [id number]  YOB: 1950  Date of Encounter: 4/23/2021     Chief Complaint   Patient presents with    Post-Op Check     f/u check for RT TKA, dos 3/16/2021        Total Knee Follow-up  Joan Jett is here for 5 weeks post right total knee arthroplasty follow-up. Pain is controlled with oxycodone. The patient denies fever, wound drainage, increasing redness, pus, increasing pain, increasing swelling. Post op problems reported: none. She is ambulating using a walker. She is working with physical therapy. Patient is still taking Vitamin D supplementation. DVT prophylaxis is chronic anticoagulation with Xarelto. The patient's  past medical history, medications, allergies,  family history, social history, and review of systems have been reviewed, and dated and are recorded in the chart under the 'MEDIA\" tab. Physical Exam:   Ms. Joan Jett appears well, she is in no apparent distress, she demonstrates appropriate mood & affect. She is alert and oriented to person, place and time. Ht 5' 4\" (1.626 m)   Wt 225 lb (102.1 kg)   BMI 38.62 kg/m²     Right Knee: She has mild swelling which is resolving as expected. The incision is clean, dry, intact and nontender and without erythema or induration. Range of motion from +4 to 100 degrees. She has minimal pain with range of motion of the knee. Patient has 4-/5 motor strength with flexion and extension of the right knee. She is able to do straight leg raises without difficulty. Patient is notwearing her bilateral ELIZABETH stockings. There is no right lower extremity edema. She is neurovascularly intact distally. Impression:    Diagnosis Orders   1. 3/16/21 RIGHT TKA  oxyCODONE-acetaminophen (PERCOCET) 5-325 MG per tablet       Plan: At this time, she will continue physical therapy. She will continue with home exercises and stretches.   She is given a refill of oxycodone today but has been weaning down on the medication. Follow up will be in 5 week(s) and radiology of the right knee will be obtained. Patient will return to the office sooner for worsening or changes in symptoms. Marivel Amato was informed of the results of any imaging. We discussed her postop course to date and a time was given to answer questions. She understand and accepts this course of care. Electronically signed by Stu Gomez PA-C on 7/25/2183  Board Certified [facility]    Please note that portions of this note were completed with a voice recognition program.  Efforts were made to edit the dictations but occasionally words are mis-transcribed.

## 2021-04-26 ENCOUNTER — HOSPITAL ENCOUNTER (OUTPATIENT)
Dept: PHYSICAL THERAPY | Age: 71
Setting detail: THERAPIES SERIES
Discharge: HOME | End: 2021-04-26
Payer: MEDICARE

## 2021-04-26 PROCEDURE — 97140 MANUAL THERAPY 1/> REGIONS: CPT

## 2021-04-26 PROCEDURE — 97110 THERAPEUTIC EXERCISES: CPT

## 2021-04-26 NOTE — FLOWSHEET NOTE
therapist to stretch into extension  4/15: knee extension: resting at lacking 5 degrees, able to achieve full extension with manual OP   Knee flexion w/ heel slide and over EOB: 90       RESTRICTIONS/PRECAUTIONSOA L knee    Exercises/Interventions:     Therapeutic Exercise (41030)  Resistance / level Sets/sec Reps Notes / Cues   bike-recumbent       SciFit StepOne Full Rev 3'        Added 4/15         IB - gastroc  30\"  3    Step Stretch  8\"  2 10 4/19-1 set with heel down           1 set with heel raise    HR/TR  3 10    HSS  30\"  2 R    HFS  30\"  2 R           Supine gluteal sets     Quad sets with towel behind knee B  Cueing to limit gluteal co contraction   Long Sit HS stretch     Towel gastroc/HS stretch in long it     Ankle pumps     SLR flexion resume next visit  4  2   5  5 4/19-Min/Mod A from clinician  4/23- decreased reps due to pain  4/26 due to time   ERMI  10\" 6                      Therapeutic Activities (60099)       4/13/21 Pt was educated on PT POC, Diagnosis, Prognosis, pathomechanics as well as frequency and duration of scheduling future physical therapy appointments. Time was also taken on this day to answer all patient questions and participation in PT. Mini squats next visit                     Neuromuscular Re-ed (44799)       SAQ  2/3\"H 10 added 4/15   LAQ   2 10 Added 4/15   Step ups 4'/6' 1/1 10 Added 4/26                               Manual Intervention (92015)       Knee mobs/PROM  X8' flexion in sitting and      Tib/Fem Mobs Seated R knee distractions Grade # X3'   Patella Mobs Superior/inferior X2'   Ankle mobs     Quad stretch off EOB  1/10\" 3 4/13; stopped due to spasms              Modalities:   4/15:   Ice x10 min  4/17: Pt did not have time for ice, ride was here to pick her up. Pt stated she would ice at home    Pt.  Education:  -pt educated on diagnosis, prognosis and expectations for rehab  -all pt questions were answered    Home Exercise Program:  Access Code: [email or web address]: [email or web address]/Date: 04/15/2021Prepared by: Marina Francois Sitting Calf Stretch with Strap - 3 x daily - 7 x weekly - 1 sets - 3 reps - 30 seconds hold   Seated Table Hamstring Stretch - 3 x daily - 7 x weekly - 1 sets - 3 reps - 30 seconds hold   Supine Knee Extension Strengthening - 2 x daily - 7 x weekly - 2 sets - 10 reps - 3 seconds hold   Seated Long Arc Quad - 2 x daily - 7 x weekly - 2 sets - 10 reps - 3 seconds hold   Standing Terminal Knee Extension with Resistance - 2 x daily - 7 x weekly - 2 sets - 10 reps - 3 seconds hold      Therapeutic Exercise and NMR EXR  [] (73736) Provided verbal/tactile cueing for activities related to strengthening, flexibility, endurance, ROM for improvements in LE, proximal hip, and core control with self care, mobility, lifting, ambulation.  [] (67283) Provided verbal/tactile cueing for activities related to improving balance, coordination, kinesthetic sense, posture, motor skill, proprioception  to assist with LE, proximal hip, and core control in self care, mobility, lifting, ambulation and eccentric single leg control.   [] (50754) Therapist is in constant attendance of 2 or more patients providing skilled therapy interventions, but not providing any significant amount of measurable one-on-one time to either patient, for improvements in LE, proximal hip, and core control in self care, mobility, lifting, ambulation and eccentric single leg control.      NMR and Therapeutic Activities:    [] (01713 or 75167) Provided verbal/tactile cueing for activities related to improving balance, coordination, kinesthetic sense, posture, motor skill, proprioception and motor activation to allow for proper function of core, proximal hip and LE with self care and ADLs  [] (79243) Gait Re-education- Provided training and instruction to the patient for proper LE, core and proximal hip recruitment and positioning and eccentric body weight control with ambulation re-education including up and down stairs     Home Exercise Program:    [x] (20046) Reviewed/Progressed HEP activities related to strengthening, flexibility, endurance, ROM of core, proximal hip and LE for functional self-care, mobility, lifting and ambulation/stair navigation   [] (96532)Reviewed/Progressed HEP activities related to improving balance, coordination, kinesthetic sense, posture, motor skill, proprioception of core, proximal hip and LE for self care, mobility, lifting, and ambulation/stair navigation      Manual Treatments:  PROM / STM / Oscillations-Mobs:  G-I, II, III, IV (PA's, Inf., Post.)  [x] (11068) Provided manual therapy to mobilize LE, proximal hip and/or LS spine soft tissue/joints for the purpose of modulating pain, promoting relaxation,  increasing ROM, reducing/eliminating soft tissue swelling/inflammation/restriction, improving soft tissue extensibility and allowing for proper ROM for normal function with self care, mobility, lifting and ambulation. Modalities:  [] (75676) Vasopneumatic compression: Utilized vasopneumatic compression to decrease edema / swelling for the purpose of improving mobility and quad tone / recruitment which will allow for increased overall function including but not limited to self-care, transfers, ambulation, and ascending / descending stairs.        Charges:  Timed Code Treatment Minutes: 45   Total Treatment Minutes: 45     [] EVAL - LOW (76858)   [] EVAL - MOD (59367)  [] EVAL - HIGH (32463)  [] RE-EVAL (59948)  [x] QT(88658) x  2     [] Ionto  [] NMR (18086) x       [] Vaso  [x] Manual (75456) x  1     [] Ultrasound  [] TA x        [] Mech Traction (64405)  [] Aquatic Therapy x      [] ES (un) (53760):   [] Home Management Training x  [] ES(attended) (88643)   [] Dry Needling 1-2 muscles (81240):  [] Dry Needling 3+ muscles (892064  [] Group:      [] Other:     GOALS:   Patient stated goal: walk without A device and perform steps without [] Goals require adjustment due to lack of progress  [] Patient is not progressing as expected and requires additional follow up with physician  [] Other    Persisting Functional Limitations/Impairments:  []Sitting []Standing   []Walking []Stairs   []Transfers []ADLs   []Squatting/bending []Kneeling  []Housework []Job related tasks  []Driving []Sports/Recreation   []Sleeping []Other:    ASSESSMENT: Pt presents with same pain at this visit, but did not take pain medicine before coming in today and arrived 5 minutes late today. Still required cueing for quad activation during SAQ and not compensating with glutes in order to increase strength and improve knee extension. Pt was able to perform step ups today and was educated on pushing through UE in order to de-weight and reduce pain in R knee. Pt demonstrates increased knee flexion still last week. Continue with skilled therapy to reduce pain and improve strength and mobility in order to return to functional activities without restrictions. Treatment/Activity Tolerance:  [x] Pt able to complete treatment [] Patient limited by fatique  [x] Patient limited by pain  [] Patient limited by other medical complications  [] Other:     Prognosis:  [x] Good [] Fair  [] Poor    Patient Requires Follow-up: [x] Yes  [] No    Return to Play:    [x]  N/A   []  Stage 1: Intro to Strength   []  Stage 2: Return to Run and Strength   []  Stage 3: Return to Jump and Strength   []  Stage 4: Dynamic Strength and Agility   []  Stage 5: Sport Specific Training   []  Ready to Return to Play, Meets All Above Stages   []  Not Ready for Return to Sports   Comments:            PLAN: See eval. PT 2-3x / week for 7-9 weeks.    [x] Continue per plan of care [] Alter current plan (see comments)  [] Plan of care initiated [] Hold pending MD visit [] Discharge    Electronically signed by: Miko Barber PTA, [address] [facility], [facility]    Session observed and directed by Kathy Jones PTA

## 2021-04-28 ENCOUNTER — HOSPITAL ENCOUNTER (OUTPATIENT)
Dept: PHYSICAL THERAPY | Age: 71
Setting detail: THERAPIES SERIES
Discharge: HOME | End: 2021-04-28
Payer: MEDICARE

## 2021-04-28 PROCEDURE — 97110 THERAPEUTIC EXERCISES: CPT

## 2021-04-28 PROCEDURE — 97140 MANUAL THERAPY 1/> REGIONS: CPT

## 2021-04-28 NOTE — FLOWSHEET NOTE
Derian Haley  Phone: [phone]   Fax: [phone]    Physical Therapy Treatment Note/ Progress Report:     Date:  2021    Patient Name:  Gavin Harrison    :  1950  MRN: [id number]  Restrictions/Precautions:    Medical/Treatment Diagnosis Information:  · Diagnosis: s/p R TKR 3/16/2021  · Treatment Diagnosis: R decreased knee ROM, decreased R LE strength and flexiblity, decreased balance, abnormal gait, decreased walking endurance  Insurance/Certification information:  PT Insurance Information: Medicare  Physician Information:  Referring Practitioner: Stephen Castañeda MD  Plan of care signed (Y/N): [x]  Yes []  No     Date of Patient follow up with Physician:      Progress Report: []  Yes  [x]  No     Date Range for reporting period:  Beginnin2021  Ending:     Progress report due (10 Rx/or 30 days whichever is less): visit #73 or   Recertification due (POC duration/ or 90 days whichever is less): 2021    Visit # Insurance Allowable Auth required? Date Range   - Medicare/[facility] []  Yes  [x]  No      Latex Allergy:  [x]NO      []YES  Preferred Language for Healthcare:   [x]English       []other:    Functional Scale:        Date assessed:  LEFS: raw score = 22; dysfunction = 72.5%   2021    Pain level:  4/10     SUBJECTIVE:  Pt reports she was doing her deep squats in the kitchen this morning and she was able to go lower than she had ever gone. Pt reports that she did not take her pain meds today.      OBJECTIVE:    : Pt 10 min late, AROM R knee flexion 100 degrees; AROM R knee flexion with  degrees; AROM R knee extension lacking 6 degrees; AROM R knee flexion with OP 0 degrees - all ROM taken in supine   : Knee flexion w/ heel slide and OP: 99 degrees, AROM R knee flexion at EOB: 102 degrees, AROM R knee extnsion: lacking 5 degrees  : Pt was 5 minutes late today  : Pt was 7 minutes late today  4/19: AROM R Knee Flex: 94 at EOB  PROM R Knee Flex: 100 at EOB   AROM R Knee: Ext: Lacking 4 in                    supine   4/17: Knee flexion w/ heel slide and OP: 94 deg; did not allow therapist to stretch into extension  4/15: knee extension: resting at lacking 5 degrees, able to achieve full extension with manual OP   Knee flexion w/ heel slide and over EOB: 90       RESTRICTIONS/PRECAUTIONSOA L knee    Exercises/Interventions:     Therapeutic Exercise (95386)  Resistance / level Sets/sec Reps Notes / Cues   bike-recumbent       SciFit StepOne Full Rev 4'        Added 4/15         IB - gastroc  30\"  2    Step Stretch  8\"  4/19-1 set with heel down           1 set with heel raise    HR/TR  2 10    HSS  30\"  2 R    HFS  30\"  2 R           Supine gluteal sets     Quad sets with towel behind knee B  Cueing to limit gluteal co contraction   Long Sit HS stretch     Towel gastroc/HS stretch in long it     Ankle pumps     SLR flexion resume next visit  4/19-Min/Mod A from clinician  4/23- decreased reps due to pain  4/26 due to time   [facility]                       Therapeutic Activities (89125)       4/13/21 Pt was educated on PT POC, Diagnosis, Prognosis, pathomechanics as well as frequency and duration of scheduling future physical therapy appointments. Time was also taken on this day to answer all patient questions and participation in PT. Mini squats- // bars  2 10                  Neuromuscular Re-ed (68481)       SAQ  added 4/15   LAQ   Added 4/15   Step ups - forward  /6\" /1 10 R Added 4/26   Step ups - lateral 6\" 1 10 R Added 4/28                        Manual Intervention (81959)       Knee mobs/PROM  X8 min total flexion in supine (OP) and extension in supine/long sit prmarily    Tib/Fem Mobs Seated R knee distractions Grade #    Patella Mobs Superior/inferior X1'   Ankle mobs     Quad stretch off EOB  4/13; stopped due to spasms              Modalities:  4/28:  Ice bag to go   4/15: Ice x10 min  4/17: Pt did not have time for ice, ride was here to pick her up. Pt stated she would ice at home    Pt. Education:  -pt educated on diagnosis, prognosis and expectations for rehab  -all pt questions were answered    Home Exercise Program:  Access Code: [email or web address]: [email or web address]/Date: 04/15/2021Prepared by: Stacey Cruz Sitting Calf Stretch with Strap - 3 x daily - 7 x weekly - 1 sets - 3 reps - 30 seconds hold   Seated Table Hamstring Stretch - 3 x daily - 7 x weekly - 1 sets - 3 reps - 30 seconds hold   Supine Knee Extension Strengthening - 2 x daily - 7 x weekly - 2 sets - 10 reps - 3 seconds hold   Seated Long Arc Quad - 2 x daily - 7 x weekly - 2 sets - 10 reps - 3 seconds hold   Standing Terminal Knee Extension with Resistance - 2 x daily - 7 x weekly - 2 sets - 10 reps - 3 seconds hold      Therapeutic Exercise and NMR EXR  [] (22313) Provided verbal/tactile cueing for activities related to strengthening, flexibility, endurance, ROM for improvements in LE, proximal hip, and core control with self care, mobility, lifting, ambulation.  [] (34932) Provided verbal/tactile cueing for activities related to improving balance, coordination, kinesthetic sense, posture, motor skill, proprioception  to assist with LE, proximal hip, and core control in self care, mobility, lifting, ambulation and eccentric single leg control.   [] (75016) Therapist is in constant attendance of 2 or more patients providing skilled therapy interventions, but not providing any significant amount of measurable one-on-one time to either patient, for improvements in LE, proximal hip, and core control in self care, mobility, lifting, ambulation and eccentric single leg control.      NMR and Therapeutic Activities:    [] (69586 or 39338) Provided verbal/tactile cueing for activities related to improving balance, coordination, kinesthetic sense, posture, motor skill, proprioception and motor activation to allow for proper function of core, proximal hip and LE with self care and ADLs  [] (64887) Gait Re-education- Provided training and instruction to the patient for proper LE, core and proximal hip recruitment and positioning and eccentric body weight control with ambulation re-education including up and down stairs     Home Exercise Program:    [x] (18790) Reviewed/Progressed HEP activities related to strengthening, flexibility, endurance, ROM of core, proximal hip and LE for functional self-care, mobility, lifting and ambulation/stair navigation   [] (93998)Reviewed/Progressed HEP activities related to improving balance, coordination, kinesthetic sense, posture, motor skill, proprioception of core, proximal hip and LE for self care, mobility, lifting, and ambulation/stair navigation      Manual Treatments:  PROM / STM / Oscillations-Mobs:  G-I, II, III, IV (PA's, Inf., Post.)  [x] (22680) Provided manual therapy to mobilize LE, proximal hip and/or LS spine soft tissue/joints for the purpose of modulating pain, promoting relaxation,  increasing ROM, reducing/eliminating soft tissue swelling/inflammation/restriction, improving soft tissue extensibility and allowing for proper ROM for normal function with self care, mobility, lifting and ambulation. Modalities:  [] (72346) Vasopneumatic compression: Utilized vasopneumatic compression to decrease edema / swelling for the purpose of improving mobility and quad tone / recruitment which will allow for increased overall function including but not limited to self-care, transfers, ambulation, and ascending / descending stairs.        Charges:  Timed Code Treatment Minutes: 35   Total Treatment Minutes: 35     [] EVAL - LOW (39294)   [] EVAL - MOD (75841)  [] EVAL - HIGH (34027)  [] RE-EVAL (15026)  [x] GW(60995) x  1     [] Ionto  [] NMR (20983) x       [] Vaso  [x] Manual (79647) x  1     [] Ultrasound  [] TA x        [] Mech Traction (43789)  [] Aquatic Therapy x      [] ES (un) (40261):   [] Home Management Training x  [] ES(attended) (58570)   [] Dry Needling 1-2 muscles (18365):  [] Dry Needling 3+ muscles (880777  [] Group:      [] Other:     GOALS:   Patient stated goal: walk without A device and perform steps without rail  [] Progressing: [] Met: [] Not Met: [] Adjusted    Therapist goals for Patient:   Short Term Goals: To be achieved in: 2 weeks  1. Independent in HEP and progression per patient tolerance, in order to prevent re-injury. [] Progressing: [] Met: [] Not Met: [] Adjusted  2. Patient will have a decrease in pain to facilitate improvement in movement, function, and ADLs as indicated by Functional Deficits. [] Progressing: [] Met: [] Not Met: [] Adjusted    Long Term Goals: To be achieved in: 7-9 weeks  1. Disability index score of 40-50% or less for the LEFS to assist with reaching prior level of function. [] Progressing: [] Met: [] Not Met: [] Adjusted  2. Patient will demonstrate increased AROM to 5-110  to allow for proper joint functioning as indicated by patients ability to perform sit to stand transfer without stiffness with L=R WBing.   [] Progressing: [] Met: [] Not Met: [] Adjusted  3. Patient will demonstrate an increase in Strength to at least 4-4.5/5 as well as good proximal hip strength and control to allow for proper functional mobility as indicated by patients ability to ascend/descend steps 6\" with 1 rail reciprocal on R   [] Progressing: [] Met: [] Not Met: [] Adjusted  4. Patient will return to functional activities including walking without assistive device with SPC or non A device with proper heel to toe gait without increased symptoms or restriction. [] Progressing: [] Met: [] Not Met: [] Adjusted  5.patient to be able to perform TUG test in 10\" or less with least restrictive A device by discharge.   [] Progressing: [] Met: [] Not Met: [] Adjusted     Overall Progression Towards Functional goals/ Treatment Progress Update:  [] Patient is

## 2021-04-30 ENCOUNTER — HOSPITAL ENCOUNTER (OUTPATIENT)
Dept: PHYSICAL THERAPY | Age: 71
Setting detail: THERAPIES SERIES
Discharge: HOME | End: 2021-04-30
Payer: MEDICARE

## 2021-04-30 PROCEDURE — 97140 MANUAL THERAPY 1/> REGIONS: CPT

## 2021-04-30 PROCEDURE — 97110 THERAPEUTIC EXERCISES: CPT

## 2021-04-30 PROCEDURE — 93291 INTERROG DEV EVAL SCRMS IP: CPT | Performed by: INTERNAL MEDICINE

## 2021-04-30 NOTE — FLOWSHEET NOTE
Derian Haley  Phone: [phone]   Fax: [phone]    Physical Therapy Treatment Note/ Progress Report:     Date:  2021    Patient Name:  Laura Vasques    :  1950  MRN: [id number]  Restrictions/Precautions:    Medical/Treatment Diagnosis Information:  · Diagnosis: s/p R TKR 3/16/2021  · Treatment Diagnosis: R decreased knee ROM, decreased R LE strength and flexiblity, decreased balance, abnormal gait, decreased walking endurance  Insurance/Certification information:  PT Insurance Information: Medicare  Physician Information:  Referring Practitioner: José Antonio Hernandez MD  Plan of care signed (Y/N): [x]  Yes []  No     Date of Patient follow up with Physician:      Progress Report: []  Yes  [x]  No     Date Range for reporting period:  Beginnin2021  Ending:     Progress report due (10 Rx/or 30 days whichever is less): visit #09 or   Recertification due (POC duration/ or 90 days whichever is less): 2021    Visit # Insurance Allowable Auth required? Date Range   - Medicare/[facility] []  Yes  [x]  No      Latex Allergy:  [x]NO      []YES  Preferred Language for Healthcare:   [x]English       []other:    Functional Scale:        Date assessed:  LEFS: raw score = 22; dysfunction = 72.5%   2021    Pain level:  4/10     SUBJECTIVE:  Pt states SLR is still difficult for her to do, but riding the bike is getting easier.     OBJECTIVE:    : Pt 10 min late, AROM R knee flexion 100 degrees; AROM R knee flexion with  degrees; AROM R knee extension lacking 6 degrees; AROM R knee flexion with OP 0 degrees - all ROM taken in supine   : Knee flexion w/ heel slide and OP: 99 degrees, AROM R knee flexion at EOB: 102 degrees, AROM R knee extnsion: lacking 5 degrees  : Pt was 5 minutes late today  : Pt was 7 minutes late today  : AROM R Knee Flex: 94 at EOB  PROM R Knee Flex: 100 at EOB   AROM R Knee: Ext: Lacking 4 in                    supine   4/17: Knee flexion w/ heel slide and OP: 94 deg; did not allow therapist to stretch into extension  4/15: knee extension: resting at lacking 5 degrees, able to achieve full extension with manual OP   Knee flexion w/ heel slide and over EOB: 90       RESTRICTIONS/PRECAUTIONSOA L knee    Exercises/Interventions:     Therapeutic Exercise (55431)  Resistance / level Sets/sec Reps Notes / Cues   bike-recumbent       SciFit StepOne Full Rev 4'        Added 4/15         IB - gastroc  30\"  2    Step Stretch  8\"  4/19-1 set with heel down           1 set with heel raise    HR/TR  2 10    HSS  30\"  2 R    HFS  30\"  2 R           Supine gluteal sets     Quad sets with towel behind knee B  Cueing to limit gluteal co contraction   Long Sit HS stretch     Towel gastroc/HS stretch in long it     Ankle pumps     SLR   54/19-Min/Mod A from clinician  4/23- decreased reps due to pain  4/26 due to time   ERMI  Standing hip abduction  2 10 4/30 Attempted one in S/L, but caused pain                 Therapeutic Activities (13325)       4/13/21 Pt was educated on PT POC, Diagnosis, Prognosis, pathomechanics as well as frequency and duration of scheduling future physical therapy appointments. Time was also taken on this day to answer all patient questions and participation in PT. Mini squats hi/low table  2 10                  Neuromuscular Re-ed (72806)       SAQ  added 4/15   LAQ   Added 4/15   Step ups - forward  /6\" /1 10 R Added 4/26   Step ups - lateral Tandem stance       SLS              Manual Intervention (94875)       Knee mobs/PROM  X7' min total flexion in supine (OP) and extension in supine/long sit prmarily    Tib/Fem Mobs Seated R knee distractions Grade #    Patella Mobs Superior/inferior X1'   Ankle mobs     Quad stretch off EOB  4/13; stopped due to spasms              Modalities:  4/28:  Ice bag to go   4/15: Ice x10 min  4/17: Pt did not have time for ice, ride was here to pick her up. Pt stated she would ice at home    Pt. Education:  -pt educated on diagnosis, prognosis and expectations for rehab  -all pt questions were answered    Home Exercise Program:  Access Code: [email or web address]: [email or web address]/[email or web address]: 04/15/2021Prepared by: Jayce Comfort Sitting Calf Stretch with Strap - 3 x daily - 7 x weekly - 1 sets - 3 reps - 30 seconds hold   Seated Table Hamstring Stretch - 3 x daily - 7 x weekly - 1 sets - 3 reps - 30 seconds hold   Supine Knee Extension Strengthening - 2 x daily - 7 x weekly - 2 sets - 10 reps - 3 seconds hold   Seated Long Arc Quad - 2 x daily - 7 x weekly - 2 sets - 10 reps - 3 seconds hold   Standing Terminal Knee Extension with Resistance - 2 x daily - 7 x weekly - 2 sets - 10 reps - 3 seconds hold      Therapeutic Exercise and NMR EXR  [] (70976) Provided verbal/tactile cueing for activities related to strengthening, flexibility, endurance, ROM for improvements in LE, proximal hip, and core control with self care, mobility, lifting, ambulation.  [] (75068) Provided verbal/tactile cueing for activities related to improving balance, coordination, kinesthetic sense, posture, motor skill, proprioception  to assist with LE, proximal hip, and core control in self care, mobility, lifting, ambulation and eccentric single leg control.   [] (48487) Therapist is in constant attendance of 2 or more patients providing skilled therapy interventions, but not providing any significant amount of measurable one-on-one time to either patient, for improvements in LE, proximal hip, and core control in self care, mobility, lifting, ambulation and eccentric single leg control.      NMR and Therapeutic Activities:    [] (15879 or 19755) Provided verbal/tactile cueing for activities related to improving balance, coordination, kinesthetic sense, posture, motor skill, proprioception and motor activation to allow for proper function of core, proximal hip and LE with self care and ADLs  [] (53898) Gait Re-education- Provided training and instruction to the patient for proper LE, core and proximal hip recruitment and positioning and eccentric body weight control with ambulation re-education including up and down stairs     Home Exercise Program:    [x] (56623) Reviewed/Progressed HEP activities related to strengthening, flexibility, endurance, ROM of core, proximal hip and LE for functional self-care, mobility, lifting and ambulation/stair navigation   [] (71071)Reviewed/Progressed HEP activities related to improving balance, coordination, kinesthetic sense, posture, motor skill, proprioception of core, proximal hip and LE for self care, mobility, lifting, and ambulation/stair navigation      Manual Treatments:  PROM / STM / Oscillations-Mobs:  G-I, II, III, IV (PA's, Inf., Post.)  [x] (27345) Provided manual therapy to mobilize LE, proximal hip and/or LS spine soft tissue/joints for the purpose of modulating pain, promoting relaxation,  increasing ROM, reducing/eliminating soft tissue swelling/inflammation/restriction, improving soft tissue extensibility and allowing for proper ROM for normal function with self care, mobility, lifting and ambulation. Modalities:  [] (75077) Vasopneumatic compression: Utilized vasopneumatic compression to decrease edema / swelling for the purpose of improving mobility and quad tone / recruitment which will allow for increased overall function including but not limited to self-care, transfers, ambulation, and ascending / descending stairs.        Charges:  Timed Code Treatment Minutes: 45   Total Treatment Minutes: 45     [] EVAL - LOW (48116)   [] EVAL - MOD (12303)  [] EVAL - HIGH (20495)  [] RE-EVAL (16616)  [x] NW(52334) x  2    [] Ionto  [] NMR (67779) x       [] Vaso  [x] Manual (13217) x  1     [] Ultrasound  [] TA x        [] Mech Traction (56840)  [] Aquatic Therapy x      [] ES (un) (58770):   [] Home Management Training x  [] ES(attended) (58246)   [] Dry Needling 1-2 muscles (20906):  [] Dry Needling 3+ muscles (530975  [] Group:      [] Other:     GOALS:   Patient stated goal: walk without A device and perform steps without rail  [] Progressing: [] Met: [] Not Met: [] Adjusted    Therapist goals for Patient:   Short Term Goals: To be achieved in: 2 weeks  1. Independent in HEP and progression per patient tolerance, in order to prevent re-injury. [] Progressing: [] Met: [] Not Met: [] Adjusted  2. Patient will have a decrease in pain to facilitate improvement in movement, function, and ADLs as indicated by Functional Deficits. [] Progressing: [] Met: [] Not Met: [] Adjusted    Long Term Goals: To be achieved in: 7-9 weeks  1. Disability index score of 40-50% or less for the LEFS to assist with reaching prior level of function. [] Progressing: [] Met: [] Not Met: [] Adjusted  2. Patient will demonstrate increased AROM to 5-110  to allow for proper joint functioning as indicated by patients ability to perform sit to stand transfer without stiffness with L=R WBing.   [] Progressing: [] Met: [] Not Met: [] Adjusted  3. Patient will demonstrate an increase in Strength to at least 4-4.5/5 as well as good proximal hip strength and control to allow for proper functional mobility as indicated by patients ability to ascend/descend steps 6\" with 1 rail reciprocal on R   [] Progressing: [] Met: [] Not Met: [] Adjusted  4. Patient will return to functional activities including walking without assistive device with SPC or non A device with proper heel to toe gait without increased symptoms or restriction. [] Progressing: [] Met: [] Not Met: [] Adjusted  5.patient to be able to perform TUG test in 10\" or less with least restrictive A device by discharge.   [] Progressing: [] Met: [] Not Met: [] Adjusted     Overall Progression Towards Functional goals/ Treatment Progress Update:  [] Patient is progressing as expected towards functional goals listed. [] Progression is slowed due to complexities/Impairments listed. [] Progression has been slowed due to co-morbidities. [x] Plan just implemented, too soon to assess goals progression <30days   [] Goals require adjustment due to lack of progress  [] Patient is not progressing as expected and requires additional follow up with physician  [] Other    Persisting Functional Limitations/Impairments:  []Sitting []Standing   []Walking []Stairs   []Transfers []ADLs   []Squatting/bending []Kneeling  []Housework []Job related tasks  []Driving []Sports/Recreation   []Sleeping []Other:    ASSESSMENT:  Pt took extra time this visit to complete tasks and required 2 rest breaks due to muscle fatigue and general slowness. Pt still presents with difficulty activating quad to perform a SLR and had to modify to small range in order to improve strength. Limited time this visit with manual due to pt presented with increased pain across anterior knee and frequent muscle spasms. Pt reported not taking pain medicine before visit this date. Continue with skilled therapy in order to improve quads strength and stability in LE to return to PLOF without restrictions. Treatment/Activity Tolerance:  [x] Pt able to complete treatment [] Patient limited by fatique  [x] Patient limited by pain  [] Patient limited by other medical complications  [] Other:     Prognosis:  [x] Good [] Fair  [] Poor    Patient Requires Follow-up: [x] Yes  [] No    Return to Play:    [x]  N/A   []  Stage 1: Intro to Strength   []  Stage 2: Return to Run and Strength   []  Stage 3: Return to Jump and Strength   []  Stage 4: Dynamic Strength and Agility   []  Stage 5: Sport Specific Training   []  Ready to Return to Play, Meets All Above Stages   []  Not Ready for Return to Sports   Comments:            PLAN: See eval. PT 2-3x / week for 7-9 weeks.    [x] Continue per plan of care [] Alter current plan (see comments)  [] Plan of care initiated [] Hold pending MD visit [] Discharge    Electronically signed by: Keane Phalen  [facility], [facility]    Session observed and directed by Beto Lester PTA [id number]    Note: If patient does not return for scheduled/ recommended follow up visits, this note will serve as a discharge from care along with most recent update on progress.

## 2021-05-03 ENCOUNTER — HOSPITAL ENCOUNTER (OUTPATIENT)
Dept: PHYSICAL THERAPY | Age: 71
Setting detail: THERAPIES SERIES
Discharge: HOME | End: 2021-05-03
Payer: MEDICARE

## 2021-05-03 ENCOUNTER — VIRTUAL VISIT (OUTPATIENT)
Dept: PULMONOLOGY | Age: 71
End: 2021-05-03
Payer: MEDICARE

## 2021-05-03 DIAGNOSIS — I48.0 PAROXYSMAL ATRIAL FIBRILLATION (HCC): Chronic | ICD-10-CM

## 2021-05-03 DIAGNOSIS — M17.11 PRIMARY OSTEOARTHRITIS OF RIGHT KNEE: ICD-10-CM

## 2021-05-03 DIAGNOSIS — G25.81 RESTLESS LEG SYNDROME: Chronic | ICD-10-CM

## 2021-05-03 DIAGNOSIS — G47.33 OSA (OBSTRUCTIVE SLEEP APNEA): Primary | Chronic | ICD-10-CM

## 2021-05-03 DIAGNOSIS — E66.9 OBESITY (BMI 30-39.9): ICD-10-CM

## 2021-05-03 DIAGNOSIS — I10 BENIGN ESSENTIAL HTN: Chronic | ICD-10-CM

## 2021-05-03 DIAGNOSIS — J45.30 MILD PERSISTENT ASTHMA WITHOUT COMPLICATION: ICD-10-CM

## 2021-05-03 PROCEDURE — 97112 NEUROMUSCULAR REEDUCATION: CPT

## 2021-05-03 PROCEDURE — 97110 THERAPEUTIC EXERCISES: CPT

## 2021-05-03 PROCEDURE — 99443 PR PHYS/QHP TELEPHONE EVALUATION 21-30 MIN: CPT | Performed by: NURSE PRACTITIONER

## 2021-05-03 RX ORDER — PRAMIPEXOLE DIHYDROCHLORIDE 0.5 MG/1
TABLET ORAL
Qty: 135 TABLET | Refills: 1 | Status: SHIPPED | OUTPATIENT
Start: 2021-05-03 | End: 2021-05-03

## 2021-05-03 RX ORDER — OXYCODONE HYDROCHLORIDE AND ACETAMINOPHEN 5; 325 MG/1; MG/1
1 TABLET ORAL EVERY 6 HOURS PRN
Qty: 35 TABLET | Refills: 0 | Status: SHIPPED | OUTPATIENT
Start: 2021-05-03 | End: 2021-05-10

## 2021-05-03 RX ORDER — PRAMIPEXOLE DIHYDROCHLORIDE 0.5 MG/1
TABLET ORAL
Qty: 270 TABLET | Refills: 1 | Status: SHIPPED | OUTPATIENT
Start: 2021-05-03 | End: 2021-10-25 | Stop reason: SDUPTHER

## 2021-05-03 ASSESSMENT — SLEEP AND FATIGUE QUESTIONNAIRES
ESS TOTAL SCORE: 12
HOW LIKELY ARE YOU TO NOD OFF OR FALL ASLEEP IN A CAR, WHILE STOPPED FOR A FEW MINUTES IN TRAFFIC: 0
HOW LIKELY ARE YOU TO NOD OFF OR FALL ASLEEP WHILE LYING DOWN TO REST IN THE AFTERNOON WHEN CIRCUMSTANCES PERMIT: 3
HOW LIKELY ARE YOU TO NOD OFF OR FALL ASLEEP WHEN YOU ARE A PASSENGER IN A CAR FOR AN HOUR WITHOUT A BREAK: 3
HOW LIKELY ARE YOU TO NOD OFF OR FALL ASLEEP WHILE WATCHING TV: 2
HOW LIKELY ARE YOU TO NOD OFF OR FALL ASLEEP WHILE SITTING AND READING: 2

## 2021-05-03 NOTE — PROGRESS NOTES
Brett Varghese MD, FAASM, [facility]  Masha Day, MSN, RN, [address] [facility] [phone]  Dept: [phone]  Dept Fax: [phone]  Loc: [phone]    Subjective:     Patient ID: Ace Zavala is a 78 y.o. female. Chief Complaint   Patient presents with    Sleep Apnea       HPI:      Sleep Medicine Telephone Visit    Pursuant to the emergency declaration under the [address] [place] [address], [facility] waiver authority and the [employer] Act this Telephone Visit was insisted, with patient's consent, to reduce the patient's risk of exposure to COVID-19 and provide continuity of care for an established patient. Services were provided through a synchronous discussion over a telephone chat to substitute for in-person clinic visit, and coded as such. While patient is at home.     Machine Modem/Download Info:  Compliance (hours/night): 4.2 hrs/night  Download AHI (/hour): 1.3 /HR  Average CPAP Pressure : 12.8 cmH2O           APAP - Settings  Pressure Min: 11 cmH2O  Pressure Max: 20 cmH2O                 Comfort Settings  Humidity Level (0-8): 3  Flex/EPR (0-3): 2 PAP Mask  Mask Type: Nasal mask  Clinically Relevant Leak: No     Hitchcock - Total score: 12    Follow-up :     Last Visit : October 2020      Subjective Health Changes: None      Over Night Oximetry: [] Yes  [] No  [x] NA [] WNL   Using O2: [] Yes  [] No  [x] NA   Patient is compliant with the machine  [] Yes  [x] No [] Per patient   Feeling rested when using the machine   [x] Yes  [] No     Pressure is comfortable with inspiration and expiration  [x] Yes  [] No   ([x] NA   [] Feeling of suffocation  [] Feeling like not enough air    [] To much pressure)     Noticed changes in pressure  [x] NA  [] Yes    [] No     Mask is fitting well  [x] Yes  [] No   Noting Mask Air Leak  [] Yes  [x] No   Having painful Aerophagia  [] Yes  [x] No   Nocturia   1  per night. Having  HA upon waking  [] Yes  [x] No   Dry mouth upon waking   Dry Nose  Dry Eyes  [x] Yes  [] No   Congestion upon waking   [] Yes  [x] No    Nose Bleeds  [] Yes  [x] No   Using Sleep Aides  Mirapex  [] NA  [] OTC  [x] Per our office   [] Per another provider   Understands how to change humidification and/or tubing temperature for comfort while at home  [x] Yes  [] No     Difficulties falling asleep  [x] Yes  [] No   Difficulties staying asleep  [x] Yes  [] No   Approximate time to bed  11:15pm-12am   Approximate wake time  7:30am   Taking Naps  no   If taking naps usual length  [x] NA   If taking naps using the machine [x] NA  [] Yes    [] No    [] With and With out    Drowsy when driving  [] Yes  [x] No     Does patient carry a DOT/CDL  [] Yes  [x] No     Does patient carry FAA/[employer] License   [] Yes  [x] No      Any concerns noted with the machine at this time  [] Yes  [x] No       Assessment/Plan:     1. HUSSAIN (obstructive sleep apnea)  Assessment & Plan:  After downloading data and reviewing  Reviewed compliance download with pt. Supplies and parts as needed for his machine. These are medically necessary. Continue medications per his PCP and other physicians. Limit caffeine use after 3pm.    The chronic medical conditions listed are directly related to the primary diagnosis listed above. The management of the primary diagnosis affects the secondary diagnosis and vice versa    Patient is not compliant at this time this increases the risk of heart attacks, strokes, car accidents, and/or death from uncontrolled Obstructive Sleep Apnea     2. Restless leg syndrome  Assessment & Plan:  Current chronicity: stable    Current Medication: Mirapex    Prescribed by: Our office     Agree on current plan: Continue current plan per provider    3. Paroxysmal atrial fibrillation (HCC)  Assessment & Plan:  Chronic- stable.       After speaking with

## 2021-05-03 NOTE — PROGRESS NOTES
Violette [place]         : 1950    Diagnosis: [x] HUSSAIN (G47.33) [] CSA (G47.31) [] Apnea (G47.30)   Length of Need: [x] 12 Months [] 99 Months [] Other:    Machine (MANDI!): [x] Respironics Dream Station   2   Auto [] ResMed AirSense     Auto [] Other:     []  CPAP () [] Bilevel ()   Mode: [] Auto [] Spontaneous    Mode: [] Auto [] Spontaneous                            Comfort Settings:   - Ramp Pressure:  cmH2O                                        - Ramp time: 15 min                                     -  Flex/EPR - 3 full time                                    - For ResMed Bilevel (TiMax-4 sec   TiMin- 0.2 sec)     Humidifier: [x] Heated ()        [x] Water chamber replacement ()/ 1 per 6 months        Mask:   [x] Nasal () /1 per 3 months [] Full Face () /1 per 3 months   [x] Patient choice -Size and fit mask [] Patient Choice - Size and fit mask   [] Dispense:  [] Dispense:    [x] Headgear () / 1 per 3 months [] Headgear () / 1 per 3 months   [x] Replacement Nasal Cushion ()/2 per month [] Interface Replacement ()/1 per month   [] Replacement Nasal Pillows ()/2 per month         Tubing: [x] Heated ()/1 per 3 months    [] Standard ()/1 per 3 months [] Other:           Filters: [x] Non-disposable ()/1 per 6 months     [x] Ultra-Fine, Disposable ()/2 per month        Miscellaneous: [] Chin Strap ()/ 1 per 6 months [] O2 bleed-in:       LPM   [] Oximetry on CPAP/Bilevel []  Other:    [x] Modem: ()         Start Order Date: 21    MEDICAL JUSTIFICATION:  I, the undersigned, certify that the above prescribed supplies are medically necessary for this patients wellbeing. In my opinion, the supplies are both reasonable and necessary in reference to accepted standards of medicalpractice in treatment of this patients condition.     BRICE Guzman - CNP      NPI: [id number]       Order Signed Date: 21    Electronically signed by BRICE Jara Caro, CNP on 5/3/2021 at 12:06 PM     [facility]  1950  [address]  [address] [place] [address]  [phone] (home)   [phone] (mobile)      Insurance Info (confirm with patient if correct):  Payor/Plan Subscr  Sex Relation Sub.  Ins. ID Effective Group Num

## 2021-05-03 NOTE — FLOWSHEET NOTE
Derian Haley  Phone: [phone]   Fax: [phone]    Physical Therapy Treatment Note/ Progress Report:     Date:  5/3/2021    Patient Name:  Ace Zavala    :  1950  MRN: [id number]  Restrictions/Precautions:    Medical/Treatment Diagnosis Information:  · Diagnosis: s/p R TKR 3/16/2021  · Treatment Diagnosis: R decreased knee ROM, decreased R LE strength and flexiblity, decreased balance, abnormal gait, decreased walking endurance  Insurance/Certification information:  PT Insurance Information: Medicare  Physician Information:  Referring Practitioner: Jasmin Soto MD  Plan of care signed (Y/N): [x]  Yes []  No     Date of Patient follow up with Physician:      Progress Report: []  Yes  [x]  No     Date Range for reporting period:  Beginnin2021  Ending:     Progress report due (10 Rx/or 30 days whichever is less): visit #61 or   Recertification due (POC duration/ or 90 days whichever is less): 2021    Visit # Insurance Allowable Auth required? Date Range   - Medicare/[facility] []  Yes  [x]  No      Latex Allergy:  [x]NO      []YES  Preferred Language for Healthcare:   [x]English       []other:    Functional Scale:        Date assessed:  LEFS: raw score = 22; dysfunction = 72.5%   2021    Pain level:  4/10     SUBJECTIVE:  Having more pain and stiffness today \"because it's raining out\". Has been out walking more this past weekend without significant difficulty but still having limitations due to stiffness and decreased endurance.      OBJECTIVE:     : Pt 10 min late, AROM R knee flexion 100 degrees; AROM R knee flexion with  degrees; AROM R knee extension lacking 6 degrees; AROM R knee flexion with OP 0 degrees - all ROM taken in supine   : Knee flexion w/ heel slide and OP: 99 degrees, AROM R knee flexion at EOB: 102 degrees, AROM R knee extnsion: lacking 5 degrees  : Pt was 5 minutes late today  4/23: Pt was 7 minutes late today  4/19: AROM R Knee Flex: 94 at EOB  PROM R Knee Flex: 100 at EOB   AROM R Knee: Ext: Lacking 4 in supine   4/17: Knee flexion w/ heel slide and OP: 94 deg; did not allow therapist to stretch into extension  4/15: knee extension: resting at lacking 5 degrees, able to achieve full extension with manual OP   Knee flexion w/ heel slide and over EOB: 90       RESTRICTIONS/PRECAUTIONSOA L knee    Exercises/Interventions:     Therapeutic Exercise (11703)  Resistance / level Sets/sec Reps Notes / Cues   bike-recumbent       SciFit StepOne Full Rev 5'        Added 4/15  Cueing to start with semi-circles to decrease pain and work toward improving motion. IB - gastroc  30\"  2    Step Stretch  8\"  4/19-1 set with heel down           1 set with heel raise    HR/TR  2 10    HSS  30\"  2 R    HFS  30\"  2 R    Quad sets with towel behind knee B  Cueing to limit gluteal co contraction   SLR   54/19-Min/Mod A from clinician  4/23- decreased reps due to pain   ERMI  Standing hip abduction  2 10 4/30 Attempted one in S/L, but caused pain   Heel prop/prone hang Next visit              Therapeutic Activities (19353)       4/13/21 Pt was educated on PT POC, Diagnosis, Prognosis, pathomechanics as well as frequency and duration of scheduling future physical therapy appointments. Time was also taken on this day to answer all patient questions and participation in PT.           Mini squats hi/low table  2 10                  Neuromuscular Re-ed (61266)       SAQ  2/3\"H 10 added 4/15   LAQ   2 10 Added 4/15   Step ups - forward  4\" 2 10 R Added 4/26   Step ups - lateral 4\" 2 10 R Added 4/28Tandem stance       SLS  5\" 5 Added 5/3          Manual Intervention (02671)       Knee mobs/PROM  X7' min total flexion in supine (OP) and extension in supine/long sit prmarily    Tib/Fem Mobs Seated R knee distractions Grade #    Patella Mobs Superior/inferior X1'   Ankle mobs     Quad stretch off EOB  4/13; kinesthetic sense, posture, motor skill, proprioception and motor activation to allow for proper function of core, proximal hip and LE with self care and ADLs  [] (67221) Gait Re-education- Provided training and instruction to the patient for proper LE, core and proximal hip recruitment and positioning and eccentric body weight control with ambulation re-education including up and down stairs     Home Exercise Program:    [x] (57005) Reviewed/Progressed HEP activities related to strengthening, flexibility, endurance, ROM of core, proximal hip and LE for functional self-care, mobility, lifting and ambulation/stair navigation   [] (97643)Reviewed/Progressed HEP activities related to improving balance, coordination, kinesthetic sense, posture, motor skill, proprioception of core, proximal hip and LE for self care, mobility, lifting, and ambulation/stair navigation      Manual Treatments:  PROM / STM / Oscillations-Mobs:  G-I, II, III, IV (PA's, Inf., Post.)  [x] (76058) Provided manual therapy to mobilize LE, proximal hip and/or LS spine soft tissue/joints for the purpose of modulating pain, promoting relaxation,  increasing ROM, reducing/eliminating soft tissue swelling/inflammation/restriction, improving soft tissue extensibility and allowing for proper ROM for normal function with self care, mobility, lifting and ambulation. Modalities:  [] (57146) Vasopneumatic compression: Utilized vasopneumatic compression to decrease edema / swelling for the purpose of improving mobility and quad tone / recruitment which will allow for increased overall function including but not limited to self-care, transfers, ambulation, and ascending / descending stairs.        Charges:  Timed Code Treatment Minutes: 45   Total Treatment Minutes: 45     [] EVAL - LOW (69558)   [] EVAL - MOD (55660)  [] EVAL - HIGH (51498)  [] RE-EVAL (64079)  [x] GF(03768) x  1    [] Ionto  [x] NMR (18660) x  2      [] Vaso  [] Manual (13782) x       [] Ultrasound  [] TA x        [] Mech Traction (81739)  [] Aquatic Therapy x      [] ES (un) (09150):   [] Home Management Training x  [] ES(attended) (00217)   [] Dry Needling 1-2 muscles (03074):  [] Dry Needling 3+ muscles (078052  [] Group:      [] Other:     GOALS:   Patient stated goal: walk without A device and perform steps without rail  [] Progressing: [] Met: [] Not Met: [] Adjusted    Therapist goals for Patient:   Short Term Goals: To be achieved in: 2 weeks  1. Independent in HEP and progression per patient tolerance, in order to prevent re-injury. [] Progressing: [] Met: [] Not Met: [] Adjusted  2. Patient will have a decrease in pain to facilitate improvement in movement, function, and ADLs as indicated by Functional Deficits. [] Progressing: [] Met: [] Not Met: [] Adjusted    Long Term Goals: To be achieved in: 7-9 weeks  1. Disability index score of 40-50% or less for the LEFS to assist with reaching prior level of function. [] Progressing: [] Met: [] Not Met: [] Adjusted  2. Patient will demonstrate increased AROM to 5-110  to allow for proper joint functioning as indicated by patients ability to perform sit to stand transfer without stiffness with L=R WBing.   [] Progressing: [] Met: [] Not Met: [] Adjusted  3. Patient will demonstrate an increase in Strength to at least 4-4.5/5 as well as good proximal hip strength and control to allow for proper functional mobility as indicated by patients ability to ascend/descend steps 6\" with 1 rail reciprocal on R   [] Progressing: [] Met: [] Not Met: [] Adjusted  4. Patient will return to functional activities including walking without assistive device with SPC or non A device with proper heel to toe gait without increased symptoms or restriction. [] Progressing: [] Met: [] Not Met: [] Adjusted  5.patient to be able to perform TUG test in 10\" or less with least restrictive A device by discharge.   [] Progressing: [] Met: [] Not Met: [] Adjusted     Overall Progression Towards Functional goals/ Treatment Progress Update:  [] Patient is progressing as expected towards functional goals listed. [] Progression is slowed due to complexities/Impairments listed. [] Progression has been slowed due to co-morbidities. [x] Plan just implemented, too soon to assess goals progression <30days   [] Goals require adjustment due to lack of progress  [] Patient is not progressing as expected and requires additional follow up with physician  [] Other    Persisting Functional Limitations/Impairments:  []Sitting []Standing   []Walking []Stairs   []Transfers []ADLs   []Squatting/bending []Kneeling  []Housework []Job related tasks  []Driving []Sports/Recreation   []Sleeping []Other:    ASSESSMENT:  Pt demonstrates increased limp with ambulation today and remains challenged by current routine with need for short rest break between each set and exercise. Pt struggles with knee extension with minimal tolerance to extension position for more than 5-10 seconds to measure knee. Discussed with pt to increase hamstring stretch to 4x/day as well as need to shift focus on improving knee extension. Pt also demonstrates limited quad function with minimal independent quad muscle contraction with quad sets. Discussed need to focus on improving knee extension as well as quad tone for improved gait mechanics and functional mobility. Pt demonstrates limited endurance with SOB noted throughout session and limited tolerance to strengthening exercises. Continue to work on improving motion, endurance, strength and flexibility for improved function and tolerance to daily tasks without restrictions.      Treatment/Activity Tolerance:  [x] Pt able to complete treatment [] Patient limited by fatique  [x] Patient limited by pain  [] Patient limited by other medical complications  [] Other:     Prognosis:  [x] Good [] Fair  [] Poor    Patient Requires Follow-up: [x] Yes  [] No    Return to Play:    [x]  N/A   []  Stage 1: Intro to Strength   []  Stage 2: Return to Run and Strength   []  Stage 3: Return to Jump and Strength   []  Stage 4: Dynamic Strength and Agility   []  Stage 5: Sport Specific Training   []  Ready to Return to Play, Meets All Above Stages   []  Not Ready for Return to Sports   Comments:            PLAN: See eval. PT 2-3x / week for 7-9 weeks. [x] Continue per plan of care [] Alter current plan (see comments)  [] Plan of care initiated [] Hold pending MD visit [] Discharge    Electronically signed by: Cade Corado PTA [id number]     Note: If patient does not return for scheduled/ recommended follow up visits, this note will serve as a discharge from care along with most recent update on progress.

## 2021-05-04 ENCOUNTER — NURSE TRIAGE (OUTPATIENT)
Dept: OTHER | Facility: CLINIC | Age: 71
End: 2021-05-04

## 2021-05-04 ENCOUNTER — OFFICE VISIT (OUTPATIENT)
Dept: INTERNAL MEDICINE CLINIC | Age: 71
End: 2021-05-04
Payer: MEDICARE

## 2021-05-04 VITALS
TEMPERATURE: 97 F | WEIGHT: 230 LBS | BODY MASS INDEX: 39.48 KG/M2 | DIASTOLIC BLOOD PRESSURE: 62 MMHG | SYSTOLIC BLOOD PRESSURE: 110 MMHG | HEART RATE: 85 BPM | OXYGEN SATURATION: 98 %

## 2021-05-04 DIAGNOSIS — E66.01 MORBID OBESITY (HCC): ICD-10-CM

## 2021-05-04 DIAGNOSIS — H01.004 BLEPHARITIS OF LEFT UPPER EYELID, UNSPECIFIED TYPE: Primary | ICD-10-CM

## 2021-05-04 DIAGNOSIS — E66.01 OBESITY, CLASS III, BMI 40-49.9 (MORBID OBESITY) (HCC): ICD-10-CM

## 2021-05-04 DIAGNOSIS — Z12.31 ENCOUNTER FOR SCREENING MAMMOGRAM FOR MALIGNANT NEOPLASM OF BREAST: ICD-10-CM

## 2021-05-04 PROCEDURE — G8427 DOCREV CUR MEDS BY ELIG CLIN: HCPCS | Performed by: INTERNAL MEDICINE

## 2021-05-04 PROCEDURE — G8417 CALC BMI ABV UP PARAM F/U: HCPCS | Performed by: INTERNAL MEDICINE

## 2021-05-04 PROCEDURE — 3017F COLORECTAL CA SCREEN DOC REV: CPT | Performed by: INTERNAL MEDICINE

## 2021-05-04 PROCEDURE — 1090F PRES/ABSN URINE INCON ASSESS: CPT | Performed by: INTERNAL MEDICINE

## 2021-05-04 PROCEDURE — G8399 PT W/DXA RESULTS DOCUMENT: HCPCS | Performed by: INTERNAL MEDICINE

## 2021-05-04 PROCEDURE — 1123F ACP DISCUSS/DSCN MKR DOCD: CPT | Performed by: INTERNAL MEDICINE

## 2021-05-04 PROCEDURE — 4040F PNEUMOC VAC/ADMIN/RCVD: CPT | Performed by: INTERNAL MEDICINE

## 2021-05-04 PROCEDURE — 99213 OFFICE O/P EST LOW 20 MIN: CPT | Performed by: INTERNAL MEDICINE

## 2021-05-04 PROCEDURE — 1036F TOBACCO NON-USER: CPT | Performed by: INTERNAL MEDICINE

## 2021-05-04 RX ORDER — ERYTHROMYCIN 5 MG/G
1 OINTMENT OPHTHALMIC EVERY 6 HOURS
Qty: 3.5 G | Refills: 5 | Status: SHIPPED | OUTPATIENT
Start: 2021-05-04 | End: 2021-06-07 | Stop reason: ALTCHOICE

## 2021-05-04 RX ORDER — SEMAGLUTIDE 1.34 MG/ML
0.5 INJECTION, SOLUTION SUBCUTANEOUS WEEKLY
Qty: 1 PEN | Refills: 0 | COMMUNITY
Start: 2021-05-04 | End: 2022-02-14 | Stop reason: SDUPTHER

## 2021-05-04 NOTE — TELEPHONE ENCOUNTER
Reason for Disposition   Eyelids are very swollen (shut or almost)    Answer Assessment - Initial Assessment Questions  1. LOCATION: Location: \"What's red, the eyeball or the outer eyelids? \" (Note: when callers say the eye is red, they usually mean the sclera is red)        Left upper eyelid - says after drops it was weeping like salty water    2. REDNESS OF SCLERA: \"Is the redness in one or both eyes? \" \"When did the redness start? \"       Left eye only and cannot see her eyeball    3. ONSET: \"When did the eye become red? \" (e.g., hours, days)       24 hrs ago    4. EYELIDS: \"Are the eyelids red or swollen? \" If so, ask: \"How much? \"       Entire upper lid swollen and red, more towards outer  Yes and almost swollen shut    5. VISION: \"Is there any difficulty seeing clearly? \"       Yes because she cannot open it fully    6. ITCHING: \"Does it feel itchy? \" If so ask: \"How bad is it\" (e.g., Scale 1-10; or mild, moderate, severe)      Mild itching    7. PAIN: \"Is there any pain? If so, ask: \"How bad is it? \" (e.g., Scale 1-10; or mild, moderate, severe)      Aches- 3/10    8. CONTACT LENS: \"Do you wear contacts? \"      No    9. CAUSE: \"What do you think is causing the redness? \"      Unknown    10. OTHER SYMPTOMS: \"Do you have any other symptoms? \" (e.g., fever, runny nose, cough, vomiting)        Says this is not pink eye. Left ear \"bothering me for a week now\" and has allergies as well, using inhalers and nasal sprays    Protocols used: EYE - RED WITHOUT PUS-ADULT-OH    Received call from [facility] at pre-service center [facility] [place] with Red Flag Complaint. Brief description of triage: left eye red and swollen, almost swollen shut    Triage indicates for patient to be seen in office now    Care advice provided, patient verbalizes understanding; denies any other questions or concerns; instructed to call back for any new or worsening symptoms.     Writer provided warm transfer to Giuliana at [facility] for appointment scheduling. Attention Provider: Thank you for allowing me to participate in the care of your patient. The patient was connected to triage in response to information provided to the [facility]. Please do not respond through this encounter as the response is not directed to a shared pool.

## 2021-05-04 NOTE — PROGRESS NOTES
Ezekiel Bernheim (:  1950) is a 78 y.o. female,Established patient, here for evaluation of the following chief complaint(s):  Eye Problem (left lid swollen)      ASSESSMENT/PLAN:  1. Blepharitis of left upper eyelid, unspecified type  -     erythromycin (ROMYCIN) 5 MG/GM ophthalmic ointment; Place 1 cm into the left eye every 6 hours, Left Eye, EVERY 6 HOURS Starting 2021, Disp-3.5 g, R-5, Normal  2. Morbid obesity ([facility] [address]  -     Semaglutide,0.25 or 0.5MG/DOS, (OZEMPIC, 0.25 OR 0.5 MG/DOSE,) 2 MG/1.5ML SOPN; Inject 0.5 mg into the skin once a week Sample, Disp-1 pen, R-0Sample  3. Obesity, Class III, BMI 40-49.9 (morbid obesity) ([facility])  -     Semaglutide,0.25 or 0.5MG/DOS, (OZEMPIC, 0.25 OR 0.5 MG/DOSE,) 2 MG/1.5ML SOPN; Inject 0.5 mg into the skin once a week Sample, Disp-1 pen, R-0Sample  4. Encounter for screening mammogram for malignant neoplasm of breast  -     JESSICA DIGITAL SCREEN W OR WO CAD BILATERAL; Future      Patient Instructions   Start erythromycin eye ointment     Schedule mammogram            Return for as scheduled . SUBJECTIVE/OBJECTIVE:  HPI  Eyelid swelling  Started yesterday and progressed throughout the day  Does not recall any trauma  The eyelid is swollen  Woke up with crust  this morning. Review of Systems   All other systems reviewed and are negative. Physical Exam  Constitutional:       Appearance: She is obese. HENT:      Head: Normocephalic. Right Ear: Hearing and tympanic membrane normal.      Left Ear: Tympanic membrane is retracted. Tympanic membrane is not injected. Eyes:      General:         Right eye: No foreign body, discharge or hordeolum. Left eye: No foreign body, discharge or hordeolum. Extraocular Movements: Extraocular movements intact. Conjunctiva/sclera: Conjunctivae normal.      Comments: Edema/erythema of left upper lid    Neurological:      Mental Status: She is alert.                  An electronic signature was used to authenticate this note. --Brian Burt MD     Documentation was done using voice recognition dragon software. Every effort was made to ensure accuracy; however, inadvertent, unintentional computerized transcription errors may be present.

## 2021-05-06 ENCOUNTER — CARE COORDINATION (OUTPATIENT)
Dept: CARE COORDINATION | Age: 71
End: 2021-05-06

## 2021-05-07 ENCOUNTER — HOSPITAL ENCOUNTER (OUTPATIENT)
Dept: PHYSICAL THERAPY | Age: 71
Setting detail: THERAPIES SERIES
Discharge: HOME | End: 2021-05-07
Payer: MEDICARE

## 2021-05-07 PROCEDURE — 97112 NEUROMUSCULAR REEDUCATION: CPT

## 2021-05-07 PROCEDURE — 97110 THERAPEUTIC EXERCISES: CPT

## 2021-05-07 PROCEDURE — 97140 MANUAL THERAPY 1/> REGIONS: CPT

## 2021-05-07 NOTE — FLOWSHEET NOTE
PT Insurance Information: Medicare  Physician Information:  Referring Practitioner: Genna Roque MD  Plan of care signed (Y/N): [x]  Yes []  No     Date of Patient follow up with Physician: 5/10/2021     Progress Report: [x]  Yes  []  No     Date Range for reporting period:  Beginnin2021  Endin2021  Progress report due (10 Rx/or 30 days whichever is less): visit #15   Recertification due (POC duration/ or 90 days whichever is less): 2021    Visit # Insurance Allowable Auth required? Date Range   10/18- Medicare/[facility] []  Yes  [x]  No      Latex Allergy:  [x]NO      []YES  Preferred Language for Healthcare:   [x]English       []other:    Functional Scale:        Date assessed:  LEFS: raw score = 22; dysfunction = 72.5%   2021  LEFS: raw score = 22; dysfunction = 72.5%   2021      Pain level:  4/10     SUBJECTIVE:  Patient states she has been using the cane except for at night wshe uses the RW. She states lately she is having more trouble standing up straight while walking.     OBJECTIVE:     : See PN: AAROM R knee 7-112; PROM L knee after tx 0-110; AROM L knee at end of tx session 3 from neutral; R   R hip AB 3-/5, SLR 3/5, iliopsoas 4/5, 5/5 HS/quad; R quad flexibility 85; pt. 8' late  : Pt 10 min late, AROM R knee flexion 100 degrees; AROM R knee flexion with  degrees; AROM R knee extension lacking 6 degrees; AROM R knee flexion with OP 0 degrees - all ROM taken in supine   : Knee flexion w/ heel slide and OP: 99 degrees, AROM R knee flexion at EOB: 102 degrees, AROM R knee extnsion: lacking 5 degrees  : Pt was 5 minutes late today  : Pt was 7 minutes late today  : AROM R Knee Flex: 94 at EOB  PROM R Knee Flex: 100 at EOB   AROM R Knee: Ext: Lacking 4 in supine   : Knee flexion w/ heel slide and OP: 94 deg; did not allow therapist to stretch into extension  4/15: knee extension: resting at lacking 5 degrees, able to achieve full extension with manual OP   Knee flexion w/ heel slide and over EOB: 90       RESTRICTIONS/PRECAUTIONSOA L knee    Exercises/Interventions:     Therapeutic Exercise (53193)  Resistance / level Sets/sec Reps Notes / Cues   bike-recumbent       SciFit StepOne Full Rev 5'        Added 4/15  Cueing to start with semi-circles to decrease pain and work toward improving motion. IB - gastroc  30\"  2    Step Stretch  8\"  4/19-1 set with heel down           1 set with heel raise    HR/TR  2 10    HSS  30\"  2 R    HFS  30\"  2 R    Quad sets with towel behind knee B  Cueing to limit gluteal co contraction   SLR   54/19-Min/Mod A from clinician  4/23- decreased reps due to pain   ERMI   resume next session  4/30 Attempted one in S/L, but caused pain   Heel prop/prone hang Next visit       Bridges Attempted but HS spasm on L    5/7    Therapeutic Activities (03642)       4/13/21 Pt was educated on PT POC, Diagnosis, Prognosis, pathomechanics as well as frequency and duration of scheduling future physical therapy appointments. Time was also taken on this day to answer all patient questions and participation in PT. Mini squats hi/low table  2 10                  Neuromuscular Re-ed (00510)       SAQ  2/3\"H 10 added 4/15   LAQ   2 10 Added 4/15   Resume next session4\" 2 10 R Added 4/26   resume next session4\" 2 10 R Added 4/28Tandem stance       SLS  5\" 5 Added 5/3          Manual Intervention (52470)       Knee mobs/PROM  X10 min total  (OP) and extension in supine/long sit prmarily    Tib/Fem Mobs     Patella Mobs Superior/inferior X1'   Ankle mobs     Quad stretch sidelying  1/10\"34/13;5/7   MET to correct R SI posterior innominate SI  X2'         Modalities:  4/28: Ice bag to go   4/15: Ice x10 min  4/17: Pt did not have time for ice, ride was here to pick her up. Pt stated she would ice at home    Pt.  Education:  -pt educated on diagnosis, prognosis and expectations for rehab  -all pt questions were answered    Home Exercise Program:  Access Code: [email or web address]: [email or web address]/[email or web address]: 04/15/2021Prepared by: Brett  Sitting Calf Stretch with Strap - 3 x daily - 7 x weekly - 1 sets - 3 reps - 30 seconds hold   Seated Table Hamstring Stretch - 3 x daily - 7 x weekly - 1 sets - 3 reps - 30 seconds hold   Supine Knee Extension Strengthening - 2 x daily - 7 x weekly - 2 sets - 10 reps - 3 seconds hold   Seated Long Arc Quad - 2 x daily - 7 x weekly - 2 sets - 10 reps - 3 seconds hold   Standing Terminal Knee Extension with Resistance - 2 x daily - 7 x weekly - 2 sets - 10 reps - 3 seconds hold      Therapeutic Exercise and NMR EXR  [] (49118) Provided verbal/tactile cueing for activities related to strengthening, flexibility, endurance, ROM for improvements in LE, proximal hip, and core control with self care, mobility, lifting, ambulation.  [] (66034) Provided verbal/tactile cueing for activities related to improving balance, coordination, kinesthetic sense, posture, motor skill, proprioception  to assist with LE, proximal hip, and core control in self care, mobility, lifting, ambulation and eccentric single leg control.   [] (74745) Therapist is in constant attendance of 2 or more patients providing skilled therapy interventions, but not providing any significant amount of measurable one-on-one time to either patient, for improvements in LE, proximal hip, and core control in self care, mobility, lifting, ambulation and eccentric single leg control.      NMR and Therapeutic Activities:    [x] (24000 or 51712) Provided verbal/tactile cueing for activities related to improving balance, coordination, kinesthetic sense, posture, motor skill, proprioception and motor activation to allow for proper function of core, proximal hip and LE with self care and ADLs  [] (90092) Gait Re-education- Provided training and instruction to the patient for proper LE, core and proximal hip recruitment and positioning and eccentric body weight control with ambulation re-education including up and down stairs     Home Exercise Program:    [x] (71238) Reviewed/Progressed HEP activities related to strengthening, flexibility, endurance, ROM of core, proximal hip and LE for functional self-care, mobility, lifting and ambulation/stair navigation   [] (00815)Reviewed/Progressed HEP activities related to improving balance, coordination, kinesthetic sense, posture, motor skill, proprioception of core, proximal hip and LE for self care, mobility, lifting, and ambulation/stair navigation      Manual Treatments:  PROM / STM / Oscillations-Mobs:  G-I, II, III, IV (PA's, Inf., Post.)  [x] (42375) Provided manual therapy to mobilize LE, proximal hip and/or LS spine soft tissue/joints for the purpose of modulating pain, promoting relaxation,  increasing ROM, reducing/eliminating soft tissue swelling/inflammation/restriction, improving soft tissue extensibility and allowing for proper ROM for normal function with self care, mobility, lifting and ambulation. Modalities:  [] (46849) Vasopneumatic compression: Utilized vasopneumatic compression to decrease edema / swelling for the purpose of improving mobility and quad tone / recruitment which will allow for increased overall function including but not limited to self-care, transfers, ambulation, and ascending / descending stairs.        Charges:  Timed Code Treatment Minutes: 41   Total Treatment Minutes: 41     [] EVAL - LOW (25903)   [] EVAL - MOD (20563)  [] EVAL - HIGH (53857)  [] RE-EVAL (88488)  [x] ZO(58758) x  1    [] Ionto  [x] NMR (37959) x  1      [] Vaso  [x] Manual (34439) x 1      [] Ultrasound  [] TA x        [] Mech Traction (19645)  [] Aquatic Therapy x      [] ES (un) (99852):   [] Home Management Training x  [] ES(attended) (96270)   [] Dry Needling 1-2 muscles (19412):  [] Dry Needling 3+ muscles (506231  [] Group:      [] Other:     GOALS:   Patient stated goal: walk without A device and perform steps without rail  [x] Progressing: [] Met: [] Not Met: [] Adjusted    Therapist goals for Patient:   Short Term Goals: To be achieved in: 2 weeks  1. Independent in HEP and progression per patient tolerance, in order to prevent re-injury. [] Progressing: [x] Met: [] Not Met: [] Adjusted  2. Patient will have a decrease in pain to facilitate improvement in movement, function, and ADLs as indicated by Functional Deficits. [x] Progressing: [] Met: [] Not Met: [] Adjusted    Long Term Goals: To be achieved in: 7-9 weeks  1. Disability index score of 40-50% or less for the LEFS to assist with reaching prior level of function. [x] Progressing: [] Met: [] Not Met: [] Adjusted  2. Patient will demonstrate increased AROM to 5-110  to allow for proper joint functioning as indicated by patients ability to perform sit to stand transfer without stiffness with L=R WBing.   [] Progressing: [x] Met: [] Not Met: [] Adjusted  3. Patient will demonstrate an increase in Strength to at least 4-4.5/5 as well as good proximal hip strength and control to allow for proper functional mobility as indicated by patients ability to ascend/descend steps 6\" with 1 rail reciprocal on R Met for knee[x] Progressing: [] Met: [] Not Met: [] Adjusted  4. Patient will return to functional activities including walking without assistive device with SPC or non A device with proper heel to toe gait without increased symptoms or restriction. [] Progressing: [] Met: [x] Not Met: [] Adjusted  5.patient to be able to perform TUG test in 10\" or less with least restrictive A device by discharge. [] Progressing: [] Met: [x] Not Met: [] Adjusted     Overall Progression Towards Functional goals/ Treatment Progress Update:  [x] Patient is progressing as expected towards functional goals listed. [] Progression is slowed due to complexities/Impairments listed. [] Progression has been slowed due to co-morbidities.   [] Plan just implemented, too soon to Return to Play, Meets All Above Stages   []  Not Ready for Return to Sports   Comments:            PLAN: See eval. PT 2-3x / week for 7-9 weeks. [x] Continue per plan of care [] Alter current plan (see comments)  [] Plan of care initiated [] Hold pending MD visit [] Discharge    Electronically signed by: Rodolfo Redding PT #[id number]   Note: If patient does not return for scheduled/ recommended follow up visits, this note will serve as a discharge from care along with most recent update on progress.

## 2021-05-10 ENCOUNTER — TELEPHONE (OUTPATIENT)
Dept: ORTHOPEDIC SURGERY | Age: 71
End: 2021-05-10

## 2021-05-10 ENCOUNTER — HOSPITAL ENCOUNTER (OUTPATIENT)
Dept: PHYSICAL THERAPY | Age: 71
Setting detail: THERAPIES SERIES
Discharge: HOME | End: 2021-05-10
Payer: MEDICARE

## 2021-05-10 PROCEDURE — 97112 NEUROMUSCULAR REEDUCATION: CPT

## 2021-05-10 PROCEDURE — 97110 THERAPEUTIC EXERCISES: CPT

## 2021-05-10 NOTE — FLOWSHEET NOTE
Derian Haley  Phone: [phone]   Fax: [phone]    Physical Therapy Treatment Note/ Progress Report:     Date:  5/10/2021    Patient Name:  Lauryn Maxwell    :  1950  MRN: [id number]  Restrictions/Precautions:    Medical/Treatment Diagnosis Information:  · Diagnosis: s/p R TKR 3/16/2021  · Treatment Diagnosis: R decreased knee ROM, decreased R LE strength and flexiblity, decreased balance, abnormal gait, decreased walking endurance  Insurance/Certification information:  PT Insurance Information: Medicare  Physician Information:  Referring Practitioner: Lenin Bernal MD  Plan of care signed (Y/N): [x]  Yes []  No     Date of Patient follow up with Physician: 5/10/2021     Progress Report: [x]  Yes  []  No     Date Range for reporting period:  Beginnin2021  Endin2021  Progress report due (10 Rx/or 30 days whichever is less): visit #14   Recertification due (POC duration/ or 90 days whichever is less): 2021    Visit # Insurance Allowable Auth required? Date Range   - Medicare/[facility] []  Yes  [x]  No      Latex Allergy:  [x]NO      []YES  Preferred Language for Healthcare:   [x]English       []other:    Functional Scale:        Date assessed:  LEFS: raw score = 22; dysfunction = 72.5%   2021  LEFS: raw score = 22; dysfunction = 72.5%   2021      Pain level:  4/10     SUBJECTIVE:  Did not see MD last week as dr office switched appt to the . Has been sitting in chair with leg propped up in 2nd chair and sits with knee hanging for 3-4 minutes several times a day to work on extension.        OBJECTIVE:     : See PN: AAROM R knee 7-112; PROM L knee after tx 0-110; AROM L knee at end of tx session 3 from neutral; R   R hip AB 3-/5, SLR 3/5, iliopsoas 4/5, 5/5 HS/quad; R quad flexibility 85; pt. 8' late  : Pt 10 min late, AROM R knee flexion 100 degrees; AROM R knee flexion with  degrees; AROM R knee extension lacking 6 degrees; AROM R knee flexion with OP 0 degrees - all ROM taken in supine   4/26: Knee flexion w/ heel slide and OP: 99 degrees, AROM R knee flexion at EOB: 102 degrees, AROM R knee extnsion: lacking 5 degrees  4/26: Pt was 5 minutes late today  4/23: Pt was 7 minutes late today  4/19: AROM R Knee Flex: 94 at EOB  PROM R Knee Flex: 100 at EOB   AROM R Knee: Ext: Lacking 4 in supine   4/17: Knee flexion w/ heel slide and OP: 94 deg; did not allow therapist to stretch into extension  4/15: knee extension: resting at lacking 5 degrees, able to achieve full extension with manual OP   Knee flexion w/ heel slide and over EOB: 90       RESTRICTIONS/PRECAUTIONSOA L knee    Exercises/Interventions:     Therapeutic Exercise (98126)  Resistance / level Sets/sec Reps Notes / Cues   bike-recumbent       SciFit StepOne Full Rev 5'        Added 4/15  Cueing to start with semi-circles to decrease pain and work toward improving motion. IB - gastroc  30\"  2    Step Stretch  8\"  4/19-1 set with heel down           1 set with heel raise    HR/TR  2 10    HSS  30\"  2 R    HFS  30\"  2 R    Quad sets with towel behind knee B  Cueing to limit gluteal co contraction   SLR   54/19-Min/Mod A from clinician  4/23- decreased reps due to pain   ERMI   resume next session  4/30 Attempted one in S/L, but caused pain   Heel prop/prone hang Next visit       Bridges Attempted but HS spasm on L    5/7    Therapeutic Activities (69445)       4/13/21 Pt was educated on PT POC, Diagnosis, Prognosis, pathomechanics as well as frequency and duration of scheduling future physical therapy appointments. Time was also taken on this day to answer all patient questions and participation in PT.           Mini squats hi/low table  2 10                  Neuromuscular Re-ed (63593)       SAQ  2/3\"H 10 added 4/15   LAQ   2 10 Added 4/15   Resume next session4\" 2 10 R Added 4/26   resume next session4\" 2 10 R Added 4/28Tandem stance SLS  5\" 5 Added 5/3          Manual Intervention (80893)       Knee mobs/PROM  X10 min total  (OP) and extension in supine/long sit prmarily    Tib/Fem Mobs     Patella Mobs Superior/inferior X1'   Ankle mobs     Quad stretch sidelying  1/10\"34/13;5/7   MET to correct R SI posterior innominate SI  X2'         Modalities:  4/28: Ice bag to go   4/15: Ice x10 min  4/17: Pt did not have time for ice, ride was here to pick her up. Pt stated she would ice at home    Pt. Education:  -pt educated on diagnosis, prognosis and expectations for rehab  -all pt questions were answered    Home Exercise Program:  Access Code: [email or web address]: [email or web address]/Date: 04/15/2021Prepared by: Francisca Hoskinsr Sitting Calf Stretch with Strap - 3 x daily - 7 x weekly - 1 sets - 3 reps - 30 seconds hold   Seated Table Hamstring Stretch - 3 x daily - 7 x weekly - 1 sets - 3 reps - 30 seconds hold   Supine Knee Extension Strengthening - 2 x daily - 7 x weekly - 2 sets - 10 reps - 3 seconds hold   Seated Long Arc Quad - 2 x daily - 7 x weekly - 2 sets - 10 reps - 3 seconds hold   Standing Terminal Knee Extension with Resistance - 2 x daily - 7 x weekly - 2 sets - 10 reps - 3 seconds hold      Therapeutic Exercise and NMR EXR  [] (45946) Provided verbal/tactile cueing for activities related to strengthening, flexibility, endurance, ROM for improvements in LE, proximal hip, and core control with self care, mobility, lifting, ambulation.  [] (42120) Provided verbal/tactile cueing for activities related to improving balance, coordination, kinesthetic sense, posture, motor skill, proprioception  to assist with LE, proximal hip, and core control in self care, mobility, lifting, ambulation and eccentric single leg control.   [] (40246) Therapist is in constant attendance of 2 or more patients providing skilled therapy interventions, but not providing any significant amount of measurable one-on-one time to either patient, for improvements in LE, proximal hip, and core control in self care, mobility, lifting, ambulation and eccentric single leg control. NMR and Therapeutic Activities:    [x] (88917 or 67830) Provided verbal/tactile cueing for activities related to improving balance, coordination, kinesthetic sense, posture, motor skill, proprioception and motor activation to allow for proper function of core, proximal hip and LE with self care and ADLs  [] (00568) Gait Re-education- Provided training and instruction to the patient for proper LE, core and proximal hip recruitment and positioning and eccentric body weight control with ambulation re-education including up and down stairs     Home Exercise Program:    [x] (49914) Reviewed/Progressed HEP activities related to strengthening, flexibility, endurance, ROM of core, proximal hip and LE for functional self-care, mobility, lifting and ambulation/stair navigation   [] (08209)Reviewed/Progressed HEP activities related to improving balance, coordination, kinesthetic sense, posture, motor skill, proprioception of core, proximal hip and LE for self care, mobility, lifting, and ambulation/stair navigation      Manual Treatments:  PROM / STM / Oscillations-Mobs:  G-I, II, III, IV (PA's, Inf., Post.)  [x] (16917) Provided manual therapy to mobilize LE, proximal hip and/or LS spine soft tissue/joints for the purpose of modulating pain, promoting relaxation,  increasing ROM, reducing/eliminating soft tissue swelling/inflammation/restriction, improving soft tissue extensibility and allowing for proper ROM for normal function with self care, mobility, lifting and ambulation.      Modalities:  [] (14800) Vasopneumatic compression: Utilized vasopneumatic compression to decrease edema / swelling for the purpose of improving mobility and quad tone / recruitment which will allow for increased overall function including but not limited to self-care, transfers, ambulation, and ascending / descending stairs. Charges:  Timed Code Treatment Minutes: 42   Total Treatment Minutes: 42     [] EVAL - LOW (59164)   [] EVAL - MOD (22210)  [] EVAL - HIGH (16238)  [] RE-EVAL (89800)  [x] OE(46995) x  2    [] Ionto  [x] NMR (53116) x  1      [] Vaso  [] Manual (14018) x       [] Ultrasound  [] TA x        [] Mech Traction (59283)  [] Aquatic Therapy x      [] ES (un) (67561):   [] Home Management Training x  [] ES(attended) (74032)   [] Dry Needling 1-2 muscles (73699):  [] Dry Needling 3+ muscles (620389  [] Group:      [] Other:     GOALS:   Patient stated goal: walk without A device and perform steps without rail  [x] Progressing: [] Met: [] Not Met: [] Adjusted    Therapist goals for Patient:   Short Term Goals: To be achieved in: 2 weeks  1. Independent in HEP and progression per patient tolerance, in order to prevent re-injury. [] Progressing: [x] Met: [] Not Met: [] Adjusted  2. Patient will have a decrease in pain to facilitate improvement in movement, function, and ADLs as indicated by Functional Deficits. [x] Progressing: [] Met: [] Not Met: [] Adjusted    Long Term Goals: To be achieved in: 7-9 weeks  1. Disability index score of 40-50% or less for the LEFS to assist with reaching prior level of function. [x] Progressing: [] Met: [] Not Met: [] Adjusted  2. Patient will demonstrate increased AROM to 5-110  to allow for proper joint functioning as indicated by patients ability to perform sit to stand transfer without stiffness with L=R WBing.   [] Progressing: [x] Met: [] Not Met: [] Adjusted  3. Patient will demonstrate an increase in Strength to at least 4-4.5/5 as well as good proximal hip strength and control to allow for proper functional mobility as indicated by patients ability to ascend/descend steps 6\" with 1 rail reciprocal on R Met for knee[x] Progressing: [] Met: [] Not Met: [] Adjusted  4.  Patient will return to functional activities including walking without assistive device with SPC or non A device with proper heel to toe gait without increased symptoms or restriction. [] Progressing: [] Met: [x] Not Met: [] Adjusted  5.patient to be able to perform TUG test in 10\" or less with least restrictive A device by discharge. [] Progressing: [] Met: [x] Not Met: [] Adjusted     Overall Progression Towards Functional goals/ Treatment Progress Update:  [x] Patient is progressing as expected towards functional goals listed. [] Progression is slowed due to complexities/Impairments listed. [] Progression has been slowed due to co-morbidities. [] Plan just implemented, too soon to assess goals progression <30days   [] Goals require adjustment due to lack of progress  [] Patient is not progressing as expected and requires additional follow up with physician  [] Other    Persisting Functional Limitations/Impairments:  []Sitting [x]Standing   [x]Walking [x]Stairs   [x]Transfers []ADLs   []Squatting/bending []Kneeling  []Housework []Job related tasks  []Driving []Sports/Recreation   []Sleeping []Other:    ASSESSMENT:  Pt continues to demonstrate slightly antalgic gait with ambulation and remains challenged by current exercise routine. Pt demonstrates deficits in endurance as noted by need for short rest breaks throughout session. Pt also continues to demonstrate limitation in quad function with noted deficits in initiating and maintaining independent quad muscle contraction with quad sets. Continue to work on improving motion, endurance, strength and flexibility for improved function and tolerance to daily tasks without restrictions.      Treatment/Activity Tolerance:  [x] Pt able to complete treatment [] Patient limited by fatique  [x] Patient limited by pain  [] Patient limited by other medical complications  [] Other:     Prognosis:  [x] Good [] Fair  [] Poor    Patient Requires Follow-up: [x] Yes  [] No    Return to Play:    [x]  N/A   []  Stage 1: Intro to Strength   []  Stage 2: Return to Run and Strength   []  Stage 3: Return to Jump and Strength   []  Stage 4: Dynamic Strength and Agility   []  Stage 5: Sport Specific Training   []  Ready to Return to Play, Meets All Above Stages   []  Not Ready for Return to Sports   Comments:            PLAN: See eval. PT 2-3x / week for 7-9 weeks. [x] Continue per plan of care [] Alter current plan (see comments)  [] Plan of care initiated [] Hold pending MD visit [] Discharge    Electronically signed by: Yessica Tolbert    Note: If patient does not return for scheduled/ recommended follow up visits, this note will serve as a discharge from care along with most recent update on progress.

## 2021-05-11 NOTE — TELEPHONE ENCOUNTER
PATIENT CALLED TO SEE IF THE PHARMACY HAS BEEN CALLED AND PER MESSAGE BELOW IT HAS BEEN CALLED AND PATIENT SAID OK SHE WILL CALL THE PHARMACY TO SEE IF THEY WILL RELEASE THE MEDICINE AND IF NOT [address] Khanna GIVE US A CALL BACK.

## 2021-05-11 NOTE — TELEPHONE ENCOUNTER
Called pharmacy and spoke w/[place], confirmed rx for q6h, may take 2nd tablet for severe pain if needed.

## 2021-05-12 ENCOUNTER — HOSPITAL ENCOUNTER (OUTPATIENT)
Dept: PHYSICAL THERAPY | Age: 71
Setting detail: THERAPIES SERIES
Discharge: HOME | End: 2021-05-12
Payer: MEDICARE

## 2021-05-14 ENCOUNTER — HOSPITAL ENCOUNTER (OUTPATIENT)
Dept: PHYSICAL THERAPY | Age: 71
Setting detail: THERAPIES SERIES
Discharge: HOME | End: 2021-05-14
Payer: MEDICARE

## 2021-05-14 NOTE — FLOWSHEET NOTE
[address] [facility]    Physical Therapy  Cancellation/No-show Note  Patient Name:  Marco Olson  :  1950   Date:  2021    Cancelled visits to date: ,   No-shows to date: 0    For today's appointment patient:  [x]  Cancelled  []  Rescheduled appointment  []  No-show     Reason given by patient:  [x]  Patient ill  []  Conflicting appointment  []  No transportation    []  Conflict with work  []  No reason given  []  Other:     Comments:      Phone call information:   []  Phone call made today to patient at _ time at number provided:      []  Patient answered, conversation as follows:    []  Patient did not answer, message left as follows:  []  Phone call not made today  [x]  Phone call not needed - pt contacted us to cancel and provided reason for cancellation.      Electronically signed by:  Yahir Christianson, PT, DPT

## 2021-05-16 DIAGNOSIS — I10 BENIGN ESSENTIAL HTN: Chronic | ICD-10-CM

## 2021-05-17 RX ORDER — FUROSEMIDE 40 MG/1
40 TABLET ORAL DAILY
Qty: 90 TABLET | Refills: 0 | Status: SHIPPED | OUTPATIENT
Start: 2021-05-17 | End: 2021-07-09 | Stop reason: SDUPTHER

## 2021-05-19 ENCOUNTER — HOSPITAL ENCOUNTER (OUTPATIENT)
Dept: PHYSICAL THERAPY | Age: 71
Setting detail: THERAPIES SERIES
Discharge: HOME | End: 2021-05-19
Payer: MEDICARE

## 2021-05-19 PROCEDURE — 97112 NEUROMUSCULAR REEDUCATION: CPT

## 2021-05-19 PROCEDURE — 97110 THERAPEUTIC EXERCISES: CPT

## 2021-05-19 NOTE — FLOWSHEET NOTE
late  4/28: Pt 10 min late, AROM R knee flexion 100 degrees; AROM R knee flexion with  degrees; AROM R knee extension lacking 6 degrees; AROM R knee flexion with OP 0 degrees - all ROM taken in supine   4/26: Knee flexion w/ heel slide and OP: 99 degrees, AROM R knee flexion at EOB: 102 degrees, AROM R knee extnsion: lacking 5 degrees  4/26: Pt was 5 minutes late today  4/23: Pt was 7 minutes late today  4/19: AROM R Knee Flex: 94 at EOB  PROM R Knee Flex: 100 at EOB   AROM R Knee: Ext: Lacking 4 in supine   4/17: Knee flexion w/ heel slide and OP: 94 deg; did not allow therapist to stretch into extension  4/15: knee extension: resting at lacking 5 degrees, able to achieve full extension with manual OP   Knee flexion w/ heel slide and over EOB: 90       RESTRICTIONS/PRECAUTIONSOA L knee    Exercises/Interventions:     Therapeutic Exercise (39235)  Resistance / level Sets/sec Reps Notes / Cues   bike-recumbent       SciFit StepOne Full Rev 5'        Added 4/15  Cueing to start with semi-circles to decrease pain and work toward improving motion. IB - gastroc  30\"  2    Step Stretch  8\"  4/19-1 set with heel down           1 set with heel raise    HS standing stretch  20\" 2 L/R    HR/TR  2 10    HSS  30\"  2 R    HFS  30\"  2 R    Quad sets with towel behind knee B  Cueing to limit gluteal co contraction   SLR   115 (A with concentric lift)4/19-Min/Mod A from clinician  4/23- decreased reps due to pain   ERMI   resume next session  4/30 Attempted one in S/L, but caused pain   H Next visit       Standing Hip AB 2# R  0# L 2  2 10 R  8 L 5/19                  Attempted but HS spasm on L    5/7    Therapeutic Activities (72705)       4/13/21 Pt was educated on PT POC, Diagnosis, Prognosis, pathomechanics as well as frequency and duration of scheduling future physical therapy appointments. Time was also taken on this day to answer all patient questions and participation in PT.           Mini squats hi/low table 1 10                  Neuromuscular Re-ed (57663)       SAQ  2/3\"H 10 added 4/15   LAQ  2# 2 10 Added 4/15   tep ups - forward  4\" 2 10 R Added 4/26   Step-ups Backward L (R eccentric) 1 15 R Step ups - lateral 4\" 1 10 R Added 4/28Semi-Tandem stance floor 15\"  3 L/R    SLS  5\" 5 Added 5/3          Manual Intervention (11859)       Knee mobs/PROM      Tib/Fem Mobs     Patella Mobs Superior/inferior X1'   Ankle mobs     Quad stretch sidelying  1/10\"34/13;5/7   MET to correct R SI posterior innominate SI  X2'         Modalities:  5/19: CP to R knee X10' supine after tx4/28: Ice bag to go   4/15: Ice x10 min  4/17: Pt did not have time for ice, ride was here to pick her up. Pt stated she would ice at home    Pt. Education:  -pt educated on diagnosis, prognosis and expectations for rehab  -all pt questions were answered    Home Exercise Program:  Access Code: [email or web address]: [place]/Date: 04/15/2021Prepared by: Ortiz Kohli Sitting Calf Stretch with Strap - 3 x daily - 7 x weekly - 1 sets - 3 reps - 30 seconds hold   Seated Table Hamstring Stretch - 3 x daily - 7 x weekly - 1 sets - 3 reps - 30 seconds hold   Supine Knee Extension Strengthening - 2 x daily - 7 x weekly - 2 sets - 10 reps - 3 seconds hold   Seated Long Arc Quad - 2 x daily - 7 x weekly - 2 sets - 10 reps - 3 seconds hold   Standing Terminal Knee Extension with Resistance - 2 x daily - 7 x weekly - 2 sets - 10 reps - 3 seconds hold      Therapeutic Exercise and NMR EXR  [x] (19966) Provided verbal/tactile cueing for activities related to strengthening, flexibility, endurance, ROM for improvements in LE, proximal hip, and core control with self care, mobility, lifting, ambulation.  [] (61581) Provided verbal/tactile cueing for activities related to improving balance, coordination, kinesthetic sense, posture, motor skill, proprioception  to assist with LE, proximal hip, and core control in self care, mobility, lifting, ambulation and eccentric single leg control.   [] (10315) Therapist is in constant attendance of 2 or more patients providing skilled therapy interventions, but not providing any significant amount of measurable one-on-one time to either patient, for improvements in LE, proximal hip, and core control in self care, mobility, lifting, ambulation and eccentric single leg control. NMR and Therapeutic Activities:    [x] (27560 or 59260) Provided verbal/tactile cueing for activities related to improving balance, coordination, kinesthetic sense, posture, motor skill, proprioception and motor activation to allow for proper function of core, proximal hip and LE with self care and ADLs  [] (69752) Gait Re-education- Provided training and instruction to the patient for proper LE, core and proximal hip recruitment and positioning and eccentric body weight control with ambulation re-education including up and down stairs     Home Exercise Program:    [x] (70868) Reviewed/Progressed HEP activities related to strengthening, flexibility, endurance, ROM of core, proximal hip and LE for functional self-care, mobility, lifting and ambulation/stair navigation   [] (66223)Reviewed/Progressed HEP activities related to improving balance, coordination, kinesthetic sense, posture, motor skill, proprioception of core, proximal hip and LE for self care, mobility, lifting, and ambulation/stair navigation      Manual Treatments:  PROM / STM / Oscillations-Mobs:  G-I, II, III, IV (PA's, Inf., Post.)  [x] (91882) Provided manual therapy to mobilize LE, proximal hip and/or LS spine soft tissue/joints for the purpose of modulating pain, promoting relaxation,  increasing ROM, reducing/eliminating soft tissue swelling/inflammation/restriction, improving soft tissue extensibility and allowing for proper ROM for normal function with self care, mobility, lifting and ambulation.      Modalities:  [] (42594) Vasopneumatic compression: Utilized vasopneumatic compression to decrease edema / swelling for the purpose of improving mobility and quad tone / recruitment which will allow for increased overall function including but not limited to self-care, transfers, ambulation, and ascending / descending stairs. Charges:  Timed Code Treatment Minutes: 45   Total Treatment Minutes: 55     [] EVAL - LOW (65393)   [] EVAL - MOD (15164)  [] EVAL - HIGH (02883)  [] RE-EVAL (45809)  [x] SE(93672) x  1    [] Ionto  [x] NMR (41350) x  2      [] Vaso  [] Manual (65307) x       [] Ultrasound  [] TA x        [] Mech Traction (82407)  [] Aquatic Therapy x      [] ES (un) (17709):   [] Home Management Training x  [] ES(attended) (94159)   [] Dry Needling 1-2 muscles (56067):  [] Dry Needling 3+ muscles (867551  [] Group:      [] Other:     GOALS:   Patient stated goal: walk without A device and perform steps without rail  [x] Progressing: [] Met: [] Not Met: [] Adjusted    Therapist goals for Patient:   Short Term Goals: To be achieved in: 2 weeks  1. Independent in HEP and progression per patient tolerance, in order to prevent re-injury. [] Progressing: [x] Met: [] Not Met: [] Adjusted  2. Patient will have a decrease in pain to facilitate improvement in movement, function, and ADLs as indicated by Functional Deficits. [x] Progressing: [] Met: [] Not Met: [] Adjusted    Long Term Goals: To be achieved in: 7-9 weeks  1. Disability index score of 40-50% or less for the LEFS to assist with reaching prior level of function. [x] Progressing: [] Met: [] Not Met: [] Adjusted  2. Patient will demonstrate increased AROM to 5-110  to allow for proper joint functioning as indicated by patients ability to perform sit to stand transfer without stiffness with L=R WBing.   [] Progressing: [x] Met: [] Not Met: [] Adjusted  3.  Patient will demonstrate an increase in Strength to at least 4-4.5/5 as well as good proximal hip strength and control to allow for proper functional mobility as indicated by patients ability to ascend/descend steps 6\" with 1 rail reciprocal on R Met for knee[x] Progressing: [] Met: [] Not Met: [] Adjusted  4. Patient will return to functional activities including walking without assistive device with SPC or non A device with proper heel to toe gait without increased symptoms or restriction. [] Progressing: [] Met: [x] Not Met: [] Adjusted  5.patient to be able to perform TUG test in 10\" or less with least restrictive A device by discharge. [] Progressing: [] Met: [x] Not Met: [] Adjusted     Overall Progression Towards Functional goals/ Treatment Progress Update:  [x] Patient is progressing as expected towards functional goals listed. [] Progression is slowed due to complexities/Impairments listed. [] Progression has been slowed due to co-morbidities. [] Plan just implemented, too soon to assess goals progression <30days   [] Goals require adjustment due to lack of progress  [] Patient is not progressing as expected and requires additional follow up with physician  [] Other    Persisting Functional Limitations/Impairments:  []Sitting [x]Standing   [x]Walking [x]Stairs   [x]Transfers []ADLs   []Squatting/bending []Kneeling  []Housework []Job related tasks  []Driving []Sports/Recreation   []Sleeping []Other:    ASSESSMENT:  Pt has made significant gains in knee extension as well as quality of gait with her SPC. With improved step length and heel to toe gait pattern. Pt demonstrates  Improvements n endurance as noted by need for short rest breaks throughout session. Pt  demonstrate l improvement in quad function om FWD and backward steps, but is still challenged with SLR flexion,  . Continue to work on improving motion, endurance, strength and flexibility for improved function and tolerance to daily tasks without restrictions.      Treatment/Activity Tolerance:  [x] Pt able to complete treatment [] Patient limited by fatique  [x] Patient limited by pain  [] Patient limited by other medical complications  [] Other:     Prognosis:  [x] Good [] Fair  [] Poor    Patient Requires Follow-up: [x] Yes  [] No    Return to Play:    [x]  N/A   []  Stage 1: Intro to Strength   []  Stage 2: Return to Run and Strength   []  Stage 3: Return to Jump and Strength   []  Stage 4: Dynamic Strength and Agility   []  Stage 5: Sport Specific Training   []  Ready to Return to Play, Meets All Above Stages   []  Not Ready for Return to Sports   Comments:            PLAN: See eval. PT 2-3x / week for 7-9 weeks. [x] Continue per plan of care [] Alter current plan (see comments)  [] Plan of care initiated [] Hold pending MD visit [] Discharge    Electronically signed by: Onel Suarez PT MSPT #[id number]    Note: If patient does not return for scheduled/ recommended follow up visits, this note will serve as a discharge from care along with most recent update on progress.

## 2021-05-21 ENCOUNTER — HOSPITAL ENCOUNTER (OUTPATIENT)
Dept: PHYSICAL THERAPY | Age: 71
Setting detail: THERAPIES SERIES
Discharge: HOME | End: 2021-05-21
Payer: MEDICARE

## 2021-05-21 NOTE — FLOWSHEET NOTE
[address] [facility]    Physical Therapy  Cancellation/No-show Note  Patient Name:  Karyle Baton  :  1950   Date:  2021    Cancelled visits to date: , ,   No-shows to date: 0    For today's appointment patient:  [x]  Cancelled  []  Rescheduled appointment  []  No-show     Reason given by patient:  []  Patient ill  []  Conflicting appointment  []  No transportation    []  Conflict with work  [x]  No reason given  [x]  Other:     Comments:      Phone call information:   []  Phone call made today to patient at _ time at number provided:      []  Patient answered, conversation as follows:    []  Patient did not answer, message left as follows:  []  Phone call not made today  [x]  Phone call not needed - pt contacted us to cancel and provided reason for cancellation.      Electronically signed by:  Joseph Alfaro PT, PT, MSPT

## 2021-05-24 ENCOUNTER — NURSE TRIAGE (OUTPATIENT)
Dept: OTHER | Facility: CLINIC | Age: 71
End: 2021-05-24

## 2021-05-24 NOTE — TELEPHONE ENCOUNTER
Reason for Disposition   COVID-19 vaccine, systemic reactions (e.g., fatigue, fever, muscle aches), questions about   MODERATE weakness (i.e., interferes with work, school, normal activities) and cause unknown (Exceptions: weakness with acute minor illness, or weakness from poor fluid intake)    Answer Assessment - Initial Assessment Questions  1. MAIN CONCERN OR SYMPTOM:  \"What is your main concern right now? \" \"What question do you have? \" \"What's the main symptom you're worried about? \" (e.g., fever, pain, redness, swelling)      Extreme fatigue    2. VACCINE: \"What vaccination did you receive? \" \"Is this your first or second shot? \" (e.g., none; [employer], [employer], [address], other)      Got the vaccine on last Wednesday . She is in a clinical trial for the vaccine called [employer]ir[employer] Grider. 3. SYMPTOM ONSET: \"When did the *No Answer* begin? \" (e.g., not relevant; hours, days)       Last Thursday    4. SYMPTOM SEVERITY: \"How bad is it? \"       moderate - canceled multiple plans over the weekend, including seeing her grand kids. She can walk from place to place with in her home. 5. FEVER: \"Is there a fever? \" If so, ask: \"What is it, how was it measured, and when did it start? \"       No    6. PAST REACTIONS: \"Have you reacted to immunizations before? \" If so, ask: \"What happened? \"      She has but not like this - she had a bad reaction to flu shot when she was [age]years old     7. OTHER SYMPTOMS: \"Do you have any other symptoms? 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 Has stomach upset. O2 saturation 96%. /76 and HR 70s-80s. Pt states she talked to the clinical study individual who is an NP and she is going to see NP today at 1400. Pt reports drinking about 100oz of water each day. She does report h/o allergies and knee surgery March 16, 2016. Answer Assessment - Initial Assessment Questions  1. DESCRIPTION: \"Describe how you are feeling. \"      Extremely fatigued    2. SEVERITY: \"How bad is it? \"  \"Can you stand and walk? \"    - MILD - Feels weak or tired, but does not interfere with work, school or normal activities    - [facility] to stand and walk; weakness interferes with work, school, or normal activities    - SEVERE - Unable to stand or walk      Moderate    3. ONSET:  \"When did the weakness begin? \"      After her covid vaccine    4. CAUSE: \"What do you think is causing the weakness? \"      COVID vaccine? 5. MEDICINES: Ruba Monroy you recently started a new medicine or had a change in the amount of a medicine? \"      She had covid shot 5 days ago. Over last few days she has tapered off oxycodone for knee surgery , which was on March 16, 2021.    6. OTHER SYMPTOMS: \"Do you have any other symptoms? \" (e.g., chest pain, fever, cough, SOB, vomiting, diarrhea, bleeding, other areas of pain)      Nose stuffy (not more than her usual from allergies). Otherwise, no.     7. PREGNANCY: \"Is there any chance you are pregnant? \" \"When was your last menstrual period? \"      Not asked    Protocols used: CORONAVIRUS (COVID-19) VACCINE QUESTIONS AND REACTIONS-ADULT-[place], WEAKNESS (GENERALIZED) AND FATIGUE-ADULT-[place]    Received call from Willie Tovar at pre-service center [facility] with Red Flag Complaint. Brief description of triage: see above    Triage indicates for patient to go to office now . Pt states she has apt at 1400 today with a NP from the clinical study - pt states that if NP wants her to f/u with pcp in any way, she will call back . She states she will go to ER, if she has any sob develop. Care advice provided, patient verbalizes understanding; denies any other questions or concerns; instructed to call back for any new or worsening symptoms. Attention Provider: Thank you for allowing me to participate in the care of your patient. The patient was connected to triage in response to information provided to the [facility].   Please do not respond through this encounter as the response is not directed to a shared pool.

## 2021-05-25 ENCOUNTER — HOSPITAL ENCOUNTER (OUTPATIENT)
Dept: PHYSICAL THERAPY | Age: 71
Setting detail: THERAPIES SERIES
Discharge: HOME | End: 2021-05-25
Payer: MEDICARE

## 2021-05-25 PROCEDURE — 97110 THERAPEUTIC EXERCISES: CPT

## 2021-05-25 PROCEDURE — 97112 NEUROMUSCULAR REEDUCATION: CPT

## 2021-05-25 NOTE — FLOWSHEET NOTE
Derian Haley  Phone: [phone]   Fax: [phone]    Physical Therapy Treatment Note/ Progress Report:     Date:  2021    Patient Name:  Lauryn Maxwell    :  1950  MRN: [id number]  Restrictions/Precautions:    Medical/Treatment Diagnosis Information:  · Diagnosis: s/p R TKR 3/16/2021  · Treatment Diagnosis: R decreased knee ROM, decreased R LE strength and flexiblity, decreased balance, abnormal gait, decreased walking endurance  Insurance/Certification information:  PT Insurance Information: Medicare  Physician Information:  Referring Practitioner: Lenin Bernal MD  Plan of care signed (Y/N): [x]  Yes []  No     Date of Patient follow up with Physician: 2021     Progress Report: []  Yes  [x]  No     Date Range for reporting period:  Beginnin2021  Ending:  Progress report due (10 Rx/or 30 days whichever is less): visit #99   Recertification due (POC duration/ or 90 days whichever is less): 2021    Visit # Insurance Allowable Auth required? Date Range   - Medicare/[facility] []  Yes  [x]  No      Latex Allergy:  [x]NO      []YES  Preferred Language for Healthcare:   [x]English       []other:    Functional Scale:        Date assessed:  LEFS: raw score = 22; dysfunction = 72.5%   2021  LEFS: raw score = 22; dysfunction = 72.5%   2021      Pain level:  4/10     SUBJECTIVE:   Pt states she has been part of a Covid vaccine study and received a shot last week and has slept for about 3 days. Hasn't done any exercises in several days and today is the first day she's felt like she has any energy. Knee feels very stiff today and aching. Has started taking steps around apartment over the last day or so without use of cane but uses it more for balance especially when outside of home.           OBJECTIVE:     :after tx: L knee 2-118 supine  : See PN: AAROM R knee 7-112; PROM L knee after tx 0-110; AROM L knee [] (39237) Therapist is in constant attendance of 2 or more patients providing skilled therapy interventions, but not providing any significant amount of measurable one-on-one time to either patient, for improvements in LE, proximal hip, and core control in self care, mobility, lifting, ambulation and eccentric single leg control. NMR and Therapeutic Activities:    [x] (16668 or 80692) Provided verbal/tactile cueing for activities related to improving balance, coordination, kinesthetic sense, posture, motor skill, proprioception and motor activation to allow for proper function of core, proximal hip and LE with self care and ADLs  [] (63298) Gait Re-education- Provided training and instruction to the patient for proper LE, core and proximal hip recruitment and positioning and eccentric body weight control with ambulation re-education including up and down stairs     Home Exercise Program:    [x] (05496) Reviewed/Progressed HEP activities related to strengthening, flexibility, endurance, ROM of core, proximal hip and LE for functional self-care, mobility, lifting and ambulation/stair navigation   [] (42862)Reviewed/Progressed HEP activities related to improving balance, coordination, kinesthetic sense, posture, motor skill, proprioception of core, proximal hip and LE for self care, mobility, lifting, and ambulation/stair navigation      Manual Treatments:  PROM / STM / Oscillations-Mobs:  G-I, II, III, IV (PA's, Inf., Post.)  [x] (02529) Provided manual therapy to mobilize LE, proximal hip and/or LS spine soft tissue/joints for the purpose of modulating pain, promoting relaxation,  increasing ROM, reducing/eliminating soft tissue swelling/inflammation/restriction, improving soft tissue extensibility and allowing for proper ROM for normal function with self care, mobility, lifting and ambulation.      Modalities:  [] (85744) Vasopneumatic compression: Utilized vasopneumatic compression to decrease edema / swelling for the purpose of improving mobility and quad tone / recruitment which will allow for increased overall function including but not limited to self-care, transfers, ambulation, and ascending / descending stairs. Charges:  Timed Code Treatment Minutes: 45   Total Treatment Minutes: 55     [] EVAL - LOW (18958)   [] EVAL - MOD (08626)  [] EVAL - HIGH (07329)  [] RE-EVAL (46040)  [x] EW(57840) x  2    [] Ionto  [x] NMR (89602) x  2      [] Vaso  [] Manual (23189) x       [] Ultrasound  [] TA x        [] Mech Traction (52179)  [] Aquatic Therapy x      [] ES (un) (04970):   [] Home Management Training x  [] ES(attended) (10449)   [] Dry Needling 1-2 muscles (35538):  [] Dry Needling 3+ muscles (483595)  [] Group:      [] Other:     GOALS:   Patient stated goal: walk without A device and perform steps without rail  [x] Progressing: [] Met: [] Not Met: [] Adjusted    Therapist goals for Patient:   Short Term Goals: To be achieved in: 2 weeks  1. Independent in HEP and progression per patient tolerance, in order to prevent re-injury. [] Progressing: [x] Met: [] Not Met: [] Adjusted  2. Patient will have a decrease in pain to facilitate improvement in movement, function, and ADLs as indicated by Functional Deficits. [x] Progressing: [] Met: [] Not Met: [] Adjusted    Long Term Goals: To be achieved in: 7-9 weeks  1. Disability index score of 40-50% or less for the LEFS to assist with reaching prior level of function. [x] Progressing: [] Met: [] Not Met: [] Adjusted  2. Patient will demonstrate increased AROM to 5-110  to allow for proper joint functioning as indicated by patients ability to perform sit to stand transfer without stiffness with L=R WBing.   [] Progressing: [x] Met: [] Not Met: [] Adjusted  3.  Patient will demonstrate an increase in Strength to at least 4-4.5/5 as well as good proximal hip strength and control to allow for proper functional mobility as indicated by patients ability to compared to last visit. Educated pt and discussed need to focus on improving motion over the next few days prior to upcoming MD appt to improve motion. Continue to advance exercises as tolerated to promote increased balance, motion, strength and endurance to decrease fall risk, improve safety and return to full PLOF without restrictions. Treatment/Activity Tolerance:  [x] Pt able to complete treatment [] Patient limited by fatique  [x] Patient limited by pain  [] Patient limited by other medical complications  [] Other:     Prognosis:  [x] Good [] Fair  [] Poor    Patient Requires Follow-up: [x] Yes  [] No    Return to Play:    [x]  N/A   []  Stage 1: Intro to Strength   []  Stage 2: Return to Run and Strength   []  Stage 3: Return to Jump and Strength   []  Stage 4: Dynamic Strength and Agility   []  Stage 5: Sport Specific Training   []  Ready to Return to Play, Meets All Above Stages   []  Not Ready for Return to Sports   Comments:            PLAN: See eval. PT 2-3x / week for 7-9 weeks. [x] Continue per plan of care [] Alter current plan (see comments)  [] Plan of care initiated [] Hold pending MD visit [] Discharge    Electronically signed by: Anthony Hill PTA [id number]    Note: If patient does not return for scheduled/ recommended follow up visits, this note will serve as a discharge from care along with most recent update on progress.

## 2021-05-28 ENCOUNTER — HOSPITAL ENCOUNTER (OUTPATIENT)
Dept: PHYSICAL THERAPY | Age: 71
Setting detail: THERAPIES SERIES
Discharge: HOME | End: 2021-05-28
Payer: MEDICARE

## 2021-05-28 ENCOUNTER — OFFICE VISIT (OUTPATIENT)
Dept: ORTHOPEDIC SURGERY | Age: 71
End: 2021-05-28

## 2021-05-28 VITALS — WEIGHT: 228.2 LBS | HEIGHT: 64 IN | BODY MASS INDEX: 38.96 KG/M2

## 2021-05-28 DIAGNOSIS — M17.11 PRIMARY OSTEOARTHRITIS OF RIGHT KNEE: Primary | ICD-10-CM

## 2021-05-28 PROCEDURE — 97140 MANUAL THERAPY 1/> REGIONS: CPT

## 2021-05-28 PROCEDURE — 97112 NEUROMUSCULAR REEDUCATION: CPT

## 2021-05-28 PROCEDURE — 97110 THERAPEUTIC EXERCISES: CPT

## 2021-05-28 PROCEDURE — 99024 POSTOP FOLLOW-UP VISIT: CPT | Performed by: PHYSICIAN ASSISTANT

## 2021-05-28 NOTE — FLOWSHEET NOTE
Derian Haley  Phone: [phone]   Fax: [phone]    Physical Therapy Treatment Note/ Progress Report:     Date:  2021    Patient Name:  Patience Yanes    :  1950  MRN: [id number]  Restrictions/Precautions:    Medical/Treatment Diagnosis Information:  · Diagnosis: s/p R TKR 3/16/2021  · Treatment Diagnosis: R decreased knee ROM, decreased R LE strength and flexiblity, decreased balance, abnormal gait, decreased walking endurance  Insurance/Certification information:  PT Insurance Information: Medicare  Physician Information:  Referring Practitioner: Marysol Arora MD  Plan of care signed (Y/N): [x]  Yes []  No     Date of Patient follow up with Physician: 2021     Progress Report: []  Yes  [x]  No     Date Range for reporting period:  Beginnin2021  Ending:  Progress report due (10 Rx/or 30 days whichever is less): visit #53   Recertification due (POC duration/ or 90 days whichever is less): 2021    Visit # Insurance Allowable Auth required? Date Range   - Medicare/[employer] []  Yes  [x]  No      Latex Allergy:  [x]NO      []YES  Preferred Language for Healthcare:   [x]English       []other:    Functional Scale:        Date assessed:  LEFS: raw score = 22; dysfunction = 72.5%   2021  LEFS: raw score = 22; dysfunction = 72.5%   2021      Pain level:  3/10     SUBJECTIVE:   Pt reports she is not having much pain today. Her pain has been much better after the first 3 weeks. She feels she is walking well with the cane. She thinks the [facility] is working well. She will see doctor office this afternoon.      OBJECTIVE:     : PROM: 3-118 deg, AROM: 5-110 deg  :after tx: R knee 2-118 supine  : See PN: AAROM R knee 7-112; PROM L knee after tx 0-110; AROM L knee at end of tx session 3 from neutral; R   R hip AB 3-/5, SLR 3/5, iliopsoas 4/5, 5/5 HS/quad; R quad flexibility 85; pt. 8' late  : Pt 10 min late, AROM R knee flexion 100 degrees; AROM R knee flexion with  degrees; AROM R knee extension lacking 6 degrees; AROM R knee flexion with OP 0 degrees - all ROM taken in supine   4/26: Knee flexion w/ heel slide and OP: 99 degrees, AROM R knee flexion at EOB: 102 degrees, AROM R knee extnsion: lacking 5 degrees  4/26: Pt was 5 minutes late today  4/23: Pt was 7 minutes late today  4/19: AROM R Knee Flex: 94 at EOB  PROM R Knee Flex: 100 at EOB   AROM R Knee: Ext: Lacking 4 in supine   4/17: Knee flexion w/ heel slide and OP: 94 deg; did not allow therapist to stretch into extension  4/15: knee extension: resting at lacking 5 degrees, able to achieve full extension with manual OP   Knee flexion w/ heel slide and over EOB: 90       RESTRICTIONS/PRECAUTIONSOA L knee    Exercises/Interventions:     Therapeutic Exercise (45807)  Resistance / level Sets/sec Reps Notes / Cues   bike-recumbent       SciFit StepOne Full Rev 5'        Added 4/15  Cueing to start with semi-circles to decrease pain and work toward improving motion. IB - gastroc  30\"  2    Step Stretch  8\"  4/19-1 set with heel down           1 set with heel raise    HS standing stretch  20\" 2 L/R    HR/TR  2 10    HSS  30\"  2 R    HFS  30\"  2 R    Quad sets with towel behind knee B  Cueing to limit gluteal co contraction   SLR   115 (A with concentric lift)4/19-Min/Mod A from clinician  4/23- decreased reps due to pain   ERMI  10\" 6 H Next visit       Standing Hip AB 2# R  0# L 2  2 10 R  8 L 5/19   bridges  1 10 Added 5/25                 Therapeutic Activities (72808)       4/13/21 Pt was educated on PT POC, Diagnosis, Prognosis, pathomechanics as well as frequency and duration of scheduling future physical therapy appointments. Time was also taken on this day to answer all patient questions and participation in PT.         Mini squats hi/low table  2 10                  Neuromuscular Re-ed (75007)       SAQ  2/3\"H 10 added 4/15   LAQ  2# 2/3\" 10 Added 4/15   tep ups - forward  4\">>6\" 2 10 R Added 4/26, 5/28 6\" for second set   Step-ups Backward L (R eccentric) 1 15 R Step ups - lateral 4\">>6\" 2 10 R Added 4/28, 5/28 6\" for second setSemi-Tandem stance floor 30\"  3 L/R    SLS  5\" 5 Added 5/3          Manual Intervention (12146)       Knee mobs/PROM  X 6 min total flexion in supine (OP) and extension in supine    Tib/Fem Mobs-A-P glide  X3'   Patella MobsSuperior/inferior X1'        1/10\"34/13;5/7    X2'         Modalities:  5/28, 5/19: CP to R knee X10' supine after tx  4/28: Ice bag to go   4/15: Ice x10 min  4/17: Pt did not have time for ice, ride was here to pick her up. Pt stated she would ice at home    Pt. Education:  -pt educated on diagnosis, prognosis and expectations for rehab  -all pt questions were answered    Home Exercise Program:  Access Code: [email or web address]: [email or web address]/Date: 04/15/2021Prepared by: Nik Caballero Sitting Calf Stretch with Strap - 3 x daily - 7 x weekly - 1 sets - 3 reps - 30 seconds hold   Seated Table Hamstring Stretch - 3 x daily - 7 x weekly - 1 sets - 3 reps - 30 seconds hold   Supine Knee Extension Strengthening - 2 x daily - 7 x weekly - 2 sets - 10 reps - 3 seconds hold   Seated Long Arc Quad - 2 x daily - 7 x weekly - 2 sets - 10 reps - 3 seconds hold   Standing Terminal Knee Extension with Resistance - 2 x daily - 7 x weekly - 2 sets - 10 reps - 3 seconds hold      Therapeutic Exercise and NMR EXR  [x] (24122) Provided verbal/tactile cueing for activities related to strengthening, flexibility, endurance, ROM for improvements in LE, proximal hip, and core control with self care, mobility, lifting, ambulation.  [] (49343) Provided verbal/tactile cueing for activities related to improving balance, coordination, kinesthetic sense, posture, motor skill, proprioception  to assist with LE, proximal hip, and core control in self care, mobility, lifting, ambulation and eccentric single leg control. swelling for the purpose of improving mobility and quad tone / recruitment which will allow for increased overall function including but not limited to self-care, transfers, ambulation, and ascending / descending stairs. Charges:  Timed Code Treatment Minutes: 50   Total Treatment Minutes: 60     [] EVAL - LOW (47014)   [] EVAL - MOD (45475)  [] EVAL - HIGH (95983)  [] RE-EVAL (79200)  [x] KB(98792) x  1    [] Ionto  [x] NMR (16364) x  1      [] Vaso  [x] Manual (80771) x 1      [] Ultrasound  [] TA x        [] Mech Traction (80845)  [] Aquatic Therapy x      [] ES (un) (35682):   [] Home Management Training x  [] ES(attended) (61211)   [] Dry Needling 1-2 muscles (00863):  [] Dry Needling 3+ muscles (190164)  [] Group:      [] Other:     GOALS:   Patient stated goal: walk without A device and perform steps without rail  [x] Progressing: [] Met: [] Not Met: [] Adjusted    Therapist goals for Patient:   Short Term Goals: To be achieved in: 2 weeks  1. Independent in HEP and progression per patient tolerance, in order to prevent re-injury. [] Progressing: [x] Met: [] Not Met: [] Adjusted  2. Patient will have a decrease in pain to facilitate improvement in movement, function, and ADLs as indicated by Functional Deficits. [x] Progressing: [] Met: [] Not Met: [] Adjusted    Long Term Goals: To be achieved in: 7-9 weeks  1. Disability index score of 40-50% or less for the LEFS to assist with reaching prior level of function. [x] Progressing: [] Met: [] Not Met: [] Adjusted  2. Patient will demonstrate increased AROM to 5-110  to allow for proper joint functioning as indicated by patients ability to perform sit to stand transfer without stiffness with L=R WBing.   [] Progressing: [x] Met: [] Not Met: [] Adjusted  3.  Patient will demonstrate an increase in Strength to at least 4-4.5/5 as well as good proximal hip strength and control to allow for proper functional mobility as indicated by patients ability to ascend/descend steps 6\" with 1 rail reciprocal on R Met for knee[x] Progressing: [] Met: [] Not Met: [] Adjusted  4. Patient will return to functional activities including walking without assistive device with SPC or non A device with proper heel to toe gait without increased symptoms or restriction. [] Progressing: [] Met: [x] Not Met: [] Adjusted  5.patient to be able to perform TUG test in 10\" or less with least restrictive A device by discharge. [] Progressing: [] Met: [x] Not Met: [] Adjusted     Overall Progression Towards Functional goals/ Treatment Progress Update:  [x] Patient is progressing as expected towards functional goals listed. [] Progression is slowed due to complexities/Impairments listed. [] Progression has been slowed due to co-morbidities. [] Plan just implemented, too soon to assess goals progression <30days   [] Goals require adjustment due to lack of progress  [] Patient is not progressing as expected and requires additional follow up with physician  [] Other    Persisting Functional Limitations/Impairments:  []Sitting [x]Standing   [x]Walking [x]Stairs   [x]Transfers []ADLs   []Squatting/bending []Kneeling  []Housework []Job related tasks  []Driving []Sports/Recreation   []Sleeping []Other:    ASSESSMENT:  Pt continues to present with decreased end range extension ROM and flexion. Achieves greatest PROM flexion on ERMI this date. Is able to progression to 6\" step up without vaulting or trendelenburg at the hip. Pt continues to fatigue rapidly with routine due to decreased endurance. She plans to begin using her community pool in the next week. Continue to advance exercises as tolerated to promote increased balance, motion, strength and endurance to decrease fall risk, improve safety and return to full PLOF without restrictions.      Treatment/Activity Tolerance:  [x] Pt able to complete treatment [] Patient limited by fatique  [x] Patient limited by pain  [] Patient limited by other medical complications  [] Other:     Prognosis:  [x] Good [] Fair  [] Poor    Patient Requires Follow-up: [x] Yes  [] No    Return to Play:    [x]  N/A   []  Stage 1: Intro to Strength   []  Stage 2: Return to Run and Strength   []  Stage 3: Return to Jump and Strength   []  Stage 4: Dynamic Strength and Agility   []  Stage 5: Sport Specific Training   []  Ready to Return to Play, Meets All Above Stages   []  Not Ready for Return to Sports   Comments:            PLAN: See eval. PT 2-3x / week for 7-9 weeks. [x] Continue per plan of care [] Alter current plan (see comments)  [] Plan of care initiated [] Hold pending MD visit [] Discharge    Electronically signed by: Chun Ward PT, DPT    Note: If patient does not return for scheduled/ recommended follow up visits, this note will serve as a discharge from care along with most recent update on progress.

## 2021-05-28 NOTE — PROGRESS NOTES
Patient Name: Mitchell Doss  Medical Record Number: [id number]  YOB: 1950  Date of Encounter: 5/28/2021     Chief Complaint   Patient presents with    Post-Op Check     5 week po right TKA, DOS - 3/16/21, feels doing pretty average       Total Knee Follow-up  Mitchell Doss is here for 10 weeks post right total knee arthroplasty follow-up. Patient reports mild occasional right knee pain. The patient denies fever, wound drainage, increasing redness, pus, increasing pain, increasing swelling. Post op problems reported: none. She is ambulating using a straight cane. She is working with physical therapy. Patient is still taking Vitamin D supplementation. DVT prophylaxis is chronic anticoagulation with Xarelto. The patient's  past medical history, medications, allergies,  family history, social history, and review of systems have been reviewed, and dated and are recorded in the chart under the 'MEDIA\" tab. Physical Exam:   Ms. Mitchell Doss appears well, she is in no apparent distress, she demonstrates appropriate mood & affect. She is alert and oriented to person, place and time. Ht 5' 4\" (1.626 m)   Wt 228 lb 3.2 oz (103.5 kg)   BMI 39.17 kg/m²     Right Knee: She has minimal swelling which is resolving as expected. The incision is clean, dry, intact and nontender and without erythema or induration. Range of motion from 5 to 113 degrees. She has no pain with range of motion of the knee. Patient has 4/5 motor strength with flexion and extension of the right knee. She is able to do straight leg raises without difficulty. Patient is notwearing her bilateral ELIZABETH stockings. There is minimal right lower extremity edema. She is neurovascularly intact distally. Radiology:  X-rays obtained and reviewed in office:  Views: 3 views right knee. Impression: Stable total knee arthroplasty with satisfactory alignment. There is no evidence of loosening or subsidence.     Orders:  Orders Placed This Encounter   Procedures    XR KNEE RIGHT (3 VIEWS)     Impression:   Status post right total knee arthroplasty and doing very well. Plan:  Patient is doing very well 10 weeks status post right knee arthroplasty. Patient is still struggling slightly with range of motion and going downstairs. She will continue physical therapy for the time being. She will continue with home exercises and stretches which she has been very consistent with. She reports minimal occasional pain. She is still using a straight cane for ambulation. She was reminded about the need for antibiotic prophylaxis before dental cleaning, colonoscopy and other invasive procedures for the first year after joint replacement. She was reminded to be very cautious during inclement weather. Patient would like to discuss left total knee arthroplasty in January 2022. She will plan on following back in October at which time we will repeat bilateral knee x-rays. Patient is given strict instructions to return before that time if she has any problems or concerns. Joan Jett was informed of the results of any imaging. We discussed her postop course to date and a time was given to answer questions. She understand and accepts this course of care. Electronically signed by Radha Diaz PA-C on 7/07/3415  Board Certified [facility]    Please note that portions of this note were completed with a voice recognition program.  Efforts were made to edit the dictations but occasionally words are mis-transcribed.

## 2021-06-01 ENCOUNTER — HOSPITAL ENCOUNTER (OUTPATIENT)
Dept: PHYSICAL THERAPY | Age: 71
Setting detail: THERAPIES SERIES
Discharge: HOME | End: 2021-06-01
Payer: MEDICARE

## 2021-06-01 RX ORDER — POTASSIUM CHLORIDE 750 MG/1
TABLET, EXTENDED RELEASE ORAL
Qty: 90 TABLET | Refills: 3 | Status: SHIPPED | OUTPATIENT
Start: 2021-06-01 | End: 2021-07-09 | Stop reason: SDUPTHER

## 2021-06-01 NOTE — FLOWSHEET NOTE
[address] [facility]    Physical Therapy  Cancellation/No-show Note  Patient Name:  Malou Salcedo  :  1950   Date:  2021    Cancelled visits to date: , , , ,   No-shows to date: 0    For today's appointment patient:  [x]  Cancelled  []  Rescheduled appointment  []  No-show     Reason given by patient:  [x]  Patient ill  []  Conflicting appointment  []  No transportation    []  Conflict with work  []  No reason given  []  Other:     Comments:      Phone call information:   []  Phone call made today to patient at _ time at number provided:      []  Patient answered, conversation as follows:    []  Patient did not answer, message left as follows:  []  Phone call not made today  [x]  Phone call not needed - pt contacted us to cancel and provided reason for cancellation.      Electronically signed by:  Pamela Guo PTA

## 2021-06-02 ENCOUNTER — CARE COORDINATION (OUTPATIENT)
Dept: CARE COORDINATION | Age: 71
End: 2021-06-02

## 2021-06-04 ENCOUNTER — HOSPITAL ENCOUNTER (OUTPATIENT)
Dept: PHYSICAL THERAPY | Age: 71
Setting detail: THERAPIES SERIES
Discharge: HOME | End: 2021-06-04
Payer: MEDICARE

## 2021-06-04 NOTE — FLOWSHEET NOTE
[address] [facility]    Physical Therapy  Cancellation/No-show Note  Patient Name:  Kiki Mooney  :  1950   Date:  2021    Cancelled visits to date: , , , , ,   No-shows to date: 0    For today's appointment patient:  [x]  Cancelled  []  Rescheduled appointment  []  No-show     Reason given by patient:  [x]  Patient ill  []  Conflicting appointment  []  No transportation    []  Conflict with work  []  No reason given  []  Other:     Comments:      Phone call information:   []  Phone call made today to patient at _ time at number provided:      []  Patient answered, conversation as follows:    []  Patient did not answer, message left as follows:  []  Phone call not made today  [x]  Phone call not needed - pt contacted us to cancel and provided reason for cancellation.      Electronically signed by:  Gianna San, PT, DPT

## 2021-06-07 ENCOUNTER — TELEPHONE (OUTPATIENT)
Dept: INTERNAL MEDICINE CLINIC | Age: 71
End: 2021-06-07

## 2021-06-07 ENCOUNTER — OFFICE VISIT (OUTPATIENT)
Dept: INTERNAL MEDICINE CLINIC | Age: 71
End: 2021-06-07
Payer: MEDICARE

## 2021-06-07 VITALS
BODY MASS INDEX: 39.48 KG/M2 | WEIGHT: 230 LBS | HEART RATE: 83 BPM | OXYGEN SATURATION: 98 % | SYSTOLIC BLOOD PRESSURE: 132 MMHG | DIASTOLIC BLOOD PRESSURE: 76 MMHG | TEMPERATURE: 97.3 F

## 2021-06-07 DIAGNOSIS — E66.9 OBESITY (BMI 30-39.9): Primary | ICD-10-CM

## 2021-06-07 DIAGNOSIS — Z12.31 ENCOUNTER FOR SCREENING MAMMOGRAM FOR MALIGNANT NEOPLASM OF BREAST: ICD-10-CM

## 2021-06-07 DIAGNOSIS — M17.0 PRIMARY OSTEOARTHRITIS OF BOTH KNEES: ICD-10-CM

## 2021-06-07 DIAGNOSIS — T50.B95A FATIGUE AFTER COVID-19 VACCINATION: ICD-10-CM

## 2021-06-07 DIAGNOSIS — R53.83 FATIGUE AFTER COVID-19 VACCINATION: ICD-10-CM

## 2021-06-07 DIAGNOSIS — D50.0 IRON DEFICIENCY ANEMIA DUE TO CHRONIC BLOOD LOSS: Primary | ICD-10-CM

## 2021-06-07 PROCEDURE — G8399 PT W/DXA RESULTS DOCUMENT: HCPCS | Performed by: INTERNAL MEDICINE

## 2021-06-07 PROCEDURE — 99213 OFFICE O/P EST LOW 20 MIN: CPT | Performed by: INTERNAL MEDICINE

## 2021-06-07 PROCEDURE — 1123F ACP DISCUSS/DSCN MKR DOCD: CPT | Performed by: INTERNAL MEDICINE

## 2021-06-07 PROCEDURE — 4040F PNEUMOC VAC/ADMIN/RCVD: CPT | Performed by: INTERNAL MEDICINE

## 2021-06-07 PROCEDURE — G8417 CALC BMI ABV UP PARAM F/U: HCPCS | Performed by: INTERNAL MEDICINE

## 2021-06-07 PROCEDURE — 1090F PRES/ABSN URINE INCON ASSESS: CPT | Performed by: INTERNAL MEDICINE

## 2021-06-07 PROCEDURE — 3017F COLORECTAL CA SCREEN DOC REV: CPT | Performed by: INTERNAL MEDICINE

## 2021-06-07 PROCEDURE — G8427 DOCREV CUR MEDS BY ELIG CLIN: HCPCS | Performed by: INTERNAL MEDICINE

## 2021-06-07 PROCEDURE — 1036F TOBACCO NON-USER: CPT | Performed by: INTERNAL MEDICINE

## 2021-06-07 SDOH — ECONOMIC STABILITY: FOOD INSECURITY: WITHIN THE PAST 12 MONTHS, YOU WORRIED THAT YOUR FOOD WOULD RUN OUT BEFORE YOU GOT MONEY TO BUY MORE.: NEVER TRUE

## 2021-06-07 SDOH — ECONOMIC STABILITY: FOOD INSECURITY: WITHIN THE PAST 12 MONTHS, THE FOOD YOU BOUGHT JUST DIDN'T LAST AND YOU DIDN'T HAVE MONEY TO GET MORE.: NEVER TRUE

## 2021-06-07 ASSESSMENT — PATIENT HEALTH QUESTIONNAIRE - PHQ9
SUM OF ALL RESPONSES TO PHQ QUESTIONS 1-9: 0
SUM OF ALL RESPONSES TO PHQ QUESTIONS 1-9: 0
2. FEELING DOWN, DEPRESSED OR HOPELESS: 0
SUM OF ALL RESPONSES TO PHQ9 QUESTIONS 1 & 2: 0
SUM OF ALL RESPONSES TO PHQ QUESTIONS 1-9: 0
1. LITTLE INTEREST OR PLEASURE IN DOING THINGS: 0

## 2021-06-07 ASSESSMENT — SOCIAL DETERMINANTS OF HEALTH (SDOH): HOW HARD IS IT FOR YOU TO PAY FOR THE VERY BASICS LIKE FOOD, HOUSING, MEDICAL CARE, AND HEATING?: NOT HARD AT ALL

## 2021-06-07 NOTE — PROGRESS NOTES
Chief Complaint   Patient presents with    Follow-up     right knee replacement     Vitals:    06/07/21 1327   BP: 132/76   Pulse: 83   Temp: 97.3 °F (36.3 °C)   SpO2: 98%     PHQ Scores 6/7/2021 4/12/2021 2/22/2021 12/28/2020 11/13/2020 10/2/2020 1/31/2020   PHQ2 Score 0 0 0 0 3 0 1   PHQ9 Score 0 0 0 0 13 0 1     Interpretation of Total Score Depression Severity: 1-4 = Minimal depression, 5-9 = Mild depression, 10-14 = Moderate depression, 15-19 = Moderately severe depression, 20-27 = Severe depression    Physical Exam  Vitals reviewed. Constitutional:       General: She is not in acute distress. Appearance: She is well-developed. She is not diaphoretic. HENT:      Head: Normocephalic and atraumatic. Pulmonary:      Effort: Pulmonary effort is normal.   Neurological:      Mental Status: She is alert and oriented to person, place, and time. Cranial Nerves: No cranial nerve deficit. Psychiatric:         Behavior: Behavior normal.         Thought Content: Thought content normal.         Judgment: Judgment normal.       Assessment/Plan:  Sharon Lane was seen today for follow-up. Diagnoses and all orders for this visit:    Obesity (BMI 30-39. 9)    Primary osteoarthritis of both knees  Comments:  Improving     Fatigue after COVID-19 vaccination    Encounter for screening mammogram for malignant neoplasm of breast  -     JESSICA DIGITAL SCREEN W OR WO CAD BILATERAL; Future    Patient is doing well. She is recovering.      Reji Manning MD  FACP

## 2021-06-08 ENCOUNTER — HOSPITAL ENCOUNTER (OUTPATIENT)
Dept: PHYSICAL THERAPY | Age: 71
Setting detail: THERAPIES SERIES
Discharge: HOME | End: 2021-06-08
Payer: MEDICARE

## 2021-06-08 DIAGNOSIS — I48.0 PAROXYSMAL ATRIAL FIBRILLATION (HCC): ICD-10-CM

## 2021-06-08 DIAGNOSIS — I50.32 CHRONIC DIASTOLIC HEART FAILURE (HCC): ICD-10-CM

## 2021-06-08 DIAGNOSIS — I10 BENIGN ESSENTIAL HTN: ICD-10-CM

## 2021-06-08 DIAGNOSIS — R06.02 SOB (SHORTNESS OF BREATH): ICD-10-CM

## 2021-06-08 LAB
ANION GAP SERPL CALCULATED.3IONS-SCNC: 14 MMOL/L (ref 3–16)
BASOPHILS ABSOLUTE: 0.1 K/UL (ref 0–0.2)
BASOPHILS RELATIVE PERCENT: 0.7 %
BUN BLDV-MCNC: 14 MG/DL (ref 7–20)
CALCIUM SERPL-MCNC: 9.8 MG/DL (ref 8.3–10.6)
CHLORIDE BLD-SCNC: 95 MMOL/L (ref 99–110)
CO2: 22 MMOL/L (ref 21–32)
CREAT SERPL-MCNC: 0.8 MG/DL (ref 0.6–1.2)
EOSINOPHILS ABSOLUTE: 0.1 K/UL (ref 0–0.6)
EOSINOPHILS RELATIVE PERCENT: 1.2 %
GFR AFRICAN AMERICAN: >60
GFR NON-AFRICAN AMERICAN: >60
GLUCOSE BLD-MCNC: 81 MG/DL (ref 70–99)
HCT VFR BLD CALC: 38.4 % (ref 36–48)
HEMOGLOBIN: 13.2 G/DL (ref 12–16)
LYMPHOCYTES ABSOLUTE: 1.6 K/UL (ref 1–5.1)
LYMPHOCYTES RELATIVE PERCENT: 21.9 %
MCH RBC QN AUTO: 30.9 PG (ref 26–34)
MCHC RBC AUTO-ENTMCNC: 34.3 G/DL (ref 31–36)
MCV RBC AUTO: 90.1 FL (ref 80–100)
MONOCYTES ABSOLUTE: 0.6 K/UL (ref 0–1.3)
MONOCYTES RELATIVE PERCENT: 8.2 %
NEUTROPHILS ABSOLUTE: 4.9 K/UL (ref 1.7–7.7)
NEUTROPHILS RELATIVE PERCENT: 68 %
PDW BLD-RTO: 15.1 % (ref 12.4–15.4)
PLATELET # BLD: 329 K/UL (ref 135–450)
PMV BLD AUTO: 8.7 FL (ref 5–10.5)
POTASSIUM SERPL-SCNC: 4.5 MMOL/L (ref 3.5–5.1)
PRO-BNP: 109 PG/ML (ref 0–124)
RBC # BLD: 4.26 M/UL (ref 4–5.2)
SODIUM BLD-SCNC: 131 MMOL/L (ref 136–145)
WBC # BLD: 7.3 K/UL (ref 4–11)

## 2021-06-08 PROCEDURE — 97140 MANUAL THERAPY 1/> REGIONS: CPT

## 2021-06-08 PROCEDURE — 97110 THERAPEUTIC EXERCISES: CPT

## 2021-06-08 PROCEDURE — 97530 THERAPEUTIC ACTIVITIES: CPT

## 2021-06-08 NOTE — PROGRESS NOTES
DamienraghavendraDerian haywood  Phone: [phone]   Fax: [phone]    Physical Therapy Treatment Note/ Progress Report:     Date:  2021    Patient Name:  Erwin Geiger    :  1950  MRN: [id number]  Restrictions/Precautions:    Medical/Treatment Diagnosis Information:  · Diagnosis: s/p R TKR 3/16/2021  · Treatment Diagnosis: R decreased knee ROM, decreased R LE strength and flexiblity, decreased balance, abnormal gait, decreased walking endurance  Insurance/Certification information:  PT Insurance Information: Medicare  Physician Information:  Referring Practitioner: Cecilia Cruz MD  Plan of care signed (Y/N): [x]  Yes []  No     Date of Patient follow up with Physician: 2021     Progress Report: []  Yes  [x]  No     Date Range for reporting period:  Beginnin2021  Endin2021  Progress report due (10 Rx/or 30 days whichever is less): visit #75   Recertification due (POC duration/ or 90 days whichever is less): 2021    Visit # Insurance Allowable Auth required? Date Range   15/18- Medicare/[facility] []  Yes  [x]  No      Latex Allergy:  [x]NO      []YES  Preferred Language for Healthcare:   [x]English       []other:    Functional Scale:        Date assessed:  LEFS: raw score = 22; dysfunction = 72.5%   2021  LEFS: raw score = 22; dysfunction = 72.5%   2021  LEFS: raw score = 47 ; dysfunction = 41.25%   2021        Pain level:  2/10     SUBJECTIVE: Pt reported being very fatigued over the course of the past 10 days. Pt stated she was in bed the majority of the time and rarely got up if not absolutely necessary. She said she had lined up bottles of water and other necessities near her bed so she would not have to get up to get anything she needed. She did not complete her HEP during this time.  Pt reported receiving her COVID-19 vaccination recently and expressed that she thinks that may be why she has been fatigued with less energy as of late. OBJECTIVE:     6/8: R Knee PROM 5-113; AROM 10-95; Quad 88; Strength: R quad 4/5, HS 5/5, iliopsoas 3+/5, Hip AB  5/28: PROM: 3-118 deg, AROM: 5-110 deg  5/19:after tx: R knee 2-118 supine  5/7: See PN: AAROM R knee 7-112; PROM L knee after tx 0-110; AROM L knee at end of tx session 3 from neutral; R   R hip AB 3-/5, SLR 3/5, iliopsoas 4/5, 5/5 HS/quad; R quad flexibility 85; pt. 8' late  4/28: Pt 10 min late, AROM R knee flexion 100 degrees; AROM R knee flexion with  degrees; AROM R knee extension lacking 6 degrees; AROM R knee flexion with OP 0 degrees - all ROM taken in supine   4/26: Knee flexion w/ heel slide and OP: 99 degrees, AROM R knee flexion at EOB: 102 degrees, AROM R knee extnsion: lacking 5 degrees  4/26: Pt was 5 minutes late today  4/23: Pt was 7 minutes late today  4/19: AROM R Knee Flex: 94 at EOB  PROM R Knee Flex: 100 at EOB   AROM R Knee: Ext: Lacking 4 in supine   4/17: Knee flexion w/ heel slide and OP: 94 deg; did not allow therapist to stretch into extension  4/15: knee extension: resting at lacking 5 degrees, able to achieve full extension with manual OP   Knee flexion w/ heel slide and over EOB: 90       RESTRICTIONS/PRECAUTIONSOA L knee    Exercises/Interventions:     Therapeutic Exercise (12841)  Resistance / level Sets/sec Reps Notes / Cues   bike-recumbent       SciFit StepOne Full Rev 5'        Added 4/15  Cueing to start with semi-circles to decrease pain and work toward improving motion.           IB - gastroc  30\"  2    Step Stretch  8\"  4/19-1 set with heel down           1 set with heel raise    HS standing stretch  20\" 2 L/R    HR/TR  2 10    HSS  30\"  2 R    HFS  30\"  2 R    AAROM R knee flex w/ towel  1106/8    Quad sets with towel behind knee B  Cueing to limit gluteal co contraction   SLR   115 (A with concentric lift)4/19-Min/Mod A from clinician  4/23- decreased reps due to pain   ERMI  10\" 6 H Next visit       Standing Hip AB 2# R  0# L 2  2 10 R  8 L 5/19   bridges  1 10 Added 5/25   Stand Hip flex/ABD/EXT Orange 1 10 Each direction bilat 6/8   Soleus stetch in standing  20\" 5 6/8 R   Gastroc Stretch In standing  20\" 5 6/8 Bilat   Therapeutic Activities (64721)       4/13/21 Pt was educated on PT POC, Diagnosis, Prognosis, pathomechanics as well as frequency and duration of scheduling future physical therapy appointments. Time was also taken on this day to answer all patient questions and participation in PT. Mini squats hi/low table  2 10    Reassessment of objective measurements, reviewed POC and achievement of goals with Progress note written and sent to MD  8'  6/8          Neuromuscular Re-ed (96223)       TKE w therabandorange 2 10 Added 4/15, 6/8SAQ  2/3\"H 10 added 4/15   LAQ  2# 2/3\" 10 Added 4/15   tep ups - forward  4\">>6\" 2 10 R Added 4/26, 5/28 6\" for second set   Step-ups Backward L (R eccentric) 1 15 R Step ups - lateral 4\">>6\" 2 10 R Added 4/28, 5/28 6\" for second setSemi-Tandem stance floor 30\"  3 L/R    SLS  5\" 5 Added 5/3          Manual Intervention (60160)       Knee mobs/PROM  X 6 min total flexion in supine (OP) and extension in supine    Tib/Fem Mobs-A-P glide  X3'   Patella MobsSuperior/inferior X1'   Ankle mobsAP TC joint   Quad stretch off EOBsidelying 1/10\"54/13;5/7    X2'         Modalities:  5/28, 5/19: CP to R knee X10' supine after tx  4/28: Ice bag to go   4/15: Ice x10 min  4/17: Pt did not have time for ice, ride was here to pick her up. Pt stated she would ice at home    Pt. Education:  -pt educated on diagnosis, prognosis and expectations for rehab  -all pt questions were answered    Home Exercise Program:  Access Code: [email or web address]  URL: [email or web address]  Date: 06/08/2021  [facility]  [facility]    Exercises  Standing Terminal Knee Extension with Resistance - 1 x daily - 7 x weekly - 10 reps - 3 sets  Standing Hip Flexion with Resistance Loop - 1 x daily - 7 x weekly - 10 reps - 3 sets  Standing Hip Abduction Kicks - 1 x daily - 7 x weekly - 10 reps - 3 sets  Hip Extension with Resistance Loop - 1 x daily - 7 x weekly - 10 reps - 3 sets  Access Code: [email or web address]: 04/15/2021Prepared by: Arik Bhatia Sitting Calf Stretch with Strap - 3 x daily - 7 x weekly - 1 sets - 3 reps - 30 seconds hold   Seated Table Hamstring Stretch - 3 x daily - 7 x weekly - 1 sets - 3 reps - 30 seconds hold   Supine Knee Extension Strengthening - 2 x daily - 7 x weekly - 2 sets - 10 reps - 3 seconds hold   Seated Long Arc Quad - 2 x daily - 7 x weekly - 2 sets - 10 reps - 3 seconds hold   Standing Terminal Knee Extension with Resistance - 2 x daily - 7 x weekly - 2 sets - 10 reps - 3 seconds hold      Therapeutic Exercise and NMR EXR  [x] (20882) Provided verbal/tactile cueing for activities related to strengthening, flexibility, endurance, ROM for improvements in LE, proximal hip, and core control with self care, mobility, lifting, ambulation.  [] (76352) Provided verbal/tactile cueing for activities related to improving balance, coordination, kinesthetic sense, posture, motor skill, proprioception  to assist with LE, proximal hip, and core control in self care, mobility, lifting, ambulation and eccentric single leg control.   [] (59679) Therapist is in constant attendance of 2 or more patients providing skilled therapy interventions, but not providing any significant amount of measurable one-on-one time to either patient, for improvements in LE, proximal hip, and core control in self care, mobility, lifting, ambulation and eccentric single leg control.      NMR and Therapeutic Activities:    [x] (65018 or 53944) Provided verbal/tactile cueing for activities related to improving balance, coordination, kinesthetic sense, posture, motor skill, proprioception and motor activation to allow for proper function of core, proximal hip and LE with self care and ADLs  [] (42019) Gait Re-education- Provided training and instruction to the patient for proper LE, core and proximal hip recruitment and positioning and eccentric body weight control with ambulation re-education including up and down stairs     Home Exercise Program:    [x] (88249) Reviewed/Progressed HEP activities related to strengthening, flexibility, endurance, ROM of core, proximal hip and LE for functional self-care, mobility, lifting and ambulation/stair navigation   [] (51859)Reviewed/Progressed HEP activities related to improving balance, coordination, kinesthetic sense, posture, motor skill, proprioception of core, proximal hip and LE for self care, mobility, lifting, and ambulation/stair navigation      Manual Treatments:  PROM / STM / Oscillations-Mobs:  G-I, II, III, IV (PA's, Inf., Post.)  [x] (89112) Provided manual therapy to mobilize LE, proximal hip and/or LS spine soft tissue/joints for the purpose of modulating pain, promoting relaxation,  increasing ROM, reducing/eliminating soft tissue swelling/inflammation/restriction, improving soft tissue extensibility and allowing for proper ROM for normal function with self care, mobility, lifting and ambulation. Modalities:  [] (01515) Vasopneumatic compression: Utilized vasopneumatic compression to decrease edema / swelling for the purpose of improving mobility and quad tone / recruitment which will allow for increased overall function including but not limited to self-care, transfers, ambulation, and ascending / descending stairs.        Charges:  Timed Code Treatment Minutes: 54   Total Treatment Minutes: 54     [] EVAL - LOW (95507)   [] EVAL - MOD (49225)  [] EVAL - HIGH (12594)  [] RE-EVAL (53074)  [x] KV(11853) x  2    [] Ionto  [] NMR (79658) x      [] Vaso  [x] Manual (80542) x 1    [] Ultrasound  [x] TA x   1     [] Mech Traction (47619)  [] Aquatic Therapy x      [] ES (un) (13843):   [] Home Management Training x  [] ES(attended) (61833)   [] Dry Needling Progression is slowed due to complexities/Impairments listed. [] Progression has been slowed due to co-morbidities. [] Plan just implemented, too soon to assess goals progression <30days   [] Goals require adjustment due to lack of progress  [] Patient is not progressing as expected and requires additional follow up with physician  [] Other    Persisting Functional Limitations/Impairments:  []Sitting [x]Standing   [x]Walking [x]Stairs   [x]Transfers []ADLs   []Squatting/bending []Kneeling  []Housework []Job related tasks  []Driving []Sports/Recreation   []Sleeping []Other:    ASSESSMENT:  Pt continues to present with decreased end range extension ROM and flexion. LEFS is 47/80 at this date compared to the previous score of 22/80 from 5/7/21. Pt has objective ROM values have regressed since last visit. Pt reported having recently received COVID-19 vaccine and reacted poorly to vaccination. Pt reported relatively continuous fatigue and admitted to not completing HEP in last few weeks. Since May 6th, pt has only attended PT 4 times and this date. The pt reports she will be completing HEP now that she is less fatigued. Pt would continue to benefit from skilled PT to address limited strength and ROM. Treatment/Activity Tolerance:  [x] Pt able to complete treatment [] Patient limited by fatique  [x] Patient limited by pain  [] Patient limited by other medical complications  [] Other:     Prognosis:  [x] Good [] Fair  [] Poor    Patient Requires Follow-up: [x] Yes  [] No    Return to Play:    [x]  N/A   []  Stage 1: Intro to Strength   []  Stage 2: Return to Run and Strength   []  Stage 3: Return to Jump and Strength   []  Stage 4: Dynamic Strength and Agility   []  Stage 5: Sport Specific Training   []  Ready to Return to Play, Meets All Above Stages   []  Not Ready for Return to Sports   Comments:            PLAN: See saroj. PT 2-3x / week for 7-9 weeks.    [x] Continue per plan of care [] Alter current plan (see comments)  [] Plan of care initiated [] Hold pending MD visit [] Discharge    Electronically signed by: Ramsey Moser PT, MSPT  NASIR Prince  Therapist was present, directed the patient's care, made skilled judgement, and was responsible for assessment and treatment of the patient. Note: If patient does not return for scheduled/ recommended follow up visits, this note will serve as a discharge from care along with most recent update on progress.

## 2021-06-11 ENCOUNTER — HOSPITAL ENCOUNTER (OUTPATIENT)
Dept: PHYSICAL THERAPY | Age: 71
Setting detail: THERAPIES SERIES
Discharge: HOME | End: 2021-06-11
Payer: MEDICARE

## 2021-06-11 NOTE — FLOWSHEET NOTE
[address] [facility]    Physical Therapy  Cancellation/No-show Note  Patient Name:  Karyle Baton  :  1950   Date:  2021    Cancelled visits to date: , , , , , ,   No-shows to date: 0    For today's appointment patient:  [x]  Cancelled  []  Rescheduled appointment  []  No-show     Reason given by patient:  []  Patient ill  []  Conflicting appointment  []  No transportation    []  Conflict with work  []  No reason given  [x]  Other:     Comments: \"can't hardly move today, thinks it is due to vaccine\"    Phone call information:   []  Phone call made today to patient at _ time at number provided:      []  Patient answered, conversation as follows:    []  Patient did not answer, message left as follows:  []  Phone call not made today  [x]  Phone call not needed - pt contacted us to cancel and provided reason for cancellation.      Electronically signed by:  Colonel Ceballos, PT, DPT

## 2021-06-14 ENCOUNTER — TELEPHONE (OUTPATIENT)
Dept: CARDIOLOGY CLINIC | Age: 71
End: 2021-06-14

## 2021-06-14 ENCOUNTER — TELEPHONE (OUTPATIENT)
Dept: INTERNAL MEDICINE CLINIC | Age: 71
End: 2021-06-14

## 2021-06-14 NOTE — TELEPHONE ENCOUNTER
I spoke to patient with Labs results per Dr Luis Alanis . Patient verbalized understanding. Advised patient to call back with any questions.

## 2021-06-14 NOTE — TELEPHONE ENCOUNTER
Patient said her extreme fatigue is persisting 1 month post 2nd COVID-19 vaccine. She said she mentioned it to Dr Shaylee Lee during her last OV on 06/07/2021. Patient said last time she felt this fatigued was in 2016, a few months post hip replacement. 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 Patient said she had a recent knee replacement and is concerned she may have PEs again, despite being on XARELTO. Patient is requesting an order for a chest CT scan to r/o PE.

## 2021-06-14 NOTE — TELEPHONE ENCOUNTER
----- Message from BRICE Fischer CNP sent at 6/13/2021 11:35 AM EDT -----  Labs look good, no changes, she has f/u with ERICKA in July.  Sharda Miller

## 2021-06-15 ENCOUNTER — HOSPITAL ENCOUNTER (OUTPATIENT)
Dept: PHYSICAL THERAPY | Age: 71
Setting detail: THERAPIES SERIES
Discharge: HOME | End: 2021-06-15
Payer: MEDICARE

## 2021-06-15 PROCEDURE — 97112 NEUROMUSCULAR REEDUCATION: CPT

## 2021-06-15 PROCEDURE — 97110 THERAPEUTIC EXERCISES: CPT

## 2021-06-15 NOTE — FLOWSHEET NOTE
Derian Haley  Phone: [phone]   Fax: [phone]    Physical Therapy Treatment Note/ Progress Report:     Date:  6/15/2021    Patient Name:  Ace Zavala    :  1950  MRN: [id number]  Restrictions/Precautions:    Medical/Treatment Diagnosis Information:  · Diagnosis: s/p R TKR 3/16/2021  · Treatment Diagnosis: R decreased knee ROM, decreased R LE strength and flexiblity, decreased balance, abnormal gait, decreased walking endurance  Insurance/Certification information:  PT Insurance Information: Medicare  Physician Information:  Referring Practitioner: Jasmin Soto MD  Plan of care signed (Y/N): [x]  Yes []  No     Date of Patient follow up with Physician: 2021     Progress Report: []  Yes  [x]  No     Date Range for reporting period:  Beginnin/9  PN:   Ending:  Progress report due (10 Rx/or 30 days whichever is less): visit #21  Recertification due (POC duration/ or 90 days whichever is less): 2021    Visit # Insurance Allowable Auth required? Date Range   - Medicare/[facility] []  Yes  [x]  No      Latex Allergy:  [x]NO      []YES  Preferred Language for Healthcare:   [x]English       []other:    Functional Scale:        Date assessed:  LEFS: raw score = 22; dysfunction = 72.5%   2021  LEFS: raw score = 22; dysfunction = 72.5%   2021  LEFS: raw score = 47 ; dysfunction = 41.25%   2021        Pain level:  2/10     SUBJECTIVE: Pt felt good following last session, said she \"felt energized. \" No soreness or fatigued noted. Pt reported being very fatigued over the course of the past 10 days. Pt stated she was in bed the majority of the time and rarely got up if not absolutely necessary. She said she had lined up bottles of water and other necessities near her bed so she would not have to get up to get anything she needed. She did not complete her HEP during this time.  Pt reported receiving her [id number] vaccination recently and expressed that she thinks that may be why she has been fatigued with less energy as of late. OBJECTIVE:     6/15: before tx: R knee flexion 103 ~; after tx on flexinator 117 degrees  6/8: R Knee PROM 5-113; AROM 10-95; Quad 88; Strength: R quad 4/5, HS 5/5, iliopsoas 3+/5, Hip AB  5/28: PROM: 3-118 deg, AROM: 5-110 deg  5/19:after tx: R knee 2-118 supine  5/7: See PN: AAROM R knee 7-112; PROM L knee after tx 0-110; AROM L knee at end of tx session 3 from neutral; R   R hip AB 3-/5, SLR 3/5, iliopsoas 4/5, 5/5 HS/quad; R quad flexibility 85; pt. 8' late  4/28: Pt 10 min late, AROM R knee flexion 100 degrees; AROM R knee flexion with  degrees; AROM R knee extension lacking 6 degrees; AROM R knee flexion with OP 0 degrees - all ROM taken in supine   4/26: Knee flexion w/ heel slide and OP: 99 degrees, AROM R knee flexion at EOB: 102 degrees, AROM R knee extnsion: lacking 5 degrees  4/26: Pt was 5 minutes late today  4/23: Pt was 7 minutes late today  4/19: AROM R Knee Flex: 94 at EOB  PROM R Knee Flex: 100 at EOB   AROM R Knee: Ext: Lacking 4 in supine   4/17: Knee flexion w/ heel slide and OP: 94 deg; did not allow therapist to stretch into extension  4/15: knee extension: resting at lacking 5 degrees, able to achieve full extension with manual OP   Knee flexion w/ heel slide and over EOB: 90       RESTRICTIONS/PRECAUTIONSOA L knee    Exercises/Interventions:     Therapeutic Exercise (67340)  Resistance / level Sets/sec Reps Notes / Cues   bike-recumbent       SciFit StepOne Full Rev 5'        Added 4/15  Cueing to start with semi-circles to decrease pain and work toward improving motion.           IB - gastroc/soleus  30\"  2 B gastroc; 2 B soleus 6/15: added soleus   ERMI Flexinator  5\" 8     8\"  4/19-1 set with heel down           1 set with heel raise      20\" 2 L/R    HR/TR  3 10 R/L    HSS  30\"  3 R      30\"  2 R     1106/8     Cueing to limit gluteal co contraction   SLR   25 (t)4/19-Min/Mod A from clinician  4/23- decreased reps due to pain  6/15 Independent       H Next visit        2# R  0# L 2  2 10 R  8 L 5/19     1 10 Added 5/25   Orange 1 10 Each direction bilat 6/8    20\" 5 6/8 R    20\" 5 6/8 Bilat   Therapeutic Activities (16776)       4/13/21 Pt was educated on PT POC, Diagnosis, Prognosis, pathomechanics as well as frequency and duration of scheduling future physical therapy appointments. Time was also taken on this day to answer all patient questions and participation in PT.          2 10    Reassessment of objective measurements, reviewed POC and achievement of goals with Progress note written and sent to MD  8'  6/8          Neuromuscular Re-ed (40675)       orange 2 10 Added 4/15, 6/8SAQ  2/3\"H 15 added 4/15    2# 2/3\" 10 Added 4/15   Step ups - forward  4\">>6\" 4 10 R Added 4/26, 5/28 6\" for second set   Step downs Forward2\" 2 10R Added 6/15 1 15 R Step ups - lateral 4\">>6\" 2 10 R Added 4/28, 5/28 6\" for second set floor 30\"  3 L/R      5\" 5 Added 5/3          Manual Intervention (20514)       Knee mobs/PROM  X 6 min total flexion in supine (OP) and extension in supine    Tib/Fem Mobs-A-P glide  X3'   Patella MobsSuperior/inferior X1'   Ankle mobsAP TC joint   Quad stretch off EOBsidelying 1/10\"54/13;5/7    X2'         Modalities:  5/28, 5/19: CP to R knee X10' supine after tx  4/28: Ice bag to go   4/15: Ice x10 min  4/17: Pt did not have time for ice, ride was here to pick her up. Pt stated she would ice at home    Pt. Education:  -pt educated on diagnosis, prognosis and expectations for rehab  -all pt questions were answered    Home Exercise Program:  Access Code: [email or web address]  URL: [email or web address]  Date: 06/08/2021  Prepared by:  [facility]    Exercises  Standing Terminal Knee Extension with Resistance - 1 x daily - 7 x weekly - 10 reps - 3 sets  Standing Hip Flexion with Resistance Loop - 1 x daily - 7 x weekly - 10 reps - 3 sets  Standing Hip Abduction Kicks - 1 x daily - 7 x weekly - 10 reps - 3 sets  Hip Extension with Resistance Loop - 1 x daily - 7 x weekly - 10 reps - 3 sets  Access Code: [email or web address]: [email or web address]. com/Date: 04/15/2021Prepared by: Chante Knapp Sitting Calf Stretch with Strap - 3 x daily - 7 x weekly - 1 sets - 3 reps - 30 seconds hold   Seated Table Hamstring Stretch - 3 x daily - 7 x weekly - 1 sets - 3 reps - 30 seconds hold   Supine Knee Extension Strengthening - 2 x daily - 7 x weekly - 2 sets - 10 reps - 3 seconds hold   Seated Long Arc Quad - 2 x daily - 7 x weekly - 2 sets - 10 reps - 3 seconds hold   Standing Terminal Knee Extension with Resistance - 2 x daily - 7 x weekly - 2 sets - 10 reps - 3 seconds hold      Therapeutic Exercise and NMR EXR  [x] (96145) Provided verbal/tactile cueing for activities related to strengthening, flexibility, endurance, ROM for improvements in LE, proximal hip, and core control with self care, mobility, lifting, ambulation.  [] (63081) Provided verbal/tactile cueing for activities related to improving balance, coordination, kinesthetic sense, posture, motor skill, proprioception  to assist with LE, proximal hip, and core control in self care, mobility, lifting, ambulation and eccentric single leg control.   [] (14506) Therapist is in constant attendance of 2 or more patients providing skilled therapy interventions, but not providing any significant amount of measurable one-on-one time to either patient, for improvements in LE, proximal hip, and core control in self care, mobility, lifting, ambulation and eccentric single leg control.      NMR and Therapeutic Activities:    [x] (00325 or 58629) Provided verbal/tactile cueing for activities related to improving balance, coordination, kinesthetic sense, posture, motor skill, proprioception and motor activation to allow for proper function of core, proximal hip and LE with self care and ADLs  [] (17452) Gait Re-education- Provided Needling 3+ muscles (446175)  [] Group:      [] Other:     GOALS:   Patient stated goal: walk without A device and perform steps without rail  [x] Progressing: [] Met: [] Not Met: [] Adjusted    Therapist goals for Patient:   Short Term Goals: To be achieved in: 2 weeks  1. Independent in HEP and progression per patient tolerance, in order to prevent re-injury. [] Progressing: [x] Met: [] Not Met: [] Adjusted  2. Patient will have a decrease in pain to facilitate improvement in movement, function, and ADLs as indicated by Functional Deficits. [x] Progressing: [] Met: [] Not Met: [] Adjusted    Long Term Goals: To be achieved in: 7-9 weeks  1. Disability index score of 40-50% or less for the LEFS to assist with reaching prior level of function. [] Progressing: [x] Met: [] Not Met: [] Adjusted  2. Patient will demonstrate increased AROM to 5-110  to allow for proper joint functioning as indicated by patients ability to perform sit to stand transfer without stiffness with L=R WBing.   [] Progressing: [x] Met: [] Not Met: [] Adjusted  3. Patient will demonstrate an increase in Strength to at least 4-4.5/5 as well as good proximal hip strength and control to allow for proper functional mobility as indicated by patients ability to ascend/descend steps 6\" with 1 rail reciprocal on R Met for knee[x] Progressing: [] Met: [] Not Met: [] Adjusted  4. Patient will return to functional activities including walking without assistive device with SPC or non A device with proper heel to toe gait without increased symptoms or restriction. [] Progressing: [] Met: [x] Not Met: [] Adjusted  5.patient to be able to perform TUG test in 10\" or less with least restrictive A device by discharge. [] Progressing: [] Met: [x] Not Met: [] Adjusted     Overall Progression Towards Functional goals/ Treatment Progress Update:  [x] Patient is progressing as expected towards functional goals listed.     [] Progression is slowed due to complexities/Impairments listed. [] Progression has been slowed due to co-morbidities. [] Plan just implemented, too soon to assess goals progression <30days   [] Goals require adjustment due to lack of progress  [] Patient is not progressing as expected and requires additional follow up with physician  [] Other    Persisting Functional Limitations/Impairments:  []Sitting [x]Standing   [x]Walking [x]Stairs   [x]Transfers []ADLs   []Squatting/bending []Kneeling  []Housework []Job related tasks  []Driving []Sports/Recreation   []Sleeping []Other:    ASSESSMENT:  Pt is progressing in R knee ROM and functional strength. Introduced forward step downs, focusing on eccentric control of RLE. Pt required education and  Tactile corrections on step downs and lateral step ups. Pt required repeated verbal cues to maintain the corrections. Following warm up and using flexinator patient  was able to achieve 112* passive knee flex and 1 * from neutral knee extension. Pt would continue to benefit from skilled PT to address limited strength and ROM. Treatment/Activity Tolerance:  [x] Pt able to complete treatment [] Patient limited by fatique  [x] Patient limited by pain  [] Patient limited by other medical complications  [] Other:     Prognosis:  [x] Good [] Fair  [] Poor    Patient Requires Follow-up: [x] Yes  [] No    Return to Play:    [x]  N/A   []  Stage 1: Intro to Strength   []  Stage 2: Return to Run and Strength   []  Stage 3: Return to Jump and Strength   []  Stage 4: Dynamic Strength and Agility   []  Stage 5: Sport Specific Training   []  Ready to Return to Play, Meets All Above Stages   []  Not Ready for Return to Sports   Comments:            PLAN: See eval. PT 2-3x / week for 7-9 weeks.    [x] Continue per plan of care [] Alter current plan (see comments)  [] Plan of care initiated [] Hold pending MD visit [] Discharge    Electronically signed by: Janes Ortiz, PT, MSPT  NASIR Ott  Therapist was present, directed the patient's care, made skilled judgement, and was responsible for assessment and treatment of the patient. Note: If patient does not return for scheduled/ recommended follow up visits, this note will serve as a discharge from care along with most recent update on progress.

## 2021-06-15 NOTE — TELEPHONE ENCOUNTER
Patient doesn't feel that she does either but can't help to worry. She also thought maybe it could be the Ozempic making her feel so tired. She was previously on the 0.25 mg and is now taking 0.5 mg. She is going to take her next dosage that is due today at 0.25 mg to see if that makes a difference. She will update Dr. Clinton Edge next week.

## 2021-06-18 ENCOUNTER — HOSPITAL ENCOUNTER (OUTPATIENT)
Dept: PHYSICAL THERAPY | Age: 71
Setting detail: THERAPIES SERIES
Discharge: HOME | End: 2021-06-18
Payer: MEDICARE

## 2021-06-18 PROCEDURE — 97112 NEUROMUSCULAR REEDUCATION: CPT

## 2021-06-18 PROCEDURE — 97110 THERAPEUTIC EXERCISES: CPT

## 2021-06-18 NOTE — FLOWSHEET NOTE
scheduling future physical therapy appointments. Time was also taken on this day to answer all patient questions and participation in PT.          2 10    Reassessment of objective measurements, reviewed POC and achievement of goals with Progress note written and sent to MD Tejeda'  6/8          Neuromuscular Re-ed (22601)       orange 2 10 Added 4/15, 6/8SAQ  2/3\"H 15 added 4/15    2# 2/3\" 10 Added 4/15   Step ups - forward  4\">>6\" 1, 1 10 R Added 4/26, 5/28 6\" for second set   Step downs Forward2\", 4\" 2, 1 10, 5R Added 6/15 1 15 R Step ups - lateral 4\">>6\" 1, 1 10 R Added 4/28, 5/28 6\" for second set; 6/18: up and over 4\", 6\" floor 30\"  3 L/R      5\" 5 Added 5/3   TRX squats  1 10 Added 6/18   Manual Intervention (89175)       Knee mobs/PROM  X 6 min total flexion in supine (OP) and extension in supine    Tib/Fem Mobs-A-P glide  X3'   Patella MobsSuperior/inferior X1'   Ankle mobsAP TC joint   Quad stretch off [address] 1/10\"54/13;5/7    X2'         Modalities:  5/28, 5/19: CP to R knee X10' supine after tx  4/28: Ice bag to go   4/15: Ice x10 min  4/17: Pt did not have time for ice, ride was here to pick her up. Pt stated she would ice at home    Pt. Education:  -pt educated on diagnosis, prognosis and expectations for rehab  -all pt questions were answered    Home Exercise Program:  Access Code: [email or web address]  URL: [email or web address]  Date: 06/08/2021  [facility]    Exercises  Standing Terminal Knee Extension with Resistance - 1 x daily - 7 x weekly - 10 reps - 3 sets  Standing Hip Flexion with Resistance Loop - 1 x daily - 7 x weekly - 10 reps - 3 sets  Standing Hip Abduction Kicks - 1 x daily - 7 x weekly - 10 reps - 3 sets  Hip Extension with Resistance Loop - 1 x daily - 7 x weekly - 10 reps - 3 sets  Access Code: [email or web address]: [email or web address]. com/Date: 04/15/2021Prepared by: Alysia Ordaz Sitting Calf Stretch with Strap - 3 x daily - 7 x weekly - 1 sets - 3 reps - 30 seconds hold   Seated Table Hamstring Stretch - 3 x daily - 7 x weekly - 1 sets - 3 reps - 30 seconds hold   Supine Knee Extension Strengthening - 2 x daily - 7 x weekly - 2 sets - 10 reps - 3 seconds hold   Seated Long Arc Quad - 2 x daily - 7 x weekly - 2 sets - 10 reps - 3 seconds hold   Standing Terminal Knee Extension with Resistance - 2 x daily - 7 x weekly - 2 sets - 10 reps - 3 seconds hold      Therapeutic Exercise and NMR EXR  [x] (79795) Provided verbal/tactile cueing for activities related to strengthening, flexibility, endurance, ROM for improvements in LE, proximal hip, and core control with self care, mobility, lifting, ambulation.  [] (85255) Provided verbal/tactile cueing for activities related to improving balance, coordination, kinesthetic sense, posture, motor skill, proprioception  to assist with LE, proximal hip, and core control in self care, mobility, lifting, ambulation and eccentric single leg control.   [] (17363) Therapist is in constant attendance of 2 or more patients providing skilled therapy interventions, but not providing any significant amount of measurable one-on-one time to either patient, for improvements in LE, proximal hip, and core control in self care, mobility, lifting, ambulation and eccentric single leg control.      NMR and Therapeutic Activities:    [x] (10444 or 48327) Provided verbal/tactile cueing for activities related to improving balance, coordination, kinesthetic sense, posture, motor skill, proprioception and motor activation to allow for proper function of core, proximal hip and LE with self care and ADLs  [] (15726) Gait Re-education- Provided training and instruction to the patient for proper LE, core and proximal hip recruitment and positioning and eccentric body weight control with ambulation re-education including up and down stairs     Home Exercise Program:    [x] (35323) Reviewed/Progressed HEP activities related to strengthening, flexibility, endurance, ROM of core, proximal hip and LE for functional self-care, mobility, lifting and ambulation/stair navigation   [] (50293)Reviewed/Progressed HEP activities related to improving balance, coordination, kinesthetic sense, posture, motor skill, proprioception of core, proximal hip and LE for self care, mobility, lifting, and ambulation/stair navigation      Manual Treatments:  PROM / STM / Oscillations-Mobs:  G-I, II, III, IV (PA's, Inf., Post.)  [x] (92309) Provided manual therapy to mobilize LE, proximal hip and/or LS spine soft tissue/joints for the purpose of modulating pain, promoting relaxation,  increasing ROM, reducing/eliminating soft tissue swelling/inflammation/restriction, improving soft tissue extensibility and allowing for proper ROM for normal function with self care, mobility, lifting and ambulation. Modalities:  [] (94940) Vasopneumatic compression: Utilized vasopneumatic compression to decrease edema / swelling for the purpose of improving mobility and quad tone / recruitment which will allow for increased overall function including but not limited to self-care, transfers, ambulation, and ascending / descending stairs. Charges:  Timed Code Treatment Minutes: Total Treatment Minutes:      [] EVAL - LOW (40492)   [] EVAL - MOD (22316)  [] EVAL - HIGH (02357)  [] RE-EVAL (94521)  [x] BM(61332) x  1    [] Ionto  [x] NMR (97636) x  2    [] Vaso  [] Manual (45517) x 1    [] Ultrasound  [] TA x   1     [] Mech Traction (85552)  [] Aquatic Therapy x      [] ES (un) (56274):   [] Home Management Training x  [] ES(attended) (18533)   [] Dry Needling 1-2 muscles (23701):  [] Dry Needling 3+ muscles (278143)  [] Group:      [] Other:     GOALS:   Patient stated goal: walk without A device and perform steps without rail  [x] Progressing: [] Met: [] Not Met: [] Adjusted    Therapist goals for Patient:   Short Term Goals: To be achieved in: 2 weeks  1.  Independent in HEP and progression per patient Functional Limitations/Impairments:  []Sitting [x]Standing   [x]Walking [x]Stairs   [x]Transfers []ADLs   []Squatting/bending []Kneeling  []Housework []Job related tasks  []Driving []Sports/Recreation   []Sleeping []Other:    ASSESSMENT:  Pt is progressing in R LE functional strength with better NMR with improved quad activation with ascending steps. Descending steps has improved from last session, focusing on eccentric control of RLE. Pt required education and  Tactile corrections on step downs and lateral step ups. Pt required repeated verbal cues to maintain the corrections. Pt would continue to benefit from skilled PT to address limited strength and ROM. Treatment/Activity Tolerance:  [x] Pt able to complete treatment [] Patient limited by fatique  [x] Patient limited by pain  [] Patient limited by other medical complications  [] Other:     Prognosis:  [x] Good [] Fair  [] Poor    Patient Requires Follow-up: [x] Yes  [] No    Return to Play:    [x]  N/A   []  Stage 1: Intro to Strength   []  Stage 2: Return to Run and Strength   []  Stage 3: Return to Jump and Strength   []  Stage 4: Dynamic Strength and Agility   []  Stage 5: Sport Specific Training   []  Ready to Return to Play, Meets All Above Stages   []  Not Ready for Return to Sports   Comments:            PLAN: See eval. PT 2-3x / week for 7-9 weeks. [x] Continue per plan of care [] Alter current plan (see comments)  [] Plan of care initiated [] Hold pending MD visit [] Discharge    Electronically signed by: Jerad Lopez PT, IAN Shepard, [facility]  Therapist was present, directed the patient's care, made skilled judgement, and was responsible for assessment and treatment of the patient. Note: If patient does not return for scheduled/ recommended follow up visits, this note will serve as a discharge from care along with most recent update on progress.

## 2021-06-22 ENCOUNTER — HOSPITAL ENCOUNTER (OUTPATIENT)
Dept: PHYSICAL THERAPY | Age: 71
Setting detail: THERAPIES SERIES
Discharge: HOME | End: 2021-06-22
Payer: MEDICARE

## 2021-06-22 PROCEDURE — 97112 NEUROMUSCULAR REEDUCATION: CPT

## 2021-06-22 PROCEDURE — 97110 THERAPEUTIC EXERCISES: CPT

## 2021-06-22 NOTE — FLOWSHEET NOTE
Tera Derian  Phone: [phone]   Fax: [phone]    Physical Therapy Treatment Note/ Progress Report:     Date:  2021    Patient Name:  Julia Casteloln    :  1950  MRN: [id number]  Restrictions/Precautions:    Medical/Treatment Diagnosis Information:  · Diagnosis: s/p R TKR 3/16/2021  · Treatment Diagnosis: R decreased knee ROM, decreased R LE strength and flexiblity, decreased balance, abnormal gait, decreased walking endurance  Insurance/Certification information:  PT Insurance Information: Medicare  Physician Information:  Referring Practitioner: Juanita Colindres MD  Plan of care signed (Y/N): [x]  Yes []  No     Date of Patient follow up with Physician: 2021     Progress Report: []  Yes  [x]  No     Date Range for reporting period:  Beginnin/9  PN:   Ending:  Progress report due (10 Rx/or 30 days whichever is less): visit #22  Recertification due (POC duration/ or 90 days whichever is less): 2021    Visit # Insurance Allowable Auth required? Date Range   - Medicare/[facility] []  Yes  [x]  No      Latex Allergy:  [x]NO      []YES  Preferred Language for Healthcare:   [x]English       []other:    Functional Scale:        Date assessed:  LEFS: raw score = 22; dysfunction = 72.5%   2021  LEFS: raw score = 22; dysfunction = 72.5%   2021  LEFS: raw score = 47 ; dysfunction = 41.25%   2021        Pain level:  2/10     SUBJECTIVE:  Feels like she is starting to lean forward when walking. Walking without cane today. OBJECTIVE:     6/15: before tx: R knee flexion 103 ~; after tx on flexinator 117 degrees  : R Knee PROM 5-113; AROM 10-95;  Quad 88; Strength: R quad 4/5, HS 5/5, iliopsoas 3+/5, Hip AB  : PROM: 3-118 deg, AROM: 5-110 deg  :after tx: R knee 2-118 supine  : See PN: AAROM R knee 7-112; PROM L knee after tx 0-110; AROM L knee at end of tx session 3 from neutral; R   R hip AB 3-/5, SLR 3/5, iliopsoas 4/5, 5/5 HS/quad; R quad flexibility 85; pt. 8' late  4/28: Pt 10 min late, AROM R knee flexion 100 degrees; AROM R knee flexion with  degrees; AROM R knee extension lacking 6 degrees; AROM R knee flexion with OP 0 degrees - all ROM taken in supine   4/26: Knee flexion w/ heel slide and OP: 99 degrees, AROM R knee flexion at EOB: 102 degrees, AROM R knee extnsion: lacking 5 degrees  4/26: Pt was 5 minutes late today  4/23: Pt was 7 minutes late today  4/19: AROM R Knee Flex: 94 at EOB  PROM R Knee Flex: 100 at EOB   AROM R Knee: Ext: Lacking 4 in supine   4/17: Knee flexion w/ heel slide and OP: 94 deg; did not allow therapist to stretch into extension  4/15: knee extension: resting at lacking 5 degrees, able to achieve full extension with manual OP   Knee flexion w/ heel slide and over EOB: 90       RESTRICTIONS/PRECAUTIONSOA L knee    Exercises/Interventions:     Therapeutic Exercise (77166)  Resistance / level Sets/sec Reps Notes / Cues   bike-recumbent       SciFit StepOne Full Rev 5'        Added 4/15  Cueing to start with semi-circles to decrease pain and work toward improving motion. IB - gastroc/soleus  30\"  2 B gastroc; 2 B soleus 6/15: added soleus   ERMI Flexinator  5\" 8    HR/TR  3 10 R/L    HSS  30\"  3 R    SLR   1/110/5 4/19-Min/Mod A from clinician  4/23- decreased reps due to pain  6/15 Independent   Stand Hip ABD - bilateral [place] 2 10  6/8                 Therapeutic Activities (29164)       4/13/21 Pt was educated on PT POC, Diagnosis, Prognosis, pathomechanics as well as frequency and duration of scheduling future physical therapy appointments. Time was also taken on this day to answer all patient questions and participation in PT.         Mini squats hi/low table  2 10           Neuromuscular Re-ed (15976)       SAQ  2/3\"H 20 added 4/15   Step ups - forward  4\">>6\" 1, 1 10 R Added 4/26, 5/28 6\" for second set   Step downs Forward2\", 4\" 2, 1 10, 5R Added 6/15Step-ups Backward L (R eccentric) 1 15 R Step ups - lateral 4\">>6\" 1, 1 10 R Added 4/28, 5/28 6\" for second set; 6/18: up and over 4\", 6\"Semi-Tandem stance floor 30\"  3 L/R    SLS  5\" 5 Added 5/3   TRX squats  1 10 Added 6/18   Manual Intervention (56775)         X 6 min total flexion in supine (OP) and extension in supine    Tib/Fem Mobs-A-P glide  X3'   Superior/inferior X1'   AP TC joint    1/10\"54/13;5/7    X2'         Modalities:  5/28, 5/19: CP to R knee X10' supine after tx  4/28: Ice bag to go   4/15: Ice x10 min  4/17: Pt did not have time for ice, ride was here to pick her up. Pt stated she would ice at home    Pt. Education:  -pt educated on diagnosis, prognosis and expectations for rehab  -all pt questions were answered    Home Exercise Program:  Access Code: [email or web address]  URL: [email or web address]  Date: 06/08/2021  [facility]    Exercises  Standing Terminal Knee Extension with Resistance - 1 x daily - 7 x weekly - 10 reps - 3 sets  Standing Hip Flexion with Resistance Loop - 1 x daily - 7 x weekly - 10 reps - 3 sets  Standing Hip Abduction Kicks - 1 x daily - 7 x weekly - 10 reps - 3 sets  Hip Extension with Resistance Loop - 1 x daily - 7 x weekly - 10 reps - 3 sets  Access Code: [email or web address]: [employer]/Date: 04/15/2021Prepared by: Yessi Vickers Sitting Calf Stretch with Strap - 3 x daily - 7 x weekly - 1 sets - 3 reps - 30 seconds hold   Seated Table Hamstring Stretch - 3 x daily - 7 x weekly - 1 sets - 3 reps - 30 seconds hold   Supine Knee Extension Strengthening - 2 x daily - 7 x weekly - 2 sets - 10 reps - 3 seconds hold   Seated Long Arc Quad - 2 x daily - 7 x weekly - 2 sets - 10 reps - 3 seconds hold   Standing Terminal Knee Extension with Resistance - 2 x daily - 7 x weekly - 2 sets - 10 reps - 3 seconds hold      Therapeutic Exercise and NMR EXR  [x] (24680) Provided verbal/tactile cueing for activities related to strengthening, flexibility, endurance, ROM for improvements in LE, proximal hip, and core control with self care, mobility, lifting, ambulation.  [] (76997) Provided verbal/tactile cueing for activities related to improving balance, coordination, kinesthetic sense, posture, motor skill, proprioception  to assist with LE, proximal hip, and core control in self care, mobility, lifting, ambulation and eccentric single leg control.   [] (00427) Therapist is in constant attendance of 2 or more patients providing skilled therapy interventions, but not providing any significant amount of measurable one-on-one time to either patient, for improvements in LE, proximal hip, and core control in self care, mobility, lifting, ambulation and eccentric single leg control.      NMR and Therapeutic Activities:    [x] (01623 or 35014) Provided verbal/tactile cueing for activities related to improving balance, coordination, kinesthetic sense, posture, motor skill, proprioception and motor activation to allow for proper function of core, proximal hip and LE with self care and ADLs  [] (61577) Gait Re-education- Provided training and instruction to the patient for proper LE, core and proximal hip recruitment and positioning and eccentric body weight control with ambulation re-education including up and down stairs     Home Exercise Program:    [x] (48128) Reviewed/Progressed HEP activities related to strengthening, flexibility, endurance, ROM of core, proximal hip and LE for functional self-care, mobility, lifting and ambulation/stair navigation   [] (32545)Reviewed/Progressed HEP activities related to improving balance, coordination, kinesthetic sense, posture, motor skill, proprioception of core, proximal hip and LE for self care, mobility, lifting, and ambulation/stair navigation      Manual Treatments:  PROM / STM / Oscillations-Mobs:  G-I, II, III, IV (PA's, Inf., Post.)  [x] (19409) Provided manual therapy to mobilize LE, proximal hip and/or LS spine soft tissue/joints for the purpose of modulating pain, promoting relaxation,  increasing ROM, reducing/eliminating soft tissue swelling/inflammation/restriction, improving soft tissue extensibility and allowing for proper ROM for normal function with self care, mobility, lifting and ambulation. Modalities:  [] (10069) Vasopneumatic compression: Utilized vasopneumatic compression to decrease edema / swelling for the purpose of improving mobility and quad tone / recruitment which will allow for increased overall function including but not limited to self-care, transfers, ambulation, and ascending / descending stairs. Charges:  Timed Code Treatment Minutes: Total Treatment Minutes:      [] EVAL - LOW (40330)   [] EVAL - MOD (86785)  [] EVAL - HIGH (80445)  [] RE-EVAL (27348)  [x] VE(29705) x  1    [] Ionto  [x] NMR (04560) x  2    [] Vaso  [] Manual (62770) x 1    [] Ultrasound  [] TA x   1     [] Mech Traction (21562)  [] Aquatic Therapy x      [] ES (un) (75152):   [] Home Management Training x  [] ES(attended) (66056)   [] Dry Needling 1-2 muscles (03622):  [] Dry Needling 3+ muscles (773231)  [] Group:      [] Other:     GOALS:   Patient stated goal: walk without A device and perform steps without rail  [x] Progressing: [] Met: [] Not Met: [] Adjusted    Therapist goals for Patient:   Short Term Goals: To be achieved in: 2 weeks  1. Independent in HEP and progression per patient tolerance, in order to prevent re-injury. [] Progressing: [x] Met: [] Not Met: [] Adjusted  2. Patient will have a decrease in pain to facilitate improvement in movement, function, and ADLs as indicated by Functional Deficits. [x] Progressing: [] Met: [] Not Met: [] Adjusted    Long Term Goals: To be achieved in: 7-9 weeks  1. Disability index score of 40-50% or less for the LEFS to assist with reaching prior level of function. [] Progressing: [x] Met: [] Not Met: [] Adjusted  2.  Patient will demonstrate increased AROM to 5-110  to allow for proper joint functioning as indicated by patients ability to perform sit to stand transfer without stiffness with L=R WBing.   [] Progressing: [x] Met: [] Not Met: [] Adjusted  3. Patient will demonstrate an increase in Strength to at least 4-4.5/5 as well as good proximal hip strength and control to allow for proper functional mobility as indicated by patients ability to ascend/descend steps 6\" with 1 rail reciprocal on R Met for knee[x] Progressing: [] Met: [] Not Met: [] Adjusted  4. Patient will return to functional activities including walking without assistive device with SPC or non A device with proper heel to toe gait without increased symptoms or restriction. [] Progressing: [] Met: [x] Not Met: [] Adjusted  5.patient to be able to perform TUG test in 10\" or less with least restrictive A device by discharge. [] Progressing: [] Met: [x] Not Met: [] Adjusted     Overall Progression Towards Functional goals/ Treatment Progress Update:  [x] Patient is progressing as expected towards functional goals listed. [] Progression is slowed due to complexities/Impairments listed. [] Progression has been slowed due to co-morbidities. [] Plan just implemented, too soon to assess goals progression <30days   [] Goals require adjustment due to lack of progress  [] Patient is not progressing as expected and requires additional follow up with physician  [] Other    Persisting Functional Limitations/Impairments:  []Sitting [x]Standing   [x]Walking [x]Stairs   [x]Transfers []ADLs   []Squatting/bending []Kneeling  []Housework []Job related tasks  []Driving []Sports/Recreation   []Sleeping []Other:    ASSESSMENT:  Pt continues to struggle most with balance demonstrating poor proprioception and multiple episodes of unsteadiness needing bilateral hand hold support for safety.   Pt demonstrates muscle weakness and fatigue with current exercise program and will continue to benefit from therapy to progress endurance, strength and balance for improved functional mobility. Treatment/Activity Tolerance:  [x] Pt able to complete treatment [] Patient limited by fatique  [x] Patient limited by pain  [] Patient limited by other medical complications  [] Other:     Prognosis:  [x] Good [] Fair  [] Poor    Patient Requires Follow-up: [x] Yes  [] No    Return to Play:    [x]  N/A   []  Stage 1: Intro to Strength   []  Stage 2: Return to Run and Strength   []  Stage 3: Return to Jump and Strength   []  Stage 4: Dynamic Strength and Agility   []  Stage 5: Sport Specific Training   []  Ready to Return to Play, Meets All Above Stages   []  Not Ready for Return to Sports   Comments:            PLAN: See eval. PT 2-3x / week for 7-9 weeks. [x] Continue per plan of care [] Alter current plan (see comments)  [] Plan of care initiated [] Hold pending MD visit [] Discharge    Electronically signed by: Jazmine Lim, PTA [id number]    Note: If patient does not return for scheduled/ recommended follow up visits, this note will serve as a discharge from care along with most recent update on progress.

## 2021-06-25 ENCOUNTER — HOSPITAL ENCOUNTER (OUTPATIENT)
Dept: PHYSICAL THERAPY | Age: 71
Setting detail: THERAPIES SERIES
Discharge: HOME | End: 2021-06-25
Payer: MEDICARE

## 2021-06-25 ENCOUNTER — CARE COORDINATION (OUTPATIENT)
Dept: CARE COORDINATION | Age: 71
End: 2021-06-25

## 2021-06-25 NOTE — FLOWSHEET NOTE
[address] [facility]    Physical Therapy  Cancellation/No-show Note  Patient Name:  Estee Zaman  :  1950   Date:  2021    Cancelled visits to date: , , , , ,   No-shows to date: 0    For today's appointment patient:  [x]  Cancelled  []  Rescheduled appointment  []  No-show     Reason given by patient:  [x]  Patient ill  []  Conflicting appointment  []  No transportation    []  Conflict with work  []  No reason given  []  Other:     Comments:      Phone call information:   []  Phone call made today to patient at _ time at number provided:      []  Patient answered, conversation as follows:    []  Patient did not answer, message left as follows:  []  Phone call not made today  [x]  Phone call not needed - pt contacted us to cancel and provided reason for cancellation.      Electronically signed by:  Anai Aleman, PT, PTA

## 2021-06-29 ENCOUNTER — HOSPITAL ENCOUNTER (OUTPATIENT)
Dept: PHYSICAL THERAPY | Age: 71
Setting detail: THERAPIES SERIES
Discharge: HOME | End: 2021-06-29
Payer: MEDICARE

## 2021-06-29 PROCEDURE — 97112 NEUROMUSCULAR REEDUCATION: CPT

## 2021-06-29 PROCEDURE — 97140 MANUAL THERAPY 1/> REGIONS: CPT

## 2021-06-29 PROCEDURE — 97110 THERAPEUTIC EXERCISES: CPT

## 2021-06-29 NOTE — FLOWSHEET NOTE
Derian Haley  Phone: [phone]   Fax: [phone]    Physical Therapy Treatment Note/ Progress Report:     Date:  2021    Patient Name:  Ace Zavala    :  1950  MRN: [id number]  Restrictions/Precautions:    Medical/Treatment Diagnosis Information:  · Diagnosis: s/p R TKR 3/16/2021  · Treatment Diagnosis: R decreased knee ROM, decreased R LE strength and flexiblity, decreased balance, abnormal gait, decreased walking endurance  Insurance/Certification information:  PT Insurance Information: Medicare  Physician Information:  Referring Practitioner: Jasmin Soto MD  Plan of care signed (Y/N): [x]  Yes []  No     Date of Patient follow up with Physician: 2021     Progress Report: []  Yes  [x]  No     Date Range for reporting period:  Beginnin/9  PN:   Ending:  Progress report due (10 Rx/or 30 days whichever is less): visit #69  Recertification due (POC duration/ or 90 days whichever is less): 2021    Visit # Insurance Allowable Auth required? Date Range   - Medicare/[facility] []  Yes  [x]  No      Latex Allergy:  [x]NO      []YES  Preferred Language for Healthcare:   [x]English       []other:    Functional Scale:        Date assessed:  LEFS: raw score = 22; dysfunction = 72.5%  2021  LEFS: raw score = 22; dysfunction = 72.5%  2021  LEFS: raw score = 47 ; dysfunction = 41.25%  2021    Pain level:  4-5/10     SUBJECTIVE: Pt feels as if she is leaning to R when standing and walking, she does not know why. Her knee pain is increased this date, but stated her whole body is sore and achey     OBJECTIVE:      : R pelvic upslip, R knee extension Seated PROM lacking 4*, bilat iliac crests, PSIS, ASIS all tender to palp  6/15: before tx: R knee flexion 103 ~; after tx on flexinator 117 degrees  : R Knee PROM 5-113; AROM 10-95;  Quad 88; Strength: R quad 4/5, HS 5/5, iliopsoas 3+/5, Hip AB  : PROM: 3-118 deg, AROM: 5-110 deg  5/19:after tx: R knee 2-118 supine  5/7: See PN: AAROM R knee 7-112; PROM L knee after tx 0-110; AROM L knee at end of tx session 3 from neutral; R   R hip AB 3-/5, SLR 3/5, iliopsoas 4/5, 5/5 HS/quad; R quad flexibility 85; pt. 8' late  4/28: Pt 10 min late, AROM R knee flexion 100 degrees; AROM R knee flexion with  degrees; AROM R knee extension lacking 6 degrees; AROM R knee flexion with OP 0 degrees - all ROM taken in supine   4/26: Knee flexion w/ heel slide and OP: 99 degrees, AROM R knee flexion at EOB: 102 degrees, AROM R knee extnsion: lacking 5 degrees  4/26: Pt was 5 minutes late today  4/23: Pt was 7 minutes late today  4/19: AROM R Knee Flex: 94 at EOB  PROM R Knee Flex: 100 at EOB   AROM R Knee: Ext: Lacking 4 in supine   4/17: Knee flexion w/ heel slide and OP: 94 deg; did not allow therapist to stretch into extension  4/15: knee extension: resting at lacking 5 degrees, able to achieve full extension with manual OP   Knee flexion w/ heel slide and over EOB: 90       RESTRICTIONS/PRECAUTIONSOA L knee    Exercises/Interventions:     Therapeutic Exercise (04781)  Resistance / level Sets/sec Reps Notes / Cues   bike-recumbent       SciFit StepOne Full Rev 5'        Added 4/15  Cueing to start with semi-circles to decrease pain and work toward improving motion. IB - gastroc/soleus  30\"  2 B gastroc; 2 B soleus 6/15: added soleus   ERMI Flexinator  5\" 8    HR/TR  3 10 R/L    HSS  30\"  3 R    SLR   1/110/5 4/19-Min/Mod A from clinician  4/23- decreased reps due to pain  6/15 Independent   Stand Hip ABD - bilateral Orange 2 10  6/8   Supine quad stretch off EOB  30\" 2           Therapeutic Activities (54938)       4/13/21 Pt was educated on PT POC, Diagnosis, Prognosis, pathomechanics as well as frequency and duration of scheduling future physical therapy appointments.  Time was also taken on this day to answer all patient questions and participation in PT. reps - 3 seconds hold   Seated Long Arc Quad - 2 x daily - 7 x weekly - 2 sets - 10 reps - 3 seconds hold   Standing Terminal Knee Extension with Resistance - 2 x daily - 7 x weekly - 2 sets - 10 reps - 3 seconds hold      Therapeutic Exercise and NMR EXR  [x] (57879) Provided verbal/tactile cueing for activities related to strengthening, flexibility, endurance, ROM for improvements in LE, proximal hip, and core control with self care, mobility, lifting, ambulation. [x] (11699) Provided verbal/tactile cueing for activities related to improving balance, coordination, kinesthetic sense, posture, motor skill, proprioception  to assist with LE, proximal hip, and core control in self care, mobility, lifting, ambulation and eccentric single leg control.   [] (04598) Therapist is in constant attendance of 2 or more patients providing skilled therapy interventions, but not providing any significant amount of measurable one-on-one time to either patient, for improvements in LE, proximal hip, and core control in self care, mobility, lifting, ambulation and eccentric single leg control.      NMR and Therapeutic Activities:    [x] (55796 or 48737) Provided verbal/tactile cueing for activities related to improving balance, coordination, kinesthetic sense, posture, motor skill, proprioception and motor activation to allow for proper function of core, proximal hip and LE with self care and ADLs  [] (99772) Gait Re-education- Provided training and instruction to the patient for proper LE, core and proximal hip recruitment and positioning and eccentric body weight control with ambulation re-education including up and down stairs     Home Exercise Program:    [x] (46897) Reviewed/Progressed HEP activities related to strengthening, flexibility, endurance, ROM of core, proximal hip and LE for functional self-care, mobility, lifting and ambulation/stair navigation   [] (84533)Reviewed/Progressed HEP activities related to improving balance, coordination, kinesthetic sense, posture, motor skill, proprioception of core, proximal hip and LE for self care, mobility, lifting, and ambulation/stair navigation      Manual Treatments:  PROM / STM / Oscillations-Mobs:  G-I, II, III, IV (PA's, Inf., Post.)  [x] (16805) Provided manual therapy to mobilize LE, proximal hip and/or LS spine soft tissue/joints for the purpose of modulating pain, promoting relaxation,  increasing ROM, reducing/eliminating soft tissue swelling/inflammation/restriction, improving soft tissue extensibility and allowing for proper ROM for normal function with self care, mobility, lifting and ambulation. Modalities:  [] (24395) Vasopneumatic compression: Utilized vasopneumatic compression to decrease edema / swelling for the purpose of improving mobility and quad tone / recruitment which will allow for increased overall function including but not limited to self-care, transfers, ambulation, and ascending / descending stairs. Charges:  Timed Code Treatment Minutes: 45   Total Treatment Minutes: 45     [] EVAL - LOW (94440)   [] EVAL - MOD (02705)  [] EVAL - HIGH (88288)  [] RE-EVAL (00706)  [x] TY(39163) x  1    [] Ionto  [x] NMR (44942) x  1    [] Vaso  [x] Manual (82257) x 1    [] Ultrasound  [] TA x   1     [] Mech Traction (81809)  [] Aquatic Therapy x      [] ES (un) (41357):   [] Home Management Training x  [] ES(attended) (99024)   [] Dry Needling 1-2 muscles (86559):  [] Dry Needling 3+ muscles (152376)  [] Group:      [] Other:     GOALS:   Patient stated goal: walk without A device and perform steps without rail  [x] Progressing: [] Met: [] Not Met: [] Adjusted    Therapist goals for Patient:   Short Term Goals: To be achieved in: 2 weeks  1. Independent in HEP and progression per patient tolerance, in order to prevent re-injury. [] Progressing: [x] Met: [] Not Met: [] Adjusted  2.  Patient will have a decrease in pain to facilitate improvement in movement, function, and ADLs as indicated by Functional Deficits. [x] Progressing: [] Met: [] Not Met: [] Adjusted    Long Term Goals: To be achieved in: 7-9 weeks  1. Disability index score of 40-50% or less for the LEFS to assist with reaching prior level of function. [] Progressing: [x] Met: [] Not Met: [] Adjusted  2. Patient will demonstrate increased AROM to 5-110  to allow for proper joint functioning as indicated by patients ability to perform sit to stand transfer without stiffness with L=R WBing.   [] Progressing: [x] Met: [] Not Met: [] Adjusted  3. Patient will demonstrate an increase in Strength to at least 4-4.5/5 as well as good proximal hip strength and control to allow for proper functional mobility as indicated by patients ability to ascend/descend steps 6\" with 1 rail reciprocal on R Met for knee[x] Progressing: [] Met: [] Not Met: [] Adjusted  4. Patient will return to functional activities including walking without assistive device with SPC or non A device with proper heel to toe gait without increased symptoms or restriction. [] Progressing: [] Met: [x] Not Met: [] Adjusted  5.patient to be able to perform TUG test in 10\" or less with least restrictive A device by discharge. [] Progressing: [] Met: [x] Not Met: [] Adjusted     Overall Progression Towards Functional goals/ Treatment Progress Update:  [x] Patient is progressing as expected towards functional goals listed. [] Progression is slowed due to complexities/Impairments listed. [] Progression has been slowed due to co-morbidities.   [] Plan just implemented, too soon to assess goals progression <30days   [] Goals require adjustment due to lack of progress  [] Patient is not progressing as expected and requires additional follow up with physician  [] Other    Persisting Functional Limitations/Impairments:  []Sitting [x]Standing   [x]Walking [x]Stairs   [x]Transfers []ADLs   []Squatting/bending []Kneeling  []Housework []Job related tasks  []Driving []Sports/Recreation   []Sleeping []Other:    ASSESSMENT:  Pt performance w/ step exercises improving. Pt continues to struggle with dynamic balance when stepping and requires UE support to complete safely. Pt still demo's poor eccentric Quad control. Pt demonstrates muscle weakness and fatigue with current exercise program. Pt unable to achieve TKE as she is lacking 4* of R knee ext. PT will continue to benefit from therapy to progress endurance, strength and balance for improved functional mobility. May consider patient discharge with a final HEP and free 30 day gym pass in next 1-3 PT sessions. Treatment/Activity Tolerance:  [x] Pt able to complete treatment [] Patient limited by fatique  [x] Patient limited by pain  [] Patient limited by other medical complications  [] Other:     Prognosis:  [x] Good [] Fair  [] Poor    Patient Requires Follow-up: [x] Yes  [] No    Return to Play:    [x]  N/A   []  Stage 1: Intro to Strength   []  Stage 2: Return to Run and Strength   []  Stage 3: Return to Jump and Strength   []  Stage 4: Dynamic Strength and Agility   []  Stage 5: Sport Specific Training   []  Ready to Return to Play, Meets All Above Stages   []  Not Ready for Return to Sports   Comments:            PLAN: See eval. PT 2-3x / week for 7-9 weeks. [x] Continue per plan of care [] Alter current plan (see comments)  [] Plan of care initiated [] Hold pending MD visit [] Discharge    Electronically signed by: Earnest Guo PT, MSPT  Mayme Gottron, SPT  Therapist was present, directed the patient's care, made skilled judgement, and was responsible for assessment and treatment of the patient. Note: If patient does not return for scheduled/ recommended follow up visits, this note will serve as a discharge from care along with most recent update on progress.

## 2021-06-29 NOTE — PROGRESS NOTES
[facility]   Advanced Heart Failure/Pulmonary Hypertension  Cardiac Follow up       Libia Bass  YOB: 1950     Date of Visit:  7/9/21     Chief Complaint   Patient presents with    Congestive Heart Failure    Hypertension     History of present illness: Libia Bass is a 78 y.o. female with past medical history significant for HTN, HUSSAIN on CPAP, multiple PE on Xarelto (8/2016). She original had a PE was treated with xarelto for recommended time and then off it and her RHC revealed pressures no RH elevated pressures. Afterwards she developed AFib and restarted on xarelto. She continues on xarelto and treatment for afib. Echos in July and August (2016) showed RVSP 106-112 without evidence of enlargement in right side of heart. RHC was done 9/2/16 and PA pressure was 24, no evidence of pulmonary hypertension. Since then, her RVSP has decreased and got back to normal. We discussed that we are questioning if the elevated ones by echo may have been due to a false positive test for her. She was seen by Dr Reno Marshall 8/1 and he is managing her hypothyroidism. [place], the thyroid medication was stopped. She was noted to have negative thyroid ultrasound ( July 2018). She wears her CPAP consistently for her HUSSAIN. States she had a reaction to Simvastatin and Livalo with muscle pain. She states she is Prediabetic and has not lost weight. She was admitted 1/13/2020 with palpitations/light-headedness along with SOB and chest pressure. She underwent successful DCCV and was put on Rhythmol -- this caused her HR to drop into the 40's which made her feel bad. She was discharged on diltiazem 240mg qd which causes her to feel fatigued and dizzy. She remains on Xarelto. Her lisinopril was stopped. She had an AF ablation on 3/27/2020. On 4/2/20 she reported that she was back in atrial fib with rates ranging from 80 to 130. She did not want to go to the ED.   She was instructed to resume her amiodarone and cardizem. ILR implanted 8/17/20. Echo 3/1/21 showed EF 55-60%. She is s/p Rt. TKR 3/16/21 -- needed Lovenox bridge. Today, she is here for regular visit. She feels fairly well overall. She reports some episodes of HR >100, some palpations; she takes diltiazem 30mg prn but states it does not seem to be working as well as it used to. She denies exertional chest pain, LUGO/PND, light-headedness, edema. She was in a study for Novavax; she did receive the usual vaccine in May and has felt very fatigued since then. She is using her C-pap regularly. She swims for exercise. Prior to Visit Medications    Medication Sig Taking? Authorizing Provider   potassium chloride (KLOR-CON M) 10 MEQ extended release tablet TAKE ONE TABLET BY MOUTH DAILY Yes Brenna Chery MD   furosemide (LASIX) 40 MG tablet Take 1 tablet by mouth daily Yes Meri Rey APRN - CNP   rivaroxaban (XARELTO) 20 MG TABS tablet Take 1 tablet by mouth Daily with supper Yes Severiano Edge MD   Semaglutide,0.25 or 0.5MG/DOS, (OZEMPIC, 0.25 OR 0.5 MG/DOSE,) 2 MG/1.5ML SOPN Inject 0.5 mg into the skin once a week Sample Yes Severiano Edge MD   pramipexole (MIRAPEX) 0.5 MG tablet 1.5 tab q 5 PM and 1.5 tab qHS Yes Tiffanie R Kehrt, APRN - CNP   spironolactone (ALDACTONE) 25 MG tablet TAKE ONE TABLET BY MOUTH DAILY Yes Brenna Chery MD   albuterol sulfate  (90 Base) MCG/ACT inhaler Use 2 puffs 4 times daily for 7 days then as needed for wheezing. Dispense with Spacer and instruct in use. At patient's preference may use 60 dose MDI. May Sub Pro-Air or Proventil as needed per insurance.  Yes EVERETTE Gaines   fluticasone (FLONASE) 50 MCG/ACT nasal spray 1 spray by Each Nostril route daily Yes Historical Provider, MD   fluticasone (FLOVENT HFA) 110 MCG/ACT inhaler Inhale 1 puff into the lungs 2 times daily Yes Frank Herman MD   azelastine (ASTELIN) 0.1 % nasal spray 1 spray by Nasal route 2 times daily 1 Spray in each nostril Yes Holli Kovacs MD   Tens Unit MISC by Does not apply route Yes Historical Provider, MD   dilTIAZem (CARDIZEM) 30 MG tablet TAKE ONE TABLET BY MOUTH FOUR TIMES A DAY AS NEEDED FOR FAST HEARTBEAT GREATER THAN 100 AND PALPITATIONS  Patient taking differently: Take 30 mg by mouth daily  Yes BRICE Be CNP   lisinopril (PRINIVIL;ZESTRIL) 10 MG tablet Take 1 tablet by mouth nightly Yes BRICE Be CNP   Handicap Placard MISC by Does not apply route Exp 5 years Yes BRICE Delcid CNP   Multiple Vitamins-Minerals (THERAPEUTIC MULTIVITAMIN-MINERALS) tablet Take 1 tablet by mouth daily Yes Historical Provider, MD   CPAP Machine MISC by Does not apply route Yes Historical Provider, MD   Magnesium Citrate 100 MG TABS Take 1 tablet by mouth daily  Patient taking differently: Take 250 tablets by mouth daily  Yes Lashell Rogers MD   EPIPEN 2-MIR 0.3 MG/0.3ML SOAJ injection  Yes Historical Provider, MD   buPROPion (WELLBUTRIN XL) 150 MG extended release tablet Take 1 tablet by mouth every morning  Holil Kovacs MD       Social History     Tobacco Use    Smoking status: Former Smoker     Packs/day: 0.50     Years: 5.00     Pack years: 2.50     Quit date: 10/5/2013     Years since quittin.7    Smokeless tobacco: Never Used    Tobacco comment: smoked for a total of 5yr total   Vaping Use    Vaping Use: Never used   Substance Use Topics    Alcohol use: Yes     Comment: RARE    Drug use:  No            Past Medical History:   Diagnosis Date    Allergic     Anesthesia complication     family hx-father-at at age 80 having hip replacement revision surgery-had tia's x2 while under anes-but also had hx chf-had dificuly with speech when coming out from anes    Anxiety     Arthritis     left ankle, bilateral hands    Arthritis of left hip 2016    Arthritis of right hip 2016    Asthma     At risk for falls     uses a cane    Atrial fibrillation with rapid ventricular response ([address]     Atrial flutter ([address] 4/25/2018    Chronic maxillary sinusitis 5/24/2017    CTEPH (chronic thromboembolic pulmonary hypertension) ([address] 8/26/2016    Depression     pt stated r/t bad marriage/alcoholic spouse    Diabetes mellitus ([address]     borderline    Disc disease, degenerative, lumbar or lumbosacral 2/20/2017    Hepatic steatosis 4/26/2019    History of total hip arthroplasty 2/28/2017    Hypertension     Leg cramps     patient states very severe, requires immediate potassium administration    Migraines, basilar     last migraine 10 years ago    Mild persistent asthma without complication 5/84/0630    Obesity, Class III, BMI 40-49.9 (morbid obesity) ([address] 4/19/2016    HUSSAIN (obstructive sleep apnea) 10/14/2013    Osteoarthritis, hip, bilateral 2016    Bilateral hip arthroplasty    Panic attacks     Pneumonia 2015    Primary osteoarthritis of both knees 9/19/2017    Primary osteoarthritis of left knee 12/15/2016    Pulmonary emboli ([address] 8/4/2016    Pulmonary HTN ([address] 7/21/2016    Pure hypercholesterolemia 2/13/2019    Restless leg syndrome     Rhinitis, chronic 3/26/2014    Sleep apnea     wears CPAP @11    Stress incontinence     Tear of left rotator cuff 1/23/2018    Thyroid disease     hypothyroid    Wears glasses      Past Surgical History:   Procedure Laterality Date    ATRIAL ABLATION SURGERY      BACK SURGERY  2004    LUMBAR FUSION    CATARACT REMOVAL Bilateral     COLONOSCOPY      EYE SURGERY Right 2010    retinal tear repaired   [facility]    right ring finger severe laceration, fracture    HIP ARTHROPLASTY Right 4-19-16    HYSTERECTOMY  1993    JOINT REPLACEMENT Left 2/2/16    left hip    TONSILLECTOMY  1972    TOTAL KNEE ARTHROPLASTY Right 3/16/2021    RIGHT ROBOTIC ASSISTED TOTAL KNEE ARTHROPLASTY (40877, [id number]) - [employer] performed by Sharda Zuniga MD at [address] History     Tobacco Use    Smoking status: noted    Labs were reviewed including labs from other hospital systems through [facility]. Cardiac testing was reviewed including echos, nuclear scans, cardiac catheterization, including from other hospital systems through [facility]. Labs:   Lab Results   Component Value Date    WBC 7.3 06/08/2021    HGB 13.2 06/08/2021    HCT 38.4 06/08/2021    MCV 90.1 06/08/2021     06/08/2021     Lab Results   Component Value Date     06/08/2021    K 4.5 06/08/2021    K 3.9 03/28/2021    CL 95 06/08/2021    CO2 22 06/08/2021    BUN 14 06/08/2021    CREATININE 0.8 06/08/2021    GLUCOSE 81 06/08/2021    GLUCOSE 100 03/27/2012    CALCIUM 9.8 06/08/2021      Lab Results   Component Value Date    TRIG 113 11/13/2020    HDL 61 11/13/2020    LDLCALC 107 11/13/2020    LABVLDL 23 11/13/2020     Diagnostic Testing:    ECHO 3/1/21   -Normal left ventricle size, wall thickness and systolic function with an estimated ejection fraction of 55-60%. -No regional wall motion abnormalities are seen. - E/e' = 12.5. Grade II diastolic dysfunction with elevated LV filling pressures.   -The aortic valve appears sclerotic but opens well.   -Trivial mitral regurgitation.   -Mild tricuspid regurgitation.   -Mild pulmonic regurgitation present. -IVC size is normal (<2.1 cm) but collapses < 50% with respiration consistent with elevated RA pressure (8 mmHg). -Estimated pulmonary artery systolic pressure is elevated at 48mmHg assuming a right atrial pressure of 8 mmHg. Procedure performed 3/27/2020  · Electrophysiology study with left atrial recording and mapping   · 3-D electroanatomical mapping of the left atrium and  right atium. · Electrophysiological study(EPS) and induction after IV drug infusion  · Transseptal puncture through an intact septum . · Intracardiac echocardiography.    · Radiofrequency ablation of atrial fibrillation and pulmonary veins isolation   · Ablation of CTI depndent flutter as a separate mechanism  · Ultrasound for access    ECHO 8/20/2019   -Left ventricular cavity size is normal.   -There is mild left ventricular hypertrophy.   -Ejection fraction is visually estimated to be 55-60%. -No wall motion abnormalities.   -Normal diastolic function.   -Trivial mitral regurgitation.   -Mild tricuspid regurgitation     Calcium CT Score 2/19/2019   Total Agatston calcium score of 51.    24 hour holter and it revealed SR/PVCs, no afib or arrhythmias. Echo 4/26/18  Left ventricular cavity size is normal.  There is mild left ventricular hypertrophy. Ejection fraction is visually estimated to be 55-60%. Normal diastolic function. Mild tricuspid regurgitation with RVSP of 36 mm/hg    VQ scan 8/30/17  Low Probability for Pulmonary Embolus. Echo 8/28/17  Normalleft ventricle size and systolic function with an estimated ejection fraction of 60%. Mild mitral regurgitation is present. There is mild-moderate tricuspid regurgitation with RVSP estimated at 88 mmHg. This is suggestive of severe pulmonary hypertension. Mild pulmonic regurgitation present. Echo 9/1/2016  Technically limited examination to evaluate RVSP. Overall left ventricular function is normal.  Normal right ventricular size and function. Mild tricuspid regurgitation with RVSP estimated at 95 mmHg. No evidence of any pericardial effusion. [address] 9/1/2016  Pressures were as follows:  RA: 9 mmHg  RV:  38/12 mmHg  PA:  33/16, mean 24 mmHg  PCWP:  14 mmHg  Thermodilution CO: 7.45   Thermodilution CI:  3.42  Adiel CO:  9.72  Adiel CI:  4.46  PA Sat:  74%  PVR:  107 dynes     Impression:  1. Minimal pulmonary hypertension with mean PA pressure 24  2. Normal filling pressures  3. Normal cardiac output  4. No evidence of PAH at this time. Echo 8/28/17  Normal left ventricle size and systolic function with an estimated ejection  fraction of 60%. Mild mitral regurgitation is present.   There is mild-moderate tricuspid regurgitation with RVSP estimated at 88  mmHg. This is suggestive of severe pulmonary hypertension. Mild pulmonic regurgitation present. 1/20/15 Nuclear Stress test  Conclusions        Summary    Normal myocardial perfusion study.    Normal LV function. Labs were reviewed including labs from other hospital systems through [facility]. Cardiac testing was reviewed including echos, nuclear scans, cardiac catheterization, including from other hospital systems through [facility]. Assessment:  1. Chronic diastolic heart failure ([address]    2. Benign essential HTN    3. Paroxysmal atrial fibrillation (HCC)    4. Symptomatic bradycardia    5. HUSSAIN (obstructive sleep apnea)    6. Pure hypercholesterolemia    7. SOB (shortness of breath)      1. Chronic heart failure with preserved ejection fraction: Stable. Compensated by exam.  -Continue Lisinopril, Lasix, spironolactone  -ProBNP> 109 from 6/8/21; 122 from 4/5/21  -ECHO 3/1/21> EF 55-60%, grade II DD     2. Paroxysmal atrial fibrillation: HR regular & controlled. Continues on Xarelto. -ILR placed 8/17/20  -Had an Ablation of atrial fib on 3/27/2020 per Dr. Tammie Fenton.  -She has had no more AF episodes. -DCCV 1/13/2020 (Dr. Bryant Lofton)   -Cardizem 30mg qd> will increase to 60mg qd due to palpitations     3. Benign essential HTN: Stable.   -At visit 3/1/21, reduced Lisinopril from 20mg to 10mg qd for B/P's 90's/60's. /70 (Site: Left Upper Arm)   Pulse 76   Ht 5' 4\" (1.626 m)   Wt 233 lb (105.7 kg)   SpO2 98%   BMI 39.99 kg/m²           Pure hypercholesterolemia:  11/13/2020> , HDL 61, , . Some improvement noted in LDL and TC.   -5/4/20 > , HDL 66, , TG 92. She decided not to start Zetia. -Mother had CAD. CT calcium score 51. Could not tolerate statin or Livalo. 4. HUSSAIN (obstructive sleep apnea):  Wearing CPAP faithfully. 5. Symptomatic bradycardia, h/o:  NSR today rate of 76. Plan:  1.  She can take 60mg diltiazem for her palpitations  2. Labs before next visit> CMP, BNP, CBC, lipids   3. RTO in 6 months    Time Based Itemization  A total of 30 minutes was spent on today's patient encounter. If applicable, non-patient-facing activities:  ( x)Preparing to see the patient and reviewing records  (x ) Individual interpretation of results  (x ) Discussion or coordination of care with other health care professionals (Dr. Reina Ceja, Dr. Batsheva Manzanares)   ( x) Ordering of unique tests, medications, or procedures  ( x) Documentation within the EHR    Thank you for allowing me to participate in the care of your patient. Cesario Joseph M.D., [address]'s attestation: This note was scribed in the presence of Dr. Mag Bolanos MD, by Brandt Wing RN. The scribe's documentation has been prepared under my direction and personally reviewed by me in its entirety. I confirm that the note above accurately reflects all work, treatment, procedures, and medical decision making performed by me.

## 2021-07-02 ENCOUNTER — HOSPITAL ENCOUNTER (OUTPATIENT)
Dept: PHYSICAL THERAPY | Age: 71
Setting detail: THERAPIES SERIES
Discharge: HOME | End: 2021-07-02
Payer: MEDICARE

## 2021-07-02 NOTE — FLOWSHEET NOTE
[address] [facility]    Physical Therapy  Cancellation/No-show Note  Patient Name:  Margi Valdivia  :  1950   Date:  2021    Cancelled visits to date: , , , , , , 72  No-shows to date: 0    For today's appointment patient:  [x]  Cancelled  []  Rescheduled appointment  []  No-show     Reason given by patient:  [x]  Patient ill  []  Conflicting appointment  []  No transportation    []  Conflict with work  []  No reason given  []  Other:     Comments:      Phone call information:   []  Phone call made today to patient at _ time at number provided:      []  Patient answered, conversation as follows:    []  Patient did not answer, message left as follows:  []  Phone call not made today  [x]  Phone call not needed - pt contacted us to cancel and provided reason for cancellation.  No further visits scheduled    Electronically signed by:  Elizabeth Liu PT, PT

## 2021-07-09 ENCOUNTER — OFFICE VISIT (OUTPATIENT)
Dept: CARDIOLOGY CLINIC | Age: 71
End: 2021-07-09
Payer: MEDICARE

## 2021-07-09 VITALS
HEART RATE: 76 BPM | SYSTOLIC BLOOD PRESSURE: 102 MMHG | OXYGEN SATURATION: 98 % | WEIGHT: 233 LBS | BODY MASS INDEX: 39.78 KG/M2 | DIASTOLIC BLOOD PRESSURE: 70 MMHG | HEIGHT: 64 IN

## 2021-07-09 DIAGNOSIS — E78.00 PURE HYPERCHOLESTEROLEMIA: ICD-10-CM

## 2021-07-09 DIAGNOSIS — R06.02 SOB (SHORTNESS OF BREATH): ICD-10-CM

## 2021-07-09 DIAGNOSIS — G47.33 OSA (OBSTRUCTIVE SLEEP APNEA): ICD-10-CM

## 2021-07-09 DIAGNOSIS — I48.0 PAROXYSMAL ATRIAL FIBRILLATION (HCC): ICD-10-CM

## 2021-07-09 DIAGNOSIS — R00.1 SYMPTOMATIC BRADYCARDIA: ICD-10-CM

## 2021-07-09 DIAGNOSIS — I10 BENIGN ESSENTIAL HTN: Chronic | ICD-10-CM

## 2021-07-09 DIAGNOSIS — I50.32 CHRONIC DIASTOLIC HEART FAILURE (HCC): Primary | ICD-10-CM

## 2021-07-09 PROCEDURE — 1123F ACP DISCUSS/DSCN MKR DOCD: CPT | Performed by: INTERNAL MEDICINE

## 2021-07-09 PROCEDURE — 3017F COLORECTAL CA SCREEN DOC REV: CPT | Performed by: INTERNAL MEDICINE

## 2021-07-09 PROCEDURE — 99214 OFFICE O/P EST MOD 30 MIN: CPT | Performed by: INTERNAL MEDICINE

## 2021-07-09 PROCEDURE — 4040F PNEUMOC VAC/ADMIN/RCVD: CPT | Performed by: INTERNAL MEDICINE

## 2021-07-09 PROCEDURE — 1036F TOBACCO NON-USER: CPT | Performed by: INTERNAL MEDICINE

## 2021-07-09 PROCEDURE — 1090F PRES/ABSN URINE INCON ASSESS: CPT | Performed by: INTERNAL MEDICINE

## 2021-07-09 PROCEDURE — G8417 CALC BMI ABV UP PARAM F/U: HCPCS | Performed by: INTERNAL MEDICINE

## 2021-07-09 PROCEDURE — G8427 DOCREV CUR MEDS BY ELIG CLIN: HCPCS | Performed by: INTERNAL MEDICINE

## 2021-07-09 PROCEDURE — G8399 PT W/DXA RESULTS DOCUMENT: HCPCS | Performed by: INTERNAL MEDICINE

## 2021-07-09 RX ORDER — DILTIAZEM HYDROCHLORIDE 60 MG/1
TABLET, FILM COATED ORAL
Qty: 200 TABLET | Refills: 3 | Status: SHIPPED | OUTPATIENT
Start: 2021-07-09 | End: 2022-02-14

## 2021-07-09 RX ORDER — LISINOPRIL 10 MG/1
10 TABLET ORAL NIGHTLY
Qty: 90 TABLET | Refills: 3 | Status: SHIPPED | OUTPATIENT
Start: 2021-07-09 | End: 2022-06-15

## 2021-07-09 RX ORDER — FUROSEMIDE 40 MG/1
40 TABLET ORAL DAILY
Qty: 90 TABLET | Refills: 3 | Status: SHIPPED | OUTPATIENT
Start: 2021-07-09 | End: 2022-07-22 | Stop reason: SDUPTHER

## 2021-07-09 RX ORDER — POTASSIUM CHLORIDE 750 MG/1
TABLET, EXTENDED RELEASE ORAL
Qty: 90 TABLET | Refills: 3 | Status: SHIPPED | OUTPATIENT
Start: 2021-07-09 | End: 2022-06-15

## 2021-07-19 ENCOUNTER — NURSE ONLY (OUTPATIENT)
Dept: CARDIOLOGY CLINIC | Age: 71
End: 2021-07-19
Payer: MEDICARE

## 2021-07-19 DIAGNOSIS — R00.1 SYMPTOMATIC BRADYCARDIA: ICD-10-CM

## 2021-07-19 DIAGNOSIS — I48.0 PAROXYSMAL ATRIAL FIBRILLATION (HCC): Chronic | ICD-10-CM

## 2021-07-19 DIAGNOSIS — Z45.09 ENCOUNTER FOR ELECTRONIC ANALYSIS OF REVEAL EVENT RECORDER: ICD-10-CM

## 2021-07-19 PROCEDURE — G2066 INTER DEVC REMOTE 30D: HCPCS | Performed by: INTERNAL MEDICINE

## 2021-07-19 PROCEDURE — 93298 REM INTERROG DEV EVAL SCRMS: CPT | Performed by: INTERNAL MEDICINE

## 2021-07-19 NOTE — PROGRESS NOTES
We received a remote transmission from patient's monitor at home. Remote Linq report shows no arrhythmias. Patients monitor has been disconnected since last year until recently. EP physician to review. We will continue to monitor remotely.

## 2021-07-20 ENCOUNTER — CARE COORDINATION (OUTPATIENT)
Dept: CARE COORDINATION | Age: 71
End: 2021-07-20

## 2021-08-13 ENCOUNTER — TELEPHONE (OUTPATIENT)
Dept: INTERNAL MEDICINE CLINIC | Age: 71
End: 2021-08-13

## 2021-08-13 NOTE — TELEPHONE ENCOUNTER
----- Message from Danielle Barcenas sent at 8/13/2021  4:03 PM EDT -----  Subject: Message to Provider    QUESTIONS  Information for Provider? Tammi Toro missed a call from us, but her mailbox was   full so they were unable to leave a VM. Was unsure who called. Please   return her phone call.   ---------------------------------------------------------------------------  --------------  [address]  What is the best way for the office to contact you? Do not leave any   message, patient will call back for answer  Preferred Call Back Phone Number?  [phone]  ---------------------------------------------------------------------------  --------------  SCRIPT ANSWERS  undefined

## 2021-08-20 ENCOUNTER — TELEMEDICINE (OUTPATIENT)
Dept: INTERNAL MEDICINE CLINIC | Age: 71
End: 2021-08-20
Payer: MEDICARE

## 2021-08-20 DIAGNOSIS — R05.9 COUGH: ICD-10-CM

## 2021-08-20 DIAGNOSIS — J01.10 SUBACUTE FRONTAL SINUSITIS: Primary | ICD-10-CM

## 2021-08-20 PROCEDURE — 99214 OFFICE O/P EST MOD 30 MIN: CPT | Performed by: INTERNAL MEDICINE

## 2021-08-20 RX ORDER — AMOXICILLIN AND CLAVULANATE POTASSIUM 875; 125 MG/1; MG/1
1 TABLET, FILM COATED ORAL 2 TIMES DAILY
Qty: 20 TABLET | Refills: 0 | Status: SHIPPED | OUTPATIENT
Start: 2021-08-20 | End: 2021-08-30

## 2021-08-20 ASSESSMENT — PATIENT HEALTH QUESTIONNAIRE - PHQ9
SUM OF ALL RESPONSES TO PHQ9 QUESTIONS 1 & 2: 0
2. FEELING DOWN, DEPRESSED OR HOPELESS: 0
1. LITTLE INTEREST OR PLEASURE IN DOING THINGS: 0
SUM OF ALL RESPONSES TO PHQ QUESTIONS 1-9: 0

## 2021-08-20 ASSESSMENT — ENCOUNTER SYMPTOMS: COUGH: 1

## 2021-08-20 NOTE — PROGRESS NOTES
Gerardo Mauro (:  1950) is a 78 y.o. female,Established patient, here for evaluation of the following chief complaint(s):  Cough (cough since  (5 days)) and Head Congestion (c/o sinus congestion that is running into her throat)         ASSESSMENT/PLAN:  1. Subacute frontal sinusitis  -     amoxicillin-clavulanate (AUGMENTIN) 875-125 MG per tablet; Take 1 tablet by mouth 2 times daily for 10 days, Disp-20 tablet, R-0Normal  2. Cough  -     Covid-19, Antibody; Future        Subjective   SUBJECTIVE/OBJECTIVE:  Cough  This is a new problem. The current episode started in the past 7 days. The problem has been unchanged. The problem occurs constantly. The cough is non-productive. Associated symptoms include nasal congestion and postnasal drip. Pertinent negatives include no chest pain, chills, ear congestion, ear pain, fever, headaches or rash. Nothing aggravates the symptoms. Risk factors for lung disease include occupational exposure. She has tried nothing for the symptoms. The treatment provided no relief. Her past medical history is significant for environmental allergies. There is no history of asthma, bronchiectasis, bronchitis, COPD or pneumonia. Review of Systems   Constitutional: Negative for chills and fever. HENT: Positive for postnasal drip. Negative for ear pain. Respiratory: Positive for cough. Cardiovascular: Negative for chest pain. Skin: Negative for rash. Allergic/Immunologic: Positive for environmental allergies. Neurological: Negative for headaches. Objective   Physical Exam  Vitals and nursing note reviewed. Constitutional:       General: She is not in acute distress. Appearance: Normal appearance. She is well-developed. HENT:      Head: Normocephalic and atraumatic. Nose: Congestion present. Eyes:      Extraocular Movements: Extraocular movements intact. Pupils: Pupils are equal, round, and reactive to light.    Pulmonary:      Effort: Pulmonary effort is normal.   Musculoskeletal:      Cervical back: Normal range of motion. Neurological:      General: No focal deficit present. Mental Status: She is alert and oriented to person, place, and time. Psychiatric:         Behavior: Behavior normal.         Thought Content: Thought content normal.         Judgment: Judgment normal.            On this date 8/20/2021 I have spent 25 minutes reviewing previous notes, test results and face to face with the patient discussing the diagnosis and importance of compliance with the treatment plan as well as documenting on the day of the visit. An electronic signature was used to authenticate this note. --Jose F Smith MD     Truman Luis, was evaluated through a synchronous (real-time) audio-video encounter. The patient (or guardian if applicable) is aware that this is a billable service. Verbal consent to proceed has been obtained within the past 12 months. The visit was conducted pursuant to the emergency declaration under the [address] [place] [address] authority and the [employer] and [employer] General Act. Patient identification was verified, and a caregiver was present when appropriate. The patient was located in a state where the provider was credentialed to provide care. Total time spent for this encounter: Not billed by time    --Jose F Smith MD on 8/20/2021 at 12:37 PM    An electronic signature was used to authenticate this note.

## 2021-08-23 ENCOUNTER — HOSPITAL ENCOUNTER (OUTPATIENT)
Dept: MAMMOGRAPHY | Age: 71
Discharge: HOME | End: 2021-08-27
Payer: MEDICARE

## 2021-08-23 ENCOUNTER — NURSE ONLY (OUTPATIENT)
Dept: CARDIOLOGY CLINIC | Age: 71
End: 2021-08-23
Payer: MEDICARE

## 2021-08-23 ENCOUNTER — TELEPHONE (OUTPATIENT)
Dept: INTERNAL MEDICINE CLINIC | Age: 71
End: 2021-08-23

## 2021-08-23 VITALS — WEIGHT: 231 LBS | HEIGHT: 64 IN | BODY MASS INDEX: 39.44 KG/M2

## 2021-08-23 DIAGNOSIS — Z12.31 VISIT FOR SCREENING MAMMOGRAM: ICD-10-CM

## 2021-08-23 DIAGNOSIS — Z45.09 ENCOUNTER FOR ELECTRONIC ANALYSIS OF REVEAL EVENT RECORDER: ICD-10-CM

## 2021-08-23 DIAGNOSIS — R00.1 SYMPTOMATIC BRADYCARDIA: ICD-10-CM

## 2021-08-23 DIAGNOSIS — I48.0 PAROXYSMAL ATRIAL FIBRILLATION (HCC): Chronic | ICD-10-CM

## 2021-08-23 PROCEDURE — 77063 BREAST TOMOSYNTHESIS BI: CPT

## 2021-08-24 PROCEDURE — 93298 REM INTERROG DEV EVAL SCRMS: CPT | Performed by: INTERNAL MEDICINE

## 2021-08-24 PROCEDURE — G2066 INTER DEVC REMOTE 30D: HCPCS | Performed by: INTERNAL MEDICINE

## 2021-08-25 ENCOUNTER — TELEPHONE (OUTPATIENT)
Dept: INTERNAL MEDICINE CLINIC | Age: 71
End: 2021-08-25

## 2021-08-25 NOTE — TELEPHONE ENCOUNTER
----- Message from Neil Perez sent at 8/25/2021  9:20 AM EDT -----  Subject: Message to Provider    QUESTIONS  Information for Provider? Patient calling in regards to sinus infection   that she was seen for 8/20 through [facility]. States the medication is not   helping and she would like someone to contact her to advise what she can   do. Please call her back as soon as possible.   ---------------------------------------------------------------------------  --------------  CALL BACK INFO  What is the best way for the office to contact you? OK to leave message on   voicemail  Preferred Call Back Phone Number? [phone]  ---------------------------------------------------------------------------  --------------  SCRIPT ANSWERS  Relationship to Patient?  Self

## 2021-09-10 ENCOUNTER — TELEPHONE (OUTPATIENT)
Dept: INTERNAL MEDICINE CLINIC | Age: 71
End: 2021-09-10

## 2021-09-10 DIAGNOSIS — J01.10 SUBACUTE FRONTAL SINUSITIS: Primary | ICD-10-CM

## 2021-09-10 RX ORDER — PREDNISONE 20 MG/1
20 TABLET ORAL DAILY
Qty: 7 TABLET | Refills: 0 | Status: SHIPPED | OUTPATIENT
Start: 2021-09-10 | End: 2021-09-17

## 2021-09-10 NOTE — TELEPHONE ENCOUNTER
Patient completed a 10 day course of AUGMENTIN 875-125 . She felt better for about a week and then the symptoms of green nasal drainage, prod cough and head pressure returned. Patient said she tested negative for COVID a few days after her symptoms began initially. Patient is requesting a 2nd course of ABX.

## 2021-09-10 NOTE — TELEPHONE ENCOUNTER
----- Message from Margaret Salazar sent at 9/9/2021  2:59 PM EDT -----  Subject: Medication Problem    QUESTIONS  Name of Medication? amoxicillin-clavulanate (AUGMENTIN) 875-125 MG per   tablet  Patient-reported dosage and instructions? 875-125mg, 2x per day for 10   days  What question or problem do you have with the medication? Pt finished   medication 9 days ago and felt well, but now she is having green mucous   from nose & when she coughs, along with pressure in her head. Would like   to know if she could get another round of antibiotics or if she needs to   schedule an appt  Preferred Pharmacy? [address] [place] [address] [address], Jose Luis Ko  [facility] phone number (if available)? [phone]  Additional Information for Provider?   ---------------------------------------------------------------------------  --------------  CALL BACK INFO  What is the best way for the office to contact you? OK to leave message on   voicemail  Preferred Call Back Phone Number? [phone]  ---------------------------------------------------------------------------  --------------  SCRIPT ANSWERS  Relationship to Patient?  Self

## 2021-09-16 ENCOUNTER — OFFICE VISIT (OUTPATIENT)
Dept: ORTHOPEDIC SURGERY | Age: 71
End: 2021-09-16
Payer: MEDICARE

## 2021-09-16 VITALS — RESPIRATION RATE: 17 BRPM | BODY MASS INDEX: 39.44 KG/M2 | WEIGHT: 231 LBS | HEIGHT: 64 IN

## 2021-09-16 DIAGNOSIS — M17.12 ARTHRITIS OF KNEE, LEFT: ICD-10-CM

## 2021-09-16 DIAGNOSIS — M25.562 ACUTE PAIN OF LEFT KNEE: Primary | ICD-10-CM

## 2021-09-16 PROCEDURE — G8427 DOCREV CUR MEDS BY ELIG CLIN: HCPCS | Performed by: PHYSICIAN ASSISTANT

## 2021-09-16 PROCEDURE — 4040F PNEUMOC VAC/ADMIN/RCVD: CPT | Performed by: PHYSICIAN ASSISTANT

## 2021-09-16 PROCEDURE — 1123F ACP DISCUSS/DSCN MKR DOCD: CPT | Performed by: PHYSICIAN ASSISTANT

## 2021-09-16 PROCEDURE — 1036F TOBACCO NON-USER: CPT | Performed by: PHYSICIAN ASSISTANT

## 2021-09-16 PROCEDURE — G8417 CALC BMI ABV UP PARAM F/U: HCPCS | Performed by: PHYSICIAN ASSISTANT

## 2021-09-16 PROCEDURE — 99213 OFFICE O/P EST LOW 20 MIN: CPT | Performed by: PHYSICIAN ASSISTANT

## 2021-09-16 PROCEDURE — G8399 PT W/DXA RESULTS DOCUMENT: HCPCS | Performed by: PHYSICIAN ASSISTANT

## 2021-09-16 PROCEDURE — 1090F PRES/ABSN URINE INCON ASSESS: CPT | Performed by: PHYSICIAN ASSISTANT

## 2021-09-16 PROCEDURE — 3017F COLORECTAL CA SCREEN DOC REV: CPT | Performed by: PHYSICIAN ASSISTANT

## 2021-09-16 NOTE — PROGRESS NOTES
sinusitis 5/24/2017    CTEPH (chronic thromboembolic pulmonary hypertension) ([address] 8/26/2016    Depression     pt stated r/t bad marriage/alcoholic spouse    Diabetes mellitus ([address]     borderline    Disc disease, degenerative, lumbar or lumbosacral 2/20/2017    Hepatic steatosis 4/26/2019    History of total hip arthroplasty 2/28/2017    Hypertension     Leg cramps     patient states very severe, requires immediate potassium administration    Migraines, basilar     last migraine 10 years ago    Mild persistent asthma without complication 3/99/5601    Obesity, Class III, BMI 40-49.9 (morbid obesity) ([address] 4/19/2016    HUSSAIN (obstructive sleep apnea) 10/14/2013    Osteoarthritis, hip, bilateral 2016    Bilateral hip arthroplasty    Panic attacks     Pneumonia 2015    Primary osteoarthritis of both knees 9/19/2017    Primary osteoarthritis of left knee 12/15/2016    Pulmonary emboli ([address] 8/4/2016    Pulmonary HTN ([address] 7/21/2016    Pure hypercholesterolemia 2/13/2019    Restless leg syndrome     Rhinitis, chronic 3/26/2014    Sleep apnea     wears CPAP @11    Stress incontinence     Tear of left rotator cuff 1/23/2018    Thyroid disease     hypothyroid    Wears glasses        Family History   Problem Relation Age of Onset    Alcohol Abuse Sister     Arthritis Sister     Stroke Maternal Grandmother     Thyroid Disease Maternal Grandmother     Thyroid Disease Maternal Grandfather     Heart Disease Maternal Grandfather     Alcohol Abuse Maternal Grandfather     Substance Abuse Maternal Grandfather     Hypertension Mother     Thyroid Disease Mother     Anxiety Disorder Mother     Arthritis Mother     Cataracts Mother     Heart Disease Mother     Asthma Mother     Thyroid Disease Father     Heart Failure Father     Heart Disease Father     Arthritis Brother     High Cholesterol Brother     Heart Disease Maternal Uncle     Heart Disease Paternal Fayetta Howell Cancer Paternal MG/1.5ML SOPN Inject 0.5 mg into the skin once a week Sample 1 pen 0    pramipexole (MIRAPEX) 0.5 MG tablet 1.5 tab q 5 PM and 1.5 tab qHS 270 tablet 1    spironolactone (ALDACTONE) 25 MG tablet TAKE ONE TABLET BY MOUTH DAILY 90 tablet 2    albuterol sulfate  (90 Base) MCG/ACT inhaler Use 2 puffs 4 times daily for 7 days then as needed for wheezing. Dispense with Spacer and instruct in use. At patient's preference may use 60 dose MDI. May Sub Pro-Air or Proventil as needed per insurance. 1 Inhaler 0    fluticasone (FLONASE) 50 MCG/ACT nasal spray 1 spray by Each Nostril route daily      fluticasone (FLOVENT HFA) 110 MCG/ACT inhaler Inhale 1 puff into the lungs 2 times daily 1 Inhaler 2    azelastine (ASTELIN) 0.1 % nasal spray 1 spray by Nasal route 2 times daily 1 Spray in each nostril 1 Bottle 2    Tens Unit MISC by Does not apply route      Handicap Placard MISC by Does not apply route Exp 5 years 1 each 0    Multiple Vitamins-Minerals (THERAPEUTIC MULTIVITAMIN-MINERALS) tablet Take 1 tablet by mouth daily      CPAP Machine MISC by Does not apply route      Magnesium Citrate 100 MG TABS Take 1 tablet by mouth daily (Patient taking differently: Take 250 tablets by mouth daily ) 90 tablet 3    EPIPEN 2-MIR 0.3 MG/0.3ML SOAJ injection       buPROPion (WELLBUTRIN XL) 150 MG extended release tablet Take 1 tablet by mouth every morning 30 tablet 5     No current facility-administered medications for this visit. Allergies   Allergen Reactions    Atorvastatin Other (See Comments)     Muscle Pain, all statins     Simvastatin Other (See Comments)     Muscle Pain    Cephalexin Hives and Itching    Cephalosporins Hives and Itching    Food Diarrhea     Milk , shellfish , gluten. ..may have bouts of diarrhea, constipation or flatulus. (RD spoke to pt regarding \"milk allergy\", pt is not allergic to milk. She does not drink milk, but consumes milk in other products and consumes dairy products. Had one reactions to shellfish years ago and now can tolerate one serving of shellfish once per week. Avoids gluten as much as she can, but does not have celiac disease.)    Other      Environmental -cats,dogs,dust    Shellfish-Derived Products      hives    Sulfa Antibiotics      Can take Celebrex, Pt denies 8/1/18         Objective:     She is alert, oriented x 3, pleasant, well nourished, developed and in no acute distress. Resp 17   Ht 5' 4\" (1.626 m)   Wt 231 lb (104.8 kg)   BMI 39.65 kg/m²      Examination of the left knee: Inspection of the skin and soft tissues reveals no abrasions. The overall alignment of the knee is varus. Gait is antalgic. There is mild to moderate intra-articular effusion. AROM:           PROM:  Extension-         5                   5  Flexion -           95                   95  There moderate pain associated with ROM testing. Medial joint line is tender to palpation. Lateral joint line is somewhat tender to palpation. Pes anserine bursa not tender to palpation. Patellar tendon is not tender to palpation. Quadriceps tendon is not tender to palpation. Collateral ligaments is not tender to palpation. Popliteal fossa is not tender to palpation. Varus Stress testing negative for instability. Valgus Stress testing negative for instability. Patellar Compression testing positive for pain or crepitus. Extensor Mechanism is intact. Lower extremities:  She has 5/5 motor strength of bilateral lower extremities. She has a negative straight leg raise, bilaterally. Deep tendon reflexes at knees and achilles are 2+. Sensation is intact to light touch L3 to S1 bilaterally. She has no clonus. Examination of the lower extremities shows intact perfusion to both lower extremities. She has no cyanosis and digigts are warm to touch, capillary refill is less than 2 seconds. She has mild edema noted.      She has intact skin without lacerations or abrasions, no significant erythema, rashes or skin lesions. X Rays: were performed in the office today:   AP Standing, Lateral and Sunrise views of left knee:   No acute fractures or dislocations noted. Knee x-rays demonstrate osteoarthritis. Medial compartment-    4/4 Kellgren and Jean-Claude Classification. Lateral compartment-    2/4 Kellgren and Jean-Claude Classification. PF compartment-          3/4 Kellgren and Jean-Claude Classification. Additional Tests reviewed: None. Additional Outside Records reviewed: None. Diagnosis:       ICD-10-CM    1. Acute pain of left knee  M25.562 XR KNEE LEFT (3 VIEWS)   2. Arthritis of knee, left  M17.12         Assessment and Plan:       Assessment:  Advanced degenerative arthritis of the left knee. Increased pain today after a twisting injury on her left knee. I had an extensive discussion with Ms. Manny Colindres regarding the natural history, etiology, and long term consequences of her condition. I have presented reasonable alternatives to the patient's proposed care, treatment, and services. Risks and benefits of the treatment options also reviewed in detail. I have outlined a treatment plan with them. She has had full opportunity to ask her questions. I have answered them all to her satisfaction. I feel that Ms. Manny Colindres understands our discussion today       Plan:  Medications-cannot take NSAIDs due to anticoagulation therapy. I have recommended Tylenol for pain    PT- A home exercise program was instructed today including ROM exercises and strengthening exercises. The patient verbalized understanding of these exercises as well as the importance of the exercise program to promote return of normal function. If pain intensifies or other problems arise you are to notify the office. Follow up- Dr Donna Porter in 3-4 weeks. Call or return to clinic if these symptoms worsen or fail to improve as anticipated. Mayra Sanford PA-C   Senior Physician Assistant   [facility]                                         Disclaimer: This note was generated with use of a verbal recognition program (DRAGON) and an attempt was made to check for errors. It is possible that there are still dictated errors within this office note. If so, please bring any significant errors to my attention for an addendum. All efforts were made to ensure that this office note is accurate.

## 2021-09-25 PROCEDURE — G2066 INTER DEVC REMOTE 30D: HCPCS | Performed by: INTERNAL MEDICINE

## 2021-09-25 PROCEDURE — 93298 REM INTERROG DEV EVAL SCRMS: CPT | Performed by: INTERNAL MEDICINE

## 2021-09-27 ENCOUNTER — NURSE ONLY (OUTPATIENT)
Dept: CARDIOLOGY CLINIC | Age: 71
End: 2021-09-27
Payer: MEDICARE

## 2021-09-27 DIAGNOSIS — R00.1 SYMPTOMATIC BRADYCARDIA: ICD-10-CM

## 2021-09-27 DIAGNOSIS — I48.0 PAROXYSMAL ATRIAL FIBRILLATION (HCC): Chronic | ICD-10-CM

## 2021-09-27 DIAGNOSIS — Z45.09 ENCOUNTER FOR ELECTRONIC ANALYSIS OF REVEAL EVENT RECORDER: ICD-10-CM

## 2021-10-06 ENCOUNTER — TELEPHONE (OUTPATIENT)
Dept: INTERNAL MEDICINE CLINIC | Age: 71
End: 2021-10-06

## 2021-10-06 NOTE — TELEPHONE ENCOUNTER
----- Message from [facility] sent at 10/6/2021  9:46 AM EDT -----  Subject: Appointment Request    Reason for Call: Routine Medicare AWV    QUESTIONS  Type of Appointment? Established Patient  Reason for appointment request? Available appointments did not meet   patient need  Additional Information for Provider? Pt needs to reschedule appt for   10/11/2021 due to death in the family. Availability did not pull for me. She would like a callback and is willing to see another provider. OA, HTN,   Obesity. flu shot  ---------------------------------------------------------------------------  --------------  CALL BACK INFO  What is the best way for the office to contact you? OK to leave message on   voicemail  Preferred Call Back Phone Number? [phone]  ---------------------------------------------------------------------------  --------------  SCRIPT ANSWERS  Relationship to Patient? Self  (If the patient has Medicare as their primary insurance coverage ask this   question) Are you requesting a Medicare Annual Wellness Visit? Yes   (Is the patient requesting a pap smear with their physical exam?)? No  (Is the patient requesting their annual physical and does not need PAP or   AWV per above?)? No  Have you been diagnosed with, awaiting test results for, or told that you   are suspected of having COVID-19 (Coronavirus)? (If patient has tested   negative or was tested as a requirement for work, school, or travel and   not based on symptoms, answer no)? No  Within the past two weeks have you developed any of the following symptoms   (answer no if symptoms have been present longer than 2 weeks or began   more than 2 weeks ago)? Fever or Chills, Cough, Shortness of breath or   difficulty breathing, Loss of taste or smell, Sore throat, Nasal   congestion, Sneezing or runny nose, Fatigue or generalized body aches   (answer no if pain is specific to a body part e.g. back pain), Diarrhea,   Headache?  No  Have you had close contact with someone with COVID-19 in the last 14 days? No  (Service Expert  click yes below to proceed with [employer] [address] As Usual   Scheduling)?  Yes

## 2021-10-12 NOTE — PROGRESS NOTES
[address]   Electrophysiology  BRICE Donnelly-CNP  Attending EP: Dr. Leandro Oliva  Date: 10/13/2021  I had the privilege of visiting Carmen Robin in the office. Chief Complaint:   No chief complaint on file. History of Present Illness: History obtained from patient and medical record. Carmen Robin is 73 y.o. female with a past medical history of HUSSAIN, obesity, dCHF, HTN, PE, and atrial fibrillation. In January of 2020, she presented to the hospital with palpitations and was found to be in atrial fibrillation with RVR. She has PAF and normally converts to NSR spontaneously. She is symptomatic with her afib with symptoms of lightheadedness, SOB, and chest pressure. On 1/13/20, she was cardioverted and found to have long conversion pauses post procedure (Hx of syncope after conversion at home). Elected for afib ablation. She is s/p RFA of afib with PVI and ablation of CTI flutter (3/27/20, Dr. Jarad Vegas). She had junctional rhtyhm with dizziness and hypotension after ablation and Cardizem and amiodarone were stopped. S/p ILR 8/2020. Interval history: Today, Carmen Robin is being seen for PAF and hypertension. Feeling well from cardiac standpoint. No known recurrence of AF on ILR or ECG. Denies recurrent symptoms. Admits her HR increases up to the 890's with activity and wandering if she can take more diltiazem. She is planning to get back into water aerobics. Denies CP, palpitations, SOB, swelling or dizziness. No syncope. No bleeding. Wears CPAP. No acute complaints at today's visit. With regard to medication therapy the patient has been compliant with prescribed regimen. She has tolerated therapy to date. Assessment:  Paroxysmal Atrial Fibrillation  - ECG today shows NSR  - Continue diltiazem 60 mg as needed    - [id number] Vasc score and anticoagulation discussed. High risk for stroke and thromboembolism.  Anticoagulation is recommended.   ~ On Xarelto 20 mg dialy, No s/s of bleeding  - Afib risk factors including age, HTN, obesity, inactivity and HUSSAIN were discussed with patient. Risk factor modification recommended              - S/p RFA of afib w/ PVI (3/27/20, Dr. Francesca Miller)   - Tomy Sandhoff shows no recurrence  Implantable Loop Recorder  - S/p ILR insertion on 8/7/2020  - The CIED was interrogated and programmed and I supervised and reviewed all the data. All findings and changes are in device interrogation sheat and reflect my personal interpretation and changes and is scanned to Epic  - Device shows: no episodes denies 8/2020  - Follow up with device clinic as scheduled  Chronic HFpEF/Pulmonary HTN   - Appears compensated   - On lasix  and aldactone              - Follows with Dr. Mansfield Class  HTN-goal <130/80   - Controlled   - Continue current mediations    - Encouraged patient to check BP at home, log and bring to office visits  - Discussed lifestyle modifications, weight loss, low sodium diet  Hx of PE              - On AC  HUSSAIN   - Adherent to therapy   - Discussed long term effects of unmanaged HUSSAIN on heart   Plan  - Take diltiazem only as needed  - Call office if symptoms recurrent    F/U: Follow-up with EP in 6-9 months MXA  -Follow up with device clinic as scheduled  -Call [employer] at [phone] with any questions    Allergies: Allergies   Allergen Reactions    Atorvastatin Other (See Comments)     Muscle Pain, all statins     Simvastatin Other (See Comments)     Muscle Pain    Cephalexin Hives and Itching    Cephalosporins Hives and Itching    Food Diarrhea     Milk , shellfish , gluten. ..may have bouts of diarrhea, constipation or flatulus. (RD spoke to pt regarding \"milk allergy\", pt is not allergic to milk. She does not drink milk, but consumes milk in other products and consumes dairy products. Had one reactions to shellfish years ago and now can tolerate one serving of shellfish once per week.   Avoids gluten as much as she can, but does not have celiac disease.)    Other      Environmental -cats,dogs,dust    Shellfish-Derived Products      hives    Sulfa Antibiotics      Can take Celebrex, Pt denies 8/1/18     Home Medications:  Prior to Visit Medications    Medication Sig Taking? Authorizing Provider   furosemide (LASIX) 40 MG tablet Take 1 tablet by mouth daily  Nick Donovan MD   potassium chloride (KLOR-CON M) 10 MEQ extended release tablet TAKE ONE TABLET BY MOUTH DAILY  Nick Donovan MD   lisinopril (PRINIVIL;ZESTRIL) 10 MG tablet Take 1 tablet by mouth nightly  Nick Donovan MD   dilTIAZem (CARDIZEM) 60 MG tablet TAKE ONE TABLET BY MOUTH THREE TIMES A DAY AS NEEDED FOR FAST HEARTBEAT GREATER THAN 100 AND PALPITATIONS  Nick Donovan MD   rivaroxaban (XARELTO) 20 MG TABS tablet Take 1 tablet by mouth Daily with supper  Yamileth Avendano MD   Semaglutide,0.25 or 0.5MG/DOS, (OZEMPIC, 0.25 OR 0.5 MG/DOSE,) 2 MG/1.5ML SOPN Inject 0.5 mg into the skin once a week Sample  Yamileth Avendano MD   pramipexole (MIRAPEX) 0.5 MG tablet 1.5 tab q 5 PM and 1.5 tab qHS  Tiffanie R Kehrt, APRN - CNP   spironolactone (ALDACTONE) 25 MG tablet TAKE ONE TABLET BY MOUTH DAILY  Nick Donovan MD   albuterol sulfate  (90 Base) MCG/ACT inhaler Use 2 puffs 4 times daily for 7 days then as needed for wheezing. Dispense with Spacer and instruct in use. At patient's preference may use 60 dose MDI. May Sub Pro-Air or Proventil as needed per insurance.   EVERETTE Sevilla   fluticasone (FLONASE) 50 MCG/ACT nasal spray 1 spray by Each Nostril route daily  Historical Provider, MD   fluticasone (FLOVENT HFA) 110 MCG/ACT inhaler Inhale 1 puff into the lungs 2 times daily  Alison Hampton MD   azelastine (ASTELIN) 0.1 % nasal spray 1 spray by Nasal route 2 times daily 1 Spray in each nostril  Alison Hampton MD   buPROPion (WELLBUTRIN XL) 150 MG extended release tablet Take 1 tablet by mouth every morning  Alison Hampton MD   Tens Unit MISC by Does not apply route Historical Provider, MD   Handicap Placard MISC by Does not apply route Exp 5 years  Angelina Tate, APRN - CNP   Multiple Vitamins-Minerals (THERAPEUTIC MULTIVITAMIN-MINERALS) tablet Take 1 tablet by mouth daily  Historical Provider, MD   CPAP Machine MISC by Does not apply route  Historical Provider, MD   Magnesium Citrate 100 MG TABS Take 1 tablet by mouth daily  Patient taking differently: Take 250 tablets by mouth daily   Shawn Harper MD   EPIPEN 2-MIR 0.3 MG/0.3ML SOAJ injection   Historical Provider, MD      Past Medical History:  Past Medical History:   Diagnosis Date    Allergic     Anesthesia complication     family hx-father-at at age 80 having hip replacement revision surgery-had tia's x2 while under anes-but also had hx chf-had dificuly with speech when coming out from [address]     left ankle, bilateral hands    Arthritis of left hip 2/2/2016    Arthritis of right hip 4/19/2016    Asthma     At risk for falls     uses a cane    Atrial fibrillation with rapid ventricular response ([address]     Atrial flutter ([address] 4/25/2018    Chronic maxillary sinusitis 5/24/2017    CTEPH (chronic thromboembolic pulmonary hypertension) ([address] 8/26/2016    Depression     pt stated r/t bad marriage/alcoholic spouse    Diabetes mellitus ([address]     borderline    Disc disease, degenerative, lumbar or lumbosacral 2/20/2017    Hepatic steatosis 4/26/2019    History of total hip arthroplasty 2/28/2017    Hypertension     Leg cramps     patient states very severe, requires immediate potassium administration    Migraines, basilar     last migraine 10 years ago    Mild persistent asthma without complication 6/69/5394    Obesity, Class III, BMI 40-49.9 (morbid obesity) ([address] 4/19/2016    HUSSAIN (obstructive sleep apnea) 10/14/2013    Osteoarthritis, hip, bilateral 2016    Bilateral hip arthroplasty    Panic attacks     Pneumonia 2015    Primary osteoarthritis of both knees 9/19/2017    Primary osteoarthritis of left knee 12/15/2016    Pulmonary emboli (HCC) 8/4/2016    Pulmonary HTN ([facility] 7/21/2016    Pure hypercholesterolemia 2/13/2019    Restless leg syndrome     Rhinitis, chronic 3/26/2014    Sleep apnea     wears CPAP @11    Stress incontinence     Tear of left rotator cuff 1/23/2018    Thyroid disease     hypothyroid    Wears glasses      Past Surgical History:    has a past surgical history that includes Cataract removal (Bilateral); Finger surgery (1988); eye surgery (Right, 2010); back surgery (2004); Tonsillectomy (1972); Hysterectomy (1993); Colonoscopy; joint replacement (Left, 2/2/16); Hip Arthroplasty (Right, 4-19-16); Atrial ablation surgery; and Total knee arthroplasty (Right, 3/16/2021). Social History:  Reviewed. reports that she quit smoking about 8 years ago. She has a 2.50 pack-year smoking history. She has never used smokeless tobacco. She reports current alcohol use. She reports that she does not use drugs. Family History:  Reviewed. family history includes Alcohol Abuse in her maternal grandfather, paternal grandfather, and sister; Anxiety Disorder in her mother; Arthritis in her brother, mother, and sister; Asthma in her mother; Cancer in her paternal uncle; Cataracts in her mother; Heart Disease in her father, maternal grandfather, maternal uncle, mother, and paternal uncle; Heart Failure in her father; High Cholesterol in her brother; Hypertension in her mother; Stroke in her maternal grandmother; Substance Abuse in her maternal grandfather and paternal grandfather; Thyroid Disease in her father, maternal grandfather, maternal grandmother, and mother. Denies family history of sudden cardiac death, arrhythmia, premature CAD    Review of System:  · Constitutional: No weight changes or weakness  · HEENT: No visual changes. No mouth sores or sore throat.   · Cardiovascular: denies chest pain, denies dyspnea on exertion, denies palpitations or denies loss of function. Diet & Exercise:   The patient is counseled to follow a low salt diet to assure blood pressure remains controlled for cardiovascular risk factor modification   The patient is counseled to avoid excess caffeine, and energy drinks as this may exacerbated ectopy and arrhythmia   The patient is counseled to lose weight to control cardiovascular risk factors   Exercise program discussed: To improve overall cardiovascular health, the patient is instructed to increase cardiovascular related activities with a goal of 150 min/week of moderate level activity or 10,000 steps per day. Encouraged to perform as much activity as tolerated      I have addressed the patient's cardiac risk factors and adjusted pharmacologic treatment as needed. In addition, I have reinforced the need for patient directed risk factor modification. I independently reviewed the ECG    All questions and concerns were addressed with the patient. Alternatives to treatment were discussed. Thank you for allowing to us to participate in the care of Ramón Salazar.     BRICE Randall-CNP  [facility]   Office: [phone]

## 2021-10-13 ENCOUNTER — OFFICE VISIT (OUTPATIENT)
Dept: CARDIOLOGY CLINIC | Age: 71
End: 2021-10-13
Payer: MEDICARE

## 2021-10-13 ENCOUNTER — NURSE ONLY (OUTPATIENT)
Dept: CARDIOLOGY CLINIC | Age: 71
End: 2021-10-13

## 2021-10-13 VITALS
SYSTOLIC BLOOD PRESSURE: 110 MMHG | BODY MASS INDEX: 40.46 KG/M2 | WEIGHT: 237 LBS | OXYGEN SATURATION: 98 % | HEIGHT: 64 IN | DIASTOLIC BLOOD PRESSURE: 64 MMHG | HEART RATE: 80 BPM

## 2021-10-13 DIAGNOSIS — I48.0 PAROXYSMAL ATRIAL FIBRILLATION (HCC): Primary | ICD-10-CM

## 2021-10-13 DIAGNOSIS — Z45.09 ENCOUNTER FOR ELECTRONIC ANALYSIS OF REVEAL EVENT RECORDER: ICD-10-CM

## 2021-10-13 DIAGNOSIS — I50.32 CHRONIC DIASTOLIC HEART FAILURE (HCC): ICD-10-CM

## 2021-10-13 DIAGNOSIS — I10 BENIGN ESSENTIAL HTN: ICD-10-CM

## 2021-10-13 PROCEDURE — 3017F COLORECTAL CA SCREEN DOC REV: CPT | Performed by: NURSE PRACTITIONER

## 2021-10-13 PROCEDURE — G8399 PT W/DXA RESULTS DOCUMENT: HCPCS | Performed by: NURSE PRACTITIONER

## 2021-10-13 PROCEDURE — 1036F TOBACCO NON-USER: CPT | Performed by: NURSE PRACTITIONER

## 2021-10-13 PROCEDURE — 93000 ELECTROCARDIOGRAM COMPLETE: CPT | Performed by: NURSE PRACTITIONER

## 2021-10-13 PROCEDURE — G8427 DOCREV CUR MEDS BY ELIG CLIN: HCPCS | Performed by: NURSE PRACTITIONER

## 2021-10-13 PROCEDURE — 1090F PRES/ABSN URINE INCON ASSESS: CPT | Performed by: NURSE PRACTITIONER

## 2021-10-13 PROCEDURE — 99214 OFFICE O/P EST MOD 30 MIN: CPT | Performed by: NURSE PRACTITIONER

## 2021-10-13 PROCEDURE — G8417 CALC BMI ABV UP PARAM F/U: HCPCS | Performed by: NURSE PRACTITIONER

## 2021-10-13 PROCEDURE — G8484 FLU IMMUNIZE NO ADMIN: HCPCS | Performed by: NURSE PRACTITIONER

## 2021-10-13 PROCEDURE — 1123F ACP DISCUSS/DSCN MKR DOCD: CPT | Performed by: NURSE PRACTITIONER

## 2021-10-13 PROCEDURE — 4040F PNEUMOC VAC/ADMIN/RCVD: CPT | Performed by: NURSE PRACTITIONER

## 2021-10-13 NOTE — PATIENT INSTRUCTIONS
- No medication changes  - Call office if symptoms develop  - Check your blood pressure at home, if your top number blood pressure is >140/90 or <90/50 please call the office  - Check your heart rate at home, if it is <50 or >120 please call the office   - Follow up in 6-9 months

## 2021-10-13 NOTE — PROGRESS NOTES
Patient comes in for interrogation of their implanted loop recorder. Interrogation shows no arrhythmias. Implanted for AF management/palpitations. Patient remains on Xarelto and Cardizem. Please see interrogation for more detail. Patient will see Iris Castillo today and we will continue to follow the Patient remotely.

## 2021-10-15 ENCOUNTER — OFFICE VISIT (OUTPATIENT)
Dept: ORTHOPEDIC SURGERY | Age: 71
End: 2021-10-15
Payer: MEDICARE

## 2021-10-15 VITALS
SYSTOLIC BLOOD PRESSURE: 123 MMHG | DIASTOLIC BLOOD PRESSURE: 68 MMHG | BODY MASS INDEX: 40.46 KG/M2 | HEART RATE: 80 BPM | HEIGHT: 64 IN | WEIGHT: 237 LBS

## 2021-10-15 DIAGNOSIS — M17.12 PRIMARY OSTEOARTHRITIS OF LEFT KNEE: ICD-10-CM

## 2021-10-15 DIAGNOSIS — M17.11 PRIMARY OSTEOARTHRITIS OF RIGHT KNEE: Primary | ICD-10-CM

## 2021-10-15 PROCEDURE — G8399 PT W/DXA RESULTS DOCUMENT: HCPCS | Performed by: PHYSICIAN ASSISTANT

## 2021-10-15 PROCEDURE — 3017F COLORECTAL CA SCREEN DOC REV: CPT | Performed by: PHYSICIAN ASSISTANT

## 2021-10-15 PROCEDURE — 1036F TOBACCO NON-USER: CPT | Performed by: PHYSICIAN ASSISTANT

## 2021-10-15 PROCEDURE — G8427 DOCREV CUR MEDS BY ELIG CLIN: HCPCS | Performed by: PHYSICIAN ASSISTANT

## 2021-10-15 PROCEDURE — G8417 CALC BMI ABV UP PARAM F/U: HCPCS | Performed by: PHYSICIAN ASSISTANT

## 2021-10-15 PROCEDURE — 1090F PRES/ABSN URINE INCON ASSESS: CPT | Performed by: PHYSICIAN ASSISTANT

## 2021-10-15 PROCEDURE — G8484 FLU IMMUNIZE NO ADMIN: HCPCS | Performed by: PHYSICIAN ASSISTANT

## 2021-10-15 PROCEDURE — 1123F ACP DISCUSS/DSCN MKR DOCD: CPT | Performed by: PHYSICIAN ASSISTANT

## 2021-10-15 PROCEDURE — 4040F PNEUMOC VAC/ADMIN/RCVD: CPT | Performed by: PHYSICIAN ASSISTANT

## 2021-10-15 PROCEDURE — 99213 OFFICE O/P EST LOW 20 MIN: CPT | Performed by: PHYSICIAN ASSISTANT

## 2021-10-15 NOTE — PROGRESS NOTES
Patient Name: Stiven Barnes  Medical Record Number: [id number]  YOB: 1950  Date of Encounter: 10/15/2021     Chief Complaint   Patient presents with    Follow-up     Right TKA 3/16/21, recent injury, seen in  [address] [place] [address] 9/16/21       History of Present Illness:   Ms. Stiven Barnes is here in follow up regarding her knees. Patient is 7 months status post right total knee arthroplasty. She states her right knee is doing great. She denies having any pain with the right knee. She is very pleased with her right knee range of motion and strength. She has known advanced osteoarthritis of the left knee and states she continues having persistent pain. She is not ready to discuss TKA for at least another 6 months. The patient's past medical history, medications, allergies, family history, social history, and review of systems have been reviewed, and dated and are recorded in the chart under the 'MEDIA\" tab. Physical Exam:    Ms. Stiven Barnes appears well, she is in no apparent distress, she demonstrates appropriate mood & affect. She is alert and oriented to person, place and time. /68   Pulse 80   Ht 5' 4\" (1.626 m)   Wt 237 lb (107.5 kg)   BMI 40.68 kg/m²     On examination of patient's right knee there is minimal swelling. Surgical incision is well-healed. She has very mild tenderness on palpation over the medial knee. Range of motion is 0 to 115 degrees. Strength is 4+/5. On examination of patient's left knee there is also very minimal swelling. She has tenderness on palpation over both the medial and lateral joint lines. Range of motion is 0 to 120 degrees. Impression:   Diagnosis Orders   1. 3/16/21 RIGHT TKA     2. Primary osteoarthritis of left knee         Treatment Plan:    Patient presented today in follow-up regarding her knees. She is 7 months status post right total knee arthroplasty. She states her right knee is doing great.   She still has mild intermittent pain but is very pleased with her range of motion and strength. She is advised to continue with home exercises and stretches. She will continue to be very cautious during inclement weather. She is reminded about antibiotic prophylaxis for the first year. She will plan on following back at her 1 year anniversary for repeat x-rays. She is advised to follow back before then with any concerns. Patient also presented concerning her left knee. She has chronic left knee pain secondary to advanced osteoarthritis. She is wanting to hold off on left TKA for at least another 6 months. She is offered a cortisone injection but states she recently had a death in the family and has a lot going on right now and would like to hold off on injection. She is advised to follow back at any point for repeat cortisone injection. She will be referred to Dr. Eloina Mckeon to discuss TKA at her availability. Blanca Gonsales was informed of the results of any imaging. We discussed treatment options and a time was given to answer questions. A plan was proposed and Blanca Gonsales understand and accepts this course of care. Electronically signed by Lilia Steward PA-C on 59/72/7296  Board Certified [facility]    Please note that portions of this note were completed with a voice recognition program.  Efforts were made to edit the dictations but occasionally words are mis-transcribed.

## 2021-10-25 ENCOUNTER — VIRTUAL VISIT (OUTPATIENT)
Dept: PULMONOLOGY | Age: 71
End: 2021-10-25
Payer: MEDICARE

## 2021-10-25 DIAGNOSIS — I10 BENIGN ESSENTIAL HTN: Chronic | ICD-10-CM

## 2021-10-25 DIAGNOSIS — G25.81 RESTLESS LEG SYNDROME: Chronic | ICD-10-CM

## 2021-10-25 DIAGNOSIS — G47.33 OSA (OBSTRUCTIVE SLEEP APNEA): Primary | Chronic | ICD-10-CM

## 2021-10-25 DIAGNOSIS — J45.30 MILD PERSISTENT ASTHMA WITHOUT COMPLICATION: ICD-10-CM

## 2021-10-25 DIAGNOSIS — I48.0 PAROXYSMAL ATRIAL FIBRILLATION (HCC): Chronic | ICD-10-CM

## 2021-10-25 DIAGNOSIS — E66.9 OBESITY (BMI 30-39.9): ICD-10-CM

## 2021-10-25 DIAGNOSIS — I50.32 CHRONIC DIASTOLIC HEART FAILURE (HCC): ICD-10-CM

## 2021-10-25 PROCEDURE — G8427 DOCREV CUR MEDS BY ELIG CLIN: HCPCS | Performed by: NURSE PRACTITIONER

## 2021-10-25 PROCEDURE — 99214 OFFICE O/P EST MOD 30 MIN: CPT | Performed by: NURSE PRACTITIONER

## 2021-10-25 PROCEDURE — G8399 PT W/DXA RESULTS DOCUMENT: HCPCS | Performed by: NURSE PRACTITIONER

## 2021-10-25 PROCEDURE — 3017F COLORECTAL CA SCREEN DOC REV: CPT | Performed by: NURSE PRACTITIONER

## 2021-10-25 PROCEDURE — 1090F PRES/ABSN URINE INCON ASSESS: CPT | Performed by: NURSE PRACTITIONER

## 2021-10-25 PROCEDURE — 4040F PNEUMOC VAC/ADMIN/RCVD: CPT | Performed by: NURSE PRACTITIONER

## 2021-10-25 PROCEDURE — 1123F ACP DISCUSS/DSCN MKR DOCD: CPT | Performed by: NURSE PRACTITIONER

## 2021-10-25 RX ORDER — PRAMIPEXOLE DIHYDROCHLORIDE 0.5 MG/1
TABLET ORAL
Qty: 270 TABLET | Refills: 1 | Status: SHIPPED | OUTPATIENT
Start: 2021-10-25 | End: 2022-04-29 | Stop reason: SDUPTHER

## 2021-10-25 ASSESSMENT — SLEEP AND FATIGUE QUESTIONNAIRES
HOW LIKELY ARE YOU TO NOD OFF OR FALL ASLEEP WHILE SITTING AND READING: 2
HOW LIKELY ARE YOU TO NOD OFF OR FALL ASLEEP WHILE SITTING QUIETLY AFTER LUNCH WITHOUT ALCOHOL: 2
ESS TOTAL SCORE: 14
HOW LIKELY ARE YOU TO NOD OFF OR FALL ASLEEP WHILE LYING DOWN TO REST IN THE AFTERNOON WHEN CIRCUMSTANCES PERMIT: 2
HOW LIKELY ARE YOU TO NOD OFF OR FALL ASLEEP WHILE SITTING INACTIVE IN A PUBLIC PLACE: 2
HOW LIKELY ARE YOU TO NOD OFF OR FALL ASLEEP WHILE SITTING AND TALKING TO SOMEONE: 2
HOW LIKELY ARE YOU TO NOD OFF OR FALL ASLEEP WHEN YOU ARE A PASSENGER IN A CAR FOR AN HOUR WITHOUT A BREAK: 2
HOW LIKELY ARE YOU TO NOD OFF OR FALL ASLEEP WHILE WATCHING TV: 2
HOW LIKELY ARE YOU TO NOD OFF OR FALL ASLEEP IN A CAR, WHILE STOPPED FOR A FEW MINUTES IN TRAFFIC: 0

## 2021-10-25 NOTE — PROGRESS NOTES
Elva Downs MD, FAASM, [facility]  Sangita Pittman, MSN, RN, [address] [address]  Dept: [phone]  Dept Fax: [phone]  Loc: [phone]    Subjective:     Patient ID: Shahbaz Bernard is a 73 y.o. female. Chief Complaint   Patient presents with    Sleep Apnea       HPI:      Sleep Medicine Video Visit    Pursuant to the emergency declaration under the [address] [place] [address] [facility] authority and the [employer] Act this Telephone Visit was insisted, with patient's consent, to reduce the patient's risk of exposure to COVID-19 and provide continuity of care for an established patient. Services were provided through a synchronous discussion over a telephone and/or Video chat to substitute for in-person clinic visit, and coded as such. While patient is at home.     Machine Modem/Download Info:  Compliance (hours/night): 4.4 hrs/night  Download AHI (/hour): 0.4 /HR  Average CPAP Pressure : 14.9 cmH2O           APAP - Settings  Pressure Min: 11 cmH2O  Pressure Max: 20 cmH2O                 Comfort Settings  Humidity Level (0-8): 3  Flex/EPR (0-3): 2 PAP Mask  Mask Type: Nasal mask  Clinically Relevant Leak: No     [place] - Total score: 14    Follow-up :     Last Visit : May 2021      Subjective Health Changes: death in the family      Over Night Oximetry: [] Yes  [] No  [x] NA [] WNL   Using O2: [] Yes  [] No  [x] NA   Patient is compliant with the machine  [] Yes  [x] No [] Per patient   Feeling rested when using the machine   [x] Yes  [] No     Pressure is comfortable with inspiration and expiration  [x] Yes  [] No   ([x] NA   [] Feeling of suffocation  [] Feeling like not enough air    [] To much pressure)     Noticed changes in pressure  [x] NA  [] Yes    [] No     Mask is fitting well  [x] Yes  [] No   Noting Mask Air Leak  [] Yes  [x] No   Having painful Aerophagia  [] Yes  [x] No   Nocturia   0-2  per night. Having  HA upon waking  [] Yes  [x] No   Dry mouth upon waking   Dry Nose  Dry Eyes  [x] Yes  [] No   Congestion upon waking   [] Yes  [x] No    Nose Bleeds  [] Yes  [x] No   Using Sleep Aides  Mirapex  [] NA  [] OTC  [x] Per our office   [] Per another provider   Understands how to change humidification and/or tubing temperature for comfort while at home  [x] Yes  [] No     Difficulties falling asleep  [] Yes  [x] No   Difficulties staying asleep  [] Yes  [x] No   Approximate time to bed  11:30pm-2:30am   Approximate wake time  6am-12pm   Taking Naps  frequent   If taking naps usual length  30+ min     [] NA   If taking naps using the machine [] NA  [] Yes    [x] No    [] With and With out    Drowsy when driving  [] Yes  [x] No     Does patient carry a DOT/CDL  [] Yes  [x] No     Does patient carry FAA/[employer] License   [] Yes  [x] No      Any concerns noted with the machine at this time  [] Yes  [x] No       Assessment/Plan:     1. HUSSAIN (obstructive sleep apnea)  Assessment & Plan:  After downloading data and reviewing  Reviewed compliance download with pt. Supplies and parts as needed for his machine. These are medically necessary. Continue medications per his PCP and other physicians. Limit caffeine use after 3pm.    The chronic medical conditions listed are directly related to the primary diagnosis listed above. The management of the primary diagnosis affects the secondary diagnosis and vice versa    Patient is NOT compliant at this time. Increasing the risk of heart attacks, strokes, car accidents, and/or death from uncontrolled Obstructive Sleep Apnea      2. Restless leg syndrome  Assessment & Plan:  Current chronicity: stable    Current Medication: Mirapex    Prescribed by: our office    Agree on current plan: Continue current plan per provider     3.  Paroxysmal atrial fibrillation (HCC)  Assessment & Plan:  Chronic- stable. After speaking with patient:    Agree with current plan, and would agree to continue this plan per prescribing and managing physician. 4. Obesity (BMI 30-39. 9)  Assessment & Plan:  Patient encouraged to work on maintaining a healthy weight per height. Achievable with diet restriction/modifications and exercise (may consult primary care if unsure of any restrictions or concerns). Weight management directly correlates to risk of control and maintenance of Obstructive Sleep Apnea. 5. Benign essential HTN  Assessment & Plan:  Chronic- stable. After speaking with patient:    Agree with current plan, and would agree to continue this plan per prescribing and managing physician. 6. Chronic diastolic heart failure (HCC)  Assessment & Plan:  Chronic- stable. After speaking with patient:    Agree with current plan, and would agree to continue this plan per prescribing and managing physician. 7. Mild persistent asthma without complication  Assessment & Plan:  Chronic- stable. After speaking with patient:    Agree with current plan, and would agree to continue this plan per prescribing and managing physician. The chronic medical conditions listed are directly related to the primary diagnosis listed above. The management of the primary diagnosis affects the secondary diagnosis and vice versa. - After pulling data and reviewing it   - Educated patient and reviewed down load from [facility] with the patient   - Continue medications per her PCP and other physicians.   - she instructed not to drive unless had 4 hrs of effective therapy for her HUSSAIN the night before. - Did review the risks of under or untreated HUSSAIN including, but not limited to, higher risks of motor vehicle accidents, stroke, heart attacks, and death. - she understands and accepts all these risks.     - The patient is advised to use the machine every night.   - Patient using  [facility] for supplies  - Patient educated on   The potential need to take the machine to the DME to trouble shoot, to allow the office to get a download  Office locations  Calling for refills when needed  Compliance  Follow up  The risk of undercontrolling Obstructive sleep apnea  After speaking to the patient, patient is currently stable.  We will continue with the current machine settings  Recall Information and DME contact    -Will follow up in office in 6 months      Electronically signed by BRICE Rosas CNP on 10/25/2021 at 10:59 AM

## 2021-10-25 NOTE — ASSESSMENT & PLAN NOTE
Current chronicity: stable    Current Medication: Mirapex    Prescribed by: our office    Agree on current plan: Continue current plan per provider

## 2021-10-25 NOTE — PROGRESS NOTES
Joel Stratton         : 1950    Diagnosis: [x] HUSSAIN (G47.33) [] CSA (G47.31) [] Apnea (G47.30)   Length of Need: [x] 12 Months [] 99 Months [] Other:    Machine (MANDI!): [] Respironics Dream Station   2   Auto [] ResMed AirSense     Auto [] Other:     []  CPAP () [] Bilevel ()   Mode: [] Auto [] Spontaneous    Mode: [] Auto [] Spontaneous            Comfort Settings:   - Ramp Pressure:  cmH2O                                        - Ramp time: 15 min                                     -  Flex/EPR - 3 full time                                    - For ResMed Bilevel (TiMax-4 sec   TiMin- 0.2 sec)     Humidifier: [x] Heated ()        [x] Water chamber replacement ()/ 1 per 6 months        Mask:   [x] Nasal () /1 per 3 months [] Full Face () /1 per 3 months   [x] Patient choice -Size and fit mask [] Patient Choice - Size and fit mask   [] Dispense:  [] Dispense:    [x] Headgear () / 1 per 3 months [] Headgear () / 1 per 3 months   [x] Replacement Nasal Cushion ()/2 per month [] Interface Replacement ()/1 per month   [] Replacement Nasal Pillows ()/2 per month         Tubing: [x] Heated ()/1 per 3 months    [] Standard ()/1 per 3 months [] Other:           Filters: [x] Non-disposable ()/1 per 6 months     [x] Ultra-Fine, Disposable ()/2 per month        Miscellaneous: [] Chin Strap ()/ 1 per 6 months [] O2 bleed-in:       LPM   [] Oximetry on CPAP/Bilevel []  Other:    [x] Modem: ()         Start Order Date: 10/25/21    MEDICAL JUSTIFICATION:  I, the undersigned, certify that the above prescribed supplies are medically necessary for this patients wellbeing. In my opinion, the supplies are both reasonable and necessary in reference to accepted standards of medicalpractice in treatment of this patients condition.     BRICE Lozano CNP      NPI: [id number]       Order Signed Date: 10/25/21    Electronically signed by Kerrie Zhong, APRN - CNP on 10/25/2021 at 10:58 AM    José Alexander  1950  [address]  [place] [facility]  [phone] (home)   [phone] (mobile)      Insurance Info (confirm with patient if correct):  Payor/Plan Subscr  Sex Relation Sub.  Ins. ID Effective Group Num

## 2021-10-27 ENCOUNTER — TELEPHONE (OUTPATIENT)
Dept: INTERNAL MEDICINE CLINIC | Age: 71
End: 2021-10-27

## 2021-10-27 RX ORDER — SEMAGLUTIDE 1.34 MG/ML
0.5 INJECTION, SOLUTION SUBCUTANEOUS WEEKLY
Qty: 2 PEN | Refills: 0 | COMMUNITY
Start: 2021-10-27 | End: 2022-02-02

## 2021-10-27 NOTE — TELEPHONE ENCOUNTER
----- Message from Ramsey Bates sent at 10/27/2021 10:32 AM EDT -----  Subject: Message to Provider    QUESTIONS  Information for Provider? Patient is asking for more samples Savage she   said she get samples of ozempik as well she cant afford Patient is looking   for samples please call back to assist.   ---------------------------------------------------------------------------  --------------  [address]  What is the best way for the office to contact you? OK to leave message on   voicemail  Preferred Call Back Phone Number? [phone]  ---------------------------------------------------------------------------  --------------  SCRIPT ANSWERS  Relationship to Patient?  Self

## 2021-11-01 ENCOUNTER — NURSE ONLY (OUTPATIENT)
Dept: CARDIOLOGY CLINIC | Age: 71
End: 2021-11-01
Payer: MEDICARE

## 2021-11-01 DIAGNOSIS — I48.0 PAROXYSMAL ATRIAL FIBRILLATION (HCC): Chronic | ICD-10-CM

## 2021-11-01 DIAGNOSIS — R00.1 SYMPTOMATIC BRADYCARDIA: ICD-10-CM

## 2021-11-01 DIAGNOSIS — Z45.09 ENCOUNTER FOR ELECTRONIC ANALYSIS OF REVEAL EVENT RECORDER: ICD-10-CM

## 2021-11-02 PROCEDURE — G2066 INTER DEVC REMOTE 30D: HCPCS | Performed by: INTERNAL MEDICINE

## 2021-11-02 PROCEDURE — 93298 REM INTERROG DEV EVAL SCRMS: CPT | Performed by: INTERNAL MEDICINE

## 2021-11-10 ENCOUNTER — OFFICE VISIT (OUTPATIENT)
Dept: INTERNAL MEDICINE CLINIC | Age: 71
End: 2021-11-10
Payer: MEDICARE

## 2021-11-10 VITALS
BODY MASS INDEX: 40.29 KG/M2 | TEMPERATURE: 97.4 F | HEART RATE: 86 BPM | DIASTOLIC BLOOD PRESSURE: 60 MMHG | HEIGHT: 64 IN | OXYGEN SATURATION: 98 % | WEIGHT: 236 LBS | SYSTOLIC BLOOD PRESSURE: 118 MMHG

## 2021-11-10 DIAGNOSIS — Z23 NEED FOR INFLUENZA VACCINATION: ICD-10-CM

## 2021-11-10 DIAGNOSIS — E87.1 HYPONATREMIA: ICD-10-CM

## 2021-11-10 DIAGNOSIS — Z23 NEED FOR PNEUMOCOCCAL VACCINE: ICD-10-CM

## 2021-11-10 DIAGNOSIS — Z00.00 ROUTINE GENERAL MEDICAL EXAMINATION AT A HEALTH CARE FACILITY: Primary | ICD-10-CM

## 2021-11-10 PROCEDURE — G0439 PPPS, SUBSEQ VISIT: HCPCS | Performed by: INTERNAL MEDICINE

## 2021-11-10 PROCEDURE — G0008 ADMIN INFLUENZA VIRUS VAC: HCPCS | Performed by: INTERNAL MEDICINE

## 2021-11-10 PROCEDURE — 1123F ACP DISCUSS/DSCN MKR DOCD: CPT | Performed by: INTERNAL MEDICINE

## 2021-11-10 PROCEDURE — 90732 PPSV23 VACC 2 YRS+ SUBQ/IM: CPT | Performed by: INTERNAL MEDICINE

## 2021-11-10 PROCEDURE — 4040F PNEUMOC VAC/ADMIN/RCVD: CPT | Performed by: INTERNAL MEDICINE

## 2021-11-10 PROCEDURE — G8484 FLU IMMUNIZE NO ADMIN: HCPCS | Performed by: INTERNAL MEDICINE

## 2021-11-10 PROCEDURE — 90694 VACC AIIV4 NO PRSRV 0.5ML IM: CPT | Performed by: INTERNAL MEDICINE

## 2021-11-10 PROCEDURE — G0009 ADMIN PNEUMOCOCCAL VACCINE: HCPCS | Performed by: INTERNAL MEDICINE

## 2021-11-10 PROCEDURE — 3017F COLORECTAL CA SCREEN DOC REV: CPT | Performed by: INTERNAL MEDICINE

## 2021-11-10 RX ORDER — CICLESONIDE 160 UG/1
AEROSOL, METERED RESPIRATORY (INHALATION)
COMMUNITY

## 2021-11-10 RX ORDER — SEMAGLUTIDE 1.34 MG/ML
0.5 INJECTION, SOLUTION SUBCUTANEOUS WEEKLY
Qty: 2 PEN | Refills: 0 | COMMUNITY
Start: 2021-11-10 | End: 2022-02-14 | Stop reason: SDUPTHER

## 2021-11-10 ASSESSMENT — LIFESTYLE VARIABLES
HOW OFTEN DURING THE LAST YEAR HAVE YOU FOUND THAT YOU WERE NOT ABLE TO STOP DRINKING ONCE YOU HAD STARTED: 0
HOW OFTEN DO YOU HAVE SIX OR MORE DRINKS ON ONE OCCASION: 0
AUDIT-C TOTAL SCORE: 2
HOW OFTEN DO YOU HAVE A DRINK CONTAINING ALCOHOL: 2
HAVE YOU OR SOMEONE ELSE BEEN INJURED AS A RESULT OF YOUR DRINKING: 0
HAS A RELATIVE, FRIEND, DOCTOR, OR ANOTHER HEALTH PROFESSIONAL EXPRESSED CONCERN ABOUT YOUR DRINKING OR SUGGESTED YOU CUT DOWN: 0
HOW OFTEN DURING THE LAST YEAR HAVE YOU HAD A FEELING OF GUILT OR REMORSE AFTER DRINKING: 0
HOW OFTEN DURING THE LAST YEAR HAVE YOU BEEN UNABLE TO REMEMBER WHAT HAPPENED THE NIGHT BEFORE BECAUSE YOU HAD BEEN DRINKING: 0
HOW OFTEN DURING THE LAST YEAR HAVE YOU FAILED TO DO WHAT WAS NORMALLY EXPECTED FROM YOU BECAUSE OF DRINKING: 0
AUDIT TOTAL SCORE: 2
HOW OFTEN DURING THE LAST YEAR HAVE YOU NEEDED AN ALCOHOLIC DRINK FIRST THING IN THE MORNING TO GET YOURSELF GOING AFTER A NIGHT OF HEAVY DRINKING: 0
HOW MANY STANDARD DRINKS CONTAINING ALCOHOL DO YOU HAVE ON A TYPICAL DAY: 0

## 2021-11-10 ASSESSMENT — PATIENT HEALTH QUESTIONNAIRE - PHQ9
1. LITTLE INTEREST OR PLEASURE IN DOING THINGS: 1
SUM OF ALL RESPONSES TO PHQ9 QUESTIONS 1 & 2: 2
SUM OF ALL RESPONSES TO PHQ QUESTIONS 1-9: 2
2. FEELING DOWN, DEPRESSED OR HOPELESS: 1

## 2021-11-10 NOTE — PROGRESS NOTES
Medicare Annual Wellness Visit  Name: Iam Espinosa Date: 11/10/2021   MRN: [id number] Sex: Female   Age: 73 y.o. Ethnicity: Non- / Non    : 1950 Race: White (non-)      Jessica Bey is here for Medicare AWV    Screenings for behavioral, psychosocial and functional/safety risks, and cognitive dysfunction are all negative except as indicated below. These results, as well as other patient data from the [address] form, are documented in Flowsheets linked to this Encounter. Allergies   Allergen Reactions    Atorvastatin Other (See Comments)     Muscle Pain, all statins     Simvastatin Other (See Comments)     Muscle Pain    Cephalexin Hives and Itching    Cephalosporins Hives and Itching    Food Diarrhea     Milk , shellfish , gluten. ..may have bouts of diarrhea, constipation or flatulus. (RD spoke to pt regarding \"milk allergy\", pt is not allergic to milk. She does not drink milk, but consumes milk in other products and consumes dairy products. Had one reactions to shellfish years ago and now can tolerate one serving of shellfish once per week. Avoids gluten as much as she can, but does not have celiac disease.)    Other      Environmental -cats,dogs,dust    Shellfish-Derived Products      hives    Sulfa Antibiotics      Can take Celebrex, Pt denies 20       Prior to Visit Medications    Medication Sig Taking?  Authorizing Provider   Ciclesonide (ALVESCO) 160 MCG/ACT AERS Inhale into the lungs Yes Historical Provider, MD   Semaglutide,0.25 or 0.5MG/DOS, (OZEMPIC, 0.25 OR 0.5 MG/DOSE,) 2 MG/1.5ML SOPN Inject 0.5 mg into the skin once a week Yes Liset Méndez MD   rivaroxaban (XARELTO) 20 MG TABS tablet Take 1 tablet by mouth daily (with breakfast) Yes Liset Méndez MD   pramipexole (MIRAPEX) 0.5 MG tablet 1.5 tab q 5 PM and 1.5 tab qHS Yes Tiffanie R Kehrt, APRN - CNP   furosemide (LASIX) 40 MG tablet Take 1 tablet by mouth daily Yes Triston Nolasco Ollie Parker MD   potassium chloride (KLOR-CON M) 10 MEQ extended release tablet TAKE ONE TABLET BY MOUTH DAILY Yes Thais Jiang MD   lisinopril (PRINIVIL;ZESTRIL) 10 MG tablet Take 1 tablet by mouth nightly Yes Thais Jiang MD   dilTIAZem (CARDIZEM) 60 MG tablet TAKE ONE TABLET BY MOUTH THREE TIMES A DAY AS NEEDED FOR FAST HEARTBEAT GREATER THAN 100 AND PALPITATIONS Yes Thais Jiang MD   rivaroxaban (XARELTO) 20 MG TABS tablet Take 1 tablet by mouth Daily with supper Yes Sigrid Johnson MD   Semaglutide,0.25 or 0.5MG/DOS, (OZEMPIC, 0.25 OR 0.5 MG/DOSE,) 2 MG/1.5ML SOPN Inject 0.5 mg into the skin once a week Sample Yes Sigrid Johnson MD   spironolactone (ALDACTONE) 25 MG tablet TAKE ONE TABLET BY MOUTH DAILY Yes Thais Jiang MD   fluticasone (FLONASE) 50 MCG/ACT nasal spray 1 spray by Each Nostril route daily Yes Historical Provider, MD   fluticasone (FLOVENT HFA) 110 MCG/ACT inhaler Inhale 1 puff into the lungs 2 times daily Yes Jazmin Roberts MD   azelastine (ASTELIN) 0.1 % nasal spray 1 spray by Nasal route 2 times daily 1 Spray in each nostril Yes Jazmin Roberts MD   Tens Unit MISC by Does not apply route Yes Historical Provider, MD   Handicap Placard MISC by Does not apply route Exp 5 years Yes Bill Lopez, APRN - CNP   Multiple Vitamins-Minerals (THERAPEUTIC MULTIVITAMIN-MINERALS) tablet Take 1 tablet by mouth daily Yes Historical Provider, MD   CPAP Machine MISC by Does not apply route Yes Historical Provider, MD   EPIPEN 2-MIR 0.3 MG/0.3ML SOAJ injection  Yes Historical Provider, MD   albuterol sulfate  (90 Base) MCG/ACT inhaler Use 2 puffs 4 times daily for 7 days then as needed for wheezing. Dispense with Spacer and instruct in use. At patient's preference may use 60 dose MDI. May Sub Pro-Air or Proventil as needed per insurance.   Patient not taking: Reported on 11/10/2021  EVERETTE Stephenson   buPROPion (WELLBUTRIN XL) 150 MG extended release tablet Take 1 tablet by mouth every morning  Alexi Mancera MD   Magnesium Citrate 100 MG TABS Take 1 tablet by mouth daily  Patient not taking: Reported on 10/13/2021  Selene Vang MD       Past Medical History:   Diagnosis Date    Allergic     Anesthesia complication     family hx-father-at at age 80 having hip replacement revision surgery-had tia's x2 while under anes-but also had hx chf-had dificuly with speech when coming out from [address]     left ankle, bilateral hands    Arthritis of left hip 2/2/2016    Arthritis of right hip 4/19/2016    Asthma     At risk for falls     uses a cane    Atrial fibrillation with rapid ventricular response (HCC)     Atrial flutter ([address] 4/25/2018    Chronic maxillary sinusitis 5/24/2017    CTEPH (chronic thromboembolic pulmonary hypertension) ([address] 8/26/2016    Depression     pt stated r/t bad marriage/alcoholic spouse    Diabetes mellitus ([address]     borderline    Disc disease, degenerative, lumbar or lumbosacral 2/20/2017    Hepatic steatosis 4/26/2019    History of total hip arthroplasty 2/28/2017    Hypertension     Leg cramps     patient states very severe, requires immediate potassium administration    Migraines, basilar     last migraine 10 years ago    Mild persistent asthma without complication 5/91/3552    Obesity, Class III, BMI 40-49.9 (morbid obesity) ([address] 4/19/2016    HUSSAIN (obstructive sleep apnea) 10/14/2013    Osteoarthritis, hip, bilateral 2016    Bilateral hip arthroplasty    Panic attacks     Pneumonia 2015    Primary osteoarthritis of both knees 9/19/2017    Primary osteoarthritis of left knee 12/15/2016    Pulmonary emboli ([address] 8/4/2016    Pulmonary HTN ([address] 7/21/2016    Pure hypercholesterolemia 2/13/2019    Restless leg syndrome     Rhinitis, chronic 3/26/2014    Sleep apnea     wears CPAP @11    Stress incontinence     Tear of left rotator cuff 1/23/2018    Thyroid disease     hypothyroid    Wears glasses        Past Surgical History:   Procedure Laterality Date    ATRIAL ABLATION SURGERY      BACK SURGERY  2004    LUMBAR FUSION    CATARACT REMOVAL Bilateral     COLONOSCOPY      EYE SURGERY Right 2010    retinal tear repaired   [facility]    right ring finger severe laceration, fracture    HIP ARTHROPLASTY Right 4-19-16    HYSTERECTOMY  1993    JOINT REPLACEMENT Left 2/2/16    left hip    TONSILLECTOMY  1972    TOTAL KNEE ARTHROPLASTY Right 3/16/2021    RIGHT ROBOTIC ASSISTED TOTAL KNEE ARTHROPLASTY (81734, [id number]) - [employer] performed by Rey Montelongo MD at [place] [address]. History   Problem Relation Age of Onset    Alcohol Abuse Sister     Arthritis Sister     Stroke Maternal Grandmother     Thyroid Disease Maternal Grandmother     Thyroid Disease Maternal Grandfather     Heart Disease Maternal Grandfather     Alcohol Abuse Maternal Grandfather     Substance Abuse Maternal Grandfather     Hypertension Mother     Thyroid Disease Mother     Anxiety Disorder Mother     Arthritis Mother     Cataracts Mother     Heart Disease Mother     Asthma Mother     Thyroid Disease Father     Heart Failure Father     Heart Disease Father     Arthritis Brother     High Cholesterol Brother     Heart Disease Maternal Uncle     Heart Disease Paternal Uncle     Cancer Paternal Uncle     Alcohol Abuse Paternal Grandfather     Substance Abuse Paternal Grandfather        CareTeam (Including outside providers/suppliers regularly involved in providing care):   Patient Care Team:  Reggie Dancer, MD as PCP - General (Internal Medicine)  Shiloh Parks MD as PCP - Hematology/Oncology (Hematology and Oncology)  Reggie Dancer, MD as PCP - [facility] Empaneled Provider  Boone Gambino MD as Consulting Physician (Sleep Medicine)  Luz Valero MD as Referring Physician (Cardiology)  BRICE Navarro CNP as Nurse Practitioner (Sleep Medicine)    Wt Readings from Last 3 Encounters:   11/10/21 236 lb (107 kg)   10/15/21 237 lb (107.5 kg)   10/13/21 237 lb (107.5 kg)     Vitals:    11/10/21 1033   BP: 118/60   Pulse: 86   Temp: 97.4 °F (36.3 °C)   TempSrc: Infrared   SpO2: 98%   Weight: 236 lb (107 kg)   Height: 5' 4\" (1.626 m)     Body mass index is 40.51 kg/m². Based upon direct observation of the patient, evaluation of cognition reveals recent and remote memory intact. Physical Exam  Vitals reviewed. Constitutional:       General: She is not in acute distress. Appearance: She is well-developed. She is not diaphoretic. HENT:      Head: Normocephalic and atraumatic. Pulmonary:      Effort: Pulmonary effort is normal.   Neurological:      Mental Status: She is alert and oriented to person, place, and time. Cranial Nerves: No cranial nerve deficit. Psychiatric:         Behavior: Behavior normal.         Thought Content: Thought content normal.         Judgment: Judgment normal.       Patient's complete Health Risk Assessment and screening values have been reviewed and are found in Flowsheets. The following problems were reviewed today and where indicated follow up appointments were made and/or referrals ordered. Positive Risk Factor Screenings with Interventions:            General Health and ACP:  General  In general, how would you say your health is?: Good  In the past 7 days, have you experienced any of the following?  New or Increased Pain, New or Increased Fatigue, Loneliness, Social Isolation, Stress or Anger?: (!) New or Increased Pain, Loneliness, Social Isolation, Stress  Do you get the social and emotional support that you need?: Yes  Do you have a Living Will?: Yes  Advance Directives     Power of  Living Will ACP-Advance Directive ACP-Power of     Not on File Filed on 03/16/21 Filed Not on File      General Health Risk Interventions:  · Pain issues: patient advised to follow-up in this office for further evaluation and treatment within 4 month(s), physical therapy referral ordered  · Stress: patient's comments regarding reasons for stress and/or anger: she recently had her ex- die.      Health Habits/Nutrition:  Health Habits/Nutrition  Do you exercise for at least 20 minutes 2-3 times per week?: Yes  Have you lost any weight without trying in the past 3 months?: No  Do you eat only one meal per day?: (!) Yes (rarely)  Have you seen the dentist within the past year?: Yes  Body mass index: (!) 40.5  Health Habits/Nutrition Interventions:  · Inadequate physical activity:  patient is not ready to increase his/her physical activity level at this time, because of her osteoarthritis of the left shoulder and left knee  · Nutritional issues:  educational materials for healthy, well-balanced diet provided    Hearing/Vision:  No exam data present  Hearing/Vision  Do you or your family notice any trouble with your hearing that hasn't been managed with hearing aids?: (!) Yes  Do you have difficulty driving, watching TV, or doing any of your daily activities because of your eyesight?: (!) Yes  Have you had an eye exam within the past year?: Yes  Hearing/Vision Interventions:  · Hearing concerns:  audiology referral provided  · Vision concerns:  patient encouraged to make appointment with his/her eye specialist      Personalized Preventive Plan   Current Health Maintenance Status  Immunization History   Administered Date(s) Administered    COVID-19, Corrigan Peter, PF, 30mcg/0.3mL 02/08/2021, 03/01/2021    Influenza Virus Vaccine 10/05/2011    Influenza, High Dose (Fluzone 65 yrs and older) 10/04/2019    Influenza, Intradermal, Preservative free 10/02/2012, 09/16/2013, 10/07/2014, 10/08/2015    Influenza, Quadv, IM, PF (6 mo and older Fluzone, Flulaval, Fluarix, and 3 yrs and older Afluria) 10/05/2016    Influenza, Quadv, adjuvanted, 65 yrs +, IM, PF (Fluad) 10/12/2020    Pneumococcal Conjugate 13-valent ([id number]) 09/01/2017    Pneumococcal Conjugate 7-valent (Prevnar7) 10/20/2012    Pneumococcal Polysaccharide ([id number]) 08/05/2016    Td, unspecified formulation 07/30/2007    Tdap (Boostrix, Adacel) 10/02/2012    Zoster Live (Zostavax) 07/19/2017, 08/01/2017        Health Maintenance   Topic Date Due    Shingles Vaccine (2 of 3) 09/26/2017    Pneumococcal 65+ years Vaccine (2 of 2 - PPSV23) 08/05/2021    Flu vaccine (1) 09/01/2021    COVID-19 Vaccine (3 - Booster for Corrigan Peter series) 09/01/2021    Annual Wellness Visit (AWV)  11/14/2021    TSH testing  03/02/2022    Potassium monitoring  06/08/2022    Creatinine monitoring  06/08/2022    DTaP/Tdap/Td vaccine (2 - Td or Tdap) 10/02/2022    Colon cancer screen fecal DNA test (Cologuard)  11/14/2022    Breast cancer screen  08/23/2023    Lipid screen  11/13/2025    DEXA (modify frequency per FRAX score)  Completed    Hepatitis C screen  Completed    Hepatitis A vaccine  Aged Out    Hepatitis B vaccine  Aged Out    Hib vaccine  Aged Out    Meningococcal (ACWY) vaccine  Aged Out     Recommendations for [employer] [address] Due: see orders and patient instructions/AVS.  . Recommended screening schedule for the next 5-10 years is provided to the patient in written form: see Patient Lee Cesar was seen today for medicare awv. Diagnoses and all orders for this visit:    Routine general medical examination at a health care facility    Need for influenza vaccination  -     INFLUENZA, QUADV, ADJUVANTED, 72 YRS =, IM, PF, PREFILL SYR, 0.5ML (FLUAD)    Need for pneumococcal vaccine  -     PNEUMOVAX 23 subcutaneous/IM (Pneumococcal polysaccharide vaccine 23-valent >= 1yo)    Hyponatremia  -     Basic Metabolic Panel;  Future           Gudelia Hendricks MD  FACP

## 2021-11-10 NOTE — PATIENT INSTRUCTIONS
Personalized Preventive Plan for Stiven Barnes - 11/10/2021  Medicare offers a range of preventive health benefits. Some of the tests and screenings are paid in full while other may be subject to a deductible, co-insurance, and/or copay. Some of these benefits include a comprehensive review of your medical history including lifestyle, illnesses that may run in your family, and various assessments and screenings as appropriate. After reviewing your medical record and screening and assessments performed today your provider may have ordered immunizations, labs, imaging, and/or referrals for you. A list of these orders (if applicable) as well as your Preventive Care list are included within your After Visit Summary for your review. Other Preventive Recommendations:    · A preventive eye exam performed by an eye specialist is recommended every 1-2 years to screen for glaucoma; cataracts, macular degeneration, and other eye disorders. · A preventive dental visit is recommended every 6 months. · Try to get at least 150 minutes of exercise per week or 10,000 steps per day on a pedometer . · Order or download the FREE \"Exercise & Physical Activity: Your Everyday Guide\" from The "[employer]. Call 8-824.134.3022 or search The [employer] online. · You need 3119-7441 mg of calcium and 5767-4425 IU of vitamin D per day. It is possible to meet your calcium requirement with diet alone, but a vitamin D supplement is usually necessary to meet this goal.  · When exposed to the sun, use a sunscreen that protects against both UVA and UVB radiation with an SPF of 30 or greater. Reapply every 2 to 3 hours or after sweating, drying off with a towel, or swimming. · Always wear a seat belt when traveling in a car. Always wear a helmet when riding a bicycle or motorcycle.

## 2021-11-29 ENCOUNTER — TELEPHONE (OUTPATIENT)
Dept: CARDIOLOGY CLINIC | Age: 71
End: 2021-11-29

## 2021-11-29 NOTE — TELEPHONE ENCOUNTER
Last OV: 7-9-21  Last labs: 6-8-21  Appt scheduled : 4-11-22 recall list  Last refill:5-4-21    Called patient and let her know that 2 bottles lot [id number] EXP 11-23   Where left up front for  with verbal understanding from patient.

## 2021-12-06 ENCOUNTER — NURSE ONLY (OUTPATIENT)
Dept: CARDIOLOGY CLINIC | Age: 71
End: 2021-12-06
Payer: MEDICARE

## 2021-12-06 DIAGNOSIS — Z45.09 ENCOUNTER FOR ELECTRONIC ANALYSIS OF REVEAL EVENT RECORDER: ICD-10-CM

## 2021-12-06 DIAGNOSIS — I48.0 PAROXYSMAL ATRIAL FIBRILLATION (HCC): Chronic | ICD-10-CM

## 2021-12-06 DIAGNOSIS — R00.1 SYMPTOMATIC BRADYCARDIA: ICD-10-CM

## 2021-12-07 PROCEDURE — G2066 INTER DEVC REMOTE 30D: HCPCS | Performed by: INTERNAL MEDICINE

## 2021-12-07 PROCEDURE — 93298 REM INTERROG DEV EVAL SCRMS: CPT | Performed by: INTERNAL MEDICINE

## 2021-12-21 ENCOUNTER — TELEPHONE (OUTPATIENT)
Dept: INTERNAL MEDICINE CLINIC | Age: 71
End: 2021-12-21

## 2021-12-21 DIAGNOSIS — E87.1 HYPONATREMIA: ICD-10-CM

## 2021-12-21 LAB
ANION GAP SERPL CALCULATED.3IONS-SCNC: 16 MMOL/L (ref 3–16)
BUN BLDV-MCNC: 20 MG/DL (ref 7–20)
CALCIUM SERPL-MCNC: 9.3 MG/DL (ref 8.3–10.6)
CHLORIDE BLD-SCNC: 99 MMOL/L (ref 99–110)
CO2: 21 MMOL/L (ref 21–32)
CREAT SERPL-MCNC: 0.8 MG/DL (ref 0.6–1.2)
GFR AFRICAN AMERICAN: >60
GFR NON-AFRICAN AMERICAN: >60
GLUCOSE BLD-MCNC: 98 MG/DL (ref 70–99)
POTASSIUM SERPL-SCNC: 4.5 MMOL/L (ref 3.5–5.1)
SODIUM BLD-SCNC: 136 MMOL/L (ref 136–145)

## 2022-01-10 ENCOUNTER — NURSE ONLY (OUTPATIENT)
Dept: CARDIOLOGY CLINIC | Age: 72
End: 2022-01-10
Payer: MEDICARE

## 2022-01-10 DIAGNOSIS — I48.0 PAROXYSMAL ATRIAL FIBRILLATION (HCC): Chronic | ICD-10-CM

## 2022-01-10 DIAGNOSIS — R00.1 SYMPTOMATIC BRADYCARDIA: ICD-10-CM

## 2022-01-10 DIAGNOSIS — Z45.09 ENCOUNTER FOR ELECTRONIC ANALYSIS OF REVEAL EVENT RECORDER: ICD-10-CM

## 2022-01-11 PROCEDURE — G2066 INTER DEVC REMOTE 30D: HCPCS | Performed by: INTERNAL MEDICINE

## 2022-01-11 PROCEDURE — 93298 REM INTERROG DEV EVAL SCRMS: CPT | Performed by: INTERNAL MEDICINE

## 2022-01-17 DIAGNOSIS — Z13.31 POSITIVE DEPRESSION SCREENING: ICD-10-CM

## 2022-01-17 RX ORDER — BUPROPION HYDROCHLORIDE 150 MG/1
150 TABLET ORAL EVERY MORNING
Qty: 30 TABLET | Refills: 5 | Status: SHIPPED | OUTPATIENT
Start: 2022-01-17 | End: 2022-02-14

## 2022-01-21 ENCOUNTER — TELEPHONE (OUTPATIENT)
Dept: INTERNAL MEDICINE CLINIC | Age: 72
End: 2022-01-21

## 2022-01-21 RX ORDER — SEMAGLUTIDE 1.34 MG/ML
0.5 INJECTION, SOLUTION SUBCUTANEOUS WEEKLY
Qty: 2 PEN | Refills: 0 | COMMUNITY
Start: 2022-01-21

## 2022-01-31 ENCOUNTER — TELEPHONE (OUTPATIENT)
Dept: CARDIOLOGY CLINIC | Age: 72
End: 2022-01-31

## 2022-01-31 NOTE — TELEPHONE ENCOUNTER
Sample requested:   rivaroxaban (XARELTO) 20 MG TABS tablet     Dosage:   20 MG    Patient's call back number:  [phone]

## 2022-02-01 NOTE — TELEPHONE ENCOUNTER
LOV : 10/13/2021  NOV : 02/02/2022    Provided the patient with 2 bottles/boxes of Xarelto   LOT #: [id number]  Exp : 04/24    Called the patient and advised that samples have been placed upfront and are ready for pickup. Patient voiced understanding.

## 2022-02-02 ENCOUNTER — OFFICE VISIT (OUTPATIENT)
Dept: CARDIOLOGY CLINIC | Age: 72
End: 2022-02-02
Payer: MEDICARE

## 2022-02-02 VITALS
DIASTOLIC BLOOD PRESSURE: 60 MMHG | HEART RATE: 84 BPM | WEIGHT: 242 LBS | BODY MASS INDEX: 41.32 KG/M2 | HEIGHT: 64 IN | SYSTOLIC BLOOD PRESSURE: 128 MMHG | OXYGEN SATURATION: 96 %

## 2022-02-02 DIAGNOSIS — I48.0 PAROXYSMAL ATRIAL FIBRILLATION (HCC): ICD-10-CM

## 2022-02-02 DIAGNOSIS — G47.33 OSA (OBSTRUCTIVE SLEEP APNEA): ICD-10-CM

## 2022-02-02 DIAGNOSIS — E78.00 PURE HYPERCHOLESTEROLEMIA: ICD-10-CM

## 2022-02-02 DIAGNOSIS — Z13.1 DIABETES MELLITUS SCREENING: ICD-10-CM

## 2022-02-02 DIAGNOSIS — I10 BENIGN ESSENTIAL HTN: ICD-10-CM

## 2022-02-02 DIAGNOSIS — R00.2 PALPITATIONS: ICD-10-CM

## 2022-02-02 DIAGNOSIS — I50.32 CHRONIC DIASTOLIC HEART FAILURE (HCC): Primary | ICD-10-CM

## 2022-02-02 DIAGNOSIS — R06.02 SOB (SHORTNESS OF BREATH): ICD-10-CM

## 2022-02-02 PROBLEM — E11.9 TYPE 2 DIABETES MELLITUS (HCC): Status: ACTIVE | Noted: 2022-02-02

## 2022-02-02 PROCEDURE — 1036F TOBACCO NON-USER: CPT | Performed by: INTERNAL MEDICINE

## 2022-02-02 PROCEDURE — G8399 PT W/DXA RESULTS DOCUMENT: HCPCS | Performed by: INTERNAL MEDICINE

## 2022-02-02 PROCEDURE — 1123F ACP DISCUSS/DSCN MKR DOCD: CPT | Performed by: INTERNAL MEDICINE

## 2022-02-02 PROCEDURE — 3017F COLORECTAL CA SCREEN DOC REV: CPT | Performed by: INTERNAL MEDICINE

## 2022-02-02 PROCEDURE — 99215 OFFICE O/P EST HI 40 MIN: CPT | Performed by: INTERNAL MEDICINE

## 2022-02-02 PROCEDURE — 4040F PNEUMOC VAC/ADMIN/RCVD: CPT | Performed by: INTERNAL MEDICINE

## 2022-02-02 PROCEDURE — G8427 DOCREV CUR MEDS BY ELIG CLIN: HCPCS | Performed by: INTERNAL MEDICINE

## 2022-02-02 PROCEDURE — G8484 FLU IMMUNIZE NO ADMIN: HCPCS | Performed by: INTERNAL MEDICINE

## 2022-02-02 PROCEDURE — 1090F PRES/ABSN URINE INCON ASSESS: CPT | Performed by: INTERNAL MEDICINE

## 2022-02-02 PROCEDURE — G8417 CALC BMI ABV UP PARAM F/U: HCPCS | Performed by: INTERNAL MEDICINE

## 2022-02-02 RX ORDER — DILTIAZEM HYDROCHLORIDE 120 MG/1
120 CAPSULE, COATED, EXTENDED RELEASE ORAL DAILY
Qty: 90 CAPSULE | Refills: 3 | Status: SHIPPED | OUTPATIENT
Start: 2022-02-02

## 2022-02-02 NOTE — PROGRESS NOTES
[address]   Advanced Heart Failure/Pulmonary Hypertension  Cardiac Follow up       Dank Barron  YOB: 1950     Date of Visit:  2/2/22     Chief Complaint   Patient presents with    6 Month Follow-Up    Congestive Heart Failure    Palpitations     HR occasionally racing     History of present illness: Dank Barron is a 73 y.o. female with past medical history significant for HTN, HUSSAIN on CPAP, multiple PE on Xarelto (8/2016). She original had a PE was treated with xarelto for recommended time and then off it and her RHC revealed pressures no RH elevated pressures. Afterwards she developed AFib and restarted on xarelto. She continues on xarelto and treatment for afib. Echos in July and August (2016) showed RVSP 106-112 without evidence of enlargement in right side of heart. RHC was done 9/2/16 and PA pressure was 24, no evidence of pulmonary hypertension. Since then, her RVSP has decreased and got back to normal. We discussed that we are questioning if the elevated ones by echo may have been due to a false positive test for her. She was seen by Dr Jett Brown 8/1 and he is managing her hypothyroidism. Baker, the thyroid medication was stopped. She was noted to have negative thyroid ultrasound ( July 2018). She wears her CPAP consistently for her HUSSAIN. States she had a reaction to Simvastatin and Livalo with muscle pain. She states she is Prediabetic and has not lost weight. She was in a study for Novavax; she did receive the usual vaccine in May and has felt very fatigued since then. She was admitted 1/13/2020 with palpitations/light-headedness along with SOB and chest pressure. She underwent successful DCCV and was put on Rhythmol -- this caused her HR to drop into the 40's which made her feel bad. She was discharged on diltiazem 240mg qd which causes her to feel fatigued and dizzy. She remains on Xarelto. Her lisinopril was stopped. She had an AF ablation on 3/27/2020.   On 4/2/20 she reported that she was back in atrial fib with rates ranging from 80 to 130. She did not want to go to the ED. She was instructed to resume her amiodarone and cardizem. ILR implanted 8/17/20. Echo 3/1/21 showed EF 55-60%. She is s/p Rt. TKR 3/16/21 -- needed Lovenox bridge. Today, she is here for follow up. Overall, she feels OK. She denies exertional chest pain, LUGO/PND,  light-headedness. She has LE edema; she states she has not eaten healthily this winter. She's only been taking diltiazem 60mg daily but can take it up to three times a day for fast heart rates (she did not know she could take it more often); she is still having intermittent palpitations. Prior to Visit Medications    Medication Sig Taking?  Authorizing Provider   buPROPion (WELLBUTRIN XL) 150 MG extended release tablet Take 1 tablet by mouth every morning Yes Brenda Sanches MD   Ciclesonide (ALVESCO) 160 MCG/ACT AERS Inhale into the lungs Yes Historical Provider, MD   pramipexole (MIRAPEX) 0.5 MG tablet 1.5 tab q 5 PM and 1.5 tab qHS Yes Tiffanie R Kehrt, APRN - CNP   furosemide (LASIX) 40 MG tablet Take 1 tablet by mouth daily Yes Federico Casillas MD   potassium chloride (KLOR-CON M) 10 MEQ extended release tablet TAKE ONE TABLET BY MOUTH DAILY Yes Federico Casillas MD   lisinopril (PRINIVIL;ZESTRIL) 10 MG tablet Take 1 tablet by mouth nightly Yes Feedrico Casillas MD   dilTIAZem (CARDIZEM) 60 MG tablet TAKE ONE TABLET BY MOUTH THREE TIMES A DAY AS NEEDED FOR FAST HEARTBEAT GREATER THAN 100 AND PALPITATIONS Yes Federico Casillas MD   rivaroxaban (XARELTO) 20 MG TABS tablet Take 1 tablet by mouth Daily with supper Yes Aury Mendes MD   Semaglutide,0.25 or 0.5MG/DOS, (OZEMPIC, 0.25 OR 0.5 MG/DOSE,) 2 MG/1.5ML SOPN Inject 0.5 mg into the skin once a week Sample Yes Aury Mendes MD   spironolactone (ALDACTONE) 25 MG tablet TAKE ONE TABLET BY MOUTH DAILY Yes Federico Casillas MD   fluticasone (FLONASE) 50 MCG/ACT nasal spray 1 spray by Each Nostril route daily Yes Historical Provider, MD   fluticasone (FLOVENT HFA) 110 MCG/ACT inhaler Inhale 1 puff into the lungs 2 times daily Yes Jennyefr Glass MD   azelastine (ASTELIN) 0.1 % nasal spray 1 spray by Nasal route 2 times daily 1 Spray in each nostril Yes Jennyfer Glass MD   Tens Unit MISC by Does not apply route Yes Historical Provider, MD   Handminerva [place] MISC by Does not apply route Exp 5 years Yes Alfred Gailcia APRN - CNP   Multiple Vitamins-Minerals (THERAPEUTIC MULTIVITAMIN-MINERALS) tablet Take 1 tablet by mouth daily Yes Historical Provider, MD   CPAP Machine MISC by Does not apply route Yes Historical Provider, MD   EPIPEN 2-MIR 0.3 MG/0.3ML SOAJ injection  Yes Historical Provider, MD   Semaglutide,0.25 or 0.5MG/DOS, (OZEMPIC, 0.25 OR 0.5 MG/DOSE,) 2 MG/1.5ML SOPN Inject 0.5 mg into the skin once a week  Jennyfer Glass MD   Semaglutide,0.25 or 0.5MG/DOS, (OZEMPIC, 0.25 OR 0.5 MG/DOSE,) 2 MG/1.5ML SOPN Inject 0.5 mg into the skin once a week  Jennyfer Glass MD   albuterol sulfate  (90 Base) MCG/ACT inhaler Use 2 puffs 4 times daily for 7 days then as needed for wheezing. Dispense with Spacer and instruct in use. At patient's preference may use 60 dose MDI. May Sub Pro-Air or Proventil as needed per insurance. Patient not taking: Reported on 11/10/2021  EVERETTE Gonzalez   Magnesium Citrate 100 MG TABS Take 1 tablet by mouth daily  Patient not taking: Reported on 10/13/2021  Lola Agudelo MD       Social History     Tobacco Use    Smoking status: Former Smoker     Packs/day: 0.50     Years: 5.00     Pack years: 2.50     Quit date: 10/5/2013     Years since quittin.3    Smokeless tobacco: Never Used    Tobacco comment: smoked for a total of 5yr total   Vaping Use    Vaping Use: Never used   Substance Use Topics    Alcohol use: Yes     Comment: RARE    Drug use:  No            Past Medical History:   Diagnosis Date    Allergic     Anesthesia complication family hx-father-at at age 80 having hip replacement revision surgery-had tia's x2 while under anes-but also had hx chf-had dificuly with speech when coming out from [address]     left ankle, bilateral hands    Arthritis of left hip 2/2/2016    Arthritis of right hip 4/19/2016    Asthma     At risk for falls     uses a cane    Atrial fibrillation with rapid ventricular response ([address]     Atrial flutter ([address] 4/25/2018    Chronic maxillary sinusitis 5/24/2017    CTEPH (chronic thromboembolic pulmonary hypertension) ([address] 8/26/2016    Depression     pt stated r/t bad marriage/alcoholic spouse    Diabetes mellitus ([address]     borderline    Disc disease, degenerative, lumbar or lumbosacral 2/20/2017    Hepatic steatosis 4/26/2019    History of total hip arthroplasty 2/28/2017    Hypertension     Leg cramps     patient states very severe, requires immediate potassium administration    Migraines, basilar     last migraine 10 years ago    Mild persistent asthma without complication 6/94/7487    Obesity, Class III, BMI 40-49.9 (morbid obesity) ([address] 4/19/2016    HUSSAIN (obstructive sleep apnea) 10/14/2013    Osteoarthritis, hip, bilateral 2016    Bilateral hip arthroplasty    Panic attacks     Pneumonia 2015    Primary osteoarthritis of both knees 9/19/2017    Primary osteoarthritis of left knee 12/15/2016    Pulmonary emboli ([address] 8/4/2016    Pulmonary HTN ([address] 7/21/2016    Pure hypercholesterolemia 2/13/2019    Restless leg syndrome     Rhinitis, chronic 3/26/2014    Sleep apnea     wears CPAP @11    Stress incontinence     Tear of left rotator cuff 1/23/2018    Thyroid disease     hypothyroid    Wears glasses      Past Surgical History:   Procedure Laterality Date    ATRIAL ABLATION SURGERY      BACK SURGERY  2004    LUMBAR FUSION    CATARACT REMOVAL Bilateral     COLONOSCOPY      EYE SURGERY Right 2010    retinal tear repaired   [facility]    right ring finger severe laceration, fracture    HIP ARTHROPLASTY Right 16    HYSTERECTOMY      JOINT REPLACEMENT Left 16    left hip    TONSILLECTOMY  1972    TOTAL KNEE ARTHROPLASTY Right 3/16/2021    RIGHT ROBOTIC ASSISTED TOTAL KNEE ARTHROPLASTY (84275, [id number]) - [employer] performed by Jerad Leavitt MD at [address] History     Tobacco Use    Smoking status: Former Smoker     Packs/day: 0.50     Years: 5.00     Pack years: 2.50     Quit date: 10/5/2013     Years since quittin.3    Smokeless tobacco: Never Used    Tobacco comment: smoked for a total of 5yr total   Substance Use Topics    Alcohol use: Yes     Comment: RARE       Review of Systems:   Constitutional: No significant change in weight, fatigue or weakness. HEENT: No change in vision or ringing in the ears. Respiratory: +LUGO, no PND, orthopnea or cough. Cardiovascular: See HPI   GI: No n/v, abdominal pain or changes in bowel habits. No melena, no hematochezia  : No dysuria or hematuria. Skin: No rash or new skin lesions. Musculoskeletal:   Neurological: No lightheadedness, dizziness, syncope or TIA-like symptoms.   Psychiatric: No anxiety, insomnia or depression    Physical Exam:  Vitals:    22 1329   BP: 128/60   Site: Left Upper Arm   Position: Sitting   Cuff Size: Large Adult   Pulse: 84   SpO2: 96%   Weight: 242 lb (109.8 kg)   Height: 5' 4\" (1.626 m)     Constitutional and General Appearance: Stable   WD/WN in NAD  HEENT:  NC/AT  GENET  No problems with hearing  Respiratory:  · Normal excursion and expansion without use of accessory muscles  · Resp Auscultation: Normal breath sounds without dullness  Cardiovascular:  · The apical impulses not displaced  · Heart tones are crisp and normal  · Cervical veins are not engorged  · The carotid upstroke is normal in amplitude and contour without delay or bruit  · JVP < 8 cm H2O  RRR with nl S1 and S2 without m,r,g  · Peripheral pulses are symmetrical and full  · There is no clubbing, cyanosis of the extremities. · Trace edema 1+  · Femoral Arteries: 2+ and equal  · Pedal Pulses: 2+ and equal   Abdomen:  · No masses or tenderness  · Liver/Spleen: No Abnormalities Noted  Neurological/Psychiatric:  · Alert and oriented in all spheres  · Moves all extremities well  · Exhibits normal gait balance and coordination  · No abnormalities of mood, affect, memory, mentation, or behavior are noted    Labs were reviewed including labs from other hospital systems through [facility]. Cardiac testing was reviewed including echos, nuclear scans, cardiac catheterization, including from other hospital systems through [facility]. Labs:   Lab Results   Component Value Date    WBC 7.3 06/08/2021    HGB 13.2 06/08/2021    HCT 38.4 06/08/2021    MCV 90.1 06/08/2021     06/08/2021     Lab Results   Component Value Date     12/21/2021    K 4.5 12/21/2021    K 3.9 03/28/2021    CL 99 12/21/2021    CO2 21 12/21/2021    BUN 20 12/21/2021    CREATININE 0.8 12/21/2021    GLUCOSE 98 12/21/2021    GLUCOSE 100 03/27/2012    CALCIUM 9.3 12/21/2021      Lab Results   Component Value Date    TRIG 113 11/13/2020    HDL 61 11/13/2020    LDLCALC 107 11/13/2020    LABVLDL 23 11/13/2020     Diagnostic Testing:    ECHO 3/1/21   -Normal left ventricle size, wall thickness and systolic function with an estimated ejection fraction of 55-60%. -No regional wall motion abnormalities are seen. - E/e' = 12.5. Grade II diastolic dysfunction with elevated LV filling pressures.   -The aortic valve appears sclerotic but opens well.   -Trivial mitral regurgitation.   -Mild tricuspid regurgitation.   -Mild pulmonic regurgitation present. -IVC size is normal (<2.1 cm) but collapses < 50% with respiration consistent with elevated RA pressure (8 mmHg).    -Estimated pulmonary artery systolic pressure is elevated at 48mmHg assuming a right atrial pressure of 8 mmHg.    Procedure performed 3/27/2020  · Electrophysiology study with left atrial recording and mapping   · 3-D electroanatomical mapping of the left atrium and  right atium. · Electrophysiological study(EPS) and induction after IV drug infusion  · Transseptal puncture through an intact septum . · Intracardiac echocardiography. · Radiofrequency ablation of atrial fibrillation and pulmonary veins isolation   · Ablation of CTI depndent flutter as a separate mechanism  · Ultrasound for access    ECHO 8/20/2019   -Left ventricular cavity size is normal.   -There is mild left ventricular hypertrophy.   -Ejection fraction is visually estimated to be 55-60%. -No wall motion abnormalities.   -Normal diastolic function.   -Trivial mitral regurgitation.   -Mild tricuspid regurgitation     Calcium CT Score 2/19/2019   Total Agatston calcium score of 51.    24 hour holter and it revealed SR/PVCs, no afib or arrhythmias. Echo 4/26/18  Left ventricular cavity size is normal.  There is mild left ventricular hypertrophy. Ejection fraction is visually estimated to be 55-60%. Normal diastolic function. Mild tricuspid regurgitation with RVSP of 36 mm/hg    VQ scan 8/30/17  Low Probability for Pulmonary Embolus. Echo 8/28/17  Normalleft ventricle size and systolic function with an estimated ejection fraction of 60%. Mild mitral regurgitation is present. There is mild-moderate tricuspid regurgitation with RVSP estimated at 88 mmHg. This is suggestive of severe pulmonary hypertension. Mild pulmonic regurgitation present. Echo 9/1/2016  Technically limited examination to evaluate RVSP. Overall left ventricular function is normal.  Normal right ventricular size and function. Mild tricuspid regurgitation with RVSP estimated at 95 mmHg. No evidence of any pericardial effusion.     [address] 9/1/2016  Pressures were as follows:  RA: 9 mmHg  RV:  38/12 mmHg  PA:  33/16, mean 24 mmHg  PCWP:  14 mmHg  Thermodilution CO: 7.45 (Site: Left Upper Arm, Position: Sitting, Cuff Size: Large Adult)   Pulse 84   Ht 5' 4\" (1.626 m)   Wt 242 lb (109.8 kg)   SpO2 96%   BMI 41.54 kg/m²           Pure hypercholesterolemia:   Mother had CAD. CT calcium score 51. Could not tolerate statin or Livalo. -11/13/2020> , , HDL 61, . Some improvement noted in LDL and TC.   -5/4/20 > , TG 92, HDL 66, . She decided not to start Zetia. 4. HUSSAIN (obstructive sleep apnea):  Wearing CPAP faithfully. 5. Symptomatic bradycardia, h/o:  Regular rhythm today rate of 84. Plan:  1. Begin Cardizem CD 120mg qd  2. Fasting labs soon> CMP, BNP, CBC, lipids, TSH, FT4, A1c  3. RTO in 4-6 months with an ECHO same day    Time Based Itemization  A total of 40 minutes was spent on today's patient encounter. If applicable, non-patient-facing activities:  ( x)Preparing to see the patient and reviewing records  (x ) Individual interpretation of results  (x ) Discussion or coordination of care with other health care professionals (Dr. Almaz Jones, Dr. Pedro Boswell)   ( x) Ordering of unique tests, medications, or procedures  ( x) Documentation within the EHR    Thank you for allowing me to participate in the care of your patient. Iris García M.D., [facility] [place]    Scribe's attestation: This note was scribed in the presence of Dr. Tejas Roach MD, by Tiffanie Duggan RN. The scribe's documentation has been prepared under my direction and personally reviewed by me in its entirety. I confirm that the note above accurately reflects all work, treatment, procedures, and medical decision making performed by me.

## 2022-02-09 ENCOUNTER — HOSPITAL ENCOUNTER (EMERGENCY)
Age: 72
Discharge: HOME | End: 2022-02-10
Attending: EMERGENCY MEDICINE
Payer: MEDICARE

## 2022-02-09 ENCOUNTER — APPOINTMENT (OUTPATIENT)
Dept: GENERAL RADIOLOGY | Age: 72
End: 2022-02-09
Payer: MEDICARE

## 2022-02-09 VITALS
SYSTOLIC BLOOD PRESSURE: 124 MMHG | OXYGEN SATURATION: 98 % | DIASTOLIC BLOOD PRESSURE: 59 MMHG | TEMPERATURE: 98 F | BODY MASS INDEX: 39.65 KG/M2 | HEART RATE: 79 BPM | RESPIRATION RATE: 17 BRPM | WEIGHT: 238 LBS | HEIGHT: 65 IN

## 2022-02-09 DIAGNOSIS — R00.2 PALPITATIONS: Primary | ICD-10-CM

## 2022-02-09 DIAGNOSIS — Z86.79 HISTORY OF ATRIAL FIBRILLATION: ICD-10-CM

## 2022-02-09 LAB
A/G RATIO: 1.3 (ref 1.1–2.2)
ALBUMIN SERPL-MCNC: 4.1 G/DL (ref 3.4–5)
ALP BLD-CCNC: 101 U/L (ref 40–129)
ALT SERPL-CCNC: 27 U/L (ref 10–40)
ANION GAP SERPL CALCULATED.3IONS-SCNC: 15 MMOL/L (ref 3–16)
APTT: 52.4 SEC (ref 26.2–38.6)
AST SERPL-CCNC: 19 U/L (ref 15–37)
BASOPHILS ABSOLUTE: 0.1 K/UL (ref 0–0.2)
BASOPHILS RELATIVE PERCENT: 1.1 %
BILIRUB SERPL-MCNC: <0.2 MG/DL (ref 0–1)
BUN BLDV-MCNC: 21 MG/DL (ref 7–20)
CALCIUM SERPL-MCNC: 9.4 MG/DL (ref 8.3–10.6)
CHLORIDE BLD-SCNC: 97 MMOL/L (ref 99–110)
CO2: 20 MMOL/L (ref 21–32)
CREAT SERPL-MCNC: 1 MG/DL (ref 0.6–1.2)
EOSINOPHILS ABSOLUTE: 0.1 K/UL (ref 0–0.6)
EOSINOPHILS RELATIVE PERCENT: 1.5 %
GFR AFRICAN AMERICAN: >60
GFR NON-AFRICAN AMERICAN: 55
GLUCOSE BLD-MCNC: 160 MG/DL (ref 70–99)
HCT VFR BLD CALC: 39.6 % (ref 36–48)
HEMOGLOBIN: 13.4 G/DL (ref 12–16)
INR BLD: 1.55 (ref 0.88–1.12)
LYMPHOCYTES ABSOLUTE: 1.5 K/UL (ref 1–5.1)
LYMPHOCYTES RELATIVE PERCENT: 21.4 %
MCH RBC QN AUTO: 32.3 PG (ref 26–34)
MCHC RBC AUTO-ENTMCNC: 33.8 G/DL (ref 31–36)
MCV RBC AUTO: 95.4 FL (ref 80–100)
MONOCYTES ABSOLUTE: 0.6 K/UL (ref 0–1.3)
MONOCYTES RELATIVE PERCENT: 8.5 %
NEUTROPHILS ABSOLUTE: 4.7 K/UL (ref 1.7–7.7)
NEUTROPHILS RELATIVE PERCENT: 67.5 %
PDW BLD-RTO: 14 % (ref 12.4–15.4)
PLATELET # BLD: 260 K/UL (ref 135–450)
PMV BLD AUTO: 8.3 FL (ref 5–10.5)
POTASSIUM SERPL-SCNC: 3.7 MMOL/L (ref 3.5–5.1)
PRO-BNP: 66 PG/ML (ref 0–124)
PROTHROMBIN TIME: 17.8 SEC (ref 9.9–12.7)
RBC # BLD: 4.15 M/UL (ref 4–5.2)
SODIUM BLD-SCNC: 132 MMOL/L (ref 136–145)
TOTAL PROTEIN: 7.2 G/DL (ref 6.4–8.2)
TROPONIN: <0.01 NG/ML
WBC # BLD: 7 K/UL (ref 4–11)

## 2022-02-09 PROCEDURE — 85025 COMPLETE CBC W/AUTO DIFF WBC: CPT

## 2022-02-09 PROCEDURE — 85730 THROMBOPLASTIN TIME PARTIAL: CPT

## 2022-02-09 PROCEDURE — 99285 EMERGENCY DEPT VISIT HI MDM: CPT

## 2022-02-09 PROCEDURE — 36415 COLL VENOUS BLD VENIPUNCTURE: CPT

## 2022-02-09 PROCEDURE — 71045 X-RAY EXAM CHEST 1 VIEW: CPT

## 2022-02-09 PROCEDURE — 93005 ELECTROCARDIOGRAM TRACING: CPT | Performed by: EMERGENCY MEDICINE

## 2022-02-09 PROCEDURE — 85610 PROTHROMBIN TIME: CPT

## 2022-02-09 PROCEDURE — 83880 ASSAY OF NATRIURETIC PEPTIDE: CPT

## 2022-02-09 PROCEDURE — 80053 COMPREHEN METABOLIC PANEL: CPT

## 2022-02-09 PROCEDURE — 84484 ASSAY OF TROPONIN QUANT: CPT

## 2022-02-09 ASSESSMENT — ENCOUNTER SYMPTOMS
NAUSEA: 0
RHINORRHEA: 0
SHORTNESS OF BREATH: 0
VOMITING: 0
ABDOMINAL PAIN: 0
COUGH: 0
WHEEZING: 0
DIARRHEA: 0

## 2022-02-10 ENCOUNTER — TELEPHONE (OUTPATIENT)
Dept: CARDIOLOGY CLINIC | Age: 72
End: 2022-02-10

## 2022-02-10 ENCOUNTER — PATIENT MESSAGE (OUTPATIENT)
Dept: INTERNAL MEDICINE CLINIC | Age: 72
End: 2022-02-10

## 2022-02-10 LAB
EKG ATRIAL RATE: 88 BPM
EKG DIAGNOSIS: NORMAL
EKG P AXIS: 39 DEGREES
EKG P-R INTERVAL: 134 MS
EKG Q-T INTERVAL: 364 MS
EKG QRS DURATION: 86 MS
EKG QTC CALCULATION (BAZETT): 440 MS
EKG R AXIS: 12 DEGREES
EKG T AXIS: 59 DEGREES
EKG VENTRICULAR RATE: 88 BPM

## 2022-02-10 PROCEDURE — 93010 ELECTROCARDIOGRAM REPORT: CPT | Performed by: INTERNAL MEDICINE

## 2022-02-10 NOTE — ED PROVIDER NOTES
I independently saw performed a substantive portion of the visit (history, physical, and MDM) on Linda Joiner. All diagnostic, treatment, and disposition decisions were made by myself in conjunction with the advanced practice provider. I have participated in the medical decision making and directed the treatment plan and disposition of the patient. For further details of [facility] emergency department encounter, please see the advanced practice provider's documentation. CHIEF COMPLAINT  Chief Complaint   Patient presents with    Palpitations     [facility] rapid heart rate. Pt reports feeling palpitations and her heart racing since 20:00. Per EMS  during transport. HR on arrival 94, complaint free at this time. Briefly, Linda Joiner is a 73 y.o. female  who presents to the ED complaining of acute onset around 8/8:15pm with palpitations and feeling flushed. EMS reported tachycardia 180's en route. Did not get any medicine en route and patient on arrival has normal heart rate and is feeling better. No palpitations anymore. No CP or SOB though. On Xarelto. Has PAF in the past and a previous ablation in the past.    FOCUSED PHYSICAL EXAMINATION  /61   Pulse 84   Temp 98 °F (36.7 °C) (Oral)   Resp 23   Ht 5' 5\" (1.651 m)   Wt 238 lb (108 kg)   SpO2 98%   BMI 39.61 kg/m²    Focused physical examination notable for no acute distress, well-appearing, well-nourished, normal speech and mentation without obvious facial droop, no obvious rash. No obvious cranial nerve deficits on my initial exam. RRR CTAB.     The 12 lead EKG was interpreted by me as follows:  Rate: normal with a rate of 88  Rhythm: sinus with sinus arrhythmia  Axis: normal  Intervals: normal DE, narrow QRS, normal QTc  ST segments: no ST elevations or depressions  T waves: no abnormal inversions  Non-specific T wave changes: not present  Prior EKG comparison: EKG dated 10/13/21 is not significantly different      MDM:  ED course was notable for suspected transient tachydysrhythmia, rapid afib vs SVT, spontaneously cardioverted prehospital without medications. Feeling a lot better now. Labs wholly reassuring. Already on anticoagulation. No metabolic derangement. F/u with EP as outpt and RTED for new/worsenings sx. Sx not c/w ACS at this time. CLINICAL IMPRESSION  1. Palpitations    2. History of atrial fibrillation        DISPOSITION  Dwayne Quintanilla was discharged to home in stable condition. I have discussed the findings of today's workup with the patient and addressed the patient's questions and concerns. Important warning signs as well as new or worsening symptoms which would necessitate immediate return to the ED were discussed. The plan is to discharge from the ED at this time, and the patient is in stable condition. The patient acknowledged understanding is agreeable with this plan. Follow-up with:  Anali Hoyos MD  [address] [place] [address]  [phone]    Schedule an appointment as soon as possible for a visit       [facility] Emergency Department  [address] [facility]  [phone]  Go to   If symptoms worsen      This chart was created using Dragon dictation software. Efforts were made by me to ensure accuracy, however some errors may be present due to limitations of this technology.             Collins Weber MD  02/09/22 [id number]

## 2022-02-10 NOTE — ED PROVIDER NOTES
[address] [facility]        Pt Name: Deanne Whitten  MRN: [id number]  [place] 1950  Date of evaluation: 2/9/2022  Provider: Axel Bernard PA-C  PCP: Aislinn Zavala MD  Note Started: 9:54 PM EST        I have seen and evaluated this patient with my supervising physician Faye Sumner MD.    [facility]       Chief Complaint   Patient presents with    Palpitations     Pt BIB [facility] from home for rapid heart rate. Pt reports feeling palpitations and her heart racing since 20:00. Per EMS  during transport. HR on arrival 94, complaint free at this time. HISTORY OF PRESENT ILLNESS   (Location, Timing/Onset, Context/Setting, Quality, Duration, Modifying Factors, Severity, Associated Signs and Symptoms)  Note limiting factors. Chief Complaint: Palpitations     Deanne Whitten is a 73 y.o. female who presents evaluation of palpitations that began about an hour prior to arriving in the ED. Patient does have history of paroxysmal atrial fibrillation and had ablation. She is anticoagulated on Eliquis. Patient states that it came all of a sudden while she was watching TV. No significant chest pain. She did feel little lightheaded but no spinning type dizziness, focal weakness visual disturbances or syncope. Mild shortness of breath. States that it feels similar to history of A. fib RVR that she has had in the past.  She denies history of SVT. She has no other complaints or concerns at this time. Nursing Notes were all reviewed and agreed with or any disagreements were addressed in the HPI. REVIEW OF SYSTEMS    (2-9 systems for level 4, 10 or more for level 5)     Review of Systems   Constitutional: Negative for appetite change, chills and fever. HENT: Negative for congestion and rhinorrhea. Respiratory: Negative for cough, shortness of breath and wheezing. Cardiovascular: Positive for palpitations. Negative for chest pain. Gastrointestinal: Negative for abdominal pain, diarrhea, nausea and vomiting. Genitourinary: Negative for difficulty urinating, dysuria and hematuria. Musculoskeletal: Negative for neck pain and neck stiffness. Skin: Negative for rash. Neurological: Negative for headaches. Positives and Pertinent negatives as per HPI. Except as noted above in the ROS, all other systems were reviewed and negative.        PAST MEDICAL HISTORY     Past Medical History:   Diagnosis Date    Allergic     Anesthesia complication     family hx-father-at at age 80 having hip replacement revision surgery-had tia's x2 while under anes-but also had hx chf-had dificuly with speech when coming out from anes    Anxiety     Arthritis     left ankle, bilateral hands    Arthritis of left hip 2/2/2016    Arthritis of right hip 4/19/2016    Asthma     At risk for falls     uses a cane    Atrial fibrillation with rapid ventricular response ([address]     Atrial flutter ([address] 4/25/2018    Chronic maxillary sinusitis 5/24/2017    CTEPH (chronic thromboembolic pulmonary hypertension) ([address] 8/26/2016    Depression     pt stated r/t bad marriage/alcoholic spouse    Diabetes mellitus ([address]     borderline    Disc disease, degenerative, lumbar or lumbosacral 2/20/2017    Hepatic steatosis 4/26/2019    History of total hip arthroplasty 2/28/2017    Hypertension     Leg cramps     patient states very severe, requires immediate potassium administration    Migraines, basilar     last migraine 10 years ago    Mild persistent asthma without complication 0/31/0788    Obesity, Class III, BMI 40-49.9 (morbid obesity) ([address] 4/19/2016    HUSSAIN (obstructive sleep apnea) 10/14/2013    Osteoarthritis, hip, bilateral 2016    Bilateral hip arthroplasty    Panic attacks     Pneumonia 2015    Primary osteoarthritis of both knees 9/19/2017    Primary osteoarthritis of left knee 12/15/2016    Pulmonary emboli ([address] 8/4/2016    Pulmonary HTN ([address] 7/21/2016    Pure hypercholesterolemia 2/13/2019    Restless leg syndrome     Rhinitis, chronic 3/26/2014    Sleep apnea     wears CPAP @11    Stress incontinence     Tear of left rotator cuff 1/23/2018    Thyroid disease     hypothyroid    Wears glasses          SURGICAL HISTORY     Past Surgical History:   Procedure Laterality Date    ATRIAL ABLATION SURGERY      BACK SURGERY  2004    LUMBAR FUSION    CATARACT REMOVAL Bilateral     COLONOSCOPY      EYE SURGERY Right 2010    retinal tear repaired   [facility]    right ring finger severe laceration, fracture    HIP ARTHROPLASTY Right 4-19-16    HYSTERECTOMY  1993    JOINT REPLACEMENT Left 2/2/16    left hip    TONSILLECTOMY  1972    TOTAL KNEE ARTHROPLASTY Right 3/16/2021    RIGHT ROBOTIC ASSISTED TOTAL KNEE ARTHROPLASTY (40164, 29709) - [employer] performed by Lilia Gibsno MD at [address] [place] [address]       Previous Medications    ALBUTEROL SULFATE  (90 BASE) MCG/ACT INHALER    Use 2 puffs 4 times daily for 7 days then as needed for wheezing. Dispense with Spacer and instruct in use. At patient's preference may use 60 dose MDI. May Sub Pro-Air or Proventil as needed per insurance.     AZELASTINE (ASTELIN) 0.1 % NASAL SPRAY    1 spray by Nasal route 2 times daily 1 Spray in each nostril    BUPROPION (WELLBUTRIN XL) 150 MG EXTENDED RELEASE TABLET    Take 1 tablet by mouth every morning    CICLESONIDE (ALVESCO) 160 MCG/ACT AERS    Inhale into the lungs    CPAP MACHINE MISC    by Does not apply route    DILTIAZEM (CARDIZEM CD) 120 MG EXTENDED RELEASE CAPSULE    Take 1 capsule by mouth daily    DILTIAZEM (CARDIZEM) 60 MG TABLET    TAKE ONE TABLET BY MOUTH THREE TIMES A DAY AS NEEDED FOR FAST HEARTBEAT GREATER THAN 100 AND PALPITATIONS    EPIPEN 2-MIR 0.3 MG/0.3ML SOAJ INJECTION        FLUTICASONE (FLONASE) 50 MCG/ACT NASAL SPRAY    1 spray by Each Nostril route daily FLUTICASONE (FLOVENT HFA) 110 MCG/ACT INHALER    Inhale 1 puff into the lungs 2 times daily    FUROSEMIDE (LASIX) 40 MG TABLET    Take 1 tablet by mouth daily    HANDICAP PLACARD MISC    by Does not apply route Exp 5 years    LISINOPRIL (PRINIVIL;ZESTRIL) 10 MG TABLET    Take 1 tablet by mouth nightly    MAGNESIUM CITRATE 100 MG TABS    Take 1 tablet by mouth daily    MULTIPLE VITAMINS-MINERALS (THERAPEUTIC MULTIVITAMIN-MINERALS) TABLET    Take 1 tablet by mouth daily    POTASSIUM CHLORIDE (KLOR-CON M) 10 MEQ EXTENDED RELEASE TABLET    TAKE ONE TABLET BY MOUTH DAILY    PRAMIPEXOLE (MIRAPEX) 0.5 MG TABLET    1.5 tab q 5 PM and 1.5 tab qHS    RIVAROXABAN (XARELTO) 20 MG TABS TABLET    Take 1 tablet by mouth Daily with supper    SEMAGLUTIDE,0.25 OR 0.5MG/DOS, (OZEMPIC, 0.25 OR 0.5 MG/DOSE,) 2 MG/1.5ML SOPN    Inject 0.5 mg into the skin once a week Sample    SEMAGLUTIDE,0.25 OR 0.5MG/DOS, (OZEMPIC, 0.25 OR 0.5 MG/DOSE,) 2 MG/1.5ML SOPN    Inject 0.5 mg into the skin once a week    SEMAGLUTIDE,0.25 OR 0.5MG/DOS, (OZEMPIC, 0.25 OR 0.5 MG/DOSE,) 2 MG/1.5ML SOPN    Inject 0.5 mg into the skin once a week    SPIRONOLACTONE (ALDACTONE) 25 MG TABLET    TAKE ONE TABLET BY MOUTH DAILY    TENS UNIT MISC    by Does not apply route         ALLERGIES     Atorvastatin, Penicillins, Simvastatin, Cephalexin, Cephalosporins, Food, Other, Shellfish-derived products, and Sulfa antibiotics    FAMILYHISTORY       Family History   Problem Relation Age of Onset    Alcohol Abuse Sister     Arthritis Sister     Stroke Maternal Grandmother     Thyroid Disease Maternal Grandmother     Thyroid Disease Maternal Grandfather     Heart Disease Maternal Grandfather     Alcohol Abuse Maternal Grandfather     Substance Abuse Maternal Grandfather     Hypertension Mother     Thyroid Disease Mother     Anxiety Disorder Mother     Arthritis Mother     Cataracts Mother     Heart Disease Mother     Asthma Mother     Thyroid Disease Father  Heart Failure Father     Heart Disease Father     Arthritis Brother     High Cholesterol Brother     Heart Disease Maternal Uncle     Heart Disease Paternal Uncle     Cancer Paternal Uncle     Alcohol Abuse Paternal Grandfather     Substance Abuse Paternal Grandfather           SOCIAL HISTORY       Social History     Tobacco Use    Smoking status: Former Smoker     Packs/day: 0.50     Years: 5.00     Pack years: 2.50     Quit date: 10/5/2013     Years since quittin.3    Smokeless tobacco: Never Used    Tobacco comment: smoked for a total of 5yr total   Vaping Use    Vaping Use: Never used   Substance Use Topics    Alcohol use: Yes     Comment: RARE    Drug use: No       SCREENINGS    [place] Coma Scale  Eye Opening: Spontaneous  Best Verbal Response: Oriented  Best Motor Response: Obeys commands  [place] Coma Scale Score: 15        PHYSICAL EXAM    (up to 7 for level 4, 8 or more for level 5)     ED Triage Vitals [22 2145]   BP Temp Temp Source Pulse Resp SpO2 Height Weight   (!) 168/89 98 °F (36.7 °C) Oral 94 16 98 % 5' 5\" (1.651 m) 238 lb (108 kg)       Physical Exam  Vitals and nursing note reviewed. Constitutional:       Appearance: She is well-developed. She is not diaphoretic. HENT:      Head: Normocephalic and atraumatic. Right Ear: External ear normal.      Left Ear: External ear normal.      Nose: Nose normal.   Eyes:      General:         Right eye: No discharge. Left eye: No discharge. Cardiovascular:      Rate and Rhythm: Normal rate and regular rhythm. Heart sounds: Normal heart sounds. Pulmonary:      Effort: Pulmonary effort is normal. No respiratory distress. Breath sounds: Normal breath sounds. Chest:      Chest wall: No tenderness. Abdominal:      General: There is no distension. Palpations: Abdomen is soft. Tenderness: There is no abdominal tenderness. Musculoskeletal:         General: Normal range of motion.       Cervical back: Normal range of motion and neck supple. Skin:     General: Skin is warm and dry. Neurological:      Mental Status: She is alert and oriented to person, place, and time. Psychiatric:         Behavior: Behavior normal.         DIAGNOSTIC RESULTS   LABS:    Labs Reviewed   COMPREHENSIVE METABOLIC PANEL - Abnormal; Notable for the following components:       Result Value    Sodium 132 (*)     Chloride 97 (*)     CO2 20 (*)     Glucose 160 (*)     BUN 21 (*)     GFR Non- 55 (*)     All other components within normal limits    Narrative:     Performed at:  [facility]  [facility]   Phone [phone]   PROTIME-INR - Abnormal; Notable for the following components:    Protime 17.8 (*)     INR 1.55 (*)     All other components within normal limits    Narrative:     Performed at:  [facility]  [facility]   Phone [phone]   APTT - Abnormal; Notable for the following components:    aPTT 52.4 (*)     All other components within normal limits    Narrative:     Performed at:  [facility] [address]   Phone [phone]   CBC WITH AUTO DIFFERENTIAL    Narrative:     Performed at:  [facility]  [facility]   Phone [phone]   TROPONIN    Narrative:     Performed at:  [facility]  [facility]   Phone [phone]   BRAIN NATRIURETIC PEPTIDE    Narrative:     Performed at:  [facility] [address]   Phone [phone]       When ordered only abnormal lab results are displayed. All other labs were within normal range or not returned as of this dictation. EKG:  When ordered, EKG's are interpreted by the Emergency Department Physician in the absence of a cardiologist.  Please see their note for interpretation of EKG. RADIOLOGY:   Non-plain film images such as CT, Ultrasound and MRI are read by the radiologist. Plain radiographic images are visualized and preliminarily interpreted by the ED Provider with the below findings:        Interpretation per the Radiologist below, if available at the time of this note:    XR CHEST PORTABLE   Final Result   No evidence of acute cardiopulmonary disease           No results found. PROCEDURES   Unless otherwise noted below, none     Procedures    CRITICAL CARE TIME       CONSULTS:  None      EMERGENCY DEPARTMENT COURSE and DIFFERENTIAL DIAGNOSIS/MDM:   Vitals:    Vitals:    02/09/22 2145 02/09/22 2159 02/09/22 2215   BP: (!) 168/89 122/75 139/61   Pulse: 94 83 84   Resp: 16 18 23   Temp: 98 °F (36.7 °C)     TempSrc: Oral     SpO2: 98% 96% 98%   Weight: 238 lb (108 kg)     Height: 5' 5\" (1.651 m)         Patient was given the following medications:  Medications - No data to display        Patient presents for evaluation of palpitations. On exam, she is resting comfortably in bed in no acute distress and nontoxic. Vitals are stable and she is afebrile. Please see attending note for EKG interpretation, her, this does show normal sinus rhythm. EMS reports heart rate of 180 prior to arrival in the ED. It appears that she has converted back on her own without intervention. She will be reevaluated. CBC and CMP are unremarkable. Troponin is negative. Coags elevated with an INR of 1.55. Chest x-ray is negative. Patient remained asymptomatic in normal sinus rhythm entire stay in the ED. No indication for additional work-up imaging observation admission of the patient at this time. Encouraged to follow-up with her electrophysiologist, Dr. Drew Cruz. The patient has been evaluated and the history and physical exam suggest a benign etiology.  I see nothing to suggest acute coronary syndrome, myocardial infarction, pulmonary embolism, thoracic aortic dissection, significant pericarditis, pneumonia, pneumothorax, or acute abdomen. I feel the patient can be safely discharged to home with outpatient follow up. Instructions have been given for the patient to return to the Emergency Department for any worsening of the symptoms, including but not limited to increased pain, shortness of breath, abdominal pain or weakness. She is agreeable to this plan and stable for discharge at this time. FINAL IMPRESSION      1. Palpitations    2.  History of atrial fibrillation          DISPOSITION/PLAN   DISPOSITION        PATIENT REFERRED TO:  Jorge Alberto Mckay MD  [address] [place], [place] [id number]  [phone]    Schedule an appointment as soon as possible for a visit       [facility] Emergency Department  [address]  Go to   If symptoms worsen      DISCHARGE MEDICATIONS:  New Prescriptions    No medications on file       DISCONTINUED MEDICATIONS:  Discontinued Medications    No medications on file              (Please note that portions of this note were completed with a voice recognition program.  Efforts were made to edit the dictations but occasionally words are mis-transcribed.)    Danielle Daniel PA-C (electronically signed)            Taya Jacobo PA-C  02/09/22 4340

## 2022-02-10 NOTE — ED TRIAGE NOTES
Pt BIB [facility] EMS from home for rapid heart rate. Pt reports feeling palpitations and her heart racing since 20:00. Per EMS  during transport. HR on arrival 94, complaint free at this time.

## 2022-02-10 NOTE — ED NOTES
Pt cleared for discharge. Given all discharge and follow-up instructions verbally and in writing. Pt verbalized understanding of all instructions. Pending transport home, friend on his way.       Miriam Duran RN  02/09/22 9324

## 2022-02-11 ENCOUNTER — TELEPHONE (OUTPATIENT)
Dept: INTERNAL MEDICINE CLINIC | Age: 72
End: 2022-02-11

## 2022-02-11 ENCOUNTER — CARE COORDINATION (OUTPATIENT)
Dept: CARE COORDINATION | Age: 72
End: 2022-02-11

## 2022-02-11 NOTE — TELEPHONE ENCOUNTER
Dr Kerline Francois sent patient a message regarding getting this patient scheduled today or early next week.

## 2022-02-11 NOTE — CARE COORDINATION
Ambulatory Care Coordination  ED Follow up Call    Reason for ED visit:  Heart palpitations, feeling flushed   Status:     significantly improved    Did you call your PCP prior to going to the ED? No      Did you receive a discharge instructions from the Emergency Room? Yes  Review of Instructions:     Understands what to report/when to return?:  Yes   Understands discharge instructions?:  Yes   Following discharge instructions?:  Yes   If not why? n/a    Are there any new complaints of pain? No  New Pain Meds? No    Constipation prophylaxis needed? N/A    If you have a wound is the dressing clean, dry, and intact? N/A  Understands wound care regimen? N/A    Are there any other complaints/concerns that you wish to tell your provider? Not at this time. Pt has f/u with PCP via My Chart messaging and has an appointment scheduled with PCP on Monday, 2/14/22.      FU appts/Provider:    Future Appointments   Date Time Provider Abhishek Uribe   2/14/2022  8:30 AM SCHEDULE, [facility] TRANSMISSION FF Cardio MMA   2/14/2022 11:15 AM MD KANU Davalos IM [place] - [facility]   3/21/2022  8:30 AM SCHEDULE, [facility] TRANSMISSION FF Cardio MMA   4/13/2022  1:45 PM Dipika Coker MD FF Cardio MMA   4/25/2022  8:30 AM SCHEDULE, [facility] TRANSMISSION FF Cardio MMA   4/29/2022 11:20 AM BRICE Sierra FF SLEEP MED MMA   5/11/2022 11:00 AM MD KANU Davalos IM [place] - [facility]YD   7/22/2022  3:00 PM ECHO ROOM 1 [facility]   7/22/2022  4:00 PM Iqra Payne MD FF Cardio MMA   8/8/2022  8:30 AM SCHEDULE, [facility] TRANSMISSION FF Cardio MMA   9/12/2022  8:30 AM SCHEDULE, [facility] TRANSMISSION FF Cardio MMA   10/17/2022  8:30 AM SCHEDULE, [facility] TRANSMISSION FF Cardio MMA   11/14/2022  1:00 PM MD KANU Davalos IM [place] - [facility]YD   11/21/2022  8:30 AM SCHEDULE, [facility] TRANSMISSION FF Cardio MMA           New Medications?:   No      Medication Reconciliation by phone - not at this time  Understands Medications? Yes  Taking Medications? Yes  Can you swallow your pills? Yes    Any further needs in the home i.e. Equipment? Not Applicable    Link to services in community?:  N/A   Which services:  N/a    ACM called patient to f/u with her after her recent visit to the ED and to introduce self as the new ACM for [facility]. Pt stated she would be agreeable for ACM to f/u with her and support her with resources/needs/help to answer questions/concerns. Pt stated she is doing much better today. She stated on Wednesday, she did not have as much to drink as she normally does and ate [place] food that evening leading up to her episode that led her to the ED. She stated her heart is back to regular rhythm at this time. She has f/u with both her cardiologist's office and her PCP via My Chart and will be f/u with her PCP in person on 2.14.22. Pt has no questions/concerns for ACM today and agreed for ACM to f/u with her again at a later date/time. ACM encouraged her to call with any questions/concerns and she stated she would do so.      PLAN:    Complete enrollment  F/u needs/questions/concerns

## 2022-02-11 NOTE — TELEPHONE ENCOUNTER
Duplicate message. Dr. Angela Stockton forwarded my chart message to me to get her scheduled. Will schedule for next week.

## 2022-02-11 NOTE — TELEPHONE ENCOUNTER
----- Message from Griselda Pretty sent at 2/11/2022  8:55 AM EST -----  Subject: Appointment Request    Reason for Call: Routine ED Follow Up Visit    QUESTIONS  Type of Appointment? Established Patient  Reason for appointment request? No appointments available during search  Additional Information for Provider? hospital follow up   ---------------------------------------------------------------------------  --------------  [address]  What is the best way for the office to contact you? OK to leave message on   voicemail  Preferred Call Back Phone Number? [phone]  ---------------------------------------------------------------------------  --------------  SCRIPT ANSWERS  Relationship to Patient? Self  Red flag items? Weakness [Passed out, Unconscious, Fell to floor   unresponsive, Not awakening, Blacked out, Syncope]  (Is the patient requesting to see the provider for a procedure?)? No  (Is the patient requesting to see the provider urgently  today or   tomorrow. )? No  (Patient requests to see provider urgently. )? No  Do you have any questions for your primary care provider that need to be   answered prior to your appointment? No  Have you been diagnosed with, awaiting test results for, or told that you   are suspected of having COVID-19 (Coronavirus)? (If patient has tested   negative or was tested as a requirement for work, school, or travel and   not based on symptoms, answer no)? No  Within the past two weeks have you developed any of the following symptoms   (answer no if symptoms have been present longer than 2 weeks or began   more than 2 weeks ago)? Fever or Chills, Cough, Shortness of breath or   difficulty breathing, Loss of taste or smell, Sore throat, Nasal   congestion, Sneezing or runny nose, Fatigue or generalized body aches   (answer no if pain is specific to a body part e.g. back pain), Diarrhea,   Headache? No  Have you had close contact with someone with COVID-19 in the last 14 days? No  (Service Expert  click yes below to proceed with [employer] As Usual   Scheduling)?  Yes

## 2022-02-14 ENCOUNTER — NURSE ONLY (OUTPATIENT)
Dept: CARDIOLOGY CLINIC | Age: 72
End: 2022-02-14
Payer: MEDICARE

## 2022-02-14 ENCOUNTER — OFFICE VISIT (OUTPATIENT)
Dept: INTERNAL MEDICINE CLINIC | Age: 72
End: 2022-02-14
Payer: MEDICARE

## 2022-02-14 VITALS
BODY MASS INDEX: 40.27 KG/M2 | TEMPERATURE: 96.6 F | HEART RATE: 78 BPM | WEIGHT: 242 LBS | OXYGEN SATURATION: 99 % | DIASTOLIC BLOOD PRESSURE: 60 MMHG | SYSTOLIC BLOOD PRESSURE: 124 MMHG

## 2022-02-14 DIAGNOSIS — E66.01 OBESITY, CLASS III, BMI 40-49.9 (MORBID OBESITY) (HCC): ICD-10-CM

## 2022-02-14 DIAGNOSIS — Z45.09 ENCOUNTER FOR ELECTRONIC ANALYSIS OF REVEAL EVENT RECORDER: ICD-10-CM

## 2022-02-14 DIAGNOSIS — R00.1 SYMPTOMATIC BRADYCARDIA: ICD-10-CM

## 2022-02-14 DIAGNOSIS — I50.32 CHRONIC DIASTOLIC HEART FAILURE (HCC): ICD-10-CM

## 2022-02-14 DIAGNOSIS — I26.99 PULMONARY EMBOLISM WITHOUT ACUTE COR PULMONALE, UNSPECIFIED CHRONICITY, UNSPECIFIED PULMONARY EMBOLISM TYPE (HCC): ICD-10-CM

## 2022-02-14 DIAGNOSIS — F32.1 CURRENT MODERATE EPISODE OF MAJOR DEPRESSIVE DISORDER WITHOUT PRIOR EPISODE (HCC): ICD-10-CM

## 2022-02-14 DIAGNOSIS — E03.9 ACQUIRED HYPOTHYROIDISM: ICD-10-CM

## 2022-02-14 DIAGNOSIS — R73.9 HYPERGLYCEMIA: ICD-10-CM

## 2022-02-14 DIAGNOSIS — Z13.31 POSITIVE DEPRESSION SCREENING: ICD-10-CM

## 2022-02-14 DIAGNOSIS — R00.2 HEART PALPITATIONS: Primary | ICD-10-CM

## 2022-02-14 DIAGNOSIS — I48.0 PAROXYSMAL ATRIAL FIBRILLATION (HCC): Chronic | ICD-10-CM

## 2022-02-14 LAB
CREATININE URINE: 15.5 MG/DL (ref 28–259)
HBA1C MFR BLD: 5.7 %
MICROALBUMIN UR-MCNC: <1.2 MG/DL
MICROALBUMIN/CREAT UR-RTO: ABNORMAL MG/G (ref 0–30)

## 2022-02-14 PROCEDURE — G8484 FLU IMMUNIZE NO ADMIN: HCPCS | Performed by: INTERNAL MEDICINE

## 2022-02-14 PROCEDURE — 1090F PRES/ABSN URINE INCON ASSESS: CPT | Performed by: INTERNAL MEDICINE

## 2022-02-14 PROCEDURE — 99214 OFFICE O/P EST MOD 30 MIN: CPT | Performed by: INTERNAL MEDICINE

## 2022-02-14 PROCEDURE — G8417 CALC BMI ABV UP PARAM F/U: HCPCS | Performed by: INTERNAL MEDICINE

## 2022-02-14 PROCEDURE — 1036F TOBACCO NON-USER: CPT | Performed by: INTERNAL MEDICINE

## 2022-02-14 PROCEDURE — 1123F ACP DISCUSS/DSCN MKR DOCD: CPT | Performed by: INTERNAL MEDICINE

## 2022-02-14 PROCEDURE — 83036 HEMOGLOBIN GLYCOSYLATED A1C: CPT | Performed by: INTERNAL MEDICINE

## 2022-02-14 PROCEDURE — G8427 DOCREV CUR MEDS BY ELIG CLIN: HCPCS | Performed by: INTERNAL MEDICINE

## 2022-02-14 PROCEDURE — G8399 PT W/DXA RESULTS DOCUMENT: HCPCS | Performed by: INTERNAL MEDICINE

## 2022-02-14 PROCEDURE — 3017F COLORECTAL CA SCREEN DOC REV: CPT | Performed by: INTERNAL MEDICINE

## 2022-02-14 PROCEDURE — 4040F PNEUMOC VAC/ADMIN/RCVD: CPT | Performed by: INTERNAL MEDICINE

## 2022-02-14 RX ORDER — BUPROPION HYDROCHLORIDE 300 MG/1
300 TABLET ORAL EVERY MORNING
Qty: 30 TABLET | Refills: 5 | Status: SHIPPED | OUTPATIENT
Start: 2022-02-14 | End: 2022-07-22

## 2022-02-14 ASSESSMENT — PATIENT HEALTH QUESTIONNAIRE - PHQ9
3. TROUBLE FALLING OR STAYING ASLEEP: 1
SUM OF ALL RESPONSES TO PHQ QUESTIONS 1-9: 12
1. LITTLE INTEREST OR PLEASURE IN DOING THINGS: 1
SUM OF ALL RESPONSES TO PHQ QUESTIONS 1-9: 2
9. THOUGHTS THAT YOU WOULD BE BETTER OFF DEAD, OR OF HURTING YOURSELF: 0
5. POOR APPETITE OR OVEREATING: 3
2. FEELING DOWN, DEPRESSED OR HOPELESS: 1
6. FEELING BAD ABOUT YOURSELF - OR THAT YOU ARE A FAILURE OR HAVE LET YOURSELF OR YOUR FAMILY DOWN: 0
4. FEELING TIRED OR HAVING LITTLE ENERGY: 1
2. FEELING DOWN, DEPRESSED OR HOPELESS: 1
SUM OF ALL RESPONSES TO PHQ QUESTIONS 1-9: 12
1. LITTLE INTEREST OR PLEASURE IN DOING THINGS: 3
7. TROUBLE CONCENTRATING ON THINGS, SUCH AS READING THE NEWSPAPER OR WATCHING TELEVISION: 3
SUM OF ALL RESPONSES TO PHQ QUESTIONS 1-9: 2
SUM OF ALL RESPONSES TO PHQ QUESTIONS 1-9: 12
SUM OF ALL RESPONSES TO PHQ9 QUESTIONS 1 & 2: 4
SUM OF ALL RESPONSES TO PHQ QUESTIONS 1-9: 2
8. MOVING OR SPEAKING SO SLOWLY THAT OTHER PEOPLE COULD HAVE NOTICED. OR THE OPPOSITE, BEING SO FIGETY OR RESTLESS THAT YOU HAVE BEEN MOVING AROUND A LOT MORE THAN USUAL: 0
SUM OF ALL RESPONSES TO PHQ QUESTIONS 1-9: 2
SUM OF ALL RESPONSES TO PHQ QUESTIONS 1-9: 12
SUM OF ALL RESPONSES TO PHQ9 QUESTIONS 1 & 2: 2
10. IF YOU CHECKED OFF ANY PROBLEMS, HOW DIFFICULT HAVE THESE PROBLEMS MADE IT FOR YOU TO DO YOUR WORK, TAKE CARE OF THINGS AT HOME, OR GET ALONG WITH OTHER PEOPLE: 1

## 2022-02-15 PROCEDURE — 93298 REM INTERROG DEV EVAL SCRMS: CPT | Performed by: INTERNAL MEDICINE

## 2022-02-15 PROCEDURE — G2066 INTER DEVC REMOTE 30D: HCPCS | Performed by: INTERNAL MEDICINE

## 2022-02-16 DIAGNOSIS — I10 BENIGN ESSENTIAL HTN: ICD-10-CM

## 2022-02-16 DIAGNOSIS — E78.00 PURE HYPERCHOLESTEROLEMIA: ICD-10-CM

## 2022-02-16 DIAGNOSIS — I50.32 CHRONIC DIASTOLIC HEART FAILURE (HCC): ICD-10-CM

## 2022-02-16 DIAGNOSIS — E03.9 ACQUIRED HYPOTHYROIDISM: ICD-10-CM

## 2022-02-16 DIAGNOSIS — R06.02 SOB (SHORTNESS OF BREATH): ICD-10-CM

## 2022-02-16 DIAGNOSIS — R00.2 PALPITATIONS: ICD-10-CM

## 2022-02-16 LAB
A/G RATIO: 2.1 (ref 1.1–2.2)
ALBUMIN SERPL-MCNC: 4.4 G/DL (ref 3.4–5)
ALP BLD-CCNC: 86 U/L (ref 40–129)
ALT SERPL-CCNC: 17 U/L (ref 10–40)
ANION GAP SERPL CALCULATED.3IONS-SCNC: 14 MMOL/L (ref 3–16)
AST SERPL-CCNC: 15 U/L (ref 15–37)
BASOPHILS ABSOLUTE: 0 K/UL (ref 0–0.2)
BASOPHILS RELATIVE PERCENT: 0.8 %
BILIRUB SERPL-MCNC: 0.4 MG/DL (ref 0–1)
BUN BLDV-MCNC: 19 MG/DL (ref 7–20)
CALCIUM SERPL-MCNC: 9.2 MG/DL (ref 8.3–10.6)
CHLORIDE BLD-SCNC: 101 MMOL/L (ref 99–110)
CHOLESTEROL, TOTAL: 208 MG/DL (ref 0–199)
CO2: 22 MMOL/L (ref 21–32)
CREAT SERPL-MCNC: 0.7 MG/DL (ref 0.6–1.2)
EOSINOPHILS ABSOLUTE: 0.1 K/UL (ref 0–0.6)
EOSINOPHILS RELATIVE PERCENT: 2.2 %
GFR AFRICAN AMERICAN: >60
GFR NON-AFRICAN AMERICAN: >60
GLUCOSE BLD-MCNC: 101 MG/DL (ref 70–99)
HCT VFR BLD CALC: 38.2 % (ref 36–48)
HDLC SERPL-MCNC: 60 MG/DL (ref 40–60)
HEMOGLOBIN: 12.8 G/DL (ref 12–16)
LDL CHOLESTEROL CALCULATED: 120 MG/DL
LYMPHOCYTES ABSOLUTE: 1.4 K/UL (ref 1–5.1)
LYMPHOCYTES RELATIVE PERCENT: 29.7 %
MCH RBC QN AUTO: 31.8 PG (ref 26–34)
MCHC RBC AUTO-ENTMCNC: 33.5 G/DL (ref 31–36)
MCV RBC AUTO: 94.7 FL (ref 80–100)
MONOCYTES ABSOLUTE: 0.4 K/UL (ref 0–1.3)
MONOCYTES RELATIVE PERCENT: 8.1 %
NEUTROPHILS ABSOLUTE: 2.7 K/UL (ref 1.7–7.7)
NEUTROPHILS RELATIVE PERCENT: 59.2 %
PDW BLD-RTO: 14.1 % (ref 12.4–15.4)
PLATELET # BLD: 247 K/UL (ref 135–450)
PMV BLD AUTO: 8.5 FL (ref 5–10.5)
POTASSIUM SERPL-SCNC: 4.5 MMOL/L (ref 3.5–5.1)
PRO-BNP: 180 PG/ML (ref 0–124)
RBC # BLD: 4.04 M/UL (ref 4–5.2)
SODIUM BLD-SCNC: 137 MMOL/L (ref 136–145)
T4 FREE: 1.1 NG/DL (ref 0.9–1.8)
TOTAL PROTEIN: 6.5 G/DL (ref 6.4–8.2)
TRIGL SERPL-MCNC: 139 MG/DL (ref 0–150)
TSH REFLEX: 1.51 UIU/ML (ref 0.27–4.2)
TSH SERPL DL<=0.05 MIU/L-ACNC: 1.51 UIU/ML (ref 0.27–4.2)
VLDLC SERPL CALC-MCNC: 28 MG/DL
WBC # BLD: 4.6 K/UL (ref 4–11)

## 2022-02-17 RX ORDER — SPIRONOLACTONE 25 MG/1
TABLET ORAL
Qty: 90 TABLET | Refills: 3 | Status: SHIPPED | OUTPATIENT
Start: 2022-02-17 | End: 2022-10-03 | Stop reason: SDUPTHER

## 2022-03-01 ENCOUNTER — CARE COORDINATION (OUTPATIENT)
Dept: CARE COORDINATION | Age: 72
End: 2022-03-01

## 2022-03-01 NOTE — CARE COORDINATION
Ambulatory Care Coordination Note  3/1/2022  CM Risk Score: 8  Charlson 10 Year Mortality Risk Score: 98%     ACC: Tami Dimas, RN    Summary Note: ACM f/u with patient today. She reports she has not had any more palpitations or symptoms to report at this time. Pt stated she recently had a sinus like infection and is feeling better from that. She said it lasted around 9-10 days. Pt reports she has no needs, questions/concerns at this time. Pt has all f/u appointments with Cardiology and PCP scheduled at this time. We will attempt to complete her medication review during next f/u call. AC encouraged pt to call with any questions/concerns and she agreed with this plan. PLAN:    Complete med review  Gaps in care  Set up goals    Ambulatory Care Coordination Assessment    Care Coordination Protocol  Program Enrollment: Complex Care  Referral from Primary Care Provider: No  Week 1 - Initial Assessment     Do you have all of your prescriptions and are they filled?: Yes  Barriers to medication adherence: None  Are you able to afford your medications?: Yes  How often do you have trouble taking your medications the way you have been told to take them?: I always take them as prescribed. Do you have Home O2 Therapy?: No   CPAP Use: CPAP      Ability to seek help/take action for Emergent Urgent situations i.e. fire, crime, inclement weather or health crisis. : Independent  Ability to ambulate to restroom: Independent  Ability handle personal hygeine needs (bathing/dressing/grooming): Independent  Ability to manage Medications: Independent  Ability to prepare Food Preparation: Independent  Ability to maintain home (clean home, laundry): Independent  Ability to drive and/or has transportation: Independent  Ability to do shopping: Independent  Ability to manage finances:  Independent  Is patient able to live independently?: Yes     Current Housing: Private Residence        Per the Fall Risk Screening, did the patient have 2 or more falls or 1 fall with injury in the past year?: No     Frequent urination at night?: Yes  Do you use rails/bars?: Yes  Do you have a non-slip tub mat?: Yes     Are you experiencing loss of meaning?: No  Are you experiencing loss of hope and peace?: No     Thinking about your patient's physical health needs, are there any symptoms or problems (risk indicators) you are unsure about that require further investigation?: No identified areas of uncertainly or problems already being investigated   Are the patients physical health problems impacting on their mental well-being?: No identified areas of concern   Are there any problems with your patients lifestyle behaviors (alcohol, drugs, diet, exercise) that are impacting on physical or mental well-being?: No identified areas of concern   Do you have any other concerns about your patients mental well-being?  How would you rate their severity and impact on the patient?: No identified areas of concern   How would you rate their home environment in terms of safety and stability (including domestic violence, insecure housing, neighbor harassment)?: Consistently safe, supportive, stable, no identified problems   How do daily activities impact on the patient's well-being? (include current or anticipated unemployment, work, caregiving, access to transportation or other): No identified problems or perceived positive benefits   How would you rate their social network (family, work, friends)?: Good participation with social networks   How would you rate their financial resources (including ability to afford all required medical care)?: Financially secure, resources adequate, no identified problems   How wells does the patient now understand their health and well-being (symptoms, signs or risk factors) and what they need to do to manage their health?: Reasonable to good understanding and already engages in managing health or is willing to undertake better management How well do you think your patient can engage in healthcare discussions? (Barriers include language, deafness, aphasia, alcohol or drug problems, learning difficulties, concentration): Clear and open communication, no identified barriers   Do other services need to be involved to help this patient?: Other care/services not required at this time   Are current services involved with this patient well-coordinated? (Include coordination with other services you are now recommendation): All required care/services in place and well-coordinated   Suggested Interventions and Community Resources   Zone Management Tools: Not Started                  Prior to Admission medications    Medication Sig Start Date End Date Taking?  Authorizing Provider   spironolactone (ALDACTONE) 25 MG tablet TAKE ONE TABLET BY MOUTH DAILY 2/17/22   Kesha Oliveira MD   buPROPion (WELLBUTRIN XL) 300 MG extended release tablet Take 1 tablet by mouth every morning 2/14/22 3/16/22  Malou Lozada MD   dilTIAZem (CARDIZEM CD) 120 MG extended release capsule Take 1 capsule by mouth daily 2/2/22   Kesha Oliveira MD   Semaglutide,0.25 or 0.5MG/DOS, (OZEMPIC, 0.25 OR 0.5 MG/DOSE,) 2 MG/1.5ML SOPN Inject 0.5 mg into the skin once a week 1/21/22   Malou Lozada MD   Ciclesonide (ALVESCO) 160 MCG/ACT AERS Inhale into the lungs    Historical Provider, MD   pramipexole (MIRAPEX) 0.5 MG tablet 1.5 tab q 5 PM and 1.5 tab qHS 10/25/21   Tiffanie R Kehrt, APRN - CNP   furosemide (LASIX) 40 MG tablet Take 1 tablet by mouth daily 7/9/21   Kesha Oliveira MD   potassium chloride (KLOR-CON M) 10 MEQ extended release tablet TAKE ONE TABLET BY MOUTH DAILY 7/9/21   Kesha Oliveira MD   lisinopril (PRINIVIL;ZESTRIL) 10 MG tablet Take 1 tablet by mouth nightly 7/9/21   Kesha Oliveira MD   rivaroxaban (XARELTO) 20 MG TABS tablet Take 1 tablet by mouth Daily with supper 5/4/21   Bren Brumfield MD   fluticasone (FLONASE) 50 MCG/ACT nasal spray 1 spray by Each Nostril route daily    Historical Provider, MD   fluticasone (FLOVENT HFA) 110 MCG/ACT inhaler Inhale 1 puff into the lungs 2 times daily 2/22/21   Violette Candelaria MD   azelastine (ASTELIN) 0.1 % nasal spray 1 spray by Nasal route 2 times daily 1 Spray in each nostril 2/22/21   Violette Candelaria MD   Tens Unit MISC by Does not apply route    Historical Provider, MD   Handicap Placard MISC by Does not apply route Exp 5 years 7/30/20   BRICE Motta - CNP   Multiple Vitamins-Minerals (THERAPEUTIC MULTIVITAMIN-MINERALS) tablet Take 1 tablet by mouth daily    Historical Provider, MD   CPAP Machine MISC by Does not apply route    Historical Provider, MD   Magnesium Citrate 100 MG TABS Take 1 tablet by mouth daily 6/28/18   Good Lemus MD   EPIPEN 2-MIR 0.3 MG/0.3ML SOAJ injection  9/29/16   Historical Provider, MD       Future Appointments   Date Time Provider Abhishek Jojo   3/21/2022  8:30 AM SCHEDULE, [facility] TRANSMISSION FF Cardio MMA   4/13/2022  1:45 PM Foreign Crespo MD FF Cardio MMA   4/25/2022  8:30 AM SCHEDULE, [facility] FF Cardio MMA   4/29/2022 11:20 AM BRICE Zeng FF SLEEP MED MMA   5/11/2022 11:00 AM MD KANU [place] [facility] [place] - DYD   5/20/2022  1:30 PM MD KANU [place] [facility] [place] - DYD   7/22/2022  3:00 PM ECHO ROOM 1 [facility] ECHO [facility]   7/22/2022  4:00 PM Good Lemus MD FF Cardio MMA   8/8/2022  8:30 AM SCHEDULE, [facility] TRANSMISSION FF Cardio MMA   9/12/2022  8:30 AM SCHEDULE, [facility] TRANSMISSION FF Cardio MMA   10/17/2022  8:30 AM SCHEDULE, [facility] TRANSMISSION FF Cardio MMA   11/14/2022  1:00 PM MD KANU [place] [facility] [place] - DYD   11/21/2022  8:30 AM SCHEDULE, [facility] TRANSMISSION FF Cardio MMA     ,   General Assessment    Do you have any symptoms that are causing concern?: No     , Care Coordination Episodes    Type: Amb Care Coordination  Episode: Complex Care Management  Noted: 2/11/2022, 8 and Charlson Mortality Risk Score: 98%

## 2022-03-14 ENCOUNTER — OFFICE VISIT (OUTPATIENT)
Dept: INTERNAL MEDICINE CLINIC | Age: 72
End: 2022-03-14
Payer: MEDICARE

## 2022-03-14 ENCOUNTER — TELEPHONE (OUTPATIENT)
Dept: INTERNAL MEDICINE CLINIC | Age: 72
End: 2022-03-14

## 2022-03-14 VITALS
TEMPERATURE: 97.3 F | WEIGHT: 240 LBS | SYSTOLIC BLOOD PRESSURE: 136 MMHG | OXYGEN SATURATION: 98 % | DIASTOLIC BLOOD PRESSURE: 78 MMHG | BODY MASS INDEX: 39.94 KG/M2 | HEART RATE: 77 BPM

## 2022-03-14 DIAGNOSIS — J20.9 ACUTE BRONCHITIS, UNSPECIFIED ORGANISM: Primary | ICD-10-CM

## 2022-03-14 DIAGNOSIS — J00 ACUTE RHINITIS: ICD-10-CM

## 2022-03-14 DIAGNOSIS — E11.69 TYPE 2 DIABETES MELLITUS WITH OBESITY (HCC): ICD-10-CM

## 2022-03-14 DIAGNOSIS — E66.9 TYPE 2 DIABETES MELLITUS WITH OBESITY (HCC): ICD-10-CM

## 2022-03-14 PROBLEM — E11.9 TYPE 2 DIABETES MELLITUS (HCC): Status: RESOLVED | Noted: 2022-02-02 | Resolved: 2022-03-14

## 2022-03-14 PROCEDURE — 1123F ACP DISCUSS/DSCN MKR DOCD: CPT | Performed by: INTERNAL MEDICINE

## 2022-03-14 PROCEDURE — 1036F TOBACCO NON-USER: CPT | Performed by: INTERNAL MEDICINE

## 2022-03-14 PROCEDURE — 99213 OFFICE O/P EST LOW 20 MIN: CPT | Performed by: INTERNAL MEDICINE

## 2022-03-14 PROCEDURE — 4040F PNEUMOC VAC/ADMIN/RCVD: CPT | Performed by: INTERNAL MEDICINE

## 2022-03-14 PROCEDURE — 3017F COLORECTAL CA SCREEN DOC REV: CPT | Performed by: INTERNAL MEDICINE

## 2022-03-14 PROCEDURE — G8484 FLU IMMUNIZE NO ADMIN: HCPCS | Performed by: INTERNAL MEDICINE

## 2022-03-14 PROCEDURE — G8399 PT W/DXA RESULTS DOCUMENT: HCPCS | Performed by: INTERNAL MEDICINE

## 2022-03-14 PROCEDURE — G8417 CALC BMI ABV UP PARAM F/U: HCPCS | Performed by: INTERNAL MEDICINE

## 2022-03-14 PROCEDURE — 3044F HG A1C LEVEL LT 7.0%: CPT | Performed by: INTERNAL MEDICINE

## 2022-03-14 PROCEDURE — G8427 DOCREV CUR MEDS BY ELIG CLIN: HCPCS | Performed by: INTERNAL MEDICINE

## 2022-03-14 PROCEDURE — 1090F PRES/ABSN URINE INCON ASSESS: CPT | Performed by: INTERNAL MEDICINE

## 2022-03-14 PROCEDURE — 2022F DILAT RTA XM EVC RTNOPTHY: CPT | Performed by: INTERNAL MEDICINE

## 2022-03-14 RX ORDER — ALBUTEROL SULFATE 2.5 MG/3ML
2.5 SOLUTION RESPIRATORY (INHALATION) EVERY 6 HOURS PRN
Qty: 120 EACH | Refills: 3 | Status: ON HOLD
Start: 2022-03-14 | End: 2022-10-26 | Stop reason: HOSPADM

## 2022-03-14 RX ORDER — DOXYCYCLINE HYCLATE 100 MG
100 TABLET ORAL 2 TIMES DAILY
Qty: 14 TABLET | Refills: 0 | Status: SHIPPED | OUTPATIENT
Start: 2022-03-14 | End: 2022-03-21

## 2022-03-14 RX ORDER — DEXTROMETHORPHAN HYDROBROMIDE AND PROMETHAZINE HYDROCHLORIDE 15; 6.25 MG/5ML; MG/5ML
5 SYRUP ORAL 4 TIMES DAILY PRN
Qty: 180 ML | Refills: 0 | Status: SHIPPED | OUTPATIENT
Start: 2022-03-14 | End: 2022-03-21

## 2022-03-14 ASSESSMENT — PATIENT HEALTH QUESTIONNAIRE - PHQ9
1. LITTLE INTEREST OR PLEASURE IN DOING THINGS: 0
2. FEELING DOWN, DEPRESSED OR HOPELESS: 0
SUM OF ALL RESPONSES TO PHQ9 QUESTIONS 1 & 2: 0
SUM OF ALL RESPONSES TO PHQ QUESTIONS 1-9: 0

## 2022-03-14 NOTE — TELEPHONE ENCOUNTER
Patient requesting a same day appt for cough, congestion and sore throat. She tested negative for COVID last week.

## 2022-03-14 NOTE — PROGRESS NOTES
Chief Complaint   Patient presents with    Cough     1 week history    Pharyngitis    Head Congestion     Assessment/Plan:  Regina Mena was seen today for cough, pharyngitis and head congestion. Diagnoses and all orders for this visit:    Acute bronchitis, unspecified organism  -     doxycycline hyclate (VIBRA-TABS) 100 MG tablet; Take 1 tablet by mouth 2 times daily for 7 days  -     promethazine-dextromethorphan (PROMETHAZINE-DM) 6.25-15 MG/5ML syrup; Take 5 mLs by mouth 4 times daily as needed for Cough  -     albuterol (PROVENTIL) (2.5 MG/3ML) 0.083% nebulizer solution; Take 3 mLs by nebulization every 6 hours as needed for Wheezing    Type 2 diabetes mellitus with obesity ([facility] [address]  Comments:  Her last A1c was 5.7%. Acute rhinitis  -     doxycycline hyclate (VIBRA-TABS) 100 MG tablet; Take 1 tablet by mouth 2 times daily for 7 days  -     promethazine-dextromethorphan (PROMETHAZINE-DM) 6.25-15 MG/5ML syrup; Take 5 mLs by mouth 4 times daily as needed for Cough        Vitals:    03/14/22 1646   BP: 136/78   Pulse: 77   Temp: 97.3 °F (36.3 °C)   SpO2: 98%       Physical Exam  Vitals reviewed. Constitutional:       General: She is not in acute distress. Appearance: She is well-developed. She is not diaphoretic. HENT:      Head: Normocephalic and atraumatic. Right Ear: Tympanic membrane, ear canal and external ear normal. There is no impacted cerumen. Left Ear: Tympanic membrane, ear canal and external ear normal. There is no impacted cerumen. Mouth/Throat:      Mouth: Mucous membranes are moist.      Pharynx: Oropharynx is clear. No oropharyngeal exudate or posterior oropharyngeal erythema. Cardiovascular:      Rate and Rhythm: Normal rate and regular rhythm. Pulses: Normal pulses. Heart sounds: Normal heart sounds. No murmur heard. No friction rub. No gallop. Pulmonary:      Effort: Pulmonary effort is normal. No respiratory distress.       Breath sounds: Normal breath sounds. No stridor. No wheezing, rhonchi or rales. Chest:      Chest wall: No tenderness. Neurological:      Mental Status: She is alert and oriented to person, place, and time. Cranial Nerves: No cranial nerve deficit. Psychiatric:         Behavior: Behavior normal.         Thought Content:  Thought content normal.         Judgment: Judgment normal.         PHQ Scores 3/14/2022 2/14/2022 2/14/2022 11/10/2021 8/20/2021 6/7/2021 4/12/2021   PHQ2 Score 0 4 2 2 0 0 0   PHQ9 Score 0 12 2 2 0 0 0     Interpretation of Total Score Depression Severity: 1-4 = Minimal depression, 5-9 = Mild depression, 10-14 = Moderate depression, 15-19 = Moderately severe depression, 20-27 = Severe depression    Michael President, MD  [address]

## 2022-03-14 NOTE — TELEPHONE ENCOUNTER
----- Message from [place] [facility] [place] sent at 3/14/2022  9:33 AM EDT -----  Subject: Appointment Request    Reason for Call: Urgent Cough Cold    QUESTIONS  Type of Appointment? Established Patient  Reason for appointment request? No appointments available during search  Additional Information for Provider? Pt states she has cold symptoms and   would like to be seen today. States she has congestion, sore throat and   cough. States she has tested negative for covid twice since symptoms   started a week ago, please advise.  ---------------------------------------------------------------------------  --------------  CALL BACK INFO  What is the best way for the office to contact you? Do not leave any   message, patient will call back for answer  Preferred Call Back Phone Number?  [phone]  ---------------------------------------------------------------------------  --------------  SCRIPT ANSWERS

## 2022-03-15 ENCOUNTER — TELEPHONE (OUTPATIENT)
Dept: ADMINISTRATIVE | Age: 72
End: 2022-03-15

## 2022-03-15 NOTE — TELEPHONE ENCOUNTER
Submitted PA for Albuterol Sulfate (2.5 MG/3ML)0.083% nebulizer solution  Via CMM Key: [id number] STATUS: DENIED. Letter is attached. If this requires a response please respond to the pool. [address])    Please advise patient. Thank you.

## 2022-03-21 ENCOUNTER — NURSE ONLY (OUTPATIENT)
Dept: CARDIOLOGY CLINIC | Age: 72
End: 2022-03-21
Payer: MEDICARE

## 2022-03-21 DIAGNOSIS — I48.0 PAROXYSMAL ATRIAL FIBRILLATION (HCC): Chronic | ICD-10-CM

## 2022-03-21 DIAGNOSIS — R00.1 SYMPTOMATIC BRADYCARDIA: ICD-10-CM

## 2022-03-21 DIAGNOSIS — Z45.09 ENCOUNTER FOR ELECTRONIC ANALYSIS OF REVEAL EVENT RECORDER: ICD-10-CM

## 2022-03-22 PROCEDURE — G2066 INTER DEVC REMOTE 30D: HCPCS | Performed by: INTERNAL MEDICINE

## 2022-03-22 PROCEDURE — 93298 REM INTERROG DEV EVAL SCRMS: CPT | Performed by: INTERNAL MEDICINE

## 2022-04-05 ENCOUNTER — CARE COORDINATION (OUTPATIENT)
Dept: CARE COORDINATION | Age: 72
End: 2022-04-05

## 2022-04-05 NOTE — CARE COORDINATION
ACM attempted to f/u with patient regarding CC. Pt was unable to reach today; ACM unable to leave a VM message as pt VM box is currently full and not accepting new messages. ACM to make another contact attempt at a later date/time.

## 2022-04-11 ENCOUNTER — TELEPHONE (OUTPATIENT)
Dept: CARDIOLOGY CLINIC | Age: 72
End: 2022-04-11

## 2022-04-11 ENCOUNTER — TELEPHONE (OUTPATIENT)
Dept: INTERNAL MEDICINE CLINIC | Age: 72
End: 2022-04-11

## 2022-04-11 NOTE — TELEPHONE ENCOUNTER
----- Message from Deedee Frank sent at 4/11/2022  9:44 AM EDT -----  Subject: Message to Provider    QUESTIONS  Information for Provider? pt called and is asking for some samples of   xarelto 20 MG Please, call pt to advise thank you   ---------------------------------------------------------------------------  --------------  CALL BACK INFO  What is the best way for the office to contact you? OK to leave message on   voicemail  Preferred Call Back Phone Number? [phone]  ---------------------------------------------------------------------------  --------------  SCRIPT ANSWERS  Relationship to Patient?  Self

## 2022-04-11 NOTE — TELEPHONE ENCOUNTER
LOV : 02/02/2022 ERICKA  NOV : 04/13/2022 ALISSON    Provided patient with 2 bottles/boxes of Xarelto 20 mg  LOT : [id number]  EXP : 04/2024    Called and spoke with the patient ; advised them that samples have been placed up front and ready for pickup. Pt voiced understanding .  Call complete

## 2022-04-23 PROCEDURE — G2066 INTER DEVC REMOTE 30D: HCPCS | Performed by: INTERNAL MEDICINE

## 2022-04-23 PROCEDURE — 93298 REM INTERROG DEV EVAL SCRMS: CPT | Performed by: INTERNAL MEDICINE

## 2022-04-25 ENCOUNTER — NURSE ONLY (OUTPATIENT)
Dept: CARDIOLOGY CLINIC | Age: 72
End: 2022-04-25
Payer: MEDICARE

## 2022-04-25 DIAGNOSIS — R00.1 SYMPTOMATIC BRADYCARDIA: ICD-10-CM

## 2022-04-25 DIAGNOSIS — Z45.09 ENCOUNTER FOR ELECTRONIC ANALYSIS OF REVEAL EVENT RECORDER: ICD-10-CM

## 2022-04-25 DIAGNOSIS — I48.0 PAROXYSMAL ATRIAL FIBRILLATION (HCC): Chronic | ICD-10-CM

## 2022-04-28 ENCOUNTER — CARE COORDINATION (OUTPATIENT)
Dept: CARE COORDINATION | Age: 72
End: 2022-04-28

## 2022-04-28 NOTE — CARE COORDINATION
Ambulatory Care Coordination Note  4/28/2022  CM Risk Score: 3  Charlson 10 Year Mortality Risk Score: 98%     ACC: Giancarlo Gutierrez, RN    Summary Note: BURT f/u with patient today. She reports her main concern at this time is that she is not sleeping well (sometimes only 90 minutes per night), and has been sleeping poorly for some time now. She reports this seems to be affecting her memory, balance, and mood. She has tried different things to help her sleep better, without success. Pt stated during the pandemic, she made over 11,000 masks for the community and since February when she put her sewing machine away, she has not had a sense of fulfillment, or like she's being useful, anymore. This has been the biggest change for her that she can think of. As a result, she said she has started eating more and has gained some weight. She continues to use her CPAP at night and has an appointment with the sleep clinic tomorrow, 4.29.22, so she will discuss her poor sleeping with them and also with Dr. Julia Lerner at her upcoming appointment with him. Pt stated she has filled her time since February with short-term projects, but she is looking for something more permanent, that she will be able to do on a regular basis. She recently made costumes for a local play and will begin working on de-cluttering her home for a yard sale this summer. She also has an upcoming 1 month long trip to [place] planned for August. (We discussed making sure she is prepared before the trip by making sure she has enough medications to last her the entire trip.) So, pt has plans to look forward to. BURT brought up the idea of looking into how to become a volunteer that [address] [place] [address] Drive or [facility]. Pt stated she would really enjoy doing that and said she knows a couple people who work there that she will reach out to and find out more information.  BURT was also able to find the web address and sent that to her My Chart, along with a  to reach out to if she is interested in pursuing this. Pt stated she will let ACM know how this is going and agrees to call with any questions/concerns. PLAN:    F/u meds, goals, enrollment complete, questions/concerns, volunteering  DM/CHF assessments    Ambulatory Care Coordination Assessment    Care Coordination Protocol  Program Enrollment: Complex Care  Referral from Primary Care Provider: No  Week 1 - Initial Assessment     Do you have all of your prescriptions and are they filled?: Yes  Barriers to medication adherence: None  Are you able to afford your medications?: Yes  How often do you have trouble taking your medications the way you have been told to take them?: I always take them as prescribed. Do you have Home O2 Therapy?: No   CPAP Use: CPAP      Ability to seek help/take action for Emergent Urgent situations i.e. fire, crime, inclement weather or health crisis. : Independent  Ability to ambulate to restroom: Independent  Ability handle personal hygeine needs (bathing/dressing/grooming): Independent  Ability to manage Medications: Independent  Ability to prepare Food Preparation: Independent  Ability to maintain home (clean home, laundry): Independent  Ability to drive and/or has transportation: Independent  Ability to do shopping: Independent  Ability to manage finances:  Independent  Is patient able to live independently?: Yes     Current Housing: Private Residence        Per the Fall Risk Screening, did the patient have 2 or more falls or 1 fall with injury in the past year?: No     Frequent urination at night?: Yes  Do you use rails/bars?: Yes  Do you have a non-slip tub mat?: Yes     Are you experiencing loss of meaning?: No  Are you experiencing loss of hope and peace?: No     Thinking about your patient's physical health needs, are there any symptoms or problems (risk indicators) you are unsure about that require further investigation?: No identified services you are now recommendation): All required care/services in place and well-coordinated   Suggested Interventions and Community Resources   Zone Management Tools: Not Started                  Prior to Admission medications    Medication Sig Start Date End Date Taking?  Authorizing Provider   rivaroxaban (XARELTO) 20 MG TABS tablet Take 1 tablet by mouth daily (with breakfast) 4/11/22   Sixto Sosa MD   albuterol (PROVENTIL) (2.5 MG/3ML) 0.083% nebulizer solution Take 3 mLs by nebulization every 6 hours as needed for Wheezing 3/14/22   Sixto Sosa MD   rivaroxaban (XARELTO) 20 MG TABS tablet Take 1 tablet by mouth daily (with breakfast) 3/14/22   Sixto Sosa MD   spironolactone (ALDACTONE) 25 MG tablet TAKE ONE TABLET BY MOUTH DAILY 2/17/22   Davin Brown MD   buPROPion (WELLBUTRIN XL) 300 MG extended release tablet Take 1 tablet by mouth every morning 2/14/22 3/16/22  Sixto Sosa MD   dilTIAZem (CARDIZEM CD) 120 MG extended release capsule Take 1 capsule by mouth daily 2/2/22   Davin Brown MD   Semaglutide,0.25 or 0.5MG/DOS, (OZEMPIC, 0.25 OR 0.5 MG/DOSE,) 2 MG/1.5ML SOPN Inject 0.5 mg into the skin once a week 1/21/22   Sixto Sosa MD   Ciclesonide (ALVESCO) 160 MCG/ACT AERS Inhale into the lungs    Historical Provider, MD   pramipexole (MIRAPEX) 0.5 MG tablet 1.5 tab q 5 PM and 1.5 tab qHS 10/25/21   Tiffanie R Kehrt, APRN - CNP   furosemide (LASIX) 40 MG tablet Take 1 tablet by mouth daily 7/9/21   Davin Brown MD   potassium chloride (KLOR-CON M) 10 MEQ extended release tablet TAKE ONE TABLET BY MOUTH DAILY 7/9/21   Davin Brown MD   lisinopril (PRINIVIL;ZESTRIL) 10 MG tablet Take 1 tablet by mouth nightly 7/9/21   Davin Brown MD   rivaroxaban (XARELTO) 20 MG TABS tablet Take 1 tablet by mouth Daily with supper 5/4/21   Sarah Reese MD   fluticasone (FLONASE) 50 MCG/ACT nasal spray 1 spray by Each Nostril route daily    Historical Provider, MD   fluticasone (FLOVENT HFA) 110 MCG/ACT inhaler Inhale 1 puff into the lungs 2 times daily 2/22/21   Camron Ledesma MD   azelastine (ASTELIN) 0.1 % nasal spray 1 spray by Nasal route 2 times daily 1 Spray in each nostril 2/22/21   Camron Ledesma MD   Tens Unit MISC by Does not apply route    Historical Provider, MD   Handicap Placard MISC by Does not apply route Exp 5 years 7/30/20   Savage Suarez, APRN - CNP   Multiple Vitamins-Minerals (THERAPEUTIC MULTIVITAMIN-MINERALS) tablet Take 1 tablet by mouth daily    Historical Provider, MD   CPAP Machine MISC by Does not apply route    Historical Provider, MD   Magnesium Citrate 100 MG TABS Take 1 tablet by mouth daily 6/28/18   Wally Arrington MD   EPIPEN 2-MIR 0.3 MG/0.3ML SOAJ injection  9/29/16   Historical Provider, MD       Future Appointments   Date Time Provider Abhishek Jojo   4/29/2022 11:20 AM BRICE Castaneda FF SLEEP MED MMA   5/11/2022 11:00 AM MD KANU [place]  [place] - DYD   5/19/2022  9:30 AM SCHEDULE, [place] DEVICE CHECK FF Cardio MMA   5/19/2022  9:30 AM Jun Dodge MD FF Cardio MMA   5/20/2022  1:30 PM MD KANU [place]  [place] - DYD   5/31/2022  8:45 AM SCHEDULE, [place] REMOTE TRANSMISSION FF Cardio MMA   7/4/2022 12:15 PM SCHEDULE, [place] REMOTE TRANSMISSION FF Cardio MMA   7/22/2022  3:00 PM ECHO ROOM 1 [facility] ECHO [facility]   7/22/2022  4:00 PM Wally Arrington MD FF Cardio MMA   8/8/2022  8:30 AM SCHEDULE, [place] REMOTE TRANSMISSION FF Cardio MMA   9/12/2022  8:30 AM SCHEDULE, [place] REMOTE TRANSMISSION FF Cardio MMA   10/17/2022  8:30 AM SCHEDULE, [facility] TRANSMISSION FF Cardio MMA   11/14/2022  1:00 PM MD KANU [place] IM [place] - DYD   11/21/2022  8:30 AM SCHEDULE, [facility] TRANSMISSION FF Cardio MMA     ,   General Assessment    Do you have any symptoms that are causing concern?: Yes  Progression since Onset: Unchanged  Reported Symptoms: Other (Comment: difficulty sleeping)     , Care Coordination Episodes    Type: Amb Care Management  Episode: Complex Care Management  Noted: 2/11/2022, 3 and Charlson Mortality Risk Score: 98%

## 2022-04-29 ENCOUNTER — OFFICE VISIT (OUTPATIENT)
Dept: PULMONOLOGY | Age: 72
End: 2022-04-29
Payer: MEDICARE

## 2022-04-29 VITALS
HEART RATE: 76 BPM | BODY MASS INDEX: 41.62 KG/M2 | DIASTOLIC BLOOD PRESSURE: 80 MMHG | WEIGHT: 249.8 LBS | TEMPERATURE: 98.7 F | OXYGEN SATURATION: 98 % | SYSTOLIC BLOOD PRESSURE: 136 MMHG | HEIGHT: 65 IN

## 2022-04-29 DIAGNOSIS — I10 BENIGN ESSENTIAL HTN: Chronic | ICD-10-CM

## 2022-04-29 DIAGNOSIS — I48.0 PAROXYSMAL ATRIAL FIBRILLATION (HCC): Chronic | ICD-10-CM

## 2022-04-29 DIAGNOSIS — J45.30 MILD PERSISTENT ASTHMA WITHOUT COMPLICATION: ICD-10-CM

## 2022-04-29 DIAGNOSIS — E11.9 TYPE 2 DIABETES MELLITUS WITHOUT COMPLICATION, UNSPECIFIED WHETHER LONG TERM INSULIN USE (HCC): ICD-10-CM

## 2022-04-29 DIAGNOSIS — I50.32 CHRONIC DIASTOLIC HEART FAILURE (HCC): ICD-10-CM

## 2022-04-29 DIAGNOSIS — F32.A ANXIETY AND DEPRESSION: ICD-10-CM

## 2022-04-29 DIAGNOSIS — F41.9 ANXIETY AND DEPRESSION: ICD-10-CM

## 2022-04-29 DIAGNOSIS — G47.33 OSA (OBSTRUCTIVE SLEEP APNEA): Primary | Chronic | ICD-10-CM

## 2022-04-29 DIAGNOSIS — E66.01 OBESITY, CLASS III, BMI 40-49.9 (MORBID OBESITY) (HCC): ICD-10-CM

## 2022-04-29 DIAGNOSIS — G25.81 RESTLESS LEG SYNDROME: Chronic | ICD-10-CM

## 2022-04-29 PROCEDURE — 99214 OFFICE O/P EST MOD 30 MIN: CPT | Performed by: NURSE PRACTITIONER

## 2022-04-29 PROCEDURE — 4040F PNEUMOC VAC/ADMIN/RCVD: CPT | Performed by: NURSE PRACTITIONER

## 2022-04-29 PROCEDURE — G8427 DOCREV CUR MEDS BY ELIG CLIN: HCPCS | Performed by: NURSE PRACTITIONER

## 2022-04-29 PROCEDURE — 3044F HG A1C LEVEL LT 7.0%: CPT | Performed by: NURSE PRACTITIONER

## 2022-04-29 PROCEDURE — G8399 PT W/DXA RESULTS DOCUMENT: HCPCS | Performed by: NURSE PRACTITIONER

## 2022-04-29 PROCEDURE — 1036F TOBACCO NON-USER: CPT | Performed by: NURSE PRACTITIONER

## 2022-04-29 PROCEDURE — 3017F COLORECTAL CA SCREEN DOC REV: CPT | Performed by: NURSE PRACTITIONER

## 2022-04-29 PROCEDURE — 2022F DILAT RTA XM EVC RTNOPTHY: CPT | Performed by: NURSE PRACTITIONER

## 2022-04-29 PROCEDURE — 1090F PRES/ABSN URINE INCON ASSESS: CPT | Performed by: NURSE PRACTITIONER

## 2022-04-29 PROCEDURE — 1123F ACP DISCUSS/DSCN MKR DOCD: CPT | Performed by: NURSE PRACTITIONER

## 2022-04-29 PROCEDURE — G8417 CALC BMI ABV UP PARAM F/U: HCPCS | Performed by: NURSE PRACTITIONER

## 2022-04-29 RX ORDER — PRAMIPEXOLE DIHYDROCHLORIDE 0.5 MG/1
TABLET ORAL
Qty: 270 TABLET | Refills: 1 | Status: SHIPPED | OUTPATIENT
Start: 2022-04-29 | End: 2022-09-09 | Stop reason: SDUPTHER

## 2022-04-29 ASSESSMENT — SLEEP AND FATIGUE QUESTIONNAIRES
HOW LIKELY ARE YOU TO NOD OFF OR FALL ASLEEP WHEN YOU ARE A PASSENGER IN A CAR FOR AN HOUR WITHOUT A BREAK: 3
ESS TOTAL SCORE: 22
HOW LIKELY ARE YOU TO NOD OFF OR FALL ASLEEP WHILE SITTING AND READING: 3
HOW LIKELY ARE YOU TO NOD OFF OR FALL ASLEEP WHILE WATCHING TV: 3
HOW LIKELY ARE YOU TO NOD OFF OR FALL ASLEEP WHILE LYING DOWN TO REST IN THE AFTERNOON WHEN CIRCUMSTANCES PERMIT: 3
HOW LIKELY ARE YOU TO NOD OFF OR FALL ASLEEP WHILE SITTING INACTIVE IN A PUBLIC PLACE: 3
HOW LIKELY ARE YOU TO NOD OFF OR FALL ASLEEP IN A CAR, WHILE STOPPED FOR A FEW MINUTES IN TRAFFIC: 3
HOW LIKELY ARE YOU TO NOD OFF OR FALL ASLEEP WHILE SITTING QUIETLY AFTER LUNCH WITHOUT ALCOHOL: 3
HOW LIKELY ARE YOU TO NOD OFF OR FALL ASLEEP WHILE SITTING AND TALKING TO SOMEONE: 1

## 2022-04-29 NOTE — ASSESSMENT & PLAN NOTE
Chronic- Stable. Discussed the importance of treating obstructive sleep apnea as part of the management of this disorder. She continues to take pramipexole 0.75 mg at 5 pm and 0.75 mg at 11-1am.  Her symptoms are controlled at the current dose and she denies side effects. Will refill medication at the current dose. Discussed the importance of of using her machine each night to have optimal therapy with her medications and RLS symptoms.

## 2022-04-29 NOTE — LETTER
[facility] Sleep Medicine  [address] [facility]  [address] [address]  Phone: [phone]  Fax: [phone]    April 29, 2022       Patient: Reza Grimes   MR Number: [id number]   YOB: 1950   Date of Visit: 4/29/2022       Delphine Romo was seen for a follow up visit today. Here is my assessment and plan as well as an attached copy of her visit today:    Chronic diastolic heart failure ([address]   Chronic- Stable. Discussed the importance of treating obstructive sleep apnea as part of the management of this disorder. Cont any meds per PCP and other physicians. Paroxysmal atrial fibrillation (HCC)   Chronic- Stable. Discussed the importance of treating obstructive sleep apnea as part of the management of this disorder. Cont any meds per PCP and other physicians. Benign essential HTN   Chronic- Stable. Discussed the importance of treating obstructive sleep apnea as part of the management of this disorder. Cont any meds per PCP and other physicians. Type 2 diabetes mellitus   Chronic- Stable. Discussed the importance of treating obstructive sleep apnea as part of the management of this disorder. Cont any meds per PCP and other physicians. Mild persistent asthma without complication   Chronic- Stable. Discussed the importance of treating obstructive sleep apnea as part of the management of this disorder. Cont any meds per PCP and other physicians. Obesity, Class III, BMI 40-49.9 (morbid obesity) (HCC)   Chronic-not stable:  Discussed importance of treating obstructive sleep apnea and getting sufficient sleep to assist with weight control. Encouraged her to work on weight loss through diet and exercise. Recommended DASH or Mediterranean diets. Restless leg syndrome  Chronic- Stable. Discussed the importance of treating obstructive sleep apnea as part of the management of this disorder.  She continues to take pramipexole 0.75 mg at 5 pm and 0.75 mg at 11-1am.  Her symptoms are controlled at the current dose and she denies side effects. Will refill medication at the current dose. Discussed the importance of of using her machine each night to have optimal therapy with her medications and RLS symptoms. HUSSAIN (obstructive sleep apnea)   Chronic-with progression/exacerbation: Reviewed and analyzed results of physiologic download from patient's machine and reviewed with patient. Supplies and parts as needed for her machine. These are medically necessary. Limit caffeine use after 3pm. Based on the analyzed data will change following settings: P min increased to 13. Encouraged consistent use of her machine each night, all night. Will trial pressure increase to see if this will be more comfortable for her, decrease nighttime awakenings, and improve daytime symptoms. If no improvement in her symptoms, patient will require an in lab titration. Discussed the importance of no driving when she is sleepy. Encouraged her to try checking hospice for local group therapy support to help her grieve the loss of her . Encouraged her to work on sleep hygiene. Instructed not to drive unless had 4 hrs of effective therapy for her HUSSAIN the night before. Did review the risks of under or untreated HUSSAIN including, but not limited to, higher risks of motor vehicle accidents, stroke, heart attacks, and death. She understands and accepts all these risks. Will see her back in 3 months or sooner if issues arise. Anxiety and depression  Chronic- Unstable. Discussed the importance of treating obstructive sleep apnea as part of the management of this disorder. Discussed checking with hospice for emotional support groups to help her grief with the loss of her . Encouraged her to contact her PCP for further management and evaluation of recent increased symptoms of depression. If you have questions or concerns, please do not hesitate to call me.  I look forward to following Viky Christian along with you.     Sincerely,    Lizbet Antonio, BRICE    CC providers:  MD Luis Guerrero [phone]  [address] [facility]

## 2022-04-29 NOTE — ASSESSMENT & PLAN NOTE
Chronic-with progression/exacerbation: Reviewed and analyzed results of physiologic download from patient's machine and reviewed with patient. Supplies and parts as needed for her machine. These are medically necessary. Limit caffeine use after 3pm. Based on the analyzed data will change following settings: P min increased to 13. Encouraged consistent use of her machine each night, all night. Will trial pressure increase to see if this will be more comfortable for her, decrease nighttime awakenings, and improve daytime symptoms. If no improvement in her symptoms, patient will require an in lab titration. Discussed the importance of no driving when she is sleepy. Encouraged her to try checking hospice for local group therapy support to help her grieve the loss of her . Encouraged her to work on sleep hygiene. Instructed not to drive unless had 4 hrs of effective therapy for her HUSSAIN the night before. Did review the risks of under or untreated HUSSAIN including, but not limited to, higher risks of motor vehicle accidents, stroke, heart attacks, and death. She understands and accepts all these risks. Will see her back in 3 months or sooner if issues arise.

## 2022-04-29 NOTE — PROGRESS NOTES
[facility]  [address] [facility], [address] [facility]  P- [phone]   [facility] [address] Keegan Diaz. [address] [place] [address]. Tung Fernandez 37 [phone]     [address] Chen [address] [phone]      Assessment/Plan:      1. HUSSAIN (obstructive sleep apnea)  Assessment & Plan:   Chronic-with progression/exacerbation: Reviewed and analyzed results of physiologic download from patient's machine and reviewed with patient. Supplies and parts as needed for her machine. These are medically necessary. Limit caffeine use after 3pm. Based on the analyzed data will change following settings: P min increased to 13. Encouraged consistent use of her machine each night, all night. Will trial pressure increase to see if this will be more comfortable for her, decrease nighttime awakenings, and improve daytime symptoms. If no improvement in her symptoms, patient will require an in lab titration. Discussed the importance of no driving when she is sleepy. Encouraged her to try checking hospice for local group therapy support to help her grieve the loss of her . Encouraged her to work on sleep hygiene. Instructed not to drive unless had 4 hrs of effective therapy for her HUSSAIN the night before. Did review the risks of under or untreated HUSSAIN including, but not limited to, higher risks of motor vehicle accidents, stroke, heart attacks, and death. She understands and accepts all these risks. Will see her back in 3 months or sooner if issues arise. 2. Chronic diastolic heart failure (HCC)  Assessment & Plan:   Chronic- Stable. Discussed the importance of treating obstructive sleep apnea as part of the management of this disorder. Cont any meds per PCP and other physicians. 3. Paroxysmal atrial fibrillation (HCC)  Assessment & Plan:   Chronic- Stable.   Discussed the importance of treating obstructive sleep apnea as part of the management of this disorder. Cont any meds per PCP and other physicians. 4. Benign essential HTN  Assessment & Plan:   Chronic- Stable. Discussed the importance of treating obstructive sleep apnea as part of the management of this disorder. Cont any meds per PCP and other physicians. 5. Mild persistent asthma without complication  Assessment & Plan:   Chronic- Stable. Discussed the importance of treating obstructive sleep apnea as part of the management of this disorder. Cont any meds per PCP and other physicians. 6. Type 2 diabetes mellitus without complication, unspecified whether long term insulin use ([facility]  Assessment & Plan:   Chronic- Stable. Discussed the importance of treating obstructive sleep apnea as part of the management of this disorder. Cont any meds per PCP and other physicians. 7. Restless leg syndrome  Assessment & Plan:  Chronic- Stable. Discussed the importance of treating obstructive sleep apnea as part of the management of this disorder. She continues to take pramipexole 0.75 mg at 5 pm and 0.75 mg at 11-1am.  Her symptoms are controlled at the current dose and she denies side effects. Will refill medication at the current dose. Discussed the importance of of using her machine each night to have optimal therapy with her medications and RLS symptoms. Orders:  -     pramipexole (MIRAPEX) 0.5 MG tablet; 1.5 tab q 5 PM and 1.5 tab qHS, Disp-270 tablet, R-1Normal  8. Obesity, Class III, BMI 40-49.9 (morbid obesity) ([facility])  Assessment & Plan:   Chronic-not stable:  Discussed importance of treating obstructive sleep apnea and getting sufficient sleep to assist with weight control. Encouraged her to work on weight loss through diet and exercise. Recommended DASH or Mediterranean diets. 9. Anxiety and depression  Assessment & Plan:  Chronic- Unstable.   Discussed the importance of treating obstructive sleep apnea as part of the management of this disorder. Discussed checking with hospice for emotional support groups to help her grief with the loss of her . Encouraged her to contact her PCP for further management and evaluation of recent increased symptoms of depression. Reviewed, analyzed, and documented physiologic data from patient's PAP machine. This information was analyzed to assess complexity and medical decision making in regards to further testing and management. The primary encounter diagnosis was HUSSAIN (obstructive sleep apnea). Diagnoses of Chronic diastolic heart failure (HCC), Paroxysmal atrial fibrillation (HCC), Benign essential HTN, Mild persistent asthma without complication, Type 2 diabetes mellitus without complication, unspecified whether long term insulin use ([facility] Restless leg syndrome, Obesity, Class III, BMI 40-49.9 (morbid obesity) ([facility] and Anxiety and depression were also pertinent to this visit. The chronic medical conditions listed are directly related to the primary diagnosis listed above. The management of the primary diagnosis affects the secondary diagnosis and vice versa. Subjective:   Subjective   Patient ID: Mich Jimenez is a 73 y.o. female. Chief Complaint   Patient presents with    Sleep Apnea       HPI:  Machine Modem/Download Info:  Compliance (hours/night): 4.17 hrs/night  % of nights >= 4 hrs: 44.4 %  Download AHI (/hour): 0.7 /HR  Average CPAP Pressure : 12.3 cmH2O       APAP - Settings  Pressure Min: 11 cmH2O  Pressure Max: 20 cmH2O                 Comfort Settings  Humidity Level (0-8): 3  Heated Tubing (Yes/No): Yes  Flex/EPR (0-3): 2 PAP Mask  Mask Type: Nasal pillows     Mich Jimenez reports she has not been compliant with her machine. She recently has had trouble sleeping since the passing of her  in October. She has gained about 15 lbs since February.   She reports she feels that she is not her normal self and has been looking for group therapy sessions to help her with grieving. She is also had increased symptoms and at times feels sleepy when she drives. The pressure on her machine feels low. she denies headaches, congestion, nosebleeds, dryness, aerophagia, or drowsiness while driving. She has registered her machine for the  recall and is currently awaiting replacement. RLS: She continues to take pramipexole 0.75 mg at 5 pm and 0.75 mg at 11-1am.  Her symptoms are controlled at the current dose and she denies side effects. Insomnia: Since January she has had a hard time sleeping. She likes to sew will and was feeling a lot during COVID and making masks but since that has slowed down she has not been setting as much. She was asked by her friend to help make costumes for a play which helped but she would like to start sewing more again. She feels that since she has been less active that this may contribute to her sleepiness. Her  passed away in October. She tried to see someone for grieving but they didn't take her insurance and was too expensive so she stopped going. Sleep hygiene: 1130-2am up at 7-715 am. Will wake around every 90 mins throughout the night on a good night. Most times she will fall asleep and have good sleep and then get up for the bathroom and then have a hard time going back asleep. Will lay there for 2-3 hours and then will get up if can't fall asleep will go to the living room and sleep in the recliner. Sometimes she will turn the tv on and watch nature programs and finds that soothing and puts her back to sleep. She feels that if she lays with nothing on she has a more difficult time falling back asleep.         [facility]    [place] - Total score: 22    Social History     Socioeconomic History    Marital status:      Spouse name: Not on file    Number of children: 3    Years of education: 12    Highest education level: Not on file   Occupational History    Occupation: retired    Tobacco Use    Smoking status: Former Smoker     Packs/day: 0.50     Years: 5.00     Pack years: 2.50     Quit date: 10/5/2013     Years since quittin.5    Smokeless tobacco: Never Used    Tobacco comment: smoked for a total of 5yr total   Vaping Use    Vaping Use: Never used   Substance and Sexual Activity    Alcohol use: Yes     Comment: RARE    Drug use: No    Sexual activity: Never   Other Topics Concern    Not on file   Social History Narrative    Not on file     Social Determinants of Health     Financial Resource Strain: Low Risk     Difficulty of Paying Living Expenses: Not hard at all   Food Insecurity: No Food Insecurity    Worried About [address] [facility] in the Last Year: Never true    [address] [employer] in the Last Year: Never true   Transportation Needs:     Lack of Transportation (Medical): Not on file    Lack of Transportation (Non-Medical):  Not on file   Physical Activity:     Days of Exercise per Week: Not on file    Minutes of Exercise per Session: Not on file   Stress:     Feeling of Stress : Not on file   Social Connections:     Frequency of Communication with Friends and Family: Not on file    Frequency of Social Gatherings with Friends and Family: Not on file    Attends Latter-day Services: Not on file    Active Member of [address] [place] [address] [employer] or Organizations: Not on file    Attends Club or Organization Meetings: Not on file    Marital Status: Not on file   Intimate Partner Violence:     Fear of Current or Ex-Partner: Not on file    Emotionally Abused: Not on file    Physically Abused: Not on file    Sexually Abused: Not on file   Housing Stability:     Unable to Pay for Housing in the Last Year: Not on file    Number of [place] in the Last Year: Not on file    Unstable Housing in the Last Year: Not on file       Current Outpatient Medications   Medication Instructions    albuterol (PROVENTIL) 2.5 mg, Nebulization, EVERY 6 HOURS PRN    azelastine (ASTELIN) 0.1 % nasal spray 1 spray, Nasal, 2 TIMES DAILY, 1 Spray in each nostril    buPROPion (WELLBUTRIN XL) 300 mg, Oral, EVERY MORNING    Ciclesonide (ALVESCO) 160 MCG/ACT AERS Inhalation    CPAP Machine MISC Does not apply    dilTIAZem (CARDIZEM CD) 120 mg, Oral, DAILY    EPIPEN 2-MIR 0.3 MG/0.3ML SOAJ injection No dose, route, or frequency recorded.  fluticasone (FLONASE) 50 MCG/ACT nasal spray 1 spray, Each Nostril, DAILY    fluticasone (FLOVENT HFA) 110 MCG/ACT inhaler 1 puff, Inhalation, 2 TIMES DAILY    furosemide (LASIX) 40 mg, Oral, DAILY    Handicap Placard MISC Does not apply, Exp 5 years    lisinopril (PRINIVIL;ZESTRIL) 10 mg, Oral, NIGHTLY    Magnesium Citrate 100 MG TABS 1 tablet, Oral, DAILY    Multiple Vitamins-Minerals (THERAPEUTIC MULTIVITAMIN-MINERALS) tablet 1 tablet, Oral, DAILY    Ozempic (0.25 or 0.5 MG/DOSE) 0.5 mg, SubCUTAneous, WEEKLY    potassium chloride (KLOR-CON M) 10 MEQ extended release tablet TAKE ONE TABLET BY MOUTH DAILY    pramipexole (MIRAPEX) 0.5 MG tablet 1.5 tab q 5 PM and 1.5 tab qHS    rivaroxaban (XARELTO) 20 mg, Oral, DAILY WITH DINNER    rivaroxaban (XARELTO) 20 mg, Oral, DAILY WITH BREAKFAST    rivaroxaban (XARELTO) 20 mg, Oral, DAILY WITH BREAKFAST    spironolactone (ALDACTONE) 25 MG tablet TAKE ONE TABLET BY MOUTH DAILY    Tens Unit MISC Does not apply          Vitals:  Weight BMI   Wt Readings from Last 3 Encounters:   04/29/22 249 lb 12.8 oz (113.3 kg)   03/14/22 240 lb (108.9 kg)   02/14/22 242 lb (109.8 kg)    Body mass index is 41.57 kg/m².      BP HR SaO2   BP Readings from Last 3 Encounters:   04/29/22 136/80   03/14/22 136/78   02/14/22 124/60    Pulse Readings from Last 3 Encounters:   04/29/22 76   03/14/22 77   02/14/22 78    SpO2 Readings from Last 3 Encounters:   04/29/22 98%   03/14/22 98%   02/14/22 99%        Electronically signed by BRICE Santoro on 4/29/2022 at 1:53 PM

## 2022-04-29 NOTE — PROGRESS NOTES
Diagnosis: [x] HUSSAIN (G47.33) [] CSA (G47.31) [] Apnea (G47.30)   Length of Need: [x] 15 Months [] 99 Months [] Other:   Machine (MANDI!): [] Respironics Dream Station      Auto [] ResMed AirSense     Auto [] Other:     []  CPAP () [] Bilevel ()   Mode: [] Auto [] Spontaneous    Mode: [] Auto [] Spontaneous              Comfort Settings:      Humidifier: [] Heated ()        [x] Water chamber replacement ()/ 1 per 6 months        Mask:   [x] Nasal () /1 per 3 months [] Full Face () /1 per 3 months   [x] Patient choice -Size and fit mask [] Patient Choice - Size and fit mask   [] Dispense: [] Dispense:   [x] Headgear () / 1 per 3 months [] Headgear () / 1 per 3 months   [] Replacement Nasal Cushion ()/2 per month [] Interface Replacement ()/1 per month   [x] Replacement Nasal Pillows ()/2 per month         Tubing: [x] Heated ()/1 per 3 months    [] Standard ()/1 per 3 months [] Other:           Filters: [x] Non-disposable ()/1 per 6 months     [x] Ultra-Fine, Disposable ()/2 per month        Miscellaneous: [] Chin Strap ()/ 1 per 6 months [] O2 bleed-in:        LPM   [] Oxymetry on CPAP/Bilevel []  Other:         Start Order Date: 04/29/22    MEDICAL JUSTIFICATION:  I, the undersigned, certify that the above prescribed supplies are medically necessary for this patients wellbeing. In my opinion, the supplies are both reasonable and necessary in reference to accepted standards of medicalpractice in treatment of this patients condition. Horace Guan NP    NPI: [id number]       Order Signed Date: 04/29/22  [address] [facility]  Pulmonary, Sleep, and Critical Care    Pulmonary, Sleep, and Critical Care  Replaced by [facility] [address] [place] [address].  [facility], [address] [facility] , [address] [facility]  Phone: [phone]    Fax: [address]  1950  [address]  [address] [facility]  [phone] (home)   [phone] (mobile)      Insurance Info (confirm with patient if correct):  Payor/Plan Subscr  Sex Relation Sub.  Ins. ID Effective Group Num

## 2022-04-29 NOTE — ASSESSMENT & PLAN NOTE
Chronic- Unstable. Discussed the importance of treating obstructive sleep apnea as part of the management of this disorder. Discussed checking with hospice for emotional support groups to help her grief with the loss of her . Encouraged her to contact her PCP for further management and evaluation of recent increased symptoms of depression.

## 2022-05-05 ENCOUNTER — PATIENT MESSAGE (OUTPATIENT)
Dept: INTERNAL MEDICINE CLINIC | Age: 72
End: 2022-05-05

## 2022-05-05 ENCOUNTER — TELEPHONE (OUTPATIENT)
Dept: PULMONOLOGY | Age: 72
End: 2022-05-05

## 2022-05-05 NOTE — TELEPHONE ENCOUNTER
[facility] called to say they did not receive the fax that they sent over and they are sending it over again. Also, the fax requests chart notes and those were not sent with the RX. Probably should send sleep study along with it.

## 2022-05-06 NOTE — TELEPHONE ENCOUNTER
Returned call to schedule, she has an appointment in our office next week and would like to wait until she is here to schedule with Dr. Tucker Andino.

## 2022-05-09 NOTE — PROGRESS NOTES
Diagnosis: [x] HUSSAIN (G47.33) [] CSA (G47.31) [] Apnea (G47.30)   Length of Need: [x] 15 Months [] 99 Months [] Other:   Machine (MANDI!): [] Respironics Dream Station      Auto [] ResMed AirSense     Auto [] Other:     []  CPAP () [] Bilevel ()   Mode: [] Auto [] Spontaneous    Mode: [] Auto [] Spontaneous            Comfort Settings:      Humidifier: [] Heated ()        [] Water chamber replacement ()/ 1 per 6 months        Mask:   [x] Nasal () /1 per 3 months [] Full Face () /1 per 3 months   [x] Patient choice -Size and fit mask [] Patient Choice - Size and fit mask   [] Dispense: [] Dispense:   [x] Headgear () / 1 per 3 months [] Headgear () / 1 per 3 months   [x] Replacement Nasal Cushion ()/2 per month [] Interface Replacement ()/1 per month   [] Replacement Nasal Pillows ()/2 per month         Tubing: [] Heated ()/1 per 3 months    [] Standard ()/1 per 3 months [] Other:           Filters: [] Non-disposable ()/1 per 6 months     [] Ultra-Fine, Disposable ()/2 per month        Miscellaneous: [] Chin Strap ()/ 1 per 6 months [] O2 bleed-in:        LPM   [] Oxymetry on CPAP/Bilevel []  Other:         Start Order Date: 05/09/22    MEDICAL JUSTIFICATION:  I, the undersigned, certify that the above prescribed supplies are medically necessary for this patients wellbeing. In my opinion, the supplies are both reasonable and necessary in reference to accepted standards of medicalpractice in treatment of this patients condition. Nico Llanes NP    NPI: [id number]       Order Signed Date: 05/09/22  [facility]  Pulmonary, Sleep, and Critical Care    Pulmonary, Sleep, and Critical Care  [address] [place] [address].  [address] [facility], [address] [facility] , [address] [facility]  Phone: [phone]    Fax: [phone]    Ke Ojeda CARYN Alexander  1950  [address] [facility] [address] [phone] (home)   [phone] (mobile)      Insurance Info (confirm with patient if correct):  Payor/Plan Subscr  Sex Relation Sub.  Ins. ID Effective Group Num

## 2022-05-09 NOTE — PROGRESS NOTES
Diagnosis: [x] HUSSAIN (G47.33) [] CSA (G47.31) [] Apnea (G47.30)   Length of Need: [x] 15 Months [] 99 Months [] Other:   Machine (MANDI!): [] Respironics Dream Station      Auto [] ResMed AirSense     Auto [] Other:     []  CPAP () [] Bilevel ()   Mode: [] Auto [] Spontaneous    Mode: [] Auto [] Spontaneous            Comfort Settings:      Humidifier: [] Heated ()        [] Water chamber replacement ()/ 1 per 6 months        Mask:   [] Nasal () /1 per 3 months [] Full Face () /1 per 3 months   [] Patient choice -Size and fit mask [] Patient Choice - Size and fit mask   [] Dispense: [] Dispense:   [] Headgear () / 1 per 3 months [] Headgear () / 1 per 3 months   [] Replacement Nasal Cushion ()/2 per month [] Interface Replacement ()/1 per month   [] Replacement Nasal Pillows ()/2 per month         Tubing: [] Heated ()/1 per 3 months    [] Standard ()/1 per 3 months [] Other:           Filters: [x] Non-disposable ()/1 per 6 months     [x] Ultra-Fine, Disposable ()/2 per month        Miscellaneous: [] Chin Strap ()/ 1 per 6 months [] O2 bleed-in:        LPM   [] Oxymetry on CPAP/Bilevel []  Other:         Start Order Date: 05/09/22    MEDICAL JUSTIFICATION:  I, the undersigned, certify that the above prescribed supplies are medically necessary for this patients wellbeing. In my opinion, the supplies are both reasonable and necessary in reference to accepted standards of medicalpractice in treatment of this patients condition. [employer] NP    NPI: [id number]       Order Signed Date: 05/09/22  [facility]  Pulmonary, Sleep, and Critical Care    Pulmonary, Sleep, and Critical Care  [address] [place] [address].  [facility], [facility] , [address]                                    [facility] AT [facility]  Phone: [phone]    Fax: [phone]    Obinna Encinas Benjamin  1950  [address] [place] [id number] [phone] (home)   [phone] (mobile)      Insurance Info (confirm with patient if correct):  Payor/Plan Subscr  Sex Relation Sub.  Ins. ID Effective Group Num

## 2022-05-09 NOTE — PROGRESS NOTES
Diagnosis: [x] HUSSAIN (G47.33) [] CSA (G47.31) [] Apnea (G47.30)   Length of Need: [x] 15 Months [] 99 Months [] Other:   Machine (MANDI!): [] Respironics Dream Station      Auto [] ResMed AirSense     Auto [] Other:     []  CPAP () [] Bilevel ()   Mode: [] Auto [] Spontaneous    Mode: [] Auto [] Spontaneous            Comfort Settings:      Humidifier: [] Heated ()        [x] Water chamber replacement ()/ 1 per 6 months        Mask:   [] Nasal () /1 per 3 months [] Full Face () /1 per 3 months   [] Patient choice -Size and fit mask [] Patient Choice - Size and fit mask   [] Dispense: [] Dispense:   [] Headgear () / 1 per 3 months [] Headgear () / 1 per 3 months   [] Replacement Nasal Cushion ()/2 per month [] Interface Replacement ()/1 per month   [] Replacement Nasal Pillows ()/2 per month         Tubing: [] Heated ()/1 per 3 months    [] Standard ()/1 per 3 months [] Other:           Filters: [] Non-disposable ()/1 per 6 months     [] Ultra-Fine, Disposable ()/2 per month        Miscellaneous: [] Chin Strap ()/ 1 per 6 months [] O2 bleed-in:        LPM   [] Oxymetry on CPAP/Bilevel []  Other:         Start Order Date: 05/09/22    MEDICAL JUSTIFICATION:  I, the undersigned, certify that the above prescribed supplies are medically necessary for this patients wellbeing. In my opinion, the supplies are both reasonable and necessary in reference to accepted standards of medicalpractice in treatment of this patients condition. Nick Sutherland NP    NPI: [id number]       Order Signed Date: 05/09/22  350 [facility]  Pulmonary, Sleep, and Critical Care    Pulmonary, Sleep, and Critical Care  [facility] [address] [place] [address].  [address], [address] [place] [address] , [facility]  Phone: [phone]    Fax: [phone]    Bhaskar Robb Benjamin  1950  [address] [place] [address]  [phone] (home)   [phone] (mobile)      Insurance Info (confirm with patient if correct):  Payor/Plan Subscr  Sex Relation Sub.  Ins. ID Effective Group Num

## 2022-05-09 NOTE — PROGRESS NOTES
Diagnosis: [x] HUSSAIN (G47.33) [] CSA (G47.31) [] Apnea (G47.30)   Length of Need: [x] 15 Months [] 99 Months [] Other:   Machine (MANDI!): [] Respironics Dream Station      Auto [] ResMed AirSense     Auto [] Other:     []  CPAP () [] Bilevel ()   Mode: [] Auto [] Spontaneous    Mode: [] Auto [] Spontaneous            Comfort Settings:      Humidifier: [] Heated ()        [] Water chamber replacement ()/ 1 per 6 months        Mask:   [] Nasal () /1 per 3 months [] Full Face () /1 per 3 months   [] Patient choice -Size and fit mask [] Patient Choice - Size and fit mask   [] Dispense: [] Dispense:   [] Headgear () / 1 per 3 months [] Headgear () / 1 per 3 months   [] Replacement Nasal Cushion ()/2 per month [] Interface Replacement ()/1 per month   [] Replacement Nasal Pillows ()/2 per month         Tubing: [x] Heated ()/1 per 3 months    [] Standard ()/1 per 3 months [] Other:           Filters: [] Non-disposable ()/1 per 6 months     [] Ultra-Fine, Disposable ()/2 per month        Miscellaneous: [] Chin Strap ()/ 1 per 6 months [] O2 bleed-in:        LPM   [] Oxymetry on CPAP/Bilevel []  Other:         Start Order Date: 05/09/22    MEDICAL JUSTIFICATION:  I, the undersigned, certify that the above prescribed supplies are medically necessary for this patients wellbeing. In my opinion, the supplies are both reasonable and necessary in reference to accepted standards of medicalpractice in treatment of this patients condition. Nick Sutherland NP    NPI: [id number]       Order Signed Date: 05/09/22  350 [facility]  Pulmonary, Sleep, and Critical Care    Pulmonary, Sleep, and Critical Care  [facility] [place] [address].  Suite [facility], [address] [place] [address] , [address] [facility]  Phone: [phone]    Fax: [phone]    Bhaskar Robb Benjamin  1950  [address] [place] [address] [phone] (home)   [phone] (mobile)      Insurance Info (confirm with patient if correct):  Payor/Plan Subscr  Sex Relation Sub.  Ins. ID Effective Group Num

## 2022-05-11 ENCOUNTER — OFFICE VISIT (OUTPATIENT)
Dept: INTERNAL MEDICINE CLINIC | Age: 72
End: 2022-05-11
Payer: MEDICARE

## 2022-05-11 VITALS
HEART RATE: 70 BPM | DIASTOLIC BLOOD PRESSURE: 72 MMHG | SYSTOLIC BLOOD PRESSURE: 118 MMHG | HEIGHT: 65 IN | TEMPERATURE: 97.6 F | OXYGEN SATURATION: 98 % | BODY MASS INDEX: 40.98 KG/M2 | WEIGHT: 246 LBS

## 2022-05-11 DIAGNOSIS — E11.9 NEW ONSET TYPE 2 DIABETES MELLITUS (HCC): ICD-10-CM

## 2022-05-11 DIAGNOSIS — F51.01 PRIMARY INSOMNIA: ICD-10-CM

## 2022-05-11 DIAGNOSIS — F33.1 MODERATE EPISODE OF RECURRENT MAJOR DEPRESSIVE DISORDER (HCC): Primary | ICD-10-CM

## 2022-05-11 DIAGNOSIS — Z88.8 ALLERGY TO STATIN MEDICATION: ICD-10-CM

## 2022-05-11 DIAGNOSIS — I26.99 PULMONARY EMBOLISM, UNSPECIFIED CHRONICITY, UNSPECIFIED PULMONARY EMBOLISM TYPE, UNSPECIFIED WHETHER ACUTE COR PULMONALE PRESENT (HCC): ICD-10-CM

## 2022-05-11 PROCEDURE — 3017F COLORECTAL CA SCREEN DOC REV: CPT | Performed by: INTERNAL MEDICINE

## 2022-05-11 PROCEDURE — 4040F PNEUMOC VAC/ADMIN/RCVD: CPT | Performed by: INTERNAL MEDICINE

## 2022-05-11 PROCEDURE — 1090F PRES/ABSN URINE INCON ASSESS: CPT | Performed by: INTERNAL MEDICINE

## 2022-05-11 PROCEDURE — G8427 DOCREV CUR MEDS BY ELIG CLIN: HCPCS | Performed by: INTERNAL MEDICINE

## 2022-05-11 PROCEDURE — 3044F HG A1C LEVEL LT 7.0%: CPT | Performed by: INTERNAL MEDICINE

## 2022-05-11 PROCEDURE — G8417 CALC BMI ABV UP PARAM F/U: HCPCS | Performed by: INTERNAL MEDICINE

## 2022-05-11 PROCEDURE — 99214 OFFICE O/P EST MOD 30 MIN: CPT | Performed by: INTERNAL MEDICINE

## 2022-05-11 PROCEDURE — G8399 PT W/DXA RESULTS DOCUMENT: HCPCS | Performed by: INTERNAL MEDICINE

## 2022-05-11 PROCEDURE — 1123F ACP DISCUSS/DSCN MKR DOCD: CPT | Performed by: INTERNAL MEDICINE

## 2022-05-11 PROCEDURE — 1036F TOBACCO NON-USER: CPT | Performed by: INTERNAL MEDICINE

## 2022-05-11 PROCEDURE — 2022F DILAT RTA XM EVC RTNOPTHY: CPT | Performed by: INTERNAL MEDICINE

## 2022-05-11 RX ORDER — MELATONIN 10 MG
10 CAPSULE ORAL NIGHTLY
COMMUNITY

## 2022-05-11 RX ORDER — VENLAFAXINE HYDROCHLORIDE 37.5 MG/1
37.5 CAPSULE, EXTENDED RELEASE ORAL DAILY
Qty: 30 CAPSULE | Refills: 5 | Status: SHIPPED | OUTPATIENT
Start: 2022-05-11

## 2022-05-11 RX ORDER — SEMAGLUTIDE 1.34 MG/ML
0.5 INJECTION, SOLUTION SUBCUTANEOUS WEEKLY
Qty: 3 PEN | Refills: 0 | COMMUNITY
Start: 2022-05-11 | End: 2022-07-13 | Stop reason: SDUPTHER

## 2022-05-11 ASSESSMENT — PATIENT HEALTH QUESTIONNAIRE - PHQ9
6. FEELING BAD ABOUT YOURSELF - OR THAT YOU ARE A FAILURE OR HAVE LET YOURSELF OR YOUR FAMILY DOWN: 0
SUM OF ALL RESPONSES TO PHQ QUESTIONS 1-9: 15
3. TROUBLE FALLING OR STAYING ASLEEP: 3
2. FEELING DOWN, DEPRESSED OR HOPELESS: 3
SUM OF ALL RESPONSES TO PHQ QUESTIONS 1-9: 15
7. TROUBLE CONCENTRATING ON THINGS, SUCH AS READING THE NEWSPAPER OR WATCHING TELEVISION: 3
SUM OF ALL RESPONSES TO PHQ QUESTIONS 1-9: 15
SUM OF ALL RESPONSES TO PHQ9 QUESTIONS 1 & 2: 6
8. MOVING OR SPEAKING SO SLOWLY THAT OTHER PEOPLE COULD HAVE NOTICED. OR THE OPPOSITE, BEING SO FIGETY OR RESTLESS THAT YOU HAVE BEEN MOVING AROUND A LOT MORE THAN USUAL: 0
4. FEELING TIRED OR HAVING LITTLE ENERGY: 1
10. IF YOU CHECKED OFF ANY PROBLEMS, HOW DIFFICULT HAVE THESE PROBLEMS MADE IT FOR YOU TO DO YOUR WORK, TAKE CARE OF THINGS AT HOME, OR GET ALONG WITH OTHER PEOPLE: 2
SUM OF ALL RESPONSES TO PHQ QUESTIONS 1-9: 15
5. POOR APPETITE OR OVEREATING: 2
1. LITTLE INTEREST OR PLEASURE IN DOING THINGS: 3
9. THOUGHTS THAT YOU WOULD BE BETTER OFF DEAD, OR OF HURTING YOURSELF: 0

## 2022-05-11 NOTE — PROGRESS NOTES
Chief Complaint   Patient presents with    Depression     requesting referral to Dr. Amirah Gamez     inside of lower lip    Insomnia       Assessment/Plan:  Terrance Archuleta was seen today for depression, blister and insomnia. Diagnoses and all orders for this visit:    Moderate episode of recurrent major depressive disorder (HCC)  -     venlafaxine (EFFEXOR XR) 37.5 MG extended release capsule; Take 1 capsule by mouth daily  -     Iodine, Serum; Future    Pulmonary embolism, unspecified chronicity, unspecified pulmonary embolism type, unspecified whether acute cor pulmonale present (HCC)  -     rivaroxaban (XARELTO) 20 MG TABS tablet; Take 1 tablet by mouth daily (with breakfast)  -     Iodine, Serum; Future    Allergy to statin medication  -     Iodine, Serum; Future    New onset type 2 diabetes mellitus ([facility]  -     Semaglutide,0.25 or 0.5MG/DOS, (OZEMPIC, 0.25 OR 0.5 MG/DOSE,) 2 MG/1.5ML SOPN; Inject 0.5 mg into the skin once a week  -      DIABETES FOOT EXAM    Primary insomnia  Comments:  Effexor was added to regimen        Vitals:    05/11/22 1110   BP: 118/72   Pulse: 70   Temp: 97.6 °F (36.4 °C)   SpO2: 98%     PHQ Scores 5/11/2022 3/14/2022 2/14/2022 2/14/2022 11/10/2021 8/20/2021 6/7/2021   PHQ2 Score 6 0 4 2 2 0 0   PHQ9 Score 15 0 12 2 2 0 0     Physical Exam  Vitals and nursing note reviewed. Constitutional:       General: She is not in acute distress. Appearance: She is well-developed. She is obese. She is not ill-appearing, toxic-appearing or diaphoretic. HENT:      Head: Normocephalic and atraumatic. Cardiovascular:      Rate and Rhythm: Normal rate and regular rhythm. Heart sounds: Normal heart sounds. No murmur heard. No friction rub. No gallop. Pulmonary:      Effort: Pulmonary effort is normal. No respiratory distress. Breath sounds: Normal breath sounds. No wheezing or rales. Chest:      Chest wall: No tenderness.    Musculoskeletal:      Comments: No calluses or open lesions   Skin:     General: Skin is warm. Findings: No erythema or rash. Neurological:      Mental Status: She is alert and oriented to person, place, and time. Cranial Nerves: No cranial nerve deficit. Comments: Normal sensation to microfilament bilaterally. Psychiatric:         Behavior: Behavior normal.         Thought Content:  Thought content normal.         Judgment: Judgment normal.         Interpretation of Total Score Depression Severity: 1-4 = Minimal depression, 5-9 = Mild depression, 10-14 = Moderate depression, 15-19 = Moderately severe depression, 20-27 = Severe depression    MD Lashell Chavez

## 2022-05-28 PROCEDURE — 93298 REM INTERROG DEV EVAL SCRMS: CPT | Performed by: INTERNAL MEDICINE

## 2022-05-28 PROCEDURE — G2066 INTER DEVC REMOTE 30D: HCPCS | Performed by: INTERNAL MEDICINE

## 2022-05-30 NOTE — PROGRESS NOTES
We received a remote transmission from patient's monitor at home. Remote Linq report shows short SVT. Pt is on Cardizem. EP physician to review. We will continue to monitor remotely.

## 2022-05-31 ENCOUNTER — NURSE ONLY (OUTPATIENT)
Dept: CARDIOLOGY CLINIC | Age: 72
End: 2022-05-31
Payer: MEDICARE

## 2022-05-31 ENCOUNTER — OFFICE VISIT (OUTPATIENT)
Dept: PSYCHOLOGY | Age: 72
End: 2022-05-31
Payer: MEDICARE

## 2022-05-31 DIAGNOSIS — F33.1 MODERATE EPISODE OF RECURRENT MAJOR DEPRESSIVE DISORDER (HCC): ICD-10-CM

## 2022-05-31 DIAGNOSIS — I26.99 PULMONARY EMBOLISM, UNSPECIFIED CHRONICITY, UNSPECIFIED PULMONARY EMBOLISM TYPE, UNSPECIFIED WHETHER ACUTE COR PULMONALE PRESENT (HCC): ICD-10-CM

## 2022-05-31 DIAGNOSIS — Z45.09 ENCOUNTER FOR ELECTRONIC ANALYSIS OF REVEAL EVENT RECORDER: ICD-10-CM

## 2022-05-31 DIAGNOSIS — Z88.8 ALLERGY TO STATIN MEDICATION: ICD-10-CM

## 2022-05-31 DIAGNOSIS — I48.0 PAROXYSMAL ATRIAL FIBRILLATION (HCC): Chronic | ICD-10-CM

## 2022-05-31 DIAGNOSIS — F32.1 MODERATE MAJOR DEPRESSION (HCC): Primary | ICD-10-CM

## 2022-05-31 DIAGNOSIS — R00.1 SYMPTOMATIC BRADYCARDIA: ICD-10-CM

## 2022-05-31 PROCEDURE — 90791 PSYCH DIAGNOSTIC EVALUATION: CPT | Performed by: PSYCHOLOGIST

## 2022-05-31 PROCEDURE — 1036F TOBACCO NON-USER: CPT | Performed by: PSYCHOLOGIST

## 2022-05-31 PROCEDURE — 1123F ACP DISCUSS/DSCN MKR DOCD: CPT | Performed by: PSYCHOLOGIST

## 2022-05-31 NOTE — PROGRESS NOTES
Behavioral Health Consultation  Abby Marquez, Ph.D.  Psychologist  2022   2:28 PM EDT       Time spent with Patient: 30 minutes  This is patient's first [facility] appointment. Reason for Consult:    Chief Complaint   Patient presents with    Depression     Referring Provider: No referring provider defined for this encounter. Pt provided informed consent for the behavioral health program. Discussed with patient model of service to include the limits of confidentiality (i.e. abuse reporting, suicide  intervention, etc.) and short-term intervention focused approach. Pt indicated understanding. Feedback given to PCP. S:  Pt seen per PCP re: mood    Pt seen for intake and reported sxs consistent w/ Major Depressive Disorder. Pt specifically endorsed depressed mood, deactivation, anhedonia,  social isolation,  insomnia/hypersomnia, fatigue, psychomotor retardation. Pt reported that their sxs began in Feb and attributed onset to environmental changes. 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 Sxs are exacerbated by social isolation.  . Focused intervention on psychoed re: depression, valued living, bx activation        O:  MSE:    Appearance: good hygiene   Attitude: cooperative and friendly  Consciousness: alert  Orientation: oriented to person, place, time, general circumstance  Memory: recent and remote memory intact  Attention/Concentration: intact during session  Psychomotor Activity:normal  Eye Contact: normal  Speech: normal rate and volume, well-articulated  Mood: Euthymic  Affect: euthymic  Perception: within normal limits  Thought Content: within normal limits  Thought Process: logical, coherent  Insight: good  Judgment: intact  Ability to understand instructions: Yes  Morbid Ideation: no   Suicide Assessment: no suicidal ideation, plan, or intent  Homicidal Ideation: no     History:    Social History:   Social History Socioeconomic History    Marital status:      Spouse name: Not on file    Number of children: 3    Years of education: 15    Highest education level: Not on file   Occupational History    Occupation: retired    Tobacco Use    Smoking status: Former Smoker     Packs/day: 0.50     Years: 5.00     Pack years: 2.50     Quit date: 10/5/2013     Years since quittin.6    Smokeless tobacco: Never Used    Tobacco comment: smoked for a total of 5yr total   Vaping Use    Vaping Use: Never used   Substance and Sexual Activity    Alcohol use: Yes     Comment: RARE    Drug use: No    Sexual activity: Never   Other Topics Concern    Not on file   Social History Narrative    Not on file     Social Determinants of Health     Financial Resource Strain: Low Risk     Difficulty of Paying Living Expenses: Not hard at all   Food Insecurity: No Food Insecurity    Worried About [address] in the Last Year: Never true    [facility] [employer] in the Last Year: Never true   Transportation Needs:     Lack of Transportation (Medical): Not on file    Lack of Transportation (Non-Medical):  Not on file   Physical Activity:     Days of Exercise per Week: Not on file    Minutes of Exercise per Session: Not on file   Stress:     Feeling of Stress : Not on file   Social Connections:     Frequency of Communication with Friends and Family: Not on file    Frequency of Social Gatherings with Friends and Family: Not on file    Attends Restorationist Services: Not on file    Active Member of Clubs or Organizations: Not on file    Attends Club or Organization Meetings: Not on file    Marital Status: Not on file   Intimate Partner Violence:     Fear of Current or Ex-Partner: Not on file    Emotionally Abused: Not on file    Physically Abused: Not on file    Sexually Abused: Not on file   Housing Stability:     Unable to Pay for Housing in the Last Year: Not on file    Number of [place] in the Last Year: Not on file    Unstable Housing in the Last Year: Not on file     TOBACCO:   reports that she quit smoking about 8 years ago. She has a 2.50 pack-year smoking history. She has never used smokeless tobacco.  ETOH:   reports current alcohol use. A:    PHQ Scores 5/11/2022 3/14/2022 2/14/2022 2/14/2022 11/10/2021 8/20/2021 6/7/2021   PHQ2 Score 6 0 4 2 2 0 0   PHQ9 Score 15 0 12 2 2 0 0     Interpretation of Total Score Depression Severity: 1-4 = Minimal depression, 5-9 = Mild depression, 10-14 = Moderate depression, 15-19 = Moderately severe depression, 20-27 = Severe depression    No flowsheet data found. Interpretation of JANELLE-7 score: 5-9 = mild anxiety, 10-14 = moderate anxiety, 15+ = severe anxiety. Recommend referral to behavioral health for scores 10 or greater. Diagnosis:    1. Moderate major depression ([facility]          Plan:    Patient Instructions   1) Look over the list of values. Choose the 5 most important to you. Bring thi sin next time. A Quick Look at Your Values   Values are your hearts deepest desires for how you want to behave as a human being. Values are not about what you want to get or achieve; they are about how you want to behave or act on an ongoing basis. There are literally hundreds of different values, but below youll find a list of the most common ones. Probably, not all of them will be relevant to you. Keep in mind there are no such things as right values or wrong values. Its a bit like our taste in pizzas. If you prefer ham and pineapple but I prefer salami and olives, that doesnt mean that my taste in pizzas is right and yours is wrong. It just means we have different tastes. And similarly, we may have different values. 1. Acceptance: to be open to and accepting of myself, others, life etc  2. Adventure: to be adventurous; to actively seek, create, or explore novel or stimulating  experiences  3.  Assertiveness: to respectfully stand up for my rights and request what I want  4. Authenticity: to be authentic, genuine, real; to be true to myself  5. Beauty: to appreciate, create, nurture or cultivate beauty in myself, others, the  environment etc  6. Caring: to be caring towards myself, others, the environment etc  7. Challenge: to keep challenging myself to grow, learn, improve  8. Compassion: to act with kindness towards those who are suffering  9. Connection: to engage fully in whatever I am doing, and be fully present with others  10. Contribution: to contribute, help, assist, or make a positive difference to myself or  others  11. Conformity: to be respectful and obedient of rules and obligations  12. Cooperation: to be cooperative and collaborative with others  15. Courage: to be courageous or brave; to persist in the face of fear, threat, or difficulty  14. Creativity: to be creative or innovative  15. Curiosity: to be curious, open-minded and interested; to explore and discover  16. Encouragement: to encourage and reward behaviour that I value in myself or others  17. [place]: to treat others as equal to myself, and vice-versa  18. Excitement: to seek, create and engage in activities that are exciting, stimulating or  thrilling  19. Fairness: to be fair to myself or others  20. Fitness: to maintain or improve my fitness; to look after my physical and mental  health and wellbeing  21. Flexibility: to adjust and adapt readily to changing circumstances  22. Freedom: to live freely; to choose how I live and behave, or help others do likewise  23. Friendliness: to be friendly, companionable, or agreeable towards others  24. Forgiveness: to be forgiving towards myself or others  25. Fun: to be fun-loving; to seek, create, and engage in fun-filled activities  26. Generosity: to be generous, sharing and giving, to myself or others  27. Gratitude: to be grateful for and appreciative of the positive aspects of myself, others  and life  28.  Honesty: to be honest, truthful, and sincere with myself and others  29. Humour: to see and appreciate the humorous side of life  30. Humility: to be humble or modest; to let my achievements speak for themselves  31. Industry: to be industrious, hard-working, dedicated  28. [place]: to be self-supportive, and choose my own way of doing things  33. Intimacy: to open up, reveal, and share myself -- emotionally or physically  in my  close personal relationships  29. Justice: to uphold justice and fairness  35. Kindness: to be kind, compassionate, considerate, nurturing or caring towards myself  or others  39. Love: to act lovingly or affectionately towards myself or others  40. Mindfulness: to be conscious of, open to, and curious about my here-and-now  experience  45. Order: to be orderly and organized  44. Open-mindedness: to think things through, see things from others points of view, and  weigh evidence fairly. 36. Patience: to wait calmly for what I want  41. Persistence: to continue resolutely, despite problems or difficulties. 42. Pleasure: to create and give pleasure to myself or others  43. Power: to strongly influence or wield authority over others, e.g. taking charge,  leading, organizing  44. Reciprocity: to build relationships in which there is a fair balance of giving and taking  45. Respect: to be respectful towards myself or others; to be polite, considerate and show  positive regard  46. Responsibility: to be responsible and accountable for my actions  47. Romance: to be romantic; to display and express love or strong affection  50. Safety: to secure, protect, or ensure safety of myself or others  49. Self-awareness: to be aware of my own thoughts, feelings and actions  50. Self-care: to look after my health and wellbeing, and get my needs met  51. Self-development: to keep growing, advancing or improving in knowledge, skills,  character, or life experience.   52. Self-control: to act in accordance with my own ideals  53. Sensuality: to create, explore and enjoy experiences that stimulate the five senses  54. Sexuality: to explore or express my sexuality  54. Spirituality: to connect with things bigger than myself  56. Skilfulness: to continually practice and improve my skills, and apply myself fully  when using them  57. Supportiveness: to be supportive, helpful, encouraging, and available to myself or  others  62. Trust: to be trustworthy; to be loyal, faithful, sincere, and reliable  59. Insert your own unlisted value here:  60. Insert your own unlisted value here:        From: https://IGIGI/upimages/Complete_Worksheets_2014. pdf        Pt interventions:  See above    No follow-ups on file. Documentation was done using voice recognition dragon software. Every effort was made to ensure accuracy; however, inadvertent, unintentional computerized transcription errors may be present.

## 2022-05-31 NOTE — PATIENT INSTRUCTIONS
1) Look over the list of values. Choose the 5 most important to you. Bring thi sin next time. A Quick Look at Your Values   Values are your hearts deepest desires for how you want to behave as a human being. Values are not about what you want to get or achieve; they are about how you want to behave or act on an ongoing basis. There are literally hundreds of different values, but below youll find a list of the most common ones. Probably, not all of them will be relevant to you. Keep in mind there are no such things as right values or wrong values. Its a bit like our taste in pizzas. If you prefer ham and pineapple but I prefer salami and olives, that doesnt mean that my taste in pizzas is right and yours is wrong. It just means we have different tastes. And similarly, we may have different values. 1. Acceptance: to be open to and accepting of myself, others, life etc  2. Adventure: to be adventurous; to actively seek, create, or explore novel or stimulating  experiences  3. Assertiveness: to respectfully stand up for my rights and request what I want  4. Authenticity: to be authentic, genuine, real; to be true to myself  5. Beauty: to appreciate, create, nurture or cultivate beauty in myself, others, the  environment etc  6. Caring: to be caring towards myself, others, the environment etc  7. Challenge: to keep challenging myself to grow, learn, improve  8. Compassion: to act with kindness towards those who are suffering  9. Connection: to engage fully in whatever I am doing, and be fully present with others  10. Contribution: to contribute, help, assist, or make a positive difference to myself or  others  11. Conformity: to be respectful and obedient of rules and obligations  12. Cooperation: to be cooperative and collaborative with others  15. Courage: to be courageous or brave; to persist in the face of fear, threat, or difficulty  14. Creativity: to be creative or innovative  15.  Curiosity: to be curious, open-minded and interested; to explore and discover  16. Encouragement: to encourage and reward behaviour that I value in myself or others  17. [place]: to treat others as equal to myself, and vice-versa  18. Excitement: to seek, create and engage in activities that are exciting, stimulating or  thrilling  19. Fairness: to be fair to myself or others  20. Fitness: to maintain or improve my fitness; to look after my physical and mental  health and wellbeing  21. Flexibility: to adjust and adapt readily to changing circumstances  22. Freedom: to live freely; to choose how I live and behave, or help others do likewise  23. Friendliness: to be friendly, companionable, or agreeable towards others  24. Forgiveness: to be forgiving towards myself or others  25. Fun: to be fun-loving; to seek, create, and engage in fun-filled activities  26. Generosity: to be generous, sharing and giving, to myself or others  27. Gratitude: to be grateful for and appreciative of the positive aspects of myself, others  and life  28. Honesty: to be honest, truthful, and sincere with myself and others  29. Humour: to see and appreciate the humorous side of life  30. Humility: to be humble or modest; to let my achievements speak for themselves  31. Industry: to be industrious, hard-working, dedicated  28. [place]: to be self-supportive, and choose my own way of doing things  33. Intimacy: to open up, reveal, and share myself -- emotionally or physically  in my  close personal relationships  29. Justice: to uphold justice and fairness  35. Kindness: to be kind, compassionate, considerate, nurturing or caring towards myself  or others  39. Love: to act lovingly or affectionately towards myself or others  40. Mindfulness: to be conscious of, open to, and curious about my here-and-now  experience  45.  Order: to be orderly and organized  44. Open-mindedness: to think things through, see things from others points of view, and  weigh evidence fairly. 36. Patience: to wait calmly for what I want  41. Persistence: to continue resolutely, despite problems or difficulties. 42. Pleasure: to create and give pleasure to myself or others  43. Power: to strongly influence or wield authority over others, e.g. taking charge,  leading, organizing  44. Reciprocity: to build relationships in which there is a fair balance of giving and taking  45. Respect: to be respectful towards myself or others; to be polite, considerate and show  positive regard  46. Responsibility: to be responsible and accountable for my actions  47. Romance: to be romantic; to display and express love or strong affection  50. Safety: to secure, protect, or ensure safety of myself or others  49. Self-awareness: to be aware of my own thoughts, feelings and actions  50. Self-care: to look after my health and wellbeing, and get my needs met  51. Self-development: to keep growing, advancing or improving in knowledge, skills,  character, or life experience. 52. Self-control: to act in accordance with my own ideals  48. Sensuality: to create, explore and enjoy experiences that stimulate the five senses  54. Sexuality: to explore or express my sexuality  54. Spirituality: to connect with things bigger than myself  56. Skilfulness: to continually practice and improve my skills, and apply myself fully  when using them  57. Supportiveness: to be supportive, helpful, encouraging, and available to myself or  others  62. Trust: to be trustworthy; to be loyal, faithful, sincere, and reliable  59. Insert your own unlisted value here:  60. Insert your own unlisted value here:        From: [email or web address] [email or web address]. pdf

## 2022-06-01 ENCOUNTER — CARE COORDINATION (OUTPATIENT)
Dept: CARE COORDINATION | Age: 72
End: 2022-06-01

## 2022-06-02 LAB — IODINE, SERUM: 64.8 UG/L (ref 40–92)

## 2022-06-15 RX ORDER — LISINOPRIL 10 MG/1
TABLET ORAL
Qty: 90 TABLET | Refills: 3 | Status: SHIPPED | OUTPATIENT
Start: 2022-06-15 | End: 2022-10-03 | Stop reason: SDUPTHER

## 2022-06-15 RX ORDER — POTASSIUM CHLORIDE 750 MG/1
TABLET, EXTENDED RELEASE ORAL
Qty: 90 TABLET | Refills: 3 | Status: SHIPPED | OUTPATIENT
Start: 2022-06-15 | End: 2022-10-03 | Stop reason: SDUPTHER

## 2022-06-15 NOTE — TELEPHONE ENCOUNTER
Prescription refill    Last OV:02/02/2022    Last Refill:07/09/2021    Labs:02/16/2022    Future Appt:07/22/2022

## 2022-06-16 ENCOUNTER — CARE COORDINATION (OUTPATIENT)
Dept: CARE COORDINATION | Age: 72
End: 2022-06-16

## 2022-06-16 NOTE — CARE COORDINATION
Pt sent my chart message to [employer] today stating she is no longer pursuing the possibility of volunteering at [facility]. ACM responded that if she changes her mind, i'll be happy to provide the contact info to her and I'll be looking forward to hearing what else she may have in mind. PLAN:    Will f/u later date/time regarding other needs/questions/concerns as ACM asked her if she has any today and if I'm able to assist her with anything else at this time.

## 2022-06-22 ENCOUNTER — OFFICE VISIT (OUTPATIENT)
Dept: PSYCHOLOGY | Age: 72
End: 2022-06-22
Payer: MEDICARE

## 2022-06-22 DIAGNOSIS — F32.1 MODERATE MAJOR DEPRESSION (HCC): Primary | ICD-10-CM

## 2022-06-22 PROCEDURE — 90832 PSYTX W PT 30 MINUTES: CPT | Performed by: PSYCHOLOGIST

## 2022-06-22 PROCEDURE — 1036F TOBACCO NON-USER: CPT | Performed by: PSYCHOLOGIST

## 2022-06-22 PROCEDURE — 1123F ACP DISCUSS/DSCN MKR DOCD: CPT | Performed by: PSYCHOLOGIST

## 2022-06-22 NOTE — PROGRESS NOTES
Behavioral Health Consultation  Cruz Schulz, Ph.D.  Psychologist  6/22/2022   2:28 PM EDT       Time spent with Patient: 30 minutes  This is patient's first [facility] appointment. Reason for Consult:    Chief Complaint   Patient presents with    Other     Referring Provider: No referring provider defined for this encounter. Pt provided informed consent for the behavioral health program. Discussed with patient model of service to include the limits of confidentiality (i.e. abuse reporting, suicide  intervention, etc.) and short-term intervention focused approach. Pt indicated understanding. Feedback given to PCP. S:  Pt seen per PCP re: mood    Patient seen for follow-up. Patient reported that completed her values exercise and identified them as below. Patient noted that at times she is not compassionate towards herself and utilizes negative self talk. Patient described origins of this. Focused visit on cognitive reappraisal and set related plan    Compassion (6), Contribution (9), Curiosity (9), Respect (7), Trust (10)        O:  MSE:    Appearance: good hygiene   Attitude: cooperative and friendly  Consciousness: alert  Orientation: oriented to person, place, time, general circumstance  Memory: recent and remote memory intact  Attention/Concentration: intact during session  Psychomotor Activity:normal  Eye Contact: normal  Speech: normal rate and volume, well-articulated  Mood: Euthymic  Affect: euthymic  Perception: within normal limits  Thought Content: within normal limits  Thought Process: logical, coherent  Insight: good  Judgment: intact  Ability to understand instructions: Yes  Morbid Ideation: no   Suicide Assessment: no suicidal ideation, plan, or intent  Homicidal Ideation: no     History:    Social History:   Social History     Socioeconomic History    Marital status:       Spouse name: Not on file    Number of children: 4    Years of education: 15    Highest education level: Not on file   Occupational History    Occupation: retired    Tobacco Use    Smoking status: Former Smoker     Packs/day: 0.50     Years: 5.00     Pack years: 2.50     Quit date: 10/5/2013     Years since quittin.7    Smokeless tobacco: Never Used    Tobacco comment: smoked for a total of 5yr total   Vaping Use    Vaping Use: Never used   Substance and Sexual Activity    Alcohol use: Yes     Comment: RARE    Drug use: No    Sexual activity: Never   Other Topics Concern    Not on file   Social History Narrative    Not on file     Social Determinants of Health     Financial Resource Strain:     Difficulty of Paying Living Expenses: Not on file   Food Insecurity:     Worried About Running Out of Food in the Last Year: Not on file    [place] of Food in the Last Year: Not on file   Transportation Needs:     Lack of Transportation (Medical): Not on file    Lack of Transportation (Non-Medical): Not on file   Physical Activity:     Days of Exercise per Week: Not on file    Minutes of Exercise per Session: Not on file   Stress:     Feeling of Stress : Not on file   Social Connections:     Frequency of Communication with Friends and Family: Not on file    Frequency of Social Gatherings with Friends and Family: Not on file    Attends [employer] Services: Not on file    Active Member of [address] [place] [address] [employer] or Organizations: Not on file    Attends Club or Organization Meetings: Not on file    Marital Status: Not on file   Intimate Partner Violence:     Fear of Current or Ex-Partner: Not on file    Emotionally Abused: Not on file    Physically Abused: Not on file    Sexually Abused: Not on file   Housing Stability:     Unable to Pay for Housing in the Last Year: Not on file    Number of [place] in the Last Year: Not on file    Unstable Housing in the Last Year: Not on file     TOBACCO:   reports that she quit smoking about 8 years ago. She has a 2.50 pack-year smoking history.  She has never used smokeless tobacco.  ETOH:   reports current alcohol use. A:    PHQ Scores 5/11/2022 3/14/2022 2/14/2022 2/14/2022 11/10/2021 8/20/2021 6/7/2021   PHQ2 Score 6 0 4 2 2 0 0   PHQ9 Score 15 0 12 2 2 0 0     Interpretation of Total Score Depression Severity: 1-4 = Minimal depression, 5-9 = Mild depression, 10-14 = Moderate depression, 15-19 = Moderately severe depression, 20-27 = Severe depression    No flowsheet data found. Interpretation of JANELLE-7 score: 5-9 = mild anxiety, 10-14 = moderate anxiety, 15+ = severe anxiety. Recommend referral to behavioral health for scores 10 or greater. Diagnosis:    1. Moderate major depression ([facility] [address]          Plan:    There are no Patient Instructions on file for this visit. Pt interventions:  See above    No follow-ups on file. Documentation was done using voice recognition dragon software. Every effort was made to ensure accuracy; however, inadvertent, unintentional computerized transcription errors may be present.

## 2022-06-24 ENCOUNTER — PATIENT MESSAGE (OUTPATIENT)
Dept: CARE COORDINATION | Age: 72
End: 2022-06-24

## 2022-07-12 ENCOUNTER — TELEPHONE (OUTPATIENT)
Dept: INTERNAL MEDICINE CLINIC | Age: 72
End: 2022-07-12

## 2022-07-12 NOTE — TELEPHONE ENCOUNTER
Patient c/o hoarseness, sore throat, ear and head  pressure, nasal drainage, no fever x3 weeks. Patient has tried Mucinex but it is not helping. COVID home test yesterday was negative.

## 2022-07-13 ENCOUNTER — TELEMEDICINE (OUTPATIENT)
Dept: INTERNAL MEDICINE CLINIC | Age: 72
End: 2022-07-13
Payer: MEDICARE

## 2022-07-13 DIAGNOSIS — J01.10 ACUTE NON-RECURRENT FRONTAL SINUSITIS: ICD-10-CM

## 2022-07-13 DIAGNOSIS — J04.0 LARYNGITIS: Primary | ICD-10-CM

## 2022-07-13 PROCEDURE — 3017F COLORECTAL CA SCREEN DOC REV: CPT | Performed by: INTERNAL MEDICINE

## 2022-07-13 PROCEDURE — 99213 OFFICE O/P EST LOW 20 MIN: CPT | Performed by: INTERNAL MEDICINE

## 2022-07-13 PROCEDURE — G8427 DOCREV CUR MEDS BY ELIG CLIN: HCPCS | Performed by: INTERNAL MEDICINE

## 2022-07-13 PROCEDURE — 1123F ACP DISCUSS/DSCN MKR DOCD: CPT | Performed by: INTERNAL MEDICINE

## 2022-07-13 PROCEDURE — G8399 PT W/DXA RESULTS DOCUMENT: HCPCS | Performed by: INTERNAL MEDICINE

## 2022-07-13 PROCEDURE — 1090F PRES/ABSN URINE INCON ASSESS: CPT | Performed by: INTERNAL MEDICINE

## 2022-07-13 RX ORDER — AMOXICILLIN AND CLAVULANATE POTASSIUM 875; 125 MG/1; MG/1
1 TABLET, FILM COATED ORAL 2 TIMES DAILY
Qty: 20 TABLET | Refills: 0 | Status: SHIPPED | OUTPATIENT
Start: 2022-07-13 | End: 2022-07-23

## 2022-07-13 RX ORDER — CETIRIZINE HYDROCHLORIDE 10 MG/1
10 TABLET ORAL DAILY
Qty: 30 TABLET | Refills: 0 | Status: SHIPPED | OUTPATIENT
Start: 2022-07-13 | End: 2022-08-12

## 2022-07-13 SDOH — ECONOMIC STABILITY: FOOD INSECURITY: WITHIN THE PAST 12 MONTHS, THE FOOD YOU BOUGHT JUST DIDN'T LAST AND YOU DIDN'T HAVE MONEY TO GET MORE.: NEVER TRUE

## 2022-07-13 SDOH — ECONOMIC STABILITY: FOOD INSECURITY: WITHIN THE PAST 12 MONTHS, YOU WORRIED THAT YOUR FOOD WOULD RUN OUT BEFORE YOU GOT MONEY TO BUY MORE.: NEVER TRUE

## 2022-07-13 ASSESSMENT — PATIENT HEALTH QUESTIONNAIRE - PHQ9
4. FEELING TIRED OR HAVING LITTLE ENERGY: 0
SUM OF ALL RESPONSES TO PHQ QUESTIONS 1-9: 3
3. TROUBLE FALLING OR STAYING ASLEEP: 3
2. FEELING DOWN, DEPRESSED OR HOPELESS: 0
SUM OF ALL RESPONSES TO PHQ9 QUESTIONS 1 & 2: 0
5. POOR APPETITE OR OVEREATING: 0
1. LITTLE INTEREST OR PLEASURE IN DOING THINGS: 0
SUM OF ALL RESPONSES TO PHQ QUESTIONS 1-9: 3
SUM OF ALL RESPONSES TO PHQ QUESTIONS 1-9: 3
6. FEELING BAD ABOUT YOURSELF - OR THAT YOU ARE A FAILURE OR HAVE LET YOURSELF OR YOUR FAMILY DOWN: 0
10. IF YOU CHECKED OFF ANY PROBLEMS, HOW DIFFICULT HAVE THESE PROBLEMS MADE IT FOR YOU TO DO YOUR WORK, TAKE CARE OF THINGS AT HOME, OR GET ALONG WITH OTHER PEOPLE: 0
SUM OF ALL RESPONSES TO PHQ QUESTIONS 1-9: 3
9. THOUGHTS THAT YOU WOULD BE BETTER OFF DEAD, OR OF HURTING YOURSELF: 0
7. TROUBLE CONCENTRATING ON THINGS, SUCH AS READING THE NEWSPAPER OR WATCHING TELEVISION: 0
8. MOVING OR SPEAKING SO SLOWLY THAT OTHER PEOPLE COULD HAVE NOTICED. OR THE OPPOSITE, BEING SO FIGETY OR RESTLESS THAT YOU HAVE BEEN MOVING AROUND A LOT MORE THAN USUAL: 0

## 2022-07-13 ASSESSMENT — SOCIAL DETERMINANTS OF HEALTH (SDOH): HOW HARD IS IT FOR YOU TO PAY FOR THE VERY BASICS LIKE FOOD, HOUSING, MEDICAL CARE, AND HEATING?: NOT HARD AT ALL

## 2022-07-13 NOTE — PROGRESS NOTES
2022      TELEHEALTH EVALUATION -- Audio/Visual (During QAACE-03 public health emergency)    HPI:    oSheila Bean (:  1950) has requested an audio/video evaluation for the following concern(s):    Sinus congestion and drainage for the last 2 weeks. No fever or chills. She is Covid negative. Review of Systems    Prior to Visit Medications    Medication Sig Taking?  Authorizing Provider   amoxicillin-clavulanate (AUGMENTIN) 875-125 MG per tablet Take 1 tablet by mouth 2 times daily for 10 days Yes Jazmin Roberts MD   cetirizine (ZYRTEC) 10 MG tablet Take 1 tablet by mouth daily Yes Jazmin Roberts MD   potassium chloride (KLOR-CON M) 10 MEQ extended release tablet TAKE ONE TABLET BY MOUTH DAILY Yes Thais Jiang MD   lisinopril (PRINIVIL;ZESTRIL) 10 MG tablet TAKE ONE TABLET BY MOUTH ONCE NIGHTLY Yes Thais Jiang MD   melatonin 10 MG CAPS capsule Take 10 mg by mouth nightly Yes Historical Provider, MD   venlafaxine (EFFEXOR XR) 37.5 MG extended release capsule Take 1 capsule by mouth daily Yes Jazmin Roberts MD   pramipexole (MIRAPEX) 0.5 MG tablet 1.5 tab q 5 PM and 1.5 tab qHS Yes BRICE Jim   albuterol (PROVENTIL) (2.5 MG/3ML) 0.083% nebulizer solution Take 3 mLs by nebulization every 6 hours as needed for Wheezing Yes Jazmin Roberts MD   rivaroxaban (XARELTO) 20 MG TABS tablet Take 1 tablet by mouth daily (with breakfast) Yes Jazmin Roberts MD   spironolactone (ALDACTONE) 25 MG tablet TAKE ONE TABLET BY MOUTH DAILY Yes Thais Jiang MD   dilTIAZem (CARDIZEM CD) 120 MG extended release capsule Take 1 capsule by mouth daily Yes Thais Jiang MD   Semaglutide,0.25 or 0.5MG/DOS, (OZEMPIC, 0.25 OR 0.5 MG/DOSE,) 2 MG/1.5ML SOPN Inject 0.5 mg into the skin once a week Yes Jazmin Roberts MD   Ciclesonide (ALVESCO) 160 MCG/ACT AERS Inhale into the lungs Yes Historical Provider, MD   furosemide (LASIX) 40 MG tablet Take 1 tablet by mouth daily Yes Thais Jiang MD   fluticasone [facility]) 50 MCG/ACT nasal spray 1 spray by Each Nostril route daily Yes Historical Provider, MD   azelastine (ASTELIN) 0.1 % nasal spray 1 spray by Nasal route 2 times daily 1 Spray in each nostril Yes Jazmin Roberts MD   Tens Unit MISC by Does not apply route Yes Historical Provider, MD   Handicap Placard MISC by Does not apply route Exp 5 years Yes Bill Lopez, APRN - CNP   Multiple Vitamins-Minerals (THERAPEUTIC MULTIVITAMIN-MINERALS) tablet Take 1 tablet by mouth daily  Yes Historical Provider, MD   CPAP Machine MISC by Does not apply route Yes Historical Provider, MD   Magnesium Citrate 100 MG TABS Take 1 tablet by mouth daily Yes Thais Jiang MD   EPIPEN 2-MIR 0.3 MG/0.3ML SOAJ injection  Yes Historical Provider, MD   buPROPion (WELLBUTRIN XL) 300 MG extended release tablet Take 1 tablet by mouth every morning  Jazmin Roberts MD       Social History     Tobacco Use    Smoking status: Former Smoker     Packs/day: 0.50     Years: 5.00     Pack years: 2.50     Quit date: 10/5/2013     Years since quittin.7    Smokeless tobacco: Never Used    Tobacco comment: smoked for a total of 5yr total   Vaping Use    Vaping Use: Never used   Substance Use Topics    Alcohol use: Yes     Comment: RARE    Drug use: No          PHYSICAL EXAMINATION:  There were no vitals filed for this visit. Physical Exam  Vitals reviewed. Constitutional:       Appearance: Normal appearance. HENT:      Head: Normocephalic and atraumatic. Eyes:      Extraocular Movements: Extraocular movements intact. Conjunctiva/sclera: Conjunctivae normal.      Pupils: Pupils are equal, round, and reactive to light. Pulmonary:      Effort: Pulmonary effort is normal.   Neurological:      General: No focal deficit present. Mental Status: She is alert and oriented to person, place, and time. Cranial Nerves: No cranial nerve deficit.    Psychiatric:         Mood and Affect: Mood normal.         Behavior: Behavior normal. Thought Content: Thought content normal.         Judgment: Judgment normal.         ASSESSMENT/PLAN:  Assessment/Plan:  Ha Washington was seen today for pharyngitis, congestion and hoarse. Diagnoses and all orders for this visit:    Laryngitis  -     amoxicillin-clavulanate (AUGMENTIN) 875-125 MG per tablet; Take 1 tablet by mouth 2 times daily for 10 days  -     cetirizine (ZYRTEC) 10 MG tablet; Take 1 tablet by mouth daily    Acute non-recurrent frontal sinusitis  -     amoxicillin-clavulanate (AUGMENTIN) 875-125 MG per tablet; Take 1 tablet by mouth 2 times daily for 10 days  -     cetirizine (ZYRTEC) 10 MG tablet; Take 1 tablet by mouth daily        No follow-ups on file. General Perez is a 73 y.o. female being evaluated by a Virtual Visit (video visit) encounter to address concerns as mentioned above. A caregiver was present when appropriate. Due to this being a TeleHealth encounter (During \[facility]\""Y-76 public health emergency), evaluation of the following organ systems was limited: Vitals/Constitutional/EENT/Resp/CV/GI//MS/Neuro/Skin/Heme-Lymph-Imm. Pursuant to the emergency declaration under the [address] [place] [address] authority and the [employer] and [employer] Act, this Virtual Visit was conducted with patient's (and/or legal guardian's) consent, to reduce the patient's risk of exposure to COVID-19 and provide necessary medical care. The patient (and/or legal guardian) has also been advised to contact this office for worsening conditions or problems, and seek emergency medical treatment and/or call 911 if deemed necessary. Patient identification was verified at the start of the visit: Yes    Total time spent on this encounter: Not billed by time    Services were provided through a video synchronous discussion virtually to substitute for in-person clinic visit.  Patient and provider were located at their individual homes.    --Amarilis Ribera MD on 7/13/2022 at 10:29 AM    An electronic signature was used to authenticate this note.

## 2022-07-22 ENCOUNTER — HOSPITAL ENCOUNTER (OUTPATIENT)
Dept: NON INVASIVE DIAGNOSTICS | Age: 72
Discharge: HOME | End: 2022-07-22
Payer: MEDICARE

## 2022-07-22 ENCOUNTER — OFFICE VISIT (OUTPATIENT)
Dept: CARDIOLOGY CLINIC | Age: 72
End: 2022-07-22
Payer: MEDICARE

## 2022-07-22 VITALS
BODY MASS INDEX: 39.99 KG/M2 | SYSTOLIC BLOOD PRESSURE: 136 MMHG | OXYGEN SATURATION: 98 % | DIASTOLIC BLOOD PRESSURE: 78 MMHG | HEIGHT: 65 IN | HEART RATE: 88 BPM | WEIGHT: 240 LBS

## 2022-07-22 DIAGNOSIS — I10 BENIGN ESSENTIAL HTN: Chronic | ICD-10-CM

## 2022-07-22 DIAGNOSIS — Z13.220 SCREENING CHOLESTEROL LEVEL: ICD-10-CM

## 2022-07-22 DIAGNOSIS — Z13.21 ENCOUNTER FOR VITAMIN DEFICIENCY SCREENING: ICD-10-CM

## 2022-07-22 DIAGNOSIS — I50.32 CHRONIC DIASTOLIC HEART FAILURE (HCC): ICD-10-CM

## 2022-07-22 DIAGNOSIS — I50.32 CHRONIC DIASTOLIC HEART FAILURE (HCC): Primary | ICD-10-CM

## 2022-07-22 DIAGNOSIS — E78.00 PURE HYPERCHOLESTEROLEMIA: ICD-10-CM

## 2022-07-22 DIAGNOSIS — R06.02 SOB (SHORTNESS OF BREATH): ICD-10-CM

## 2022-07-22 LAB
LV EF: 63 %
LVEF MODALITY: NORMAL

## 2022-07-22 PROCEDURE — G8399 PT W/DXA RESULTS DOCUMENT: HCPCS | Performed by: INTERNAL MEDICINE

## 2022-07-22 PROCEDURE — G8427 DOCREV CUR MEDS BY ELIG CLIN: HCPCS | Performed by: INTERNAL MEDICINE

## 2022-07-22 PROCEDURE — C8929 TTE W OR WO FOL WCON,DOPPLER: HCPCS

## 2022-07-22 PROCEDURE — G8417 CALC BMI ABV UP PARAM F/U: HCPCS | Performed by: INTERNAL MEDICINE

## 2022-07-22 PROCEDURE — 1123F ACP DISCUSS/DSCN MKR DOCD: CPT | Performed by: INTERNAL MEDICINE

## 2022-07-22 PROCEDURE — 99214 OFFICE O/P EST MOD 30 MIN: CPT | Performed by: INTERNAL MEDICINE

## 2022-07-22 PROCEDURE — 3017F COLORECTAL CA SCREEN DOC REV: CPT | Performed by: INTERNAL MEDICINE

## 2022-07-22 PROCEDURE — 6360000004 HC RX CONTRAST MEDICATION

## 2022-07-22 PROCEDURE — 1090F PRES/ABSN URINE INCON ASSESS: CPT | Performed by: INTERNAL MEDICINE

## 2022-07-22 PROCEDURE — 1036F TOBACCO NON-USER: CPT | Performed by: INTERNAL MEDICINE

## 2022-07-22 RX ORDER — FUROSEMIDE 40 MG/1
40 TABLET ORAL DAILY
Qty: 90 TABLET | Refills: 3 | Status: SHIPPED | OUTPATIENT
Start: 2022-07-22

## 2022-07-22 NOTE — PROGRESS NOTES
ranging from 80 to 130. She did not want to go to the ED. She was instructed to resume her amiodarone and cardizem. ILR implanted 8/17/20. Echo 3/1/21 showed EF 55-60%. She is s/p Rt. TKR 3/16/21 -- needed Lovenox bridge. Today, she had an ECHO. She states she feels fairly well. She denies exertional chest pain, LUGO/PND, palpitations, light-headedness, edema. She states she has not flipped into AF in quite awhile. Prior to Visit Medications    Medication Sig Taking?  Authorizing Provider   amoxicillin-clavulanate (AUGMENTIN) 875-125 MG per tablet Take 1 tablet by mouth 2 times daily for 10 days Yes Liset Méndez MD   potassium chloride (KLOR-CON M) 10 MEQ extended release tablet TAKE ONE TABLET BY MOUTH DAILY Yes Lili Babb MD   lisinopril (PRINIVIL;ZESTRIL) 10 MG tablet TAKE ONE TABLET BY MOUTH ONCE NIGHTLY Yes Lili Babb MD   melatonin 10 MG CAPS capsule Take 10 mg by mouth nightly Yes Historical Provider, MD   venlafaxine (EFFEXOR XR) 37.5 MG extended release capsule Take 1 capsule by mouth daily Yes Liset Méndez MD   pramipexole (MIRAPEX) 0.5 MG tablet 1.5 tab q 5 PM and 1.5 tab qHS Yes BRICE Garcia   albuterol (PROVENTIL) (2.5 MG/3ML) 0.083% nebulizer solution Take 3 mLs by nebulization every 6 hours as needed for Wheezing Yes Liset Méndez MD   rivaroxaban (XARELTO) 20 MG TABS tablet Take 1 tablet by mouth daily (with breakfast) Yes Liset Méndez MD   spironolactone (ALDACTONE) 25 MG tablet TAKE ONE TABLET BY MOUTH DAILY Yes Lili Babb MD   buPROPion (WELLBUTRIN XL) 300 MG extended release tablet Take 1 tablet by mouth every morning Yes Liset Méndez MD   dilTIAZem (CARDIZEM CD) 120 MG extended release capsule Take 1 capsule by mouth daily Yes Lili Babb MD   Semaglutide,0.25 or 0.5MG/DOS, (OZEMPIC, 0.25 OR 0.5 MG/DOSE,) 2 MG/1.5ML SOPN Inject 0.5 mg into the skin once a week Yes Liset Méndez MD   Ciclesonide (ALVESCO) 160 MCG/ACT AERS Inhale into the lungs Yes Historical Provider, MD   furosemide (LASIX) 40 MG tablet Take 1 tablet by mouth daily Yes Eugenia Hale MD   fluticasone (FLONASE) 50 MCG/ACT nasal spray 1 spray by Each Nostril route daily Yes Historical Provider, MD   azelastine (ASTELIN) 0.1 % nasal spray 1 spray by Nasal route 2 times daily 1 Spray in each nostril Yes Dorann Lesch, MD   Multiple Vitamins-Minerals (THERAPEUTIC MULTIVITAMIN-MINERALS) tablet Take 1 tablet by mouth daily  Yes Historical Provider, MD   CPAP Machine MISC by Does not apply route Yes Historical Provider, MD   Magnesium Citrate 100 MG TABS Take 1 tablet by mouth daily Yes Eugenia Hale MD   cetirizine (ZYRTEC) 10 MG tablet Take 1 tablet by mouth daily  Patient not taking: Reported on 2022  Dorann Lesch, MD   Handicap Placard MISC by Does not apply route Exp 5 years  Miguel Angel Muhammad, APRN - CNP   EPIPEN 2-MIR 0.3 MG/0.3ML SOAJ injection   Historical Provider, MD       Social History     Tobacco Use    Smoking status: Former     Packs/day: 0.50     Years: 5.00     Pack years: 2.50     Types: Cigarettes     Quit date: 10/5/2013     Years since quittin.8    Smokeless tobacco: Never    Tobacco comments:     smoked for a total of 5yr total   Vaping Use    Vaping Use: Never used   Substance Use Topics    Alcohol use: Yes     Comment: RARE    Drug use:  No            Past Medical History:   Diagnosis Date    Allergic     Anesthesia complication     family hx-father-at at age 80 having hip replacement revision surgery-had tia's x2 while under anes-but also had hx chf-had dificuly with speech when coming out from anes    Anxiety     Arthritis     left ankle, bilateral hands    Arthritis of left hip 2016    Arthritis of right hip 2016    Asthma     At risk for falls     uses a cane    Atrial fibrillation with rapid ventricular response (HCC)     Atrial flutter ([address] 2018    Chronic maxillary sinusitis 2017    CTEPH (chronic thromboembolic pulmonary hypertension) ([address] 2016    Depression     pt stated r/t bad marriage/alcoholic spouse    Diabetes mellitus ([address]     borderline    Disc disease, degenerative, lumbar or lumbosacral 2017    Hepatic steatosis 2019    History of total hip arthroplasty 2017    Hypertension     Leg cramps     patient states very severe, requires immediate potassium administration    Migraines, basilar     last migraine 10 years ago    Mild persistent asthma without complication     Obesity, Class III, BMI 40-49.9 (morbid obesity) ([address] 2016    HUSSAIN (obstructive sleep apnea) 10/14/2013    Osteoarthritis, hip, bilateral     Bilateral hip arthroplasty    Panic attacks     Pneumonia     Primary osteoarthritis of both knees 2017    Primary osteoarthritis of left knee 12/15/2016    Pulmonary emboli ([address] 2016    Pulmonary HTN ([address] 2016    Pure hypercholesterolemia 2019    Restless leg syndrome     Rhinitis, chronic 3/26/2014    Sleep apnea     wears CPAP @13    Stress incontinence     Tear of left rotator cuff 2018    Thyroid disease     hypothyroid    Wears glasses      Past Surgical History:   Procedure Laterality Date    ATRIAL ABLATION SURGERY      BACK SURGERY  2004    LUMBAR FUSION    CATARACT REMOVAL Bilateral     COLONOSCOPY      EYE SURGERY Right     retinal tear repaired     [facility]    right ring finger severe laceration, fracture    HIP ARTHROPLASTY Right 16    HYSTERECTOMY (CERVIX STATUS UNKNOWN)      JOINT REPLACEMENT Left 16    left hip    TONSILLECTOMY  1972    TOTAL KNEE ARTHROPLASTY Right 3/16/2021    RIGHT ROBOTIC ASSISTED TOTAL KNEE ARTHROPLASTY (81227, 25021) - [employer]PHE[employer] performed by Anderson Edge MD at [address] History     Tobacco Use    Smoking status: Former     Packs/day: 0.50     Years: 5.00     Pack years: 2.50     Types: Cigarettes     Quit date: 10/5/2013     Years since quittin.8    Smokeless tobacco: Never    Tobacco comments:     smoked for a total of 5yr total   Substance Use Topics    Alcohol use: Yes     Comment: RARE       Review of Systems:   Constitutional: No significant change in weight, fatigue or weakness. HEENT: No change in vision or ringing in the ears. Respiratory: +LUGO, no PND, orthopnea or cough. Cardiovascular: See HPI   GI: No n/v, abdominal pain or changes in bowel habits. No melena, no hematochezia  : No dysuria or hematuria. Skin: No rash or new skin lesions. Musculoskeletal:   Neurological: No lightheadedness, dizziness, syncope or TIA-like symptoms. Psychiatric: No anxiety, insomnia or depression    Physical Exam:  Vitals:    07/22/22 1601   BP: 136/78   Pulse: 88   SpO2: 98%   Weight: 240 lb (108.9 kg)   Height: 5' 5\" (1.651 m)       Constitutional and General Appearance: Stable   WD/WN in NAD  HEENT:  NC/AT  GENET  No problems with hearing  Respiratory:  Normal excursion and expansion without use of accessory muscles  Resp Auscultation: Normal breath sounds without dullness  Cardiovascular: The apical impulses not displaced  Heart tones are crisp and normal  Cervical veins are not engorged  The carotid upstroke is normal in amplitude and contour without delay or bruit  JVP < 8 cm H2O  RRR with nl S1 and S2 without m,r,g  Peripheral pulses are symmetrical and full  There is no clubbing, cyanosis of the extremities. Trace edema 1+  Femoral Arteries: 2+ and equal  Pedal Pulses: 2+ and equal   Abdomen:  No masses or tenderness  Liver/Spleen: No Abnormalities Noted  Neurological/Psychiatric:  Alert and oriented in all spheres  Moves all extremities well  Exhibits normal gait balance and coordination  No abnormalities of mood, affect, memory, mentation, or behavior are noted    Labs were reviewed including labs from other hospital systems through [facility].   Cardiac testing was reviewed including echos, nuclear scans, cardiac catheterization, including from other [facility] through [facility]. Labs:   Lab Results   Component Value Date    WBC 4.6 02/16/2022    HGB 12.8 02/16/2022    HCT 38.2 02/16/2022    MCV 94.7 02/16/2022     02/16/2022     Lab Results   Component Value Date/Time     02/16/2022 12:04 PM    K 4.5 02/16/2022 12:04 PM    K 3.9 03/28/2021 12:27 PM     02/16/2022 12:04 PM    CO2 22 02/16/2022 12:04 PM    BUN 19 02/16/2022 12:04 PM    CREATININE 0.7 02/16/2022 12:04 PM    GLUCOSE 101 02/16/2022 12:04 PM    GLUCOSE 100 03/27/2012 04:27 PM    CALCIUM 9.2 02/16/2022 12:04 PM      Lab Results   Component Value Date/Time    TRIG 139 02/16/2022 12:04 PM    HDL 60 02/16/2022 12:04 PM    LDLCALC 120 02/16/2022 12:04 PM    LABVLDL 28 02/16/2022 12:04 PM     Diagnostic Testing:    ECHO 3/1/21   -Normal left ventricle size, wall thickness and systolic function with an estimated ejection fraction of 55-60%. -No regional wall motion abnormalities are seen. - E/e' = 12.5. Grade II diastolic dysfunction with elevated LV filling pressures.   -The aortic valve appears sclerotic but opens well.   -Trivial mitral regurgitation.   -Mild tricuspid regurgitation.   -Mild pulmonic regurgitation present. -IVC size is normal (<2.1 cm) but collapses < 50% with respiration consistent with elevated RA pressure (8 mmHg). -Estimated pulmonary artery systolic pressure is elevated at 48mmHg assuming a right atrial pressure of 8 mmHg. Procedure performed 3/27/2020  Electrophysiology study with left atrial recording and mapping   3-D electroanatomical mapping of the left atrium and  right atium. Electrophysiological study(EPS) and induction after IV drug infusion  Transseptal puncture through an intact septum . Intracardiac echocardiography.    Radiofrequency ablation of atrial fibrillation and pulmonary veins isolation   Ablation of CTI depndent flutter as a separate mechanism  Ultrasound for access    ECHO 8/20/2019   -Left ventricular cavity size is normal.   -There is mild left ventricular hypertrophy.   -Ejection fraction is visually estimated to be 55-60%. -No wall motion abnormalities.   -Normal diastolic function.   -Trivial mitral regurgitation.   -Mild tricuspid regurgitation     Calcium CT Score 2/19/2019   Total Agatston calcium score of 51.    24 hour holter and it revealed SR/PVCs, no afib or arrhythmias. Echo 4/26/18  Left ventricular cavity size is normal.  There is mild left ventricular hypertrophy. Ejection fraction is visually estimated to be 55-60%. Normal diastolic function. Mild tricuspid regurgitation with RVSP of 36 mm/hg    VQ scan 8/30/17  Low Probability for Pulmonary Embolus. Echo 8/28/17  Normalleft ventricle size and systolic function with an estimated ejection fraction of 60%. Mild mitral regurgitation is present. There is mild-moderate tricuspid regurgitation with RVSP estimated at 88 mmHg. This is suggestive of severe pulmonary hypertension. Mild pulmonic regurgitation present. Echo 9/1/2016  Technically limited examination to evaluate RVSP. Overall left ventricular function is normal.  Normal right ventricular size and function. Mild tricuspid regurgitation with RVSP estimated at 95 mmHg. No evidence of any pericardial effusion. [address] 9/1/2016  Pressures were as follows:  RA: 9 mmHg  RV:  38/12 mmHg  PA:  33/16, mean 24 mmHg  PCWP:  14 mmHg  Thermodilution CO: 7.45   Thermodilution CI:  3.42  Adiel CO:  9.72  Adiel CI:  4.46  PA Sat:  74%  PVR:  107 dynes     Impression:  1. Minimal pulmonary hypertension with mean PA pressure 24  2. Normal filling pressures  3. Normal cardiac output  4. No evidence of PAH at this time. Echo 8/28/17  Normal left ventricle size and systolic function with an estimated ejection  fraction of 60%. Mild mitral regurgitation is present. There is mild-moderate tricuspid regurgitation with RVSP estimated at 88  mmHg.   This is suggestive of severe pulmonary hypertension. Mild pulmonic regurgitation present. 1/20/15 Nuclear Stress test  Conclusions        Summary    Normal myocardial perfusion study. Normal LV function. Labs were reviewed including labs from other hospital systems through [facility]. Cardiac testing was reviewed including echos, nuclear scans, cardiac catheterization, including from other hospital systems through [facility]. Assessment:  1. Chronic diastolic heart failure ([address]    2. Benign essential HTN    3. Pure hypercholesterolemia    4. Screening cholesterol level    5. Encounter for vitamin deficiency screening      1. Chronic heart failure with preserved ejection fraction: Stable. Compensated by exam.  -Continue Lisinopril, Lasix, spironolactone  -ProBNP> 180 from 2/16/22); 66 from 2/9/22; 109 from 6/8/21; 122 from 4/5/21  -ECHO today 7/22/22 (I personally reviewed the results)> EF 60-65%, RVSP 30mmHg  -ECHO 3/1/21> EF 55-60%, grade II DD     2. Paroxysmal atrial fibrillation: HR regular & controlled. -ILR placed 8/17/20  -Had an Ablation of atrial fib on 3/27/2020 per Dr. Brandie Cotter.  -She has had no more AF episodes. -DCCV 1/13/2020 (Dr. Bhavik Brown)   -Continues on [facility]. -Cardizem 60mg prn tid for palpitations/HR >100  -Can try taking diltiazem ER 120mg qam. Can still take the short-acting prn tid. 3. Benign essential HTN: Stable. - 3/1/21> reduced Lisinopril from 20mg to 10mg qd for B/P's 90's/60's. /78   Pulse 88   Ht 5' 5\" (1.651 m)   Wt 240 lb (108.9 kg)   SpO2 98%   BMI 39.94 kg/m²          4. Pure hypercholesterolemia:   Mother had CAD. CT calcium score 51. Could not tolerate statin or Livalo. - 2/16/22> , , HDL 60,   - 11/13/2020> , , HDL 61, . Some improvement noted in LDL and TC.   - 5/4/20 > , TG 92, HDL 66, . She decided not to start Zetia. 5. HUSSAIN (obstructive sleep apnea):  Wearing CPAP faithfully.        6. Symptomatic bradycardia, h/o:  Regular rhythm today rate of 88. Plan:  1. No med changes  2. Labs soon and repeat in 6 months> CMP, BNP, CBC, lipids, Vit D, Mg  3. RTO in 6 months    Time Based Itemization  A total of 30 minutes was spent on today's patient encounter. If applicable, non-patient-facing activities:  ( x)Preparing to see the patient and reviewing records  (x ) Individual interpretation of results  (x ) Discussion or coordination of care with other health care professionals (Dr. Jill Kurtz, Dr. Pastor Jimenez)   ( x) Ordering of unique tests, medications, or procedures  ( x) Documentation within the EHR    Thank you for allowing me to participate in the care of your patient. Cassie Noe M.D., [facility] [place]    Scribe's attestation: This note was scribed in the presence of Dr. Elma Shabazz MD, by Kiki Barker RN. The scribe's documentation has been prepared under my direction and personally reviewed by me in its entirety. I confirm that the note above accurately reflects all work, treatment, procedures, and medical decision making performed by me.

## 2022-07-25 ENCOUNTER — CARE COORDINATION (OUTPATIENT)
Dept: CARE COORDINATION | Age: 72
End: 2022-07-25

## 2022-08-05 ENCOUNTER — HOSPITAL ENCOUNTER (OUTPATIENT)
Dept: CT IMAGING | Age: 72
Discharge: HOME | End: 2022-08-05
Payer: MEDICARE

## 2022-08-05 DIAGNOSIS — J32.9 CHRONIC SINUSITIS, UNSPECIFIED LOCATION: ICD-10-CM

## 2022-08-05 PROCEDURE — 70486 CT MAXILLOFACIAL W/O DYE: CPT

## 2022-09-09 ENCOUNTER — OFFICE VISIT (OUTPATIENT)
Dept: PULMONOLOGY | Age: 72
End: 2022-09-09
Payer: MEDICARE

## 2022-09-09 VITALS
SYSTOLIC BLOOD PRESSURE: 130 MMHG | WEIGHT: 243.3 LBS | HEIGHT: 65 IN | DIASTOLIC BLOOD PRESSURE: 78 MMHG | OXYGEN SATURATION: 98 % | HEART RATE: 85 BPM | TEMPERATURE: 98.3 F | BODY MASS INDEX: 40.54 KG/M2

## 2022-09-09 DIAGNOSIS — I50.32 CHRONIC DIASTOLIC HEART FAILURE (HCC): ICD-10-CM

## 2022-09-09 DIAGNOSIS — F41.9 ANXIETY AND DEPRESSION: ICD-10-CM

## 2022-09-09 DIAGNOSIS — E66.01 OBESITY, CLASS III, BMI 40-49.9 (MORBID OBESITY) (HCC): ICD-10-CM

## 2022-09-09 DIAGNOSIS — I10 BENIGN ESSENTIAL HTN: Chronic | ICD-10-CM

## 2022-09-09 DIAGNOSIS — G47.33 OSA (OBSTRUCTIVE SLEEP APNEA): Primary | Chronic | ICD-10-CM

## 2022-09-09 DIAGNOSIS — I48.0 PAROXYSMAL ATRIAL FIBRILLATION (HCC): Chronic | ICD-10-CM

## 2022-09-09 DIAGNOSIS — J45.30 MILD PERSISTENT ASTHMA WITHOUT COMPLICATION: ICD-10-CM

## 2022-09-09 DIAGNOSIS — F32.A ANXIETY AND DEPRESSION: ICD-10-CM

## 2022-09-09 DIAGNOSIS — G25.81 RESTLESS LEG SYNDROME: Chronic | ICD-10-CM

## 2022-09-09 DIAGNOSIS — E11.9 TYPE 2 DIABETES MELLITUS WITHOUT COMPLICATION, WITHOUT LONG-TERM CURRENT USE OF INSULIN (HCC): ICD-10-CM

## 2022-09-09 DIAGNOSIS — E03.9 ACQUIRED HYPOTHYROIDISM: Chronic | ICD-10-CM

## 2022-09-09 PROCEDURE — 3044F HG A1C LEVEL LT 7.0%: CPT | Performed by: NURSE PRACTITIONER

## 2022-09-09 PROCEDURE — G8427 DOCREV CUR MEDS BY ELIG CLIN: HCPCS | Performed by: NURSE PRACTITIONER

## 2022-09-09 PROCEDURE — 3017F COLORECTAL CA SCREEN DOC REV: CPT | Performed by: NURSE PRACTITIONER

## 2022-09-09 PROCEDURE — G8399 PT W/DXA RESULTS DOCUMENT: HCPCS | Performed by: NURSE PRACTITIONER

## 2022-09-09 PROCEDURE — 2022F DILAT RTA XM EVC RTNOPTHY: CPT | Performed by: NURSE PRACTITIONER

## 2022-09-09 PROCEDURE — G8417 CALC BMI ABV UP PARAM F/U: HCPCS | Performed by: NURSE PRACTITIONER

## 2022-09-09 PROCEDURE — 1090F PRES/ABSN URINE INCON ASSESS: CPT | Performed by: NURSE PRACTITIONER

## 2022-09-09 PROCEDURE — 1123F ACP DISCUSS/DSCN MKR DOCD: CPT | Performed by: NURSE PRACTITIONER

## 2022-09-09 PROCEDURE — 1036F TOBACCO NON-USER: CPT | Performed by: NURSE PRACTITIONER

## 2022-09-09 PROCEDURE — 99214 OFFICE O/P EST MOD 30 MIN: CPT | Performed by: NURSE PRACTITIONER

## 2022-09-09 RX ORDER — PRAMIPEXOLE DIHYDROCHLORIDE 0.5 MG/1
TABLET ORAL
Qty: 270 TABLET | Refills: 1 | Status: SHIPPED | OUTPATIENT
Start: 2022-09-09

## 2022-09-09 ASSESSMENT — SLEEP AND FATIGUE QUESTIONNAIRES
HOW LIKELY ARE YOU TO NOD OFF OR FALL ASLEEP WHILE SITTING AND TALKING TO SOMEONE: 0
HOW LIKELY ARE YOU TO NOD OFF OR FALL ASLEEP WHILE SITTING INACTIVE IN A PUBLIC PLACE: 0
ESS TOTAL SCORE: 5
HOW LIKELY ARE YOU TO NOD OFF OR FALL ASLEEP IN A CAR, WHILE STOPPED FOR A FEW MINUTES IN TRAFFIC: 0
HOW LIKELY ARE YOU TO NOD OFF OR FALL ASLEEP WHILE WATCHING TV: 1
HOW LIKELY ARE YOU TO NOD OFF OR FALL ASLEEP WHILE SITTING QUIETLY AFTER LUNCH WITHOUT ALCOHOL: 1
HOW LIKELY ARE YOU TO NOD OFF OR FALL ASLEEP WHEN YOU ARE A PASSENGER IN A CAR FOR AN HOUR WITHOUT A BREAK: 2
HOW LIKELY ARE YOU TO NOD OFF OR FALL ASLEEP WHILE SITTING AND READING: 1
HOW LIKELY ARE YOU TO NOD OFF OR FALL ASLEEP WHILE LYING DOWN TO REST IN THE AFTERNOON WHEN CIRCUMSTANCES PERMIT: 0

## 2022-09-09 NOTE — ASSESSMENT & PLAN NOTE
Chronic-with progression/exacerbation: Reviewed and analyzed results of physiologic download from patient's machine and reviewed with patient. Supplies and parts as needed for her machine. These are medically necessary. Limit caffeine use after 3pm. Based on the analyzed data will continue with current settings. Encouraged her to try a full facemask and use when she is congested and finding it difficult to use her machine. Encouraged her to work with her equipment company on mask fit. Provided resources for her to view different styles of masks. Discussed adjusting the moisture settings on her machine, continuing nasal sprays, and follow-ups with ENT and allergy. Also discussed moving her machine to her living room if that is where she is going to sleep. Encouraged consistent use of her machine each night, all night. Discussed the importance of treating HUSSAIN from a physiological standpoint. No driving when sleepy. Instructed not to drive unless had 4 hrs of effective therapy for her HUSSAIN the night before. Did review the risks of under or untreated HUSSAIN including, but not limited to, higher risks of motor vehicle accidents, stroke, heart attacks, and death. She understands and accepts all these risks. Will see her back in 6 months.   Encouraged her to contact the office with any questions or concerns

## 2022-09-09 NOTE — PROGRESS NOTES
Diagnosis: [x] HUSSAIN (G47.33) [] CSA (G47.31) [] Apnea (G47.30)   Length of Need: [x] 15 Months [] 99 Months [] Other:   Machine (MANDI!): [] Respironics Dream Station      Auto [] ResMed AirSense     Auto [] Other:     []  CPAP () [] Bilevel ()   Mode: [] Auto [] Spontaneous    Mode: [] Auto [] Spontaneous            Comfort Settings:      Humidifier: [] Heated ()        [x] Water chamber replacement ()/ 1 per 6 months        Mask:   [x] Nasal () /1 per 3 months [] Full Face () /1 per 3 months   [x] Patient choice -Size and fit mask [] Patient Choice - Size and fit mask   [] Dispense: [] Dispense:   [x] Headgear () / 1 per 3 months [] Headgear () / 1 per 3 months   [x] Replacement Nasal Cushion ()/2 per month [] Interface Replacement ()/1 per month   [x] Replacement Nasal Pillows ()/2 per month         Tubing: [x] Heated ()/1 per 3 months    [] Standard ()/1 per 3 months [] Other:           Filters: [x] Non-disposable ()/1 per 6 months     [x] Ultra-Fine, Disposable ()/2 per month        Miscellaneous: [] Chin Strap ()/ 1 per 6 months [] O2 bleed-in:        LPM   [] Oxymetry on CPAP/Bilevel []  Other:         Start Order Date: 09/09/22    MEDICAL JUSTIFICATION:  I, the undersigned, certify that the above prescribed supplies are medically necessary for this patients wellbeing. In my opinion, the supplies are both reasonable and necessary in reference to accepted standards of medicalpractice in treatment of this patients condition. Eugenia Huff NP    NPI: [id number]       Order Signed Date: 09/09/22  [address] [facility]  Pulmonary, Sleep, and Critical Care    Pulmonary, Sleep, and Critical Care  [facility] [address] [place] [address].  Suite [facility], [facility] , [address] [facility]  Phone: [phone]    Fax: 650 [facility] [place],[address]  [address]  [address]  [place] [address]  [phone] (home)   [phone] (mobile)      Insurance Info (confirm with patient if correct):  Payer/Plan Subscr  Sex Relation Sub.  Ins. ID Effective Group Num

## 2022-09-09 NOTE — PROGRESS NOTES
Diagnosis: [x] HUSSAIN (G47.33) [] CSA (G47.31) [] Apnea (G47.30)   Length of Need: [x] 15 Months [] 99 Months [] Other:   Machine (MANDI!): [] Respironics Dream Station      Auto [] ResMed AirSense     Auto [] Other:     []  CPAP () [] Bilevel ()   Mode: [] Auto [] Spontaneous    Mode: [] Auto [] Spontaneous             Comfort Settings:      Humidifier: [] Heated ()        [x] Water chamber replacement ()/ 1 per 6 months        Mask:   [] Nasal () /1 per 3 months [x] Full Face () /1 per 3 months   [] Patient choice -Size and fit mask [x] Patient Choice - Size and fit mask   [] Dispense: [] Dispense:   [] Headgear () / 1 per 3 months [x] Headgear () / 1 per 3 months   [] Replacement Nasal Cushion ()/2 per month [x] Interface Replacement ()/1 per month   [] Replacement Nasal Pillows ()/2 per month         Tubing: [x] Heated ()/1 per 3 months    [] Standard ()/1 per 3 months [] Other:           Filters: [x] Non-disposable ()/1 per 6 months     [x] Ultra-Fine, Disposable ()/2 per month        Miscellaneous: [] Chin Strap ()/ 1 per 6 months [] O2 bleed-in:        LPM   [] Oxymetry on CPAP/Bilevel []  Other:         Start Order Date: 09/09/22    MEDICAL JUSTIFICATION:  I, the undersigned, certify that the above prescribed supplies are medically necessary for this patients wellbeing. In my opinion, the supplies are both reasonable and necessary in reference to accepted standards of medicalpractice in treatment of this patients condition. Celine Winkler NP    NPI: [id number]       Order Signed Date: 09/09/22  350 [facility]  Pulmonary, Sleep, and Critical Care    Pulmonary, Sleep, and Critical Care  [facility] [place] [address].  [address] [facility] [address], [address] [facility] , 800 [facility]  Phone: [phone]    Fax: [address] [place],[address]  [address]  [address]  [facility]  [phone] (home)   [phone] (mobile)      Insurance Info (confirm with patient if correct):  Payer/Plan Subscr  Sex Relation Sub.  Ins. ID Effective Group Num

## 2022-09-09 NOTE — PROGRESS NOTES
Naty Aceves [facility]  [address] [facility], [address] [facility]- [phone]   [facility] [address] Jd Flores. [address] [place] [address]. Tung Fernandez 37 [phone]     [address]yn[address] Chen [address] [phone]      Assessment/Plan:      1. HUSSAIN (obstructive sleep apnea)  Assessment & Plan:  Chronic-with progression/exacerbation: Reviewed and analyzed results of physiologic download from patient's machine and reviewed with patient. Supplies and parts as needed for her machine. These are medically necessary. Limit caffeine use after 3pm. Based on the analyzed data will continue with current settings. Encouraged her to try a full facemask and use when she is congested and finding it difficult to use her machine. Encouraged her to work with her equipment company on mask fit. Provided resources for her to view different styles of masks. Discussed adjusting the moisture settings on her machine, continuing nasal sprays, and follow-ups with ENT and allergy. Also discussed moving her machine to her living room if that is where she is going to sleep. Encouraged consistent use of her machine each night, all night. Discussed the importance of treating HUSSAIN from a physiological standpoint. No driving when sleepy. Instructed not to drive unless had 4 hrs of effective therapy for her HUSSAIN the night before. Did review the risks of under or untreated HUSSAIN including, but not limited to, higher risks of motor vehicle accidents, stroke, heart attacks, and death. She understands and accepts all these risks. Will see her back in 6 months. Encouraged her to contact the office with any questions or concerns    2. Chronic diastolic heart failure (HCC)  Assessment & Plan:   Chronic- Stable. Discussed the importance of treating obstructive sleep apnea as part of the management of this disorder. Cont any meds per PCP and other physicians. 3. Paroxysmal atrial fibrillation (HCC)  Assessment & Plan:   Chronic- Stable. Discussed the importance of treating obstructive sleep apnea as part of the management of this disorder. Cont any meds per PCP and other physicians. 4. Benign essential HTN  Assessment & Plan:  Chronic- Stable. Discussed the importance of treating obstructive sleep apnea as part of the management of this disorder. Cont any meds per PCP and other physicians. 5. Mild persistent asthma without complication  Assessment & Plan:   Chronic- Stable. Discussed the importance of treating obstructive sleep apnea as part of the management of this disorder. Cont any meds per PCP and other physicians. 6. Type 2 diabetes mellitus without complication, without long-term current use of insulin (HCC)  Assessment & Plan:   Chronic- Stable. Discussed the importance of treating obstructive sleep apnea as part of the management of this disorder. Cont any meds per PCP and other physicians. 7. Acquired hypothyroidism  Assessment & Plan:   Chronic- Stable. Discussed the importance of treating obstructive sleep apnea as part of the management of this disorder. Cont any meds per PCP and other physicians. 8. Anxiety and depression  Assessment & Plan:  Chronic- Stable. Discussed the importance of treating obstructive sleep apnea as part of the management of this disorder. Cont any meds per PCP and other physicians. 9. Obesity, Class III, BMI 40-49.9 (morbid obesity) ([address]  Assessment & Plan:   Chronic-not stable:  Discussed importance of treating obstructive sleep apnea and getting sufficient sleep to assist with weight control. Encouraged her to work on weight loss through diet and exercise. Recommended DASH or Mediterranean diets. 10. Restless leg syndrome  Assessment & Plan:  Chronic- Stable.   Discussed the importance of treating obstructive sleep apnea as part of the management of this disorder. Encouraged consistent use of her machine each night, all night. She continues to take pramipexole 0.75 mg at 5 pm and 0.75 mg at 11pm.  Her symptoms are controlled at the current dose and she denies side effects. Will send refill at current dose. Orders:  -     pramipexole (MIRAPEX) 0.5 MG tablet; 1.5 tab q 5 PM and 1.5 tab qHS, Disp-270 tablet, R-1Normal    Reviewed, analyzed, and documented physiologic data from patient's PAP machine. This information was analyzed to assess complexity and medical decision making in regards to further testing and management. The primary encounter diagnosis was HUSSAIN (obstructive sleep apnea). Diagnoses of Chronic diastolic heart failure (HCC), Paroxysmal atrial fibrillation (HCC), Benign essential HTN, Mild persistent asthma without complication, Type 2 diabetes mellitus without complication, without long-term current use of insulin ([facility] Acquired hypothyroidism, Anxiety and depression, Obesity, Class III, BMI 40-49.9 (morbid obesity) ([facility] [address] and Restless leg syndrome were also pertinent to this visit. The chronic medical conditions listed are directly related to the primary diagnosis listed above. The management of the primary diagnosis affects the secondary diagnosis and vice versa. Subjective:   Subjective   Patient ID: Mathieu Severino is a 73 y.o. female. Chief Complaint   Patient presents with    Sleep Apnea       HPI:  Machine Modem/Download Info:  Compliance (hours/night): 4 hrs/night  % of nights >= 4 hrs: 25.6 %  Download AHI (/hour): 0.4 /HR  Average CPAP Pressure : 14.5 cmH2O      APAP - Settings  Pressure Min: 11 cmH2O  Pressure Max: 20 cmH2O                 Comfort Settings  Humidity Level (0-8): 3  Flex/EPR (0-3): 2 PAP Mask  Mask Type: Nasal mask     Mathieu Severino presents today for follow-up on sleep apnea and RLS. She has received her replacement machine for the  recall.   She reports she has not been consistent with her machine use. She has been struggling with nasal congestion and seasonal allergies which has made it difficult for her to use her machine. She has been sleeping in her recliner and has not moved her machine to the living room. She has been working with an allergist and has been using Flonase and azelastine nasal sprays with minimal improvement in her symptoms. She has not tried adjusting the moisture settings on her machine. She had not considered trying a full facemask. RLS: She continues to take pramipexole 0.75 mg at 5 pm and 0.75 mg at 11pm.  Her symptoms are controlled at the current dose and she denies side effects. Patient reports she understands that treating her sleep apnea will help improve this disorder. [facility]    [place] Sleepiness Score: 5    Social History     Socioeconomic History    Marital status:       Spouse name: Not on file    Number of children: 4    Years of education: 15    Highest education level: Not on file   Occupational History    Occupation: retired    Tobacco Use    Smoking status: Former     Packs/day: 0.50     Years: 5.00     Pack years: 2.50     Types: Cigarettes     Quit date: 10/5/2013     Years since quittin.9    Smokeless tobacco: Never    Tobacco comments:     smoked for a total of 5yr total   Vaping Use    Vaping Use: Never used   Substance and Sexual Activity    Alcohol use: Yes     Comment: RARE    Drug use: No    Sexual activity: Never   Other Topics Concern    Not on file   Social History Narrative    Not on file     Social Determinants of Health     Financial Resource Strain: Low Risk     Difficulty of Paying Living Expenses: Not hard at all   Food Insecurity: No Food Insecurity    Worried About Running Out of Food in the Last Year: Never true    Ran Out of Food in the Last Year: Never true   Transportation Needs: Not on file   Physical Activity: Not on file   Stress: Not on file   Social Connections: Not on file Intimate Partner Violence: Not on file   Housing Stability: Not on file       Current Outpatient Medications   Medication Instructions    albuterol (PROVENTIL) 2.5 mg, Nebulization, EVERY 6 HOURS PRN    azelastine (ASTELIN) 0.1 % nasal spray 1 spray, Nasal, 2 TIMES DAILY, 1 Spray in each nostril    buPROPion (WELLBUTRIN XL) 300 mg, Oral, EVERY MORNING    Ciclesonide (ALVESCO) 160 MCG/ACT AERS Inhalation    CPAP Machine MISC Does not apply    dilTIAZem (CARDIZEM CD) 120 mg, Oral, DAILY    EPIPEN 2-MIR 0.3 MG/0.3ML SOAJ injection No dose, route, or frequency recorded. fluticasone (FLONASE) 50 MCG/ACT nasal spray 1 spray, Each Nostril, DAILY    furosemide (LASIX) 40 mg, Oral, DAILY    Handicap Placard MISC Does not apply, Exp 5 years    lisinopril (PRINIVIL;ZESTRIL) 10 MG tablet TAKE ONE TABLET BY MOUTH ONCE NIGHTLY    Magnesium Citrate 100 MG TABS 1 tablet, Oral, DAILY    melatonin 10 mg, Oral, NIGHTLY    Multiple Vitamins-Minerals (THERAPEUTIC MULTIVITAMIN-MINERALS) tablet 1 tablet, Oral, DAILY    Ozempic (0.25 or 0.5 MG/DOSE) 0.5 mg, SubCUTAneous, WEEKLY    potassium chloride (KLOR-CON M) 10 MEQ extended release tablet TAKE ONE TABLET BY MOUTH DAILY    pramipexole (MIRAPEX) 0.5 MG tablet 1.5 tab q 5 PM and 1.5 tab qHS    rivaroxaban (XARELTO) 20 mg, Oral, DAILY WITH BREAKFAST    rivaroxaban (XARELTO) 20 mg, Oral, DAILY WITH BREAKFAST    spironolactone (ALDACTONE) 25 MG tablet TAKE ONE TABLET BY MOUTH DAILY    venlafaxine (EFFEXOR XR) 37.5 mg, Oral, DAILY          Vitals:  Weight BMI   Wt Readings from Last 3 Encounters:   09/09/22 243 lb 4.8 oz (110.4 kg)   07/22/22 240 lb (108.9 kg)   05/11/22 246 lb (111.6 kg)    Body mass index is 40.74 kg/m².      BP HR SaO2   BP Readings from Last 3 Encounters:   09/09/22 130/78   07/22/22 136/78   05/11/22 118/72    Pulse Readings from Last 3 Encounters:   09/09/22 85   07/22/22 88   05/11/22 70    SpO2 Readings from Last 3 Encounters:   09/09/22 98%   07/22/22 98% 05/11/22 98%        Electronically signed by BRICE Jim on 9/9/2022 at 2:54 PM

## 2022-09-28 ENCOUNTER — TELEPHONE (OUTPATIENT)
Dept: INTERNAL MEDICINE CLINIC | Age: 72
End: 2022-09-28

## 2022-10-03 ENCOUNTER — TELEPHONE (OUTPATIENT)
Dept: CARDIOLOGY CLINIC | Age: 72
End: 2022-10-03

## 2022-10-03 RX ORDER — SPIRONOLACTONE 25 MG/1
TABLET ORAL
Qty: 90 TABLET | Refills: 3 | Status: SHIPPED | OUTPATIENT
Start: 2022-10-03

## 2022-10-03 RX ORDER — POTASSIUM CHLORIDE 750 MG/1
10 TABLET, EXTENDED RELEASE ORAL DAILY
Qty: 90 TABLET | Refills: 3 | Status: SHIPPED | OUTPATIENT
Start: 2022-10-03

## 2022-10-03 RX ORDER — LISINOPRIL 10 MG/1
TABLET ORAL
Qty: 90 TABLET | Refills: 3 | Status: SHIPPED | OUTPATIENT
Start: 2022-10-03

## 2022-10-03 NOTE — TELEPHONE ENCOUNTER
Medication Refill    Medication needing refilled:  spironolactone (ALDACTONE)  lisinopril (PRINIVIL;ZESTRIL)  potassium chloride (KLOR-CON M)    Dosage of the medication:  25mg  10mg  10meq    How are you taking this medication (QD, BID, TID, QID, PRN):  TAKE ONE TABLET BY MOUTH DAILY  TAKE ONE TABLET BY MOUTH ONCE NIGHTLY  TAKE ONE TABLET BY MOUTH DAILY    30 or 90 day supply called in:  90 90  90    When will you run out of your medication:    Which Pharmacy are we sending the medication to?:    [address] [place] [address]  JAY Garsia   Phone:  [phone]

## 2022-10-13 NOTE — RESULT ENCOUNTER NOTE
at 41wga presents for scheduled, elective repeat .     Reviewed.     Chito Rendon MD [facility]

## 2022-10-17 ENCOUNTER — NURSE ONLY (OUTPATIENT)
Dept: CARDIOLOGY CLINIC | Age: 72
End: 2022-10-17
Payer: MEDICARE

## 2022-10-17 DIAGNOSIS — I48.0 PAROXYSMAL ATRIAL FIBRILLATION (HCC): Chronic | ICD-10-CM

## 2022-10-17 DIAGNOSIS — R00.1 SYMPTOMATIC BRADYCARDIA: ICD-10-CM

## 2022-10-17 DIAGNOSIS — Z45.09 ENCOUNTER FOR ELECTRONIC ANALYSIS OF REVEAL EVENT RECORDER: ICD-10-CM

## 2022-10-17 PROCEDURE — G2066 INTER DEVC REMOTE 30D: HCPCS | Performed by: INTERNAL MEDICINE

## 2022-10-17 PROCEDURE — 93298 REM INTERROG DEV EVAL SCRMS: CPT | Performed by: INTERNAL MEDICINE

## 2022-10-18 ENCOUNTER — HOSPITAL ENCOUNTER (OUTPATIENT)
Dept: CARDIAC REHAB | Age: 72
Discharge: HOME | End: 2022-10-18

## 2022-10-18 NOTE — PROGRESS NOTES
We received a remote transmission from patient's monitor at home. Remote Linq report shows no arrhythmia recordings. No egms for AF recordings are available. Patients monitor has been disconnected for several months. She will have to send a download for us to review. Will notify office staff. EP physician to review. We will continue to monitor remotely. Implanted for palpitations and AF management. Pt is on Xarelto. End of 31-day monitoring period 10-17-22.

## 2022-10-20 SDOH — HEALTH STABILITY: PHYSICAL HEALTH: ON AVERAGE, HOW MANY MINUTES DO YOU ENGAGE IN EXERCISE AT THIS LEVEL?: 20 MIN

## 2022-10-20 SDOH — HEALTH STABILITY: PHYSICAL HEALTH: ON AVERAGE, HOW MANY DAYS PER WEEK DO YOU ENGAGE IN MODERATE TO STRENUOUS EXERCISE (LIKE A BRISK WALK)?: 2 DAYS

## 2022-10-21 ENCOUNTER — OFFICE VISIT (OUTPATIENT)
Dept: ORTHOPEDIC SURGERY | Age: 72
End: 2022-10-21
Payer: MEDICARE

## 2022-10-21 VITALS — WEIGHT: 251 LBS | HEIGHT: 64 IN | BODY MASS INDEX: 42.85 KG/M2

## 2022-10-21 DIAGNOSIS — M25.562 LEFT KNEE PAIN, UNSPECIFIED CHRONICITY: Primary | ICD-10-CM

## 2022-10-21 PROCEDURE — 3017F COLORECTAL CA SCREEN DOC REV: CPT | Performed by: ORTHOPAEDIC SURGERY

## 2022-10-21 PROCEDURE — 99214 OFFICE O/P EST MOD 30 MIN: CPT | Performed by: ORTHOPAEDIC SURGERY

## 2022-10-21 PROCEDURE — 1123F ACP DISCUSS/DSCN MKR DOCD: CPT | Performed by: ORTHOPAEDIC SURGERY

## 2022-10-21 PROCEDURE — G8399 PT W/DXA RESULTS DOCUMENT: HCPCS | Performed by: ORTHOPAEDIC SURGERY

## 2022-10-21 PROCEDURE — 1090F PRES/ABSN URINE INCON ASSESS: CPT | Performed by: ORTHOPAEDIC SURGERY

## 2022-10-21 PROCEDURE — G8484 FLU IMMUNIZE NO ADMIN: HCPCS | Performed by: ORTHOPAEDIC SURGERY

## 2022-10-21 PROCEDURE — G8428 CUR MEDS NOT DOCUMENT: HCPCS | Performed by: ORTHOPAEDIC SURGERY

## 2022-10-21 PROCEDURE — G8417 CALC BMI ABV UP PARAM F/U: HCPCS | Performed by: ORTHOPAEDIC SURGERY

## 2022-10-21 PROCEDURE — 1036F TOBACCO NON-USER: CPT | Performed by: ORTHOPAEDIC SURGERY

## 2022-10-21 RX ORDER — METHOCARBAMOL 750 MG/1
750 TABLET, FILM COATED ORAL
Qty: 90 TABLET | Refills: 0 | Status: SHIPPED | OUTPATIENT
Start: 2022-10-21 | End: 2022-11-20

## 2022-10-21 NOTE — PROGRESS NOTES
Patient: Blane Salazar  : 1950    MRN: [id number]    Date of Visit: 10/21/22    Attending Physician: Maryelizabeth Holstein    History of Present Illness  Ms. Neli Tripathi is a very pleasant 81 y.o. patient with a several year history of progressive LEFT knee pain. There is no precipitating event or trauma. The pain is located predominantly in the medial and anterior aspect of the knee aggravated by weight bearing. Walking even short distances can be painful. Stair climbing is progressing becoming more difficult and painful. However, it can also awaken the patient at night. She has tried the following interventions without sustained functional improvement:    Low-impact, structured therapy/exercise program  NSAID's/Tylenol Arthritis strength  Crtisone injections/viscosupplementation   Knee brace  Cane for ambulation    Quality of life is negatively impacted with daily tasks being more difficult. The patient would like to talk about surgical treatment options. She had a right total knee arthroplasty performed by Dr. Jean Pierre Drew several years ago this was uncomplicated. Since then she has gained a significant amount of weight.   Her current BMI is 43 additionally she was on Xarelto for PEs after prior arthroplasty    PMH/PSH:  Past Medical History:   Diagnosis Date    Allergic     Anesthesia complication     family hx-father-at at age 80 having hip replacement revision surgery-had tia's x2 while under anes-but also had hx chf-had dificuly with speech when coming out from anes    Anxiety     Arthritis     left ankle, bilateral hands    Arthritis of left hip 2016    Arthritis of right hip 2016    Asthma     At risk for falls     uses a cane    Atrial fibrillation with rapid ventricular response (HCC)     Atrial flutter ([address] 2018    Chronic maxillary sinusitis 2017    CTEPH (chronic thromboembolic pulmonary hypertension) ([address] 2016    Depression     pt stated r/t bad marriage/alcoholic spouse Diabetes mellitus ([address]     borderline    Disc disease, degenerative, lumbar or lumbosacral 2/20/2017    Hepatic steatosis 4/26/2019    History of total hip arthroplasty 2/28/2017    Hypertension     Leg cramps     patient states very severe, requires immediate potassium administration    Migraines, basilar     last migraine 10 years ago    Mild persistent asthma without complication 8/22/9035    Obesity, Class III, BMI 40-49.9 (morbid obesity) ([address] 4/19/2016    HUSSAIN (obstructive sleep apnea) 10/14/2013    Osteoarthritis, hip, bilateral 2016    Bilateral hip arthroplasty    Panic attacks     Pneumonia 2015    Primary osteoarthritis of both knees 9/19/2017    Primary osteoarthritis of left knee 12/15/2016    Pulmonary emboli ([address] 8/4/2016    Pulmonary HTN ([address] 7/21/2016    Pure hypercholesterolemia 2/13/2019    Restless leg syndrome     Rhinitis, chronic 3/26/2014    Sleep apnea     wears CPAP @13    Stress incontinence     Tear of left rotator cuff 1/23/2018    Thyroid disease     hypothyroid    Wears glasses      Patient Active Problem List   Diagnosis    Restless leg syndrome    Acquired hypothyroidism    Benign essential HTN    HUSSAIN (obstructive sleep apnea)    Obesity, Class III, BMI 40-49.9 (morbid obesity) ([address]    Primary osteoarthritis of both knees    Arthritis of knee, left    Paroxysmal atrial fibrillation (HCC)    Pure hypercholesterolemia    Hepatic steatosis    Hx of pulmonary embolus    Chronic diastolic heart failure (HCC)    Pulmonary emboli (HCC)    Obesity (BMI 30-39. 9)    Symptomatic bradycardia    Encounter for electronic analysis of reveal event recorder    Mild persistent asthma without complication    Primary osteoarthritis of right knee    Acute pain of left knee    Type 2 diabetes mellitus    Anxiety and depression     Past Surgical History:   Procedure Laterality Date    ATRIAL ABLATION SURGERY      BACK SURGERY  2004    LUMBAR FUSION    CATARACT REMOVAL Bilateral     COLONOSCOPY      EYE (OZEMPIC, 0.25 OR 0.5 MG/DOSE,) 2 MG/1.5ML SOPN, Inject 0.5 mg into the skin once a week, Disp: 2 pen, Rfl: 0    Ciclesonide (ALVESCO) 160 MCG/ACT AERS, Inhale into the lungs, Disp: , Rfl:     fluticasone (FLONASE) 50 MCG/ACT nasal spray, 1 spray by Each Nostril route daily, Disp: , Rfl:     azelastine (ASTELIN) 0.1 % nasal spray, 1 spray by Nasal route 2 times daily 1 Spray in each nostril, Disp: 1 Bottle, Rfl: 2    Handicap Placard MISC, by Does not apply route Exp 5 years, Disp: 1 each, Rfl: 0    Multiple Vitamins-Minerals (THERAPEUTIC MULTIVITAMIN-MINERALS) tablet, Take 1 tablet by mouth daily , Disp: , Rfl:     CPAP Machine MISC, by Does not apply route, Disp: , Rfl:     Magnesium Citrate 100 MG TABS, Take 1 tablet by mouth daily, Disp: 90 tablet, Rfl: 3    EPIPEN 2-MIR 0.3 MG/0.3ML SOAJ injection, , Disp: , Rfl:         ALLERGIES:  Allergies   Allergen Reactions    Atorvastatin Other (See Comments)     Muscle Pain, all statins     Penicillins Anaphylaxis    Simvastatin Other (See Comments)     Muscle Pain    Cephalexin Hives and Itching    Cephalosporins Hives and Itching    Food Diarrhea     Milk , shellfish , gluten. ..may have bouts of diarrhea, constipation or flatulus. (RD spoke to pt regarding \"milk allergy\", pt is not allergic to milk. She does not drink milk, but consumes milk in other products and consumes dairy products. Had one reactions to shellfish years ago and now can tolerate one serving of shellfish once per week. Avoids gluten as much as she can, but does not have celiac disease.)    Other      Environmental -cats,dogs,dust    Shellfish-Derived Products      hives    Sulfa Antibiotics      Can take Celebrex, Pt denies 8/1/18         Social History:   Social History     Socioeconomic History    Marital status:       Spouse name: Not on file    Number of children: 4    Years of education: 15    Highest education level: Not on file   Occupational History    Occupation: retired  Tobacco Use    Smoking status: Former     Packs/day: 0.50     Years: 5.00     Pack years: 2.50     Types: Cigarettes     Quit date: 10/5/2013     Years since quittin.0    Smokeless tobacco: Never    Tobacco comments:     smoked for a total of 5yr total   Vaping Use    Vaping Use: Never used   Substance and Sexual Activity    Alcohol use: Yes     Comment: RARE    Drug use: No    Sexual activity: Never   Other Topics Concern    Not on file   Social History Narrative    Not on file     Social Determinants of Health     Financial Resource Strain: Low Risk     Difficulty of Paying Living Expenses: Not hard at all   Food Insecurity: No Food Insecurity    Worried About Running Out of Food in the Last Year: Never true    Ran Out of Food in the Last Year: Never true   Transportation Needs: Not on file   Physical Activity: Insufficiently Active    Days of Exercise per Week: 2 days    Minutes of Exercise per Session: 20 min   Stress: Not on file   Social Connections: Not on file   Intimate Partner Violence: Not At Risk    Fear of Current or Ex-Partner: No    Emotionally Abused: No    Physically Abused: No    Sexually Abused: No   Housing Stability: Not on file         Family History:   Cancer-related family history includes Cancer in her paternal uncle. Review of Systems:  No personal history of DVT, PE. 12 point ROS otherwise negative other than reported in HPI. Physical Examination:  Patient is alert and oriented x 3 and appears well nourished and appropriate for today's visit. Height:   Ht Readings from Last 3 Encounters:   10/21/22 5' 4\" (1.626 m)   22 5' 4.8\" (1.646 m)   22 5' 5\" (1.651 m)     Weight:   Wt Readings from Last 3 Encounters:   10/21/22 251 lb (113.9 kg)   22 243 lb 4.8 oz (110.4 kg)   22 240 lb (108.9 kg)     Gait: The patient walks with antalgic gait. Left knee: no effusion             Ligaments stable to varus/valgus stress at full extension and 30 degrees. AROM: L: 5-120 Deg             Positive patellofemoral crepitus             Positive medial joint line tenderness  Hips: Preserved, pain free range of motion in both hips. Skin: Skin appears to be intact in both upper and lower extremities. There does not appear to be any ulceration or other non-healing wounds. Radiographs: Standing AP/lateral/Sunrise Knee: Imaging was reviewed with the patient. There are advanced degenerative changes of the LEFT knee with joint space narrowing, subchondral sclerosis, and osteophyte formation. There is no radiographic evidence of AVN, fracture, or dislocation. Non-Operative Treatment      Assessment and Plan?: The patient has advanced degenerative changes of the LEFT knee     We discussed the diagnosis and treatment options in detail with the patient. Patient may one day benefit from an elective total joint replacement however has not yet exhausted conservative treatment modalities  We have recommended non-steroidal anti-inflammatories, physical therapy, activity modification and weight loss. She previously has had injections without significant relief  Provided her a referral to weight management additionally provided her a prescription for Robaxin. Discussion was had with the patient regarding this medication. Patient has no known history of allergy to this medication and side effects and adverse reactions were discussed. The patient will call if they have any concern regarding side effects. We will plan on re-assessing the status in 3 months, earlier if symptoms worsen.         ?___________________________   Tresa Santamaria MD  ?   ??cc: Orly Maldonado MD

## 2022-10-25 ENCOUNTER — HOSPITAL ENCOUNTER (OUTPATIENT)
Age: 72
Setting detail: OBSERVATION
Discharge: HOME | End: 2022-10-26
Attending: EMERGENCY MEDICINE | Admitting: INTERNAL MEDICINE
Payer: MEDICARE

## 2022-10-25 ENCOUNTER — APPOINTMENT (OUTPATIENT)
Dept: GENERAL RADIOLOGY | Age: 72
End: 2022-10-25
Payer: MEDICARE

## 2022-10-25 DIAGNOSIS — R07.9 CHEST PAIN, UNSPECIFIED TYPE: Primary | ICD-10-CM

## 2022-10-25 PROBLEM — R00.2 PALPITATION: Status: ACTIVE | Noted: 2022-10-25

## 2022-10-25 PROBLEM — J45.40 MODERATE PERSISTENT ASTHMA WITHOUT COMPLICATION: Status: ACTIVE | Noted: 2022-10-25

## 2022-10-25 PROBLEM — J45.901 MODERATE ASTHMA WITH ACUTE EXACERBATION: Status: ACTIVE | Noted: 2022-10-25

## 2022-10-25 PROBLEM — R07.89 CHEST TIGHTNESS: Status: ACTIVE | Noted: 2022-10-25

## 2022-10-25 LAB
A/G RATIO: 2 (ref 1.1–2.2)
ALBUMIN SERPL-MCNC: 4.3 G/DL (ref 3.4–5)
ALP BLD-CCNC: 90 U/L (ref 40–129)
ALT SERPL-CCNC: 21 U/L (ref 10–40)
ANION GAP SERPL CALCULATED.3IONS-SCNC: 12 MMOL/L (ref 3–16)
AST SERPL-CCNC: 21 U/L (ref 15–37)
BASOPHILS ABSOLUTE: 0.1 K/UL (ref 0–0.2)
BASOPHILS RELATIVE PERCENT: 0.9 %
BILIRUB SERPL-MCNC: <0.2 MG/DL (ref 0–1)
BUN BLDV-MCNC: 19 MG/DL (ref 7–20)
CALCIUM SERPL-MCNC: 9.5 MG/DL (ref 8.3–10.6)
CHLORIDE BLD-SCNC: 102 MMOL/L (ref 99–110)
CO2: 22 MMOL/L (ref 21–32)
CREAT SERPL-MCNC: 0.8 MG/DL (ref 0.6–1.2)
D DIMER: 0.53 UG/ML FEU (ref 0–0.6)
EOSINOPHILS ABSOLUTE: 0.1 K/UL (ref 0–0.6)
EOSINOPHILS RELATIVE PERCENT: 2.3 %
GFR SERPL CREATININE-BSD FRML MDRD: >60 ML/MIN/{1.73_M2}
GLUCOSE BLD-MCNC: 120 MG/DL (ref 70–99)
HCT VFR BLD CALC: 42.9 % (ref 36–48)
HEMOGLOBIN: 13.9 G/DL (ref 12–16)
LYMPHOCYTES ABSOLUTE: 1.4 K/UL (ref 1–5.1)
LYMPHOCYTES RELATIVE PERCENT: 21.6 %
MCH RBC QN AUTO: 30.8 PG (ref 26–34)
MCHC RBC AUTO-ENTMCNC: 32.4 G/DL (ref 31–36)
MCV RBC AUTO: 95.2 FL (ref 80–100)
MONOCYTES ABSOLUTE: 0.6 K/UL (ref 0–1.3)
MONOCYTES RELATIVE PERCENT: 9.8 %
NEUTROPHILS ABSOLUTE: 4.1 K/UL (ref 1.7–7.7)
NEUTROPHILS RELATIVE PERCENT: 65.4 %
PDW BLD-RTO: 15 % (ref 12.4–15.4)
PLATELET # BLD: 253 K/UL (ref 135–450)
PMV BLD AUTO: 7.6 FL (ref 5–10.5)
POTASSIUM REFLEX MAGNESIUM: 4.1 MMOL/L (ref 3.5–5.1)
PRO-BNP: 96 PG/ML (ref 0–124)
RBC # BLD: 4.5 M/UL (ref 4–5.2)
SODIUM BLD-SCNC: 136 MMOL/L (ref 136–145)
TOTAL PROTEIN: 6.5 G/DL (ref 6.4–8.2)
TROPONIN: <0.01 NG/ML
TROPONIN: <0.01 NG/ML
WBC # BLD: 6.3 K/UL (ref 4–11)

## 2022-10-25 PROCEDURE — G0378 HOSPITAL OBSERVATION PER HR: HCPCS

## 2022-10-25 PROCEDURE — 6370000000 HC RX 637 (ALT 250 FOR IP): Performed by: INTERNAL MEDICINE

## 2022-10-25 PROCEDURE — 36415 COLL VENOUS BLD VENIPUNCTURE: CPT

## 2022-10-25 PROCEDURE — 71046 X-RAY EXAM CHEST 2 VIEWS: CPT

## 2022-10-25 PROCEDURE — 93005 ELECTROCARDIOGRAM TRACING: CPT | Performed by: EMERGENCY MEDICINE

## 2022-10-25 PROCEDURE — 83880 ASSAY OF NATRIURETIC PEPTIDE: CPT

## 2022-10-25 PROCEDURE — 6370000000 HC RX 637 (ALT 250 FOR IP): Performed by: EMERGENCY MEDICINE

## 2022-10-25 PROCEDURE — 84484 ASSAY OF TROPONIN QUANT: CPT

## 2022-10-25 PROCEDURE — 99205 OFFICE O/P NEW HI 60 MIN: CPT | Performed by: INTERNAL MEDICINE

## 2022-10-25 PROCEDURE — 6370000000 HC RX 637 (ALT 250 FOR IP): Performed by: STUDENT IN AN ORGANIZED HEALTH CARE EDUCATION/TRAINING PROGRAM

## 2022-10-25 PROCEDURE — 80053 COMPREHEN METABOLIC PANEL: CPT

## 2022-10-25 PROCEDURE — 85379 FIBRIN DEGRADATION QUANT: CPT

## 2022-10-25 PROCEDURE — 2580000003 HC RX 258: Performed by: INTERNAL MEDICINE

## 2022-10-25 PROCEDURE — 99214 OFFICE O/P EST MOD 30 MIN: CPT | Performed by: INTERNAL MEDICINE

## 2022-10-25 PROCEDURE — 99285 EMERGENCY DEPT VISIT HI MDM: CPT

## 2022-10-25 PROCEDURE — 85025 COMPLETE CBC W/AUTO DIFF WBC: CPT

## 2022-10-25 PROCEDURE — 94761 N-INVAS EAR/PLS OXIMETRY MLT: CPT

## 2022-10-25 PROCEDURE — 94640 AIRWAY INHALATION TREATMENT: CPT

## 2022-10-25 RX ORDER — TIOTROPIUM BROMIDE INHALATION SPRAY 1.56 UG/1
2 SPRAY, METERED RESPIRATORY (INHALATION) DAILY
COMMUNITY

## 2022-10-25 RX ORDER — PRAMIPEXOLE DIHYDROCHLORIDE 0.25 MG/1
0.75 TABLET ORAL 2 TIMES DAILY
Status: DISCONTINUED | OUTPATIENT
Start: 2022-10-25 | End: 2022-10-26 | Stop reason: HOSPADM

## 2022-10-25 RX ORDER — LEVALBUTEROL TARTRATE 45 UG/1
1 AEROSOL, METERED ORAL EVERY 6 HOURS PRN
Status: DISCONTINUED | OUTPATIENT
Start: 2022-10-25 | End: 2022-10-26 | Stop reason: HOSPADM

## 2022-10-25 RX ORDER — PRAMIPEXOLE DIHYDROCHLORIDE 0.25 MG/1
0.75 TABLET ORAL ONCE
Status: COMPLETED | OUTPATIENT
Start: 2022-10-25 | End: 2022-10-25

## 2022-10-25 RX ORDER — ONDANSETRON 2 MG/ML
4 INJECTION INTRAMUSCULAR; INTRAVENOUS EVERY 6 HOURS PRN
Status: DISCONTINUED | OUTPATIENT
Start: 2022-10-25 | End: 2022-10-26 | Stop reason: HOSPADM

## 2022-10-25 RX ORDER — SODIUM CHLORIDE 0.9 % (FLUSH) 0.9 %
5-40 SYRINGE (ML) INJECTION PRN
Status: DISCONTINUED | OUTPATIENT
Start: 2022-10-25 | End: 2022-10-26 | Stop reason: HOSPADM

## 2022-10-25 RX ORDER — SODIUM CHLORIDE 9 MG/ML
INJECTION, SOLUTION INTRAVENOUS PRN
Status: DISCONTINUED | OUTPATIENT
Start: 2022-10-25 | End: 2022-10-26 | Stop reason: HOSPADM

## 2022-10-25 RX ORDER — ASPIRIN 81 MG/1
324 TABLET, CHEWABLE ORAL ONCE
Status: COMPLETED | OUTPATIENT
Start: 2022-10-25 | End: 2022-10-25

## 2022-10-25 RX ORDER — LANOLIN ALCOHOL/MO/W.PET/CERES
10 CREAM (GRAM) TOPICAL NIGHTLY PRN
Status: DISCONTINUED | OUTPATIENT
Start: 2022-10-25 | End: 2022-10-26 | Stop reason: HOSPADM

## 2022-10-25 RX ORDER — ONDANSETRON 4 MG/1
4 TABLET, ORALLY DISINTEGRATING ORAL EVERY 8 HOURS PRN
Status: DISCONTINUED | OUTPATIENT
Start: 2022-10-25 | End: 2022-10-26 | Stop reason: HOSPADM

## 2022-10-25 RX ORDER — SODIUM CHLORIDE 0.9 % (FLUSH) 0.9 %
5-40 SYRINGE (ML) INJECTION EVERY 12 HOURS SCHEDULED
Status: DISCONTINUED | OUTPATIENT
Start: 2022-10-25 | End: 2022-10-26 | Stop reason: HOSPADM

## 2022-10-25 RX ORDER — ACETAMINOPHEN 650 MG/1
650 SUPPOSITORY RECTAL EVERY 6 HOURS PRN
Status: DISCONTINUED | OUTPATIENT
Start: 2022-10-25 | End: 2022-10-26 | Stop reason: HOSPADM

## 2022-10-25 RX ORDER — SPIRONOLACTONE 25 MG/1
25 TABLET ORAL DAILY
Status: DISCONTINUED | OUTPATIENT
Start: 2022-10-25 | End: 2022-10-26 | Stop reason: HOSPADM

## 2022-10-25 RX ORDER — FUROSEMIDE 40 MG/1
40 TABLET ORAL DAILY
Status: DISCONTINUED | OUTPATIENT
Start: 2022-10-25 | End: 2022-10-26 | Stop reason: HOSPADM

## 2022-10-25 RX ORDER — POLYETHYLENE GLYCOL 3350 17 G/17G
17 POWDER, FOR SOLUTION ORAL DAILY PRN
Status: DISCONTINUED | OUTPATIENT
Start: 2022-10-25 | End: 2022-10-26 | Stop reason: HOSPADM

## 2022-10-25 RX ORDER — FLUTICASONE PROPIONATE 110 UG/1
1 AEROSOL, METERED RESPIRATORY (INHALATION) 2 TIMES DAILY
Status: DISCONTINUED | OUTPATIENT
Start: 2022-10-25 | End: 2022-10-26 | Stop reason: HOSPADM

## 2022-10-25 RX ORDER — LISINOPRIL 10 MG/1
10 TABLET ORAL DAILY
Status: DISCONTINUED | OUTPATIENT
Start: 2022-10-25 | End: 2022-10-26 | Stop reason: HOSPADM

## 2022-10-25 RX ORDER — POTASSIUM CHLORIDE 7.45 MG/ML
10 INJECTION INTRAVENOUS ONCE
Status: ON HOLD | COMMUNITY
End: 2022-10-26 | Stop reason: HOSPADM

## 2022-10-25 RX ORDER — DILTIAZEM HYDROCHLORIDE 120 MG/1
120 CAPSULE, COATED, EXTENDED RELEASE ORAL DAILY
Status: DISCONTINUED | OUTPATIENT
Start: 2022-10-25 | End: 2022-10-26 | Stop reason: HOSPADM

## 2022-10-25 RX ORDER — ACETAMINOPHEN 325 MG/1
650 TABLET ORAL EVERY 6 HOURS PRN
Status: DISCONTINUED | OUTPATIENT
Start: 2022-10-25 | End: 2022-10-26 | Stop reason: HOSPADM

## 2022-10-25 RX ORDER — LANOLIN ALCOHOL/MO/W.PET/CERES
10 CREAM (GRAM) TOPICAL NIGHTLY PRN
Status: DISCONTINUED | OUTPATIENT
Start: 2022-10-26 | End: 2022-10-25

## 2022-10-25 RX ADMIN — DILTIAZEM HYDROCHLORIDE 120 MG: 120 CAPSULE, COATED, EXTENDED RELEASE ORAL at 14:57

## 2022-10-25 RX ADMIN — Medication 10 ML: at 20:17

## 2022-10-25 RX ADMIN — RIVAROXABAN 20 MG: 20 TABLET, FILM COATED ORAL at 20:32

## 2022-10-25 RX ADMIN — PRAMIPEXOLE DIHYDROCHLORIDE 0.75 MG: 0.25 TABLET ORAL at 20:32

## 2022-10-25 RX ADMIN — ASPIRIN 81 MG 324 MG: 81 TABLET ORAL at 06:57

## 2022-10-25 RX ADMIN — Medication 1 PUFF: at 20:22

## 2022-10-25 RX ADMIN — TIOTROPIUM BROMIDE INHALATION SPRAY 2 PUFF: 3.12 SPRAY, METERED RESPIRATORY (INHALATION) at 18:30

## 2022-10-25 RX ADMIN — LISINOPRIL 10 MG: 10 TABLET ORAL at 14:57

## 2022-10-25 RX ADMIN — PRAMIPEXOLE DIHYDROCHLORIDE 0.75 MG: 0.25 TABLET ORAL at 15:21

## 2022-10-25 RX ADMIN — MELATONIN TAB 3 MG 10 MG: 3 TAB at 23:42

## 2022-10-25 RX ADMIN — SPIRONOLACTONE 25 MG: 25 TABLET ORAL at 14:57

## 2022-10-25 RX ADMIN — FUROSEMIDE 40 MG: 40 TABLET ORAL at 14:57

## 2022-10-25 ASSESSMENT — PAIN SCALES - GENERAL
PAINLEVEL_OUTOF10: 5
PAINLEVEL_OUTOF10: 0

## 2022-10-25 ASSESSMENT — PAIN - FUNCTIONAL ASSESSMENT: PAIN_FUNCTIONAL_ASSESSMENT: 0-10

## 2022-10-25 ASSESSMENT — PAIN DESCRIPTION - LOCATION: LOCATION: CHEST

## 2022-10-25 NOTE — CONSULTS
[facility] Pulmonary and Critical Care   Consult Note      Reason for Consult: Asthma evaluation  Requesting Physician: Christal Lesches      Subjective:   CHIEF COMPLAINT: Chest tightness and heaviness/\" heart racing\"     HPI: Patient has history of paroxysmal atrial fibrillation, status post ablation in 2020-presents with central chest tightness and pressure associated with some shortness of breath. She felt that her heart rate suddenly jumped to the 160s only for a couple of minutes and then came right back down. Currently she states that she is asymptomatic-denies any shortness of breath, palpitation or chest tightness. Patient has a history of asthma with recurrent shortness of breath for the last few days-has been using albuterol inhaler more frequently lately. History of PE in 2016 post hip replacement. Anticoagulation with Xarelto. Scant smoking history during college years. Diagnosed with asthma in 2016-currently on Alvesco and Spiriva Respimat.        The patient is a 81 y.o. female with significant past medical history of:      Diagnosis Date    Allergic     Anesthesia complication     family hx-father-at at age 80 having hip replacement revision surgery-had tia's x2 while under anes-but also had hx chf-had dificuly with speech when coming out from anes    Anxiety     Arthritis     left ankle, bilateral hands    Arthritis of left hip 2/2/2016    Arthritis of right hip 4/19/2016    Asthma     At risk for falls     uses a cane    Atrial fibrillation with rapid ventricular response (HCC)     Atrial flutter ([address] 4/25/2018    Chronic maxillary sinusitis 5/24/2017    CTEPH (chronic thromboembolic pulmonary hypertension) ([address] 8/26/2016    Depression     pt stated r/t bad marriage/alcoholic spouse    Diabetes mellitus ([address]     borderline    Disc disease, degenerative, lumbar or lumbosacral 2/20/2017    Hepatic steatosis 4/26/2019    History of total hip arthroplasty 2/28/2017    Hypertension     Leg cramps patient states very severe, requires immediate potassium administration    Migraines, basilar     last migraine 10 years ago    Mild persistent asthma without complication 1/47/6829    Obesity, Class III, BMI 40-49.9 (morbid obesity) ([facility] 4/19/2016    HUSSAIN (obstructive sleep apnea) 10/14/2013    Osteoarthritis, hip, bilateral 2016    Bilateral hip arthroplasty    Panic attacks     Pneumonia 2015    Primary osteoarthritis of both knees 9/19/2017    Primary osteoarthritis of left knee 12/15/2016    Pulmonary emboli ([address] 8/4/2016    Pulmonary HTN ([address] 7/21/2016    Pure hypercholesterolemia 2/13/2019    Restless leg syndrome     Rhinitis, chronic 3/26/2014    Sleep apnea     wears CPAP @13    Stress incontinence     Tear of left rotator cuff 1/23/2018    Thyroid disease     hypothyroid    Wears glasses         Past Surgical History:        Procedure Laterality Date    ATRIAL ABLATION SURGERY      BACK SURGERY  2004    LUMBAR FUSION    CATARACT REMOVAL Bilateral     COLONOSCOPY      EYE SURGERY Right 2010    retinal tear repaired    2018 [facility]    right ring finger severe laceration, fracture    HIP ARTHROPLASTY Right 4-19-16    HYSTERECTOMY (CERVIX STATUS UNKNOWN)  1993    JOINT REPLACEMENT Left 2/2/16    left hip    TONSILLECTOMY  1972    TOTAL KNEE ARTHROPLASTY Right 3/16/2021    RIGHT ROBOTIC ASSISTED TOTAL KNEE ARTHROPLASTY (77418, 41261) - [employer] performed by Curly Weaver MD at [address] [place] [address]     Current Medications:    Current Facility-Administered Medications: [START ON 10/26/2022] rivaroxaban (XARELTO) tablet 20 mg, 20 mg, Oral, Daily with breakfast  dilTIAZem (CARDIZEM CD) extended release capsule 120 mg, 120 mg, Oral, Daily  furosemide (LASIX) tablet 40 mg, 40 mg, Oral, Daily  lisinopril (PRINIVIL;ZESTRIL) tablet 10 mg, 10 mg, Oral, Daily  spironolactone (ALDACTONE) tablet 25 mg, 25 mg, Oral, Daily  regadenoson (LEXISCAN) injection 0.4 mg, 0.4 mg, IntraVENous, ONCE PRN  sodium chloride flush 0.9 % injection 5-40 mL, 5-40 mL, IntraVENous, 2 times per day  sodium chloride flush 0.9 % injection 5-40 mL, 5-40 mL, IntraVENous, PRN  0.9 % sodium chloride infusion, , IntraVENous, PRN  ondansetron (ZOFRAN-ODT) disintegrating tablet 4 mg, 4 mg, Oral, Q8H PRN **OR** ondansetron (ZOFRAN) injection 4 mg, 4 mg, IntraVENous, Q6H PRN  polyethylene glycol (GLYCOLAX) packet 17 g, 17 g, Oral, Daily PRN  acetaminophen (TYLENOL) tablet 650 mg, 650 mg, Oral, Q6H PRN **OR** acetaminophen (TYLENOL) suppository 650 mg, 650 mg, Rectal, Q6H PRN  pramipexole (MIRAPEX) tablet 0.75 mg, 0.75 mg, Oral, BID    Allergies   Allergen Reactions    Atorvastatin Other (See Comments)     Muscle Pain, all statins     Penicillins Anaphylaxis    Simvastatin Other (See Comments)     Muscle Pain    Cephalexin Hives and Itching    Cephalosporins Hives and Itching    Food Diarrhea     Milk , shellfish , gluten. ..may have bouts of diarrhea, constipation or flatulus. (RD spoke to pt regarding \"milk allergy\", pt is not allergic to milk. She does not drink milk, but consumes milk in other products and consumes dairy products. Had one reactions to shellfish years ago and now can tolerate one serving of shellfish once per week. Avoids gluten as much as she can, but does not have celiac disease.)    Other      Environmental -cats,dogs,dust    Shellfish-Derived Products      hives    Sulfa Antibiotics      Can take Celebrex, Pt denies 8/1/18       Social History:    TOBACCO:   reports that she quit smoking about 9 years ago. Her smoking use included cigarettes. She has a 2.50 pack-year smoking history. She has never used smokeless tobacco.  ETOH:   reports current alcohol use.   Patient currently lives independently    Family History:       Problem Relation Age of Onset    Alcohol Abuse Sister     Arthritis Sister     Stroke Maternal Grandmother     Thyroid Disease Maternal Grandmother     Thyroid Disease Maternal Grandfather     Heart Disease Maternal Grandfather     Alcohol Abuse Maternal Grandfather     Substance Abuse Maternal Grandfather     Hypertension Mother     Thyroid Disease Mother     Anxiety Disorder Mother     Arthritis Mother     Cataracts Mother     Heart Disease Mother     Asthma Mother     Thyroid Disease Father     Heart Failure Father     Heart Disease Father     Arthritis Brother     High Cholesterol Brother     Heart Disease Maternal Uncle     Heart Disease Paternal Uncle     Cancer Paternal Uncle     Alcohol Abuse Paternal Grandfather     Substance Abuse Paternal Grandfather        REVIEW OF SYSTEMS:    Constitutional: negative for fatigue, fevers, malaise and weight loss  Ears, nose, mouth, throat: negative for ear drainage, epistaxis, hoarseness, nasal congestion, sore throat and voice change  Respiratory: negative shortness of breath, cough, phlegm or pleurisy  Cardiovascular: negative for chest pain, chest pressure/discomfort, irregular heart beat, lower extremity edema and palpitations  Gastrointestinal: negative for abdominal pain, constipation, diarrhea, jaundice, melena, odynophagia, reflux symptoms and vomiting  Hematologic/lymphatic: negative for bleeding, easy bruising, lymphadenopathy and petechiae  Musculoskeletal:negative for arthralgias, bone pain, muscle weakness, neck pain and stiff joints  Neurological: negative for dizziness, gait problems, headaches, seizures, speech problems, tremors and weakness  Behavioral/Psych: negative for anxiety, behavior problems, depression, fatigue and sleep disturbance  Endocrine: negative for diabetic symptoms including none, neuropathy, polyphagia, polyuria, polydipsia, vomiting and diarrhea and temperature intolerance  Allergic/Immunologic: negative for anaphylaxis, angioedema, hay fever and urticaria      Objective:   Patient Vitals for the past 8 hrs:   BP Temp Temp src Pulse Resp SpO2   10/25/22 1700 (!) 137/91 98.2 °F (36.8 °C) Temporal 90 16 97 %   10/25/22 1600 132/67 -- -- 69 -- --   10/25/22 1500 138/70 99 °F (37.2 °C) Temporal 78 16 97 %   10/25/22 1457 (!) 149/82 -- -- -- -- --   10/25/22 1130 -- -- -- 78 -- --   10/25/22 1128 -- -- -- -- -- 97 %   10/25/22 1127 (!) 151/80 97.8 °F (36.6 °C) Temporal 82 16 --   10/25/22 1045 137/64 -- -- 78 17 97 %   10/25/22 1030 137/62 -- -- 76 20 97 %   10/25/22 1015 133/70 -- -- 83 16 97 %   10/25/22 1000 (!) 140/59 -- -- 80 17 98 %   10/25/22 0945 138/62 -- -- 79 22 95 %     No intake/output data recorded.   I/O this shift:  In: 360 [P.O.:360]  Out: 1250 [Urine:1250]    Physical Exam:  General Appearance: alert and oriented to person, place and time, well developed and well- nourished, in no acute distress  Skin: warm and dry, no rash or erythema  Head: normocephalic and atraumatic  Eyes: pupils equal, round, and reactive to light, extraocular eye movements intact, conjunctivae normal  ENT: external ear and ear canal normal bilaterally, nose without deformity, nasal mucosa and turbinates normal  Neck: supple and non-tender without mass, no cervical lymphadenopathy  Pulmonary/Chest: clear to auscultation bilaterally- no wheezes, rales or rhonchi, normal air movement, no respiratory distress  Cardiovascular: normal rate, regular rhythm,  no murmurs, rubs, distal pulses intact, no carotid bruits  Abdomen: soft, non-tender, non-distended, normal bowel sounds, no masses or organomegaly  Lymph Nodes: Cervical, supraclavicular normal  Extremities: no cyanosis, clubbing or edema  Musculoskeletal: normal range of motion, no joint swelling, deformity or tenderness  Neurologic: alert, no focal neurologic deficits    Data Review:  CBC:   Lab Results   Component Value Date/Time    WBC 6.3 10/25/2022 07:04 AM    RBC 4.50 10/25/2022 07:04 AM     BMP:   Lab Results   Component Value Date/Time    GLUCOSE 120 10/25/2022 06:31 AM    GLUCOSE 100 03/27/2012 04:27 PM    CO2 22 10/25/2022 06:31 AM    BUN 19 10/25/2022 06:31 AM    CREATININE 0.8 10/25/2022 06:31 AM    CALCIUM 9.5 10/25/2022 06:31 AM     ABG: No results found for: QIJ7ZOF, BEART, C8BVIPPI, PHART, THGBART, ZLF8EBY, PO2ART, LBL7XOO    Radiology: All pertinent images / reports were reviewed as a part of this visit. Narrative   EXAMINATION:   TWO XRAY VIEWS OF THE CHEST       10/25/2022 6:24 am       COMPARISON:   02/09/2022       HISTORY:   ORDERING SYSTEM PROVIDED HISTORY: chest tightness no fever or cough   TECHNOLOGIST PROVIDED HISTORY:   Reason for exam:->chest tightness no fever or cough   Reason for Exam: chest tightness no fever or cough   Relevant Medical/Surgical History: previous   pneumonia,htn,CTEPH,A-FIB,A-Flutter,asthma and P.E,       FINDINGS:   The lungs are clear. A cardiac digital recorder device is insitu. The   cardiac silhouette is within normal limits. There is no pneumothorax or   pleural effusion. Status post posterior decompression and stabilization of   the lumbar spine. Impression   1. No acute abnormality. Problem List:   Paroxysmal atrial fibrillation ? SVT  History of asthma    Assessment/Plan:     Patient's symptoms could be related to SVT or paroxysmal atrial fibrillation-could be related to frequent albuterol use lately. She states over the last 4 days she has used at least 4 times. Patient would be switched over to Xopenex instead in view of her symptoms. She is okay to continue with Alvesco and Spiriva Respimat as per her home controller regime for asthma. Patient also planned to have cardiac work-up-stress test for tomorrow a.m.  proBNP 96, troponin x2 negative. Patient is currently not in asthma exacerbation, no wheeze heard    X-ray was reviewed and noted to be normal.    Pulmonary service will follow.     Jannie Okeefe MD

## 2022-10-25 NOTE — ACP (ADVANCE CARE PLANNING)
Advanced Care Planning Note. Purpose of Encounter: Advanced care planning in light of hospitalization  Parties In Attendance: Patient,    Decisional Capacity: Yes  Subjective: Patient  understand that this conversation is to address long term care goal  Objective: to hospital with chest pain history of paroxysmal A. fib  Goals of Care Determination: Patient would pursue CPR and Intubation if required. No tracheostomy or long term ventilation if required.      Code Status: full code  Time spent on Advanced care Plannin minutes  Advanced Care Planning Documents: documented patient's wishes, would like Ritesh Boyce to make medical decisions if unable to make decisions    Gloria Seip, MD  10/25/2022 10:15 AM

## 2022-10-25 NOTE — CONSULTS
[facility]   Electrophysiology Consultation   Date: 10/25/2022  Reason for Consultation: chest pain, palpitations   Consult Requesting Physician: Ro De La O MD     Chief Complaint   Patient presents with    Chest Pain     Patient brought in by [address] [facility] for chest pain that woke her up out of her sleep, describes as crushing pain and a squeezing pain, 5 (0-10), pt was also experiencing nausea and shortness of breath, as well as a generalized feeling shakiness/tremors and uneasy, also felt off balance     HPI: Manju Burnett is a 81 y.o. female with a history of paroxsymal atrial fib, PE and is on xarelto, dCHF and HUSSAIN, who woke up this morning at 4 AM with midsternal chest pressure that was 5 out of 10 associate with shortness of breath and nausea and some sweats. Patient heart rate was jumping up to the 160s and stayed there for a couple of minutes and then come back down patient states when her heart rate got up her pain got worse. She had Atrial fibrillation ablation on 3/27/20 and has been symptom free since then. Her asthma has been worse and she started using albuterol nebulizer daily since last Friday for the first time. Device interrogation showed no Atrial fibrillation.  There are several SVT episodes of a few seconds to minutes    Past Medical History:   Diagnosis Date    Allergic     Anesthesia complication     family hx-father-at at age 80 having hip replacement revision surgery-had tia's x2 while under anes-but also had hx chf-had dificuly with speech when coming out from anes    Anxiety     Arthritis     left ankle, bilateral hands    Arthritis of left hip 2/2/2016    Arthritis of right hip 4/19/2016    Asthma     At risk for falls     uses a cane    Atrial fibrillation with rapid ventricular response (HCC)     Atrial flutter ([address] 4/25/2018    Chronic maxillary sinusitis 5/24/2017    CTEPH (chronic thromboembolic pulmonary hypertension) ([address] 8/26/2016    Depression pt stated r/t bad marriage/alcoholic spouse    Diabetes mellitus ([address]     borderline    Disc disease, degenerative, lumbar or lumbosacral 2/20/2017    Hepatic steatosis 4/26/2019    History of total hip arthroplasty 2/28/2017    Hypertension     Leg cramps     patient states very severe, requires immediate potassium administration    Migraines, basilar     last migraine 10 years ago    Mild persistent asthma without complication 9/21/3313    Obesity, Class III, BMI 40-49.9 (morbid obesity) ([address] 4/19/2016    HUSSAIN (obstructive sleep apnea) 10/14/2013    Osteoarthritis, hip, bilateral 2016    Bilateral hip arthroplasty    Panic attacks     Pneumonia 2015    Primary osteoarthritis of both knees 9/19/2017    Primary osteoarthritis of left knee 12/15/2016    Pulmonary emboli ([address] 8/4/2016    Pulmonary HTN ([address] 7/21/2016    Pure hypercholesterolemia 2/13/2019    Restless leg syndrome     Rhinitis, chronic 3/26/2014    Sleep apnea     wears CPAP @13    Stress incontinence     Tear of left rotator cuff 1/23/2018    Thyroid disease     hypothyroid    Wears glasses         Past Surgical History:   Procedure Laterality Date    ATRIAL ABLATION SURGERY      BACK SURGERY  2004    LUMBAR FUSION    CATARACT REMOVAL Bilateral     COLONOSCOPY      EYE SURGERY Right 2010    retinal tear repaired    2018 [facility]    right ring finger severe laceration, fracture    HIP ARTHROPLASTY Right 4-19-16    HYSTERECTOMY (CERVIX STATUS UNKNOWN)  1993    JOINT REPLACEMENT Left 2/2/16    left hip    TONSILLECTOMY  1972    TOTAL KNEE ARTHROPLASTY Right 3/16/2021    RIGHT ROBOTIC ASSISTED TOTAL KNEE ARTHROPLASTY (28907, 80842) - PRESSLEY & NEPHEW ADVANCED performed by Leah Guthrie MD at [facility] OR       Allergies   Allergen Reactions    Atorvastatin Other (See Comments)     Muscle Pain, all statins     Penicillins Anaphylaxis    Simvastatin Other (See Comments)     Muscle Pain    Cephalexin Hives and Itching    Cephalosporins Hives and Itching    Food Diarrhea     Milk , shellfish , gluten. ..may have bouts of diarrhea, constipation or flatulus. (RD spoke to pt regarding \"milk allergy\", pt is not allergic to milk. She does not drink milk, but consumes milk in other products and consumes dairy products. Had one reactions to shellfish years ago and now can tolerate one serving of shellfish once per week. Avoids gluten as much as she can, but does not have celiac disease.)    Other      Environmental -cats,dogs,dust    Shellfish-Derived Products      hives    Sulfa Antibiotics      Can take Celebrex, Pt denies 8/1/18       Social History:  Reviewed. reports that she quit smoking about 9 years ago. Her smoking use included cigarettes. She has a 2.50 pack-year smoking history. She has never used smokeless tobacco. She reports current alcohol use. She reports that she does not use drugs. Family History:  Reviewed. family history includes Alcohol Abuse in her maternal grandfather, paternal grandfather, and sister; Anxiety Disorder in her mother; Arthritis in her brother, mother, and sister; Asthma in her mother; Cancer in her paternal uncle; Cataracts in her mother; Heart Disease in her father, maternal grandfather, maternal uncle, mother, and paternal uncle; Heart Failure in her father; High Cholesterol in her brother; Hypertension in her mother; Stroke in her maternal grandmother; Substance Abuse in her maternal grandfather and paternal grandfather; Thyroid Disease in her father, maternal grandfather, maternal grandmother, and mother. Review of System:  All other systems reviewed and are negative except for that noted above.  Pertinent negatives are:     General: negative for fever, chills   Ophthalmic ROS: negative for - eye pain or loss of vision  ENT ROS: negative for - headaches, sore throat   Respiratory: negative for - cough, sputum  Cardiovascular: Reviewed in HPI  Gastrointestinal: negative for - abdominal pain, diarrhea, N/V  Hematology: negative for - bleeding, blood clots, bruising or jaundice  Genito-Urinary:  negative for - Dysuria or incontinence  Musculoskeletal: negative for - Joint swelling, muscle pain  Neurological: negative for - confusion, dizziness, headaches   Psychiatric: No anxiety, no depression. Dermatological: negative for - rash    Physical Examination:  Vitals:    10/25/22 1128   BP:    Pulse:    Resp:    Temp:    SpO2: 97%      No intake/output data recorded. Wt Readings from Last 3 Encounters:   10/25/22 241 lb (109.3 kg)   10/21/22 251 lb (113.9 kg)   22 243 lb 4.8 oz (110.4 kg)     Temp  Av.2 °F (36.8 °C)  Min: 97.8 °F (36.6 °C)  Max: 98.5 °F (36.9 °C)  Pulse  Av.9  Min: 75  Max: 93  BP  Min: 125/65  Max: 151/80  SpO2  Av.9 %  Min: 95 %  Max: 99 %  No intake or output data in the 24 hours ending 10/25/22 1202    Telemetry: Sinus rhythm   Constitutional: Oriented. No distress. Head: Normocephalic and atraumatic. Mouth/Throat: Oropharynx is clear and moist.   Eyes: Conjunctivae normal. EOM are normal.   Neck: Neck supple. No rigidity. No JVD present. Cardiovascular: Normal rate, regular rhythm, S1&S2. Pulmonary/Chest: Bilateral respiratory sounds. No wheezes, No rhonchi. Abdominal: Soft. Bowel sounds present. No distension, No tenderness. Musculoskeletal: No tenderness. No edema    Lymphadenopathy: Has no cervical adenopathy. Neurological: Alert and oriented. Cranial nerve appears intact, No Gross deficit   Skin: Skin is warm and dry. No rash noted. Psychiatric: Has a normal behavior     Labs, diagnostic and imaging results reviewed. Reviewed.    Recent Labs     10/25/22  0631      K 4.1      CO2 22   BUN 19   CREATININE 0.8     Recent Labs     10/25/22  0704   WBC 6.3   HGB 13.9   HCT 42.9   MCV 95.2        Lab Results   Component Value Date/Time    CKTOTAL 145 2013 10:22 AM    TROPONINI <0.01 10/25/2022 06:31 AM     No results found for: BNP  Lab Results   Component Value Date/Time    PROTIME 17.8 02/09/2022 09:50 PM    PROTIME 13.9 03/28/2021 12:27 PM    PROTIME 11.4 03/16/2021 11:25 AM    INR 1.55 02/09/2022 09:50 PM    INR 1.20 03/28/2021 12:27 PM    INR 0.98 03/16/2021 11:25 AM     Lab Results   Component Value Date/Time    CHOL 208 02/16/2022 12:04 PM    HDL 60 02/16/2022 12:04 PM    TRIG 139 02/16/2022 12:04 PM       ECG: Sinus rhythm with Premature atrial complexe  Echo: 07/2022Conclusions      Summary   Technically difficult study due to body habitus. Definity was administered. Normal left ventricle size, wall thickness and systolic function with an   estimated ejection fraction of 60-65%. Mild concentric left ventricular hypertrophy. No regional wall motion abnormalities are seen. Normal diastolic filling pattern for age. The aortic valve appears sclerotic but opens well. Mild aortic   regurgitation. Mild tricuspid regurgitation. PASP estimated at 30 mmHg. Cath:   GXT: 1/20/15  Summary    Normal myocardial perfusion study. Normal LV function.      Scheduled Meds:   [START ON 10/26/2022] rivaroxaban  20 mg Oral Daily with breakfast    dilTIAZem  120 mg Oral Daily    furosemide  40 mg Oral Daily    lisinopril  10 mg Oral Daily    spironolactone  1 tablet Oral Daily    sodium chloride flush  5-40 mL IntraVENous 2 times per day     Continuous Infusions:   sodium chloride       PRN Meds:.regadenoson, sodium chloride flush, sodium chloride, ondansetron **OR** ondansetron, polyethylene glycol, acetaminophen **OR** acetaminophen     Patient Active Problem List    Diagnosis Date Noted    Chest pain 10/25/2022    Type 2 diabetes mellitus 04/29/2022    Anxiety and depression 04/29/2022    Acute pain of left knee 09/16/2021    Primary osteoarthritis of right knee 03/16/2021    Mild persistent asthma without complication 38/33/2537    Encounter for electronic analysis of reveal event recorder 08/20/2020    Symptomatic bradycardia 03/28/2020    Obesity (BMI 30-39. 9)     Pulmonary emboli ([facility] 08/22/2019    Hx of pulmonary embolus 05/14/2019    Chronic diastolic heart failure ([address] 05/14/2019    Hepatic steatosis 04/26/2019    Pure hypercholesterolemia 02/13/2019    Paroxysmal atrial fibrillation ([address] 11/09/2018    Arthritis of knee, left 01/23/2018    Primary osteoarthritis of both knees 09/19/2017    Obesity, Class III, BMI 40-49.9 (morbid obesity) ([facility] 02/02/2016    Benign essential HTN 10/14/2013    HUSSAIN (obstructive sleep apnea) 10/14/2013    Restless leg syndrome 07/11/2011    Acquired hypothyroidism 07/11/2011      Active Hospital Problems    Diagnosis Date Noted    Chest pain [R07.9] 10/25/2022     Priority: Medium       Assessment:       Plan:    - chest pain and palpitations / Atrial runs    ECG here and in EMS did not show any Atrial fibrillation. The Implantable Loop recorder shows some short atrial tachycardia episodes  It is likely that her symptoms were triggered by new use of albuterol daily which she has never used before. Will do a stress test and discharge with a 30-d event monitor . Meanwhile, alternative treatment of her asthma is recommended. - Asthma    Needs other treatments for her exacerbation than beta agonist to reduce side effects. Paroxysmal atrial fibrillation      S/p Loop recorder implant 08/17/2020. Today shows sinus rhythm 0% AF burden  S/p ablation of Atrial fibrillation and atrial flutter 03/27/2020     Xarelto 20 mg BID  Cardizem 30mg. Taking one a day. HUSSAIN (obstructive sleep apnea)  Compliant with CPAP        Hx of pulmonary embolus  xarelto 20mg daily     HTN  BP is well controlled. Continue current meds. Implantable Loop recorder   The CIED was interrogated and programmed and I supervised and reviewed all the data. All findings and changes are in device interrogation sheet and reflect my personal interpretation and changes and is scanned to Epic.    All recorded episodes of sinus tachycardia           Thank you for allowing me to participate in the care of Rut Jade     NOTE: This report was transcribed using voice recognition software. Every effort was made to ensure accuracy, however, inadvertent computerized transcription errors may be present.

## 2022-10-25 NOTE — ED PROVIDER NOTES
[address] PROVIDER NOTE    Patient Identification  Pt Name: Milli Solano  MRN: [id number]  Brenna 1950  Date of evaluation: 10/25/2022  Provider: Kwadwo Wall MD  PCP: Elvira Brooks MD    Chief Complaint  Chest Pain (Patient brought in by [address] [facility] for chest pain that woke her up out of her sleep, describes as crushing pain and a squeezing pain, 5 (0-10), pt was also experiencing nausea and shortness of breath, as well as a generalized feeling shakiness/tremors and uneasy, also felt off balance)      HPI  History provided by patient   This is a 81 y.o. female who presents to the ED for chest discomfort. Feels as though something is sitting on her chest.  Associated with shortness of breath and nausea. No recent fevers or chills. Does have history of asthma that has been flaring up. Also had palpitations. Thought that her heart was racing 160 times a minute. Does have history of A. fib with ablation. She is on Xarelto. Her heart does not feel like it is racing anymore but she still does feel some chest discomfort. Nonradiating. Associated with lightheadedness. ROS  12 systems reviewed, pertinent positives/negatives per HPI otherwise noted to be negative.     I have reviewed the following nursing documentation:  Allergies: Atorvastatin, Penicillins, Simvastatin, Cephalexin, Cephalosporins, Food, Other, Shellfish-derived products, and Sulfa antibiotics    Past medical history:   Past Medical History:   Diagnosis Date    Allergic     Anesthesia complication     family hx-father-at at age 80 having hip replacement revision surgery-had tia's x2 while under anes-but also had hx chf-had dificuly with speech when coming out from anes    Anxiety     Arthritis     left ankle, bilateral hands    Arthritis of left hip 2/2/2016    Arthritis of right hip 4/19/2016    Asthma     At risk for falls     uses a cane    Atrial fibrillation with rapid ventricular response [facility])     Atrial flutter ([address] 4/25/2018    Chronic maxillary sinusitis 5/24/2017    CTEPH (chronic thromboembolic pulmonary hypertension) ([address] 8/26/2016    Depression     pt stated r/t bad marriage/alcoholic spouse    Diabetes mellitus ([address]     borderline    Disc disease, degenerative, lumbar or lumbosacral 2/20/2017    Hepatic steatosis 4/26/2019    History of total hip arthroplasty 2/28/2017    Hypertension     Leg cramps     patient states very severe, requires immediate potassium administration    Migraines, basilar     last migraine 10 years ago    Mild persistent asthma without complication 0/84/3283    Obesity, Class III, BMI 40-49.9 (morbid obesity) ([address] 4/19/2016    HUSSAIN (obstructive sleep apnea) 10/14/2013    Osteoarthritis, hip, bilateral 2016    Bilateral hip arthroplasty    Panic attacks     Pneumonia 2015    Primary osteoarthritis of both knees 9/19/2017    Primary osteoarthritis of left knee 12/15/2016    Pulmonary emboli ([address] 8/4/2016    Pulmonary HTN ([address] 7/21/2016    Pure hypercholesterolemia 2/13/2019    Restless leg syndrome     Rhinitis, chronic 3/26/2014    Sleep apnea     wears CPAP @13    Stress incontinence     Tear of left rotator cuff 1/23/2018    Thyroid disease     hypothyroid    Wears glasses      Past surgical history:   Past Surgical History:   Procedure Laterality Date    ATRIAL ABLATION SURGERY      BACK SURGERY  2004    LUMBAR FUSION    CATARACT REMOVAL Bilateral     COLONOSCOPY      EYE SURGERY Right 2010    retinal tear repaired    2018 [facility]    right ring finger severe laceration, fracture    HIP ARTHROPLASTY Right 4-19-16    HYSTERECTOMY (CERVIX STATUS UNKNOWN)  1993    JOINT REPLACEMENT Left 2/2/16    left hip    TONSILLECTOMY  1972    TOTAL KNEE ARTHROPLASTY Right 3/16/2021    RIGHT ROBOTIC ASSISTED TOTAL KNEE ARTHROPLASTY (61281, 58593) - PRESSLEY & NEPHEW ADVANCED performed by Amirah Heart MD at [place] [address] medications:   Previous Medications ALBUTEROL (PROVENTIL) (2.5 MG/3ML) 0.083% NEBULIZER SOLUTION    Take 3 mLs by nebulization every 6 hours as needed for Wheezing    AZELASTINE (ASTELIN) 0.1 % NASAL SPRAY    1 spray by Nasal route 2 times daily 1 Spray in each nostril    BUPROPION (WELLBUTRIN XL) 300 MG EXTENDED RELEASE TABLET    Take 1 tablet by mouth every morning    CICLESONIDE (ALVESCO) 160 MCG/ACT AERS    Inhale into the lungs    CPAP MACHINE MISC    by Does not apply route    DILTIAZEM (CARDIZEM CD) 120 MG EXTENDED RELEASE CAPSULE    Take 1 capsule by mouth daily    EPIPEN 2-MIR 0.3 MG/0.3ML SOAJ INJECTION        FLUTICASONE (FLONASE) 50 MCG/ACT NASAL SPRAY    1 spray by Each Nostril route daily    FUROSEMIDE (LASIX) 40 MG TABLET    Take 1 tablet by mouth in the morning.     HANDICAP PLACARD MISC    by Does not apply route Exp 5 years    LISINOPRIL (PRINIVIL;ZESTRIL) 10 MG TABLET    TAKE ONE TABLET BY MOUTH ONCE NIGHTLY    MAGNESIUM CITRATE 100 MG TABS    Take 1 tablet by mouth daily    MELATONIN 10 MG CAPS CAPSULE    Take 10 mg by mouth nightly    METHOCARBAMOL (ROBAXIN-750) 750 MG TABLET    Take 1 tablet by mouth nightly as needed (As needed)    MULTIPLE VITAMINS-MINERALS (THERAPEUTIC MULTIVITAMIN-MINERALS) TABLET    Take 1 tablet by mouth daily     POTASSIUM CHLORIDE (KLOR-CON M) 10 MEQ EXTENDED RELEASE TABLET    Take 1 tablet by mouth daily    PRAMIPEXOLE (MIRAPEX) 0.5 MG TABLET    1.5 tab q 5 PM and 1.5 tab qHS    RIVAROXABAN (XARELTO) 20 MG TABS TABLET    Take 1 tablet by mouth daily (with breakfast)    RIVAROXABAN (XARELTO) 20 MG TABS TABLET    Take 1 tablet by mouth daily (with breakfast)    SEMAGLUTIDE,0.25 OR 0.5MG/DOS, (OZEMPIC, 0.25 OR 0.5 MG/DOSE,) 2 MG/1.5ML SOPN    Inject 0.5 mg into the skin once a week    SPIRONOLACTONE (ALDACTONE) 25 MG TABLET    TAKE ONE TABLET BY MOUTH DAILY    VENLAFAXINE (EFFEXOR XR) 37.5 MG EXTENDED RELEASE CAPSULE    Take 1 capsule by mouth daily       Social history:  reports that she quit smoking about 9 years ago. Her smoking use included cigarettes. She has a 2.50 pack-year smoking history. She has never used smokeless tobacco. She reports current alcohol use. She reports that she does not use drugs. Family history:    Family History   Problem Relation Age of Onset    Alcohol Abuse Sister     Arthritis Sister     Stroke Maternal Grandmother     Thyroid Disease Maternal Grandmother     Thyroid Disease Maternal Grandfather     Heart Disease Maternal Grandfather     Alcohol Abuse Maternal Grandfather     Substance Abuse Maternal Grandfather     Hypertension Mother     Thyroid Disease Mother     Anxiety Disorder Mother     Arthritis Mother     Cataracts Mother     Heart Disease Mother     Asthma Mother     Thyroid Disease Father     Heart Failure Father     Heart Disease Father     Arthritis Brother     High Cholesterol Brother     Heart Disease Maternal Uncle     Heart Disease Paternal Uncle     Cancer Paternal Uncle     Alcohol Abuse Paternal Grandfather     Substance Abuse Paternal Grandfather          Exam  ED Triage Vitals [10/25/22 0608]   BP Temp Temp Source Heart Rate Resp SpO2 Height Weight   (!) 136/124 98.5 °F (36.9 °C) Oral 93 15 99 % 5' 4\" (1.626 m) 241 lb (109.3 kg)     Nursing note and vitals reviewed. Constitutional: In no acute distress  HENT:      Head: Normocephalic      Ears: External ears normal.      Nose: Nose normal.     Mouth: Membrane mucosa moist   Eyes: No discharge. Neck: Supple. Trachea midline. Cardiovascular: Regular rate. Warm extremities  Pulmonary/Chest: Effort normal. No respiratory distress. Abdominal: Soft. No distension. Nontender  : Deferred  Rectal: Deferred   Musculoskeletal: Moves all extremities. No gross deformity. Neurological: Alert and oriented. Face symmetric. Speech is clear. Skin: Warm and dry. Psychiatric: Normal mood and affect. Behavior is normal.    Procedures      EKG  The Ekg interpreted by me in the absence of a cardiologist shows.   Normal sinus rhythm. No specific ST changes appreciated. HR 94  No significant change from prior EKG dated 2/22      Radiology  XR CHEST (2 VW)    (Results Pending)       Labs  Results for orders placed or performed during the hospital encounter of 10/25/22   EKG 12 Lead   Result Value Ref Range    Ventricular Rate 94 BPM    Atrial Rate 94 BPM    P-R Interval 150 ms    QRS Duration 86 ms    Q-T Interval 360 ms    QTc Calculation (Bazett) 450 ms    P Axis 21 degrees    R Axis 21 degrees    T Axis 50 degrees    Diagnosis       Sinus rhythm with Premature atrial complexesOtherwise normal ECG       Screenings   Bharat Coma Scale  Eye Opening: Spontaneous  Best Verbal Response: Oriented  Best Motor Response: Obeys commands  [place] Coma Scale Score: 15       MDM and ED Course  This is a 81 y.o. female who presents to the ED for chest discomfort. ACS in differential.  EKG shows no acute ischemic changes. Will obtain troponin. I do believe the patient is at low risk for major cardiac event within the next 30 days. Anticipate admission for chest pain work-up. Pulmonary embolism in differential.  She is anticoagulated and has no hypoxia with oxygen saturation 99%. Obtaining D-dimer to help risk stratify for this as well as dissection. None rating towards the back. Could have been due to arrhythmia earlier on. Does have history of A. fib. She has never had this chest tightness associated with it however.    ----------    Dimer negative. Troponin negative. Admitted to hospitalist service. [unfilled]    Is this patient to be included in the SEP-1 Core Measure due to severe sepsis or septic shock? No   Exclusion criteria - the patient is NOT to be included for SEP-1 Core Measure due to: Infection is not suspected        Final Impression  1. Chest pain, unspecified type        Blood pressure (!) 136/124, pulse 93, temperature 98.5 °F (36.9 °C), temperature source Oral, resp.  rate 15, height 5' 4\" (1.626 m), weight 241 lb (109.3 kg), SpO2 99 %, not currently breastfeeding. Disposition:  DISPOSITION        Patient Referrals:  No follow-up provider specified. Discharge Medications:  New Prescriptions    No medications on file       Discontinued Medications:  Discontinued Medications    No medications on file       This chart was generated using the [address] [place] [address] [address] [employer] system. I created this record but it may contain dictation errors given the limitations of this technology.         Taiwo Gonsales MD  10/25/22 5325

## 2022-10-25 NOTE — H&P
HOSPITALISTS HISTORY AND PHYSICAL    10/25/2022 10:13 AM    Patient Information:  Beka Jackson is a 81 y.o. female [id number]  PCP:  Eloisa Buerger, MD (Tel: [phone] )    Chief complaint:    Chief Complaint   Patient presents with    Chest Pain     Patient brought in by [address] [facility] for chest pain that woke her up out of her sleep, describes as crushing pain and a squeezing pain, 5 (0-10), pt was also experiencing nausea and shortness of breath, as well as a generalized feeling shakiness/tremors and uneasy, also felt off balance       History of Present Illness:  Sandip Avendaño is a 81 y.o. female who woke up this morning at 4 AM with midsternal chest pressure that was 5 out of 10 associate with shortness of breath and nausea and some sweats. Patient heart rate was jumping up to the 160s and stayed there for a couple of minutes and then come back down patient states when her heart rate got up her pain got worse. Patient has a history of A. fib with ablation is on Xarelto states has not had any further A. fib events since ablation. Patient did have asthma exacerbation last week and had been taking inhalers quite often. Denies any sick contacts fevers or chills. REVIEW OF SYSTEMS:   Constitutional: Negative for fever,chills or night sweats  ENT: Negative for rhinorrhea, epistaxis, hoarseness, sore throat. Respiratory:see above  Cardiovascular: see above  Gastrointestinal: see above  Genitourinary: Negative for polyuria, dysuria   Hematologic/Lymphatic: Negative for bleeding tendency, easy bruising  Musculoskeletal: Negative for myalgias and arthralgias  Neurologic: Negative for confusion,dysarthria. Skin: Negative for itching,rash  Psychiatric: Negative for depression,anxiety, agitation. Endocrine: Negative for polydipsia,polyuria,heat /cold intolerance.     Past Medical History:   has a past medical history of Allergic, Anesthesia complication, Anxiety, Arthritis, Arthritis of left hip, Arthritis of right hip, Asthma, At risk for falls, Atrial fibrillation with rapid ventricular response (HCC), Atrial flutter ([address] Chronic maxillary sinusitis, CTEPH (chronic thromboembolic pulmonary hypertension) ([address] Depression, Diabetes mellitus ([address] Disc disease, degenerative, lumbar or lumbosacral, Hepatic steatosis, History of total hip arthroplasty, Hypertension, Leg cramps, Migraines, basilar, Mild persistent asthma without complication, Obesity, Class III, BMI 40-49.9 (morbid obesity) ([address] HUSSAIN (obstructive sleep apnea), Osteoarthritis, hip, bilateral, Panic attacks, Pneumonia, Primary osteoarthritis of both knees, Primary osteoarthritis of left knee, Pulmonary emboli ([address] Pulmonary HTN ([address] Pure hypercholesterolemia, Restless leg syndrome, Rhinitis, chronic, Sleep apnea, Stress incontinence, Tear of left rotator cuff, Thyroid disease, and Wears glasses. Past Surgical History:   has a past surgical history that includes Cataract removal (Bilateral); Finger surgery (1988); eye surgery (Right, 2010); back surgery (2004); Tonsillectomy (1972); Hysterectomy (1993); Colonoscopy; joint replacement (Left, 2/2/16); Hip Arthroplasty (Right, 4-19-16); Atrial ablation surgery; and Total knee arthroplasty (Right, 3/16/2021). Medications:  No current facility-administered medications on file prior to encounter.      Current Outpatient Medications on File Prior to Encounter   Medication Sig Dispense Refill    methocarbamol (ROBAXIN-750) 750 MG tablet Take 1 tablet by mouth nightly as needed (As needed) 90 tablet 0    lisinopril (PRINIVIL;ZESTRIL) 10 MG tablet TAKE ONE TABLET BY MOUTH ONCE NIGHTLY 90 tablet 3    spironolactone (ALDACTONE) 25 MG tablet TAKE ONE TABLET BY MOUTH DAILY 90 tablet 3    potassium chloride (KLOR-CON M) 10 MEQ extended release tablet Take 1 tablet by mouth daily 90 tablet 3 pramipexole (MIRAPEX) 0.5 MG tablet 1.5 tab q 5 PM and 1.5 tab qHS 270 tablet 1    rivaroxaban (XARELTO) 20 MG TABS tablet Take 1 tablet by mouth daily (with breakfast) 42 tablet 0    furosemide (LASIX) 40 MG tablet Take 1 tablet by mouth in the morning. 90 tablet 3    melatonin 10 MG CAPS capsule Take 10 mg by mouth nightly      venlafaxine (EFFEXOR XR) 37.5 MG extended release capsule Take 1 capsule by mouth daily 30 capsule 5    albuterol (PROVENTIL) (2.5 MG/3ML) 0.083% nebulizer solution Take 3 mLs by nebulization every 6 hours as needed for Wheezing 120 each 3    rivaroxaban (XARELTO) 20 MG TABS tablet Take 1 tablet by mouth daily (with breakfast) 35 tablet 0    buPROPion (WELLBUTRIN XL) 300 MG extended release tablet Take 1 tablet by mouth every morning 30 tablet 5    dilTIAZem (CARDIZEM CD) 120 MG extended release capsule Take 1 capsule by mouth daily 90 capsule 3    Semaglutide,0.25 or 0.5MG/DOS, (OZEMPIC, 0.25 OR 0.5 MG/DOSE,) 2 MG/1.5ML SOPN Inject 0.5 mg into the skin once a week 2 pen 0    Ciclesonide (ALVESCO) 160 MCG/ACT AERS Inhale into the lungs      fluticasone (FLONASE) 50 MCG/ACT nasal spray 1 spray by Each Nostril route daily      azelastine (ASTELIN) 0.1 % nasal spray 1 spray by Nasal route 2 times daily 1 Spray in each nostril 1 Bottle 2    Handicap Placard MISC by Does not apply route Exp 5 years 1 each 0    Multiple Vitamins-Minerals (THERAPEUTIC MULTIVITAMIN-MINERALS) tablet Take 1 tablet by mouth daily       CPAP Machine MISC by Does not apply route      Magnesium Citrate 100 MG TABS Take 1 tablet by mouth daily 90 tablet 3    EPIPEN 2-MIR 0.3 MG/0.3ML SOAJ injection          Allergies: Allergies   Allergen Reactions    Atorvastatin Other (See Comments)     Muscle Pain, all statins     Penicillins Anaphylaxis    Simvastatin Other (See Comments)     Muscle Pain    Cephalexin Hives and Itching    Cephalosporins Hives and Itching    Food Diarrhea     Milk , shellfish , gluten. ..may have bouts of diarrhea, constipation or flatulus. (RD spoke to pt regarding \"milk allergy\", pt is not allergic to milk. She does not drink milk, but consumes milk in other products and consumes dairy products. Had one reactions to shellfish years ago and now can tolerate one serving of shellfish once per week. Avoids gluten as much as she can, but does not have celiac disease.)    Other      Environmental -cats,dogs,dust    Shellfish-Derived Products      hives    Sulfa Antibiotics      Can take Celebrex, Pt denies 8/1/18        Social History:  Patient Lives at home   reports that she quit smoking about 9 years ago. Her smoking use included cigarettes. She has a 2.50 pack-year smoking history. She has never used smokeless tobacco. She reports current alcohol use. She reports that she does not use drugs. Family History:  family history includes Alcohol Abuse in her maternal grandfather, paternal grandfather, and sister; Anxiety Disorder in her mother; Arthritis in her brother, mother, and sister; Asthma in her mother; Cancer in her paternal uncle; Cataracts in her mother; Heart Disease in her father, maternal grandfather, maternal uncle, mother, and paternal uncle; Heart Failure in her father; High Cholesterol in her brother; Hypertension in her mother; Stroke in her maternal grandmother; Substance Abuse in her maternal grandfather and paternal grandfather; Thyroid Disease in her father, maternal grandfather, maternal grandmother, and mother. ,     Physical Exam:  BP (!) 140/66   Pulse 85   Temp 98.5 °F (36.9 °C) (Oral)   Resp 16   Ht 5' 4\" (1.626 m)   Wt 241 lb (109.3 kg)   SpO2 96%   BMI 41.37 kg/m²     General appearance:  Appears comfortable.  AAOx3  HEENT: atraumatic, Pupils equal, muscous membranes moist, no masses appreciated  Cardiovascular: Regular rate and rhythm no murmurs appreciated  Respiratory: CTAB no wheezing  Gastrointestinal: Abdomen soft, non-tender, BS+  EXT: no edema  Neurology: no gross focal deficts  Psychiatry: Appropriate affect. Not agitated  Skin: Warm, dry, no rashes appreciated    Labs:  CBC:   Lab Results   Component Value Date/Time    WBC 6.3 10/25/2022 07:04 AM    RBC 4.50 10/25/2022 07:04 AM    HGB 13.9 10/25/2022 07:04 AM    HCT 42.9 10/25/2022 07:04 AM    MCV 95.2 10/25/2022 07:04 AM    MCH 30.8 10/25/2022 07:04 AM    MCHC 32.4 10/25/2022 07:04 AM    RDW 15.0 10/25/2022 07:04 AM     10/25/2022 07:04 AM    MPV 7.6 10/25/2022 07:04 AM     BMP:    Lab Results   Component Value Date/Time     10/25/2022 06:31 AM    K 4.1 10/25/2022 06:31 AM     10/25/2022 06:31 AM    CO2 22 10/25/2022 06:31 AM    BUN 19 10/25/2022 06:31 AM    CREATININE 0.8 10/25/2022 06:31 AM    CALCIUM 9.5 10/25/2022 06:31 AM    GFRAA >60 02/16/2022 12:04 PM    GFRAA >60 04/22/2013 10:45 AM    LABGLOM >60 10/25/2022 06:31 AM    GLUCOSE 120 10/25/2022 06:31 AM    GLUCOSE 100 03/27/2012 04:27 PM     XR CHEST (2 VW)   Final Result   1. No acute abnormality. Recent imaging reviewed    Problem List  Principal Problem:    Chest pain  Resolved Problems:    * No resolved hospital problems. *        Assessment/Plan:   Chest pain: ? Afib event   - will get cards input  - possible stress test in am  - troponin    PAF: home meds xarelto    Htn home meds    DVT prophylaxis xarelto  Code status full code    Please note that some part of this chart was generated using Dragon dictation software. Although every effort was made to ensure the accuracy of this automated transcription, some errors in transcription may have occurred inadvertently. If you may need any clarification, please do not hesitate to contact me through [facility].        Ama Olivas MD    10/25/2022 10:13 AM

## 2022-10-25 NOTE — ED NOTES
Pt denies chest pain radiating anywhere, pt has a cardiac history of Afib and had an ablation procedure,      Dave Brown, RN  10/25/22 [id number]

## 2022-10-25 NOTE — CARE COORDINATION
Patient admitted as Observation/OPIB with an anticipated short hospitalization length of stay. Chart reviewed and it appears that patient has minimal needs for discharge at this time. Discussed with patients nurse and requested that case management be notified if discharge needs are identified. *Case management will continue to follow progress and update discharge plan as needed. Cardiology consult pending.     Bre Bonilla RN, BSN  [phone]

## 2022-10-26 VITALS
OXYGEN SATURATION: 97 % | RESPIRATION RATE: 18 BRPM | HEART RATE: 75 BPM | DIASTOLIC BLOOD PRESSURE: 67 MMHG | HEIGHT: 64 IN | TEMPERATURE: 97.2 F | WEIGHT: 251.5 LBS | BODY MASS INDEX: 42.94 KG/M2 | SYSTOLIC BLOOD PRESSURE: 125 MMHG

## 2022-10-26 LAB
ANION GAP SERPL CALCULATED.3IONS-SCNC: 14 MMOL/L (ref 3–16)
BASOPHILS ABSOLUTE: 0 K/UL (ref 0–0.2)
BASOPHILS RELATIVE PERCENT: 0.7 %
BUN BLDV-MCNC: 21 MG/DL (ref 7–20)
CALCIUM SERPL-MCNC: 9.5 MG/DL (ref 8.3–10.6)
CHLORIDE BLD-SCNC: 98 MMOL/L (ref 99–110)
CO2: 21 MMOL/L (ref 21–32)
CREAT SERPL-MCNC: 0.9 MG/DL (ref 0.6–1.2)
EKG ATRIAL RATE: 94 BPM
EKG DIAGNOSIS: NORMAL
EKG P AXIS: 21 DEGREES
EKG P-R INTERVAL: 150 MS
EKG Q-T INTERVAL: 360 MS
EKG QRS DURATION: 86 MS
EKG QTC CALCULATION (BAZETT): 450 MS
EKG R AXIS: 21 DEGREES
EKG T AXIS: 50 DEGREES
EKG VENTRICULAR RATE: 94 BPM
EOSINOPHILS ABSOLUTE: 0.1 K/UL (ref 0–0.6)
EOSINOPHILS RELATIVE PERCENT: 1.6 %
GFR SERPL CREATININE-BSD FRML MDRD: >60 ML/MIN/{1.73_M2}
GLUCOSE BLD-MCNC: 105 MG/DL (ref 70–99)
HCT VFR BLD CALC: 38.7 % (ref 36–48)
HEMOGLOBIN: 12.7 G/DL (ref 12–16)
LV EF: 74 %
LVEF MODALITY: NORMAL
LYMPHOCYTES ABSOLUTE: 1.4 K/UL (ref 1–5.1)
LYMPHOCYTES RELATIVE PERCENT: 19.3 %
MCH RBC QN AUTO: 30.8 PG (ref 26–34)
MCHC RBC AUTO-ENTMCNC: 32.8 G/DL (ref 31–36)
MCV RBC AUTO: 93.9 FL (ref 80–100)
MONOCYTES ABSOLUTE: 0.7 K/UL (ref 0–1.3)
MONOCYTES RELATIVE PERCENT: 9.7 %
NEUTROPHILS ABSOLUTE: 5 K/UL (ref 1.7–7.7)
NEUTROPHILS RELATIVE PERCENT: 68.7 %
PDW BLD-RTO: 14.6 % (ref 12.4–15.4)
PLATELET # BLD: 279 K/UL (ref 135–450)
PMV BLD AUTO: 7.8 FL (ref 5–10.5)
POTASSIUM REFLEX MAGNESIUM: 4 MMOL/L (ref 3.5–5.1)
RBC # BLD: 4.12 M/UL (ref 4–5.2)
SODIUM BLD-SCNC: 133 MMOL/L (ref 136–145)
TSH REFLEX: 1.43 UIU/ML (ref 0.27–4.2)
WBC # BLD: 7.2 K/UL (ref 4–11)

## 2022-10-26 PROCEDURE — G0378 HOSPITAL OBSERVATION PER HR: HCPCS

## 2022-10-26 PROCEDURE — 84443 ASSAY THYROID STIM HORMONE: CPT

## 2022-10-26 PROCEDURE — 36415 COLL VENOUS BLD VENIPUNCTURE: CPT

## 2022-10-26 PROCEDURE — 93010 ELECTROCARDIOGRAM REPORT: CPT | Performed by: INTERNAL MEDICINE

## 2022-10-26 PROCEDURE — 99213 OFFICE O/P EST LOW 20 MIN: CPT | Performed by: INTERNAL MEDICINE

## 2022-10-26 PROCEDURE — 85025 COMPLETE CBC W/AUTO DIFF WBC: CPT

## 2022-10-26 PROCEDURE — A9502 TC99M TETROFOSMIN: HCPCS | Performed by: INTERNAL MEDICINE

## 2022-10-26 PROCEDURE — 78452 HT MUSCLE IMAGE SPECT MULT: CPT

## 2022-10-26 PROCEDURE — 94640 AIRWAY INHALATION TREATMENT: CPT

## 2022-10-26 PROCEDURE — 99215 OFFICE O/P EST HI 40 MIN: CPT | Performed by: INTERNAL MEDICINE

## 2022-10-26 PROCEDURE — 80048 BASIC METABOLIC PNL TOTAL CA: CPT

## 2022-10-26 PROCEDURE — 2500000003 HC RX 250 WO HCPCS: Performed by: NURSE PRACTITIONER

## 2022-10-26 PROCEDURE — 2580000003 HC RX 258: Performed by: INTERNAL MEDICINE

## 2022-10-26 PROCEDURE — 6370000000 HC RX 637 (ALT 250 FOR IP): Performed by: INTERNAL MEDICINE

## 2022-10-26 PROCEDURE — 93017 CV STRESS TEST TRACING ONLY: CPT | Performed by: INTERNAL MEDICINE

## 2022-10-26 PROCEDURE — 96374 THER/PROPH/DIAG INJ IV PUSH: CPT

## 2022-10-26 PROCEDURE — 3430000000 HC RX DIAGNOSTIC RADIOPHARMACEUTICAL: Performed by: INTERNAL MEDICINE

## 2022-10-26 PROCEDURE — 6360000002 HC RX W HCPCS: Performed by: INTERNAL MEDICINE

## 2022-10-26 RX ORDER — DILTIAZEM HYDROCHLORIDE 5 MG/ML
10 INJECTION INTRAVENOUS ONCE
Status: COMPLETED | OUTPATIENT
Start: 2022-10-26 | End: 2022-10-26

## 2022-10-26 RX ORDER — LEVALBUTEROL TARTRATE 45 UG/1
1 AEROSOL, METERED ORAL EVERY 6 HOURS PRN
Qty: 1 EACH | Refills: 3 | Status: SHIPPED | OUTPATIENT
Start: 2022-10-26

## 2022-10-26 RX ADMIN — SPIRONOLACTONE 25 MG: 25 TABLET ORAL at 12:19

## 2022-10-26 RX ADMIN — DILTIAZEM HYDROCHLORIDE 10 MG: 5 INJECTION, SOLUTION INTRAVENOUS at 00:55

## 2022-10-26 RX ADMIN — FUROSEMIDE 40 MG: 40 TABLET ORAL at 12:19

## 2022-10-26 RX ADMIN — DILTIAZEM HYDROCHLORIDE 120 MG: 120 CAPSULE, COATED, EXTENDED RELEASE ORAL at 15:08

## 2022-10-26 RX ADMIN — TIOTROPIUM BROMIDE INHALATION SPRAY 2 PUFF: 3.12 SPRAY, METERED RESPIRATORY (INHALATION) at 12:20

## 2022-10-26 RX ADMIN — TETROFOSMIN 30 MILLICURIE: 1.38 INJECTION, POWDER, LYOPHILIZED, FOR SOLUTION INTRAVENOUS at 11:24

## 2022-10-26 RX ADMIN — ACETAMINOPHEN 650 MG: 325 TABLET ORAL at 01:13

## 2022-10-26 RX ADMIN — REGADENOSON 0.4 MG: 0.08 INJECTION, SOLUTION INTRAVENOUS at 11:10

## 2022-10-26 RX ADMIN — LISINOPRIL 10 MG: 10 TABLET ORAL at 12:18

## 2022-10-26 RX ADMIN — Medication 1 PUFF: at 12:20

## 2022-10-26 RX ADMIN — Medication 10 ML: at 12:20

## 2022-10-26 RX ADMIN — TETROFOSMIN 10 MILLICURIE: 1.38 INJECTION, POWDER, LYOPHILIZED, FOR SOLUTION INTRAVENOUS at 10:00

## 2022-10-26 RX ADMIN — SODIUM CHLORIDE, PRESERVATIVE FREE 10 ML: 5 INJECTION INTRAVENOUS at 00:55

## 2022-10-26 ASSESSMENT — PAIN SCALES - GENERAL
PAINLEVEL_OUTOF10: 0
PAINLEVEL_OUTOF10: 0
PAINLEVEL_OUTOF10: 2

## 2022-10-26 ASSESSMENT — PAIN DESCRIPTION - DESCRIPTORS: DESCRIPTORS: DISCOMFORT

## 2022-10-26 ASSESSMENT — PAIN DESCRIPTION - ORIENTATION: ORIENTATION: RIGHT

## 2022-10-26 ASSESSMENT — PAIN DESCRIPTION - LOCATION: LOCATION: SHOULDER

## 2022-10-26 NOTE — PROGRESS NOTES
Instructed on Lexiscan Stress Test Procedure including possible side effects/ adverse reactions. Patient verbalizes  understanding and denies having any questions. See [address] [place] [address] Rd Cardiology.   Rome Arroyo RN

## 2022-10-26 NOTE — PROGRESS NOTES
Spoke with [facility] SYS new orders see mar,   For the last 10 minutes patient has been in sr rate 70/80's and reports that she is felling better.   ELMIRA

## 2022-10-26 NOTE — PROGRESS NOTES
[facility]   Electrophysiology progress note  Date: 10/26/2022  Reason for Consultation: chest pain, palpitations   Consult Requesting Physician: Khurram Ramirez MD     Chief Complaint   Patient presents with    Chest Pain     Patient brought in by [address] [facility] for chest pain that woke her up out of her sleep, describes as crushing pain and a squeezing pain, 5 (0-10), pt was also experiencing nausea and shortness of breath, as well as a generalized feeling shakiness/tremors and uneasy, also felt off balance     HPI: Timothy Warren is a 81 y.o. female with a history of paroxsymal atrial fib, PE and is on xarelto, dCHF and HUSSAIN, who woke up this morning at 4 AM with midsternal chest pressure that was 5 out of 10 associate with shortness of breath and nausea and some sweats. Patient heart rate was jumping up to the 160s and stayed there for a couple of minutes and then come back down patient states when her heart rate got up her pain got worse. She had Atrial fibrillation ablation on 3/27/20 and has been symptom free since then. Her asthma has been worse and she started using albuterol nebulizer daily since last Friday for the first time. Device interrogation showed no Atrial fibrillation. There are several SVT episodes of a few seconds to minutes    HPI and Interval History:   Patient seen and examined. Clinical notes reviewed. Telemetry reviewed. No new complaint today. No major events overnight. Denies having chest pain, shortness of breath, dyspnea on exertion, Orthopnea, PND at the time of this visit.   Some atrial runs yesterday    Past Medical History:   Diagnosis Date    Allergic     Anesthesia complication     family hx-father-at at age 80 having hip replacement revision surgery-had tia's x2 while under anes-but also had hx chf-had dificuly with speech when coming out from anes    Anxiety     Arthritis     left ankle, bilateral hands    Arthritis of left hip 2/2/2016    Arthritis of right hip 4/19/2016    Asthma     At risk for falls     uses a cane    Atrial fibrillation with rapid ventricular response (HCC)     Atrial flutter ([address] 4/25/2018    Chronic maxillary sinusitis 5/24/2017    CTEPH (chronic thromboembolic pulmonary hypertension) ([address] 8/26/2016    Depression     pt stated r/t bad marriage/alcoholic spouse    Diabetes mellitus ([address]     borderline    Disc disease, degenerative, lumbar or lumbosacral 2/20/2017    Hepatic steatosis 4/26/2019    History of total hip arthroplasty 2/28/2017    Hypertension     Leg cramps     patient states very severe, requires immediate potassium administration    Migraines, basilar     last migraine 10 years ago    Mild persistent asthma without complication 1/01/3621    Obesity, Class III, BMI 40-49.9 (morbid obesity) ([address] 4/19/2016    HUSSAIN (obstructive sleep apnea) 10/14/2013    Osteoarthritis, hip, bilateral 2016    Bilateral hip arthroplasty    Panic attacks     Pneumonia 2015    Primary osteoarthritis of both knees 9/19/2017    Primary osteoarthritis of left knee 12/15/2016    Pulmonary emboli ([address] 8/4/2016    Pulmonary HTN ([address] 7/21/2016    Pure hypercholesterolemia 2/13/2019    Restless leg syndrome     Rhinitis, chronic 3/26/2014    Sleep apnea     wears CPAP @11    Stress incontinence     Tear of left rotator cuff 1/23/2018    Thyroid disease     hypothyroid    Wears glasses         Past Surgical History:   Procedure Laterality Date    ATRIAL ABLATION SURGERY      BACK SURGERY  2004    LUMBAR FUSION    CATARACT REMOVAL Bilateral     COLONOSCOPY      EYE SURGERY Right 2010    retinal tear repaired    2018 [facility]    right ring finger severe laceration, fracture    HIP ARTHROPLASTY Right 4-19-16    HYSTERECTOMY (CERVIX STATUS UNKNOWN)  1993    JOINT REPLACEMENT Left 2/2/16    left hip    TONSILLECTOMY  1972    TOTAL KNEE ARTHROPLASTY Right 3/16/2021    RIGHT ROBOTIC ASSISTED TOTAL KNEE ARTHROPLASTY ([id number], [id number]) [facility] performed by Miguelina Crespo MD at [address]   Allergen Reactions    Atorvastatin Other (See Comments)     Muscle Pain, all statins     Penicillins Anaphylaxis    Simvastatin Other (See Comments)     Muscle Pain    Cephalexin Hives and Itching    Cephalosporins Hives and Itching    Food Diarrhea     Milk , shellfish , gluten. ..may have bouts of diarrhea, constipation or flatulus. (RD spoke to pt regarding \"milk allergy\", pt is not allergic to milk. She does not drink milk, but consumes milk in other products and consumes dairy products. Had one reactions to shellfish years ago and now can tolerate one serving of shellfish once per week. Avoids gluten as much as she can, but does not have celiac disease.)    Other      Environmental -cats,dogs,dust    Shellfish-Derived Products      hives    Sulfa Antibiotics      Can take Celebrex, Pt denies 8/1/18       Social History:  Reviewed. reports that she quit smoking about 9 years ago. Her smoking use included cigarettes. She has a 2.50 pack-year smoking history. She has never used smokeless tobacco. She reports current alcohol use. She reports that she does not use drugs. Family History:  Reviewed. family history includes Alcohol Abuse in her maternal grandfather, paternal grandfather, and sister; Anxiety Disorder in her mother; Arthritis in her brother, mother, and sister; Asthma in her mother; Cancer in her paternal uncle; Cataracts in her mother; Heart Disease in her father, maternal grandfather, maternal uncle, mother, and paternal uncle; Heart Failure in her father; High Cholesterol in her brother; Hypertension in her mother; Stroke in her maternal grandmother; Substance Abuse in her maternal grandfather and paternal grandfather; Thyroid Disease in her father, maternal grandfather, maternal grandmother, and mother. Review of System:  All other systems reviewed and are negative except for that noted above.  Pertinent negatives are:     General: negative for fever, chills   Ophthalmic ROS: negative for - eye pain or loss of vision  ENT ROS: negative for - headaches, sore throat   Respiratory: negative for - cough, sputum  Cardiovascular: Reviewed in HPI  Gastrointestinal: negative for - abdominal pain, diarrhea, N/V  Hematology: negative for - bleeding, blood clots, bruising or jaundice  Genito-Urinary:  negative for - Dysuria or incontinence  Musculoskeletal: negative for - Joint swelling, muscle pain  Neurological: negative for - confusion, dizziness, headaches   Psychiatric: No anxiety, no depression. Dermatological: negative for - rash    Physical Examination:  Vitals:    10/26/22 0820   BP: 127/79   Pulse: 67   Resp: 16   Temp: 97 °F (36.1 °C)   SpO2: 97%      In: 760 [P.O.:760]  Out: 3900    Wt Readings from Last 3 Encounters:   10/26/22 251 lb 8 oz (114.1 kg)   10/21/22 251 lb (113.9 kg)   22 243 lb 4.8 oz (110.4 kg)     Temp  Av °F (36.7 °C)  Min: 97 °F (36.1 °C)  Max: 99 °F (37.2 °C)  Pulse  Av.2  Min: 67  Max: 90  BP  Min: 100/66  Max: 151/80  SpO2  Av.4 %  Min: 94 %  Max: 99 %  FiO2   Av %  Min: 21 %  Max: 21 %    Intake/Output Summary (Last 24 hours) at 10/26/2022 1009  Last data filed at 10/26/2022 0820  Gross per 24 hour   Intake 760 ml   Output 3900 ml   Net -3140 ml       Telemetry: Sinus rhythm , atrial runs  Constitutional: Oriented. No distress. Head: Normocephalic and atraumatic. Mouth/Throat: Oropharynx is clear and moist.   Eyes: Conjunctivae normal. EOM are normal.   Neck: Neck supple. No rigidity. No JVD present. Cardiovascular: Normal rate, regular rhythm, S1&S2. Pulmonary/Chest: Bilateral respiratory sounds. No wheezes, No rhonchi. Abdominal: Soft. Bowel sounds present. No distension, No tenderness. Musculoskeletal: No tenderness. No edema    Lymphadenopathy: Has no cervical adenopathy. Neurological: Alert and oriented.  Cranial nerve appears intact, No Gross deficit Skin: Skin is warm and dry. No rash noted. Psychiatric: Has a normal behavior     Labs, diagnostic and imaging results reviewed. Reviewed. Recent Labs     10/25/22  0631 10/26/22  0330    133*   K 4.1 4.0    98*   CO2 22 21   BUN 19 21*   CREATININE 0.8 0.9     Recent Labs     10/25/22  0704 10/26/22  0330   WBC 6.3 7.2   HGB 13.9 12.7   HCT 42.9 38.7   MCV 95.2 93.9    279     Lab Results   Component Value Date/Time    CKTOTAL 145 09/23/2013 10:22 AM    TROPONINI <0.01 10/25/2022 12:43 PM     No results found for: BNP  Lab Results   Component Value Date/Time    PROTIME 17.8 02/09/2022 09:50 PM    PROTIME 13.9 03/28/2021 12:27 PM    PROTIME 11.4 03/16/2021 11:25 AM    INR 1.55 02/09/2022 09:50 PM    INR 1.20 03/28/2021 12:27 PM    INR 0.98 03/16/2021 11:25 AM     Lab Results   Component Value Date/Time    CHOL 208 02/16/2022 12:04 PM    HDL 60 02/16/2022 12:04 PM    TRIG 139 02/16/2022 12:04 PM       ECG: Sinus rhythm with Premature atrial complexe  Echo: 07/2022Conclusions      Summary   Technically difficult study due to body habitus. Definity was administered. Normal left ventricle size, wall thickness and systolic function with an   estimated ejection fraction of 60-65%. Mild concentric left ventricular hypertrophy. No regional wall motion abnormalities are seen. Normal diastolic filling pattern for age. The aortic valve appears sclerotic but opens well. Mild aortic   regurgitation. Mild tricuspid regurgitation. PASP estimated at 30 mmHg. Cath:   GXT: 1/20/15  Summary    Normal myocardial perfusion study. Normal LV function.      Scheduled Meds:   dilTIAZem  120 mg Oral Daily    furosemide  40 mg Oral Daily    lisinopril  10 mg Oral Daily    spironolactone  25 mg Oral Daily    sodium chloride flush  5-40 mL IntraVENous 2 times per day    pramipexole  0.75 mg Oral BID    fluticasone  1 puff Inhalation BID    tiotropium  2 puff Inhalation Daily    rivaroxaban  20 mg Oral Daily     Continuous Infusions:   sodium chloride       PRN Meds:.technetium tetrofosmin, technetium tetrofosmin, regadenoson, sodium chloride flush, sodium chloride, ondansetron **OR** ondansetron, polyethylene glycol, acetaminophen **OR** acetaminophen, levalbuterol, melatonin     Patient Active Problem List    Diagnosis Date Noted    Chest pain 10/25/2022    Palpitation 10/25/2022    Moderate asthma with acute exacerbation 10/25/2022    Chest tightness 10/25/2022    Moderate persistent asthma without complication 72/86/4175    Type 2 diabetes mellitus 04/29/2022    Anxiety and depression 04/29/2022    Acute pain of left knee 09/16/2021    Primary osteoarthritis of right knee 03/16/2021    Mild persistent asthma without complication 58/81/5029    Encounter for electronic analysis of reveal event recorder 08/20/2020    Symptomatic bradycardia 03/28/2020    PAF (paroxysmal atrial fibrillation) ([address] 03/27/2020    Obesity (BMI 30-39. 9)     Pulmonary emboli ([address] 08/22/2019    Hx of pulmonary embolus 05/14/2019    Chronic diastolic heart failure ([address] 05/14/2019    Hepatic steatosis 04/26/2019    Pure hypercholesterolemia 02/13/2019    Paroxysmal atrial fibrillation ([address] 11/09/2018    Arthritis of knee, left 01/23/2018    Primary osteoarthritis of both knees 09/19/2017    Obesity, Class III, BMI 40-49.9 (morbid obesity) ([facility] 02/02/2016    Benign essential HTN 10/14/2013    HUSSAIN (obstructive sleep apnea) 10/14/2013    Restless leg syndrome 07/11/2011    Acquired hypothyroidism 07/11/2011      Active Hospital Problems    Diagnosis Date Noted    Chest pain [R07.9] 10/25/2022     Priority: Medium    Palpitation [R00.2] 10/25/2022     Priority: Medium    Moderate asthma with acute exacerbation [J45.901] 10/25/2022     Priority: Medium    Chest tightness [R07.89] 10/25/2022     Priority: Medium    Moderate persistent asthma without complication [S56.86] 97/83/9806     Priority: Medium    PAF (paroxysmal atrial fibrillation) ([facility] [I48.0] 03/27/2020       Assessment:       Plan:    - chest pain and palpitations / Atrial runs  She had some atrial runs yesterday. ECG here and in EMS did not show any Atrial fibrillation. The Implantable Loop recorder shows some short atrial tachycardia episodes  It is likely that her symptoms were triggered by new use of albuterol daily which she has never used before. I asked her also to take the diltiazem a little bit later during the day to have a peak effect in the afternoon when she is more symptomatic. Will do a stress test.  Meanwhile, alternative treatment of her asthma is recommended. - Asthma    Needs other treatments for her exacerbation than beta agonist to reduce side effects. Paroxysmal atrial fibrillation      S/p Loop recorder implant 08/17/2020. Today shows sinus rhythm 0% AF burden  S/p ablation of Atrial fibrillation and atrial flutter 03/27/2020     Xarelto 20 mg BID  Cardizem 30mg. Taking one a day. HUSSAIN (obstructive sleep apnea)  Compliant with CPAP        Hx of pulmonary embolus  xarelto 20mg daily     HTN  BP is well controlled. Continue current meds. Implantable Loop recorder   The CIED was interrogated and programmed and I supervised and reviewed all the data. All findings and changes are in device interrogation sheet and reflect my personal interpretation and changes and is scanned to Epic. Implantable loop recorder showed episodes of atrial tachycardia. We will continue to monitor. Thank you for allowing me to participate in the care of Emani Shay     NOTE: This report was transcribed using voice recognition software. Every effort was made to ensure accuracy, however, inadvertent computerized transcription errors may be present.

## 2022-10-26 NOTE — PROGRESS NOTES
Remains in afib rate currently 77, bp 117/66=83, pt very emotional, placed on 2l nc for comfort, o2 sat = 100, will repage [facility] if no response by 0030.   WCM

## 2022-10-26 NOTE — PROGRESS NOTES
Data- discharge order received, pt verbalized agreement to discharge, disposition to previous residence, no needs for HHC/DME. Action- discharge instructions prepared and given to patient, pt verbalized understanding. Medication information packet given r/t NEW and/or CHANGED prescriptions emphasizing name/purpose/side effects, pt verbalized understanding. Discharge instruction summary: Diet- cardiac, Activity- up as tolerated, Primary Care Physician as follows: Vincent Winston [phone] f/u appointment to be set up by pt, immunizations reviewed, prescription medications filled at patient preferred pharmacy. 1. WEIGHT: Admit Weight: 241 lb (109.3 kg) (10/25/22 2146)        Today  Weight: 251 lb 8 oz (114.1 kg) (10/26/22 0324)       2. O2 SAT.: SpO2: 97 % (10/26/22 1219)    Response- Pt belongings gathered, IV removed. Disposition is home (no HHC/DME needs), transported with family, taken to lobby via w/c w/ belongings, no complications.

## 2022-10-26 NOTE — DISCHARGE SUMMARY
Hospital Medicine Discharge Summary    Patient: Garcia Carl     Gender: female  : 1950   Age: 81 y.o.3  MRN: [id number]    Admitting Physician: Ama Olivas MD  Discharge Physician: Ama Olivas MD     Code Status: Full Code     Admit Date: 10/25/2022   Discharge Date:   10/26/2022    Disposition:  Home  Discharge Diagnoses: Active Hospital Problems    Diagnosis Date Noted    Chest pain [R07.9] 10/25/2022     Priority: Medium    Palpitation [R00.2] 10/25/2022     Priority: Medium    Moderate asthma with acute exacerbation [J45.901] 10/25/2022     Priority: Medium    Chest tightness [R07.89] 10/25/2022     Priority: Medium    Moderate persistent asthma without complication [C98.08]      Priority: Medium    PAF ([facility]oxysmal [facility] fibrillation) ([facility] [I48.0] 2020           Condition at Discharge:  Stable    Hospital Course: To hospital with chest pain troponins were negative under stress test which was negative. Patient have episode of atrial tachycardia no active A. fib per EP. Patient albuterol was changed to Xopenex by pulmonology patient was cleared for discharge by EP will follow with EP as outpatient    Discharge Exam:    /79   Pulse 67   Temp 97 °F (36.1 °C) (Temporal)   Resp 16   Ht 5' 4\" (1.626 m)   Wt 251 lb 8 oz (114.1 kg)   SpO2 97%   BMI 43.17 kg/m²   General appearance:  Appears comfortable. AAOx3  HEENT: atraumatic, Pupils equal, muscous membranes moist, no masses appreciated  Cardiovascular: Regular rate and rhythm no murmurs appreciated  Respiratory: CTAB no wheezing  Gastrointestinal: Abdomen soft, non-tender, BS+  EXT: no edema  Neurology: no gross focal deficts  Psychiatry: Appropriate affect.  Not agitated  Skin: Warm, dry, no rashes appreciated    Discharge Medications:   Current Discharge Medication List        START taking these medications    Details   levalbuterol (XOPENEX HFA) 45 MCG/ACT inhaler Inhale 1 puff into the lungs every 6 hours as needed for Wheezing  Qty: 1 each, Refills: 3           Current Discharge Medication List        Current Discharge Medication List        CONTINUE these medications which have NOT CHANGED    Details   tiotropium (SPIRIVA RESPIMAT) 1.25 MCG/ACT AERS inhaler Inhale 2 puffs into the lungs daily      methocarbamol (ROBAXIN-750) 750 MG tablet Take 1 tablet by mouth nightly as needed (As needed)  Qty: 90 tablet, Refills: 0    Associated Diagnoses: Left knee pain, unspecified chronicity      lisinopril (PRINIVIL;ZESTRIL) 10 MG tablet TAKE ONE TABLET BY MOUTH ONCE NIGHTLY  Qty: 90 tablet, Refills: 3      spironolactone (ALDACTONE) 25 MG tablet TAKE ONE TABLET BY MOUTH DAILY  Qty: 90 tablet, Refills: 3      potassium chloride (KLOR-CON M) 10 MEQ extended release tablet Take 1 tablet by mouth daily  Qty: 90 tablet, Refills: 3      pramipexole (MIRAPEX) 0.5 MG tablet 1.5 tab q 5 PM and 1.5 tab qHS  Qty: 270 tablet, Refills: 1    Associated Diagnoses: Restless leg syndrome      rivaroxaban (XARELTO) 20 MG TABS tablet Take 1 tablet by mouth daily (with breakfast)  Qty: 42 tablet, Refills: 0      furosemide (LASIX) 40 MG tablet Take 1 tablet by mouth in the morning. Qty: 90 tablet, Refills: 3    Associated Diagnoses: Benign essential HTN      melatonin 10 MG CAPS capsule Take 10 mg by mouth nightly    Associated Diagnoses: Primary insomnia      venlafaxine (EFFEXOR XR) 37.5 MG extended release capsule Take 1 capsule by mouth daily  Qty: 30 capsule, Refills: 5    Associated Diagnoses: Moderate episode of recurrent major depressive disorder (HCC)      buPROPion (WELLBUTRIN XL) 300 MG extended release tablet Take 1 tablet by mouth every morning  Qty: 30 tablet, Refills: 5    Associated Diagnoses: Current moderate episode of major depressive disorder without prior episode ([address]  Positive depression screening      dilTIAZem (CARDIZEM CD) 120 MG extended release capsule Take 1 capsule by mouth daily  Qty: 90 capsule, Refills: 3      Semaglutide,0.25 or 0.5MG/DOS, (OZEMPIC, 0.25 OR 0.5 MG/DOSE,) 2 MG/1.5ML SOPN Inject 0.5 mg into the skin once a week  Qty: 2 pen, Refills: 0      Ciclesonide (ALVESCO) 160 MCG/ACT AERS Inhale into the lungs      fluticasone (FLONASE) 50 MCG/ACT nasal spray 1 spray by Each Nostril route daily      azelastine (ASTELIN) 0.1 % nasal spray 1 spray by Nasal route 2 times daily 1 Spray in each nostril  Qty: 1 Bottle, Refills: 2    Associated Diagnoses: Seasonal allergic rhinitis due to pollen      Handicap Placard MISC by Does not apply route Exp 5 years  Qty: 1 each, Refills: 0      Multiple Vitamins-Minerals (THERAPEUTIC MULTIVITAMIN-MINERALS) tablet Take 1 tablet by mouth daily       CPAP Machine MISC by Does not apply route      Magnesium Citrate 100 MG TABS Take 1 tablet by mouth daily  Qty: 90 tablet, Refills: 3    Associated Diagnoses: Atrial fibrillation with RVR ([facility] Chest pain, unspecified type; Benign essential HTN; Non morbid obesity, unspecified obesity type; Other pulmonary embolism without acute cor pulmonale, unspecified chronicity ([facility]      EPIPEN 2-MIR 0.3 MG/0.3ML SOAJ injection            Current Discharge Medication List        STOP taking these medications       potassium chloride 10 MEQ/100ML Comments:   Reason for Stopping:         albuterol (PROVENTIL) (2.5 MG/3ML) 0.083% nebulizer solution Comments:   Reason for Stopping:               Labs:  For convenience and continuity at follow-up the following most recent labs are provided:    Lab Results   Component Value Date/Time    WBC 7.2 10/26/2022 03:30 AM    HGB 12.7 10/26/2022 03:30 AM    HCT 38.7 10/26/2022 03:30 AM    MCV 93.9 10/26/2022 03:30 AM     10/26/2022 03:30 AM     10/26/2022 03:30 AM    K 4.0 10/26/2022 03:30 AM    CL 98 10/26/2022 03:30 AM    CO2 21 10/26/2022 03:30 AM    BUN 21 10/26/2022 03:30 AM    CREATININE 0.9 10/26/2022 03:30 AM    CALCIUM 9.5 10/26/2022 03:30 AM    PHOS 2.7 04/14/2017 12:21 PM ALKPHOS 90 10/25/2022 06:31 AM    ALT 21 10/25/2022 06:31 AM    AST 21 10/25/2022 06:31 AM    BILITOT <0.2 10/25/2022 06:31 AM    BILIDIR <0.2 02/09/2015 01:54 PM    LABALBU 4.3 10/25/2022 06:31 AM    LDLCALC 120 02/16/2022 12:04 PM    TRIG 139 02/16/2022 12:04 PM     Lab Results   Component Value Date    INR 1.55 (H) 02/09/2022    INR 1.20 (H) 03/28/2021    INR 0.98 03/16/2021       Radiology:  XR CHEST (2 VW)    Result Date: 10/25/2022  EXAMINATION: TWO XRAY VIEWS OF THE CHEST 10/25/2022 6:24 am COMPARISON: 02/09/2022 HISTORY: ORDERING SYSTEM PROVIDED HISTORY: chest tightness no fever or cough TECHNOLOGIST PROVIDED HISTORY: Reason for exam:->chest tightness no fever or cough Reason for Exam: chest tightness no fever or cough Relevant Medical/Surgical History: previous pneumonia,htn,CTEPH,A-FIB,A-Flutter,asthma and P.E, FINDINGS: The lungs are clear. A cardiac digital recorder device is insitu. The cardiac silhouette is within normal limits. There is no pneumothorax or pleural effusion. Status post posterior decompression and stabilization of the lumbar spine. 1.  No acute abnormality. XR KNEE LEFT (MIN 4 VIEWS)    Result Date: 10/21/2022  Radiology exam is complete. No Radiologist dictation. Please follow up with ordering provider.      NM Cardiac Stress Test Nuclear Imaging    Result Date: 10/26/2022  Cardiac Perfusion Imaging  Demographics   Patient Name       Lindy Singh   Date of Study      10/26/2022         Gender              Female   Patient Number     [id number]         Date of Birth       1950   Visit Number       [id number]          Age                 81 year(s)   Accession Number   [id number]         Room Number         6983   Corporate ID       [id number]           NM Technician       Mayank Schultz, RT   Nurse              Danielle Daly, RN   Interpreting        Glen Mc MD,                                        Physician           [facility], Sav Wellington MD   The procedure was explained in detail to the patient. Risks,  complications and alternative treatments were reviewed. Written consent  was obtained. Procedure Procedure Type:   Nuclear Stress Test:Pharmacological, NM MYOCARDIAL SPECT REST EXERCISE OR  RX   Study location: [facility] - Nuclear Medicine   Indications: Dyspnea upon exertion,  Atrial Fibrillation, Chest pain and  Lightheadedness. Hospital Status: Outpatient. Height: 64 inches Weight: 241 pounds  Risk Factors   The patient risk factors include:peripheral arterial disease, obesity,  physical activity, former tobacco use, treated and controlled  hypercholesterolemia, treated and controlled hypertension, family history of  premature CAD, untreated diabetes mellitus, chronic lung disease,  dyslipidemia, prior heart failure and ( years not smokin). Conclusions   Summary  There is normal isotope uptake at stress and rest. There is no evidence of  myocardial ischemia or scar. Normal myocardial perfusion study. Normal LV size and systolic function. Stress Protocols   Resting ECG  Normal sinus rhythm. Resting HR:61 bpm     Resting BP:107/64 mmHg   Pre-stress physical exam: Lungs sound clear. Stress Protocol:Pharmacologic - Lexiscan's  Peak HR:75 bpm                               HR/BP product:9225  Peak BP:123/62 mmHg  Predicted HR: 150 bpm  % of predicted HR: 50  Test duration: 4 min  Reason for termination:Completed   ECG Findings  Normal response to lexiscan . Arrhythmias  No significant rhythm abnormality. Symptoms  Developed lightheadedness, dizziness, nausea symptoms likely related to  [address] [place] [address]. Patient denies any chest pain and/or discomfort. Complications  Procedure complication was none. Stress Interpretation  Appropriate hemodynamic response to lexiscan with no significant ST  changes.   Procedure Medications   - Lexiscan I.V. 0.4 mg.10 sec  Imaging Protocols   - One Day Rest                          Stress   Isotope:Myoview/Tetrofosmin   Isotope: Myoview/Tetrofosmin  Isotope dose:10.38 mCi        Isotope dose:31.6 mCi  Administration Route:I.V. Administration Route:I.V.  Date:10/26/2022 09:38         Date:10/26/2022 11:15                                 Technique:      Gated  Imaging Results    Stress ejection    Ejection fraction:74 %    EDV :86 ml    ESV :22 ml    Stroke volume :64 ml    LV mass :126 gr  Medical History   Additional Medical History   Outpatient Medications  tiotropium (SPIRIVA RESPIMAT) 1.25 MCG/ACT AERS inhaler Inhale 2  puffs into the lungs daily  potassium chloride 10 MEQ/100ML Infuse 10 mEq intravenously once  methocarbamol (ROBAXIN-750) 750 MG tablet Take 1 tablet by mouth  nightly as needed (As needed)  lisinopril (PRINIVIL;ZESTRIL) 10 MG tablet TAKE ONE TABLET BY  MOUTH ONCE NIGHTLY  spironolactone (ALDACTONE) 25 MG tablet TAKE ONE TABLET BY MOUTH  DAILY  potassium chloride (KLOR-CON M) 10 MEQ extended release tablet  Take 1 tablet by mouth daily  pramipexole (MIRAPEX) 0.5 MG tablet 1.5 tab q 5 PM and 1.5 tab  qHS  rivaroxaban (XARELTO) 20 MG TABS tablet Take 1 tablet by mouth  daily (with breakfast)  furosemide (LASIX) 40 MG tablet Take 1 tablet by mouth in the  morning.   melatonin 10 MG CAPS capsule Take 10 mg by mouth nightly  venlafaxine (EFFEXOR XR) 37.5 MG extended release capsule Take 1  capsule by mouth daily  albuterol (PROVENTIL) (2.5 MG/3ML) 0.083% nebulizer solution  Take 3 mLs by nebulization every 6 hours as needed for Wheezing  buPROPion (WELLBUTRIN XL) 300 MG extended release tablet  () Take 1 tablet by mouth every morning  dilTIAZem (CARDIZEM CD) 120 MG extended release capsule Take 1  capsule by mouth daily  Semaglutide,0.25 or 0.5MG/DOS, (OZEMPIC, 0.25 OR 0.5 MG/DOSE,) 2  MG/1.5ML SOPN Inject 0.5 mg into the skin once a week  Ciclesonide (ALVESCO) 160 MCG/ACT AERS Inhale into the lungs  fluticasone (FLONASE) 50 MCG/ACT nasal spray 1 spray by Each  Nostril route daily  azelastine (ASTELIN) 0.1 % nasal spray 1 spray by Nasal route 2  times daily 1 Spray in each nostril  Handicap Placard MISC by Does not apply route Exp 5   Past medical history:  - Allergic  - Anesthesia complication  family hx-father-at at age 80 having hip replacement revision  surgery-had tia's x2 while under anes-but also had hx chf-had  dificuly with speech when coming out from anes  - Anxiety  - Arthritis  left ankle, bilateral hands  - Arthritis of left hip 2/2/2016  - Arthritis of right hip 4/19/2016  - Asthma  - At risk for falls uses a cane  - Atrial fibrillation with rapid ventricular response  - Atrial flutter 4/25/2018  - Chronic maxillary sinusitis 5/24/2017  - CTEPH (chronic thromboembolic pulmonary hypertension)  8/26/2016  - Depression pt stated r/t bad marriage/alcoholic spouse  - Diabetes mellitus borderline  - Disc disease, degenerative, lumbar or lumbosacral 2/20/2017  - Hepatic steatosis 4/26/2019  - History of total hip arthroplasty 2/28/2017  - Hypertension  - Leg cramps patient states very severe, requires immediate  potassium administration  - Migraines, basilar last migraine 10 years ago  - Mild persistent asthma without complication 4/96/2750  - Obesity, Class III, BMI 40-49.9 (morbid obesity) 4/19/2016  - HUSSAIN (obstructive sleep apnea) 10/14/2013  - Osteoarthritis, hip, bilateral 2016  Bilateral hip arthroplasty  - Panic attacks  - Pneumonia 2015  - Primary osteoarthritis of both knees 9/19/2017  - Primary osteoarthritis of left knee 12/15/2016  - Pulmonary emboli 8/4/2016  - Pulmonary HTN 7/21/2016  - Pure hypercholesterolemia 2/13/2019  - Restless leg syndrome  - Rhinitis, chronic 3/26/2014  - Sleep apnea wears CPAP @11  - Stress incontinence  - Tear of left rotator cuff 1/23/2018  - Thyroid disease hypothyroid  - Wears glasses  Past surgical history:  - ATRIAL ABLATION SURGERY  - BACK SURGERY 2004  LUMBAR FUSION  - CATARACT REMOVAL Bilateral - COLONOSCOPY  - EYE SURGERY Right 2010 retinal tear repaired  - FINGER SURGERY 1988 right ring finger severe laceration,  fracture  - HIP ARTHROPLASTY Right 4-19-16  - HYSTERECTOMY (CERVIX STATUS UNKNOWN) 1993  - JOINT REPLACEMENT Left 2/2/16 left hip  - TONSILLECTOMY 1972  - TOTAL KNEE ARTHROPLASTY Right 3/16/2021  RIGHT ROBOTIC ASSISTED TOTAL KNEE ARTHROPLASTY (58579, 48630) -  [employer] [facility] [facility] performed by Gonzalo Vicente MD at  [facility]   ------------------------------------------------------------------  Electronically signed by Teresita Cadena MD, [facility] [place], [address]  (Interpreting physician) on 10/26/2022 at 13:20  ------------------------------------------------------------------          Signed:    Curly Terry MD   10/26/2022

## 2022-10-26 NOTE — PROGRESS NOTES
Patient in atrial fib rate 80's to 160's, does not stay in a fast rate for long, /74, O2 96% on RA, patient c/o not felling well, [place] paged.   WCM

## 2022-10-27 ENCOUNTER — TELEPHONE (OUTPATIENT)
Dept: INTERNAL MEDICINE CLINIC | Age: 72
End: 2022-10-27

## 2022-10-27 NOTE — TELEPHONE ENCOUNTER
[facility] Transitions Initial Follow Up Call    Outreach made within 2 business days of discharge: Yes    Patient: Tushar Johnston Patient : 1950   MRN: [id number]  Reason for Admission: There are no discharge diagnoses documented for the most recent discharge. Discharge Date: 10/26/22       Spoke with: Patient    Discharge department/facility: [facility]    TCM Interactive Patient Contact:  Was patient able to fill all prescriptions: Yes  Was patient instructed to bring all medications to the follow-up visit: Yes  Is patient taking all medications as directed in the discharge summary?  Yes  Does patient understand their discharge instructions: Yes  Does patient have questions or concerns that need addressed prior to 7-14 day follow up office visit: No    Scheduled appointment with PCP within 7-14 days    Follow Up  Future Appointments   Date Time Provider Abhishek Uribe   2022  2:45 PM MD KANU Smith [facility] [place] [facility]   2022  1:00 PM MD KANU Smith [facility] [place] [facility]   2022  4:15 PM SCHEDULE, [place] REMOTE TRANSMISSION FF Card[facility]   2022 11:00 AM SCHEDULE, [place] REMOTE TRANSMISSION FF [facility]   2023  1:30 PM SCHEDULE, [place] DEVICE CHECK FF Cardio [facility]   2023  1:45 PM Cici Dozier MD FF [facility]   2023  9:45 AM Tio Padgett MD FF Ortho [facility]   2023 10:00 AM Marya Dumont MD FF [facility]   2023 11:00 AM SCHEDULE, [place] REMOTE TRANSMISSION FF Cardio [facility]   3/6/2023 11:00 AM SCHEDULE, [place] REMOTE TRANSMISSION FF Cardio [facility]   3/10/2023 11:20 AM BRICE Garces [facility] SLEEP [facility]   4/10/2023 11:00 AM SCHEDULE, [place] REMOTE TRANSMISSION FF Card[facility]   5/15/2023 11:00 AM SCHEDULE, [place] REMOTE TRANSMISSION FF Cardio [facility]   2023 11:00 AM SCHEDULE, [place] REMOTE TRANSMISSION FF Cardio [facility]   2023 11:00 AM SCHEDULE, [place] REMOTE TRANSMISSION FF Cardio [facility]   2023 11:00 AM SCHEDULE, [place] REMOTE TRANSMISSION FF Cardio [facility]   10/2/2023 11:00 AM SCHEDULE, [place] REMOTE TRANSMISSION FF Cardio [facility]   11/6/2023 11:00 AM SCHEDULE, [place] REMOTE TRANSMISSION FF Cardio [facility]   12/11/2023 11:00 AM SCHEDULE, [facility] TRANSMISSION FF Cardio [place], [place]

## 2022-10-28 DIAGNOSIS — M17.12 PRIMARY OSTEOARTHRITIS OF LEFT KNEE: Primary | ICD-10-CM

## 2022-10-28 RX ORDER — TRAMADOL HYDROCHLORIDE 50 MG/1
50 TABLET ORAL EVERY 4 HOURS PRN
Qty: 42 TABLET | Refills: 0 | Status: SHIPPED | OUTPATIENT
Start: 2022-10-28 | End: 2022-11-14

## 2022-11-04 ENCOUNTER — OFFICE VISIT (OUTPATIENT)
Dept: INTERNAL MEDICINE CLINIC | Age: 72
End: 2022-11-04
Payer: MEDICARE

## 2022-11-04 VITALS
DIASTOLIC BLOOD PRESSURE: 64 MMHG | BODY MASS INDEX: 43.43 KG/M2 | OXYGEN SATURATION: 97 % | TEMPERATURE: 97.3 F | HEART RATE: 82 BPM | WEIGHT: 253 LBS | SYSTOLIC BLOOD PRESSURE: 130 MMHG

## 2022-11-04 DIAGNOSIS — Z09 HOSPITAL DISCHARGE FOLLOW-UP: Primary | ICD-10-CM

## 2022-11-04 DIAGNOSIS — I26.99 PULMONARY EMBOLISM, UNSPECIFIED CHRONICITY, UNSPECIFIED PULMONARY EMBOLISM TYPE, UNSPECIFIED WHETHER ACUTE COR PULMONALE PRESENT (HCC): ICD-10-CM

## 2022-11-04 DIAGNOSIS — R07.89 OTHER CHEST PAIN: ICD-10-CM

## 2022-11-04 PROCEDURE — 1111F DSCHRG MED/CURRENT MED MERGE: CPT | Performed by: INTERNAL MEDICINE

## 2022-11-04 PROCEDURE — G8417 CALC BMI ABV UP PARAM F/U: HCPCS | Performed by: INTERNAL MEDICINE

## 2022-11-04 PROCEDURE — G8399 PT W/DXA RESULTS DOCUMENT: HCPCS | Performed by: INTERNAL MEDICINE

## 2022-11-04 PROCEDURE — 99213 OFFICE O/P EST LOW 20 MIN: CPT | Performed by: INTERNAL MEDICINE

## 2022-11-04 PROCEDURE — 1090F PRES/ABSN URINE INCON ASSESS: CPT | Performed by: INTERNAL MEDICINE

## 2022-11-04 PROCEDURE — 1123F ACP DISCUSS/DSCN MKR DOCD: CPT | Performed by: INTERNAL MEDICINE

## 2022-11-04 PROCEDURE — G8427 DOCREV CUR MEDS BY ELIG CLIN: HCPCS | Performed by: INTERNAL MEDICINE

## 2022-11-04 PROCEDURE — 3074F SYST BP LT 130 MM HG: CPT | Performed by: INTERNAL MEDICINE

## 2022-11-04 PROCEDURE — G8484 FLU IMMUNIZE NO ADMIN: HCPCS | Performed by: INTERNAL MEDICINE

## 2022-11-04 PROCEDURE — 3078F DIAST BP <80 MM HG: CPT | Performed by: INTERNAL MEDICINE

## 2022-11-04 PROCEDURE — 3017F COLORECTAL CA SCREEN DOC REV: CPT | Performed by: INTERNAL MEDICINE

## 2022-11-04 PROCEDURE — 1036F TOBACCO NON-USER: CPT | Performed by: INTERNAL MEDICINE

## 2022-11-04 ASSESSMENT — PATIENT HEALTH QUESTIONNAIRE - PHQ9
2. FEELING DOWN, DEPRESSED OR HOPELESS: 0
8. MOVING OR SPEAKING SO SLOWLY THAT OTHER PEOPLE COULD HAVE NOTICED. OR THE OPPOSITE, BEING SO FIGETY OR RESTLESS THAT YOU HAVE BEEN MOVING AROUND A LOT MORE THAN USUAL: 0
SUM OF ALL RESPONSES TO PHQ QUESTIONS 1-9: 0
10. IF YOU CHECKED OFF ANY PROBLEMS, HOW DIFFICULT HAVE THESE PROBLEMS MADE IT FOR YOU TO DO YOUR WORK, TAKE CARE OF THINGS AT HOME, OR GET ALONG WITH OTHER PEOPLE: 0
SUM OF ALL RESPONSES TO PHQ QUESTIONS 1-9: 0
1. LITTLE INTEREST OR PLEASURE IN DOING THINGS: 0
7. TROUBLE CONCENTRATING ON THINGS, SUCH AS READING THE NEWSPAPER OR WATCHING TELEVISION: 0
SUM OF ALL RESPONSES TO PHQ QUESTIONS 1-9: 0
4. FEELING TIRED OR HAVING LITTLE ENERGY: 0
6. FEELING BAD ABOUT YOURSELF - OR THAT YOU ARE A FAILURE OR HAVE LET YOURSELF OR YOUR FAMILY DOWN: 0
5. POOR APPETITE OR OVEREATING: 0
SUM OF ALL RESPONSES TO PHQ9 QUESTIONS 1 & 2: 0
SUM OF ALL RESPONSES TO PHQ QUESTIONS 1-9: 0
9. THOUGHTS THAT YOU WOULD BE BETTER OFF DEAD, OR OF HURTING YOURSELF: 0
3. TROUBLE FALLING OR STAYING ASLEEP: 0

## 2022-11-04 NOTE — PROGRESS NOTES
Post-Discharge Transitional Care  Follow Up      John Ramsay   YOB: 1950    Date of Office Visit:  11/4/2022  Date of Hospital Admission: 10/25/22  Date of Hospital Discharge: 10/26/22  Risk of hospital readmission (high >=14%. Medium >=10%) :No data recorded    Care management risk score Rising risk (score 2-5) and Complex Care (Scores >=6): No Risk Score On File     Non face to face  following discharge, date last encounter closed (first attempt may have been earlier): 10/27/2022    Call initiated 2 business days of discharge: Yes    ASSESSMENT/PLAN:   Other chest pain  Hospital discharge follow-up  -     HI DISCHARGE MEDS RECONCILED W/ CURRENT OUTPATIENT MED LIST  -     HI DISCHARGE MEDS RECONCILED W/ CURRENT OUTPATIENT MED LIST      Medical Decision Making: low complexity  No follow-ups on file. On this date 11/4/2022 I have spent 15 minutes reviewing previous notes, test results and face to face with the patient discussing the diagnosis and importance of compliance with the treatment plan as well as documenting on the day of the visit. Subjective:   HPI:  Follow up of Hospital problems/diagnosis(es): Chest pain    Inpatient course: Discharge summary reviewed- see chart. Interval history/Current status: Good    Patient Active Problem List   Diagnosis    Restless leg syndrome    Acquired hypothyroidism    Benign essential HTN    HUSSAIN (obstructive sleep apnea)    Obesity, Class III, BMI 40-49.9 (morbid obesity) (HCC)    Primary osteoarthritis of both knees    Arthritis of knee, left    Paroxysmal atrial fibrillation (HCC)    Pure hypercholesterolemia    Hepatic steatosis    Hx of pulmonary embolus    Chronic diastolic heart failure (HCC)    Pulmonary emboli (HCC)    Obesity (BMI 30-39. 9)    PAF (paroxysmal atrial fibrillation) (HCC)    Symptomatic bradycardia    Encounter for electronic analysis of reveal event recorder    Mild persistent asthma without complication    Primary osteoarthritis of right knee    Acute pain of left knee    Type 2 diabetes mellitus    Anxiety and depression    Chest pain    Palpitation    Moderate asthma with acute exacerbation    Chest tightness    Moderate persistent asthma without complication       Medications listed as ordered at the time of discharge from hospital     Medication List            Accurate as of November 4, 2022  3:18 PM. If you have any questions, ask your nurse or doctor. CONTINUE taking these medications      Alvesco 160 MCG/ACT Aers  Generic drug: Ciclesonide     azelastine 0.1 % nasal spray  Commonly known as: ASTELIN  1 spray by Nasal route 2 times daily 1 Spray in each nostril     buPROPion 300 MG extended release tablet  Commonly known as: WELLBUTRIN XL  Take 1 tablet by mouth every morning     CPAP Machine Misc     dilTIAZem 120 MG extended release capsule  Commonly known as: CARDIZEM CD  Take 1 capsule by mouth daily     EpiPen 2-Felix 0.3 MG/0.3ML Soaj injection  Generic drug: EPINEPHrine     fluticasone 50 MCG/ACT nasal spray  Commonly known as: FLONASE     furosemide 40 MG tablet  Commonly known as: LASIX  Take 1 tablet by mouth in the morning.      Handicap Placard Misc  by Does not apply route Exp 5 years     levalbuterol 45 MCG/ACT inhaler  Commonly known as: XOPENEX HFA  Inhale 1 puff into the lungs every 6 hours as needed for Wheezing     lisinopril 10 MG tablet  Commonly known as: PRINIVIL;ZESTRIL  TAKE ONE TABLET BY MOUTH ONCE NIGHTLY     Magnesium Citrate 100 MG Tabs  Take 1 tablet by mouth daily     melatonin 10 MG Caps capsule     methocarbamol 750 MG tablet  Commonly known as: Robaxin-750  Take 1 tablet by mouth nightly as needed (As needed)     Ozempic (0.25 or 0.5 MG/DOSE) 2 MG/1.5ML Sopn  Generic drug: Semaglutide(0.25 or 0.5MG/DOS)  Inject 0.5 mg into the skin once a week     potassium chloride 10 MEQ extended release tablet  Commonly known as: KLOR-CON M  Take 1 tablet by mouth daily     pramipexole 0.5 MG tablet  Commonly known as: Mirapex  1.5 tab q 5 PM and 1.5 tab qHS     rivaroxaban 20 MG Tabs tablet  Commonly known as: Xarelto  Take 1 tablet by mouth daily (with breakfast)     Spiriva Respimat 1.25 MCG/ACT Aers inhaler  Generic drug: tiotropium     spironolactone 25 MG tablet  Commonly known as: ALDACTONE  TAKE ONE TABLET BY MOUTH DAILY     therapeutic multivitamin-minerals tablet     traMADol 50 MG tablet  Commonly known as: Ultram  Take 1 tablet by mouth every 4 hours as needed for Pain for up to 7 days. Take lowest dose possible to manage pain     venlafaxine 37.5 MG extended release capsule  Commonly known as: EFFEXOR XR  Take 1 capsule by mouth daily                Medications marked \"taking\" at this time  Outpatient Medications Marked as Taking for the 11/4/22 encounter (Office Visit) with Ag Mello MD   Medication Sig Dispense Refill    traMADol (ULTRAM) 50 MG tablet Take 1 tablet by mouth every 4 hours as needed for Pain for up to 7 days. Take lowest dose possible to manage pain 42 tablet 0    levalbuterol (XOPENEX HFA) 45 MCG/ACT inhaler Inhale 1 puff into the lungs every 6 hours as needed for Wheezing 1 each 3    tiotropium (SPIRIVA RESPIMAT) 1.25 MCG/ACT AERS inhaler Inhale 2 puffs into the lungs daily      methocarbamol (ROBAXIN-750) 750 MG tablet Take 1 tablet by mouth nightly as needed (As needed) 90 tablet 0    lisinopril (PRINIVIL;ZESTRIL) 10 MG tablet TAKE ONE TABLET BY MOUTH ONCE NIGHTLY 90 tablet 3    spironolactone (ALDACTONE) 25 MG tablet TAKE ONE TABLET BY MOUTH DAILY 90 tablet 3    potassium chloride (KLOR-CON M) 10 MEQ extended release tablet Take 1 tablet by mouth daily 90 tablet 3    pramipexole (MIRAPEX) 0.5 MG tablet 1.5 tab q 5 PM and 1.5 tab qHS 270 tablet 1    rivaroxaban (XARELTO) 20 MG TABS tablet Take 1 tablet by mouth daily (with breakfast) 42 tablet 0    furosemide (LASIX) 40 MG tablet Take 1 tablet by mouth in the morning.  90 tablet 3    melatonin 10 MG CAPS capsule Take 10 mg by mouth nightly      venlafaxine (EFFEXOR XR) 37.5 MG extended release capsule Take 1 capsule by mouth daily 30 capsule 5    dilTIAZem (CARDIZEM CD) 120 MG extended release capsule Take 1 capsule by mouth daily 90 capsule 3    Semaglutide,0.25 or 0.5MG/DOS, (OZEMPIC, 0.25 OR 0.5 MG/DOSE,) 2 MG/1.5ML SOPN Inject 0.5 mg into the skin once a week 2 pen 0    Ciclesonide (ALVESCO) 160 MCG/ACT AERS Inhale into the lungs      fluticasone (FLONASE) 50 MCG/ACT nasal spray 1 spray by Each Nostril route daily      azelastine (ASTELIN) 0.1 % nasal spray 1 spray by Nasal route 2 times daily 1 Spray in each nostril 1 Bottle 2    Handicap Placard MISC by Does not apply route Exp 5 years 1 each 0    Multiple Vitamins-Minerals (THERAPEUTIC MULTIVITAMIN-MINERALS) tablet Take 1 tablet by mouth daily       CPAP Machine MISC by Does not apply route      Magnesium Citrate 100 MG TABS Take 1 tablet by mouth daily 90 tablet 3    EPIPEN 2-MIR 0.3 MG/0.3ML SOAJ injection           Medications patient taking as of now reconciled against medications ordered at time of hospital discharge: Yes    A comprehensive review of systems was negative. Objective:    /64   Pulse 82   Temp 97.3 °F (36.3 °C) (Infrared)   Wt 253 lb (114.8 kg)   SpO2 97%   BMI 43.43 kg/m²     Physical Exam  Vitals reviewed. Constitutional:       General: She is not in acute distress. Appearance: She is well-developed. She is not diaphoretic. HENT:      Head: Normocephalic and atraumatic. Pulmonary:      Effort: Pulmonary effort is normal. No respiratory distress. Breath sounds: No stridor. No wheezing, rhonchi or rales. Chest:      Chest wall: No tenderness. Neurological:      Mental Status: She is alert and oriented to person, place, and time. Cranial Nerves: No cranial nerve deficit. Psychiatric:         Behavior: Behavior normal.         Thought Content:  Thought content normal.         Judgment: Judgment normal. Assessment/Plan:  Ivon Loya was seen today for follow-up from hospital.    Diagnoses and all orders for this visit:    Hospital discharge follow-up  -     MT DISCHARGE MEDS RECONCILED W/ CURRENT OUTPATIENT MED LIST  -     MT DISCHARGE MEDS RECONCILED W/ CURRENT OUTPATIENT MED LIST    Other chest pain  Comments:  Acute and resolved. Pulmonary embolism, unspecified chronicity, unspecified pulmonary embolism type, unspecified whether acute cor pulmonale present (HCC)  -     rivaroxaban (XARELTO) 20 MG TABS tablet; Take 1 tablet by mouth daily (with breakfast)          An electronic signature was used to authenticate this note.   --Nallely Quesada MD

## 2022-11-07 ENCOUNTER — OFFICE VISIT (OUTPATIENT)
Dept: ORTHOPEDIC SURGERY | Age: 72
End: 2022-11-07
Payer: MEDICARE

## 2022-11-07 VITALS — WEIGHT: 253 LBS | BODY MASS INDEX: 43.19 KG/M2 | HEIGHT: 64 IN

## 2022-11-07 DIAGNOSIS — M17.12 PRIMARY OSTEOARTHRITIS OF LEFT KNEE: Primary | ICD-10-CM

## 2022-11-07 PROCEDURE — 20610 DRAIN/INJ JOINT/BURSA W/O US: CPT | Performed by: ORTHOPAEDIC SURGERY

## 2022-11-07 NOTE — PROGRESS NOTES
Patient: Taya Stewart  : 1950    MRN: [id number]    Date of Visit: 22    Attending Physician: Inez Carey    History of Present Illness  Ms. Mame Riley is a very pleasant 81 y.o. patient with a several year history of progressive LEFT knee pain. There is no precipitating event or trauma. The pain is located predominantly in the medial and anterior aspect of the knee aggravated by weight bearing. Walking even short distances can be painful. Stair climbing is progressing becoming more difficult and painful. However, it can also awaken the patient at night. She has tried the following interventions without sustained functional improvement:    Low-impact, structured therapy/exercise program  NSAID's/Tylenol Arthritis strength  Crtisone injections/viscosupplementation with relief  Knee brace  Cane for ambulation    Quality of life is negatively impacted with daily tasks being more difficult. The patient would like to talk about surgical treatment options. She had a right total knee arthroplasty performed by Dr. Naga Boyle several years ago this was uncomplicated. Since then she has gained a significant amount of weight. Her current BMI is 43 additionally she was on Xarelto for PEs after prior arthroplasty, she denies smoking or diabetes.     PMH/PSH:  Past Medical History:   Diagnosis Date    Allergic     Anesthesia complication     family hx-father-at at age 80 having hip replacement revision surgery-had tia's x2 while under anes-but also had hx chf-had dificuly with speech when coming out from anes    Anxiety     Arthritis     left ankle, bilateral hands    Arthritis of left hip 2016    Arthritis of right hip 2016    Asthma     At risk for falls     uses a cane    Atrial fibrillation with rapid ventricular response (HCC)     Atrial flutter ([address] 2018    Chronic maxillary sinusitis 2017    CTEPH (chronic thromboembolic pulmonary hypertension) ([address] 2016    Depression     pt stated r/t bad marriage/alcoholic spouse    Diabetes mellitus ([address]     borderline    Disc disease, degenerative, lumbar or lumbosacral 2/20/2017    Hepatic steatosis 4/26/2019    History of total hip arthroplasty 2/28/2017    Hypertension     Leg cramps     patient states very severe, requires immediate potassium administration    Migraines, basilar     last migraine 10 years ago    Mild persistent asthma without complication 2/85/0278    Obesity, Class III, BMI 40-49.9 (morbid obesity) ([address] 4/19/2016    HUSSAIN (obstructive sleep apnea) 10/14/2013    Osteoarthritis, hip, bilateral 2016    Bilateral hip arthroplasty    Panic attacks     Pneumonia 2015    Primary osteoarthritis of both knees 9/19/2017    Primary osteoarthritis of left knee 12/15/2016    Pulmonary emboli ([address] 8/4/2016    Pulmonary HTN ([address] 7/21/2016    Pure hypercholesterolemia 2/13/2019    Restless leg syndrome     Rhinitis, chronic 3/26/2014    Sleep apnea     wears CPAP @13    Stress incontinence     Tear of left rotator cuff 1/23/2018    Thyroid disease     hypothyroid    Wears glasses      Patient Active Problem List   Diagnosis    Restless leg syndrome    Acquired hypothyroidism    Benign essential HTN    HUSSAIN (obstructive sleep apnea)    Obesity, Class III, BMI 40-49.9 (morbid obesity) ([address]    Primary osteoarthritis of both knees    Arthritis of knee, left    Paroxysmal atrial fibrillation (HCC)    Pure hypercholesterolemia    Hepatic steatosis    Hx of pulmonary embolus    Chronic diastolic heart failure (HCC)    Pulmonary emboli (HCC)    Obesity (BMI 30-39. 9)    PAF (paroxysmal atrial fibrillation) (HCC)    Symptomatic bradycardia    Encounter for electronic analysis of reveal event recorder    Mild persistent asthma without complication    Primary osteoarthritis of right knee    Acute pain of left knee    Type 2 diabetes mellitus    Anxiety and depression    Chest pain    Palpitation    Moderate asthma with acute exacerbation    Chest tightness    Moderate persistent asthma without complication     Past Surgical History:   Procedure Laterality Date    ATRIAL ABLATION SURGERY      BACK SURGERY  2004    LUMBAR FUSION    CATARACT REMOVAL Bilateral     COLONOSCOPY      EYE SURGERY Right 2010    retinal tear repaired    [address]    right ring finger severe laceration, fracture    HIP ARTHROPLASTY Right 4-19-16    HYSTERECTOMY (CERVIX STATUS UNKNOWN)  1993    JOINT REPLACEMENT Left 2/2/16    left hip    TONSILLECTOMY  1972    TOTAL KNEE ARTHROPLASTY Right 3/16/2021    RIGHT ROBOTIC ASSISTED TOTAL KNEE ARTHROPLASTY (80491, [id number]) - [employer] performed by Ana Cox MD at [address]:  Scheduled Meds:  Continuous Infusions:  PRN Meds:  Current Meds:  Current Outpatient Medications:     rivaroxaban (XARELTO) 20 MG TABS tablet, Take 1 tablet by mouth daily (with breakfast), Disp: 35 tablet, Rfl: 0    levalbuterol (XOPENEX HFA) 45 MCG/ACT inhaler, Inhale 1 puff into the lungs every 6 hours as needed for Wheezing, Disp: 1 each, Rfl: 3    tiotropium (SPIRIVA RESPIMAT) 1.25 MCG/ACT AERS inhaler, Inhale 2 puffs into the lungs daily, Disp: , Rfl:     methocarbamol (ROBAXIN-750) 750 MG tablet, Take 1 tablet by mouth nightly as needed (As needed), Disp: 90 tablet, Rfl: 0    lisinopril (PRINIVIL;ZESTRIL) 10 MG tablet, TAKE ONE TABLET BY MOUTH ONCE NIGHTLY, Disp: 90 tablet, Rfl: 3    spironolactone (ALDACTONE) 25 MG tablet, TAKE ONE TABLET BY MOUTH DAILY, Disp: 90 tablet, Rfl: 3    potassium chloride (KLOR-CON M) 10 MEQ extended release tablet, Take 1 tablet by mouth daily, Disp: 90 tablet, Rfl: 3    pramipexole (MIRAPEX) 0.5 MG tablet, 1.5 tab q 5 PM and 1.5 tab qHS, Disp: 270 tablet, Rfl: 1    rivaroxaban (XARELTO) 20 MG TABS tablet, Take 1 tablet by mouth daily (with breakfast), Disp: 42 tablet, Rfl: 0    furosemide (LASIX) 40 MG tablet, Take 1 tablet by mouth in the morning., Disp: 90 tablet, Rfl: 3    melatonin 10 MG CAPS capsule, Take 10 mg by mouth nightly, Disp: , Rfl:     venlafaxine (EFFEXOR XR) 37.5 MG extended release capsule, Take 1 capsule by mouth daily, Disp: 30 capsule, Rfl: 5    buPROPion (WELLBUTRIN XL) 300 MG extended release tablet, Take 1 tablet by mouth every morning, Disp: 30 tablet, Rfl: 5    dilTIAZem (CARDIZEM CD) 120 MG extended release capsule, Take 1 capsule by mouth daily, Disp: 90 capsule, Rfl: 3    Semaglutide,0.25 or 0.5MG/DOS, (OZEMPIC, 0.25 OR 0.5 MG/DOSE,) 2 MG/1.5ML SOPN, Inject 0.5 mg into the skin once a week, Disp: 2 pen, Rfl: 0    Ciclesonide (ALVESCO) 160 MCG/ACT AERS, Inhale into the lungs, Disp: , Rfl:     fluticasone (FLONASE) 50 MCG/ACT nasal spray, 1 spray by Each Nostril route daily, Disp: , Rfl:     azelastine (ASTELIN) 0.1 % nasal spray, 1 spray by Nasal route 2 times daily 1 Spray in each nostril, Disp: 1 Bottle, Rfl: 2    Handicap Placard MISC, by Does not apply route Exp 5 years, Disp: 1 each, Rfl: 0    Multiple Vitamins-Minerals (THERAPEUTIC MULTIVITAMIN-MINERALS) tablet, Take 1 tablet by mouth daily , Disp: , Rfl:     CPAP Machine MISC, by Does not apply route, Disp: , Rfl:     Magnesium Citrate 100 MG TABS, Take 1 tablet by mouth daily, Disp: 90 tablet, Rfl: 3    EPIPEN 2-MIR 0.3 MG/0.3ML SOAJ injection, , Disp: , Rfl:         ALLERGIES:  Allergies   Allergen Reactions    Atorvastatin Other (See Comments)     Muscle Pain, all statins     Penicillins Anaphylaxis    Simvastatin Other (See Comments)     Muscle Pain    Cephalexin Hives and Itching    Cephalosporins Hives and Itching    Food Diarrhea     Milk , shellfish , gluten. ..may have bouts of diarrhea, constipation or flatulus. (RD spoke to pt regarding \"milk allergy\", pt is not allergic to milk. She does not drink milk, but consumes milk in other products and consumes dairy products. Had one reactions to shellfish years ago and now can tolerate one serving of shellfish once per week.   Avoids gluten as much as she can, but does not have celiac disease.)    Other      Environmental -cats,dogs,dust    Shellfish-Derived Products      hives    Sulfa Antibiotics      Can take Celebrex, Pt denies 20         Social History:   Social History     Socioeconomic History    Marital status:      Spouse name: Not on file    Number of children: 4    Years of education: 15    Highest education level: Not on file   Occupational History    Occupation: retired    Tobacco Use    Smoking status: Former     Packs/day: 0.50     Years: 5.00     Pack years: 2.50     Types: Cigarettes     Quit date: 10/5/2013     Years since quittin.0    Smokeless tobacco: Never    Tobacco comments:     smoked for a total of 5yr total   Vaping Use    Vaping Use: Never used   Substance and Sexual Activity    Alcohol use: Yes     Comment: RARE    Drug use: No    Sexual activity: Never   Other Topics Concern    Not on file   Social History Narrative    Not on file     Social Determinants of Health     Financial Resource Strain: Low Risk     Difficulty of Paying Living Expenses: Not hard at all   Food Insecurity: No Food Insecurity    Worried About Running Out of Food in the Last Year: Never true    [address] [place] [address] Place of Food in the Last Year: Never true   Transportation Needs: Not on file   Physical Activity: Insufficiently Active    Days of Exercise per Week: 2 days    Minutes of Exercise per Session: 20 min   Stress: Not on file   Social Connections: Not on file   Intimate Partner Violence: Not At Risk    Fear of Current or Ex-Partner: No    Emotionally Abused: No    Physically Abused: No    Sexually Abused: No   Housing Stability: Not on file         Family History:   Cancer-related family history includes Cancer in her paternal uncle. Review of Systems:  No personal history of DVT, PE. 12 point ROS otherwise negative other than reported in HPI.     Physical Examination:  Patient is alert and oriented x 3 and appears well nourished and appropriate for today's visit. Height:   Ht Readings from Last 3 Encounters:   11/07/22 5' 4\" (1.626 m)   10/25/22 5' 4\" (1.626 m)   10/21/22 5' 4\" (1.626 m)     Weight:   Wt Readings from Last 3 Encounters:   11/07/22 253 lb (114.8 kg)   11/04/22 253 lb (114.8 kg)   10/26/22 251 lb 8 oz (114.1 kg)     Gait: The patient walks with antalgic gait. Left knee: no effusion             Ligaments stable to varus/valgus stress at full extension and 30 degrees. AROM: L: 5-120 Deg             Positive patellofemoral crepitus             Positive medial joint line tenderness  Hips: Preserved, pain free range of motion in both hips. Skin: Skin appears to be intact in both upper and lower extremities. There does not appear to be any ulceration or other non-healing wounds. Radiographs: Standing AP/lateral/Sunrise Knee: Imaging was reviewed with the patient. There are advanced degenerative changes of the LEFT knee with joint space narrowing, subchondral sclerosis, and osteophyte formation. There is no radiographic evidence of AVN, fracture, or dislocation. Non-Operative Treatment      Assessment and Plan?: The patient has advanced degenerative changes of the LEFT knee     We discussed the diagnosis and treatment options in detail with the patient. We discussed hyaluronic acid injections with the patient she would like to proceed we discussed risks and benefits. Lastly we did discuss total knee arthroplasty well her BMI is slightly above our normal limit she otherwise is free of modifiable risk factors we discussed weight management prior to scheduling her surgery in February and we will see her again at the beginning of January  Discussed prior prescriptions for Robaxin as well as tramadol she will call when she needs refills she is tolerating these medications.     Joint Injection: risks and benefits were discussed with the patient, after preparation of the injection site with alcohol, MONOVISC  was injected into the LEFT knee  joint for arthritis. The procedure was tolerated well without adverse reaction. The patient was counseled on potential reactions to the injection and given information on follow up and when to seek medical attention. The patient will monitor how long relief persists.       ?___________________________   Michelle Harvey MD  ?   ??cc: Tessie López MD

## 2022-11-07 NOTE — LETTER
Dr Noel Matters [phone] F: [phone]  Surgery Scheduling Form:  DEMOGRAPHICS:                                                                                                                  Patient Name:  Dottie Ledesma    Patient :  1950   Patient SS#:  [id number]      Patient Phone:  [phone] (home)      Patient Address[facility]  [facility]  [address] [place] [id number]    Insurance:  Medicare/anthem    DIAGNOSIS & PROCEDURE:                                                                                                Diagnosis:  Left knee arthritis M17.12    Operation:  Left total knee replacement   [id number]    Location:  [facility]    Surgeon:  Sunil Burnham    SCHEDULING INFORMATION:                                                                                         .    Requested Date:  2023  OR Time: 7:30am  Patient Arrival Time: 5:30am     OR Time Required:  120 minutes    Anesthesia:  General or Spinal;  NO BLOCK  SA Required:  Yes x 2    Equipment:  Cassandra Advanced with tibial tray stem extension, De Akins knee positioner.     Status: 23 hr observation       PAT Required:  Yes     Latex Allergy:  no Defibrilator or Pacemaker:  no    Isolation Precautions:  no                        Terri Negron MD     22   BILLING INFORMATION:                                                                                                                             CPT Code Modifier  Total knee    Prior auth:  [id number]

## 2022-11-11 DIAGNOSIS — M17.12 PRIMARY OSTEOARTHRITIS OF LEFT KNEE: ICD-10-CM

## 2022-11-12 SDOH — HEALTH STABILITY: PHYSICAL HEALTH: ON AVERAGE, HOW MANY DAYS PER WEEK DO YOU ENGAGE IN MODERATE TO STRENUOUS EXERCISE (LIKE A BRISK WALK)?: 0 DAYS

## 2022-11-12 ASSESSMENT — PATIENT HEALTH QUESTIONNAIRE - PHQ9
SUM OF ALL RESPONSES TO PHQ QUESTIONS 1-9: 0
SUM OF ALL RESPONSES TO PHQ QUESTIONS 1-9: 0
SUM OF ALL RESPONSES TO PHQ9 QUESTIONS 1 & 2: 0
2. FEELING DOWN, DEPRESSED OR HOPELESS: 0
SUM OF ALL RESPONSES TO PHQ QUESTIONS 1-9: 0
SUM OF ALL RESPONSES TO PHQ QUESTIONS 1-9: 0
1. LITTLE INTEREST OR PLEASURE IN DOING THINGS: 0

## 2022-11-12 ASSESSMENT — LIFESTYLE VARIABLES
HOW MANY STANDARD DRINKS CONTAINING ALCOHOL DO YOU HAVE ON A TYPICAL DAY: 1
HOW MANY STANDARD DRINKS CONTAINING ALCOHOL DO YOU HAVE ON A TYPICAL DAY: 1 OR 2
HOW OFTEN DO YOU HAVE A DRINK CONTAINING ALCOHOL: 2
HOW OFTEN DO YOU HAVE SIX OR MORE DRINKS ON ONE OCCASION: 1
HOW OFTEN DO YOU HAVE A DRINK CONTAINING ALCOHOL: MONTHLY OR LESS

## 2022-11-14 ENCOUNTER — TELEPHONE (OUTPATIENT)
Dept: ORTHOPEDIC SURGERY | Age: 72
End: 2022-11-14

## 2022-11-14 ENCOUNTER — OFFICE VISIT (OUTPATIENT)
Dept: INTERNAL MEDICINE CLINIC | Age: 72
End: 2022-11-14
Payer: MEDICARE

## 2022-11-14 VITALS
DIASTOLIC BLOOD PRESSURE: 72 MMHG | HEIGHT: 64 IN | SYSTOLIC BLOOD PRESSURE: 132 MMHG | TEMPERATURE: 97.9 F | OXYGEN SATURATION: 97 % | HEART RATE: 84 BPM | BODY MASS INDEX: 43.54 KG/M2 | WEIGHT: 255 LBS

## 2022-11-14 DIAGNOSIS — G72.0 STATIN MYOPATHY: ICD-10-CM

## 2022-11-14 DIAGNOSIS — Z23 NEED FOR SHINGLES VACCINE: ICD-10-CM

## 2022-11-14 DIAGNOSIS — I48.0 PAROXYSMAL ATRIAL FIBRILLATION (HCC): Chronic | ICD-10-CM

## 2022-11-14 DIAGNOSIS — Z00.00 MEDICARE ANNUAL WELLNESS VISIT, SUBSEQUENT: Primary | ICD-10-CM

## 2022-11-14 DIAGNOSIS — E66.01 OBESITY, CLASS III, BMI 40-49.9 (MORBID OBESITY) (HCC): ICD-10-CM

## 2022-11-14 DIAGNOSIS — Z12.11 COLON CANCER SCREENING: ICD-10-CM

## 2022-11-14 DIAGNOSIS — M70.50 ANSERINE BURSITIS: ICD-10-CM

## 2022-11-14 DIAGNOSIS — E78.00 PURE HYPERCHOLESTEROLEMIA: Chronic | ICD-10-CM

## 2022-11-14 DIAGNOSIS — M25.562 LEFT KNEE PAIN, UNSPECIFIED CHRONICITY: ICD-10-CM

## 2022-11-14 DIAGNOSIS — Z23 NEED FOR INFLUENZA VACCINATION: ICD-10-CM

## 2022-11-14 DIAGNOSIS — T46.6X5A STATIN MYOPATHY: ICD-10-CM

## 2022-11-14 DIAGNOSIS — M17.12 ARTHRITIS OF KNEE, LEFT: Primary | ICD-10-CM

## 2022-11-14 PROBLEM — I26.99 PULMONARY EMBOLI (HCC): Status: RESOLVED | Noted: 2019-08-22 | Resolved: 2022-11-14

## 2022-11-14 PROCEDURE — 90694 VACC AIIV4 NO PRSRV 0.5ML IM: CPT | Performed by: INTERNAL MEDICINE

## 2022-11-14 PROCEDURE — 1123F ACP DISCUSS/DSCN MKR DOCD: CPT | Performed by: INTERNAL MEDICINE

## 2022-11-14 PROCEDURE — G8484 FLU IMMUNIZE NO ADMIN: HCPCS | Performed by: INTERNAL MEDICINE

## 2022-11-14 PROCEDURE — 3074F SYST BP LT 130 MM HG: CPT | Performed by: INTERNAL MEDICINE

## 2022-11-14 PROCEDURE — G0439 PPPS, SUBSEQ VISIT: HCPCS | Performed by: INTERNAL MEDICINE

## 2022-11-14 PROCEDURE — 3078F DIAST BP <80 MM HG: CPT | Performed by: INTERNAL MEDICINE

## 2022-11-14 PROCEDURE — G0008 ADMIN INFLUENZA VIRUS VAC: HCPCS | Performed by: INTERNAL MEDICINE

## 2022-11-14 PROCEDURE — 3017F COLORECTAL CA SCREEN DOC REV: CPT | Performed by: INTERNAL MEDICINE

## 2022-11-14 RX ORDER — ZOSTER VACCINE RECOMBINANT, ADJUVANTED 50 MCG/0.5
0.5 KIT INTRAMUSCULAR SEE ADMIN INSTRUCTIONS
Qty: 0.5 ML | Refills: 0 | Status: SHIPPED | OUTPATIENT
Start: 2022-11-14 | End: 2023-05-13

## 2022-11-14 RX ORDER — TRAMADOL HYDROCHLORIDE 50 MG/1
50-100 TABLET ORAL EVERY 8 HOURS PRN
Qty: 30 TABLET | Refills: 2 | Status: SHIPPED | OUTPATIENT
Start: 2022-11-14 | End: 2022-12-14

## 2022-11-14 RX ORDER — SEMAGLUTIDE 1.34 MG/ML
0.5 INJECTION, SOLUTION SUBCUTANEOUS WEEKLY
Qty: 2 ADJUSTABLE DOSE PRE-FILLED PEN SYRINGE | Refills: 0 | COMMUNITY
Start: 2022-11-14

## 2022-11-14 NOTE — TELEPHONE ENCOUNTER
General Question     Subject: L KNEE  Patient and /or Facility Request: Yina Green  Contact Number: [phone]      PATIENT CALLED IN TO SEE WHAT SHE CAN DO FOR HER L KNEE. Alize Kunz PATIENT STATES THAT SHE RECEIVED AN GEL INJECTION IN HER KNEE. .. AND ITS GETTING WORSE. .. PLEASE CALL PATIENT BACK AT THE ABOVE NUMBER. ..

## 2022-11-14 NOTE — PATIENT INSTRUCTIONS
Personalized Preventive Plan for Ginette Sánchez - 11/14/2022  Medicare offers a range of preventive health benefits. Some of the tests and screenings are paid in full while other may be subject to a deductible, co-insurance, and/or copay. Some of these benefits include a comprehensive review of your medical history including lifestyle, illnesses that may run in your family, and various assessments and screenings as appropriate. After reviewing your medical record and screening and assessments performed today your provider may have ordered immunizations, labs, imaging, and/or referrals for you. A list of these orders (if applicable) as well as your Preventive Care list are included within your After Visit Summary for your review. Other Preventive Recommendations:    A preventive eye exam performed by an eye specialist is recommended every 1-2 years to screen for glaucoma; cataracts, macular degeneration, and other eye disorders. A preventive dental visit is recommended every 6 months. Try to get at least 150 minutes of exercise per week or 10,000 steps per day on a pedometer . Order or download the FREE \"Exercise & Physical Activity: Your Everyday Guide\" from [employer]. Call 2-866.437.3557 or search [employer] online. You need 1025-4588 mg of calcium and 7730-2284 IU of vitamin D per day. It is possible to meet your calcium requirement with diet alone, but a vitamin D supplement is usually necessary to meet this goal.  When exposed to the sun, use a sunscreen that protects against both UVA and UVB radiation with an SPF of 30 or greater. Reapply every 2 to 3 hours or after sweating, drying off with a towel, or swimming. Always wear a seat belt when traveling in a car. Always wear a helmet when riding a bicycle or motorcycle.

## 2022-11-14 NOTE — TELEPHONE ENCOUNTER
General Question     Subject: REQ CALLBACK  Patient and /or Facility Request: Blessing Watkins  Contact Number:  [phone]    PT LEFT MESSAGE EARLIER ABOUT PAIN IN HER L KNEE AND REQ CALLBACK AT NUMBER LISTED TO DISCUSS WHAT SHE SHOULD DO. CONTACT PT AT NUMBER LISTED TO ADVISE.

## 2022-11-14 NOTE — PROGRESS NOTES
Medicare Annual Wellness Visit    Aliyah Porter is here for Medicare AWV    Assessment & Plan   Medicare annual wellness visit, subsequent  Need for influenza vaccination  -     Influenza, FLUAD, (age 72 y+), IM, Preservative Free, 0.5 mL  Need for shingles vaccine  -     zoster recombinant adjuvanted vaccine [facility]) 50 MCG/0.5ML SUSR injection; Inject 0.5 mLs into the muscle See Admin Instructions 1 dose now and repeat in 2-6 months, Disp-0.5 mL, R-0Print  Colon cancer screening  -     Fecal DNA Colorectal cancer screening (Cologuard)  Paroxysmal atrial fibrillation (HCC)  Comments:  Chronic, controlled  Obesity, Class III, BMI 40-49.9 (morbid obesity) ([address]  Pure hypercholesterolemia  Statin myopathy  Comments:  She cannot take statin. Recommendations for Preventive Services Due: see orders and patient instructions/AVS.  Recommended screening schedule for the next 5-10 years is provided to the patient in written form: see Patient Instructions/AVS.     Return for Medicare Annual Wellness Visit in 1 year. Subjective   The following acute and/or chronic problems were also addressed today:  Atrial fibrillation, hTN, HLP    Patient's complete Health Risk Assessment and screening values have been reviewed and are found in Flowsheets. The following problems were reviewed today and where indicated follow up appointments were made and/or referrals ordered. Positive Risk Factor Screenings with Interventions:    Fall Risk:  Do you feel unsteady or are you worried about falling? : (!) yes  2 or more falls in past year?: no  Fall with injury in past year?: no   Fall Risk Interventions:    Home safety tips provided  Physical therapy referral ordered for strength and balance training            Opioid Risk: (Low risk score <55) Opioid risk score: 11    Patient is low risk for opioid use disorder or overdose.   Last PDMP Wendy Hathaway as Reviewed:  Review User Review Instant Review Result                 General Health and ACP:  General  In general, how would you say your health is?: Good  In the past 7 days, have you experienced any of the following: New or Increased Pain, New or Increased Fatigue, Loneliness, Social Isolation, Stress or Anger?: (!) Yes  Select all that apply: (!) New or Increased Pain  Do you get the social and emotional support that you need?: Yes  Do you have a Living Will?: Yes    Advance Directives       Power of  Living Will ACP-Advance Directive ACP-Power of     Not on File Filed on 03/16/21 Filed Not on File          General Health Risk Interventions:  Pain issues: orthopedic surgery referral, physical therapy referral ordered    Health Habits/Nutrition:  Physical Activity: Inactive    Days of Exercise per Week: 0 days    Minutes of Exercise per Session: 20 min     Have you lost any weight without trying in the past 3 months?: No  Body mass index: (!) 43.77  Have you seen the dentist within the past year?: Yes  Health Habits/Nutrition Interventions:  Inadequate physical activity:  patient is not ready to increase his/her physical activity level at this time      ADLs:  In the past 7 days, did you need help from others to perform any of the following everyday activities: Eating, dressing, grooming, bathing, toileting, or walking/balance?: (!) Yes  Select all that apply: (!) Walking/Balance  In the past 7 days, did you need help from others to take care of any of the following: Laundry, housekeeping, banking/finances, shopping, telephone use, food preparation, transportation, or taking medications?: (!) Yes  Select all that apply: (!) Housekeeping  ADL Interventions:  Patient declines any further evaluation/treatment for this issue          Objective   Vitals:    11/14/22 1256   BP: 132/72   Pulse: 84   Temp: 97.9 °F (36.6 °C)   TempSrc: Infrared   SpO2: 97%   Weight: 255 lb (115.7 kg)   Height: 5' 4\" (1.626 m)      Body mass index is 43.77 kg/m². Physical Exam  Vitals reviewed. Constitutional:       General: She is not in acute distress. Appearance: She is well-developed. She is not diaphoretic. HENT:      Head: Normocephalic and atraumatic. Pulmonary:      Effort: Pulmonary effort is normal.   Neurological:      Mental Status: She is alert and oriented to person, place, and time. Cranial Nerves: No cranial nerve deficit. Psychiatric:         Behavior: Behavior normal.         Thought Content: Thought content normal.         Judgment: Judgment normal.            Allergies   Allergen Reactions    Atorvastatin Other (See Comments)     Muscle Pain, all statins     Penicillins Anaphylaxis    Simvastatin Other (See Comments)     Muscle Pain    Cephalexin Hives and Itching    Cephalosporins Hives and Itching    Food Diarrhea     Milk , shellfish , gluten. ..may have bouts of diarrhea, constipation or flatulus. (RD spoke to pt regarding \"milk allergy\", pt is not allergic to milk. She does not drink milk, but consumes milk in other products and consumes dairy products. Had one reactions to shellfish years ago and now can tolerate one serving of shellfish once per week. Avoids gluten as much as she can, but does not have celiac disease.)    Other      Environmental -cats,dogs,dust    Shellfish-Derived Products      hives    Sulfa Antibiotics      Can take Celebrex, Pt denies 8/1/18     Prior to Visit Medications    Medication Sig Taking? Authorizing Provider   traMADol (ULTRAM) 50 MG tablet Take 1-2 tablets by mouth every 8 hours as needed for Pain for up to 30 days.  Yes Chico Izaguirre MD   zoster recombinant adjuvanted vaccine [facility]) 50 MCG/0.5ML SUSR injection Inject 0.5 mLs into the muscle See Admin Instructions 1 dose now and repeat in 2-6 months Yes Vincent Winston MD   levalbuterol [facility]) 45 MCG/ACT inhaler Inhale 1 puff into the lungs every 6 hours as needed for Wheezing Yes Yadi Perkins MD   tiotropium (SPIRIVA RESPIMAT) 1.25 MCG/ACT AERS inhaler Inhale 2 puffs into the lungs daily Yes Historical Provider, MD   methocarbamol (ROBAXIN-750) 750 MG tablet Take 1 tablet by mouth nightly as needed (As needed) Yes Luz Rasmussen MD   lisinopril (PRINIVIL;ZESTRIL) 10 MG tablet TAKE ONE TABLET BY MOUTH ONCE NIGHTLY Yes Rosa Vasques MD   spironolactone (ALDACTONE) 25 MG tablet TAKE ONE TABLET BY MOUTH DAILY Yes Rosa Vasques MD   potassium chloride (KLOR-CON M) 10 MEQ extended release tablet Take 1 tablet by mouth daily Yes Rosa Vasques MD   pramipexole (MIRAPEX) 0.5 MG tablet 1.5 tab q 5 PM and 1.5 tab qHS Yes BRICE Vidales   rivaroxaban (XARELTO) 20 MG TABS tablet Take 1 tablet by mouth daily (with breakfast) Yes Nawaf Abarca MD   furosemide (LASIX) 40 MG tablet Take 1 tablet by mouth in the morning.  Yes Rosa Vasques MD   melatonin 10 MG CAPS capsule Take 10 mg by mouth nightly Yes Historical Provider, MD   venlafaxine (EFFEXOR XR) 37.5 MG extended release capsule Take 1 capsule by mouth daily Yes Nawaf Abarca MD   dilTIAZem (CARDIZEM CD) 120 MG extended release capsule Take 1 capsule by mouth daily Yes Rosa Vasques MD   Semaglutide,0.25 or 0.5MG/DOS, (OZEMPIC, 0.25 OR 0.5 MG/DOSE,) 2 MG/1.5ML SOPN Inject 0.5 mg into the skin once a week Yes Nawaf Abarca MD   Ciclesonide (ALVESCO) 160 MCG/ACT AERS Inhale into the lungs Yes Historical Provider, MD   fluticasone (FLONASE) 50 MCG/ACT nasal spray 1 spray by Each Nostril route daily Yes Historical Provider, MD   azelastine (ASTELIN) 0.1 % nasal spray 1 spray by Nasal route 2 times daily 1 Spray in each nostril Yes Nawaf Abarca MD   Handicap Placard MISC by Does not apply route Exp 5 years Yes Illona Lips, APRN - CNP   Multiple Vitamins-Minerals (THERAPEUTIC MULTIVITAMIN-MINERALS) tablet Take 1 tablet by mouth daily  Yes Historical Provider, MD   CPAP Machine MISC by Does not apply route Yes Historical Provider, MD   Magnesium Citrate 100 MG TABS Take 1 tablet by mouth daily Yes Rosa Vasques MD   [facility] 2-MIR 0.3 MG/0.3ML SOAJ injection  Yes Historical Provider, MD   buPROPion (WELLBUTRIN XL) 300 MG extended release tablet Take 1 tablet by mouth every morning  Hugh Ravi MD       [facility] (Including outside providers/suppliers regularly involved in providing care):   Patient Care Team:  Hugh Ravi MD as PCP - General (Internal Medicine)  Vahid Ruiz MD as PCP - Hematology/Oncology (Hematology and Oncology)  Hugh Ravi MD as PCP - [facility]  Harvey Magalalnes MD as Consulting Physician ([facility])  Marga Garcia MD as Referring Physician (Cardiology)  BRICE Fontaine CNP as Nurse Practitioner (Sleep Medicine [facility])     Reviewed and updated this visit:  Tobacco  Allergies  Meds  Problems  Med Hx  Surg Hx  Soc Hx  Fam Hx          Dez Clarke MD  [address]

## 2022-11-15 ENCOUNTER — HOSPITAL ENCOUNTER (OUTPATIENT)
Dept: CARDIAC REHAB | Age: 72
Discharge: HOME | End: 2022-11-15

## 2022-11-15 RX ORDER — METHOCARBAMOL 750 MG/1
750 TABLET, FILM COATED ORAL
Qty: 90 TABLET | Refills: 0 | Status: SHIPPED | OUTPATIENT
Start: 2022-11-15 | End: 2022-12-15

## 2022-11-15 NOTE — TELEPHONE ENCOUNTER
Spoke with pt about intense knee pain after Moonovisc injection. Pt stated that she had \"intense\" pain for a few days after her injection and wanted to know if this was normal because this didn't happen in her other knee. I did tell her that sometimes this can happen, that it must of really irritated the knee. I mentioned to her to elevate and ice the knee. Pt said she has been doing that and it does help. Pt states it is getting better now. Pain used to be  a 9/10 but is now 3-4/10. Pt also stated that the Tramadol is not helping at all.     Pt still plans on having her sx in February    Routing this message to Dr. Vicky Ortiz for further advise per pt

## 2022-11-21 ENCOUNTER — TELEPHONE (OUTPATIENT)
Dept: INTERNAL MEDICINE CLINIC | Age: 72
End: 2022-11-21

## 2022-11-21 RX ORDER — CIPROFLOXACIN 500 MG/1
500 TABLET, FILM COATED ORAL 2 TIMES DAILY
Qty: 14 TABLET | Refills: 0 | Status: SHIPPED | OUTPATIENT
Start: 2022-11-21 | End: 2022-11-28

## 2022-12-07 DIAGNOSIS — F32.1 CURRENT MODERATE EPISODE OF MAJOR DEPRESSIVE DISORDER WITHOUT PRIOR EPISODE (HCC): ICD-10-CM

## 2022-12-07 DIAGNOSIS — Z13.31 POSITIVE DEPRESSION SCREENING: ICD-10-CM

## 2022-12-07 NOTE — TELEPHONE ENCOUNTER
Recent Visits  Date Type Provider Dept   11/14/22 Office Visit Leidy Barba MD [facility]   11/04/22 Office Visit Leidy Barba MD [facility]   05/11/22 Office Visit Leidy Barba MD [facility]   03/14/22 Office Visit Leidy Barba MD [facility]   02/14/22 Office Visit Leidy Barba MD [facility]   11/10/21 Office Visit Leidy Barba MD [facility]   Showing recent visits within past 540 days with a meds authorizing provider and meeting all other requirements  Future Appointments  No visits were found meeting these conditions.   Showing future appointments within next 150 days with a meds authorizing provider and meeting all other requirements     11/14/2022

## 2022-12-08 RX ORDER — BUPROPION HYDROCHLORIDE 300 MG/1
300 TABLET ORAL EVERY MORNING
Qty: 90 TABLET | Refills: 0 | Status: SHIPPED | OUTPATIENT
Start: 2022-12-08 | End: 2022-12-09 | Stop reason: SDUPTHER

## 2022-12-09 DIAGNOSIS — F32.1 CURRENT MODERATE EPISODE OF MAJOR DEPRESSIVE DISORDER WITHOUT PRIOR EPISODE (HCC): ICD-10-CM

## 2022-12-09 DIAGNOSIS — Z13.31 POSITIVE DEPRESSION SCREENING: ICD-10-CM

## 2022-12-09 RX ORDER — BUPROPION HYDROCHLORIDE 300 MG/1
300 TABLET ORAL EVERY MORNING
Qty: 90 TABLET | Refills: 0 | Status: SHIPPED | OUTPATIENT
Start: 2022-12-09 | End: 2023-01-08

## 2022-12-09 NOTE — TELEPHONE ENCOUNTER
Patient requesting her buPROPion (WELLBUTRIN XL) 300 MG be sent to [facility] instead of [facility]. [facility] charges her $9.00 and [facility] was > $100. Patient is out of the medication and would like this expedited if at all possible.

## 2022-12-12 DIAGNOSIS — G25.81 RESTLESS LEG SYNDROME: Chronic | ICD-10-CM

## 2022-12-12 RX ORDER — PRAMIPEXOLE DIHYDROCHLORIDE 0.5 MG/1
TABLET ORAL
Qty: 270 TABLET | Refills: 1 | Status: SHIPPED | OUTPATIENT
Start: 2022-12-12

## 2022-12-12 NOTE — TELEPHONE ENCOUNTER
Medication:   Requested Prescriptions     Pending Prescriptions Disp Refills    pramipexole (MIRAPEX) 0.5 MG tablet 270 tablet 1     Si.5 tab q 5 PM and 1.5 tab qHS     Last Filled:  22    Last appt: 2022   Next appt: 3/10/2023    Last OARRS:   RX Monitoring 2022   Periodic Controlled Substance Monitoring Possible medication side effects, risk of tolerance/dependence & alternative treatments discussed. Chronic Pain > 50 MEDD Considered consultation with a specialist.   Chronic Pain > 80 MEDD Consulted with a specialist.   Chronic Pain > 120 MEDD Engaged a pain medicine specialist as a prescriber or consultant.

## 2022-12-14 ENCOUNTER — TELEPHONE (OUTPATIENT)
Dept: ORTHOPEDIC SURGERY | Age: 72
End: 2022-12-14

## 2022-12-16 ENCOUNTER — TELEPHONE (OUTPATIENT)
Dept: INTERNAL MEDICINE CLINIC | Age: 72
End: 2022-12-16

## 2022-12-16 DIAGNOSIS — M17.12 PRIMARY OSTEOARTHRITIS OF LEFT KNEE: Primary | ICD-10-CM

## 2022-12-16 NOTE — TELEPHONE ENCOUNTER
----- Message from David Rico sent at 12/16/2022  8:23 AM EST -----  Subject: Message to Provider    QUESTIONS  Information for Provider? Patient is not having any luck getting her   colonoscopy scheduled, office has not called her back and she is a little   worried, can Dr Joslyn Phillip help with this? Also if he has any samples of   Xarelto she can have, as well? please advise  ---------------------------------------------------------------------------  --------------  Kolton Roberts INFO  [phone]; OK to leave message on voicemail  ---------------------------------------------------------------------------  --------------  SCRIPT ANSWERS  Relationship to Patient?  Self

## 2022-12-16 NOTE — TELEPHONE ENCOUNTER
Spoke to [facility] STACY, who I was speaking to wasn't able to schedule. They do have note about patient calling previously and she is sending another message back to get her scheduled. I did inform them of positive cologuard test.  Patient has been informed. Samples at  for .

## 2022-12-19 RX ORDER — TRAMADOL HYDROCHLORIDE 50 MG/1
TABLET ORAL
Qty: 30 TABLET | Refills: 0 | Status: SHIPPED | OUTPATIENT
Start: 2022-12-19 | End: 2022-12-26

## 2022-12-21 ENCOUNTER — APPOINTMENT (OUTPATIENT)
Dept: GENERAL RADIOLOGY | Age: 72
End: 2022-12-21
Payer: MEDICARE

## 2022-12-21 ENCOUNTER — APPOINTMENT (OUTPATIENT)
Dept: CT IMAGING | Age: 72
End: 2022-12-21
Payer: MEDICARE

## 2022-12-21 ENCOUNTER — HOSPITAL ENCOUNTER (EMERGENCY)
Age: 72
Discharge: HOME | End: 2022-12-21
Attending: EMERGENCY MEDICINE
Payer: MEDICARE

## 2022-12-21 VITALS
WEIGHT: 245 LBS | RESPIRATION RATE: 18 BRPM | HEART RATE: 83 BPM | DIASTOLIC BLOOD PRESSURE: 73 MMHG | SYSTOLIC BLOOD PRESSURE: 106 MMHG | BODY MASS INDEX: 41.83 KG/M2 | HEIGHT: 64 IN | TEMPERATURE: 97.4 F | OXYGEN SATURATION: 98 %
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
## 2022-12-21 DIAGNOSIS — S40.021A CONTUSION OF MULTIPLE SITES OF RIGHT SHOULDER AND UPPER ARM, INITIAL ENCOUNTER: ICD-10-CM

## 2022-12-21 DIAGNOSIS — S09.90XA INJURY OF HEAD, INITIAL ENCOUNTER: ICD-10-CM

## 2022-12-21 DIAGNOSIS — S39.92XA INJURY OF BACK, INITIAL ENCOUNTER: ICD-10-CM

## 2022-12-21 DIAGNOSIS — S70.01XA CONTUSION OF RIGHT HIP, INITIAL ENCOUNTER: ICD-10-CM

## 2022-12-21 DIAGNOSIS — S40.011A CONTUSION OF MULTIPLE SITES OF RIGHT SHOULDER AND UPPER ARM, INITIAL ENCOUNTER: ICD-10-CM

## 2022-12-21 LAB
A/G RATIO: 1.8 (ref 1.1–2.2)
ALBUMIN SERPL-MCNC: 4.6 G/DL (ref 3.4–5)
ALP BLD-CCNC: 91 U/L (ref 40–129)
ALT SERPL-CCNC: 31 U/L (ref 10–40)
ANION GAP SERPL CALCULATED.3IONS-SCNC: 14 MMOL/L (ref 3–16)
APTT: 33.1 SEC (ref 23–34.3)
AST SERPL-CCNC: 27 U/L (ref 15–37)
BASOPHILS ABSOLUTE: 0.1 K/UL (ref 0–0.2)
BASOPHILS RELATIVE PERCENT: 0.8 %
BILIRUB SERPL-MCNC: 0.3 MG/DL (ref 0–1)
BUN BLDV-MCNC: 17 MG/DL (ref 7–20)
CALCIUM SERPL-MCNC: 10 MG/DL (ref 8.3–10.6)
CHLORIDE BLD-SCNC: 98 MMOL/L (ref 99–110)
CO2: 23 MMOL/L (ref 21–32)
CREAT SERPL-MCNC: 0.9 MG/DL (ref 0.6–1.2)
EOSINOPHILS ABSOLUTE: 0.1 K/UL (ref 0–0.6)
EOSINOPHILS RELATIVE PERCENT: 1.2 %
GFR SERPL CREATININE-BSD FRML MDRD: >60 ML/MIN/{1.73_M2}
GLUCOSE BLD-MCNC: 102 MG/DL (ref 70–99)
HCT VFR BLD CALC: 38.7 % (ref 36–48)
HEMOGLOBIN: 12.8 G/DL (ref 12–16)
INR BLD: 1.2 (ref 0.87–1.14)
LYMPHOCYTES ABSOLUTE: 1 K/UL (ref 1–5.1)
LYMPHOCYTES RELATIVE PERCENT: 14.9 %
MCH RBC QN AUTO: 31.2 PG (ref 26–34)
MCHC RBC AUTO-ENTMCNC: 33.1 G/DL (ref 31–36)
MCV RBC AUTO: 94.1 FL (ref 80–100)
MONOCYTES ABSOLUTE: 0.6 K/UL (ref 0–1.3)
MONOCYTES RELATIVE PERCENT: 8.6 %
NEUTROPHILS ABSOLUTE: 5.1 K/UL (ref 1.7–7.7)
NEUTROPHILS RELATIVE PERCENT: 74.5 %
PDW BLD-RTO: 14.6 % (ref 12.4–15.4)
PLATELET # BLD: 304 K/UL (ref 135–450)
PMV BLD AUTO: 7.6 FL (ref 5–10.5)
POTASSIUM REFLEX MAGNESIUM: 4.3 MMOL/L (ref 3.5–5.1)
PROTHROMBIN TIME: 15.1 SEC (ref 11.7–14.5)
RBC # BLD: 4.11 M/UL (ref 4–5.2)
SODIUM BLD-SCNC: 135 MMOL/L (ref 136–145)
TOTAL PROTEIN: 7.1 G/DL (ref 6.4–8.2)
WBC # BLD: 6.9 K/UL (ref 4–11)

## 2022-12-21 PROCEDURE — 73502 X-RAY EXAM HIP UNI 2-3 VIEWS: CPT

## 2022-12-21 PROCEDURE — 73030 X-RAY EXAM OF SHOULDER: CPT

## 2022-12-21 PROCEDURE — 85610 PROTHROMBIN TIME: CPT

## 2022-12-21 PROCEDURE — 80053 COMPREHEN METABOLIC PANEL: CPT

## 2022-12-21 PROCEDURE — 85025 COMPLETE CBC W/AUTO DIFF WBC: CPT

## 2022-12-21 PROCEDURE — 70450 CT HEAD/BRAIN W/O DYE: CPT

## 2022-12-21 PROCEDURE — 72128 CT CHEST SPINE W/O DYE: CPT

## 2022-12-21 PROCEDURE — 72131 CT LUMBAR SPINE W/O DYE: CPT

## 2022-12-21 PROCEDURE — 72125 CT NECK SPINE W/O DYE: CPT

## 2022-12-21 PROCEDURE — 36415 COLL VENOUS BLD VENIPUNCTURE: CPT

## 2022-12-21 PROCEDURE — 85730 THROMBOPLASTIN TIME PARTIAL: CPT

## 2022-12-21 PROCEDURE — 99284 EMERGENCY DEPT VISIT MOD MDM: CPT

## 2022-12-21 ASSESSMENT — PAIN DESCRIPTION - LOCATION: LOCATION: HEAD

## 2022-12-21 ASSESSMENT — LIFESTYLE VARIABLES
HOW OFTEN DO YOU HAVE A DRINK CONTAINING ALCOHOL: NEVER
HOW MANY STANDARD DRINKS CONTAINING ALCOHOL DO YOU HAVE ON A TYPICAL DAY: PATIENT DOES NOT DRINK

## 2022-12-21 ASSESSMENT — PAIN DESCRIPTION - DESCRIPTORS: DESCRIPTORS: PRESSURE;SHARP

## 2022-12-21 ASSESSMENT — PAIN SCALES - GENERAL: PAINLEVEL_OUTOF10: 2

## 2022-12-21 ASSESSMENT — PAIN - FUNCTIONAL ASSESSMENT: PAIN_FUNCTIONAL_ASSESSMENT: 0-10

## 2022-12-21 ASSESSMENT — PAIN DESCRIPTION - ORIENTATION: ORIENTATION: RIGHT

## 2022-12-21 NOTE — ED PROVIDER NOTES
[address] [facility] PROVIDER NOTE    Patient Identification  Pt Name: Megha Templeton  MRN: [id number]  Emeliagfyocasta 1950  Date of evaluation: 12/21/2022  Provider: Isra Palm MD  PCP: Orly Maldonado MD    Chief Complaint  Fall (Pt arrives from [place] via EMS where she fell and hit the right side of her head on the concrete. Pain was at first a 2/10 now a 4/10. Pain is described as a pushing pain and sharp. Pt is currently on blood thinners)      HPI  (History provided by patient)  This is a 81 y.o. female who was brought in by EMS transportation after a fall. She was walking out of [employer] when she suddenly fell. She is uncertain as to the cause. She did not become dizzy or lightheaded. She denies preceding shortness of breath, palpitations, and chest pain. She did not have these symptoms post-fall either. When she fell, her right temple impacted the concrete. She had no loss of consciousness. Pain is currently 3-4/10 and localized to the right temple. She also has tenderness to the right shoulder, right hip, and back. This pain is minimal and only occurs when these areas are touched. She states she feels \"dream-like\" since the head injury. She denies nausea, vomiting, dizziness since injury. ROS  10 systems reviewed, pertinent positives/negatives per HPI otherwise noted to be negative.     I have reviewed the following nursing documentation:  Allergies: Atorvastatin, Penicillins, Simvastatin, Cephalexin, Cephalosporins, Food, Other, Shellfish-derived products, and Sulfa antibiotics    Past medical history:   Past Medical History:   Diagnosis Date    Allergic     Anesthesia complication     family hx-father-at at age 80 having hip replacement revision surgery-had tia's x2 while under anes-but also had hx chf-had dificuly with speech when coming out from [place]    Anxiety     Arthritis     left ankle, bilateral hands    Arthritis of left hip 2/2/2016    Arthritis of right hip 4/19/2016    Asthma     At risk for falls     uses a cane    Atrial fibrillation with rapid ventricular response (HCC)     Atrial flutter ([address] 4/25/2018    Chronic maxillary sinusitis 5/24/2017    CTEPH (chronic thromboembolic pulmonary hypertension) ([address] 8/26/2016    Depression     pt stated r/t bad marriage/alcoholic spouse    Diabetes mellitus ([address]     borderline    Disc disease, degenerative, lumbar or lumbosacral 2/20/2017    Hepatic steatosis 4/26/2019    History of total hip arthroplasty 2/28/2017    Hypertension     Leg cramps     patient states very severe, requires immediate potassium administration    Migraines, [place]     last migraine 10 years ago    Mild persistent asthma without complication 1/39/2151    Obesity, Class III, BMI 40-49.9 (morbid obesity) ([address] 4/19/2016    HUSSAIN (obstructive sleep apnea) 10/14/2013    Osteoarthritis, hip, bilateral 2016    Bilateral hip arthroplasty    Panic attacks     Pneumonia 2015    Primary osteoarthritis of both knees 9/19/2017    Primary osteoarthritis of left knee 12/15/2016    Pulmonary emboli ([address] 8/4/2016    Pulmonary HTN ([address] 7/21/2016    Pure hypercholesterolemia 2/13/2019    Restless leg syndrome     Rhinitis, chronic 3/26/2014    Sleep apnea     wears CPAP @13    Stress incontinence     Tear of left rotator cuff 1/23/2018    Thyroid disease     hypothyroid    Wears glasses      Past surgical history:   Past Surgical History:   Procedure Laterality Date    ATRIAL ABLATION SURGERY      BACK SURGERY  2004    LUMBAR FUSION    CATARACT REMOVAL Bilateral     COLONOSCOPY      EYE SURGERY Right 2010    retinal tear repaired    2018 [facility]    right ring finger severe laceration, fracture    HIP ARTHROPLASTY Right 4-19-16    HYSTERECTOMY (CERVIX STATUS UNKNOWN)  1993    JOINT REPLACEMENT Left 2/2/16    left hip    TONSILLECTOMY  1972    TOTAL KNEE ARTHROPLASTY Right 3/16/2021    RIGHT ROBOTIC ASSISTED TOTAL KNEE ARTHROPLASTY (39873, 09530) [facility] performed by Franki Henry MD at [place] [id number] medications:   Previous Medications    AMINO ACIDS (DAILY AMINO 6000 PO)    Take 20 mg by mouth daily    AZELASTINE (ASTELIN) 0.1 % NASAL SPRAY    1 spray by Nasal route 2 times daily 1 Spray in each nostril    BUPROPION (WELLBUTRIN XL) 300 MG EXTENDED RELEASE TABLET    Take 1 tablet by mouth every morning    CICLESONIDE (ALVESCO) 160 MCG/ACT AERS    Inhale into the lungs    CPAP MACHINE MISC    by Does not apply route    DILTIAZEM (CARDIZEM CD) 120 MG EXTENDED RELEASE CAPSULE    Take 1 capsule by mouth daily    EPIPEN 2-MIR 0.3 MG/0.3ML SOAJ INJECTION        FLUTICASONE (FLONASE) 50 MCG/ACT NASAL SPRAY    1 spray by Each Nostril route daily    FUROSEMIDE (LASIX) 40 MG TABLET    Take 1 tablet by mouth in the morning.     HANDICAP PLACARD MISC    by Does not apply route Exp 5 years    LEVALBUTEROL (XOPENEX HFA) 45 MCG/ACT INHALER    Inhale 1 puff into the lungs every 6 hours as needed for Wheezing    LISINOPRIL (PRINIVIL;ZESTRIL) 10 MG TABLET    TAKE ONE TABLET BY MOUTH ONCE NIGHTLY    MAGNESIUM CITRATE 100 MG TABS    Take 1 tablet by mouth daily    MELATONIN 10 MG CAPS CAPSULE    Take 10 mg by mouth nightly    MULTIPLE VITAMINS-MINERALS (THERAPEUTIC MULTIVITAMIN-MINERALS) TABLET    Take 1 tablet by mouth daily     POTASSIUM CHLORIDE (KLOR-CON M) 10 MEQ EXTENDED RELEASE TABLET    Take 1 tablet by mouth daily    PRAMIPEXOLE (MIRAPEX) 0.5 MG TABLET    1.5 tab q 5 PM and 1.5 tab qHS    RIVAROXABAN (XARELTO) 20 MG TABS TABLET    Take 1 tablet by mouth daily (with breakfast)    RIVAROXABAN (XARELTO) 20 MG TABS TABLET    Take 1 tablet by mouth daily (with breakfast)    SEMAGLUTIDE,0.25 OR 0.5MG/DOS, (OZEMPIC, 0.25 OR 0.5 MG/DOSE,) 2 MG/1.5ML SOPN    Inject 0.5 mg into the skin once a week    SEMAGLUTIDE,0.25 OR 0.5MG/DOS, (OZEMPIC, 0.25 OR 0.5 MG/DOSE,) 2 MG/1.5ML SOPN    Inject 0.5 mg into the skin once a week    SPIRONOLACTONE (ALDACTONE) 25 MG TABLET    TAKE ONE TABLET BY MOUTH DAILY    TIOTROPIUM (SPIRIVA RESPIMAT) 1.25 MCG/ACT AERS INHALER    Inhale 2 puffs into the lungs daily    TRAMADOL (ULTRAM) 50 MG TABLET    TAKE ONE TO TWO TABLETS BY MOUTH EVERY 8 HOURS AS NEEDED FOR PAIN REDUCE DOSES TAKEN AS PAIN BECOMES MANAGEABLE    VENLAFAXINE (EFFEXOR XR) 37.5 MG EXTENDED RELEASE CAPSULE    Take 1 capsule by mouth daily    ZOSTER RECOMBINANT ADJUVANTED VACCINE (SHINGRIX) 50 MCG/0.5ML SUSR INJECTION    Inject 0.5 mLs into the muscle See Admin Instructions 1 dose now and repeat in 2-6 months       Social history:  reports that she quit smoking about 9 years ago. Her smoking use included cigarettes. She has a 2.50 pack-year smoking history. She has never used smokeless tobacco. She reports current alcohol use. She reports that she does not use drugs. Family history:    Family History   Problem Relation Age of Onset    Alcohol Abuse Sister     Arthritis Sister     Stroke Maternal Grandmother     Thyroid Disease Maternal Grandmother     Thyroid Disease Maternal Grandfather     Heart Disease Maternal Grandfather     Alcohol Abuse Maternal Grandfather     Substance Abuse Maternal Grandfather     Hypertension Mother     Thyroid Disease Mother     Anxiety Disorder Mother     Arthritis Mother     Cataracts Mother     Heart Disease Mother     Asthma Mother     Thyroid Disease Father     Heart Failure Father     Heart Disease Father     Arthritis Brother     High Cholesterol Brother     Heart Disease Maternal Uncle     Heart Disease Paternal Uncle     Cancer Paternal Uncle     Alcohol Abuse Paternal Grandfather     Substance Abuse Paternal Grandfather        Exam  ED Triage Vitals [12/21/22 1640]   BP Temp Temp src Heart Rate Resp SpO2 Height Weight   106/73 97.4 °F (36.3 °C) -- 83 18 98 % 5' 4\" (1.626 m) 245 lb (111.1 kg)     Nursing note and vitals reviewed. Constitutional: Well developed, well nourished. Non-toxic in appearance. HENT:      Head: Normocephalic. No skull deformity or depression. Small abrasion to the right temple. No bruising. Ears: External ears normal.      Nose: Nose normal.     Mouth: Membrane mucosa moist and pink. Eyes: Anicteric sclera. No discharge. Neck: Supple. Trachea midline. Tender to the mid cervical spine without palpable step-off deformity. Cardiovascular: RRR; no murmurs, rubs, or gallops. Pulmonary/Chest: Effort normal. No respiratory distress. CTAB. No stridor. No wheezes. No rales. Abdominal: Soft. No distension. Musculoskeletal: Tender to palpation over the right shoulder without palpable or visible deformity of the joint. Tender to palpation over the right hip without visible or palpable deformity of the joint. Mid thoracic tenderness to palpation without step-off or deformity. Mid and lower lumbar spine tenderness to palpation without step-off or deformity. All other bones and joints in the extremities were palpated and inspected without tenderness or deformity. Neurological: Alert and oriented. Face symmetric. Speech is clear. Skin: Warm and dry. No rash. Psychiatric: Normal mood and affect. Behavior is normal.    Radiology  XR HIP 2-3 VW W PELVIS RIGHT   Final Result   Bilateral hip replacements which are unchanged with no acute abnormality seen. Postop changes lower lumbar spine which is unchanged. XR SHOULDER RIGHT (MIN 2 VIEWS)   Final Result   Moderate osteoarthritic changes along the glenohumeral joint and mild   hypertrophic changes of the [facility] joint with no acute abnormality seen. CT LUMBAR SPINE WO CONTRAST   Final Result   1. No acute lumbar spine fracture. 2. Stable postop changes related to posterior metallic fusion and discectomy   at L4-L5 with no evident hardware complication. 3. Advanced multilevel spinal degenerative changes with a least moderate   central canal narrowing at the L2-L3 and L3-L4 levels.          CT THORACIC SPINE WO CONTRAST   Final Result   Degenerative disc disease of the lower thoracic and upper lumbar spine. Chronic loss of height of the T7 vertebral body. CT CERVICAL SPINE WO CONTRAST   Final Result   No acute cervical spine fracture. Spinal degenerative changes as described. CT HEAD WO CONTRAST   Final Result   No acute intracranial findings.              Labs  Results for orders placed or performed during the hospital encounter of 12/21/22   CBC with Auto Differential   Result Value Ref Range    WBC 6.9 4.0 - 11.0 K/uL    RBC 4.11 4.00 - 5.20 M/uL    Hemoglobin 12.8 12.0 - 16.0 g/dL    Hematocrit 38.7 36.0 - 48.0 %    MCV 94.1 80.0 - 100.0 fL    MCH 31.2 26.0 - 34.0 pg    MCHC 33.1 31.0 - 36.0 g/dL    RDW 14.6 12.4 - 15.4 %    Platelets 635 013 - 384 K/uL    MPV 7.6 5.0 - 10.5 fL    Neutrophils % 74.5 %    Lymphocytes % 14.9 %    Monocytes % 8.6 %    Eosinophils % 1.2 %    Basophils % 0.8 %    Neutrophils Absolute 5.1 1.7 - 7.7 K/uL    Lymphocytes Absolute 1.0 1.0 - 5.1 K/uL    Monocytes Absolute 0.6 0.0 - 1.3 K/uL    Eosinophils Absolute 0.1 0.0 - 0.6 K/uL    Basophils Absolute 0.1 0.0 - 0.2 K/uL   Comprehensive Metabolic Panel w/ Reflex to MG   Result Value Ref Range    Sodium 135 (L) 136 - 145 mmol/L    Potassium reflex Magnesium 4.3 3.5 - 5.1 mmol/L    Chloride 98 (L) 99 - 110 mmol/L    CO2 23 21 - 32 mmol/L    Anion Gap 14 3 - 16    Glucose 102 (H) 70 - 99 mg/dL    BUN 17 7 - 20 mg/dL    Creatinine 0.9 0.6 - 1.2 mg/dL    Est, Glom Filt Rate >60 >60    Calcium 10.0 8.3 - 10.6 mg/dL    Total Protein 7.1 6.4 - 8.2 g/dL    Albumin 4.6 3.4 - 5.0 g/dL    Albumin/Globulin Ratio 1.8 1.1 - 2.2    Total Bilirubin 0.3 0.0 - 1.0 mg/dL    Alkaline Phosphatase 91 40 - 129 U/L    ALT 31 10 - 40 U/L    AST 27 15 - 37 U/L   Protime-INR   Result Value Ref Range    Protime 15.1 (H) 11.7 - 14.5 sec    INR 1.20 (H) 0.87 - 1.14   APTT   Result Value Ref Range    aPTT 33.1 23.0 - 34.3 sec     MDM and ED Course  Given the patient's exam, I was concerned for possible extremity injury, back injury, or neck injury. Specifically, she had tenderness in the right shoulder and right hip. Fortunately x-rays of these areas were normal.  CTs of the cervical, thoracic, lumbar spine were also normal.  As the patient hit her head against the concrete and is on Xarelto, it was important to rule out intracranial hemorrhage as well as skull fracture. Fortunately, CT of the head showed no evidence of hemorrhage. On reevaluation after the visit, she was feeling well with minimal pain and felt comfortable with discharge home. We discussed her results and she was reassured that there is no evidence of acute injury. We discussed the signs and symptoms of concussion. I advised her to return if she develops more significant or severe symptoms, particularly if she has a severe headache, dizziness, vomiting, or new pains. Finally, I have low suspicion for syncopal event as the patient did not have loss of consciousness or preceding symptoms before this happened. Although she cannot unify the etiology, presentation is most consistent with mechanical fall    I estimate there is LOW risk for LIVER OR SPLEEN LACERATION, PELVIC FRACTURE, PNEUMOTHORAX, CARDIAC TAMPONADE, PULMONARY CONTUSION, CAUDA EQUINA SYNDROME, CENTRAL CORD SYNDROME, COMPARTMENT SYNDROME, HERNIATED DISK CAUSING SEVERE STENOSIS, INTRACRANIAL HEMORRHAGE, INTRA-ABDOMINAL INJURY, PERFORATED BOWEL OR OTHER HOLLOW VISCUS INJURY, SKULL FRACTURE, SUBARACHNOID HEMORRHAGE, SUBDURAL HEMATOMA, TENDON INJURY, NEUROVASCULAR INJURY, or AORTIC DISSECTION/RUPTURE, thus I consider the discharge disposition reasonable. Final Impression  1. Fall from standing, initial encounter    2. Injury of head, initial encounter    3. Injury of back, initial encounter    4. Contusion of multiple sites of right shoulder and upper arm, initial encounter    5.  Contusion of right hip, initial encounter        Blood pressure 106/73, pulse 83, temperature 97.4 °F (36.3 °C), resp. rate 18, height 5' 4\" (1.626 m), weight 245 lb (111.1 kg), SpO2 98 %, not currently breastfeeding. Disposition:  DISPOSITION Decision To Discharge 12/21/2022 07:36:43 PM      Patient Referrals:  Ag Mello MD  [address] [place] [address]  [phone]    In 5 days  for re-evaluation    [facility] Emergency Department  [address] [facility]  [phone]    As needed, If symptoms worsen or new symptoms develop    This chart was generated using the [address] [place] [address] [facility] dictation system. I created this record but it may contain dictation errors given the limitations of this technology.         Rock Matos MD  12/21/22 3338

## 2022-12-21 NOTE — ED NOTES
Dr. [place] Zacarias notified and in to see patient at this time     Brenna oGldsmith RN  12/21/22 [id number]

## 2022-12-22 ENCOUNTER — TELEPHONE (OUTPATIENT)
Dept: INTERNAL MEDICINE CLINIC | Age: 72
End: 2022-12-22

## 2022-12-22 ENCOUNTER — CARE COORDINATION (OUTPATIENT)
Dept: CARE COORDINATION | Age: 72
End: 2022-12-22

## 2022-12-22 NOTE — TELEPHONE ENCOUNTER
----- Message from Tiesha Cowan sent at 12/22/2022 11:58 AM EST -----  Subject: Message to Provider    QUESTIONS  Information for Provider? eDep Saeed went to the ER and they told her to   rest and use cold compress, after 24 hours use warm compresses for the   bruising she was wanting to know if there is anything she needs to do   before her appointment  ---------------------------------------------------------------------------  --------------  [id number] [employer]  [phone]; OK to leave message on voicemail  ---------------------------------------------------------------------------  --------------  SCRIPT ANSWERS  Relationship to Patient? Self  (Patient requests to see provider urgently. )? No  Do you have any questions for your primary care provider that need to be   answered prior to your appointment?  Yes

## 2022-12-23 ENCOUNTER — CARE COORDINATION (OUTPATIENT)
Dept: CARE COORDINATION | Age: 72
End: 2022-12-23

## 2022-12-28 NOTE — TELEPHONE ENCOUNTER
Patient is feeling good and doesn't feel she needs a follow up as she has several appointments with speciality physicians in the next several weeks. Patient will contact office if anything additional is needed.

## 2022-12-31 ENCOUNTER — APPOINTMENT (OUTPATIENT)
Dept: GENERAL RADIOLOGY | Age: 72
End: 2022-12-31
Payer: MEDICARE

## 2022-12-31 ENCOUNTER — HOSPITAL ENCOUNTER (EMERGENCY)
Age: 72
Discharge: HOME | End: 2022-12-31
Payer: MEDICARE

## 2022-12-31 VITALS
OXYGEN SATURATION: 96 % | HEIGHT: 64 IN | SYSTOLIC BLOOD PRESSURE: 120 MMHG | RESPIRATION RATE: 19 BRPM | WEIGHT: 250 LBS | DIASTOLIC BLOOD PRESSURE: 59 MMHG | HEART RATE: 72 BPM | TEMPERATURE: 98.7 F | BODY MASS INDEX: 42.68 KG/M2

## 2022-12-31 DIAGNOSIS — R00.2 HEART PALPITATIONS: Primary | ICD-10-CM

## 2022-12-31 LAB
A/G RATIO: 2 (ref 1.1–2.2)
ALBUMIN SERPL-MCNC: 4.1 G/DL (ref 3.4–5)
ALP BLD-CCNC: 100 U/L (ref 40–129)
ALT SERPL-CCNC: 23 U/L (ref 10–40)
ANION GAP SERPL CALCULATED.3IONS-SCNC: 13 MMOL/L (ref 3–16)
AST SERPL-CCNC: 22 U/L (ref 15–37)
BASOPHILS ABSOLUTE: 0.1 K/UL (ref 0–0.2)
BASOPHILS RELATIVE PERCENT: 1.2 %
BILIRUB SERPL-MCNC: <0.2 MG/DL (ref 0–1)
BUN BLDV-MCNC: 19 MG/DL (ref 7–20)
CALCIUM SERPL-MCNC: 9.4 MG/DL (ref 8.3–10.6)
CHLORIDE BLD-SCNC: 100 MMOL/L (ref 99–110)
CO2: 23 MMOL/L (ref 21–32)
CREAT SERPL-MCNC: 0.9 MG/DL (ref 0.6–1.2)
EOSINOPHILS ABSOLUTE: 0.1 K/UL (ref 0–0.6)
EOSINOPHILS RELATIVE PERCENT: 0.7 %
GFR SERPL CREATININE-BSD FRML MDRD: >60 ML/MIN/{1.73_M2}
GLUCOSE BLD-MCNC: 147 MG/DL (ref 70–99)
HCT VFR BLD CALC: 37.6 % (ref 36–48)
HEMOGLOBIN: 12.6 G/DL (ref 12–16)
LYMPHOCYTES ABSOLUTE: 1.2 K/UL (ref 1–5.1)
LYMPHOCYTES RELATIVE PERCENT: 14.2 %
MCH RBC QN AUTO: 31.4 PG (ref 26–34)
MCHC RBC AUTO-ENTMCNC: 33.6 G/DL (ref 31–36)
MCV RBC AUTO: 93.6 FL (ref 80–100)
MONOCYTES ABSOLUTE: 0.7 K/UL (ref 0–1.3)
MONOCYTES RELATIVE PERCENT: 8.2 %
NEUTROPHILS ABSOLUTE: 6.5 K/UL (ref 1.7–7.7)
NEUTROPHILS RELATIVE PERCENT: 75.7 %
PDW BLD-RTO: 14.6 % (ref 12.4–15.4)
PLATELET # BLD: 319 K/UL (ref 135–450)
PMV BLD AUTO: 7.8 FL (ref 5–10.5)
POTASSIUM SERPL-SCNC: 3.9 MMOL/L (ref 3.5–5.1)
PRO-BNP: 159 PG/ML (ref 0–124)
RBC # BLD: 4.01 M/UL (ref 4–5.2)
SODIUM BLD-SCNC: 136 MMOL/L (ref 136–145)
TOTAL PROTEIN: 6.2 G/DL (ref 6.4–8.2)
TROPONIN: <0.01 NG/ML
TROPONIN: <0.01 NG/ML
WBC # BLD: 8.6 K/UL (ref 4–11)

## 2022-12-31 PROCEDURE — 85025 COMPLETE CBC W/AUTO DIFF WBC: CPT

## 2022-12-31 PROCEDURE — 36415 COLL VENOUS BLD VENIPUNCTURE: CPT

## 2022-12-31 PROCEDURE — 84484 ASSAY OF TROPONIN QUANT: CPT

## 2022-12-31 PROCEDURE — 93005 ELECTROCARDIOGRAM TRACING: CPT | Performed by: EMERGENCY MEDICINE

## 2022-12-31 PROCEDURE — 71045 X-RAY EXAM CHEST 1 VIEW: CPT

## 2022-12-31 PROCEDURE — 99285 EMERGENCY DEPT VISIT HI MDM: CPT

## 2022-12-31 PROCEDURE — 83880 ASSAY OF NATRIURETIC PEPTIDE: CPT

## 2022-12-31 PROCEDURE — 80053 COMPREHEN METABOLIC PANEL: CPT

## 2022-12-31 RX ORDER — DILTIAZEM HYDROCHLORIDE 120 MG/1
120 CAPSULE, COATED, EXTENDED RELEASE ORAL DAILY
Qty: 90 CAPSULE | Refills: 3 | Status: SHIPPED | OUTPATIENT
Start: 2022-12-31

## 2023-01-01 LAB
EKG ATRIAL RATE: 91 BPM
EKG DIAGNOSIS: NORMAL
EKG P AXIS: 36 DEGREES
EKG P-R INTERVAL: 148 MS
EKG Q-T INTERVAL: 370 MS
EKG QRS DURATION: 80 MS
EKG QTC CALCULATION (BAZETT): 455 MS
EKG R AXIS: 14 DEGREES
EKG T AXIS: 64 DEGREES
EKG VENTRICULAR RATE: 91 BPM

## 2023-01-01 PROCEDURE — 93010 ELECTROCARDIOGRAM REPORT: CPT | Performed by: INTERNAL MEDICINE

## 2023-01-01 NOTE — ED PROVIDER NOTES
[facility]        Pt Name: Pooja Paul  MRN: [id number]  [place] 1950  Date of evaluation: 12/31/2022  Provider: EVERETTE Card  PCP: Alison Hampton MD  Note Started: 7:29 PM EST       ADRYAN. I have evaluated this patient. My supervising physician was available for consultation. CHIEF COMPLAINT       Chief Complaint   Patient presents with    Palpitations     Patient states she started having heart palpitations as well as chest pressure that began this evening. Patient states this hasn't had this issue since her ablasion in 2021. HISTORY OF PRESENT ILLNESS      Chief Complaint: Chest pressure, heart palpitations    Pooja Paul is a 81 y.o. female who presents with sudden onset heart palpitations and chest pressure while watching football game. She monitored her pulse during this event, noted that it went up to the 170s. She has a history of A. fib, and had an ablation in the past.  Since that time she has not had any issues. She reports that her symptoms resolved in the ambulance ride on the waiting list department. In the emergency [facility] today she reports no symptoms. Her chest pressure and palpitations have resolved. She has no leg swelling. No fevers or chills. No cough or sore throat. No belly pain or back pain. She recently ran out of her diltiazem extended release. She is taking 1 capsule of regular in the morning, and 1 capsule at night. This evening she forgot to take her nightly dose. REVIEW OF SYSTEMS       10 system ROS was performed and was negative except as noted in HPI.      PAST MEDICAL HISTORY     Past Medical History:   Diagnosis Date    Allergic     Anesthesia complication     family hx-father-at at age 80 having hip replacement revision surgery-had tia's x2 while under anes-but also had hx chf-had dificuly with speech when coming out from anes    Anxiety     Arthritis left ankle, bilateral hands    Arthritis of left hip 2/2/2016    Arthritis of right hip 4/19/2016    Asthma     At risk for falls     uses a cane    Atrial fibrillation with rapid ventricular response (HCC)     Atrial flutter ([address] 4/25/2018    Chronic maxillary sinusitis 5/24/2017    CTEPH (chronic thromboembolic pulmonary hypertension) ([address] 8/26/2016    Depression     pt stated r/t bad marriage/alcoholic spouse    Diabetes mellitus ([address]     borderline    Disc disease, degenerative, lumbar or lumbosacral 2/20/2017    Hepatic steatosis 4/26/2019    History of total hip arthroplasty 2/28/2017    Hypertension     Leg cramps     patient states very severe, requires immediate potassium administration    Migraines, basilar     last migraine 10 years ago    Mild persistent asthma without complication 2/39/5104    Obesity, Class III, BMI 40-49.9 (morbid obesity) ([address] 4/19/2016    HUSSAIN (obstructive sleep apnea) 10/14/2013    Osteoarthritis, hip, bilateral 2016    Bilateral hip arthroplasty    Panic attacks     Pneumonia 2015    Primary osteoarthritis of both knees 9/19/2017    Primary osteoarthritis of left knee 12/15/2016    Pulmonary emboli ([address] 8/4/2016    Pulmonary HTN ([address] 7/21/2016    Pure hypercholesterolemia 2/13/2019    Restless leg syndrome     Rhinitis, chronic 3/26/2014    Sleep apnea     wears CPAP @13    Stress incontinence     Tear of left rotator cuff 1/23/2018    Thyroid disease     hypothyroid    Wears glasses        SURGICAL HISTORY     Past Surgical History:   Procedure Laterality Date    ATRIAL ABLATION SURGERY      BACK SURGERY  2004    LUMBAR FUSION    CATARACT REMOVAL Bilateral     COLONOSCOPY      EYE SURGERY Right 2010    retinal tear repaired    2018 [facility]    right ring finger severe laceration, fracture    HIP ARTHROPLASTY Right 4-19-16    HYSTERECTOMY (CERVIX STATUS UNKNOWN)  1993    JOINT REPLACEMENT Left 2/2/16    left hip    TONSILLECTOMY  1972    TOTAL KNEE ARTHROPLASTY Right 3/16/2021    RIGHT ROBOTIC ASSISTED TOTAL KNEE ARTHROPLASTY (65933, 66362) - [employer] performed by Erick Zhang MD at [address] [place] [address]       Discharge Medication List as of 12/31/2022 11:33 PM        CONTINUE these medications which have NOT CHANGED    Details   Amino Acids (DAILY AMINO 6000 PO) Take 20 mg by mouth dailyHistorical Med      pramipexole (MIRAPEX) 0.5 MG tablet 1.5 tab q 5 PM and 1.5 tab qHS, Disp-270 tablet, R-1Normal      buPROPion (WELLBUTRIN XL) 300 MG extended release tablet Take 1 tablet by mouth every morning, Disp-90 tablet, R-0Normal      zoster recombinant adjuvanted vaccine (SHINGRIX) 50 MCG/0.5ML SUSR injection Inject 0.5 mLs into the muscle See Admin Instructions 1 dose now and repeat in 2-6 months, Disp-0.5 mL, R-0Print      !! rivaroxaban (XARELTO) 20 MG TABS tablet Take 1 tablet by mouth daily (with breakfast), Disp-25 tablet, R-0Sample      !!  Semaglutide,0.25 or 0.5MG/DOS, (OZEMPIC, 0.25 OR 0.5 MG/DOSE,) 2 MG/1.5ML SOPN Inject 0.5 mg into the skin once a week, Disp-2 Adjustable Dose Pre-filled Pen Syringe, R-0Sample      levalbuterol (XOPENEX HFA) 45 MCG/ACT inhaler Inhale 1 puff into the lungs every 6 hours as needed for Wheezing, Disp-1 each, R-3Normal      tiotropium (SPIRIVA RESPIMAT) 1.25 MCG/ACT AERS inhaler Inhale 2 puffs into the lungs dailyHistorical Med      lisinopril (PRINIVIL;ZESTRIL) 10 MG tablet TAKE ONE TABLET BY MOUTH ONCE NIGHTLY, Disp-90 tablet, R-3Normal      spironolactone (ALDACTONE) 25 MG tablet TAKE ONE TABLET BY MOUTH DAILY, Disp-90 tablet, R-3Normal      potassium chloride (KLOR-CON M) 10 MEQ extended release tablet Take 1 tablet by mouth daily, Disp-90 tablet, R-3Normal      !! rivaroxaban (XARELTO) 20 MG TABS tablet Take 1 tablet by mouth daily (with breakfast), Disp-42 tablet, R-0Sample      furosemide (LASIX) 40 MG tablet Take 1 tablet by mouth in the morning., Disp-90 tablet, R-3Normal      melatonin 10 MG CAPS capsule Take 10 mg by mouth nightlyHistorical Med      venlafaxine (EFFEXOR XR) 37.5 MG extended release capsule Take 1 capsule by mouth daily, Disp-30 capsule, R-5Normal      !! Semaglutide,0.25 or 0.5MG/DOS, (OZEMPIC, 0.25 OR 0.5 MG/DOSE,) 2 MG/1.5ML SOPN Inject 0.5 mg into the skin once a week, Disp-2 pen, R-0Sample      Ciclesonide (ALVESCO) 160 MCG/ACT AERS Inhale into the lungsHistorical Med      fluticasone (FLONASE) 50 MCG/ACT nasal spray 1 spray by Each Nostril route dailyHistorical Med      azelastine (ASTELIN) 0.1 % nasal spray 1 spray by Nasal route 2 times daily 1 Spray in each nostril, Disp-1 Bottle, R-2Normal      [facility]30[facility], Disp-1 each,R-0, PrintExp 5 years      Multiple Vitamins-Minerals (THERAPEUTIC MULTIVITAMIN-MINERALS) tablet Take 1 tablet by mouth daily Historical Med      CPAP Machine MISC Historical Med      Magnesium Citrate 100 MG TABS Take 1 tablet by mouth daily, Disp-90 tablet, R-3NO PRINT      EPIPEN 2-MIR 0.3 MG/0.3ML SOAJ injection MANDI       !! - Potential duplicate medications found. Please discuss with provider.           ALLERGIES     Atorvastatin, Penicillins, Simvastatin, Cephalexin, Cephalosporins, Food, Other, Shellfish-derived products, and Sulfa antibiotics    FAMILYHISTORY       Family History   Problem Relation Age of Onset    Alcohol Abuse Sister     Arthritis Sister     Stroke Maternal Grandmother     Thyroid Disease Maternal Grandmother     Thyroid Disease Maternal Grandfather     Heart Disease Maternal Grandfather     Alcohol Abuse Maternal Grandfather     Substance Abuse Maternal Grandfather     Hypertension Mother     Thyroid Disease Mother     Anxiety Disorder Mother     Arthritis Mother     Cataracts Mother     Heart Disease Mother     Asthma Mother     Thyroid Disease Father     Heart Failure Father     Heart Disease Father     Arthritis Brother     High Cholesterol Brother     Heart Disease Maternal Uncle     Heart Disease Paternal Uncle     Cancer Paternal Uncle     Alcohol Abuse Paternal Grandfather     Substance Abuse Paternal Grandfather         SOCIAL HISTORY       Social History     Tobacco Use    Smoking status: Former     Packs/day: 0.50     Years: 5.00     Pack years: 2.50     Types: Cigarettes     Quit date: 10/5/2013     Years since quittin.2    Smokeless tobacco: Never    Tobacco comments:     smoked for a total of 5yr total   Vaping Use    Vaping Use: Never used   Substance Use Topics    Alcohol use: Yes     Comment: RARE    Drug use: No       SCREENINGS    [place] Coma Scale  Eye Opening: Spontaneous  Best Verbal Response: Oriented  Best Motor Response: Obeys commands  Bharat Coma Scale Score: 15      Is this patient to be included in the SEP-1 Core Measure due to severe sepsis or septic shock? No   Exclusion criteria - the patient is NOT to be included for SEP-1 Core Measure due to: Infection is not suspected    HEART Score for Major Cardiac Events from [employer]  on 2022  ** All calculations should be rechecked by clinician prior to use **    RESULT SUMMARY:  3 points  Low Score (0-3 points)    Risk of MACE of 0.9-1.7%. INPUTS:  History --> 0 = Slightly suspicious  EKG --> 0 = Normal  Age --> 2 = ?65  Risk factors --> 1 = 1-2 risk factors  Initial troponin --> 0 = ?normal limit      PHYSICAL EXAM     Vitals: BP (!) 120/59   Pulse 72   Temp 98.7 °F (37.1 °C) (Oral)   Resp 19   Ht 5' 4\" (1.626 m)   Wt 250 lb (113.4 kg)   SpO2 96%   BMI 42.91 kg/m²    General: awake, alert, no apparent distress  Pupils: equal, reactive  Eyes: EOM intact, conjunctiva clear, no discharge  Head: Non-traumatic  Neck: Supple  Mouth: Moist, no oral lesions, no tonsillar enlargement  Heart: Rate as noted, regular rhythm, no murmur or rubs. Chest/Lungs: CTAB, no wheezes or crackles  Abdomen: soft, nondistended, no tenderness to palpation   Back: No midline tenderness.  No CVA tenderness  Extremities:  cap refill <2 UE/LE, no tenderness of calves, no edema  Neuro: Moving all extremities, no facial droop, no slurred speech, answers questions appropriately. Skin: Warm. No visible rash, lesions, or bruising       DIAGNOSTIC RESULTS   LABS:    Labs Reviewed   COMPREHENSIVE METABOLIC PANEL - Abnormal; Notable for the following components:       Result Value    Glucose 147 (*)     Total Protein 6.2 (*)     All other components within normal limits   BRAIN NATRIURETIC PEPTIDE - Abnormal; Notable for the following components:    Pro- (*)     All other components within normal limits   CBC WITH AUTO DIFFERENTIAL   TROPONIN   TROPONIN       EKG: When ordered, EKG's are interpreted by the Emergency Department Physician in the absence of a cardiologist.  Please see their note for interpretation of EKG. RADIOLOGY:   XR CHEST PORTABLE   Final Result   No radiographic evidence of acute cardiopulmonary disease. No results found. PROCEDURES       CRITICAL CARE TIME   I personally saw the patient and independently provided 0 minutes of non-concurrent critical care time out of the total critical care time provided. This excludes time spent doing separately billable procedures. This includes time at the bedside, data interpretation, medication management, obtaining critical history from collateral sources if the patient is unable to provide it directly, and physician consultation. Specifics of interventions taken and potentially life-threatening diagnostic considerations are listed above in the medical decision making.     CONSULTS   None    ED COURSE and MEDICAL DECISIONS MAKING:   Vitals:    Vitals:    12/31/22 1936 12/31/22 2030 12/31/22 2200 12/31/22 2300   BP: 132/83 (!) 115/53 112/68 (!) 120/59   Pulse: 97 83 72 72   Resp: 20 23 19 19   Temp: 98.7 °F (37.1 °C)      TempSrc: Oral      SpO2: 96%      Weight: 250 lb (113.4 kg)      Height: 5' 4\" (1.626 m)        MEDICATIONS:Medications - No data to display 79-year-old female presents after episode of chest pressure and tachycardia. She failed to take her usual medications this evening, and was watching football game in which she became excited. We observed her for multiple hours in the emergency department, rhythm did not recur. She has a scheduled follow-up on the fourth. I refilled her medications. She and her partner are both aware that they should return to the ED for new or worsening symptoms. Her heart score is 3. Because her rhythm problem was caused by missed dose of medications, and her work-up today is unremarkable, I feel she is safe for discharge at this time. FINAL IMPRESSION      1. Heart palpitations          DISPOSITION/PLAN   DISPOSITION Decision To Discharge 12/31/2022 10:59:33 PM      PATIENT REFERRED TO:  No follow-up provider specified.     DISCHARGE MEDICATIONS:  Discharge Medication List as of 12/31/2022 11:33 PM          DISCONTINUED MEDICATIONS:  Discharge Medication List as of 12/31/2022 11:33 PM               EVERETTE Unger (electronically signed)       Ana Cristina Cooperma  01/01/23 0947

## 2023-01-01 NOTE — DISCHARGE INSTRUCTIONS
-Come back if you feel worse.   -Follow up with cardiology on the 4th.   -I refilled your Cardizem CD.

## 2023-01-03 DIAGNOSIS — M17.12 PRIMARY OSTEOARTHRITIS OF LEFT KNEE: Primary | ICD-10-CM

## 2023-01-04 ENCOUNTER — TELEPHONE (OUTPATIENT)
Dept: CARDIOLOGY CLINIC | Age: 73
End: 2023-01-04

## 2023-01-04 PROBLEM — J45.30 MILD PERSISTENT ASTHMA WITHOUT COMPLICATION: Status: RESOLVED | Noted: 2020-09-28 | Resolved: 2023-01-04

## 2023-01-04 PROBLEM — R07.89 CHEST TIGHTNESS: Status: RESOLVED | Noted: 2022-10-25 | Resolved: 2023-01-04

## 2023-01-04 PROBLEM — R07.9 CHEST PAIN: Status: RESOLVED | Noted: 2022-10-25 | Resolved: 2023-01-04

## 2023-01-04 PROBLEM — M25.562 ACUTE PAIN OF LEFT KNEE: Status: RESOLVED | Noted: 2021-09-16 | Resolved: 2023-01-04

## 2023-01-04 RX ORDER — TRAMADOL HYDROCHLORIDE 50 MG/1
TABLET ORAL
Qty: 30 TABLET | Refills: 0 | Status: SHIPPED | OUTPATIENT
Start: 2023-01-04 | End: 2023-01-11

## 2023-01-04 NOTE — PROGRESS NOTES
[address]   Electrophysiology  BRICE Redding-CNP  Attending EP: Dr. Severiano Mckeon    Date: 1/9/2023  I had the privilege of visiting Moira Weber in the office. Chief Complaint:   Chief Complaint   Patient presents with    Atrial Fibrillation     History of Present Illness: History obtained from patient and medical record. Moira Weber is 81 y.o. female with a past medical history of atrial fibrillation, HUSSAIN, obesity, dCHF, HTN, and PE. In January of 2020, she presented to the hospital with palpitations and was found to be in atrial fibrillation with RVR. She has PAF and normally converts to NSR spontaneously. She is symptomatic with her afib with symptoms of lightheadedness, SOB, and chest pressure. On 1/13/20, she was cardioverted and found to have long conversion pauses post procedure (Hx of syncope after conversion at home). S/p RFA of afib with PVI and ablation of CTI flutter (3/27/20, Dr. Anna Gatica)    -Interval history: Today, Moira Weber is being seen for routine EP follow up. She was just seen in the ER last week for palpitations, but her work up was unremarkable. She states she was in AF. She states her HR was in the 180s in the ambulance and the episode lasted 2 hours, but she was back in sinus rhythm upon arrival to the ER. She states she had been out of her cardizem for 4 days prior to this event. She feels heart racing, fatigue, SOB, and dizziness. She is due to have knee surgery in February. Denies having chest pain, palpitations, shortness of breath, orthopnea/PND, cough, or dizziness at the time of this visit. With regard to medication therapy the patient has been compliant with prescribed regimen. They have tolerated therapy to date. Allergies:   Allergies   Allergen Reactions    Atorvastatin Other (See Comments)     Muscle Pain, all statins     Penicillins Anaphylaxis    Simvastatin Other (See Comments)     Muscle Pain    Cephalexin Hives and Itching    Cephalosporins Hives and Itching    Food Diarrhea     Milk , shellfish , gluten. ..may have bouts of diarrhea, constipation or flatulus. (RD spoke to pt regarding \"milk allergy\", pt is not allergic to milk. She does not drink milk, but consumes milk in other products and consumes dairy products. Had one reactions to shellfish years ago and now can tolerate one serving of shellfish once per week. Avoids gluten as much as she can, but does not have celiac disease.)    Other      Environmental -cats,dogs,dust    Shellfish-Derived Products      hives    Sulfa Antibiotics      Can take Celebrex, Pt denies 8/1/18     Home Medications:  Prior to Visit Medications    Medication Sig Taking? Authorizing Provider   traMADol (ULTRAM) 50 MG tablet TAKE ONE TO TWO TABLETS BY MOUTH EVERY 8 HOURS AS NEEDED FOR PAIN.  REDUCE DOSES TAKEN AS PAIN BECOMES MANAGEABLE Yes Trista Jackson MD   dilTIAZem (CARDIZEM CD) 120 MG extended release capsule Take 1 capsule by mouth daily Yes EVERETTE Boland   pramipexole (MIRAPEX) 0.5 MG tablet 1.5 tab q 5 PM and 1.5 tab qHS Yes BRICE Gonzalez   buPROPion (WELLBUTRIN XL) 300 MG extended release tablet Take 1 tablet by mouth every morning Yes Deborah Mathis MD   zoster recombinant adjuvanted vaccine [facility]) 50 MCG/0.5ML SUSR injection Inject 0.5 mLs into the muscle See Admin Instructions 1 dose now and repeat in 2-6 months Yes Marcellus Asencio MD   levalbuterol [facility]) 45 MCG/ACT inhaler Inhale 1 puff into the lungs every 6 hours as needed for Wheezing Yes Ian Roman MD   tiotropium (SPIRIVA RESPIMAT) 1.25 MCG/ACT AERS inhaler Inhale 2 puffs into the lungs daily Yes Historical Provider, MD   lisinopril (PRINIVIL;ZESTRIL) 10 MG tablet TAKE ONE TABLET BY MOUTH ONCE NIGHTLY Yes Claude Arnold MD   spironolactone (ALDACTONE) 25 MG tablet TAKE ONE TABLET BY MOUTH DAILY Yes Claude Arnold MD   potassium chloride (KLOR-CON M) 10 MEQ extended release tablet Take 1 tablet by mouth daily Yes Lili Babb MD   rivaroxaban (XARELTO) 20 MG TABS tablet Take 1 tablet by mouth daily (with breakfast) Yes Liset Méndez MD   furosemide (LASIX) 40 MG tablet Take 1 tablet by mouth in the morning.  Yes Lili Babb MD   melatonin 10 MG CAPS capsule Take 10 mg by mouth nightly Yes Historical Provider, MD   Semaglutide,0.25 or 0.5MG/DOS, (OZEMPIC, 0.25 OR 0.5 MG/DOSE,) 2 MG/1.5ML SOPN Inject 0.5 mg into the skin once a week Yes Liset Méndez MD   Ciclesonide (ALVESCO) 160 MCG/ACT AERS Inhale into the lungs Yes Historical Provider, MD   fluticasone (FLONASE) 50 MCG/ACT nasal spray 1 spray by Each Nostril route daily Yes Historical Provider, MD   azelastine (ASTELIN) 0.1 % nasal spray 1 spray by Nasal route 2 times daily 1 Spray in each nostril Yes Liset Méndez MD   Handicap Placard MISC by Does not apply route Exp 5 years Yes Eloy Obrien, APRN - CNP   Multiple Vitamins-Minerals (THERAPEUTIC MULTIVITAMIN-MINERALS) tablet Take 1 tablet by mouth daily  Yes Historical Provider, MD   CPAP Machine MISC by Does not apply route Yes Historical Provider, MD   Magnesium Citrate 100 MG TABS Take 1 tablet by mouth daily Yes Lili Babb MD   EPIPEN 2-MIR 0.3 MG/0.3ML SOAJ injection  Yes Historical Provider, MD      Past Medical History:  Past Medical History:   Diagnosis Date    Allergic     Anesthesia complication     family hx-father-at at age 80 having hip replacement revision surgery-had tia's x2 while under anes-but also had hx chf-had dificuly with speech when coming out from anes    Anxiety     Arthritis     left ankle, bilateral hands    Arthritis of left hip 2/2/2016    Arthritis of right hip 4/19/2016    Asthma     At risk for falls     uses a cane    Atrial fibrillation with rapid ventricular response (HCC)     Atrial flutter ([address] 4/25/2018    Chronic maxillary sinusitis 5/24/2017    CTEPH (chronic thromboembolic pulmonary hypertension) ([address] 8/26/2016    Depression     pt stated r/t bad marriage/alcoholic spouse    Diabetes mellitus ([address]     borderline    Disc disease, degenerative, lumbar or lumbosacral 2/20/2017    Hepatic steatosis 4/26/2019    History of total hip arthroplasty 2/28/2017    Hypertension     Leg cramps     patient states very severe, requires immediate potassium administration    Migraines, basilar     last migraine 10 years ago    Mild persistent asthma without complication 9/62/7512    Obesity, Class III, BMI 40-49.9 (morbid obesity) ([address] 4/19/2016    HUSSAIN (obstructive sleep apnea) 10/14/2013    Osteoarthritis, hip, bilateral 2016    Bilateral hip arthroplasty    Panic attacks     Pneumonia 2015    Primary osteoarthritis of both knees 9/19/2017    Primary osteoarthritis of left knee 12/15/2016    Pulmonary emboli ([address] 8/4/2016    Pulmonary HTN ([facility] 7/21/2016    Pure hypercholesterolemia 2/13/2019    Restless leg syndrome     Rhinitis, chronic 3/26/2014    Sleep apnea     wears CPAP @13    Stress incontinence     Tear of left rotator cuff 1/23/2018    Thyroid disease     hypothyroid    Wears glasses      Past Surgical History:    has a past surgical history that includes Cataract removal (Bilateral); Finger surgery (1988); eye surgery (Right, 2010); back surgery (2004); Tonsillectomy (1972); Hysterectomy (1993); Colonoscopy; joint replacement (Left, 2/2/16); Hip Arthroplasty (Right, 4-19-16); Atrial ablation surgery; and Total knee arthroplasty (Right, 3/16/2021). Social History:  Reviewed. reports that she quit smoking about 9 years ago. Her smoking use included cigarettes. She has a 2.50 pack-year smoking history. She has never used smokeless tobacco. She reports current alcohol use. She reports that she does not use drugs. Family History:  Reviewed. family history includes Alcohol Abuse in her maternal grandfather, paternal grandfather, and sister;  Anxiety Disorder in her mother; Arthritis in her brother, mother, and sister; Asthma in her mother; Cancer in her paternal uncle; Cataracts in her mother; Heart Disease in her father, maternal grandfather, maternal uncle, mother, and paternal uncle; Heart Failure in her father; High Cholesterol in her brother; Hypertension in her mother; Stroke in her maternal grandmother; Substance Abuse in her maternal grandfather and paternal grandfather; Thyroid Disease in her father, maternal grandfather, maternal grandmother, and mother. Review of System:  Constitutional: Negative for fever, night sweats, chills, weight changes, or weakness  Skin: Negative for rash, dry skin, pruritus, bruising, bleeding, blood clots, or changes in skin pigment  HEENT: Negative for vision changes, ringing in the ears, sore throat, dysphagia, or swollen lymph nodes  Respiratory: Reviewed in HPI  Cardiovascular: Reviewed in HPI  Gastrointestinal: Negative for abdominal pain, N/V/D, constipation, or black/tarry stools  Genito-Urinary: Negative for dysuria, incontinence, urgency, or hematuria  Musculoskeletal: Positive for left knee pain. Negative for joint swelling, muscle pain, or injuries  Neurological/Psych: Negative for confusion, seizures, dizziness, headaches, balance issues or TIA-like symptoms. No anxiety, depression, or insomnia    Physical Examination:  Vitals:    01/09/23 1104   BP: 120/80   Pulse: 78   SpO2: 94%        Wt Readings from Last 3 Encounters:   01/09/23 251 lb 6.4 oz (114 kg)   12/31/22 250 lb (113.4 kg)   12/21/22 245 lb (111.1 kg)     Constitutional: Cooperative and in no apparent distress, and appears well nourished  Skin: Warm and pink; no pallor, cyanosis, bruising, or clubbing  HEENT: Symmetric and normocephalic. PERRL, EOM intact. + Glasses. Conjunctiva pink with clear sclera. Mucus membranes pink and moist. Teeth intact. Thyroid smooth without nodules or goiter  Respiratory: Respirations symmetric and unlabored. Lungs clear to auscultation bilaterally, no wheezing, rhonchi, or crackles  Cardiovascular:  Regular rate and rhythm.  S1/S2 present without murmurs, rubs, or gallops. Peripheral pulses 2+, capillary refill < 3 seconds. No elevation of JVP. No peripheral edema  Gastrointestinal: Abdomen soft and round. Bowel sounds normoactive in all quadrants without tenderness or masses. + Obese  Musculoskeletal: Bilateral upper and lower extremity strength 5/5 with full ROM. Neurological/Psych: Awake and orientated to person, place and time. Calm affect, appropriate mood. Pertinent labs, diagnostic, device, and imaging results reviewed as a part of this visit    LABS    CBC:   Lab Results   Component Value Date    WBC 8.6 2022    HGB 12.6 2022    HCT 37.6 2022    MCV 93.6 2022     2022     BMP:   Lab Results   Component Value Date    CREATININE 0.9 2022    BUN 19 2022     2022    K 3.9 2022     2022    CO2 23 2022     Estimated Creatinine Clearance: 72 mL/min (based on SCr of 0.9 mg/dL). Thyroid:   Lab Results   Component Value Date    TSH 1.51 2022    T5BUMVF 1.28 07/10/2020     Lipid Panel:   Lab Results   Component Value Date/Time    CHOL 208 2022 12:04 PM    HDL 60 2022 12:04 PM    TRIG 139 2022 12:04 PM     LFTs:  Lab Results   Component Value Date    ALT 23 2022    AST 22 2022    ALKPHOS 100 2022    BILITOT <0.2 2022     Coags:   Lab Results   Component Value Date    PROTIME 15.1 (H) 2022    INR 1.20 (H) 2022    APTT 33.1 2022       EC2023  - NSR. Rate 75, QRS 92, QTc 390    Echo:    Technically difficult study due to body habitus. Definity was administered. Normal left ventricle size, wall thickness and systolic function with an   estimated ejection fraction of 60-65%. Mild concentric left ventricular hypertrophy. No regional wall motion abnormalities are seen. Normal diastolic filling pattern for age. The aortic valve appears sclerotic but opens well.  Mild aortic regurgitation. Mild tricuspid regurgitation. PASP estimated at 30 mmHg. GXT: 10/22   There is normal isotope uptake at stress and rest. There is no evidence of    myocardial ischemia or scar. Normal myocardial perfusion study. Normal LV size and systolic function. Event Monitor: 1/20  - AF burden 3%. Average HR 95, low 66, high 134. Longest episode ~ 9 hours. PAC and PVC burden <1%. Average sinus rate 71, low 32, high 163    Assessment:    1. Persistent Atrial Fibrillation  - S/p DCCV (1/13/20). S/p RFA of afib with PVI and ablation of CTI flutter (3/27/20, Dr. Abbe Walton)    - Currently in NSR  - Continue cardizem 120 mg QD   ~ Ok to use PRN cardizem 60 mg for breakthrough episodes of PAF    - [id number] score: 4 (Age, Gender, HTN, CHF) ; [id number] Vasc score and anticoagulation discussed. High risk for stroke and thromboembolism. Anticoagulation is recommended. Risk of bleeding was discussed.  ~ On Xarelto 20 mg QD; tolerating well (CrCl >50 ml/min)    - Afib risk factors including age, HTN, obesity, inactivity and HUSSAIN were discussed with patient. Risk factor modification recommended   ~ TSH 2.37 (10/19)       - At this point, she has been doing pretty well. Her episode on 12/31 occurred when off her cardizem due to issue with her supply. Discussed increasing daily cardizem dose or continuing current dose with use of PRN. She would like to continue daily cardizem 120 mg daily with use of PRN cardizem. She will take extra 60 mg at onset of episode, then repeat dose 1-2 hours later if she remains in AF. She will call if episode continues following 2nd dose. If she continues to have episode, may need to consider AAD vs repeat ablation, but at this point her burden is low    2. Implantable Loop Recorder  - S/p ILR insertion  -The CIED was interrogated and programmed and I supervised and reviewed all the data.  All findings and changes are in device interrogation sheat and reflect my personal interpretation and changes and is scanned to Epic  - Device shows: Episodes of AT/AF with rates up to the 180s on 12/31/22  - Follow up with device clinic as scheduled    3. HTN  - Controlled: Goal <130/80  - Continue current medications  - Encouraged to monitor and log BP readings at home, then bring log to next visit  - Discussed importance of low sodium diet, weight control and exercise    4. Chronic diastolic heart failure (NYHA Class II)  - Appears compensated   ~ EF 60% per echo  - Continue with lasix 40 mg QD, spironolactone 25 mg QD  - Aggressive medical therapy with risk factor modification  - Discussed with patient importance of monitoring weight, low sodium diet and fluid restriction  - Followed by CHF team    5. HUSSAIN  - Stable: Uses CPAP  - Encourage to use CPAP to prevent long term effects of untreated HUSSAIN    6. Obesity  - Body mass index is 43.15 kg/m². - Complicating assessment and treatment. Placing patient at risk for multiple co-morbidities as well as early death and contributing to the patient's presentation  - Discussed weight loss and patient was encouraged to reduce calorie intake and increase exercise    7. Hx of PE   - On Xarelto (recommend lovenox bridge for surgery)    Plan:  No changes  Use as needed cardizem for breakthrough episodes of afib  Ok to proceed with knee surgery from EP standpoint. Recommend using lovenox given history of pulmonary embolus    F/U: Follow-up with Dr. Kateryna Guerrero in 4-6 months  -Call [facility] at [phone] with any questions    Diet & Exercise: The patient is counseled to follow a low salt diet to assure blood pressure remains controlled for cardiovascular risk factor modification  The patient is counseled to avoid excess caffeine, and energy drinks as this may exacerbated ectopy and arrhythmia  The patient is counseled to lose weight to control cardiovascular risk factors  Exercise program discussed:  To improve overall cardiovascular health, the patient is instructed to increase cardiovascular related activities with a goal of 150 min/week of moderate level activity or 10,000 steps per day. Encouraged to perform as much activity as tolerated     I have addressed the patient's cardiac risk factors and adjusted pharmacologic treatment as needed. In addition, I have reinforced the need for patient directed risk factor modification. I independently reviewed the EKG and device interrogation    All questions and concerns were addressed with the patient. Alternatives to treatment were discussed. Thank you for allowing to us to participate in the care of Codie Paige    30 minutes spent preparing to see patient including reviewing patient history/prior tests/prior consults, performing a medical exam, counseling and educating patient/family/caregiver, ordering medications/tests/procedures, referring and communicating with PCPs and other pertinent consultants, documenting information in the EMR, independently interpreting results and communicating to family and coordination of patient care.      BRICE Chambers-CNP  [facility]   Office: [phone]

## 2023-01-04 NOTE — TELEPHONE ENCOUNTER
DYLON. Patient missed an appointment with Dr. Riley Perry today. Patient would like Dr. Riley Perry to know she was seen in the ER on 12/31/22 and went into afib and went out of afib while in the ER. Patient has an appointment with Liana Jewell on 1/9/23.   jeronimo

## 2023-01-06 ENCOUNTER — CARE COORDINATION (OUTPATIENT)
Dept: CARE COORDINATION | Age: 73
End: 2023-01-06

## 2023-01-06 NOTE — CARE COORDINATION
ACM attempted to f/u with patient today after her recent visit to the ED. Pt was UTR and ACM left VM message with contact information. ACM to send My Chart UTR letter as well. ACM to make another outreach attempt at a later date/time.

## 2023-01-09 ENCOUNTER — TELEPHONE (OUTPATIENT)
Dept: ORTHOPEDIC SURGERY | Age: 73
End: 2023-01-09

## 2023-01-09 ENCOUNTER — NURSE ONLY (OUTPATIENT)
Dept: CARDIOLOGY CLINIC | Age: 73
End: 2023-01-09

## 2023-01-09 ENCOUNTER — HOSPITAL ENCOUNTER (OUTPATIENT)
Age: 73
Discharge: HOME | End: 2023-01-09
Payer: MEDICARE

## 2023-01-09 ENCOUNTER — OFFICE VISIT (OUTPATIENT)
Dept: CARDIOLOGY CLINIC | Age: 73
End: 2023-01-09
Payer: MEDICARE

## 2023-01-09 VITALS
HEIGHT: 64 IN | OXYGEN SATURATION: 94 % | HEART RATE: 78 BPM | DIASTOLIC BLOOD PRESSURE: 80 MMHG | BODY MASS INDEX: 42.92 KG/M2 | WEIGHT: 251.4 LBS | SYSTOLIC BLOOD PRESSURE: 120 MMHG

## 2023-01-09 DIAGNOSIS — I50.32 CHRONIC DIASTOLIC HEART FAILURE (HCC): ICD-10-CM

## 2023-01-09 DIAGNOSIS — Z13.21 ENCOUNTER FOR VITAMIN DEFICIENCY SCREENING: ICD-10-CM

## 2023-01-09 DIAGNOSIS — I10 BENIGN ESSENTIAL HTN: Chronic | ICD-10-CM

## 2023-01-09 DIAGNOSIS — I48.0 PAROXYSMAL ATRIAL FIBRILLATION (HCC): Primary | Chronic | ICD-10-CM

## 2023-01-09 DIAGNOSIS — G47.33 OSA (OBSTRUCTIVE SLEEP APNEA): Chronic | ICD-10-CM

## 2023-01-09 DIAGNOSIS — E78.00 PURE HYPERCHOLESTEROLEMIA: ICD-10-CM

## 2023-01-09 DIAGNOSIS — E66.01 OBESITY, CLASS III, BMI 40-49.9 (MORBID OBESITY) (HCC): ICD-10-CM

## 2023-01-09 DIAGNOSIS — Z86.711 HX OF PULMONARY EMBOLUS: ICD-10-CM

## 2023-01-09 LAB
A/G RATIO: 1.4 (ref 1.1–2.2)
ALBUMIN SERPL-MCNC: 4.2 G/DL (ref 3.4–5)
ALP BLD-CCNC: 98 U/L (ref 40–129)
ALT SERPL-CCNC: 22 U/L (ref 10–40)
ANION GAP SERPL CALCULATED.3IONS-SCNC: 15 MMOL/L (ref 3–16)
AST SERPL-CCNC: 17 U/L (ref 15–37)
BASOPHILS ABSOLUTE: 0.1 K/UL (ref 0–0.2)
BASOPHILS RELATIVE PERCENT: 1.2 %
BILIRUB SERPL-MCNC: 0.4 MG/DL (ref 0–1)
BUN BLDV-MCNC: 15 MG/DL (ref 7–20)
CALCIUM SERPL-MCNC: 9.5 MG/DL (ref 8.3–10.6)
CHLORIDE BLD-SCNC: 96 MMOL/L (ref 99–110)
CHOLESTEROL, TOTAL: 185 MG/DL (ref 0–199)
CO2: 20 MMOL/L (ref 21–32)
CREAT SERPL-MCNC: 0.8 MG/DL (ref 0.6–1.2)
EOSINOPHILS ABSOLUTE: 0.2 K/UL (ref 0–0.6)
EOSINOPHILS RELATIVE PERCENT: 2.9 %
GFR SERPL CREATININE-BSD FRML MDRD: >60 ML/MIN/{1.73_M2}
GLUCOSE BLD-MCNC: 111 MG/DL (ref 70–99)
HCT VFR BLD CALC: 40.4 % (ref 36–48)
HDLC SERPL-MCNC: 58 MG/DL (ref 40–60)
HEMOGLOBIN: 13.1 G/DL (ref 12–16)
LDL CHOLESTEROL CALCULATED: 99 MG/DL
LYMPHOCYTES ABSOLUTE: 1.2 K/UL (ref 1–5.1)
LYMPHOCYTES RELATIVE PERCENT: 24.1 %
MAGNESIUM: 2.1 MG/DL (ref 1.8–2.4)
MCH RBC QN AUTO: 30.8 PG (ref 26–34)
MCHC RBC AUTO-ENTMCNC: 32.4 G/DL (ref 31–36)
MCV RBC AUTO: 95.2 FL (ref 80–100)
MONOCYTES ABSOLUTE: 0.6 K/UL (ref 0–1.3)
MONOCYTES RELATIVE PERCENT: 11.1 %
NEUTROPHILS ABSOLUTE: 3.1 K/UL (ref 1.7–7.7)
NEUTROPHILS RELATIVE PERCENT: 60.7 %
PDW BLD-RTO: 14.5 % (ref 12.4–15.4)
PLATELET # BLD: 290 K/UL (ref 135–450)
PMV BLD AUTO: 8.5 FL (ref 5–10.5)
POTASSIUM SERPL-SCNC: 4.3 MMOL/L (ref 3.5–5.1)
PRO-BNP: 76 PG/ML (ref 0–124)
RBC # BLD: 4.24 M/UL (ref 4–5.2)
SODIUM BLD-SCNC: 131 MMOL/L (ref 136–145)
TOTAL PROTEIN: 7.1 G/DL (ref 6.4–8.2)
TRIGL SERPL-MCNC: 138 MG/DL (ref 0–150)
VITAMIN D 25-HYDROXY: 53.6 NG/ML
VLDLC SERPL CALC-MCNC: 28 MG/DL
WBC # BLD: 5.2 K/UL (ref 4–11)

## 2023-01-09 PROCEDURE — G8484 FLU IMMUNIZE NO ADMIN: HCPCS | Performed by: NURSE PRACTITIONER

## 2023-01-09 PROCEDURE — 83735 ASSAY OF MAGNESIUM: CPT

## 2023-01-09 PROCEDURE — 80053 COMPREHEN METABOLIC PANEL: CPT

## 2023-01-09 PROCEDURE — 80061 LIPID PANEL: CPT

## 2023-01-09 PROCEDURE — G8427 DOCREV CUR MEDS BY ELIG CLIN: HCPCS | Performed by: NURSE PRACTITIONER

## 2023-01-09 PROCEDURE — 3074F SYST BP LT 130 MM HG: CPT | Performed by: NURSE PRACTITIONER

## 2023-01-09 PROCEDURE — 36415 COLL VENOUS BLD VENIPUNCTURE: CPT

## 2023-01-09 PROCEDURE — 3079F DIAST BP 80-89 MM HG: CPT | Performed by: NURSE PRACTITIONER

## 2023-01-09 PROCEDURE — 1036F TOBACCO NON-USER: CPT | Performed by: NURSE PRACTITIONER

## 2023-01-09 PROCEDURE — 3017F COLORECTAL CA SCREEN DOC REV: CPT | Performed by: NURSE PRACTITIONER

## 2023-01-09 PROCEDURE — 85025 COMPLETE CBC W/AUTO DIFF WBC: CPT

## 2023-01-09 PROCEDURE — 1123F ACP DISCUSS/DSCN MKR DOCD: CPT | Performed by: NURSE PRACTITIONER

## 2023-01-09 PROCEDURE — 99214 OFFICE O/P EST MOD 30 MIN: CPT | Performed by: NURSE PRACTITIONER

## 2023-01-09 PROCEDURE — 82306 VITAMIN D 25 HYDROXY: CPT

## 2023-01-09 PROCEDURE — 83880 ASSAY OF NATRIURETIC PEPTIDE: CPT

## 2023-01-09 PROCEDURE — 1090F PRES/ABSN URINE INCON ASSESS: CPT | Performed by: NURSE PRACTITIONER

## 2023-01-09 PROCEDURE — G8399 PT W/DXA RESULTS DOCUMENT: HCPCS | Performed by: NURSE PRACTITIONER

## 2023-01-09 PROCEDURE — G8417 CALC BMI ABV UP PARAM F/U: HCPCS | Performed by: NURSE PRACTITIONER

## 2023-01-09 PROCEDURE — 93000 ELECTROCARDIOGRAM COMPLETE: CPT | Performed by: NURSE PRACTITIONER

## 2023-01-09 RX ORDER — DILTIAZEM HYDROCHLORIDE 60 MG/1
60 TABLET, FILM COATED ORAL 2 TIMES DAILY PRN
Qty: 60 TABLET | Refills: 2 | Status: SHIPPED | OUTPATIENT
Start: 2023-01-09

## 2023-01-09 NOTE — PATIENT INSTRUCTIONS
No changes  Use as needed cardizem for breakthrough episodes of afib  Ok to proceed with knee surgery from EP standpoint.  Recommend using lovenox given history of pulmonary embolus

## 2023-01-09 NOTE — TELEPHONE ENCOUNTER
Pt called in requesting a stronger pain medication.  She is taking Tramadol 2 x daily but feels it's \"not enough\" She states the pain is always at a 6/10    Pt is having sx on 2/8/23  For TKA

## 2023-01-09 NOTE — TELEPHONE ENCOUNTER
Prescription Refill     Medication Name:  [facility]     Pharmacy: [facility]   Address: [address], [place], [address] [place] [address] Drive  Phone: [phone]    Patient Contact Number:  [phone]

## 2023-01-09 NOTE — PROGRESS NOTES
Patient comes in for interrogation of their implanted loop recorder. Interrogation shows Battery Status GOOD. 30 Tachy episodes noted. Last on 12/31/2022. Was in ER on 12/31/2022. Implanted for AF management/palpitations. Patient remains on Xarelto and Cardizem. Please see interrogation for more detail. Patient will see NPSR today and we will continue to follow the Patient remotely. Let Patient know we have not been receiving her remote transmission. Gave Patient instructions and asked her to send in a carson transmission once she got home. She VU. Also gave her the number to [employer] Stay connected if unable to get a transmission to send.

## 2023-01-10 DIAGNOSIS — M17.12 PRIMARY OSTEOARTHRITIS OF LEFT KNEE: ICD-10-CM

## 2023-01-10 RX ORDER — TRAMADOL HYDROCHLORIDE 50 MG/1
100 TABLET ORAL EVERY 8 HOURS PRN
Qty: 40 TABLET | Refills: 0 | Status: SHIPPED | OUTPATIENT
Start: 2023-01-10 | End: 2023-01-24

## 2023-01-12 ENCOUNTER — NURSE ONLY (OUTPATIENT)
Dept: CARDIOLOGY CLINIC | Age: 73
End: 2023-01-12

## 2023-01-12 DIAGNOSIS — I48.0 PAROXYSMAL ATRIAL FIBRILLATION (HCC): Chronic | ICD-10-CM

## 2023-01-12 DIAGNOSIS — Z45.09 ENCOUNTER FOR ELECTRONIC ANALYSIS OF REVEAL EVENT RECORDER: ICD-10-CM

## 2023-01-12 DIAGNOSIS — R00.1 SYMPTOMATIC BRADYCARDIA: ICD-10-CM

## 2023-01-12 NOTE — PROGRESS NOTES
We received a remote transmission from patient's monitor at home. Remote Linq report shows no arrhythmia recordings. No egms for tachy recordings are available. Patients monitor has been disconnected for several months. She will have to send a download for us to review. Will notify office staff. EP physician to review. We will continue to monitor remotely. Implanted for palpitations and AF management. Pt is on Xarelto. End of 31-day monitoring period 1-12-23.

## 2023-01-13 ENCOUNTER — TELEPHONE (OUTPATIENT)
Dept: INTERNAL MEDICINE CLINIC | Age: 73
End: 2023-01-13

## 2023-01-13 NOTE — PROGRESS NOTES
[address]   Advanced Heart Failure/Pulmonary Hypertension  Cardiac Follow up       Stiven Barnes  YOB: 1950     Date of Visit:  1/18/23     Chief Complaint   Patient presents with    Congestive Heart Failure       History of present illness: Stiven Barnes is a 81 y.o. female with past medical history significant for HTN, HUSSAIN on CPAP, multiple PE on Xarelto (8/2016). She original had a PE was treated with xarelto for recommended time and then off it and her RHC revealed pressures no RH elevated pressures. Afterwards she developed AFib and restarted on xarelto. She continues on xarelto and treatment for afib. Echos in July and August (2016) showed RVSP 106-112 without evidence of enlargement in right side of heart. RHC was done 9/2/16 and PA pressure was 24, no evidence of pulmonary hypertension. Since then, her RVSP has decreased and got back to normal. We discussed that we are questioning if the elevated ones by echo may have been due to a false positive test for her. She was seen by Dr Eveline Dewey 8/1 and he is managing her hypothyroidism. [place], the thyroid medication was stopped. She was noted to have negative thyroid ultrasound ( July 2018). She wears her CPAP consistently for her HUSSAIN. States she had a reaction to Simvastatin and Livalo with muscle pain. She states she is Prediabetic and has not lost weight. She was in a study for Novavax; she did receive the usual vaccine in May and has felt very fatigued since then. She was admitted 1/13/2020 with palpitations/light-headedness along with SOB and chest pressure. She underwent successful DCCV and was put on Rhythmol -- this caused her HR to drop into the 40's which made her feel bad. She was discharged on diltiazem 240mg qd which causes her to feel fatigued and dizzy. She remains on Xarelto. Her lisinopril was stopped. She had an AF ablation on 3/27/2020.   On 4/2/20 she reported that she was back in atrial fib with rates ranging from 80 to 130. She did not want to go to the ED. She was instructed to resume her amiodarone and cardizem. ILR implanted 8/17/20. Echo 3/1/21 showed EF 55-60%. She is s/p Rt. TKR 3/16/21 -- needed Lovenox bridge. Today, she states she is having a colonoscopy due to abnormal Cologard. She will also be undergoing knee surgery in February. She was in the ambulance with palpitations/heart rate of 180. She took Diltiazem on her own and converted before getting to the ER. She has had diarrhea off and on recently. She eats a lot of prunes. Labs reviewed. EKG today read by me shows:  SR with PVC and dropped QRS rate 85      Prior to Visit Medications    Medication Sig Taking? Authorizing Provider   traMADol (ULTRAM) 50 MG tablet Take 2 tablets by mouth every 8 hours as needed for Pain for up to 14 days. Max Daily Amount: 300 mg Yes Tracy Mtz MD   dilTIAZem (CARDIZEM CD) 120 MG extended release capsule Take 1 capsule by mouth daily Yes EVERETTE He   pramipexole (MIRAPEX) 0.5 MG tablet 1.5 tab q 5 PM and 1.5 tab qHS Yes BRICE Diamond   buPROPion (WELLBUTRIN XL) 300 MG extended release tablet Take 1 tablet by mouth every morning Yes Rosalinda Ji MD   tiotropium (SPIRIVA RESPIMAT) 1.25 MCG/ACT AERS inhaler Inhale 2 puffs into the lungs daily Yes Historical Provider, MD   lisinopril (PRINIVIL;ZESTRIL) 10 MG tablet TAKE ONE TABLET BY MOUTH ONCE NIGHTLY Yes Cristi Yeung MD   spironolactone (ALDACTONE) 25 MG tablet TAKE ONE TABLET BY MOUTH DAILY Yes Cristi Yeung MD   potassium chloride (KLOR-CON M) 10 MEQ extended release tablet Take 1 tablet by mouth daily Yes Cristi Yeung MD   rivaroxaban (XARELTO) 20 MG TABS tablet Take 1 tablet by mouth daily (with breakfast) Yes Abraham Lovett MD   furosemide (LASIX) 40 MG tablet Take 1 tablet by mouth in the morning.  Yes Cristi Yeung MD   melatonin 10 MG CAPS capsule Take 10 mg by mouth nightly Yes Historical Provider, MD   Semaglutide,0.25 or 0.5MG/DOS, (OZEMPIC, 0.25 OR 0.5 MG/DOSE,) 2 MG/1.5ML SOPN Inject 0.5 mg into the skin once a week Yes Abraham Lovett MD   Ciclesonide (ALVESCO) 160 MCG/ACT AERS Inhale into the lungs Yes Historical Provider, MD   fluticasone (FLONASE) 50 MCG/ACT nasal spray 1 spray by Each Nostril route daily Yes Historical Provider, MD   azelastine (ASTELIN) 0.1 % nasal spray 1 spray by Nasal route 2 times daily 1 Spray in each nostril Yes Abraham Lovett MD   Multiple Vitamins-Minerals (THERAPEUTIC MULTIVITAMIN-MINERALS) tablet Take 1 tablet by mouth daily  Yes Historical Provider, MD   CPAP Machine MISC by Does not apply route Yes Historical Provider, MD   Magnesium Citrate 100 MG TABS Take 1 tablet by mouth daily Yes Cristi Yeung MD   dilTIAZem (CARDIZEM) 60 MG tablet Take 1 tablet by mouth 2 times daily as needed (Take at onset of atrial fibrillation. If your heart rate remains elevated after 1-2 hours, then take a 2nd dose. If you remain in atrial fibrillation after the 2nd dose, call our office)  Patient not taking: Reported on 2023  BRICE Vazquez CNP   zoster recombinant adjuvanted vaccine [facility]) 50 MCG/0.5ML SUSR injection Inject 0.5 mLs into the muscle See Admin Instructions 1 dose now and repeat in 2-6 months  Abraham Lovett MD   Handicap Placard MISC by Does not apply route Exp 5 years  BRICE Alvarado CNP   EPIPEN 2-MIR 0.3 MG/0.3ML SOAJ injection   Historical Provider, MD       Social History     Tobacco Use    Smoking status: Former     Packs/day: 0.50     Years: 5.00     Pack years: 2.50     Types: Cigarettes     Quit date: 10/5/2013     Years since quittin.2    Smokeless tobacco: Never    Tobacco comments:     smoked for a total of 5yr total   Vaping Use    Vaping Use: Never used   Substance Use Topics    Alcohol use: Yes     Comment: RARE    Drug use:  No            Past Medical History:   Diagnosis Date    Allergic     Anesthesia complication     family hx-father-at at age 85 having hip replacement revision surgery-had tia's x2 while under anes-but also had hx chf-had dificuly with speech when coming out from anes    Anxiety     Arthritis     left ankle, bilateral hands    Arthritis of left hip 2/2/2016    Arthritis of right hip 4/19/2016    Asthma     At risk for falls     uses a cane    Atrial fibrillation with rapid ventricular response (HCC)     Atrial flutter (HCC) 4/25/2018    Chronic maxillary sinusitis 5/24/2017    CTEPH (chronic thromboembolic pulmonary hypertension) (HCC) 8/26/2016    Depression     pt stated r/t bad marriage/alcoholic spouse    Diabetes mellitus (HCC)     borderline    Disc disease, degenerative, lumbar or lumbosacral 2/20/2017    Hepatic steatosis 4/26/2019    History of total hip arthroplasty 2/28/2017    Hypertension     Leg cramps     patient states very severe, requires immediate potassium administration    Migraines, basilar     last migraine 10 years ago    Mild persistent asthma without complication 9/14/2016    Obesity, Class III, BMI 40-49.9 (morbid obesity) ([facility]) 4/19/2016    HUSSAIN (obstructive sleep apnea) 10/14/2013    Osteoarthritis, hip, bilateral 2016    Bilateral hip arthroplasty    Panic attacks     Pneumonia 2015    Primary osteoarthritis of both knees 9/19/2017    Primary osteoarthritis of left knee 12/15/2016    Pulmonary emboli (HCC) 8/4/2016    Pulmonary HTN ([facility]) 7/21/2016    Pure hypercholesterolemia 2/13/2019    Restless leg syndrome     Rhinitis, chronic 3/26/2014    Sleep apnea     wears CPAP @13    Stress incontinence     Tear of left rotator cuff 1/23/2018    Thyroid disease     hypothyroid    Wears glasses      Past Surgical History:   Procedure Laterality Date    ATRIAL ABLATION SURGERY      BACK SURGERY  2004    LUMBAR FUSION    CATARACT REMOVAL Bilateral     COLONOSCOPY      EYE SURGERY Right 2010    retinal tear repaired    FINGER SURGERY  1988    right ring finger severe laceration, fracture    HIP  ARTHROPLASTY Right 16    HYSTERECTOMY (CERVIX STATUS UNKNOWN)      JOINT REPLACEMENT Left 16    left hip    TONSILLECTOMY  1972    TOTAL KNEE ARTHROPLASTY Right 3/16/2021    RIGHT ROBOTIC ASSISTED TOTAL KNEE ARTHROPLASTY (23754, [id number]) - PRESSLEY & NEPHEW ADVANCED performed by Fausto Montgomery MD at [address] History     Tobacco Use    Smoking status: Former     Packs/day: 0.50     Years: 5.00     Pack years: 2.50     Types: Cigarettes     Quit date: 10/5/2013     Years since quittin.2    Smokeless tobacco: Never    Tobacco comments:     smoked for a total of 5yr total   Substance Use Topics    Alcohol use: Yes     Comment: RARE       Review of Systems:   Constitutional: No significant change in weight, fatigue or weakness. HEENT: No change in vision or ringing in the ears. Respiratory: +LUGO, no PND, orthopnea or cough. Cardiovascular: See HPI   GI: No n/v, abdominal pain or changes in bowel habits. No melena, no hematochezia  : No dysuria or hematuria. Skin: No rash or new skin lesions. Musculoskeletal:   Neurological: No lightheadedness, dizziness, syncope or TIA-like symptoms. Psychiatric: No anxiety, insomnia or depression    Physical Exam:  Vitals:    23 1045   BP: 132/70   Pulse: 82   SpO2: 97%   Weight: 251 lb 9.6 oz (114.1 kg)   Height: 5' 4\" (1.626 m)         Constitutional and General Appearance: Stable   WD/WN in NAD  HEENT:  NC/AT  GENET  No problems with hearing  Respiratory:  Normal excursion and expansion without use of accessory muscles  Resp Auscultation: Normal breath sounds without dullness  Cardiovascular: The apical impulses not displaced  Heart tones are crisp and normal  Cervical veins are not engorged  The carotid upstroke is normal in amplitude and contour without delay or bruit  JVP < 8 cm H2O  RRR with nl S1 and S2 without m,r,g  Peripheral pulses are symmetrical and full  There is no clubbing, cyanosis of the extremities.   Trace edema 1+  Femoral Arteries: 2+ and equal  Pedal Pulses: 2+ and equal   Abdomen:  No masses or tenderness  Liver/Spleen: No Abnormalities Noted  Neurological/Psychiatric:  Alert and oriented in all spheres  Moves all extremities well  Exhibits normal gait balance and coordination  No abnormalities of mood, affect, memory, mentation, or behavior are noted    Labs were reviewed including labs from other hospital systems through [facility]. Cardiac testing was reviewed including echos, nuclear scans, cardiac catheterization, including from other hospital systems through [facility]. Labs:   Lab Results   Component Value Date    WBC 5.2 01/09/2023    HGB 13.1 01/09/2023    HCT 40.4 01/09/2023    MCV 95.2 01/09/2023     01/09/2023     Lab Results   Component Value Date/Time     01/09/2023 10:35 AM    K 4.3 01/09/2023 10:35 AM    K 4.3 12/21/2022 05:02 PM    CL 96 01/09/2023 10:35 AM    CO2 20 01/09/2023 10:35 AM    BUN 15 01/09/2023 10:35 AM    CREATININE 0.8 01/09/2023 10:35 AM    GLUCOSE 111 01/09/2023 10:35 AM    GLUCOSE 100 03/27/2012 04:27 PM    CALCIUM 9.5 01/09/2023 10:35 AM      Lab Results   Component Value Date/Time    TRIG 138 01/09/2023 10:35 AM    HDL 58 01/09/2023 10:35 AM    LDLCALC 99 01/09/2023 10:35 AM    LABVLDL 28 01/09/2023 10:35 AM     Diagnostic Testing:  Echo 7/22/22    Technically difficult study due to body habitus. Definity was administered. Normal left ventricle size, wall thickness and systolic function with an   estimated ejection fraction of 60-65%. Mild concentric left ventricular hypertrophy. No regional wall motion abnormalities are seen. Normal diastolic filling pattern for age. The aortic valve appears sclerotic but opens well. Mild aortic   regurgitation. Mild tricuspid regurgitation. PASP estimated at 30 mmHg. ECHO 3/1/21   -Normal left ventricle size, wall thickness and systolic function with an estimated ejection fraction of 55-60%.    -No regional wall motion abnormalities are seen. - E/e' = 12.5. Grade II diastolic dysfunction with elevated LV filling pressures.   -The aortic valve appears sclerotic but opens well.   -Trivial mitral regurgitation.   -Mild tricuspid regurgitation.   -Mild pulmonic regurgitation present. -IVC size is normal (<2.1 cm) but collapses < 50% with respiration consistent with elevated RA pressure (8 mmHg). -Estimated pulmonary artery systolic pressure is elevated at 48mmHg assuming a right atrial pressure of 8 mmHg. Procedure performed 3/27/2020  Electrophysiology study with left atrial recording and mapping   3-D electroanatomical mapping of the left atrium and  right atium. Electrophysiological study(EPS) and induction after IV drug infusion  Transseptal puncture through an intact septum . Intracardiac echocardiography. Radiofrequency ablation of atrial fibrillation and pulmonary veins isolation   Ablation of CTI depndent flutter as a separate mechanism  Ultrasound for access    ECHO 8/20/2019   -Left ventricular cavity size is normal.   -There is mild left ventricular hypertrophy.   -Ejection fraction is visually estimated to be 55-60%. -No wall motion abnormalities.   -Normal diastolic function.   -Trivial mitral regurgitation.   -Mild tricuspid regurgitation     Calcium CT Score 2/19/2019   Total Agatston calcium score of 51.    24 hour holter and it revealed SR/PVCs, no afib or arrhythmias. Echo 4/26/18  Left ventricular cavity size is normal.  There is mild left ventricular hypertrophy. Ejection fraction is visually estimated to be 55-60%. Normal diastolic function. Mild tricuspid regurgitation with RVSP of 36 mm/hg    VQ scan 8/30/17  Low Probability for Pulmonary Embolus. Echo 8/28/17  Normalleft ventricle size and systolic function with an estimated ejection fraction of 60%. Mild mitral regurgitation is present.  There is mild-moderate tricuspid regurgitation with RVSP estimated at 88 mmHg. This is suggestive of severe pulmonary hypertension. Mild pulmonic regurgitation present. Echo 9/1/2016  Technically limited examination to evaluate RVSP. Overall left ventricular function is normal.  Normal right ventricular size and function. Mild tricuspid regurgitation with RVSP estimated at 95 mmHg. No evidence of any pericardial effusion. [address] 9/1/2016  Pressures were as follows:  RA: 9 mmHg  RV:  38/12 mmHg  PA:  33/16, mean 24 mmHg  PCWP:  14 mmHg  Thermodilution CO: 7.45   Thermodilution CI:  3.42  Adiel CO:  9.72  Adiel CI:  4.46  PA Sat:  74%  PVR:  107 dynes     Impression:  1. Minimal pulmonary hypertension with mean PA pressure 24  2. Normal filling pressures  3. Normal cardiac output  4. No evidence of PAH at this time. Echo 8/28/17  Normal left ventricle size and systolic function with an estimated ejection  fraction of 60%. Mild mitral regurgitation is present. There is mild-moderate tricuspid regurgitation with RVSP estimated at 88  mmHg. This is suggestive of severe pulmonary hypertension. Mild pulmonic regurgitation present. 1/20/15 Nuclear Stress test  Conclusions        Summary    Normal myocardial perfusion study. Normal LV function. Labs were reviewed including labs from other hospital systems through [facility]. Cardiac testing was reviewed including echos, nuclear scans, cardiac catheterization, including from other hospital systems through [facility]. Assessment:  1. Pre-operative cardiovascular examination    2. Paroxysmal atrial fibrillation (HCC)    3. HUSSAIN (obstructive sleep apnea)    4. Chronic diastolic heart failure ([address]    5. Benign essential HTN    6. Pure hypercholesterolemia        1. Chronic heart failure with preserved ejection fraction: Stable.  Compensated by exam.  -Continue Lisinopril, Lasix, spironolactone  -ProBNP> 180 from 2/16/22); 66 from 2/9/22; 109 from 6/8/21; 122 from 4/5/21  -ECHO today 7/22/22 > EF 60-65%, RVSP 30mmHg  -ECHO 3/1/21> EF 55-60%, grade II DD     2. Paroxysmal atrial fibrillation: HR regular & controlled. -ILR placed 8/17/20  -Had an Ablation of atrial fib on 3/27/2020 per Dr. Andreia Ferro.  -She has had no more AF episodes. -DCCV 1/13/2020 (Dr. Carlos Woods)   -Continues on [facility]. -Cardizem 60mg prn tid for palpitations/HR >100  -Can try taking diltiazem ER 120mg qam. Can still take the short-acting prn tid. 3. Benign essential HTN: Stable. - 3/1/21> reduced Lisinopril from 20mg to 10mg qd for B/P's 90's/60's. /70   Pulse 82   Ht 5' 4\" (1.626 m)   Wt 251 lb 9.6 oz (114.1 kg)   SpO2 97%   BMI 43.19 kg/m²          4. Pure hypercholesterolemia:   Mother had CAD. CT calcium score 51. Could not tolerate statin or Livalo.   -1/9/23  , HDL 58, LDL 99, . Controlled. - 2/16/22> , , HDL 60,   - 11/13/2020> , , HDL 61, . Some improvement noted in LDL and TC.   - 5/4/20 > , TG 92, HDL 66, . She decided not to start Zetia. 5. HUSSAIN (obstructive sleep apnea):  Wearing CPAP faithfully. 6. Symptomatic bradycardia, h/o:  Regular rhythm today rate of 85. Plan:  1. Do Not take Xarelto 3 days before your colonoscopy and 3 days before your knee surgery. Resume the Xarelto the day after your procedure. 2.   OK for surgery. You may need another EKG if knee surgery is more than 30 days out. 3.   Labs before next office visit:  CBC, CMP, BNP, Lipids. FASTING   4.   Echo and Office Visit in 6 Months      Scribe's attestation: This note was scribed in the presence of Armando You M.D. by Mary Rossi RN     The scribe's documentation has been prepared under my direction and personally reviewed by me in its entirety. I confirm that the note above accurately reflects all work, treatment, procedures, and medical decision making performed by me.           Time Based Itemization  A total of 40 minutes was spent on today's patient encounter. If applicable, non-patient-facing activities:  ( x)Preparing to see the patient and reviewing records  ( x) Individual interpretation of results  (x ) Discussion or coordination of care with other health care professionals (Dr. Beckie Evangelista, Dr. Natty Ortiz)   ( x) Ordering of unique tests, medications, or procedures  ( x) Documentation within the EHR    Thank you for allowing me to participate in the care of your patient.     Kimberly Medina M.D., [facility] [place]

## 2023-01-13 NOTE — TELEPHONE ENCOUNTER
Patient would like to know if she can get samples of Xalerto 20 mg ,1 weeks worth , patient also wants to know if Rubina Brii can give her cold water circulation machine for work done on her shoulder.  Patient can be reached at [phone] (home)

## 2023-01-16 ENCOUNTER — OFFICE VISIT (OUTPATIENT)
Dept: ORTHOPEDIC SURGERY | Age: 73
End: 2023-01-16
Payer: MEDICARE

## 2023-01-16 VITALS — BODY MASS INDEX: 42.85 KG/M2 | HEIGHT: 64 IN | WEIGHT: 251 LBS

## 2023-01-16 DIAGNOSIS — M17.12 PRIMARY OSTEOARTHRITIS OF LEFT KNEE: Primary | ICD-10-CM

## 2023-01-16 PROCEDURE — 3017F COLORECTAL CA SCREEN DOC REV: CPT | Performed by: ORTHOPAEDIC SURGERY

## 2023-01-16 PROCEDURE — G8427 DOCREV CUR MEDS BY ELIG CLIN: HCPCS | Performed by: ORTHOPAEDIC SURGERY

## 2023-01-16 PROCEDURE — 99214 OFFICE O/P EST MOD 30 MIN: CPT | Performed by: ORTHOPAEDIC SURGERY

## 2023-01-16 PROCEDURE — 1090F PRES/ABSN URINE INCON ASSESS: CPT | Performed by: ORTHOPAEDIC SURGERY

## 2023-01-16 PROCEDURE — 1036F TOBACCO NON-USER: CPT | Performed by: ORTHOPAEDIC SURGERY

## 2023-01-16 PROCEDURE — 1123F ACP DISCUSS/DSCN MKR DOCD: CPT | Performed by: ORTHOPAEDIC SURGERY

## 2023-01-16 PROCEDURE — MISCCOLD COLD THERAPY UNIT AND PAD: Performed by: ORTHOPAEDIC SURGERY

## 2023-01-16 PROCEDURE — G8417 CALC BMI ABV UP PARAM F/U: HCPCS | Performed by: ORTHOPAEDIC SURGERY

## 2023-01-16 PROCEDURE — G8399 PT W/DXA RESULTS DOCUMENT: HCPCS | Performed by: ORTHOPAEDIC SURGERY

## 2023-01-16 PROCEDURE — G8484 FLU IMMUNIZE NO ADMIN: HCPCS | Performed by: ORTHOPAEDIC SURGERY

## 2023-01-16 NOTE — PROGRESS NOTES
Patient: Angie Grande  : 1950    MRN: [id number]    Date of Visit: 23    Attending Physician: Hanna Newman    History of Present Illness  Ms. Leonard Valdez is a very pleasant 81 y.o. patient with a several year history of progressive LEFT knee pain. There is no precipitating event or trauma. The pain is located predominantly in the medial and anterior aspect of the knee aggravated by weight bearing. Walking even short distances can be painful. Stair climbing is progressing becoming more difficult and painful. However, it can also awaken the patient at night. She has tried the following interventions without sustained functional improvement:    Low-impact, structured therapy/exercise program  NSAID's/Tylenol Arthritis strength  Crtisone injections/viscosupplementation with relief  Knee brace  Cane for ambulation    Quality of life is negatively impacted with daily tasks being more difficult. The patient would like to talk about surgical treatment options. She had a right total knee arthroplasty performed by Dr. David Potter several years ago this was uncomplicated. Since then she has gained a significant amount of weight. Her current BMI is 43 additionally she was on Xarelto for PEs after prior arthroplasty, she denies smoking or diabetes.     PMH/PSH:  Past Medical History:   Diagnosis Date    Allergic     Anesthesia complication     family hx-father-at at age 80 having hip replacement revision surgery-had tia's x2 while under anes-but also had hx chf-had dificuly with speech when coming out from anes    Anxiety     Arthritis     left ankle, bilateral hands    Arthritis of left hip 2016    Arthritis of right hip 2016    Asthma     At risk for falls     uses a cane    Atrial fibrillation with rapid ventricular response (HCC)     Atrial flutter ([address] 2018    Chronic maxillary sinusitis 2017    CTEPH (chronic thromboembolic pulmonary hypertension) ([address] 2016    Depression     pt stated r/t bad marriage/alcoholic spouse    Diabetes mellitus ([address]     borderline    Disc disease, degenerative, lumbar or lumbosacral 2/20/2017    Hepatic steatosis 4/26/2019    History of total hip arthroplasty 2/28/2017    Hypertension     Leg cramps     patient states very severe, requires immediate potassium administration    Migraines, basilar     last migraine 10 years ago    Mild persistent asthma without complication 3/94/7192    Obesity, Class III, BMI 40-49.9 (morbid obesity) ([address] 4/19/2016    HUSSAIN (obstructive sleep apnea) 10/14/2013    Osteoarthritis, hip, bilateral 2016    Bilateral hip arthroplasty    Panic attacks     Pneumonia 2015    Primary osteoarthritis of both knees 9/19/2017    Primary osteoarthritis of left knee 12/15/2016    Pulmonary emboli ([address] 8/4/2016    Pulmonary HTN ([address] 7/21/2016    Pure hypercholesterolemia 2/13/2019    Restless leg syndrome     Rhinitis, chronic 3/26/2014    Sleep apnea     wears CPAP @11    Stress incontinence     Tear of left rotator cuff 1/23/2018    Thyroid disease     hypothyroid    Wears glasses      Patient Active Problem List   Diagnosis    Restless leg syndrome    Acquired hypothyroidism    Benign essential HTN    HUSSAIN (obstructive sleep apnea)    Obesity, Class III, BMI 40-49.9 (morbid obesity) ([address]    Primary osteoarthritis of both knees    Arthritis of knee, left    Paroxysmal atrial fibrillation ([address]    Pure hypercholesterolemia    Hepatic steatosis    Hx of pulmonary embolus    Chronic diastolic heart failure (HCC)    Symptomatic bradycardia    Encounter for electronic analysis of reveal event recorder    Primary osteoarthritis of right knee    Type 2 diabetes mellitus    Anxiety and depression    Palpitation    Moderate asthma with acute exacerbation    Moderate persistent asthma without complication     Past Surgical History:   Procedure Laterality Date    ATRIAL ABLATION SURGERY      BACK SURGERY  2004    LUMBAR FUSION    CATARACT REMOVAL Bilateral     COLONOSCOPY      EYE SURGERY Right 2010    retinal tear repaired    FINGER SURGERY  1988    right ring finger severe laceration, fracture    HIP ARTHROPLASTY Right 4-19-16    HYSTERECTOMY (CERVIX STATUS UNKNOWN)  1993    JOINT REPLACEMENT Left 2/2/16    left hip    TONSILLECTOMY  1972    TOTAL KNEE ARTHROPLASTY Right 3/16/2021    RIGHT ROBOTIC ASSISTED TOTAL KNEE ARTHROPLASTY (00539, 02999) - [employer] performed by Natalia Michel MD at [address]:  Scheduled Meds:  Continuous Infusions:  PRN Meds:  Current Meds:  Current Outpatient Medications:     traMADol (ULTRAM) 50 MG tablet, Take 2 tablets by mouth every 8 hours as needed for Pain for up to 14 days. Max Daily Amount: 300 mg, Disp: 40 tablet, Rfl: 0    dilTIAZem (CARDIZEM) 60 MG tablet, Take 1 tablet by mouth 2 times daily as needed (Take at onset of atrial fibrillation. If your heart rate remains elevated after 1-2 hours, then take a 2nd dose.  If you remain in atrial fibrillation after the 2nd dose, call our office), Disp: 60 tablet, Rfl: 2    dilTIAZem (CARDIZEM CD) 120 MG extended release capsule, Take 1 capsule by mouth daily, Disp: 90 capsule, Rfl: 3    pramipexole (MIRAPEX) 0.5 MG tablet, 1.5 tab q 5 PM and 1.5 tab qHS, Disp: 270 tablet, Rfl: 1    buPROPion (WELLBUTRIN XL) 300 MG extended release tablet, Take 1 tablet by mouth every morning, Disp: 90 tablet, Rfl: 0    zoster recombinant adjuvanted vaccine (SHINGRIX) 50 MCG/0.5ML SUSR injection, Inject 0.5 mLs into the muscle See Admin Instructions 1 dose now and repeat in 2-6 months, Disp: 0.5 mL, Rfl: 0    levalbuterol (XOPENEX HFA) 45 MCG/ACT inhaler, Inhale 1 puff into the lungs every 6 hours as needed for Wheezing, Disp: 1 each, Rfl: 3    tiotropium (SPIRIVA RESPIMAT) 1.25 MCG/ACT AERS inhaler, Inhale 2 puffs into the lungs daily, Disp: , Rfl:     lisinopril (PRINIVIL;ZESTRIL) 10 MG tablet, TAKE ONE TABLET BY MOUTH ONCE NIGHTLY, Disp: 90 tablet, Rfl: 3 spironolactone (ALDACTONE) 25 MG tablet, TAKE ONE TABLET BY MOUTH DAILY, Disp: 90 tablet, Rfl: 3    potassium chloride (KLOR-CON M) 10 MEQ extended release tablet, Take 1 tablet by mouth daily, Disp: 90 tablet, Rfl: 3    rivaroxaban (XARELTO) 20 MG TABS tablet, Take 1 tablet by mouth daily (with breakfast), Disp: 42 tablet, Rfl: 0    furosemide (LASIX) 40 MG tablet, Take 1 tablet by mouth in the morning., Disp: 90 tablet, Rfl: 3    melatonin 10 MG CAPS capsule, Take 10 mg by mouth nightly, Disp: , Rfl:     Semaglutide,0.25 or 0.5MG/DOS, (OZEMPIC, 0.25 OR 0.5 MG/DOSE,) 2 MG/1.5ML SOPN, Inject 0.5 mg into the skin once a week, Disp: 2 pen, Rfl: 0    Ciclesonide (ALVESCO) 160 MCG/ACT AERS, Inhale into the lungs, Disp: , Rfl:     fluticasone (FLONASE) 50 MCG/ACT nasal spray, 1 spray by Each Nostril route daily, Disp: , Rfl:     azelastine (ASTELIN) 0.1 % nasal spray, 1 spray by Nasal route 2 times daily 1 Spray in each nostril, Disp: 1 Bottle, Rfl: 2    Handicap Placard MISC, by Does not apply route Exp 5 years, Disp: 1 each, Rfl: 0    Multiple Vitamins-Minerals (THERAPEUTIC MULTIVITAMIN-MINERALS) tablet, Take 1 tablet by mouth daily , Disp: , Rfl:     CPAP Machine MISC, by Does not apply route, Disp: , Rfl:     Magnesium Citrate 100 MG TABS, Take 1 tablet by mouth daily, Disp: 90 tablet, Rfl: 3    EPIPEN 2-MIR 0.3 MG/0.3ML SOAJ injection, , Disp: , Rfl:         ALLERGIES:  Allergies   Allergen Reactions    Atorvastatin Other (See Comments)     Muscle Pain, all statins     Penicillins Anaphylaxis    Simvastatin Other (See Comments)     Muscle Pain    Cephalexin Hives and Itching    Cephalosporins Hives and Itching    Food Diarrhea     Milk , shellfish , gluten. ..may have bouts of diarrhea, constipation or flatulus. (RD spoke to pt regarding \"milk allergy\", pt is not allergic to milk. She does not drink milk, but consumes milk in other products and consumes dairy products.   Had one reactions to shellfish years ago and now can tolerate one serving of shellfish once per week. Avoids gluten as much as she can, but does not have celiac disease.)    Other      Environmental -cats,dogs,dust    Shellfish-Derived Products      hives    Sulfa Antibiotics      Can take Celebrex, Pt denies 20         Social History:   Social History     Socioeconomic History    Marital status:      Spouse name: Not on file    Number of children: 4    Years of education: 15    Highest education level: Not on file   Occupational History    Occupation: retired    Tobacco Use    Smoking status: Former     Packs/day: 0.50     Years: 5.00     Pack years: 2.50     Types: Cigarettes     Quit date: 10/5/2013     Years since quittin.2    Smokeless tobacco: Never    Tobacco comments:     smoked for a total of 5yr total   Vaping Use    Vaping Use: Never used   Substance and Sexual Activity    Alcohol use: Yes     Comment: RARE    Drug use: No    Sexual activity: Never   Other Topics Concern    Not on file   Social History Narrative    Not on file     Social Determinants of Health     Financial Resource Strain: Low Risk     Difficulty of Paying Living Expenses: Not hard at all   Food Insecurity: No Food Insecurity    Worried About Running Out of Food in the Last Year: Never true    [place] of Food in the Last Year: Never true   Transportation Needs: Not on file   Physical Activity: Inactive    Days of Exercise per Week: 0 days    Minutes of Exercise per Session: 20 min   Stress: Not on file   Social Connections: Not on file   Intimate Partner Violence: Not At Risk    Fear of Current or Ex-Partner: No    Emotionally Abused: No    Physically Abused: No    Sexually Abused: No   Housing Stability: Not on file         Family History:   Cancer-related family history includes Cancer in her paternal uncle. Review of Systems:  No personal history of DVT, PE. 12 point ROS otherwise negative other than reported in HPI.     Physical Examination:  Patient is alert and oriented x 3 and appears well nourished and appropriate for today's visit. Height:   Ht Readings from Last 3 Encounters:   01/16/23 5' 4\" (1.626 m)   01/09/23 5' 4\" (1.626 m)   12/31/22 5' 4\" (1.626 m)     Weight:   Wt Readings from Last 3 Encounters:   01/16/23 251 lb (113.9 kg)   01/09/23 251 lb 6.4 oz (114 kg)   12/31/22 250 lb (113.4 kg)     Gait: The patient walks with antalgic gait. Left knee: no effusion             Ligaments stable to varus/valgus stress at full extension and 30 degrees. AROM: L: 5-120 Deg             Positive patellofemoral crepitus             Positive medial joint line tenderness  Hips: Preserved, pain free range of motion in both hips. Skin: Skin appears to be intact in both upper and lower extremities. There does not appear to be any ulceration or other non-healing wounds. Radiographs: Standing AP/lateral/Sunrise Knee: Imaging was reviewed with the patient. There are advanced degenerative changes of the LEFT knee with joint space narrowing, subchondral sclerosis, and osteophyte formation. There is no radiographic evidence of AVN, fracture, or dislocation. Elective LEFT TKA     Assessment and Plan?: The patient has functionally disabling knee pain with advanced degenerative changes of the knee    We discussed the diagnosis and treatment options in detail with the patient. Patient has tried conservative treatment including anti-inflammatories, activity modification, physical therapy, use of cane, injections. Given the failure of these conservative measures the patient is a good candidate for an elective total knee arthroplasty  We discussed the surgical procedure, recovery period, and protocol for pain management, expected post-operative course in detail.     We discussed the potential benefits of the surgery including pain relief and improved range of motion as well as the inherent risks including but are not limited to incomplete resolution of symptoms, infection, dislocation, leg length inequality requiring shoe lift, fracture, implant loosening, hardware failure, nerve or vascular injury, need for further surgery, deep vein thrombus, pulmonary embolism. The patient has verbalized understanding of these risks and wishes to proceed. The patient will need to obtain pre-operative medical clearance. I did discuss with her given her weight that she is at slightly increased risk of perioperative complications, however she has made multiple times to lose weight and continues to struggle feeling the knee is the limiting factor. There are no other modifiable risk factors to improve her risk profile.     ?___________________________   Belen Enamorado MD  ?   ??cc: Selwyn Finnegan MD

## 2023-01-18 ENCOUNTER — OFFICE VISIT (OUTPATIENT)
Dept: CARDIOLOGY CLINIC | Age: 73
End: 2023-01-18
Payer: MEDICARE

## 2023-01-18 VITALS
OXYGEN SATURATION: 97 % | WEIGHT: 251.6 LBS | SYSTOLIC BLOOD PRESSURE: 132 MMHG | HEIGHT: 64 IN | DIASTOLIC BLOOD PRESSURE: 70 MMHG | HEART RATE: 82 BPM | BODY MASS INDEX: 42.95 KG/M2

## 2023-01-18 DIAGNOSIS — E78.00 PURE HYPERCHOLESTEROLEMIA: ICD-10-CM

## 2023-01-18 DIAGNOSIS — G47.33 OSA (OBSTRUCTIVE SLEEP APNEA): ICD-10-CM

## 2023-01-18 DIAGNOSIS — I10 BENIGN ESSENTIAL HTN: ICD-10-CM

## 2023-01-18 DIAGNOSIS — Z01.810 PRE-OPERATIVE CARDIOVASCULAR EXAMINATION: Primary | ICD-10-CM

## 2023-01-18 DIAGNOSIS — I48.0 PAROXYSMAL ATRIAL FIBRILLATION (HCC): ICD-10-CM

## 2023-01-18 DIAGNOSIS — I50.32 CHRONIC DIASTOLIC HEART FAILURE (HCC): ICD-10-CM

## 2023-01-18 PROCEDURE — G8484 FLU IMMUNIZE NO ADMIN: HCPCS | Performed by: INTERNAL MEDICINE

## 2023-01-18 PROCEDURE — G8417 CALC BMI ABV UP PARAM F/U: HCPCS | Performed by: INTERNAL MEDICINE

## 2023-01-18 PROCEDURE — 93000 ELECTROCARDIOGRAM COMPLETE: CPT | Performed by: INTERNAL MEDICINE

## 2023-01-18 PROCEDURE — 3017F COLORECTAL CA SCREEN DOC REV: CPT | Performed by: INTERNAL MEDICINE

## 2023-01-18 PROCEDURE — 1036F TOBACCO NON-USER: CPT | Performed by: INTERNAL MEDICINE

## 2023-01-18 PROCEDURE — G8427 DOCREV CUR MEDS BY ELIG CLIN: HCPCS | Performed by: INTERNAL MEDICINE

## 2023-01-18 PROCEDURE — 1090F PRES/ABSN URINE INCON ASSESS: CPT | Performed by: INTERNAL MEDICINE

## 2023-01-18 PROCEDURE — 3074F SYST BP LT 130 MM HG: CPT | Performed by: INTERNAL MEDICINE

## 2023-01-18 PROCEDURE — 99215 OFFICE O/P EST HI 40 MIN: CPT | Performed by: INTERNAL MEDICINE

## 2023-01-18 PROCEDURE — 1123F ACP DISCUSS/DSCN MKR DOCD: CPT | Performed by: INTERNAL MEDICINE

## 2023-01-18 PROCEDURE — 3078F DIAST BP <80 MM HG: CPT | Performed by: INTERNAL MEDICINE

## 2023-01-18 PROCEDURE — G8399 PT W/DXA RESULTS DOCUMENT: HCPCS | Performed by: INTERNAL MEDICINE

## 2023-01-18 NOTE — PATIENT INSTRUCTIONS
Plan:  1. Do Not take Xarelto 3 days before your colonoscopy and 3 days before your knee surgery. Resume the Xarelto the day after your procedure. 2.   OK for surgery.     3.   Labs before next office visit:  CBC, CMP, BNP, Lipids FASTING  4.   Echo and Office Visit in 6 Months

## 2023-01-20 ENCOUNTER — TELEPHONE (OUTPATIENT)
Dept: CARDIOLOGY CLINIC | Age: 73
End: 2023-01-20

## 2023-01-20 NOTE — TELEPHONE ENCOUNTER
CARDIAC CLEARANCE     What type of procedure are you having? Total Left Knee Replacement    Which physician is performing your procedure? Dr. Ailyn Oliveira    When is your procedure scheduled for? February 28th    Where are you having this procedure? [facility]    Are you taking Blood Thinners? Yes   If so what? (Name/dose/frequesncy)  rivaroxaban (XARELTO) 20 MG TABS tablet by mouth daily      Does the surgeon want you to stop your blood thinner? If so for how long?   Stop 3 days before and start back one day fter surgery per ERICKA    Phone Number and Contact Name for Physicians office:[phone]    Fax number to send information:  [phone]

## 2023-01-20 NOTE — LETTER
[facility] CARDIOLOGY[address]  Dept: [phone]  Dept Fax: [phone]      2023    Jono Alexander  : 1950  DOS: 2023    To Whom it May Concern:      Dianne Waterman has been evaluated for cardiac clearance. Dianne Waterman is considered at a Low risk for Total knee replacement surgery. There is no further cardiac testing that could be done to lower the risk. OK to stop Xarelto 3 days before your knee surgery. Resume the Xarelto the day after your procedure. The patient will need another EKG prior to knee replacement as it will be greater than 30 days from prior EKG. Please let my office know if you have any questions or concerns.           Yessi Greene MD                           [facility] serving [place] and [place]

## 2023-01-20 NOTE — TELEPHONE ENCOUNTER
Letter created and faxed. Pt will need another EKG prior to knee replacement as it will be greater than 30 days from prior EKG. Received fax confirmation.

## 2023-01-24 DIAGNOSIS — I10 BENIGN ESSENTIAL HTN: Chronic | ICD-10-CM

## 2023-01-27 ENCOUNTER — TELEPHONE (OUTPATIENT)
Dept: CARDIOLOGY CLINIC | Age: 73
End: 2023-01-27

## 2023-01-27 DIAGNOSIS — I26.99 OTHER PULMONARY EMBOLISM WITHOUT ACUTE COR PULMONALE, UNSPECIFIED CHRONICITY (HCC): ICD-10-CM

## 2023-01-27 DIAGNOSIS — I48.91 ATRIAL FIBRILLATION WITH RVR (HCC): ICD-10-CM

## 2023-01-27 DIAGNOSIS — I10 BENIGN ESSENTIAL HTN: Chronic | ICD-10-CM

## 2023-01-27 DIAGNOSIS — E66.9 NON MORBID OBESITY, UNSPECIFIED OBESITY TYPE: ICD-10-CM

## 2023-01-27 DIAGNOSIS — R07.9 CHEST PAIN, UNSPECIFIED TYPE: ICD-10-CM

## 2023-01-27 RX ORDER — SPIRONOLACTONE 25 MG/1
TABLET ORAL
Qty: 90 TABLET | Refills: 3 | Status: SHIPPED | OUTPATIENT
Start: 2023-01-27

## 2023-01-27 RX ORDER — FUROSEMIDE 40 MG/1
TABLET ORAL
Qty: 90 TABLET | Refills: 3 | OUTPATIENT
Start: 2023-01-27

## 2023-01-27 RX ORDER — LISINOPRIL 10 MG/1
TABLET ORAL
Qty: 90 TABLET | Refills: 3 | Status: SHIPPED | OUTPATIENT
Start: 2023-01-27

## 2023-01-27 RX ORDER — FUROSEMIDE 40 MG/1
40 TABLET ORAL DAILY
Qty: 90 TABLET | Refills: 3 | Status: SHIPPED | OUTPATIENT
Start: 2023-01-27

## 2023-01-27 RX ORDER — CALCIUM CARBONATE 260MG(650)
1 TABLET,CHEWABLE ORAL DAILY
Qty: 90 TABLET | Refills: 3 | Status: SHIPPED | OUTPATIENT
Start: 2023-01-27

## 2023-01-27 RX ORDER — POTASSIUM CHLORIDE 750 MG/1
10 TABLET, EXTENDED RELEASE ORAL DAILY
Qty: 90 TABLET | Refills: 3 | Status: SHIPPED | OUTPATIENT
Start: 2023-01-27

## 2023-01-27 NOTE — TELEPHONE ENCOUNTER
Pt has new mailorder pharmacy: [facility]    Per [employer] with patient, she would like all of her meds to be sent to new mailorder. This encounter includes : Lisinopril, Spironolactone, Potassium , Lasix, Mag citrate.

## 2023-01-27 NOTE — TELEPHONE ENCOUNTER
Pt contacted ERICKA via [employer] to say her new mailorder pharmacy is [facility]. She would like her EP drugs sent in to [facility] as well. Two different doses of Diltiazem and Xarelto 20mg    Please contact pt if you have  questions.

## 2023-01-31 NOTE — TELEPHONE ENCOUNTER
Called and spoke with patient and informed her of message below. Holding for review.
Doubtful that adding cardizem causing her tachycardia as it has the opposite effect.      No changes until ILR interrogation received
Her CXR is normal. I looked at the CXR and I can see her loop recorder.     Mayte Jackson, APRN-CNP
I spoke with pt and walked ER through doing the transmission. She sent, and had a question. Her blood sugar was 160 in the ER . Would the high blood sugar set off the afib?
I spoke with pt. I relayed messages She stated that she was recenlty switched to the long acting diltiazem. She was wondering if that might be the issue.
No, elevated glucose would not cause AF to occur. Likely high due to her dinner.
Patients remote Interrogation shows no arrhythmias. Current ECG shows SR with PAC. Implanted for AF management/palpitations. Patient remains on Xarelto and Cardizem. Please see interrogation for more detail. Exported into [address] [place] [address] Rd.    Thanks
Please have her send in a loop transmission to see if any tachycardia detected on device
Pt called stating she was having Palpitations yesterday so bad she went to they ER tachycardia was 180\"s  they told her to follow up with EP as soon as possible, there are no openings. Pls provide date and time pt can get scheduled?
Spoke to the pt-gave instructions per EP provider.
Soft, nontender

## 2023-02-01 DIAGNOSIS — M17.12 PRIMARY OSTEOARTHRITIS OF LEFT KNEE: ICD-10-CM

## 2023-02-02 ENCOUNTER — TELEPHONE (OUTPATIENT)
Dept: ORTHOPEDIC SURGERY | Age: 73
End: 2023-02-02

## 2023-02-02 RX ORDER — TRAMADOL HYDROCHLORIDE 50 MG/1
TABLET ORAL
Qty: 40 TABLET | Refills: 1 | Status: SHIPPED | OUTPATIENT
Start: 2023-02-02 | End: 2023-02-16

## 2023-02-02 NOTE — PROGRESS NOTES
Name_______________________________________Printed:____________________  Date and time of surgery___2/28  0730_____________________Arrival Time:__0600______________   1. The instructions given regarding when and if a patient needs to stop oral intake prior to surgery varies. Follow the specific instructions you were given                  _x__Nothing to eat or to drink after Midnight the night before.                   ____Carbo loading or instructions will be given to select patients-if you have been given those instructions -please do the following                           The evening before your surgery after dinner before midnight drink 40 ounces of gatorade. If you are diabetic use sugar free. The morning of surgery drink 40 ounces of water. This needs to be finished 3 hours prior to your surgery start time. 2. Take the following pills with a small sip of water on the morning of surgery___wellbutrin tramadol-if needed  inhaler mirapex cardiazem________________________________________________                  Do not take blood pressure medications ending in pril or sartan the jessica prior to surgery or the morning of surgery. Dr Chary Harman patient are not to take any medications the AM of surgery. 3. Aspirin, Ibuprofen, Advil, Naproxen, Vitamin E and other Anti-inflammatory products and supplements should be stopped for 5 -7days before surgery or as directed by your physician. 4. Check with your Doctor regarding stopping Plavix, Coumadin,Eliquis, Lovenox,Effient,Pradaxa,Xarelto, Fragmin or other blood thinners and follow their instructions. XARELTO follow drs instructions   5. Do not smoke, and do not drink any alcoholic beverages 24 hours prior to surgery. This includes NA Beer. Refrain from the usage of any recreational drugs. 6. You may brush your teeth and gargle the morning of surgery. DO NOT SWALLOW WATER   7.  You MUST make arrangements for a responsible adult to stay on site while you are here and take you home after your surgery. You will not be allowed to leave alone or drive yourself home. It is strongly suggested someone stay with you the first 24 hrs. Your surgery will be cancelled if you do not have a ride home. 8. A parent/legal guardian must accompany a child scheduled for surgery and plan to stay at the hospital until the child is discharged. Please do not bring other children with you. 9. Please wear simple, loose fitting clothing to the hospital.  Cleophus Silver not bring valuables (money, credit cards, checkbooks, etc.) Do not wear any makeup (including no eye makeup) or nail polish on your fingers or toes. 10. DO NOT wear any jewelry or piercings on day of surgery. All body piercing jewelry must be removed. 11. If you have ___dentures, they will be removed before going to the OR; we will provide you a container. If you wear ___contact lenses or ___glasses, they will be removed; please bring a case for them. 12. Please see your family doctor/pediatrician for a history & physical and/or concerning medications. Bring any test results/reports from your physician's office. PCP__________________Phone___________H&P Appt. Date________             13 If you  have a Living Will and Durable Power of  for Healthcare, please bring in a copy. 15. Notify your Surgeon if you develop any illness between now and surgery  time, cough, cold, fever, sore throat, nausea, vomiting, etc.  Please notify your surgeon if you experience dizziness, shortness of breath or blurred vision between now & the time of your surgery             15. DO NOT shave your operative site 96 hours prior to surgery. For face & neck surgery, men may use an electric razor 48 hours prior to surgery. 16. Shower the night before or morning of surgery using an antibacterial soap or as you have been instructed.              17. To provide excellent care visitors will be limited to one in the room at any given time. 18.  Please bring picture ID and insurance card. 19.  Visit our web site for additional information:  [email or web address]              20.During flu season no children under the age of 15 are permitted in the hospital for the safety of all patients. 21. If you take a long acting insulin in the evening only  take half of your usual  dose the night  before your procedure              22. If you use a c-pap please bring DOS if staying overnight,             23.For your convenience [facility] has a pharmacy on site to fill your prescriptions. 24. If you use oxygen and have a portable tank please bring it  with you the DOS             25. Bring a complete list of all your medications with name and dose include any supplements. 26. Other_____PATs by 2/20  PCP 2/15_____________________________________   *Please call pre admission testing if you any further questions   Amy Farfan         [phone]   [facility] [address]    [place] [id number]. [facility]  [phone]   [address] [place] [address]       VISITOR POLICY(subject to change)    Current policy is 2 visitors per patient. No children. Mask is  at the discretion of the facility. Visiting hours are 8a-8p. Overnight visitors will be at the discretion of the nurse. All policies subject to change. All above information reviewed with patient in person or by phone. Patient verbalizes understanding. All questions and concerns addressed.                                                                                                  Patient/Rep____________________                                                                                                                           Per phone/pt         PRE OP INSTRUCTIONS

## 2023-02-02 NOTE — PROGRESS NOTES
The patient will attend class on___has soap/greger____________  The patent will get ordered PATs on___by 2/20________________________________  The patient will see PCP within 30 days of scheduled surgery____2/15______

## 2023-02-02 NOTE — TELEPHONE ENCOUNTER
General Question     Subject: Pt WOULD LIKE TO SPEAK W/ DR Jamir Stanley  Patient and /or Facility Request: Sina Dieter  Contact Number: [phone]     Pt HAVING CONCERNS (SEE 01/19/23 Natalit MSG) AND WOULD LIKE TO SPEAK WITH DR Jamir Stanley ABOUT THE UPCOMING SX). PLEASE CALL ASAP TO DISCUSS @ THE # ABOVE.

## 2023-02-13 ENCOUNTER — TELEPHONE (OUTPATIENT)
Dept: PULMONOLOGY | Age: 73
End: 2023-02-13

## 2023-02-13 ENCOUNTER — TELEPHONE (OUTPATIENT)
Dept: INTERNAL MEDICINE CLINIC | Age: 73
End: 2023-02-13

## 2023-02-13 ENCOUNTER — NURSE ONLY (OUTPATIENT)
Dept: CARDIOLOGY CLINIC | Age: 73
End: 2023-02-13
Payer: MEDICARE

## 2023-02-13 DIAGNOSIS — I48.0 PAROXYSMAL ATRIAL FIBRILLATION (HCC): Chronic | ICD-10-CM

## 2023-02-13 DIAGNOSIS — R00.1 SYMPTOMATIC BRADYCARDIA: ICD-10-CM

## 2023-02-13 DIAGNOSIS — Z45.09 ENCOUNTER FOR ELECTRONIC ANALYSIS OF REVEAL EVENT RECORDER: ICD-10-CM

## 2023-02-13 PROCEDURE — 93298 REM INTERROG DEV EVAL SCRMS: CPT | Performed by: INTERNAL MEDICINE

## 2023-02-13 PROCEDURE — G2066 INTER DEVC REMOTE 30D: HCPCS | Performed by: INTERNAL MEDICINE

## 2023-02-13 NOTE — TELEPHONE ENCOUNTER
Prescription was sent on 12/2/22. For 90 day supply with 1 refill. She should have enough to get her through until her follow up in June. Her machine compliance is low. Please encourage her to use her machine each night, all night.

## 2023-02-13 NOTE — TELEPHONE ENCOUNTER
Please Advise     Patient would like to know if she should get another Covid and booster shot for her knee replacement surgery on February 28 th , states she hasn't had one since last summer , would like to know if she can get either or at her appointment Wednesday , also would like samples of Ozempic and Xarelto.  Patient can be reached at [phone] (home)

## 2023-02-13 NOTE — TELEPHONE ENCOUNTER
Nomi Mann,  Prescription was sent on 12/2/22 for 90 day supply with 1 refill. You should have enough to get her through until your follow up in June. Your machine compliance is low, per Areli Jaquez, She encourages you to use your machine each night, all night. If you have any other questions or concerns please call us. I hope you have a good week! Thanks!

## 2023-02-13 NOTE — PROGRESS NOTES
We received a remote transmission from patient's monitor at home. Remote Linq report shows no arrhythmia recordings. No egms for tachy recordings are available. Patients monitor has been disconnected for several months. She will have to send a download for us to review. We sent her a letter asking her to do this. EP physician to review. We will continue to monitor remotely. Implanted for palpitations and AF management. Pt is on Xarelto. End of 31-day monitoring period 2-13-23.

## 2023-02-13 NOTE — TELEPHONE ENCOUNTER
Pt called in stating she is having knee surgery at the end of February and her follow up appt with us at the beginning of March would have to be cancelled due to it. Pt states she realized that her medication for pramitexole . 5 mg will run out by that appt she had to cancel. Pt was wondering if she could have a refill to hold her over til her next appt in June. Pt wants call back or [place] message with answer if we are able to do that or not. Please advise.     Ph. [phone]

## 2023-02-14 ENCOUNTER — TELEPHONE (OUTPATIENT)
Dept: ORTHOPEDIC SURGERY | Age: 73
End: 2023-02-14

## 2023-02-14 NOTE — TELEPHONE ENCOUNTER
Auth: NPR  Date: 02/28/23  Type of SX: 23 HOUR OB  Location: [facility]  CPT: 31550   DX: M17.12  SX area: L KNEE  Insurance: MEDICARE A&B

## 2023-02-15 ENCOUNTER — OFFICE VISIT (OUTPATIENT)
Dept: INTERNAL MEDICINE CLINIC | Age: 73
End: 2023-02-15
Payer: MEDICARE

## 2023-02-15 VITALS
SYSTOLIC BLOOD PRESSURE: 128 MMHG | OXYGEN SATURATION: 98 % | DIASTOLIC BLOOD PRESSURE: 76 MMHG | WEIGHT: 250 LBS | TEMPERATURE: 97.5 F | BODY MASS INDEX: 42.91 KG/M2 | HEART RATE: 97 BPM

## 2023-02-15 DIAGNOSIS — I48.0 PAROXYSMAL ATRIAL FIBRILLATION (HCC): Chronic | ICD-10-CM

## 2023-02-15 DIAGNOSIS — M17.12 PRIMARY OSTEOARTHRITIS OF LEFT KNEE: ICD-10-CM

## 2023-02-15 DIAGNOSIS — I50.32 CHRONIC DIASTOLIC HEART FAILURE (HCC): ICD-10-CM

## 2023-02-15 DIAGNOSIS — G47.33 OSA (OBSTRUCTIVE SLEEP APNEA): Chronic | ICD-10-CM

## 2023-02-15 DIAGNOSIS — F32.1 MODERATE MAJOR DEPRESSION (HCC): ICD-10-CM

## 2023-02-15 DIAGNOSIS — Z01.818 PRE-OP EXAMINATION: Primary | ICD-10-CM

## 2023-02-15 DIAGNOSIS — E66.9 TYPE 2 DIABETES MELLITUS WITH OBESITY (HCC): ICD-10-CM

## 2023-02-15 DIAGNOSIS — E11.69 TYPE 2 DIABETES MELLITUS WITH OBESITY (HCC): ICD-10-CM

## 2023-02-15 PROBLEM — J45.901 MODERATE ASTHMA WITH ACUTE EXACERBATION: Status: RESOLVED | Noted: 2022-10-25 | Resolved: 2023-02-15

## 2023-02-15 PROCEDURE — 1123F ACP DISCUSS/DSCN MKR DOCD: CPT | Performed by: INTERNAL MEDICINE

## 2023-02-15 PROCEDURE — 3017F COLORECTAL CA SCREEN DOC REV: CPT | Performed by: INTERNAL MEDICINE

## 2023-02-15 PROCEDURE — 3074F SYST BP LT 130 MM HG: CPT | Performed by: INTERNAL MEDICINE

## 2023-02-15 PROCEDURE — 2022F DILAT RTA XM EVC RTNOPTHY: CPT | Performed by: INTERNAL MEDICINE

## 2023-02-15 PROCEDURE — 3078F DIAST BP <80 MM HG: CPT | Performed by: INTERNAL MEDICINE

## 2023-02-15 PROCEDURE — G8399 PT W/DXA RESULTS DOCUMENT: HCPCS | Performed by: INTERNAL MEDICINE

## 2023-02-15 PROCEDURE — 99214 OFFICE O/P EST MOD 30 MIN: CPT | Performed by: INTERNAL MEDICINE

## 2023-02-15 PROCEDURE — 3046F HEMOGLOBIN A1C LEVEL >9.0%: CPT | Performed by: INTERNAL MEDICINE

## 2023-02-15 PROCEDURE — 1036F TOBACCO NON-USER: CPT | Performed by: INTERNAL MEDICINE

## 2023-02-15 PROCEDURE — G8484 FLU IMMUNIZE NO ADMIN: HCPCS | Performed by: INTERNAL MEDICINE

## 2023-02-15 PROCEDURE — 1090F PRES/ABSN URINE INCON ASSESS: CPT | Performed by: INTERNAL MEDICINE

## 2023-02-15 PROCEDURE — G8417 CALC BMI ABV UP PARAM F/U: HCPCS | Performed by: INTERNAL MEDICINE

## 2023-02-15 PROCEDURE — G8427 DOCREV CUR MEDS BY ELIG CLIN: HCPCS | Performed by: INTERNAL MEDICINE

## 2023-02-15 RX ORDER — LIDOCAINE 4 G/G
1 PATCH TOPICAL DAILY
COMMUNITY

## 2023-02-15 SDOH — ECONOMIC STABILITY: FOOD INSECURITY: WITHIN THE PAST 12 MONTHS, YOU WORRIED THAT YOUR FOOD WOULD RUN OUT BEFORE YOU GOT MONEY TO BUY MORE.: NEVER TRUE

## 2023-02-15 SDOH — ECONOMIC STABILITY: INCOME INSECURITY: HOW HARD IS IT FOR YOU TO PAY FOR THE VERY BASICS LIKE FOOD, HOUSING, MEDICAL CARE, AND HEATING?: NOT HARD AT ALL

## 2023-02-15 SDOH — ECONOMIC STABILITY: FOOD INSECURITY: WITHIN THE PAST 12 MONTHS, THE FOOD YOU BOUGHT JUST DIDN'T LAST AND YOU DIDN'T HAVE MONEY TO GET MORE.: NEVER TRUE

## 2023-02-15 SDOH — ECONOMIC STABILITY: HOUSING INSECURITY
IN THE LAST 12 MONTHS, WAS THERE A TIME WHEN YOU DID NOT HAVE A STEADY PLACE TO SLEEP OR SLEPT IN A SHELTER (INCLUDING NOW)?: NO

## 2023-02-15 ASSESSMENT — ENCOUNTER SYMPTOMS
DIARRHEA: 0
CONSTIPATION: 0
BLOOD IN STOOL: 0
COUGH: 0
SHORTNESS OF BREATH: 0

## 2023-02-15 ASSESSMENT — PATIENT HEALTH QUESTIONNAIRE - PHQ9
1. LITTLE INTEREST OR PLEASURE IN DOING THINGS: 0
SUM OF ALL RESPONSES TO PHQ QUESTIONS 1-9: 1
3. TROUBLE FALLING OR STAYING ASLEEP: 1
SUM OF ALL RESPONSES TO PHQ QUESTIONS 1-9: 1
8. MOVING OR SPEAKING SO SLOWLY THAT OTHER PEOPLE COULD HAVE NOTICED. OR THE OPPOSITE, BEING SO FIGETY OR RESTLESS THAT YOU HAVE BEEN MOVING AROUND A LOT MORE THAN USUAL: 0
10. IF YOU CHECKED OFF ANY PROBLEMS, HOW DIFFICULT HAVE THESE PROBLEMS MADE IT FOR YOU TO DO YOUR WORK, TAKE CARE OF THINGS AT HOME, OR GET ALONG WITH OTHER PEOPLE: 1
5. POOR APPETITE OR OVEREATING: 0
SUM OF ALL RESPONSES TO PHQ QUESTIONS 1-9: 1
SUM OF ALL RESPONSES TO PHQ9 QUESTIONS 1 & 2: 0
9. THOUGHTS THAT YOU WOULD BE BETTER OFF DEAD, OR OF HURTING YOURSELF: 0
SUM OF ALL RESPONSES TO PHQ QUESTIONS 1-9: 1
4. FEELING TIRED OR HAVING LITTLE ENERGY: 0
7. TROUBLE CONCENTRATING ON THINGS, SUCH AS READING THE NEWSPAPER OR WATCHING TELEVISION: 0
6. FEELING BAD ABOUT YOURSELF - OR THAT YOU ARE A FAILURE OR HAVE LET YOURSELF OR YOUR FAMILY DOWN: 0
2. FEELING DOWN, DEPRESSED OR HOPELESS: 0

## 2023-02-15 NOTE — PROGRESS NOTES
Gypsy Roe is a 81 y.o.  female who comes for a preoperative exam.  She is referred by Dr. Catherine Leach for perioperative risk determination for upcoming surgery for left total knee replacement.       Past Medical History:   Diagnosis Date    Allergic     Anesthesia complication     family hx-father-at at age 80 having hip replacement revision surgery-had tia's x2 while under anes-but also had hx chf-had dificuly with speech when coming out from anes    Anxiety     Arthritis     left ankle, bilateral hands    Arthritis of left hip 2/2/2016    Arthritis of right hip 4/19/2016    Asthma     At risk for falls     uses a cane    Atrial fibrillation with rapid ventricular response (HCC)     Atrial flutter ([address] 4/25/2018    Chronic maxillary sinusitis 5/24/2017    CTEPH (chronic thromboembolic pulmonary hypertension) ([address] 8/26/2016    Depression     pt stated r/t bad marriage/alcoholic spouse    Diabetes mellitus ([address]     borderline    Disc disease, degenerative, lumbar or lumbosacral 2/20/2017    Hepatic steatosis 4/26/2019    History of total hip arthroplasty 2/28/2017    Hypertension     Leg cramps     patient states very severe, requires immediate potassium administration    Migraines, basilar     last migraine 10 years ago    Mild persistent asthma without complication 6/55/0540    Moderate asthma with acute exacerbation 10/25/2022    Obesity, Class III, BMI 40-49.9 (morbid obesity) ([address] 4/19/2016    HUSSAIN (obstructive sleep apnea) 10/14/2013    Osteoarthritis, hip, bilateral 2016    Bilateral hip arthroplasty    Panic attacks     Pneumonia 2015    Primary osteoarthritis of both knees 9/19/2017    Primary osteoarthritis of left knee 12/15/2016    Pulmonary emboli ([address] 8/4/2016    Pulmonary HTN ([address] 7/21/2016    Pure hypercholesterolemia 2/13/2019    Restless leg syndrome     Rhinitis, chronic 3/26/2014    Sleep apnea     wears CPAP @13    Stress incontinence     Tear of left rotator cuff 1/23/2018    Thyroid disease hypothyroid    Wears glasses      Past Surgical History:   Procedure Laterality Date    ATRIAL ABLATION SURGERY      BACK SURGERY      LUMBAR FUSION    CATARACT REMOVAL Bilateral     COLONOSCOPY      EYE SURGERY Right 2010    retinal tear repaired     [facility]    right ring finger severe laceration, fracture    HIP ARTHROPLASTY Right 16    HYSTERECTOMY (CERVIX STATUS UNKNOWN)      JOINT REPLACEMENT Left 18    left hip    TONSILLECTOMY  1972    TOTAL KNEE ARTHROPLASTY Right 3/16/2021    RIGHT ROBOTIC ASSISTED TOTAL KNEE ARTHROPLASTY (55356, 20650) - [employer] [facility] performed by Cookie Higgins MD at [address] [facility],[address] [facility] History   Problem Relation Age of Onset    Alcohol Abuse Sister     Arthritis Sister     Stroke Maternal Grandmother     Thyroid Disease Maternal Grandmother     Thyroid Disease Maternal Grandfather     Heart Disease Maternal Grandfather     Alcohol Abuse Maternal Grandfather     Substance Abuse Maternal Grandfather     Hypertension Mother     Thyroid Disease Mother     Anxiety Disorder Mother     Arthritis Mother     Cataracts Mother     Heart Disease Mother     Asthma Mother     Thyroid Disease Father     Heart Failure Father     Heart Disease Father     Arthritis Brother     High Cholesterol Brother     Heart Disease Maternal Uncle     Heart Disease Paternal Uncle     Cancer Paternal Uncle     Alcohol Abuse Paternal Grandfather     Substance Abuse Paternal Grandfather      Social History     Socioeconomic History    Marital status:       Spouse name: Not on file    Number of children: 4    Years of education: 15    Highest education level: Not on file   Occupational History    Occupation: retired    Tobacco Use    Smoking status: Former     Packs/day: 0.50     Years: 5.00     Pack years: 2.50     Types: Cigarettes     Quit date: 10/5/2013     Years since quittin.3    Smokeless tobacco: Never    Tobacco comments:     smoked for a total of 5yr total   Vaping Use    Vaping Use: Never used   Substance and Sexual Activity    Alcohol use: Yes     Comment: RARE    Drug use: No    Sexual activity: Never   Other Topics Concern    Not on file   Social History Narrative    Not on file     Social Determinants of Health     Financial Resource Strain: Low Risk     Difficulty of Paying Living Expenses: Not hard at all   Food Insecurity: No Food Insecurity    Worried About [address] in the Last Year: Never true    [address] in the Last Year: Never true   Transportation Needs: Unknown    Lack of Transportation (Medical): Not on file    Lack of Transportation (Non-Medical): No   Physical Activity: Inactive    Days of Exercise per Week: 0 days    Minutes of Exercise per Session: 20 min   Stress: Not on file   Social Connections: Not on file   Intimate Partner Violence: Not At Risk    Fear of Current or Ex-Partner: No    Emotionally Abused: No    Physically Abused: No    Sexually Abused: No   Housing Stability: Unknown    Unable to Pay for Housing in the Last Year: Not on file    Number of Places Lived in the Last Year: Not on file    Unstable Housing in the Last Year: No       Current Outpatient Medications:     lidocaine 4 % external patch, Place 1 patch onto the skin daily, Disp: , Rfl:     traMADol (ULTRAM) 50 MG tablet, TAKE TWO TABLETS BY MOUTH EVERY 8 HOURS AS NEEDED FOR PAIN FOR UP TO 14 DAYS. MAX DAILY AMOUNT 300 MG.  REDUCE DOSES TAKEN AS PAIN BECOMES MANAGEABLE., Disp: 40 tablet, Rfl: 1    lisinopril (PRINIVIL;ZESTRIL) 10 MG tablet, TAKE ONE TABLET BY MOUTH ONCE NIGHTLY, Disp: 90 tablet, Rfl: 3    Magnesium Citrate 100 MG TABS, Take 1 tablet by mouth daily, Disp: 90 tablet, Rfl: 3    potassium chloride (KLOR-CON M) 10 MEQ extended release tablet, Take 1 tablet by mouth daily, Disp: 90 tablet, Rfl: 3    spironolactone (ALDACTONE) 25 MG tablet, TAKE ONE TABLET BY MOUTH DAILY, Disp: 90 tablet, Rfl: 3    furosemide (LASIX) 40 MG tablet, Take 1 tablet by mouth daily, Disp: 90 tablet, Rfl: 3    dilTIAZem (CARDIZEM) 60 MG tablet, Take 1 tablet by mouth 2 times daily as needed (Take at onset of atrial fibrillation. If your heart rate remains elevated after 1-2 hours, then take a 2nd dose.  If you remain in atrial fibrillation after the 2nd dose, call our office), Disp: 60 tablet, Rfl: 2    dilTIAZem (CARDIZEM CD) 120 MG extended release capsule, Take 1 capsule by mouth daily, Disp: 90 capsule, Rfl: 3    pramipexole (MIRAPEX) 0.5 MG tablet, 1.5 tab q 5 PM and 1.5 tab qHS, Disp: 270 tablet, Rfl: 1    buPROPion (WELLBUTRIN XL) 300 MG extended release tablet, Take 1 tablet by mouth every morning, Disp: 90 tablet, Rfl: 0    zoster recombinant adjuvanted vaccine (SHINGRIX) 50 MCG/0.5ML SUSR injection, Inject 0.5 mLs into the muscle See Admin Instructions 1 dose now and repeat in 2-6 months, Disp: 0.5 mL, Rfl: 0    tiotropium (SPIRIVA RESPIMAT) 1.25 MCG/ACT AERS inhaler, Inhale 2 puffs into the lungs daily, Disp: , Rfl:     rivaroxaban (XARELTO) 20 MG TABS tablet, Take 1 tablet by mouth daily (with breakfast), Disp: 42 tablet, Rfl: 0    melatonin 10 MG CAPS capsule, Take 10 mg by mouth nightly, Disp: , Rfl:     Semaglutide,0.25 or 0.5MG/DOS, (OZEMPIC, 0.25 OR 0.5 MG/DOSE,) 2 MG/1.5ML SOPN, Inject 0.5 mg into the skin once a week, Disp: 2 pen, Rfl: 0    Ciclesonide (ALVESCO) 160 MCG/ACT AERS, Inhale into the lungs, Disp: , Rfl:     fluticasone (FLONASE) 50 MCG/ACT nasal spray, 1 spray by Each Nostril route daily, Disp: , Rfl:     azelastine (ASTELIN) 0.1 % nasal spray, 1 spray by Nasal route 2 times daily 1 Spray in each nostril, Disp: 1 Bottle, Rfl: 2    Handicap Placard MISC, by Does not apply route Exp 5 years, Disp: 1 each, Rfl: 0    Multiple Vitamins-Minerals (THERAPEUTIC MULTIVITAMIN-MINERALS) tablet, Take 1 tablet by mouth daily , Disp: , Rfl:     CPAP Machine MISC, by Does not apply route, Disp: , Rfl:     EPIPEN 2-MIR 0.3 MG/0.3ML SOAJ injection, , Disp: , Rfl:   Allergies   Allergen Reactions    Atorvastatin Other (See Comments)     Muscle Pain, all statins     Penicillins Anaphylaxis    Simvastatin Other (See Comments)     Muscle Pain    Cephalexin Hives and Itching    Cephalosporins Hives and Itching    Food Diarrhea     Milk , shellfish , gluten. ..may have bouts of diarrhea, constipation or flatulus. (RD spoke to pt regarding \"milk allergy\", pt is not allergic to milk. She does not drink milk, but consumes milk in other products and consumes dairy products. Had one reactions to shellfish years ago and now can tolerate one serving of shellfish once per week. Avoids gluten as much as she can, but does not have celiac disease.)    Levofloxacin     Other      Environmental -cats,dogs,dust    Shellfish-Derived Products      hives    Sulfa Antibiotics      Can take Celebrex, Pt denies 8/1/18     Vitals:    02/15/23 1512   BP: 128/76   Pulse: 97   Temp: 97.5 °F (36.4 °C)   SpO2: 98%     Review of Systems   Constitutional:  Negative for chills and fatigue. HENT:  Negative for congestion. Respiratory:  Negative for cough and shortness of breath. Cardiovascular:  Negative for chest pain and palpitations. Gastrointestinal:  Negative for blood in stool, constipation and diarrhea. Genitourinary:  Negative for dysuria and urgency. Psychiatric/Behavioral:  Negative for behavioral problems, sleep disturbance and suicidal ideas. Physical Exam  Vitals reviewed. Constitutional:       General: She is not in acute distress. Appearance: She is well-developed. She is obese. She is not diaphoretic. HENT:      Head: Normocephalic and atraumatic. Right Ear: Tympanic membrane, ear canal and external ear normal. There is no impacted cerumen. Left Ear: Tympanic membrane, ear canal and external ear normal. There is no impacted cerumen. Nose: Nose normal. No congestion or rhinorrhea.       Mouth/Throat:      Mouth: Mucous membranes are moist. Pharynx: Oropharynx is clear. No oropharyngeal exudate or posterior oropharyngeal erythema. Eyes:      General:         Right eye: No discharge. Left eye: No discharge. Extraocular Movements: Extraocular movements intact. Conjunctiva/sclera: Conjunctivae normal.      Pupils: Pupils are equal, round, and reactive to light. Neck:      Vascular: No carotid bruit. Cardiovascular:      Rate and Rhythm: Normal rate and regular rhythm. Pulses: Normal pulses. Heart sounds: Normal heart sounds. No murmur heard. No friction rub. No gallop. Pulmonary:      Effort: Pulmonary effort is normal. No respiratory distress. Breath sounds: Normal breath sounds. No stridor. No wheezing, rhonchi or rales. Chest:      Chest wall: No tenderness. Abdominal:      General: Abdomen is flat. Bowel sounds are normal. There is no distension. Palpations: Abdomen is soft. There is no mass. Tenderness: There is no abdominal tenderness. There is no right CVA tenderness, left CVA tenderness, guarding or rebound. Hernia: No hernia is present. Musculoskeletal:         General: No swelling, tenderness, deformity or signs of injury. Cervical back: Normal range of motion and neck supple. No rigidity or tenderness. Right lower leg: No edema. Left lower leg: No edema. Lymphadenopathy:      Cervical: No cervical adenopathy. Skin:     General: Skin is warm and dry. Findings: No lesion or rash. Neurological:      Mental Status: She is alert and oriented to person, place, and time. Cranial Nerves: No cranial nerve deficit. Sensory: No sensory deficit. Motor: No weakness. Coordination: Coordination normal.      Gait: Gait normal.      Deep Tendon Reflexes: Reflexes normal.   Psychiatric:         Behavior: Behavior normal.         Thought Content:  Thought content normal.         Judgment: Judgment normal.     Assessment/Plan:  Reyes Karol was seen today for pre-op exam.    Diagnoses and all orders for this visit:    Pre-op examination    Primary osteoarthritis of left knee  Comments:  She is in need of a total knee replacement    Moderate major depression ([address]  Comments:  Chronic, stable. 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 HUSSAIN (obstructive sleep apnea)  Comments:  She uses a CPAP. This increases her risk for perioperative complications. Perioperative risk related to the patient's upcoming surgery is considered moderate. Her risk is primarily related to the patient's HUSSAIN, atrial fibrillation and the use of Xarelto. She is cleared for surgery with close observation of her pulmonary status. Pre-op exam was completed on February 15, 2023 at 1000.       Lambert Dawson MD  FACP

## 2023-02-15 NOTE — H&P (VIEW-ONLY)
Cb Obrien is a 81 y.o.  female who comes for a preoperative exam.  She is referred by Dr. Vladimir Gonzalez for perioperative risk determination for upcoming surgery for left total knee replacement.       Past Medical History:   Diagnosis Date    Allergic     Anesthesia complication     family hx-father-at at age 80 having hip replacement revision surgery-had tia's x2 while under anes-but also had hx chf-had dificuly with speech when coming out from anes    Anxiety     Arthritis     left ankle, bilateral hands    Arthritis of left hip 2/2/2016    Arthritis of right hip 4/19/2016    Asthma     At risk for falls     uses a cane    Atrial fibrillation with rapid ventricular response (HCC)     Atrial flutter ([address] 4/25/2018    Chronic maxillary sinusitis 5/24/2017    CTEPH (chronic thromboembolic pulmonary hypertension) ([address] 8/26/2016    Depression     pt stated r/t bad marriage/alcoholic spouse    Diabetes mellitus ([address]     borderline    Disc disease, degenerative, lumbar or lumbosacral 2/20/2017    Hepatic steatosis 4/26/2019    History of total hip arthroplasty 2/28/2017    Hypertension     Leg cramps     patient states very severe, requires immediate potassium administration    Migraines, basilar     last migraine 10 years ago    Mild persistent asthma without complication 6/60/2239    Moderate asthma with acute exacerbation 10/25/2022    Obesity, Class III, BMI 40-49.9 (morbid obesity) ([address] 4/19/2016    HUSSAIN (obstructive sleep apnea) 10/14/2013    Osteoarthritis, hip, bilateral 2016    Bilateral hip arthroplasty    Panic attacks     Pneumonia 2015    Primary osteoarthritis of both knees 9/19/2017    Primary osteoarthritis of left knee 12/15/2016    Pulmonary emboli ([address] 8/4/2016    Pulmonary HTN ([address] 7/21/2016    Pure hypercholesterolemia 2/13/2019    Restless leg syndrome     Rhinitis, chronic 3/26/2014    Sleep apnea     wears CPAP @13    Stress incontinence     Tear of left rotator cuff 1/23/2018    Thyroid disease hypothyroid    Wears glasses      Past Surgical History:   Procedure Laterality Date    ATRIAL ABLATION SURGERY      BACK SURGERY      LUMBAR FUSION    CATARACT REMOVAL Bilateral     COLONOSCOPY      EYE SURGERY Right 2010    retinal tear repaired     [facility]    right ring finger severe laceration, fracture    HIP ARTHROPLASTY Right 18    HYSTERECTOMY (CERVIX STATUS UNKNOWN)      JOINT REPLACEMENT Left 18    left hip    TONSILLECTOMY  1972    TOTAL KNEE ARTHROPLASTY Right 3/16/2021    RIGHT ROBOTIC ASSISTED TOTAL KNEE ARTHROPLASTY (96076, 42386) - [employer]ED performed by Jacque Ballard MD at [facility],[address] [facility] History   Problem Relation Age of Onset    Alcohol Abuse Sister     Arthritis Sister     Stroke Maternal Grandmother     Thyroid Disease Maternal Grandmother     Thyroid Disease Maternal Grandfather     Heart Disease Maternal Grandfather     Alcohol Abuse Maternal Grandfather     Substance Abuse Maternal Grandfather     Hypertension Mother     Thyroid Disease Mother     Anxiety Disorder Mother     Arthritis Mother     Cataracts Mother     Heart Disease Mother     Asthma Mother     Thyroid Disease Father     Heart Failure Father     Heart Disease Father     Arthritis Brother     High Cholesterol Brother     Heart Disease Maternal Uncle     Heart Disease Paternal Uncle     Cancer Paternal Uncle     Alcohol Abuse Paternal Grandfather     Substance Abuse Paternal Grandfather      Social History     Socioeconomic History    Marital status:       Spouse name: Not on file    Number of children: 4    Years of education: 15    Highest education level: Not on file   Occupational History    Occupation: retired    Tobacco Use    Smoking status: Former     Packs/day: 0.50     Years: 5.00     Pack years: 2.50     Types: Cigarettes     Quit date: 10/5/2013     Years since quittin.3    Smokeless tobacco: Never    Tobacco comments:     smoked for a total of 5yr total   Vaping Use    Vaping Use: Never used   Substance and Sexual Activity    Alcohol use: Yes     Comment: RARE    Drug use: No    Sexual activity: Never   Other Topics Concern    Not on file   Social History Narrative    Not on file     Social Determinants of Health     Financial Resource Strain: Low Risk     Difficulty of Paying Living Expenses: Not hard at all   Food Insecurity: No Food Insecurity    Worried About [address] in the Last Year: Never true    [address] in the Last Year: Never true   Transportation Needs: Unknown    Lack of Transportation (Medical): Not on file    Lack of Transportation (Non-Medical): No   Physical Activity: Inactive    Days of Exercise per Week: 0 days    Minutes of Exercise per Session: 20 min   Stress: Not on file   Social Connections: Not on file   Intimate Partner Violence: Not At Risk    Fear of Current or Ex-Partner: No    Emotionally Abused: No    Physically Abused: No    Sexually Abused: No   Housing Stability: Unknown    Unable to Pay for Housing in the Last Year: Not on file    Number of Places Lived in the Last Year: Not on file    Unstable Housing in the Last Year: No       Current Outpatient Medications:     lidocaine 4 % external patch, Place 1 patch onto the skin daily, Disp: , Rfl:     traMADol (ULTRAM) 50 MG tablet, TAKE TWO TABLETS BY MOUTH EVERY 8 HOURS AS NEEDED FOR PAIN FOR UP TO 14 DAYS. MAX DAILY AMOUNT 300 MG.  REDUCE DOSES TAKEN AS PAIN BECOMES MANAGEABLE., Disp: 40 tablet, Rfl: 1    lisinopril (PRINIVIL;ZESTRIL) 10 MG tablet, TAKE ONE TABLET BY MOUTH ONCE NIGHTLY, Disp: 90 tablet, Rfl: 3    Magnesium Citrate 100 MG TABS, Take 1 tablet by mouth daily, Disp: 90 tablet, Rfl: 3    potassium chloride (KLOR-CON M) 10 MEQ extended release tablet, Take 1 tablet by mouth daily, Disp: 90 tablet, Rfl: 3    spironolactone (ALDACTONE) 25 MG tablet, TAKE ONE TABLET BY MOUTH DAILY, Disp: 90 tablet, Rfl: 3    furosemide (LASIX) 40 MG tablet, Take 1 tablet by mouth daily, Disp: 90 tablet, Rfl: 3    dilTIAZem (CARDIZEM) 60 MG tablet, Take 1 tablet by mouth 2 times daily as needed (Take at onset of atrial fibrillation. If your heart rate remains elevated after 1-2 hours, then take a 2nd dose.  If you remain in atrial fibrillation after the 2nd dose, call our office), Disp: 60 tablet, Rfl: 2    dilTIAZem (CARDIZEM CD) 120 MG extended release capsule, Take 1 capsule by mouth daily, Disp: 90 capsule, Rfl: 3    pramipexole (MIRAPEX) 0.5 MG tablet, 1.5 tab q 5 PM and 1.5 tab qHS, Disp: 270 tablet, Rfl: 1    buPROPion (WELLBUTRIN XL) 300 MG extended release tablet, Take 1 tablet by mouth every morning, Disp: 90 tablet, Rfl: 0    zoster recombinant adjuvanted vaccine (SHINGRIX) 50 MCG/0.5ML SUSR injection, Inject 0.5 mLs into the muscle See Admin Instructions 1 dose now and repeat in 2-6 months, Disp: 0.5 mL, Rfl: 0    tiotropium (SPIRIVA RESPIMAT) 1.25 MCG/ACT AERS inhaler, Inhale 2 puffs into the lungs daily, Disp: , Rfl:     rivaroxaban (XARELTO) 20 MG TABS tablet, Take 1 tablet by mouth daily (with breakfast), Disp: 42 tablet, Rfl: 0    melatonin 10 MG CAPS capsule, Take 10 mg by mouth nightly, Disp: , Rfl:     Semaglutide,0.25 or 0.5MG/DOS, (OZEMPIC, 0.25 OR 0.5 MG/DOSE,) 2 MG/1.5ML SOPN, Inject 0.5 mg into the skin once a week, Disp: 2 pen, Rfl: 0    Ciclesonide (ALVESCO) 160 MCG/ACT AERS, Inhale into the lungs, Disp: , Rfl:     fluticasone (FLONASE) 50 MCG/ACT nasal spray, 1 spray by Each Nostril route daily, Disp: , Rfl:     azelastine (ASTELIN) 0.1 % nasal spray, 1 spray by Nasal route 2 times daily 1 Spray in each nostril, Disp: 1 Bottle, Rfl: 2    Handicap Placard MISC, by Does not apply route Exp 5 years, Disp: 1 each, Rfl: 0    Multiple Vitamins-Minerals (THERAPEUTIC MULTIVITAMIN-MINERALS) tablet, Take 1 tablet by mouth daily , Disp: , Rfl:     CPAP Machine MISC, by Does not apply route, Disp: , Rfl:     EPIPEN 2-MIR 0.3 MG/0.3ML SOAJ injection, , Disp: , Rfl:   Allergies   Allergen Reactions    Atorvastatin Other (See Comments)     Muscle Pain, all statins     Penicillins Anaphylaxis    Simvastatin Other (See Comments)     Muscle Pain    Cephalexin Hives and Itching    Cephalosporins Hives and Itching    Food Diarrhea     Milk , shellfish , gluten. ..may have bouts of diarrhea, constipation or flatulus. (RD spoke to pt regarding \"milk allergy\", pt is not allergic to milk. She does not drink milk, but consumes milk in other products and consumes dairy products. Had one reactions to shellfish years ago and now can tolerate one serving of shellfish once per week. Avoids gluten as much as she can, but does not have celiac disease.)    Levofloxacin     Other      Environmental -cats,dogs,dust    Shellfish-Derived Products      hives    Sulfa Antibiotics      Can take Celebrex, Pt denies 8/1/18     Vitals:    02/15/23 1512   BP: 128/76   Pulse: 97   Temp: 97.5 °F (36.4 °C)   SpO2: 98%     Review of Systems   Constitutional:  Negative for chills and fatigue. HENT:  Negative for congestion. Respiratory:  Negative for cough and shortness of breath. Cardiovascular:  Negative for chest pain and palpitations. Gastrointestinal:  Negative for blood in stool, constipation and diarrhea. Genitourinary:  Negative for dysuria and urgency. Psychiatric/Behavioral:  Negative for behavioral problems, sleep disturbance and suicidal ideas. Physical Exam  Vitals reviewed. Constitutional:       General: She is not in acute distress. Appearance: She is well-developed. She is obese. She is not diaphoretic. HENT:      Head: Normocephalic and atraumatic. Right Ear: Tympanic membrane, ear canal and external ear normal. There is no impacted cerumen. Left Ear: Tympanic membrane, ear canal and external ear normal. There is no impacted cerumen. Nose: Nose normal. No congestion or rhinorrhea.       Mouth/Throat:      Mouth: Mucous membranes are moist. Pharynx: Oropharynx is clear. No oropharyngeal exudate or posterior oropharyngeal erythema. Eyes:      General:         Right eye: No discharge. Left eye: No discharge. Extraocular Movements: Extraocular movements intact. Conjunctiva/sclera: Conjunctivae normal.      Pupils: Pupils are equal, round, and reactive to light. Neck:      Vascular: No carotid bruit. Cardiovascular:      Rate and Rhythm: Normal rate and regular rhythm. Pulses: Normal pulses. Heart sounds: Normal heart sounds. No murmur heard. No friction rub. No gallop. Pulmonary:      Effort: Pulmonary effort is normal. No respiratory distress. Breath sounds: Normal breath sounds. No stridor. No wheezing, rhonchi or rales. Chest:      Chest wall: No tenderness. Abdominal:      General: Abdomen is flat. Bowel sounds are normal. There is no distension. Palpations: Abdomen is soft. There is no mass. Tenderness: There is no abdominal tenderness. There is no right CVA tenderness, left CVA tenderness, guarding or rebound. Hernia: No hernia is present. Musculoskeletal:         General: No swelling, tenderness, deformity or signs of injury. Cervical back: Normal range of motion and neck supple. No rigidity or tenderness. Right lower leg: No edema. Left lower leg: No edema. Lymphadenopathy:      Cervical: No cervical adenopathy. Skin:     General: Skin is warm and dry. Findings: No lesion or rash. Neurological:      Mental Status: She is alert and oriented to person, place, and time. Cranial Nerves: No cranial nerve deficit. Sensory: No sensory deficit. Motor: No weakness. Coordination: Coordination normal.      Gait: Gait normal.      Deep Tendon Reflexes: Reflexes normal.   Psychiatric:         Behavior: Behavior normal.         Thought Content:  Thought content normal.         Judgment: Judgment normal.     Assessment/Plan:  Gina Romo was seen today for pre-op exam.    Diagnoses and all orders for this visit:    Pre-op examination    Primary osteoarthritis of left knee  Comments:  She is in need of a total knee replacement    Moderate major depression ([address]  Comments:  Chronic, stable. 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 HUSSAIN (obstructive sleep apnea)  Comments:  She uses a CPAP. This increases her risk for perioperative complications. Perioperative risk related to the patient's upcoming surgery is considered moderate. Her risk is primarily related to the patient's HUSSAIN, atrial fibrillation and the use of Xarelto. She is cleared for surgery with close observation of her pulmonary status. Pre-op exam was completed on February 15, 2023 at 1000.       Chris Capellan MD  FACP

## 2023-02-21 DIAGNOSIS — G25.81 RESTLESS LEG SYNDROME: Chronic | ICD-10-CM

## 2023-02-21 RX ORDER — PRAMIPEXOLE DIHYDROCHLORIDE 0.5 MG/1
TABLET ORAL
Qty: 270 TABLET | Refills: 1 | OUTPATIENT
Start: 2023-02-21

## 2023-02-21 NOTE — DISCHARGE INSTRUCTIONS
INSTRUCTIONS    ACTIVITY: weight-bearing as tolerated. You may progress off support as tolerated. During the day, continue to wear your ELIZABETH stocking on the operative leg. Take the stocking off at night. You do not need to wear a stocking on your non-operative leg. You should wear the compression stocking until your post-op visit, this keeps lower extremity swelling to a minimum and reduces joint stiffness.     MEDICATIONS: Upon discharge resume your home medications. Take all medications as prescribed. Take a stool softener (Senna/Colace) if taking narcotic pain medications. Stool softeners are only effective if you are adequately hydrated.  Try and drink 6-8 glasses of water or fluids a day, unless otherwise contraindicated. Despite using Senna/Colace, if you haven't had a bowel movement in 3 days, please switch to Miralax. Miralax is a gentle osmotic laxative and is sold over the counter. Mix one capful of the powder into a glass of water or juice once a day. You should have a bowel movement within 24 hours, if not call the office.     You will be discharged from the hospital with an adequate supply of pain medication. You are encouraged to taper the use of narcotic pain medication as tolerated. Should you require a refill, please call our office. Dr. Torrez's office routinely prescribes narcotic pain medication for 4-6 weeks after surgery. If you require pain medication beyond this interval you may be referred to your PCP or to the Pain Clinic for further evaluation.     Plan ahead for refills on pain medication as many narcotics either need to be picked up at the office or mailed. It is best to call 48-72 hours in advance of needing a refill so that you don't run out of medication.     ANTICOAGULATION: Continue your XARELTO as prescribed to prevent blood clots (DVT/PE). You will take a 1/2 dose for 2 days then increase to a full dose on Friday 3/2.    Common symptoms of blood clots (DVT/PE) include: localized  pain, swelling, calf tenderness, redness or discoloration of the skin. PE symptoms include: shortness of breath, rapid pulse, sweating, and chest pain that worsens with inspiration, coughing up blood, light headedness, feelings of apprehension. If you experience any of these symptoms call the office or go to the closest ER.    WOUND CARE: The dressing will remain in place until post-op day 10. You can shower with the dressing but should not tub-bath or submerge your incision in water. Prior to any wound care please wash and dry your hands. Keep your incision clean and dry. If the wound is dry, you do not need to cover it with a dressing. The visiting nurse, physical therapist or rehab facility can help remove the dressing. Should your incision start to drain let our office know. ANTIBIOTIC: You are being discharged on 7 days of antibiotics. Take the antibiotics as prescribed to prevent infection. Please follow-up with Dr. Prabha Ramirez in orthopedic clinic in about 2 weeks from day of surgery, call [phone] to make an appointment                [place] [address] is participating in [address] [place] [address] for Joint Replacement (CJR) [facility] is participating in a Medicare initiative called the [address] [place] [address] for Joint Replacement ([address]) model. Medicare designed this model to encourage higher quality care and greater financial accountability from hospitals when Medicare beneficiaries receive lower-extremity joint replacement procedures (Freya Feliciano), typically hip or knee replacements. [facility] participation in the [address] [place] [address],[address] should not restrict your access to care for your medical condition or your freedom to choose your health care providers and services. All existing Medicare beneficiary protections continue to be available to you. The CJR model aims to help give you better care.      The R model aims to support better and more efficient care for beneficiaries undergoing LEJR procedures. A CJR episode of care is typically defined as an admission of an eligible Medicare beneficiary to a hospital participating in the [address] for an [address] procedure. The LEJR procedure can take place in either an inpatient or outpatient setting and will still be considered a CJR episode of care. The CJR episode of care continues for 90 days following discharge. This model uses episode payment and quality measurement for an episode of care associated with LEJR procedures to encourage hospitals, physicians, and post-acute care providers to work together to improve the quality and coordination of care from the initial hospitalization through recovery. Under the [address], [facility] can earn additional payments from Medicare if we meet the high quality goals set by Medicare, while keeping hospital costs and care spending under control. If we dont meet these quality and cost goals, we may have to repay Medicare. Medicare is using the CJR model to encourage [facility] to work more closely with your doctors and other health care providers that help patients recover after discharge from the hospital including, but not limited to, nursing homes (skilled nursing facilities), home health agencies, inpatient rehabilitation facilities, and long- term care hospitals. If you require a stay in a [facility] (SNF) to assist with your recovery from surgery and if, and only if, it is clinically appropriate, the CJR model permits [facility] to discharge you to a high quality SNF sooner than the three days Medicare usually requires to cover a SNF stay. Medicare will monitor your care to ensure you and others are receiving high quality care. It's your choice which hospital, doctor, or other providers you use.     You have the right to choose which hospital, doctor, or other post-hospital stay health care provider you use. If you believe that your care is adversely affected or have concerns about substandard care, you may call 1-800-MEDICARE or contact your states Quality Improvement Organization by going to: Guillermo.whitley. To find a different doctor, visit [employer] [address] Physician Compare website, [employer] ([email or web address], or call 1-800-MEDICARE (4-818.778.5559). TTY users should call 5-464.751.6556. To find a different hospital, visit "[employer] or  call 1-800-MEDICARE (1- 197.566.2440). TTY users should call  8-776.641.2494. To find a different skilled nursing facility, visit Jossue ROBERTALinda Sebastian  website, [email or web address]/, or call  1-800-MEDICARE (3-314.270.5397). TTY users should call 4-055-977- 4230. To find a different home health agency, visit [address] [place] [address] website, [employer], or call 1-800-MEDICARE (2-470.260.6782). TTY users should call 6-696-023- 7252. For an explanation of how patients can access their health care records and beneficiary claims data, please visit [email or web address]    Get more information     If you have questions or want more information about the Comprehensive Care for Joint Replacement (CJR) model, call [facility] at [phone] or call 1-800-MEDICARE. You can also find additional information at https://innovation.cms.gov/initiatives/cjr.

## 2023-02-21 NOTE — PROGRESS NOTES
New time on 2/28/23 1100 with an arrival of 0900 confirmed with patient. States will be in tomorrow AM to get PATs done

## 2023-02-22 ENCOUNTER — HOSPITAL ENCOUNTER (OUTPATIENT)
Age: 73
Discharge: HOME | End: 2023-02-22

## 2023-02-22 ENCOUNTER — HOSPITAL ENCOUNTER (OUTPATIENT)
Age: 73
Discharge: HOME | End: 2023-02-22
Payer: MEDICARE

## 2023-02-22 DIAGNOSIS — Z01.818 PREOP TESTING: ICD-10-CM

## 2023-02-22 LAB
APTT: 35.5 SEC (ref 23–34.3)
HCT VFR BLD CALC: 39 % (ref 36–48)
HEMOGLOBIN: 13.2 G/DL (ref 12–16)
INR BLD: 1.17 (ref 0.87–1.14)
MCH RBC QN AUTO: 31.5 PG (ref 26–34)
MCHC RBC AUTO-ENTMCNC: 33.8 G/DL (ref 31–36)
MCV RBC AUTO: 93.3 FL (ref 80–100)
PDW BLD-RTO: 14.4 % (ref 12.4–15.4)
PLATELET # BLD: 293 K/UL (ref 135–450)
PMV BLD AUTO: 9.1 FL (ref 5–10.5)
PROTHROMBIN TIME: 14.8 SEC (ref 11.7–14.5)
RBC # BLD: 4.18 M/UL (ref 4–5.2)
WBC # BLD: 5.6 K/UL (ref 4–11)

## 2023-02-22 PROCEDURE — 85610 PROTHROMBIN TIME: CPT

## 2023-02-22 PROCEDURE — 83036 HEMOGLOBIN GLYCOSYLATED A1C: CPT

## 2023-02-22 PROCEDURE — 87081 CULTURE SCREEN ONLY: CPT

## 2023-02-22 PROCEDURE — 80048 BASIC METABOLIC PNL TOTAL CA: CPT

## 2023-02-22 PROCEDURE — 85730 THROMBOPLASTIN TIME PARTIAL: CPT

## 2023-02-22 PROCEDURE — 85027 COMPLETE CBC AUTOMATED: CPT

## 2023-02-22 PROCEDURE — 36415 COLL VENOUS BLD VENIPUNCTURE: CPT

## 2023-02-22 PROCEDURE — 82306 VITAMIN D 25 HYDROXY: CPT

## 2023-02-23 LAB
ANION GAP SERPL CALCULATED.3IONS-SCNC: 13 MMOL/L (ref 3–16)
BUN BLDV-MCNC: 14 MG/DL (ref 7–20)
CALCIUM SERPL-MCNC: 9.2 MG/DL (ref 8.3–10.6)
CHLORIDE BLD-SCNC: 100 MMOL/L (ref 99–110)
CO2: 23 MMOL/L (ref 21–32)
CREAT SERPL-MCNC: 0.7 MG/DL (ref 0.6–1.2)
ESTIMATED AVERAGE GLUCOSE: 119.8 MG/DL
GFR SERPL CREATININE-BSD FRML MDRD: >60 ML/MIN/{1.73_M2}
GLUCOSE BLD-MCNC: 112 MG/DL (ref 70–99)
HBA1C MFR BLD: 5.8 %
POTASSIUM SERPL-SCNC: 4 MMOL/L (ref 3.5–5.1)
SODIUM BLD-SCNC: 136 MMOL/L (ref 136–145)
VITAMIN D 25-HYDROXY: 48.1 NG/ML

## 2023-02-25 LAB — MRSA CULTURE ONLY: NORMAL

## 2023-02-27 ENCOUNTER — TELEPHONE (OUTPATIENT)
Dept: ORTHOPEDIC SURGERY | Age: 73
End: 2023-02-27

## 2023-02-27 NOTE — TELEPHONE ENCOUNTER
CALLING TO SPEAK WITH NURSE, REGARDING BLOOD PRESSURE MEDS BEFORE SX TOMORROW     PLEASE CALL BACK [phone] Juan Manuel PEDRO

## 2023-02-27 NOTE — TELEPHONE ENCOUNTER
Spoke with patient this afternoon. Patient was concern if she can take her blood pressure medication the morning of surgery. Patient was informed not to take anything the day of nor the day before her surgery. Patient understood and agreed.

## 2023-02-27 NOTE — TELEPHONE ENCOUNTER
----- Message from Sindypaulo Key sent at 2/27/2023 12:32 PM EST -----  Subject: Message to Provider    QUESTIONS  Information for Provider? patient said there is times that her PCP gives   her samples of a medication she has surgery tomorrow and will be staying   with a friend for her care after surgery so she wants to know if her PCP   has any samples that she can  today.  ---------------------------------------------------------------------------  --------------  Rajani Purcell [facility]  [phone]; OK to leave message on voicemail  ---------------------------------------------------------------------------  --------------  SCRIPT ANSWERS  Relationship to Patient?  Self

## 2023-02-28 ENCOUNTER — HOSPITAL ENCOUNTER (OUTPATIENT)
Age: 73
Discharge: HOME | End: 2023-03-01
Attending: ORTHOPAEDIC SURGERY | Admitting: ORTHOPAEDIC SURGERY
Payer: MEDICARE

## 2023-02-28 ENCOUNTER — APPOINTMENT (OUTPATIENT)
Dept: GENERAL RADIOLOGY | Age: 73
End: 2023-02-28
Attending: ORTHOPAEDIC SURGERY
Payer: MEDICARE

## 2023-02-28 ENCOUNTER — ANESTHESIA EVENT (OUTPATIENT)
Dept: OPERATING ROOM | Age: 73
End: 2023-02-28
Payer: MEDICARE

## 2023-02-28 ENCOUNTER — ANESTHESIA (OUTPATIENT)
Dept: OPERATING ROOM | Age: 73
End: 2023-02-28
Payer: MEDICARE

## 2023-02-28 DIAGNOSIS — Z01.818 PREOP TESTING: Primary | ICD-10-CM

## 2023-02-28 DIAGNOSIS — Z96.652 S/P TKR (TOTAL KNEE REPLACEMENT) USING CEMENT, LEFT: ICD-10-CM

## 2023-02-28 LAB
GLUCOSE BLD-MCNC: 119 MG/DL (ref 70–99)
PERFORMED ON: ABNORMAL

## 2023-02-28 PROCEDURE — 2580000003 HC RX 258: Performed by: NURSE ANESTHETIST, CERTIFIED REGISTERED

## 2023-02-28 PROCEDURE — C1713 ANCHOR/SCREW BN/BN,TIS/BN: HCPCS | Performed by: ORTHOPAEDIC SURGERY

## 2023-02-28 PROCEDURE — 2500000003 HC RX 250 WO HCPCS: Performed by: ORTHOPAEDIC SURGERY

## 2023-02-28 PROCEDURE — 3600000005 HC SURGERY LEVEL 5 BASE: Performed by: ORTHOPAEDIC SURGERY

## 2023-02-28 PROCEDURE — 6370000000 HC RX 637 (ALT 250 FOR IP): Performed by: ORTHOPAEDIC SURGERY

## 2023-02-28 PROCEDURE — 3700000000 HC ANESTHESIA ATTENDED CARE: Performed by: ORTHOPAEDIC SURGERY

## 2023-02-28 PROCEDURE — 97165 OT EVAL LOW COMPLEX 30 MIN: CPT

## 2023-02-28 PROCEDURE — 6360000002 HC RX W HCPCS: Performed by: ORTHOPAEDIC SURGERY

## 2023-02-28 PROCEDURE — 2720000010 HC SURG SUPPLY STERILE: Performed by: ORTHOPAEDIC SURGERY

## 2023-02-28 PROCEDURE — 2580000003 HC RX 258: Performed by: ORTHOPAEDIC SURGERY

## 2023-02-28 PROCEDURE — 2709999900 HC NON-CHARGEABLE SUPPLY: Performed by: ORTHOPAEDIC SURGERY

## 2023-02-28 PROCEDURE — 6360000002 HC RX W HCPCS: Performed by: ANESTHESIOLOGY

## 2023-02-28 PROCEDURE — A4217 STERILE WATER/SALINE, 500 ML: HCPCS | Performed by: ORTHOPAEDIC SURGERY

## 2023-02-28 PROCEDURE — 73560 X-RAY EXAM OF KNEE 1 OR 2: CPT

## 2023-02-28 PROCEDURE — 3600000015 HC SURGERY LEVEL 5 ADDTL 15MIN: Performed by: ORTHOPAEDIC SURGERY

## 2023-02-28 PROCEDURE — 97161 PT EVAL LOW COMPLEX 20 MIN: CPT

## 2023-02-28 PROCEDURE — 6370000000 HC RX 637 (ALT 250 FOR IP): Performed by: ANESTHESIOLOGY

## 2023-02-28 PROCEDURE — 97116 GAIT TRAINING THERAPY: CPT

## 2023-02-28 PROCEDURE — 6360000002 HC RX W HCPCS: Performed by: NURSE ANESTHETIST, CERTIFIED REGISTERED

## 2023-02-28 PROCEDURE — 97535 SELF CARE MNGMENT TRAINING: CPT

## 2023-02-28 PROCEDURE — 3700000001 HC ADD 15 MINUTES (ANESTHESIA): Performed by: ORTHOPAEDIC SURGERY

## 2023-02-28 PROCEDURE — 7100000000 HC PACU RECOVERY - FIRST 15 MIN: Performed by: ORTHOPAEDIC SURGERY

## 2023-02-28 PROCEDURE — 7100000001 HC PACU RECOVERY - ADDTL 15 MIN: Performed by: ORTHOPAEDIC SURGERY

## 2023-02-28 PROCEDURE — 2500000003 HC RX 250 WO HCPCS: Performed by: NURSE ANESTHETIST, CERTIFIED REGISTERED

## 2023-02-28 PROCEDURE — C1776 JOINT DEVICE (IMPLANTABLE): HCPCS | Performed by: ORTHOPAEDIC SURGERY

## 2023-02-28 DEVICE — IMPLANTABLE DEVICE
Type: IMPLANTABLE DEVICE | Site: KNEE | Status: FUNCTIONAL
Brand: PERSONA®

## 2023-02-28 DEVICE — CEMENT BNE 40GM HI VISC RADPQ FOR REV SURG: Type: IMPLANTABLE DEVICE | Site: KNEE | Status: FUNCTIONAL

## 2023-02-28 DEVICE — IMPL KNEE PSN FEM CR CMT CCR NRW SZ8 L: Type: IMPLANTABLE DEVICE | Site: KNEE | Status: FUNCTIONAL

## 2023-02-28 DEVICE — PSN MC VE ASF L 10MM 8-11 EF: Type: IMPLANTABLE DEVICE | Site: KNEE | Status: FUNCTIONAL

## 2023-02-28 DEVICE — COMPONENT PAT DIA35MM THK9MM STD KNEE VIVACIT-E CEM PERSONA: Type: IMPLANTABLE DEVICE | Site: KNEE | Status: FUNCTIONAL

## 2023-02-28 DEVICE — PSN TIB STM 5 DEG SZ F L: Type: IMPLANTABLE DEVICE | Site: KNEE | Status: FUNCTIONAL

## 2023-02-28 RX ORDER — LABETALOL HYDROCHLORIDE 5 MG/ML
5 INJECTION, SOLUTION INTRAVENOUS
Status: DISCONTINUED | OUTPATIENT
Start: 2023-02-28 | End: 2023-03-01 | Stop reason: HOSPADM

## 2023-02-28 RX ORDER — SENNA AND DOCUSATE SODIUM 50; 8.6 MG/1; MG/1
1 TABLET, FILM COATED ORAL 2 TIMES DAILY
Status: DISCONTINUED | OUTPATIENT
Start: 2023-02-28 | End: 2023-03-01 | Stop reason: HOSPADM

## 2023-02-28 RX ORDER — LIDOCAINE HYDROCHLORIDE 10 MG/ML
1 INJECTION, SOLUTION EPIDURAL; INFILTRATION; INTRACAUDAL; PERINEURAL
Status: DISCONTINUED | OUTPATIENT
Start: 2023-02-28 | End: 2023-02-28 | Stop reason: HOSPADM

## 2023-02-28 RX ORDER — LIDOCAINE HYDROCHLORIDE 20 MG/ML
INJECTION, SOLUTION INFILTRATION; PERINEURAL PRN
Status: DISCONTINUED | OUTPATIENT
Start: 2023-02-28 | End: 2023-02-28 | Stop reason: SDUPTHER

## 2023-02-28 RX ORDER — ONDANSETRON 2 MG/ML
4 INJECTION INTRAMUSCULAR; INTRAVENOUS
Status: ACTIVE | OUTPATIENT
Start: 2023-02-28 | End: 2023-03-01

## 2023-02-28 RX ORDER — OXYCODONE HYDROCHLORIDE 5 MG/1
5 TABLET ORAL EVERY 4 HOURS PRN
Status: DISCONTINUED | OUTPATIENT
Start: 2023-02-28 | End: 2023-03-01 | Stop reason: HOSPADM

## 2023-02-28 RX ORDER — HYDROXYZINE HYDROCHLORIDE 10 MG/1
10 TABLET, FILM COATED ORAL EVERY 8 HOURS PRN
Status: DISCONTINUED | OUTPATIENT
Start: 2023-02-28 | End: 2023-03-01 | Stop reason: HOSPADM

## 2023-02-28 RX ORDER — POLYETHYLENE GLYCOL 3350 17 G/17G
17 POWDER, FOR SOLUTION ORAL DAILY PRN
Status: DISCONTINUED | OUTPATIENT
Start: 2023-02-28 | End: 2023-03-01 | Stop reason: HOSPADM

## 2023-02-28 RX ORDER — LIDOCAINE HYDROCHLORIDE 10 MG/ML
0.5 INJECTION, SOLUTION EPIDURAL; INFILTRATION; INTRACAUDAL; PERINEURAL ONCE
Status: DISCONTINUED | OUTPATIENT
Start: 2023-02-28 | End: 2023-02-28 | Stop reason: HOSPADM

## 2023-02-28 RX ORDER — SODIUM CHLORIDE 0.9 % (FLUSH) 0.9 %
5-40 SYRINGE (ML) INJECTION PRN
Status: DISCONTINUED | OUTPATIENT
Start: 2023-02-28 | End: 2023-03-01 | Stop reason: HOSPADM

## 2023-02-28 RX ORDER — PRAMIPEXOLE DIHYDROCHLORIDE 0.25 MG/1
0.75 TABLET ORAL 2 TIMES DAILY
Status: DISCONTINUED | OUTPATIENT
Start: 2023-02-28 | End: 2023-03-01 | Stop reason: HOSPADM

## 2023-02-28 RX ORDER — HYDROMORPHONE HCL 110MG/55ML
0.25 PATIENT CONTROLLED ANALGESIA SYRINGE INTRAVENOUS EVERY 5 MIN PRN
Status: DISCONTINUED | OUTPATIENT
Start: 2023-02-28 | End: 2023-03-01 | Stop reason: HOSPADM

## 2023-02-28 RX ORDER — SODIUM CHLORIDE 9 MG/ML
INJECTION, SOLUTION INTRAVENOUS CONTINUOUS
Status: DISCONTINUED | OUTPATIENT
Start: 2023-02-28 | End: 2023-02-28 | Stop reason: HOSPADM

## 2023-02-28 RX ORDER — KETAMINE HCL IN NACL, ISO-OSM 100MG/10ML
SYRINGE (ML) INJECTION PRN
Status: DISCONTINUED | OUTPATIENT
Start: 2023-02-28 | End: 2023-02-28 | Stop reason: SDUPTHER

## 2023-02-28 RX ORDER — 0.9 % SODIUM CHLORIDE 0.9 %
1000 INTRAVENOUS SOLUTION INTRAVENOUS ONCE
Status: COMPLETED | OUTPATIENT
Start: 2023-02-28 | End: 2023-03-01

## 2023-02-28 RX ORDER — SODIUM CHLORIDE 0.9 % (FLUSH) 0.9 %
5-40 SYRINGE (ML) INJECTION EVERY 12 HOURS SCHEDULED
Status: DISCONTINUED | OUTPATIENT
Start: 2023-02-28 | End: 2023-03-01 | Stop reason: HOSPADM

## 2023-02-28 RX ORDER — AZELASTINE 1 MG/ML
1 SPRAY, METERED NASAL 2 TIMES DAILY
Status: DISCONTINUED | OUTPATIENT
Start: 2023-02-28 | End: 2023-03-01 | Stop reason: HOSPADM

## 2023-02-28 RX ORDER — CLINDAMYCIN PHOSPHATE 900 MG/50ML
900 INJECTION INTRAVENOUS EVERY 8 HOURS
Status: COMPLETED | OUTPATIENT
Start: 2023-02-28 | End: 2023-03-01

## 2023-02-28 RX ORDER — SODIUM CHLORIDE 0.9 % (FLUSH) 0.9 %
5-40 SYRINGE (ML) INJECTION EVERY 12 HOURS SCHEDULED
Status: DISCONTINUED | OUTPATIENT
Start: 2023-02-28 | End: 2023-02-28 | Stop reason: HOSPADM

## 2023-02-28 RX ORDER — CELECOXIB 200 MG/1
400 CAPSULE ORAL ONCE
Status: COMPLETED | OUTPATIENT
Start: 2023-02-28 | End: 2023-02-28

## 2023-02-28 RX ORDER — POTASSIUM CHLORIDE 750 MG/1
10 TABLET, FILM COATED, EXTENDED RELEASE ORAL DAILY
Status: DISCONTINUED | OUTPATIENT
Start: 2023-03-01 | End: 2023-03-01 | Stop reason: HOSPADM

## 2023-02-28 RX ORDER — HYDROMORPHONE HCL 110MG/55ML
0.5 PATIENT CONTROLLED ANALGESIA SYRINGE INTRAVENOUS EVERY 5 MIN PRN
Status: DISCONTINUED | OUTPATIENT
Start: 2023-02-28 | End: 2023-03-01 | Stop reason: HOSPADM

## 2023-02-28 RX ORDER — SODIUM CHLORIDE 9 MG/ML
INJECTION, SOLUTION INTRAVENOUS PRN
Status: DISCONTINUED | OUTPATIENT
Start: 2023-02-28 | End: 2023-03-01 | Stop reason: HOSPADM

## 2023-02-28 RX ORDER — SUCCINYLCHOLINE/SOD CL,ISO/PF 200MG/10ML
SYRINGE (ML) INTRAVENOUS PRN
Status: DISCONTINUED | OUTPATIENT
Start: 2023-02-28 | End: 2023-02-28 | Stop reason: SDUPTHER

## 2023-02-28 RX ORDER — VANCOMYCIN HYDROCHLORIDE 1 G/20ML
INJECTION, POWDER, LYOPHILIZED, FOR SOLUTION INTRAVENOUS
Status: COMPLETED | OUTPATIENT
Start: 2023-02-28 | End: 2023-02-28

## 2023-02-28 RX ORDER — MAGNESIUM HYDROXIDE/ALUMINUM HYDROXICE/SIMETHICONE 120; 1200; 1200 MG/30ML; MG/30ML; MG/30ML
15 SUSPENSION ORAL EVERY 6 HOURS PRN
Status: DISCONTINUED | OUTPATIENT
Start: 2023-02-28 | End: 2023-03-01 | Stop reason: HOSPADM

## 2023-02-28 RX ORDER — MAGNESIUM SULFATE HEPTAHYDRATE 500 MG/ML
INJECTION, SOLUTION INTRAMUSCULAR; INTRAVENOUS PRN
Status: DISCONTINUED | OUTPATIENT
Start: 2023-02-28 | End: 2023-02-28 | Stop reason: SDUPTHER

## 2023-02-28 RX ORDER — DILTIAZEM HYDROCHLORIDE 120 MG/1
120 CAPSULE, COATED, EXTENDED RELEASE ORAL DAILY
Status: DISCONTINUED | OUTPATIENT
Start: 2023-03-01 | End: 2023-03-01 | Stop reason: HOSPADM

## 2023-02-28 RX ORDER — ONDANSETRON 2 MG/ML
INJECTION INTRAMUSCULAR; INTRAVENOUS PRN
Status: DISCONTINUED | OUTPATIENT
Start: 2023-02-28 | End: 2023-02-28 | Stop reason: SDUPTHER

## 2023-02-28 RX ORDER — SODIUM CHLORIDE 9 MG/ML
INJECTION, SOLUTION INTRAVENOUS PRN
Status: DISCONTINUED | OUTPATIENT
Start: 2023-02-28 | End: 2023-02-28 | Stop reason: HOSPADM

## 2023-02-28 RX ORDER — FLUTICASONE PROPIONATE 50 MCG
1 SPRAY, SUSPENSION (ML) NASAL DAILY
Status: DISCONTINUED | OUTPATIENT
Start: 2023-02-28 | End: 2023-03-01 | Stop reason: HOSPADM

## 2023-02-28 RX ORDER — FENTANYL CITRATE 50 UG/ML
INJECTION, SOLUTION INTRAMUSCULAR; INTRAVENOUS PRN
Status: DISCONTINUED | OUTPATIENT
Start: 2023-02-28 | End: 2023-02-28 | Stop reason: SDUPTHER

## 2023-02-28 RX ORDER — LISINOPRIL 10 MG/1
10 TABLET ORAL NIGHTLY
Status: DISCONTINUED | OUTPATIENT
Start: 2023-02-28 | End: 2023-03-01 | Stop reason: HOSPADM

## 2023-02-28 RX ORDER — LANOLIN ALCOHOL/MO/W.PET/CERES
9 CREAM (GRAM) TOPICAL NIGHTLY
Status: DISCONTINUED | OUTPATIENT
Start: 2023-02-28 | End: 2023-03-01 | Stop reason: HOSPADM

## 2023-02-28 RX ORDER — KETOROLAC TROMETHAMINE 30 MG/ML
15 INJECTION, SOLUTION INTRAMUSCULAR; INTRAVENOUS EVERY 6 HOURS
Status: DISCONTINUED | OUTPATIENT
Start: 2023-02-28 | End: 2023-03-01 | Stop reason: HOSPADM

## 2023-02-28 RX ORDER — DILTIAZEM HYDROCHLORIDE 120 MG/1
120 CAPSULE, COATED, EXTENDED RELEASE ORAL ONCE
Status: COMPLETED | OUTPATIENT
Start: 2023-02-28 | End: 2023-02-28

## 2023-02-28 RX ORDER — HYDROMORPHONE HCL 110MG/55ML
PATIENT CONTROLLED ANALGESIA SYRINGE INTRAVENOUS PRN
Status: DISCONTINUED | OUTPATIENT
Start: 2023-02-28 | End: 2023-02-28 | Stop reason: SDUPTHER

## 2023-02-28 RX ORDER — DEXAMETHASONE SODIUM PHOSPHATE 4 MG/ML
INJECTION, SOLUTION INTRA-ARTICULAR; INTRALESIONAL; INTRAMUSCULAR; INTRAVENOUS; SOFT TISSUE PRN
Status: DISCONTINUED | OUTPATIENT
Start: 2023-02-28 | End: 2023-02-28 | Stop reason: SDUPTHER

## 2023-02-28 RX ORDER — SODIUM CHLORIDE 9 MG/ML
INJECTION, SOLUTION INTRAVENOUS CONTINUOUS PRN
Status: DISCONTINUED | OUTPATIENT
Start: 2023-02-28 | End: 2023-02-28 | Stop reason: SDUPTHER

## 2023-02-28 RX ORDER — FUROSEMIDE 40 MG/1
40 TABLET ORAL DAILY
Status: DISCONTINUED | OUTPATIENT
Start: 2023-03-01 | End: 2023-03-01 | Stop reason: HOSPADM

## 2023-02-28 RX ORDER — ONDANSETRON 4 MG/1
4 TABLET, ORALLY DISINTEGRATING ORAL EVERY 8 HOURS PRN
Status: DISCONTINUED | OUTPATIENT
Start: 2023-02-28 | End: 2023-03-01 | Stop reason: HOSPADM

## 2023-02-28 RX ORDER — ONDANSETRON 2 MG/ML
4 INJECTION INTRAMUSCULAR; INTRAVENOUS EVERY 6 HOURS PRN
Status: DISCONTINUED | OUTPATIENT
Start: 2023-02-28 | End: 2023-03-01 | Stop reason: HOSPADM

## 2023-02-28 RX ORDER — ACETAMINOPHEN 500 MG
1000 TABLET ORAL ONCE
Status: COMPLETED | OUTPATIENT
Start: 2023-02-28 | End: 2023-02-28

## 2023-02-28 RX ORDER — VANCOMYCIN HYDROCHLORIDE 1 G/20ML
INJECTION, POWDER, LYOPHILIZED, FOR SOLUTION INTRAVENOUS PRN
Status: DISCONTINUED | OUTPATIENT
Start: 2023-02-28 | End: 2023-02-28 | Stop reason: SDUPTHER

## 2023-02-28 RX ORDER — SODIUM CHLORIDE 0.9 % (FLUSH) 0.9 %
5-40 SYRINGE (ML) INJECTION PRN
Status: DISCONTINUED | OUTPATIENT
Start: 2023-02-28 | End: 2023-02-28 | Stop reason: HOSPADM

## 2023-02-28 RX ORDER — ACETAMINOPHEN 500 MG
1000 TABLET ORAL EVERY 8 HOURS
Status: DISCONTINUED | OUTPATIENT
Start: 2023-02-28 | End: 2023-03-01 | Stop reason: HOSPADM

## 2023-02-28 RX ORDER — SODIUM CHLORIDE 9 MG/ML
INJECTION, SOLUTION INTRAVENOUS CONTINUOUS
Status: DISCONTINUED | OUTPATIENT
Start: 2023-02-28 | End: 2023-03-01 | Stop reason: HOSPADM

## 2023-02-28 RX ORDER — PROPOFOL 10 MG/ML
INJECTION, EMULSION INTRAVENOUS PRN
Status: DISCONTINUED | OUTPATIENT
Start: 2023-02-28 | End: 2023-02-28 | Stop reason: SDUPTHER

## 2023-02-28 RX ORDER — SPIRONOLACTONE 25 MG/1
25 TABLET ORAL DAILY
Status: DISCONTINUED | OUTPATIENT
Start: 2023-03-01 | End: 2023-03-01 | Stop reason: HOSPADM

## 2023-02-28 RX ORDER — TRAMADOL HYDROCHLORIDE 50 MG/1
50 TABLET ORAL EVERY 6 HOURS PRN
Status: DISCONTINUED | OUTPATIENT
Start: 2023-02-28 | End: 2023-03-01 | Stop reason: HOSPADM

## 2023-02-28 RX ORDER — SODIUM CHLORIDE, SODIUM LACTATE, POTASSIUM CHLORIDE, CALCIUM CHLORIDE 600; 310; 30; 20 MG/100ML; MG/100ML; MG/100ML; MG/100ML
INJECTION, SOLUTION INTRAVENOUS CONTINUOUS
Status: DISCONTINUED | OUTPATIENT
Start: 2023-02-28 | End: 2023-03-01 | Stop reason: HOSPADM

## 2023-02-28 RX ORDER — DEXMEDETOMIDINE HYDROCHLORIDE 100 UG/ML
INJECTION, SOLUTION INTRAVENOUS PRN
Status: DISCONTINUED | OUTPATIENT
Start: 2023-02-28 | End: 2023-02-28 | Stop reason: SDUPTHER

## 2023-02-28 RX ADMIN — OXYCODONE 5 MG: 5 TABLET ORAL at 20:44

## 2023-02-28 RX ADMIN — DEXAMETHASONE SODIUM PHOSPHATE 8 MG: 4 INJECTION, SOLUTION INTRAMUSCULAR; INTRAVENOUS at 11:55

## 2023-02-28 RX ADMIN — KETOROLAC TROMETHAMINE 15 MG: 30 INJECTION, SOLUTION INTRAMUSCULAR; INTRAVENOUS at 20:34

## 2023-02-28 RX ADMIN — DEXMEDETOMIDINE HYDROCHLORIDE 10 MCG: 100 INJECTION, SOLUTION INTRAVENOUS at 12:18

## 2023-02-28 RX ADMIN — PROPOFOL 200 MG: 10 INJECTION, EMULSION INTRAVENOUS at 11:51

## 2023-02-28 RX ADMIN — ONDANSETRON 4 MG: 2 INJECTION INTRAMUSCULAR; INTRAVENOUS at 11:55

## 2023-02-28 RX ADMIN — Medication 180 MG: at 11:51

## 2023-02-28 RX ADMIN — CLINDAMYCIN PHOSPHATE 900 MG: 900 INJECTION, SOLUTION INTRAVENOUS at 20:32

## 2023-02-28 RX ADMIN — Medication 20 MG: at 13:08

## 2023-02-28 RX ADMIN — SODIUM CHLORIDE 1000 ML: 9 INJECTION, SOLUTION INTRAVENOUS at 18:33

## 2023-02-28 RX ADMIN — PRAMIPEXOLE DIHYDROCHLORIDE 0.75 MG: 0.25 TABLET ORAL at 20:34

## 2023-02-28 RX ADMIN — TRANEXAMIC ACID 1000 MG: 1 INJECTION, SOLUTION INTRAVENOUS at 11:49

## 2023-02-28 RX ADMIN — LIDOCAINE HYDROCHLORIDE 100 MG: 20 INJECTION, SOLUTION INFILTRATION; PERINEURAL at 11:51

## 2023-02-28 RX ADMIN — OXYCODONE 5 MG: 5 TABLET ORAL at 14:58

## 2023-02-28 RX ADMIN — MELATONIN TAB 3 MG 9 MG: 3 TAB at 20:33

## 2023-02-28 RX ADMIN — PROPOFOL 50 MG: 10 INJECTION, EMULSION INTRAVENOUS at 12:15

## 2023-02-28 RX ADMIN — HYDROMORPHONE HYDROCHLORIDE 0.5 MG: 2 INJECTION, SOLUTION INTRAMUSCULAR; INTRAVENOUS; SUBCUTANEOUS at 14:30

## 2023-02-28 RX ADMIN — PROPOFOL 100 MG: 10 INJECTION, EMULSION INTRAVENOUS at 12:12

## 2023-02-28 RX ADMIN — FENTANYL CITRATE 25 MCG: 50 INJECTION, SOLUTION INTRAMUSCULAR; INTRAVENOUS at 13:25

## 2023-02-28 RX ADMIN — HYDROMORPHONE HYDROCHLORIDE 0.5 MG: 2 INJECTION, SOLUTION INTRAMUSCULAR; INTRAVENOUS; SUBCUTANEOUS at 14:36

## 2023-02-28 RX ADMIN — FENTANYL CITRATE 50 MCG: 50 INJECTION, SOLUTION INTRAMUSCULAR; INTRAVENOUS at 11:51

## 2023-02-28 RX ADMIN — HYDROMORPHONE HYDROCHLORIDE 0.5 MG: 2 INJECTION, SOLUTION INTRAMUSCULAR; INTRAVENOUS; SUBCUTANEOUS at 13:42

## 2023-02-28 RX ADMIN — STANDARDIZED SENNA CONCENTRATE AND DOCUSATE SODIUM 1 TABLET: 8.6; 5 TABLET ORAL at 20:34

## 2023-02-28 RX ADMIN — AZELASTINE HYDROCHLORIDE 1 SPRAY: 137 SPRAY, METERED NASAL at 20:35

## 2023-02-28 RX ADMIN — ACETAMINOPHEN 1000 MG: 500 TABLET ORAL at 09:22

## 2023-02-28 RX ADMIN — SODIUM CHLORIDE: 9 INJECTION, SOLUTION INTRAVENOUS at 11:45

## 2023-02-28 RX ADMIN — MAGNESIUM SULFATE HEPTAHYDRATE 1 G: 500 INJECTION, SOLUTION INTRAMUSCULAR; INTRAVENOUS at 11:55

## 2023-02-28 RX ADMIN — SODIUM CHLORIDE, POTASSIUM CHLORIDE, SODIUM LACTATE AND CALCIUM CHLORIDE: 600; 310; 30; 20 INJECTION, SOLUTION INTRAVENOUS at 18:04

## 2023-02-28 RX ADMIN — DEXMEDETOMIDINE HYDROCHLORIDE 10 MCG: 100 INJECTION, SOLUTION INTRAVENOUS at 11:55

## 2023-02-28 RX ADMIN — DILTIAZEM HYDROCHLORIDE 120 MG: 120 CAPSULE, COATED, EXTENDED RELEASE ORAL at 11:16

## 2023-02-28 RX ADMIN — FENTANYL CITRATE 25 MCG: 50 INJECTION, SOLUTION INTRAMUSCULAR; INTRAVENOUS at 12:37

## 2023-02-28 RX ADMIN — Medication 10 MG: at 12:27

## 2023-02-28 RX ADMIN — CELECOXIB 400 MG: 200 CAPSULE ORAL at 09:24

## 2023-02-28 RX ADMIN — SODIUM CHLORIDE: 9 INJECTION, SOLUTION INTRAVENOUS at 09:57

## 2023-02-28 RX ADMIN — KETOROLAC TROMETHAMINE 15 MG: 30 INJECTION, SOLUTION INTRAMUSCULAR; INTRAVENOUS at 14:58

## 2023-02-28 RX ADMIN — VANCOMYCIN HYDROCHLORIDE 1500 MG: 1 INJECTION, POWDER, LYOPHILIZED, FOR SOLUTION INTRAVENOUS at 12:00

## 2023-02-28 RX ADMIN — Medication 20 MG: at 12:00

## 2023-02-28 RX ADMIN — LISINOPRIL 10 MG: 10 TABLET ORAL at 20:33

## 2023-02-28 RX ADMIN — PROPOFOL 50 MG: 10 INJECTION, EMULSION INTRAVENOUS at 12:50

## 2023-02-28 RX ADMIN — VANCOMYCIN HYDROCHLORIDE 1500 MG: 1 INJECTION, POWDER, LYOPHILIZED, FOR SOLUTION INTRAVENOUS at 10:02

## 2023-02-28 ASSESSMENT — PAIN DESCRIPTION - LOCATION
LOCATION: KNEE
LOCATION: LEG
LOCATION: KNEE

## 2023-02-28 ASSESSMENT — PAIN DESCRIPTION - DESCRIPTORS
DESCRIPTORS: ACHING;THROBBING
DESCRIPTORS: ACHING;THROBBING

## 2023-02-28 ASSESSMENT — PAIN DESCRIPTION - ORIENTATION
ORIENTATION: LEFT

## 2023-02-28 ASSESSMENT — PAIN SCALES - GENERAL
PAINLEVEL_OUTOF10: 7
PAINLEVEL_OUTOF10: 5

## 2023-02-28 ASSESSMENT — PAIN DESCRIPTION - PAIN TYPE
TYPE: SURGICAL PAIN
TYPE: SURGICAL PAIN

## 2023-02-28 ASSESSMENT — PAIN - FUNCTIONAL ASSESSMENT: PAIN_FUNCTIONAL_ASSESSMENT: 0-10

## 2023-02-28 ASSESSMENT — PAIN DESCRIPTION - FREQUENCY
FREQUENCY: CONTINUOUS
FREQUENCY: CONTINUOUS

## 2023-02-28 ASSESSMENT — PAIN DESCRIPTION - ONSET
ONSET: ON-GOING
ONSET: ON-GOING

## 2023-02-28 NOTE — OP NOTE
Patient: Shakila Salazar                    : 1950     MRN: [id number]     Date: 23     SURGEON: Kip Barnett MD     ASSISTANT:      PREOPERATIVE DIAGNOSIS: LEFT knee osteoarthritis. POSTOPERATIVE DIAGNOSIS: LEFT knee osteoarthritis. PROCEDURE: LEFT total knee arthroplasty. CPT: [id number]    ANESTHESIA: General    COMPLICATIONS: None. ESTIMATED BLOOD LOSS: 30 mL. SPECIMENS: Bone and soft tissue to pathology per protocol. DRAINS: None         IMPLANTS USED:   Implant Name Type Inv. Item Serial No.  Lot No. LRB No. Used Action   CEMENT BNE 40GM HI VISC RADPQ FOR REV SURG - [id number]  CEMENT BNE 40GM HI VISC RADPQ FOR REV SURG  [employer]- [id number] Left 1 Implanted   CEMENT BNE 40GM HI VISC RADPQ FOR REV SURG - [id number]  CEMENT BNE 40GM HI VISC RADPQ FOR REV SURG  [employer]- [id number] Left 1 Implanted   IMPL KNEE PSN FEM CR CMT CCR NRW SZ8 L - [id number] Knee IMPL KNEE PSN FEM CR CMT CCR NRW SZ8 L  [employer] [id number] Left 1 Implanted   COMPONENT PAT ONP41YO THK9MM STD KNEE VIVACIT-E JIMENEZ PERSONA - [id number]  COMPONENT PAT [id number] THK9MM STD KNEE VIVACIT-E JIMENEZ PERSONA  [employer]- [id number] Left 1 Implanted   EXTENSION STEM L30MM [id number] KNEE TAPR JIMENEZ PERSONA - [id number]  EXTENSION STEM L30MM [id number] KNEE TAPR JIMENEZ PERSONA  [employer]- [id number] Left 1 Implanted   PSN TIB STM 5 DEG SZ F L - [id number]  PSN TIB STM 5 DEG SZ F L  [employer]- [id number] Left 1 Implanted   PSN MC VE ASF L 10MM 8-11 EF - [id number]  PSN MC VE ASF L 10MM 8-11 EF  [employer]- [id number] Left 1 Implanted          INDICATIONS: Soha Urias is a 81 y.o. female with advanced osteoarthritis of the left knee which is limiting her ability to ambulate as well to complete activities of daily living. Her knee pain has been refractory to conservative management.  The risks and benefits of total knee arthroplasty were discussed, as well as the expected recovery process and the alternatives to surgery, and she has elected to proceed. Informed consent was obtained. PROCEDURE:  She was met in the Preoperative Holding area where the informed consent was reviewed and the operative site was marked. She was then transported to the operating room and placed supine in the operating room table. After adequate induction of spinal anesthesia, a tourniquet was placed on the upper thigh of the operative extremity. The operative extremity was then prepped and draped in the usual sterile fashion. The planned incision was marked. A safety timeout was performed where the correct patient, planned operative procedure and correct operative site were reviewed and agreed upon by all operating room staff. It was also confirmed that the patient had received IV prophylactic antibiotics and TXA. The operative extremity was elevated and the tourniquet was inflated. A standard midline skin incision was used and a midline medial parapatellar arthrotomy was then performed, revealed clear synovial fluid as well as significant tricompartmental osteoarthritis of the knee. The deep MCL was then gently reflected off the medial aspect of the tibia. Synovial tissue was also excised from the anterior aspect of the distal femur. Osteophytes were removed from distal femur and proximal tibia the epicondylar axis was identified. The entry point to the femoral canal was identified and a step drill used to entry the canal. The intramedullary femoral guide set to a 55 degree valgus cut was then put into position placed and secured with pins. The distal femoral cut was then made. The tibial extramedullary guide was then put into position and aligned with the mechanical axis of tibia. The tibial cutting block was secured into position with pins.   The proximal tibial cut was made and varus/valgus alignment of knee in full extension was confirmed with a spacer block and alignment karlie. It was also confirmed that full extension could be achieved. The knee was then flexed and the femoral sizing guide was put into position. A femoral component was measured to be the appropriate size; 3 degrees of external rotation was marked and the 4-in-1 cutting block was secured into position. The remaining femoral cuts were completed. Attention was then redirect to the tibia. The trial base plate was secured into position with pins. The femoral and tibial insert trial was placed at which point range of motion and stability were verified. The articulate surface of the patella was then resected using an oscillating saw and a patella button was determined to the appropriate size. The three patella button peg and the femoral component lugs were then drilled. With the patella button in place it was confirmed that the patella tracked well within the trochlear grove. All trial components were removed and the cut bony surfaces were irrigated and dried. Medium viscosity cement was mixed and the tibial, femoral and patella implant were cemented into place. A trial tibial insert was place and the knee was held in full extension as the cement was allowed to cure. Cement that extruded from around each implant was carefully removed. Once the cement had cured and all excess cement was removed, the trial tibial insert was removed and a real tibial insert was placed. Stability in both coronal and sagittal planes, range of motion 0 to 120 degrees with gravity, and patellar tracking were checked and found to be satisfactory. The tourniquet was let down and meticulous hemostasis was achieved. The wound was bathed in betadine then thoroughly irrigated. Vancomycin powder was placed. The arthrotomy was closed with a #1 Vicryl in an interrupted fashion.  The deep subcutaneous tissue was closed with 0 Vicryl in a running fashion and the subcutaneous layer was closed with 2-0 Vicryl in a running fashion. The skin was closed with monocryl and dermabond. A dry postoperative dressing was applied. The patient was then taken to the Recovery Room in comfortable and stable condition. ATTESTATION:  [facility] [place] was present and scrubbed for the entire procedure.      Sully Raymundo MD

## 2023-02-28 NOTE — PROGRESS NOTES
Pt stable and able to be transferred from PACU to room 4480. A&O , VSS, with no complaints at this time. 4T called and notified that pt is being transferred out of PACU and to room.

## 2023-02-28 NOTE — PROGRESS NOTES
Pt transferred to room 4480 at this time. A&O with no signs of distress. Report given to Dipika Pérez RN. V/u and denies questions or further needs at this time.

## 2023-02-28 NOTE — PROGRESS NOTES
Pt placed on hold at this time. Pt meets criteria to be discharged from Phase 1, but waiting for a room assignment at this time. Will continue to monitor.

## 2023-02-28 NOTE — PROGRESS NOTES
[facility]   Phone: [phone]    Occupational Therapy    [x] Initial Evaluation            [] Daily Treatment Note         [] Discharge Summary      Patient: Cb Obrien   : 1950   MRN: [id number]   Date of Service:  2023    Admitting Diagnosis:  s/p L TKA  Current Admission Summary: s/p L TKA 23  Past Medical History:  has a past medical history of Allergic, Anesthesia complication, Anxiety, Arthritis, Arthritis of left hip, Arthritis of right hip, Asthma, At risk for falls, Atrial fibrillation with rapid ventricular response ([address] Atrial flutter ([address] Chronic maxillary sinusitis, CTEPH (chronic thromboembolic pulmonary hypertension) ([address] Depression, Diabetes mellitus ([address] Disc disease, degenerative, lumbar or lumbosacral, Hepatic steatosis, History of total hip arthroplasty, Hypertension, Leg cramps, Migraines, basilar, Mild persistent asthma without complication, Moderate asthma with acute exacerbation, Obesity, Class III, BMI 40-49.9 (morbid obesity) ([address] HUSSAIN (obstructive sleep apnea), Osteoarthritis, hip, bilateral, Panic attacks, Pneumonia, Primary osteoarthritis of both knees, Primary osteoarthritis of left knee, Pulmonary emboli ([address] Pulmonary HTN ([address] Pure hypercholesterolemia, Restless leg syndrome, Rhinitis, chronic, Sleep apnea, Stress incontinence, Tear of left rotator cuff, Thyroid disease, and Wears glasses. Past Surgical History:  has a past surgical history that includes Cataract removal (Bilateral); Finger surgery (); eye surgery (Right, ); back surgery (); Tonsillectomy (); Hysterectomy (); Colonoscopy; joint replacement (Left, 16); Hip Arthroplasty (Right, 16); Atrial ablation surgery; and Total knee arthroplasty (Right, 3/16/2021). Discharge Recommendations: Cb Obrien scored a 17/24 on the AM-PAC ADL Inpatient form.  Current research shows that an AM-PAC score of 18 or greater is typically associated with a discharge to the patient's home setting. Based on the patient's AM-PAC score, and their current ADL deficits, it is recommended that the patient have 2-3 sessions per week of Occupational Therapy at d/c to increase the patient's independence. At this time, this patient demonstrates the endurance and safety to discharge home with home services (home vs OP services) and a follow up treatment frequency of 2-3x/wk. Please see assessment section for further patient specific details. If patient discharges prior to next session this note will serve as a discharge summary. Please see below for the latest assessment towards goals. HOME HEALTH CARE: LEVEL 1 STANDARD    - Initial home health evaluation to occur within 24-48 hours, in patient home   - Therapy to evaluate with goal of regaining prior level of functioning   - Therapy to evaluate if patient has [id number] needs for personal care      DME Required For Discharge:  toileting aid    Precautions/Restrictions: high fall risk  Weight Bearing Restrictions: weight bearing as tolerated  [] Right Upper Extremity  [] Left Upper Extremity [] Right Lower Extremity  [x] Left Lower Extremity       Pre-Admission Information   Lives With:  Plan's to d/c to friend's house     Type of Home: house  Home Layout: one level  Home Access: level entry  [facility] 45: walk in shower  Bathroom Equipment: grab bars in shower, grab bars around toilet, shower chair, hand held shower head  Toilet Height: elevated height  Home Equipment: rolling walker, single point cane, reacher, sock aide, long handled shoe horn, transport chair  Transfer Assistance: modified independent with use of SPC  Ambulation Assistance:modified independent with use of SPC  ADL Assistance: independent with all ADL's  IADL Assistance: independent with homemaking tasks  Active :        [x] Yes  [] No  Hand Dominance: [] Left  [] Right  Current Employment: retired. Occupation: Research and   Hobbies:   Recent Falls: Pt denies recent falls    Examination   Vision:   Vision Gross Assessment: Impaired and Vision Corrective Device: wears glasses for reading  Hearing:   WFL  Sensation:   WFL  ROM:   (B) UE AROM WFL  Strength:   (B) UE strength grossly WFL    Therapist Clinical Decision Making (Complexity): low complexity  Clinical Presentation: stable      Subjective  General: Pt lying upright in PACU stretcher upon arrival, agreeable to evaluation  Pain: 5/10. Location: L knee  Pain Interventions: pain medication in place prior to arrival        Activities of Daily Living  Basic Activities of Daily Living  Grooming: stand by assistance Comment: hand hygiene in stance at sink  Toileting: moderate assistance. Toileting Comments: A for thoroughness with pericare  Instrumental Activities of Daily Living  No IADL completed on this date. Functional Mobility  Bed Mobility  Supine to Sit: contact guard assistance  Sit to Supine: stand by assistance  Comments:  Transfers  Sit to stand transfer:contact guard assistance  Stand to sit transfer: contact guard assistance  Stand step transfer: contact guard assistance, transport chair <> toilet, transport chair > edge of stretcher with RW  Toilet transfer: contact guard assistance  Toilet transfer equipment: grab bars on (L)  Comments:  Functional Mobility:  Sitting Balance: stand by assistance. Standing Balance: contact guard assistance.     Functional Mobility: .  contact guard assistance  Functional Mobility Activity: 15' + 5' in bathroom  Functional Mobility Device Use: rolling walker    Other Therapeutic Interventions    Functional Outcomes  AM-PAC Inpatient Daily Activity Raw Score: 17    Cognition  WFL  Orientation:    alert and oriented x 4  Command Following:   [facility]     Education  Barriers To Learning: none  Patient Education: patient educated on goals, OT role and benefits, plan of care, ADL adaptive strategies, weight-bearing education, transfer training, discharge recommendations  Learning Assessment:  patient verbalizes understanding, would benefit from continued reinforcement    Assessment  Activity Tolerance: Tolerated well  Impairments Requiring Therapeutic Intervention: decreased functional mobility, decreased ADL status, decreased strength, decreased endurance, decreased balance, decreased IADL  Prognosis: good  Clinical Assessment: The patient is a 81 y.o. female who presents below their baseline level of function due to above deficits, associated with s/p L TKA. Typically, pt is mod I for mobility with SPC and independent for ADL. Currently, pt is CGA for ambulation/transfers and mod A for toileting.  Continued OT indicated in order to promote return to PLOF    Safety Interventions: patient left in bed, call light within reach, gait belt, nurse notified, and in PACU stretcher    Plan  Frequency: 7 x/week  Current Treatment Recommendations: strengthening, balance training, functional mobility training, transfer training, endurance training, patient/caregiver education, ADL/self-care training, IADL training, and equipment evaluation/education    Goals    Short Term Goals:  Time Frame: by discharge  Patient will complete upper body ADL at modified independent   Patient will complete lower body ADL at modified independent   Patient will complete toileting at modified independent   Patient will complete functional transfers at modified independent   Patient will complete functional mobility at modified independent     Therapy Session Time     Individual Group Co-treatment   Time In    1627   Time Out    1720   Minutes    53        Timed Code Treatment Minutes:   18 minutes  Total Treatment Minutes:  53 minutes     Billing curtis d/t pt's OP in a bed class    Electronically Signed By: CARLITO Najera MOT OTR/L [id number]

## 2023-02-28 NOTE — ANESTHESIA POSTPROCEDURE EVALUATION
Department of Anesthesiology  Postprocedure Note    Patient: Kira Alexander  MRN: [id number]  YOB: 1950  Date of evaluation: 2/28/2023      Procedure Summary     Date: 02/28/23 Room / Location: [address]    Anesthesia Start: 1145 Anesthesia Stop: 1354    Procedure: LEFT TOTAL KNEE REPLACEMENT-OR SPINAL; NO BLOCK-SHERRY ADVANCED (Left: Knee) Diagnosis:       Arthritis of left knee      (Arthritis of left knee [M17.12])    Surgeons: Erick Torrez MD Responsible Provider:     Anesthesia Type: general ASA Status: 3          Anesthesia Type: No value filed.    Roxie Phase I: Roxie Score: 4    Roxie Phase II:        Anesthesia Post Evaluation    Patient location during evaluation: PACU  Patient participation: complete - patient participated  Level of consciousness: awake  Airway patency: patent  Nausea & Vomiting: no vomiting  Complications: no  Cardiovascular status: hemodynamically stable  Respiratory status: acceptable  Hydration status: euvolemic  Multimodal analgesia pain management approach

## 2023-02-28 NOTE — ANESTHESIA PRE PROCEDURE
Department of Anesthesiology  Preprocedure Note       Name:  Rowena Asencio   Age:  81 y.o.  :  1950                                          MRN:  [id number]         Date:  2023      Surgeon: Nita Núñez):  Hodan Vega MD    Procedure: Procedure(s):  LEFT TOTAL KNEE REPLACEMENT-OR SPINAL; NO BLOCK-SHERRY ADVANCED    Medications prior to admission:   Prior to Admission medications    Medication Sig Start Date End Date Taking? Authorizing Provider   rivaroxaban (XARELTO) 20 MG TABS tablet Take 1 tablet by mouth daily (with breakfast) 23   Mir Muhammad MD   lidocaine 4 % external patch Place 1 patch onto the skin daily    Historical Provider, MD   lisinopril (PRINIVIL;ZESTRIL) 10 MG tablet TAKE ONE TABLET BY MOUTH ONCE NIGHTLY 23   Morgan Whitlock MD   Magnesium Citrate 100 MG TABS Take 1 tablet by mouth daily 23   Morgan Whitlock MD   potassium chloride (KLOR-CON M) 10 MEQ extended release tablet Take 1 tablet by mouth daily 23   Morgan Whitlock MD   spironolactone (ALDACTONE) 25 MG tablet TAKE ONE TABLET BY MOUTH DAILY 23   Morgan Whitlock MD   furosemide (LASIX) 40 MG tablet Take 1 tablet by mouth daily 23   Morgan Whitlock MD   dilTIAZem (CARDIZEM) 60 MG tablet Take 1 tablet by mouth 2 times daily as needed (Take at onset of atrial fibrillation. If your heart rate remains elevated after 1-2 hours, then take a 2nd dose.  If you remain in atrial fibrillation after the 2nd dose, call our office) 23   Yolanda Alejandro APRN - CNP   dilTIAZem (CARDIZEM CD) 120 MG extended release capsule Take 1 capsule by mouth daily 22   EVERETTE Sloan   pramipexole (MIRAPEX) 0.5 MG tablet 1.5 tab q 5 PM and 1.5 tab qHS 22   BRICE Griffin   buPROPion (WELLBUTRIN XL) 300 MG extended release tablet Take 1 tablet by mouth every morning 12/9/22 2/15/23  Halima Shipman MD   zoster recombinant adjuvanted vaccine [facility]) 50 MCG/0.5ML SUSR injection Inject 0.5 mLs into the muscle See Admin Instructions 1 dose now and repeat in 2-6 months 11/14/22 5/13/23  Troy Sánchez MD   tiotropium (SPIRIVA RESPIMAT) 1.25 MCG/ACT AERS inhaler Inhale 2 puffs into the lungs daily    Historical Provider, MD   rivaroxaban (XARELTO) 20 MG TABS tablet Take 1 tablet by mouth daily (with breakfast) 8/2/22   Troy Sánchez MD   melatonin 10 MG CAPS capsule Take 10 mg by mouth nightly    Historical Provider, MD   Semaglutide,0.25 or 0.5MG/DOS, (OZEMPIC, 0.25 OR 0.5 MG/DOSE,) 2 MG/1.5ML SOPN Inject 0.5 mg into the skin once a week 1/21/22   Troy Sánchez MD   Ciclesonide (ALVESCO) 160 MCG/ACT AERS Inhale into the lungs    Historical Provider, MD   fluticasone (FLONASE) 50 MCG/ACT nasal spray 1 spray by Each Nostril route daily    Historical Provider, MD   azelastine (ASTELIN) 0.1 % nasal spray 1 spray by Nasal route 2 times daily 1 Spray in each nostril 2/22/21   Troy Sánchez MD   Handicap Placard MISC by Does not apply route Exp 5 years 7/30/20   BRICE Ayala - CNP   Multiple Vitamins-Minerals (THERAPEUTIC MULTIVITAMIN-MINERALS) tablet Take 1 tablet by mouth daily     Historical Provider, MD   CPAP Machine MISC by Does not apply route    Historical Provider, MD   EPIPEN 2-MIR 0.3 MG/0.3ML SOAJ injection  9/29/16   Historical Provider, MD       Current medications:    Current Facility-Administered Medications   Medication Dose Route Frequency Provider Last Rate Last Admin    ortho mix injection   Injection On Call Kip Barnett MD        tranexamic acid (CYKLOKAPRON) 1,000 mg in sodium chloride 0.9 % 60 mL IVPB  1,000 mg IntraVENous Once Kip Barnett MD        tranexamic acid (CYKLOKAPRON) 1,000 mg in sodium chloride 0.9 % 60 mL IVPB  1,000 mg IntraVENous On Call to Viktor Patel MD           Allergies:     Allergies   Allergen Reactions    Atorvastatin Other (See Comments)     Muscle Pain, all statins     Penicillins Anaphylaxis    Simvastatin Other (See Comments)     Muscle Pain  Cephalexin Hives and Itching    Cephalosporins Hives and Itching    Food Diarrhea     Milk , shellfish , gluten. ..may have bouts of diarrhea, constipation or flatulus. (RD spoke to pt regarding \"milk allergy\", pt is not allergic to milk. She does not drink milk, but consumes milk in other products and consumes dairy products. Had one reactions to shellfish years ago and now can tolerate one serving of shellfish once per week. Avoids gluten as much as she can, but does not have celiac disease.)    Levofloxacin     Other      Environmental -cats,dogs,dust    Shellfish-Derived Products      hives    Sulfa Antibiotics      Can take Celebrex, Pt denies 8/1/18       Problem List:    Patient Active Problem List   Diagnosis Code    Restless leg syndrome G25.81    Acquired hypothyroidism E03.9    Benign essential HTN I10    HUSSAIN (obstructive sleep apnea) G47.33    Obesity, Class III, BMI 40-49.9 (morbid obesity) (HCC) E66.01    Primary osteoarthritis of both knees M17.0    Arthritis of knee, left M17.12    Paroxysmal atrial fibrillation (HCC) I48.0    Pure hypercholesterolemia E78.00    Hepatic steatosis K76.0    Hx of pulmonary embolus Z86.711    Chronic diastolic heart failure (HCC) I50.32    Symptomatic bradycardia R00.1    Encounter for electronic analysis of reveal event recorder Z45.09    Primary osteoarthritis of right knee M17.11    Type 2 diabetes mellitus E11.9    Anxiety and depression F41.9, F32. A    Palpitation R00.2    Moderate persistent asthma without complication K85.12    Moderate major depression (HCC) F32.1    Type 2 diabetes mellitus with obesity (HCC) E11.69, E66.9       Past Medical History:        Diagnosis Date    Allergic     Anesthesia complication     family hx-father-at at age 80 having hip replacement revision surgery-had tia's x2 while under anes-but also had hx chf-had dificuly with speech when coming out from anes    Anxiety     Arthritis     left ankle, bilateral hands    Arthritis of left hip 2/2/2016    Arthritis of right hip 4/19/2016    Asthma     At risk for falls     uses a cane    Atrial fibrillation with rapid ventricular response (HCC)     Atrial flutter ([address] 4/25/2018    Chronic maxillary sinusitis 5/24/2017    CTEPH (chronic thromboembolic pulmonary hypertension) ([address] 8/26/2016    Depression     pt stated r/t bad marriage/alcoholic spouse    Diabetes mellitus ([address]     borderline    Disc disease, degenerative, lumbar or lumbosacral 2/20/2017    Hepatic steatosis 4/26/2019    History of total hip arthroplasty 2/28/2017    Hypertension     Leg cramps     patient states very severe, requires immediate potassium administration    Migraines, basilar     last migraine 10 years ago    Mild persistent asthma without complication 5/13/3450    Moderate asthma with acute exacerbation 10/25/2022    Obesity, Class III, BMI 40-49.9 (morbid obesity) ([address] 4/19/2016    HUSSAIN (obstructive sleep apnea) 10/14/2013    Osteoarthritis, hip, bilateral 2016    Bilateral hip arthroplasty    Panic attacks     Pneumonia 2015    Primary osteoarthritis of both knees 9/19/2017    Primary osteoarthritis of left knee 12/15/2016    Pulmonary emboli ([address] 8/4/2016    Pulmonary HTN ([address] 7/21/2016    Pure hypercholesterolemia 2/13/2019    Restless leg syndrome     Rhinitis, chronic 3/26/2014    Sleep apnea     wears CPAP @11    Stress incontinence     Tear of left rotator cuff 1/23/2018    Thyroid disease     hypothyroid    Wears glasses        Past Surgical History:        Procedure Laterality Date    ATRIAL ABLATION SURGERY      BACK SURGERY  2004    LUMBAR FUSION    CATARACT REMOVAL Bilateral     COLONOSCOPY      EYE SURGERY Right 2010    retinal tear repaired   [facility]    right ring finger severe laceration, fracture    HIP ARTHROPLASTY Right 4-19-16    HYSTERECTOMY (CERVIX STATUS UNKNOWN)  1993    JOINT REPLACEMENT Left 2/2/16 left hip    TONSILLECTOMY  1972    TOTAL KNEE ARTHROPLASTY Right 3/16/2021    RIGHT ROBOTIC ASSISTED TOTAL KNEE ARTHROPLASTY (02426, 35052) - PRESSLEY & NEPHEW ADVANCED performed by Iqra Martinez MD at [address] [place] [address] History:    Social History     Tobacco Use    Smoking status: Former     Packs/day: 0.50     Years: 5.00     Pack years: 2.50     Types: Cigarettes     Quit date: 10/5/2013     Years since quittin.4    Smokeless tobacco: Never    Tobacco comments:     smoked for a total of 5yr total   Substance Use Topics    Alcohol use: Yes     Comment: RARE                                Counseling given: Not Answered  Tobacco comments: smoked for a total of 5yr total      Vital Signs (Current):   Vitals:    23 1451   Weight: 248 lb (112.5 kg)   Height: 5' 4\" (1.626 m)                                              BP Readings from Last 3 Encounters:   02/15/23 128/76   23 132/70   23 120/80       NPO Status:                                                                                 BMI:   Wt Readings from Last 3 Encounters:   23 248 lb (112.5 kg)   02/15/23 250 lb (113.4 kg)   23 251 lb 9.6 oz (114.1 kg)     Body mass index is 42.57 kg/m².     CBC:   Lab Results   Component Value Date/Time    WBC 5.6 2023 02:50 PM    RBC 4.18 2023 02:50 PM    HGB 13.2 2023 02:50 PM    HCT 39.0 2023 02:50 PM    MCV 93.3 2023 02:50 PM    RDW 14.4 2023 02:50 PM     2023 02:50 PM       CMP:   Lab Results   Component Value Date/Time     2023 02:50 PM    K 4.0 2023 02:50 PM    K 4.3 2022 05:02 PM     2023 02:50 PM    CO2 23 2023 02:50 PM    BUN 14 2023 02:50 PM    CREATININE 0.7 2023 02:50 PM    GFRAA >60 2022 12:04 PM    GFRAA >60 2013 10:45 AM    AGRATIO 1.4 2023 10:35 AM    LABGLOM >60 2023 02:50 PM    GLUCOSE 112 2023 02:50 PM    GLUCOSE 100 03/27/2012 04:27 PM    PROT 7.1 01/09/2023 10:35 AM    PROT 7.0 08/24/2012 12:00 AM    CALCIUM 9.2 02/22/2023 02:50 PM    BILITOT 0.4 01/09/2023 10:35 AM    ALKPHOS 98 01/09/2023 10:35 AM    AST 17 01/09/2023 10:35 AM    ALT 22 01/09/2023 10:35 AM       POC Tests: No results for input(s): POCGLU, POCNA, POCK, POCCL, POCBUN, POCHEMO, POCHCT in the last 72 hours.    Coags:   Lab Results   Component Value Date/Time    PROTIME 14.8 02/22/2023 02:50 PM    INR 1.17 02/22/2023 02:50 PM    APTT 35.5 02/22/2023 02:50 PM       HCG (If Applicable): No results found for: PREGTESTUR, PREGSERUM, HCG, HCGQUANT     ABGs: No results found for: PHART, PO2ART, ZUP4PAJ, LDE8FOI, BEART, S6SVDFXM     Type & Screen (If Applicable):  No results found for: LABABO, LABRH    Drug/Infectious Status (If Applicable):  No results found for: HIV, HEPCAB    COVID-19 Screening (If Applicable):   Lab Results   Component Value Date/Time    COVID19 Not Detected 03/10/2021 10:25 AM           Anesthesia Evaluation  Patient summary reviewed and Nursing notes reviewed no history of anesthetic complications:   Airway: Mallampati: II  TM distance: >3 FB   Neck ROM: full  Mouth opening: > = 3 FB   Dental: normal exam         Pulmonary:   (+) sleep apnea: on CPAP,  asthma (rarely uses levalbuterol):     (-) rhonchi and wheezes                           Cardiovascular:    (+) hypertension:, dysrhythmias (PAF): atrial fibrillation, CHF:,           Rate: normal                 ROS comment: Component 7 mo ago   Left Ventricular Ejection Fraction 63   LVEF MODALITY ECHO         Narrative & Impression      Transthoracic Echocardiography Report (TTE)      Demographics      Patient Name       VANESSA RUBIN      Date of Study      07/22/2022         Gender              Female      Patient Number     [id number]         Date of Birth       1950      Visit Number       [id number]          Age                 71 year(s)      Accession Number   [id number]         Room  Number         OP      Corporate ID       [id number]           Sonographer         Marcelo Shelter      Ordering Physician Patricia Morris,    Interpreting        Markos Jordan MD, [facility]           Physician           DO ILEANA, [facility]     Procedure     Type of Study      TTE procedure:ECHOCARDIOGRAM COMPLETE 2D W DOPPLER W COLOR. Procedure Date  Date: 07/22/2022 Start: 01:58 PM     Study Location: [facility] - Echo Lab  Technical Quality: Limited visualization due to body habitus.     Indications:Congestive heart failure, diastolic dysfunction.     Patient Status: Routine     Height: 65 inches Weight: 246.01 pounds BSA: 2.16 m2 BMI: 40.94 kg/m2     BP: 118/72 mmHg      Conclusions      Summary   Technically difficult study due to body habitus. Definity was administered. Normal left ventricle size, wall thickness and systolic function with an   estimated ejection fraction of 60-65%. Mild concentric left ventricular hypertrophy. No regional wall motion abnormalities are seen. Normal diastolic filling pattern for age. The aortic valve appears sclerotic but opens well. Mild aortic   regurgitation. Mild tricuspid regurgitation. PASP estimated at 30 mmHg. Signature      ------------------------------------------------------------------   Electronically signed by Adan Duran [facility]   (Interpreting physician) on 07/22/2022 at 04:16 PM   ------------------------------------------------------------------           Event recorder  PVCs     Neuro/Psych:      (-) seizures, TIA and CVA           GI/Hepatic/Renal:             Endo/Other:    (+) Diabetes, hypothyroidism, blood dyscrasia: anticoagulation therapy:., .                 Abdominal:             Vascular:   + PE.  - DVT. Other Findings:           Anesthesia Plan      general     ASA 3       Induction: intravenous.     MIPS: Postoperative opioids intended and Prophylactic antiemetics administered. Anesthetic plan and risks discussed with patient. Plan discussed with CRNA.                     Melanie Tavares MD   2/28/2023

## 2023-02-28 NOTE — DISCHARGE INSTR - COC
Continuity of Care Form    Patient Name: Kerrie Manning   :  1950  MRN:  [id number]    Admit date:  2023  Discharge date:  3/1/23    Code Status Order: Prior   Advance Directives:   [facility] Documentation       Date/Time Healthcare Directive Type of Healthcare Directive Copy in [address] Agent's Name Healthcare Agent's Phone Number    [phone] No, patient does not have an advance directive for healthcare treatment -- -- -- -- --            Admitting Physician:  Garth Cotton MD  PCP: Dg Wen MD    Discharging Nurse: [facility]/Room#: [address] [facility] Phone Number: [phone]    Emergency Contact:   Extended Emergency Contact Information  Primary Emergency Contact: [facility]  Address: [address], [address] [facility],[address] Amy Toscano of [address] Phone: [phone]  Relation: Child  Secondary Emergency Contact: Matthias Sw[address] of [address] Phone: [phone]  Relation: Other    Past Surgical History:  Past Surgical History:   Procedure Laterality Date    ATRIAL ABLATION SURGERY      BACK SURGERY  2004    LUMBAR FUSION    CATARACT REMOVAL Bilateral     COLONOSCOPY      EYE SURGERY Right     retinal tear repaired    [facility]    right ring finger severe laceration, fracture    HIP ARTHROPLASTY Right 16    HYSTERECTOMY (CERVIX STATUS UNKNOWN)      JOINT REPLACEMENT Left 16    left hip    TONSILLECTOMY  1972    TOTAL KNEE ARTHROPLASTY Right 3/16/2021    RIGHT ROBOTIC ASSISTED TOTAL KNEE ARTHROPLASTY (59528, 48564) - PRESSLEY & NEPHEW ADVANCED performed by Marilin Abraham MD at [address] [place] [address]       Immunization History:   Immunization History   Administered Date(s) Administered    COVID-19, PFIZER PURPLE top, DILUTE for use, (age 15 y+), 30mcg/0.3mL 2021, 2021, 2022    Influenza Virus Vaccine 10/05/2011    Influenza, FLUAD, (age 72 y+), Adjuvanted, 0.5mL 10/12/2020, 11/10/2021, 11/14/2022    Influenza, FLUARIX, FLULAVAL, FLUZONE (age 10 mo+) AND AFLURIA, (age 1 y+), PF, 0.5mL 10/05/2016    Influenza, High Dose (Fluzone 65 yrs and older) 10/04/2019    Influenza, Intradermal, Preservative free 10/02/2012, 09/16/2013, 10/07/2014, 10/08/2015    Pneumococcal Conjugate 13-valent (Qmyaesx22) 09/01/2017    Pneumococcal Conjugate 7-valent (Prevnar7) 10/20/2012    Pneumococcal Polysaccharide (Zifrbrtbr54) 08/05/2016, 11/10/2021    Td, unspecified formulation 07/30/2007    Tdap (Boostrix, Adacel) 10/02/2012    Zoster Live (Zostavax) 07/19/2017, 08/01/2017       Active Problems:  Patient Active Problem List   Diagnosis Code    Restless leg syndrome G25.81    Acquired hypothyroidism E03.9    Benign essential HTN I10    HUSSAIN (obstructive sleep apnea) G47.33    Obesity, Class III, BMI 40-49.9 (morbid obesity) ([facility] E66.01    Primary osteoarthritis of both knees M17.0    Arthritis of knee, left M17.12    Paroxysmal atrial fibrillation (HCC) I48.0    Pure hypercholesterolemia E78.00    Hepatic steatosis K76.0    Hx of pulmonary embolus Z86.711    Chronic diastolic heart failure (HCC) I50.32    Symptomatic bradycardia R00.1    Encounter for electronic analysis of reveal event recorder Z45.09    Primary osteoarthritis of right knee M17.11    Type 2 diabetes mellitus E11.9    Anxiety and depression F41.9, F32. A    Palpitation R00.2    Moderate persistent asthma without complication G54.42    Moderate major depression (HCC) F32.1    Type 2 diabetes mellitus with obesity (HCC) E11.69, E66.9       Isolation/Infection:   Isolation            No Isolation          Patient Infection Status       None to display            Nurse Assessment:  Last Vital Signs: /74   Pulse 79   Temp 97.7 °F (36.5 °C) (Temporal)   Resp 18   Ht 5' 4\" (1.626 m)   Wt 252 lb (114.3 kg)   SpO2 96%   BMI 43.26 kg/m²     Last documented pain score (0-10 scale): Pain Level: 4  Last Weight:   Wt Readings from Last 1 Encounters:   02/28/23 252 lb (114.3 kg)     Mental Status:  oriented and alert    IV Access:  - None    Nursing Mobility/ADLs:  Walking   Independent  Transfer  Independent  Bathing  Independent  Dressing  Independent  Toileting  Independent  Feeding  Independent  Med Admin  Independent  Med Delivery   whole    Wound Care Documentation and Therapy:  Puncture 03/27/20 Groin Right (Active)   Number of days: 1067       Incision 03/16/21 Knee Anterior;Right (Active)   Number of days: 713        Elimination:  Continence:   Bowel: Yes  Bladder: Yes  Urinary Catheter: None   Colostomy/Ileostomy/Ileal Conduit: No       Date of Last BM: 3/1  No intake or output data in the 24 hours ending 02/28/23 1145  No intake/output data recorded.    Safety Concerns:     At Risk for Falls    Impairments/Disabilities:      None    Nutrition Therapy:  Current Nutrition Therapy:   - Oral Diet:  General    Routes of Feeding: Oral  Liquids: No Restrictions  Daily Fluid Restriction: no  Last Modified Barium Swallow with Video (Video Swallowing Test): not done    Treatments at the Time of Hospital Discharge:   Respiratory Treatments: n/a  Oxygen Therapy:  is not on home oxygen therapy.  Ventilator:    - No ventilator support    Rehab Therapies: Physical Therapy  Weight Bearing Status/Restrictions: No weight bearing restrictions  Other Medical Equipment (for information only, NOT a DME order):  walker  Other Treatments: INSTRUCTIONS  HOME HEALTH CARE: LEVEL 1 STANDARD    Home health agency to establish plan of care for patient over 60 day period     Initial home evaluation visit to occur within 24-48 hours for:  medication management  VS and clinical assessment  S&S chronic disease exacerbation education + when to contact MD / NP  care coordination  Medication Reconciliation during 1st  visit       PT  Evaluate with goal of regaining prior level of functioning   OT to  evaluate if patient has [address] needs for personal care      PCP Visit scheduled within 7 days of hospital discharge     ACTIVITY: weight-bearing as tolerated. You may progress off support as tolerated. During the day, continue to wear your ELIZABETH stocking on the operative leg. Take the stocking off at night. You do not need to wear a stocking on your non-operative leg. You should wear the compression stocking until your post-op visit, this keeps lower extremity swelling to a minimum and reduces joint stiffness. MEDICATIONS: Upon discharge resume your home medications. Take all medications as prescribed. Take a stool softener (Senna/Colace) if taking narcotic pain medications. Stool softeners are only effective if you are adequately hydrated. Try and drink 6-8 glasses of water or fluids a day, unless otherwise contraindicated. Despite using Senna/Colace, if you haven't had a bowel movement in 3 days, please switch to Miralax. Miralax is a gentle osmotic laxative and is sold over the counter. Mix one capful of the powder into a glass of water or juice once a day. You should have a bowel movement within 24 hours, if not call the office. You will be discharged from the hospital with an adequate supply of pain medication. You are encouraged to taper the use of narcotic pain medication as tolerated. Should you require a refill, please call our office. Dr. Claudean Livings office routinely prescribes narcotic pain medication for 4-6 weeks after surgery. If you require pain medication beyond this interval you may be referred to your PCP or to the Pain Clinic for further evaluation. Plan ahead for refills on pain medication as many narcotics either need to be picked up at the office or mailed. It is best to call 48-72 hours in advance of needing a refill so that you don't run out of medication. ANTICOAGULATION: Continue your Xarelto as prescribed to prevent blood clots (DVT/PE).  As long as your incision remains dry and you tolerate anti-inflammatory medications (Meloxicam, Aleve, Advil, Motrin, ibuprofen, naprosyn), it is OK to take these medications as well. Common symptoms of blood clots (DVT/PE) include: localized pain, swelling, calf tenderness, redness or discoloration of the skin. PE symptoms include: shortness of breath, rapid pulse, sweating, and chest pain that worsens with inspiration, coughing up blood, light headedness, feelings of apprehension. If you experience any of these symptoms call the office or go to the closest ER.    WOUND CARE: The dressing will remain in place until post-op day 7. You can shower with the dressing but should not tub-bath or submerge your incision in water. Prior to any wound care please wash and dry your hands. Keep your incision clean and dry. If the wound is dry, you do not need to cover it with a dressing. The visiting nurse, physical therapist or rehab facility can help remove the dressing. Should your incision start to drain let our office know.      Please follow-up with Dr. Thiago Mendoza in orthopedic clinic in about 2 weeks from day of surgery, call [phone] to make an appointment      Patient's personal belongings (please select all that are sent with patient):  None    RN SIGNATURE:  Electronically signed by Hans Begum RN on 3/1/23 at 1:00 PM EST    CASE MANAGEMENT/SOCIAL WORK SECTION    Inpatient Status Date: 02/28/2023    Readmission Risk Assessment Score:  Readmission Risk              Risk of Unplanned Readmission:  0           Discharging to Facility/ Agency   Name:  [facility]    Address: [address] [place] [address], [address] [place] [address], [place] [address]  Phone: [phone]  Fax: [phone]        / signature: Electronically signed by Celestine Gutierrez RN on 3/1/23 at 8:52 AM EST    PHYSICIAN SECTION    Prognosis: Good    Condition at Discharge: Stable    Rehab Potential (if transferring to Rehab): Good    Recommended Labs or Other Treatments After Discharge: ***    Physician Certification: I certify the above information and transfer of Kerrie Manning  is necessary for the continuing treatment of the diagnosis listed and that she requires [address] for less 30 days.      Update Admission H&P: Changes in H&P as follows - S/p TKA    PHYSICIAN SIGNATURE:  Electronically signed by BRICE Palma CNP on 2/28/23 at 2:22 PM EST

## 2023-02-28 NOTE — INTERVAL H&P NOTE
Update History & Physical    The patient's History and Physical of February 15, 2023 was reviewed with the patient and I examined the patient. There was no change. The surgical site was confirmed by the patient and me. Plan: The risks, benefits, expected outcome, and alternative to the recommended procedure have been discussed with the patient. Patient understands and wants to proceed with the procedure.      Electronically signed by Garth Cotton MD on 2/28/2023 at 9:28 AM

## 2023-02-28 NOTE — PROGRESS NOTES
[facility] Orthopedic Surgery   Progress Note    CHIEF COMPLAINT/DIAGNOSIS: S/p left Total Knee Arthroplasty    SUBJECTIVE: The patient is sitting up in the bed in PACU; describes mild knee pain. Denies operative leg paresthesias. Ice machine in place. She will be staying with a friend who [place]. Has a walker. Wants Rx filled in house. OBJECTIVE  Physical    VITALS:  BP (!) 144/56   Pulse 89   Temp 97.6 °F (36.4 °C) (Temporal)   Resp 19   Ht 5' 4\" (1.626 m)   Wt 252 lb (114.3 kg)   SpO2 94%   BMI 43.26 kg/m²     GENERAL: Alert and oriented x3, in no acute distress. MUSCULOSKELETAL: Able to dorsi and plantarflex the ankle without issue. INCISION:  Covered with post-op dressing/ACE is c/d/I.  ROM: left knee ROM deferred. Sensory:  Intact to light touch in peroneal and tibial distributions. Vascular:   2+ DP pulses with brisk cap refill;  calf soft and nontender    Data    ALL MEDICATIONS HAVE BEEN REVIEWED    CBC: No results for input(s): WBC, HGB, HCT, PLT in the last 72 hours. BMP: No results for input(s): NA, K, CL, CO2, PHOS, BUN, CREATININE, CA in the last 72 hours. INR: No results for input(s): INR in the last 72 hours. Post-op films show stable total knee arthroplasty construct without acute complication. ASSESSMENT:  S/p left Total Knee Arthroplasty (2/28/23), POD#0  HUSSAIN on CPAP  PAF on Xarelto  DM II  Asthma  Depression  Chronic diastolic HF  HLD    PLAN:   - WB status:  WBAT; reviewed post op precautions  - DVT prophylaxis: Resume Xarelto 10 mg tomorrow x 2 days then normal 20mg daily on Friday  - PT/OT  - Pain Control: Current regimen. Due to orthopaedic surgical procedure/condition, patient may require pain medication for up to 6-8 weeks. - ID:  Cleocin x 2 post-op. - Dispo: home with home PT tomorrow.     Follow-up with Dr. Elo Harris as scheduled on 3/17.  [phone]  Future Appointments   Date Time Provider Abhishek Uribe   3/17/2023 10:45 AM Angel Trimble MD [facility]   3/20/2023 11:15 AM SCHEDULE, Gamaliel REMOTE TRANSMISSION [facility]   4/24/2023 11:15 AM SCHEDULE, Gamaliel REMOTE TRANSMISSION [facility]   5/10/2023 11:00 AM MD KANU Sanchez [facility] [place] [facility]   5/10/2023  1:30 PM SCHEDULE, Gamaliel DEVICE CHECK [facility]   5/10/2023  1:45 PM Chava Bloom MD [facility]   5/15/2023  1:30 PM MD KANU Sanchez [facility] [place] - [facility]   6/1/2023 12:00 PM SCHEDULE, Gamaliel REMOTE TRANSMISSION [facility]   6/12/2023  1:00 PM BRICE Catalan [facility]   7/3/2023 11:15 AM SCHEDULE, Gamaliel REMOTE TRANSMISSION [facility]   7/21/2023 10:30 AM ECHO ROOM 1 [facility]   7/21/2023 11:30 AM Rick Simmons MD [facility]   8/7/2023 11:15 AM SCHEDULE, Gamaliel REMOTE TRANSMISSION [facility]   9/11/2023 11:15 AM SCHEDULE, Gamaliel REMOTE TRANSMISSION [facility]   10/16/2023 11:15 AM SCHEDULE, Gamaliel REMOTE TRANSMISSION [facility]   11/17/2023  1:00 PM MD KANU Sanchez [facility] [place] - [facility]   11/20/2023 11:15 AM SCHEDULE, Gamaliel REMOTE TRANSMISSION [facility]       BRICE Cadet - CNP  2/28/2023  2:21 PM

## 2023-02-28 NOTE — PROGRESS NOTES
Pt arrived from OR to PACU bay 7. Report received from OR staff. Pt not arousable to voice. Surgical incisions dressings in place to L knee. Pt on 6L simple mask with OPA in place, NSR, and VSS. Will continue to monitor.

## 2023-02-28 NOTE — PROGRESS NOTES
[facility]   Phone: [phone]    Physical Therapy    [x] Initial Evaluation            [] Daily Treatment Note         [] Discharge Summary      Patient: Jennifer Mckenzie   : 1950   MRN: [id number]   Date of Service:  2023  Admitting Diagnosis: S/P TKR (total knee replacement) using cement, left  Current Admission Summary: S/P L TKA  Past Medical History:  has a past medical history of Allergic, Anesthesia complication, Anxiety, Arthritis, Arthritis of left hip, Arthritis of right hip, Asthma, At risk for falls, Atrial fibrillation with rapid ventricular response (HCC), Atrial flutter ([address] Chronic maxillary sinusitis, CTEPH (chronic thromboembolic pulmonary hypertension) ([address] Depression, Diabetes mellitus ([address] Disc disease, degenerative, lumbar or lumbosacral, Hepatic steatosis, History of total hip arthroplasty, Hypertension, Leg cramps, Migraines, basilar, Mild persistent asthma without complication, Moderate asthma with acute exacerbation, Obesity, Class III, BMI 40-49.9 (morbid obesity) ([address] HUSSAIN (obstructive sleep apnea), Osteoarthritis, hip, bilateral, Panic attacks, Pneumonia, Primary osteoarthritis of both knees, Primary osteoarthritis of left knee, Pulmonary emboli ([address] Pulmonary HTN ([address] Pure hypercholesterolemia, Restless leg syndrome, Rhinitis, chronic, Sleep apnea, Stress incontinence, Tear of left rotator cuff, Thyroid disease, and Wears glasses. Past Surgical History:  has a past surgical history that includes Cataract removal (Bilateral); Finger surgery (); eye surgery (Right, ); back surgery (); Tonsillectomy (); Hysterectomy (); Colonoscopy; joint replacement (Left, 16); Hip Arthroplasty (Right, 16); Atrial ablation surgery; and Total knee arthroplasty (Right, 3/16/2021). Discharge Recommendations: Jennifer Mckenzie scored a 17/24 on the AM-PAC short mobility form.  Current research shows that an AM-PAC score of 18 or greater is typically associated with a discharge to the patient's home setting. Based on the patient's AM-PAC score and their current functional mobility deficits, it is recommended that the patient have 2-3 sessions per week of Physical Therapy at d/c to increase the patient's independence. At this time, this patient demonstrates the endurance and safety to discharge home with (home services) and a follow up treatment frequency of 2-3x/wk. Please see assessment section for further patient specific details. If patient discharges prior to next session this note will serve as a discharge summary. Please see below for the latest assessment towards goals.     HOME HEALTH CARE: LEVEL 1 STANDARD    - Initial home health evaluation to occur within 24-48 hours, in patient home   - Therapy to evaluate with goal of regaining prior level of functioning   - Therapy to evaluate if patient has [id number] needs for personal care    DME Required For Discharge: no DME required at discharge  Precautions/Restrictions: high fall risk  Weight Bearing Restrictions: weight bearing as tolerated  [] Right Upper Extremity  [] Left Upper Extremity [] Right Lower Extremity  [x] Left Lower Extremity     Required Braces/Orthotics: no braces required   [] Right  [] Left  Positional Restrictions:no positional restrictions    Pre-Admission Information   Lives With:  Plan's to d/c to friend's house                 Type of Home: house  Home Layout: one level  Home Access: level entry  Bathroom Layout: walk in shower  Bathroom Equipment: grab bars in shower, grab bars around toilet, shower chair, hand held shower head  Toilet Height: elevated height  Home Equipment: rolling walker, single point cane, reacher, sock aide, long handled shoe horn, transport chair  Transfer Assistance: modified independent with use of SPC  Ambulation Assistance:modified independent with use of SPC  ADL Assistance: independent with all ADL's  IADL Assistance: independent with homemaking tasks  Active :        [x] Yes                 [] No  Hand Dominance: [] Left                 [] Right  Current Employment: retired. Occupation: Research and   Hobbies:   Recent Falls: Pt denies recent falls    Examination   Vision:   Vision Gross Assessment: Impaired and Vision Corrective Device: wears glasses for reading  Hearing:   WFL  Posture:   Fair  Sensation:   accurately detects gross touch to all extremities  ROM:   (B) LE AROM WFL  Strength:   (B) LE strength grossly WFL  Therapist Clinical Decision Making (Complexity): low complexity  Clinical Presentation: stable      Subjective  General: Pt supine in bed upon arrival. Pt agreeable to PT/OT eval.  Pain: 5/10. Location: L knee  Pain Interventions: pain medication in place prior to arrival, RN notified, ice applied, and repositioned        Functional Mobility  Bed Mobility  Supine to Sit: contact guard assistance  Sit to Supine: stand by assistance  Scooting: stand by assistance  Comments:  Transfers  Sit to stand transfer: contact guard assistance  Stand to sit transfer: contact guard assistance  Stand step transfer: contact guard assistance  Toilet transfer: contact guard assistance  Comments:  Ambulation  Surface:level surface  Assistive Device: rolling walker  Assistance: contact guard assistance  Distance: ~15 ft + ~5 ft  Gait Mechanics: Pt ambulating with slow, step-to gait pattern, wide HENRRY, increased BUE weightbearing, no LOB. Comments:    Stair Mobility  Stair mobility not completed on this date. Comments:  Wheelchair Mobility:  No w/c mobility completed on this date.   Comments:  Balance  Static Sitting Balance: fair (+): maintains balance at SBA/supervision without use of UE support  Dynamic Sitting Balance: fair (+): maintains balance at SBA/supervision without use of UE support  Static Standing Balance: fair (-): maintains balance at CGA with use of UE support  Dynamic Standing Balance: fair (-): maintains balance at CGA with use of UE support  Comments:    Other Therapeutic Interventions  Pt assisted to transport chair following ambulation as pt requesting to use restroom while in PACU. Pt transported to restroom and completing standing step transfer to toilet to urinate. Following toileting, pt ambulating short distance in bathroom and standing at sink to complete hand hygiene. Pt then transported via chair back to stretcher and completing additional step transfer with RW to bed. See OT note for ADL assessment. Functional Outcomes  AM-PAC Inpatient Mobility Raw Score : 17              Cognition  WFL  Orientation:    alert and oriented x 4  Command Following:   [facility]    Education  Barriers To Learning: none  Patient Education: patient educated on goals, PT role and benefits, plan of care, precautions, weight-bearing education, general safety, discharge recommendations  Learning Assessment:  patient verbalizes and demonstrates understanding    Assessment  Activity Tolerance: Good  Impairments Requiring Therapeutic Intervention: decreased functional mobility, decreased ADL status, decreased ROM, decreased strength, decreased endurance, decreased balance, increased pain  Prognosis: excellent  Clinical Assessment: Pt presents as below her baseline function and would benefit from skilled PT services to promote safe return to PLOF. Safety Interventions: patient left in bed, gait belt, patient at risk for falls, and nurse notified; Pt in PACU and in process of being transported to 4T room at end of session    Plan  Frequency: 7 x/week BID tx  Current Treatment Recommendations: strengthening, ROM, balance training, functional mobility training, transfer training, gait training, stair training, endurance training, patient/caregiver education, home exercise program, safety education, and equipment evaluation/education    Goals  Patient Goals:  To return home   Short Term Goals:  Time Frame: Prior to D/C  Patient will complete bed mobility at modified independent   Patient will complete transfers at [facility]   Patient will ambulate 100 ft with use of rolling walker at modified independent  Patient will complete car transfer at supervision    Therapy Session Time      Individual Group Co-treatment   Time In     1627   Time Out     1720   Minutes     53     Timed Code Treatment Minutes:  15 Minutes  Total Treatment Minutes:  48     Billed as such due to outpatient in bed status and co-eval with OT.     Electronically Signed By: Cyrena Sandhoff, PT  Cyrena Sandhoff, PT, DPT, [id number]

## 2023-03-01 VITALS
DIASTOLIC BLOOD PRESSURE: 67 MMHG | HEIGHT: 64 IN | BODY MASS INDEX: 42.76 KG/M2 | TEMPERATURE: 97.5 F | WEIGHT: 250.49 LBS | HEART RATE: 84 BPM | OXYGEN SATURATION: 97 % | SYSTOLIC BLOOD PRESSURE: 111 MMHG | RESPIRATION RATE: 16 BRPM

## 2023-03-01 LAB
ANION GAP SERPL CALCULATED.3IONS-SCNC: 7 MMOL/L (ref 3–16)
BUN BLDV-MCNC: 19 MG/DL (ref 7–20)
CALCIUM SERPL-MCNC: 8.6 MG/DL (ref 8.3–10.6)
CHLORIDE BLD-SCNC: 107 MMOL/L (ref 99–110)
CO2: 22 MMOL/L (ref 21–32)
CREAT SERPL-MCNC: 0.8 MG/DL (ref 0.6–1.2)
GFR SERPL CREATININE-BSD FRML MDRD: >60 ML/MIN/{1.73_M2}
GLUCOSE BLD-MCNC: 147 MG/DL (ref 70–99)
HCT VFR BLD CALC: 31.5 % (ref 36–48)
HEMOGLOBIN: 10.2 G/DL (ref 12–16)
INR BLD: 1.17 (ref 0.87–1.14)
MCH RBC QN AUTO: 30.6 PG (ref 26–34)
MCHC RBC AUTO-ENTMCNC: 32.5 G/DL (ref 31–36)
MCV RBC AUTO: 94.3 FL (ref 80–100)
PDW BLD-RTO: 14.6 % (ref 12.4–15.4)
PLATELET # BLD: 218 K/UL (ref 135–450)
PMV BLD AUTO: 8.2 FL (ref 5–10.5)
POTASSIUM SERPL-SCNC: 4.6 MMOL/L (ref 3.5–5.1)
PROTHROMBIN TIME: 14.9 SEC (ref 11.7–14.5)
RBC # BLD: 3.34 M/UL (ref 4–5.2)
SODIUM BLD-SCNC: 136 MMOL/L (ref 136–145)
WBC # BLD: 9.1 K/UL (ref 4–11)

## 2023-03-01 PROCEDURE — 85027 COMPLETE CBC AUTOMATED: CPT

## 2023-03-01 PROCEDURE — 99024 POSTOP FOLLOW-UP VISIT: CPT | Performed by: NURSE PRACTITIONER

## 2023-03-01 PROCEDURE — 94640 AIRWAY INHALATION TREATMENT: CPT

## 2023-03-01 PROCEDURE — 6360000002 HC RX W HCPCS: Performed by: ORTHOPAEDIC SURGERY

## 2023-03-01 PROCEDURE — 2580000003 HC RX 258: Performed by: ORTHOPAEDIC SURGERY

## 2023-03-01 PROCEDURE — 6370000000 HC RX 637 (ALT 250 FOR IP): Performed by: ORTHOPAEDIC SURGERY

## 2023-03-01 PROCEDURE — 97116 GAIT TRAINING THERAPY: CPT

## 2023-03-01 PROCEDURE — APPNB45 APP NON BILLABLE 31-45 MINUTES: Performed by: NURSE PRACTITIONER

## 2023-03-01 PROCEDURE — 97535 SELF CARE MNGMENT TRAINING: CPT

## 2023-03-01 PROCEDURE — 6370000000 HC RX 637 (ALT 250 FOR IP): Performed by: NURSE PRACTITIONER

## 2023-03-01 PROCEDURE — 94150 VITAL CAPACITY TEST: CPT

## 2023-03-01 PROCEDURE — 94761 N-INVAS EAR/PLS OXIMETRY MLT: CPT

## 2023-03-01 PROCEDURE — 97530 THERAPEUTIC ACTIVITIES: CPT

## 2023-03-01 PROCEDURE — 80048 BASIC METABOLIC PNL TOTAL CA: CPT

## 2023-03-01 PROCEDURE — 85610 PROTHROMBIN TIME: CPT

## 2023-03-01 PROCEDURE — 97110 THERAPEUTIC EXERCISES: CPT

## 2023-03-01 PROCEDURE — 2500000003 HC RX 250 WO HCPCS: Performed by: ORTHOPAEDIC SURGERY

## 2023-03-01 RX ORDER — ACETAMINOPHEN 500 MG
1000 TABLET ORAL 3 TIMES DAILY
Qty: 84 TABLET | Refills: 0 | Status: SHIPPED | OUTPATIENT
Start: 2023-03-01 | End: 2023-03-15

## 2023-03-01 RX ORDER — LANOLIN ALCOHOL/MO/W.PET/CERES
400 CREAM (GRAM) TOPICAL DAILY
Status: DISCONTINUED | OUTPATIENT
Start: 2023-03-01 | End: 2023-03-01 | Stop reason: HOSPADM

## 2023-03-01 RX ORDER — SENNA PLUS 8.6 MG/1
1 TABLET ORAL 2 TIMES DAILY
Qty: 28 TABLET | Refills: 0 | Status: SHIPPED | OUTPATIENT
Start: 2023-03-01 | End: 2023-03-15

## 2023-03-01 RX ORDER — OXYCODONE HYDROCHLORIDE 5 MG/1
5 TABLET ORAL EVERY 4 HOURS PRN
Qty: 42 TABLET | Refills: 0 | Status: SHIPPED | OUTPATIENT
Start: 2023-03-01 | End: 2023-03-08

## 2023-03-01 RX ORDER — MELOXICAM 15 MG/1
15 TABLET ORAL DAILY
Qty: 14 TABLET | Refills: 0 | Status: SHIPPED | OUTPATIENT
Start: 2023-03-01 | End: 2023-03-15

## 2023-03-01 RX ORDER — CLINDAMYCIN HYDROCHLORIDE 300 MG/1
300 CAPSULE ORAL 2 TIMES DAILY
Qty: 14 CAPSULE | Refills: 0 | Status: SHIPPED | OUTPATIENT
Start: 2023-03-01 | End: 2023-03-08

## 2023-03-01 RX ADMIN — KETOROLAC TROMETHAMINE 15 MG: 30 INJECTION, SOLUTION INTRAMUSCULAR; INTRAVENOUS at 03:30

## 2023-03-01 RX ADMIN — AZELASTINE HYDROCHLORIDE 1 SPRAY: 137 SPRAY, METERED NASAL at 09:07

## 2023-03-01 RX ADMIN — ACETAMINOPHEN 1000 MG: 500 TABLET ORAL at 02:28

## 2023-03-01 RX ADMIN — MAGNESIUM OXIDE 400 MG (241.3 MG MAGNESIUM) TABLET 400 MG: TABLET at 09:26

## 2023-03-01 RX ADMIN — SPIRONOLACTONE 25 MG: 25 TABLET ORAL at 09:06

## 2023-03-01 RX ADMIN — FUROSEMIDE 40 MG: 40 TABLET ORAL at 09:06

## 2023-03-01 RX ADMIN — KETOROLAC TROMETHAMINE 15 MG: 30 INJECTION, SOLUTION INTRAMUSCULAR; INTRAVENOUS at 09:05

## 2023-03-01 RX ADMIN — FLUTICASONE PROPIONATE 1 SPRAY: 50 SPRAY, METERED NASAL at 09:07

## 2023-03-01 RX ADMIN — SODIUM CHLORIDE, PRESERVATIVE FREE 10 ML: 5 INJECTION INTRAVENOUS at 09:27

## 2023-03-01 RX ADMIN — RIVAROXABAN 10 MG: 10 TABLET, FILM COATED ORAL at 09:34

## 2023-03-01 RX ADMIN — TIOTROPIUM BROMIDE INHALATION SPRAY 2 PUFF: 3.12 SPRAY, METERED RESPIRATORY (INHALATION) at 11:25

## 2023-03-01 RX ADMIN — OXYCODONE 5 MG: 5 TABLET ORAL at 09:06

## 2023-03-01 RX ADMIN — DILTIAZEM HYDROCHLORIDE 120 MG: 120 CAPSULE, EXTENDED RELEASE ORAL at 09:06

## 2023-03-01 RX ADMIN — OXYCODONE 5 MG: 5 TABLET ORAL at 02:28

## 2023-03-01 RX ADMIN — POTASSIUM CHLORIDE 10 MEQ: 750 TABLET, FILM COATED, EXTENDED RELEASE ORAL at 09:05

## 2023-03-01 RX ADMIN — CLINDAMYCIN PHOSPHATE 900 MG: 900 INJECTION, SOLUTION INTRAVENOUS at 02:32

## 2023-03-01 RX ADMIN — ACETAMINOPHEN 1000 MG: 500 TABLET ORAL at 09:15

## 2023-03-01 RX ADMIN — STANDARDIZED SENNA CONCENTRATE AND DOCUSATE SODIUM 1 TABLET: 8.6; 5 TABLET ORAL at 09:06

## 2023-03-01 ASSESSMENT — PAIN DESCRIPTION - ORIENTATION: ORIENTATION: LEFT

## 2023-03-01 ASSESSMENT — PAIN SCALES - GENERAL
PAINLEVEL_OUTOF10: 6
PAINLEVEL_OUTOF10: 5
PAINLEVEL_OUTOF10: 6

## 2023-03-01 ASSESSMENT — PAIN DESCRIPTION - PAIN TYPE: TYPE: SURGICAL PAIN

## 2023-03-01 ASSESSMENT — PAIN DESCRIPTION - ONSET: ONSET: ON-GOING

## 2023-03-01 ASSESSMENT — PAIN DESCRIPTION - DESCRIPTORS: DESCRIPTORS: ACHING

## 2023-03-01 ASSESSMENT — PAIN DESCRIPTION - LOCATION: LOCATION: KNEE

## 2023-03-01 ASSESSMENT — PAIN DESCRIPTION - FREQUENCY: FREQUENCY: CONTINUOUS

## 2023-03-01 NOTE — PROGRESS NOTES
Patient transferred to unit at this time. Received bedside report from PACU nurse. Patient expressed concern about not getting a dose of Xarelto after surgery. This RN explained to pt; she will resume Xarelto 10 mg tomorrow x 2 days then normal 20mg daily on Friday. Pt stated she has a history of blood clot and needs this med ASAP. Pt threatens she will call a family member to bring in her home dose to take. This RN told pt that is not allowed. The PACU nurse and I educated pt on the risk of bleeding when she take this med immediately after surgery as LTKR is a big huge surgery. We also explained the importance of the SCD and why it on. She also had elastic stocking on. Patient is crying the whole time. Assessment completed. Neuro WNL. Denies any pain at this time. AM meds administered per MAR. The care plan and education has been reviewed and mutually agreed upon with the patient. Standard safety measures in place. Patient utilizing home CPAP.     6:30 pm   Pt continue to cry and express concern about missing a dose of her Xarelto. This RN notified Michelle Fofana CNP; he talked to pt on RN's phone. Pt verbalized understanding of missing a dose tonight but wants her first dose low dose of Xarelto 10 mg tomorrow by noon.

## 2023-03-01 NOTE — PROGRESS NOTES
[facility]   Phone: [phone]    Physical Therapy    [] Initial Evaluation            [x] Daily Treatment Note         [] Discharge Summary      Patient: Olga Lidia Robledo   : 1950   MRN: [id number]   Date of Service:  3/1/2023  Admitting Diagnosis: S/P TKR (total knee replacement) using cement, left  Current Admission Summary: S/P L TKA  Past Medical History:  has a past medical history of Allergic, Anesthesia complication, Anxiety, Arthritis, Arthritis of left hip, Arthritis of right hip, Asthma, At risk for falls, Atrial fibrillation with rapid ventricular response (HCC), Atrial flutter ([address] Chronic maxillary sinusitis, CTEPH (chronic thromboembolic pulmonary hypertension) ([address] Depression, Diabetes mellitus ([address] Disc disease, degenerative, lumbar or lumbosacral, Hepatic steatosis, History of total hip arthroplasty, Hypertension, Leg cramps, Migraines, basilar, Mild persistent asthma without complication, Moderate asthma with acute exacerbation, Obesity, Class III, BMI 40-49.9 (morbid obesity) ([address] HUSSAIN (obstructive sleep apnea), Osteoarthritis, hip, bilateral, Panic attacks, Pneumonia, Primary osteoarthritis of both knees, Primary osteoarthritis of left knee, Pulmonary emboli ([address] Pulmonary HTN ([address] Pure hypercholesterolemia, Restless leg syndrome, Rhinitis, chronic, Sleep apnea, Stress incontinence, Tear of left rotator cuff, Thyroid disease, and Wears glasses. Past Surgical History:  has a past surgical history that includes Cataract removal (Bilateral); Finger surgery (); eye surgery (Right, ); back surgery (); Tonsillectomy (); Hysterectomy (); Colonoscopy; joint replacement (Left, 16); Hip Arthroplasty (Right, 16); Atrial ablation surgery; Total knee arthroplasty (Right, 3/16/2021); and Total knee arthroplasty (Left, 2023).   Discharge Recommendations: Olga Lidia Robledo scored a 18/24 on the AM-PAC short mobility form. Current research shows that an AM-PAC score of 18 or greater is typically associated with a discharge to the patient's home setting. Based on the patient's AM-PAC score and their current functional mobility deficits, it is recommended that the patient have 2-3 sessions per week of Physical Therapy at d/c to increase the patient's independence. At this time, this patient demonstrates the endurance and safety to discharge home with (home services) and a follow up treatment frequency of 2-3x/wk. Please see assessment section for further patient specific details. If patient discharges prior to next session this note will serve as a discharge summary. Please see below for the latest assessment towards goals.     HOME HEALTH CARE: LEVEL 1 STANDARD    - Initial home health evaluation to occur within 24-48 hours, in patient home   - Therapy to evaluate with goal of regaining prior level of functioning   - Therapy to evaluate if patient has [address] needs for personal care    DME Required For Discharge: no DME required at discharge  Precautions/Restrictions: high fall risk  Weight Bearing Restrictions: weight bearing as tolerated  [] Right Upper Extremity  [] Left Upper Extremity [] Right Lower Extremity  [x] Left Lower Extremity     Required Braces/Orthotics: no braces required   [] Right  [] Left  Positional Restrictions:no positional restrictions    Pre-Admission Information   Lives With:  Plan's to d/c to friend's house                 Type of Home: house  Home Layout: one level  Home Access: level entry  Bathroom Layout: walk in shower  Bathroom Equipment: grab bars in shower, grab bars around toilet, shower chair, hand held shower head  Toilet Height: elevated height  Home Equipment: rolling walker, single point cane, reacher, sock aide, long handled shoe horn, transport chair  Transfer Assistance: modified independent with use of SPC  Ambulation Assistance:modified independent with use of SPC  ADL Assistance: independent with all ADL's  IADL Assistance: independent with homemaking tasks  Active :        [x] Yes                 [] No  Hand Dominance: [] Left                 [] Right  Current Employment: retired. Occupation: Research and   Hobbies:   Recent Falls: Pt denies recent falls        Subjective  General: Pt sitting on toilet, RN present upon arrival. Pt agreeable to PT. RN providing meds. Pain: 6/10. Location: L knee  Pain Interventions: pain medication in place prior to arrival, RN notified, ice applied, and repositioned        Functional Mobility  Bed Mobility  Bed mobility not completed on this date. Comments:  Transfers  Sit to stand transfer: stand by assistance  Stand to sit transfer: stand by assistance  Stand pivot transfer: stand by assistance  Toilet transfer: stand by assistance  Car transfer: stand by assistance  Comments: VC for B hand placement  Ambulation  Surface:level surface  Assistive Device: rolling walker  Assistance: contact guard assistance  Distance: 7' + 7' + 72' + 28' + 100'  Gait Mechanics: Pt ambulating with slow, step-to gait pattern, wide HENRRY, increased BUE weightbearing, no LOB. Comments: VC for upright posture. Required seated rest break due to feeling lightheaded and breathing heavy - stating she didn't receive or bring her inhaler. Stair Mobility  Stair mobility not completed on this date. Comments:  Wheelchair Mobility:  No w/c mobility completed on this date. Comments:  Balance  Static Sitting Balance: fair (+): maintains balance at SBA/supervision without use of UE support  Dynamic Sitting Balance: fair (+): maintains balance at SBA/supervision without use of UE support  Static Standing Balance: fair (-): maintains balance at CGA with use of UE support  Dynamic Standing Balance: fair (-): maintains balance at CGA with use of UE support  Comments: stood SBA for angy-care, hand hygiene, and clothing management with RW support.     Other Therapeutic Interventions  Seated B LE x10: AP, GS, sulma. Educated on QS, SLR, SAQ, LAQ ABD/ADD - Pt verbalized understanding. Functional Outcomes  AM-PAC Inpatient Mobility Raw Score : 18              Cognition  WFL  Orientation:    alert and oriented x 4  Command Following:   [facility]    Education  Barriers To Learning: none  Patient Education: patient educated on goals, PT role and benefits, plan of care, precautions, weight-bearing education, general safety, discharge recommendations  Learning Assessment:  patient verbalizes and demonstrates understanding    Assessment  Activity Tolerance: Good - Increased time due to cooperating with nursing to provide meds. Impairments Requiring Therapeutic Intervention: decreased functional mobility, decreased ADL status, decreased ROM, decreased strength, decreased endurance, decreased balance, increased pain  Prognosis: excellent  Clinical Assessment: Pt presents as below her baseline function and would benefit from skilled PT services to promote safe return to PLOF. Pt performs ambulation and transfers with RW SBA. PT familiar with exercise program and verbally understood any education. Safety Interventions: chair alarm in place, call light within reach, gait belt, patient at risk for falls, and nurse notified; Pt in PACU and in process of being transported to  room at end of session    Plan  Frequency: 7 x/week BID tx  Current Treatment Recommendations: strengthening, ROM, balance training, functional mobility training, transfer training, gait training, stair training, endurance training, patient/caregiver education, home exercise program, safety education, and equipment evaluation/education    Goals  Patient Goals:  To return home   Short Term Goals:  Time Frame: Prior to D/C  Patient will complete bed mobility at [facility] independent   Patient will complete transfers at [facility] independent   Patient will ambulate 100 ft with use of rolling walker at modified independent  Patient will complete car transfer at supervision    Therapy Session Time      Individual Group Co-treatment   Time In 0856       Time Out 1006       Minutes 70         Timed Code Treatment Minutes:  70 Minutes  Total Treatment Minutes:  70       Electronically Signed By: Radha Christian PTA

## 2023-03-01 NOTE — PROGRESS NOTES
[facility] Orthopedic Surgery   Progress Note    CHIEF COMPLAINT/DIAGNOSIS: S/p left Total Knee Arthroplasty    SUBJECTIVE: The patient is sitting up in the bed and was seen again ambulating the halls with therapy. She had some concerns regarding her resumption of anticoagulation. We discussed this at length and she agreed to half dose today and tomorrow and full dose on Friday. Wants to take her magnesium which she takes twice daily at home. Denies new issues. She will be staying with a friend [place]. Has a walker. Wants Rx filled in house. OBJECTIVE  Physical    VITALS:  /61   Pulse 75   Temp 98.3 °F (36.8 °C) (Oral)   Resp 19   Ht 5' 4\" (1.626 m)   Wt 250 lb 7.8 oz (113.6 kg)   SpO2 91%   BMI 43.00 kg/m²     GENERAL: Alert and oriented x3, in no acute distress. MUSCULOSKELETAL: Able to dorsi and plantarflex the ankle without issue. INCISION:  Covered with post-op dressing is c/d/I.  ROM: left knee ROM deferred. Sensory:  Intact to light touch in peroneal and tibial distributions. Vascular:   2+ DP pulses with brisk cap refill;  calf soft and nontender    Data    ALL MEDICATIONS HAVE BEEN REVIEWED    CBC:   Recent Labs     03/01/23  0543   WBC 9.1   HGB 10.2*   HCT 31.5*        BMP:   Recent Labs     03/01/23  0543      K 4.6      CO2 22   BUN 19   CREATININE 0.8     INR:   Recent Labs     03/01/23  0543   INR 1.17*       Post-op films show stable total knee arthroplasty construct without acute complication. ASSESSMENT:  S/p left Total Knee Arthroplasty (2/28/23), POD#1  HUSSAIN on CPAP  PAF on Xarelto  DM II  Asthma  Depression  Chronic diastolic HF  HLD  Acute blood loss anemia as expected    PLAN:   - WB status:  WBAT; reviewed post op precautions  - DVT prophylaxis: Resume Xarelto 10 mg tomorrow x 2 days then normal 20mg daily on Friday  - PT/OT  - Pain Control: Current regimen.   Due to orthopaedic surgical procedure/condition, patient may require pain medication for up to 6-8 weeks.   - Acute blood loss anemia as expected:10.2/31.5  - ID:  Cleocin x 2 post-op then 7 days oralprophylaxis  - Dispo: home with home PT today    Follow-up with Dr. Pamela Chao as scheduled on 3/17.  [phone]  Future Appointments   Date Time Provider Abhishek Jojo   3/17/2023 10:45 AM Tony Bumpers, MD FF Ortho MMA   3/20/2023 11:15 AM SCHEDULE, [place] REMOTE TRANSMISSION FF Cardio MMA   4/24/2023 11:15 AM SCHEDULE, [place] REMOTE TRANSMISSION FF Cardio MMA   5/10/2023 11:00 AM MD KANU Cobb [facility] [place] - [facility]   5/10/2023  1:30 PM SCHEDULE, [place] DEVICE CHECK FF Cardio MMA   5/10/2023  1:45 PM Josh Molina MD FF Cardio MMA   5/15/2023  1:30 PM MD KANU Cobb [facility] [place] - [facility]   6/1/2023 12:00 PM SCHEDULE, [place] REMOTE TRANSMISSION FF Cardio MMA   6/12/2023  1:00 PM BRICE Berg FF SLEEP MED MMA   7/3/2023 11:15 AM SCHEDULE, [facility] TRANSMISSION FF Cardio MMA   7/21/2023 10:30 AM ECHO ROOM 1 [facility] ECHO [facility]   7/21/2023 11:30 AM Hortencia Fierro MD FF Cardio MMA   8/7/2023 11:15 AM SCHEDULE, [place] REMOTE TRANSMISSION FF Cardio MMA   9/11/2023 11:15 AM SCHEDULE, [place] REMOTE TRANSMISSION FF Cardio MMA   10/16/2023 11:15 AM SCHEDULE, [place] REMOTE TRANSMISSION FF Cardio MMA   11/17/2023  1:00 PM MD KANU Cobb [facility] [place] - [facility]   11/20/2023 11:15 AM SCHEDULE, [place] REMOTE TRANSMISSION FF Cardio MMA       BRICE Burris - CNP  3/1/2023  7:13 AM

## 2023-03-01 NOTE — CARE COORDINATION
Discharge Planning Note:    Chart reviewed and it appears that patient has minimal needs for discharge at this time. Discussed with patient and requested that case management be notified if discharge needs are identified.     - Current discharge plan is for the patient to return home. - [facility] Elsie Avendano will be following the patient upon discharge. - Patient states she has a walker. Case management will continue to follow progress and update discharge plan as needed.       Risk of Readmission Score: 12%    ARNOL Devlin RN    [facility]  Phone: [phone]

## 2023-03-01 NOTE — PROGRESS NOTES
[facility] [address] Department   Phone: [phone]    Occupational Therapy    [] Initial Evaluation            [x] Daily Treatment Note         [] Discharge Summary      Patient: Diamond Ma   : 1950   MRN: [id number]   Date of Service:  3/1/2023    Admitting Diagnosis:  s/p L TKA  Current Admission Summary: s/p L TKA 23  Past Medical History:  has a past medical history of Allergic, Anesthesia complication, Anxiety, Arthritis, Arthritis of left hip, Arthritis of right hip, Asthma, At risk for falls, Atrial fibrillation with rapid ventricular response ([address] Atrial flutter ([address] Chronic maxillary sinusitis, CTEPH (chronic thromboembolic pulmonary hypertension) ([address] Depression, Diabetes mellitus ([address] Disc disease, degenerative, lumbar or lumbosacral, Hepatic steatosis, History of total hip arthroplasty, Hypertension, Leg cramps, Migraines, basilar, Mild persistent asthma without complication, Moderate asthma with acute exacerbation, Obesity, Class III, BMI 40-49.9 (morbid obesity) ([address] HUSSAIN (obstructive sleep apnea), Osteoarthritis, hip, bilateral, Panic attacks, Pneumonia, Primary osteoarthritis of both knees, Primary osteoarthritis of left knee, Pulmonary emboli ([address] Pulmonary HTN ([address] Pure hypercholesterolemia, Restless leg syndrome, Rhinitis, chronic, Sleep apnea, Stress incontinence, Tear of left rotator cuff, Thyroid disease, and Wears glasses. Past Surgical History:  has a past surgical history that includes Cataract removal (Bilateral); Finger surgery (); eye surgery (Right, ); back surgery (); Tonsillectomy (); Hysterectomy (); Colonoscopy; joint replacement (Left, 16); Hip Arthroplasty (Right, 16); Atrial ablation surgery; Total knee arthroplasty (Right, 3/16/2021); and Total knee arthroplasty (Left, 2023). Discharge Recommendations: Diamond Ma scored a 19/ on the -[facility] ADL Inpatient form.  Current research shows that an AM-PAC score of 18 or greater is typically associated with a discharge to the patient's home setting. Based on the patient's AM-PAC score, and their current ADL deficits, it is recommended that the patient have 2-3 sessions per week of Occupational Therapy at d/c to increase the patient's independence. At this time, this patient demonstrates the endurance and safety to discharge home with home services (home vs OP services) and a follow up treatment frequency of 2-3x/wk. Please see assessment section for further patient specific details. If patient discharges prior to next session this note will serve as a discharge summary. Please see below for the latest assessment towards goals.      HOME HEALTH CARE: LEVEL 1 STANDARD    - Initial home health evaluation to occur within 24-48 hours, in patient home   - Therapy to evaluate with goal of regaining prior level of functioning   - Therapy to evaluate if patient has [address] needs for personal care      DME Required For Discharge: patient has all required DME for discharge    Precautions/Restrictions: high fall risk  Weight Bearing Restrictions: weight bearing as tolerated  [] Right Upper Extremity  [] Left Upper Extremity [] Right Lower Extremity  [x] Left Lower Extremity       Pre-Admission Information   Lives With:  Plan's to d/c to friend's house     Type of Home: house  Home Layout: one level  Home Access: level entry  [facility]: walk in shower  Bathroom Equipment: grab bars in shower, grab bars around toilet, shower chair, hand held shower head  Toilet Height: elevated height  Home Equipment: rolling walker, single point cane, reacher, sock aide, long handled shoe horn, transport chair  Transfer Assistance: modified independent with use of SPC  Ambulation Assistance:modified independent with use of SPC  ADL Assistance: independent with all ADL's  IADL Assistance: independent with homemaking tasks  Active :        [x] Yes  [] No  Hand Dominance: [] Left  [] Right  Current Employment: retired. Occupation: Research and   Hobbies:   Recent Falls: Pt denies recent falls    Examination   Vision:   Vision Gross Assessment: Impaired and Vision Corrective Device: wears glasses for reading  Hearing:   [facility]    Subjective  General: Pt received seated in bedside chair, agreeable to OT. Pain: 0/10  Pain Interventions: pain medication in place prior to arrival        Activities of Daily Living  Basic Activities of Daily Living  Grooming: stand by assistance Comment: hand hygiene in stance at sink  Upper Extremity Dressing: setup assistance  Lower Extremity Dressing: contact guard assistance minimal assistance  Toileting: supervision stand by assistance. Pt required Min assist threading L LE through underwear and shorts  Instrumental Activities of Daily Living  No IADL completed on this date. Functional Mobility  Bed Mobility  Bed mobility not completed on this date. Comments:  Transfers  Sit to stand transfer:stand by assistance  Stand to sit transfer: stand by assistance  Stand step transfer: contact guard assistance  Toilet transfer: contact guard assistance  Toilet transfer equipment: grab bars on (L)  Comments:  Functional Mobility:  Standing Balance: stand by assistance.     Functional Mobility: .  stand by assistance, contact guard assistance  Functional Mobility Activity: to/from bathroom  Functional Mobility Device Use: rolling walker    Other Therapeutic Interventions    Functional Outcomes  AM-PAC Inpatient Daily Activity Raw Score: 19    Cognition  WFL  Orientation:    alert and oriented x 4  Command Following:   [facility]     Education  Barriers To Learning: none  Patient Education: patient educated on goals, OT role and benefits, plan of care, ADL adaptive strategies, weight-bearing education, transfer training, discharge recommendations  Learning Assessment:  patient verbalizes understanding, would benefit from continued reinforcement    Assessment  Activity Tolerance: Tolerated well  Impairments Requiring Therapeutic Intervention: decreased functional mobility, decreased ADL status, decreased strength, decreased endurance, decreased balance, decreased IADL  Prognosis: good  Clinical Assessment: The patient is a 81 y.o. female who presents below their baseline level of function due to above deficits, associated with s/p L TKA. Typically, pt is mod I for mobility with SPC and independent for ADL. Currently, pt is CGA for ambulation/transfers and mod A for toileting.  Continued OT indicated in order to promote return to PLOF    Safety Interventions: patient left in bed, call light within reach, gait belt, nurse notified, and in PACU stretcher    Plan  Frequency: 7 x/week  Current Treatment Recommendations: strengthening, balance training, functional mobility training, transfer training, endurance training, patient/caregiver education, ADL/self-care training, IADL training, and equipment evaluation/education    Goals    Short Term Goals:  Time Frame: by discharge  Patient will complete upper body ADL at modified independent   Patient will complete lower body ADL at modified independent   Patient will complete toileting at modified independent   Patient will complete functional transfers at modified independent   Patient will complete functional mobility at modified independent   Progressing toward goals 3/1  Therapy Session Time     Individual Group Co-treatment   Time In 1326     Time Out 1402     Minutes 36          Timed Code Treatment Minutes:   36minutes  Total Treatment Minutes:  36       Electronically Signed By: Mitzi Rowland, [address] [facility], [address] [facility]

## 2023-03-01 NOTE — PROGRESS NOTES
Incentive Spirometry education and demonstration completed by Respiratory Therapy Yes      Response to education: Very Good     Teaching Time: 5 minutes    Minimum Predicted Vital Capacity - 547 mL. Patient's Actual Vital Capacity - 1000 mL. Turning over to Nursing for routine follow-up Yes.     Comments:     Electronically signed by Brenna May RCP on 3/1/2023 at 11:31 AM

## 2023-03-01 NOTE — PLAN OF CARE
Problem: Chronic Conditions and Co-morbidities  Goal: Patient's chronic conditions and co-morbidity symptoms are monitored and maintained or improved  3/1/2023 1213 by Drew Salgado RN  Outcome: Progressing  3/1/2023 0136 by Homa Tai RN  Outcome: Progressing     Problem: Discharge Planning  Goal: Discharge to home or other facility with appropriate resources  3/1/2023 1213 by Drew Salgado RN  Outcome: Progressing  3/1/2023 0136 by Homa Tai RN  Outcome: Progressing     Problem: Pain  Goal: Verbalizes/displays adequate comfort level or baseline comfort level  3/1/2023 1213 by Drew Salgado RN  Outcome: Progressing  3/1/2023 0136 by Homa Tai RN  Outcome: Progressing  Flowsheets (Taken 2/28/2023 1730 by Kenya Bush RN)  Verbalizes/displays adequate comfort level or baseline comfort level:   Encourage patient to monitor pain and request assistance   Assess pain using appropriate pain scale   Administer analgesics based on type and severity of pain and evaluate response

## 2023-03-01 NOTE — DISCHARGE SUMMARY
Patient ID:  Olga Lidia Robledo  [id number]  1950    Admit date: 2/28/2023    Discharge date: 2/28/23    Attending Physician: Tessa Mak MD     Admission Diagnoses:   Arthritis of left knee [M17.12]  S/P TKR (total knee replacement) using cement, left [Z96.652]    Discharge Diagnoses:   Principal Problem:    S/P TKR (total knee replacement) using cement, left  Resolved Problems:    * No resolved hospital problems.  *    Past Medical History:   Diagnosis Date    Allergic     Anesthesia complication     family hx-father-at at age 80 having hip replacement revision surgery-had tia's x2 while under anes-but also had hx chf-had dificuly with speech when coming out from anes    Anxiety     Arthritis     left ankle, bilateral hands    Arthritis of left hip 2/2/2016    Arthritis of right hip 4/19/2016    Asthma     At risk for falls     uses a cane    Atrial fibrillation with rapid ventricular response (HCC)     Atrial flutter ([address] 4/25/2018    Chronic maxillary sinusitis 5/24/2017    CTEPH (chronic thromboembolic pulmonary hypertension) ([address] 8/26/2016    Depression     pt stated r/t bad marriage/alcoholic spouse    Diabetes mellitus ([address]     borderline    Disc disease, degenerative, lumbar or lumbosacral 2/20/2017    Hepatic steatosis 4/26/2019    History of total hip arthroplasty 2/28/2017    Hypertension     Leg cramps     patient states very severe, requires immediate potassium administration    Migraines, basilar     last migraine 10 years ago    Mild persistent asthma without complication 2/22/7792    Moderate asthma with acute exacerbation 10/25/2022    Obesity, Class III, BMI 40-49.9 (morbid obesity) ([address] 4/19/2016    HUSSAIN (obstructive sleep apnea) 10/14/2013    Osteoarthritis, hip, bilateral 2016    Bilateral hip arthroplasty    Panic attacks     Pneumonia 2015    Primary osteoarthritis of both knees 9/19/2017    Primary osteoarthritis of left knee 12/15/2016    Pulmonary emboli ([address] 8/4/2016    Pulmonary HTN ([facility] 7/21/2016    Pure hypercholesterolemia 2/13/2019    Restless leg syndrome     Rhinitis, chronic 3/26/2014    Sleep apnea     wears CPAP @11    Stress incontinence     Tear of left rotator cuff 1/23/2018    Thyroid disease     hypothyroid    Wears glasses        Indication for Admission: Rosalind Means is a 81 y.o. female who presented with left knee osteoarthritis that was not responsive to conservative measures. she carries a history of HUSSAIN on CPAP, PAF on Xarelto, Hx PE, DM II, Asthma, Depression, Chronic diastolic HF, HLD. Operations/Procedures Performed:   1. left total knee arthroplasty    Hospital Course: Patient admitted on 2/28/2023 and underwent abovementioned procedure(s) on 2/28/23. Tolerated the procedure well with no complications. Please see full operative report for further details regarding the operation. Postoperatively transferred to the floor in stable condition. Pain controlled post-op with IV/oral pain medication. Diet was advanced and tolerated this well. At time of discharge, the patient was tolerating oral food and hydration, voiding spontaneously, had return of bowel function, was ambulating without difficulty, and pain was controlled on oral medications. The patient was determined to be suitable for discharge and the patient felt comfortable with that decision.      Consults: Physical and Occupational Therapy, Social Work    Significant Diagnostic Studies:   Stable post-op films    Discharge Exam:  /67   Pulse 84   Temp 97.5 °F (36.4 °C) (Axillary)   Resp 16   Ht 5' 4\" (1.626 m)   Wt 250 lb 7.8 oz (113.6 kg)   SpO2 96%   BMI 43.00 kg/m²     Gen - NAD, AOx3   - voiding spontaneously  Ext - ROM 0-85  Operative leg NVI  Discharge condition:  Stable    Discharge Medications:  ALL MEDICATIONS HAVE BEEN REVIEWED:  Current Discharge Medication List        START taking these medications    Details   oxyCODONE (ROXICODONE) 5 MG immediate release tablet Take 1 tablet by mouth every 4 hours as needed for Pain for up to 7 days. Intended supply: 7 days. Take lowest dose possible to manage pain Max Daily Amount: 30 mg  Qty: 42 tablet, Refills: 0    Comments: Reduce doses taken as pain becomes manageable  Associated Diagnoses: S/P TKR (total knee replacement) using cement, left      acetaminophen (TYLENOL) 500 MG tablet Take 2 tablets by mouth in the morning, at noon, and at bedtime for 14 days  Qty: 84 tablet, Refills: 0      meloxicam (MOBIC) 15 MG tablet Take 1 tablet by mouth daily for 14 days  Qty: 14 tablet, Refills: 0      senna (SENOKOT) 8.6 MG tablet Take 1 tablet by mouth 2 times daily for 14 days  Qty: 28 tablet, Refills: 0      clindamycin (CLEOCIN) 300 MG capsule Take 1 capsule by mouth 2 times daily for 7 days  Qty: 14 capsule, Refills: 0           CONTINUE these medications which have CHANGED    Details   !! rivaroxaban (XARELTO) 10 MG TABS tablet Take 1 tablet by mouth daily (with breakfast) for 1 day  Qty: 1 tablet, Refills: 0       !! - Potential duplicate medications found. Please discuss with provider. CONTINUE these medications which have NOT CHANGED    Details   lidocaine 4 % external patch Place 1 patch onto the skin daily      lisinopril (PRINIVIL;ZESTRIL) 10 MG tablet TAKE ONE TABLET BY MOUTH ONCE NIGHTLY  Qty: 90 tablet, Refills: 3      Magnesium Citrate 100 MG TABS Take 1 tablet by mouth daily  Qty: 90 tablet, Refills: 3    Associated Diagnoses: Atrial fibrillation with RVR ([address] Chest pain, unspecified type; Benign essential HTN; Non morbid obesity, unspecified obesity type;  Other pulmonary embolism without acute cor pulmonale, unspecified chronicity (HCC)      potassium chloride (KLOR-CON M) 10 MEQ extended release tablet Take 1 tablet by mouth daily  Qty: 90 tablet, Refills: 3      spironolactone (ALDACTONE) 25 MG tablet TAKE ONE TABLET BY MOUTH DAILY  Qty: 90 tablet, Refills: 3      furosemide (LASIX) 40 MG tablet Take 1 tablet by mouth daily  Qty: 90 tablet, Refills: 3    Associated Diagnoses: Benign essential HTN      dilTIAZem (CARDIZEM) 60 MG tablet Take 1 tablet by mouth 2 times daily as needed (Take at onset of atrial fibrillation. If your heart rate remains elevated after 1-2 hours, then take a 2nd dose. If you remain in atrial fibrillation after the 2nd dose, call our office)  Qty: 60 tablet, Refills: 2      dilTIAZem (CARDIZEM CD) 120 MG extended release capsule Take 1 capsule by mouth daily  Qty: 90 capsule, Refills: 3      pramipexole (MIRAPEX) 0.5 MG tablet 1.5 tab q 5 PM and 1.5 tab qHS  Qty: 270 tablet, Refills: 1    Associated Diagnoses: Restless leg syndrome      buPROPion (WELLBUTRIN XL) 300 MG extended release tablet Take 1 tablet by mouth every morning  Qty: 90 tablet, Refills: 0    Associated Diagnoses: Current moderate episode of major depressive disorder without prior episode ([facility]  Positive depression screening      zoster recombinant adjuvanted vaccine [facility]) 50 MCG/0.5ML SUSR injection Inject 0.5 mLs into the muscle See Admin Instructions 1 dose now and repeat in 2-6 months  Qty: 0.5 mL, Refills: 0    Associated Diagnoses: Need for shingles vaccine      tiotropium (SPIRIVA RESPIMAT) 1.25 MCG/ACT AERS inhaler Inhale 2 puffs into the lungs daily      !! rivaroxaban (XARELTO) 20 MG TABS tablet Take 1 tablet by mouth daily (with breakfast)  Qty: 42 tablet, Refills: 0      melatonin 10 MG CAPS capsule Take 10 mg by mouth nightly    Associated Diagnoses: Primary insomnia      Semaglutide,0.25 or 0.5MG/DOS, (OZEMPIC, 0.25 OR 0.5 MG/DOSE,) 2 MG/1.5ML SOPN Inject 0.5 mg into the skin once a week  Qty: 2 pen, Refills: 0      Ciclesonide (ALVESCO) 160 MCG/ACT AERS Inhale into the lungs      fluticasone (FLONASE) 50 MCG/ACT nasal spray 1 spray by Each Nostril route daily      azelastine (ASTELIN) 0.1 % nasal spray 1 spray by Nasal route 2 times daily 1 Spray in each nostril  Qty: 1 Bottle, Refills: 2    Associated Diagnoses: Seasonal allergic rhinitis due to pollen      Handicap Placard MISC by Does not apply route Exp 5 years  Qty: 1 each, Refills: 0      Multiple Vitamins-Minerals (THERAPEUTIC MULTIVITAMIN-MINERALS) tablet Take 1 tablet by mouth daily       CPAP Machine MISC by Does not apply route      EPIPEN 2-MIR 0.3 MG/0.3ML SOAJ injection        !! - Potential duplicate medications found. Please discuss with provider. Due to patient's orthopaedic surgical procedure/condition, patient may require pain medication for up to 6-8 weeks. Disposition: home with home PT    Patient Instructions: Activity: Ambulate with assistive device every hour while awake. Exercises per PT. Wound Care: Keep outer dressing in place x 10 days then remove and leave steri-strips in place until follow-up appt.   Anticoagulation:  Xarelto 10mg today and tomorrow then resume normal 20mg dose Friday  Cleocin x 7 days for prophylaxis      Follow-Up:  Future Appointments   Date Time Provider Abhishek Uribe   3/17/2023 10:45 AM Russ Pisano MD FF Ortho MMA   3/20/2023 11:15 AM SCHEDULE, [place] REMOTE TRANSMISSION FF Cardio MMA   4/24/2023 11:15 AM SCHEDULE, [facility] TRANSMISSION FF Cardio MMA   5/10/2023 11:00 AM MD KANU Caceres IM [place] - D[facility]D   5/10/2023  1:30 PM SCHEDULE, [place] DEVICE CHECK FF Cardio MMA   5/10/2023  1:45 PM Cici Sneed MD FF Cardio MMA   5/15/2023  1:30 PM MD KANU Caceres IM [place] - DYD   6/1/2023 12:00 PM SCHEDULE, [place] REMOTE TRANSMISSION FF Cardio MMA   6/12/2023  1:00 PM BRICE Meyer FF SLEEP MED MMA   7/3/2023 11:15 AM SCHEDULE, [facility] TRANSMISSION FF Cardio MMA   7/21/2023 10:30 AM ECHO ROOM 1 [facility] ECHO [facility]   7/21/2023 11:30 AM eJniffer Acosta MD FF Cardio MMA   8/7/2023 11:15 AM SCHEDULE, [place] REMOTE TRANSMISSION FF Cardio MMA   9/11/2023 11:15 AM SCHEDULE, [facility] TRANSMISSION FF Cardio MMA   10/16/2023 11:15 AM SCHEDULE, [facility] TRANSMISSION FF Cardio [facility]   11/17/2023  1:00 PM MD KANU Duran [place] - DYAMANDA   11/20/2023 11:15 AM SCHEDULE, [place] REMOTE TRANSMISSION FF Cardio [facility]       BRICE Barrera - CNP  3/1/2023  10:13 AM    CC: PCP

## 2023-03-01 NOTE — CARE COORDINATION
03/01/23 0818   IMM Letter   Observation Status Letter date given: 03/01/23   Observation Status Letter time given: 0818   Observation Status Letter given to Patient/Family/Significant other/Guardian/POA/by: Patient       Copy given to the patient.     GILLIAN AguilarN RN    [facility]  Phone: [phone]

## 2023-03-01 NOTE — PROGRESS NOTES
Shift assessment complete, alert and oriented X4, VSS. Patient requested for Protime, expressed concern about getting blood clots because she is not taking Xarelto. Ambulated to bathroom, voiding, tolerated well, ice machine applied to left knee, incision CDI, SCD pump in use. All evening medications administered. The care plan and education has been reviewed and mutually agreed upon with the patient.

## 2023-03-01 NOTE — PROGRESS NOTES
CLINICAL PHARMACY NOTE: MEDS TO BEDS    Total # of Prescriptions Filled: 6   The following medications were delivered to the patient:  Acetaminophen  Senna  Meloxicam  Oxycodone  Clindamycin  xarelto    Additional Documentation:  Patient picked up

## 2023-03-01 NOTE — CARE COORDINATION
03/01/23 0813   Service Assessment   Patient Orientation Alert and Oriented   Cognition Alert   History Provided By Patient   Primary Caregiver Self   Support Systems Friends/Neighbors; Family Members;[employer] [place] [facility]   PCP Verified by CM Yes   Last Visit to PCP Within last 3 months   Prior Functional Level Independent in ADLs/IADLs   Current Functional Level Assistance with the following:;Mobility   Can patient return to prior living arrangement Yes   Ability to make needs known: Good   Family able to assist with home care needs: Yes   Social/Functional History   Lives With [facility]   Discharge Planning   Living Arrangements Friends     ARNOL Diez RN    [facility]  Phone: [phone]

## 2023-03-01 NOTE — PROGRESS NOTES
0845 Pt vitals done,assessment completed,pt alert and oriented. All meds given. Pt currently in bed with call light within reach,and bed alarm on.

## 2023-03-01 NOTE — PLAN OF CARE
Problem: Chronic Conditions and Co-morbidities  Goal: Patient's chronic conditions and co-morbidity symptoms are monitored and maintained or improved  Outcome: Progressing     Problem: Discharge Planning  Goal: Discharge to home or other facility with appropriate resources  Outcome: Progressing     Problem: Pain  Goal: Verbalizes/displays adequate comfort level or baseline comfort level  Outcome: Progressing  Flowsheets (Taken 2/28/2023 1730 by Doreen Curtis RN)  Verbalizes/displays adequate comfort level or baseline comfort level:   Encourage patient to monitor pain and request assistance   Assess pain using appropriate pain scale   Administer analgesics based on type and severity of pain and evaluate response

## 2023-03-01 NOTE — CARE COORDINATION
0610 [facility] home care referral. Spoke with pt and re: home care plan of care/services. Agreeable. Demographic's verified. Will follow for home care.

## 2023-03-02 ENCOUNTER — TELEPHONE (OUTPATIENT)
Dept: CARDIOLOGY CLINIC | Age: 73
End: 2023-03-02

## 2023-03-02 NOTE — TELEPHONE ENCOUNTER
Apparently taking diltiazem and xarelto together can increase the blood level of xarelto possibly causing bleeding. When you take the xarelto doesn't matter as long as it is different from diltiazem. Hope this helps.   ERICKA

## 2023-03-02 NOTE — TELEPHONE ENCOUNTER
Pt is confused about the time she should take xarelto. Pt states she has been off for a few days due to knee sx. States she took 10 mg this morning. Asking when should she take next dose. Also states she is taking diltiazem and read she should not take it at the same time as xarelto . Please call to discuss.

## 2023-03-06 ENCOUNTER — TELEPHONE (OUTPATIENT)
Dept: ORTHOPEDIC SURGERY | Age: 73
End: 2023-03-06

## 2023-03-06 ENCOUNTER — TELEPHONE (OUTPATIENT)
Dept: CARDIOLOGY CLINIC | Age: 73
End: 2023-03-06

## 2023-03-06 DIAGNOSIS — Z96.652 S/P TKR (TOTAL KNEE REPLACEMENT) USING CEMENT, LEFT: ICD-10-CM

## 2023-03-06 RX ORDER — OXYCODONE HYDROCHLORIDE 5 MG/1
5 TABLET ORAL EVERY 6 HOURS PRN
Qty: 28 TABLET | Refills: 0 | Status: SHIPPED | OUTPATIENT
Start: 2023-03-06 | End: 2023-03-13

## 2023-03-06 NOTE — TELEPHONE ENCOUNTER
Spoke with patient this afternoon regards to her blood pressure question. Patient states that she does feel a bit dizzy and her hands cold. As per Dr. Raul Liriano for patient to increase liquid-drinking more water and getting up slowly. Patient was notified and agreed and also stated that she will call her cardiologist to ask about her Lisinopril medication.

## 2023-03-06 NOTE — TELEPHONE ENCOUNTER
General Question     Subject: PATIENT IS WONDERING HOW LOW IS TOO LOW FOR HER BLOOD PRESSURE. 79 OVER 44 /91 OVER 42  Patient: Avila Cotter  Contact Number: [phone]

## 2023-03-06 NOTE — TELEPHONE ENCOUNTER
Pt states her BP is getting low. Today's numbers are 91/42 and 79/44. Pt is lightheaded and dizzy with a little SOB. States her BP has not gone over 100 at all. Pt last dose of Lisinopril was last night at 10PM taking 10mg per pt. Wants to know if she should hold this medicine due to Low BP. Please advise pt.

## 2023-03-07 ENCOUNTER — TELEPHONE (OUTPATIENT)
Dept: INTERNAL MEDICINE CLINIC | Age: 73
End: 2023-03-07

## 2023-03-07 NOTE — TELEPHONE ENCOUNTER
Patient had her surgery on 2/28 and started with a sore throat right afterwards. No fever, voice is hoarse. Patient is requesting a video visit as she isn't able to come into the office. Appointment has been scheduled.

## 2023-03-08 ENCOUNTER — TELEMEDICINE (OUTPATIENT)
Dept: INTERNAL MEDICINE CLINIC | Age: 73
End: 2023-03-08
Payer: MEDICARE

## 2023-03-08 DIAGNOSIS — J04.0 LARYNGITIS: Primary | ICD-10-CM

## 2023-03-08 PROCEDURE — 1090F PRES/ABSN URINE INCON ASSESS: CPT | Performed by: INTERNAL MEDICINE

## 2023-03-08 PROCEDURE — G8427 DOCREV CUR MEDS BY ELIG CLIN: HCPCS | Performed by: INTERNAL MEDICINE

## 2023-03-08 PROCEDURE — 99213 OFFICE O/P EST LOW 20 MIN: CPT | Performed by: INTERNAL MEDICINE

## 2023-03-08 PROCEDURE — 1123F ACP DISCUSS/DSCN MKR DOCD: CPT | Performed by: INTERNAL MEDICINE

## 2023-03-08 PROCEDURE — 3017F COLORECTAL CA SCREEN DOC REV: CPT | Performed by: INTERNAL MEDICINE

## 2023-03-08 PROCEDURE — G8399 PT W/DXA RESULTS DOCUMENT: HCPCS | Performed by: INTERNAL MEDICINE

## 2023-03-08 ASSESSMENT — PATIENT HEALTH QUESTIONNAIRE - PHQ9
SUM OF ALL RESPONSES TO PHQ QUESTIONS 1-9: 0
2. FEELING DOWN, DEPRESSED OR HOPELESS: 0
SUM OF ALL RESPONSES TO PHQ QUESTIONS 1-9: 0
SUM OF ALL RESPONSES TO PHQ QUESTIONS 1-9: 0
SUM OF ALL RESPONSES TO PHQ9 QUESTIONS 1 & 2: 0
SUM OF ALL RESPONSES TO PHQ QUESTIONS 1-9: 0
1. LITTLE INTEREST OR PLEASURE IN DOING THINGS: 0

## 2023-03-08 ASSESSMENT — ENCOUNTER SYMPTOMS
SORE THROAT: 1
VOICE CHANGE: 1
COUGH: 1

## 2023-03-08 NOTE — PROGRESS NOTES
3/8/2023      TELEHEALTH EVALUATION -- Audio/Visual (During SFIDG-59 public health emergency)    HPI:    Julia Weber (:  1950) has requested an audio/video evaluation for the following concern(s):    Hoarseness and cough. She recently completed a course of Clindamycin. Symptoms started after surgery for a knee replacement. Review of Systems   Constitutional:  Negative for chills and fever. HENT:  Positive for sore throat and voice change. Respiratory:  Positive for cough. Prior to Visit Medications    Medication Sig Taking? Authorizing Provider   oxyCODONE (ROXICODONE) 5 MG immediate release tablet Take 1 tablet by mouth every 6 hours as needed for Pain for up to 7 days. Intended supply: 7 days.  Take lowest dose possible to manage pain Max Daily Amount: 20 mg Yes Yesi Israel MD   acetaminophen (TYLENOL) 500 MG tablet Take 2 tablets by mouth in the morning, at noon, and at bedtime for 14 days Yes BRICE Garber CNP   meloxicam (MOBIC) 15 MG tablet Take 1 tablet by mouth daily for 14 days Yes BRICE Garber CNP   senna (SENOKOT) 8.6 MG tablet Take 1 tablet by mouth 2 times daily for 14 days Yes BRICE Garber CNP   clindamycin (CLEOCIN) 300 MG capsule Take 1 capsule by mouth 2 times daily for 7 days Yes BRICE Garber CNP   lidocaine 4 % external patch Place 1 patch onto the skin daily Yes Historical Provider, MD   lisinopril (PRINIVIL;ZESTRIL) 10 MG tablet TAKE ONE TABLET BY MOUTH ONCE NIGHTLY Yes Octavio Lynn MD   Magnesium Citrate 100 MG TABS Take 1 tablet by mouth daily Yes Octavio Lynn MD   potassium chloride (KLOR-CON M) 10 MEQ extended release tablet Take 1 tablet by mouth daily Yes Octavio Lynn MD   spironolactone (ALDACTONE) 25 MG tablet TAKE ONE TABLET BY MOUTH DAILY Yes Octavio Lynn MD   furosemide (LASIX) 40 MG tablet Take 1 tablet by mouth daily Yes Octavio Lynn MD   dilTIAZem (CARDIZEM) 60 MG tablet Take 1 tablet by mouth 2 times daily as needed (Take at onset of atrial fibrillation. If your heart rate remains elevated after 1-2 hours, then take a 2nd dose.  If you remain in atrial fibrillation after the 2nd dose, call our office) Yes BRICE Perez CNP   dilTIAZem (CARDIZEM CD) 120 MG extended release capsule Take 1 capsule by mouth daily Yes EVERETTE Vargas   pramipexole (MIRAPEX) 0.5 MG tablet 1.5 tab q 5 PM and 1.5 tab qHS  Patient taking differently: 0.75 tab q 5 PM and 0.75 tab qHS Yes BRICE Lomas   buPROPion (WELLBUTRIN XL) 300 MG extended release tablet Take 1 tablet by mouth every morning Yes Trudi Joe MD   zoster recombinant adjuvanted vaccine [facility]) 50 MCG/0.5ML SUSR injection Inject 0.5 mLs into the muscle See Admin Instructions 1 dose now and repeat in 2-6 months Yes Trudi Ramsay MD   tiotropium (SPIRIVA RESPIMAT) 1.25 MCG/ACT AERS inhaler Inhale 2 puffs into the lungs daily Yes Historical Provider, MD   rivaroxaban (XARELTO) 20 MG TABS tablet Take 1 tablet by mouth daily (with breakfast) Yes Trudi Ramsay MD   melatonin 10 MG CAPS capsule Take 10 mg by mouth nightly Yes Historical Provider, MD   Semaglutide,0.25 or 0.5MG/DOS, (OZEMPIC, 0.25 OR 0.5 MG/DOSE,) 2 MG/1.5ML SOPN Inject 0.5 mg into the skin once a week Yes Trudi Ramsay MD   Ciclesonide (ALVESCO) 160 MCG/ACT AERS Inhale into the lungs Yes Historical Provider, MD   fluticasone (FLONASE) 50 MCG/ACT nasal spray 1 spray by Each Nostril route daily Yes Historical Provider, MD   azelastine (ASTELIN) 0.1 % nasal spray 1 spray by Nasal route 2 times daily 1 Spray in each nostril Yes Trudi Ramsay MD   Handicap Placard MISC by Does not apply route Exp 5 years Yes BRICE Juárez CNP   Multiple Vitamins-Minerals (THERAPEUTIC MULTIVITAMIN-MINERALS) tablet Take 1 tablet by mouth daily  Yes Historical Provider, MD   CPAP Machine MISC by Does not apply route Yes Historical Provider, MD   EPIPEN 2-MIR 0.3 MG/0.3ML SOAJ injection  Yes Historical Provider, MD   rivaroxaban (XARELTO) 10 MG TABS tablet Take 1 tablet by mouth daily (with breakfast) for 1 day  Arnie Multani, APRN - CNP       Social History     Tobacco Use    Smoking status: Former     Packs/day: 0.50     Years: 5.00     Pack years: 2.50     Types: Cigarettes     Quit date: 10/5/2013     Years since quittin.4    Smokeless tobacco: Never    Tobacco comments:     smoked for a total of 5yr total   Vaping Use    Vaping Use: Never used   Substance Use Topics    Alcohol use: Yes     Comment: RARE    Drug use: No          PHYSICAL EXAMINATION:  There were no vitals filed for this visit.    Physical Exam  Vitals reviewed.   Constitutional:       Appearance: Normal appearance.   HENT:      Head: Normocephalic and atraumatic.   Eyes:      Extraocular Movements: Extraocular movements intact.      Conjunctiva/sclera: Conjunctivae normal.      Pupils: Pupils are equal, round, and reactive to light.   Pulmonary:      Effort: Pulmonary effort is normal.   Neurological:      General: No focal deficit present.      Mental Status: She is alert and oriented to person, place, and time.      Cranial Nerves: No cranial nerve deficit.   Psychiatric:         Mood and Affect: Mood normal.         Behavior: Behavior normal.         Thought Content: Thought content normal.         Judgment: Judgment normal.       ASSESSMENT/PLAN:  Assessment/Plan:  Kira was seen today for pharyngitis and cough.    Diagnoses and all orders for this visit:    Laryngitis  Comments:  She just finished taking Clindamycin. I recommended a Zantac/Zyrtec combination for her symptoms.         No follow-ups on file.    Kira Alexander is a 72 y.o. female being evaluated by a Virtual Visit (video visit) encounter to address concerns as mentioned above.  A caregiver was present when appropriate. Due to this being a TeleHealth encounter (During COVID-19 public health emergency), evaluation of the following organ systems was limited:  Vitals/Constitutional/EENT/Resp/CV/GI//MS/Neuro/Skin/Heme-Lymph-Imm. Pursuant to the emergency declaration under the [address] [place] [address] and the [employer] Act, this Virtual Visit was conducted with patient's (and/or legal guardian's) consent, to reduce the patient's risk of exposure to COVID-19 and provide necessary medical care. The patient (and/or legal guardian) has also been advised to contact this office for worsening conditions or problems, and seek emergency medical treatment and/or call 911 if deemed necessary. Patient identification was verified at the start of the visit: Yes    Total time spent on this encounter: Not billed by time    Services were provided through a video synchronous discussion virtually to substitute for in-person clinic visit. Patient and provider were located at their individual homes. --Lilia Medina MD on 3/8/2023 at 10:48 AM    An electronic signature was used to authenticate this note.

## 2023-03-09 ENCOUNTER — TELEPHONE (OUTPATIENT)
Dept: ORTHOPEDIC SURGERY | Age: 73
End: 2023-03-09

## 2023-03-09 NOTE — TELEPHONE ENCOUNTER
Left a VM for Vic with [facility] with response about her question about the support hose. I relayed the message below from Dr. Akil Delgado    She can leave them on if she wants.  Most people want to take them off at night but leaving them on is fine

## 2023-03-10 ENCOUNTER — PATIENT MESSAGE (OUTPATIENT)
Dept: ORTHOPEDIC SURGERY | Age: 73
End: 2023-03-10

## 2023-03-10 ENCOUNTER — TELEPHONE (OUTPATIENT)
Dept: ORTHOPEDIC SURGERY | Age: 73
End: 2023-03-10

## 2023-03-10 DIAGNOSIS — M17.12 PRIMARY OSTEOARTHRITIS OF LEFT KNEE: ICD-10-CM

## 2023-03-10 RX ORDER — TRAMADOL HYDROCHLORIDE 50 MG/1
TABLET ORAL
Qty: 40 TABLET | Refills: 1 | Status: CANCELLED | OUTPATIENT
Start: 2023-03-10 | End: 2023-03-24

## 2023-03-10 NOTE — TELEPHONE ENCOUNTER
Spoke with patient via Phone and reiterated the plan Mign Sarmiento suggested. Patient will stop the oxy and continue with plain tylenol and start on on Benadryl every 4-6 hours. Patient understood. I did tell her I would send this to Dr. Isabel Castro team and someone will follow up today or Monday to see how she is doing. Patient will also need a refill of tramadol on Monday if she ends up taking it.      Refill should be sent to [facility] on [facility], [address] [place] [address] rd    [facility] [phone]

## 2023-03-11 ENCOUNTER — TELEPHONE (OUTPATIENT)
Dept: INTERNAL MEDICINE CLINIC | Age: 73
End: 2023-03-11

## 2023-03-11 RX ORDER — PREDNISONE 20 MG/1
20 TABLET ORAL DAILY
Qty: 5 TABLET | Refills: 0 | Status: SHIPPED | OUTPATIENT
Start: 2023-03-11 | End: 2023-03-16

## 2023-03-12 DIAGNOSIS — M17.12 PRIMARY OSTEOARTHRITIS OF LEFT KNEE: ICD-10-CM

## 2023-03-12 RX ORDER — TRAMADOL HYDROCHLORIDE 50 MG/1
50 TABLET ORAL EVERY 4 HOURS PRN
Qty: 42 TABLET | Refills: 0 | Status: SHIPPED | OUTPATIENT
Start: 2023-03-12 | End: 2023-03-19

## 2023-03-13 ENCOUNTER — TELEPHONE (OUTPATIENT)
Dept: ORTHOPEDIC SURGERY | Age: 73
End: 2023-03-13

## 2023-03-13 NOTE — TELEPHONE ENCOUNTER
I spoke the pt to check on her rash. Pt stated that the hives are getting better but she woke up with swollen lips this morning. She is taking the Benadryl but not getting much better. The pt called her PCP and they put her on Prednisone     Pt does not know what might be causing the reaction but would like to try stopping all medications as long as she can keep the pain under control    I spoke with Dr. Maribel Whelan who said it's ok for her to stop all medications from him except the Xarelto. HE wants her to continue taking that.  He also stated she can take both the Benadryl and Prednisone    I sent a [employer] message per pt request

## 2023-03-14 DIAGNOSIS — L29.9 CHRONIC PRURITUS: Primary | ICD-10-CM

## 2023-03-14 RX ORDER — HYDROXYZINE HYDROCHLORIDE 25 MG/1
25 TABLET, FILM COATED ORAL NIGHTLY
Qty: 30 TABLET | Refills: 0 | Status: SHIPPED | OUTPATIENT
Start: 2023-03-14 | End: 2023-04-13

## 2023-03-15 ENCOUNTER — TELEPHONE (OUTPATIENT)
Dept: ORTHOPEDIC SURGERY | Age: 73
End: 2023-03-15

## 2023-03-17 ENCOUNTER — OFFICE VISIT (OUTPATIENT)
Dept: ORTHOPEDIC SURGERY | Age: 73
End: 2023-03-17

## 2023-03-17 VITALS — WEIGHT: 250 LBS | BODY MASS INDEX: 42.68 KG/M2 | HEIGHT: 64 IN

## 2023-03-17 DIAGNOSIS — Z47.89 AFTERCARE FOLLOWING SURGERY OF THE MUSCULOSKELETAL SYSTEM: Primary | ICD-10-CM

## 2023-03-17 NOTE — PROGRESS NOTES
Patient: Javier Tabor                  : 1950   MRN: [id number]   Date of Visit: 3/17/23     Physician: Wilder Shukla MD.     Reason for Visit: Status post TKA     Subjective History of Present Illness:     Javier Tabor is here for regularly scheduled follow-up s/p LEFT TKA. Reports they are doing well, occasional aches and pains are controlled with over the counter medications. Taking opioid medications sparingly and continuing to wean. They do not report fevers, chills or drainage from the incision site    She had an allergic reaction to oxycodone, she is taking tramadol now. Physical Examination??: ?   General: Patient is alert and oriented x 3 and appears comfortable. Incision is well-approximated, no erythema, fluctuance   Able to perform SLR   ROM 5-100    SILT throughout LE     Radiographs: AP/lateral of the operative knee with well-positioned total knee arthroplasty. No evidence of fracture subluxation or dislocation. No evidence of migration or loosening. Assessment and Plan?: The patient is progressing well approximately 2 weeks s/p TKA     1. A thorough discussion was had with the patient concerning the ?postoperative course and the patient is in agreement with the plan. 2. They will continue to wean from pain medications and progress weight bearing    3. Discussed the need to avoid invasive procedures within 3 months of the operative procedure and the need for antibiotics prior to dental procedures at least for 1 years after arthroplasty. 4. Will see the patient in 4 weeks with repeat xrays at that time.   _______________________      Wilder Shukla MD

## 2023-03-20 ENCOUNTER — NURSE ONLY (OUTPATIENT)
Dept: CARDIOLOGY CLINIC | Age: 73
End: 2023-03-20
Payer: MEDICARE

## 2023-03-20 DIAGNOSIS — I48.0 PAROXYSMAL ATRIAL FIBRILLATION (HCC): Chronic | ICD-10-CM

## 2023-03-20 DIAGNOSIS — R00.1 SYMPTOMATIC BRADYCARDIA: ICD-10-CM

## 2023-03-20 DIAGNOSIS — Z45.09 ENCOUNTER FOR ELECTRONIC ANALYSIS OF REVEAL EVENT RECORDER: ICD-10-CM

## 2023-03-20 PROCEDURE — G2066 INTER DEVC REMOTE 30D: HCPCS | Performed by: INTERNAL MEDICINE

## 2023-03-20 PROCEDURE — 93298 REM INTERROG DEV EVAL SCRMS: CPT | Performed by: INTERNAL MEDICINE

## 2023-03-21 NOTE — PROGRESS NOTES
We received a remote transmission from patient's monitor at home. Remote Linq report shows no arrhythmia recordings. EP physician to review. We will continue to monitor remotely. Implanted for palpitations and AF management. Pt is on Xarelto. End of 31-day monitoring period 3-20-23.

## 2023-03-30 ENCOUNTER — HOSPITAL ENCOUNTER (OUTPATIENT)
Dept: PHYSICAL THERAPY | Age: 73
Setting detail: THERAPIES SERIES
Discharge: HOME | End: 2023-03-30

## 2023-03-30 NOTE — FLOWSHEET NOTE
[address]     Physical Therapy  Cancellation/No-show Note  Patient Name:  Sonido Alfaro  :  1950   Date:  3/30/2023  Cancelled visits to date: 1  No-shows to date: 0    Patient status for today's appointment patient:  [x]  Cancelled - 3/30  []  Rescheduled appointment  []  No-show     Reason given by patient:  [x]  Patient ill  []  Conflicting appointment  []  No transportation    []  Conflict with work  []  No reason given  []  Other:     Comments:      Phone call information:   []  Phone call made today to patient at _ time at number provided:      []  Patient answered, conversation as follows:    []  Patient did not answer, message left as follows:  []  Phone call not made today  [x]  Phone call not needed - pt contacted us to cancel and provided reason for cancellation.      Electronically signed by:  Colt August, PT, DPT

## 2023-04-17 ENCOUNTER — HOSPITAL ENCOUNTER (OUTPATIENT)
Dept: PHYSICAL THERAPY | Age: 73
Setting detail: THERAPIES SERIES
Discharge: HOME | End: 2023-04-17
Payer: MEDICARE

## 2023-04-17 ENCOUNTER — TELEPHONE (OUTPATIENT)
Dept: CARDIOLOGY CLINIC | Age: 73
End: 2023-04-17

## 2023-04-17 PROCEDURE — 97110 THERAPEUTIC EXERCISES: CPT

## 2023-04-17 PROCEDURE — 97112 NEUROMUSCULAR REEDUCATION: CPT

## 2023-04-17 PROCEDURE — 97530 THERAPEUTIC ACTIVITIES: CPT

## 2023-04-17 NOTE — TELEPHONE ENCOUNTER
Spoke to pt. States the swelling started 4-5 days ago. She states she has been wheezing. Pt states this is not normal for her. She had ham over Easter. She gained 8 pounds. She took Extra lasix 20mg for 2 days Saturday, Sunday. She took 60mg those days. She also knew to take extra potassium. Asked her to take another 20mg of Lasix now. ? Need for IV lasix. Appt made for her to see [facility] tomorrow and she was agreeable.

## 2023-04-17 NOTE — FLOWSHEET NOTE
for decreased fall risk and improved safety with community ambulation. .  [] Progressing: [] Met: [] Not Met: [] Adjusted    Overall Progression Towards Functional goals/ Treatment Progress Update:  [] Patient is progressing as expected towards functional goals listed. [] Progression is slowed due to complexities/Impairments listed. [] Progression has been slowed due to co-morbidities. [x] Plan just implemented, too soon to assess goals progression <30days   [] Goals require adjustment due to lack of progress  [] Patient is not progressing as expected and requires additional follow up with physician  [] Other    Persisting Functional Limitations/Impairments:  []Sitting []Standing   []Walking []Stairs   []Transfers []ADLs   []Squatting/bending []Kneeling  []Housework []Job related tasks  []Driving []Sports/Recreation   []Sleeping []Other:    ASSESSMENT:  Pt unable to tolerate supine laying due to pressure in chest. Vtials listed above WNL. Pt advised to call cardiologist to follow up after appt today. Intermittent L knee pain with strength and stretching. Treatment/Activity Tolerance:  [] Pt able to complete treatment [] Patient limited by fatique  [x] Patient limited by pain  [] Patient limited by other medical complications  [] Other:     Prognosis:  [x] Good [] Fair  [] Poor    Patient Requires Follow-up: [x] Yes  [] No    Return to Play:    [x]  N/A   []  Stage 1: Intro to Strength   []  Stage 2: Return to Run and Strength   []  Stage 3: Return to Jump and Strength   []  Stage 4: Dynamic Strength and Agility   []  Stage 5: Sport Specific Training     []  Ready to Return to Play, Meets All Above Stages   []  Not Ready for Return to Sports   Comments:            PLAN: See eval. PT 2x / week for 8 weeks. [x] Continue per plan of care [] Alter current plan (see comments)  [] Plan of care initiated [] Hold pending MD visit [] Discharge    Electronically signed by:  Emily White, PT, DPT ATC      Note: If

## 2023-04-17 NOTE — TELEPHONE ENCOUNTER
FYI:    Pt called in to make an appointment stating that she has swelling in both her legs all the way down to her feet, with Sob and extreme fatigue. Pt states top part of foot jas like its been blown up by air.     Pt is requesting a call back and she can be reached at [phone]

## 2023-04-18 ENCOUNTER — TELEPHONE (OUTPATIENT)
Dept: CARDIOLOGY CLINIC | Age: 73
End: 2023-04-18

## 2023-04-18 ENCOUNTER — OFFICE VISIT (OUTPATIENT)
Dept: CARDIOLOGY CLINIC | Age: 73
End: 2023-04-18
Payer: MEDICARE

## 2023-04-18 ENCOUNTER — HOSPITAL ENCOUNTER (OUTPATIENT)
Age: 73
Discharge: HOME | End: 2023-04-18
Payer: MEDICARE

## 2023-04-18 ENCOUNTER — HOSPITAL ENCOUNTER (OUTPATIENT)
Dept: ONCOLOGY | Age: 73
Setting detail: INFUSION SERIES
Discharge: HOME | End: 2023-04-18
Payer: MEDICARE

## 2023-04-18 VITALS
WEIGHT: 257 LBS | OXYGEN SATURATION: 98 % | HEIGHT: 64 IN | HEART RATE: 72 BPM | DIASTOLIC BLOOD PRESSURE: 70 MMHG | BODY MASS INDEX: 43.87 KG/M2 | SYSTOLIC BLOOD PRESSURE: 134 MMHG

## 2023-04-18 VITALS
RESPIRATION RATE: 16 BRPM | HEART RATE: 67 BPM | DIASTOLIC BLOOD PRESSURE: 51 MMHG | SYSTOLIC BLOOD PRESSURE: 146 MMHG | TEMPERATURE: 96.8 F

## 2023-04-18 DIAGNOSIS — R06.02 SOB (SHORTNESS OF BREATH): ICD-10-CM

## 2023-04-18 DIAGNOSIS — I10 BENIGN ESSENTIAL HTN: Chronic | ICD-10-CM

## 2023-04-18 DIAGNOSIS — I50.31 ACUTE DIASTOLIC HEART FAILURE (HCC): Primary | ICD-10-CM

## 2023-04-18 DIAGNOSIS — I48.0 PAROXYSMAL ATRIAL FIBRILLATION (HCC): Chronic | ICD-10-CM

## 2023-04-18 LAB
ANION GAP SERPL CALCULATED.3IONS-SCNC: 14 MMOL/L (ref 3–16)
BUN SERPL-MCNC: 21 MG/DL (ref 7–20)
CALCIUM SERPL-MCNC: 10 MG/DL (ref 8.3–10.6)
CHLORIDE SERPL-SCNC: 100 MMOL/L (ref 99–110)
CO2 SERPL-SCNC: 25 MMOL/L (ref 21–32)
CREAT SERPL-MCNC: 0.7 MG/DL (ref 0.6–1.2)
DEPRECATED RDW RBC AUTO: 14.7 % (ref 12.4–15.4)
FERRITIN SERPL IA-MCNC: 140.9 NG/ML (ref 15–150)
GFR SERPLBLD CREATININE-BSD FMLA CKD-EPI: >60 ML/MIN/{1.73_M2}
GLUCOSE SERPL-MCNC: 138 MG/DL (ref 70–99)
HCT VFR BLD AUTO: 35.8 % (ref 36–48)
HGB BLD-MCNC: 12.1 G/DL (ref 12–16)
MCH RBC QN AUTO: 31.5 PG (ref 26–34)
MCHC RBC AUTO-ENTMCNC: 33.8 G/DL (ref 31–36)
MCV RBC AUTO: 93.1 FL (ref 80–100)
NT-PROBNP SERPL-MCNC: 125 PG/ML (ref 0–124)
PLATELET # BLD AUTO: 276 K/UL (ref 135–450)
PMV BLD AUTO: 9 FL (ref 5–10.5)
POTASSIUM SERPL-SCNC: 4.7 MMOL/L (ref 3.5–5.1)
RBC # BLD AUTO: 3.84 M/UL (ref 4–5.2)
SODIUM SERPL-SCNC: 139 MMOL/L (ref 136–145)
WBC # BLD AUTO: 5.3 K/UL (ref 4–11)

## 2023-04-18 PROCEDURE — 85027 COMPLETE CBC AUTOMATED: CPT

## 2023-04-18 PROCEDURE — G8399 PT W/DXA RESULTS DOCUMENT: HCPCS | Performed by: NURSE PRACTITIONER

## 2023-04-18 PROCEDURE — 1090F PRES/ABSN URINE INCON ASSESS: CPT | Performed by: NURSE PRACTITIONER

## 2023-04-18 PROCEDURE — 3075F SYST BP GE 130 - 139MM HG: CPT | Performed by: NURSE PRACTITIONER

## 2023-04-18 PROCEDURE — 36415 COLL VENOUS BLD VENIPUNCTURE: CPT

## 2023-04-18 PROCEDURE — 99215 OFFICE O/P EST HI 40 MIN: CPT | Performed by: NURSE PRACTITIONER

## 2023-04-18 PROCEDURE — 3017F COLORECTAL CA SCREEN DOC REV: CPT | Performed by: NURSE PRACTITIONER

## 2023-04-18 PROCEDURE — 83880 ASSAY OF NATRIURETIC PEPTIDE: CPT

## 2023-04-18 PROCEDURE — 6360000002 HC RX W HCPCS: Performed by: NURSE PRACTITIONER

## 2023-04-18 PROCEDURE — 83540 ASSAY OF IRON: CPT

## 2023-04-18 PROCEDURE — 80048 BASIC METABOLIC PNL TOTAL CA: CPT

## 2023-04-18 PROCEDURE — 1036F TOBACCO NON-USER: CPT | Performed by: NURSE PRACTITIONER

## 2023-04-18 PROCEDURE — G8417 CALC BMI ABV UP PARAM F/U: HCPCS | Performed by: NURSE PRACTITIONER

## 2023-04-18 PROCEDURE — 1123F ACP DISCUSS/DSCN MKR DOCD: CPT | Performed by: NURSE PRACTITIONER

## 2023-04-18 PROCEDURE — 99211 OFF/OP EST MAY X REQ PHY/QHP: CPT

## 2023-04-18 PROCEDURE — 83550 IRON BINDING TEST: CPT

## 2023-04-18 PROCEDURE — G8427 DOCREV CUR MEDS BY ELIG CLIN: HCPCS | Performed by: NURSE PRACTITIONER

## 2023-04-18 PROCEDURE — 82728 ASSAY OF FERRITIN: CPT

## 2023-04-18 PROCEDURE — 96374 THER/PROPH/DIAG INJ IV PUSH: CPT

## 2023-04-18 PROCEDURE — 3078F DIAST BP <80 MM HG: CPT | Performed by: NURSE PRACTITIONER

## 2023-04-18 RX ORDER — FUROSEMIDE 10 MG/ML
120 INJECTION INTRAMUSCULAR; INTRAVENOUS ONCE
Status: COMPLETED | OUTPATIENT
Start: 2023-04-18 | End: 2023-04-18

## 2023-04-18 RX ADMIN — FUROSEMIDE 120 MG: 10 INJECTION, SOLUTION INTRAMUSCULAR; INTRAVENOUS at 11:39

## 2023-04-18 ASSESSMENT — ENCOUNTER SYMPTOMS
GASTROINTESTINAL NEGATIVE: 1
WHEEZING: 1
SHORTNESS OF BREATH: 1

## 2023-04-18 NOTE — PATIENT INSTRUCTIONS
Instructions:   Go to registration first for STAT labs  Go to outpatient surgery center for IV lasix  Medications: starting tomorrow continue lasix 60mg/day with estra potassium until your wt is back to baseline  Lifestyle Recommendations: Weigh yourself every day in the morning after urination, call Izabel Garcia if wt increases 2-3lb in one day or 5lb in one week, Limit sodium to 3000mg/day and fluids to 2L or 64oz/day.    Follow up: as scheduled      Rod: [phone]

## 2023-04-18 NOTE — DISCHARGE INSTRUCTIONS
furosemide (oral/injection)  Pronunciation:  madhav lazcano  Brand:  Lasix  What is the most important information I should know about furosemide? You should not use this medicine if you are unable to urinate. Do not take more than your recommended dose. High doses of furosemide may cause irreversible hearing loss. What is furosemide? Furosemide is a loop diuretic (water pill) that prevents your body from absorbing too much salt. This allows the salt to instead be passed in your urine. Furosemide is used to treat fluid retention (edema) in people with congestive heart failure, liver disease, or a kidney disorder such as nephrotic syndrome. Furosemide is also used to treat high blood pressure (hypertension). Furosemide may also be used for purposes not listed in this medication guide. What should I discuss with my healthcare provider before taking furosemide? You should not use furosemide if you are allergic to it, or if you are unable to urinate. Tell your doctor if you have ever had:  kidney disease;  enlarged prostate, bladder obstruction, urination problems;  cirrhosis or other liver disease;  an electrolyte imbalance (such as low levels of potassium or magnesium in your blood);  gout;  lupus;  diabetes; or  a sulfa drug allergy. Tell your doctor if you have an MRI (magnetic resonance imaging) or any type of scan using a radioactive dye that is injected into your veins. Both contrast dyes and furosemide can harm your kidneys. It is not known whether this medicine will harm an unborn baby. Tell your doctor if you are pregnant or plan to become pregnant. It may not be safe to breastfeed while using this medicine. Ask your doctor about any risk. Furosemide may slow breast milk production. How should I take furosemide? Follow all directions on your prescription label and read all medication guides or instruction sheets. Your doctor may occasionally change your dose.  Use the medicine exactly as

## 2023-04-18 NOTE — PROGRESS NOTES
care of this individual.    BRICE Cody - CNP, CNP, 4/18/2023,8:30 AM    QUALITY MEASURES  1. Tobacco Cessation Counseling: NA  2. Retake of BP if >140/90:   NA  3. Documentation to PCP/referring for new patient:  Sent to PCP at close of office visit  4. CAD patient on anti-platelet: NA  5. CAD patient on STATIN therapy:  NA  6. Patient with CHF and aFib on anticoagulation:  Yes   7. Patient Education:Yes   8. BB for LVSD :  NA   9. ACE/ARB for LVSD:  NA   10.  Left Ventricular Ejection Fraction (LVEF) Assessment:  Yes

## 2023-04-18 NOTE — PROGRESS NOTES
Pt to [place] from Cardiology office, then outpatient lab for IVP Lasix. VSS. Pt reports SBP 90s this am; 146 prior to Lasix administration. Pt report dizziness, SOB, numbness in feet. Pt s/p knee surgery. IV access obtained. Lasix administered. IV removed on completion. Pt to BR with cane to void. Pt developed muscle cramp in her right leg during ambulation. Banana requested; provided per request. K+ 4.7,  per labs. Pt educated on most common side effects of med, sources of potassium and protein and sodium in the diet. Pt advised to change positions slowly for the next 6 hours to reduce orthostatic hypotension risk. Pt advised to reduce fluid intake to 64 oz/day while fluid overloaded. Pt advised to weigh herself daily and notify Cardiology if her weight increase >/+ 2 pounds in 1 day or 5 pounds in 1 week. Pt v/u. AVS provided. Pt discharged home; escorted to car via wheelchair. Pt to f/u with Cardiology.

## 2023-04-18 NOTE — TELEPHONE ENCOUNTER
----- Message from BRICE Shi CNP sent at 4/18/2023 12:15 PM EDT -----  Potassium is normal, she can take an extra 40 this afternoon when she gets home.  Shad Belcher

## 2023-04-19 ENCOUNTER — TELEPHONE (OUTPATIENT)
Dept: CARDIOLOGY CLINIC | Age: 73
End: 2023-04-19

## 2023-04-19 ENCOUNTER — APPOINTMENT (OUTPATIENT)
Dept: PHYSICAL THERAPY | Age: 73
End: 2023-04-19
Payer: MEDICARE

## 2023-04-19 DIAGNOSIS — D50.9 IRON DEFICIENCY ANEMIA, UNSPECIFIED IRON DEFICIENCY ANEMIA TYPE: ICD-10-CM

## 2023-04-19 LAB
IRON SATN MFR SERPL: 15 % (ref 15–50)
IRON SERPL-MCNC: 63 UG/DL (ref 37–145)
TIBC SERPL-MCNC: 418 UG/DL (ref 260–445)

## 2023-04-19 RX ORDER — SODIUM CHLORIDE 9 MG/ML
5-250 INJECTION, SOLUTION INTRAVENOUS PRN
OUTPATIENT
Start: 2023-04-19

## 2023-04-19 RX ORDER — SODIUM CHLORIDE 0.9 % (FLUSH) 0.9 %
5-40 SYRINGE (ML) INJECTION PRN
OUTPATIENT
Start: 2023-04-19

## 2023-04-19 NOTE — TELEPHONE ENCOUNTER
Spoke to patient she is agreeable to the IV iron infusion. She has lost 6 pounds of fluid since the IV lasix.

## 2023-04-19 NOTE — TELEPHONE ENCOUNTER
----- Message from Sharyle Donna, APRN - CNP sent at 4/19/2023  8:41 AM EDT -----  Blood count is ok but her iron is low if she wants me to order infusions I can. How is she feeling today?  Ruth Moreno

## 2023-04-20 ENCOUNTER — PATIENT MESSAGE (OUTPATIENT)
Dept: INTERNAL MEDICINE CLINIC | Age: 73
End: 2023-04-20

## 2023-04-20 NOTE — TELEPHONE ENCOUNTER
Pt called states last time she had surgery, a hip Replacement, and was not felling well like this time it was found that she had Blood Clots in her Lungs. Pt is concerned this might be the case again. PT also has some questions about the infusion. Pt asking for a call back to discuss.

## 2023-04-20 NOTE — TELEPHONE ENCOUNTER
Spoke to patient per npkv blood clots unlikely since patient is on xaelto. Patient stated she will speak with ERICKA about getting testing to make sure. Patient is having fatigue and feeling tired all the time. Spoke to patient about the iron infusion process she verbalized understanding and will wait for their call to schedule.

## 2023-04-21 ENCOUNTER — OFFICE VISIT (OUTPATIENT)
Dept: CARDIOLOGY CLINIC | Age: 73
End: 2023-04-21
Payer: MEDICARE

## 2023-04-21 VITALS
HEIGHT: 64 IN | OXYGEN SATURATION: 96 % | DIASTOLIC BLOOD PRESSURE: 56 MMHG | HEART RATE: 83 BPM | SYSTOLIC BLOOD PRESSURE: 118 MMHG | BODY MASS INDEX: 43.43 KG/M2 | WEIGHT: 254.4 LBS

## 2023-04-21 DIAGNOSIS — I10 BENIGN ESSENTIAL HTN: ICD-10-CM

## 2023-04-21 DIAGNOSIS — E78.00 PURE HYPERCHOLESTEROLEMIA: ICD-10-CM

## 2023-04-21 DIAGNOSIS — I48.0 PAROXYSMAL ATRIAL FIBRILLATION (HCC): ICD-10-CM

## 2023-04-21 DIAGNOSIS — G47.33 OSA (OBSTRUCTIVE SLEEP APNEA): ICD-10-CM

## 2023-04-21 DIAGNOSIS — R06.02 SOB (SHORTNESS OF BREATH): ICD-10-CM

## 2023-04-21 DIAGNOSIS — I50.32 CHRONIC DIASTOLIC HEART FAILURE (HCC): Primary | ICD-10-CM

## 2023-04-21 PROCEDURE — G8427 DOCREV CUR MEDS BY ELIG CLIN: HCPCS | Performed by: INTERNAL MEDICINE

## 2023-04-21 PROCEDURE — 1090F PRES/ABSN URINE INCON ASSESS: CPT | Performed by: INTERNAL MEDICINE

## 2023-04-21 PROCEDURE — G8417 CALC BMI ABV UP PARAM F/U: HCPCS | Performed by: INTERNAL MEDICINE

## 2023-04-21 PROCEDURE — G8399 PT W/DXA RESULTS DOCUMENT: HCPCS | Performed by: INTERNAL MEDICINE

## 2023-04-21 PROCEDURE — 3074F SYST BP LT 130 MM HG: CPT | Performed by: INTERNAL MEDICINE

## 2023-04-21 PROCEDURE — 99215 OFFICE O/P EST HI 40 MIN: CPT | Performed by: INTERNAL MEDICINE

## 2023-04-21 PROCEDURE — 1036F TOBACCO NON-USER: CPT | Performed by: INTERNAL MEDICINE

## 2023-04-21 PROCEDURE — 1123F ACP DISCUSS/DSCN MKR DOCD: CPT | Performed by: INTERNAL MEDICINE

## 2023-04-21 PROCEDURE — 3017F COLORECTAL CA SCREEN DOC REV: CPT | Performed by: INTERNAL MEDICINE

## 2023-04-21 PROCEDURE — 3078F DIAST BP <80 MM HG: CPT | Performed by: INTERNAL MEDICINE

## 2023-04-21 RX ORDER — HYDROXYZINE HYDROCHLORIDE 25 MG/1
TABLET, FILM COATED ORAL
Qty: 30 TABLET | Refills: 1 | Status: SHIPPED | OUTPATIENT
Start: 2023-04-21

## 2023-04-24 ENCOUNTER — NURSE ONLY (OUTPATIENT)
Dept: CARDIOLOGY CLINIC | Age: 73
End: 2023-04-24
Payer: MEDICARE

## 2023-04-24 DIAGNOSIS — I48.0 PAROXYSMAL ATRIAL FIBRILLATION (HCC): Chronic | ICD-10-CM

## 2023-04-24 DIAGNOSIS — Z45.09 ENCOUNTER FOR ELECTRONIC ANALYSIS OF REVEAL EVENT RECORDER: ICD-10-CM

## 2023-04-24 DIAGNOSIS — R00.1 SYMPTOMATIC BRADYCARDIA: ICD-10-CM

## 2023-04-24 PROCEDURE — G2066 INTER DEVC REMOTE 30D: HCPCS | Performed by: INTERNAL MEDICINE

## 2023-04-24 PROCEDURE — 93298 REM INTERROG DEV EVAL SCRMS: CPT | Performed by: INTERNAL MEDICINE

## 2023-04-24 NOTE — PROGRESS NOTES
We received a remote transmission from patient's monitor at home. Remote Linq report shows no arrhythmia recordings. EP physician to review. We will continue to monitor remotely. Implanted for palpitations and AF management. Pt is on Xarelto. End of 31-day monitoring period 4-24-23.

## 2023-04-25 ENCOUNTER — HOSPITAL ENCOUNTER (OUTPATIENT)
Dept: PHYSICAL THERAPY | Age: 73
Setting detail: THERAPIES SERIES
Discharge: HOME | End: 2023-04-25
Payer: MEDICARE

## 2023-04-25 PROCEDURE — 97112 NEUROMUSCULAR REEDUCATION: CPT

## 2023-04-25 PROCEDURE — 97110 THERAPEUTIC EXERCISES: CPT

## 2023-04-25 PROCEDURE — 97140 MANUAL THERAPY 1/> REGIONS: CPT

## 2023-04-25 NOTE — FLOWSHEET NOTE
Treatment Minutes: 55     [] EVAL - LOW (65217)   [] EVAL - MOD (40879)  [] EVAL - HIGH (83815)  [] RE-EVAL (44570)  [x] ZP(10009) x   2    [] Ionto  [x] NMR (21987) x   1    [] Vaso  [x] Manual (00841) x    1   [] Ultrasound  [] TA x  1      [] Mech Traction (73664)  [] Aquatic Therapy x     [] ES (un) (31965):   [] Home Management Training x  [] ES(attended) (61369)   [] Dry Needling 1-2 muscles (60795):  [] Dry Needling 3+ muscles (784355  [] Group:      [] Other:       GOALS:  Patient stated goal: improve walking and balance  [] Progressing: [] Met: [] Not Met: [] Adjusted    Short Term Goals: To be achieved in: 4 weeks  1. Independent in HEP and progression per patient tolerance, in order to prevent re-injury. [] Progressing: [] Met: [] Not Met: [] Adjusted  2. Patient will sleep through night uninterrupted from pain for appropriate sleep hygiene. [] Progressing: [] Met: [] Not Met: [] Adjusted  3. Patient will demo 10 or more reps with 30 sec STS for improved transfers. [] Progressing: [] Met: [] Not Met: [] Adjusted    Long Term Goals: To be achieved in: 8 weeks  1. Pt will score 20% disability or less on LEFS to assist with reaching prior level of function with ADLs and community ambulation. [] Progressing: [] Met: [] Not Met: [] Adjusted  2. Patient will demonstrate improved functional LE strength as indicated by reciprocal gait pattern on stairs for one flight pain free with hand rails to return to PLOF with household mobility. [] Progressing: [] Met: [] Not Met: [] Adjusted  3. Patient will demonstrate increased knee AROM to 0-120 to allow for functional knee ROM to normalize stair mobility. [] Progressing: [] Met: [] Not Met: [] Adjusted  4. Patient will demonstrate 450m or more w/ [facility] with 6MWT for improved community ambulation. [] Progressing: [] Met: [] Not Met: [] Adjusted   5.  Patient will demo 12 sec or less with TUG with SPC for decreased fall risk and improved safety with community

## 2023-04-26 ENCOUNTER — HOSPITAL ENCOUNTER (OUTPATIENT)
Dept: ONCOLOGY | Age: 73
Setting detail: INFUSION SERIES
Discharge: HOME | End: 2023-04-26
Payer: MEDICARE

## 2023-04-26 VITALS
SYSTOLIC BLOOD PRESSURE: 148 MMHG | HEART RATE: 71 BPM | RESPIRATION RATE: 20 BRPM | DIASTOLIC BLOOD PRESSURE: 64 MMHG | TEMPERATURE: 97.1 F

## 2023-04-26 DIAGNOSIS — D50.9 IRON DEFICIENCY ANEMIA, UNSPECIFIED IRON DEFICIENCY ANEMIA TYPE: Primary | ICD-10-CM

## 2023-04-26 PROCEDURE — 99211 OFF/OP EST MAY X REQ PHY/QHP: CPT

## 2023-04-26 PROCEDURE — 2580000003 HC RX 258: Performed by: NURSE PRACTITIONER

## 2023-04-26 PROCEDURE — 96365 THER/PROPH/DIAG IV INF INIT: CPT

## 2023-04-26 PROCEDURE — 6360000002 HC RX W HCPCS: Performed by: NURSE PRACTITIONER

## 2023-04-26 RX ORDER — SODIUM CHLORIDE 9 MG/ML
5-250 INJECTION, SOLUTION INTRAVENOUS PRN
Status: CANCELLED | OUTPATIENT
Start: 2023-05-03

## 2023-04-26 RX ORDER — SODIUM CHLORIDE 0.9 % (FLUSH) 0.9 %
5-40 SYRINGE (ML) INJECTION PRN
Status: DISCONTINUED | OUTPATIENT
Start: 2023-04-26 | End: 2023-04-27 | Stop reason: HOSPADM

## 2023-04-26 RX ORDER — SODIUM CHLORIDE 0.9 % (FLUSH) 0.9 %
5-40 SYRINGE (ML) INJECTION PRN
Status: CANCELLED | OUTPATIENT
Start: 2023-05-03

## 2023-04-26 RX ORDER — SODIUM CHLORIDE 9 MG/ML
5-250 INJECTION, SOLUTION INTRAVENOUS PRN
Status: DISCONTINUED | OUTPATIENT
Start: 2023-04-26 | End: 2023-04-27 | Stop reason: HOSPADM

## 2023-04-26 RX ADMIN — SODIUM CHLORIDE, PRESERVATIVE FREE 10 ML: 5 INJECTION INTRAVENOUS at 14:21

## 2023-04-26 RX ADMIN — FERRIC CARBOXYMALTOSE INJECTION 750 MG: 50 INJECTION, SOLUTION INTRAVENOUS at 14:22

## 2023-04-26 RX ADMIN — SODIUM CHLORIDE 100 ML/HR: 9 INJECTION, SOLUTION INTRAVENOUS at 14:22

## 2023-04-26 NOTE — DISCHARGE INSTRUCTIONS
apart. You may need frequent medical tests, even if you have no symptoms. What happens if I miss a dose? Call your doctor for instructions if you miss an appointment for your ferric carboxymaltose injection. What happens if I overdose? Seek emergency medical attention or call the Poison Help line at [phone]. Overdose symptoms may include pain, cough, wheezing, shortness of breath, coughing up blood, weight loss, or slowed growth in a child. What should I avoid after receiving ferric carboxymaltose? Do not take iron supplements or a vitamin/mineral supplement that your doctor has not prescribed or recommended. What are the possible side effects of ferric carboxymaltose? Get emergency medical help if you have signs of an allergic reaction: hives; feeling like you might pass out; wheezing, difficult breathing; swelling of your face, lips, tongue, or throat. Call your doctor at once if you have:  increased blood pressure --dizziness, nausea, sudden warmth or redness in your face, severe headache, pounding in your neck or ears;  low levels of phosphorus in your blood --confusion, bone pain, muscle weakness; or  high levels of iron stored in your body --feeling weak or tired, joint pain, finger pain, stomach pain, weight loss, irregular heartbeats, fluttering in your chest.  Common side effects may include:  nausea;  dizziness;  high blood pressure;  flushing (warmth, redness, or tingly feeling); or  low phosphorus levels. This is not a complete list of side effects and others may occur. Call your doctor for medical advice about side effects. You may report side effects to FDA at 9-267-UEP-3971. What other drugs will affect ferric carboxymaltose? Other drugs may affect ferric carboxymaltose, including prescription and over-the-counter medicines, vitamins, and herbal products. Tell your doctor about all other medicines you use. Where can I get more information?   Your pharmacist can provide more

## 2023-04-28 ENCOUNTER — HOSPITAL ENCOUNTER (OUTPATIENT)
Dept: PHYSICAL THERAPY | Age: 73
Setting detail: THERAPIES SERIES
Discharge: HOME | End: 2023-04-28
Payer: MEDICARE

## 2023-04-28 DIAGNOSIS — Z47.89 AFTERCARE FOLLOWING SURGERY OF THE MUSCULOSKELETAL SYSTEM: Primary | ICD-10-CM

## 2023-04-28 RX ORDER — OXYCODONE HYDROCHLORIDE 5 MG/1
5 TABLET ORAL EVERY 8 HOURS PRN
Qty: 21 TABLET | Refills: 0 | Status: SHIPPED | OUTPATIENT
Start: 2023-04-28 | End: 2023-05-05

## 2023-04-29 ENCOUNTER — APPOINTMENT (OUTPATIENT)
Dept: GENERAL RADIOLOGY | Age: 73
DRG: 313 | End: 2023-04-29
Payer: MEDICARE

## 2023-04-29 ENCOUNTER — HOSPITAL ENCOUNTER (INPATIENT)
Age: 73
LOS: 2 days | Discharge: HOME | DRG: 313 | End: 2023-05-01
Attending: EMERGENCY MEDICINE | Admitting: INTERNAL MEDICINE
Payer: MEDICARE

## 2023-04-29 ENCOUNTER — APPOINTMENT (OUTPATIENT)
Dept: CT IMAGING | Age: 73
DRG: 313 | End: 2023-04-29
Payer: MEDICARE

## 2023-04-29 DIAGNOSIS — R07.9 ACUTE CHEST PAIN: Primary | ICD-10-CM

## 2023-04-29 LAB
ALBUMIN SERPL-MCNC: 4.5 G/DL (ref 3.4–5)
ALBUMIN/GLOB SERPL: 1.6 {RATIO} (ref 1.1–2.2)
ALP SERPL-CCNC: 104 U/L (ref 40–129)
ALT SERPL-CCNC: 25 U/L (ref 10–40)
ANION GAP SERPL CALCULATED.3IONS-SCNC: 13 MMOL/L (ref 3–16)
AST SERPL-CCNC: 22 U/L (ref 15–37)
BASOPHILS # BLD: 0 K/UL (ref 0–0.2)
BASOPHILS NFR BLD: 1.1 %
BILIRUB SERPL-MCNC: 0.3 MG/DL (ref 0–1)
BUN SERPL-MCNC: 13 MG/DL (ref 7–20)
CALCIUM SERPL-MCNC: 9.6 MG/DL (ref 8.3–10.6)
CHLORIDE SERPL-SCNC: 102 MMOL/L (ref 99–110)
CO2 SERPL-SCNC: 20 MMOL/L (ref 21–32)
CREAT SERPL-MCNC: 0.7 MG/DL (ref 0.6–1.2)
DEPRECATED RDW RBC AUTO: 15.1 % (ref 12.4–15.4)
EOSINOPHIL # BLD: 0.1 K/UL (ref 0–0.6)
EOSINOPHIL NFR BLD: 2.1 %
GFR SERPLBLD CREATININE-BSD FMLA CKD-EPI: >60 ML/MIN/{1.73_M2}
GLUCOSE BLD-MCNC: 130 MG/DL (ref 70–99)
GLUCOSE BLD-MCNC: 132 MG/DL (ref 70–99)
GLUCOSE SERPL-MCNC: 124 MG/DL (ref 70–99)
HCT VFR BLD AUTO: 39.4 % (ref 36–48)
HGB BLD-MCNC: 12.6 G/DL (ref 12–16)
LYMPHOCYTES # BLD: 1.1 K/UL (ref 1–5.1)
LYMPHOCYTES NFR BLD: 26.4 %
MCH RBC QN AUTO: 30 PG (ref 26–34)
MCHC RBC AUTO-ENTMCNC: 32.1 G/DL (ref 31–36)
MCV RBC AUTO: 93.5 FL (ref 80–100)
MONOCYTES # BLD: 0.5 K/UL (ref 0–1.3)
MONOCYTES NFR BLD: 12.3 %
NEUTROPHILS # BLD: 2.4 K/UL (ref 1.7–7.7)
NEUTROPHILS NFR BLD: 58.1 %
NT-PROBNP SERPL-MCNC: 108 PG/ML (ref 0–124)
PERFORMED ON: ABNORMAL
PERFORMED ON: ABNORMAL
PLATELET # BLD AUTO: 310 K/UL (ref 135–450)
PMV BLD AUTO: 7.9 FL (ref 5–10.5)
POTASSIUM SERPL-SCNC: 4.1 MMOL/L (ref 3.5–5.1)
PROT SERPL-MCNC: 7.3 G/DL (ref 6.4–8.2)
RBC # BLD AUTO: 4.21 M/UL (ref 4–5.2)
REASON FOR REJECTION: NORMAL
REJECTED TEST: NORMAL
SODIUM SERPL-SCNC: 135 MMOL/L (ref 136–145)
TROPONIN, HIGH SENSITIVITY: 14 NG/L (ref 0–14)
TROPONIN, HIGH SENSITIVITY: 16 NG/L (ref 0–14)
WBC # BLD AUTO: 4.1 K/UL (ref 4–11)

## 2023-04-29 PROCEDURE — 99285 EMERGENCY DEPT VISIT HI MDM: CPT

## 2023-04-29 PROCEDURE — 1200000000 HC SEMI PRIVATE

## 2023-04-29 PROCEDURE — 2580000003 HC RX 258: Performed by: INTERNAL MEDICINE

## 2023-04-29 PROCEDURE — 6370000000 HC RX 637 (ALT 250 FOR IP): Performed by: INTERNAL MEDICINE

## 2023-04-29 PROCEDURE — 6370000000 HC RX 637 (ALT 250 FOR IP): Performed by: FAMILY MEDICINE

## 2023-04-29 PROCEDURE — 71260 CT THORAX DX C+: CPT

## 2023-04-29 PROCEDURE — 6360000004 HC RX CONTRAST MEDICATION: Performed by: EMERGENCY MEDICINE

## 2023-04-29 PROCEDURE — 71045 X-RAY EXAM CHEST 1 VIEW: CPT

## 2023-04-29 PROCEDURE — 6370000000 HC RX 637 (ALT 250 FOR IP): Performed by: EMERGENCY MEDICINE

## 2023-04-29 PROCEDURE — 84484 ASSAY OF TROPONIN QUANT: CPT

## 2023-04-29 PROCEDURE — 85025 COMPLETE CBC W/AUTO DIFF WBC: CPT

## 2023-04-29 PROCEDURE — 93005 ELECTROCARDIOGRAM TRACING: CPT | Performed by: EMERGENCY MEDICINE

## 2023-04-29 PROCEDURE — 83880 ASSAY OF NATRIURETIC PEPTIDE: CPT

## 2023-04-29 PROCEDURE — 80053 COMPREHEN METABOLIC PANEL: CPT

## 2023-04-29 PROCEDURE — 36415 COLL VENOUS BLD VENIPUNCTURE: CPT

## 2023-04-29 PROCEDURE — 5A09357 ASSISTANCE WITH RESPIRATORY VENTILATION, LESS THAN 24 CONSECUTIVE HOURS, CONTINUOUS POSITIVE AIRWAY PRESSURE: ICD-10-PCS | Performed by: INTERNAL MEDICINE

## 2023-04-29 RX ORDER — POTASSIUM CHLORIDE 7.45 MG/ML
10 INJECTION INTRAVENOUS PRN
Status: DISCONTINUED | OUTPATIENT
Start: 2023-04-29 | End: 2023-04-29 | Stop reason: SDUPTHER

## 2023-04-29 RX ORDER — INSULIN LISPRO 100 [IU]/ML
0-4 INJECTION, SOLUTION INTRAVENOUS; SUBCUTANEOUS NIGHTLY
Status: DISCONTINUED | OUTPATIENT
Start: 2023-04-29 | End: 2023-05-01 | Stop reason: HOSPADM

## 2023-04-29 RX ORDER — LISINOPRIL 10 MG/1
10 TABLET ORAL DAILY
Status: DISCONTINUED | OUTPATIENT
Start: 2023-04-29 | End: 2023-05-01 | Stop reason: HOSPADM

## 2023-04-29 RX ORDER — SODIUM CHLORIDE 0.9 % (FLUSH) 0.9 %
10 SYRINGE (ML) INJECTION PRN
Status: DISCONTINUED | OUTPATIENT
Start: 2023-04-29 | End: 2023-05-01 | Stop reason: HOSPADM

## 2023-04-29 RX ORDER — POTASSIUM CHLORIDE 20 MEQ/1
40 TABLET, EXTENDED RELEASE ORAL PRN
Status: DISCONTINUED | OUTPATIENT
Start: 2023-04-29 | End: 2023-04-29 | Stop reason: SDUPTHER

## 2023-04-29 RX ORDER — ASPIRIN 81 MG/1
81 TABLET, CHEWABLE ORAL DAILY
Status: DISCONTINUED | OUTPATIENT
Start: 2023-04-30 | End: 2023-05-01 | Stop reason: HOSPADM

## 2023-04-29 RX ORDER — PRAMIPEXOLE DIHYDROCHLORIDE 1 MG/1
0.5 TABLET ORAL 3 TIMES DAILY
Status: DISCONTINUED | OUTPATIENT
Start: 2023-04-29 | End: 2023-05-01 | Stop reason: HOSPADM

## 2023-04-29 RX ORDER — SODIUM CHLORIDE 0.9 % (FLUSH) 0.9 %
5-40 SYRINGE (ML) INJECTION EVERY 12 HOURS SCHEDULED
Status: DISCONTINUED | OUTPATIENT
Start: 2023-04-29 | End: 2023-05-01 | Stop reason: HOSPADM

## 2023-04-29 RX ORDER — CALCIUM CARBONATE 260MG(650)
1 TABLET,CHEWABLE ORAL DAILY
Status: DISCONTINUED | OUTPATIENT
Start: 2023-04-29 | End: 2023-04-29 | Stop reason: RX

## 2023-04-29 RX ORDER — HYDROXYZINE HYDROCHLORIDE 25 MG/1
25 TABLET, FILM COATED ORAL NIGHTLY
Status: DISCONTINUED | OUTPATIENT
Start: 2023-04-29 | End: 2023-05-01 | Stop reason: HOSPADM

## 2023-04-29 RX ORDER — SPIRONOLACTONE 25 MG/1
25 TABLET ORAL DAILY
Status: DISCONTINUED | OUTPATIENT
Start: 2023-04-29 | End: 2023-05-01 | Stop reason: HOSPADM

## 2023-04-29 RX ORDER — POTASSIUM CHLORIDE 7.45 MG/ML
10 INJECTION INTRAVENOUS PRN
Status: DISCONTINUED | OUTPATIENT
Start: 2023-04-29 | End: 2023-05-01 | Stop reason: HOSPADM

## 2023-04-29 RX ORDER — OXYCODONE HYDROCHLORIDE 5 MG/1
5 TABLET ORAL EVERY 8 HOURS PRN
Status: DISCONTINUED | OUTPATIENT
Start: 2023-04-29 | End: 2023-05-01 | Stop reason: HOSPADM

## 2023-04-29 RX ORDER — M-VIT,TX,IRON,MINS/CALC/FOLIC 27MG-0.4MG
1 TABLET ORAL DAILY
Status: DISCONTINUED | OUTPATIENT
Start: 2023-04-29 | End: 2023-05-01 | Stop reason: HOSPADM

## 2023-04-29 RX ORDER — SODIUM CHLORIDE 9 MG/ML
INJECTION, SOLUTION INTRAVENOUS PRN
Status: DISCONTINUED | OUTPATIENT
Start: 2023-04-29 | End: 2023-05-01 | Stop reason: HOSPADM

## 2023-04-29 RX ORDER — POTASSIUM CHLORIDE 20 MEQ/1
40 TABLET, EXTENDED RELEASE ORAL PRN
Status: DISCONTINUED | OUTPATIENT
Start: 2023-04-29 | End: 2023-05-01 | Stop reason: HOSPADM

## 2023-04-29 RX ORDER — DILTIAZEM HYDROCHLORIDE 60 MG/1
60 TABLET, FILM COATED ORAL 2 TIMES DAILY PRN
Status: DISCONTINUED | OUTPATIENT
Start: 2023-04-29 | End: 2023-05-01 | Stop reason: HOSPADM

## 2023-04-29 RX ORDER — 0.9 % SODIUM CHLORIDE 0.9 %
500 INTRAVENOUS SOLUTION INTRAVENOUS PRN
Status: DISCONTINUED | OUTPATIENT
Start: 2023-04-29 | End: 2023-05-01 | Stop reason: HOSPADM

## 2023-04-29 RX ORDER — ACETAMINOPHEN 325 MG/1
650 TABLET ORAL EVERY 6 HOURS PRN
Status: DISCONTINUED | OUTPATIENT
Start: 2023-04-29 | End: 2023-05-01 | Stop reason: HOSPADM

## 2023-04-29 RX ORDER — ONDANSETRON 4 MG/1
4 TABLET, ORALLY DISINTEGRATING ORAL EVERY 8 HOURS PRN
Status: DISCONTINUED | OUTPATIENT
Start: 2023-04-29 | End: 2023-05-01 | Stop reason: HOSPADM

## 2023-04-29 RX ORDER — FLUTICASONE PROPIONATE 50 MCG
1 SPRAY, SUSPENSION (ML) NASAL DAILY
Status: DISCONTINUED | OUTPATIENT
Start: 2023-04-29 | End: 2023-05-01 | Stop reason: HOSPADM

## 2023-04-29 RX ORDER — LANOLIN ALCOHOL/MO/W.PET/CERES
9 CREAM (GRAM) TOPICAL NIGHTLY
Status: DISCONTINUED | OUTPATIENT
Start: 2023-04-29 | End: 2023-05-01 | Stop reason: HOSPADM

## 2023-04-29 RX ORDER — POLYETHYLENE GLYCOL 3350 17 G/17G
17 POWDER, FOR SOLUTION ORAL DAILY PRN
Status: DISCONTINUED | OUTPATIENT
Start: 2023-04-29 | End: 2023-05-01 | Stop reason: HOSPADM

## 2023-04-29 RX ORDER — ACETAMINOPHEN 650 MG/1
650 SUPPOSITORY RECTAL EVERY 6 HOURS PRN
Status: DISCONTINUED | OUTPATIENT
Start: 2023-04-29 | End: 2023-05-01 | Stop reason: HOSPADM

## 2023-04-29 RX ORDER — INSULIN LISPRO 100 [IU]/ML
0-8 INJECTION, SOLUTION INTRAVENOUS; SUBCUTANEOUS
Status: DISCONTINUED | OUTPATIENT
Start: 2023-04-29 | End: 2023-05-01 | Stop reason: HOSPADM

## 2023-04-29 RX ORDER — DILTIAZEM HYDROCHLORIDE 120 MG/1
120 CAPSULE, COATED, EXTENDED RELEASE ORAL DAILY
Status: DISCONTINUED | OUTPATIENT
Start: 2023-04-29 | End: 2023-05-01 | Stop reason: HOSPADM

## 2023-04-29 RX ORDER — FLUTICASONE PROPIONATE 110 UG/1
1 AEROSOL, METERED RESPIRATORY (INHALATION) 2 TIMES DAILY
Status: DISCONTINUED | OUTPATIENT
Start: 2023-04-29 | End: 2023-05-01 | Stop reason: HOSPADM

## 2023-04-29 RX ORDER — ENOXAPARIN SODIUM 100 MG/ML
40 INJECTION SUBCUTANEOUS DAILY
Status: DISCONTINUED | OUTPATIENT
Start: 2023-04-29 | End: 2023-04-29

## 2023-04-29 RX ORDER — NITROGLYCERIN 0.4 MG/1
0.4 TABLET SUBLINGUAL EVERY 5 MIN PRN
Status: DISCONTINUED | OUTPATIENT
Start: 2023-04-29 | End: 2023-05-01 | Stop reason: HOSPADM

## 2023-04-29 RX ORDER — ASPIRIN 81 MG/1
324 TABLET, CHEWABLE ORAL ONCE
Status: COMPLETED | OUTPATIENT
Start: 2023-04-29 | End: 2023-04-29

## 2023-04-29 RX ORDER — HYDRALAZINE HYDROCHLORIDE 20 MG/ML
10 INJECTION INTRAMUSCULAR; INTRAVENOUS EVERY 6 HOURS PRN
Status: DISCONTINUED | OUTPATIENT
Start: 2023-04-29 | End: 2023-05-01 | Stop reason: HOSPADM

## 2023-04-29 RX ORDER — DOCUSATE SODIUM 100 MG/1
100 CAPSULE, LIQUID FILLED ORAL 2 TIMES DAILY
Status: DISCONTINUED | OUTPATIENT
Start: 2023-04-29 | End: 2023-05-01 | Stop reason: HOSPADM

## 2023-04-29 RX ORDER — ONDANSETRON 2 MG/ML
4 INJECTION INTRAMUSCULAR; INTRAVENOUS EVERY 6 HOURS PRN
Status: DISCONTINUED | OUTPATIENT
Start: 2023-04-29 | End: 2023-05-01 | Stop reason: HOSPADM

## 2023-04-29 RX ORDER — DEXTROSE MONOHYDRATE 100 MG/ML
INJECTION, SOLUTION INTRAVENOUS CONTINUOUS PRN
Status: DISCONTINUED | OUTPATIENT
Start: 2023-04-29 | End: 2023-05-01 | Stop reason: HOSPADM

## 2023-04-29 RX ORDER — POTASSIUM CHLORIDE 20 MEQ/1
10 TABLET, EXTENDED RELEASE ORAL DAILY
Status: DISCONTINUED | OUTPATIENT
Start: 2023-04-29 | End: 2023-05-01 | Stop reason: HOSPADM

## 2023-04-29 RX ORDER — FUROSEMIDE 40 MG/1
40 TABLET ORAL DAILY
Status: DISCONTINUED | OUTPATIENT
Start: 2023-04-29 | End: 2023-05-01 | Stop reason: HOSPADM

## 2023-04-29 RX ORDER — AZELASTINE 1 MG/ML
1 SPRAY, METERED NASAL 2 TIMES DAILY
Status: DISCONTINUED | OUTPATIENT
Start: 2023-04-29 | End: 2023-05-01 | Stop reason: HOSPADM

## 2023-04-29 RX ADMIN — POLYETHYLENE GLYCOL 3350 17 G: 17 POWDER, FOR SOLUTION ORAL at 22:43

## 2023-04-29 RX ADMIN — ASPIRIN 324 MG: 81 TABLET, CHEWABLE ORAL at 16:18

## 2023-04-29 RX ADMIN — NITROGLYCERIN 1 INCH: 20 OINTMENT TOPICAL at 16:18

## 2023-04-29 RX ADMIN — PRAMIPEXOLE DIHYDROCHLORIDE 0.5 MG: 0.25 TABLET ORAL at 18:31

## 2023-04-29 RX ADMIN — Medication 10 ML: at 20:55

## 2023-04-29 RX ADMIN — DOCUSATE SODIUM 100 MG: 100 CAPSULE, LIQUID FILLED ORAL at 22:43

## 2023-04-29 RX ADMIN — HYDROXYZINE HYDROCHLORIDE 25 MG: 25 TABLET, FILM COATED ORAL at 20:53

## 2023-04-29 RX ADMIN — RIVAROXABAN 20 MG: 20 TABLET, FILM COATED ORAL at 20:54

## 2023-04-29 RX ADMIN — MELATONIN TAB 3 MG 9 MG: 3 TAB at 20:53

## 2023-04-29 RX ADMIN — LISINOPRIL 10 MG: 10 TABLET ORAL at 20:54

## 2023-04-29 RX ADMIN — IOPAMIDOL 75 ML: 755 INJECTION, SOLUTION INTRAVENOUS at 15:04

## 2023-04-29 RX ADMIN — PRAMIPEXOLE DIHYDROCHLORIDE 0.5 MG: 0.25 TABLET ORAL at 20:53

## 2023-04-29 ASSESSMENT — PAIN DESCRIPTION - DESCRIPTORS
DESCRIPTORS: PRESSURE
DESCRIPTORS: PRESSURE
DESCRIPTORS_3: ACHING
DESCRIPTORS_2: ACHING;SHARP
DESCRIPTORS: PRESSURE
DESCRIPTORS_3: ACHING
DESCRIPTORS_3: ACHING
DESCRIPTORS: PRESSURE

## 2023-04-29 ASSESSMENT — PAIN DESCRIPTION - LOCATION
LOCATION: CHEST
LOCATION_3: KNEE
LOCATION: CHEST
LOCATION: CHEST
LOCATION_3: KNEE
LOCATION_2: SHOULDER
LOCATION_3: KNEE
LOCATION: CHEST

## 2023-04-29 ASSESSMENT — LIFESTYLE VARIABLES
HOW MANY STANDARD DRINKS CONTAINING ALCOHOL DO YOU HAVE ON A TYPICAL DAY: 1 OR 2
HOW OFTEN DO YOU HAVE A DRINK CONTAINING ALCOHOL: MONTHLY OR LESS
HOW OFTEN DO YOU HAVE A DRINK CONTAINING ALCOHOL: MONTHLY OR LESS

## 2023-04-29 ASSESSMENT — PAIN SCALES - GENERAL
PAINLEVEL_OUTOF10: 7
PAINLEVEL_OUTOF10: 5
PAINLEVEL_OUTOF10: 5
PAINLEVEL_OUTOF10: 3
PAINLEVEL_OUTOF10: 5

## 2023-04-29 ASSESSMENT — PAIN DESCRIPTION - INTENSITY
RATING_3: 4
RATING_3: 4
RATING_2: 5
RATING_3: 0
RATING_2: 0
RATING_2: 0
RATING_3: 4
RATING_2: 0

## 2023-04-29 ASSESSMENT — PAIN DESCRIPTION - ORIENTATION
ORIENTATION_3: LEFT
ORIENTATION: MID
ORIENTATION: MID
ORIENTATION_3: LEFT
ORIENTATION: MID
ORIENTATION_2: RIGHT;LEFT
ORIENTATION_3: LEFT

## 2023-04-29 ASSESSMENT — PAIN DESCRIPTION - PAIN TYPE
TYPE: ACUTE PAIN

## 2023-04-29 ASSESSMENT — PAIN - FUNCTIONAL ASSESSMENT: PAIN_FUNCTIONAL_ASSESSMENT: 0-10

## 2023-04-30 PROBLEM — I50.32 CHRONIC DIASTOLIC HEART FAILURE (HCC): Status: ACTIVE | Noted: 2023-04-30

## 2023-04-30 PROBLEM — R06.02 SOB (SHORTNESS OF BREATH): Status: ACTIVE | Noted: 2023-04-30

## 2023-04-30 LAB
ANION GAP SERPL CALCULATED.3IONS-SCNC: 11 MMOL/L (ref 3–16)
BASOPHILS # BLD: 0 K/UL (ref 0–0.2)
BASOPHILS NFR BLD: 0.7 %
BUN SERPL-MCNC: 19 MG/DL (ref 7–20)
CALCIUM SERPL-MCNC: 9.3 MG/DL (ref 8.3–10.6)
CHLORIDE SERPL-SCNC: 102 MMOL/L (ref 99–110)
CHOLEST SERPL-MCNC: 157 MG/DL (ref 0–199)
CO2 SERPL-SCNC: 23 MMOL/L (ref 21–32)
CREAT SERPL-MCNC: 0.9 MG/DL (ref 0.6–1.2)
CRP SERPL-MCNC: 6.2 MG/L (ref 0–5.1)
D DIMER: 1.39 UG/ML FEU (ref 0–0.6)
DEPRECATED RDW RBC AUTO: 14.8 % (ref 12.4–15.4)
EKG ATRIAL RATE: 74 BPM
EKG DIAGNOSIS: NORMAL
EKG P AXIS: 40 DEGREES
EKG P-R INTERVAL: 128 MS
EKG Q-T INTERVAL: 304 MS
EKG QRS DURATION: 76 MS
EKG QTC CALCULATION (BAZETT): 337 MS
EKG R AXIS: -5 DEGREES
EKG T AXIS: 45 DEGREES
EKG VENTRICULAR RATE: 74 BPM
EOSINOPHIL # BLD: 0.2 K/UL (ref 0–0.6)
EOSINOPHIL NFR BLD: 3 %
ERYTHROCYTE [SEDIMENTATION RATE] IN BLOOD BY WESTERGREN METHOD: 6 MM/HR (ref 0–30)
GFR SERPLBLD CREATININE-BSD FMLA CKD-EPI: >60 ML/MIN/{1.73_M2}
GLUCOSE BLD-MCNC: 112 MG/DL (ref 70–99)
GLUCOSE BLD-MCNC: 123 MG/DL (ref 70–99)
GLUCOSE BLD-MCNC: 163 MG/DL (ref 70–99)
GLUCOSE BLD-MCNC: 96 MG/DL (ref 70–99)
GLUCOSE SERPL-MCNC: 118 MG/DL (ref 70–99)
HCT VFR BLD AUTO: 35.6 % (ref 36–48)
HDLC SERPL-MCNC: 53 MG/DL (ref 40–60)
HGB BLD-MCNC: 11.6 G/DL (ref 12–16)
LDLC SERPL CALC-MCNC: 82 MG/DL
LYMPHOCYTES # BLD: 1.2 K/UL (ref 1–5.1)
LYMPHOCYTES NFR BLD: 23.1 %
MCH RBC QN AUTO: 30.6 PG (ref 26–34)
MCHC RBC AUTO-ENTMCNC: 32.7 G/DL (ref 31–36)
MCV RBC AUTO: 93.6 FL (ref 80–100)
MONOCYTES # BLD: 0.7 K/UL (ref 0–1.3)
MONOCYTES NFR BLD: 13.2 %
NEUTROPHILS # BLD: 3.2 K/UL (ref 1.7–7.7)
NEUTROPHILS NFR BLD: 60 %
PERFORMED ON: ABNORMAL
PERFORMED ON: NORMAL
PLATELET # BLD AUTO: 259 K/UL (ref 135–450)
PMV BLD AUTO: 8 FL (ref 5–10.5)
POTASSIUM SERPL-SCNC: 4.1 MMOL/L (ref 3.5–5.1)
RBC # BLD AUTO: 3.8 M/UL (ref 4–5.2)
SODIUM SERPL-SCNC: 136 MMOL/L (ref 136–145)
TRIGL SERPL-MCNC: 112 MG/DL (ref 0–150)
TROPONIN, HIGH SENSITIVITY: 14 NG/L (ref 0–14)
VLDLC SERPL CALC-MCNC: 22 MG/DL
WBC # BLD AUTO: 5.4 K/UL (ref 4–11)

## 2023-04-30 PROCEDURE — 6370000000 HC RX 637 (ALT 250 FOR IP): Performed by: INTERNAL MEDICINE

## 2023-04-30 PROCEDURE — 85652 RBC SED RATE AUTOMATED: CPT

## 2023-04-30 PROCEDURE — 85379 FIBRIN DEGRADATION QUANT: CPT

## 2023-04-30 PROCEDURE — 86140 C-REACTIVE PROTEIN: CPT

## 2023-04-30 PROCEDURE — 80048 BASIC METABOLIC PNL TOTAL CA: CPT

## 2023-04-30 PROCEDURE — 2580000003 HC RX 258: Performed by: INTERNAL MEDICINE

## 2023-04-30 PROCEDURE — 94761 N-INVAS EAR/PLS OXIMETRY MLT: CPT

## 2023-04-30 PROCEDURE — 6370000000 HC RX 637 (ALT 250 FOR IP): Performed by: FAMILY MEDICINE

## 2023-04-30 PROCEDURE — 85025 COMPLETE CBC W/AUTO DIFF WBC: CPT

## 2023-04-30 PROCEDURE — 99223 1ST HOSP IP/OBS HIGH 75: CPT | Performed by: INTERNAL MEDICINE

## 2023-04-30 PROCEDURE — 93010 ELECTROCARDIOGRAM REPORT: CPT | Performed by: INTERNAL MEDICINE

## 2023-04-30 PROCEDURE — 36415 COLL VENOUS BLD VENIPUNCTURE: CPT

## 2023-04-30 PROCEDURE — 84484 ASSAY OF TROPONIN QUANT: CPT

## 2023-04-30 PROCEDURE — 1200000000 HC SEMI PRIVATE

## 2023-04-30 PROCEDURE — 94640 AIRWAY INHALATION TREATMENT: CPT

## 2023-04-30 PROCEDURE — 80061 LIPID PANEL: CPT

## 2023-04-30 RX ADMIN — MULTIPLE VITAMINS W/ MINERALS TAB 1 TABLET: TAB at 08:11

## 2023-04-30 RX ADMIN — FLUTICASONE PROPIONATE 1 SPRAY: 50 SPRAY, METERED NASAL at 08:14

## 2023-04-30 RX ADMIN — AZELASTINE HYDROCHLORIDE 1 SPRAY: 137 SPRAY, METERED NASAL at 20:58

## 2023-04-30 RX ADMIN — PRAMIPEXOLE DIHYDROCHLORIDE 0.5 MG: 0.25 TABLET ORAL at 16:39

## 2023-04-30 RX ADMIN — SPIRONOLACTONE 25 MG: 25 TABLET ORAL at 08:11

## 2023-04-30 RX ADMIN — FUROSEMIDE 40 MG: 40 TABLET ORAL at 08:10

## 2023-04-30 RX ADMIN — TIOTROPIUM BROMIDE INHALATION SPRAY 1 PUFF: 3.12 SPRAY, METERED RESPIRATORY (INHALATION) at 09:06

## 2023-04-30 RX ADMIN — ASPIRIN 81 MG: 81 TABLET, CHEWABLE ORAL at 08:10

## 2023-04-30 RX ADMIN — Medication 10 ML: at 09:21

## 2023-04-30 RX ADMIN — RIVAROXABAN 20 MG: 20 TABLET, FILM COATED ORAL at 18:39

## 2023-04-30 RX ADMIN — Medication 10 ML: at 23:02

## 2023-04-30 RX ADMIN — POTASSIUM CHLORIDE 10 MEQ: 1500 TABLET, EXTENDED RELEASE ORAL at 08:25

## 2023-04-30 RX ADMIN — MELATONIN TAB 3 MG 9 MG: 3 TAB at 22:41

## 2023-04-30 RX ADMIN — PRAMIPEXOLE DIHYDROCHLORIDE 0.5 MG: 0.25 TABLET ORAL at 22:41

## 2023-04-30 RX ADMIN — DOCUSATE SODIUM 100 MG: 100 CAPSULE, LIQUID FILLED ORAL at 08:11

## 2023-04-30 RX ADMIN — DOCUSATE SODIUM 100 MG: 100 CAPSULE, LIQUID FILLED ORAL at 20:57

## 2023-04-30 RX ADMIN — OXYCODONE 5 MG: 5 TABLET ORAL at 22:41

## 2023-04-30 RX ADMIN — LISINOPRIL 10 MG: 10 TABLET ORAL at 08:10

## 2023-04-30 RX ADMIN — Medication 1 PUFF: at 22:00

## 2023-04-30 RX ADMIN — DILTIAZEM HYDROCHLORIDE 120 MG: 120 CAPSULE, EXTENDED RELEASE ORAL at 08:11

## 2023-04-30 RX ADMIN — AZELASTINE HYDROCHLORIDE 1 SPRAY: 137 SPRAY, METERED NASAL at 09:20

## 2023-04-30 RX ADMIN — PRAMIPEXOLE DIHYDROCHLORIDE 0.5 MG: 0.25 TABLET ORAL at 09:20

## 2023-04-30 RX ADMIN — Medication 1 PUFF: at 09:06

## 2023-04-30 RX ADMIN — OXYCODONE 5 MG: 5 TABLET ORAL at 06:48

## 2023-04-30 RX ADMIN — HYDROXYZINE HYDROCHLORIDE 25 MG: 25 TABLET, FILM COATED ORAL at 20:57

## 2023-04-30 RX ADMIN — OXYCODONE 5 MG: 5 TABLET ORAL at 15:27

## 2023-04-30 ASSESSMENT — PAIN DESCRIPTION - LOCATION
LOCATION: GENERALIZED

## 2023-04-30 ASSESSMENT — PAIN DESCRIPTION - ORIENTATION
ORIENTATION: MID

## 2023-04-30 ASSESSMENT — PAIN SCALES - GENERAL
PAINLEVEL_OUTOF10: 5
PAINLEVEL_OUTOF10: 4
PAINLEVEL_OUTOF10: 4
PAINLEVEL_OUTOF10: 0
PAINLEVEL_OUTOF10: 6
PAINLEVEL_OUTOF10: 5
PAINLEVEL_OUTOF10: 0
PAINLEVEL_OUTOF10: 0
PAINLEVEL_OUTOF10: 7
PAINLEVEL_OUTOF10: 6

## 2023-04-30 ASSESSMENT — PAIN DESCRIPTION - DESCRIPTORS
DESCRIPTORS: ACHING

## 2023-04-30 ASSESSMENT — PAIN DESCRIPTION - INTENSITY
RATING_2: 0

## 2023-04-30 ASSESSMENT — PAIN DESCRIPTION - PAIN TYPE
TYPE: ACUTE PAIN

## 2023-05-01 VITALS
BODY MASS INDEX: 42.92 KG/M2 | RESPIRATION RATE: 16 BRPM | SYSTOLIC BLOOD PRESSURE: 143 MMHG | WEIGHT: 251.4 LBS | TEMPERATURE: 97 F | OXYGEN SATURATION: 99 % | DIASTOLIC BLOOD PRESSURE: 77 MMHG | HEART RATE: 79 BPM | HEIGHT: 64 IN

## 2023-05-01 LAB
ANION GAP SERPL CALCULATED.3IONS-SCNC: 11 MMOL/L (ref 3–16)
BASOPHILS # BLD: 0.1 K/UL (ref 0–0.2)
BASOPHILS NFR BLD: 0.9 %
BUN SERPL-MCNC: 18 MG/DL (ref 7–20)
CALCIUM SERPL-MCNC: 9.2 MG/DL (ref 8.3–10.6)
CHLORIDE SERPL-SCNC: 103 MMOL/L (ref 99–110)
CO2 SERPL-SCNC: 22 MMOL/L (ref 21–32)
CREAT SERPL-MCNC: 0.7 MG/DL (ref 0.6–1.2)
DEPRECATED RDW RBC AUTO: 15 % (ref 12.4–15.4)
EOSINOPHIL # BLD: 0.2 K/UL (ref 0–0.6)
EOSINOPHIL NFR BLD: 3.8 %
GFR SERPLBLD CREATININE-BSD FMLA CKD-EPI: >60 ML/MIN/{1.73_M2}
GLUCOSE BLD-MCNC: 101 MG/DL (ref 70–99)
GLUCOSE BLD-MCNC: 117 MG/DL (ref 70–99)
GLUCOSE SERPL-MCNC: 109 MG/DL (ref 70–99)
HCT VFR BLD AUTO: 37.4 % (ref 36–48)
HGB BLD-MCNC: 12.1 G/DL (ref 12–16)
LEFT VENTRICULAR EJECTION FRACTION MODE: NORMAL
LV EF: 58 %
LV EF: 58 %
LV EF: 68 %
LVEF MODALITY: NORMAL
LVEF MODALITY: NORMAL
LYMPHOCYTES # BLD: 1.5 K/UL (ref 1–5.1)
LYMPHOCYTES NFR BLD: 25 %
MCH RBC QN AUTO: 30.3 PG (ref 26–34)
MCHC RBC AUTO-ENTMCNC: 32.4 G/DL (ref 31–36)
MCV RBC AUTO: 93.8 FL (ref 80–100)
MONOCYTES # BLD: 0.7 K/UL (ref 0–1.3)
MONOCYTES NFR BLD: 11.2 %
NEUTROPHILS # BLD: 3.6 K/UL (ref 1.7–7.7)
NEUTROPHILS NFR BLD: 59.1 %
PERFORMED ON: ABNORMAL
PERFORMED ON: ABNORMAL
PLATELET # BLD AUTO: 292 K/UL (ref 135–450)
PMV BLD AUTO: 8.4 FL (ref 5–10.5)
POTASSIUM SERPL-SCNC: 4.2 MMOL/L (ref 3.5–5.1)
RBC # BLD AUTO: 3.99 M/UL (ref 4–5.2)
SODIUM SERPL-SCNC: 136 MMOL/L (ref 136–145)
TROPONIN, HIGH SENSITIVITY: 16 NG/L (ref 0–14)
WBC # BLD AUTO: 6.2 K/UL (ref 4–11)

## 2023-05-01 PROCEDURE — 36415 COLL VENOUS BLD VENIPUNCTURE: CPT

## 2023-05-01 PROCEDURE — 80048 BASIC METABOLIC PNL TOTAL CA: CPT

## 2023-05-01 PROCEDURE — 2580000003 HC RX 258: Performed by: INTERNAL MEDICINE

## 2023-05-01 PROCEDURE — 3430000000 HC RX DIAGNOSTIC RADIOPHARMACEUTICAL: Performed by: INTERNAL MEDICINE

## 2023-05-01 PROCEDURE — 93017 CV STRESS TEST TRACING ONLY: CPT | Performed by: INTERNAL MEDICINE

## 2023-05-01 PROCEDURE — 6370000000 HC RX 637 (ALT 250 FOR IP): Performed by: FAMILY MEDICINE

## 2023-05-01 PROCEDURE — 6370000000 HC RX 637 (ALT 250 FOR IP): Performed by: INTERNAL MEDICINE

## 2023-05-01 PROCEDURE — 84484 ASSAY OF TROPONIN QUANT: CPT

## 2023-05-01 PROCEDURE — 93306 TTE W/DOPPLER COMPLETE: CPT

## 2023-05-01 PROCEDURE — 99232 SBSQ HOSP IP/OBS MODERATE 35: CPT | Performed by: NURSE PRACTITIONER

## 2023-05-01 PROCEDURE — 6370000000 HC RX 637 (ALT 250 FOR IP): Performed by: NURSE PRACTITIONER

## 2023-05-01 PROCEDURE — 85025 COMPLETE CBC W/AUTO DIFF WBC: CPT

## 2023-05-01 PROCEDURE — A9502 TC99M TETROFOSMIN: HCPCS | Performed by: INTERNAL MEDICINE

## 2023-05-01 PROCEDURE — 78452 HT MUSCLE IMAGE SPECT MULT: CPT | Performed by: INTERNAL MEDICINE

## 2023-05-01 PROCEDURE — 6360000002 HC RX W HCPCS: Performed by: INTERNAL MEDICINE

## 2023-05-01 RX ORDER — COLCHICINE 0.6 MG/1
0.6 TABLET ORAL DAILY
Qty: 30 TABLET | Refills: 0 | Status: SHIPPED | OUTPATIENT
Start: 2023-05-01 | End: 2023-05-01 | Stop reason: SDUPTHER

## 2023-05-01 RX ORDER — COLCHICINE 0.6 MG/1
0.6 TABLET ORAL DAILY
Status: DISCONTINUED | OUTPATIENT
Start: 2023-05-01 | End: 2023-05-01 | Stop reason: HOSPADM

## 2023-05-01 RX ORDER — REGADENOSON 0.08 MG/ML
0.4 INJECTION, SOLUTION INTRAVENOUS
Status: COMPLETED | OUTPATIENT
Start: 2023-05-01 | End: 2023-05-01

## 2023-05-01 RX ORDER — COLCHICINE 0.6 MG/1
0.6 TABLET ORAL DAILY
Qty: 30 TABLET | Refills: 0 | Status: SHIPPED | OUTPATIENT
Start: 2023-05-01 | End: 2023-06-12 | Stop reason: ALTCHOICE

## 2023-05-01 RX ADMIN — Medication 10 ML: at 09:27

## 2023-05-01 RX ADMIN — DILTIAZEM HYDROCHLORIDE 120 MG: 120 CAPSULE, EXTENDED RELEASE ORAL at 09:25

## 2023-05-01 RX ADMIN — REGADENOSON 0.4 MG: 0.08 INJECTION, SOLUTION INTRAVENOUS at 11:37

## 2023-05-01 RX ADMIN — FLUTICASONE PROPIONATE 1 SPRAY: 50 SPRAY, METERED NASAL at 09:25

## 2023-05-01 RX ADMIN — POTASSIUM CHLORIDE 10 MEQ: 1500 TABLET, EXTENDED RELEASE ORAL at 09:24

## 2023-05-01 RX ADMIN — PRAMIPEXOLE DIHYDROCHLORIDE 0.5 MG: 0.25 TABLET ORAL at 13:35

## 2023-05-01 RX ADMIN — ASPIRIN 81 MG: 81 TABLET, CHEWABLE ORAL at 09:24

## 2023-05-01 RX ADMIN — TETROFOSMIN 10 MILLICURIE: 1.38 INJECTION, POWDER, LYOPHILIZED, FOR SOLUTION INTRAVENOUS at 09:50

## 2023-05-01 RX ADMIN — RIVAROXABAN 20 MG: 20 TABLET, FILM COATED ORAL at 17:31

## 2023-05-01 RX ADMIN — COLCHICINE 0.6 MG: 0.6 TABLET ORAL at 17:34

## 2023-05-01 RX ADMIN — TETROFOSMIN 30 MILLICURIE: 1.38 INJECTION, POWDER, LYOPHILIZED, FOR SOLUTION INTRAVENOUS at 11:39

## 2023-05-01 RX ADMIN — LISINOPRIL 10 MG: 10 TABLET ORAL at 09:24

## 2023-05-01 RX ADMIN — AZELASTINE HYDROCHLORIDE 1 SPRAY: 137 SPRAY, METERED NASAL at 09:25

## 2023-05-01 RX ADMIN — MULTIPLE VITAMINS W/ MINERALS TAB 1 TABLET: TAB at 09:25

## 2023-05-01 RX ADMIN — DOCUSATE SODIUM 100 MG: 100 CAPSULE, LIQUID FILLED ORAL at 09:24

## 2023-05-01 RX ADMIN — OXYCODONE 5 MG: 5 TABLET ORAL at 06:40

## 2023-05-01 RX ADMIN — ACETAMINOPHEN 650 MG: 325 TABLET ORAL at 06:39

## 2023-05-01 ASSESSMENT — PAIN SCALES - GENERAL
PAINLEVEL_OUTOF10: 7
PAINLEVEL_OUTOF10: 3
PAINLEVEL_OUTOF10: 0
PAINLEVEL_OUTOF10: 3

## 2023-05-01 ASSESSMENT — PAIN DESCRIPTION - DESCRIPTORS
DESCRIPTORS: THROBBING
DESCRIPTORS: ACHING
DESCRIPTORS: ACHING

## 2023-05-01 ASSESSMENT — PAIN DESCRIPTION - LOCATION
LOCATION: GENERALIZED
LOCATION: KNEE

## 2023-05-01 ASSESSMENT — PAIN DESCRIPTION - ORIENTATION: ORIENTATION: LEFT

## 2023-05-01 NOTE — PROGRESS NOTES
Progress Note - Dr. Katherine Meehan - Internal Medicine  PCP: Paddy Navarro  [address] / [address] [facility] [phone]    Hospital Day: 2  Code Status: Full Code  Current Diet: Diet NPO        CC: follow up on medical issues    Subjective:   Princess Salazar is a 81 y.o. female. Pt seen and examined  Chart reviewed since last visit, labs and imaging below      Doing ok  Still complains of chest pressure, but better than on admit  cards eval appreciated; echo and gxt pending      Review of Systems: (1 system for EPF, 2-9 for detailed, 10+ for comprehensive)  Constitutional: Negative for chills and fever. HENT: Negative for dental problem, nosebleeds and rhinorrhea. Eyes: Negative for photophobia and visual disturbance. Respiratory: Negative for cough, chest tightness and shortness of breath. Cardiovascular: positive for chest pressure and negative for leg swelling. Gastrointestinal: Negative for diarrhea, nausea and vomiting. Endocrine: Negative for polydipsia and polyphagia. Genitourinary: Negative for frequency, hematuria and urgency. Musculoskeletal: Negative for back pain and myalgias. Skin: Negative for rash. Allergic/Immunologic: Negative for food allergies. Neurological: Negative for dizziness, seizures, syncope and facial asymmetry. Hematological: Negative for adenopathy. Psychiatric/Behavioral: Negative for dysphoric mood. The patient is not nervous/anxious. I have reviewed the patient's medical and social history in detail and updated the computerized patient record.   To recap: She  has a past medical history of Allergic, Anesthesia complication, Anxiety, Arthritis, Arthritis of left hip, Arthritis of right hip, Asthma, At risk for falls, Atrial fibrillation with rapid ventricular response ([address] Atrial flutter ([address] Chronic maxillary sinusitis, CTEPH (chronic thromboembolic pulmonary hypertension) ([address] Depression, Diabetes mellitus ([address]

## 2023-05-01 NOTE — PROGRESS NOTES
Data- discharge order received, pt verbalized agreement to discharge, disposition to previous residence, no needs for HHC/DME. Action- discharge instructions prepared and given to pt, pt verbalized understanding. Medication information packet given r/t NEW and/or CHANGED prescriptions emphasizing name/purpose/side effects, pt verbalized understanding. Discharge instruction summary: Diet- regular/cardiac, Activity- up as tolerated with cane, Primary Care Physician as follows: Dilcia Jenkins -[phone] f/u appointment scheduled for May 10, immunizations reviewed, prescription medications filled at pharmacy of choice. Inpatient surgical procedure precautions reviewed: stress test, echo. CHF Education reviewed. Pt/ Family has had a total of 60 minutes CHF education this admission encounter. 1. WEIGHT: Admit Weight: 250 lb (113.4 kg) (04/29/23 1211)        Today  Weight: 251 lb 6.4 oz (114 kg) (05/01/23 0443)       2. O2 SAT.: SpO2: 99 % (05/01/23 1559)    Response- Pt belongings gathered, IV removed. Disposition is home (no HHC/DME needs), transported with belongings, taken to lobby via w/c w/ staff, no complications.

## 2023-05-01 NOTE — PLAN OF CARE
Problem: Discharge Planning  Goal: Discharge to home or other facility with appropriate resources  Outcome: Adequate for Discharge     Problem: Pain  Goal: Verbalizes/displays adequate comfort level or baseline comfort level  Outcome: Adequate for Discharge     Problem: Safety - Adult  Goal: Free from fall injury  Outcome: Adequate for Discharge     Problem: ABCDS Injury Assessment  Goal: Absence of physical injury  Outcome: Adequate for Discharge     Problem: Respiratory - Adult  Goal: Achieves optimal ventilation and oxygenation  Outcome: Adequate for Discharge     Problem: Gastrointestinal - Adult  Goal: Maintains or returns to baseline bowel function  Outcome: Adequate for Discharge     Problem: Genitourinary - Adult  Goal: Absence of urinary retention  Outcome: Adequate for Discharge     Problem: Metabolic/Fluid and Electrolytes - Adult  Goal: Electrolytes maintained within normal limits  Outcome: Adequate for Discharge  Goal: Hemodynamic stability and optimal renal function maintained  Outcome: Adequate for Discharge  Goal: Glucose maintained within prescribed range  Outcome: Adequate for Discharge     Problem: Hematologic - Adult  Goal: Maintains hematologic stability  Outcome: Adequate for Discharge     Problem: Chronic Conditions and Co-morbidities  Goal: Patient's chronic conditions and co-morbidity symptoms are monitored and maintained or improved  Outcome: Adequate for Discharge

## 2023-05-01 NOTE — PROGRESS NOTES
Instructed on Lexiscan Stress Test Procedure including possible side effects/ adverse reactions. Patient verbalizes  understanding and denies having any questions . See [address] [place] [address] Cardiology

## 2023-05-01 NOTE — PROGRESS NOTES
[facility]  HEART FAILURE  Progress Note      Admit Date 4/29/2023     Reason for Consult:      Reason for Consultation/Chief Complaint: chest pain    HPI:    Emy Wyatt is a 81 y.o. female with PMH HTN, HUSSAIN on CPAP, DM, HLD, multiple PE on Xarelto, AF, DCCV,ablation and HFpEF admitted with chest pain for the past 2 weeks. She had knee replacement In April, was seen in HF office 2 weeks ago and was sent to infusion clinic for 120mg IV lasix. Echo and ST pending today      Subjective:  Patient is being seen for chest pain. There were no acute overnight cardiac events. Today Ms. Alexander denies shortness of breath, palpitations, or dizziness and tells me chest pressure is less but still c/o fatigue      Review of Systems - General ROS: positive for fatigue  Respiratory ROS: no cough, shortness of breath, or wheezing  Cardiovascular ROS: positive for - chest pain  Gastrointestinal ROS: no abdominal pain, change in bowel habits, or black or bloody stools  Musculoskeletal ROS: negative  Neurological ROS: no TIA or stroke symptoms     Baseline Weight: 250  Wt Readings from Last 3 Encounters:   05/01/23 251 lb 6.4 oz (114 kg)   04/21/23 254 lb 6.4 oz (115.4 kg)   04/18/23 257 lb (116.6 kg)         Cardiac Testing: Echo 5/1/23  ST 5/1/23:    **Myocardial perfusion is normal. Low risk scan. No reversible ischemia. **     Echo:  7/22/22   Summary   Technically difficult study due to body habitus. Definity was administered. Normal left ventricle size, wall thickness and systolic function with an   estimated ejection fraction of 60-65%. Mild concentric left ventricular hypertrophy. No regional wall motion abnormalities are seen. Normal diastolic filling pattern for age. The aortic valve appears sclerotic but opens well. Mild aortic   regurgitation. Mild tricuspid regurgitation. PASP estimated at 30 mmHg.      Myoview:  10/26/22:   Summary   There is normal isotope uptake at stress and rest. There is no

## 2023-05-02 ENCOUNTER — CARE COORDINATION (OUTPATIENT)
Dept: CASE MANAGEMENT | Age: 73
End: 2023-05-02

## 2023-05-02 ENCOUNTER — TELEPHONE (OUTPATIENT)
Dept: CARDIOLOGY CLINIC | Age: 73
End: 2023-05-02

## 2023-05-02 NOTE — CARE COORDINATION
[facility] Care Transitions Initial Follow Up Call    Call within 2 business days of discharge: Yes    First attempt at 24 hour discharge call, no answer, CTN left VM with contact information and request for return call. CTN will continue with outreach call attempts.     Follow Up  Future Appointments   Date Time Provider Abhishek Uribe   5/4/2023  2:00 PM ROOM 1 - [facility]   5/10/2023 11:00 AM MD KANU Bravo IM [place] - DYD   5/10/2023  1:30 PM SCHEDULE, [place] DEVICE CHECK FF Cardio MMA   5/10/2023  1:45 PM Whitney Schilder, MD FF Cardio MMA   5/15/2023  1:30 PM MD KANU Bravo IM [place] - DYD   5/22/2023  2:15 PM BRICE Sánchez - CNP FF Cardio MMA   6/1/2023 12:00 PM SCHEDULE, [place] REMOTE TRANSMISSION FF Cardio MMA   6/12/2023  1:00 PM BRICE Hernandez FF SLEEP MED [facility]   7/3/2023 11:15 AM SCHEDULE, [place] REMOTE TRANSMISSION FF Cardio MMA   7/14/2023 11:15 AM Noe Singleton MD FF Ortho MMA   7/21/2023 10:30 AM ECHO ROOM 1 [facility]   7/21/2023 11:30 AM Sabine Rutledge MD FF Cardio MMA   8/7/2023 11:15 AM SCHEDULE, [place] REMOTE TRANSMISSION FF Cardio MMA   9/11/2023 11:15 AM SCHEDULE, [place] REMOTE TRANSMISSION FF Cardio MMA   10/16/2023 11:15 AM SCHEDULE, [place] REMOTE TRANSMISSION FF Cardio MMA   11/17/2023  1:00 PM MD KANU Bravo IM [place] - DYD   11/20/2023 11:15 AM SCHEDULE, [place] REMOTE TRANSMISSION FF Cardio MMA       James Miller, RN

## 2023-05-02 NOTE — DISCHARGE SUMMARY
Physician Discharge Summary       Princess Salazar  1950  MRN: [id number]    Admit Date: 4/29/2023  Discharge Date: 5/1/2023  6:51 PM    Discharge Unit: [address] [id number]  Dept: [phone]  Loc: [phone]    Attending MD: No att. providers found  Discharging MD: Noemi Crocker MD  PCP: Paddy Navarro MD [address] [place] [address] [phone]      Admission Diagnosis: Chest pain [R07.9]  Acute chest pain [R07.9]    Discharge Diagnosis: Chest pain, non cardiac in origin (unable to determine cause)    Full Hospital Problem List:  Active Hospital Problems    Diagnosis Date Noted    Type 2 diabetes mellitus [E11.9] 04/29/2022     Priority: Medium    Chronic diastolic heart failure ([address] [I50.32] 04/30/2023    SOB (shortness of breath) [R06.02] 04/30/2023    Chest pain [R07.9] 04/29/2023    Paroxysmal atrial fibrillation ([address] [I48.0] 11/09/2018    Acute chest pain [R07.9] 01/19/2015    Benign essential HTN [I10] 10/14/2013    Acquired hypothyroidism [E03.9] 07/11/2011           Hospital Course:    Pt was admitted for chest pain. Placed on telemetry and r/o MI pathway. Serial cardiac enzymes were negative. Pt underwent stress test, results of which are negative. Patient is chest pain free at time of discharge  At this time, etiology of the chest pain is unknown    changes are made to patients medications. Colchicine trial started for elevated CRP    Pt is to follow up with PMD in 1-2 weeks time.   Cards in 4wk    Consults made during Hospitalization:  IP CONSULT TO PRIMARY CARE PROVIDER  IP CONSULT TO CARDIOLOGY    Treatment team at time of Discharge: Treatment Team: Consulting Physician: Paddy Navarro MD; Consulting Physician: Noemi Crocker MD; Consulting Physician: Deniz Naranjo MD; : Alyssa Singh RN    Condition at discharge: Stable    Imaging Results:  XR KNEE LEFT (3 VIEWS)    Result Date:

## 2023-05-02 NOTE — TELEPHONE ENCOUNTER
Is tomorrow the last infusion? If so, I would go ahead and complete the infusion to make sure the bone marrow is sufficiently saturated with iron.   ERICKA

## 2023-05-02 NOTE — TELEPHONE ENCOUNTER
Pt is asking if she should keep her Iron Infusion appt on 5/3 at 1PM or cancel. She was in the Hospital last week and was seen by ERICKA and states her numbers have changed and now is uncertain if Iron Infusion is still needed. Please advise pt before appt at 1PM tomorrow.

## 2023-05-03 ENCOUNTER — TELEPHONE (OUTPATIENT)
Dept: INTERNAL MEDICINE CLINIC | Age: 73
End: 2023-05-03

## 2023-05-03 ENCOUNTER — CARE COORDINATION (OUTPATIENT)
Dept: CASE MANAGEMENT | Age: 73
End: 2023-05-03

## 2023-05-03 PROBLEM — I48.19 PERSISTENT ATRIAL FIBRILLATION (HCC): Status: ACTIVE | Noted: 2023-05-03

## 2023-05-03 NOTE — CARE COORDINATION
[address] Care Transitions Initial Follow Up Call    Call within 2 business days of discharge: Yes      Second and final attempt at 24 hour discharge call, no answer, CTN left  with contact information and request for return call. CTN will send message to patient in My Chart, resolve episode & remain available.     Follow Up  Future Appointments   Date Time Provider Abhishek Uribe   5/4/2023  2:00 PM ROOM 1 - [facility]   5/10/2023 11:00 AM MD KANU Cross IM [place] - DY[facility]   5/10/2023  1:30 PM SCHEDULE, [place] DEVICE CHECK FF Cardio MMA   5/10/2023  1:45 PM Viky Frazier MD FF Cardio MMA   5/15/2023  1:30 PM MD KANU Cross IM [place] - [facility]   5/22/2023  2:15 PM BRICE Gunter - CNP FF Cardio MMA   6/1/2023 12:00 PM SCHEDULE, [place] REMOTE TRANSMISSION FF Cardio MMA   6/12/2023  1:00 PM BRICE Lynn FF SLEEP MED MMA   7/3/2023 11:15 AM SCHEDULE, [place] REMOTE TRANSMISSION FF Cardio MMA   7/14/2023 11:15 AM Claus Dias MD FF Ortho MMA   7/21/2023 10:30 AM ECHO ROOM 1 [facility] ECHO [facility]   7/21/2023 11:30 AM Christopher Sarkar MD FF Cardio MMA   8/7/2023 11:15 AM SCHEDULE, [place] REMOTE TRANSMISSION FF Cardio MMA   9/11/2023 11:15 AM SCHEDULE, [place] REMOTE TRANSMISSION FF Cardio MMA   10/16/2023 11:15 AM SCHEDULE, [place] REMOTE TRANSMISSION FF Cardio MMA   11/17/2023  1:00 PM MD KANU Cross IM [place] - [facility]   11/20/2023 11:15 AM SCHEDULE, [place] REMOTE TRANSMISSION FF Cardio MMA     Saurav Santizo RN

## 2023-05-04 ENCOUNTER — HOSPITAL ENCOUNTER (OUTPATIENT)
Dept: ONCOLOGY | Age: 73
Setting detail: INFUSION SERIES
Discharge: HOME | End: 2023-05-04
Payer: MEDICARE

## 2023-05-04 VITALS
TEMPERATURE: 97.8 F | RESPIRATION RATE: 16 BRPM | SYSTOLIC BLOOD PRESSURE: 133 MMHG | DIASTOLIC BLOOD PRESSURE: 55 MMHG | HEART RATE: 69 BPM

## 2023-05-04 DIAGNOSIS — D50.9 IRON DEFICIENCY ANEMIA, UNSPECIFIED IRON DEFICIENCY ANEMIA TYPE: Primary | ICD-10-CM

## 2023-05-04 PROCEDURE — 6360000002 HC RX W HCPCS: Performed by: NURSE PRACTITIONER

## 2023-05-04 PROCEDURE — 99211 OFF/OP EST MAY X REQ PHY/QHP: CPT

## 2023-05-04 PROCEDURE — 96365 THER/PROPH/DIAG IV INF INIT: CPT

## 2023-05-04 PROCEDURE — 2580000003 HC RX 258: Performed by: NURSE PRACTITIONER

## 2023-05-04 RX ORDER — SODIUM CHLORIDE 0.9 % (FLUSH) 0.9 %
5-40 SYRINGE (ML) INJECTION PRN
Status: DISCONTINUED | OUTPATIENT
Start: 2023-05-04 | End: 2023-05-05 | Stop reason: HOSPADM

## 2023-05-04 RX ORDER — SODIUM CHLORIDE 9 MG/ML
5-250 INJECTION, SOLUTION INTRAVENOUS PRN
Status: DISCONTINUED | OUTPATIENT
Start: 2023-05-04 | End: 2023-05-05 | Stop reason: HOSPADM

## 2023-05-04 RX ORDER — SODIUM CHLORIDE 0.9 % (FLUSH) 0.9 %
5-40 SYRINGE (ML) INJECTION PRN
Status: CANCELLED | OUTPATIENT
Start: 2023-05-10

## 2023-05-04 RX ORDER — SODIUM CHLORIDE 9 MG/ML
5-250 INJECTION, SOLUTION INTRAVENOUS PRN
Status: CANCELLED | OUTPATIENT
Start: 2023-05-10

## 2023-05-04 RX ADMIN — Medication 10 ML: at 14:08

## 2023-05-04 RX ADMIN — FERRIC CARBOXYMALTOSE INJECTION 750 MG: 50 INJECTION, SOLUTION INTRAVENOUS at 14:10

## 2023-05-04 RX ADMIN — SODIUM CHLORIDE 100 ML/HR: 9 INJECTION, SOLUTION INTRAVENOUS at 14:09

## 2023-05-04 NOTE — PROGRESS NOTES
To clinic for 2nd dose of Injectafer. Tolerated infusion well over 30 minutes. Given information about the medication including possible side effects. Verbalized understanding. To follow up with provider for any further plan of care.

## 2023-05-04 NOTE — DISCHARGE INSTRUCTIONS
Mediterranean diet may also include red wine with your meal--1 glass each day for women and up to 2 glasses a day for men. Tips for eating at home  Use herbs, spices, garlic, lemon zest, and citrus juice instead of salt to add flavor to foods. Add avocado slices to your sandwich instead of lancaster. Have fish for lunch or dinner instead of red meat. Brush the fish with olive oil, and broil or grill it. Sprinkle your salad with seeds or nuts instead of cheese. Cook with olive or canola oil instead of butter or oils that are high in saturated fat. Switch from 2% milk or whole milk to 1% or fat-free milk. Dip raw vegetables in a vinaigrette dressing or hummus instead of dips made from mayonnaise or sour cream.  Have a piece of fruit for dessert instead of a piece of cake. Try baked apples, or have some dried fruit. Tips for eating out  Try broiled, grilled, baked, or poached fish instead of having it fried or breaded. Ask your  to have your meals prepared with olive oil instead of butter. Order dishes made with marinara sauce or sauces made from olive oil. Avoid sauces made from cream or mayonnaise. Choose whole-grain breads, whole wheat pasta and pizza crust, brown rice, beans, and lentils. Cut back on butter or margarine on bread. Instead, you can dip your bread in a small amount of olive oil. Ask for a side salad or grilled vegetables instead of french fries or chips. Where can you learn more? Go to http://www.woods.com/ and enter O407 to learn more about \"Learning About the Mediterranean Diet. \"  Current as of: May 9, 2022               Content Version: 13.6  © 9109-1546 [employer]wise, Incorporated. Care instructions adapted under license by [facility] ([facility]). If you have questions about a medical condition or this instruction, always ask your healthcare professional. Magaly Elmore any warranty or liability for your use of this information.

## 2023-05-05 ENCOUNTER — TELEPHONE (OUTPATIENT)
Dept: CARDIOLOGY CLINIC | Age: 73
End: 2023-05-05

## 2023-05-05 ENCOUNTER — CARE COORDINATION (OUTPATIENT)
Dept: CASE MANAGEMENT | Age: 73
End: 2023-05-05

## 2023-05-05 DIAGNOSIS — I50.31 ACUTE DIASTOLIC HEART FAILURE (HCC): Primary | ICD-10-CM

## 2023-05-05 PROCEDURE — 1111F DSCHRG MED/CURRENT MED MERGE: CPT | Performed by: INTERNAL MEDICINE

## 2023-05-05 NOTE — TELEPHONE ENCOUNTER
I replied back to patient through mychart.   ERICKA
Pt called to inform the office that her left leg is swollen and she has gained 5 lbs. Pt is wearing compression stocking. Pt is wanting to know if she can increase her furosemide from 40mg to 60mg to   the swelling in her left leg. Are is there any suggestion that ERICKA would have for the swelling. Please advise.   Thank you
Specialty Care (Immediate)...

## 2023-05-05 NOTE — CARE COORDINATION
[facility] Care Transitions Initial Follow Up Call    Call within 2 business days of discharge: No, patient did not answer first two attempts at 24 hour outreach, CTN resolved episode, patient called CTN and left VM. Patient Current Location:  Home: [facility] [address] [place] [address]    Care Transition Nurse contacted the patient by telephone to perform post hospital discharge assessment. Verified name and  with patient as identifiers. Provided introduction to self, and explanation of the Care Transition Nurse role. Patient: Neftaly Saleh Patient : 1950   MRN: [id number]  Reason for Admission: SOB, HF, chest pain  Discharge Date: 23 RARS: Readmission Risk Score: 9.8      Last Discharge [address]       Date Complaint Diagnosis Description Type Department Provider    23 Chest Pain Acute chest pain ED to Hosp-Admission (Discharged) (ADMITTED) [facility] Krunal Ruiz MD; Tabitha Suarez. .. Was this an external facility discharge? No Discharge Facility:     Challenges to be reviewed by the provider   Additional needs identified to be addressed with provider: No  none               Method of communication with provider: none. Patient received letter from CTN in My Chart and returned call. She reports that she has some increased swelling in LLE, some SOB, weight gain from Monday 249.8#, today 253.2#. She is taking all of her medications and adhering to fluid and sodium restrictions. She wanted to know if she can increase the diuretic. CTN referred her Dr. Villar Figures, explained that there will need to be a doctor's order for that. She verbalized understanding and will call cardiology office now. CTN confirmed that she has contact information for cardiology office. CTN will monitor chart and remain available. Care Transition Nurse reviewed discharge instructions and red flags with patient who verbalized understanding.  The patient was given an opportunity to ask

## 2023-05-09 NOTE — PROGRESS NOTES
Patient comes in for interrogation of their implanted loop recorder. Interrogation shows Battery Status GOOD. 1 Tachy episodes noted on 3/1/2023 lasting 7 seconds. Implanted for AF management/palpitations. Patient remains on Xarelto and Cardizem. Please see interrogation for more detail. Patient will see NPSR today and we will continue to follow the Patient remotely.

## 2023-05-10 ENCOUNTER — PATIENT MESSAGE (OUTPATIENT)
Dept: CARDIOLOGY CLINIC | Age: 73
End: 2023-05-10

## 2023-05-10 ENCOUNTER — NURSE ONLY (OUTPATIENT)
Dept: CARDIOLOGY CLINIC | Age: 73
End: 2023-05-10

## 2023-05-10 ENCOUNTER — OFFICE VISIT (OUTPATIENT)
Dept: INTERNAL MEDICINE CLINIC | Age: 73
End: 2023-05-10

## 2023-05-10 ENCOUNTER — OFFICE VISIT (OUTPATIENT)
Dept: CARDIOLOGY CLINIC | Age: 73
End: 2023-05-10
Payer: MEDICARE

## 2023-05-10 VITALS
OXYGEN SATURATION: 97 % | SYSTOLIC BLOOD PRESSURE: 124 MMHG | DIASTOLIC BLOOD PRESSURE: 70 MMHG | BODY MASS INDEX: 42.57 KG/M2 | WEIGHT: 248 LBS | TEMPERATURE: 97.4 F | HEART RATE: 83 BPM

## 2023-05-10 VITALS
WEIGHT: 250.8 LBS | HEIGHT: 64 IN | BODY MASS INDEX: 42.82 KG/M2 | OXYGEN SATURATION: 96 % | SYSTOLIC BLOOD PRESSURE: 120 MMHG | HEART RATE: 79 BPM | DIASTOLIC BLOOD PRESSURE: 56 MMHG

## 2023-05-10 DIAGNOSIS — I50.32 CHRONIC DIASTOLIC HEART FAILURE (HCC): ICD-10-CM

## 2023-05-10 DIAGNOSIS — R53.83 OTHER FATIGUE: ICD-10-CM

## 2023-05-10 DIAGNOSIS — I48.0 PAROXYSMAL ATRIAL FIBRILLATION (HCC): ICD-10-CM

## 2023-05-10 DIAGNOSIS — Z09 HOSPITAL DISCHARGE FOLLOW-UP: Primary | ICD-10-CM

## 2023-05-10 DIAGNOSIS — I48.19 PERSISTENT ATRIAL FIBRILLATION (HCC): Primary | ICD-10-CM

## 2023-05-10 DIAGNOSIS — M79.10 MYALGIA: ICD-10-CM

## 2023-05-10 DIAGNOSIS — I10 BENIGN ESSENTIAL HTN: ICD-10-CM

## 2023-05-10 DIAGNOSIS — R06.02 SOB (SHORTNESS OF BREATH): ICD-10-CM

## 2023-05-10 DIAGNOSIS — I10 BENIGN ESSENTIAL HTN: Chronic | ICD-10-CM

## 2023-05-10 DIAGNOSIS — Z45.09 ENCOUNTER FOR ELECTRONIC ANALYSIS OF REVEAL EVENT RECORDER: ICD-10-CM

## 2023-05-10 DIAGNOSIS — E66.01 OBESITY, CLASS III, BMI 40-49.9 (MORBID OBESITY) (HCC): ICD-10-CM

## 2023-05-10 DIAGNOSIS — G47.33 OSA (OBSTRUCTIVE SLEEP APNEA): Chronic | ICD-10-CM

## 2023-05-10 LAB
ANION GAP SERPL CALCULATED.3IONS-SCNC: 10 MMOL/L (ref 3–16)
BUN SERPL-MCNC: 14 MG/DL (ref 7–20)
CALCIUM SERPL-MCNC: 9.6 MG/DL (ref 8.3–10.6)
CHLORIDE SERPL-SCNC: 99 MMOL/L (ref 99–110)
CO2 SERPL-SCNC: 26 MMOL/L (ref 21–32)
CREAT SERPL-MCNC: 0.6 MG/DL (ref 0.6–1.2)
DEPRECATED RDW RBC AUTO: 15.4 % (ref 12.4–15.4)
ERYTHROCYTE [SEDIMENTATION RATE] IN BLOOD BY WESTERGREN METHOD: 10 MM/HR (ref 0–30)
GFR SERPLBLD CREATININE-BSD FMLA CKD-EPI: >60 ML/MIN/{1.73_M2}
GLUCOSE SERPL-MCNC: 120 MG/DL (ref 70–99)
HCT VFR BLD AUTO: 38.7 % (ref 36–48)
HGB BLD-MCNC: 13 G/DL (ref 12–16)
MCH RBC QN AUTO: 31.5 PG (ref 26–34)
MCHC RBC AUTO-ENTMCNC: 33.5 G/DL (ref 31–36)
MCV RBC AUTO: 94.2 FL (ref 80–100)
NT-PROBNP SERPL-MCNC: 138 PG/ML (ref 0–124)
PLATELET # BLD AUTO: 224 K/UL (ref 135–450)
PMV BLD AUTO: 9.5 FL (ref 5–10.5)
POTASSIUM SERPL-SCNC: 4 MMOL/L (ref 3.5–5.1)
RBC # BLD AUTO: 4.11 M/UL (ref 4–5.2)
SODIUM SERPL-SCNC: 135 MMOL/L (ref 136–145)
WBC # BLD AUTO: 4.7 K/UL (ref 4–11)

## 2023-05-10 PROCEDURE — 3074F SYST BP LT 130 MM HG: CPT | Performed by: INTERNAL MEDICINE

## 2023-05-10 PROCEDURE — 3017F COLORECTAL CA SCREEN DOC REV: CPT | Performed by: INTERNAL MEDICINE

## 2023-05-10 PROCEDURE — 93000 ELECTROCARDIOGRAM COMPLETE: CPT | Performed by: INTERNAL MEDICINE

## 2023-05-10 PROCEDURE — G8427 DOCREV CUR MEDS BY ELIG CLIN: HCPCS | Performed by: INTERNAL MEDICINE

## 2023-05-10 PROCEDURE — 1111F DSCHRG MED/CURRENT MED MERGE: CPT | Performed by: INTERNAL MEDICINE

## 2023-05-10 PROCEDURE — 1036F TOBACCO NON-USER: CPT | Performed by: INTERNAL MEDICINE

## 2023-05-10 PROCEDURE — G8399 PT W/DXA RESULTS DOCUMENT: HCPCS | Performed by: INTERNAL MEDICINE

## 2023-05-10 PROCEDURE — 3078F DIAST BP <80 MM HG: CPT | Performed by: INTERNAL MEDICINE

## 2023-05-10 PROCEDURE — 1123F ACP DISCUSS/DSCN MKR DOCD: CPT | Performed by: INTERNAL MEDICINE

## 2023-05-10 PROCEDURE — G8417 CALC BMI ABV UP PARAM F/U: HCPCS | Performed by: INTERNAL MEDICINE

## 2023-05-10 PROCEDURE — 99214 OFFICE O/P EST MOD 30 MIN: CPT | Performed by: INTERNAL MEDICINE

## 2023-05-10 PROCEDURE — 1090F PRES/ABSN URINE INCON ASSESS: CPT | Performed by: INTERNAL MEDICINE

## 2023-05-10 RX ORDER — PREDNISONE 1 MG/1
5 TABLET ORAL DAILY
Qty: 30 TABLET | Refills: 1 | Status: SHIPPED | OUTPATIENT
Start: 2023-05-10 | End: 2023-06-09

## 2023-05-10 ASSESSMENT — PATIENT HEALTH QUESTIONNAIRE - PHQ9
10. IF YOU CHECKED OFF ANY PROBLEMS, HOW DIFFICULT HAVE THESE PROBLEMS MADE IT FOR YOU TO DO YOUR WORK, TAKE CARE OF THINGS AT HOME, OR GET ALONG WITH OTHER PEOPLE: 0
2. FEELING DOWN, DEPRESSED OR HOPELESS: 0
8. MOVING OR SPEAKING SO SLOWLY THAT OTHER PEOPLE COULD HAVE NOTICED. OR THE OPPOSITE, BEING SO FIGETY OR RESTLESS THAT YOU HAVE BEEN MOVING AROUND A LOT MORE THAN USUAL: 0
SUM OF ALL RESPONSES TO PHQ9 QUESTIONS 1 & 2: 2
3. TROUBLE FALLING OR STAYING ASLEEP: 0
9. THOUGHTS THAT YOU WOULD BE BETTER OFF DEAD, OR OF HURTING YOURSELF: 0
1. LITTLE INTEREST OR PLEASURE IN DOING THINGS: 2
4. FEELING TIRED OR HAVING LITTLE ENERGY: 3
SUM OF ALL RESPONSES TO PHQ QUESTIONS 1-9: 5
SUM OF ALL RESPONSES TO PHQ QUESTIONS 1-9: 5
7. TROUBLE CONCENTRATING ON THINGS, SUCH AS READING THE NEWSPAPER OR WATCHING TELEVISION: 0
SUM OF ALL RESPONSES TO PHQ QUESTIONS 1-9: 5
6. FEELING BAD ABOUT YOURSELF - OR THAT YOU ARE A FAILURE OR HAVE LET YOURSELF OR YOUR FAMILY DOWN: 0
5. POOR APPETITE OR OVEREATING: 0
SUM OF ALL RESPONSES TO PHQ QUESTIONS 1-9: 5

## 2023-05-10 NOTE — PATIENT INSTRUCTIONS
So, you are taking Jardiance (Empagliflozin)? Here are some tips on lessening the cost of medications:    Commercial Insurance:  $10 COPAY CARD  Your actual cost or co-pay will depend on your insurance plan's drug   coverage. Each plan is different and will determine your out-of-pocket cost.   On average, 88%, or 9 out of 10 patients, will pay between $0-$50 per   month for Annie Forth, and the remaining 12% will pay an average of $232   per month. 1  With the [employer], you could pay as little as $10 per month. Medicare Part D Coverage: For patients with Medicare Part D coverage, about 59%, or 5.9 out of 10   patients will pay between $0-$50 a month for Annie Forth, with the   remaining 41% averaging about $169 a month.1 You should know that your   out-of-pocket costs could vary throughout the year depending on which   phase of the Part D benefit (eg,\"donut hole\") you are currently in. For patients with Medicare Part D who also qualify for the Extra Help   program2 the average cost is between $4 and $10 a MHSKN.4,7    Application: [employer]  Call [phone] to have application mailed to you. Always remember-shop around as pharmacies can differ quite a lot on cost.    [employer] com  GoodRX. com  Additional Affordability Resources  Other Resources   Here are some additional resources that may help you gain access to the medicines or services you need. This is not a complete list and is provided as a public service for health care providers, caregivers, and low-income patients. Area Agencies on Aging (ElderCare)  [facility] agencies on aging may be able to help patients age 72 years and older who cannot afford their medicines. To contact your local area agency on aging, call 3-693.610.8396 or visit www.eldercare.acl.gov.   Association of Clinicians for the Underserved (ACU)  The ACU is a nonprofit organization

## 2023-05-10 NOTE — PROGRESS NOTES
Post-Discharge Transitional Care  Follow Up      Sesar Castro   YOB: 1950    Date of Office Visit:  5/10/2023  Date of Hospital Admission: 4/29/23  Date of Hospital Discharge: 5/1/23  Risk of hospital readmission (high >=14%. Medium >=10%) :Readmission Risk Score: 9.8      Care management risk score Rising risk (score 2-5) and Complex Care (Scores >=6): No Risk Score On File     Non face to face  following discharge, date last encounter closed (first attempt may have been earlier): 05/05/2023    Call initiated 2 business days of discharge: No    ASSESSMENT/PLAN:   Hospital discharge follow-up  -     VA DISCHARGE MEDS RECONCILED W/ CURRENT OUTPATIENT MED LIST  Paroxysmal atrial fibrillation ([facility] [address]  -     Ambulatory Referral to Care Management with Device (Remote Patient Monitoring)  Myalgia  -     Sedimentation Rate; Future  -     VIKI Reflex to Antibody Cascade; Future  -     ANTI-DNA ANTIBODY, DOUBLE-STRANDED; Future  -     predniSONE (DELTASONE) 5 MG tablet; Take 1 tablet by mouth daily, Disp-30 tablet, R-1Normal (Patient taking differently: 5 mg, Oral, DAILY, Pt didn't started it yet)  Other fatigue  -     Sedimentation Rate; Future  -     VIKI Reflex to Antibody Cascade; Future  -     ANTI-DNA ANTIBODY, DOUBLE-STRANDED; Future      Medical Decision Making: high complexity  Return in 3 months (on 8/10/2023). On this date 5/10/2023 I have spent 30 minutes reviewing previous notes, test results and face to face with the patient discussing the diagnosis and importance of compliance with the treatment plan as well as documenting on the day of the visit. Subjective:   HPI:  Follow up of Hospital problems/diagnosis(es): Chest pain due to pericarditis    Inpatient course: Discharge summary reviewed- see chart. Interval history/Current status: Patient comes the office today for hospital follow-up related to admission due to chest pain.   Patient was informed that she had pericarditis and was

## 2023-05-11 NOTE — TELEPHONE ENCOUNTER
From: Abbey Butler  To: Dr. Vinod Duque  Sent: 5/10/2023 10:48 PM EDT  Subject: taking prednisone and Jardiance together    Dr. Gurinder Kee,  Is it safe for me to take both prednisone and Jardiance at the same time?   Thanks,  Claudia Robertson

## 2023-05-11 NOTE — TELEPHONE ENCOUNTER
From: Lauren Bose  To: Dr. Lisa Wasserman  Sent: 5/10/2023 8:51 PM EDT  Subject: exercise    Hello Dr. Db Vazquez,  I forgot to ask you today about exercise with this pericarditis condition. I have 2 girlfriends who want to take me to the [facility] to walk in the warm water swimming pool. Can I safely do that? Is there any other exercise activity that you would recommend at this time?   Thank you ,  Maggie Espitia

## 2023-05-12 LAB
ANA SER QL IA: NEGATIVE
DSDNA AB SER-ACNC: <1 IU/ML (ref 0–9)

## 2023-05-17 ASSESSMENT — ENCOUNTER SYMPTOMS
SHORTNESS OF BREATH: 1
GASTROINTESTINAL NEGATIVE: 1
WHEEZING: 1

## 2023-05-22 ENCOUNTER — OFFICE VISIT (OUTPATIENT)
Dept: CARDIOLOGY CLINIC | Age: 73
End: 2023-05-22
Payer: MEDICARE

## 2023-05-22 VITALS
HEIGHT: 64 IN | OXYGEN SATURATION: 99 % | SYSTOLIC BLOOD PRESSURE: 130 MMHG | DIASTOLIC BLOOD PRESSURE: 82 MMHG | HEART RATE: 85 BPM | WEIGHT: 253 LBS | BODY MASS INDEX: 43.19 KG/M2

## 2023-05-22 DIAGNOSIS — I10 BENIGN ESSENTIAL HTN: Chronic | ICD-10-CM

## 2023-05-22 DIAGNOSIS — I30.0 ACUTE IDIOPATHIC PERICARDITIS: ICD-10-CM

## 2023-05-22 DIAGNOSIS — I48.0 PAROXYSMAL ATRIAL FIBRILLATION (HCC): Chronic | ICD-10-CM

## 2023-05-22 DIAGNOSIS — I50.32 CHRONIC DIASTOLIC HEART FAILURE (HCC): Primary | ICD-10-CM

## 2023-05-22 PROCEDURE — G8417 CALC BMI ABV UP PARAM F/U: HCPCS | Performed by: NURSE PRACTITIONER

## 2023-05-22 PROCEDURE — 99214 OFFICE O/P EST MOD 30 MIN: CPT | Performed by: NURSE PRACTITIONER

## 2023-05-22 PROCEDURE — 3079F DIAST BP 80-89 MM HG: CPT | Performed by: NURSE PRACTITIONER

## 2023-05-22 PROCEDURE — G8399 PT W/DXA RESULTS DOCUMENT: HCPCS | Performed by: NURSE PRACTITIONER

## 2023-05-22 PROCEDURE — 3017F COLORECTAL CA SCREEN DOC REV: CPT | Performed by: NURSE PRACTITIONER

## 2023-05-22 PROCEDURE — 1123F ACP DISCUSS/DSCN MKR DOCD: CPT | Performed by: NURSE PRACTITIONER

## 2023-05-22 PROCEDURE — 1111F DSCHRG MED/CURRENT MED MERGE: CPT | Performed by: NURSE PRACTITIONER

## 2023-05-22 PROCEDURE — G8427 DOCREV CUR MEDS BY ELIG CLIN: HCPCS | Performed by: NURSE PRACTITIONER

## 2023-05-22 PROCEDURE — 1036F TOBACCO NON-USER: CPT | Performed by: NURSE PRACTITIONER

## 2023-05-22 PROCEDURE — 3075F SYST BP GE 130 - 139MM HG: CPT | Performed by: NURSE PRACTITIONER

## 2023-05-22 PROCEDURE — 1090F PRES/ABSN URINE INCON ASSESS: CPT | Performed by: NURSE PRACTITIONER

## 2023-05-22 NOTE — PATIENT INSTRUCTIONS
Instructions:   Medications: decrease prednisone to 2.5mg once a day for the next week, finish out colchicine and takes extra lasix as needed for wt gain, start jardiance once a day  Labs before OV with Dr. Starlett Simmonds Recommendations: Weigh yourself every day in the morning after urination, call Gisela Osullivan if wt increases 2-3lb in one day or 5lb in one week, Limit sodium to 3000mg/day and fluids to 2L or 64oz/day.    Follow up: as scheduled in July      [place] [id number] [facility]: [phone]

## 2023-05-31 NOTE — PROGRESS NOTES
We received a remote transmission from patient's monitor at home. Remote Linq report shows no arrhythmia recordings. EP physician to review. We will continue to monitor remotely. Implanted for palpitations and AF management. Pt is on Xarelto. End of 31-day monitoring period 6-1-23.

## 2023-06-01 ENCOUNTER — NURSE ONLY (OUTPATIENT)
Dept: CARDIOLOGY CLINIC | Age: 73
End: 2023-06-01
Payer: MEDICARE

## 2023-06-01 DIAGNOSIS — Z45.09 ENCOUNTER FOR ELECTRONIC ANALYSIS OF REVEAL EVENT RECORDER: ICD-10-CM

## 2023-06-01 DIAGNOSIS — R00.1 SYMPTOMATIC BRADYCARDIA: ICD-10-CM

## 2023-06-01 DIAGNOSIS — I48.0 PAROXYSMAL ATRIAL FIBRILLATION (HCC): Chronic | ICD-10-CM

## 2023-06-01 PROCEDURE — G2066 INTER DEVC REMOTE 30D: HCPCS | Performed by: INTERNAL MEDICINE

## 2023-06-01 PROCEDURE — 93298 REM INTERROG DEV EVAL SCRMS: CPT | Performed by: INTERNAL MEDICINE

## 2023-06-05 ENCOUNTER — TELEPHONE (OUTPATIENT)
Dept: CARDIOLOGY CLINIC | Age: 73
End: 2023-06-05

## 2023-06-05 DIAGNOSIS — I50.32 CHRONIC DIASTOLIC HEART FAILURE (HCC): ICD-10-CM

## 2023-06-05 DIAGNOSIS — R06.02 SOB (SHORTNESS OF BREATH): ICD-10-CM

## 2023-06-05 DIAGNOSIS — I30.8 OTHER ACUTE PERICARDITIS: Primary | ICD-10-CM

## 2023-06-06 ENCOUNTER — HOSPITAL ENCOUNTER (OUTPATIENT)
Age: 73
Discharge: HOME | End: 2023-06-06
Payer: MEDICARE

## 2023-06-06 DIAGNOSIS — R53.83 OTHER FATIGUE: ICD-10-CM

## 2023-06-06 DIAGNOSIS — I30.8 OTHER ACUTE PERICARDITIS: ICD-10-CM

## 2023-06-06 DIAGNOSIS — I48.19 PERSISTENT ATRIAL FIBRILLATION (HCC): ICD-10-CM

## 2023-06-06 DIAGNOSIS — I10 BENIGN ESSENTIAL HTN: Chronic | ICD-10-CM

## 2023-06-06 DIAGNOSIS — I50.32 CHRONIC DIASTOLIC HEART FAILURE (HCC): ICD-10-CM

## 2023-06-06 DIAGNOSIS — R06.02 SOB (SHORTNESS OF BREATH): ICD-10-CM

## 2023-06-06 LAB
ANION GAP SERPL CALCULATED.3IONS-SCNC: 13 MMOL/L (ref 3–16)
BUN SERPL-MCNC: 19 MG/DL (ref 7–20)
CALCIUM SERPL-MCNC: 9.3 MG/DL (ref 8.3–10.6)
CHLORIDE SERPL-SCNC: 100 MMOL/L (ref 99–110)
CO2 SERPL-SCNC: 23 MMOL/L (ref 21–32)
CREAT SERPL-MCNC: 0.9 MG/DL (ref 0.6–1.2)
CRP SERPL-MCNC: 4.7 MG/L (ref 0–5.1)
DEPRECATED RDW RBC AUTO: 16.7 % (ref 12.4–15.4)
ERYTHROCYTE [SEDIMENTATION RATE] IN BLOOD BY WESTERGREN METHOD: 10 MM/HR (ref 0–30)
GFR SERPLBLD CREATININE-BSD FMLA CKD-EPI: >60 ML/MIN/{1.73_M2}
GLUCOSE SERPL-MCNC: 123 MG/DL (ref 70–99)
HCT VFR BLD AUTO: 39.1 % (ref 36–48)
HGB BLD-MCNC: 13.4 G/DL (ref 12–16)
MCH RBC QN AUTO: 32.5 PG (ref 26–34)
MCHC RBC AUTO-ENTMCNC: 34.2 G/DL (ref 31–36)
MCV RBC AUTO: 95.1 FL (ref 80–100)
NT-PROBNP SERPL-MCNC: 117 PG/ML (ref 0–124)
PLATELET # BLD AUTO: 224 K/UL (ref 135–450)
PMV BLD AUTO: 8.9 FL (ref 5–10.5)
POTASSIUM SERPL-SCNC: 4.5 MMOL/L (ref 3.5–5.1)
RBC # BLD AUTO: 4.11 M/UL (ref 4–5.2)
SODIUM SERPL-SCNC: 136 MMOL/L (ref 136–145)
TSH SERPL DL<=0.005 MIU/L-ACNC: 1.35 UIU/ML (ref 0.27–4.2)
WBC # BLD AUTO: 4.4 K/UL (ref 4–11)

## 2023-06-06 PROCEDURE — 80048 BASIC METABOLIC PNL TOTAL CA: CPT

## 2023-06-06 PROCEDURE — 84443 ASSAY THYROID STIM HORMONE: CPT

## 2023-06-06 PROCEDURE — 85652 RBC SED RATE AUTOMATED: CPT

## 2023-06-06 PROCEDURE — 86140 C-REACTIVE PROTEIN: CPT

## 2023-06-06 PROCEDURE — 36415 COLL VENOUS BLD VENIPUNCTURE: CPT

## 2023-06-06 PROCEDURE — 83880 ASSAY OF NATRIURETIC PEPTIDE: CPT

## 2023-06-06 PROCEDURE — 85027 COMPLETE CBC AUTOMATED: CPT

## 2023-06-13 ENCOUNTER — TELEPHONE (OUTPATIENT)
Dept: ADMINISTRATIVE | Age: 73
End: 2023-06-13

## 2023-06-13 NOTE — TELEPHONE ENCOUNTER
Submitted PA for Albuterol Sulfate  [facility]  Key: [id number] STATUS: PENDING. Follow up done daily; if no response in three days we will refax for status check. If another three days goes by with no response we will call the insurance for status.

## 2023-06-14 NOTE — TELEPHONE ENCOUNTER
DENIED. LETTER ATTACHED. If this requires a response please respond to the pool. [facility]). Please advise patient thank you.

## 2023-06-19 DIAGNOSIS — J04.0 LARYNGITIS: ICD-10-CM

## 2023-06-19 DIAGNOSIS — Z47.89 AFTERCARE FOLLOWING SURGERY OF THE MUSCULOSKELETAL SYSTEM: ICD-10-CM

## 2023-06-19 DIAGNOSIS — G25.81 RESTLESS LEG SYNDROME: Chronic | ICD-10-CM

## 2023-06-19 DIAGNOSIS — J01.00 ACUTE NON-RECURRENT MAXILLARY SINUSITIS: ICD-10-CM

## 2023-06-19 RX ORDER — HYDROXYZINE HYDROCHLORIDE 25 MG/1
TABLET, FILM COATED ORAL
Qty: 30 TABLET | Refills: 2 | Status: SHIPPED | OUTPATIENT
Start: 2023-06-19

## 2023-06-19 RX ORDER — PRAMIPEXOLE DIHYDROCHLORIDE 0.5 MG/1
TABLET ORAL
Qty: 90 TABLET | Refills: 0 | Status: SHIPPED | OUTPATIENT
Start: 2023-06-19 | End: 2023-07-18 | Stop reason: SDUPTHER

## 2023-06-19 RX ORDER — METHOCARBAMOL 500 MG/1
TABLET, FILM COATED ORAL
Qty: 28 TABLET | Refills: 0 | Status: SHIPPED | OUTPATIENT
Start: 2023-06-19

## 2023-06-19 RX ORDER — DOXYCYCLINE HYCLATE 100 MG
TABLET ORAL
Qty: 14 TABLET | Refills: 0 | OUTPATIENT
Start: 2023-06-19

## 2023-06-19 RX ORDER — TRAMADOL HYDROCHLORIDE 50 MG/1
50 TABLET ORAL EVERY 8 HOURS PRN
Qty: 21 TABLET | Refills: 0 | Status: SHIPPED | OUTPATIENT
Start: 2023-06-19 | End: 2023-06-26

## 2023-06-19 NOTE — TELEPHONE ENCOUNTER
Refill request     Melodie Soha 2/28/23  Robaxin- last filled 4/14/23  Tramadol last filled 4/14/23  Next f/u 7/4/23

## 2023-06-22 ENCOUNTER — OFFICE VISIT (OUTPATIENT)
Dept: PULMONOLOGY | Age: 73
End: 2023-06-22
Payer: MEDICARE

## 2023-06-22 VITALS
BODY MASS INDEX: 42.85 KG/M2 | OXYGEN SATURATION: 96 % | WEIGHT: 251 LBS | SYSTOLIC BLOOD PRESSURE: 124 MMHG | HEIGHT: 64 IN | DIASTOLIC BLOOD PRESSURE: 82 MMHG | HEART RATE: 78 BPM

## 2023-06-22 DIAGNOSIS — E11.9 TYPE 2 DIABETES MELLITUS WITHOUT COMPLICATION, WITHOUT LONG-TERM CURRENT USE OF INSULIN (HCC): ICD-10-CM

## 2023-06-22 DIAGNOSIS — G25.81 RESTLESS LEG SYNDROME: ICD-10-CM

## 2023-06-22 DIAGNOSIS — F32.A ANXIETY AND DEPRESSION: ICD-10-CM

## 2023-06-22 DIAGNOSIS — I48.0 PAROXYSMAL ATRIAL FIBRILLATION (HCC): ICD-10-CM

## 2023-06-22 DIAGNOSIS — G47.33 OSA (OBSTRUCTIVE SLEEP APNEA): Primary | ICD-10-CM

## 2023-06-22 DIAGNOSIS — I50.32 CHRONIC DIASTOLIC HEART FAILURE (HCC): ICD-10-CM

## 2023-06-22 DIAGNOSIS — E03.9 ACQUIRED HYPOTHYROIDISM: ICD-10-CM

## 2023-06-22 DIAGNOSIS — I10 BENIGN ESSENTIAL HTN: ICD-10-CM

## 2023-06-22 DIAGNOSIS — J45.40 MODERATE PERSISTENT ASTHMA WITHOUT COMPLICATION: ICD-10-CM

## 2023-06-22 DIAGNOSIS — F41.9 ANXIETY AND DEPRESSION: ICD-10-CM

## 2023-06-22 DIAGNOSIS — E66.01 OBESITY, CLASS III, BMI 40-49.9 (MORBID OBESITY) (HCC): ICD-10-CM

## 2023-06-22 PROCEDURE — G8417 CALC BMI ABV UP PARAM F/U: HCPCS | Performed by: NURSE PRACTITIONER

## 2023-06-22 PROCEDURE — 3079F DIAST BP 80-89 MM HG: CPT | Performed by: NURSE PRACTITIONER

## 2023-06-22 PROCEDURE — 1123F ACP DISCUSS/DSCN MKR DOCD: CPT | Performed by: NURSE PRACTITIONER

## 2023-06-22 PROCEDURE — 2022F DILAT RTA XM EVC RTNOPTHY: CPT | Performed by: NURSE PRACTITIONER

## 2023-06-22 PROCEDURE — 1036F TOBACCO NON-USER: CPT | Performed by: NURSE PRACTITIONER

## 2023-06-22 PROCEDURE — G8399 PT W/DXA RESULTS DOCUMENT: HCPCS | Performed by: NURSE PRACTITIONER

## 2023-06-22 PROCEDURE — 3044F HG A1C LEVEL LT 7.0%: CPT | Performed by: NURSE PRACTITIONER

## 2023-06-22 PROCEDURE — G8427 DOCREV CUR MEDS BY ELIG CLIN: HCPCS | Performed by: NURSE PRACTITIONER

## 2023-06-22 PROCEDURE — 99214 OFFICE O/P EST MOD 30 MIN: CPT | Performed by: NURSE PRACTITIONER

## 2023-06-22 PROCEDURE — 1090F PRES/ABSN URINE INCON ASSESS: CPT | Performed by: NURSE PRACTITIONER

## 2023-06-22 PROCEDURE — 3074F SYST BP LT 130 MM HG: CPT | Performed by: NURSE PRACTITIONER

## 2023-06-22 PROCEDURE — 3017F COLORECTAL CA SCREEN DOC REV: CPT | Performed by: NURSE PRACTITIONER

## 2023-06-22 ASSESSMENT — SLEEP AND FATIGUE QUESTIONNAIRES
HOW LIKELY ARE YOU TO NOD OFF OR FALL ASLEEP WHILE SITTING AND TALKING TO SOMEONE: 0
HOW LIKELY ARE YOU TO NOD OFF OR FALL ASLEEP WHILE SITTING INACTIVE IN A PUBLIC PLACE: 1
NECK CIRCUMFERENCE (INCHES): 17
HOW LIKELY ARE YOU TO NOD OFF OR FALL ASLEEP WHILE WATCHING TV: 2
HOW LIKELY ARE YOU TO NOD OFF OR FALL ASLEEP WHILE SITTING QUIETLY AFTER LUNCH WITHOUT ALCOHOL: 1
ESS TOTAL SCORE: 11
HOW LIKELY ARE YOU TO NOD OFF OR FALL ASLEEP WHILE LYING DOWN TO REST IN THE AFTERNOON WHEN CIRCUMSTANCES PERMIT: 2
HOW LIKELY ARE YOU TO NOD OFF OR FALL ASLEEP WHILE SITTING AND READING: 1
HOW LIKELY ARE YOU TO NOD OFF OR FALL ASLEEP WHEN YOU ARE A PASSENGER IN A CAR FOR AN HOUR WITHOUT A BREAK: 3
HOW LIKELY ARE YOU TO NOD OFF OR FALL ASLEEP IN A CAR, WHILE STOPPED FOR A FEW MINUTES IN TRAFFIC: 1

## 2023-06-22 NOTE — PROGRESS NOTES
Diagnosis: [x] HUSSAIN (G47.33) [] CSA (G47.31) [] Apnea (G47.30)   Length of Need: [x] 18 Months [] 99 Months [] Other:   Machine (MANDI!): [] Respironics Dream Station   2   Auto [] ResMed AirSense     Auto 11 [] Other:     []  CPAP () [] Bilevel ()   Mode: [] Auto [] Spontaneous    Mode: [] Auto [] Spontaneous             Comfort Settings:      Humidifier: [] Heated ()        [x] Water chamber replacement ()/ 1 per 6 months        Mask:   [x] Nasal () /1 per 3 months [] Full Face () /1 per 3 months   [x] Patient choice -Size and fit mask [] Patient Choice - Size and fit mask   [] Dispense: [] Dispense:   [x] Headgear () / 1 per 3 months [] Headgear () / 1 per 3 months   [x] Replacement Nasal Cushion ()/2 per month [] Interface Replacement ()/1 per month   [x] Replacement Nasal Pillows ()/2 per month         Tubing: [x] Heated ()/1 per 3 months    [] Standard ()/1 per 3 months [] Other:           Filters: [x] Non-disposable ()/1 per 6 months     [x] Ultra-Fine, Disposable ()/2 per month        Miscellaneous: [] Chin Strap ()/ 1 per 6 months [] O2 bleed-in:        LPM   [] Oxymetry on CPAP/Bilevel [x]  Other: Modem: ()         Start Order Date: 23    MEDICAL JUSTIFICATION:  I, the undersigned, certify that the above prescribed supplies are medically necessary for this patients wellbeing. In my opinion, the supplies are both reasonable and necessary in reference to accepted standards of medicalpractice in treatment of this patients condition. Petronakaylan Tabares CNP    NPI: [id number]     Order Signed Date: 23    Jennifer Shone Setty  1950  [address]  [facility]  [phone] (home)   [phone] (mobile)      Insurance Info (confirm with patient if correct):  Payer/Plan Subscr  Sex Relation Sub.  Ins. ID Effective Group Num

## 2023-06-22 NOTE — ASSESSMENT & PLAN NOTE
Chronic - with progression/exacerbation: Reviewed and analyzed results of physiologic download from patient's machine and reviewed with patients. Supplies and parts as needed for the machine. These are medically necessary. Limit caffeine use after 3 PM. Based on the analyzed data, we will make the following change: P min increased to 13 to see if it helps with sleep maintenance. Daytime symptoms,  and nocturia. She is planning on using wedges in the bed so she can sleep in her bed, hoping that it will help with using her machine daily and remaining on the machine longer nightly. She may need the in lab titration for further evaluation, but the patient declined at this time and would like to work on the pressure increase first. She will return in 3 mo after the follow up appointments with her cardiologist and orthopaedic surgeon to check her progress/compliance. We will order the in lab titration study at that time if no improvement or worsening of symptoms. Patient is in agreement. Encouraged her to use machine each night, all night. Encouraged the patient to contact the office with any questions or concerns. Discussed the importance of consistence use of the machine and encouraged consistent use of the machine each night. Also discussed the importance of treating Obstructive Sleep Apnea from a physiological standpoint. Instructed not to drive unless had 4 hours of effective therapy for HUSSAIN the night before. Did review the risks of under or untreated HUSSAIN including, but not limited to, higher risks of motor vehicle accidents, stroke, heart attacks, and death. Patients verbalized understanding and accepts all these risks.

## 2023-06-22 NOTE — ASSESSMENT & PLAN NOTE
Speech Therapy Clinical Swallow Eval Entered On:  8/8/2019 13:56     Performed On:  8/8/2019 13:48  by Sylwia Aden               Diagnosis and Problem   (As Of: 8/8/2019 15:55:50 CDT)   Problems(Active)    Celiac disease (SNOMED CT  :9481608447 )  Name of Problem:   Celiac disease ; Recorder:   Anita Elliott; Confirmation:   Confirmed ; Classification:   History ; Code:   [id number] ; Contributor System:   PowerChart ; Last Updated:   3/21/2014 5:16  ; Life Cycle Date:   3/21/2014 ; Life Cycle Status:   Active ; Vocabulary:   SNOMED CT        Diabetes mellitus (SNOMED CT  :181002012 )  Name of Problem:   Diabetes mellitus ; Recorder:   Anita Elliott; Confirmation:   Confirmed ; Classification:   History ; Code:   [id number] ; Contributor System:   Mobypark ; Last Updated:   3/21/2014 5:16  ; Life Cycle Date:   3/21/2014 ; Life Cycle Status:   Active ; Vocabulary:   SNOMED CT        Pancreatitis (SNOMED CT  :648468211 )  Name of Problem:   Pancreatitis ; Recorder:   Anita Elliott; Confirmation:   Confirmed ; Classification:   History ; Code:   [id number] ; Contributor System:   PowerChart ; Last Updated:   3/21/2014 5:19  ; Life Cycle Date:   3/21/2014 ; Life Cycle Status:   Active ; Vocabulary:   SNOMED CT          ST Patient History   Chart/Pertinent Medical Hx Reviewed :   Yes   Therapy Benefits/Risks Discussed :   Yes   Living Arrangements :   Lives with family   Patient's Prior Level of Function Per :   Patient, Chart   Home Diet Consistency :   General Diet, Thin Liquids   Primary Language :   English   Sylwia Aden 8/8/2019 13:48    Patient Assessment   Orientation :   Oriented x 3   Orientation Comments :   confused, encephalopathy   ST Visual Fields :   Intact   Visual Aids :   None   ST Hearing :   Intact   Pain Symptoms :   No   Sylwia Aden 8/8/2019 13:48    Oral Motor Exam   ST Oral Motor :   Not Impaired   ST Speech Production :   Within normal limits   Sylwia Aden 8/8/2019  Chronic- with progression/exacerbation. Discussed the importance of treating obstructive sleep apnea as part of the management of this disorder. Encouraged consistent use of her machine each night, all night. Discussed that pramipexole is no longer the first line treatment for RLS and concerns for augmentation. She reports worsening of symptoms since her knee surgery but she has not used her machine consistently. At this time, we will continue with the current regimen and re-evaluate when she returns in 3 mo to see if her symptoms improve with consistent use of her machine. If symptoms remains the same or worsen, we will consider switching to other agents such as gabapentin/lyrica at that time. 13:48    Testing Conditions   Present Means of Nutrition :   NPO Pending Eval   ST Reason for Exam :   Upgrade Diet, Eval Swallow Techniques, Assess Swallow Physiology, Determine Safe Swallow Consistencies   ST Test Conditions :   Alert, Follow Instructions   Sylwia Aden 8/8/2019 13:48    Clinical Swallow Evaluation   ST Consistencies Tested :   Thin Liquid By Cup, Thin Liquid By Straw   Oral Phase: Clinical Findings :   Oral skills WFL with good bolus formation/control and no oral stasis   Oral Phase: Causal Factors :   Not applicable   Sylwia Aden 8/8/2019 13:48    Delayed Cough :   Thin Liquids   Sylwia Aden 8/8/2019 13:48    Assessment   Dysphagia Scale :   Yes   Sylwia Aden 8/8/2019 13:48    Assessment :   Order rec'd 2/2 confusion and left lower lobe infiltrate on CXR.  Pt presents with minimal dysphagia only for very large volumes.  Pt with dry cough x1 following large continuous gulps (over 15 swallows in a row).  No clinical s/s of apsiration on any other trials across a total of approx 15 ounces trialed.  Per GI, only thin liquids trailed.    AM-PAC Applied Cognitive Raw Score 8, t-scale score 19.32     Sylwia Aden 8/8/2019 15:54      Assessment: Dysphagia Scale   Reflex timely with all consistencies.  Decreased laryngeal force/elevation may be present.  Patient may exhibit occasional pharyngeal signs (e.g. coughing); however patient does not appear to be at risk for aspiration. :   Mild - Pharyngeal   Sylwia Aden 8/8/2019 13:48    Recommendations and Goals   ST Recommended Diet Consistencies   Liquids :    Thin Liquids  (Comment: clears, per GI [Sylwia Aden 8/8/2019 13:48 ] )             Comment  (Comment: may upgrade per GI  [Sylwia Aden 8/8/2019 13:48 ] )         Sylwia Aden 8/8/2019 13:48          ST Treatment Plan :   Discharge - No Further Treatment   Results Discussed With :   Patient, Nurse   Nurse name :   RN- Sylwia Reddy 8/8/2019 13:48    Education    Patient/Family Education :   Yes   Sylwia Aden 8/8/2019 13:48    General Patient Education Powergrid   Topics :    Swallowing              Individuals Taught :    Patient              Patient Family Preference :    Explanation              Teaching Evaluation :    Verbalizes understanding                Sylwia Aden 8/8/2019 13:48          Learning Style Preference Adult Grid   Patient :   Verbal explanation   Family :   Verbal explanation   Sylwia Aden 8/8/2019 13:48    Barriers to Learning :   Acuity of Illness, Cognitive deficit   Language for Written Material :   English   Sylwia Aden 8/8/2019 13:48

## 2023-06-22 NOTE — PROGRESS NOTES
Oral, DAILY    fluticasone (FLONASE) 50 MCG/ACT nasal spray 1 spray, Each Nostril, DAILY    furosemide (LASIX) 40 mg, Oral, DAILY    Handicap Placard MISC Does not apply, Exp 5 years    hydrOXYzine HCl (ATARAX) 25 MG tablet TAKE ONE TABLET BY MOUTH ONCE NIGHTLY    lisinopril (PRINIVIL;ZESTRIL) 10 MG tablet TAKE ONE TABLET BY MOUTH ONCE NIGHTLY    loratadine-pseudoephedrine (CLARITIN-D 12 HOUR) 5-120 MG per extended release tablet 1 tablet, Oral, 2 TIMES DAILY    Magnesium Citrate 100 MG TABS 1 tablet, Oral, DAILY    melatonin 10 mg, Oral, NIGHTLY    methocarbamol (ROBAXIN) 500 MG tablet TAKE ONE TABLET BY MOUTH TWICE A DAY IN THE MORNING AND AT BEDTIME    Multiple Vitamins-Minerals (THERAPEUTIC MULTIVITAMIN-MINERALS) tablet 1 tablet, Oral, DAILY    potassium chloride (KLOR-CON M) 10 MEQ extended release tablet 10 mEq, Oral, DAILY    pramipexole (MIRAPEX) 0.5 MG tablet TAKE 1 AND 1/2 TABLET BY MOUTH DAILY AT 5 P.M. AND TAKE 1 AND 1/2 TABLET BY MOUTH EVERY NIGHT AT BEDTIME    rivaroxaban (XARELTO) 20 mg, Oral, DAILY, Patient Takes medication at 17:00 pm every evening    spironolactone (ALDACTONE) 25 MG tablet TAKE ONE TABLET BY MOUTH DAILY    tiotropium (SPIRIVA RESPIMAT) 1.25 MCG/ACT AERS inhaler 2 puffs, Inhalation, DAILY    traMADol (ULTRAM) 50 mg, Oral, EVERY 8 HOURS PRN          Vitals:  Weight BMI   Wt Readings from Last 3 Encounters:   06/22/23 251 lb (113.9 kg)   05/22/23 253 lb (114.8 kg)   05/10/23 250 lb 12.8 oz (113.8 kg)    Body mass index is 43.08 kg/m².      BP HR SaO2   BP Readings from Last 3 Encounters:   06/22/23 124/82   06/12/23 134/70   05/22/23 130/82    Pulse Readings from Last 3 Encounters:   06/22/23 78   06/12/23 88   05/22/23 85    SpO2 Readings from Last 3 Encounters:   06/22/23 96%   06/12/23 97%   05/22/23 99%        Electronically signed by Karyle Kub, APRN - CNP on 6/22/2023 at 12:28 PM

## 2023-06-23 ENCOUNTER — PATIENT MESSAGE (OUTPATIENT)
Dept: PULMONOLOGY | Age: 73
End: 2023-06-23

## 2023-07-03 ENCOUNTER — NURSE ONLY (OUTPATIENT)
Dept: CARDIOLOGY CLINIC | Age: 73
End: 2023-07-03
Payer: MEDICARE

## 2023-07-03 DIAGNOSIS — I48.0 PAROXYSMAL ATRIAL FIBRILLATION (HCC): Chronic | ICD-10-CM

## 2023-07-03 DIAGNOSIS — Z45.09 ENCOUNTER FOR ELECTRONIC ANALYSIS OF REVEAL EVENT RECORDER: ICD-10-CM

## 2023-07-03 DIAGNOSIS — R00.1 SYMPTOMATIC BRADYCARDIA: ICD-10-CM

## 2023-07-03 PROCEDURE — 93298 REM INTERROG DEV EVAL SCRMS: CPT | Performed by: INTERNAL MEDICINE

## 2023-07-03 PROCEDURE — G2066 INTER DEVC REMOTE 30D: HCPCS | Performed by: INTERNAL MEDICINE

## 2023-07-03 NOTE — PROGRESS NOTES
We received a remote transmission from patient's monitor at home. Remote Linq report shows no arrhythmia recordings. EP physician to review. We will continue to monitor remotely. Implanted for palpitations and AF management. Pt is on Xarelto. End of 31-day monitoring period 7-3-23.

## 2023-07-07 ENCOUNTER — TELEPHONE (OUTPATIENT)
Dept: ORTHOPEDIC SURGERY | Age: 73
End: 2023-07-07

## 2023-07-14 ENCOUNTER — OFFICE VISIT (OUTPATIENT)
Dept: ORTHOPEDIC SURGERY | Age: 73
End: 2023-07-14
Payer: MEDICARE

## 2023-07-14 VITALS — HEIGHT: 64 IN | BODY MASS INDEX: 42.85 KG/M2 | WEIGHT: 251 LBS

## 2023-07-14 DIAGNOSIS — Z47.89 AFTERCARE FOLLOWING SURGERY OF THE MUSCULOSKELETAL SYSTEM: Primary | ICD-10-CM

## 2023-07-14 PROCEDURE — 1036F TOBACCO NON-USER: CPT | Performed by: ORTHOPAEDIC SURGERY

## 2023-07-14 PROCEDURE — G8427 DOCREV CUR MEDS BY ELIG CLIN: HCPCS | Performed by: ORTHOPAEDIC SURGERY

## 2023-07-14 PROCEDURE — 99213 OFFICE O/P EST LOW 20 MIN: CPT | Performed by: ORTHOPAEDIC SURGERY

## 2023-07-14 PROCEDURE — 1123F ACP DISCUSS/DSCN MKR DOCD: CPT | Performed by: ORTHOPAEDIC SURGERY

## 2023-07-14 PROCEDURE — G8417 CALC BMI ABV UP PARAM F/U: HCPCS | Performed by: ORTHOPAEDIC SURGERY

## 2023-07-14 PROCEDURE — G8399 PT W/DXA RESULTS DOCUMENT: HCPCS | Performed by: ORTHOPAEDIC SURGERY

## 2023-07-14 PROCEDURE — 3017F COLORECTAL CA SCREEN DOC REV: CPT | Performed by: ORTHOPAEDIC SURGERY

## 2023-07-14 PROCEDURE — 1090F PRES/ABSN URINE INCON ASSESS: CPT | Performed by: ORTHOPAEDIC SURGERY

## 2023-07-17 ENCOUNTER — PATIENT MESSAGE (OUTPATIENT)
Dept: CARDIOLOGY CLINIC | Age: 73
End: 2023-07-17

## 2023-07-17 DIAGNOSIS — H66.003 NON-RECURRENT ACUTE SUPPURATIVE OTITIS MEDIA OF BOTH EARS WITHOUT SPONTANEOUS RUPTURE OF TYMPANIC MEMBRANES: Primary | ICD-10-CM

## 2023-07-17 RX ORDER — AZITHROMYCIN 250 MG/1
250 TABLET, FILM COATED ORAL SEE ADMIN INSTRUCTIONS
Qty: 6 TABLET | Refills: 0 | Status: SHIPPED | OUTPATIENT
Start: 2023-07-17 | End: 2023-07-21

## 2023-07-17 NOTE — TELEPHONE ENCOUNTER
From: Neetu Joyner  To: Dr. Pamela Medina  Sent: 7/17/2023 1:41 PM EDT  Subject: prescription for Abe iMrza,  I request a 90 day prescription for Jardiance to be refilled 3 times for a total of 12 months please. I request that the script be a paper script that I can send in to the [employer]. This is a patient assistance program for folks who are taking Jardiance and cannot afford the price of the medication. I will see DR. Faustino Lora on Friday July 21 and would like to  the script at that time. Thank you in advance  Kezia Session  PS:  The Jardiance seems to be helping

## 2023-07-17 NOTE — PROGRESS NOTES
Patient: Sav Harden                  : 1950   MRN: [id number]   Date of Visit: 23     Physician: Christiano Duval MD.     Reason for Visit: Status post TKA     Subjective History of Present Illness:     Sav Harden is here for regularly scheduled follow-up s/p LEFT TKA. Reports they are doing well, occasional aches and pains are controlled with over the counter medications. They do not report fevers, chills or drainage from the incision site    Physical Examination??: ?   General: Patient is alert and oriented x 3 and appears comfortable. Incision is well-approximated, no erythema, fluctuance   Able to perform SLR   ROM 0-110    SILT throughout LE     Radiographs: AP/lateral of the operative knee with well-positioned total knee arthroplasty. No evidence of fracture subluxation or dislocation. No evidence of migration or loosening. Assessment and Plan?: The patient is progressing well approximately 4 months s/p TKA     1. A thorough discussion was had with the patient concerning the ?postoperative course and the patient is in agreement with the plan. 2. Spoke with the patient regarding the use of over-the-counter medications both oral and topical medications for pain control.      3.  I would like her to get to a more extensive physical therapy program to work on strength and exercises for quad she does have a constellation of symptoms suggestive of periarticular tendinitis.  _______________________      Christiano Duval MD

## 2023-07-18 DIAGNOSIS — G25.81 RESTLESS LEG SYNDROME: Chronic | ICD-10-CM

## 2023-07-18 RX ORDER — PRAMIPEXOLE DIHYDROCHLORIDE 0.5 MG/1
TABLET ORAL
Qty: 90 TABLET | Refills: 1 | Status: SHIPPED | OUTPATIENT
Start: 2023-07-18

## 2023-07-19 ENCOUNTER — TELEPHONE (OUTPATIENT)
Dept: CARDIOLOGY CLINIC | Age: 73
End: 2023-07-19

## 2023-07-19 DIAGNOSIS — R06.02 SOB (SHORTNESS OF BREATH): Primary | ICD-10-CM

## 2023-07-19 DIAGNOSIS — I50.32 CHRONIC DIASTOLIC HEART FAILURE (HCC): ICD-10-CM

## 2023-07-21 ENCOUNTER — OFFICE VISIT (OUTPATIENT)
Dept: CARDIOLOGY CLINIC | Age: 73
End: 2023-07-21

## 2023-07-21 ENCOUNTER — OFFICE VISIT (OUTPATIENT)
Dept: INTERNAL MEDICINE CLINIC | Age: 73
End: 2023-07-21

## 2023-07-21 VITALS
OXYGEN SATURATION: 97 % | SYSTOLIC BLOOD PRESSURE: 124 MMHG | BODY MASS INDEX: 42.91 KG/M2 | WEIGHT: 250 LBS | DIASTOLIC BLOOD PRESSURE: 72 MMHG | TEMPERATURE: 97.7 F | HEART RATE: 80 BPM

## 2023-07-21 VITALS
HEART RATE: 78 BPM | BODY MASS INDEX: 42.51 KG/M2 | OXYGEN SATURATION: 95 % | HEIGHT: 64 IN | WEIGHT: 249 LBS | DIASTOLIC BLOOD PRESSURE: 70 MMHG | SYSTOLIC BLOOD PRESSURE: 120 MMHG

## 2023-07-21 DIAGNOSIS — I50.32 CHRONIC DIASTOLIC HEART FAILURE (HCC): Primary | ICD-10-CM

## 2023-07-21 DIAGNOSIS — R06.02 SOB (SHORTNESS OF BREATH): ICD-10-CM

## 2023-07-21 DIAGNOSIS — E78.00 PURE HYPERCHOLESTEROLEMIA: ICD-10-CM

## 2023-07-21 DIAGNOSIS — E11.69 TYPE 2 DIABETES MELLITUS WITH OBESITY (HCC): ICD-10-CM

## 2023-07-21 DIAGNOSIS — E66.9 TYPE 2 DIABETES MELLITUS WITH OBESITY (HCC): ICD-10-CM

## 2023-07-21 DIAGNOSIS — I10 BENIGN ESSENTIAL HTN: ICD-10-CM

## 2023-07-21 DIAGNOSIS — I50.32 CHRONIC DIASTOLIC HEART FAILURE (HCC): ICD-10-CM

## 2023-07-21 DIAGNOSIS — I48.0 PAROXYSMAL ATRIAL FIBRILLATION (HCC): ICD-10-CM

## 2023-07-21 DIAGNOSIS — D04.62 SQUAMOUS CELL CARCINOMA IN SITU OF SKIN OF LEFT UPPER ARM: Primary | ICD-10-CM

## 2023-07-21 DIAGNOSIS — F51.01 PRIMARY INSOMNIA: ICD-10-CM

## 2023-07-21 LAB
ALBUMIN SERPL-MCNC: 4.7 G/DL (ref 3.4–5)
ALBUMIN/GLOB SERPL: 2 {RATIO} (ref 1.1–2.2)
ALP SERPL-CCNC: 102 U/L (ref 40–129)
ALT SERPL-CCNC: 29 U/L (ref 10–40)
ANION GAP SERPL CALCULATED.3IONS-SCNC: 14 MMOL/L (ref 3–16)
AST SERPL-CCNC: 22 U/L (ref 15–37)
BILIRUB SERPL-MCNC: 0.3 MG/DL (ref 0–1)
BUN SERPL-MCNC: 23 MG/DL (ref 7–20)
CALCIUM SERPL-MCNC: 9.7 MG/DL (ref 8.3–10.6)
CHLORIDE SERPL-SCNC: 100 MMOL/L (ref 99–110)
CHOLEST SERPL-MCNC: 203 MG/DL (ref 0–199)
CO2 SERPL-SCNC: 21 MMOL/L (ref 21–32)
CREAT SERPL-MCNC: 0.7 MG/DL (ref 0.6–1.2)
CREAT UR-MCNC: 59.3 MG/DL (ref 28–259)
DEPRECATED RDW RBC AUTO: 16.4 % (ref 12.4–15.4)
GFR SERPLBLD CREATININE-BSD FMLA CKD-EPI: >60 ML/MIN/{1.73_M2}
GLUCOSE SERPL-MCNC: 152 MG/DL (ref 70–99)
HCT VFR BLD AUTO: 41.5 % (ref 36–48)
HDLC SERPL-MCNC: 63 MG/DL (ref 40–60)
HGB BLD-MCNC: 13.7 G/DL (ref 12–16)
LDLC SERPL CALC-MCNC: 119 MG/DL
MAGNESIUM SERPL-MCNC: 2.2 MG/DL (ref 1.8–2.4)
MCH RBC QN AUTO: 31.7 PG (ref 26–34)
MCHC RBC AUTO-ENTMCNC: 33 G/DL (ref 31–36)
MCV RBC AUTO: 96 FL (ref 80–100)
MICROALBUMIN UR DL<=1MG/L-MCNC: <1.2 MG/DL
MICROALBUMIN/CREAT UR: NORMAL MG/G (ref 0–30)
NT-PROBNP SERPL-MCNC: 85 PG/ML (ref 0–124)
PLATELET # BLD AUTO: 292 K/UL (ref 135–450)
PMV BLD AUTO: 9.2 FL (ref 5–10.5)
POTASSIUM SERPL-SCNC: 4.6 MMOL/L (ref 3.5–5.1)
PROT SERPL-MCNC: 7.1 G/DL (ref 6.4–8.2)
RBC # BLD AUTO: 4.32 M/UL (ref 4–5.2)
SODIUM SERPL-SCNC: 135 MMOL/L (ref 136–145)
TRIGL SERPL-MCNC: 106 MG/DL (ref 0–150)
VLDLC SERPL CALC-MCNC: 21 MG/DL
WBC # BLD AUTO: 7.9 K/UL (ref 4–11)

## 2023-07-21 RX ORDER — ZOLPIDEM TARTRATE 10 MG/1
10 TABLET ORAL NIGHTLY PRN
Qty: 30 TABLET | Refills: 1 | Status: SHIPPED | OUTPATIENT
Start: 2023-07-21 | End: 2023-09-19

## 2023-07-21 ASSESSMENT — PATIENT HEALTH QUESTIONNAIRE - PHQ9
SUM OF ALL RESPONSES TO PHQ QUESTIONS 1-9: 0
SUM OF ALL RESPONSES TO PHQ QUESTIONS 1-9: 0
SUM OF ALL RESPONSES TO PHQ9 QUESTIONS 1 & 2: 0
1. LITTLE INTEREST OR PLEASURE IN DOING THINGS: 0
SUM OF ALL RESPONSES TO PHQ QUESTIONS 1-9: 0
SUM OF ALL RESPONSES TO PHQ QUESTIONS 1-9: 0
2. FEELING DOWN, DEPRESSED OR HOPELESS: 0

## 2023-07-21 NOTE — PROGRESS NOTES
[facility]   Advanced Heart Failure/Pulmonary Hypertension  Cardiac Follow up       Barbara Murillo  YOB: 1950     Date of Visit:  7/21/23     No chief complaint on file. History of present illness: Barbara Murillo is a 81 y.o. 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 She original had a PE was treated with xarelto for recommended time and then off it and her RHC revealed pressures no RH elevated pressures. Afterwards she developed AFib and restarted on xarelto. She continues on xarelto and treatment for afib. Echos in July and August (2016) showed RVSP 106-112 without evidence of enlargement in right side of heart. RHC was done 9/2/16 and PA pressure was 24, no evidence of pulmonary hypertension. Since then, her RVSP has decreased and got back to normal. We discussed that we are questioning if the elevated ones by echo may have been due to a false positive test for her. She was seen by Dr Ida Asencio 8/1 and he is managing her hypothyroidism. [place], the thyroid medication was stopped. She was noted to have negative thyroid ultrasound ( July 2018). She wears her CPAP consistently for her HUSSAIN. States she had a reaction to Simvastatin and Livalo with muscle pain. She states she is Prediabetic and has not lost weight. She was in a study for Novavax; she did receive the usual vaccine in May and has felt very fatigued since then. She was admitted 1/13/2020 with palpitations/light-headedness along with SOB and chest pressure. She underwent successful DCCV and was put on Rhythmol -- this caused her HR to drop into the 40's which made her feel bad. She was discharged on diltiazem 240mg qd which causes her to feel fatigued and dizzy. She remains on Xarelto. Her lisinopril was stopped. She had an AF ablation on 3/27/2020. On 4/2/20 she reported that she was back in atrial fib with rates ranging from 80 to 130.   She did not

## 2023-07-21 NOTE — PROGRESS NOTES
[facility]   Advanced Heart Failure/Pulmonary Hypertension  Cardiac Follow up       Ragini Ahn  YOB: 1950     Date of Visit:  7/21/23     Chief Complaint   Patient presents with    6 Month Follow-Up    Congestive Heart Failure       History of present illness: Ragini Ahn is a 81 y.o. 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 She original had a PE was treated with xarelto for recommended time and then off it and her RHC revealed pressures no RH elevated pressures. Afterwards she developed AFib and restarted on xarelto. She continues on xarelto and treatment for afib. Echos in July and August (2016) showed RVSP 106-112 without evidence of enlargement in right side of heart. RHC was done 9/2/16 and PA pressure was 24, no evidence of pulmonary hypertension. Since then, her RVSP has decreased and got back to normal. We discussed that we are questioning if the elevated ones by echo may have been due to a false positive test for her. She was seen by Dr Oksana Myles 8/1 and he is managing her hypothyroidism. [place], the thyroid medication was stopped. She was noted to have negative thyroid ultrasound ( July 2018). She wears her CPAP consistently for her HUSSAIN. States she had a reaction to Simvastatin and Livalo with muscle pain. She states she is Prediabetic and has not lost weight. She was in a study for Novavax; she did receive the usual vaccine in May and has felt very fatigued since then. She was admitted 1/13/2020 with palpitations/light-headedness along with SOB and chest pressure. She underwent successful DCCV and was put on Rhythmol -- this caused her HR to drop into the 40's which made her feel bad. She was discharged on diltiazem 240mg qd which causes her to feel fatigued and dizzy. She remains on Xarelto. Her lisinopril was stopped. She had an AF ablation on 3/27/2020.   On 4/2/20 she reported that she was back

## 2023-07-21 NOTE — PATIENT INSTRUCTIONS
Plan:  1. Labs soon  CBC, BMP, BNP, MG and labs in 6 months FASTING CBC, CMP, BNP, Lipid  2. Continue same medications.    3.    See Dr. Kline Forward in 6 months

## 2023-07-22 LAB
EST. AVERAGE GLUCOSE BLD GHB EST-MCNC: 131.2 MG/DL
HBA1C MFR BLD: 6.2 %

## 2023-07-25 ENCOUNTER — HOSPITAL ENCOUNTER (EMERGENCY)
Age: 73
Discharge: HOME | End: 2023-07-25
Payer: MEDICARE

## 2023-07-25 VITALS
TEMPERATURE: 98.2 F | SYSTOLIC BLOOD PRESSURE: 159 MMHG | OXYGEN SATURATION: 100 % | DIASTOLIC BLOOD PRESSURE: 80 MMHG | HEART RATE: 97 BPM | RESPIRATION RATE: 18 BRPM

## 2023-07-25 DIAGNOSIS — L98.9 SKIN LESION: Primary | ICD-10-CM

## 2023-07-25 PROCEDURE — 99282 EMERGENCY DEPT VISIT SF MDM: CPT

## 2023-07-25 NOTE — ED PROVIDER NOTES
[facility]        Pt Name: Kavitha Liriano  MRN: [id number]  [address] [facility] [place] 1950  Date of evaluation: 7/25/2023  Provider: Suzanna Saldivar PA-C  PCP: Ursula Crocker MD  Note Started: 1:04 PM EDT 7/25/23      ADRYAN. I have evaluated this patient. CHIEF COMPLAINT       Chief Complaint   Patient presents with    Other     Patient in with complaints of having a spot on her left upper arm that her pcp told her to get checked. Derm appt isnt til sept        HISTORY OF PRESENT ILLNESS: 1 or more Elements     History From: patient  Limitations to history : None    Kavitha Liriano is a 81 y.o. female who presents to the emergency department with a chief complaint of a skin lesion on her left arm that she noticed about 2 weeks ago that is slightly gotten bigger. She has scheduled an appointment with a dermatologist but is unable to get in until September. She was worried about the timeframe with this and presented to the emergency department today. States she has noticed some some bruising and bleeding from this but denies redness, nausea, vomiting, change in her chronic shortness of breath with her CHF or any other symptoms associated with this. Denies any history of skin cancer or malignancy in the past.    Nursing Notes were all reviewed and agreed with or any disagreements were addressed in the HPI. REVIEW OF SYSTEMS :      Review of Systems    Positives and Pertinent negatives as per HPI.      SURGICAL HISTORY     Past Surgical History:   Procedure Laterality Date    ATRIAL ABLATION SURGERY      BACK SURGERY  2004    LUMBAR FUSION    CATARACT REMOVAL Bilateral     COLONOSCOPY      EYE SURGERY Right 2010    retinal tear repaired    [address] [place] [address]    right ring finger severe laceration, fracture    HIP ARTHROPLASTY Right 4-19-16    HYSTERECTOMY (CERVIX STATUS UNKNOWN)  1993    JOINT REPLACEMENT Left 2/2/16    left hip    TONSILLECTOMY

## 2023-07-26 ENCOUNTER — HOSPITAL ENCOUNTER (OUTPATIENT)
Dept: PHYSICAL THERAPY | Age: 73
Setting detail: THERAPIES SERIES
Discharge: HOME | End: 2023-07-26

## 2023-07-26 NOTE — PROGRESS NOTES
[address] [facility]     Physical Therapy  Cancellation/No-show Note  Patient Name:  Cruzito Ricks  :  1950   Date:  2023  Cancelled visits to date: 1  No-shows to date: 0    Patient status for today's appointment patient:  [x]  Cancelled  (eval)  []  Rescheduled appointment  []  No-show     Reason given by patient:  []  Patient ill  []  Conflicting appointment  [x]  No transportation    []  Conflict with work  []  No reason given  [x]  Other:  car issues   Comments:      Phone call information:   []  Phone call made today to patient at _ time at number provided:      []  Patient answered, conversation as follows:    []  Patient did not answer, message left as follows:  []  Phone call not made today  [x]  Phone call not needed - pt contacted us to cancel and provided reason for cancellation. Electronically signed by:   David Roy PT DPT ATC

## 2023-08-02 ENCOUNTER — TELEPHONE (OUTPATIENT)
Dept: CARDIOLOGY CLINIC | Age: 73
End: 2023-08-02

## 2023-08-02 NOTE — TELEPHONE ENCOUNTER
Please let he know her paperwork was faxed for jardiance assistance  and if she would like to  the copy of it she can at her convenience

## 2023-08-08 ENCOUNTER — HOSPITAL ENCOUNTER (OUTPATIENT)
Dept: PHYSICAL THERAPY | Age: 73
Setting detail: THERAPIES SERIES
End: 2023-08-08
Payer: MEDICARE

## 2023-08-11 ENCOUNTER — OFFICE VISIT (OUTPATIENT)
Dept: INTERNAL MEDICINE CLINIC | Age: 73
End: 2023-08-11

## 2023-08-11 VITALS
TEMPERATURE: 97.4 F | WEIGHT: 249 LBS | OXYGEN SATURATION: 97 % | DIASTOLIC BLOOD PRESSURE: 82 MMHG | HEART RATE: 90 BPM | BODY MASS INDEX: 42.74 KG/M2 | SYSTOLIC BLOOD PRESSURE: 130 MMHG

## 2023-08-11 DIAGNOSIS — I48.0 PAROXYSMAL ATRIAL FIBRILLATION (HCC): ICD-10-CM

## 2023-08-11 DIAGNOSIS — E11.69 TYPE 2 DIABETES MELLITUS WITH OBESITY (HCC): Primary | ICD-10-CM

## 2023-08-11 DIAGNOSIS — E66.01 OBESITY, CLASS III, BMI 40-49.9 (MORBID OBESITY) (HCC): ICD-10-CM

## 2023-08-11 DIAGNOSIS — E66.9 TYPE 2 DIABETES MELLITUS WITH OBESITY (HCC): Primary | ICD-10-CM

## 2023-08-11 DIAGNOSIS — E03.9 ACQUIRED HYPOTHYROIDISM: Chronic | ICD-10-CM

## 2023-08-11 ASSESSMENT — PATIENT HEALTH QUESTIONNAIRE - PHQ9
1. LITTLE INTEREST OR PLEASURE IN DOING THINGS: 0
9. THOUGHTS THAT YOU WOULD BE BETTER OFF DEAD, OR OF HURTING YOURSELF: 0
SUM OF ALL RESPONSES TO PHQ QUESTIONS 1-9: 2
8. MOVING OR SPEAKING SO SLOWLY THAT OTHER PEOPLE COULD HAVE NOTICED. OR THE OPPOSITE, BEING SO FIGETY OR RESTLESS THAT YOU HAVE BEEN MOVING AROUND A LOT MORE THAN USUAL: 0
7. TROUBLE CONCENTRATING ON THINGS, SUCH AS READING THE NEWSPAPER OR WATCHING TELEVISION: 0
4. FEELING TIRED OR HAVING LITTLE ENERGY: 0
SUM OF ALL RESPONSES TO PHQ QUESTIONS 1-9: 2
SUM OF ALL RESPONSES TO PHQ9 QUESTIONS 1 & 2: 0
SUM OF ALL RESPONSES TO PHQ QUESTIONS 1-9: 2
10. IF YOU CHECKED OFF ANY PROBLEMS, HOW DIFFICULT HAVE THESE PROBLEMS MADE IT FOR YOU TO DO YOUR WORK, TAKE CARE OF THINGS AT HOME, OR GET ALONG WITH OTHER PEOPLE: 0
6. FEELING BAD ABOUT YOURSELF - OR THAT YOU ARE A FAILURE OR HAVE LET YOURSELF OR YOUR FAMILY DOWN: 0
SUM OF ALL RESPONSES TO PHQ QUESTIONS 1-9: 2
5. POOR APPETITE OR OVEREATING: 0
3. TROUBLE FALLING OR STAYING ASLEEP: 2
2. FEELING DOWN, DEPRESSED OR HOPELESS: 0

## 2023-08-12 DIAGNOSIS — G25.81 RESTLESS LEG SYNDROME: Chronic | ICD-10-CM

## 2023-08-14 PROCEDURE — 93298 REM INTERROG DEV EVAL SCRMS: CPT | Performed by: INTERNAL MEDICINE

## 2023-08-14 PROCEDURE — G2066 INTER DEVC REMOTE 30D: HCPCS | Performed by: INTERNAL MEDICINE

## 2023-08-14 NOTE — TELEPHONE ENCOUNTER
Medication:   Requested Prescriptions     Pending Prescriptions Disp Refills    pramipexole (MIRAPEX) 0.5 MG tablet 90 tablet 1     Sig: TAKE 1 AND 1/2 TABLET BY MOUTH DAILY AT 5 P.M. AND TAKE 1 AND 1/2 TABLET BY MOUTH EVERY NIGHT AT BEDTIME     Last Filled:  # 90 with 1 refill on 7/18/23    Last appt: 6/22/2023   Next appt: 9/26/2023    Last OARRS:   RX Monitoring 11/14/2022   Periodic Controlled Substance Monitoring Possible medication side effects, risk of tolerance/dependence & alternative treatments discussed. Chronic Pain > 50 MEDD Considered consultation with a specialist.   Chronic Pain > 80 MEDD Consulted with a specialist.   Chronic Pain > 120 MEDD Engaged a pain medicine specialist as a prescriber or consultant.

## 2023-08-14 NOTE — TELEPHONE ENCOUNTER
The prescription written on 7/18/23 was sent with one refill. She should be able to fill it one more time before needing another prescription. When it is due, I can re-write the prescription for 90 days as long as it is appropriate. Because she is on high dose of mirapex and restless leg syndrome not well controlled, I would like to talk to her about trial of other medication. She can keep the current appointment scheduled at the end of September, or she can return sooner if possible.

## 2023-08-15 ENCOUNTER — HOSPITAL ENCOUNTER (OUTPATIENT)
Dept: PHYSICAL THERAPY | Age: 73
Setting detail: THERAPIES SERIES
Discharge: HOME | End: 2023-08-15
Payer: MEDICARE

## 2023-08-15 PROCEDURE — 97161 PT EVAL LOW COMPLEX 20 MIN: CPT | Performed by: PHYSICAL THERAPIST

## 2023-08-15 PROCEDURE — 97530 THERAPEUTIC ACTIVITIES: CPT | Performed by: PHYSICAL THERAPIST

## 2023-08-15 PROCEDURE — 97110 THERAPEUTIC EXERCISES: CPT | Performed by: PHYSICAL THERAPIST

## 2023-08-15 RX ORDER — PRAMIPEXOLE DIHYDROCHLORIDE 0.5 MG/1
TABLET ORAL
Qty: 90 TABLET | Refills: 1 | OUTPATIENT
Start: 2023-08-15

## 2023-08-15 NOTE — PLAN OF CARE
potential:              []Age  []Sex    []Smoker              [x]Motivation/Lack of Motivation                        []Co-Morbidities              []Cognitive Function, education/learning barriers              []Environmental, home barriers              []profession/work barriers  [x]past PT/medical experience  []other:  Justification: no barriers    Falls Risk Assessment (30 days):   [x] Falls Risk assessed and no intervention required. [] Falls Risk assessed and Patient requires intervention due to being higher risk   TUG score (>12s at risk):     [] Falls education provided, including         ASSESSMENT: Pt is 80 y/o female 5 months s/p L TKA. She presents w/ dec ROM, dec strength, pain in L knee and dec gt skills resulting dec function. She has done well w/ PT in the past and expect that she will progress well w/ therapy to address deficits.      Functional Impairments:     []Noted lumbar/proximal hip/LE hypomobility   []Decreased LE functional ROM   [x]Decreased core/proximal hip strength and neuromuscular control   [x]Decreased LE functional strength   [x]Reduced balance/proprioceptive control   []other:      Functional Activity Limitations (from functional questionnaire and intake)   [x]Reduced ability to tolerate prolonged functional positions   []Reduced ability or difficulty with changes of positions or transfers between positions   []Reduced ability to maintain good posture and demonstrate good body mechanics with sitting, bending, and lifting   []Reduced ability to sleep   [] Reduced ability or tolerance with driving and/or computer work   [x]Reduced ability to perform lifting, carrying tasks   [x]Reduced ability to squat   []Reduced ability to forward bend   [x]Reduced ability to ambulate prolonged functional periods/distances/surfaces   [x]Reduced ability to ascend/descend stairs   []Reduced ability to run, hop or jump   []other:     Participation Restrictions   [x]Reduced participation in self care

## 2023-08-15 NOTE — FLOWSHEET NOTE
[facility] Physical Therapy  Phone: [phone]   Fax: [phone]      Physical Therapy Treatment Note/ Progress Report:       Date:  8/15/2023    Patient Name:  Britta Mathis    :  1950  MRN: [id number]  Restrictions/Precautions:  cardiac concerns;  Medical/Treatment Diagnosis Information:  Diagnosis: Z47.89 (ICD-10-CM) - Aftercare following surgery of the musculoskeletal system   Treating Diagnosis; weakness in LE's, dec function   Insurance/Certification information:  PT Insurance Information: MEDICARE  IN NETWORK  EFF 10/1/17  $226 IND DED - IS MET  NO OOP MAX  ALL CODES BILLABLE  NO COPAY  20% COINSURANCE  NO VISIT LIMIT PER KRISTINE YEAR   NO AUTH  MEDICARE PRIOR BALANCE $397.53  Epic  CC  Physician Information:   Eri Ruvalcaba  Has the plan of care been signed (Y/N):        []  Yes  [x]  No     Date of Patient follow up with Physician: not scheduled      Is this a Progress Report:     []  Yes  [x]  No        If Yes:  Date Range for reporting period:  Beginning 8/15/2023  Ending    Progress report will be due (10 Rx or 30 days whichever is less):        Recertification will be due (POC Duration  / 90 days whichever is less): 9/15/23         Visit # Insurance Allowable Auth Required   1 BMN []  Yes [x]  No        Functional Scale: LEFU -NPV    Date assessed:  8/15/2023      Latex Allergy:  [x]NO      []YES  Preferred Language for Healthcare:   [x]English       []other:    Pain level:  5/10     SUBJECTIVE:  See eval    OBJECTIVE: See eval  Observation:   Test measurements:      RESTRICTIONS/PRECAUTIONS: cardiac concerns, PE, Bilat TKA and JENNIFER;      Exercises/Interventions:   Exercise/Equipment Resistance/Repetitions Other comments   Cardio/Warm-up     Bike     Treadmill          Stretching     Hamstring Seated 10\" 7 L, x3 R    Hip Flexion     ITB     Grion     Quad     Inclined Calf Standing 10\"x5 B, x5 single    Towel Pull          ROM     Passive     Active

## 2023-08-17 ENCOUNTER — HOSPITAL ENCOUNTER (OUTPATIENT)
Dept: PHYSICAL THERAPY | Age: 73
Setting detail: THERAPIES SERIES
Discharge: HOME | End: 2023-08-17
Payer: MEDICARE

## 2023-08-17 PROCEDURE — 97530 THERAPEUTIC ACTIVITIES: CPT | Performed by: SPECIALIST/TECHNOLOGIST

## 2023-08-17 PROCEDURE — 97110 THERAPEUTIC EXERCISES: CPT | Performed by: SPECIALIST/TECHNOLOGIST

## 2023-08-17 PROCEDURE — 97140 MANUAL THERAPY 1/> REGIONS: CPT | Performed by: SPECIALIST/TECHNOLOGIST

## 2023-08-17 NOTE — FLOWSHEET NOTE
[address] [facility] Physical Therapy  Phone: [phone]   Fax: [phone]      Physical Therapy Treatment Note/ Progress Report:       Date:  2023    Patient Name:  Duglas Glez    :  1950  MRN: [id number]  Restrictions/Precautions:  cardiac concerns;  Medical/Treatment Diagnosis Information:  Diagnosis: Z47.89 (ICD-10-CM) - Aftercare following surgery of the musculoskeletal system   Treating Diagnosis; weakness in LE's, dec function   Insurance/Certification information:  PT Insurance Information: MEDICARE  IN NETWORK  EFF 10/1/17  $226 IND DED - IS MET  NO OOP MAX  ALL CODES BILLABLE  NO COPAY  20% COINSURANCE  NO VISIT LIMIT PER KRISTINE YEAR   NO AUTH  MEDICARE PRIOR BALANCE $397.53  Epic  CC  Physician Information:   Brandie Carcamo  Has the plan of care been signed (Y/N):        []  Yes  [x]  No     Date of Patient follow up with Physician: not scheduled      Is this a Progress Report:     []  Yes  [x]  No        If Yes:  Date Range for reporting period:  Beginning 8/15/2023  Ending    Progress report will be due (10 Rx or 30 days whichever is less):        Recertification will be due (POC Duration  / 90 days whichever is less): 9/15/23         Visit # Insurance Allowable Auth Required   2 BMN []  Yes [x]  No        Functional Scale: LEFU -NPV    Date assessed:  2023      Latex Allergy:  [x]NO      []YES  Preferred Language for Healthcare:   [x]English       []other:    Pain level:  5/10     SUBJECTIVE:  Pt is doing ok well, knee is stiff. Taking \"wobenzime\". Uses the sink for balance with standing Abduction. Is currently doing her HEP majority of her pain is across the medial knee   OBJECTIVE:   Observation:    Left knee ROM 0-103/ 107 w/ slide board  Test measurements:      RESTRICTIONS/PRECAUTIONS: cardiac concerns, PE, Bilat TKA and JENNIFER;  LBP/ Left shoulder RTC tear/ CTS    PE after hip Replacement.    Exercises/Interventions: 39'  Exercise/Equipment

## 2023-08-22 ENCOUNTER — HOSPITAL ENCOUNTER (OUTPATIENT)
Dept: PHYSICAL THERAPY | Age: 73
Setting detail: THERAPIES SERIES
Discharge: HOME | End: 2023-08-22
Payer: MEDICARE

## 2023-08-22 NOTE — FLOWSHEET NOTE
[address] [facility] Physical Therapy  Phone: [phone]   Fax: [phone]           Physical Therapy  Cancellation/No-show Note  Patient Name:  Duglas Glez  :  1950   Date:  2023  Cancelled visits to date: 1  No-shows to date: 0    For today's appointment patient:  [x]  Cancelled  []  Rescheduled appointment  []  No-show     Reason given by patient:  []  Patient ill  []  Conflicting appointment  []  No transportation    []  Conflict with work  []  No reason given  [x]  Other:  Pt had skin cancer spot removed from her shoulder yesterday and still in a lot of pain today. Comments:      Phone call information:   []  Phone call made today to patient at _ time at number provided:      []  Patient answered, conversation as follows:    []  Patient did not answer, message left as follows:  []  Phone call not made today  [x]  Phone call not needed - pt contacted us to cancel and provided reason for cancellation.      Electronically signed by:  Sylvia Lizama PT, [address] [facility]    Physical Therapist [address] license #[id number]  Physical Therapist [place] license #[id number]

## 2023-08-24 ENCOUNTER — HOSPITAL ENCOUNTER (OUTPATIENT)
Dept: PHYSICAL THERAPY | Age: 73
Setting detail: THERAPIES SERIES
Discharge: HOME | End: 2023-08-24
Payer: MEDICARE

## 2023-08-24 PROCEDURE — 97112 NEUROMUSCULAR REEDUCATION: CPT | Performed by: PHYSICAL THERAPIST

## 2023-08-24 PROCEDURE — 97110 THERAPEUTIC EXERCISES: CPT | Performed by: PHYSICAL THERAPIST

## 2023-08-24 PROCEDURE — 97530 THERAPEUTIC ACTIVITIES: CPT | Performed by: PHYSICAL THERAPIST

## 2023-08-24 NOTE — FLOWSHEET NOTE
ambulation. [x] (75330) Provided verbal/tactile cueing for activities related to improving balance, coordination, kinesthetic sense, posture, motor skill, proprioception  to assist with LE, proximal hip, and core control in self care, mobility, lifting, ambulation and eccentric single leg control. NMR and Therapeutic Activities:    [x] (35378 or 62782) Provided verbal/tactile cueing for activities related to improving balance, coordination, kinesthetic sense, posture, motor skill, proprioception and motor activation to allow for proper function of core, proximal hip and LE with self care and ADLs  [x] (16032) Gait Re-education- Provided training and instruction to the patient for proper LE, core and proximal hip recruitment and positioning and eccentric body weight control with ambulation re-education including up and down stairs     Home Exercise Program:    [x] (58577) Reviewed/Progressed HEP activities related to strengthening, flexibility, endurance, ROM of core, proximal hip and LE for functional self-care, mobility, lifting and ambulation/stair navigation            Written HEP est    Access Code: [email or web address]  URL: [email or web address]  Date: 08/15/2023  Prepared by: Radames Palmer      [] (41994)Reviewed/Progressed HEP activities related to improving balance, coordination, kinesthetic sense, posture, motor skill, proprioception of core, proximal hip and LE for self care, mobility, lifting, and ambulation/stair navigation      Manual Treatments:  PROM / STM / Oscillations-Mobs:  G-I, II, III, IV (PA's, Inf., Post.)  [x] (50449) Provided manual therapy to mobilize LE, proximal hip and/or LS spine soft tissue/joints for the purpose of modulating pain, promoting relaxation,  increasing ROM, reducing/eliminating soft tissue swelling/inflammation/restriction, improving soft tissue extensibility and allowing for proper ROM for normal function with self care, mobility, lifting and ambulation.      Modalities:

## 2023-08-29 ENCOUNTER — HOSPITAL ENCOUNTER (OUTPATIENT)
Dept: PHYSICAL THERAPY | Age: 73
Setting detail: THERAPIES SERIES
Discharge: HOME | End: 2023-08-29
Payer: MEDICARE

## 2023-08-29 ENCOUNTER — NURSE ONLY (OUTPATIENT)
Dept: CARDIOLOGY CLINIC | Age: 73
End: 2023-08-29
Payer: MEDICARE

## 2023-08-29 ENCOUNTER — TELEPHONE (OUTPATIENT)
Dept: CARDIOLOGY CLINIC | Age: 73
End: 2023-08-29

## 2023-08-29 ENCOUNTER — HOSPITAL ENCOUNTER (OUTPATIENT)
Age: 73
Discharge: HOME | End: 2023-08-29
Payer: MEDICARE

## 2023-08-29 DIAGNOSIS — I48.19 PERSISTENT ATRIAL FIBRILLATION (HCC): ICD-10-CM

## 2023-08-29 DIAGNOSIS — R07.89 OTHER CHEST PAIN: Primary | ICD-10-CM

## 2023-08-29 DIAGNOSIS — I10 BENIGN ESSENTIAL HTN: ICD-10-CM

## 2023-08-29 DIAGNOSIS — R07.9 CHEST PAIN, UNSPECIFIED TYPE: ICD-10-CM

## 2023-08-29 DIAGNOSIS — R06.02 SOB (SHORTNESS OF BREATH): ICD-10-CM

## 2023-08-29 DIAGNOSIS — R06.02 SOB (SHORTNESS OF BREATH): Primary | ICD-10-CM

## 2023-08-29 DIAGNOSIS — I50.32 CHRONIC DIASTOLIC HEART FAILURE (HCC): ICD-10-CM

## 2023-08-29 LAB
ALBUMIN SERPL-MCNC: 4.5 G/DL (ref 3.4–5)
ALBUMIN/GLOB SERPL: 2 {RATIO} (ref 1.1–2.2)
ALP SERPL-CCNC: 99 U/L (ref 40–129)
ALT SERPL-CCNC: 25 U/L (ref 10–40)
ANION GAP SERPL CALCULATED.3IONS-SCNC: 14 MMOL/L (ref 3–16)
AST SERPL-CCNC: 16 U/L (ref 15–37)
BILIRUB SERPL-MCNC: 0.3 MG/DL (ref 0–1)
BUN SERPL-MCNC: 21 MG/DL (ref 7–20)
CALCIUM SERPL-MCNC: 9.5 MG/DL (ref 8.3–10.6)
CHLORIDE SERPL-SCNC: 103 MMOL/L (ref 99–110)
CO2 SERPL-SCNC: 20 MMOL/L (ref 21–32)
CREAT SERPL-MCNC: 0.7 MG/DL (ref 0.6–1.2)
CRP SERPL HS-MCNC: 8.27 MG/L (ref 0.16–3)
DEPRECATED RDW RBC AUTO: 13.9 % (ref 12.4–15.4)
ERYTHROCYTE [SEDIMENTATION RATE] IN BLOOD BY WESTERGREN METHOD: 16 MM/HR (ref 0–30)
GFR SERPLBLD CREATININE-BSD FMLA CKD-EPI: >60 ML/MIN/{1.73_M2}
GLUCOSE SERPL-MCNC: 112 MG/DL (ref 70–99)
HCT VFR BLD AUTO: 39.3 % (ref 36–48)
HGB BLD-MCNC: 13.5 G/DL (ref 12–16)
MCH RBC QN AUTO: 33.1 PG (ref 26–34)
MCHC RBC AUTO-ENTMCNC: 34.4 G/DL (ref 31–36)
MCV RBC AUTO: 96.2 FL (ref 80–100)
NT-PROBNP SERPL-MCNC: 111 PG/ML (ref 0–124)
PLATELET # BLD AUTO: 236 K/UL (ref 135–450)
PMV BLD AUTO: 9.2 FL (ref 5–10.5)
POTASSIUM SERPL-SCNC: 4 MMOL/L (ref 3.5–5.1)
PROT SERPL-MCNC: 6.8 G/DL (ref 6.4–8.2)
RBC # BLD AUTO: 4.08 M/UL (ref 4–5.2)
SODIUM SERPL-SCNC: 137 MMOL/L (ref 136–145)
WBC # BLD AUTO: 6.2 K/UL (ref 4–11)

## 2023-08-29 PROCEDURE — 85027 COMPLETE CBC AUTOMATED: CPT

## 2023-08-29 PROCEDURE — 83880 ASSAY OF NATRIURETIC PEPTIDE: CPT

## 2023-08-29 PROCEDURE — 86141 C-REACTIVE PROTEIN HS: CPT

## 2023-08-29 PROCEDURE — 80053 COMPREHEN METABOLIC PANEL: CPT

## 2023-08-29 PROCEDURE — 36415 COLL VENOUS BLD VENIPUNCTURE: CPT

## 2023-08-29 PROCEDURE — 85652 RBC SED RATE AUTOMATED: CPT

## 2023-08-29 PROCEDURE — 93000 ELECTROCARDIOGRAM COMPLETE: CPT | Performed by: INTERNAL MEDICINE

## 2023-08-29 ASSESSMENT — ENCOUNTER SYMPTOMS
SHORTNESS OF BREATH: 1
GASTROINTESTINAL NEGATIVE: 1

## 2023-08-29 NOTE — PROGRESS NOTES
[facility]   Congestive Heart Failure    PrimaryCare Doctor:  Sherry Caldwell MD    Chief Complaint:  edema    History of Present Illness:  Yamil Oconnell is a 81 y.o. female with PMH HTN, HUSSAIN on CPAP, multiple PE on Xarelto, AF, DCCV,ablation, pericarditis in April this year and HFpEF who presents today for phone call below:   Phone Call 8/29/23:  Pain in rib cage. Pt sent for labs and EKG to r/u recurrent pericarditis    Today she c/o chest pressure like she had before with pericarditis for the past week, SOB with activity and prolonged conversation. Fatigue is not as bad as April but her wt up in the past 2-3 weeks with increased edema. she is taking 40 lasix and has not taken any extra recently. She c/o dizziness since April. Her CRP elevated on labs yesterday, EKG w/o acute changes. Home wt: 251      Baseline Weight: 242  Wt Readings from Last 3 Encounters:   08/30/23 251 lb (113.9 kg)   08/11/23 249 lb (112.9 kg)   07/21/23 250 lb (113.4 kg)          EF: 55-60%  Cardiac Imaging:Echo 4/29/23:    Summary   Normal left ventricle size, wall thickness, and systolic function with an   estimated ejection fraction of 55-60%. No regional wall motion abnormalities are seen. Normal diastolic function. Avg E/e' estimated to be 11.9. The right ventricle is mildly enlarged with normal function. Mild mitral regurgitation is present. Mild tricuspid regurgitation with an RVSP estimated to be 46 mmHg. Mild-to-moderate pulmonic regurgitation present. Procedure performed:  3-  Electrophysiology study with left atrial recording and mapping   3-D electroanatomical mapping of the left atrium and  right atium. Electrophysiological study(EPS) and induction after IV drug infusion  Transseptal puncture through an intact septum . Intracardiac echocardiography.    Radiofrequency ablation of atrial fibrillation and pulmonary veins isolation   Ablation of CTI depndent flutter as a separate

## 2023-08-29 NOTE — FLOWSHEET NOTE
[address] [facility] Physical Therapy  Phone: [phone]   Fax: [phone]           Physical Therapy  Cancellation/No-show Note  Patient Name:  Tyler Young  :  1950   Date:  2023  Cancelled visits to date: 1  No-shows to date: 1    For today's appointment patient:  []  Cancelled  []  Rescheduled appointment  [x]  No-show     Reason given by patient:  []  Patient ill  []  Conflicting appointment  []  No transportation    []  Conflict with work  []  No reason given  []  Other:     Comments:      Phone call information:   []  Phone call made today to patient at _ time at number provided:      []  Patient answered, conversation as follows:    []  Patient did not answer, message left as follows:  []  Phone call not made today  []  Phone call not needed - pt contacted us to cancel and provided reason for cancellation.      Electronically signed by:  Blayne Bishop PT, [address] [facility] [place], OMT-C    Physical Therapist [place] license #[id number]  Physical Therapist [place] license #[id number]

## 2023-08-29 NOTE — TELEPHONE ENCOUNTER
Spoke to pt. EKG today at 2:00. Pt agreeable to labs and EKG today and seeing Keeley Smith tomorrow at 1:00. Appointments made.

## 2023-08-29 NOTE — TELEPHONE ENCOUNTER
Spoke to pt. Pain in rig cage. 5/1/23 Stress test negative for ischemia. Was diagnosed with pericarditis. In April, CRP was positive. Sed rate was normal.     She was treated with Colchicine and doesn't have any more. Pulse ox is 95%, HR 76.  115/68 today's numbers. Denies fever.

## 2023-08-29 NOTE — TELEPHONE ENCOUNTER
Pt called and thinks she is having a reoccurrence of what was going on in April during her hospitalization. Pt currently has a very deep cough, with pain when coughing. She is very fatigued with SOB while completing small tasks. Her blood pressure this morning was 124/63 and has remained consistent along with her weight at well.

## 2023-08-29 NOTE — TELEPHONE ENCOUNTER
Does she want to come in and see KV this afternoon? Get an ekg? And labs. Cbc, cmp, bnp, crp, esr.     ERICKA

## 2023-08-30 ENCOUNTER — OFFICE VISIT (OUTPATIENT)
Dept: CARDIOLOGY CLINIC | Age: 73
End: 2023-08-30
Payer: MEDICARE

## 2023-08-30 VITALS
BODY MASS INDEX: 42.85 KG/M2 | HEART RATE: 84 BPM | WEIGHT: 251 LBS | OXYGEN SATURATION: 96 % | DIASTOLIC BLOOD PRESSURE: 70 MMHG | SYSTOLIC BLOOD PRESSURE: 138 MMHG | HEIGHT: 64 IN

## 2023-08-30 DIAGNOSIS — R06.02 SOB (SHORTNESS OF BREATH): ICD-10-CM

## 2023-08-30 DIAGNOSIS — I30.0 ACUTE IDIOPATHIC PERICARDITIS: Primary | ICD-10-CM

## 2023-08-30 DIAGNOSIS — I50.32 CHRONIC DIASTOLIC HEART FAILURE (HCC): ICD-10-CM

## 2023-08-30 DIAGNOSIS — I10 BENIGN ESSENTIAL HTN: Chronic | ICD-10-CM

## 2023-08-30 PROBLEM — R07.2 PRECORDIAL PAIN: Status: ACTIVE | Noted: 2023-04-29

## 2023-08-30 PROCEDURE — 1090F PRES/ABSN URINE INCON ASSESS: CPT | Performed by: NURSE PRACTITIONER

## 2023-08-30 PROCEDURE — 99214 OFFICE O/P EST MOD 30 MIN: CPT | Performed by: NURSE PRACTITIONER

## 2023-08-30 PROCEDURE — G8427 DOCREV CUR MEDS BY ELIG CLIN: HCPCS | Performed by: NURSE PRACTITIONER

## 2023-08-30 PROCEDURE — 3017F COLORECTAL CA SCREEN DOC REV: CPT | Performed by: NURSE PRACTITIONER

## 2023-08-30 PROCEDURE — 3078F DIAST BP <80 MM HG: CPT | Performed by: NURSE PRACTITIONER

## 2023-08-30 PROCEDURE — 1123F ACP DISCUSS/DSCN MKR DOCD: CPT | Performed by: NURSE PRACTITIONER

## 2023-08-30 PROCEDURE — 3075F SYST BP GE 130 - 139MM HG: CPT | Performed by: NURSE PRACTITIONER

## 2023-08-30 PROCEDURE — 1036F TOBACCO NON-USER: CPT | Performed by: NURSE PRACTITIONER

## 2023-08-30 PROCEDURE — G8417 CALC BMI ABV UP PARAM F/U: HCPCS | Performed by: NURSE PRACTITIONER

## 2023-08-30 PROCEDURE — G8399 PT W/DXA RESULTS DOCUMENT: HCPCS | Performed by: NURSE PRACTITIONER

## 2023-08-30 RX ORDER — COLCHICINE 0.6 MG/1
0.6 TABLET ORAL DAILY
Qty: 30 TABLET | Refills: 3 | Status: SHIPPED | OUTPATIENT
Start: 2023-08-30

## 2023-08-30 RX ORDER — METHYLPREDNISOLONE 4 MG/1
TABLET ORAL
Qty: 1 KIT | Refills: 0 | Status: SHIPPED | OUTPATIENT
Start: 2023-08-30

## 2023-08-30 ASSESSMENT — ENCOUNTER SYMPTOMS: COUGH: 1

## 2023-08-30 NOTE — PATIENT INSTRUCTIONS
Instructions:   Medications: take medrol dose pack , restart colchicine  Labs before next OV   Lifestyle Recommendations: Weigh yourself every day in the morning after urination, call Yuliana Bye if wt increases 2-3lb in one day or 5lb in one week, Limit sodium to 3000mg/day and fluids to 2L or 64oz/day.    Follow up: 8 weeks with Dr. Gill Schwab or [address]: [phone]

## 2023-08-31 ENCOUNTER — HOSPITAL ENCOUNTER (OUTPATIENT)
Dept: PHYSICAL THERAPY | Age: 73
Setting detail: THERAPIES SERIES
End: 2023-08-31
Payer: MEDICARE

## 2023-09-06 ENCOUNTER — TELEPHONE (OUTPATIENT)
Dept: INTERNAL MEDICINE CLINIC | Age: 73
End: 2023-09-06

## 2023-09-06 NOTE — TELEPHONE ENCOUNTER
Patient is stating the stitches should be   -removed 09/05/2023    Patient is stating that she will not be able to get to the   dermatologist until 10/20/2023 for removal

## 2023-09-06 NOTE — TELEPHONE ENCOUNTER
----- Message from Yaima Hernandez sent at 9/5/2023 10:56 AM EDT -----  Subject: Message to Provider    QUESTIONS  Information for Provider? pt is calling to see if she can get her stitches   removed thursday, pt had an appt with dermatology today for it but her   pericarditis is giving her trouble and cardio might be admitting her, she   is waiting for a call back from the nurse there, please advise.   ---------------------------------------------------------------------------  --------------  Og MESSER  [phone]; OK to leave message on voicemail  ---------------------------------------------------------------------------  --------------  SCRIPT ANSWERS  Relationship to Patient? Self  (Is the patient requesting to see the provider for a procedure?)?  Yes

## 2023-09-06 NOTE — TELEPHONE ENCOUNTER
This is the job of the operating physician. Not me. I am not the intern!     Navin Carlisle MD  FACP

## 2023-09-07 DIAGNOSIS — I30.0 ACUTE IDIOPATHIC PERICARDITIS: Primary | ICD-10-CM

## 2023-09-13 ENCOUNTER — TELEPHONE (OUTPATIENT)
Dept: INTERNAL MEDICINE CLINIC | Age: 73
End: 2023-09-13

## 2023-09-13 DIAGNOSIS — E11.69 TYPE 2 DIABETES MELLITUS WITH OBESITY (HCC): ICD-10-CM

## 2023-09-13 DIAGNOSIS — E66.9 TYPE 2 DIABETES MELLITUS WITH OBESITY (HCC): ICD-10-CM

## 2023-09-13 NOTE — TELEPHONE ENCOUNTER
----- Message from Griselda Fester, MD sent at 9/13/2023 12:40 PM EDT -----  Regarding: FW: medication samples  Contact: [phone]  Can we help her? Antoinette Pryor MD  [facility]      ----- Message -----  From: Sukhdev Sasha  Sent: 9/13/2023  12:35 PM EDT  To: Griselda Fester, MD  Subject: medication samples                               [facility] Dr. Marquis Baeza and team,  I request samples of both Jardiance (I have 6 pills left) and Xarelto (I have 11 pills left). I would appreciate it if you could help me with either or both. I can come by the office for pickup whenever is convenient to you.   Thank you very much,  Meri Tomlinson

## 2023-09-13 NOTE — TELEPHONE ENCOUNTER
----- Message from Julian Gilmore MD sent at 9/13/2023 12:40 PM EDT -----  Regarding: FW: medication samples  Contact: [phone]  Can we help her? Shayan Hinton MD  [facility]      ----- Message -----  From: Laurenncjorje Duty  Sent: 9/13/2023  12:35 PM EDT  To: Julian Gilmore MD  Subject: medication samples                               Mingo Mejía and team,  I request samples of both Jardiance (I have 6 pills left) and Xarelto (I have 11 pills left). I would appreciate it if you could help me with either or both. I can come by the office for pickup whenever is convenient to you.   Thank you very much,  Chloe Watt

## 2023-09-18 PROCEDURE — G2066 INTER DEVC REMOTE 30D: HCPCS | Performed by: INTERNAL MEDICINE

## 2023-09-18 PROCEDURE — 93298 REM INTERROG DEV EVAL SCRMS: CPT | Performed by: INTERNAL MEDICINE

## 2023-10-02 ENCOUNTER — OFFICE VISIT (OUTPATIENT)
Dept: INTERNAL MEDICINE CLINIC | Age: 73
End: 2023-10-02

## 2023-10-02 ENCOUNTER — OFFICE VISIT (OUTPATIENT)
Dept: PULMONOLOGY | Age: 73
End: 2023-10-02
Payer: MEDICARE

## 2023-10-02 VITALS
HEIGHT: 64 IN | HEART RATE: 70 BPM | WEIGHT: 250 LBS | SYSTOLIC BLOOD PRESSURE: 134 MMHG | BODY MASS INDEX: 42.68 KG/M2 | OXYGEN SATURATION: 96 % | DIASTOLIC BLOOD PRESSURE: 70 MMHG

## 2023-10-02 VITALS
HEART RATE: 80 BPM | BODY MASS INDEX: 42.91 KG/M2 | SYSTOLIC BLOOD PRESSURE: 134 MMHG | TEMPERATURE: 97.3 F | DIASTOLIC BLOOD PRESSURE: 70 MMHG | WEIGHT: 250 LBS | OXYGEN SATURATION: 97 %

## 2023-10-02 DIAGNOSIS — I10 BENIGN ESSENTIAL HTN: Chronic | ICD-10-CM

## 2023-10-02 DIAGNOSIS — F41.9 ANXIETY AND DEPRESSION: ICD-10-CM

## 2023-10-02 DIAGNOSIS — Z47.89 AFTERCARE FOLLOWING SURGERY OF THE MUSCULOSKELETAL SYSTEM: ICD-10-CM

## 2023-10-02 DIAGNOSIS — I30.0 ACUTE IDIOPATHIC PERICARDITIS: ICD-10-CM

## 2023-10-02 DIAGNOSIS — G25.81 RESTLESS LEG SYNDROME: ICD-10-CM

## 2023-10-02 DIAGNOSIS — I10 BENIGN ESSENTIAL HTN: ICD-10-CM

## 2023-10-02 DIAGNOSIS — E03.9 ACQUIRED HYPOTHYROIDISM: ICD-10-CM

## 2023-10-02 DIAGNOSIS — I50.32 CHRONIC DIASTOLIC HEART FAILURE (HCC): ICD-10-CM

## 2023-10-02 DIAGNOSIS — S46.012A TRAUMATIC INCOMPLETE TEAR OF LEFT ROTATOR CUFF, INITIAL ENCOUNTER: ICD-10-CM

## 2023-10-02 DIAGNOSIS — R06.02 SOB (SHORTNESS OF BREATH): ICD-10-CM

## 2023-10-02 DIAGNOSIS — E66.01 OBESITY, CLASS III, BMI 40-49.9 (MORBID OBESITY) (HCC): ICD-10-CM

## 2023-10-02 DIAGNOSIS — G47.33 OSA (OBSTRUCTIVE SLEEP APNEA): Primary | ICD-10-CM

## 2023-10-02 DIAGNOSIS — Z23 NEED FOR INFLUENZA VACCINATION: Primary | ICD-10-CM

## 2023-10-02 DIAGNOSIS — I48.0 PAROXYSMAL ATRIAL FIBRILLATION (HCC): ICD-10-CM

## 2023-10-02 DIAGNOSIS — F32.A ANXIETY AND DEPRESSION: ICD-10-CM

## 2023-10-02 DIAGNOSIS — E11.9 TYPE 2 DIABETES MELLITUS WITHOUT COMPLICATION, WITHOUT LONG-TERM CURRENT USE OF INSULIN (HCC): ICD-10-CM

## 2023-10-02 LAB
ANION GAP SERPL CALCULATED.3IONS-SCNC: 17 MMOL/L (ref 3–16)
BUN SERPL-MCNC: 17 MG/DL (ref 7–20)
CALCIUM SERPL-MCNC: 9.7 MG/DL (ref 8.3–10.6)
CHLORIDE SERPL-SCNC: 99 MMOL/L (ref 99–110)
CO2 SERPL-SCNC: 22 MMOL/L (ref 21–32)
CREAT SERPL-MCNC: 0.7 MG/DL (ref 0.6–1.2)
CRP SERPL HS-MCNC: 3.71 MG/L (ref 0.16–3)
GFR SERPLBLD CREATININE-BSD FMLA CKD-EPI: >60 ML/MIN/{1.73_M2}
GLUCOSE SERPL-MCNC: 126 MG/DL (ref 70–99)
NT-PROBNP SERPL-MCNC: 196 PG/ML (ref 0–124)
POTASSIUM SERPL-SCNC: 3.8 MMOL/L (ref 3.5–5.1)
SODIUM SERPL-SCNC: 138 MMOL/L (ref 136–145)

## 2023-10-02 PROCEDURE — G8427 DOCREV CUR MEDS BY ELIG CLIN: HCPCS | Performed by: NURSE PRACTITIONER

## 2023-10-02 PROCEDURE — 3075F SYST BP GE 130 - 139MM HG: CPT | Performed by: NURSE PRACTITIONER

## 2023-10-02 PROCEDURE — 3044F HG A1C LEVEL LT 7.0%: CPT | Performed by: NURSE PRACTITIONER

## 2023-10-02 PROCEDURE — 1036F TOBACCO NON-USER: CPT | Performed by: NURSE PRACTITIONER

## 2023-10-02 PROCEDURE — 3017F COLORECTAL CA SCREEN DOC REV: CPT | Performed by: NURSE PRACTITIONER

## 2023-10-02 PROCEDURE — 1090F PRES/ABSN URINE INCON ASSESS: CPT | Performed by: NURSE PRACTITIONER

## 2023-10-02 PROCEDURE — G8484 FLU IMMUNIZE NO ADMIN: HCPCS | Performed by: NURSE PRACTITIONER

## 2023-10-02 PROCEDURE — G8399 PT W/DXA RESULTS DOCUMENT: HCPCS | Performed by: NURSE PRACTITIONER

## 2023-10-02 PROCEDURE — G8417 CALC BMI ABV UP PARAM F/U: HCPCS | Performed by: NURSE PRACTITIONER

## 2023-10-02 PROCEDURE — 99214 OFFICE O/P EST MOD 30 MIN: CPT | Performed by: NURSE PRACTITIONER

## 2023-10-02 PROCEDURE — 3078F DIAST BP <80 MM HG: CPT | Performed by: NURSE PRACTITIONER

## 2023-10-02 PROCEDURE — 2022F DILAT RTA XM EVC RTNOPTHY: CPT | Performed by: NURSE PRACTITIONER

## 2023-10-02 PROCEDURE — 1123F ACP DISCUSS/DSCN MKR DOCD: CPT | Performed by: NURSE PRACTITIONER

## 2023-10-02 RX ORDER — GABAPENTIN 100 MG/1
CAPSULE ORAL
Qty: 120 CAPSULE | Refills: 1 | Status: SHIPPED | OUTPATIENT
Start: 2023-10-02 | End: 2024-04-01

## 2023-10-02 RX ORDER — TIOTROPIUM BROMIDE 18 UG/1
CAPSULE ORAL; RESPIRATORY (INHALATION)
COMMUNITY
Start: 2023-08-17

## 2023-10-02 RX ORDER — PRAMIPEXOLE DIHYDROCHLORIDE 0.5 MG/1
TABLET ORAL
Qty: 90 TABLET | Refills: 0 | Status: SHIPPED | OUTPATIENT
Start: 2023-10-02

## 2023-10-02 RX ORDER — TRAMADOL HYDROCHLORIDE 50 MG/1
50 TABLET ORAL EVERY 8 HOURS PRN
Qty: 30 TABLET | Refills: 0 | Status: SHIPPED | OUTPATIENT
Start: 2023-10-02 | End: 2023-10-09

## 2023-10-02 ASSESSMENT — PATIENT HEALTH QUESTIONNAIRE - PHQ9
SUM OF ALL RESPONSES TO PHQ9 QUESTIONS 1 & 2: 0
8. MOVING OR SPEAKING SO SLOWLY THAT OTHER PEOPLE COULD HAVE NOTICED. OR THE OPPOSITE, BEING SO FIGETY OR RESTLESS THAT YOU HAVE BEEN MOVING AROUND A LOT MORE THAN USUAL: 0
5. POOR APPETITE OR OVEREATING: 0
9. THOUGHTS THAT YOU WOULD BE BETTER OFF DEAD, OR OF HURTING YOURSELF: 0
4. FEELING TIRED OR HAVING LITTLE ENERGY: 0
SUM OF ALL RESPONSES TO PHQ QUESTIONS 1-9: 4
SUM OF ALL RESPONSES TO PHQ QUESTIONS 1-9: 4
10. IF YOU CHECKED OFF ANY PROBLEMS, HOW DIFFICULT HAVE THESE PROBLEMS MADE IT FOR YOU TO DO YOUR WORK, TAKE CARE OF THINGS AT HOME, OR GET ALONG WITH OTHER PEOPLE: 1
7. TROUBLE CONCENTRATING ON THINGS, SUCH AS READING THE NEWSPAPER OR WATCHING TELEVISION: 1
SUM OF ALL RESPONSES TO PHQ QUESTIONS 1-9: 4
2. FEELING DOWN, DEPRESSED OR HOPELESS: 0
6. FEELING BAD ABOUT YOURSELF - OR THAT YOU ARE A FAILURE OR HAVE LET YOURSELF OR YOUR FAMILY DOWN: 0
1. LITTLE INTEREST OR PLEASURE IN DOING THINGS: 0
3. TROUBLE FALLING OR STAYING ASLEEP: 3
SUM OF ALL RESPONSES TO PHQ QUESTIONS 1-9: 4

## 2023-10-02 ASSESSMENT — SLEEP AND FATIGUE QUESTIONNAIRES
HOW LIKELY ARE YOU TO NOD OFF OR FALL ASLEEP IN A CAR, WHILE STOPPED FOR A FEW MINUTES IN TRAFFIC: 0
HOW LIKELY ARE YOU TO NOD OFF OR FALL ASLEEP WHILE SITTING INACTIVE IN A PUBLIC PLACE: 0
HOW LIKELY ARE YOU TO NOD OFF OR FALL ASLEEP WHILE WATCHING TV: 1
HOW LIKELY ARE YOU TO NOD OFF OR FALL ASLEEP WHILE SITTING AND TALKING TO SOMEONE: 0
HOW LIKELY ARE YOU TO NOD OFF OR FALL ASLEEP WHEN YOU ARE A PASSENGER IN A CAR FOR AN HOUR WITHOUT A BREAK: 1
HOW LIKELY ARE YOU TO NOD OFF OR FALL ASLEEP WHILE LYING DOWN TO REST IN THE AFTERNOON WHEN CIRCUMSTANCES PERMIT: 1
HOW LIKELY ARE YOU TO NOD OFF OR FALL ASLEEP WHILE SITTING QUIETLY AFTER LUNCH WITHOUT ALCOHOL: 1
HOW LIKELY ARE YOU TO NOD OFF OR FALL ASLEEP WHILE SITTING AND READING: 1
ESS TOTAL SCORE: 5

## 2023-10-02 NOTE — ASSESSMENT & PLAN NOTE
Chronic - With progression/exacerbation: Reviewed and analyzed results of physiologic download from patient's machine and reviewed with patients. Supplies and parts as needed for the machine. These are medically necessary. Limit caffeine use after 3 PM. Based on the analyzed data, we will continue with current settings. We will order a new machine as her machine is making loud noise intermittently and not pushing pressure like it used to, causing her to feel suffocating and wake her up during sleep. Her machine is over 11years old, not functioning properly. Given severity of her sleep apnea, it is imperative for her to stay on the PAP therapy and it is medically necessary. She was also instructed to call the manufacture to see if they are able to send a replacement for the machine she received as a replacement for the manufacture recall. Contact information was provided. She will return to the office between 31 to 90 days once she is set up with the new machine to monitor progress and insurance compliance. Discussed the importance of treating HUSSAIN to treat symptoms of RLS. Encouraged her to use her machine each night, all night. Encouraged the patient to contact the office with any questions or concerns. Discussed the importance of consistence use of the machine and encouraged consistent use of the machine each night. Also discussed the importance of treating Obstructive Sleep Apnea from a physiological standpoint. Instructed not to drive unless had 4 hours of effective therapy for HUSSAIN the night before. Did review the risks of under or untreated HUSSAIN including, but not limited to, higher risks of motor vehicle accidents, stroke, heart attacks, and death. Patients verbalized understanding and accepts all these risks.

## 2023-10-02 NOTE — PROGRESS NOTES
Verona Pratt CNP [facility] [address] [facility] [address], [address] [facility]- [phone] [address], [address] [place] [address] [phone] [address] [facility] [address]  Dept: [phone]  Dept Fax: [phone]  Loc: [phone]      Assessment/Plan:      1. HUSSAIN (obstructive sleep apnea)  Assessment & Plan:  Chronic - With progression/exacerbation: Reviewed and analyzed results of physiologic download from patient's machine and reviewed with patients. Supplies and parts as needed for the machine. These are medically necessary. Limit caffeine use after 3 PM. Based on the analyzed data, we will continue with current settings. We will order a new machine as her machine is making loud noise intermittently and not pushing pressure like it used to, causing her to feel suffocating and wake her up during sleep. Her machine is over 11years old, not functioning properly. Given severity of her sleep apnea, it is imperative for her to stay on the PAP therapy and it is medically necessary. She was also instructed to call the manufacture to see if they are able to send a replacement for the machine she received as a replacement for the manufacture recall. Contact information was provided. She will return to the office between 31 to 90 days once she is set up with the new machine to monitor progress and insurance compliance. Discussed the importance of treating HUSSAIN to treat symptoms of RLS. Encouraged her to use her machine each night, all night. Encouraged the patient to contact the office with any questions or concerns. Discussed the importance of consistence use of the machine and encouraged consistent use of the machine each night. Also discussed the importance of treating Obstructive Sleep Apnea from a physiological standpoint.

## 2023-10-02 NOTE — ASSESSMENT & PLAN NOTE
Chronic- Not stable. Discussed the importance of treating sleep apnea as part of the management of this disorder. Cont any meds per PCP and other physicians. Will taper off pramipexole slowly and will start gabapentin with lower dose first. Discussed tapering off schedule (reduce the dose to 0.5 mg/1 tab at 5 PM and 11 PM for 2-3 weeks, then reduce the dose to 0.25 mg 5 PM and 11 PM for another 2-3 weeks, then 0.25 mg at bed time for 2-3 weeks and then stop). Will start gabapentin at bed time/1 hour before bed time. Will start with 100 mg then slowly titrate the dose up to 400 mg max at bed time to control symptoms of RLS. Discussed side effects/adverse effects of this medication. PDMP reviewed with no concerns for misuse or diversion of controlled substances. States she is no longer taking zolpidem and is taking tramadol PRN basis for shoulder pain. Advised the patient to not use zolpidem with gabapentin, and also advised not to take tramadol with gabapentin together at bed time. Controlled medication contract reviewed and signed during the visit. Discussed non-pharmacological strategies to manage RLS symptoms, including leg massage, compression socks, etc. and discussed aggravating factors to avoid for RLS. She will return in 2 mo for follow up. She was instructed to contact the office if new or worsening of symptoms, and call the office/the pharmacy or go to the nearest ED if experience adverse effects.

## 2023-10-03 ENCOUNTER — TELEPHONE (OUTPATIENT)
Dept: PULMONOLOGY | Age: 73
End: 2023-10-03

## 2023-10-03 ENCOUNTER — TELEPHONE (OUTPATIENT)
Dept: CARDIOLOGY CLINIC | Age: 73
End: 2023-10-03

## 2023-10-03 DIAGNOSIS — R06.02 SOB (SHORTNESS OF BREATH): ICD-10-CM

## 2023-10-03 DIAGNOSIS — I10 BENIGN ESSENTIAL HTN: Primary | Chronic | ICD-10-CM

## 2023-10-03 RX ORDER — POTASSIUM CHLORIDE 750 MG/1
10 TABLET, EXTENDED RELEASE ORAL DAILY
Qty: 10 TABLET | Refills: 0 | Status: SHIPPED | OUTPATIENT
Start: 2023-10-03

## 2023-10-03 NOTE — TELEPHONE ENCOUNTER
----- Message from BRICE Mary - CNS sent at 10/3/2023  8:46 AM EDT -----  John Cabrera is out  Fluid level up a little ask HF questions  If no issues, continue all current medications  Thanks    Spoke to patient she is having sob, swelling and weight gain of 4 pds over the last 2 day. She is out of potassium. Can she have a 7 days refill? Should she increase lasix?

## 2023-10-03 NOTE — TELEPHONE ENCOUNTER
----- Message from BRICE Morales CNP sent at 10/2/2023  6:54 PM EDT -----  Regarding: Order for new machine  Please manually fax OV along with an order for her new CPAP machine to her DME/[facility]. Please ask them to call our office if they cannot dispense the same machine on the order so we can contact other DME.  Thank you

## 2023-10-04 ENCOUNTER — TELEPHONE (OUTPATIENT)
Dept: PULMONOLOGY | Age: 73
End: 2023-10-04

## 2023-10-04 ENCOUNTER — PATIENT MESSAGE (OUTPATIENT)
Dept: PULMONOLOGY | Age: 73
End: 2023-10-04

## 2023-10-04 NOTE — TELEPHONE ENCOUNTER
Pt called in stating after she brought equipment for machine in during appt she has not gotten the same amount of pressure to come out as it was before. Pt stated she felt the air pressure was not high enough causing her to gasp for air. Pt asking if pressure could be raised.      Ph. [phone]

## 2023-10-04 NOTE — TELEPHONE ENCOUNTER
From: Duglas Glez  To: Alana Arambula  Sent: 10/4/2023 2:23 PM EDT  Subject: CPAP machine settings    Mingo Reynolds,  My   CPAP machine does not give me the air pressure that i require. Since i saw you on Monday of this week i have not been able to use the machine because i find myself gasping for breath not long after trying to settle down for sleep. i think the machine settings have been set back on the default settings. I need higher settings as you well know. How can i fix this problem quickly? I cannot use the machine until it is resolved.

## 2023-10-04 NOTE — TELEPHONE ENCOUNTER
Spoke to patient she verbalized understanding. She will call our office in a week's time to check in and let us know about her weight. She would like us to place lab orders incase she needs them. Can we place lab orders incase she needs them?

## 2023-10-06 ENCOUNTER — CARE COORDINATION (OUTPATIENT)
Dept: CARE COORDINATION | Age: 73
End: 2023-10-06

## 2023-10-06 NOTE — TELEPHONE ENCOUNTER
I spoke to the patient I let her know the order for her new machine was faxed to [facility] on 10/02/2023. She will call them to get the status of when she can get setup with the new machine. She would also like it stated in her chart that she has not been able to use her machine and there will be a gap in her usage.

## 2023-10-06 NOTE — CARE COORDINATION
Pt referred to CC from [address] [place] [address] PCP referral. Pt was UTR, ACM to send UTR My Chart letter as well and will make another outreach attempt at a later date/time.

## 2023-10-06 NOTE — TELEPHONE ENCOUNTER
From: Ryan Leon  To: Calderon Goldman  Sent: 10/4/2023 5:17 PM EDT  Subject: CPAP pressure    Hennessey,    Looks like your machine is only pushing pressure of 4 - 5 although your settings have not changed. In case your machine is ramping up, I will increase the ramp pressure closer to your setting to see if this will fix the issue and give you more pressure. This should be only for the first 15 min and should go to your pressure setting of 13. Not sure what is going on with your machine. If this is too much, please let me know so I can adjust it again.      Lonnie Lockwood

## 2023-10-10 ENCOUNTER — TELEPHONE (OUTPATIENT)
Dept: PULMONOLOGY | Age: 73
End: 2023-10-10

## 2023-10-10 NOTE — TELEPHONE ENCOUNTER
Pt called in stating that she was prescribed gabapentin for her RSL and she does not like it. Pt stated that she only slept 90 min last night and it is making her leg jump like jumping beans. Pt no longer wants to take the gabapentin. Pt stated that she was taking pramipexole twice a day and she wants to go back on that.      [id number]

## 2023-10-10 NOTE — TELEPHONE ENCOUNTER
Pt in with unit . Unit delivering correct pressure (13 cmH2O) per water manometer. Ramp was turned off due to pt c/o not enough pressure.

## 2023-10-13 ENCOUNTER — HOSPITAL ENCOUNTER (OUTPATIENT)
Dept: PHYSICAL THERAPY | Age: 73
Setting detail: THERAPIES SERIES
Discharge: HOME | End: 2023-10-13
Attending: INTERNAL MEDICINE
Payer: MEDICARE

## 2023-10-13 PROCEDURE — 97161 PT EVAL LOW COMPLEX 20 MIN: CPT

## 2023-10-13 PROCEDURE — 97530 THERAPEUTIC ACTIVITIES: CPT

## 2023-10-13 NOTE — FLOWSHEET NOTE
No      GOALS     Patient stated goal: able to perform crafts without restriction  Status: [] Progressing: [] Met: [] Not Met: [] Adjusted    Therapist goals for Patient:   Short Term Goals: To be achieved in: 2 weeks  Independent in HEP and progression per patient tolerance, in order to progress toward full function and prevent re-injury. Status: [] Progressing: [] Met: [] Not Met: [] Adjusted  Patient will have a decrease in pain to 2/10 or less with driving. Status: [] Progressing: [] Met: [] Not Met: [] Adjusted    Long Term Goals: To be achieved in: 8weeks  Disability index score of 10% or less for the Quick DASH to assist with return to prior level of function. Status: [] Progressing: [] Met: [] Not Met: [] Adjusted  Improve shoulder AROM to 130 flex and ABD degrees or  better to allow for reaching overhead into cabinet  Status: [] Progressing: [] Met: [] Not Met: [] Adjusted  Pt will demo 4+/5 or higer with flex and ABD and B ER to 4/5 or higher for improve ability to lift items for crafting. Status: [] Progressing: [] Met: [] Not Met: [] Adjusted  Patient will return to Bathing/Grooming without increased symptoms or restriction as indicated with symmetrical and pain free Er/IR scratch test.      Status: [] Progressing: [] Met: [] Not Met: [] Adjusted  Pt will sleep uninterrupted from pain for improved sleep hygiene. Status: [] Progressing: [] Met: [] Not Met: [] Adjusted    Overall Progression Towards Functional goals/ Treatment Progress Update:  [] Patient is progressing as expected towards functional goals listed. [] Progression is slowed due to complexities/Impairments listed. [] Progression has been slowed due to co-morbidities.   [x] Plan just implemented, too soon (<30days) to assess goals progression   [] Goals require adjustment due to lack of progress  [] Patient is not progressing as expected and requires additional follow up with physician  [] Other:     Urbano Shrestha

## 2023-10-18 ENCOUNTER — HOSPITAL ENCOUNTER (OUTPATIENT)
Dept: PHYSICAL THERAPY | Age: 73
Setting detail: THERAPIES SERIES
Discharge: HOME | End: 2023-10-18
Attending: INTERNAL MEDICINE
Payer: MEDICARE

## 2023-10-18 ENCOUNTER — HOSPITAL ENCOUNTER (OUTPATIENT)
Age: 73
Discharge: HOME | End: 2023-10-18
Payer: MEDICARE

## 2023-10-18 DIAGNOSIS — I50.32 CHRONIC DIASTOLIC HEART FAILURE (HCC): ICD-10-CM

## 2023-10-18 DIAGNOSIS — R06.02 SOB (SHORTNESS OF BREATH): ICD-10-CM

## 2023-10-18 LAB
ANION GAP SERPL CALCULATED.3IONS-SCNC: 13 MMOL/L (ref 3–16)
BUN SERPL-MCNC: 14 MG/DL (ref 7–20)
CALCIUM SERPL-MCNC: 9.3 MG/DL (ref 8.3–10.6)
CHLORIDE SERPL-SCNC: 101 MMOL/L (ref 99–110)
CO2 SERPL-SCNC: 23 MMOL/L (ref 21–32)
CREAT SERPL-MCNC: 0.7 MG/DL (ref 0.6–1.2)
DEPRECATED RDW RBC AUTO: 13.8 % (ref 12.4–15.4)
GFR SERPLBLD CREATININE-BSD FMLA CKD-EPI: >60 ML/MIN/{1.73_M2}
GLUCOSE SERPL-MCNC: 107 MG/DL (ref 70–99)
HCT VFR BLD AUTO: 41.8 % (ref 36–48)
HGB BLD-MCNC: 14.4 G/DL (ref 12–16)
MCH RBC QN AUTO: 33.3 PG (ref 26–34)
MCHC RBC AUTO-ENTMCNC: 34.4 G/DL (ref 31–36)
MCV RBC AUTO: 96.6 FL (ref 80–100)
NT-PROBNP SERPL-MCNC: 147 PG/ML (ref 0–124)
PLATELET # BLD AUTO: 227 K/UL (ref 135–450)
PMV BLD AUTO: 9.5 FL (ref 5–10.5)
POTASSIUM SERPL-SCNC: 4 MMOL/L (ref 3.5–5.1)
RBC # BLD AUTO: 4.33 M/UL (ref 4–5.2)
SODIUM SERPL-SCNC: 137 MMOL/L (ref 136–145)
WBC # BLD AUTO: 4.7 K/UL (ref 4–11)

## 2023-10-18 PROCEDURE — 85027 COMPLETE CBC AUTOMATED: CPT

## 2023-10-18 PROCEDURE — 97110 THERAPEUTIC EXERCISES: CPT

## 2023-10-18 PROCEDURE — 97530 THERAPEUTIC ACTIVITIES: CPT

## 2023-10-18 PROCEDURE — 36415 COLL VENOUS BLD VENIPUNCTURE: CPT

## 2023-10-18 PROCEDURE — 83880 ASSAY OF NATRIURETIC PEPTIDE: CPT

## 2023-10-18 PROCEDURE — 97140 MANUAL THERAPY 1/> REGIONS: CPT

## 2023-10-18 PROCEDURE — 80048 BASIC METABOLIC PNL TOTAL CA: CPT

## 2023-10-18 NOTE — FLOWSHEET NOTE
patient meets the below criteria necessary for medical necessity for care and/or justification of therapy services: The patient has a musculoskeletal condition(s) with a corresponding ICD-10 code that is of complexity and severity that require skilled therapeutic intervention. This has a direct and significant impact on the need for therapy and significantly impacts the rate of recovery. The patient had a prior episode of outpatient therapy during this calendar year for a different condition. Current diagnosis requires skilled therapeutic intervention. Treatment/Activity Tolerance:  [x] Patient tolerated treatment well [] Patient limited by fatique  [] Patient limited by pain  [] Patient limited by other medical complications  [] Other:     Return to Play: NA    Prognosis for POC: [x] Good [] Fair  [] Poor    Patient requires continued skilled intervention: [x] Yes  [] No      GOALS     Patient stated goal: able to perform crafts without restriction  Status: [] Progressing: [] Met: [] Not Met: [] Adjusted    Therapist goals for Patient:   Short Term Goals: To be achieved in: 2 weeks  Independent in HEP and progression per patient tolerance, in order to progress toward full function and prevent re-injury. Status: [] Progressing: [] Met: [] Not Met: [] Adjusted  Patient will have a decrease in pain to 2/10 or less with driving. Status: [] Progressing: [] Met: [] Not Met: [] Adjusted    Long Term Goals: To be achieved in: 8weeks  Disability index score of 10% or less for the Quick DASH to assist with return to prior level of function. Status: [] Progressing: [] Met: [] Not Met: [] Adjusted  Improve shoulder AROM to 130 flex and ABD degrees or  better to allow for reaching overhead into cabinet  Status: [] Progressing: [] Met: [] Not Met: [] Adjusted  Pt will demo 4+/5 or higer with flex and ABD and B ER to 4/5 or higher for improve ability to lift items for crafting.   Status: [] Progressing: [] Met: []
Progressing: [] Met: [] Not Met: [] Adjusted  Patient will return to Bathing/Grooming without increased symptoms or restriction as indicated with symmetrical and pain free Er/IR scratch test.      Status: [] Progressing: [] Met: [] Not Met: [] Adjusted  Pt will sleep uninterrupted from pain for improved sleep hygiene. Status: [] Progressing: [] Met: [] Not Met: [] Adjusted    Overall Progression Towards Functional goals/ Treatment Progress Update:  [] Patient is progressing as expected towards functional goals listed. [] Progression is slowed due to complexities/Impairments listed. [] Progression has been slowed due to co-morbidities. [x] Plan just implemented, too soon (<30days) to assess goals progression   [] Goals require adjustment due to lack of progress  [] Patient is not progressing as expected and requires additional follow up with physician  [] Other:     CHARGE CAPTURE     CHARGE GRID   CPT Code (TIMED) minutes # CPT Code (UNTIMED) #     [x] Therex (10756)  19 1  [] EVAL:LOW (70087 - Typically 20 minutes face-to-face)     [] Neuromusc. Re-ed (27598)    [] Re-Eval (73735)     [x] Manual (25067) 13 1  [] Estim Unattended (44383)     [x] Ther. Act (50980) 8 1  [] Mech. Traction (74240)     [] Gait (19614)    [] Dry Needle 1-2 muscle (69003)     [] Aquatic Therex (99853)    [] Dry Needle 3+ muscle (09992)     [] Iontophoresis (02004)    [] VASO (02811)     [] Ultrasound (30119)    [] Group Therapy (38884)     [] Estim Attended (94714)    [] Other: Total Timed Code Tx Minutes 40        Total Treatment Minutes 50        Charge Justification:  (06022) HOME EXERCISE PROGRAM- Reviewed/Progressed HEP activities related to strengthening, flexibility, endurance, ROM performed to prevent loss of range of motion, maintain or improve muscular strength or increase flexibility, following either an injury or surgery.   (50016) THERAPEUTIC ACTIVITY- use of dynamic activities to improve functional

## 2023-10-23 PROCEDURE — 93298 REM INTERROG DEV EVAL SCRMS: CPT | Performed by: INTERNAL MEDICINE

## 2023-10-23 PROCEDURE — G2066 INTER DEVC REMOTE 30D: HCPCS | Performed by: INTERNAL MEDICINE

## 2023-10-24 ENCOUNTER — APPOINTMENT (OUTPATIENT)
Dept: PHYSICAL THERAPY | Age: 73
End: 2023-10-24
Attending: INTERNAL MEDICINE
Payer: MEDICARE

## 2023-10-26 ENCOUNTER — HOSPITAL ENCOUNTER (OUTPATIENT)
Dept: PHYSICAL THERAPY | Age: 73
Setting detail: THERAPIES SERIES
Discharge: HOME | End: 2023-10-26
Attending: INTERNAL MEDICINE
Payer: MEDICARE

## 2023-10-26 NOTE — FLOWSHEET NOTE
[facility] [email or web address]     Physical Therapy  Cancellation/No-show Note  Patient Name:  Ryan Cesar  :  1950   Date:  10/26/2023  Cancelled visits to date: 1  No-shows to date: 0    Patient status for today's appointment patient:  [x]  Cancelled  10/26  []  Rescheduled appointment  []  No-show     Reason given by patient:  [x]  Patient ill - thinks she might have covid  []  Conflicting appointment  []  No transportation    []  Conflict with work  []  No reason given  []  Other:     Comments:      Phone call information:   []  Phone call made today to patient at _ time at number provided:      []  Patient answered, conversation as follows:    []  Patient did not answer, message left as follows:  []  Phone call not made today  [x]  Phone call not needed - pt contacted us to cancel and provided reason for cancellation.      Electronically signed by:  Sp Ramirez PTA

## 2023-10-26 NOTE — PROGRESS NOTES
[facility]   Advanced Heart Failure/Pulmonary Hypertension  Cardiac Follow up       Krishna Crocker  YOB: 1950     Date of Visit:  10/26/23     No chief complaint on file. History of present illness: Krishna Crocker is a 72 y.o. 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 She original had a PE was treated with xarelto for recommended time and then off it and her RHC revealed pressures no RH elevated pressures. Afterwards she developed AFib and restarted on xarelto. She continues on xarelto and treatment for afib. Echos in July and August (2016) showed RVSP 106-112 without evidence of enlargement in right side of heart. RHC was done 9/2/16 and PA pressure was 24, no evidence of pulmonary hypertension. Since then, her RVSP has decreased and got back to normal. We discussed that we are questioning if the elevated ones by echo may have been due to a false positive test for her. She was seen by Dr Riri Bazan 8/1 and he is managing her hypothyroidism. [place], the thyroid medication was stopped. She was noted to have negative thyroid ultrasound ( July 2018). She wears her CPAP consistently for her HUSSAIN. States she had a reaction to Simvastatin and Livalo with muscle pain. She states she is Prediabetic and has not lost weight. She was in a study for Novavax; she did receive the usual vaccine in May and has felt very fatigued since then. She was admitted 1/13/2020 with palpitations/light-headedness along with SOB and chest pressure. She underwent successful DCCV and was put on Rhythmol -- this caused her HR to drop into the 40's which made her feel bad. She was discharged on diltiazem 240mg qd which causes her to feel fatigued and dizzy. She remains on Xarelto. Her lisinopril was stopped. She had an AF ablation on 3/27/2020. On 4/2/20 she reported that she was back in atrial fib with rates ranging from 80 to 130.   She did not
estimated ejection  fraction of 60%. Mild mitral regurgitation is present. There is mild-moderate tricuspid regurgitation with RVSP estimated at 88  mmHg. This is suggestive of severe pulmonary hypertension. Mild pulmonic regurgitation present. 1/20/15 Nuclear Stress test  Conclusions        Summary    Normal myocardial perfusion study. Normal LV function. Labs were reviewed including labs from other hospital systems through [facility]. Cardiac testing was reviewed including echos, nuclear scans, cardiac catheterization, including from other hospital systems through [facility]. Assessment:  1. Chronic diastolic heart failure (HCC)    2. Chest pain, unspecified type    3. Benign essential HTN    4. SOB (shortness of breath)    5. Paroxysmal atrial fibrillation (HCC)    6. Acute idiopathic pericarditis        1. Chronic heart failure with preserved ejection fraction: Stable. Compensated by exam.  -Continue Lisinopril, Lasix, spironolactone and Jardiance (Dr. Alem Amaya)    -ProBNP> 147 (10/18/23); 196 (10/2/23); 111 (8/29/23); 117 (6/6/23); 138 (5/10/23); 125 from 4/18/23 (weight gain, swelling); 76 from 1/9/23; 159 from 12/31/22; 96 from 10/25/22; 180 from 2/16/22; 66 from 2/9/22; 109 from 6/8/21; 122 from 4/5/21    -ECHO 5/1/23> EF 87-33%, normal diastolic function, PASP [id number]  -ECHO 7/22/22> EF 60-65%, RVSP 30mmHg  -ECHO 3/1/21> EF 55-60%, grade II DD     2. Paroxysmal atrial fibrillation: HR regular & controlled. -EKG 8/29/23> NSR 75  -ILR placed 8/17/20. Regular interrogations> no arrhythmias noted 7/7/23.  -Had an Ablation of atrial fib on 3/27/2020 per Dr. Sigifredo Brown. She has had no more AF episodes. -DCCV 1/13/2020 (Dr. Harris Nageotte)   -Continues on Xarelto per EP  -Cardizem 60mg prn tid for palpitations/HR >100  -Can try taking diltiazem ER 120mg qam. Can still take the short-acting prn tid. 3. Benign essential HTN: Stable.    - 3/1/21> reduced Lisinopril from 20mg to 10mg qd for B/P's

## 2023-10-27 ENCOUNTER — OFFICE VISIT (OUTPATIENT)
Dept: CARDIOLOGY CLINIC | Age: 73
End: 2023-10-27

## 2023-10-27 VITALS
DIASTOLIC BLOOD PRESSURE: 82 MMHG | HEIGHT: 64 IN | HEART RATE: 82 BPM | OXYGEN SATURATION: 95 % | WEIGHT: 248 LBS | SYSTOLIC BLOOD PRESSURE: 126 MMHG | BODY MASS INDEX: 42.34 KG/M2

## 2023-10-27 DIAGNOSIS — R06.02 SOB (SHORTNESS OF BREATH): ICD-10-CM

## 2023-10-27 DIAGNOSIS — R07.9 CHEST PAIN, UNSPECIFIED TYPE: ICD-10-CM

## 2023-10-27 DIAGNOSIS — I10 BENIGN ESSENTIAL HTN: ICD-10-CM

## 2023-10-27 DIAGNOSIS — I48.0 PAROXYSMAL ATRIAL FIBRILLATION (HCC): ICD-10-CM

## 2023-10-27 DIAGNOSIS — I50.32 CHRONIC DIASTOLIC HEART FAILURE (HCC): Primary | ICD-10-CM

## 2023-10-27 DIAGNOSIS — I30.0 ACUTE IDIOPATHIC PERICARDITIS: ICD-10-CM

## 2023-10-27 RX ORDER — COLCHICINE 0.6 MG/1
0.6 TABLET ORAL DAILY
Qty: 90 TABLET | Refills: 3 | Status: SHIPPED | OUTPATIENT
Start: 2023-10-27

## 2023-10-27 RX ORDER — CETIRIZINE HYDROCHLORIDE 5 MG/1
5 TABLET ORAL DAILY
COMMUNITY

## 2023-10-27 NOTE — PATIENT INSTRUCTIONS
Plan:  1. Labs in December. BMP, BNP, CRP, ESR.   2.    Labs again in March 2024.   3.    See Dr. Gill Schwab in March 2024.   CBC, CMP, BNP, CRP, ESR.

## 2023-10-30 ENCOUNTER — HOSPITAL ENCOUNTER (OUTPATIENT)
Dept: PHYSICAL THERAPY | Age: 73
Setting detail: THERAPIES SERIES
Discharge: HOME | End: 2023-10-30
Attending: INTERNAL MEDICINE
Payer: MEDICARE

## 2023-10-30 ENCOUNTER — TELEPHONE (OUTPATIENT)
Dept: INTERNAL MEDICINE CLINIC | Age: 73
End: 2023-10-30

## 2023-10-30 NOTE — PROGRESS NOTES
[facility] [employer]     Physical Therapy  Cancellation/No-show Note  Patient Name:  Neetu Joyner  :  1950   Date:  10/30/2023  Cancelled visits to date: 1  No-shows to date: 1    Patient status for today's appointment patient:  []  [address]  10/26  []  Rescheduled appointment  [x]  No-show 10/30     Reason given by patient:  [x]  Patient ill - thinks she might have covid  []  Conflicting appointment  []  No transportation    []  Conflict with work  []  No reason given  []  Other:     Comments:      Phone call information:   []  Phone call made today to patient at [address] [place] [address] at number provided:      []  Patient answered, conversation as follows:    [x]  Patient did not answer, message left as follows:left VM informing of NS, asked pt to call to cxl appts as needed if still ill  []  Phone call not made today-   []  Phone call not needed - pt contacted us to cancel and provided reason for cancellation. Electronically signed by:   Mary Jo Alcocer, PT DPT ATC

## 2023-11-01 ENCOUNTER — APPOINTMENT (OUTPATIENT)
Dept: PHYSICAL THERAPY | Age: 73
End: 2023-11-01
Attending: INTERNAL MEDICINE
Payer: MEDICARE

## 2023-11-03 NOTE — ED NOTES
IV started without complications, pt tolerated well. 20G in right forearm, flushed easily. Pt placed on continuous pulse ox, tele monitoring with BP set to cycle. Pt resting in bed, bed in lowest position with call light within reach. Pt denies further needs at this time, will continue to monitor.       Debbra Klinefelter, RN  02/02/20 [address]

## 2023-11-06 ENCOUNTER — APPOINTMENT (OUTPATIENT)
Dept: PHYSICAL THERAPY | Age: 73
End: 2023-11-06
Attending: INTERNAL MEDICINE
Payer: MEDICARE

## 2023-11-08 ENCOUNTER — HOSPITAL ENCOUNTER (OUTPATIENT)
Dept: PHYSICAL THERAPY | Age: 73
Setting detail: THERAPIES SERIES
Discharge: HOME | End: 2023-11-08
Attending: INTERNAL MEDICINE
Payer: MEDICARE

## 2023-11-08 PROCEDURE — 97110 THERAPEUTIC EXERCISES: CPT

## 2023-11-08 PROCEDURE — 97140 MANUAL THERAPY 1/> REGIONS: CPT

## 2023-11-08 NOTE — FLOWSHEET NOTE
[facility] and Therapy [facility], [address] office: [phone] fax: [phone]      Physical Therapy: TREATMENT/PROGRESS NOTE   Patient: Reza Eid (50 y.o. female)   Treatment Date: 2023   :  1950 MRN: [id number]   Visit #: 3  (Update each time, visit count with smartphrase not accurate)    Insurance Allowable Auth Needed   BMN []Yes    [x]No    Insurance: Payor: MEDICARE / Plan: MEDICARE PART A AND B / Product Type: *No Product type* /   Insurance ID: [id number] - (Medicare)  Secondary Insurance (if applicable): BCBS   Treatment Diagnosis: L shoulder pain, decreased ROM, weakness     Medical Diagnosis:    Left shoulder pain [M25.512]   Referring Physician: Yany Gonzalez MD  PCP: Yany Gonzalez MD                             Plan of care signed (Y/N): Y    Date of Patient follow up with Physician:      Progress Report/POC: NO  POC update due:  (OR 10 visits /OR 2-[place], whichever is less)    Latex Allergy:  [x]NO      []YES    Preferred Language for Healthcare:   [x]English       []other:    SUBJECTIVE EXAMINATION     Patient Report/Comments:   Pt reports she was doing HEP even while sick.      Test used Initial score  10/13/23 2023   Pain Summary VAS 1-6 3-4   Functional questionnaire Quick DASH 44, 75%    Other:                OBJECTIVE EXAMINATION     Observation:     Test measurements:     RESTRICTIONS/PRECAUTIONS: HTN, CHF    Exercises/Interventions:     Therapeutic Ex (72203)  resistance Sets/time Reps Notes/Cues/Progressions   Pulleys ABD, Flex  2'ea        Gentle rows lime 2 10    Seated ER cane- HEP    Wall walks as tolerated  10\" 5    Thoracic extension over chair- HEP     Push SB up reclined portion of table  1 10    Cane chest press  1 10                  Manual Intervention (41577)  TIME     STM RTC and pec, UT, scalenes  15'     All PROM  8'                          NMR re-education (75819)

## 2023-11-14 ENCOUNTER — HOSPITAL ENCOUNTER (OUTPATIENT)
Dept: PHYSICAL THERAPY | Age: 73
Setting detail: THERAPIES SERIES
Discharge: HOME | End: 2023-11-14
Attending: INTERNAL MEDICINE
Payer: MEDICARE

## 2023-11-14 PROCEDURE — 97110 THERAPEUTIC EXERCISES: CPT

## 2023-11-14 PROCEDURE — 97140 MANUAL THERAPY 1/> REGIONS: CPT

## 2023-11-14 NOTE — FLOWSHEET NOTE
[facility] and Therapy [facility], [address] [facility], [facility] [address] [address] [place] [address] office: [phone] fax: [phone]      Physical Therapy: TREATMENT/PROGRESS NOTE   Patient: Sanket Prieto (56 y.o. female)   Treatment Date: 2023   :  1950 MRN: [id number]   Visit #: 4  (Update each time, visit count with smartphrase not accurate)    Insurance Allowable Auth Needed   BMN []Yes    [x]No    Insurance: Payor: MEDICARE / Plan: MEDICARE PART A AND B / Product Type: *No Product type* /   Insurance ID: [id number] - (Medicare)  Secondary Insurance (if applicable): BCBS   Treatment Diagnosis: L shoulder pain, decreased ROM, weakness     Medical Diagnosis:    Left shoulder pain [M25.512]   Referring Physician: Norah Grove MD  PCP: Norah Grove MD                             Plan of care signed (Y/N): Y    Date of Patient follow up with Physician:      Progress Report/POC: NO  POC update due:  (OR 10 visits /OR [address], whichever is less)    Latex Allergy:  [x]NO      []YES    Preferred Language for Healthcare:   [x]English       []other:    SUBJECTIVE EXAMINATION     Patient Report/Comments:   Pt reports less pain and improved mobility. Pt reports pain for several days after STM last session.      Test used Initial score  10/13/23 2023   Pain Summary VAS 1-6 2-3   Functional questionnaire Quick DASH 44, 75%    Other:                OBJECTIVE EXAMINATION     Observation:     Test measurements:     RESTRICTIONS/PRECAUTIONS: HTN, CHF    Exercises/Interventions:     Therapeutic Ex (73583)  resistance Sets/time Reps Notes/Cues/Progressions   UBE 2F/B             Gentle rows lime 2 10    Seated ER cane- HEP    Wall walks as tolerated  10\" 5    Thoracic extension over chair- HEP      1 10    Cane chest press 2# 2 10    Wall ball ML and AP  3 10    Cane flex  10\" 6    Manual Intervention (45583)  TIME     STM RTC and pec, UT, scalenes  10'     All PROM  5'

## 2023-11-17 ENCOUNTER — HOSPITAL ENCOUNTER (OUTPATIENT)
Dept: PHYSICAL THERAPY | Age: 73
Setting detail: THERAPIES SERIES
Discharge: HOME | End: 2023-11-17
Attending: INTERNAL MEDICINE
Payer: MEDICARE

## 2023-11-17 NOTE — PROGRESS NOTES
[facility]     Physical Therapy  Cancellation/No-show Note  Patient Name:  Tyler Young  :  1950   Date:  2023  Cancelled visits to date: 2  No-shows to date: 1    Patient status for today's appointment patient:  [x]  Cancelled  10/26,   []  Rescheduled appointment  []  No-show 10/30     Reason given by patient:  [x]  Patient ill -   []  Conflicting appointment  []  No transportation    []  Conflict with work  []  No reason given  []  Other:     Comments:      Phone call information:   []  Phone call made today to patient at [phone] at number provided:      []  Patient answered, conversation as follows:    []  Patient did not answer, message left as follows:left VM informing of NS, asked pt to call to cxl appts as needed if still ill  []  Phone call not made today-   [x]  Phone call not needed - pt contacted us to cancel and provided reason for cancellation. Electronically signed by:   Chelsea Pratt PT DPT ATC

## 2023-11-20 ENCOUNTER — OFFICE VISIT (OUTPATIENT)
Dept: INTERNAL MEDICINE CLINIC | Age: 73
End: 2023-11-20
Payer: MEDICARE

## 2023-11-20 VITALS
BODY MASS INDEX: 42.23 KG/M2 | HEART RATE: 64 BPM | DIASTOLIC BLOOD PRESSURE: 62 MMHG | SYSTOLIC BLOOD PRESSURE: 124 MMHG | OXYGEN SATURATION: 98 % | WEIGHT: 246 LBS

## 2023-11-20 DIAGNOSIS — M17.12 ARTHRITIS OF KNEE, LEFT: ICD-10-CM

## 2023-11-20 DIAGNOSIS — M17.11 PRIMARY OSTEOARTHRITIS OF RIGHT KNEE: ICD-10-CM

## 2023-11-20 DIAGNOSIS — J30.2 SEASONAL ALLERGIES: ICD-10-CM

## 2023-11-20 DIAGNOSIS — J06.9 URI, ACUTE: Primary | ICD-10-CM

## 2023-11-20 DIAGNOSIS — E66.01 CLASS 3 SEVERE OBESITY DUE TO EXCESS CALORIES WITH SERIOUS COMORBIDITY AND BODY MASS INDEX (BMI) OF 40.0 TO 44.9 IN ADULT (HCC): ICD-10-CM

## 2023-11-20 PROCEDURE — 1090F PRES/ABSN URINE INCON ASSESS: CPT | Performed by: STUDENT IN AN ORGANIZED HEALTH CARE EDUCATION/TRAINING PROGRAM

## 2023-11-20 PROCEDURE — 1123F ACP DISCUSS/DSCN MKR DOCD: CPT | Performed by: STUDENT IN AN ORGANIZED HEALTH CARE EDUCATION/TRAINING PROGRAM

## 2023-11-20 PROCEDURE — G8399 PT W/DXA RESULTS DOCUMENT: HCPCS | Performed by: STUDENT IN AN ORGANIZED HEALTH CARE EDUCATION/TRAINING PROGRAM

## 2023-11-20 PROCEDURE — 3078F DIAST BP <80 MM HG: CPT | Performed by: STUDENT IN AN ORGANIZED HEALTH CARE EDUCATION/TRAINING PROGRAM

## 2023-11-20 PROCEDURE — G8484 FLU IMMUNIZE NO ADMIN: HCPCS | Performed by: STUDENT IN AN ORGANIZED HEALTH CARE EDUCATION/TRAINING PROGRAM

## 2023-11-20 PROCEDURE — 99214 OFFICE O/P EST MOD 30 MIN: CPT | Performed by: STUDENT IN AN ORGANIZED HEALTH CARE EDUCATION/TRAINING PROGRAM

## 2023-11-20 PROCEDURE — 3017F COLORECTAL CA SCREEN DOC REV: CPT | Performed by: STUDENT IN AN ORGANIZED HEALTH CARE EDUCATION/TRAINING PROGRAM

## 2023-11-20 PROCEDURE — G8427 DOCREV CUR MEDS BY ELIG CLIN: HCPCS | Performed by: STUDENT IN AN ORGANIZED HEALTH CARE EDUCATION/TRAINING PROGRAM

## 2023-11-20 PROCEDURE — G8417 CALC BMI ABV UP PARAM F/U: HCPCS | Performed by: STUDENT IN AN ORGANIZED HEALTH CARE EDUCATION/TRAINING PROGRAM

## 2023-11-20 PROCEDURE — 1036F TOBACCO NON-USER: CPT | Performed by: STUDENT IN AN ORGANIZED HEALTH CARE EDUCATION/TRAINING PROGRAM

## 2023-11-20 PROCEDURE — 3074F SYST BP LT 130 MM HG: CPT | Performed by: STUDENT IN AN ORGANIZED HEALTH CARE EDUCATION/TRAINING PROGRAM

## 2023-11-20 RX ORDER — TRAMADOL HYDROCHLORIDE 50 MG/1
50 TABLET ORAL EVERY 4 HOURS PRN
Qty: 30 TABLET | Refills: 2 | Status: SHIPPED | OUTPATIENT
Start: 2023-11-20 | End: 2023-12-20

## 2023-11-20 NOTE — PATIENT INSTRUCTIONS
Thank you for allowing me to care for you!     Patient instructions:  Food diary  When you, what you eat  When you're the hungriest  Time of you braking fast  Time of last bite

## 2023-11-20 NOTE — PROGRESS NOTES
Radha Nix (:  1950) is a 72 y.o. female,Established patient, here for evaluation of the following chief complaint(s):  Congestion and Cough           Assessment/Plan:     1. URI, acute  - Resolved    2. Seasonal allergies  - Resume zyrtec and flonase    3. Arthritis of knee, left  - Refill  - traMADol (ULTRAM) 50 MG tablet; Take 1 tablet by mouth every 4 hours as needed for Pain for up to 30 days. Intended supply: 7 days. Take lowest dose possible to manage pain Max Daily Amount: 300 mg  Dispense: 30 tablet; Refill: 2    4. Primary osteoarthritis of right knee  - Refill  - traMADol (ULTRAM) 50 MG tablet; Take 1 tablet by mouth every 4 hours as needed for Pain for up to 30 days. Intended supply: 7 days. Take lowest dose possible to manage pain Max Daily Amount: 300 mg  Dispense: 30 tablet; Refill: 2    5. Class 3 severe obesity due to excess calories with serious comorbidity and body mass index (BMI) of 40.0 to 44.9 in adult [facility])  - Return for obesity management   - INSULIN, RANDOM; Future  - Detailed food diary      Return for Weight management OVE. Return for Weight management OVE.        No data recorded   The 10-year ASCVD risk score (Meera DK, et al., 2019) is: 23.6%    Values used to calculate the score:      Age: 72 years      Sex: Female      Is Non- : No      Diabetic: Yes      Tobacco smoker: No      Systolic Blood Pressure: 252 mmHg      Is BP treated: Yes      HDL Cholesterol: 63 mg/dL      Total Cholesterol: 203 mg/dL      Subjective   SUBJECTIVE/OBJECTIVE:  HPI:   4 days ago was experiencing sinus pressure and drainage  Pressure ws infraorbital and frontal  COVID neg at the time  S/p RSV and influenza vaccine   Took sudafed with improvement   All drainage was clear  Denies fever worsening SOB  Cough has since resolved       Patient Active Problem List   Diagnosis    Restless leg syndrome    Acquired hypothyroidism    Benign essential HTN    HUSSAIN

## 2023-11-21 ENCOUNTER — APPOINTMENT (OUTPATIENT)
Dept: PHYSICAL THERAPY | Age: 73
End: 2023-11-21
Attending: INTERNAL MEDICINE
Payer: MEDICARE

## 2023-11-27 ENCOUNTER — OFFICE VISIT (OUTPATIENT)
Dept: INTERNAL MEDICINE CLINIC | Age: 73
End: 2023-11-27
Payer: MEDICARE

## 2023-11-27 VITALS
WEIGHT: 250.8 LBS | OXYGEN SATURATION: 95 % | HEART RATE: 86 BPM | BODY MASS INDEX: 42.82 KG/M2 | SYSTOLIC BLOOD PRESSURE: 125 MMHG | HEIGHT: 64 IN | DIASTOLIC BLOOD PRESSURE: 75 MMHG

## 2023-11-27 DIAGNOSIS — E11.9 TYPE 2 DIABETES MELLITUS WITHOUT COMPLICATION, WITHOUT LONG-TERM CURRENT USE OF INSULIN (HCC): ICD-10-CM

## 2023-11-27 DIAGNOSIS — E88.810 METABOLIC SYNDROME: Primary | ICD-10-CM

## 2023-11-27 PROCEDURE — 3044F HG A1C LEVEL LT 7.0%: CPT | Performed by: STUDENT IN AN ORGANIZED HEALTH CARE EDUCATION/TRAINING PROGRAM

## 2023-11-27 PROCEDURE — G8399 PT W/DXA RESULTS DOCUMENT: HCPCS | Performed by: STUDENT IN AN ORGANIZED HEALTH CARE EDUCATION/TRAINING PROGRAM

## 2023-11-27 PROCEDURE — 2022F DILAT RTA XM EVC RTNOPTHY: CPT | Performed by: STUDENT IN AN ORGANIZED HEALTH CARE EDUCATION/TRAINING PROGRAM

## 2023-11-27 PROCEDURE — G8484 FLU IMMUNIZE NO ADMIN: HCPCS | Performed by: STUDENT IN AN ORGANIZED HEALTH CARE EDUCATION/TRAINING PROGRAM

## 2023-11-27 PROCEDURE — 93298 REM INTERROG DEV EVAL SCRMS: CPT | Performed by: INTERNAL MEDICINE

## 2023-11-27 PROCEDURE — 3078F DIAST BP <80 MM HG: CPT | Performed by: STUDENT IN AN ORGANIZED HEALTH CARE EDUCATION/TRAINING PROGRAM

## 2023-11-27 PROCEDURE — 1090F PRES/ABSN URINE INCON ASSESS: CPT | Performed by: STUDENT IN AN ORGANIZED HEALTH CARE EDUCATION/TRAINING PROGRAM

## 2023-11-27 PROCEDURE — G8417 CALC BMI ABV UP PARAM F/U: HCPCS | Performed by: STUDENT IN AN ORGANIZED HEALTH CARE EDUCATION/TRAINING PROGRAM

## 2023-11-27 PROCEDURE — 3017F COLORECTAL CA SCREEN DOC REV: CPT | Performed by: STUDENT IN AN ORGANIZED HEALTH CARE EDUCATION/TRAINING PROGRAM

## 2023-11-27 PROCEDURE — G2066 INTER DEVC REMOTE 30D: HCPCS | Performed by: INTERNAL MEDICINE

## 2023-11-27 PROCEDURE — G8427 DOCREV CUR MEDS BY ELIG CLIN: HCPCS | Performed by: STUDENT IN AN ORGANIZED HEALTH CARE EDUCATION/TRAINING PROGRAM

## 2023-11-27 PROCEDURE — 1123F ACP DISCUSS/DSCN MKR DOCD: CPT | Performed by: STUDENT IN AN ORGANIZED HEALTH CARE EDUCATION/TRAINING PROGRAM

## 2023-11-27 PROCEDURE — 3074F SYST BP LT 130 MM HG: CPT | Performed by: STUDENT IN AN ORGANIZED HEALTH CARE EDUCATION/TRAINING PROGRAM

## 2023-11-27 PROCEDURE — 99214 OFFICE O/P EST MOD 30 MIN: CPT | Performed by: STUDENT IN AN ORGANIZED HEALTH CARE EDUCATION/TRAINING PROGRAM

## 2023-11-27 PROCEDURE — 1036F TOBACCO NON-USER: CPT | Performed by: STUDENT IN AN ORGANIZED HEALTH CARE EDUCATION/TRAINING PROGRAM

## 2023-11-27 ASSESSMENT — ANXIETY QUESTIONNAIRES
1. FEELING NERVOUS, ANXIOUS, OR ON EDGE: 0
2. NOT BEING ABLE TO STOP OR CONTROL WORRYING: 0
4. TROUBLE RELAXING: 0
7. FEELING AFRAID AS IF SOMETHING AWFUL MIGHT HAPPEN: 0
GAD7 TOTAL SCORE: 0
3. WORRYING TOO MUCH ABOUT DIFFERENT THINGS: 0
5. BEING SO RESTLESS THAT IT IS HARD TO SIT STILL: 0
6. BECOMING EASILY ANNOYED OR IRRITABLE: 0
IF YOU CHECKED OFF ANY PROBLEMS ON THIS QUESTIONNAIRE, HOW DIFFICULT HAVE THESE PROBLEMS MADE IT FOR YOU TO DO YOUR WORK, TAKE CARE OF THINGS AT HOME, OR GET ALONG WITH OTHER PEOPLE: NOT DIFFICULT AT ALL

## 2023-11-27 ASSESSMENT — PATIENT HEALTH QUESTIONNAIRE - PHQ9
9. THOUGHTS THAT YOU WOULD BE BETTER OFF DEAD, OR OF HURTING YOURSELF: 0
1. LITTLE INTEREST OR PLEASURE IN DOING THINGS: 0
SUM OF ALL RESPONSES TO PHQ QUESTIONS 1-9: 4
2. FEELING DOWN, DEPRESSED OR HOPELESS: 0
SUM OF ALL RESPONSES TO PHQ QUESTIONS 1-9: 4
3. TROUBLE FALLING OR STAYING ASLEEP: 3
SUM OF ALL RESPONSES TO PHQ9 QUESTIONS 1 & 2: 0
4. FEELING TIRED OR HAVING LITTLE ENERGY: 1
7. TROUBLE CONCENTRATING ON THINGS, SUCH AS READING THE NEWSPAPER OR WATCHING TELEVISION: 0
8. MOVING OR SPEAKING SO SLOWLY THAT OTHER PEOPLE COULD HAVE NOTICED. OR THE OPPOSITE, BEING SO FIGETY OR RESTLESS THAT YOU HAVE BEEN MOVING AROUND A LOT MORE THAN USUAL: 0
SUM OF ALL RESPONSES TO PHQ QUESTIONS 1-9: 4
SUM OF ALL RESPONSES TO PHQ QUESTIONS 1-9: 4
5. POOR APPETITE OR OVEREATING: 0
10. IF YOU CHECKED OFF ANY PROBLEMS, HOW DIFFICULT HAVE THESE PROBLEMS MADE IT FOR YOU TO DO YOUR WORK, TAKE CARE OF THINGS AT HOME, OR GET ALONG WITH OTHER PEOPLE: 0
6. FEELING BAD ABOUT YOURSELF - OR THAT YOU ARE A FAILURE OR HAVE LET YOURSELF OR YOUR FAMILY DOWN: 0

## 2023-11-27 NOTE — PATIENT INSTRUCTIONS
Thank you for allowing me to care for you! Patient instructions:  Intermittently fasting.  Eat between 1p-8p  Wait 3 hour in between meals/snacks   10,000 steps/day; in increments of 300 steps   Continue with pain management to   Find mounjaro coupon

## 2023-11-28 ENCOUNTER — TELEPHONE (OUTPATIENT)
Dept: INTERNAL MEDICINE CLINIC | Age: 73
End: 2023-11-28

## 2023-11-28 DIAGNOSIS — E11.9 TYPE 2 DIABETES MELLITUS WITHOUT COMPLICATION, WITHOUT LONG-TERM CURRENT USE OF INSULIN (HCC): ICD-10-CM

## 2023-11-28 DIAGNOSIS — E88.810 METABOLIC SYNDROME: ICD-10-CM

## 2023-12-04 NOTE — TELEPHONE ENCOUNTER
Samples pulled for pt,     2 bottles, 14 tabs   Lot [id number]   Exp 10/2025  Date 12/4/23   HLB    PT assistance paperwork in bag.

## 2023-12-04 NOTE — TELEPHONE ENCOUNTER
Pt states xarelto is costing over $400. Asking if there are any samples or other alternatives. Pt states she has one week left of medication. Please call to advise.

## 2023-12-05 NOTE — TELEPHONE ENCOUNTER
Called and spoke with patient and informed that patient's samples are ready for pick. Will route for signature sign off.

## 2023-12-11 ENCOUNTER — PATIENT MESSAGE (OUTPATIENT)
Dept: PULMONOLOGY | Age: 73
End: 2023-12-11

## 2023-12-12 NOTE — TELEPHONE ENCOUNTER
----- Message from BRICE Scott CNP sent at 9/21/2020  8:36 AM EDT -----  Labs look good, no change, keep taking kcl as is.  Zeke Carrera
PAST MEDICAL HISTORY:  Asthma - intubated in 2006 and In ICU in 2008 for exacerbation of asthma , uses Advair and ventolin     Chronic Sinusitis     Gall stones     H/O sciatica     H/O sleep apnea - had sleep studies done 2 yrs ago     H/O umbilical hernia repair     H/O uterine leiomyoma     Hypertension     Umbilical hernia     Uterine cyst

## 2023-12-12 NOTE — TELEPHONE ENCOUNTER
From: Junior Clay  To: Gloria Kim  Sent: 12/11/2023 4:30 PM EST  Subject: pramipexole dosage    Mingo Reynolds,  I dearly wish that I was not forced to cancel the appointment scheduled with you on December 6, 2023. I simply did not have enough data on the new machine to make the visit worthwhile at that time. I was sadly mistaken. The new CPAP machine is now working fine. My legs, however, are giving me FITS! As i sit at the computer right now I cannot stop them from jerking and jumping everywhere. I am miserable. I have started to take the first 0.5mg   dose of pramipexole as soon as I recognize that my legs are jumping. Today that was a t 2PM. It is 4:22PM now and they have only become worse. I am rocking back and forth as I type to cope. I plan to take a second Pramipexole 0.5 mg at 6PM, if I can wait that long. Ill take 2 Pramipexole, 0.5mg at about 11PM to prepare for a bedtime of midnight. If I am chapis I will sleep until 4AM, when I will be forced to get up and walk the length of my condo about 10 times to quiet legs enough to go back to sleep. Sometimes I can get back to sleep and sometimes I can't. Here is my question. How can I adjust/increase the Pramipexole safely and manage these unruly symptoms? Can I take the Gabapentin with the Pramipexole? Is there some other solution I can put into place? Please help? Thanks.  Junior Clay

## 2023-12-13 DIAGNOSIS — G25.81 RESTLESS LEG SYNDROME: ICD-10-CM

## 2023-12-14 RX ORDER — PRAMIPEXOLE DIHYDROCHLORIDE 0.5 MG/1
TABLET ORAL
Qty: 90 TABLET | Refills: 0 | Status: SHIPPED | OUTPATIENT
Start: 2023-12-14

## 2023-12-14 NOTE — TELEPHONE ENCOUNTER
Last OV  10/02/2023  Next OV  01/29/2024    Last Refill  10/02/2023 - 90 days, 0 refills    Download in chart from yesterday message.

## 2023-12-27 ENCOUNTER — TELEPHONE (OUTPATIENT)
Dept: PULMONOLOGY | Age: 73
End: 2023-12-27

## 2023-12-27 DIAGNOSIS — E88.810 METABOLIC SYNDROME: ICD-10-CM

## 2023-12-27 DIAGNOSIS — E11.9 TYPE 2 DIABETES MELLITUS WITHOUT COMPLICATION, WITHOUT LONG-TERM CURRENT USE OF INSULIN (HCC): ICD-10-CM

## 2023-12-27 RX ORDER — TIRZEPATIDE 5 MG/.5ML
INJECTION, SOLUTION SUBCUTANEOUS
Qty: 2 ML | Refills: 0 | Status: SHIPPED | OUTPATIENT
Start: 2023-12-27

## 2023-12-27 NOTE — TELEPHONE ENCOUNTER
Recent Visits  Date Type Provider Dept   11/27/23 Office Visit Jazmín Sawyer DO [facility]   11/20/23 Office Visit Jazmín Sawyer,  [facility]   10/02/23 Office Visit Macho Salgado MD [facility]   08/11/23 Office Visit Macho Salgado MD [facility]   07/21/23 Office Visit Macho Salgado MD [facility]   06/12/23 Office Visit Macho Salgado MD [facility]   05/10/23 Office Visit Macho Salgado MD [facility]   02/15/23 Office Visit Macho Salgado MD [facility]   11/14/22 Office Visit Macho Salgado MD [facility]   11/04/22 Office Visit Macho Salgado MD [facility]   Showing recent visits within past 540 days with a meds authorizing provider and meeting all other requirements  Future Appointments  Date Type Provider Dept   01/03/24 Appointment Macho Salgado MD [facility]   01/16/24 Appointment Jazmín Sawyer DO [facility]   Showing future appointments within next 150 days with a meds authorizing provider and meeting all other requirements     11/27/2023

## 2023-12-27 NOTE — TELEPHONE ENCOUNTER
Pt called in stating that she has been prescribed gabapentin for the past month and has been trying hard to use it but it is not working for her. Pt reported staying up all night 2X a week due to her legs jumping like beans. Pt stated that she counted 20-30 jumps a min. Pt stated that she no longer wants to take the gabapentin and would like to go back on pramipexole. Pt stated that she took that for years and it works for her. Pt stated that if she chooses not to put pt back on it she would like to know ASAP so she can find a new provider.      Ph.[phone]

## 2023-12-28 ENCOUNTER — TELEPHONE (OUTPATIENT)
Dept: PULMONOLOGY | Age: 73
End: 2023-12-28

## 2023-12-28 NOTE — TELEPHONE ENCOUNTER
Went straight to VM; advised patient to check her MyChart messages and let us know if she has any questions.

## 2023-12-28 NOTE — TELEPHONE ENCOUNTER
Pt called in stating that she is taking gabapentin for her RLS and she refuses to take it any longer. Pt stated since she started taking it that she has only gotten about 4 hours of sleep a night. Pt not wanting to take gabapentin any longer and stated if it gets ordered again, she will find a new provider. Pt asking to be put back on pramipexole and if a script for a refill can be sent. Pt requesting to be called back as soon as order can be sent. Please advise.      Ph. [phone]

## 2023-12-29 RX ORDER — DILTIAZEM HYDROCHLORIDE 120 MG/1
120 CAPSULE, COATED, EXTENDED RELEASE ORAL DAILY
Qty: 90 CAPSULE | Refills: 2 | Status: SHIPPED | OUTPATIENT
Start: 2023-12-29

## 2024-01-01 PROCEDURE — 93298 REM INTERROG DEV EVAL SCRMS: CPT | Performed by: INTERNAL MEDICINE

## 2024-01-02 ENCOUNTER — TELEPHONE (OUTPATIENT)
Dept: INTERNAL MEDICINE CLINIC | Age: 74
End: 2024-01-02

## 2024-01-02 DIAGNOSIS — G25.81 RESTLESS LEG SYNDROME: ICD-10-CM

## 2024-01-02 RX ORDER — PRAMIPEXOLE DIHYDROCHLORIDE 0.5 MG/1
TABLET ORAL
Qty: 90 TABLET | Refills: 1 | Status: CANCELLED | OUTPATIENT
Start: 2024-01-02

## 2024-01-02 NOTE — TELEPHONE ENCOUNTER
Patient is requesting the following  -physical therapy  -2 to 3 session    Patient is requesting another recommendation for another sleep therapy  -not agreeing with current specialist  -medication patient was prescribed causing other issues    Patient is also requesting the following medication be refilled  -pramipexole (MIRAPEX) 0.5 MG tablet   -restless leg syndrome

## 2024-01-08 ENCOUNTER — HOSPITAL ENCOUNTER (OUTPATIENT)
Dept: PHYSICAL THERAPY | Age: 74
Setting detail: THERAPIES SERIES
Discharge: HOME | End: 2024-01-08
Attending: INTERNAL MEDICINE

## 2024-01-17 DIAGNOSIS — G25.81 RESTLESS LEG SYNDROME: ICD-10-CM

## 2024-01-17 RX ORDER — PRAMIPEXOLE DIHYDROCHLORIDE 0.5 MG/1
TABLET ORAL
Qty: 90 TABLET | Refills: 0 | Status: SHIPPED | OUTPATIENT
Start: 2024-01-17 | End: 2024-02-01 | Stop reason: SDUPTHER

## 2024-01-17 NOTE — PROGRESS NOTES
30 days refill sent to the pharmacy. She is to see Dr. Patricia as discussed to manage RLS. Appointment on 1/29/24 with me needs to be rescheduled.

## 2024-01-31 ASSESSMENT — SLEEP AND FATIGUE QUESTIONNAIRES
HOW LIKELY ARE YOU TO NOD OFF OR FALL ASLEEP WHILE SITTING AND TALKING TO SOMEONE: 0
HOW LIKELY ARE YOU TO NOD OFF OR FALL ASLEEP WHILE WATCHING TV: 2
HOW LIKELY ARE YOU TO NOD OFF OR FALL ASLEEP WHILE SITTING QUIETLY AFTER LUNCH WITHOUT ALCOHOL: SLIGHT CHANCE OF DOZING
HOW LIKELY ARE YOU TO NOD OFF OR FALL ASLEEP WHILE WATCHING TV: MODERATE CHANCE OF DOZING
HOW LIKELY ARE YOU TO NOD OFF OR FALL ASLEEP IN A CAR, WHILE STOPPED FOR A FEW MINUTES IN TRAFFIC: 0
HOW LIKELY ARE YOU TO NOD OFF OR FALL ASLEEP WHILE SITTING QUIETLY AFTER LUNCH WITHOUT ALCOHOL: 1
HOW LIKELY ARE YOU TO NOD OFF OR FALL ASLEEP WHILE LYING DOWN TO REST IN THE AFTERNOON WHEN CIRCUMSTANCES PERMIT: 1
HOW LIKELY ARE YOU TO NOD OFF OR FALL ASLEEP WHEN YOU ARE A PASSENGER IN A CAR FOR AN HOUR WITHOUT A BREAK: MODERATE CHANCE OF DOZING
HOW LIKELY ARE YOU TO NOD OFF OR FALL ASLEEP WHEN YOU ARE A PASSENGER IN A CAR FOR AN HOUR WITHOUT A BREAK: 2
HOW LIKELY ARE YOU TO NOD OFF OR FALL ASLEEP IN A CAR, WHILE STOPPED FOR A FEW MINUTES IN TRAFFIC: WOULD NEVER DOZE
HOW LIKELY ARE YOU TO NOD OFF OR FALL ASLEEP WHILE SITTING AND READING: MODERATE CHANCE OF DOZING
NECK CIRCUMFERENCE (INCHES): 17
HOW LIKELY ARE YOU TO NOD OFF OR FALL ASLEEP WHILE SITTING AND READING: 2
ESS TOTAL SCORE: 8
HOW LIKELY ARE YOU TO NOD OFF OR FALL ASLEEP WHILE SITTING AND TALKING TO SOMEONE: WOULD NEVER DOZE
HOW LIKELY ARE YOU TO NOD OFF OR FALL ASLEEP WHILE SITTING INACTIVE IN A PUBLIC PLACE: 0
HOW LIKELY ARE YOU TO NOD OFF OR FALL ASLEEP WHILE SITTING INACTIVE IN A PUBLIC PLACE: WOULD NEVER DOZE
HOW LIKELY ARE YOU TO NOD OFF OR FALL ASLEEP WHILE LYING DOWN TO REST IN THE AFTERNOON WHEN CIRCUMSTANCES PERMIT: SLIGHT CHANCE OF DOZING

## 2024-02-01 ENCOUNTER — OFFICE VISIT (OUTPATIENT)
Dept: PULMONOLOGY | Age: 74
End: 2024-02-01
Payer: MEDICARE

## 2024-02-01 VITALS
OXYGEN SATURATION: 98 % | DIASTOLIC BLOOD PRESSURE: 68 MMHG | SYSTOLIC BLOOD PRESSURE: 106 MMHG | BODY MASS INDEX: 42.37 KG/M2 | WEIGHT: 248.2 LBS | HEIGHT: 64 IN

## 2024-02-01 DIAGNOSIS — G47.33 OSA ON CPAP: Primary | ICD-10-CM

## 2024-02-01 DIAGNOSIS — G25.81 RESTLESS LEG SYNDROME: ICD-10-CM

## 2024-02-01 DIAGNOSIS — I10 BENIGN ESSENTIAL HTN: ICD-10-CM

## 2024-02-01 DIAGNOSIS — I48.0 PAROXYSMAL ATRIAL FIBRILLATION (HCC): ICD-10-CM

## 2024-02-01 PROCEDURE — 3074F SYST BP LT 130 MM HG: CPT | Performed by: STUDENT IN AN ORGANIZED HEALTH CARE EDUCATION/TRAINING PROGRAM

## 2024-02-01 PROCEDURE — G8427 DOCREV CUR MEDS BY ELIG CLIN: HCPCS | Performed by: STUDENT IN AN ORGANIZED HEALTH CARE EDUCATION/TRAINING PROGRAM

## 2024-02-01 PROCEDURE — 1090F PRES/ABSN URINE INCON ASSESS: CPT | Performed by: STUDENT IN AN ORGANIZED HEALTH CARE EDUCATION/TRAINING PROGRAM

## 2024-02-01 PROCEDURE — 1123F ACP DISCUSS/DSCN MKR DOCD: CPT | Performed by: STUDENT IN AN ORGANIZED HEALTH CARE EDUCATION/TRAINING PROGRAM

## 2024-02-01 PROCEDURE — 3078F DIAST BP <80 MM HG: CPT | Performed by: STUDENT IN AN ORGANIZED HEALTH CARE EDUCATION/TRAINING PROGRAM

## 2024-02-01 PROCEDURE — 3017F COLORECTAL CA SCREEN DOC REV: CPT | Performed by: STUDENT IN AN ORGANIZED HEALTH CARE EDUCATION/TRAINING PROGRAM

## 2024-02-01 PROCEDURE — G8484 FLU IMMUNIZE NO ADMIN: HCPCS | Performed by: STUDENT IN AN ORGANIZED HEALTH CARE EDUCATION/TRAINING PROGRAM

## 2024-02-01 PROCEDURE — 99214 OFFICE O/P EST MOD 30 MIN: CPT | Performed by: STUDENT IN AN ORGANIZED HEALTH CARE EDUCATION/TRAINING PROGRAM

## 2024-02-01 PROCEDURE — 1036F TOBACCO NON-USER: CPT | Performed by: STUDENT IN AN ORGANIZED HEALTH CARE EDUCATION/TRAINING PROGRAM

## 2024-02-01 PROCEDURE — G8417 CALC BMI ABV UP PARAM F/U: HCPCS | Performed by: STUDENT IN AN ORGANIZED HEALTH CARE EDUCATION/TRAINING PROGRAM

## 2024-02-01 PROCEDURE — G8399 PT W/DXA RESULTS DOCUMENT: HCPCS | Performed by: STUDENT IN AN ORGANIZED HEALTH CARE EDUCATION/TRAINING PROGRAM

## 2024-02-01 RX ORDER — PRAMIPEXOLE DIHYDROCHLORIDE 0.5 MG/1
TABLET ORAL
Qty: 180 TABLET | Refills: 0 | Status: SHIPPED | OUTPATIENT
Start: 2024-02-01

## 2024-02-01 NOTE — PATIENT INSTRUCTIONS
min. You can also put it in the top rack of  to clean.     Ask your medical equipment company about renewal of your supplies. At the very least- this should be done once a year.       Weight Loss  Weight Loss is an important part of treating obstructive sleep apnea. Being at a healthy weight is improtant to prevent and/or facilitate treatment of chronic conditions such as heart disease and diabetes in addition to obstructive sleep apnea.     This is optimally achieved through a healthy diet and exercise program that can be maintained long term.       For more information please call [phone] ([facility]).          Drowsy Driving Tips    These suggestions will help prevent you from the risk of drowsy driving.    1.  If you feel tired or drowsy do not drive.  Sleepiness is a major cause of motor vehicle accidents and accounts for 40% of all fatal crashes reported on the [facility].  No matter how much you think you can control sleepiness, you can't.    2. Ensure you follow your doctor's advice about the treatment for your sleep disorder. For example, if you have sleep apnea and use CPAP, ensure you use it fully the night before your trip.    3. Get a good night's sleep before driving.  Do not reduce your sleep time if you plan a long drive the next day.  Get to bed early and do not stay up late packing.    4. Avoid alcohol both the night before your trip and the during your trip.  Alcohol will disrupt sleep and make you more tired the next day.  Sleepiness and alcohol are additive in increasing impairment of your driving ability.    5. Avoid any sedative medications, including sedative antihistamines that are often contained in cold or allergy medications, the night before you drive as they may have long lasting effects the next day.    6. Travel during non-sleeping hours.  Accidents due to sleepiness are more common during the nighttime hours.    7. If sleepy, stop and rest.

## 2024-02-01 NOTE — PROGRESS NOTES
[facility]  Sleep Medicine  [address] [place] [address], [address]  [place] [address]    Chief Complaint   Patient presents with    Sleep Apnea         Kira Alexander comes in today for sleep apnea and RLS follow-up . She was diagnosed with severe obstructive sleep apnea and is being treated with PAP therapy.   She has been on PAP therapy for several years.  She states she wears the PAP device some nights. She goes to bed at between 11pm and midnight. She falls asleep in  few minutes.  She awakens at least 1 times per night (but many times it is hard to fall asleep again) and takes no nap. In hte morning she gets out of bed around 7:30am-8am.  She does use sleep aid/s:  melatonin and some other OTC herbals for sleep .  She continues to have complaints of disrupted sleep, difficulty staying asleep, and waking up too early.  She is not snoring with the machine on.  She does complain of mask fit / discomfort issues.  She reports that sometimes she feels soreness in her nostrils and the headgear of her current mask slips off easily, causing high leaks.  She had stopped using her CPAP because of how bad her RLS symptoms had gotten since weaning pramipexole.  [facility]  Mask: AirFit P10  Machine: ResMed Airsense 11 Autoset      RLS: she has been on pramipexole for 2-3 years. She is on 0.75 mg of pramipexole BID. Before then she was on ropinirole. She was recently weaned off pramipexole because of augmentation symptoms. She was started on a weaning process and started on gabapentin.  However, her RLS symptoms got so much worse that she went back to take the regular dose of 0.75 mg BID (at 5pm and then at about 11pm). Right now her RLS symptoms are better but she is concerned about imminent augmentation symptoms.     DATA REVIEWED TODAY:    Diagnostic Review  PSG on 9/14/2017 showed apnea hypopnea index of 86.4/h with oxygen desaturation down to a jeanne of 86%.     [place]           1/31/2024    10:35

## 2024-02-01 NOTE — PROGRESS NOTES
Diagnosis: [x] HUSSAIN  (G47.33) [] CSA (G47.31) []  Other:__________________   Length of Need: [] 13 months [x]  99 Months                                          []  Other:__________________   Machine (MANDI): [] Respironics Auto (with modem for remote monitoring)       [] Other:____________________    []  ResMed Auto (with modem for remote monitoring)    []  CPAP () [] Bilevel ()   Mode: Mode:   [] Auto [] Fixed [] Auto [] Spontaneous   Pmin:_________cmH2O      Pmax:_________cmH2O   P:_________cmH2O    EPAPmin:__________cmH2O IPAP:__________cmH2O     IPAPmax:__________cmH2O EPAP:__________cmH2O     PSmin:_______  PSmax:_______       (ResMed) PS:_________     Flex/EPR - 3 full time                          Ramp time: 30 min Flex/EPR - 3 full time                 Ramp time: 30 min   Ramp Pressure:___________cmH2O Ramp Pressure:____________cmH2O         Humidifier: [] Heated ()                                               [] Passive     [] Water chamber replacement ()/ 1 per 6 months   Mask:   [x] Nasal () /1 per 3 months [] Full Face () /1 per 3 months   [x] Patient choice -Size and fit mask [] Patient Choice - Size and fit mask   [x] Dispense: AirFit N30i [] Dispense:    [x] Headgear () / 1 per 3 months [] Headgear () / 1 per 3 months   [x] Replacement Nasal Cushion ()/2 per month [] Interface Replacement ()/1 per month   [] Replacement Nasal Pillows ()/2 per month       Tubing: [] Heated ()/1 per 3 months                           [] Standard ()/1 per 3 months  [] Other:____________________   Filters: [] Non-disposable ()/1 per 6 months                 [] Ultra-Fine, Disposable ()/2 per month     Miscellaneous: [] Chin Strap ()/ 1 per 6months      [] Other:__________________________________         Start Order Date: 02/01/24    MEDICAL JUSTIFICATION:  I, the undersigned, certify that the above prescribed supplies are medically necessary

## 2024-02-05 ENCOUNTER — TELEPHONE (OUTPATIENT)
Dept: ENDOCRINOLOGY | Age: 74
End: 2024-02-05

## 2024-02-05 PROCEDURE — 93298 REM INTERROG DEV EVAL SCRMS: CPT | Performed by: INTERNAL MEDICINE

## 2024-02-05 NOTE — TELEPHONE ENCOUNTER
Patient calling wanting a sample of Ozempic from Dr. Cerda. Stating \"Ozempic was to expensive.\" Patient isnt a patient of Prashant's or our group. Patient stated \" that she is a patient of Dr. Patricia with sleep and he had mentioned taking the Ozempic for weight loss. \"Patient stated \"she doesn't want to make an appointment with Dr. Cerda just asking for samples per Dr. Patricia with sleep.\"

## 2024-02-06 ENCOUNTER — TELEPHONE (OUTPATIENT)
Dept: PULMONOLOGY | Age: 74
End: 2024-02-06

## 2024-02-06 NOTE — TELEPHONE ENCOUNTER
Spoke with patient at this time informing her that all orders for supplies/headgear, ect. Has been successfully received by Tari ([place] office)

## 2024-02-08 ENCOUNTER — PATIENT MESSAGE (OUTPATIENT)
Dept: CARDIOLOGY CLINIC | Age: 74
End: 2024-02-08

## 2024-02-08 DIAGNOSIS — Z13.21 ENCOUNTER FOR VITAMIN DEFICIENCY SCREENING: Primary | ICD-10-CM

## 2024-02-08 DIAGNOSIS — R06.02 SOB (SHORTNESS OF BREATH): ICD-10-CM

## 2024-02-08 DIAGNOSIS — I50.32 CHRONIC DIASTOLIC HEART FAILURE (HCC): ICD-10-CM

## 2024-02-08 DIAGNOSIS — Z13.0 SCREENING FOR DEFICIENCY ANEMIA: ICD-10-CM

## 2024-02-08 NOTE — TELEPHONE ENCOUNTER
From: Kira Alexander  To: Dr. Cece Betancur  Sent: 2/8/2024 11:11 AM EST  Subject: bloodwork    Hello Dr. Betancur,  Would you please add 2 more blood tests to the screening I am already having done before I see you in March?    They would be to check my iron levels and to check my magnesium levels.  These would help me to better understand why my sleeping has been so poor lately. I will be seeing my sleep medicine doctor in  April. It would really be nice to only get stuck once and also to have recent bloodwork for him to look at while I am there for a visit instead of trying to talk about the results using Outsell[employer] after I see him.  Thanks,  Kira Alexander

## 2024-02-26 DIAGNOSIS — I10 BENIGN ESSENTIAL HTN: Chronic | ICD-10-CM

## 2024-02-26 NOTE — TELEPHONE ENCOUNTER
Last ov:10/27/23 ERICKA  Next ov:3/27/24 ERICKA  Last EK23  Last labs:10/18/23  Last filled:   Disp Refills Start End    furosemide (LASIX) 40 MG tablet 90 tablet 3 2023 --    Sig - Route: Take 1 tablet by mouth daily - Oral    Sent to pharmacy as: Furosemide 40 MG Oral Tablet (LASIX)    Cosign for Ordering: Accepted by Cece Betancur MD on 2023  6:25 PM    E-Prescribing Status: Receipt confirmed by pharmacy (2023  9:37 AM EST)

## 2024-02-27 RX ORDER — FUROSEMIDE 40 MG/1
40 TABLET ORAL DAILY
Qty: 90 TABLET | Refills: 3 | Status: SHIPPED | OUTPATIENT
Start: 2024-02-27

## 2024-03-06 DIAGNOSIS — M17.11 PRIMARY OSTEOARTHRITIS OF RIGHT KNEE: ICD-10-CM

## 2024-03-06 DIAGNOSIS — M17.12 ARTHRITIS OF KNEE, LEFT: ICD-10-CM

## 2024-03-06 RX ORDER — TRAMADOL HYDROCHLORIDE 50 MG/1
50 TABLET ORAL EVERY 4 HOURS PRN
Qty: 30 TABLET | Refills: 0 | Status: SHIPPED | OUTPATIENT
Start: 2024-03-06 | End: 2024-04-05

## 2024-03-06 NOTE — TELEPHONE ENCOUNTER
Recent Visits  Date Type Provider Dept   11/27/23 Office Visit Allison Chang,  [facility]   11/20/23 Office Visit Allison Chang,  [facility]   10/02/23 Office Visit Miguel Denton MD [facility]   08/11/23 Office Visit Miguel Denton, MD [facility]   07/21/23 Office Visit Miguel Denton MD [facility]   06/12/23 Office Visit Miguel Denton, MD [facility]   05/10/23 Office Visit Miguel Denton MD [facility]   02/15/23 Office Visit Miguel Denton MD [facility]   11/14/22 Office Visit Miguel Denton MD [facility]   11/04/22 Office Visit Miguel Denton MD [facility]   Showing recent visits within past 540 days with a meds authorizing provider and meeting all other requirements  Future Appointments  Date Type Provider Dept   04/08/24 Appointment Miguel Denton MD [facility]   Showing future appointments within next 150 days with a meds authorizing provider and meeting all other requirements     11/27/2023

## 2024-03-12 DIAGNOSIS — J20.9 ACUTE BRONCHITIS, UNSPECIFIED ORGANISM: ICD-10-CM

## 2024-03-13 DIAGNOSIS — J20.9 ACUTE BRONCHITIS, UNSPECIFIED ORGANISM: ICD-10-CM

## 2024-03-13 DIAGNOSIS — J45.21 MILD INTERMITTENT ASTHMA WITH ACUTE EXACERBATION: Primary | ICD-10-CM

## 2024-03-13 RX ORDER — ALBUTEROL SULFATE 2.5 MG/3ML
2.5 SOLUTION RESPIRATORY (INHALATION) EVERY 6 HOURS PRN
Qty: 120 EACH | Refills: 2 | Status: SHIPPED | OUTPATIENT
Start: 2024-03-13 | End: 2024-03-13 | Stop reason: SDUPTHER

## 2024-03-13 RX ORDER — ALBUTEROL SULFATE 2.5 MG/3ML
2.5 SOLUTION RESPIRATORY (INHALATION) EVERY 6 HOURS PRN
Qty: 120 EACH | Refills: 2 | Status: SHIPPED | OUTPATIENT
Start: 2024-03-13

## 2024-03-13 RX ORDER — LEVALBUTEROL TARTRATE 45 UG/1
1 AEROSOL, METERED ORAL EVERY 6 HOURS PRN
Qty: 1 EACH | Refills: 3 | Status: SHIPPED | OUTPATIENT
Start: 2024-03-13

## 2024-03-23 ENCOUNTER — PATIENT MESSAGE (OUTPATIENT)
Dept: INTERNAL MEDICINE CLINIC | Age: 74
End: 2024-03-23

## 2024-03-23 ENCOUNTER — PATIENT MESSAGE (OUTPATIENT)
Dept: CARDIOLOGY CLINIC | Age: 74
End: 2024-03-23

## 2024-03-25 NOTE — TELEPHONE ENCOUNTER
From: Kira Alexander  To: Dr. Cece Betancur  Sent: 3/23/2024 9:13 PM EDT  Subject: Xarelto    Yikes!! I have 3 Xarelto pills left as of Saturday March 23, 2024. Will you please set aside some samples of Xarelto for me that I can  from your office on Wednesday when I see you?   Thanks,  Kira Alexander

## 2024-03-26 DIAGNOSIS — I10 BENIGN ESSENTIAL HTN: ICD-10-CM

## 2024-03-26 DIAGNOSIS — I30.0 ACUTE IDIOPATHIC PERICARDITIS: ICD-10-CM

## 2024-03-26 DIAGNOSIS — I48.0 PAROXYSMAL ATRIAL FIBRILLATION (HCC): ICD-10-CM

## 2024-03-26 DIAGNOSIS — R06.02 SOB (SHORTNESS OF BREATH): ICD-10-CM

## 2024-03-26 DIAGNOSIS — Z13.0 SCREENING FOR DEFICIENCY ANEMIA: ICD-10-CM

## 2024-03-26 DIAGNOSIS — Z13.21 ENCOUNTER FOR VITAMIN DEFICIENCY SCREENING: ICD-10-CM

## 2024-03-26 DIAGNOSIS — I50.32 CHRONIC DIASTOLIC HEART FAILURE (HCC): ICD-10-CM

## 2024-03-26 DIAGNOSIS — R07.9 CHEST PAIN, UNSPECIFIED TYPE: ICD-10-CM

## 2024-03-26 LAB
ALBUMIN SERPL-MCNC: 4.7 G/DL (ref 3.4–5)
ALBUMIN/GLOB SERPL: 2.5 {RATIO} (ref 1.1–2.2)
ALP SERPL-CCNC: 92 U/L (ref 40–129)
ALT SERPL-CCNC: 39 U/L (ref 10–40)
ANION GAP SERPL CALCULATED.3IONS-SCNC: 16 MMOL/L (ref 3–16)
AST SERPL-CCNC: 23 U/L (ref 15–37)
BILIRUB SERPL-MCNC: 0.3 MG/DL (ref 0–1)
BUN SERPL-MCNC: 20 MG/DL (ref 7–20)
CALCIUM SERPL-MCNC: 9.5 MG/DL (ref 8.3–10.6)
CHLORIDE SERPL-SCNC: 100 MMOL/L (ref 99–110)
CO2 SERPL-SCNC: 21 MMOL/L (ref 21–32)
CREAT SERPL-MCNC: 0.8 MG/DL (ref 0.6–1.2)
DEPRECATED RDW RBC AUTO: 13.8 % (ref 12.4–15.4)
ERYTHROCYTE [SEDIMENTATION RATE] IN BLOOD BY WESTERGREN METHOD: 9 MM/HR (ref 0–30)
FERRITIN SERPL IA-MCNC: 438.8 NG/ML (ref 15–150)
GFR SERPLBLD CREATININE-BSD FMLA CKD-EPI: 78 ML/MIN/{1.73_M2}
GLUCOSE SERPL-MCNC: 105 MG/DL (ref 70–99)
HCT VFR BLD AUTO: 36.9 % (ref 36–48)
HGB BLD-MCNC: 12.7 G/DL (ref 12–16)
IRON SATN MFR SERPL: 30 % (ref 15–50)
IRON SERPL-MCNC: 99 UG/DL (ref 37–145)
MAGNESIUM SERPL-MCNC: 2 MG/DL (ref 1.8–2.4)
MCH RBC QN AUTO: 33 PG (ref 26–34)
MCHC RBC AUTO-ENTMCNC: 34.4 G/DL (ref 31–36)
MCV RBC AUTO: 96 FL (ref 80–100)
NT-PROBNP SERPL-MCNC: 151 PG/ML (ref 0–124)
PLATELET # BLD AUTO: 227 K/UL (ref 135–450)
PMV BLD AUTO: 9.1 FL (ref 5–10.5)
POTASSIUM SERPL-SCNC: 4.5 MMOL/L (ref 3.5–5.1)
PROT SERPL-MCNC: 6.6 G/DL (ref 6.4–8.2)
RBC # BLD AUTO: 3.85 M/UL (ref 4–5.2)
SODIUM SERPL-SCNC: 137 MMOL/L (ref 136–145)
TIBC SERPL-MCNC: 333 UG/DL (ref 260–445)
WBC # BLD AUTO: 4.7 K/UL (ref 4–11)

## 2024-03-26 NOTE — PROGRESS NOTES
confirm that the note above accurately reflects all work, treatment, procedures, and medical decision making performed by me.          Time Based Itemization  A total of 40 minutes was spent on today's patient encounter.  If applicable, non-patient-facing activities:  ( x)Preparing to see the patient and reviewing records  (x ) Individual interpretation of results  (x ) Discussion or coordination of care with other health care professionals (Dr. Fong, Dr. Denton) Dr. Kassi peter Xarelto.   ( x) Ordering of unique tests, medications, or procedures  ( x) Documentation within the EHR    Thank you for allowing me to participate in the care of your patient.    Cece gutierres M.D., [facility]                          
None

## 2024-03-27 ENCOUNTER — OFFICE VISIT (OUTPATIENT)
Dept: CARDIOLOGY CLINIC | Age: 74
End: 2024-03-27
Payer: MEDICARE

## 2024-03-27 ENCOUNTER — TELEPHONE (OUTPATIENT)
Dept: CARDIOLOGY CLINIC | Age: 74
End: 2024-03-27

## 2024-03-27 VITALS
SYSTOLIC BLOOD PRESSURE: 130 MMHG | OXYGEN SATURATION: 95 % | HEART RATE: 74 BPM | DIASTOLIC BLOOD PRESSURE: 66 MMHG | WEIGHT: 252 LBS | BODY MASS INDEX: 43.02 KG/M2 | HEIGHT: 64 IN

## 2024-03-27 DIAGNOSIS — R06.02 SOB (SHORTNESS OF BREATH): ICD-10-CM

## 2024-03-27 DIAGNOSIS — E78.00 PURE HYPERCHOLESTEROLEMIA: ICD-10-CM

## 2024-03-27 DIAGNOSIS — I30.0 ACUTE IDIOPATHIC PERICARDITIS: ICD-10-CM

## 2024-03-27 DIAGNOSIS — I10 ESSENTIAL (PRIMARY) HYPERTENSION: ICD-10-CM

## 2024-03-27 DIAGNOSIS — Z86.711 HX OF PULMONARY EMBOLUS: ICD-10-CM

## 2024-03-27 DIAGNOSIS — I48.0 PAROXYSMAL ATRIAL FIBRILLATION (HCC): ICD-10-CM

## 2024-03-27 DIAGNOSIS — I50.32 CHRONIC DIASTOLIC HEART FAILURE (HCC): Primary | ICD-10-CM

## 2024-03-27 DIAGNOSIS — I10 BENIGN ESSENTIAL HTN: ICD-10-CM

## 2024-03-27 LAB — CRP SERPL HS-MCNC: 7.98 MG/L (ref 0.16–3)

## 2024-03-27 PROCEDURE — 1090F PRES/ABSN URINE INCON ASSESS: CPT | Performed by: INTERNAL MEDICINE

## 2024-03-27 PROCEDURE — 1036F TOBACCO NON-USER: CPT | Performed by: INTERNAL MEDICINE

## 2024-03-27 PROCEDURE — 3075F SYST BP GE 130 - 139MM HG: CPT | Performed by: INTERNAL MEDICINE

## 2024-03-27 PROCEDURE — G8427 DOCREV CUR MEDS BY ELIG CLIN: HCPCS | Performed by: INTERNAL MEDICINE

## 2024-03-27 PROCEDURE — 3017F COLORECTAL CA SCREEN DOC REV: CPT | Performed by: INTERNAL MEDICINE

## 2024-03-27 PROCEDURE — G8484 FLU IMMUNIZE NO ADMIN: HCPCS | Performed by: INTERNAL MEDICINE

## 2024-03-27 PROCEDURE — 99215 OFFICE O/P EST HI 40 MIN: CPT | Performed by: INTERNAL MEDICINE

## 2024-03-27 PROCEDURE — G8417 CALC BMI ABV UP PARAM F/U: HCPCS | Performed by: INTERNAL MEDICINE

## 2024-03-27 PROCEDURE — 3078F DIAST BP <80 MM HG: CPT | Performed by: INTERNAL MEDICINE

## 2024-03-27 PROCEDURE — 1123F ACP DISCUSS/DSCN MKR DOCD: CPT | Performed by: INTERNAL MEDICINE

## 2024-03-27 PROCEDURE — G8399 PT W/DXA RESULTS DOCUMENT: HCPCS | Performed by: INTERNAL MEDICINE

## 2024-03-27 RX ORDER — FUROSEMIDE 40 MG/1
40 TABLET ORAL DAILY
Qty: 120 TABLET | Refills: 3 | Status: SHIPPED | OUTPATIENT
Start: 2024-03-27

## 2024-03-27 RX ORDER — COLCHICINE 0.6 MG/1
0.6 TABLET ORAL DAILY
Qty: 90 TABLET | Refills: 3 | Status: SHIPPED | OUTPATIENT
Start: 2024-03-27

## 2024-03-27 NOTE — TELEPHONE ENCOUNTER
Pt dropped off the Xarelto script for Dr. Solitario to sign.  Given to Anneliese.  Patient will come to  the prescription tomorrow.  Please call when ready.  jeornimo

## 2024-03-27 NOTE — TELEPHONE ENCOUNTER
Pt in office with ERICKA and states she needs paper script for Xarelto for the Xarelto Savings Program which was brought to the office for her by SOHA Beckham) yesterday.  She mentioned this would be patient's last Xarelto sample from [facility].     Script printed for [facility] to sign. Given to pt at end of office visit.

## 2024-03-27 NOTE — PATIENT INSTRUCTIONS
Plan:  1.    Ask to use Good Rx and no insurance with your Medications. Check price of Colchicine.   2.    Take printed script for Nicole to Dr. Solitario's office for signature.   3.    She will try to lose weight to help with SOB.   4.    Echo in 4 month with next office visit  5.    Labs in 4 months

## 2024-04-01 ENCOUNTER — TELEPHONE (OUTPATIENT)
Dept: CARDIOLOGY CLINIC | Age: 74
End: 2024-04-01

## 2024-04-01 ASSESSMENT — SLEEP AND FATIGUE QUESTIONNAIRES
HOW LIKELY ARE YOU TO NOD OFF OR FALL ASLEEP WHEN YOU ARE A PASSENGER IN A CAR FOR AN HOUR WITHOUT A BREAK: SLIGHT CHANCE OF DOZING
HOW LIKELY ARE YOU TO NOD OFF OR FALL ASLEEP WHILE SITTING AND TALKING TO SOMEONE: WOULD NEVER DOZE
HOW LIKELY ARE YOU TO NOD OFF OR FALL ASLEEP WHILE SITTING INACTIVE IN A PUBLIC PLACE: WOULD NEVER DOZE
HOW LIKELY ARE YOU TO NOD OFF OR FALL ASLEEP IN A CAR, WHILE STOPPED FOR A FEW MINUTES IN TRAFFIC: WOULD NEVER DOZE
HOW LIKELY ARE YOU TO NOD OFF OR FALL ASLEEP WHILE SITTING INACTIVE IN A PUBLIC PLACE: WOULD NEVER DOZE
HOW LIKELY ARE YOU TO NOD OFF OR FALL ASLEEP WHILE SITTING AND READING: SLIGHT CHANCE OF DOZING
HOW LIKELY ARE YOU TO NOD OFF OR FALL ASLEEP WHILE LYING DOWN TO REST IN THE AFTERNOON WHEN CIRCUMSTANCES PERMIT: SLIGHT CHANCE OF DOZING
HOW LIKELY ARE YOU TO NOD OFF OR FALL ASLEEP WHILE WATCHING TV: SLIGHT CHANCE OF DOZING
HOW LIKELY ARE YOU TO NOD OFF OR FALL ASLEEP WHEN YOU ARE A PASSENGER IN A CAR FOR AN HOUR WITHOUT A BREAK: SLIGHT CHANCE OF DOZING
HOW LIKELY ARE YOU TO NOD OFF OR FALL ASLEEP IN A CAR, WHILE STOPPED FOR A FEW MINUTES IN TRAFFIC: WOULD NEVER DOZE
ESS TOTAL SCORE: 4
HOW LIKELY ARE YOU TO NOD OFF OR FALL ASLEEP WHILE SITTING QUIETLY AFTER LUNCH WITHOUT ALCOHOL: WOULD NEVER DOZE
HOW LIKELY ARE YOU TO NOD OFF OR FALL ASLEEP WHILE SITTING AND TALKING TO SOMEONE: WOULD NEVER DOZE
HOW LIKELY ARE YOU TO NOD OFF OR FALL ASLEEP WHILE SITTING AND READING: SLIGHT CHANCE OF DOZING
HOW LIKELY ARE YOU TO NOD OFF OR FALL ASLEEP WHILE WATCHING TV: SLIGHT CHANCE OF DOZING
HOW LIKELY ARE YOU TO NOD OFF OR FALL ASLEEP WHILE LYING DOWN TO REST IN THE AFTERNOON WHEN CIRCUMSTANCES PERMIT: SLIGHT CHANCE OF DOZING
HOW LIKELY ARE YOU TO NOD OFF OR FALL ASLEEP WHILE SITTING QUIETLY AFTER LUNCH WITHOUT ALCOHOL: WOULD NEVER DOZE

## 2024-04-01 NOTE — TELEPHONE ENCOUNTER
Remote transmission shows patients implanted Linq recorder has reached the [facility]. Report sent to [facility]. Thanks.

## 2024-04-02 ENCOUNTER — OFFICE VISIT (OUTPATIENT)
Age: 74
End: 2024-04-02
Payer: MEDICARE

## 2024-04-02 VITALS
BODY MASS INDEX: 43.54 KG/M2 | OXYGEN SATURATION: 96 % | WEIGHT: 255 LBS | HEIGHT: 64 IN | DIASTOLIC BLOOD PRESSURE: 83 MMHG | SYSTOLIC BLOOD PRESSURE: 146 MMHG | HEART RATE: 83 BPM

## 2024-04-02 DIAGNOSIS — I10 BENIGN ESSENTIAL HTN: Chronic | ICD-10-CM

## 2024-04-02 DIAGNOSIS — G47.33 OSA ON CPAP: Primary | ICD-10-CM

## 2024-04-02 DIAGNOSIS — E66.01 OBESITY, CLASS III, BMI 40-49.9 (MORBID OBESITY) (HCC): ICD-10-CM

## 2024-04-02 DIAGNOSIS — G25.81 RESTLESS LEG SYNDROME: Chronic | ICD-10-CM

## 2024-04-02 PROCEDURE — 1036F TOBACCO NON-USER: CPT | Performed by: STUDENT IN AN ORGANIZED HEALTH CARE EDUCATION/TRAINING PROGRAM

## 2024-04-02 PROCEDURE — G8399 PT W/DXA RESULTS DOCUMENT: HCPCS | Performed by: STUDENT IN AN ORGANIZED HEALTH CARE EDUCATION/TRAINING PROGRAM

## 2024-04-02 PROCEDURE — G8427 DOCREV CUR MEDS BY ELIG CLIN: HCPCS | Performed by: STUDENT IN AN ORGANIZED HEALTH CARE EDUCATION/TRAINING PROGRAM

## 2024-04-02 PROCEDURE — 3079F DIAST BP 80-89 MM HG: CPT | Performed by: STUDENT IN AN ORGANIZED HEALTH CARE EDUCATION/TRAINING PROGRAM

## 2024-04-02 PROCEDURE — 1123F ACP DISCUSS/DSCN MKR DOCD: CPT | Performed by: STUDENT IN AN ORGANIZED HEALTH CARE EDUCATION/TRAINING PROGRAM

## 2024-04-02 PROCEDURE — 1090F PRES/ABSN URINE INCON ASSESS: CPT | Performed by: STUDENT IN AN ORGANIZED HEALTH CARE EDUCATION/TRAINING PROGRAM

## 2024-04-02 PROCEDURE — G8417 CALC BMI ABV UP PARAM F/U: HCPCS | Performed by: STUDENT IN AN ORGANIZED HEALTH CARE EDUCATION/TRAINING PROGRAM

## 2024-04-02 PROCEDURE — 3017F COLORECTAL CA SCREEN DOC REV: CPT | Performed by: STUDENT IN AN ORGANIZED HEALTH CARE EDUCATION/TRAINING PROGRAM

## 2024-04-02 PROCEDURE — 3077F SYST BP >= 140 MM HG: CPT | Performed by: STUDENT IN AN ORGANIZED HEALTH CARE EDUCATION/TRAINING PROGRAM

## 2024-04-02 PROCEDURE — 99214 OFFICE O/P EST MOD 30 MIN: CPT | Performed by: STUDENT IN AN ORGANIZED HEALTH CARE EDUCATION/TRAINING PROGRAM

## 2024-04-02 RX ORDER — GABAPENTIN 600 MG/1
900 TABLET ORAL NIGHTLY
Qty: 135 TABLET | Refills: 0 | Status: SHIPPED | OUTPATIENT
Start: 2024-04-02 | End: 2024-07-01

## 2024-04-02 NOTE — PROGRESS NOTES
breathing. Kira Alexander should exercise regularly and watch her diet.  Return in about 2 months (around 6/2/2024) for HUSSAIN and RLS follow up .  She is to contact me if she has any questions prior to that time.      Daniel Harp MD   Sleep Medicine    4/2/24    This dictation was generated by voice recognition computer software.  Although all attempts are made to edit the dictation for accuracy, there may be errors in the transcription that are not intended.

## 2024-04-02 NOTE — PATIENT INSTRUCTIONS
HUSSAIN education:    You have obstructive sleep apnea (HUSSAIN):    1. Obstructive sleep apnea (HUSSAIN) is a condition where the upper airway narrows or closes intermittently during sleep. This can lead to drops in your oxygen levels during sleep, arousals from sleep, and excessive daytime sleepiness.     2. Untreated HUSSAIN is associated with increased risk of hypertension, cardiac disease, myocardial infarction, stroke, and poor blood sugar control. Treating your HUSSAIN can decrease these risks.    3. Weight loss does improve sleep apnea. It is important to have a healthy diet and an exercise program.     4. Alcohol and sedating medicine can make HUSSAIN worse and should be avoided in particular before sleep.    5. If you have surgery or are hospitalized, tell your doctor that you have HUSSAIN and bring your CPAP to the hospital.    6. If you are drowsy or sleepy, you should not drive. If you are driving and become drowsy or sleepy, you should pull over to a safe place. Below are our drowsy driving tips.     PAP machine care:   You should clean your PAP regularly (see your PAP  site for more details)  Daily cleaning   1. Wipe mask with a damp towel with mild detergent, rinse with a damp towel and let air dry. You can also see CPAP cleaning wipes. Avoid harsh cleaning wipes or chemicals.    2. Humidifier- empty water daily. Fill with clean distilled water before use before sleep.    Weekly Cleaning   1. Clean mask, headgear, and tubing weekly  2. Fill your sink with warm water and a few drops of mild dish soap. Swirl equipment for at least 5 minutes. Rinse well and air dry. Hang hose over something so the water droplets drip out.   3. Clean filter- rinse with warm water, squeeze excess water and blot dry with towel. If there is a white filter (respironics machine)- do not wash that - it is disposable and should be changed once a month.   4. Humidifier- Disinfect in solution that is one part vinegar and 5 parts water for 30

## 2024-04-03 ASSESSMENT — PATIENT HEALTH QUESTIONNAIRE - PHQ9
10. IF YOU CHECKED OFF ANY PROBLEMS, HOW DIFFICULT HAVE THESE PROBLEMS MADE IT FOR YOU TO DO YOUR WORK, TAKE CARE OF THINGS AT HOME, OR GET ALONG WITH OTHER PEOPLE: VERY DIFFICULT
SUM OF ALL RESPONSES TO PHQ QUESTIONS 1-9: 12
SUM OF ALL RESPONSES TO PHQ QUESTIONS 1-9: 12
8. MOVING OR SPEAKING SO SLOWLY THAT OTHER PEOPLE COULD HAVE NOTICED. OR THE OPPOSITE, BEING SO FIGETY OR RESTLESS THAT YOU HAVE BEEN MOVING AROUND A LOT MORE THAN USUAL: MORE THAN HALF THE DAYS
2. FEELING DOWN, DEPRESSED OR HOPELESS: NOT AT ALL
SUM OF ALL RESPONSES TO PHQ QUESTIONS 1-9: 12
7. TROUBLE CONCENTRATING ON THINGS, SUCH AS READING THE NEWSPAPER OR WATCHING TELEVISION: NEARLY EVERY DAY
5. POOR APPETITE OR OVEREATING: MORE THAN HALF THE DAYS
9. THOUGHTS THAT YOU WOULD BE BETTER OFF DEAD, OR OF HURTING YOURSELF: NOT AT ALL
SUM OF ALL RESPONSES TO PHQ QUESTIONS 1-9: 12
1. LITTLE INTEREST OR PLEASURE IN DOING THINGS: NOT AT ALL
6. FEELING BAD ABOUT YOURSELF - OR THAT YOU ARE A FAILURE OR HAVE LET YOURSELF OR YOUR FAMILY DOWN: NOT AT ALL
3. TROUBLE FALLING OR STAYING ASLEEP: NEARLY EVERY DAY
4. FEELING TIRED OR HAVING LITTLE ENERGY: MORE THAN HALF THE DAYS
SUM OF ALL RESPONSES TO PHQ9 QUESTIONS 1 & 2: 0

## 2024-04-04 ENCOUNTER — PATIENT MESSAGE (OUTPATIENT)
Dept: INTERNAL MEDICINE CLINIC | Age: 74
End: 2024-04-04

## 2024-04-04 ASSESSMENT — PATIENT HEALTH QUESTIONNAIRE - PHQ9
6. FEELING BAD ABOUT YOURSELF - OR THAT YOU ARE A FAILURE OR HAVE LET YOURSELF OR YOUR FAMILY DOWN: NOT AT ALL
8. MOVING OR SPEAKING SO SLOWLY THAT OTHER PEOPLE COULD HAVE NOTICED. OR THE OPPOSITE - BEING SO FIDGETY OR RESTLESS THAT YOU HAVE BEEN MOVING AROUND A LOT MORE THAN USUAL: MORE THAN HALF THE DAYS
9. THOUGHTS THAT YOU WOULD BE BETTER OFF DEAD, OR OF HURTING YOURSELF: NOT AT ALL
1. LITTLE INTEREST OR PLEASURE IN DOING THINGS: NOT AT ALL
SUM OF ALL RESPONSES TO PHQ QUESTIONS 1-9: 12
5. POOR APPETITE OR OVEREATING: MORE THAN HALF THE DAYS
3. TROUBLE FALLING OR STAYING ASLEEP: NEARLY EVERY DAY
2. FEELING DOWN, DEPRESSED OR HOPELESS: NOT AT ALL
10. IF YOU CHECKED OFF ANY PROBLEMS, HOW DIFFICULT HAVE THESE PROBLEMS MADE IT FOR YOU TO DO YOUR WORK, TAKE CARE OF THINGS AT HOME, OR GET ALONG WITH OTHER PEOPLE: VERY DIFFICULT
4. FEELING TIRED OR HAVING LITTLE ENERGY: MORE THAN HALF THE DAYS
7. TROUBLE CONCENTRATING ON THINGS, SUCH AS READING THE NEWSPAPER OR WATCHING TELEVISION: NEARLY EVERY DAY

## 2024-04-05 RX ORDER — SPIRONOLACTONE 25 MG/1
TABLET ORAL
Qty: 90 TABLET | Refills: 3 | Status: SHIPPED | OUTPATIENT
Start: 2024-04-05

## 2024-04-05 RX ORDER — POTASSIUM CHLORIDE 750 MG/1
10 TABLET, EXTENDED RELEASE ORAL DAILY
Qty: 90 TABLET | Refills: 3 | Status: SHIPPED | OUTPATIENT
Start: 2024-04-05

## 2024-04-05 RX ORDER — LISINOPRIL 10 MG/1
TABLET ORAL
Qty: 90 TABLET | Refills: 3 | Status: SHIPPED | OUTPATIENT
Start: 2024-04-05

## 2024-04-08 ENCOUNTER — PATIENT MESSAGE (OUTPATIENT)
Dept: CARDIOLOGY CLINIC | Age: 74
End: 2024-04-08

## 2024-04-08 ENCOUNTER — HOSPITAL ENCOUNTER (OUTPATIENT)
Dept: GENERAL RADIOLOGY | Age: 74
Discharge: HOME | End: 2024-04-08
Payer: MEDICARE

## 2024-04-08 ENCOUNTER — OFFICE VISIT (OUTPATIENT)
Dept: INTERNAL MEDICINE CLINIC | Age: 74
End: 2024-04-08
Payer: MEDICARE

## 2024-04-08 ENCOUNTER — HOSPITAL ENCOUNTER (OUTPATIENT)
Age: 74
Discharge: HOME | End: 2024-04-08
Payer: MEDICARE

## 2024-04-08 VITALS
HEART RATE: 70 BPM | OXYGEN SATURATION: 95 % | DIASTOLIC BLOOD PRESSURE: 64 MMHG | WEIGHT: 256 LBS | BODY MASS INDEX: 43.94 KG/M2 | SYSTOLIC BLOOD PRESSURE: 116 MMHG

## 2024-04-08 DIAGNOSIS — J45.21 MILD INTERMITTENT ASTHMA WITH ACUTE EXACERBATION: ICD-10-CM

## 2024-04-08 DIAGNOSIS — J30.1 SEASONAL ALLERGIC RHINITIS DUE TO POLLEN: ICD-10-CM

## 2024-04-08 DIAGNOSIS — J20.9 ACUTE BRONCHITIS, UNSPECIFIED ORGANISM: ICD-10-CM

## 2024-04-08 DIAGNOSIS — Z12.31 ENCOUNTER FOR SCREENING MAMMOGRAM FOR MALIGNANT NEOPLASM OF BREAST: Primary | ICD-10-CM

## 2024-04-08 PROCEDURE — 99213 OFFICE O/P EST LOW 20 MIN: CPT | Performed by: INTERNAL MEDICINE

## 2024-04-08 PROCEDURE — 3074F SYST BP LT 130 MM HG: CPT | Performed by: INTERNAL MEDICINE

## 2024-04-08 PROCEDURE — 1123F ACP DISCUSS/DSCN MKR DOCD: CPT | Performed by: INTERNAL MEDICINE

## 2024-04-08 PROCEDURE — G8427 DOCREV CUR MEDS BY ELIG CLIN: HCPCS | Performed by: INTERNAL MEDICINE

## 2024-04-08 PROCEDURE — 71046 X-RAY EXAM CHEST 2 VIEWS: CPT

## 2024-04-08 PROCEDURE — G8399 PT W/DXA RESULTS DOCUMENT: HCPCS | Performed by: INTERNAL MEDICINE

## 2024-04-08 PROCEDURE — 1036F TOBACCO NON-USER: CPT | Performed by: INTERNAL MEDICINE

## 2024-04-08 PROCEDURE — G8417 CALC BMI ABV UP PARAM F/U: HCPCS | Performed by: INTERNAL MEDICINE

## 2024-04-08 PROCEDURE — 3017F COLORECTAL CA SCREEN DOC REV: CPT | Performed by: INTERNAL MEDICINE

## 2024-04-08 PROCEDURE — 1090F PRES/ABSN URINE INCON ASSESS: CPT | Performed by: INTERNAL MEDICINE

## 2024-04-08 PROCEDURE — 3078F DIAST BP <80 MM HG: CPT | Performed by: INTERNAL MEDICINE

## 2024-04-08 RX ORDER — DOXYCYCLINE HYCLATE 100 MG
100 TABLET ORAL 2 TIMES DAILY
Qty: 28 TABLET | Refills: 0 | Status: SHIPPED | OUTPATIENT
Start: 2024-04-08 | End: 2024-04-22

## 2024-04-08 RX ORDER — PREDNISONE 10 MG/1
TABLET ORAL
Qty: 30 TABLET | Refills: 0 | Status: SHIPPED | OUTPATIENT
Start: 2024-04-08

## 2024-04-08 RX ORDER — LEVALBUTEROL TARTRATE 45 UG/1
1 AEROSOL, METERED ORAL EVERY 6 HOURS PRN
Qty: 1 EACH | Refills: 3 | Status: CANCELLED | OUTPATIENT
Start: 2024-04-08

## 2024-04-08 RX ORDER — ALBUTEROL SULFATE 2.5 MG/3ML
2.5 SOLUTION RESPIRATORY (INHALATION) EVERY 6 HOURS PRN
Qty: 120 EACH | Refills: 2 | Status: CANCELLED | OUTPATIENT
Start: 2024-04-08

## 2024-04-08 SDOH — ECONOMIC STABILITY: FOOD INSECURITY: WITHIN THE PAST 12 MONTHS, THE FOOD YOU BOUGHT JUST DIDN'T LAST AND YOU DIDN'T HAVE MONEY TO GET MORE.: NEVER TRUE

## 2024-04-08 SDOH — ECONOMIC STABILITY: INCOME INSECURITY: HOW HARD IS IT FOR YOU TO PAY FOR THE VERY BASICS LIKE FOOD, HOUSING, MEDICAL CARE, AND HEATING?: NOT VERY HARD

## 2024-04-08 SDOH — ECONOMIC STABILITY: TRANSPORTATION INSECURITY
IN THE PAST 12 MONTHS, HAS LACK OF TRANSPORTATION KEPT YOU FROM MEETINGS, WORK, OR FROM GETTING THINGS NEEDED FOR DAILY LIVING?: NO

## 2024-04-08 SDOH — ECONOMIC STABILITY: FOOD INSECURITY: WITHIN THE PAST 12 MONTHS, YOU WORRIED THAT YOUR FOOD WOULD RUN OUT BEFORE YOU GOT MONEY TO BUY MORE.: NEVER TRUE

## 2024-04-08 ASSESSMENT — PATIENT HEALTH QUESTIONNAIRE - PHQ9
6. FEELING BAD ABOUT YOURSELF - OR THAT YOU ARE A FAILURE OR HAVE LET YOURSELF OR YOUR FAMILY DOWN: NOT AT ALL
SUM OF ALL RESPONSES TO PHQ QUESTIONS 1-9: 9
SUM OF ALL RESPONSES TO PHQ QUESTIONS 1-9: 9
7. TROUBLE CONCENTRATING ON THINGS, SUCH AS READING THE NEWSPAPER OR WATCHING TELEVISION: SEVERAL DAYS
10. IF YOU CHECKED OFF ANY PROBLEMS, HOW DIFFICULT HAVE THESE PROBLEMS MADE IT FOR YOU TO DO YOUR WORK, TAKE CARE OF THINGS AT HOME, OR GET ALONG WITH OTHER PEOPLE: VERY DIFFICULT
9. THOUGHTS THAT YOU WOULD BE BETTER OFF DEAD, OR OF HURTING YOURSELF: NOT AT ALL
SUM OF ALL RESPONSES TO PHQ QUESTIONS 1-9: 9
1. LITTLE INTEREST OR PLEASURE IN DOING THINGS: NOT AT ALL
2. FEELING DOWN, DEPRESSED OR HOPELESS: NOT AT ALL
SUM OF ALL RESPONSES TO PHQ9 QUESTIONS 1 & 2: 0
3. TROUBLE FALLING OR STAYING ASLEEP: NEARLY EVERY DAY
SUM OF ALL RESPONSES TO PHQ QUESTIONS 1-9: 9
5. POOR APPETITE OR OVEREATING: SEVERAL DAYS
4. FEELING TIRED OR HAVING LITTLE ENERGY: MORE THAN HALF THE DAYS
8. MOVING OR SPEAKING SO SLOWLY THAT OTHER PEOPLE COULD HAVE NOTICED. OR THE OPPOSITE, BEING SO FIGETY OR RESTLESS THAT YOU HAVE BEEN MOVING AROUND A LOT MORE THAN USUAL: MORE THAN HALF THE DAYS

## 2024-04-08 ASSESSMENT — ANXIETY QUESTIONNAIRES
1. FEELING NERVOUS, ANXIOUS, OR ON EDGE: SEVERAL DAYS
5. BEING SO RESTLESS THAT IT IS HARD TO SIT STILL: NEARLY EVERY DAY
4. TROUBLE RELAXING: NEARLY EVERY DAY
3. WORRYING TOO MUCH ABOUT DIFFERENT THINGS: NOT AT ALL
GAD7 TOTAL SCORE: 10
IF YOU CHECKED OFF ANY PROBLEMS ON THIS QUESTIONNAIRE, HOW DIFFICULT HAVE THESE PROBLEMS MADE IT FOR YOU TO DO YOUR WORK, TAKE CARE OF THINGS AT HOME, OR GET ALONG WITH OTHER PEOPLE: VERY DIFFICULT
2. NOT BEING ABLE TO STOP OR CONTROL WORRYING: SEVERAL DAYS
6. BECOMING EASILY ANNOYED OR IRRITABLE: SEVERAL DAYS
7. FEELING AFRAID AS IF SOMETHING AWFUL MIGHT HAPPEN: SEVERAL DAYS

## 2024-04-08 NOTE — TELEPHONE ENCOUNTER
From: Kira Alexander  To: Dr. Cece Betancur  Sent: 4/8/2024 9:54 AM EDT  Subject: colchicine and inflammation    [facility] medical team,  It seems that I have a lot of inflammation going on inside of me right now based upon the information from my visit to your office last week. I am also having a LOT of trouble with carpal tunnel symptoms in both wrists at this time. I have 2 questions.   Would an 'anti-inflammatory' diet help? If so, where would I find such a food plan?   Would it also help my hands and wrists if temporarily increased the amount of colchicine daily? Just wondering.   Thanks for your help,  Kira Alexander

## 2024-04-08 NOTE — PROGRESS NOTES
Chief Complaint   Patient presents with    Shortness of Breath    Cough       Assessment/Plan:  Kira was seen today for shortness of breath and cough.    Diagnoses and all orders for this visit:    Seasonal allergic rhinitis due to pollen  -     XR CHEST (2 VW); Future  -     predniSONE (DELTASONE) 10 MG tablet; Take 4 tablets daily for 3 days, then 3 tablets daily for 3 days, then 2 tablets daily for 3 days, then 1 tablet daily for 3 days.  -     doxycycline hyclate (VIBRA-TABS) 100 MG tablet; Take 1 tablet by mouth 2 times daily for 14 days    Acute bronchitis, unspecified organism  -     XR CHEST (2 VW); Future  -     predniSONE (DELTASONE) 10 MG tablet; Take 4 tablets daily for 3 days, then 3 tablets daily for 3 days, then 2 tablets daily for 3 days, then 1 tablet daily for 3 days.  -     doxycycline hyclate (VIBRA-TABS) 100 MG tablet; Take 1 tablet by mouth 2 times daily for 14 days    Mild intermittent asthma with acute exacerbation  -     XR CHEST (2 VW); Future  -     predniSONE (DELTASONE) 10 MG tablet; Take 4 tablets daily for 3 days, then 3 tablets daily for 3 days, then 2 tablets daily for 3 days, then 1 tablet daily for 3 days.  -     doxycycline hyclate (VIBRA-TABS) 100 MG tablet; Take 1 tablet by mouth 2 times daily for 14 days        Vitals:    04/08/24 1108   BP: 116/64   Pulse: 70   SpO2: 95%       Physical Exam  Vitals reviewed.   Constitutional:       General: She is not in acute distress.     Appearance: She is well-developed. She is not diaphoretic.   HENT:      Head: Normocephalic and atraumatic.   Cardiovascular:      Rate and Rhythm: Normal rate and regular rhythm.      Pulses: Normal pulses.      Heart sounds: Normal heart sounds. No murmur heard.     No gallop.   Pulmonary:      Effort: Pulmonary effort is normal. No respiratory distress.      Breath sounds: No stridor. Wheezing present. No rhonchi or rales.      Comments: Bilateral in the bases.   Chest:      Chest wall: No

## 2024-04-15 DIAGNOSIS — J45.21 MILD INTERMITTENT ASTHMA WITH ACUTE EXACERBATION: Primary | ICD-10-CM

## 2024-04-15 RX ORDER — ALBUTEROL SULFATE 90 UG/1
2 AEROSOL, METERED RESPIRATORY (INHALATION) EVERY 6 HOURS PRN
Qty: 18 G | Refills: 3 | Status: SHIPPED | OUTPATIENT
Start: 2024-04-15

## 2024-04-16 ASSESSMENT — SLEEP AND FATIGUE QUESTIONNAIRES
ESS TOTAL SCORE: 7
HOW LIKELY ARE YOU TO NOD OFF OR FALL ASLEEP WHILE SITTING AND TALKING TO SOMEONE: WOULD NEVER DOZE
HOW LIKELY ARE YOU TO NOD OFF OR FALL ASLEEP WHILE WATCHING TV: MODERATE CHANCE OF DOZING
HOW LIKELY ARE YOU TO NOD OFF OR FALL ASLEEP WHILE SITTING QUIETLY AFTER LUNCH WITHOUT ALCOHOL: SLIGHT CHANCE OF DOZING
HOW LIKELY ARE YOU TO NOD OFF OR FALL ASLEEP WHILE SITTING AND READING: SLIGHT CHANCE OF DOZING
HOW LIKELY ARE YOU TO NOD OFF OR FALL ASLEEP IN A CAR, WHILE STOPPED FOR A FEW MINUTES IN TRAFFIC: WOULD NEVER DOZE
HOW LIKELY ARE YOU TO NOD OFF OR FALL ASLEEP WHILE SITTING AND READING: SLIGHT CHANCE OF DOZING
HOW LIKELY ARE YOU TO NOD OFF OR FALL ASLEEP WHILE LYING DOWN TO REST IN THE AFTERNOON WHEN CIRCUMSTANCES PERMIT: MODERATE CHANCE OF DOZING
HOW LIKELY ARE YOU TO NOD OFF OR FALL ASLEEP WHILE WATCHING TV: MODERATE CHANCE OF DOZING
HOW LIKELY ARE YOU TO NOD OFF OR FALL ASLEEP WHEN YOU ARE A PASSENGER IN A CAR FOR AN HOUR WITHOUT A BREAK: SLIGHT CHANCE OF DOZING
HOW LIKELY ARE YOU TO NOD OFF OR FALL ASLEEP WHILE SITTING QUIETLY AFTER LUNCH WITHOUT ALCOHOL: SLIGHT CHANCE OF DOZING
HOW LIKELY ARE YOU TO NOD OFF OR FALL ASLEEP WHILE SITTING AND TALKING TO SOMEONE: WOULD NEVER DOZE
HOW LIKELY ARE YOU TO NOD OFF OR FALL ASLEEP WHILE LYING DOWN TO REST IN THE AFTERNOON WHEN CIRCUMSTANCES PERMIT: MODERATE CHANCE OF DOZING
HOW LIKELY ARE YOU TO NOD OFF OR FALL ASLEEP IN A CAR, WHILE STOPPED FOR A FEW MINUTES IN TRAFFIC: WOULD NEVER DOZE
HOW LIKELY ARE YOU TO NOD OFF OR FALL ASLEEP WHILE SITTING INACTIVE IN A PUBLIC PLACE: WOULD NEVER DOZE
HOW LIKELY ARE YOU TO NOD OFF OR FALL ASLEEP WHILE SITTING INACTIVE IN A PUBLIC PLACE: WOULD NEVER DOZE
HOW LIKELY ARE YOU TO NOD OFF OR FALL ASLEEP WHEN YOU ARE A PASSENGER IN A CAR FOR AN HOUR WITHOUT A BREAK: SLIGHT CHANCE OF DOZING

## 2024-04-17 ENCOUNTER — OFFICE VISIT (OUTPATIENT)
Dept: PULMONOLOGY | Age: 74
End: 2024-04-17
Payer: MEDICARE

## 2024-04-17 VITALS
SYSTOLIC BLOOD PRESSURE: 118 MMHG | HEART RATE: 78 BPM | DIASTOLIC BLOOD PRESSURE: 78 MMHG | BODY MASS INDEX: 43.87 KG/M2 | HEIGHT: 64 IN | OXYGEN SATURATION: 98 % | WEIGHT: 257 LBS

## 2024-04-17 DIAGNOSIS — F51.01 PRIMARY INSOMNIA: Primary | ICD-10-CM

## 2024-04-17 DIAGNOSIS — G25.81 RESTLESS LEG SYNDROME: Primary | ICD-10-CM

## 2024-04-17 PROCEDURE — 1036F TOBACCO NON-USER: CPT | Performed by: STUDENT IN AN ORGANIZED HEALTH CARE EDUCATION/TRAINING PROGRAM

## 2024-04-17 PROCEDURE — G8417 CALC BMI ABV UP PARAM F/U: HCPCS | Performed by: STUDENT IN AN ORGANIZED HEALTH CARE EDUCATION/TRAINING PROGRAM

## 2024-04-17 PROCEDURE — G8427 DOCREV CUR MEDS BY ELIG CLIN: HCPCS | Performed by: STUDENT IN AN ORGANIZED HEALTH CARE EDUCATION/TRAINING PROGRAM

## 2024-04-17 PROCEDURE — 1123F ACP DISCUSS/DSCN MKR DOCD: CPT | Performed by: STUDENT IN AN ORGANIZED HEALTH CARE EDUCATION/TRAINING PROGRAM

## 2024-04-17 PROCEDURE — 99214 OFFICE O/P EST MOD 30 MIN: CPT | Performed by: STUDENT IN AN ORGANIZED HEALTH CARE EDUCATION/TRAINING PROGRAM

## 2024-04-17 PROCEDURE — 1090F PRES/ABSN URINE INCON ASSESS: CPT | Performed by: STUDENT IN AN ORGANIZED HEALTH CARE EDUCATION/TRAINING PROGRAM

## 2024-04-17 PROCEDURE — 3074F SYST BP LT 130 MM HG: CPT | Performed by: STUDENT IN AN ORGANIZED HEALTH CARE EDUCATION/TRAINING PROGRAM

## 2024-04-17 PROCEDURE — G8399 PT W/DXA RESULTS DOCUMENT: HCPCS | Performed by: STUDENT IN AN ORGANIZED HEALTH CARE EDUCATION/TRAINING PROGRAM

## 2024-04-17 PROCEDURE — 3078F DIAST BP <80 MM HG: CPT | Performed by: STUDENT IN AN ORGANIZED HEALTH CARE EDUCATION/TRAINING PROGRAM

## 2024-04-17 PROCEDURE — 3017F COLORECTAL CA SCREEN DOC REV: CPT | Performed by: STUDENT IN AN ORGANIZED HEALTH CARE EDUCATION/TRAINING PROGRAM

## 2024-04-17 RX ORDER — PREGABALIN 75 MG/1
150 CAPSULE ORAL NIGHTLY
Qty: 28 CAPSULE | Refills: 0 | Status: SHIPPED | OUTPATIENT
Start: 2024-04-17 | End: 2024-05-01

## 2024-04-17 RX ORDER — ZOLPIDEM TARTRATE 10 MG/1
10 TABLET ORAL NIGHTLY PRN
Qty: 30 TABLET | Refills: 0 | Status: SHIPPED | OUTPATIENT
Start: 2024-04-17 | End: 2024-05-17

## 2024-04-17 ASSESSMENT — ENCOUNTER SYMPTOMS
SHORTNESS OF BREATH: 0
ABDOMINAL PAIN: 0
WHEEZING: 0

## 2024-04-17 NOTE — PROGRESS NOTES
I sent Ambien to your pharmacy.  I would not cold turkey stop the Gabapentin.  I would wean off.  Take 1 tablet nightly for 7 days, then 1/2 tablet for 7 days, then 1/2 every other day, then off.     Miguel Denton MD  FACP

## 2024-04-17 NOTE — ASSESSMENT & PLAN NOTE
Uncontrolled.  Previously on dopaminergic medications which caused augmentation.  She has been weaned off pramipexole.  Currently on gabapentin, which according to patient has worsened her RLS.  She is agreeable for a trial of pregabalin 150 mg nightly.  If some improvement is noted, we can increase dose to 300 mg nightly after 2 weeks (patient has normal renal function).  She was reminded of potential side effects.  She should stop taking gabapentin.  She was reminded of potential risk of respiratory suppression if she does not use her CPAP regularly. We also discussed about peroneal nerve stimulation therapy for RLS (TOMAC by [employer]).  I will inquire about how to get patient approved by insurance for this treatment.

## 2024-04-17 NOTE — PROGRESS NOTES
Patient:  Kira Alexander 73 y.o. female     Date of Service: 4/17/2024       Chief complaint:   Chief Complaint   Patient presents with    RLS     Uncontrolled        History of Present Illness   Patient presents today because of uncontrolled RLS. She was weaned off pramipexole and transitioned to gabapentin. She reports that gabapentin has made her RLS worse. She has been using some OTC RLS remedies without any improvement.  She has been using a brace on one of her legs (she is to use this for her sciatica) and it has helped a little.        Reviewof Systems:   Review of Systems   Constitutional:  Negative for fever.   Respiratory:  Negative for shortness of breath and wheezing.    Cardiovascular:  Negative for chest pain and palpitations.   Gastrointestinal:  Negative for abdominal pain.   Neurological:         Restless legs       Physical Exam   Vitals: /78 (Site: Right Upper Arm, Position: Sitting, Cuff Size: Medium Adult)   Pulse 78   Ht 1.626 m (5' 4\")   Wt 116.6 kg (257 lb)   SpO2 98%   BMI 44.11 kg/m²   Physical Exam  Constitutional:       General: She is not in acute distress.     Appearance: Normal appearance. She is not ill-appearing or toxic-appearing.   Neurological:      Mental Status: She is alert and oriented to person, place, and time.   Psychiatric:         Mood and Affect: Mood normal.         Behavior: Behavior normal.            No results found for this visit on 04/17/24.    Assessment and Plan   1. Restless leg syndrome  Assessment & Plan:  Uncontrolled.  Previously on dopaminergic medications which caused augmentation.  She has been weaned off pramipexole.  Currently on gabapentin, which according to patient has worsened her RLS.  She is agreeable for a trial of pregabalin 150 mg nightly.  If some improvement is noted, we can increase dose to 300 mg nightly after 2 weeks (patient has normal renal function).  She was reminded of potential side effects.  She should stop taking

## 2024-04-25 ENCOUNTER — TELEPHONE (OUTPATIENT)
Dept: INTERNAL MEDICINE CLINIC | Age: 74
End: 2024-04-25

## 2024-04-25 NOTE — TELEPHONE ENCOUNTER
Spoke with patient to offer her an appointment on 5/8 for a mammogram. We have a bus coming that day and patient declined at this time. Stated that she is having issues with sleep (another provider of her's keeps changing her medication) and she can't get much sleep. This is affecting her mood and she doesn't want to interact with no one right now. She wants us to call her next month and try again.

## 2024-04-30 ENCOUNTER — TELEPHONE (OUTPATIENT)
Dept: INTERNAL MEDICINE CLINIC | Age: 74
End: 2024-04-30

## 2024-04-30 NOTE — TELEPHONE ENCOUNTER
Patient called requesting samples of XARELTO (Rivaroxaban). Would like to pick them up by today if possible.

## 2024-05-01 ENCOUNTER — TELEPHONE (OUTPATIENT)
Dept: CARDIOLOGY CLINIC | Age: 74
End: 2024-05-01

## 2024-05-08 ENCOUNTER — TELEPHONE (OUTPATIENT)
Dept: CARDIOLOGY CLINIC | Age: 74
End: 2024-05-08

## 2024-05-09 ENCOUNTER — OFFICE VISIT (OUTPATIENT)
Dept: CARDIOLOGY CLINIC | Age: 74
End: 2024-05-09
Payer: MEDICARE

## 2024-05-09 ENCOUNTER — NURSE ONLY (OUTPATIENT)
Dept: CARDIOLOGY CLINIC | Age: 74
End: 2024-05-09

## 2024-05-09 VITALS
SYSTOLIC BLOOD PRESSURE: 118 MMHG | HEIGHT: 64 IN | BODY MASS INDEX: 43.5 KG/M2 | DIASTOLIC BLOOD PRESSURE: 74 MMHG | HEART RATE: 77 BPM | WEIGHT: 254.8 LBS | OXYGEN SATURATION: 95 %

## 2024-05-09 DIAGNOSIS — R00.1 SYMPTOMATIC BRADYCARDIA: ICD-10-CM

## 2024-05-09 DIAGNOSIS — E66.01 OBESITY, CLASS III, BMI 40-49.9 (MORBID OBESITY) (HCC): ICD-10-CM

## 2024-05-09 DIAGNOSIS — Z95.818 IMPLANTABLE LOOP RECORDER PRESENT: ICD-10-CM

## 2024-05-09 DIAGNOSIS — I10 BENIGN ESSENTIAL HTN: ICD-10-CM

## 2024-05-09 DIAGNOSIS — Z45.09 ENCOUNTER FOR ELECTRONIC ANALYSIS OF REVEAL EVENT RECORDER: Primary | ICD-10-CM

## 2024-05-09 DIAGNOSIS — I48.19 PERSISTENT ATRIAL FIBRILLATION (HCC): Primary | ICD-10-CM

## 2024-05-09 DIAGNOSIS — I48.0 PAROXYSMAL ATRIAL FIBRILLATION (HCC): Chronic | ICD-10-CM

## 2024-05-09 DIAGNOSIS — Z86.711 HX OF PULMONARY EMBOLUS: ICD-10-CM

## 2024-05-09 DIAGNOSIS — I50.32 CHRONIC DIASTOLIC HEART FAILURE (HCC): ICD-10-CM

## 2024-05-09 PROCEDURE — G8399 PT W/DXA RESULTS DOCUMENT: HCPCS | Performed by: INTERNAL MEDICINE

## 2024-05-09 PROCEDURE — 3078F DIAST BP <80 MM HG: CPT | Performed by: INTERNAL MEDICINE

## 2024-05-09 PROCEDURE — 93000 ELECTROCARDIOGRAM COMPLETE: CPT | Performed by: INTERNAL MEDICINE

## 2024-05-09 PROCEDURE — G8417 CALC BMI ABV UP PARAM F/U: HCPCS | Performed by: INTERNAL MEDICINE

## 2024-05-09 PROCEDURE — 1090F PRES/ABSN URINE INCON ASSESS: CPT | Performed by: INTERNAL MEDICINE

## 2024-05-09 PROCEDURE — 1036F TOBACCO NON-USER: CPT | Performed by: INTERNAL MEDICINE

## 2024-05-09 PROCEDURE — 1123F ACP DISCUSS/DSCN MKR DOCD: CPT | Performed by: INTERNAL MEDICINE

## 2024-05-09 PROCEDURE — 99214 OFFICE O/P EST MOD 30 MIN: CPT | Performed by: INTERNAL MEDICINE

## 2024-05-09 PROCEDURE — G8428 CUR MEDS NOT DOCUMENT: HCPCS | Performed by: INTERNAL MEDICINE

## 2024-05-09 PROCEDURE — 3074F SYST BP LT 130 MM HG: CPT | Performed by: INTERNAL MEDICINE

## 2024-05-09 PROCEDURE — 3017F COLORECTAL CA SCREEN DOC REV: CPT | Performed by: INTERNAL MEDICINE

## 2024-05-09 NOTE — PROGRESS NOTES
[facility]   Electrophysiology Follow up   Date: 5/9/2024  I had the privilege of visiting Kira Alexander in the office.     CC:    HPI: Kira Alexander is a 73 y.o. female with a past medical history of atrial fibrillation, HUSSAIN, obesity, dCHF, HTN, and PE. In January of 2020, she presented to the hospital with palpitations and was found to be in atrial fibrillation with RVR. She has PAF and normally converts to NSR spontaneously. She is symptomatic with her afib with symptoms of lightheadedness, SOB, and chest pressure. On 1/13/20, she was cardioverted and found to have long conversion pauses post procedure (Hx of syncope after conversion at home).     S/p RFA of afib with PVI and ablation of CTI flutter (3/27/20, Dr. Solitario)    Presented to The ED 04/29/2023 with chest pressure.EKG showed NSR with PAC - no STEMI .NM stress negative for reversible ischemia . Colchicine started for elevated CRP      Interval History: Kira presents today in follow up and to discuss ILR battery life.       Assessment and plan:   Persistent atrial fibrillation    - EKG today    - burden on device is 0   - S/p DCCV (1/13/20). S/p RFA of afib with PVI and ablation of CTI flutter (3/27/20, Dr. Solitario)  - Continue cardizem 120 mg QD                ~ Ok to use PRN cardizem 60 mg for breakthrough episodes of PAF     -  [id number] score: 4 (Age, Gender, HTN, CHF) ; [id number] Vasc score and anticoagulation discussed. High risk for stroke and thromboembolism. Anticoagulation is recommended. Risk of bleeding was discussed.  ~ On Xarelto 20 mg QD; tolerating well  ~ creatinine clearance of  134   ml/min based on creatinine fo 0.7 5/01/2023      - Afib risk factors including age, HTN, obesity, inactivity and HUSSAIN were discussed with patient. Risk factor modification recommended              ~ TSH 1.43  10/26/2022   She is doing well from the A-fib standpoint.  Continue to monitor for any recurrence      Implanted loop recorder  08/17/2020 
Had one reactions to shellfish years ago and now can tolerate one serving of shellfish once per week.  Avoids gluten as much as she can, but does not have celiac disease.)    Levofloxacin     Other      Environmental -cats,dogs,dust    Shellfish-Derived Products      hives    Sulfa Antibiotics      Can take Celebrex, Pt denies 8/1/18       Social History:  Reviewed.  reports that she quit smoking about 10 years ago. Her smoking use included cigarettes. She started smoking about 15 years ago. She has a 2.5 pack-year smoking history. She has never used smokeless tobacco. She reports current alcohol use of about 1.0 standard drink of alcohol per week. She reports that she does not use drugs.     Family History:  Reviewed. Reviewed. No family history of SCD.      Relevant and available labs, and cardiovascular diagnostics reviewed.   Reviewed.     I independently reviewed relevant and available cardiac diagnostic tests ECG, CXR, Echo, Stress test, Device interrogation, Holter, CT scan.    Outside medical records via [facility] everywhere reviewed and summarized in H&P above.    Complex medical condition with multiple medical problems affecting prognosis and outcome of EP interventions       - The patient is counseled to follow a low salt diet to assure blood pressure remains controlled for cardiovascular risk factor modification.   - The patient is counseled to avoid excess caffeine, and energy drinks as this may exacerbated ectopy and arrhythmia.   - The patient is counseled to get regular exercise 3-5 times per week to control cardiovascular risk factors.   - The patient is counseled to lose weigt to control cardiovascular risk factors.          All questions and concerns were addressed to the patient/family. Alternatives to my treatment were discussed. I have discussed the above stated plan and the patient verbalized understanding and agreed with the plan.      Scribe attestation: This note was scribed in the presence of

## 2024-05-11 DIAGNOSIS — M17.11 PRIMARY OSTEOARTHRITIS OF RIGHT KNEE: ICD-10-CM

## 2024-05-11 DIAGNOSIS — M79.10 MYALGIA: Primary | ICD-10-CM

## 2024-05-11 RX ORDER — TRAMADOL HYDROCHLORIDE 50 MG/1
50 TABLET ORAL EVERY 4 HOURS PRN
Qty: 42 TABLET | Refills: 0 | Status: SHIPPED | OUTPATIENT
Start: 2024-05-11 | End: 2024-05-18

## 2024-05-23 DIAGNOSIS — G25.81 RESTLESS LEG SYNDROME: ICD-10-CM

## 2024-05-24 RX ORDER — PRAMIPEXOLE DIHYDROCHLORIDE 0.5 MG/1
TABLET ORAL
Qty: 180 TABLET | Refills: 0 | OUTPATIENT
Start: 2024-05-24

## 2024-06-05 ENCOUNTER — OFFICE VISIT (OUTPATIENT)
Dept: INTERNAL MEDICINE CLINIC | Age: 74
End: 2024-06-05
Payer: MEDICARE

## 2024-06-05 VITALS
HEART RATE: 76 BPM | OXYGEN SATURATION: 98 % | TEMPERATURE: 98.2 F | SYSTOLIC BLOOD PRESSURE: 120 MMHG | RESPIRATION RATE: 16 BRPM | BODY MASS INDEX: 42.55 KG/M2 | WEIGHT: 248 LBS | DIASTOLIC BLOOD PRESSURE: 70 MMHG

## 2024-06-05 DIAGNOSIS — M75.82 TENDONITIS OF LEFT ROTATOR CUFF: ICD-10-CM

## 2024-06-05 DIAGNOSIS — F32.1 MODERATE MAJOR DEPRESSION (HCC): ICD-10-CM

## 2024-06-05 DIAGNOSIS — I50.32 CHRONIC DIASTOLIC HEART FAILURE (HCC): ICD-10-CM

## 2024-06-05 DIAGNOSIS — E66.9 TYPE 2 DIABETES MELLITUS WITH OBESITY (HCC): Primary | ICD-10-CM

## 2024-06-05 DIAGNOSIS — E11.69 TYPE 2 DIABETES MELLITUS WITH OBESITY (HCC): Primary | ICD-10-CM

## 2024-06-05 DIAGNOSIS — Z23 NEED FOR SHINGLES VACCINE: ICD-10-CM

## 2024-06-05 PROCEDURE — G8417 CALC BMI ABV UP PARAM F/U: HCPCS | Performed by: INTERNAL MEDICINE

## 2024-06-05 PROCEDURE — 1123F ACP DISCUSS/DSCN MKR DOCD: CPT | Performed by: INTERNAL MEDICINE

## 2024-06-05 PROCEDURE — G8399 PT W/DXA RESULTS DOCUMENT: HCPCS | Performed by: INTERNAL MEDICINE

## 2024-06-05 PROCEDURE — 3078F DIAST BP <80 MM HG: CPT | Performed by: INTERNAL MEDICINE

## 2024-06-05 PROCEDURE — G8427 DOCREV CUR MEDS BY ELIG CLIN: HCPCS | Performed by: INTERNAL MEDICINE

## 2024-06-05 PROCEDURE — 1036F TOBACCO NON-USER: CPT | Performed by: INTERNAL MEDICINE

## 2024-06-05 PROCEDURE — 3074F SYST BP LT 130 MM HG: CPT | Performed by: INTERNAL MEDICINE

## 2024-06-05 PROCEDURE — 99214 OFFICE O/P EST MOD 30 MIN: CPT | Performed by: INTERNAL MEDICINE

## 2024-06-05 PROCEDURE — 2022F DILAT RTA XM EVC RTNOPTHY: CPT | Performed by: INTERNAL MEDICINE

## 2024-06-05 PROCEDURE — 1090F PRES/ABSN URINE INCON ASSESS: CPT | Performed by: INTERNAL MEDICINE

## 2024-06-05 PROCEDURE — 3017F COLORECTAL CA SCREEN DOC REV: CPT | Performed by: INTERNAL MEDICINE

## 2024-06-05 PROCEDURE — 3046F HEMOGLOBIN A1C LEVEL >9.0%: CPT | Performed by: INTERNAL MEDICINE

## 2024-06-05 RX ORDER — SEMAGLUTIDE 1.34 MG/ML
1 INJECTION, SOLUTION SUBCUTANEOUS
Qty: 3 ML | Refills: 3 | Status: SHIPPED | OUTPATIENT
Start: 2024-06-05

## 2024-06-05 RX ORDER — ZOSTER VACCINE RECOMBINANT, ADJUVANTED 50 MCG/0.5
0.5 KIT INTRAMUSCULAR SEE ADMIN INSTRUCTIONS
Qty: 0.5 ML | Refills: 0 | Status: SHIPPED | OUTPATIENT
Start: 2024-06-05 | End: 2024-06-06

## 2024-06-05 ASSESSMENT — PATIENT HEALTH QUESTIONNAIRE - PHQ9
8. MOVING OR SPEAKING SO SLOWLY THAT OTHER PEOPLE COULD HAVE NOTICED. OR THE OPPOSITE, BEING SO FIGETY OR RESTLESS THAT YOU HAVE BEEN MOVING AROUND A LOT MORE THAN USUAL: NOT AT ALL
SUM OF ALL RESPONSES TO PHQ9 QUESTIONS 1 & 2: 0
SUM OF ALL RESPONSES TO PHQ QUESTIONS 1-9: 4
SUM OF ALL RESPONSES TO PHQ QUESTIONS 1-9: 4
7. TROUBLE CONCENTRATING ON THINGS, SUCH AS READING THE NEWSPAPER OR WATCHING TELEVISION: NOT AT ALL
6. FEELING BAD ABOUT YOURSELF - OR THAT YOU ARE A FAILURE OR HAVE LET YOURSELF OR YOUR FAMILY DOWN: SEVERAL DAYS
1. LITTLE INTEREST OR PLEASURE IN DOING THINGS: NOT AT ALL
5. POOR APPETITE OR OVEREATING: NOT AT ALL
4. FEELING TIRED OR HAVING LITTLE ENERGY: NEARLY EVERY DAY
SUM OF ALL RESPONSES TO PHQ QUESTIONS 1-9: 4
2. FEELING DOWN, DEPRESSED OR HOPELESS: NOT AT ALL
SUM OF ALL RESPONSES TO PHQ QUESTIONS 1-9: 4
9. THOUGHTS THAT YOU WOULD BE BETTER OFF DEAD, OR OF HURTING YOURSELF: NOT AT ALL

## 2024-06-05 NOTE — PROGRESS NOTES
Chief Complaint   Patient presents with    Pain     Bilateral wrist pain and L>R  Follow up left shoulder pain         Assessment/Plan:  Kira was seen today for pain.    Diagnoses and all orders for this visit:    Type 2 diabetes mellitus with obesity (HCC)  -     Ambulatory Referral to Care Management  -     Semaglutide, 1 MG/DOSE, (OZEMPIC, 1 MG/DOSE,) 4 MG/3ML SOPN sc injection; Inject 1 mg into the skin every 7 days  -      DIABETES FOOT EXAM  -     External Referral To Ophthalmology    Moderate major depression (HCC)  -     Ambulatory Referral to Care Management    Chronic diastolic heart failure (HCC)  -     Ambulatory Referral to Care Management    Tendonitis of left rotator cuff  Comments:  Orthopedic referral.    Need for shingles vaccine  -     zoster recombinant adjuvanted vaccine (SHINGRIX) 50 MCG/0.5ML SUSR injection; Inject 0.5 mLs into the muscle See Admin Instructions for 1 day        Vitals:    06/05/24 1008   BP: 120/70   Pulse: 76   Resp: 16   Temp: 98.2 °F (36.8 °C)   SpO2: 98%       Physical Exam  Vitals and nursing note reviewed.   Constitutional:       General: She is not in acute distress.     Appearance: She is well-developed. She is not diaphoretic.   HENT:      Head: Normocephalic and atraumatic.   Cardiovascular:      Rate and Rhythm: Normal rate and regular rhythm.      Heart sounds: Normal heart sounds. No murmur heard.     No friction rub. No gallop.   Pulmonary:      Effort: Pulmonary effort is normal. No respiratory distress.      Breath sounds: Normal breath sounds. No wheezing or rales.   Chest:      Chest wall: No tenderness.   Musculoskeletal:      Comments: No calluses or open lesions   Skin:     General: Skin is warm.      Findings: No erythema or rash.   Neurological:      Mental Status: She is alert and oriented to person, place, and time.      Cranial Nerves: No cranial nerve deficit.      Comments: Normal sensation to vibration bilaterally.      Psychiatric:

## 2024-06-11 ENCOUNTER — HOSPITAL ENCOUNTER (OUTPATIENT)
Age: 74
Discharge: HOME | End: 2024-06-11
Payer: MEDICARE

## 2024-06-11 DIAGNOSIS — I10 BENIGN ESSENTIAL HTN: ICD-10-CM

## 2024-06-11 DIAGNOSIS — I30.0 ACUTE IDIOPATHIC PERICARDITIS: ICD-10-CM

## 2024-06-11 DIAGNOSIS — I50.32 CHRONIC DIASTOLIC HEART FAILURE (HCC): ICD-10-CM

## 2024-06-11 DIAGNOSIS — R06.02 SOB (SHORTNESS OF BREATH): ICD-10-CM

## 2024-06-11 DIAGNOSIS — E78.00 PURE HYPERCHOLESTEROLEMIA: ICD-10-CM

## 2024-06-11 DIAGNOSIS — I48.0 PAROXYSMAL ATRIAL FIBRILLATION (HCC): ICD-10-CM

## 2024-06-11 LAB
ALBUMIN SERPL-MCNC: 4.3 G/DL (ref 3.4–5)
ALBUMIN/GLOB SERPL: 1.9 {RATIO} (ref 1.1–2.2)
ALP SERPL-CCNC: 90 U/L (ref 40–129)
ALT SERPL-CCNC: 53 U/L (ref 10–40)
ANION GAP SERPL CALCULATED.3IONS-SCNC: 13 MMOL/L (ref 3–16)
AST SERPL-CCNC: 33 U/L (ref 15–37)
BILIRUB SERPL-MCNC: 0.3 MG/DL (ref 0–1)
BUN SERPL-MCNC: 13 MG/DL (ref 7–20)
CALCIUM SERPL-MCNC: 9.2 MG/DL (ref 8.3–10.6)
CHLORIDE SERPL-SCNC: 99 MMOL/L (ref 99–110)
CHOLEST SERPL-MCNC: 206 MG/DL (ref 0–199)
CO2 SERPL-SCNC: 21 MMOL/L (ref 21–32)
CREAT SERPL-MCNC: 0.6 MG/DL (ref 0.6–1.2)
CRP SERPL HS-MCNC: 4 MG/L (ref 0.16–3)
DEPRECATED RDW RBC AUTO: 13.3 % (ref 12.4–15.4)
ERYTHROCYTE [SEDIMENTATION RATE] IN BLOOD BY WESTERGREN METHOD: 8 MM/HR (ref 0–30)
GFR SERPLBLD CREATININE-BSD FMLA CKD-EPI: >90 ML/MIN/{1.73_M2}
GLUCOSE SERPL-MCNC: 95 MG/DL (ref 70–99)
HCT VFR BLD AUTO: 39 % (ref 36–48)
HDLC SERPL-MCNC: 53 MG/DL (ref 40–60)
HGB BLD-MCNC: 13.5 G/DL (ref 12–16)
LDLC SERPL CALC-MCNC: 126 MG/DL
MCH RBC QN AUTO: 33.6 PG (ref 26–34)
MCHC RBC AUTO-ENTMCNC: 34.6 G/DL (ref 31–36)
MCV RBC AUTO: 96.9 FL (ref 80–100)
NT-PROBNP SERPL-MCNC: 90 PG/ML (ref 0–124)
PLATELET # BLD AUTO: 245 K/UL (ref 135–450)
PMV BLD AUTO: 9.4 FL (ref 5–10.5)
POTASSIUM SERPL-SCNC: 4.5 MMOL/L (ref 3.5–5.1)
PROT SERPL-MCNC: 6.6 G/DL (ref 6.4–8.2)
RBC # BLD AUTO: 4.03 M/UL (ref 4–5.2)
SODIUM SERPL-SCNC: 133 MMOL/L (ref 136–145)
TRIGL SERPL-MCNC: 134 MG/DL (ref 0–150)
VLDLC SERPL CALC-MCNC: 27 MG/DL
WBC # BLD AUTO: 5 K/UL (ref 4–11)

## 2024-06-11 PROCEDURE — 85027 COMPLETE CBC AUTOMATED: CPT

## 2024-06-11 PROCEDURE — 85652 RBC SED RATE AUTOMATED: CPT

## 2024-06-11 PROCEDURE — 86141 C-REACTIVE PROTEIN HS: CPT

## 2024-06-11 PROCEDURE — 83880 ASSAY OF NATRIURETIC PEPTIDE: CPT

## 2024-06-11 PROCEDURE — 80053 COMPREHEN METABOLIC PANEL: CPT

## 2024-06-11 PROCEDURE — 80061 LIPID PANEL: CPT

## 2024-06-11 PROCEDURE — 36415 COLL VENOUS BLD VENIPUNCTURE: CPT

## 2024-06-20 DIAGNOSIS — G25.81 RESTLESS LEG SYNDROME: ICD-10-CM

## 2024-06-20 RX ORDER — PRAMIPEXOLE DIHYDROCHLORIDE 0.5 MG/1
TABLET ORAL
Qty: 90 TABLET | Refills: 0 | OUTPATIENT
Start: 2024-06-20

## 2024-06-24 DIAGNOSIS — M79.10 MYALGIA: ICD-10-CM

## 2024-06-24 DIAGNOSIS — M17.11 PRIMARY OSTEOARTHRITIS OF RIGHT KNEE: ICD-10-CM

## 2024-06-24 RX ORDER — TRAMADOL HYDROCHLORIDE 50 MG/1
50 TABLET ORAL EVERY 6 HOURS PRN
Qty: 42 TABLET | Refills: 0 | Status: SHIPPED | OUTPATIENT
Start: 2024-06-24 | End: 2024-07-24

## 2024-06-24 NOTE — TELEPHONE ENCOUNTER
Recent Visits  Date Type Provider Dept   06/05/24 Office Visit Miguel Denton MD [facility]   04/08/24 Office Visit Miguel Denton MD [facility]   11/27/23 Office Visit Allison Chang, DO [facility]   11/20/23 Office Visit Allison Chang, DO [facility]   10/02/23 Office Visit Miguel Denton MD [facility]   08/11/23 Office Visit Miguel Denton, MD [facility]   07/21/23 Office Visit Miguel Denton MD [facility]   06/12/23 Office Visit Miguel Denton MD [facility]   05/10/23 Office Visit Miguel Denton MD [facility]   02/15/23 Office Visit Miguel Denton MD [facility]   Showing recent visits within past 540 days with a meds authorizing provider and meeting all other requirements  Future Appointments  Date Type Provider Dept   10/04/24 Appointment Miguel Denton MD [facility]   Showing future appointments within next 150 days with a meds authorizing provider and meeting all other requirements     6/5/2024

## 2024-08-02 ENCOUNTER — TELEPHONE (OUTPATIENT)
Dept: INTERNAL MEDICINE CLINIC | Age: 74
End: 2024-08-02

## 2024-08-02 NOTE — PROGRESS NOTES
[facility] Pulmonary/CCM Progress note      Admit Date: 10/25/2022    Chief Complaint: Chest tightness and shortness of breath    Subjective: Interval History: Patient appears to be doing very well currently. Had 1 episode of atrial tachycardia up to 160/min last night-spontaneous and was not related to inhaler use. She had chest heaviness/tightness and palpitations at that time which has completely resolved now.     Scheduled Meds:   dilTIAZem  120 mg Oral Daily    furosemide  40 mg Oral Daily    lisinopril  10 mg Oral Daily    spironolactone  25 mg Oral Daily    sodium chloride flush  5-40 mL IntraVENous 2 times per day    pramipexole  0.75 mg Oral BID    fluticasone  1 puff Inhalation BID    tiotropium  2 puff Inhalation Daily    rivaroxaban  20 mg Oral Daily     Continuous Infusions:   sodium chloride       PRN Meds:sodium chloride flush, sodium chloride, ondansetron **OR** ondansetron, polyethylene glycol, acetaminophen **OR** acetaminophen, levalbuterol, melatonin    Review of Systems  Constitutional: negative for fatigue, fevers, malaise and weight loss  Ears, nose, mouth, throat: negative for ear drainage, epistaxis, hoarseness, nasal congestion, sore throat and voice change  Respiratory: negative for shortness of breath, cough, phlegm or pleurisy  Cardiovascular: negative for chest pain, chest pressure/discomfort, irregular heart beat, lower extremity edema and palpitations  Gastrointestinal: negative for abdominal pain, constipation, diarrhea, jaundice, melena, odynophagia, reflux symptoms and vomiting  Hematologic/lymphatic: negative for bleeding, easy bruising, lymphadenopathy and petechiae  Musculoskeletal:negative for arthralgias, bone pain, muscle weakness, neck pain and stiff joints  Neurological: negative for dizziness, gait problems, headaches, seizures, speech problems, tremors and weakness  Behavioral/Psych: negative for anxiety, behavior problems, depression, fatigue and sleep disturbance  Endocrine: negative for diabetic symptoms including none, neuropathy, polyphagia, polyuria, polydipsia, vomiting and diarrhea and temperature intolerance  Allergic/Immunologic: negative for anaphylaxis, angioedema, hay fever and urticaria    Objective:     Patient Vitals for the past 8 hrs:   BP Temp Temp src Pulse Resp SpO2   10/26/22 1219 -- -- -- -- -- 97 %   10/26/22 0820 127/79 97 °F (36.1 °C) Temporal 67 16 97 %     I/O last 3 completed shifts:   In: 36 [P.O.:760]  Out: 3050 [Urine:3050]  I/O this shift:  In: -   Out: 850 [Urine:850]    General Appearance: alert and oriented to person, place and time, well developed and well- nourished, in no acute distress  Skin: warm and dry, no rash or erythema  Head: normocephalic and atraumatic  Eyes: pupils equal, round, and reactive to light, extraocular eye movements intact, conjunctivae normal  ENT: external ear and ear canal normal bilaterally, nose without deformity, nasal mucosa and turbinates normal  Neck: supple and non-tender without mass, no cervical lymphadenopathy  Pulmonary/Chest: clear to auscultation bilaterally- no wheezes, rales or rhonchi, normal air movement, no respiratory distress  Cardiovascular: normal rate, regular rhythm,  no murmurs, rubs, distal pulses intact, no carotid bruits  Abdomen: soft, non-tender, non-distended, normal bowel sounds, no masses or organomegaly  Lymph Nodes: Cervical, supraclavicular normal  Extremities: no cyanosis, clubbing or edema  Musculoskeletal: normal range of motion, no joint swelling, deformity or tenderness  Neurologic: alert, no focal neurologic deficits    Data Review:  CBC:   Lab Results   Component Value Date/Time    WBC 7.2 10/26/2022 03:30 AM    RBC 4.12 10/26/2022 03:30 AM     BMP:   Lab Results   Component Value Date/Time    GLUCOSE 105 10/26/2022 03:30 AM    GLUCOSE 100 03/27/2012 04:27 PM    CO2 21 10/26/2022 03:30 AM    BUN 21 10/26/2022 03:30 AM    CREATININE 0.9 10/26/2022 03:30 AM CALCIUM 9.5 10/26/2022 03:30 AM     ABG: No results found for: [id number], BEART, J5QYRMTL, PHART, THGBART, FNI3HCF, PO2ART, SDZ6FUR    Radiology: All pertinent images / reports were reviewed as a part of this visit. Narrative   EXAMINATION:   TWO XRAY VIEWS OF THE CHEST       10/25/2022 6:24 am       COMPARISON:   02/09/2022       HISTORY:   ORDERING SYSTEM PROVIDED HISTORY: chest tightness no fever or cough   TECHNOLOGIST PROVIDED HISTORY:   Reason for exam:->chest tightness no fever or cough   Reason for Exam: chest tightness no fever or cough   Relevant Medical/Surgical History: previous   pneumonia,htn,CTEPH,A-FIB,A-Flutter,asthma and P.E,       FINDINGS:   The lungs are clear. A cardiac digital recorder device is insitu. The   cardiac silhouette is within normal limits. There is no pneumothorax or   pleural effusion. Status post posterior decompression and stabilization of   the lumbar spine. Impression   1. No acute abnormality. Problem List:   SVT  History of asthma      Assessment/Plan:     Patient had another recurrent event overnight, this was unrelated to inhaler/Xopenex use. History of paroxysmal atrial fibrillation status post ablation in 2020. Recent episode noted to be SVT/atrial tachycardia-spontaneously resolved. Stress test noted to be normal.    Based on recent events, tachycardia does not appear to be related to inhaler use. Defer further management to cardiology. Discussed with patient. Okay to continue with Alvesco and Spiriva Respimat, patient should be prescribed Xopenex inhaler instead of albuterol as rescue at the time of discharge.     Isidro Hood MD BMP/CBC/CMP/PT/PTT/INR/Hepatic Function/EKG/Results in MD note/COVID-19

## 2024-08-02 NOTE — TELEPHONE ENCOUNTER
Pt reports legs are locking up.  Started when she used Ozempic but has stopped taking it but still locking up.  She has an appt in Oct but she would like to see Dr Bertin middleton.      She is not swimming, driving and has a lot of trouble walking. Is scaring her because she hasn't had this before.     Please advise when can schedule an appt

## 2024-08-09 ENCOUNTER — TELEPHONE (OUTPATIENT)
Dept: CARDIOLOGY CLINIC | Age: 74
End: 2024-08-09

## 2024-08-09 ENCOUNTER — OFFICE VISIT (OUTPATIENT)
Dept: CARDIOLOGY CLINIC | Age: 74
End: 2024-08-09

## 2024-08-09 VITALS
HEART RATE: 74 BPM | BODY MASS INDEX: 42.85 KG/M2 | HEIGHT: 64 IN | SYSTOLIC BLOOD PRESSURE: 130 MMHG | DIASTOLIC BLOOD PRESSURE: 60 MMHG | WEIGHT: 251 LBS | OXYGEN SATURATION: 95 %

## 2024-08-09 DIAGNOSIS — I50.32 CHRONIC DIASTOLIC CONGESTIVE HEART FAILURE (HCC): Primary | ICD-10-CM

## 2024-08-09 DIAGNOSIS — R06.02 SOB (SHORTNESS OF BREATH): ICD-10-CM

## 2024-08-09 DIAGNOSIS — I48.0 PAROXYSMAL ATRIAL FIBRILLATION (HCC): ICD-10-CM

## 2024-08-09 DIAGNOSIS — I10 BENIGN ESSENTIAL HTN: ICD-10-CM

## 2024-08-09 DIAGNOSIS — E78.00 PURE HYPERCHOLESTEROLEMIA: ICD-10-CM

## 2024-08-09 DIAGNOSIS — Z95.818 IMPLANTABLE LOOP RECORDER PRESENT: ICD-10-CM

## 2024-08-09 DIAGNOSIS — E66.01 OBESITY, CLASS III, BMI 40-49.9 (MORBID OBESITY) (HCC): ICD-10-CM

## 2024-08-09 NOTE — PATIENT INSTRUCTIONS
Talk to Dr. Denton about your leg issues.    No medication changes.    Call the office for any new, worsening, or concerning symptoms.

## 2024-08-09 NOTE — TELEPHONE ENCOUNTER
2 weeks worth of samples provided to patient. Please provide the patient with assistance information .

## 2024-08-09 NOTE — PROGRESS NOTES
[facility]   Advanced Heart Failure/Pulmonary Hypertension  Cardiac Follow up       Kira Alexander  YOB: 1950     Date of Visit:  8/9/24     Chief Complaint   Patient presents with    Congestive Heart Failure    Shortness of Breath    Fatigue     History of present illness: Kira Alexander is a 73 y.o. 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 She original had a PE was treated with xarelto for recommended time and then off it and her RHC revealed pressures no RH elevated pressures. Afterwards she developed AFib and restarted on xarelto. She continues on xarelto and treatment for afib. Echos in July and August (2016) showed RVSP 106-112 without evidence of enlargement in right side of heart. RHC was done 9/2/16 and PA pressure was 24, no evidence of pulmonary hypertension. ILR implanted 8/17/20.    5/22/23: Started on Jardiance     Her VIKI was Negative    She wears her CPAP consistently for her HUSSAIN.    Today, she is here for follow up for diastolic heart failure; she is using her cane for balance assist.  She denies exertional chest pain, LUGO/PND, palpitations, light-headedness, edema. She thinks Ozempic (took a total of 3 doses) affected the nerves in her right leg as she had numbness down the leg, back of thigh/calf would tighten, and foot involuntary \"straightening.\" The symptoms kept her awake at night, and she is just beginning to feel better, has some residual sensations down that leg. Dr. Denton told her to stop taking it. She has not been very active this summer, has gained weight which frustrates her. She enjoys making bracelets, likes to give them away. She is going to restart cardiac rehab, already has a time slot. Reviewed latest medications, labs, and cardiac tests with her including labs and cardiac testing from [facility] Everywhere.     NYHA Class II    Prior to Visit Medications    Medication Sig Taking? Authorizing 
extremities well  Exhibits normal gait balance and coordination  No abnormalities of mood, affect, memory, mentation, or behavior are noted    Labs were reviewed including labs from other hospital systems through [facility] Everywhere.  Cardiac testing was reviewed including echos, nuclear scans, cardiac catheterization, including from other hospital systems through [facility]where.      Labs:   Lab Results   Component Value Date    WBC 5.0 06/11/2024    HGB 13.5 06/11/2024    HCT 39.0 06/11/2024    MCV 96.9 06/11/2024     06/11/2024     Lab Results   Component Value Date/Time     06/11/2024 12:57 PM    K 4.5 06/11/2024 12:57 PM    K 4.1 04/29/2023 12:55 PM    CL 99 06/11/2024 12:57 PM    CO2 21 06/11/2024 12:57 PM    BUN 13 06/11/2024 12:57 PM    CREATININE 0.6 06/11/2024 12:57 PM    GLUCOSE 95 06/11/2024 12:57 PM    GLUCOSE 100 03/27/2012 04:27 PM    CALCIUM 9.2 06/11/2024 12:57 PM      Lab Results   Component Value Date/Time    TRIG 134 06/11/2024 12:57 PM    HDL 53 06/11/2024 12:57 PM     DIAGNOSTIC CARDIAC TESTING    EKG 8/29/23 was NSR    ECHO 5/1/23  Normal left ventricle size, wall thickness, and systolic function with an estimated ejection fraction of 55-60%.  No regional wall motion abnormalities are seen.  Normal diastolic function. Avg E/e' estimated to be 11.9.  The right ventricle is mildly enlarged with normal function.  Mild mitral regurgitation is present.  Mild tricuspid regurgitation with an RVSP estimated to be 46 mmHg.  Mild-to-moderate pulmonic regurgitation present.    Lexiscan eliz 5/1/23  The overall quality of the study is good. There is subdiaphragmatic attenuation.  Left ventricular cavity size is normal. Right ventricle is normal in size.  SPECT images demonstrate homogeneous tracer distribution throughout the myocardium. There is normal isotope uptake at stress and rest. There is no evidence of myocardial ischemia or scar. Gated spect reveals normal myocardial thickening and wall

## 2024-08-16 ENCOUNTER — TELEPHONE (OUTPATIENT)
Dept: CARDIOLOGY CLINIC | Age: 74
End: 2024-08-16

## 2024-08-16 NOTE — TELEPHONE ENCOUNTER
Pt called to inform the office that she was in a- fib that lasted 2 1/2 hrs last night.  Pt is now in normally rhythm\.  Pt wants the office to know and her implant is no longer working.  Please call to discuss this matter.  Thank you

## 2024-08-20 NOTE — TELEPHONE ENCOUNTER
Pt.called back and stated that after she take diltiazen 60mg she is back to her rhythm and feeling better.

## 2024-08-20 NOTE — TELEPHONE ENCOUNTER
Called pt.and LVM regarding messaged per NPSR ask for call back if she have any question regarding message.

## 2024-08-21 ENCOUNTER — PATIENT MESSAGE (OUTPATIENT)
Dept: INTERNAL MEDICINE CLINIC | Age: 74
End: 2024-08-21

## 2024-09-09 ENCOUNTER — TELEPHONE (OUTPATIENT)
Dept: CARDIOLOGY CLINIC | Age: 74
End: 2024-09-09

## 2024-09-10 ENCOUNTER — HOSPITAL ENCOUNTER (OUTPATIENT)
Age: 74
Discharge: HOME | End: 2024-09-10
Payer: MEDICARE

## 2024-09-10 LAB
ANION GAP SERPL CALCULATED.3IONS-SCNC: 14 MMOL/L (ref 3–16)
BASOPHILS # BLD: 0.1 K/UL (ref 0–0.2)
BASOPHILS NFR BLD: 1.2 %
BUN SERPL-MCNC: 14 MG/DL (ref 7–20)
CALCIUM SERPL-MCNC: 9 MG/DL (ref 8.3–10.6)
CHLORIDE SERPL-SCNC: 98 MMOL/L (ref 99–110)
CO2 SERPL-SCNC: 20 MMOL/L (ref 21–32)
CREAT SERPL-MCNC: 0.7 MG/DL (ref 0.6–1.2)
DEPRECATED RDW RBC AUTO: 13.7 % (ref 12.4–15.4)
EOSINOPHIL # BLD: 0.1 K/UL (ref 0–0.6)
EOSINOPHIL NFR BLD: 2.2 %
GFR SERPLBLD CREATININE-BSD FMLA CKD-EPI: >90 ML/MIN/{1.73_M2}
GLUCOSE SERPL-MCNC: 120 MG/DL (ref 70–99)
HCT VFR BLD AUTO: 39.5 % (ref 36–48)
HGB BLD-MCNC: 13.4 G/DL (ref 12–16)
LYMPHOCYTES # BLD: 1.4 K/UL (ref 1–5.1)
LYMPHOCYTES NFR BLD: 27 %
MCH RBC QN AUTO: 32.9 PG (ref 26–34)
MCHC RBC AUTO-ENTMCNC: 33.9 G/DL (ref 31–36)
MCV RBC AUTO: 97 FL (ref 80–100)
MONOCYTES # BLD: 0.6 K/UL (ref 0–1.3)
MONOCYTES NFR BLD: 11.8 %
NEUTROPHILS # BLD: 2.9 K/UL (ref 1.7–7.7)
NEUTROPHILS NFR BLD: 57.8 %
PLATELET # BLD AUTO: 240 K/UL (ref 135–450)
PMV BLD AUTO: 9.8 FL (ref 5–10.5)
POTASSIUM SERPL-SCNC: 3.6 MMOL/L (ref 3.5–5.1)
RBC # BLD AUTO: 4.07 M/UL (ref 4–5.2)
SODIUM SERPL-SCNC: 132 MMOL/L (ref 136–145)
WBC # BLD AUTO: 5.1 K/UL (ref 4–11)

## 2024-09-10 PROCEDURE — 85025 COMPLETE CBC W/AUTO DIFF WBC: CPT

## 2024-09-10 PROCEDURE — 80048 BASIC METABOLIC PNL TOTAL CA: CPT

## 2024-09-10 PROCEDURE — 36415 COLL VENOUS BLD VENIPUNCTURE: CPT

## 2024-09-11 ENCOUNTER — OFFICE VISIT (OUTPATIENT)
Dept: INTERNAL MEDICINE CLINIC | Age: 74
End: 2024-09-11

## 2024-09-11 VITALS
BODY MASS INDEX: 42.57 KG/M2 | WEIGHT: 248 LBS | HEART RATE: 70 BPM | RESPIRATION RATE: 18 BRPM | DIASTOLIC BLOOD PRESSURE: 68 MMHG | OXYGEN SATURATION: 98 % | SYSTOLIC BLOOD PRESSURE: 128 MMHG | TEMPERATURE: 98.2 F

## 2024-09-11 DIAGNOSIS — G61.0: ICD-10-CM

## 2024-09-11 DIAGNOSIS — G25.81 RESTLESS LEG SYNDROME: Primary | ICD-10-CM

## 2024-09-11 LAB — CRP SERPL-MCNC: 4.3 MG/L (ref 0–5.1)

## 2024-09-11 RX ORDER — TRAMADOL HYDROCHLORIDE 50 MG/1
50 TABLET ORAL EVERY 8 HOURS PRN
Qty: 90 TABLET | Refills: 1 | Status: ON HOLD | OUTPATIENT
Start: 2024-09-11 | End: 2024-11-10

## 2024-09-11 RX ORDER — CARBAMAZEPINE 200 MG/1
200 TABLET ORAL 3 TIMES DAILY
Qty: 90 TABLET | Refills: 3 | Status: ON HOLD | OUTPATIENT
Start: 2024-09-11

## 2024-09-11 RX ORDER — KETOROLAC TROMETHAMINE 30 MG/ML
60 INJECTION, SOLUTION INTRAMUSCULAR; INTRAVENOUS ONCE
Status: COMPLETED | OUTPATIENT
Start: 2024-09-11 | End: 2024-09-11

## 2024-09-11 RX ORDER — CICLESONIDE 160 UG/1
AEROSOL, METERED RESPIRATORY (INHALATION)
Status: ON HOLD | COMMUNITY

## 2024-09-11 RX ADMIN — KETOROLAC TROMETHAMINE 60 MG: 30 INJECTION, SOLUTION INTRAMUSCULAR; INTRAVENOUS at 14:26

## 2024-09-11 ASSESSMENT — PATIENT HEALTH QUESTIONNAIRE - PHQ9
8. MOVING OR SPEAKING SO SLOWLY THAT OTHER PEOPLE COULD HAVE NOTICED. OR THE OPPOSITE, BEING SO FIGETY OR RESTLESS THAT YOU HAVE BEEN MOVING AROUND A LOT MORE THAN USUAL: NEARLY EVERY DAY
2. FEELING DOWN, DEPRESSED OR HOPELESS: SEVERAL DAYS
SUM OF ALL RESPONSES TO PHQ QUESTIONS 1-9: 2
5. POOR APPETITE OR OVEREATING: SEVERAL DAYS
SUM OF ALL RESPONSES TO PHQ QUESTIONS 1-9: 11
9. THOUGHTS THAT YOU WOULD BE BETTER OFF DEAD, OR OF HURTING YOURSELF: NOT AT ALL
2. FEELING DOWN, DEPRESSED OR HOPELESS: SEVERAL DAYS
SUM OF ALL RESPONSES TO PHQ QUESTIONS 1-9: 2
SUM OF ALL RESPONSES TO PHQ QUESTIONS 1-9: 11
SUM OF ALL RESPONSES TO PHQ QUESTIONS 1-9: 2
4. FEELING TIRED OR HAVING LITTLE ENERGY: SEVERAL DAYS
1. LITTLE INTEREST OR PLEASURE IN DOING THINGS: SEVERAL DAYS
3. TROUBLE FALLING OR STAYING ASLEEP: SEVERAL DAYS
SUM OF ALL RESPONSES TO PHQ QUESTIONS 1-9: 11
SUM OF ALL RESPONSES TO PHQ QUESTIONS 1-9: 2
6. FEELING BAD ABOUT YOURSELF - OR THAT YOU ARE A FAILURE OR HAVE LET YOURSELF OR YOUR FAMILY DOWN: NOT AT ALL
SUM OF ALL RESPONSES TO PHQ9 QUESTIONS 1 & 2: 2
7. TROUBLE CONCENTRATING ON THINGS, SUCH AS READING THE NEWSPAPER OR WATCHING TELEVISION: NEARLY EVERY DAY
SUM OF ALL RESPONSES TO PHQ9 QUESTIONS 1 & 2: 2
SUM OF ALL RESPONSES TO PHQ QUESTIONS 1-9: 11
10. IF YOU CHECKED OFF ANY PROBLEMS, HOW DIFFICULT HAVE THESE PROBLEMS MADE IT FOR YOU TO DO YOUR WORK, TAKE CARE OF THINGS AT HOME, OR GET ALONG WITH OTHER PEOPLE: SOMEWHAT DIFFICULT
1. LITTLE INTEREST OR PLEASURE IN DOING THINGS: SEVERAL DAYS

## 2024-09-11 ASSESSMENT — COLUMBIA-SUICIDE SEVERITY RATING SCALE - C-SSRS
1. WITHIN THE PAST MONTH, HAVE YOU WISHED YOU WERE DEAD OR WISHED YOU COULD GO TO SLEEP AND NOT WAKE UP?: NO
6. HAVE YOU EVER DONE ANYTHING, STARTED TO DO ANYTHING, OR PREPARED TO DO ANYTHING TO END YOUR LIFE?: NO
2. HAVE YOU ACTUALLY HAD ANY THOUGHTS OF KILLING YOURSELF?: NO

## 2024-09-14 ENCOUNTER — APPOINTMENT (OUTPATIENT)
Dept: CT IMAGING | Age: 74
End: 2024-09-14
Payer: MEDICARE

## 2024-09-14 ENCOUNTER — HOSPITAL ENCOUNTER (INPATIENT)
Age: 74
LOS: 5 days | Discharge: NURSING, REHAB OR LONG-TERM CARE | End: 2024-09-19
Attending: INTERNAL MEDICINE | Admitting: INTERNAL MEDICINE
Payer: MEDICARE

## 2024-09-14 ENCOUNTER — APPOINTMENT (OUTPATIENT)
Dept: GENERAL RADIOLOGY | Age: 74
End: 2024-09-14
Payer: MEDICARE

## 2024-09-14 DIAGNOSIS — R42 DIZZINESS: Primary | ICD-10-CM

## 2024-09-14 DIAGNOSIS — R00.1 BRADYCARDIA: ICD-10-CM

## 2024-09-14 DIAGNOSIS — R42 LIGHTHEADEDNESS: ICD-10-CM

## 2024-09-14 DIAGNOSIS — R52 PAIN IN PACEMAKER POCKET: ICD-10-CM

## 2024-09-14 DIAGNOSIS — I45.5 SINUS PAUSE: ICD-10-CM

## 2024-09-14 LAB
ALBUMIN SERPL-MCNC: 4.6 G/DL (ref 3.4–5)
ALBUMIN/GLOB SERPL: 1.7 {RATIO} (ref 1.1–2.2)
ALP SERPL-CCNC: 97 U/L (ref 40–129)
ALT SERPL-CCNC: 40 U/L (ref 10–40)
ANION GAP SERPL CALCULATED.3IONS-SCNC: 13 MMOL/L (ref 3–16)
AST SERPL-CCNC: 29 U/L (ref 15–37)
BASOPHILS # BLD: 0.1 K/UL (ref 0–0.2)
BASOPHILS NFR BLD: 0.8 %
BILIRUB SERPL-MCNC: 0.3 MG/DL (ref 0–1)
BILIRUB UR QL STRIP.AUTO: NEGATIVE
BUN SERPL-MCNC: 17 MG/DL (ref 7–20)
CALCIUM SERPL-MCNC: 9.9 MG/DL (ref 8.3–10.6)
CHLORIDE SERPL-SCNC: 100 MMOL/L (ref 99–110)
CLARITY UR: CLEAR
CO2 SERPL-SCNC: 24 MMOL/L (ref 21–32)
COLOR UR: YELLOW
CREAT SERPL-MCNC: 0.8 MG/DL (ref 0.6–1.2)
DEPRECATED RDW RBC AUTO: 14.1 % (ref 12.4–15.4)
EOSINOPHIL # BLD: 0.1 K/UL (ref 0–0.6)
EOSINOPHIL NFR BLD: 1 %
GFR SERPLBLD CREATININE-BSD FMLA CKD-EPI: 78 ML/MIN/{1.73_M2}
GLUCOSE SERPL-MCNC: 114 MG/DL (ref 70–99)
GLUCOSE UR STRIP.AUTO-MCNC: NEGATIVE MG/DL
HCT VFR BLD AUTO: 41.3 % (ref 36–48)
HGB BLD-MCNC: 14.4 G/DL (ref 12–16)
HGB UR QL STRIP.AUTO: NEGATIVE
KETONES UR STRIP.AUTO-MCNC: NEGATIVE MG/DL
LEUKOCYTE ESTERASE UR QL STRIP.AUTO: NEGATIVE
LYMPHOCYTES # BLD: 1.1 K/UL (ref 1–5.1)
LYMPHOCYTES NFR BLD: 17.1 %
MAGNESIUM SERPL-MCNC: 2.1 MG/DL (ref 1.8–2.4)
MCH RBC QN AUTO: 33.7 PG (ref 26–34)
MCHC RBC AUTO-ENTMCNC: 34.8 G/DL (ref 31–36)
MCV RBC AUTO: 96.9 FL (ref 80–100)
MONOCYTES # BLD: 0.6 K/UL (ref 0–1.3)
MONOCYTES NFR BLD: 8.5 %
NEUTROPHILS # BLD: 4.9 K/UL (ref 1.7–7.7)
NEUTROPHILS NFR BLD: 72.6 %
NITRITE UR QL STRIP.AUTO: NEGATIVE
NT-PROBNP SERPL-MCNC: 366 PG/ML (ref 0–124)
PH UR STRIP.AUTO: 6 [PH] (ref 5–8)
PLATELET # BLD AUTO: 247 K/UL (ref 135–450)
PMV BLD AUTO: 8.2 FL (ref 5–10.5)
POTASSIUM SERPL-SCNC: 4.5 MMOL/L (ref 3.5–5.1)
PROT SERPL-MCNC: 7.3 G/DL (ref 6.4–8.2)
PROT UR STRIP.AUTO-MCNC: NEGATIVE MG/DL
RBC # BLD AUTO: 4.26 M/UL (ref 4–5.2)
SODIUM SERPL-SCNC: 137 MMOL/L (ref 136–145)
SP GR UR STRIP.AUTO: <=1.005 (ref 1–1.03)
TROPONIN, HIGH SENSITIVITY: 18 NG/L (ref 0–14)
TROPONIN, HIGH SENSITIVITY: 19 NG/L (ref 0–14)
UA COMPLETE W REFLEX CULTURE PNL UR: NORMAL
UA DIPSTICK W REFLEX MICRO PNL UR: NORMAL
URN SPEC COLLECT METH UR: NORMAL
UROBILINOGEN UR STRIP-ACNC: 0.2 E.U./DL
WBC # BLD AUTO: 6.7 K/UL (ref 4–11)

## 2024-09-14 PROCEDURE — 83735 ASSAY OF MAGNESIUM: CPT

## 2024-09-14 PROCEDURE — 6360000002 HC RX W HCPCS: Performed by: PHYSICIAN ASSISTANT

## 2024-09-14 PROCEDURE — 99285 EMERGENCY DEPT VISIT HI MDM: CPT

## 2024-09-14 PROCEDURE — 70498 CT ANGIOGRAPHY NECK: CPT

## 2024-09-14 PROCEDURE — 71045 X-RAY EXAM CHEST 1 VIEW: CPT

## 2024-09-14 PROCEDURE — 2060000000 HC ICU INTERMEDIATE R&B

## 2024-09-14 PROCEDURE — 93005 ELECTROCARDIOGRAM TRACING: CPT | Performed by: PHYSICIAN ASSISTANT

## 2024-09-14 PROCEDURE — 83880 ASSAY OF NATRIURETIC PEPTIDE: CPT

## 2024-09-14 PROCEDURE — 2580000003 HC RX 258: Performed by: PHYSICIAN ASSISTANT

## 2024-09-14 PROCEDURE — 85025 COMPLETE CBC W/AUTO DIFF WBC: CPT

## 2024-09-14 PROCEDURE — 6360000004 HC RX CONTRAST MEDICATION: Performed by: PHYSICIAN ASSISTANT

## 2024-09-14 PROCEDURE — 6370000000 HC RX 637 (ALT 250 FOR IP): Performed by: PHYSICIAN ASSISTANT

## 2024-09-14 PROCEDURE — 81003 URINALYSIS AUTO W/O SCOPE: CPT

## 2024-09-14 PROCEDURE — 70450 CT HEAD/BRAIN W/O DYE: CPT

## 2024-09-14 PROCEDURE — 96374 THER/PROPH/DIAG INJ IV PUSH: CPT

## 2024-09-14 PROCEDURE — 84484 ASSAY OF TROPONIN QUANT: CPT

## 2024-09-14 PROCEDURE — 80053 COMPREHEN METABOLIC PANEL: CPT

## 2024-09-14 RX ORDER — IOPAMIDOL 755 MG/ML
75 INJECTION, SOLUTION INTRAVASCULAR
Status: COMPLETED | OUTPATIENT
Start: 2024-09-14 | End: 2024-09-14

## 2024-09-14 RX ORDER — SODIUM CHLORIDE 0.9 % (FLUSH) 0.9 %
5-40 SYRINGE (ML) INJECTION EVERY 12 HOURS SCHEDULED
Status: DISCONTINUED | OUTPATIENT
Start: 2024-09-15 | End: 2024-09-19 | Stop reason: HOSPADM

## 2024-09-14 RX ORDER — ONDANSETRON 4 MG/1
4 TABLET, ORALLY DISINTEGRATING ORAL EVERY 8 HOURS PRN
Status: DISCONTINUED | OUTPATIENT
Start: 2024-09-14 | End: 2024-09-19 | Stop reason: HOSPADM

## 2024-09-14 RX ORDER — ASPIRIN 81 MG/1
324 TABLET, CHEWABLE ORAL ONCE
Status: COMPLETED | OUTPATIENT
Start: 2024-09-14 | End: 2024-09-14

## 2024-09-14 RX ORDER — SODIUM CHLORIDE 0.9 % (FLUSH) 0.9 %
5-40 SYRINGE (ML) INJECTION PRN
Status: DISCONTINUED | OUTPATIENT
Start: 2024-09-14 | End: 2024-09-19 | Stop reason: HOSPADM

## 2024-09-14 RX ORDER — POLYETHYLENE GLYCOL 3350 17 G/17G
17 POWDER, FOR SOLUTION ORAL DAILY PRN
Status: DISCONTINUED | OUTPATIENT
Start: 2024-09-14 | End: 2024-09-18

## 2024-09-14 RX ORDER — SODIUM CHLORIDE 9 MG/ML
INJECTION, SOLUTION INTRAVENOUS PRN
Status: DISCONTINUED | OUTPATIENT
Start: 2024-09-14 | End: 2024-09-19 | Stop reason: HOSPADM

## 2024-09-14 RX ORDER — ONDANSETRON 2 MG/ML
4 INJECTION INTRAMUSCULAR; INTRAVENOUS EVERY 6 HOURS PRN
Status: DISCONTINUED | OUTPATIENT
Start: 2024-09-14 | End: 2024-09-19 | Stop reason: HOSPADM

## 2024-09-14 RX ADMIN — SODIUM CHLORIDE 0.5 MG: 9 INJECTION INTRAMUSCULAR; INTRAVENOUS; SUBCUTANEOUS at 19:54

## 2024-09-14 RX ADMIN — IOPAMIDOL 75 ML: 755 INJECTION, SOLUTION INTRAVENOUS at 19:52

## 2024-09-14 RX ADMIN — ASPIRIN 324 MG: 81 TABLET, CHEWABLE ORAL at 21:36

## 2024-09-14 ASSESSMENT — PAIN - FUNCTIONAL ASSESSMENT: PAIN_FUNCTIONAL_ASSESSMENT: NONE - DENIES PAIN

## 2024-09-15 LAB
CHOLEST SERPL-MCNC: 188 MG/DL (ref 0–199)
DEPRECATED RDW RBC AUTO: 13.7 % (ref 12.4–15.4)
EKG ATRIAL RATE: 73 BPM
EKG DIAGNOSIS: NORMAL
EKG P AXIS: 33 DEGREES
EKG P-R INTERVAL: 146 MS
EKG Q-T INTERVAL: 398 MS
EKG QRS DURATION: 82 MS
EKG QTC CALCULATION (BAZETT): 438 MS
EKG R AXIS: 0 DEGREES
EKG T AXIS: 30 DEGREES
EKG VENTRICULAR RATE: 73 BPM
FERRITIN SERPL IA-MCNC: 524 NG/ML (ref 15–150)
GLUCOSE BLD-MCNC: 152 MG/DL (ref 70–99)
HCT VFR BLD AUTO: 40.2 % (ref 36–48)
HDLC SERPL-MCNC: 59 MG/DL (ref 40–60)
HGB BLD-MCNC: 13.6 G/DL (ref 12–16)
IRON SATN MFR SERPL: 23 % (ref 15–50)
IRON SERPL-MCNC: 85 UG/DL (ref 37–145)
LDLC SERPL CALC-MCNC: 82 MG/DL
MCH RBC QN AUTO: 32.8 PG (ref 26–34)
MCHC RBC AUTO-ENTMCNC: 33.7 G/DL (ref 31–36)
MCV RBC AUTO: 97.2 FL (ref 80–100)
PERFORMED ON: ABNORMAL
PLATELET # BLD AUTO: 244 K/UL (ref 135–450)
PMV BLD AUTO: 8.6 FL (ref 5–10.5)
RBC # BLD AUTO: 4.14 M/UL (ref 4–5.2)
TIBC SERPL-MCNC: 370 UG/DL (ref 260–445)
TRIGL SERPL-MCNC: 235 MG/DL (ref 0–150)
VLDLC SERPL CALC-MCNC: 47 MG/DL
WBC # BLD AUTO: 8.6 K/UL (ref 4–11)

## 2024-09-15 PROCEDURE — 6370000000 HC RX 637 (ALT 250 FOR IP): Performed by: NURSE PRACTITIONER

## 2024-09-15 PROCEDURE — 2580000003 HC RX 258: Performed by: INTERNAL MEDICINE

## 2024-09-15 PROCEDURE — 80061 LIPID PANEL: CPT

## 2024-09-15 PROCEDURE — 93010 ELECTROCARDIOGRAM REPORT: CPT | Performed by: INTERNAL MEDICINE

## 2024-09-15 PROCEDURE — 83036 HEMOGLOBIN GLYCOSYLATED A1C: CPT

## 2024-09-15 PROCEDURE — 6370000000 HC RX 637 (ALT 250 FOR IP): Performed by: INTERNAL MEDICINE

## 2024-09-15 PROCEDURE — 85027 COMPLETE CBC AUTOMATED: CPT

## 2024-09-15 PROCEDURE — 99222 1ST HOSP IP/OBS MODERATE 55: CPT | Performed by: INTERNAL MEDICINE

## 2024-09-15 PROCEDURE — 2060000000 HC ICU INTERMEDIATE R&B

## 2024-09-15 PROCEDURE — 6360000002 HC RX W HCPCS: Performed by: INTERNAL MEDICINE

## 2024-09-15 PROCEDURE — 36415 COLL VENOUS BLD VENIPUNCTURE: CPT

## 2024-09-15 PROCEDURE — 83540 ASSAY OF IRON: CPT

## 2024-09-15 PROCEDURE — 82728 ASSAY OF FERRITIN: CPT

## 2024-09-15 PROCEDURE — 83550 IRON BINDING TEST: CPT

## 2024-09-15 RX ORDER — HYDRALAZINE HYDROCHLORIDE 20 MG/ML
10 INJECTION INTRAMUSCULAR; INTRAVENOUS EVERY 6 HOURS PRN
Status: DISCONTINUED | OUTPATIENT
Start: 2024-09-15 | End: 2024-09-19 | Stop reason: HOSPADM

## 2024-09-15 RX ORDER — SPIRONOLACTONE 25 MG/1
25 TABLET ORAL DAILY
Status: DISCONTINUED | OUTPATIENT
Start: 2024-09-15 | End: 2024-09-19 | Stop reason: HOSPADM

## 2024-09-15 RX ORDER — CETIRIZINE HYDROCHLORIDE 10 MG/1
5 TABLET ORAL DAILY
Status: DISCONTINUED | OUTPATIENT
Start: 2024-09-15 | End: 2024-09-19 | Stop reason: HOSPADM

## 2024-09-15 RX ORDER — COLCHICINE 0.6 MG/1
0.6 TABLET ORAL DAILY
Status: DISCONTINUED | OUTPATIENT
Start: 2024-09-15 | End: 2024-09-19 | Stop reason: HOSPADM

## 2024-09-15 RX ORDER — FUROSEMIDE 40 MG
40 TABLET ORAL DAILY
Status: DISCONTINUED | OUTPATIENT
Start: 2024-09-15 | End: 2024-09-19 | Stop reason: HOSPADM

## 2024-09-15 RX ORDER — ALBUTEROL SULFATE 90 UG/1
2 INHALANT RESPIRATORY (INHALATION) EVERY 6 HOURS PRN
Status: DISCONTINUED | OUTPATIENT
Start: 2024-09-15 | End: 2024-09-19 | Stop reason: HOSPADM

## 2024-09-15 RX ORDER — DILTIAZEM HYDROCHLORIDE 120 MG/1
120 CAPSULE, COATED, EXTENDED RELEASE ORAL DAILY
Status: DISCONTINUED | OUTPATIENT
Start: 2024-09-15 | End: 2024-09-15

## 2024-09-15 RX ORDER — LANOLIN ALCOHOL/MO/W.PET/CERES
9 CREAM (GRAM) TOPICAL NIGHTLY PRN
Status: DISCONTINUED | OUTPATIENT
Start: 2024-09-15 | End: 2024-09-19 | Stop reason: HOSPADM

## 2024-09-15 RX ADMIN — RIVAROXABAN 20 MG: 20 TABLET, FILM COATED ORAL at 12:48

## 2024-09-15 RX ADMIN — COLCHICINE 0.6 MG: 0.6 TABLET, FILM COATED ORAL at 12:48

## 2024-09-15 RX ADMIN — FUROSEMIDE 40 MG: 40 TABLET ORAL at 12:48

## 2024-09-15 RX ADMIN — CETIRIZINE HYDROCHLORIDE 5 MG: 10 TABLET, FILM COATED ORAL at 12:48

## 2024-09-15 RX ADMIN — SODIUM CHLORIDE 0.5 MG: 9 INJECTION INTRAMUSCULAR; INTRAVENOUS; SUBCUTANEOUS at 02:34

## 2024-09-15 RX ADMIN — SPIRONOLACTONE 25 MG: 25 TABLET ORAL at 12:48

## 2024-09-15 RX ADMIN — Medication 10 ML: at 12:50

## 2024-09-15 ASSESSMENT — PAIN SCALES - GENERAL: PAINLEVEL_OUTOF10: 0

## 2024-09-16 ENCOUNTER — APPOINTMENT (OUTPATIENT)
Dept: MRI IMAGING | Age: 74
End: 2024-09-16
Payer: MEDICARE

## 2024-09-16 ENCOUNTER — APPOINTMENT (OUTPATIENT)
Dept: GENERAL RADIOLOGY | Age: 74
End: 2024-09-16
Payer: MEDICARE

## 2024-09-16 PROBLEM — I48.0 PAF (PAROXYSMAL ATRIAL FIBRILLATION) (HCC): Status: ACTIVE | Noted: 2024-09-16

## 2024-09-16 PROBLEM — R00.1 BRADYCARDIA: Status: ACTIVE | Noted: 2024-09-14

## 2024-09-16 PROBLEM — I45.5 SINUS PAUSE: Status: ACTIVE | Noted: 2024-09-14

## 2024-09-16 LAB
ANION GAP SERPL CALCULATED.3IONS-SCNC: 15 MMOL/L (ref 3–16)
BUN SERPL-MCNC: 17 MG/DL (ref 7–20)
CALCIUM SERPL-MCNC: 9.4 MG/DL (ref 8.3–10.6)
CHLORIDE SERPL-SCNC: 103 MMOL/L (ref 99–110)
CO2 SERPL-SCNC: 21 MMOL/L (ref 21–32)
CREAT SERPL-MCNC: 0.6 MG/DL (ref 0.6–1.2)
DEPRECATED RDW RBC AUTO: 14 % (ref 12.4–15.4)
ECHO BSA: 2.22 M2
EKG ATRIAL RATE: 77 BPM
EKG DIAGNOSIS: NORMAL
EKG P AXIS: 41 DEGREES
EKG P-R INTERVAL: 144 MS
EKG Q-T INTERVAL: 344 MS
EKG QRS DURATION: 74 MS
EKG QTC CALCULATION (BAZETT): 389 MS
EKG R AXIS: -8 DEGREES
EKG T AXIS: 13 DEGREES
EKG VENTRICULAR RATE: 77 BPM
EST. AVERAGE GLUCOSE BLD GHB EST-MCNC: 122.6 MG/DL
GFR SERPLBLD CREATININE-BSD FMLA CKD-EPI: >90 ML/MIN/{1.73_M2}
GLUCOSE SERPL-MCNC: 100 MG/DL (ref 70–99)
HBA1C MFR BLD: 5.9 %
HCT VFR BLD AUTO: 39.8 % (ref 36–48)
HGB BLD-MCNC: 13.5 G/DL (ref 12–16)
MCH RBC QN AUTO: 33.2 PG (ref 26–34)
MCHC RBC AUTO-ENTMCNC: 34.1 G/DL (ref 31–36)
MCV RBC AUTO: 97.4 FL (ref 80–100)
PLATELET # BLD AUTO: 228 K/UL (ref 135–450)
PMV BLD AUTO: 8.2 FL (ref 5–10.5)
POTASSIUM SERPL-SCNC: 4.1 MMOL/L (ref 3.5–5.1)
RBC # BLD AUTO: 4.08 M/UL (ref 4–5.2)
SODIUM SERPL-SCNC: 139 MMOL/L (ref 136–145)
TSH SERPL DL<=0.005 MIU/L-ACNC: 0.97 UIU/ML (ref 0.27–4.2)
WBC # BLD AUTO: 5.9 K/UL (ref 4–11)

## 2024-09-16 PROCEDURE — 6360000002 HC RX W HCPCS

## 2024-09-16 PROCEDURE — 99153 MOD SED SAME PHYS/QHP EA: CPT | Performed by: INTERNAL MEDICINE

## 2024-09-16 PROCEDURE — 72148 MRI LUMBAR SPINE W/O DYE: CPT

## 2024-09-16 PROCEDURE — 6370000000 HC RX 637 (ALT 250 FOR IP): Performed by: NURSE PRACTITIONER

## 2024-09-16 PROCEDURE — 2580000003 HC RX 258

## 2024-09-16 PROCEDURE — 99152 MOD SED SAME PHYS/QHP 5/>YRS: CPT | Performed by: INTERNAL MEDICINE

## 2024-09-16 PROCEDURE — C1887 CATHETER, GUIDING: HCPCS | Performed by: INTERNAL MEDICINE

## 2024-09-16 PROCEDURE — 6360000002 HC RX W HCPCS: Performed by: INTERNAL MEDICINE

## 2024-09-16 PROCEDURE — 2060000000 HC ICU INTERMEDIATE R&B

## 2024-09-16 PROCEDURE — 36415 COLL VENOUS BLD VENIPUNCTURE: CPT

## 2024-09-16 PROCEDURE — 2580000003 HC RX 258: Performed by: INTERNAL MEDICINE

## 2024-09-16 PROCEDURE — 85027 COMPLETE CBC AUTOMATED: CPT

## 2024-09-16 PROCEDURE — 6360000002 HC RX W HCPCS: Performed by: NURSE PRACTITIONER

## 2024-09-16 PROCEDURE — C1889 IMPLANT/INSERT DEVICE, NOC: HCPCS | Performed by: INTERNAL MEDICINE

## 2024-09-16 PROCEDURE — 70551 MRI BRAIN STEM W/O DYE: CPT

## 2024-09-16 PROCEDURE — 0JH606Z INSERTION OF PACEMAKER, DUAL CHAMBER INTO CHEST SUBCUTANEOUS TISSUE AND FASCIA, OPEN APPROACH: ICD-10-PCS | Performed by: INTERNAL MEDICINE

## 2024-09-16 PROCEDURE — APPSS60 APP SPLIT SHARED TIME 46-60 MINUTES

## 2024-09-16 PROCEDURE — 2580000003 HC RX 258: Performed by: NURSE PRACTITIONER

## 2024-09-16 PROCEDURE — 33208 INSRT HEART PM ATRIAL & VENT: CPT | Performed by: INTERNAL MEDICINE

## 2024-09-16 PROCEDURE — 2709999900 HC NON-CHARGEABLE SUPPLY: Performed by: INTERNAL MEDICINE

## 2024-09-16 PROCEDURE — C1892 INTRO/SHEATH,FIXED,PEEL-AWAY: HCPCS | Performed by: INTERNAL MEDICINE

## 2024-09-16 PROCEDURE — 0JPT32Z REMOVAL OF MONITORING DEVICE FROM TRUNK SUBCUTANEOUS TISSUE AND FASCIA, PERCUTANEOUS APPROACH: ICD-10-PCS | Performed by: INTERNAL MEDICINE

## 2024-09-16 PROCEDURE — 6370000000 HC RX 637 (ALT 250 FOR IP): Performed by: INTERNAL MEDICINE

## 2024-09-16 PROCEDURE — C1769 GUIDE WIRE: HCPCS | Performed by: INTERNAL MEDICINE

## 2024-09-16 PROCEDURE — 33286 RMVL SUBQ CAR RHYTHM MNTR: CPT | Performed by: INTERNAL MEDICINE

## 2024-09-16 PROCEDURE — APPNB30 APP NON BILLABLE TIME 0-30 MINS

## 2024-09-16 PROCEDURE — C1898 LEAD, PMKR, OTHER THAN TRANS: HCPCS | Performed by: INTERNAL MEDICINE

## 2024-09-16 PROCEDURE — 2500000003 HC RX 250 WO HCPCS: Performed by: INTERNAL MEDICINE

## 2024-09-16 PROCEDURE — 80048 BASIC METABOLIC PNL TOTAL CA: CPT

## 2024-09-16 PROCEDURE — 02HK0JZ INSERTION OF PACEMAKER LEAD INTO RIGHT VENTRICLE, OPEN APPROACH: ICD-10-PCS | Performed by: INTERNAL MEDICINE

## 2024-09-16 PROCEDURE — 02H63JZ INSERTION OF PACEMAKER LEAD INTO RIGHT ATRIUM, PERCUTANEOUS APPROACH: ICD-10-PCS | Performed by: INTERNAL MEDICINE

## 2024-09-16 PROCEDURE — 99223 1ST HOSP IP/OBS HIGH 75: CPT | Performed by: INTERNAL MEDICINE

## 2024-09-16 PROCEDURE — 84443 ASSAY THYROID STIM HORMONE: CPT

## 2024-09-16 PROCEDURE — C1785 PMKR, DUAL, RATE-RESP: HCPCS | Performed by: INTERNAL MEDICINE

## 2024-09-16 PROCEDURE — 99223 1ST HOSP IP/OBS HIGH 75: CPT | Performed by: PSYCHIATRY & NEUROLOGY

## 2024-09-16 PROCEDURE — 71045 X-RAY EXAM CHEST 1 VIEW: CPT

## 2024-09-16 DEVICE — LEAD PACE 4.1FR L69CM ATR VENT TRNSVEN SIL POLYUR INSULATOR: Type: IMPLANTABLE DEVICE | Site: HEART | Status: FUNCTIONAL

## 2024-09-16 DEVICE — LEAD PACE 6FR L52CM SIL INSU BPLR PLATINIZED STEROID DXAC: Type: IMPLANTABLE DEVICE | Site: HEART | Status: FUNCTIONAL

## 2024-09-16 DEVICE — IPG W1DR01 AZURE XT DR MRI USA
Type: IMPLANTABLE DEVICE | Site: HEART | Status: FUNCTIONAL
Brand: AZURE™ XT DR MRI SURESCAN™

## 2024-09-16 RX ORDER — SODIUM CHLORIDE 0.9 % (FLUSH) 0.9 %
5-40 SYRINGE (ML) INJECTION EVERY 12 HOURS SCHEDULED
Status: DISCONTINUED | OUTPATIENT
Start: 2024-09-16 | End: 2024-09-19 | Stop reason: HOSPADM

## 2024-09-16 RX ORDER — SODIUM CHLORIDE 0.9 % (FLUSH) 0.9 %
5-40 SYRINGE (ML) INJECTION EVERY 12 HOURS SCHEDULED
Status: DISCONTINUED | OUTPATIENT
Start: 2024-09-16 | End: 2024-09-16 | Stop reason: HOSPADM

## 2024-09-16 RX ORDER — MIDAZOLAM HYDROCHLORIDE 1 MG/ML
INJECTION INTRAMUSCULAR; INTRAVENOUS PRN
Status: DISCONTINUED | OUTPATIENT
Start: 2024-09-16 | End: 2024-09-16 | Stop reason: HOSPADM

## 2024-09-16 RX ORDER — POTASSIUM CHLORIDE 1500 MG/1
10 TABLET, EXTENDED RELEASE ORAL ONCE
Status: COMPLETED | OUTPATIENT
Start: 2024-09-16 | End: 2024-09-16

## 2024-09-16 RX ORDER — SODIUM CHLORIDE 9 MG/ML
INJECTION, SOLUTION INTRAVENOUS PRN
Status: DISCONTINUED | OUTPATIENT
Start: 2024-09-16 | End: 2024-09-19 | Stop reason: HOSPADM

## 2024-09-16 RX ORDER — OXYCODONE HYDROCHLORIDE 5 MG/1
5 TABLET ORAL EVERY 4 HOURS PRN
Status: DISCONTINUED | OUTPATIENT
Start: 2024-09-16 | End: 2024-09-19 | Stop reason: HOSPADM

## 2024-09-16 RX ORDER — LIDOCAINE HYDROCHLORIDE 10 MG/ML
INJECTION, SOLUTION INFILTRATION; PERINEURAL PRN
Status: DISCONTINUED | OUTPATIENT
Start: 2024-09-16 | End: 2024-09-16 | Stop reason: HOSPADM

## 2024-09-16 RX ORDER — SODIUM CHLORIDE 0.9 % (FLUSH) 0.9 %
5-40 SYRINGE (ML) INJECTION PRN
Status: DISCONTINUED | OUTPATIENT
Start: 2024-09-16 | End: 2024-09-16 | Stop reason: HOSPADM

## 2024-09-16 RX ORDER — OXYCODONE HYDROCHLORIDE 5 MG/1
10 TABLET ORAL EVERY 4 HOURS PRN
Status: DISCONTINUED | OUTPATIENT
Start: 2024-09-16 | End: 2024-09-19 | Stop reason: HOSPADM

## 2024-09-16 RX ORDER — SODIUM CHLORIDE 9 MG/ML
INJECTION, SOLUTION INTRAVENOUS PRN
Status: DISCONTINUED | OUTPATIENT
Start: 2024-09-16 | End: 2024-09-16 | Stop reason: HOSPADM

## 2024-09-16 RX ORDER — DILTIAZEM HYDROCHLORIDE 120 MG/1
120 CAPSULE, COATED, EXTENDED RELEASE ORAL DAILY
Status: DISCONTINUED | OUTPATIENT
Start: 2024-09-16 | End: 2024-09-19 | Stop reason: HOSPADM

## 2024-09-16 RX ORDER — ACETAMINOPHEN 325 MG/1
650 TABLET ORAL EVERY 4 HOURS PRN
Status: DISCONTINUED | OUTPATIENT
Start: 2024-09-16 | End: 2024-09-19 | Stop reason: HOSPADM

## 2024-09-16 RX ORDER — SODIUM CHLORIDE 0.9 % (FLUSH) 0.9 %
5-40 SYRINGE (ML) INJECTION PRN
Status: DISCONTINUED | OUTPATIENT
Start: 2024-09-16 | End: 2024-09-19 | Stop reason: HOSPADM

## 2024-09-16 RX ORDER — LISINOPRIL 10 MG/1
10 TABLET ORAL DAILY
Status: DISCONTINUED | OUTPATIENT
Start: 2024-09-16 | End: 2024-09-19 | Stop reason: HOSPADM

## 2024-09-16 RX ORDER — BUPIVACAINE HYDROCHLORIDE 5 MG/ML
INJECTION, SOLUTION EPIDURAL; INTRACAUDAL PRN
Status: DISCONTINUED | OUTPATIENT
Start: 2024-09-16 | End: 2024-09-16 | Stop reason: HOSPADM

## 2024-09-16 RX ADMIN — SPIRONOLACTONE 25 MG: 25 TABLET ORAL at 08:25

## 2024-09-16 RX ADMIN — DILTIAZEM HYDROCHLORIDE 120 MG: 120 CAPSULE, COATED, EXTENDED RELEASE ORAL at 17:34

## 2024-09-16 RX ADMIN — Medication 10 ML: at 08:28

## 2024-09-16 RX ADMIN — COLCHICINE 0.6 MG: 0.6 TABLET, FILM COATED ORAL at 08:25

## 2024-09-16 RX ADMIN — LISINOPRIL 10 MG: 10 TABLET ORAL at 10:59

## 2024-09-16 RX ADMIN — OXYCODONE HYDROCHLORIDE 10 MG: 5 TABLET ORAL at 22:18

## 2024-09-16 RX ADMIN — SODIUM CHLORIDE 0.5 MG: 9 INJECTION INTRAMUSCULAR; INTRAVENOUS; SUBCUTANEOUS at 12:57

## 2024-09-16 RX ADMIN — POTASSIUM CHLORIDE 10 MEQ: 1500 TABLET, EXTENDED RELEASE ORAL at 10:59

## 2024-09-16 RX ADMIN — MELATONIN TAB 3 MG 9 MG: 3 TAB at 00:12

## 2024-09-16 RX ADMIN — Medication 10 ML: at 00:14

## 2024-09-16 RX ADMIN — SODIUM CHLORIDE 1500 MG: 9 INJECTION, SOLUTION INTRAVENOUS at 15:00

## 2024-09-16 RX ADMIN — FUROSEMIDE 40 MG: 40 TABLET ORAL at 11:03

## 2024-09-16 ASSESSMENT — PAIN SCALES - GENERAL
PAINLEVEL_OUTOF10: 7
PAINLEVEL_OUTOF10: 0

## 2024-09-16 ASSESSMENT — PAIN DESCRIPTION - LOCATION: LOCATION: CHEST

## 2024-09-16 ASSESSMENT — PAIN DESCRIPTION - DESCRIPTORS: DESCRIPTORS: DISCOMFORT

## 2024-09-16 ASSESSMENT — PAIN DESCRIPTION - ORIENTATION: ORIENTATION: MID

## 2024-09-17 ENCOUNTER — TELEPHONE (OUTPATIENT)
Dept: ADMINISTRATIVE | Age: 74
End: 2024-09-17

## 2024-09-17 ENCOUNTER — NURSE ONLY (OUTPATIENT)
Dept: CARDIOLOGY CLINIC | Age: 74
End: 2024-09-17

## 2024-09-17 DIAGNOSIS — R00.1 SYMPTOMATIC BRADYCARDIA: ICD-10-CM

## 2024-09-17 DIAGNOSIS — I45.5 SINUS PAUSE: ICD-10-CM

## 2024-09-17 DIAGNOSIS — Z95.0 PACEMAKER: Primary | ICD-10-CM

## 2024-09-17 DIAGNOSIS — R00.1 BRADYCARDIA: ICD-10-CM

## 2024-09-17 DIAGNOSIS — I48.0 PAF (PAROXYSMAL ATRIAL FIBRILLATION) (HCC): ICD-10-CM

## 2024-09-17 LAB
GLUCOSE BLD-MCNC: 107 MG/DL (ref 70–99)
GLUCOSE BLD-MCNC: 138 MG/DL (ref 70–99)
GLUCOSE BLD-MCNC: 163 MG/DL (ref 70–99)
PERFORMED ON: ABNORMAL

## 2024-09-17 PROCEDURE — 99232 SBSQ HOSP IP/OBS MODERATE 35: CPT | Performed by: PSYCHIATRY & NEUROLOGY

## 2024-09-17 PROCEDURE — 97162 PT EVAL MOD COMPLEX 30 MIN: CPT

## 2024-09-17 PROCEDURE — 6370000000 HC RX 637 (ALT 250 FOR IP): Performed by: NURSE PRACTITIONER

## 2024-09-17 PROCEDURE — 97530 THERAPEUTIC ACTIVITIES: CPT

## 2024-09-17 PROCEDURE — 2580000003 HC RX 258: Performed by: INTERNAL MEDICINE

## 2024-09-17 PROCEDURE — 99232 SBSQ HOSP IP/OBS MODERATE 35: CPT | Performed by: NURSE PRACTITIONER

## 2024-09-17 PROCEDURE — APPSS30 APP SPLIT SHARED TIME 16-30 MINUTES

## 2024-09-17 PROCEDURE — 97166 OT EVAL MOD COMPLEX 45 MIN: CPT

## 2024-09-17 PROCEDURE — APPNB30 APP NON BILLABLE TIME 0-30 MINS

## 2024-09-17 PROCEDURE — 6370000000 HC RX 637 (ALT 250 FOR IP): Performed by: INTERNAL MEDICINE

## 2024-09-17 PROCEDURE — 2060000000 HC ICU INTERMEDIATE R&B

## 2024-09-17 RX ORDER — OXYCODONE HYDROCHLORIDE 5 MG/1
5 TABLET ORAL EVERY 6 HOURS PRN
Qty: 12 TABLET | Refills: 0 | Status: SHIPPED | OUTPATIENT
Start: 2024-09-17 | End: 2024-09-19

## 2024-09-17 RX ORDER — CARBAMAZEPINE 200 MG/1
100 TABLET ORAL NIGHTLY
Status: CANCELLED | OUTPATIENT
Start: 2024-09-17

## 2024-09-17 RX ORDER — POTASSIUM CHLORIDE 1500 MG/1
10 TABLET, EXTENDED RELEASE ORAL ONCE
Status: COMPLETED | OUTPATIENT
Start: 2024-09-17 | End: 2024-09-17

## 2024-09-17 RX ADMIN — OXYCODONE HYDROCHLORIDE 5 MG: 5 TABLET ORAL at 09:22

## 2024-09-17 RX ADMIN — LISINOPRIL 10 MG: 10 TABLET ORAL at 07:14

## 2024-09-17 RX ADMIN — SPIRONOLACTONE 25 MG: 25 TABLET ORAL at 07:14

## 2024-09-17 RX ADMIN — POTASSIUM CHLORIDE 10 MEQ: 1500 TABLET, EXTENDED RELEASE ORAL at 07:20

## 2024-09-17 RX ADMIN — OXYCODONE HYDROCHLORIDE 10 MG: 5 TABLET ORAL at 21:49

## 2024-09-17 RX ADMIN — COLCHICINE 0.6 MG: 0.6 TABLET, FILM COATED ORAL at 07:14

## 2024-09-17 RX ADMIN — Medication 10 ML: at 00:51

## 2024-09-17 RX ADMIN — OXYCODONE HYDROCHLORIDE 10 MG: 5 TABLET ORAL at 06:09

## 2024-09-17 RX ADMIN — OXYCODONE HYDROCHLORIDE 5 MG: 5 TABLET ORAL at 13:30

## 2024-09-17 RX ADMIN — Medication 10 ML: at 07:25

## 2024-09-17 RX ADMIN — Medication 10 ML: at 07:21

## 2024-09-17 RX ADMIN — RIVAROXABAN 20 MG: 20 TABLET, FILM COATED ORAL at 17:26

## 2024-09-17 RX ADMIN — ACETAMINOPHEN 650 MG: 325 TABLET ORAL at 15:17

## 2024-09-17 RX ADMIN — FUROSEMIDE 40 MG: 40 TABLET ORAL at 07:13

## 2024-09-17 RX ADMIN — Medication 10 ML: at 21:50

## 2024-09-17 RX ADMIN — DILTIAZEM HYDROCHLORIDE 120 MG: 120 CAPSULE, COATED, EXTENDED RELEASE ORAL at 07:14

## 2024-09-17 RX ADMIN — POLYETHYLENE GLYCOL 3350 17 G: 17 POWDER, FOR SOLUTION ORAL at 15:28

## 2024-09-17 RX ADMIN — Medication 10 ML: at 21:49

## 2024-09-17 RX ADMIN — OXYCODONE HYDROCHLORIDE 5 MG: 5 TABLET ORAL at 17:28

## 2024-09-17 RX ADMIN — OXYCODONE HYDROCHLORIDE 10 MG: 5 TABLET ORAL at 02:10

## 2024-09-17 RX ADMIN — POLYETHYLENE GLYCOL 3350 17 G: 17 POWDER, FOR SOLUTION ORAL at 05:49

## 2024-09-17 ASSESSMENT — PAIN DESCRIPTION - DESCRIPTORS
DESCRIPTORS: DISCOMFORT
DESCRIPTORS: DISCOMFORT;ACHING
DESCRIPTORS: DISCOMFORT

## 2024-09-17 ASSESSMENT — PAIN SCALES - GENERAL
PAINLEVEL_OUTOF10: 7
PAINLEVEL_OUTOF10: 7
PAINLEVEL_OUTOF10: 3
PAINLEVEL_OUTOF10: 1
PAINLEVEL_OUTOF10: 3
PAINLEVEL_OUTOF10: 4
PAINLEVEL_OUTOF10: 4
PAINLEVEL_OUTOF10: 1
PAINLEVEL_OUTOF10: 0
PAINLEVEL_OUTOF10: 6

## 2024-09-17 ASSESSMENT — PAIN DESCRIPTION - LOCATION
LOCATION: ARM;CHEST
LOCATION: ARM;CHEST
LOCATION: CHEST
LOCATION: CHEST;ARM;GENERALIZED
LOCATION: CHEST

## 2024-09-17 ASSESSMENT — PAIN DESCRIPTION - ORIENTATION
ORIENTATION: LEFT
ORIENTATION: MID;LEFT
ORIENTATION: LEFT;MID
ORIENTATION: LEFT
ORIENTATION: MID;LEFT

## 2024-09-18 LAB
ANION GAP SERPL CALCULATED.3IONS-SCNC: 12 MMOL/L (ref 3–16)
BASOPHILS # BLD: 0.1 K/UL (ref 0–0.2)
BASOPHILS NFR BLD: 0.7 %
BUN SERPL-MCNC: 19 MG/DL (ref 7–20)
CALCIUM SERPL-MCNC: 9.1 MG/DL (ref 8.3–10.6)
CHLORIDE SERPL-SCNC: 98 MMOL/L (ref 99–110)
CO2 SERPL-SCNC: 23 MMOL/L (ref 21–32)
CREAT SERPL-MCNC: 0.6 MG/DL (ref 0.6–1.2)
DEPRECATED RDW RBC AUTO: 14 % (ref 12.4–15.4)
EOSINOPHIL # BLD: 0.4 K/UL (ref 0–0.6)
EOSINOPHIL NFR BLD: 5.1 %
GFR SERPLBLD CREATININE-BSD FMLA CKD-EPI: >90 ML/MIN/{1.73_M2}
GLUCOSE BLD-MCNC: 106 MG/DL (ref 70–99)
GLUCOSE SERPL-MCNC: 117 MG/DL (ref 70–99)
HCT VFR BLD AUTO: 40.5 % (ref 36–48)
HGB BLD-MCNC: 13.6 G/DL (ref 12–16)
LYMPHOCYTES # BLD: 1.5 K/UL (ref 1–5.1)
LYMPHOCYTES NFR BLD: 21.4 %
MCH RBC QN AUTO: 32.8 PG (ref 26–34)
MCHC RBC AUTO-ENTMCNC: 33.7 G/DL (ref 31–36)
MCV RBC AUTO: 97.3 FL (ref 80–100)
MONOCYTES # BLD: 0.8 K/UL (ref 0–1.3)
MONOCYTES NFR BLD: 10.6 %
NEUTROPHILS # BLD: 4.5 K/UL (ref 1.7–7.7)
NEUTROPHILS NFR BLD: 62.2 %
PERFORMED ON: ABNORMAL
PLATELET # BLD AUTO: 223 K/UL (ref 135–450)
PMV BLD AUTO: 8.4 FL (ref 5–10.5)
POTASSIUM SERPL-SCNC: 4.4 MMOL/L (ref 3.5–5.1)
RBC # BLD AUTO: 4.16 M/UL (ref 4–5.2)
SODIUM SERPL-SCNC: 133 MMOL/L (ref 136–145)
WBC # BLD AUTO: 7.2 K/UL (ref 4–11)

## 2024-09-18 PROCEDURE — 85025 COMPLETE CBC W/AUTO DIFF WBC: CPT

## 2024-09-18 PROCEDURE — 80048 BASIC METABOLIC PNL TOTAL CA: CPT

## 2024-09-18 PROCEDURE — 2580000003 HC RX 258: Performed by: INTERNAL MEDICINE

## 2024-09-18 PROCEDURE — 6370000000 HC RX 637 (ALT 250 FOR IP): Performed by: NURSE PRACTITIONER

## 2024-09-18 PROCEDURE — 36415 COLL VENOUS BLD VENIPUNCTURE: CPT

## 2024-09-18 PROCEDURE — 6370000000 HC RX 637 (ALT 250 FOR IP): Performed by: INTERNAL MEDICINE

## 2024-09-18 PROCEDURE — 2060000000 HC ICU INTERMEDIATE R&B

## 2024-09-18 RX ORDER — METHOCARBAMOL 500 MG/1
250 TABLET, FILM COATED ORAL 2 TIMES DAILY PRN
Qty: 10 TABLET | Refills: 0
Start: 2024-09-18 | End: 2024-09-19

## 2024-09-18 RX ORDER — METHOCARBAMOL 500 MG/1
250 TABLET, FILM COATED ORAL 3 TIMES DAILY
Status: DISCONTINUED | OUTPATIENT
Start: 2024-09-18 | End: 2024-09-19 | Stop reason: HOSPADM

## 2024-09-18 RX ORDER — SENNA AND DOCUSATE SODIUM 50; 8.6 MG/1; MG/1
2 TABLET, FILM COATED ORAL DAILY PRN
Qty: 30 TABLET | Refills: 0
Start: 2024-09-18

## 2024-09-18 RX ORDER — POTASSIUM CHLORIDE 1500 MG/1
10 TABLET, EXTENDED RELEASE ORAL
Status: DISCONTINUED | OUTPATIENT
Start: 2024-09-18 | End: 2024-09-19 | Stop reason: HOSPADM

## 2024-09-18 RX ORDER — POLYETHYLENE GLYCOL 3350 17 G/17G
17 POWDER, FOR SOLUTION ORAL DAILY
Qty: 30 PACKET | Refills: 0
Start: 2024-09-19 | End: 2024-10-19

## 2024-09-18 RX ORDER — SENNA AND DOCUSATE SODIUM 50; 8.6 MG/1; MG/1
2 TABLET, FILM COATED ORAL 2 TIMES DAILY
Status: DISCONTINUED | OUTPATIENT
Start: 2024-09-18 | End: 2024-09-19 | Stop reason: HOSPADM

## 2024-09-18 RX ORDER — POLYETHYLENE GLYCOL 3350 17 G/17G
17 POWDER, FOR SOLUTION ORAL DAILY PRN
Status: DISCONTINUED | OUTPATIENT
Start: 2024-09-18 | End: 2024-09-19 | Stop reason: HOSPADM

## 2024-09-18 RX ORDER — POLYETHYLENE GLYCOL 3350 17 G/17G
17 POWDER, FOR SOLUTION ORAL DAILY
Status: DISCONTINUED | OUTPATIENT
Start: 2024-09-18 | End: 2024-09-19 | Stop reason: HOSPADM

## 2024-09-18 RX ADMIN — OXYCODONE HYDROCHLORIDE 10 MG: 5 TABLET ORAL at 06:56

## 2024-09-18 RX ADMIN — OXYCODONE HYDROCHLORIDE 10 MG: 5 TABLET ORAL at 22:07

## 2024-09-18 RX ADMIN — Medication 10 ML: at 21:12

## 2024-09-18 RX ADMIN — METHOCARBAMOL TABLETS 250 MG: 500 TABLET, COATED ORAL at 15:37

## 2024-09-18 RX ADMIN — Medication 10 ML: at 08:19

## 2024-09-18 RX ADMIN — DILTIAZEM HYDROCHLORIDE 120 MG: 120 CAPSULE, COATED, EXTENDED RELEASE ORAL at 08:17

## 2024-09-18 RX ADMIN — POLYETHYLENE GLYCOL 3350 17 G: 17 POWDER, FOR SOLUTION ORAL at 21:10

## 2024-09-18 RX ADMIN — SENNOSIDES AND DOCUSATE SODIUM 2 TABLET: 50; 8.6 TABLET ORAL at 21:11

## 2024-09-18 RX ADMIN — OXYCODONE HYDROCHLORIDE 10 MG: 5 TABLET ORAL at 03:05

## 2024-09-18 RX ADMIN — LISINOPRIL 10 MG: 10 TABLET ORAL at 08:17

## 2024-09-18 RX ADMIN — OXYCODONE HYDROCHLORIDE 5 MG: 5 TABLET ORAL at 18:21

## 2024-09-18 RX ADMIN — SPIRONOLACTONE 25 MG: 25 TABLET ORAL at 08:18

## 2024-09-18 RX ADMIN — POTASSIUM CHLORIDE 10 MEQ: 1500 TABLET, EXTENDED RELEASE ORAL at 12:11

## 2024-09-18 RX ADMIN — Medication 10 ML: at 08:20

## 2024-09-18 RX ADMIN — Medication 10 ML: at 21:15

## 2024-09-18 RX ADMIN — RIVAROXABAN 20 MG: 20 TABLET, FILM COATED ORAL at 17:56

## 2024-09-18 RX ADMIN — COLCHICINE 0.6 MG: 0.6 TABLET, FILM COATED ORAL at 08:17

## 2024-09-18 RX ADMIN — METHOCARBAMOL TABLETS 250 MG: 500 TABLET, COATED ORAL at 21:11

## 2024-09-18 RX ADMIN — FUROSEMIDE 40 MG: 40 TABLET ORAL at 12:11

## 2024-09-18 RX ADMIN — SENNOSIDES AND DOCUSATE SODIUM 2 TABLET: 50; 8.6 TABLET ORAL at 12:12

## 2024-09-18 ASSESSMENT — PAIN DESCRIPTION - ORIENTATION
ORIENTATION: LEFT;RIGHT
ORIENTATION: LEFT;RIGHT
ORIENTATION: RIGHT;LEFT

## 2024-09-18 ASSESSMENT — PAIN DESCRIPTION - LOCATION
LOCATION: LEG

## 2024-09-18 ASSESSMENT — PAIN SCALES - GENERAL
PAINLEVEL_OUTOF10: 7
PAINLEVEL_OUTOF10: 5
PAINLEVEL_OUTOF10: 4
PAINLEVEL_OUTOF10: 5
PAINLEVEL_OUTOF10: 3
PAINLEVEL_OUTOF10: 5
PAINLEVEL_OUTOF10: 5
PAINLEVEL_OUTOF10: 0
PAINLEVEL_OUTOF10: 5
PAINLEVEL_OUTOF10: 4
PAINLEVEL_OUTOF10: 7
PAINLEVEL_OUTOF10: 1

## 2024-09-18 ASSESSMENT — PAIN DESCRIPTION - DESCRIPTORS
DESCRIPTORS: ACHING
DESCRIPTORS: ACHING
DESCRIPTORS: ACHING;DISCOMFORT

## 2024-09-18 ASSESSMENT — PAIN SCALES - WONG BAKER
WONGBAKER_NUMERICALRESPONSE: NO HURT
WONGBAKER_NUMERICALRESPONSE: NO HURT

## 2024-09-19 ENCOUNTER — TELEPHONE (OUTPATIENT)
Dept: INTERNAL MEDICINE CLINIC | Age: 74
End: 2024-09-19

## 2024-09-19 VITALS
BODY MASS INDEX: 43.23 KG/M2 | OXYGEN SATURATION: 99 % | RESPIRATION RATE: 16 BRPM | HEIGHT: 63 IN | WEIGHT: 244 LBS | TEMPERATURE: 97 F | DIASTOLIC BLOOD PRESSURE: 60 MMHG | HEART RATE: 73 BPM | SYSTOLIC BLOOD PRESSURE: 120 MMHG

## 2024-09-19 LAB
ANION GAP SERPL CALCULATED.3IONS-SCNC: 16 MMOL/L (ref 3–16)
BASOPHILS # BLD: 0.1 K/UL (ref 0–0.2)
BASOPHILS NFR BLD: 1 %
BUN SERPL-MCNC: 21 MG/DL (ref 7–20)
CALCIUM SERPL-MCNC: 9.2 MG/DL (ref 8.3–10.6)
CHLORIDE SERPL-SCNC: 98 MMOL/L (ref 99–110)
CO2 SERPL-SCNC: 21 MMOL/L (ref 21–32)
CREAT SERPL-MCNC: 0.7 MG/DL (ref 0.6–1.2)
DEPRECATED RDW RBC AUTO: 13.6 % (ref 12.4–15.4)
EOSINOPHIL # BLD: 0.3 K/UL (ref 0–0.6)
EOSINOPHIL NFR BLD: 5 %
GFR SERPLBLD CREATININE-BSD FMLA CKD-EPI: >90 ML/MIN/{1.73_M2}
GLUCOSE BLD-MCNC: 102 MG/DL (ref 70–99)
GLUCOSE BLD-MCNC: 109 MG/DL (ref 70–99)
GLUCOSE BLD-MCNC: 140 MG/DL (ref 70–99)
GLUCOSE BLD-MCNC: 98 MG/DL (ref 70–99)
GLUCOSE SERPL-MCNC: 121 MG/DL (ref 70–99)
HCT VFR BLD AUTO: 40.1 % (ref 36–48)
HGB BLD-MCNC: 13.8 G/DL (ref 12–16)
LYMPHOCYTES # BLD: 1.8 K/UL (ref 1–5.1)
LYMPHOCYTES NFR BLD: 26.1 %
MCH RBC QN AUTO: 33.1 PG (ref 26–34)
MCHC RBC AUTO-ENTMCNC: 34.3 G/DL (ref 31–36)
MCV RBC AUTO: 96.5 FL (ref 80–100)
MONOCYTES # BLD: 0.8 K/UL (ref 0–1.3)
MONOCYTES NFR BLD: 12 %
NEUTROPHILS # BLD: 3.8 K/UL (ref 1.7–7.7)
NEUTROPHILS NFR BLD: 55.9 %
PERFORMED ON: ABNORMAL
PERFORMED ON: NORMAL
PLATELET # BLD AUTO: 230 K/UL (ref 135–450)
PMV BLD AUTO: 8.5 FL (ref 5–10.5)
POTASSIUM SERPL-SCNC: 4.2 MMOL/L (ref 3.5–5.1)
RBC # BLD AUTO: 4.16 M/UL (ref 4–5.2)
SODIUM SERPL-SCNC: 135 MMOL/L (ref 136–145)
WBC # BLD AUTO: 6.8 K/UL (ref 4–11)

## 2024-09-19 PROCEDURE — 2580000003 HC RX 258: Performed by: INTERNAL MEDICINE

## 2024-09-19 PROCEDURE — 36415 COLL VENOUS BLD VENIPUNCTURE: CPT

## 2024-09-19 PROCEDURE — 6370000000 HC RX 637 (ALT 250 FOR IP): Performed by: INTERNAL MEDICINE

## 2024-09-19 PROCEDURE — 6370000000 HC RX 637 (ALT 250 FOR IP): Performed by: NURSE PRACTITIONER

## 2024-09-19 PROCEDURE — 85025 COMPLETE CBC W/AUTO DIFF WBC: CPT

## 2024-09-19 PROCEDURE — 80048 BASIC METABOLIC PNL TOTAL CA: CPT

## 2024-09-19 RX ORDER — OXYCODONE HYDROCHLORIDE 5 MG/1
5 TABLET ORAL EVERY 6 HOURS PRN
Qty: 12 TABLET | Refills: 0 | Status: SHIPPED | OUTPATIENT
Start: 2024-09-19 | End: 2024-09-22

## 2024-09-19 RX ORDER — LISINOPRIL 10 MG/1
10 TABLET ORAL DAILY
COMMUNITY
Start: 2024-09-19

## 2024-09-19 RX ORDER — METHOCARBAMOL 500 MG/1
250 TABLET, FILM COATED ORAL 3 TIMES DAILY
Qty: 45 TABLET | Refills: 0
Start: 2024-09-19 | End: 2024-10-19

## 2024-09-19 RX ADMIN — Medication 10 ML: at 08:46

## 2024-09-19 RX ADMIN — DILTIAZEM HYDROCHLORIDE 120 MG: 120 CAPSULE, COATED, EXTENDED RELEASE ORAL at 08:44

## 2024-09-19 RX ADMIN — POTASSIUM CHLORIDE 10 MEQ: 1500 TABLET, EXTENDED RELEASE ORAL at 08:42

## 2024-09-19 RX ADMIN — OXYCODONE HYDROCHLORIDE 10 MG: 5 TABLET ORAL at 06:37

## 2024-09-19 RX ADMIN — SPIRONOLACTONE 25 MG: 25 TABLET ORAL at 08:44

## 2024-09-19 RX ADMIN — OXYCODONE HYDROCHLORIDE 5 MG: 5 TABLET ORAL at 13:23

## 2024-09-19 RX ADMIN — COLCHICINE 0.6 MG: 0.6 TABLET, FILM COATED ORAL at 08:44

## 2024-09-19 RX ADMIN — OXYCODONE HYDROCHLORIDE 10 MG: 5 TABLET ORAL at 02:18

## 2024-09-19 RX ADMIN — FUROSEMIDE 40 MG: 40 TABLET ORAL at 08:44

## 2024-09-19 RX ADMIN — METHOCARBAMOL TABLETS 250 MG: 500 TABLET, COATED ORAL at 08:43

## 2024-09-19 RX ADMIN — SENNOSIDES AND DOCUSATE SODIUM 2 TABLET: 50; 8.6 TABLET ORAL at 08:43

## 2024-09-19 RX ADMIN — LISINOPRIL 10 MG: 10 TABLET ORAL at 08:43

## 2024-09-19 ASSESSMENT — PAIN DESCRIPTION - ORIENTATION
ORIENTATION: RIGHT;LEFT
ORIENTATION: RIGHT;LEFT

## 2024-09-19 ASSESSMENT — PAIN DESCRIPTION - DESCRIPTORS
DESCRIPTORS: ACHING
DESCRIPTORS: ACHING;DISCOMFORT
DESCRIPTORS: ACHING

## 2024-09-19 ASSESSMENT — PAIN DESCRIPTION - LOCATION
LOCATION: LEG
LOCATION: LEG;GENERALIZED
LOCATION: LEG

## 2024-09-19 ASSESSMENT — PAIN SCALES - WONG BAKER: WONGBAKER_NUMERICALRESPONSE: NO HURT

## 2024-09-19 ASSESSMENT — PAIN SCALES - GENERAL
PAINLEVEL_OUTOF10: 6
PAINLEVEL_OUTOF10: 4
PAINLEVEL_OUTOF10: 6
PAINLEVEL_OUTOF10: 3
PAINLEVEL_OUTOF10: 7

## 2024-09-20 ENCOUNTER — OFFICE VISIT (OUTPATIENT)
Dept: INTERNAL MEDICINE CLINIC | Age: 74
End: 2024-09-20

## 2024-09-20 ENCOUNTER — TELEPHONE (OUTPATIENT)
Dept: INTERNAL MEDICINE CLINIC | Age: 74
End: 2024-09-20

## 2024-09-20 ENCOUNTER — CARE COORDINATION (OUTPATIENT)
Dept: CARE COORDINATION | Age: 74
End: 2024-09-20

## 2024-09-20 VITALS
WEIGHT: 244.6 LBS | HEIGHT: 63 IN | DIASTOLIC BLOOD PRESSURE: 82 MMHG | SYSTOLIC BLOOD PRESSURE: 140 MMHG | BODY MASS INDEX: 43.34 KG/M2 | OXYGEN SATURATION: 98 % | HEART RATE: 80 BPM

## 2024-09-20 DIAGNOSIS — G25.81 RESTLESS LEG SYNDROME: ICD-10-CM

## 2024-09-20 DIAGNOSIS — I50.32 CHRONIC HEART FAILURE WITH PRESERVED EJECTION FRACTION (HCC): ICD-10-CM

## 2024-09-20 DIAGNOSIS — F32.1 MODERATE MAJOR DEPRESSION (HCC): ICD-10-CM

## 2024-09-20 DIAGNOSIS — Z09 HOSPITAL DISCHARGE FOLLOW-UP: ICD-10-CM

## 2024-09-20 DIAGNOSIS — I48.0 PAROXYSMAL ATRIAL FIBRILLATION (HCC): Primary | ICD-10-CM

## 2024-09-20 PROBLEM — I50.31 ACUTE DIASTOLIC HEART FAILURE (HCC): Status: RESOLVED | Noted: 2019-05-14 | Resolved: 2024-09-20

## 2024-09-23 ENCOUNTER — NURSE ONLY (OUTPATIENT)
Dept: CARDIOLOGY CLINIC | Age: 74
End: 2024-09-23
Payer: MEDICARE

## 2024-09-23 ENCOUNTER — PATIENT MESSAGE (OUTPATIENT)
Dept: CARDIOLOGY CLINIC | Age: 74
End: 2024-09-23

## 2024-09-23 ENCOUNTER — TELEPHONE (OUTPATIENT)
Dept: CARDIOLOGY CLINIC | Age: 74
End: 2024-09-23

## 2024-09-23 DIAGNOSIS — R00.1 SYMPTOMATIC BRADYCARDIA: ICD-10-CM

## 2024-09-23 DIAGNOSIS — I48.19 PERSISTENT ATRIAL FIBRILLATION (HCC): ICD-10-CM

## 2024-09-23 DIAGNOSIS — I45.5 SINUS PAUSE: ICD-10-CM

## 2024-09-23 DIAGNOSIS — Z95.0 PACEMAKER: Primary | ICD-10-CM

## 2024-09-23 DIAGNOSIS — R00.1 BRADYCARDIA: ICD-10-CM

## 2024-09-23 PROCEDURE — 93280 PM DEVICE PROGR EVAL DUAL: CPT | Performed by: INTERNAL MEDICINE

## 2024-09-23 RX ORDER — DILTIAZEM HYDROCHLORIDE 180 MG/1
180 CAPSULE, COATED, EXTENDED RELEASE ORAL DAILY
Qty: 90 CAPSULE | Refills: 1 | Status: SHIPPED | OUTPATIENT
Start: 2024-09-23

## 2024-09-30 PROCEDURE — 93296 REM INTERROG EVL PM/IDS: CPT | Performed by: INTERNAL MEDICINE

## 2024-09-30 PROCEDURE — 93294 REM INTERROG EVL PM/LDLS PM: CPT | Performed by: INTERNAL MEDICINE

## 2024-10-03 ENCOUNTER — OFFICE VISIT (OUTPATIENT)
Dept: CARDIOLOGY CLINIC | Age: 74
End: 2024-10-03
Payer: MEDICARE

## 2024-10-03 VITALS
BODY MASS INDEX: 43.77 KG/M2 | WEIGHT: 247 LBS | OXYGEN SATURATION: 97 % | HEART RATE: 84 BPM | SYSTOLIC BLOOD PRESSURE: 110 MMHG | HEIGHT: 63 IN | DIASTOLIC BLOOD PRESSURE: 74 MMHG

## 2024-10-03 DIAGNOSIS — Z95.0 PACEMAKER: ICD-10-CM

## 2024-10-03 DIAGNOSIS — R06.02 SHORTNESS OF BREATH: ICD-10-CM

## 2024-10-03 DIAGNOSIS — I50.32 CHRONIC DIASTOLIC CONGESTIVE HEART FAILURE (HCC): ICD-10-CM

## 2024-10-03 DIAGNOSIS — I48.0 PAF (PAROXYSMAL ATRIAL FIBRILLATION) (HCC): ICD-10-CM

## 2024-10-03 DIAGNOSIS — R00.1 BRADYCARDIA: ICD-10-CM

## 2024-10-03 DIAGNOSIS — D86.9 SARCOID: ICD-10-CM

## 2024-10-03 DIAGNOSIS — I45.5 SINUS PAUSE: ICD-10-CM

## 2024-10-03 DIAGNOSIS — G47.33 OSA (OBSTRUCTIVE SLEEP APNEA): ICD-10-CM

## 2024-10-03 DIAGNOSIS — I51.7 LVH (LEFT VENTRICULAR HYPERTROPHY): ICD-10-CM

## 2024-10-03 DIAGNOSIS — I50.32 CHRONIC DIASTOLIC CONGESTIVE HEART FAILURE (HCC): Primary | ICD-10-CM

## 2024-10-03 DIAGNOSIS — R06.02 SOB (SHORTNESS OF BREATH): ICD-10-CM

## 2024-10-03 DIAGNOSIS — E78.00 PURE HYPERCHOLESTEROLEMIA: ICD-10-CM

## 2024-10-03 LAB
ALBUMIN SERPL-MCNC: 4.7 G/DL (ref 3.4–5)
ALBUMIN/GLOB SERPL: 2.4 {RATIO} (ref 1.1–2.2)
ALP SERPL-CCNC: 101 U/L (ref 40–129)
ALT SERPL-CCNC: 45 U/L (ref 10–40)
ANION GAP SERPL CALCULATED.3IONS-SCNC: 15 MMOL/L (ref 3–16)
AST SERPL-CCNC: 30 U/L (ref 15–37)
BILIRUB SERPL-MCNC: 0.4 MG/DL (ref 0–1)
BUN SERPL-MCNC: 15 MG/DL (ref 7–20)
CALCIUM SERPL-MCNC: 9.9 MG/DL (ref 8.3–10.6)
CHLORIDE SERPL-SCNC: 96 MMOL/L (ref 99–110)
CHOLEST SERPL-MCNC: 234 MG/DL (ref 0–199)
CO2 SERPL-SCNC: 24 MMOL/L (ref 21–32)
CREAT SERPL-MCNC: 0.7 MG/DL (ref 0.6–1.2)
CRP SERPL HS-MCNC: 5.01 MG/L (ref 0.16–3)
DEPRECATED RDW RBC AUTO: 13.7 % (ref 12.4–15.4)
ERYTHROCYTE [SEDIMENTATION RATE] IN BLOOD BY WESTERGREN METHOD: 16 MM/HR (ref 0–30)
GFR SERPLBLD CREATININE-BSD FMLA CKD-EPI: >90 ML/MIN/{1.73_M2}
GLUCOSE SERPL-MCNC: 110 MG/DL (ref 70–99)
HCT VFR BLD AUTO: 41.4 % (ref 36–48)
HDLC SERPL-MCNC: 63 MG/DL (ref 40–60)
HGB BLD-MCNC: 14.2 G/DL (ref 12–16)
LDLC SERPL CALC-MCNC: 140 MG/DL
MCH RBC QN AUTO: 33.7 PG (ref 26–34)
MCHC RBC AUTO-ENTMCNC: 34.3 G/DL (ref 31–36)
MCV RBC AUTO: 98.1 FL (ref 80–100)
NT-PROBNP SERPL-MCNC: 287 PG/ML (ref 0–124)
PLATELET # BLD AUTO: 248 K/UL (ref 135–450)
PMV BLD AUTO: 9.4 FL (ref 5–10.5)
POTASSIUM SERPL-SCNC: 4.1 MMOL/L (ref 3.5–5.1)
PROT UR-MCNC: 0.01 G/DL
PROT UR-MCNC: 6 MG/DL
RBC # BLD AUTO: 4.22 M/UL (ref 4–5.2)
SODIUM SERPL-SCNC: 135 MMOL/L (ref 136–145)
TRIGL SERPL-MCNC: 154 MG/DL (ref 0–150)
VLDLC SERPL CALC-MCNC: 31 MG/DL
WBC # BLD AUTO: 8.2 K/UL (ref 4–11)

## 2024-10-03 PROCEDURE — 3078F DIAST BP <80 MM HG: CPT | Performed by: INTERNAL MEDICINE

## 2024-10-03 PROCEDURE — G8417 CALC BMI ABV UP PARAM F/U: HCPCS | Performed by: INTERNAL MEDICINE

## 2024-10-03 PROCEDURE — 1123F ACP DISCUSS/DSCN MKR DOCD: CPT | Performed by: INTERNAL MEDICINE

## 2024-10-03 PROCEDURE — 3074F SYST BP LT 130 MM HG: CPT | Performed by: INTERNAL MEDICINE

## 2024-10-03 PROCEDURE — 1111F DSCHRG MED/CURRENT MED MERGE: CPT | Performed by: INTERNAL MEDICINE

## 2024-10-03 PROCEDURE — G8399 PT W/DXA RESULTS DOCUMENT: HCPCS | Performed by: INTERNAL MEDICINE

## 2024-10-03 PROCEDURE — G8484 FLU IMMUNIZE NO ADMIN: HCPCS | Performed by: INTERNAL MEDICINE

## 2024-10-03 PROCEDURE — 99215 OFFICE O/P EST HI 40 MIN: CPT | Performed by: INTERNAL MEDICINE

## 2024-10-03 PROCEDURE — G8427 DOCREV CUR MEDS BY ELIG CLIN: HCPCS | Performed by: INTERNAL MEDICINE

## 2024-10-03 PROCEDURE — 1036F TOBACCO NON-USER: CPT | Performed by: INTERNAL MEDICINE

## 2024-10-03 PROCEDURE — 3017F COLORECTAL CA SCREEN DOC REV: CPT | Performed by: INTERNAL MEDICINE

## 2024-10-03 PROCEDURE — 1090F PRES/ABSN URINE INCON ASSESS: CPT | Performed by: INTERNAL MEDICINE

## 2024-10-03 NOTE — PATIENT INSTRUCTIONS
Plan:  1.    Please get Amyloid Labs and urine.  This will help us with the Amyloid Diagnosis  2.    Get inflammatory labs for the purpose of trending markers for the possible sarcoid. Sarcoid diagnosis is made with a cardiac MRI which you cannot have just yet.   3.    Genetic testing today.   4.    See Dr. Betancur in 6 weeks.   5.    Echo at [facility].    6.    PYP testing at [facility].

## 2024-10-03 NOTE — PROGRESS NOTES
in atrial fib with rates ranging from 80 to 130.  She did not want to go to the ED.  She was instructed to resume her amiodarone and cardizem.    ILR implanted 8/17/20.    Echo 3/1/21 showed EF 55-60%.      She is s/p Rt. TKR 3/16/21 -- needed Lovenox bridge.   She is s/p left knee surgery 2/28/23.    ECHO 7/2022 with EF 60-65%.    Nuclear stress test 10/2022 was stable.    She saw my CNP Mickie 4/18/23 with progressive LE edema along with \"wheezing.\" She reported she had eaten ham over [place] and gained 8#. She took Extra lasix 20mg for 2 days Saturday, Sunday. She took 60mg those days. She also knew to take extra potassium. Mickie sent her to the [facility] for IV Lasix 120mg that day and labs (ProBNP 125). She was to take extra Lasix this week until weight stabilized. She said the IV Lasix helped a lot.    She was admitted to [facility] 4/29/23-5/1/23 for chest pain   Hospital cardiology consult 4/29/23: She reports the sensation of someone sitting on her chest.  This feeling has been going on and off for the past 1-2 weeks.  Nothing makes the pain better.  The pain is associated with shortness of breath.  She says it feels as if she is wheezing.  It feels similar to when she had a PE in the past.  Studies completed: Chest pain, non cardiac in origin (unable to determine cause) and no new PE.        She saw my CNP Mickie post hospitalization 5/22/23. She was started on Jardiance and prednisone dose decreased. Extra Lasix for weight gain. Baseline Weight: 242.    She has been following recently with her PCP for cough / SOB as well as her sleep apnea provider.    Colchicine started for elevated CRP but has now been weaned off.      She was admitted 9/14/24 with dizziness and had recently been initiated on carbamazepine TID per per PCP for RLS. Imaging revealed nothing acute and presented in sinus rhythm but noted to have a 6.5 second pause with associated dizziness. She underwent PPM placement 9/16/24. She called the 
suggestive of severe pulmonary hypertension. Mild pulmonic regurgitation present.    Echo 9/1/2016  Technically limited examination to evaluate RVSP.  Overall left ventricular function is normal.  Normal right ventricular size and function.  Mild tricuspid regurgitation with RVSP estimated at 95 mmHg.  No evidence of any pericardial effusion.    [facility] 9/1/2016  Pressures were as follows:  RA: 9 mmHg  RV:  38/12 mmHg  PA:  33/16, mean 24 mmHg  PCWP:  14 mmHg  Thermodilution CO: 7.45   Thermodilution CI:  3.42  Adiel CO:  9.72  Adiel CI:  4.46  PA Sat:  74%  PVR:  107 dynes     Impression:  1.  Minimal pulmonary hypertension with mean PA pressure 24  2.  Normal filling pressures  3.  Normal cardiac output  4.  No evidence of PAH at this time.    Echo 8/28/17  Normal left ventricle size and systolic function with an estimated ejection  fraction of 60%.  Mild mitral regurgitation is present.  There is mild-moderate tricuspid regurgitation with RVSP estimated at 88  mmHg.  This is suggestive of severe pulmonary hypertension.  Mild pulmonic regurgitation present.    1/20/15 Nuclear Stress test  Conclusions        Summary    Normal myocardial perfusion study.    Normal LV function.      Labs were reviewed including labs from other hospital systems through Care Everywhere.  Cardiac testing was reviewed including echos, nuclear scans, cardiac catheterization, including from other hospital systems through Care Everywhere.    Assessment:  No diagnosis found.      1. Chronic heart failure with preserved ejection fraction: Stable. Compensated by exam.  -Continue Lisinopril, Lasix, spironolactone and Jardiance (Dr. Denton)  -ProBNP> 117 (6/6/23); 138 (5/10/23); 125 from 4/18/23 (weight gain, swelling); 76 from 1/9/23; 159 from 12/31/22; 96 from 10/25/22; 180 from 2/16/22; 66 from 2/9/22; 109 from 6/8/21; 122 from 4/5/21  -ECHO 5/1/23> EF 55-60%, normal diastolic function, PASP 46mmHg  -ECHO 7/22/22> EF 60-65%, RVSP 30mmHg  -ECHO

## 2024-10-04 LAB — PROT PATTERN UR ELPH-IMP: NORMAL

## 2024-10-06 LAB
ACE SERPL-CCNC: <10 U/L (ref 16–85)
KAPPA LC FREE SER-MCNC: 19.7 MG/L (ref 2.37–20.73)
KAPPA LC FREE/LAMBDA FREE SER: 1.84 {RATIO} (ref 0.22–1.74)
LAMBDA LC FREE SERPL-MCNC: 10.7 MG/L (ref 4.23–27.69)
RPT COMMENT: ABNORMAL

## 2024-10-07 ENCOUNTER — TELEPHONE (OUTPATIENT)
Dept: CARDIOLOGY CLINIC | Age: 74
End: 2024-10-07

## 2024-10-07 DIAGNOSIS — D86.9 SARCOID: ICD-10-CM

## 2024-10-07 DIAGNOSIS — I51.7 LVH (LEFT VENTRICULAR HYPERTROPHY): Primary | ICD-10-CM

## 2024-10-07 DIAGNOSIS — I30.0 ACUTE IDIOPATHIC PERICARDITIS: ICD-10-CM

## 2024-10-07 DIAGNOSIS — I48.0 PAF (PAROXYSMAL ATRIAL FIBRILLATION) (HCC): ICD-10-CM

## 2024-10-07 DIAGNOSIS — R06.02 SHORTNESS OF BREATH: ICD-10-CM

## 2024-10-07 DIAGNOSIS — Z95.0 PACEMAKER: ICD-10-CM

## 2024-10-07 LAB
ALBUMIN SERPL ELPH-MCNC: 3.5 G/DL (ref 3.1–4.9)
ALPHA1 GLOB SERPL ELPH-MCNC: 0.3 G/DL (ref 0.2–0.4)
ALPHA2 GLOB SERPL ELPH-MCNC: 0.9 G/DL (ref 0.4–1.1)
B-GLOBULIN SERPL ELPH-MCNC: 1.3 G/DL (ref 0.9–1.6)
GAMMA GLOB SERPL ELPH-MCNC: 0.7 G/DL (ref 0.6–1.8)
PROT SERPL-MCNC: 6.7 G/DL (ref 6.4–8.2)
SPE/IFE INTERPRETATION: NORMAL

## 2024-10-07 NOTE — TELEPHONE ENCOUNTER
Pt has a [employer]tr[employer] Device. When MRI check list is done, they will need to reach out to Device Team.   Left pt a message to call back regarding if she is claustrophobic or not.     MRI to be scheduled at [facility].    THANK YOU FROM YOUR CARE TEAM    Our staff would like to THANK YOU for choosing [facility]. Our goal is to always provide you with the best of care and we continue to look for better ways to improve the services we provide.     You may receive a survey in the mail with questions specific to your encounter with our clinic. Should you receive a survey, please take a few minutes to rate your experience.   Our providers and staff value your feedback.  Thank you in advance for your time and participation.     We appreciate the opportunity to partner with you to meet your health care needs. THANK YOU, again, for choosing us to be your care team.     Medical Assistant: Alejandra  Provider: Matias Crawley MD  Care Coordinator:  NGUYEN Acevedo    [facility]  [facility]  [address] [place] [address]  Phone:  [phone]  Fax:  [phone]    Modesta Venegas, Manager Clinic operations  [email or web address]     [phone]                                            ...        Princess Bunn    DURING YOUR APPOINTMENT TODAY    Please review the following instructions carefully for the next steps in your care.   We suggest that you take your after-visit summary to your pharmacy to update their records.      [facility] obtains authorizations from your insurance company on your behalf.  An approved authorization for a procedure or treatment does not necessarily mean that it will be covered 100%.  We recommend that you check with your insurance plan to determine whether or not you will have out of pocket expenses.      FOLLOW-UP    The provider has recommended a procedure and follow up appointment.  You will be called to schedule this in the next 2 business days.  If you prefer, you can call the Back & Spine program at [phone] to schedule.      X-RAYS  X-Rays can be done today, you do not need an appointment.  • Location: [facility]. 1st Floor.   Check in at the reception desk for Urgent care.  • X-Ray hours:  Monday - Friday 8 am - 8 pm                                 Saturday and Sunday 8 am - 4 pm  • Phone:  [phone]    If you are not able to have your X-Ray done today, you may have them done at any [facility] department.  Please have these done within a few days so that your provider can review them.     RESULTS  The results will be reviewed during your follow up visit.      If there are unexpected findings that change your plan of care, you will be notified.          EMG TESTING - we are recommending you have this done by Dr. Poonam Lynne.   Your provider would like you to have an EMG (Electromyelogram) / NCS (Nerve conduction study). These tests help us know more about where your pain is coming from.   • Shower or have a bath the day of your test, before your appointment.  • Do not put lotion or talcum powder on the arm or leg being tested.   · Wear or bring loose clothes- shorts for leg tests or a tank top for arm tests.  To schedule or reschedule your appointment in [place], you may call [phone]. The office is located at [address] [place] [address].  To schedule or reschedule your appointment in [place], you may call [phone].  The office is located at [address] [place] [address] in [address].        INJECTION- bilateraal L4-5 Transforaminal epidural steroid injection    If you take blood thinners:  The nurse will review the instructions on how to hold the blood thinners.      Procedure day  Diet  • It is required that you do not eat or drink for 2 hours prior to the actual injection.   (except sips of water for taking routine medication)  • If your injection is in the morning you may eat a very early light breakfast at least 2 hours prior to the injection.   Medications on the day of procedure  • Take your usual medications with a sip of water.   • Make sure you take your blood pressure and/or heart medicines.  • If your blood pressure  is too high on the day of your procedure, your procedure may need to be rescheduled.   If you have Diabetes  • Your blood sugar needs to be controlled and below 250 on the day of your procedure.  If your sugars are too high, your procedure may need to be rescheduled.  • You may be asked to check your blood sugars for a couple days prior to the injection appointment.  • Be aware that if a steroid is used for your injection, it may cause your blood sugar to elevate following the procedure.   • If you have concerns, you may contact the doctor managing your diabetes.   If you have allergies to dye or contrast   • If you have an allergy to contrast material or dye, you may need to be premedicated prior to the procedure.  o You may be instructed to take medicine before arriving to the hospital or   o You may be given medicine by an IV right before your procedure  • In some cases, a different type of contrast may be used, and pre-medications may not be needed.  • Make sure to tell the procedure nurse about your allergies.    IMPORTANT    • You need a  for the procedure  • Arrive 30 minutes before your scheduled time   • Plan for 2½ hours total time     You were given a copy of the [facility] Sp[facility] Program's teaching sheet:  \"General Guideline's About Epidural and Other Spinal Injection.\" = blue sheet

## 2024-10-07 NOTE — TELEPHONE ENCOUNTER
----- Message from Dr. Cece Betancur MD sent at 10/7/2024  4:00 PM EDT -----  Regarding: schedule MRI  Please schedule patient for a cardiac MRI to rule out infiltrative cardiomyopathy, especially sarcoidosis after November 1.  ERICKA  ----- Message -----  From: Daniel Bennett APRN - CNP  Sent: 10/7/2024   2:33 PM EDT  To: Reymundo Solitario MD; Cece Betancur MD    6 weeks is recommendation post implant from [employer]  ----- Message -----  From: Cece Betancur MD  Sent: 10/7/2024   2:10 PM EDT  To: Reymundo Solitario MD; [facility]    I want to get a cardiac MRI on this patient to rule out sarcoidosis.  How soon after her pacemaker implant is it safe to order an MRI for her?    Thank you, Cece

## 2024-10-08 NOTE — TELEPHONE ENCOUNTER
Called pt and LMOM to ask questions about her Cardiac MRI so we can order it. I asked her to please call us back

## 2024-10-09 NOTE — TELEPHONE ENCOUNTER
Spoke to pt. She states she is not claustrophobic. Pt states she did fine for her lumbar MRI.     Please schedule Cardiac MRI at the [facility] AFTER November 1. Pt has pacemaker per [facility].

## 2024-10-09 NOTE — TELEPHONE ENCOUNTER
Pt is scheduled for Cardiac MRI at the [facility]   11/12/2024    Encounter Complete

## 2024-10-10 ENCOUNTER — HOSPITAL ENCOUNTER (OUTPATIENT)
Dept: NUCLEAR MEDICINE | Age: 74
Discharge: HOME | End: 2024-10-10
Attending: INTERNAL MEDICINE
Payer: MEDICARE

## 2024-10-10 DIAGNOSIS — I50.32 CHRONIC DIASTOLIC CONGESTIVE HEART FAILURE (HCC): ICD-10-CM

## 2024-10-10 DIAGNOSIS — I51.7 LVH (LEFT VENTRICULAR HYPERTROPHY): ICD-10-CM

## 2024-10-10 PROCEDURE — 3430000000 HC RX DIAGNOSTIC RADIOPHARMACEUTICAL: Performed by: INTERNAL MEDICINE

## 2024-10-10 PROCEDURE — 78803 RP LOCLZJ TUM SPECT 1 AREA: CPT

## 2024-10-10 PROCEDURE — A9561 TC99M OXIDRONATE: HCPCS | Performed by: INTERNAL MEDICINE

## 2024-10-10 RX ADMIN — TECHNETIUM TC 99M OXIDRONATE 16.37 MILLICURIE: 3.15 INJECTION, POWDER, LYOPHILIZED, FOR SOLUTION INTRAVENOUS at 10:04

## 2024-10-17 ENCOUNTER — TELEPHONE (OUTPATIENT)
Dept: CARDIOLOGY CLINIC | Age: 74
End: 2024-10-17

## 2024-10-17 NOTE — TELEPHONE ENCOUNTER
Called pt regarding Genetic Testing results:  Variant of uncertain Significance     Gene: [id number], DMD, MYOM1.  Left message for pt that she does not carry the gene for ATTR cardiac amyloidosis.   I can call pt back tomorrow to explain details.     The IPIOG5I gene is a gene that provides instructions for making a calcium channel, which is a protein that transports calcium ions into cells.     The DMD gene is the gene that encodes the protein dystrophin, which is responsible for the proper development and maintenance of muscle cells:      The MYOM1 gene encodes the protein myomesin-1, which is expressed in almost all striated muscles: Abnormalities in the MYOM1 gene family are thought to be a fundamental cause of myopathies.

## 2024-10-18 NOTE — TELEPHONE ENCOUNTER
Per ERICKA, No significant Genetic issue; No Amyloid.  Good News.   Spoke to pt.  She verbalizes understanding.   Copy scanned into chart and Copy mailed to pt.

## 2024-10-23 ENCOUNTER — TELEPHONE (OUTPATIENT)
Dept: CARDIOLOGY CLINIC | Age: 74
End: 2024-10-23

## 2024-10-23 NOTE — TELEPHONE ENCOUNTER
Follow up with EVERETTE Gallego in December and we will schedule follow up at that time. No need to see NPSR in February

## 2024-10-23 NOTE — TELEPHONE ENCOUNTER
Called pt to schedule 02/03/25 9 month ov with NPSR recall, pt states she had pace maker placed 9/2024.      Is this time frame for 9 month office visit for NPSR needed in February, or should the date be changed?    Please advise.

## 2024-10-23 NOTE — TELEPHONE ENCOUNTER
Called pt and lm with instructions to keep 12/4/24 appt with PA Ector and NPSR Feb appt is not needed. LM to call with any questions.

## 2024-10-25 ENCOUNTER — OFFICE VISIT (OUTPATIENT)
Dept: INTERNAL MEDICINE CLINIC | Age: 74
End: 2024-10-25

## 2024-10-25 VITALS
HEART RATE: 67 BPM | SYSTOLIC BLOOD PRESSURE: 138 MMHG | OXYGEN SATURATION: 96 % | DIASTOLIC BLOOD PRESSURE: 70 MMHG | WEIGHT: 248 LBS | BODY MASS INDEX: 43.93 KG/M2

## 2024-10-25 DIAGNOSIS — E11.69 TYPE 2 DIABETES MELLITUS WITH OBESITY (HCC): ICD-10-CM

## 2024-10-25 DIAGNOSIS — I50.32 CHRONIC HEART FAILURE WITH PRESERVED EJECTION FRACTION (HCC): ICD-10-CM

## 2024-10-25 DIAGNOSIS — E66.9 TYPE 2 DIABETES MELLITUS WITH OBESITY (HCC): ICD-10-CM

## 2024-10-25 DIAGNOSIS — I48.0 PAROXYSMAL ATRIAL FIBRILLATION (HCC): ICD-10-CM

## 2024-10-25 DIAGNOSIS — E03.9 ACQUIRED HYPOTHYROIDISM: Primary | ICD-10-CM

## 2024-10-25 DIAGNOSIS — I10 BENIGN ESSENTIAL HTN: ICD-10-CM

## 2024-10-25 RX ORDER — FUROSEMIDE 40 MG/1
40 TABLET ORAL DAILY
Qty: 90 TABLET | Refills: 1
Start: 2024-10-25

## 2024-10-25 SDOH — ECONOMIC STABILITY: FOOD INSECURITY: WITHIN THE PAST 12 MONTHS, YOU WORRIED THAT YOUR FOOD WOULD RUN OUT BEFORE YOU GOT MONEY TO BUY MORE.: NEVER TRUE

## 2024-10-25 SDOH — ECONOMIC STABILITY: FOOD INSECURITY: WITHIN THE PAST 12 MONTHS, THE FOOD YOU BOUGHT JUST DIDN'T LAST AND YOU DIDN'T HAVE MONEY TO GET MORE.: NEVER TRUE

## 2024-10-25 SDOH — ECONOMIC STABILITY: INCOME INSECURITY: HOW HARD IS IT FOR YOU TO PAY FOR THE VERY BASICS LIKE FOOD, HOUSING, MEDICAL CARE, AND HEATING?: NOT VERY HARD

## 2024-10-25 ASSESSMENT — PATIENT HEALTH QUESTIONNAIRE - PHQ9
8. MOVING OR SPEAKING SO SLOWLY THAT OTHER PEOPLE COULD HAVE NOTICED. OR THE OPPOSITE, BEING SO FIGETY OR RESTLESS THAT YOU HAVE BEEN MOVING AROUND A LOT MORE THAN USUAL: NOT AT ALL
7. TROUBLE CONCENTRATING ON THINGS, SUCH AS READING THE NEWSPAPER OR WATCHING TELEVISION: SEVERAL DAYS
4. FEELING TIRED OR HAVING LITTLE ENERGY: NOT AT ALL
3. TROUBLE FALLING OR STAYING ASLEEP: MORE THAN HALF THE DAYS
5. POOR APPETITE OR OVEREATING: NOT AT ALL
SUM OF ALL RESPONSES TO PHQ QUESTIONS 1-9: 4
SUM OF ALL RESPONSES TO PHQ QUESTIONS 1-9: 4
9. THOUGHTS THAT YOU WOULD BE BETTER OFF DEAD, OR OF HURTING YOURSELF: NOT AT ALL
6. FEELING BAD ABOUT YOURSELF - OR THAT YOU ARE A FAILURE OR HAVE LET YOURSELF OR YOUR FAMILY DOWN: NOT AT ALL
2. FEELING DOWN, DEPRESSED OR HOPELESS: SEVERAL DAYS
1. LITTLE INTEREST OR PLEASURE IN DOING THINGS: NOT AT ALL
SUM OF ALL RESPONSES TO PHQ9 QUESTIONS 1 & 2: 1
10. IF YOU CHECKED OFF ANY PROBLEMS, HOW DIFFICULT HAVE THESE PROBLEMS MADE IT FOR YOU TO DO YOUR WORK, TAKE CARE OF THINGS AT HOME, OR GET ALONG WITH OTHER PEOPLE: NOT DIFFICULT AT ALL
SUM OF ALL RESPONSES TO PHQ QUESTIONS 1-9: 4
SUM OF ALL RESPONSES TO PHQ QUESTIONS 1-9: 4

## 2024-10-25 ASSESSMENT — ANXIETY QUESTIONNAIRES
1. FEELING NERVOUS, ANXIOUS, OR ON EDGE: NOT AT ALL
6. BECOMING EASILY ANNOYED OR IRRITABLE: NOT AT ALL
2. NOT BEING ABLE TO STOP OR CONTROL WORRYING: NOT AT ALL
IF YOU CHECKED OFF ANY PROBLEMS ON THIS QUESTIONNAIRE, HOW DIFFICULT HAVE THESE PROBLEMS MADE IT FOR YOU TO DO YOUR WORK, TAKE CARE OF THINGS AT HOME, OR GET ALONG WITH OTHER PEOPLE: NOT DIFFICULT AT ALL
3. WORRYING TOO MUCH ABOUT DIFFERENT THINGS: NOT AT ALL
4. TROUBLE RELAXING: NOT AT ALL
GAD7 TOTAL SCORE: 1
7. FEELING AFRAID AS IF SOMETHING AWFUL MIGHT HAPPEN: NOT AT ALL
5. BEING SO RESTLESS THAT IT IS HARD TO SIT STILL: SEVERAL DAYS

## 2024-10-25 NOTE — PATIENT INSTRUCTIONS
[facility] Housing Resources*  (Call [employer]/211 if need more resources.)      [employer]   Speak to a trained professional 24/7 who can connect you to essential community services including food, clothing, transportation, housing, utilities, employment services, childcare, and baby supplies. 211 serves nationwide.  [facility].org for resources in [place], [facility], [place] and [facility] in [place]; [place], [place], [place], and [facility] in [place].   [employer].org/resources for resources in [place], [place], [place], [place], [place], [place], [place], [place], [facility], [place], [place], and [facility] in [place].      Homeless Crisis Line  Central Access Point (CAP Line): Intake system for families and individuals who are currently experiencing homelessness or who are at risk of becoming homeless. Can provide list of shelters and bed availability  Phone Number: (661) 254-OIYP ([phone]) Contact this number first.  It is a centralized point of entry for services.  Hours of Operation:  Monday - Friday 9am - 5pm; Saturday & Sunday 10pm - 2pm    [facility]  What they offer: 24-hour facility providing medical and nursing care to sick persons experiencing homelessness; assistance in breaking the cycle of homelessness.  Contact: [email or web address]     Homeless Shelters: [facility]: Women and Children  Located at [address] / Phone: [phone]  [facility]: Men   Located at [address] [facility] / Phone: [phone]  [facility]  Located at [address] / Phone: [phone]  [facility] [place] for Women  [address] / Phone: [phone]    Homeless Shelters: [facility]: Emergency shelter in hotels for a stay of one month. The agency also has rental assistance funds for security deposits and

## 2024-10-25 NOTE — PROGRESS NOTES
Chief Complaint   Patient presents with    Hypertension    Discuss Medications       Assessment/Plan:  Kira was seen today for hypertension and discuss medications.    Diagnoses and all orders for this visit:    Acquired hypothyroidism  -     TSH  -     T4, Free    Benign essential HTN  -     furosemide (LASIX) 40 MG tablet; Take 1 tablet by mouth daily    Paroxysmal atrial fibrillation (HCC)  -     Ambulatory Referral to Care Management    Chronic heart failure with preserved ejection fraction (HCC)  -     Ambulatory Referral to Care Management    Type 2 diabetes mellitus with obesity (HCC)  -     Microalbumin / Creatinine Urine Ratio        Vitals:    10/25/24 1113   BP: 138/70   Pulse: 67   SpO2: 96%       Physical Exam  Vitals reviewed.   Constitutional:       General: She is not in acute distress.     Appearance: She is well-developed. She is not diaphoretic.   HENT:      Head: Normocephalic and atraumatic.   Pulmonary:      Effort: Pulmonary effort is normal.   Neurological:      Mental Status: She is alert and oriented to person, place, and time.      Cranial Nerves: No cranial nerve deficit.   Psychiatric:         Behavior: Behavior normal.         Thought Content: Thought content normal.         Judgment: Judgment normal.             10/25/2024    11:19 AM 9/11/2024     1:34 PM 9/11/2024     1:33 PM 6/5/2024    10:10 AM 4/8/2024    11:11 AM 4/3/2024     6:20 AM 11/27/2023     9:58 AM   PHQ Scores   PHQ2 Score 1 2 2 0 0 0    0 0   PHQ9 Score 4 11 2 4 9 12    12 4     Interpretation of Total Score Depression Severity: 1-4 = Minimal depression, 5-9 = Mild depression, 10-14 = Moderate depression, 15-19 = Moderately severe depression, 20-27 = Severe depression           Miguel Denton MD  FACP

## 2024-10-26 LAB
T4 FREE SERPL-MCNC: 1 NG/DL (ref 0.9–1.8)
TSH SERPL DL<=0.005 MIU/L-ACNC: 1.04 UIU/ML (ref 0.27–4.2)

## 2024-10-29 ENCOUNTER — TELEPHONE (OUTPATIENT)
Dept: CARDIOLOGY CLINIC | Age: 74
End: 2024-10-29

## 2024-10-29 NOTE — TELEPHONE ENCOUNTER
CARDIAC CLEARANCE     What type of procedure are you having?  Cataract Removed, Right Eye    Which physician is performing your procedure?  Dr. Rosemarie Reyez    When is your procedure scheduled for?  11/06    Where are you having this procedure?  [facility]    Are you taking Blood Thinners?  Yes   If so what? (Name/dose/frequesncy)  Xarelto 20mg     Does the surgeon want you to stop your blood thinner?  If so for how long?  Need [facility] opinion    Phone Number and Contact Name for Physicians office:  [phone]    Fax number to send information:  [phone] -  Pre-Admission Testing

## 2024-11-01 ENCOUNTER — CARE COORDINATION (OUTPATIENT)
Dept: CARE COORDINATION | Age: 74
End: 2024-11-01

## 2024-11-01 NOTE — CARE COORDINATION
M received CC referral from PCP. [facility] called and introduced self and explained reason for calling.     Pt stated she is feeling better physically, but said she was not happy about her recent inpatient stay at the hospital. She said she was under the impression she was supposed to be d/c to home on Tuesday/Wednesday, but instead she was discharged to a local SNF and said she was told the SNF would receive her D/C paperwork. She said she was transported to the SNF and was only there for around 2 hours until her family came to pick her up and take her home.     [facility] provided pt with the contact number to speak with a patient representative about her experience. [phone] is the number to call regarding a hospital experience in the [facility]. Pt read the number back to [facility] and also stated she will reach out to [facility] after she calls and will let me know she is available to talk more at that time.     Pt has f/u with both her PCP and cardiology since her d/c to home. [facility] to wait for pt to reach out again and will f/u with her at that time. Pt agreed with this plan.

## 2024-11-03 DIAGNOSIS — M79.10 MYALGIA: ICD-10-CM

## 2024-11-03 DIAGNOSIS — M17.11 PRIMARY OSTEOARTHRITIS OF RIGHT KNEE: ICD-10-CM

## 2024-11-04 DIAGNOSIS — F51.01 PRIMARY INSOMNIA: ICD-10-CM

## 2024-11-04 RX ORDER — TRAMADOL HYDROCHLORIDE 50 MG/1
50 TABLET ORAL EVERY 8 HOURS PRN
Qty: 42 TABLET | Refills: 0 | Status: SHIPPED | OUTPATIENT
Start: 2024-11-04 | End: 2024-12-04

## 2024-11-04 RX ORDER — ZOLPIDEM TARTRATE 10 MG/1
10 TABLET ORAL NIGHTLY PRN
Qty: 90 TABLET | Refills: 0 | Status: SHIPPED | OUTPATIENT
Start: 2024-11-04 | End: 2025-02-02

## 2024-11-04 NOTE — TELEPHONE ENCOUNTER
Recent Visits  Date Type Provider Dept   10/25/24 Office Visit Miguel Denton MD [facility]   09/20/24 Office Visit Miguel Denton MD [facility]   09/11/24 Office Visit Miguel Denton MD [facility]   06/05/24 Office Visit Miguel Denton MD [facility]   04/08/24 Office Visit Miguel Denton MD [facility]   11/27/23 Office Visit Allison Chang,  [facility]   11/20/23 Office Visit Alliosn Chang,  [facility]   10/02/23 Office Visit Miguel Denton MD [facility]   08/11/23 Office Visit Miguel Denton MD [facility]   07/21/23 Office Visit Miguel Denton MD [facility]   Showing recent visits within past 540 days with a meds authorizing provider and meeting all other requirements  Future Appointments  No visits were found meeting these conditions.  Showing future appointments within next 150 days with a meds authorizing provider and meeting all other requirements     10/25/2024

## 2024-11-04 NOTE — TELEPHONE ENCOUNTER
Recent Visits  Date Type Provider Dept   10/25/24 Office Visit Miguel Denton MD [facility]   09/20/24 Office Visit Miguel Denton MD [facility]   09/11/24 Office Visit Miguel Denton MD [facility]   06/05/24 Office Visit Miguel Denton MD [facility]   04/08/24 Office Visit Miguel Denton MD [facility]   11/27/23 Office Visit Allison Chang,  [facility]   11/20/23 Office Visit Allison Chang,  [facility]   10/02/23 Office Visit Miguel Denton MD [facility]   08/11/23 Office Visit Miguel Denton MD [facility]   07/21/23 Office Visit Miguel Denton MD [facility]   Showing recent visits within past 540 days with a meds authorizing provider and meeting all other requirements  Future Appointments  No visits were found meeting these conditions.  Showing future appointments within next 150 days with a meds authorizing provider and meeting all other requirements     10/25/2024

## 2024-11-06 ENCOUNTER — TELEPHONE (OUTPATIENT)
Dept: INTERNAL MEDICINE CLINIC | Age: 74
End: 2024-11-06

## 2024-11-06 ENCOUNTER — OFFICE VISIT (OUTPATIENT)
Dept: INTERNAL MEDICINE CLINIC | Age: 74
End: 2024-11-06

## 2024-11-06 ENCOUNTER — PATIENT MESSAGE (OUTPATIENT)
Dept: INTERNAL MEDICINE CLINIC | Age: 74
End: 2024-11-06

## 2024-11-06 VITALS
SYSTOLIC BLOOD PRESSURE: 122 MMHG | TEMPERATURE: 98.2 F | RESPIRATION RATE: 16 BRPM | WEIGHT: 250.2 LBS | HEART RATE: 86 BPM | DIASTOLIC BLOOD PRESSURE: 66 MMHG | BODY MASS INDEX: 44.32 KG/M2 | OXYGEN SATURATION: 98 %

## 2024-11-06 DIAGNOSIS — J20.0 ACUTE BRONCHITIS DUE TO MYCOPLASMA PNEUMONIAE: Primary | ICD-10-CM

## 2024-11-06 RX ORDER — ACETAMINOPHEN AND CODEINE PHOSPHATE 300; 15 MG/1; MG/1
1 TABLET ORAL EVERY 4 HOURS PRN
Qty: 20 TABLET | Refills: 0 | Status: SHIPPED | OUTPATIENT
Start: 2024-11-06 | End: 2024-11-11

## 2024-11-06 RX ORDER — DOXYCYCLINE HYCLATE 100 MG
100 TABLET ORAL 2 TIMES DAILY
Qty: 20 TABLET | Refills: 0 | Status: SHIPPED | OUTPATIENT
Start: 2024-11-06 | End: 2024-11-16

## 2024-11-06 RX ORDER — PREDNISONE 20 MG/1
20 TABLET ORAL DAILY
Qty: 5 TABLET | Refills: 0 | Status: SHIPPED | OUTPATIENT
Start: 2024-11-06 | End: 2024-11-11

## 2024-11-06 SDOH — ECONOMIC STABILITY: FOOD INSECURITY: WITHIN THE PAST 12 MONTHS, THE FOOD YOU BOUGHT JUST DIDN'T LAST AND YOU DIDN'T HAVE MONEY TO GET MORE.: NEVER TRUE

## 2024-11-06 SDOH — ECONOMIC STABILITY: FOOD INSECURITY: WITHIN THE PAST 12 MONTHS, YOU WORRIED THAT YOUR FOOD WOULD RUN OUT BEFORE YOU GOT MONEY TO BUY MORE.: NEVER TRUE

## 2024-11-06 SDOH — ECONOMIC STABILITY: INCOME INSECURITY: HOW HARD IS IT FOR YOU TO PAY FOR THE VERY BASICS LIKE FOOD, HOUSING, MEDICAL CARE, AND HEATING?: NOT HARD AT ALL

## 2024-11-06 ASSESSMENT — PATIENT HEALTH QUESTIONNAIRE - PHQ9
1. LITTLE INTEREST OR PLEASURE IN DOING THINGS: SEVERAL DAYS
SUM OF ALL RESPONSES TO PHQ QUESTIONS 1-9: 1
2. FEELING DOWN, DEPRESSED OR HOPELESS: NOT AT ALL
SUM OF ALL RESPONSES TO PHQ QUESTIONS 1-9: 1
SUM OF ALL RESPONSES TO PHQ9 QUESTIONS 1 & 2: 1
SUM OF ALL RESPONSES TO PHQ QUESTIONS 1-9: 1
SUM OF ALL RESPONSES TO PHQ QUESTIONS 1-9: 1

## 2024-11-06 NOTE — PROGRESS NOTES
Chief Complaint   Patient presents with    Congestion     Congestion, cough, no fever, slight fatigue   No fever   3 days        Assessment/Plan:  Kira was seen today for congestion.    Diagnoses and all orders for this visit:    Acute bronchitis due to Mycoplasma pneumoniae  -     doxycycline hyclate (VIBRA-TABS) 100 MG tablet; Take 1 tablet by mouth 2 times daily for 10 days  -     acetaminophen-Codeine (TYLENOL #2) 300-15 MG per tablet; Take 1 tablet by mouth every 4 hours as needed for Pain for up to 5 days. Max Daily Amount: 6 tablets  -     predniSONE (DELTASONE) 20 MG tablet; Take 1 tablet by mouth daily for 5 days        Vitals:    11/06/24 1612   BP: 122/66   Pulse: 86   Resp: 16   Temp: 98.2 °F (36.8 °C)   SpO2: 98%       Physical Exam  Vitals reviewed.   Constitutional:       General: She is not in acute distress.     Appearance: She is well-developed. She is not diaphoretic.   HENT:      Head: Normocephalic and atraumatic.   Pulmonary:      Effort: Pulmonary effort is normal. No respiratory distress.      Breath sounds: Normal breath sounds. No stridor. No wheezing, rhonchi or rales.   Chest:      Chest wall: No tenderness.   Neurological:      Mental Status: She is alert and oriented to person, place, and time.      Cranial Nerves: No cranial nerve deficit.   Psychiatric:         Behavior: Behavior normal.         Thought Content: Thought content normal.         Judgment: Judgment normal.             11/6/2024     4:14 PM 10/25/2024    11:19 AM 9/11/2024     1:34 PM 9/11/2024     1:33 PM 6/5/2024    10:10 AM 4/8/2024    11:11 AM 4/3/2024     6:20 AM   PHQ Scores   PHQ2 Score 1 1 2 2 0 0 0    0   PHQ9 Score 1 4 11 2 4 9 12    12     Interpretation of Total Score Depression Severity: 1-4 = Minimal depression, 5-9 = Mild depression, 10-14 = Moderate depression, 15-19 = Moderately severe depression, 20-27 = Severe depression           Miguel Denton MD  FACP

## 2024-11-06 NOTE — TELEPHONE ENCOUNTER
Appointment Request From: Kira Alexander     With Provider: Dr. Miguel Denton MD [[facility]]     Preferred Date Range: 11/6/2024 - 11/8/2024     Preferred Times: Any Time     Reason for visit: Request an Appointment     Comments:  Sick visit.  Upper respiratory.  Lots of coughing. Infection is in chest.    *suggested patient send a "[employer]t message to Dr Denton w/her sx, how long she has been feeling ill and what OTC meds she has tried. Patient agreed.

## 2024-11-08 ENCOUNTER — CARE COORDINATION (OUTPATIENT)
Dept: CARE COORDINATION | Age: 74
End: 2024-11-08

## 2024-11-08 NOTE — CARE COORDINATION
ACM attempted to f/u with patient today, however she was UTR. Left VM message with contact information. Sent [facility] Tuck Me In Winter Holiday 2024 info via My Chart as well. Will make another outreach attempt at a later date/time.

## 2024-11-12 ENCOUNTER — HOSPITAL ENCOUNTER (OUTPATIENT)
Dept: MRI IMAGING | Age: 74
Discharge: HOME | End: 2024-11-12
Attending: INTERNAL MEDICINE
Payer: MEDICARE

## 2024-11-12 ENCOUNTER — TELEPHONE (OUTPATIENT)
Dept: CARDIOLOGY CLINIC | Age: 74
End: 2024-11-12

## 2024-11-12 DIAGNOSIS — Z95.0 PACEMAKER: ICD-10-CM

## 2024-11-12 DIAGNOSIS — D86.9 SARCOID: ICD-10-CM

## 2024-11-12 DIAGNOSIS — I51.7 LVH (LEFT VENTRICULAR HYPERTROPHY): ICD-10-CM

## 2024-11-12 DIAGNOSIS — R06.02 SHORTNESS OF BREATH: ICD-10-CM

## 2024-11-12 DIAGNOSIS — I30.0 ACUTE IDIOPATHIC PERICARDITIS: ICD-10-CM

## 2024-11-12 DIAGNOSIS — I48.0 PAF (PAROXYSMAL ATRIAL FIBRILLATION) (HCC): ICD-10-CM

## 2024-11-12 PROCEDURE — 75561 CARDIAC MRI FOR MORPH W/DYE: CPT

## 2024-11-12 PROCEDURE — 6360000004 HC RX CONTRAST MEDICATION: Performed by: INTERNAL MEDICINE

## 2024-11-12 PROCEDURE — A9585 GADOBUTROL INJECTION: HCPCS | Performed by: INTERNAL MEDICINE

## 2024-11-12 RX ORDER — GADOBUTROL 604.72 MG/ML
15 INJECTION INTRAVENOUS
Status: COMPLETED | OUTPATIENT
Start: 2024-11-12 | End: 2024-11-12

## 2024-11-12 RX ADMIN — GADOBUTROL 15 ML: 604.72 INJECTION INTRAVENOUS at 12:56

## 2024-11-12 NOTE — TELEPHONE ENCOUNTER
Pt called stating they are exteremly tired today, has gain 2 lbs over wt. Has increase SOB.    Pt has taken all of the medications today except for furosemide and Potassium due to being scheduled for MRI today.     Pt sent a manual transmission and would like to know if there is anything showing up on it? Pt stated they are having the same feeling as they did when the 1st received the pacemaker.    Pls advise pt   [phone]

## 2024-11-12 NOTE — TELEPHONE ENCOUNTER
We received remote transmission from patient's monitor at home. Transmission shows normal sensing and pacing function.   Current ECG shows ASVS-sinus at ~85 bpm  No current episodes noted.  Last on 11/6/2024-short atrial run.     Please advise. Thanks

## 2024-11-13 PROBLEM — I51.7 LVH (LEFT VENTRICULAR HYPERTROPHY): Status: ACTIVE | Noted: 2024-11-13

## 2024-11-13 PROCEDURE — 93294 REM INTERROG EVL PM/LDLS PM: CPT | Performed by: INTERNAL MEDICINE

## 2024-11-13 PROCEDURE — 93296 REM INTERROG EVL PM/IDS: CPT | Performed by: INTERNAL MEDICINE

## 2024-11-20 ENCOUNTER — CARE COORDINATION (OUTPATIENT)
Dept: CARE COORDINATION | Age: 74
End: 2024-11-20

## 2024-11-20 NOTE — PROGRESS NOTES
[facility]   Advanced Heart Failure/Pulmonary Hypertension  Cardiac Follow up       Kira Alexander  YOB: 1950     Date of Visit:  11/25/24     Chief Complaint   Patient presents with    Congestive Heart Failure    Edema       History of present illness: Kira Alexander is a 74 y.o. 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 She original had a PE was treated with xarelto for recommended time and then off it and her RHC revealed pressures no RH elevated pressures. Afterwards she developed AFib and restarted on xarelto. She continues on xarelto and treatment for afib. Echos in July and August (2016) showed RVSP 106-112 without evidence of enlargement in right side of heart. RHC was done 9/2/16 and PA pressure was 24, no evidence of pulmonary hypertension. Since then, her RVSP has decreased and got back to normal. We discussed that we are questioning if the elevated ones by echo may have been due to a false positive test for her. She was seen by Dr Calles 8/1 and he is managing her hypothyroidism. [place], the thyroid medication was stopped. She was noted to have negative thyroid ultrasound ( July 2018).   She wears her CPAP consistently for her HUSSAIN.  States she had a reaction to Simvastatin and Livalo with muscle pain.  She states she is Prediabetic and has not lost weight.      She was in a study for Novavax; she did receive the usual vaccine in May and has felt very fatigued since then.    Electrophysiology: Reymundo Solitario MD   PCP: Follows with Miguel Denton MD    Sleep:Harshad-Daniel Wilkins MD    9/16/2024-linq explanted-[employer] [place] XT DR PASTRANA [id number]-Sick sinus syndrome/Symptomatic Bradycardia   Known AF/Xarelto  Amyloid workup was negative      Pt was last seen in office 10/15/24 and presents today for a follow up of chronic diastolic heart failure. Last EF 55-60% on 10/14/24. 11/22/24, pt called Dr. Solitario stating she 
want to go to the ED.  She was instructed to resume her amiodarone and cardizem.    ILR implanted 8/17/20.    Echo 3/1/21 showed EF 55-60%.      She is s/p Rt. TKR 3/16/21 -- needed Lovenox bridge.   She is s/p left knee surgery 2/28/23.    ECHO 7/2022 with EF 60-65%.    Nuclear stress test 10/2022 was stable.    She saw my CNP Mickie 4/18/23 with progressive LE edema along with \"wheezing.\" She reported she had eaten ham over [place] and gained 8#. She took Extra lasix 20mg for 2 days Saturday, Sunday. She took 60mg those days. She also knew to take extra potassium. Mickie [facility] for IV Lasix 120mg that day and labs (ProBNP 125). She was to take extra Lasix this week until weight stabilized. She said the IV Lasix helped a lot.    She was admitted to [facility] 4/29/23-5/1/23 for chest pain   Hospital cardiology consult 4/29/23: She reports the sensation of someone sitting on her chest.  This feeling has been going on and off for the past 1-2 weeks.  Nothing makes the pain better.  The pain is associated with shortness of breath.  She says it feels as if she is wheezing.  It feels similar to when she had a PE in the past.  Studies completed: Chest pain, non cardiac in origin (unable to determine cause) and no new PE.        She saw my CNP Mickie post hospitalization 5/22/23. She was started on Jardiance and prednisone dose decreased. Extra Lasix for weight gain. Baseline Weight: 242.    She has been following recently with her PCP for cough / SOB as well as her sleep apnea provider.    Colchicine started for elevated CRP but has now been weaned off.      She was admitted 9/14/24 with dizziness and had recently been initiated on carbamazepine TID per per PCP for RLS. Imaging revealed nothing acute and presented in sinus rhythm but noted to have a 6.5 second pause with associated dizziness. She underwent PPM placement 9/16/24. She called the office 9/23/24 and reported recurrence of Afib. She was seen in

## 2024-11-22 ENCOUNTER — TELEPHONE (OUTPATIENT)
Dept: CARDIOLOGY CLINIC | Age: 74
End: 2024-11-22

## 2024-11-22 NOTE — TELEPHONE ENCOUNTER
Spoke to patient, she stated she had chest heaviness alittle SOB HR was 148,She took her fast acting cardizem x2. Patient wandering ie she should take her scheduled meds. Please advise.

## 2024-11-22 NOTE — TELEPHONE ENCOUNTER
Per NPSR she should still take her scheduled 180 mg cardizem. If she is still in afib after an hour or two then she can take a second dose of the 180 mg cardizem if her BP is okay and can tolerate it. Then call us again around lunchtime if she has no relief.  Called patient and relayed this information. She v/u

## 2024-11-22 NOTE — TELEPHONE ENCOUNTER
Pt called in letting MXA know she has been in AFIB since 4am this morning. Pt /64 with a HR of 122. Pt does state she has chest heaviness, SOB and light headedness. Pt took fast working dilTIAZem (CARDIZEM CD  and still feels no relief. Please call pt and advise  Thank you

## 2024-11-25 ENCOUNTER — OFFICE VISIT (OUTPATIENT)
Dept: CARDIOLOGY CLINIC | Age: 74
End: 2024-11-25

## 2024-11-25 ENCOUNTER — TELEPHONE (OUTPATIENT)
Dept: CARDIOLOGY CLINIC | Age: 74
End: 2024-11-25

## 2024-11-25 VITALS
HEART RATE: 79 BPM | HEIGHT: 63 IN | BODY MASS INDEX: 44.3 KG/M2 | WEIGHT: 250 LBS | DIASTOLIC BLOOD PRESSURE: 76 MMHG | OXYGEN SATURATION: 97 % | SYSTOLIC BLOOD PRESSURE: 136 MMHG

## 2024-11-25 DIAGNOSIS — Z95.0 PACEMAKER: ICD-10-CM

## 2024-11-25 DIAGNOSIS — I50.32 CHRONIC DIASTOLIC CONGESTIVE HEART FAILURE (HCC): Primary | ICD-10-CM

## 2024-11-25 DIAGNOSIS — G47.33 OSA (OBSTRUCTIVE SLEEP APNEA): ICD-10-CM

## 2024-11-25 DIAGNOSIS — E78.00 PURE HYPERCHOLESTEROLEMIA: ICD-10-CM

## 2024-11-25 DIAGNOSIS — R06.02 SHORTNESS OF BREATH: ICD-10-CM

## 2024-11-25 DIAGNOSIS — I48.0 PAF (PAROXYSMAL ATRIAL FIBRILLATION) (HCC): ICD-10-CM

## 2024-11-25 DIAGNOSIS — I51.7 LVH (LEFT VENTRICULAR HYPERTROPHY): ICD-10-CM

## 2024-11-25 RX ORDER — LISINOPRIL 20 MG/1
20 TABLET ORAL DAILY
Qty: 90 TABLET | Refills: 3 | Status: SHIPPED | OUTPATIENT
Start: 2024-11-25

## 2024-11-25 RX ORDER — LISINOPRIL 10 MG/1
10 TABLET ORAL DAILY
Qty: 90 TABLET | Refills: 3 | Status: SHIPPED | OUTPATIENT
Start: 2024-11-25 | End: 2024-11-25

## 2024-11-25 RX ORDER — DILTIAZEM HCL 60 MG
60 TABLET ORAL DAILY PRN
Qty: 120 TABLET | Refills: 3 | Status: SHIPPED | OUTPATIENT
Start: 2024-11-25

## 2024-11-25 NOTE — TELEPHONE ENCOUNTER
Called pt to let her know I accidentally left off a second set of labs for next OV. She states verbal understanding and will get those drawn a week before OV in February.

## 2024-12-04 ENCOUNTER — TELEPHONE (OUTPATIENT)
Dept: CARDIOLOGY CLINIC | Age: 74
End: 2024-12-04

## 2024-12-04 NOTE — TELEPHONE ENCOUNTER
Called and left message for Marianna that this message needs clarified. We have nothing regarding this procedure.

## 2024-12-04 NOTE — TELEPHONE ENCOUNTER
Please fax last ov notes and cardiac clearance for ill exchange under angy wilson to [phone], no need to hold any medications.

## 2024-12-04 NOTE — TELEPHONE ENCOUNTER
CARDIAC CLEARANCE     What type of procedure are you having?  Lens exchange  Which physician is performing your procedure? Dr Barrios    When is your procedure scheduled for?  12/12/24  Where are you having this procedure?   [facility]    Are you taking Blood Thinners?    If so what? (Name/dose/frequesncy)     Does the surgeon want you to stop your blood thinner? No If so for how long?    Phone Number and Contact Name for Physicians office: [phone]    Fax number to send information: [phone]    Marianna  states clearance is not needed if you will fax last office note on 11/25/24.

## 2024-12-05 ENCOUNTER — TELEPHONE (OUTPATIENT)
Dept: CARDIOLOGY CLINIC | Age: 74
End: 2024-12-05

## 2024-12-05 ENCOUNTER — HOSPITAL ENCOUNTER (OUTPATIENT)
Age: 74
Discharge: HOME | End: 2024-12-05
Payer: MEDICARE

## 2024-12-05 DIAGNOSIS — I50.32 CHRONIC DIASTOLIC CONGESTIVE HEART FAILURE (HCC): ICD-10-CM

## 2024-12-05 DIAGNOSIS — R06.02 SHORTNESS OF BREATH: ICD-10-CM

## 2024-12-05 DIAGNOSIS — I48.0 PAF (PAROXYSMAL ATRIAL FIBRILLATION) (HCC): ICD-10-CM

## 2024-12-05 LAB
ALBUMIN SERPL-MCNC: 4.2 G/DL (ref 3.4–5)
ALBUMIN/GLOB SERPL: 1.8 {RATIO} (ref 1.1–2.2)
ALP SERPL-CCNC: 77 U/L (ref 40–129)
ALT SERPL-CCNC: 47 U/L (ref 10–40)
ANION GAP SERPL CALCULATED.3IONS-SCNC: 14 MMOL/L (ref 3–16)
AST SERPL-CCNC: 31 U/L (ref 15–37)
BILIRUB SERPL-MCNC: 0.3 MG/DL (ref 0–1)
BUN SERPL-MCNC: 22 MG/DL (ref 7–20)
CALCIUM SERPL-MCNC: 9.7 MG/DL (ref 8.3–10.6)
CHLORIDE SERPL-SCNC: 100 MMOL/L (ref 99–110)
CO2 SERPL-SCNC: 21 MMOL/L (ref 21–32)
CREAT SERPL-MCNC: 0.8 MG/DL (ref 0.6–1.2)
DEPRECATED RDW RBC AUTO: 14.3 % (ref 12.4–15.4)
GFR SERPLBLD CREATININE-BSD FMLA CKD-EPI: 78 ML/MIN/{1.73_M2}
GLUCOSE SERPL-MCNC: 113 MG/DL (ref 70–99)
HCT VFR BLD AUTO: 41.3 % (ref 36–48)
HGB BLD-MCNC: 14 G/DL (ref 12–16)
MCH RBC QN AUTO: 32.9 PG (ref 26–34)
MCHC RBC AUTO-ENTMCNC: 33.8 G/DL (ref 31–36)
MCV RBC AUTO: 97.3 FL (ref 80–100)
NT-PROBNP SERPL-MCNC: 119 PG/ML (ref 0–124)
PLATELET # BLD AUTO: 217 K/UL (ref 135–450)
PMV BLD AUTO: 9.4 FL (ref 5–10.5)
POTASSIUM SERPL-SCNC: 4.2 MMOL/L (ref 3.5–5.1)
PROT SERPL-MCNC: 6.5 G/DL (ref 6.4–8.2)
RBC # BLD AUTO: 4.24 M/UL (ref 4–5.2)
SODIUM SERPL-SCNC: 135 MMOL/L (ref 136–145)
WBC # BLD AUTO: 5.7 K/UL (ref 4–11)

## 2024-12-05 PROCEDURE — 85027 COMPLETE CBC AUTOMATED: CPT

## 2024-12-05 PROCEDURE — 83880 ASSAY OF NATRIURETIC PEPTIDE: CPT

## 2024-12-05 PROCEDURE — 80053 COMPREHEN METABOLIC PANEL: CPT

## 2024-12-05 PROCEDURE — 36415 COLL VENOUS BLD VENIPUNCTURE: CPT

## 2024-12-05 NOTE — TELEPHONE ENCOUNTER
Pt states since her pacemaker was placed she has been experiencing heaviness left of the breast bone, \"like a little kid is standing on my chest with bare feet\".  Sometimes when she moves her left arm, it feels like a rubber band snap at her upper arm close to the joint.  The pain in her arm is bad enough she cannot put her arm behind her back to latch her bra.    She has begun cancelling engagements because of this, she is staying home a lot since the pacemaker placement.  She states her whole body pain has increased to a pain level 5.  She mentioned she may be developing anxiety related to going out, does not know what she should do, she states \"maybe I need to talk to someone\".    Please advise (pt states, if possible, please text her so she knows to answer the phone)

## 2024-12-05 NOTE — TELEPHONE ENCOUNTER
Likely musculoskeletal pain and scar tissue related from device placement. Recommend she ice the site daily for 20 minute increments and avoid sleeping on her left side. She can use OTC tylenol to help with pain relief as well    BRICE Fleming-CNP

## 2024-12-05 NOTE — TELEPHONE ENCOUNTER
I spoke with pt. She has a heaviness feeling. Intermittent since implant. More the last month or so. When see feel that way has no energy. She is also having joint and muscle pain unsure if related. I had her send a remote transmission and advised to get the lab work ordered by ERICKA. Pt took vitals while on the phone. She says her cuff runs about 10 points low. 162/86 HR 76  O2 97% Pain in LT arm, arm feels like somebody snapped a rubber band in e arm. Fatigue. 137/76 HR 69.

## 2024-12-05 NOTE — TELEPHONE ENCOUNTER
Relayed message to pt and she verbalized understanding. She states she will call next week for update.

## 2024-12-06 ENCOUNTER — TELEPHONE (OUTPATIENT)
Dept: CARDIOLOGY CLINIC | Age: 74
End: 2024-12-06

## 2024-12-06 DIAGNOSIS — I50.32 CHRONIC DIASTOLIC CONGESTIVE HEART FAILURE (HCC): ICD-10-CM

## 2024-12-06 DIAGNOSIS — E78.00 PURE HYPERCHOLESTEROLEMIA: ICD-10-CM

## 2024-12-06 DIAGNOSIS — I48.0 PAF (PAROXYSMAL ATRIAL FIBRILLATION) (HCC): ICD-10-CM

## 2024-12-06 DIAGNOSIS — G47.33 OSA (OBSTRUCTIVE SLEEP APNEA): ICD-10-CM

## 2024-12-06 LAB
CHOLEST SERPL-MCNC: 223 MG/DL (ref 0–199)
CRP SERPL-MCNC: <3 MG/L (ref 0–5.1)
HDLC SERPL-MCNC: 64 MG/DL (ref 40–60)
LDL CHOLESTEROL: 130 MG/DL
TRIGL SERPL-MCNC: 143 MG/DL (ref 0–150)
VLDLC SERPL CALC-MCNC: 29 MG/DL

## 2024-12-06 NOTE — TELEPHONE ENCOUNTER
Called pt to let her know CRP was ordered on 11/25/24 at her OV. She had labs drawn yesterday but they did not order those. Called [facility] lab and they are adding them to her blood draw she had done yesterday. Called pt back to let her know she does not have to come back up and get her blood drawn.

## 2024-12-18 ENCOUNTER — TELEPHONE (OUTPATIENT)
Dept: CARDIOLOGY CLINIC | Age: 74
End: 2024-12-18

## 2024-12-18 ENCOUNTER — CARE COORDINATION (OUTPATIENT)
Dept: CARE COORDINATION | Age: 74
End: 2024-12-18

## 2024-12-18 NOTE — TELEPHONE ENCOUNTER
Follow up with PCP to determine what she should do. She had these complaints prior to her device implant and are more than likely do to her lumbar stenosis or previously treated RLS

## 2024-12-18 NOTE — CARE COORDINATION
Attempted to f/u with patient today, however she was UTR. Left VM message with contact information and will make another outreach attempt at a later date/time.

## 2024-12-18 NOTE — TELEPHONE ENCOUNTER
I spoke with pt and relayed message per RHRN. Pt verbalized understanding. She agreed to f/u with PCP.

## 2024-12-18 NOTE — TELEPHONE ENCOUNTER
Pt is having problems with her legs that she said she did not have prior to having the pacemaker installed on 9/16/24. She is experiencing weakness, cramping, trembling, and tightness. She has no stamina when walking and now has to use a cane. It is mostly her left leg but it can also happen with her right.She isn't sure if this can be caused by the pacemaker but it she knows she didn't have this problem prior.    Please call pt to discuss. [phone]  If calling tomorrow 12/19, she is having eye sx in the morning and would be able to answer phone until afternoon.

## 2024-12-18 NOTE — TELEPHONE ENCOUNTER
Called pt an informed her of message below, she verbalized understanding. She insist that she did not have any of these problems before her pacemaker. She states she drinks plenty of water and can not lay flat to sleep at night because of the pain that it causes.Please advise if anything else can be done.

## 2024-12-20 ENCOUNTER — CARE COORDINATION (OUTPATIENT)
Dept: CARE COORDINATION | Age: 74
End: 2024-12-20

## 2024-12-20 NOTE — CARE COORDINATION
Pt called and left VM message for AC. ACM returned pt call. She reports she is having concerns about her legs since having her pacemaker placed in September of 2024. She said she is experiencing weakness and wasn't sure if this was related or not. Pt did contact cardiology and was informed this is not related to her to her pacemaker, so she isn't sure what is causing this.     She said cardiology did tell her they would like for her to start cardiac rehab after the first of the year and she plans on doing so. She said she would like to meet with Dr. Denton to talk about her legs, however she would like to meet with cardiac rehab first, get a plan together with them, and then meet with Dr. Denton near mid January, if possible.     Pt also reports she recently had eye surgery, on 12/12, to remove a cataract implant that had shifted in her eye and it was replaced with a different type of lens. Due to the type of lens and surgery procedure, pt is currently dealing with a lot of blood in her eye still and is unable to drive at this time.     [facility] informed pt I will send Dr. Denton a message about her legs and her plan to see if he is in agreement and will let her know. She said if she doesn't answer the phone to please send her a My Chart message with a date/time in mid January Dr. Denton can possibly meet with her. [facility] informed her I will do so, or it may be Shilpa as well. Pt agreed with this plan.    no

## 2024-12-23 ENCOUNTER — TELEPHONE (OUTPATIENT)
Dept: INTERNAL MEDICINE CLINIC | Age: 74
End: 2024-12-23

## 2024-12-23 NOTE — TELEPHONE ENCOUNTER
----- Message from MICHELINE HORN RN sent at 12/20/2024  9:44 AM EST -----  Regarding: Appt for Mid January  Hi Ms. Massey,    Hope you are doing well! Pt would like to see Dr. Denton in mid January, after she meets with cardiac rehab, to discuss the weakness in her legs.     I'm afraid to mess up Dr. Mercado's schedule, but if you see a date/time, would you care to schedule it for patient and I'll let her know?    Thanks so much,  Reina

## 2024-12-31 NOTE — PROGRESS NOTES
[facility] [place]   Electrophysiology  [place] Gallego PA-C  Attending EP: Kassi    Date: 12/31/2024  I had the privilege of visiting Kira Alexander in the office.     Chief Complaint: Office follow up    History of Present Illness: History obtained from patient and medical record.    Kira Alexander is 74 y.o. female with a past medical history of persistent atrial fibrillation, HUSSAIN, HFpEF, HTN, PE, sinus pauses.    Found to be in Afib 1/2020 when she came to ED with palpitations, converted spontaneously. Symptoms of lightheadedness, SOB, and chest pressure when in Afib.     S/p cardioversion 1/13/2020, had long conversion pauses after, has h/o syncope after conversion at home.     S/p RFA PVI and CTI dependent flutter 3/7/2020.     S/p loop recorder insertion 8/17/2020    She was having episodes of lightheadedness and dizziness with episodes of bradycardia, found to have sinus pauses. S/p dual chamber [employer]tr[employer] pacemaker 9/16/2024.    Interval history: Today, Kira Alexander is being seen for follow up after pacemaker implantation    Taking 60 mg Cardizem when she goes into Afib. Feels heart racing when in Afib, dizzy, lightheaded, and nauseous.     Having pain running down her leg since pacemaker.    Denies having chest pain, palpitations, shortness of breath, orthopnea/PND, cough, or dizziness at the time of this visit.    With regard to medication therapy the patient has been compliant with prescribed regimen. They have tolerated therapy to date.     Allergies:  Allergies   Allergen Reactions    Atorvastatin Other (See Comments)     Muscle Pain, all statins     Penicillins Anaphylaxis    Simvastatin Other (See Comments)     Muscle Pain    Cephalexin Hives and Itching    Cephalosporins Hives and Itching    Food Diarrhea     Milk , shellfish , gluten...may have bouts of diarrhea, constipation or flatulus.  (RD spoke to pt regarding \"milk allergy\", pt is not allergic to milk. She does not drink milk, but consumes

## 2025-01-02 ENCOUNTER — OFFICE VISIT (OUTPATIENT)
Dept: CARDIOLOGY CLINIC | Age: 75
End: 2025-01-02

## 2025-01-02 ENCOUNTER — NURSE ONLY (OUTPATIENT)
Dept: CARDIOLOGY CLINIC | Age: 75
End: 2025-01-02

## 2025-01-02 VITALS
HEART RATE: 78 BPM | OXYGEN SATURATION: 94 % | DIASTOLIC BLOOD PRESSURE: 70 MMHG | HEIGHT: 63 IN | WEIGHT: 247.8 LBS | SYSTOLIC BLOOD PRESSURE: 116 MMHG | BODY MASS INDEX: 43.91 KG/M2

## 2025-01-02 DIAGNOSIS — R00.1 SYMPTOMATIC BRADYCARDIA: ICD-10-CM

## 2025-01-02 DIAGNOSIS — I48.0 PAF (PAROXYSMAL ATRIAL FIBRILLATION) (HCC): Primary | ICD-10-CM

## 2025-01-02 DIAGNOSIS — M79.604 PAIN IN BOTH LOWER EXTREMITIES: ICD-10-CM

## 2025-01-02 DIAGNOSIS — M79.605 PAIN IN BOTH LOWER EXTREMITIES: ICD-10-CM

## 2025-01-02 DIAGNOSIS — I50.32 CHRONIC DIASTOLIC CONGESTIVE HEART FAILURE (HCC): ICD-10-CM

## 2025-01-02 DIAGNOSIS — R00.1 BRADYCARDIA: ICD-10-CM

## 2025-01-02 DIAGNOSIS — Z95.0 PACEMAKER: Primary | ICD-10-CM

## 2025-01-02 DIAGNOSIS — R06.02 SHORTNESS OF BREATH: ICD-10-CM

## 2025-01-02 DIAGNOSIS — I48.0 PAF (PAROXYSMAL ATRIAL FIBRILLATION) (HCC): ICD-10-CM

## 2025-01-02 DIAGNOSIS — I45.5 SINUS PAUSE: ICD-10-CM

## 2025-01-03 NOTE — TELEPHONE ENCOUNTER
Scheduled TR 1/29 [facility]   Mailed PW to patient     J-Pouch - 24 hr clears, NPO midnight     Eliquis - Eva        Sent. Sig adjusted

## 2025-01-08 DIAGNOSIS — I48.0 PAROXYSMAL ATRIAL FIBRILLATION (HCC): ICD-10-CM

## 2025-01-08 DIAGNOSIS — E78.00 PURE HYPERCHOLESTEROLEMIA: ICD-10-CM

## 2025-01-08 DIAGNOSIS — I50.32 CHRONIC DIASTOLIC HEART FAILURE (HCC): ICD-10-CM

## 2025-01-08 DIAGNOSIS — I10 BENIGN ESSENTIAL HTN: ICD-10-CM

## 2025-01-08 DIAGNOSIS — J45.21 MILD INTERMITTENT ASTHMA WITH ACUTE EXACERBATION: ICD-10-CM

## 2025-01-08 DIAGNOSIS — I30.0 ACUTE IDIOPATHIC PERICARDITIS: ICD-10-CM

## 2025-01-08 DIAGNOSIS — R06.02 SOB (SHORTNESS OF BREATH): ICD-10-CM

## 2025-01-08 RX ORDER — COLCHICINE 0.6 MG/1
TABLET ORAL
Qty: 30 TABLET | Refills: 10 | Status: SHIPPED | OUTPATIENT
Start: 2025-01-08

## 2025-01-08 RX ORDER — TIOTROPIUM BROMIDE 18 UG/1
CAPSULE ORAL; RESPIRATORY (INHALATION)
Qty: 1 CAPSULE | Refills: 10 | Status: SHIPPED | OUTPATIENT
Start: 2025-01-08

## 2025-01-08 RX ORDER — TIZANIDINE HYDROCHLORIDE 4 MG/1
CAPSULE, GELATIN COATED ORAL
Qty: 30 CAPSULE | Refills: 10 | Status: SHIPPED | OUTPATIENT
Start: 2025-01-08

## 2025-01-08 RX ORDER — SPIRONOLACTONE 25 MG/1
TABLET ORAL
Qty: 30 TABLET | Refills: 10 | Status: SHIPPED | OUTPATIENT
Start: 2025-01-08

## 2025-01-08 RX ORDER — AZELASTINE HYDROCHLORIDE 137 UG/1
SPRAY, METERED NASAL
Qty: 1 ML | Refills: 10 | Status: SHIPPED | OUTPATIENT
Start: 2025-01-08

## 2025-01-08 RX ORDER — RIVAROXABAN 20 MG/1
TABLET, FILM COATED ORAL
Qty: 30 TABLET | Refills: 10 | OUTPATIENT
Start: 2025-01-08

## 2025-01-08 RX ORDER — POTASSIUM CHLORIDE 750 MG/1
TABLET, EXTENDED RELEASE ORAL
Qty: 30 TABLET | Refills: 10 | Status: SHIPPED | OUTPATIENT
Start: 2025-01-08

## 2025-01-08 RX ORDER — CICLESONIDE 160 UG/1
AEROSOL, METERED RESPIRATORY (INHALATION)
Qty: 6.1 G | Refills: 10 | Status: SHIPPED | OUTPATIENT
Start: 2025-01-08

## 2025-01-08 RX ORDER — ALBUTEROL SULFATE 90 UG/1
INHALANT RESPIRATORY (INHALATION)
Qty: 18 G | Refills: 10 | Status: SHIPPED | OUTPATIENT
Start: 2025-01-08 | End: 2025-01-09 | Stop reason: SDUPTHER

## 2025-01-08 RX ORDER — FLUTICASONE PROPIONATE 50 MCG
SPRAY, SUSPENSION (ML) NASAL
Qty: 1 G | Refills: 10 | Status: SHIPPED | OUTPATIENT
Start: 2025-01-08

## 2025-01-08 RX ORDER — LISINOPRIL 20 MG/1
TABLET ORAL
Qty: 30 TABLET | Refills: 10 | OUTPATIENT
Start: 2025-01-08

## 2025-01-08 RX ORDER — FUROSEMIDE 40 MG/1
TABLET ORAL
Qty: 30 TABLET | Refills: 10 | OUTPATIENT
Start: 2025-01-08

## 2025-01-08 RX ORDER — DILTIAZEM HCL 60 MG
TABLET ORAL
Qty: 30 TABLET | Refills: 10 | OUTPATIENT
Start: 2025-01-08

## 2025-01-08 RX ORDER — DILTIAZEM HYDROCHLORIDE 180 MG/1
CAPSULE, COATED, EXTENDED RELEASE ORAL
Qty: 30 CAPSULE | Refills: 10 | OUTPATIENT
Start: 2025-01-08

## 2025-01-08 NOTE — TELEPHONE ENCOUNTER
Prescription refill:  Requested Prescriptions     Pending Prescriptions Disp Refills    potassium chloride (KLOR-CON) 10 MEQ extended release tablet [Pharmacy Med Name: POTASSIUM CHL ER 10MEQ TAB 10 Tablet] 30 tablet 10     Sig: TAKE 1 TABLET BY MOUTH 1 TIMES PER DAY *NEW PRESCRIPTION REQUEST*    spironolactone (ALDACTONE) 25 MG tablet [Pharmacy Med Name: SPIRONOLACTONE 25 MG TABS 25 Tablet] 30 tablet 10     Sig: TAKE 1 TABLET BY MOUTH 1 TIMES PER DAY *NEW PRESCRIPTION REQUEST*    colchicine (COLCRYS) 0.6 MG tablet [Pharmacy Med Name: COLCHICINE 0.6 MG TABS 0.6 Tablet] 30 tablet 10     Sig: TAKE 1 TABLET BY MOUTH 1 TIMES PER DAY *NEW PRESCRIPTION REQUEST*     Refused Prescriptions Disp Refills    lisinopril (PRINIVIL;ZESTRIL) 20 MG tablet [Pharmacy Med Name: LISINOPRIL 20 MG TABLET 20 Tablet] 30 tablet 10     Sig: TAKE 1 TABLET BY MOUTH 1 TIMES PER DAY *NEW PRESCRIPTION REQUEST*    dilTIAZem (CARDIZEM) 60 MG tablet [Pharmacy Med Name: DILTIAZEM 60MG TABS 60 Tablet] 30 tablet 10     Sig: TAKE 1 TABLET BY MOUTH 1 TIMES PER DAY AS NEEDED *NEW PRESCRIPTION REQUEST*    dilTIAZem (CARDIZEM CD) 180 MG extended release capsule [Pharmacy Med Name: DILTIAZEM ER 180MG CAP 24H 180 Capsule] 30 capsule 10     Sig: TAKE 1 CAPSULE BY MOUTH 1 TIMES PER DAY *NEW PRESCRIPTION REQUEST*    furosemide (LASIX) 40 MG tablet [Pharmacy Med Name: FUROSEMIDE 40 MG TABS 40 Tablet] 30 tablet 10     Sig: TAKE 1 TABLET BY MOUTH 1 TIMES PER DAY *NEW PRESCRIPTION REQUEST*    XARELTO 20 MG TABS tablet [Pharmacy Med Name: XARELTO 20 MG TABLET 20 Tablet] 30 tablet 10     Sig: TAKE 1 TABLET BY MOUTH EVERY DAY *NEW PRESCRIPTION REQUEST*       Refill parameters per protocol were met    Last OV: 11/25/2024     Future Appt: 2/10/2025     Labs:    Lab Results   Component Value Date     (L) 12/05/2024    K 4.2 12/05/2024     12/05/2024    CO2 21 12/05/2024    BUN 22 (H) 12/05/2024    CREATININE 0.8 12/05/2024    GLUCOSE 113 (H) 12/05/2024

## 2025-01-08 NOTE — TELEPHONE ENCOUNTER
Recent Visits  Date Type Provider Dept   11/06/24 Office Visit Miguel Denton MD [facility]   10/25/24 Office Visit Miguel Denton, MD [facility]   09/20/24 Office Visit Miguel Denton, MD [facility]   09/11/24 Office Visit Miguel Denton, MD [facility]   06/05/24 Office Visit Miguel Denton MD [facility]   04/08/24 Office Visit Miguel Denton, MD [facility]   11/27/23 Office Visit Allison Chang,  [facility]   11/20/23 Office Visit Allison Chang, DO [facility]   10/02/23 Office Visit Miguel Denton MD [facility]   08/11/23 Office Visit Miguel Denton MD [facility]   Showing recent visits within past 540 days with a meds authorizing provider and meeting all other requirements  Future Appointments  Date Type Provider Dept   01/13/25 Appointment Miguel Denton MD [facility]   Showing future appointments within next 150 days with a meds authorizing provider and meeting all other requirements     11/6/2024

## 2025-01-09 ENCOUNTER — OFFICE VISIT (OUTPATIENT)
Dept: NEUROLOGY | Age: 75
End: 2025-01-09

## 2025-01-09 VITALS
SYSTOLIC BLOOD PRESSURE: 139 MMHG | HEIGHT: 63 IN | DIASTOLIC BLOOD PRESSURE: 80 MMHG | HEART RATE: 89 BPM | WEIGHT: 247.8 LBS | BODY MASS INDEX: 43.91 KG/M2

## 2025-01-09 DIAGNOSIS — G25.81 RESTLESS LEG SYNDROME: Primary | Chronic | ICD-10-CM

## 2025-01-09 DIAGNOSIS — F34.1 DYSTHYMIA: ICD-10-CM

## 2025-01-09 DIAGNOSIS — E11.9 TYPE 2 DIABETES MELLITUS WITHOUT COMPLICATION, WITHOUT LONG-TERM CURRENT USE OF INSULIN (HCC): ICD-10-CM

## 2025-01-09 DIAGNOSIS — F51.01 PRIMARY INSOMNIA: ICD-10-CM

## 2025-01-09 DIAGNOSIS — G47.33 OSA ON CPAP: ICD-10-CM

## 2025-01-09 DIAGNOSIS — J45.21 MILD INTERMITTENT ASTHMA WITH ACUTE EXACERBATION: ICD-10-CM

## 2025-01-09 DIAGNOSIS — J20.9 ACUTE BRONCHITIS, UNSPECIFIED ORGANISM: ICD-10-CM

## 2025-01-09 RX ORDER — ALBUTEROL SULFATE 0.83 MG/ML
2.5 SOLUTION RESPIRATORY (INHALATION) EVERY 6 HOURS PRN
Qty: 120 EACH | Refills: 2 | Status: SHIPPED | OUTPATIENT
Start: 2025-01-09

## 2025-01-09 RX ORDER — ALBUTEROL SULFATE 90 UG/1
1 INHALANT RESPIRATORY (INHALATION) EVERY 4 HOURS PRN
Qty: 18 G | Refills: 5 | Status: SHIPPED | OUTPATIENT
Start: 2025-01-09

## 2025-01-09 NOTE — TELEPHONE ENCOUNTER
Recent Visits  Date Type Provider Dept   11/06/24 Office Visit Miguel Denton MD [facility]   10/25/24 Office Visit Miguel Denton, MD [facility]   09/20/24 Office Visit Miguel Denton, MD [facility]   09/11/24 Office Visit Miguel Denton, MD [facility]   06/05/24 Office Visit Miguel Denton MD [facility]   04/08/24 Office Visit Miguel Denton, MD [facility]   11/27/23 Office Visit Allison Chang,  [facility]   11/20/23 Office Visit Allison Chang, DO [facility]   10/02/23 Office Visit Miguel Denton MD [facility]   08/11/23 Office Visit Miguel Denton MD [facility]   Showing recent visits within past 540 days with a meds authorizing provider and meeting all other requirements  Future Appointments  Date Type Provider Dept   01/13/25 Appointment Miguel Denton MD [facility]   Showing future appointments within next 150 days with a meds authorizing provider and meeting all other requirements     11/6/2024

## 2025-01-09 NOTE — PROGRESS NOTES
The patient came today for follow up regarding: RLS and hospital follow up     The patient was seen last month at [facility] for dizziness.  Further imaging with MRI showed no acute findings.  She came today for RLS management.    She has longstanding history of restless leg syndrome.  She does see Dr. Miller for sleep apnea treatment and insomnia.  She is currently on Mirapex 0.7 mg x 2.  She has been on the same dose for quite some time.  Multiple complicated history with on different medication including tramadol, Ambien, Zanaflex and blood pressure medicines.  She does have insomnia and sleep apnea.  She tried Requip in the past without improvement.  The patient describes painful crawling sensation in the legs and the urge to move the legs before the patient goes to sleep which does improve such painful sensation. Symptoms are worse before bedtime and can interfere with sleep.  The symptoms can interfere with quality of life and can be during the daytime but does not interfere upper extremity.  No recent falling or injury.  No family history of similar illness.  Previous blood test showed elevated ferritin level.  Other review of system was unremarkable.                Exam:   Constitutional:   Vitals:    01/09/25 1428   BP: 139/80   Site: Right Upper Arm   Pulse: 89   Weight: 112.4 kg (247 lb 12.8 oz)   Height: 1.6 m (5' 2.99\")       General appearance:  Normal development and appear in no acute distress.   Mental Status:   Oriented to person, place, problem, and time.    Memory: Good immediate recall.  Intact remote memory  Normal attention span and concentration.  Language: intact naming, repeating and fluency   Good fund of Knowledge. Aware of current events and vocabulary   Cranial Nerves: Pupil round regular and reactive, extraocular muscles were intact, no ophthalmoplegia, face is symmetric, tongue is midline and rest of cranial nerves are normal    Musculoskeletal: no focal weakness in UE or LL.

## 2025-01-09 NOTE — PATIENT INSTRUCTIONS
YOU MUST CONFIRM YOUR APPOINTMENT 1 DAY PRIOR OR IT WILL BE CANCELLED!!   Our office will call you 3 times the day prior to your appointment in an attempt to confirm.  Please return our call ASAP or confirm your appt through [employer] no later than 3 pm the day before your appointment.  If we do not hear back from you by 3 pm to confirm, your appointment will be cancelled & someone will be added into that slot from our wait list.

## 2025-01-10 DIAGNOSIS — I10 BENIGN ESSENTIAL HTN: ICD-10-CM

## 2025-01-10 RX ORDER — FUROSEMIDE 40 MG/1
40 TABLET ORAL DAILY
Qty: 90 TABLET | Refills: 3 | Status: SHIPPED | OUTPATIENT
Start: 2025-01-10

## 2025-01-10 NOTE — TELEPHONE ENCOUNTER
Medication Refill    Medication needing refilled:  furosemide (LASIX)   Dosage of the medication:  40 MG tablet   How are you taking this medication (QD, BID, TID, QID, PRN):  Take 1 tablet by mouth daily   30 or 90 day supply called in:  90 day supply  When will you run out of your medication:  unknown  Which Pharm[facility] are we sending the medication to?:  [facility] [id number] [facility] [phone] -  [phone]

## 2025-01-10 NOTE — TELEPHONE ENCOUNTER
Prescription refill:  Requested Prescriptions     Pending Prescriptions Disp Refills    furosemide (LASIX) 40 MG tablet 90 tablet 3     Sig: Take 1 tablet by mouth daily       Refill parameters per protocol were met    Last OV: 11/25/2024     Future Appt: 2/10/2025     Labs:    Lab Results   Component Value Date     (L) 12/05/2024    K 4.2 12/05/2024     12/05/2024    CO2 21 12/05/2024    BUN 22 (H) 12/05/2024    CREATININE 0.8 12/05/2024    GLUCOSE 113 (H) 12/05/2024    CALCIUM 9.7 12/05/2024    BILITOT 0.3 12/05/2024    ALKPHOS 77 12/05/2024    AST 31 12/05/2024    ALT 47 (H) 12/05/2024    LABGLOM 78 12/05/2024    GFRAA >60 02/16/2022    AGRATIO 1.8 12/05/2024    GLOB 2.7 03/28/2021       Lab Results   Component Value Date    CHOL 234 (H) 10/03/2024    TRIG 154 (H) 10/03/2024    HDL 64 (H) 12/05/2024     (H) 12/05/2024    VLDL 29 12/05/2024       Lab Results   Component Value Date    ALT 47 (H) 12/05/2024    AST 31 12/05/2024    ALKPHOS 77 12/05/2024    BILITOT 0.3 12/05/2024

## 2025-01-13 ENCOUNTER — OFFICE VISIT (OUTPATIENT)
Dept: INTERNAL MEDICINE CLINIC | Age: 75
End: 2025-01-13
Payer: MEDICARE

## 2025-01-13 VITALS
WEIGHT: 252 LBS | SYSTOLIC BLOOD PRESSURE: 124 MMHG | DIASTOLIC BLOOD PRESSURE: 76 MMHG | OXYGEN SATURATION: 98 % | BODY MASS INDEX: 44.65 KG/M2 | HEART RATE: 89 BPM | TEMPERATURE: 98.4 F

## 2025-01-13 DIAGNOSIS — F32.1 MODERATE MAJOR DEPRESSION (HCC): ICD-10-CM

## 2025-01-13 DIAGNOSIS — E11.69 DYSLIPIDEMIA ASSOCIATED WITH TYPE 2 DIABETES MELLITUS (HCC): Primary | ICD-10-CM

## 2025-01-13 DIAGNOSIS — E66.01 MORBID OBESITY: ICD-10-CM

## 2025-01-13 DIAGNOSIS — Z23 NEED FOR INFLUENZA VACCINATION: ICD-10-CM

## 2025-01-13 DIAGNOSIS — Z12.31 ENCOUNTER FOR SCREENING MAMMOGRAM FOR MALIGNANT NEOPLASM OF BREAST: ICD-10-CM

## 2025-01-13 DIAGNOSIS — E78.5 DYSLIPIDEMIA ASSOCIATED WITH TYPE 2 DIABETES MELLITUS (HCC): Primary | ICD-10-CM

## 2025-01-13 PROCEDURE — 1036F TOBACCO NON-USER: CPT | Performed by: INTERNAL MEDICINE

## 2025-01-13 PROCEDURE — 2022F DILAT RTA XM EVC RTNOPTHY: CPT | Performed by: INTERNAL MEDICINE

## 2025-01-13 PROCEDURE — 3074F SYST BP LT 130 MM HG: CPT | Performed by: INTERNAL MEDICINE

## 2025-01-13 PROCEDURE — 3017F COLORECTAL CA SCREEN DOC REV: CPT | Performed by: INTERNAL MEDICINE

## 2025-01-13 PROCEDURE — 1123F ACP DISCUSS/DSCN MKR DOCD: CPT | Performed by: INTERNAL MEDICINE

## 2025-01-13 PROCEDURE — G0008 ADMIN INFLUENZA VIRUS VAC: HCPCS | Performed by: INTERNAL MEDICINE

## 2025-01-13 PROCEDURE — G8399 PT W/DXA RESULTS DOCUMENT: HCPCS | Performed by: INTERNAL MEDICINE

## 2025-01-13 PROCEDURE — 90653 IIV ADJUVANT VACCINE IM: CPT | Performed by: INTERNAL MEDICINE

## 2025-01-13 PROCEDURE — 3078F DIAST BP <80 MM HG: CPT | Performed by: INTERNAL MEDICINE

## 2025-01-13 PROCEDURE — 1090F PRES/ABSN URINE INCON ASSESS: CPT | Performed by: INTERNAL MEDICINE

## 2025-01-13 PROCEDURE — 1159F MED LIST DOCD IN RCRD: CPT | Performed by: INTERNAL MEDICINE

## 2025-01-13 PROCEDURE — G8427 DOCREV CUR MEDS BY ELIG CLIN: HCPCS | Performed by: INTERNAL MEDICINE

## 2025-01-13 PROCEDURE — 3046F HEMOGLOBIN A1C LEVEL >9.0%: CPT | Performed by: INTERNAL MEDICINE

## 2025-01-13 PROCEDURE — 99214 OFFICE O/P EST MOD 30 MIN: CPT | Performed by: INTERNAL MEDICINE

## 2025-01-13 PROCEDURE — G8417 CALC BMI ABV UP PARAM F/U: HCPCS | Performed by: INTERNAL MEDICINE

## 2025-01-13 SDOH — ECONOMIC STABILITY: FOOD INSECURITY: WITHIN THE PAST 12 MONTHS, THE FOOD YOU BOUGHT JUST DIDN'T LAST AND YOU DIDN'T HAVE MONEY TO GET MORE.: NEVER TRUE

## 2025-01-13 SDOH — ECONOMIC STABILITY: FOOD INSECURITY: WITHIN THE PAST 12 MONTHS, YOU WORRIED THAT YOUR FOOD WOULD RUN OUT BEFORE YOU GOT MONEY TO BUY MORE.: NEVER TRUE

## 2025-01-13 ASSESSMENT — PATIENT HEALTH QUESTIONNAIRE - PHQ9
3. TROUBLE FALLING OR STAYING ASLEEP: NEARLY EVERY DAY
SUM OF ALL RESPONSES TO PHQ QUESTIONS 1-9: 9
SUM OF ALL RESPONSES TO PHQ QUESTIONS 1-9: 9
2. FEELING DOWN, DEPRESSED OR HOPELESS: NOT AT ALL
SUM OF ALL RESPONSES TO PHQ9 QUESTIONS 1 & 2: 0
5. POOR APPETITE OR OVEREATING: NEARLY EVERY DAY
8. MOVING OR SPEAKING SO SLOWLY THAT OTHER PEOPLE COULD HAVE NOTICED. OR THE OPPOSITE, BEING SO FIGETY OR RESTLESS THAT YOU HAVE BEEN MOVING AROUND A LOT MORE THAN USUAL: NOT AT ALL
6. FEELING BAD ABOUT YOURSELF - OR THAT YOU ARE A FAILURE OR HAVE LET YOURSELF OR YOUR FAMILY DOWN: NOT AT ALL
SUM OF ALL RESPONSES TO PHQ QUESTIONS 1-9: 9
1. LITTLE INTEREST OR PLEASURE IN DOING THINGS: NOT AT ALL
SUM OF ALL RESPONSES TO PHQ QUESTIONS 1-9: 9
10. IF YOU CHECKED OFF ANY PROBLEMS, HOW DIFFICULT HAVE THESE PROBLEMS MADE IT FOR YOU TO DO YOUR WORK, TAKE CARE OF THINGS AT HOME, OR GET ALONG WITH OTHER PEOPLE: SOMEWHAT DIFFICULT
9. THOUGHTS THAT YOU WOULD BE BETTER OFF DEAD, OR OF HURTING YOURSELF: NOT AT ALL
4. FEELING TIRED OR HAVING LITTLE ENERGY: NEARLY EVERY DAY
7. TROUBLE CONCENTRATING ON THINGS, SUCH AS READING THE NEWSPAPER OR WATCHING TELEVISION: NOT AT ALL

## 2025-01-13 NOTE — PROGRESS NOTES
Chief Complaint   Patient presents with    Atrial Fibrillation     Follow up  a-fib       Assessment/Plan:  Kira was seen today for atrial fibrillation.    Diagnoses and all orders for this visit:    Dyslipidemia associated with type 2 diabetes mellitus (HCC)  -     Cancel: Albumin/Creatinine Ratio, Urine; Future  -     Albumin/Creatinine Ratio, Urine    Moderate major depression (HCC)  Comments:  Chronic, stable.    Morbid obesity  Comments:  Chronic, stable.    Encounter for screening mammogram for malignant neoplasm of breast  -     JESSICA DIGITAL SCREEN W OR WO CAD BILATERAL; Future    Need for influenza vaccination  -     Influenza, FLUAD Trivalent, (age 65 y+), IM, Preservative Free, 0.5mL        Vitals:    01/13/25 1030   BP: 124/76   Pulse: 89   Temp: 98.4 °F (36.9 °C)   SpO2: 98%       Physical Exam  Vitals reviewed.   Constitutional:       General: She is not in acute distress.     Appearance: She is well-developed. She is not diaphoretic.   HENT:      Head: Normocephalic and atraumatic.   Pulmonary:      Effort: Pulmonary effort is normal.   Neurological:      Mental Status: She is alert and oriented to person, place, and time.      Cranial Nerves: No cranial nerve deficit.   Psychiatric:         Behavior: Behavior normal.         Thought Content: Thought content normal.         Judgment: Judgment normal.             1/13/2025    10:31 AM 11/6/2024     4:14 PM 10/25/2024    11:19 AM 9/11/2024     1:34 PM 9/11/2024     1:33 PM 6/5/2024    10:10 AM 4/8/2024    11:11 AM   PHQ Scores   PHQ2 Score 0 1 1 2 2 0 0   PHQ9 Score 9 1 4 11 2 4 9     Interpretation of Total Score Depression Severity: 1-4 = Minimal depression, 5-9 = Mild depression, 10-14 = Moderate depression, 15-19 = Moderately severe depression, 20-27 = Severe depression           Miguel Denton MD  FACP

## 2025-01-16 ENCOUNTER — TELEPHONE (OUTPATIENT)
Dept: CARDIOLOGY CLINIC | Age: 75
End: 2025-01-16

## 2025-01-16 DIAGNOSIS — I48.0 PAROXYSMAL ATRIAL FIBRILLATION (HCC): ICD-10-CM

## 2025-01-16 RX ORDER — DILTIAZEM HYDROCHLORIDE 180 MG/1
180 CAPSULE, COATED, EXTENDED RELEASE ORAL DAILY
Qty: 90 CAPSULE | Refills: 1 | Status: SHIPPED | OUTPATIENT
Start: 2025-01-16

## 2025-01-16 NOTE — TELEPHONE ENCOUNTER
Medication Refill    Medication needing refilled:  dilTIAZem (CARDIZEM   Dosage of the medication:  60 MG tablet   How are you taking this medication (QD, BID, TID, QID, PRN):    30 or 90 day supply called in:  90  When will you run out of your medication:    Which Pharmacy are we sending the medication to?:  [address] [phone] - F [phone]  [address] [place] [address]  Phone: [phone]  Fax: [phone]       Medication Refill    Medication needing refilled:  rivaroxaban (XARELTO)   Dosage of the medication:  20 MG TABS tablet   How are you taking this medication (QD, BID, TID, QID, PRN):    30 or 90 day supply called in:    When will you run out of your medication:    Which Pharmacy are we sending the medication to?:  [address] [phone] - F [phone]  [address] [place] [address]  Phone: [phone]  Fax: [phone]

## 2025-01-16 NOTE — TELEPHONE ENCOUNTER
Recent Visits  Date Type Provider Dept   01/13/25 Office Visit Miguel Denton MD [facility]   11/06/24 Office Visit Miguel Denton, MD [facility]   10/25/24 Office Visit Miguel Denton, MD [facility]   09/20/24 Office Visit Miguel Denton, MD [facility]   09/11/24 Office Visit Miguel Denton, MD [facility]   06/05/24 Office Visit Miguel Denton, MD [facility]   04/08/24 Office Visit Miguel Denton, MD [facility]   11/27/23 Office Visit Allison Chang,  [facility]   11/20/23 Office Visit Allison Chang,  [facility]   10/02/23 Office Visit Miguel Denton MD [facility]   Showing recent visits within past 540 days with a meds authorizing provider and meeting all other requirements  Future Appointments  Date Type Provider Dept   04/09/25 Appointment Miguel Denton MD [facility]   Showing future appointments within next 150 days with a meds authorizing provider and meeting all other requirements     1/13/2025

## 2025-01-20 DIAGNOSIS — Z12.31 ENCOUNTER FOR SCREENING MAMMOGRAM FOR MALIGNANT NEOPLASM OF BREAST: Primary | ICD-10-CM

## 2025-01-21 DIAGNOSIS — I48.0 PAROXYSMAL ATRIAL FIBRILLATION (HCC): ICD-10-CM

## 2025-01-21 RX ORDER — RIVAROXABAN 20 MG/1
20 TABLET, FILM COATED ORAL
Qty: 28 TABLET | Refills: 10 | OUTPATIENT
Start: 2025-01-21

## 2025-01-23 ENCOUNTER — TELEPHONE (OUTPATIENT)
Dept: ADMINISTRATIVE | Age: 75
End: 2025-01-23

## 2025-01-23 ENCOUNTER — CARE COORDINATION (OUTPATIENT)
Dept: CARE COORDINATION | Age: 75
End: 2025-01-23

## 2025-01-23 NOTE — TELEPHONE ENCOUNTER
Rec'd a request from [facility] for Albuterol Sulfate (2.5 MG/3ML)0.083% nebulizer solution Key: [id number].   ARCHIVED.     Patient has medicare coverage. Nebulizer and nebulizer medications will need to be billed under the medical portion (part B) at the pharmacy or through DME company.     If this requires a response please respond to the pool ( P [facility] MEDICATION PRE-AUTH).      Thank you please advise patient.

## 2025-01-23 NOTE — CARE COORDINATION
Ambulatory Care Coordination Note     1/23/2025 2:26 PM     Patient outreach attempt by this ACM today to perform care management follow up . ACM was unable to reach the patient by telephone today;   left voice message requesting a return phone call to this ACM.     ACM: Reina Gaines RN     Care Summary Note: Attempted to f/u with patient about her RLS, cardiac rehab, etc. Will make another outreach at a later date/time.     PCP/Specialist follow up:   Future Appointments         Provider Specialty Dept Phone    2/10/2025 10:15 AM Cece Betancur MD Cardiology [phone]    4/1/2025 1:00 PM SCHEDULE, [place] DEV[facility] CHECK [facility] [phone]    4/1/2025 1:00 PM Reymundo Solitario MD Cardiology [phone]    4/9/2025 8:45 AM Miguel Denton MD Internal Medicine [phone]            Follow Up:   Plan for next AC outreach in approximately 1 month to complete:  - CC Protocol assessments  - disease specific assessments  - SDOH assessments  - medication review  - advance care planning  - goal progression  - education   - outreach attempt to offer care management services.

## 2025-01-24 DIAGNOSIS — Z12.31 ENCOUNTER FOR SCREENING MAMMOGRAM FOR MALIGNANT NEOPLASM OF BREAST: Primary | ICD-10-CM

## 2025-01-24 NOTE — CARE COORDINATION
Pt returned ACM call and left VM message on 1/23/25 at 1436. Pt called ACM on 1/24/25 at 0758.     ACM attempted to return pt call at 0819 on 1/24/25 and she was UTR. Left VM message with contact information.

## 2025-01-28 ENCOUNTER — TELEPHONE (OUTPATIENT)
Dept: INTERNAL MEDICINE CLINIC | Age: 75
End: 2025-01-28

## 2025-01-28 DIAGNOSIS — N63.0 BREAST MASS IN FEMALE: Primary | ICD-10-CM

## 2025-01-28 DIAGNOSIS — N63.12 MASS OF UPPER INNER QUADRANT OF RIGHT BREAST: ICD-10-CM

## 2025-01-28 DIAGNOSIS — R92.30 DENSE BREAST TISSUE: Primary | ICD-10-CM

## 2025-01-28 NOTE — TELEPHONE ENCOUNTER
Even though Dr Denton says he will not do further on this but,  The mammogram ordered is not going through Medicare because is not specified.  Patient has a small mass on nipple of Rt breast but because is says unspecified, medicare is rejecting.     Is it that the patient needs to be seen again for this specifically?    Andrea at [phone] ext [phone]

## 2025-01-29 DIAGNOSIS — N63.0 BREAST MASS IN FEMALE: Primary | ICD-10-CM

## 2025-01-29 DIAGNOSIS — N64.4 PAINFUL LUMPY BREASTS: ICD-10-CM

## 2025-01-29 DIAGNOSIS — N63.0 PAINFUL LUMPY BREASTS: ICD-10-CM

## 2025-02-03 DIAGNOSIS — J20.9 ACUTE BRONCHITIS, UNSPECIFIED ORGANISM: ICD-10-CM

## 2025-02-03 RX ORDER — ALBUTEROL SULFATE 0.83 MG/ML
2.5 SOLUTION RESPIRATORY (INHALATION) EVERY 6 HOURS PRN
Qty: 120 EACH | Refills: 2 | Status: SHIPPED | OUTPATIENT
Start: 2025-02-03

## 2025-02-10 ENCOUNTER — HOSPITAL ENCOUNTER (OUTPATIENT)
Age: 75
Discharge: HOME | End: 2025-02-10
Payer: MEDICARE

## 2025-02-10 ENCOUNTER — OFFICE VISIT (OUTPATIENT)
Dept: CARDIOLOGY CLINIC | Age: 75
End: 2025-02-10

## 2025-02-10 VITALS
OXYGEN SATURATION: 99 % | BODY MASS INDEX: 45.45 KG/M2 | HEIGHT: 62 IN | SYSTOLIC BLOOD PRESSURE: 126 MMHG | HEART RATE: 78 BPM | WEIGHT: 247 LBS | DIASTOLIC BLOOD PRESSURE: 66 MMHG

## 2025-02-10 DIAGNOSIS — I48.0 PAROXYSMAL ATRIAL FIBRILLATION (HCC): ICD-10-CM

## 2025-02-10 DIAGNOSIS — R06.02 SOB (SHORTNESS OF BREATH): ICD-10-CM

## 2025-02-10 DIAGNOSIS — I10 BENIGN ESSENTIAL HTN: ICD-10-CM

## 2025-02-10 DIAGNOSIS — I50.32 CHRONIC DIASTOLIC HEART FAILURE (HCC): Primary | ICD-10-CM

## 2025-02-10 DIAGNOSIS — I50.32 CHRONIC DIASTOLIC HEART FAILURE (HCC): ICD-10-CM

## 2025-02-10 DIAGNOSIS — E78.00 PURE HYPERCHOLESTEROLEMIA: ICD-10-CM

## 2025-02-10 DIAGNOSIS — I51.7 LVH (LEFT VENTRICULAR HYPERTROPHY): ICD-10-CM

## 2025-02-10 DIAGNOSIS — G47.33 OSA (OBSTRUCTIVE SLEEP APNEA): ICD-10-CM

## 2025-02-10 LAB
ANION GAP SERPL CALCULATED.3IONS-SCNC: 13 MMOL/L (ref 3–16)
BUN SERPL-MCNC: 18 MG/DL (ref 7–20)
CALCIUM SERPL-MCNC: 10.1 MG/DL (ref 8.3–10.6)
CHLORIDE SERPL-SCNC: 100 MMOL/L (ref 99–110)
CO2 SERPL-SCNC: 24 MMOL/L (ref 21–32)
CREAT SERPL-MCNC: 0.8 MG/DL (ref 0.6–1.2)
GFR SERPLBLD CREATININE-BSD FMLA CKD-EPI: 78 ML/MIN/{1.73_M2}
GLUCOSE SERPL-MCNC: 129 MG/DL (ref 70–99)
MAGNESIUM SERPL-MCNC: 2.14 MG/DL (ref 1.8–2.4)
NT-PROBNP SERPL-MCNC: 144 PG/ML (ref 0–124)
POTASSIUM SERPL-SCNC: 4.5 MMOL/L (ref 3.5–5.1)
SODIUM SERPL-SCNC: 137 MMOL/L (ref 136–145)

## 2025-02-10 PROCEDURE — 36415 COLL VENOUS BLD VENIPUNCTURE: CPT

## 2025-02-10 PROCEDURE — 83880 ASSAY OF NATRIURETIC PEPTIDE: CPT

## 2025-02-10 PROCEDURE — 80048 BASIC METABOLIC PNL TOTAL CA: CPT

## 2025-02-10 PROCEDURE — 83735 ASSAY OF MAGNESIUM: CPT

## 2025-02-10 RX ORDER — PRAMIPEXOLE DIHYDROCHLORIDE 0.5 MG/1
0.5 TABLET ORAL DAILY
COMMUNITY
Start: 2025-01-13

## 2025-02-10 RX ORDER — POTASSIUM CHLORIDE 750 MG/1
10 TABLET, EXTENDED RELEASE ORAL DAILY
Qty: 90 TABLET | Refills: 3 | Status: SHIPPED | OUTPATIENT
Start: 2025-02-10 | End: 2025-02-11

## 2025-02-10 NOTE — PATIENT INSTRUCTIONS
Continue current medications, no changes  Attend HF classes (upon your request), I will call you with more information  Try some back strengthening exercises for posture  Keep working on your weight loss  Referral to cardiac rehab for core strengthening and leg weakness  Routine labs now: BMP and BNP and MAGNESIUM  Follow up with Cece Betancur MD in 6 months  Fasting labs (FASTING for 8-10 hours) with next office visit: CBC, CMP, BNP, and LIPID  Call my office for any worsening symptoms such as shortness of breath, chest pain, sudden weight gain or loss, or any increased [phone]

## 2025-02-11 ENCOUNTER — TELEPHONE (OUTPATIENT)
Dept: CARDIOLOGY CLINIC | Age: 75
End: 2025-02-11

## 2025-02-11 DIAGNOSIS — I48.0 PAROXYSMAL ATRIAL FIBRILLATION (HCC): ICD-10-CM

## 2025-02-11 DIAGNOSIS — R06.02 SOB (SHORTNESS OF BREATH): ICD-10-CM

## 2025-02-11 DIAGNOSIS — G47.33 OSA (OBSTRUCTIVE SLEEP APNEA): ICD-10-CM

## 2025-02-11 DIAGNOSIS — I50.32 CHRONIC DIASTOLIC HEART FAILURE (HCC): ICD-10-CM

## 2025-02-11 RX ORDER — POTASSIUM CHLORIDE 750 MG/1
10 TABLET, EXTENDED RELEASE ORAL 2 TIMES DAILY
Qty: 180 TABLET | Refills: 3 | Status: SHIPPED | OUTPATIENT
Start: 2025-02-11

## 2025-02-11 NOTE — TELEPHONE ENCOUNTER
Your potassium level on the 10th was good, but we could try to increase the dose to twice a day.  I will have the office change the prescription.  Cece Betancur     Called pt and relayed message above. Pt states verbal understanding. She wanted ERICKA to know that when she increased her leafy greens and ate more bananas she didn't have those leg cramps, so that's why she asked for the increase.

## 2025-02-13 ENCOUNTER — PATIENT MESSAGE (OUTPATIENT)
Dept: INTERNAL MEDICINE CLINIC | Age: 75
End: 2025-02-13

## 2025-02-13 DIAGNOSIS — M79.10 MYALGIA: ICD-10-CM

## 2025-02-13 DIAGNOSIS — M17.11 PRIMARY OSTEOARTHRITIS OF RIGHT KNEE: ICD-10-CM

## 2025-02-13 RX ORDER — TRAMADOL HYDROCHLORIDE 50 MG/1
TABLET ORAL
Qty: 42 TABLET | OUTPATIENT
Start: 2025-02-13

## 2025-02-13 NOTE — TELEPHONE ENCOUNTER
Recent Visits  Date Type Provider Dept   01/13/25 Office Visit Miguel Denton MD [facility]   11/06/24 Office Visit Miguel Denton, MD [facility]   10/25/24 Office Visit Miguel Denton, MD [facility]   09/20/24 Office Visit Miguel Denton, MD [facility]   09/11/24 Office Visit Miguel Denton, MD [facility]   06/05/24 Office Visit Miguel Denton, MD [facility]   04/08/24 Office Visit Miguel Denton, MD [facility]   11/27/23 Office Visit Allison Chnag,  [facility]   11/20/23 Office Visit Allison Chang,  [facility]   10/02/23 Office Visit Miguel Denton MD [facility]   Showing recent visits within past 540 days with a meds authorizing provider and meeting all other requirements  Future Appointments  Date Type Provider Dept   04/09/25 Appointment Miguel Denton MD [facility]   Showing future appointments within next 150 days with a meds authorizing provider and meeting all other requirements     1/13/2025

## 2025-02-14 RX ORDER — TRAMADOL HYDROCHLORIDE 50 MG/1
50 TABLET ORAL EVERY 8 HOURS PRN
Qty: 42 TABLET | Refills: 0 | Status: SHIPPED | OUTPATIENT
Start: 2025-02-14 | End: 2025-03-16

## 2025-02-14 NOTE — TELEPHONE ENCOUNTER
Dr Denton  I spoke with Ms Alexander.  She is wanting a refill of the Tramadol 50mg.  Not takes daily,  she is having  pain in her neck, feet knees and fingers at times.    Last refill   11/4/2024    Recent Visits  Date Type Provider Dept   01/13/25 Office Visit Miguel Denton MD [facility]   11/06/24 Office Visit Miguel Denton MD [facility]   10/25/24 Office Visit Miguel Denton MD [facility]   09/20/24 Office Visit Miguel Denton MD [facility]   09/11/24 Office Visit Miguel Denton MD [facility]   06/05/24 Office Visit Miguel Denton MD [facility]   04/08/24 Office Visit Miguel Denton MD [facility]   11/27/23 Office Visit Allison Chang, DO [facility]   11/20/23 Office Visit Allison Chang, DO [facility]   10/02/23 Office Visit Miguel Denton MD [facility]   Showing recent visits within past 540 days with a meds authorizing provider and meeting all other requirements  Future Appointments  Date Type Provider Dept   04/09/25 Appointment Miguel Denton MD [facility]   Showing future appointments within next 150 days with a meds authorizing provider and meeting all other requirements     1/13/2025

## 2025-02-18 ENCOUNTER — CARE COORDINATION (OUTPATIENT)
Dept: CARE COORDINATION | Age: 75
End: 2025-02-18

## 2025-02-18 DIAGNOSIS — I10 BENIGN ESSENTIAL HTN: ICD-10-CM

## 2025-02-18 RX ORDER — FUROSEMIDE 40 MG/1
40 TABLET ORAL DAILY
Qty: 90 TABLET | Refills: 3 | Status: SHIPPED | OUTPATIENT
Start: 2025-02-18

## 2025-02-18 NOTE — TELEPHONE ENCOUNTER
Prescription refill:  Requested Prescriptions     Pending Prescriptions Disp Refills    furosemide (LASIX) 40 MG tablet [Pharmacy Med Name: FUROSEMIDE 40 MG TABLET] 90 tablet 3     Sig: TAKE 1 TABLET BY MOUTH DAILY       Refill parameters per protocol were met    Last OV: 2/10/2025     Future Appt: 8/15/2025     Labs:    Lab Results   Component Value Date     02/10/2025    K 4.5 02/10/2025     02/10/2025    CO2 24 02/10/2025    BUN 18 02/10/2025    CREATININE 0.8 02/10/2025    GLUCOSE 129 (H) 02/10/2025    CALCIUM 10.1 02/10/2025    BILITOT 0.3 12/05/2024    ALKPHOS 77 12/05/2024    AST 31 12/05/2024    ALT 47 (H) 12/05/2024    LABGLOM 78 02/10/2025    GFRAA >60 02/16/2022    AGRATIO 1.8 12/05/2024    GLOB 2.7 03/28/2021       Lab Results   Component Value Date    CHOL 234 (H) 10/03/2024    TRIG 154 (H) 10/03/2024    HDL 64 (H) 12/05/2024     (H) 12/05/2024    VLDL 29 12/05/2024       Lab Results   Component Value Date    ALT 47 (H) 12/05/2024    AST 31 12/05/2024    ALKPHOS 77 12/05/2024    BILITOT 0.3 12/05/2024

## 2025-02-18 NOTE — CARE COORDINATION
Ambulatory Care Coordination Note     2/18/2025 10:31 AM     Patient outreach attempt by this ACM today to perform care management follow up . ACM was unable to reach the patient by telephone today;   left voice message requesting a return phone call to this ACM.     ACM: Reina Gaines, RN     Care Summary Note: Attempting to f/u regarding HF classes, noted scheduled for 3/18, and CC f/u.     PCP/Specialist follow up:   Future Appointments           Provider Specialty Dept Phone     2/25/2025 2:00 PM [facility] [phone]     2/27/2025 2:00 PM [facility] PH[facility] III [facility] [phone]     3/4/2025 2:00 PM [facility] PH[facility] III [facility] [phone]     3/6/2025 2:00 PM [facility] PHASE III CLASS [facility] [phone]     3/11/2025 2:00 PM [facility] PH[facility] III CLASS [facility] [phone]     3/13/2025 2:00 PM [facility] PH[facility] III CLASS [facility] [phone]     3/18/2025 9:30 AM [facility] PHASE III CLASS [facility] [phone]     3/18/2025 1:30 PM SCHEDULE, [facility] [place] [facility]      3/20/2025 2:00 PM [facility] PHASE III CLASS [facility] [phone]     3/25/2025 2:00 PM [facility] PHASE III CLASS [facility] [phone]     3/27/2025 2:00 PM [facility] PH[facility] III [facility] [phone]     4/1/2025 2:00 PM [facility] PHASE III CLASS [facility] [phone]     4/3/2025 2:00 PM [facility] PH[facility] III CLASS [facility] [phone]     4/8/2025 9:30 AM [facility] PHASE III CLASS [facility] [phone]     4/8/2025 1:30 PM SCHEDULE, [facility]      4/9/2025 8:45 AM Miguel Denton MD Internal Medicine [phone]     4/9/2025 11:00 AM SCHEDULE, [place] DEVICE CHECK [facility] [phone]     4/9/2025 11:00 AM Reymundo Solitario MD

## 2025-02-25 ENCOUNTER — HOSPITAL ENCOUNTER (OUTPATIENT)
Dept: CARDIAC REHAB | Age: 75
Discharge: HOME | End: 2025-02-25

## 2025-02-25 PROCEDURE — 9900000065 HC CARDIAC REHAB PHASE 3 - 1 VISIT

## 2025-02-27 ENCOUNTER — HOSPITAL ENCOUNTER (OUTPATIENT)
Dept: CARDIAC REHAB | Age: 75
Discharge: HOME | End: 2025-02-27

## 2025-02-27 PROCEDURE — 9900000065 HC CARDIAC REHAB PHASE 3 - 1 VISIT

## 2025-03-04 ENCOUNTER — HOSPITAL ENCOUNTER (OUTPATIENT)
Dept: CARDIAC REHAB | Age: 75
Discharge: HOME | End: 2025-03-04

## 2025-03-04 PROCEDURE — 9900000065 HC CARDIAC REHAB PHASE 3 - 1 VISIT

## 2025-03-07 ENCOUNTER — TELEPHONE (OUTPATIENT)
Dept: CARDIOLOGY CLINIC | Age: 75
End: 2025-03-07

## 2025-03-07 NOTE — TELEPHONE ENCOUNTER
She takes cardizem  mg daily, but she also has a short acting (60 mg daily PRN) dose that she uses as needed. Please clarify this with her pharmacy

## 2025-03-07 NOTE — TELEPHONE ENCOUNTER
[facility] and was told PT called them and cancelled with them to go to a local pharmacy.

## 2025-03-07 NOTE — TELEPHONE ENCOUNTER
[facility] calling requesting clarification on the directions for dilTIAZem (CARDIZEM CD) as they have 2 scripts   Please call and advise  Thank you

## 2025-03-12 ENCOUNTER — CARE COORDINATION (OUTPATIENT)
Dept: CARE COORDINATION | Age: 75
End: 2025-03-12

## 2025-03-12 NOTE — CARE COORDINATION
Ambulatory Care Coordination Note     3/12/2025 3:10 PM     patient outreach attempt by this ACM today to perform care management follow up . ACM was unable to reach the patient by telephone today;   left voice message requesting a return phone call to this ACM.     Patient closed (unable to reach patient) from the High Risk Care Management program on 3/12/2025.  Patient  UTR for multiple attempts .  Care management goals have been completed. No further Ambulatory Care Manager follow up scheduled.

## 2025-03-13 ENCOUNTER — HOSPITAL ENCOUNTER (OUTPATIENT)
Dept: CARDIAC REHAB | Age: 75
Discharge: HOME | End: 2025-03-13

## 2025-03-13 PROCEDURE — 9900000065 HC CARDIAC REHAB PHASE 3 - 1 VISIT

## 2025-03-17 DIAGNOSIS — J20.9 ACUTE BRONCHITIS, UNSPECIFIED ORGANISM: ICD-10-CM

## 2025-03-17 RX ORDER — ALBUTEROL SULFATE 0.83 MG/ML
2.5 SOLUTION RESPIRATORY (INHALATION) EVERY 6 HOURS PRN
Qty: 120 EACH | Refills: 2 | Status: SHIPPED | OUTPATIENT
Start: 2025-03-17

## 2025-03-17 NOTE — TELEPHONE ENCOUNTER
Recent Visits  Date Type Provider Dept   01/13/25 Office Visit Miguel Denton MD [facility]   11/06/24 Office Visit Miguel Denton, MD [facility]   10/25/24 Office Visit Miguel Denton, MD [facility]   09/20/24 Office Visit Miguel Denton, MD [facility]   09/11/24 Office Visit Miguel Denton, MD [facility]   06/05/24 Office Visit Miguel Denton, MD [facility]   04/08/24 Office Visit Miguel Denton, MD [facility]   11/27/23 Office Visit Allison Chang,  [facility]   11/20/23 Office Visit Allison Chang,  [facility]   10/02/23 Office Visit Miguel Denton MD [facility]   Showing recent visits within past 540 days with a meds authorizing provider and meeting all other requirements  Future Appointments  Date Type Provider Dept   04/09/25 Appointment Miguel Denton MD [facility]   Showing future appointments within next 150 days with a meds authorizing provider and meeting all other requirements     1/13/2025

## 2025-03-18 ENCOUNTER — NURSE ONLY (OUTPATIENT)
Dept: OTHER | Age: 75
End: 2025-03-18

## 2025-03-19 ENCOUNTER — TELEPHONE (OUTPATIENT)
Dept: CARDIOLOGY CLINIC | Age: 75
End: 2025-03-19

## 2025-03-19 NOTE — TELEPHONE ENCOUNTER
Is she continuing to have these symptoms?  She appears to be back in normal rhythm. If she is still having symptoms she should seek ED evaluation. If she is asymptomatic since being back in normal rhythm then there is nothing to be done at this time

## 2025-03-19 NOTE — TELEPHONE ENCOUNTER
EP Triage Questionnaire  Does the patient have a device? [x]Y  [] N, If yes ask them to please send an interrogation. Sending transmission    What is their HR and BP?during  episode 87/51  90/50 HR fluctuated 57,123,115, 85, 131.   Pt will have current BP and HR when you call    Have they had a recent procedure?   Sept 19 2024 pacemaker implant    What symptoms are they having?   Elevated HR, sob, sharp dizzy, clammy sweats, weakness, chest heaviness/ squeezing, fatigue     How long have they had symptoms?   Pt had an episode about 1130 pm and lasted until 6 am    Have they taken or missed medications recently and if so which ones?  No meds missed.   Pt states she did take her 60 mg diltiazem about 12:50 am and an hour later took another 60 mg tab as directed.      Can pt have water and chicken broth?

## 2025-03-19 NOTE — TELEPHONE ENCOUNTER
Spoke with the patient and advised her of the message below. Patient voiced understanding. Call complete

## 2025-03-26 ENCOUNTER — HOSPITAL ENCOUNTER (OUTPATIENT)
Age: 75
Discharge: HOME | End: 2025-03-26
Payer: MEDICARE

## 2025-03-26 DIAGNOSIS — I50.32 CHRONIC DIASTOLIC HEART FAILURE (HCC): ICD-10-CM

## 2025-03-26 DIAGNOSIS — I48.0 PAROXYSMAL ATRIAL FIBRILLATION (HCC): ICD-10-CM

## 2025-03-26 DIAGNOSIS — E78.00 PURE HYPERCHOLESTEROLEMIA: ICD-10-CM

## 2025-03-26 DIAGNOSIS — R06.02 SOB (SHORTNESS OF BREATH): ICD-10-CM

## 2025-03-26 LAB
ALBUMIN SERPL-MCNC: 4.4 G/DL (ref 3.4–5)
ALBUMIN/GLOB SERPL: 1.6 {RATIO} (ref 1.1–2.2)
ALP SERPL-CCNC: 89 U/L (ref 40–129)
ALT SERPL-CCNC: 43 U/L (ref 10–40)
ANION GAP SERPL CALCULATED.3IONS-SCNC: 12 MMOL/L (ref 3–16)
AST SERPL-CCNC: 34 U/L (ref 15–37)
BILIRUB SERPL-MCNC: 0.4 MG/DL (ref 0–1)
BUN SERPL-MCNC: 22 MG/DL (ref 7–20)
CALCIUM SERPL-MCNC: 9.8 MG/DL (ref 8.3–10.6)
CHLORIDE SERPL-SCNC: 100 MMOL/L (ref 99–110)
CO2 SERPL-SCNC: 21 MMOL/L (ref 21–32)
CREAT SERPL-MCNC: 0.8 MG/DL (ref 0.6–1.2)
DEPRECATED RDW RBC AUTO: 14.3 % (ref 12.4–15.4)
GFR SERPLBLD CREATININE-BSD FMLA CKD-EPI: 78 ML/MIN/{1.73_M2}
GLUCOSE SERPL-MCNC: 107 MG/DL (ref 70–99)
HCT VFR BLD AUTO: 42.6 % (ref 36–48)
HGB BLD-MCNC: 14.3 G/DL (ref 12–16)
MAGNESIUM SERPL-MCNC: 2.09 MG/DL (ref 1.8–2.4)
MCH RBC QN AUTO: 33.4 PG (ref 26–34)
MCHC RBC AUTO-ENTMCNC: 33.5 G/DL (ref 31–36)
MCV RBC AUTO: 99.7 FL (ref 80–100)
NT-PROBNP SERPL-MCNC: 142 PG/ML (ref 0–124)
PLATELET # BLD AUTO: 209 K/UL (ref 135–450)
PMV BLD AUTO: 8.4 FL (ref 5–10.5)
POTASSIUM SERPL-SCNC: 4.7 MMOL/L (ref 3.5–5.1)
PROT SERPL-MCNC: 7.1 G/DL (ref 6.4–8.2)
RBC # BLD AUTO: 4.27 M/UL (ref 4–5.2)
SODIUM SERPL-SCNC: 133 MMOL/L (ref 136–145)
WBC # BLD AUTO: 6.1 K/UL (ref 4–11)

## 2025-03-26 PROCEDURE — 85027 COMPLETE CBC AUTOMATED: CPT

## 2025-03-26 PROCEDURE — 83735 ASSAY OF MAGNESIUM: CPT

## 2025-03-26 PROCEDURE — 80061 LIPID PANEL: CPT

## 2025-03-26 PROCEDURE — 36415 COLL VENOUS BLD VENIPUNCTURE: CPT

## 2025-03-26 PROCEDURE — 80053 COMPREHEN METABOLIC PANEL: CPT

## 2025-03-26 PROCEDURE — 83880 ASSAY OF NATRIURETIC PEPTIDE: CPT

## 2025-03-27 ENCOUNTER — RESULTS FOLLOW-UP (OUTPATIENT)
Dept: CARDIOLOGY CLINIC | Age: 75
End: 2025-03-27

## 2025-03-27 LAB
CHOLEST SERPL-MCNC: 227 MG/DL (ref 0–199)
HDLC SERPL-MCNC: 54 MG/DL (ref 40–60)
LDL CHOLESTEROL: 137 MG/DL
TRIGL SERPL-MCNC: 178 MG/DL (ref 0–150)
VLDLC SERPL CALC-MCNC: 36 MG/DL

## 2025-03-27 NOTE — TELEPHONE ENCOUNTER
----- Message from Dr. Cece Betancur MD sent at 3/27/2025  1:42 PM EDT -----  See below.  Please call patient about LDL.  ERICKA Malone, your labs look good EXCEPT for your cholesterol!  Girlfriend, we have to do something about this!  I know you don't do well on statins.  Can we try an injectable Repatha every 2 weeks that you give yourself at home or Leqvio that is every 6 months given as an injection in the infusion center?  I will have the office call you.  Maybe you want to do a little research?!  Cece Betancur

## 2025-03-28 NOTE — TELEPHONE ENCOUNTER
Spoke with patient.  She has an appointment with Dr. Denton on 4/8 and would like to discuss this with him .  She also wants to do her own research and will then send you a mychart message regarding her decision.

## 2025-03-28 NOTE — PROGRESS NOTES
Mickie, APRN - CNP   Multiple Vitamins-Minerals (THERAPEUTIC MULTIVITAMIN-MINERALS) tablet Take 1 tablet by mouth daily    Provider, MD Malina   CPAP Machine MISC by Does not apply route    Provider, MD Malina       Allergies   Allergen Reactions    Atorvastatin Other (See Comments)     Muscle Pain, all statins     Penicillins Anaphylaxis    Simvastatin Other (See Comments)     Muscle Pain    Cephalexin Hives and Itching    Cephalosporins Hives and Itching    Food Diarrhea     Milk , shellfish , gluten...may have bouts of diarrhea, constipation or flatulus.  (RD spoke to pt regarding \"milk allergy\", pt is not allergic to milk. She does not drink milk, but consumes milk in other products and consumes dairy products.  Had one reactions to shellfish years ago and now can tolerate one serving of shellfish once per week.  Avoids gluten as much as she can, but does not have celiac disease.)    Levofloxacin     Other      Environmental -cats,dogs,dust    Shellfish-Derived Products      hives    Sulfa Antibiotics      Can take Celebrex, Pt denies 8/1/18       Social History:  Reviewed.  reports that she quit smoking about 11 years ago. Her smoking use included cigarettes. She started smoking about 16 years ago. She has a 2.5 pack-year smoking history. She has never been exposed to tobacco smoke. She has never used smokeless tobacco. She reports current alcohol use of about 1.0 standard drink of alcohol per week. She reports that she does not use drugs.     Family History:  Reviewed. Reviewed. No family history of SCD.      Relevant and available labs, and cardiovascular diagnostics reviewed.   Reviewed.     I independently reviewed relevant and available cardiac diagnostic tests ECG, CXR, Echo, Stress test, Device interrogation, Holter, CT scan.    Outside medical records via Care everywhere reviewed and summarized in H&P above.    Complex medical condition with multiple medical problems affecting prognosis and

## 2025-03-30 DIAGNOSIS — I50.32 CHRONIC DIASTOLIC HEART FAILURE (HCC): ICD-10-CM

## 2025-03-30 DIAGNOSIS — I48.0 PAROXYSMAL ATRIAL FIBRILLATION (HCC): ICD-10-CM

## 2025-03-30 DIAGNOSIS — R06.02 SOB (SHORTNESS OF BREATH): ICD-10-CM

## 2025-03-30 DIAGNOSIS — G47.33 OSA (OBSTRUCTIVE SLEEP APNEA): ICD-10-CM

## 2025-04-01 RX ORDER — POTASSIUM CHLORIDE 750 MG/1
10 TABLET, EXTENDED RELEASE ORAL 2 TIMES DAILY
Qty: 180 TABLET | Refills: 3 | OUTPATIENT
Start: 2025-04-01

## 2025-04-01 NOTE — TELEPHONE ENCOUNTER
Prescription refill:  Requested Prescriptions     Pending Prescriptions Disp Refills    potassium chloride (KLOR-CON M) 10 MEQ extended release tablet [Pharmacy Med Name: POTASSIUM CHLORIDE ER M-10 MEQ TAB] 90 tablet      Sig: TAKE 1 TABLET BY MOUTH DAILY       Refill parameters per protocol were met    Last OV: 2/10/2025     Future Appt: 8/15/2025     LABS:

## 2025-04-09 ENCOUNTER — CLINICAL SUPPORT (OUTPATIENT)
Dept: CARDIOLOGY CLINIC | Age: 75
End: 2025-04-09

## 2025-04-09 ENCOUNTER — OFFICE VISIT (OUTPATIENT)
Dept: CARDIOLOGY CLINIC | Age: 75
End: 2025-04-09
Payer: MEDICARE

## 2025-04-09 ENCOUNTER — OFFICE VISIT (OUTPATIENT)
Dept: INTERNAL MEDICINE CLINIC | Age: 75
End: 2025-04-09
Payer: MEDICARE

## 2025-04-09 VITALS
SYSTOLIC BLOOD PRESSURE: 124 MMHG | WEIGHT: 257 LBS | OXYGEN SATURATION: 98 % | HEIGHT: 63 IN | DIASTOLIC BLOOD PRESSURE: 66 MMHG | HEART RATE: 71 BPM | BODY MASS INDEX: 45.54 KG/M2

## 2025-04-09 VITALS
BODY MASS INDEX: 47.29 KG/M2 | OXYGEN SATURATION: 98 % | SYSTOLIC BLOOD PRESSURE: 122 MMHG | HEART RATE: 78 BPM | WEIGHT: 257 LBS | HEIGHT: 62 IN | DIASTOLIC BLOOD PRESSURE: 70 MMHG

## 2025-04-09 DIAGNOSIS — R00.1 BRADYCARDIA: ICD-10-CM

## 2025-04-09 DIAGNOSIS — Z95.0 PACEMAKER: ICD-10-CM

## 2025-04-09 DIAGNOSIS — E66.813 OBESITY, CLASS III, BMI 40-49.9 (MORBID OBESITY): ICD-10-CM

## 2025-04-09 DIAGNOSIS — G47.33 OSA ON CPAP: ICD-10-CM

## 2025-04-09 DIAGNOSIS — I50.32 CHRONIC HEART FAILURE WITH PRESERVED EJECTION FRACTION (HCC): ICD-10-CM

## 2025-04-09 DIAGNOSIS — Z95.0 PACEMAKER: Primary | ICD-10-CM

## 2025-04-09 DIAGNOSIS — I48.0 PAF (PAROXYSMAL ATRIAL FIBRILLATION) (HCC): ICD-10-CM

## 2025-04-09 DIAGNOSIS — I10 HTN (HYPERTENSION), BENIGN: ICD-10-CM

## 2025-04-09 DIAGNOSIS — I48.0 PAF (PAROXYSMAL ATRIAL FIBRILLATION) (HCC): Primary | ICD-10-CM

## 2025-04-09 DIAGNOSIS — I48.19 PERSISTENT ATRIAL FIBRILLATION (HCC): Primary | ICD-10-CM

## 2025-04-09 DIAGNOSIS — R27.0 ATAXIA: ICD-10-CM

## 2025-04-09 PROBLEM — E66.9 TYPE 2 DIABETES MELLITUS WITH OBESITY (HCC): Status: RESOLVED | Noted: 2023-02-15 | Resolved: 2025-04-09

## 2025-04-09 PROBLEM — E11.9 TYPE 2 DIABETES MELLITUS (HCC): Status: RESOLVED | Noted: 2022-04-29 | Resolved: 2025-04-09

## 2025-04-09 PROBLEM — E11.69 TYPE 2 DIABETES MELLITUS WITH OBESITY (HCC): Status: RESOLVED | Noted: 2023-02-15 | Resolved: 2025-04-09

## 2025-04-09 PROCEDURE — 1090F PRES/ABSN URINE INCON ASSESS: CPT | Performed by: INTERNAL MEDICINE

## 2025-04-09 PROCEDURE — G8399 PT W/DXA RESULTS DOCUMENT: HCPCS | Performed by: INTERNAL MEDICINE

## 2025-04-09 PROCEDURE — 3074F SYST BP LT 130 MM HG: CPT | Performed by: INTERNAL MEDICINE

## 2025-04-09 PROCEDURE — 3078F DIAST BP <80 MM HG: CPT | Performed by: INTERNAL MEDICINE

## 2025-04-09 PROCEDURE — 1123F ACP DISCUSS/DSCN MKR DOCD: CPT | Performed by: INTERNAL MEDICINE

## 2025-04-09 PROCEDURE — 3017F COLORECTAL CA SCREEN DOC REV: CPT | Performed by: INTERNAL MEDICINE

## 2025-04-09 PROCEDURE — G8427 DOCREV CUR MEDS BY ELIG CLIN: HCPCS | Performed by: INTERNAL MEDICINE

## 2025-04-09 PROCEDURE — G8417 CALC BMI ABV UP PARAM F/U: HCPCS | Performed by: INTERNAL MEDICINE

## 2025-04-09 PROCEDURE — 1036F TOBACCO NON-USER: CPT | Performed by: INTERNAL MEDICINE

## 2025-04-09 PROCEDURE — 99214 OFFICE O/P EST MOD 30 MIN: CPT | Performed by: INTERNAL MEDICINE

## 2025-04-09 PROCEDURE — 1159F MED LIST DOCD IN RCRD: CPT | Performed by: INTERNAL MEDICINE

## 2025-04-09 PROCEDURE — 93000 ELECTROCARDIOGRAM COMPLETE: CPT | Performed by: INTERNAL MEDICINE

## 2025-04-09 PROCEDURE — G2211 COMPLEX E/M VISIT ADD ON: HCPCS | Performed by: INTERNAL MEDICINE

## 2025-04-09 RX ORDER — CICLESONIDE 80 UG/1
80 AEROSOL, METERED RESPIRATORY (INHALATION) DAILY
Qty: 6.1 G | Refills: 0
Start: 2025-04-09

## 2025-04-09 SDOH — ECONOMIC STABILITY: FOOD INSECURITY: WITHIN THE PAST 12 MONTHS, THE FOOD YOU BOUGHT JUST DIDN'T LAST AND YOU DIDN'T HAVE MONEY TO GET MORE.: NEVER TRUE

## 2025-04-09 SDOH — ECONOMIC STABILITY: FOOD INSECURITY: WITHIN THE PAST 12 MONTHS, YOU WORRIED THAT YOUR FOOD WOULD RUN OUT BEFORE YOU GOT MONEY TO BUY MORE.: NEVER TRUE

## 2025-04-09 ASSESSMENT — PATIENT HEALTH QUESTIONNAIRE - PHQ9
8. MOVING OR SPEAKING SO SLOWLY THAT OTHER PEOPLE COULD HAVE NOTICED. OR THE OPPOSITE, BEING SO FIGETY OR RESTLESS THAT YOU HAVE BEEN MOVING AROUND A LOT MORE THAN USUAL: NOT AT ALL
SUM OF ALL RESPONSES TO PHQ QUESTIONS 1-9: 0
6. FEELING BAD ABOUT YOURSELF - OR THAT YOU ARE A FAILURE OR HAVE LET YOURSELF OR YOUR FAMILY DOWN: NOT AT ALL
SUM OF ALL RESPONSES TO PHQ QUESTIONS 1-9: 0
4. FEELING TIRED OR HAVING LITTLE ENERGY: NOT AT ALL
1. LITTLE INTEREST OR PLEASURE IN DOING THINGS: NOT AT ALL
SUM OF ALL RESPONSES TO PHQ QUESTIONS 1-9: 0
10. IF YOU CHECKED OFF ANY PROBLEMS, HOW DIFFICULT HAVE THESE PROBLEMS MADE IT FOR YOU TO DO YOUR WORK, TAKE CARE OF THINGS AT HOME, OR GET ALONG WITH OTHER PEOPLE: NOT DIFFICULT AT ALL
2. FEELING DOWN, DEPRESSED OR HOPELESS: NOT AT ALL
7. TROUBLE CONCENTRATING ON THINGS, SUCH AS READING THE NEWSPAPER OR WATCHING TELEVISION: NOT AT ALL
5. POOR APPETITE OR OVEREATING: NOT AT ALL
3. TROUBLE FALLING OR STAYING ASLEEP: NOT AT ALL
SUM OF ALL RESPONSES TO PHQ QUESTIONS 1-9: 0
9. THOUGHTS THAT YOU WOULD BE BETTER OFF DEAD, OR OF HURTING YOURSELF: NOT AT ALL

## 2025-04-09 NOTE — PROGRESS NOTES
Chief Complaint   Patient presents with    3 Month Follow-Up    Difficulty Walking    Abdominal Pain    Discuss Medications    Immunizations     Covid         Assessment/Plan:  Kira was seen today for 3 month follow-up, difficulty walking, abdominal pain, discuss medications and immunizations.    Diagnoses and all orders for this visit:    Persistent atrial fibrillation (HCC)  Comments:  Chronic, rate controlled on Eliquis.    Obesity, Class III, BMI 40-49.9 (morbid obesity)  Comments:  Chronic, stable.    Chronic heart failure with preserved ejection fraction (HCC)  Comments:  Chronic, stable.    Ataxia  -     [facility]    Other orders  -     Ciclesonide (ALVESCO) 80 MCG/ACT AERS; Inhale 1 puff into the lungs daily      Review of Systems    Vitals:    04/09/25 0900   BP: 122/70   Pulse: 78   SpO2: 98%     Physical Exam  Vitals reviewed.   Constitutional:       General: She is not in acute distress.     Appearance: She is well-developed. She is not diaphoretic.   HENT:      Head: Normocephalic and atraumatic.   Pulmonary:      Effort: Pulmonary effort is normal.   Neurological:      Mental Status: She is alert and oriented to person, place, and time.      Cranial Nerves: No cranial nerve deficit.      Gait: Gait abnormal (Slumped gait that seems progressive.).   Psychiatric:         Behavior: Behavior normal.         Thought Content: Thought content normal.         Judgment: Judgment normal.           4/9/2025     9:10 AM 1/13/2025    10:31 AM 11/6/2024     4:14 PM 10/25/2024    11:19 AM 9/11/2024     1:34 PM 9/11/2024     1:33 PM 6/5/2024    10:10 AM   PHQ Scores   PHQ2 Score 0 0 1 1 2 2 0   PHQ9 Score 0 9 1 4 11 2 4     Interpretation of Total Score Depression Severity: 1-4 = Minimal depression, 5-9 = Mild depression, 10-14 = Moderate depression, 15-19 = Moderately severe depression, 20-27 = Severe depression           Miguel Denton MD  FACP

## 2025-04-11 DIAGNOSIS — I30.0 ACUTE IDIOPATHIC PERICARDITIS: ICD-10-CM

## 2025-04-11 DIAGNOSIS — I50.32 CHRONIC DIASTOLIC HEART FAILURE (HCC): ICD-10-CM

## 2025-04-11 DIAGNOSIS — R06.02 SOB (SHORTNESS OF BREATH): ICD-10-CM

## 2025-04-11 DIAGNOSIS — I10 BENIGN ESSENTIAL HTN: ICD-10-CM

## 2025-04-11 RX ORDER — COLCHICINE 0.6 MG/1
0.6 TABLET ORAL DAILY
Qty: 90 TABLET | Refills: 3 | Status: SHIPPED | OUTPATIENT
Start: 2025-04-11

## 2025-04-11 NOTE — TELEPHONE ENCOUNTER
Patient says previous RX was sent but it is more affordable for her to get a 90 day supply - and asking if RX can be resent for 9 days.  She is out of medicine, needs today.      Medication Refill    Medication needing refilled: colchicine (COLCRYS) 0.6 MG tablet     Dosage of the medication:0.6mg    How are you taking this medication (QD, BID, TID, QID, PRN):  TAKE 1 TABLET BY MOUTH 1 TIMES PER DAY *NEW PRESCRIPTION REQUEST*     30 or 90 day supply called in: 90    When will you run out of your medication: TODAY    Which Pharmacy are we sending the medication to?:  [facility] [id number] - [place] [address] [address] [phone] - F [phone]

## 2025-04-16 ENCOUNTER — HOSPITAL ENCOUNTER (OUTPATIENT)
Dept: PHYSICAL THERAPY | Age: 75
Setting detail: THERAPIES SERIES
Discharge: HOME | End: 2025-04-16
Attending: INTERNAL MEDICINE
Payer: MEDICARE

## 2025-04-16 DIAGNOSIS — Z74.09 IMPAIRED FUNCTIONAL MOBILITY, BALANCE, GAIT, AND ENDURANCE: ICD-10-CM

## 2025-04-16 DIAGNOSIS — R29.898 WEAKNESS OF BOTH LOWER EXTREMITIES: Primary | ICD-10-CM

## 2025-04-16 DIAGNOSIS — R26.89 IMBALANCE: ICD-10-CM

## 2025-04-16 DIAGNOSIS — M53.82 LIMITED ACTIVE RANGE OF MOTION (AROM) OF CERVICAL SPINE ON ROTATION: ICD-10-CM

## 2025-04-16 DIAGNOSIS — M62.89 TIGHTNESS OF FASCIA OF LOWER EXTREMITY: ICD-10-CM

## 2025-04-16 PROCEDURE — 97530 THERAPEUTIC ACTIVITIES: CPT

## 2025-04-16 PROCEDURE — 97161 PT EVAL LOW COMPLEX 20 MIN: CPT

## 2025-04-16 PROCEDURE — 97110 THERAPEUTIC EXERCISES: CPT

## 2025-04-23 ENCOUNTER — HOSPITAL ENCOUNTER (OUTPATIENT)
Dept: PHYSICAL THERAPY | Age: 75
Setting detail: THERAPIES SERIES
End: 2025-04-23
Attending: INTERNAL MEDICINE
Payer: MEDICARE

## 2025-04-29 DIAGNOSIS — F51.01 PRIMARY INSOMNIA: ICD-10-CM

## 2025-04-29 RX ORDER — ZOLPIDEM TARTRATE 10 MG/1
5 TABLET ORAL NIGHTLY PRN
Qty: 45 TABLET | Refills: 0 | Status: SHIPPED | OUTPATIENT
Start: 2025-04-29 | End: 2025-07-28

## 2025-04-29 NOTE — TELEPHONE ENCOUNTER
Recent Visits  Date Type Provider Dept   04/09/25 Office Visit Miguel Denton MD [facility]   01/13/25 Office Visit Miguel Denton, MD [facility]   11/06/24 Office Visit Mgiuel Denton, MD [facility]   10/25/24 Office Visit Miguel Denton, MD [facility]   09/20/24 Office Visit Miguel Denton, MD [facility]   09/11/24 Office Visit Miguel Denton, MD [facility]   06/05/24 Office Visit Miguel Denton, MD [facility]   04/08/24 Office Visit Miguel Denton MD [facility]   11/27/23 Office Visit Allison Chang DO [facility]   11/20/23 Office Visit Allison Chang,  [facility]   Showing recent visits within past 540 days with a meds authorizing provider and meeting all other requirements  Future Appointments  Date Type Provider Dept   08/18/25 Appointment Miguel Denton MD [facility]   Showing future appointments within next 150 days with a meds authorizing provider and meeting all other requirements     4/9/2025

## 2025-04-30 ENCOUNTER — APPOINTMENT (OUTPATIENT)
Dept: PHYSICAL THERAPY | Age: 75
End: 2025-04-30
Attending: INTERNAL MEDICINE
Payer: MEDICARE

## 2025-05-08 ENCOUNTER — TELEPHONE (OUTPATIENT)
Dept: CARDIOLOGY CLINIC | Age: 75
End: 2025-05-08

## 2025-05-08 NOTE — TELEPHONE ENCOUNTER
Pt calling in letting [facility] know she had accidentally took a double dose of her diltiazem/ morning meds  and was wondering if she will be okay   Please advise

## 2025-05-08 NOTE — TELEPHONE ENCOUNTER
Spoke with patient and advised her she should monitor BP. She has a PPM so she is protected from bradycardia. Advised to increase fluid intake today and monitor BP. Discussed symptoms that warrant ER evaluation. Patient verbalized understanding.

## 2025-05-13 NOTE — PROGRESS NOTES
Patient attended HF Shared Group Education Class 1- Reviewed overview of HF education. Discussed  CHF signs and symptoms, causes, treatments, symptom recognition, daily weights, activity and follow-up.Discussed and gave an overview of HF medications and over the counter medications to avoid. Patient was provided with handouts.      Daily Care at Home  1) Did I weight myself this morning: Yes        Today's home weight: 249.5 lb (baseline 247-254)  2) Do I have a sodium limitation:                    My sodium limit is : 2500 mg  3) Do I eat out at restaurants often:    Frequency: 1-2 a week  4) Do I have a fluid limitation:                         My fluid limit is: 64-85 oz  5) Do I take all of my medications: Yes   6)Am I on an exercise program: Yes              My exercise program is: cardiac rehab and walking  7) Do I smoke: No                   Packs per day:n/a Am I in a program to Quit? N/a         Living with Heart Failure Questionnaire  1) I feel like my overall health is:  unsure  2)I have trouble sleeping at night: Frequent  3)I have problems breath: Frequent  4) I have swelling in my legs/ankles: Seldom  5) My appetite is: good  6) My energy level is: good  7) My weight varies more than 2 lbs on a daily basis: Frequent  8) I am dizzy or lightheaded: Seldom

## 2025-05-27 DIAGNOSIS — M79.10 MYALGIA: ICD-10-CM

## 2025-05-27 DIAGNOSIS — M17.11 PRIMARY OSTEOARTHRITIS OF RIGHT KNEE: ICD-10-CM

## 2025-05-28 RX ORDER — TRAMADOL HYDROCHLORIDE 50 MG/1
50 TABLET ORAL EVERY 8 HOURS PRN
Qty: 42 TABLET | Refills: 0 | Status: SHIPPED | OUTPATIENT
Start: 2025-05-28 | End: 2025-06-27

## 2025-06-17 ENCOUNTER — HOSPITAL ENCOUNTER (OUTPATIENT)
Dept: CARDIAC REHAB | Age: 75
Discharge: HOME | End: 2025-06-17

## 2025-06-17 PROCEDURE — 9900000065 HC CARDIAC REHAB PHASE 3 - 1 VISIT

## 2025-06-19 ENCOUNTER — HOSPITAL ENCOUNTER (OUTPATIENT)
Dept: CARDIAC REHAB | Age: 75
Discharge: HOME | End: 2025-06-19

## 2025-06-19 PROCEDURE — 9900000065 HC CARDIAC REHAB PHASE 3 - 1 VISIT

## 2025-06-25 ENCOUNTER — HOSPITAL ENCOUNTER (OUTPATIENT)
Dept: PHYSICAL THERAPY | Age: 75
Setting detail: THERAPIES SERIES
Discharge: HOME | End: 2025-06-25
Attending: INTERNAL MEDICINE
Payer: MEDICARE

## 2025-06-25 PROCEDURE — 97530 THERAPEUTIC ACTIVITIES: CPT

## 2025-06-25 PROCEDURE — 97110 THERAPEUTIC EXERCISES: CPT

## 2025-06-25 NOTE — PLAN OF CARE
06/25/2025 - Re-eval   Pain Summary VAS 4-5/10 5.5/10   Functional questionnaire Activities-Specific Balance Confidence  Scale 710/56% disability 840/52.5% disability   Other:              Pain:  Pain location: back, neck, legs, shoulders  Patient describes pain to be constant and aching numbness B feet and hands  Pain decreases with: Sitting, Resting, Ice, and Heat  Pain increases with: Prolonged standing, Prolonged walking, and Prolonged sitting     Occupation/School:  Work/School Status: Retired  Job Duties/Demands: NA  Active : Yes  Leisure/Hobbies/Sports: na    Current Functional Limitations:    Functional Complaints:  Pt with less energy, balance. Pt can not reach upper cabinets in her house. Pt has decreased driving, can't bend over and  things, gait deviations      PLOF:  No functional limitations  Pt's sleep is affected?   YES - describes as sporadic        Social Support/Environment:  Lives with: alone - one floor home two entrances one without steps.     Family/caregiver support needed prior to current illness: No     Home Accessibility/Assistive Devices:  Patient is independent with all home mobility    Pt experiences symptoms of vertigo (spinning/ external) ?     NO          Vertigo is : NA    Pt experiences disequilibrium (imbalance) ?     YES    Pt experiences symptom of dizziness (Lightheaded, syncope, internal) ? NO      Associated hearing/ ear symptoms:    NO  Any recent illness or infections?      NO    Any recent accidents or head trauma?     NO  Any history of migraines or headaches?  NO  Associated Nausea/ Vomitting?   NO  Any changes in vision?   NO    History of Prior Therapy/Testing (e.g. MRI):  Specialist visit pt got PT and cardiac rehab after surgeries    Review of Medications:    Recent change in Medications?  YES - recently stopped taking Ambien  (6/25/2025)      Currently taking vestibular suppressants?     No  Vitamin Supplements: Not Applicable      History of

## 2025-06-26 ENCOUNTER — HOSPITAL ENCOUNTER (OUTPATIENT)
Dept: CARDIAC REHAB | Age: 75
Discharge: HOME | End: 2025-06-26

## 2025-06-26 PROCEDURE — 9900000065 HC CARDIAC REHAB PHASE 3 - 1 VISIT

## 2025-07-01 ENCOUNTER — HOSPITAL ENCOUNTER (OUTPATIENT)
Dept: PHYSICAL THERAPY | Age: 75
Setting detail: THERAPIES SERIES
Discharge: HOME | End: 2025-07-01
Attending: INTERNAL MEDICINE

## 2025-07-01 NOTE — FLOWSHEET NOTE
[facility] - Outpatient Rehabilitation and Therapy: [address], [place] [address] office: [phone] fax: [phone]       Physical Therapy: TREATMENT/PROGRESS NOTE   Patient: Kira Alexander (74 y.o. female)   Examination Date: 2025   :  1950 MRN: [id number]   Visit #: 3 /20  Insurance Allowable Auth Needed   Med nec, 80/20% []Yes    [x]No    Insurance: Payor: MEDICARE / Plan: MEDICARE PART A AND B / Product Type: *No Product type* /   Insurance ID: [id number] - (Medicare)  Secondary Insurance (if applicable): [place] BCBS   Treatment Diagnosis:     ICD-10-CM    1. Weakness of both lower extremities  R29.898       2. Imbalance  R26.89       3. Tightness of fascia of lower extremity  M62.89       4. Impaired functional mobility, balance, gait, and endurance  Z74.09       5. Limited active range of motion (AROM) of cervical spine on rotation  M53.82          Medical Diagnosis:  No admission diagnoses are documented for this encounter.   Referring Physician: Miguel Denton MD  PCP: Miguel Denton MD       Plan of care signed (Y/N): Y   Y 25 POC cosign in epic    Date of Patient follow up with Physician: 25     Plan of Care Report: Reassessment completed this date 25-25  POC update due: (10 visits /OR AUTH LIMITS, whichever is less)  2025                                             Medical History:  Comorbidities:  Hypertension  Pacemaker  Anxiety  Other Cardiovascular Conditions: OA, B carpal tunnel-N/T in hands, wears night splints  Prior Surgeries: hx B TKR, B THR, L zehra RTC  Relevant Medical History: Cardiac hx with cardiac rehab                                         Precautions/ Contra-indications:           Latex allergy:  NO  Pacemaker:    YES  Contraindications for Manipulation: None  Date of Surgery: not recent  Other:    Red Flags:  None    Suicide Screening:   The patient did not verbalize a primary behavioral concern, suicidal

## 2025-07-03 ENCOUNTER — APPOINTMENT (OUTPATIENT)
Dept: PHYSICAL THERAPY | Age: 75
End: 2025-07-03
Attending: INTERNAL MEDICINE
Payer: MEDICARE

## 2025-07-07 DIAGNOSIS — M51.9 LUMBAR DISC DISEASE: Primary | ICD-10-CM

## 2025-07-08 ENCOUNTER — HOSPITAL ENCOUNTER (OUTPATIENT)
Dept: PHYSICAL THERAPY | Age: 75
Setting detail: THERAPIES SERIES
Discharge: HOME | End: 2025-07-08
Attending: INTERNAL MEDICINE
Payer: MEDICARE

## 2025-07-08 PROCEDURE — 97530 THERAPEUTIC ACTIVITIES: CPT

## 2025-07-08 PROCEDURE — 97110 THERAPEUTIC EXERCISES: CPT

## 2025-07-08 NOTE — FLOWSHEET NOTE
[facility] - Outpatient Rehabilitation and Therapy: [address], [place] [address] office: [phone] fax: [phone]       Physical Therapy: TREATMENT/PROGRESS NOTE   Patient: Kira Alexander (74 y.o. female)   Examination Date: 2025   :  1950 MRN: [id number]   Visit #: 3 /20  Insurance Allowable Auth Needed   Med nec, 80/20% []Yes    [x]No    Insurance: Payor: MEDICARE / Plan: MEDICARE PART A AND B / Product Type: *No Product type* /   Insurance ID: [id number] - (Medicare)  Secondary Insurance (if applicable): [place] BCBS   Treatment Diagnosis:     ICD-10-CM    1. Weakness of both lower extremities  R29.898       2. Imbalance  R26.89       3. Tightness of fascia of lower extremity  M62.89       4. Impaired functional mobility, balance, gait, and endurance  Z74.09       5. Limited active range of motion (AROM) of cervical spine on rotation  M53.82          Medical Diagnosis:  No admission diagnoses are documented for this encounter.   Referring Physician: Miguel Denton MD  PCP: Miguel Denton MD       Plan of care signed (Y/N): Y   Y 25 POC cosign in epic    Date of Patient follow up with Physician: 25     Plan of Care Report: NO  POC update due: (10 visits /OR AUTH LIMITS, whichever is less)  2025                                             Medical History:  Comorbidities:  Hypertension  Pacemaker  Anxiety  Other Cardiovascular Conditions: OA, B carpal tunnel-N/T in hands, wears night splints  Prior Surgeries: hx B TKR, B THR, L zehra RTC  Relevant Medical History: Cardiac hx with cardiac rehab                                         Precautions/ Contra-indications:           Latex allergy:  NO  Pacemaker:    YES  Contraindications for Manipulation: None  Date of Surgery: not recent  Other:    Red Flags:  None    Suicide Screening:   The patient did not verbalize a primary behavioral concern, suicidal ideation, suicidal intent, or demonstrate suicidal

## 2025-07-10 ENCOUNTER — HOSPITAL ENCOUNTER (OUTPATIENT)
Dept: PHYSICAL THERAPY | Age: 75
Setting detail: THERAPIES SERIES
Discharge: HOME | End: 2025-07-10
Attending: INTERNAL MEDICINE
Payer: MEDICARE

## 2025-07-10 PROCEDURE — 97110 THERAPEUTIC EXERCISES: CPT

## 2025-07-10 PROCEDURE — 97530 THERAPEUTIC ACTIVITIES: CPT

## 2025-07-10 NOTE — FLOWSHEET NOTE
2.Normal Stance EC  S   3.Staggered Stance EO  S   4.Stagger Stance EC  F   5.Normal Stance on Foam EO  S   6.Normal Stance on Foam EC  F   7.Fakuda Stepping test  N       4/16/25:   Observations:  Posture: forward head and forward trunk    Blood Pressure:  not tested per pt BP runs low  Supine:   Seated:  Standing:     Cervical Assessment:      AROM decreased by 25% pain neck motion all          Coordination Testing:     WFL all      Postural Stability / VSR:    Test Position Time Result   1.Normal Stance EO 20 sec all N   2.Normal Stance EC  S   3.Staggered Stance EO  S   4.Stagger Stance EC  F   5.Normal Stance on Foam EO  S   6.Normal Stance on Foam EC  F   7.Fakuda Stepping test  Can not do   If 6 and 7 are positive, there is a 95% sensitivity for UVL    Balance:  [] WNL      [] NT       [x] Dysfunction noted  Comment:         Gait Testing:   tested  Level of Assistance needed:  Modified Independent  Gait Deviations (firm surface/linoleum):    decreased nilsa, increased HENRRY, decreased head and trunk rotation, and forward flexed posture  Assistive Device Used:  Single point cane on right    Positional Testing    Nystagmus Direction Duration Vertigo Duration   Right Loaded Tatiana Hallpike Not tested, no reports of dizziness or vertigo      Left Loaded Tatiana Hallpike       Right Sidelying Test       Left Sidelying Test       Right Roll Test       Left Roll Test                  Exercises/Interventions       GAIT BELT USED FOR SAFETY      Therapeutic Ex (62642)  Resistance Sets/time Reps Notes/Cues/Progressions   Performed and printed HEP see below   6/25/2025   Nustep seat 12, arms 10    Stair hip flexor and hams stretch L/R      Seated deep breathing    Seated scap sq  Cervical AROM stretches rot, sb, flex/ext    Seated active L/R LE nn glides LAQ with ankle DF          Walking laps with rollator for ex and endurance L 1    4 in  5 min                                     100' and pt needed to rest

## 2025-07-15 ENCOUNTER — HOSPITAL ENCOUNTER (OUTPATIENT)
Dept: PHYSICAL THERAPY | Age: 75
Setting detail: THERAPIES SERIES
Discharge: HOME | End: 2025-07-15
Attending: INTERNAL MEDICINE
Payer: MEDICARE

## 2025-07-15 PROCEDURE — 97110 THERAPEUTIC EXERCISES: CPT

## 2025-07-15 NOTE — FLOWSHEET NOTE
behaviors.    Preferred Language for Healthcare:   [x] English       [] other:    SUBJECTIVE EXAMINATION     Patient stated complaint/comment: appointment to see physical medicine doctor on August 11th. Office told her to keep doing her PT. Went to swimming pool yesterday at [employer] and got stuck in pool after 45 min. Needed help to get up the stairs and had a 7/10 pain. Reports less pain after session       Test used Initial score  4/16/25 6/25/2025  Re-eval 07/15/2025   Pain Summary VAS 4-5/10 5.5/10 4/10   3/10 post session   Functional questionnaire Activities-Specific Balance Confidence  Scale 710/56% disability 840/52.5% disability    Other:                Pain:  Pain location: back, neck, legs, shoulders  Patient describes pain to be constant and aching numbness B feet and hands  Pain decreases with: Sitting, Resting, Ice, and Heat  Pain increases with: Prolonged standing, Prolonged walking, and Prolonged sitting     Occupation/School:  Work/School Status: Retired  Job Duties/Demands: NA  Active : Yes  Leisure/Hobbies/Sports: na    Current Functional Limitations:    Functional Complaints:  Pt with less energy, balance. Pt can not reach upper cabinets in her house. Pt has decreased driving, can't bend over and  things, gait deviations      PLOF:  No functional limitations  Pt's sleep is affected?   YES - describes as sporadic        Social Support/Environment:  Lives with: alone - one floor home two entrances one without steps.     Family/caregiver support needed prior to current illness: No     Home Accessibility/Assistive Devices:  Patient is independent with all home mobility    Pt experiences symptoms of vertigo (spinning/ external) ?     NO          Vertigo is : NA    Pt experiences disequilibrium (imbalance) ?     YES    Pt experiences symptom of dizziness (Lightheaded, syncope, internal) ? NO      Associated hearing/ ear symptoms:    NO  Any recent illness or infections?      NO    Any recent 
Donald mistry

## 2025-07-17 ENCOUNTER — HOSPITAL ENCOUNTER (OUTPATIENT)
Dept: PHYSICAL THERAPY | Age: 75
Setting detail: THERAPIES SERIES
Discharge: HOME | End: 2025-07-17
Attending: INTERNAL MEDICINE
Payer: MEDICARE

## 2025-07-17 PROCEDURE — 97530 THERAPEUTIC ACTIVITIES: CPT

## 2025-07-17 PROCEDURE — 97110 THERAPEUTIC EXERCISES: CPT

## 2025-07-17 NOTE — FLOWSHEET NOTE
[facility] - Outpatient Rehabilitation and Therapy: [address], [place] [address] office: [phone] fax: [phone]       Physical Therapy: TREATMENT/PROGRESS NOTE   Patient: Kira Alexander (74 y.o. female)   Examination Date: 2025   :  1950 MRN: [id number]   Visit #:   Insurance Allowable Auth Needed   Med nec, 80/20% []Yes    [x]No    Insurance: Payor: MEDICARE / Plan: MEDICARE PART A AND B / Product Type: *No Product type* /   Insurance ID: [id number] - (Medicare)  Secondary Insurance (if applicable): [place] BCBS   Treatment Diagnosis:     ICD-10-CM    1. Weakness of both lower extremities  R29.898       2. Imbalance  R26.89       3. Tightness of fascia of lower extremity  M62.89       4. Impaired functional mobility, balance, gait, and endurance  Z74.09       5. Limited active range of motion (AROM) of cervical spine on rotation  M53.82          Medical Diagnosis:  No admission diagnoses are documented for this encounter.   Referring Physician: Miguel Denton MD  PCP: Miguel Denton MD       Plan of care signed (Y/N): Y   Y 25 POC cosign in epic    Date of Patient follow up with Physician: 25     Plan of Care Report: NO  POC update due: (10 visits /OR AUTH LIMITS, whichever is less)  2025                                             Medical History:  Comorbidities:  Hypertension  Pacemaker  Anxiety  Other Cardiovascular Conditions: OA, B carpal tunnel-N/T in hands, wears night splints  Prior Surgeries: hx B TKR, B THR, L zehra RTC  Relevant Medical History: Cardiac hx with cardiac rehab                                         Precautions/ Contra-indications:           Latex allergy:  NO  Pacemaker:    YES  Contraindications for Manipulation: None  Date of Surgery: not recent  Other:    Red Flags:  None    Suicide Screening:   The patient did not verbalize a primary behavioral concern, suicidal ideation, suicidal intent, or demonstrate suicidal 
General

## 2025-07-19 DIAGNOSIS — M17.11 PRIMARY OSTEOARTHRITIS OF RIGHT KNEE: ICD-10-CM

## 2025-07-19 DIAGNOSIS — M79.10 MYALGIA: ICD-10-CM

## 2025-07-20 NOTE — TELEPHONE ENCOUNTER
Recent Visits  Date Type Provider Dept   04/09/25 Office Visit Miguel Denton MD [facility]   01/13/25 Office Visit Miguel Denton, MD [facility]   11/06/24 Office Visit Miguel Denton, MD [facility]   10/25/24 Office Visit Miguel Denton, MD [facility]   09/20/24 Office Visit Miguel Denton MD [facility]   09/11/24 Office Visit Miguel Denton, MD [facility]   06/05/24 Office Visit Miguel Denton MD [facility]   04/08/24 Office Visit Miguel Denton MD [facility]   Showing recent visits within past 540 days with a meds authorizing provider and meeting all other requirements  Future Appointments  Date Type Provider Dept   08/18/25 Appointment Miguel Denton MD [facility]   Showing future appointments within next 150 days with a meds authorizing provider and meeting all other requirements     4/9/2025

## 2025-07-21 RX ORDER — DILTIAZEM HYDROCHLORIDE 180 MG/1
180 CAPSULE, EXTENDED RELEASE ORAL DAILY
Qty: 90 CAPSULE | Refills: 1 | Status: SHIPPED | OUTPATIENT
Start: 2025-07-21

## 2025-07-21 RX ORDER — TRAMADOL HYDROCHLORIDE 50 MG/1
50 TABLET ORAL EVERY 8 HOURS PRN
Qty: 42 TABLET | Refills: 0 | Status: SHIPPED | OUTPATIENT
Start: 2025-07-21 | End: 2025-08-20

## 2025-07-21 NOTE — TELEPHONE ENCOUNTER
Requested Prescriptions     Pending Prescriptions Disp Refills    dilTIAZem (TIAZAC) 180 MG extended release capsule [Pharmacy Med Name: dilTIAZem 24HR  MG CAP] 90 capsule      Sig: TAKE 1 CAPSULE BY MOUTH DAILY      Last OV:  2025 MXA    Next OV: 8/15/2025 ERICKA    Last EK2025    Last Filled: 2025 NPSR

## 2025-07-22 ENCOUNTER — HOSPITAL ENCOUNTER (OUTPATIENT)
Dept: PHYSICAL THERAPY | Age: 75
Setting detail: THERAPIES SERIES
End: 2025-07-22
Attending: INTERNAL MEDICINE
Payer: MEDICARE

## 2025-07-24 ENCOUNTER — HOSPITAL ENCOUNTER (OUTPATIENT)
Dept: PHYSICAL THERAPY | Age: 75
Setting detail: THERAPIES SERIES
End: 2025-07-24
Attending: INTERNAL MEDICINE
Payer: MEDICARE

## 2025-07-31 ENCOUNTER — HOSPITAL ENCOUNTER (OUTPATIENT)
Dept: CARDIAC REHAB | Age: 75
Discharge: HOME | End: 2025-07-31

## 2025-07-31 ENCOUNTER — HOSPITAL ENCOUNTER (OUTPATIENT)
Dept: PHYSICAL THERAPY | Age: 75
Setting detail: THERAPIES SERIES
Discharge: HOME | End: 2025-07-31
Attending: INTERNAL MEDICINE
Payer: MEDICARE

## 2025-07-31 PROCEDURE — 97112 NEUROMUSCULAR REEDUCATION: CPT

## 2025-07-31 PROCEDURE — 97530 THERAPEUTIC ACTIVITIES: CPT

## 2025-07-31 PROCEDURE — 97110 THERAPEUTIC EXERCISES: CPT

## 2025-07-31 PROCEDURE — 9900000065 HC CARDIAC REHAB PHASE 3 - 1 VISIT

## 2025-07-31 NOTE — PLAN OF CARE
[facility] - Outpatient Rehabilitation and Therapy: [address], [place] [address] office: [phone] fax: [phone]     Physical Therapy Re-Certification Plan of Care    Dear Miguel Denton MD  ,    We had the pleasure of treating the following patient for physical therapy services at [facility]. A summary of our findings can be found in the updated assessment below.  This includes our plan of care.  If you have any questions or concerns regarding these findings, please do not hesitate to contact me at the office phone number checked above.  Thank you for the referral.     Physician Signature:________________________________Date:__________________  By signing above (or electronic signature), therapist's plan is approved by physician      Total Visits: 7     Overall Response to Treatment:  Pt is making progress, only seen 4x this month due to pain and pt not feeling well. Improvement with goals and ABC outcome measure. Pt now taking meds to help with sleep, she is doing more ex at home. 7 of 7 goals partially met. Pt amb well with rollator for safety. Pt needs con't skilled PT to meet goals and improve mobility/balance for function. Pt trying to get back with cardiac rehab, going today after PT. Pt to see [facility].     Recommendation:    [x] Continue PT 2x / wk for 8 weeks.   [] Hold PT, pending MD visit   [] Discharge to [facility]. Follow up with PT or MD PRN.    Physical Therapy: TREATMENT/PROGRESS NOTE   Patient: Kira Alexander (74 y.o. female)   Examination Date: 2025   :  1950 MRN: [id number]   Visit #:   Insurance Allowable Auth Needed   Med nec, 80/20% []Yes    [x]No    Insurance: Payor: MEDICARE / Plan: MEDICARE PART A AND B / Product Type: *No Product type* /   Insurance ID: [id number] - (Medicare)  Secondary Insurance (if applicable): [place] BCBS   Treatment Diagnosis:     ICD-10-CM    1. Weakness of both lower

## 2025-08-05 ENCOUNTER — HOSPITAL ENCOUNTER (OUTPATIENT)
Dept: PHYSICAL THERAPY | Age: 75
Setting detail: THERAPIES SERIES
Discharge: HOME | End: 2025-08-05
Attending: INTERNAL MEDICINE
Payer: MEDICARE

## 2025-08-05 PROCEDURE — 97110 THERAPEUTIC EXERCISES: CPT

## 2025-08-05 PROCEDURE — 97530 THERAPEUTIC ACTIVITIES: CPT

## 2025-08-12 ENCOUNTER — HOSPITAL ENCOUNTER (OUTPATIENT)
Dept: PHYSICAL THERAPY | Age: 75
Setting detail: THERAPIES SERIES
End: 2025-08-12
Attending: INTERNAL MEDICINE
Payer: MEDICARE

## 2025-08-14 ENCOUNTER — HOSPITAL ENCOUNTER (OUTPATIENT)
Dept: PHYSICAL THERAPY | Age: 75
Setting detail: THERAPIES SERIES
End: 2025-08-14
Attending: INTERNAL MEDICINE
Payer: MEDICARE

## 2025-08-15 ENCOUNTER — OFFICE VISIT (OUTPATIENT)
Dept: CARDIOLOGY CLINIC | Age: 75
End: 2025-08-15
Payer: MEDICARE

## 2025-08-15 VITALS
WEIGHT: 243 LBS | BODY MASS INDEX: 43.05 KG/M2 | OXYGEN SATURATION: 96 % | HEART RATE: 75 BPM | HEIGHT: 63 IN | SYSTOLIC BLOOD PRESSURE: 120 MMHG | DIASTOLIC BLOOD PRESSURE: 70 MMHG

## 2025-08-15 DIAGNOSIS — I50.32 CHRONIC DIASTOLIC CONGESTIVE HEART FAILURE (HCC): ICD-10-CM

## 2025-08-15 DIAGNOSIS — R06.02 SOB (SHORTNESS OF BREATH): ICD-10-CM

## 2025-08-15 DIAGNOSIS — E78.00 PURE HYPERCHOLESTEROLEMIA: ICD-10-CM

## 2025-08-15 DIAGNOSIS — I10 BENIGN ESSENTIAL HTN: ICD-10-CM

## 2025-08-15 DIAGNOSIS — I50.32 CHRONIC DIASTOLIC HEART FAILURE (HCC): Primary | ICD-10-CM

## 2025-08-15 DIAGNOSIS — G62.9 NEUROPATHY: ICD-10-CM

## 2025-08-15 DIAGNOSIS — R00.1 BRADYCARDIA: ICD-10-CM

## 2025-08-15 DIAGNOSIS — Z95.0 PACEMAKER: ICD-10-CM

## 2025-08-15 DIAGNOSIS — I48.0 PAF (PAROXYSMAL ATRIAL FIBRILLATION) (HCC): ICD-10-CM

## 2025-08-15 PROCEDURE — 1159F MED LIST DOCD IN RCRD: CPT | Performed by: INTERNAL MEDICINE

## 2025-08-15 PROCEDURE — 99215 OFFICE O/P EST HI 40 MIN: CPT | Performed by: INTERNAL MEDICINE

## 2025-08-15 PROCEDURE — G8417 CALC BMI ABV UP PARAM F/U: HCPCS | Performed by: INTERNAL MEDICINE

## 2025-08-15 PROCEDURE — G8399 PT W/DXA RESULTS DOCUMENT: HCPCS | Performed by: INTERNAL MEDICINE

## 2025-08-15 PROCEDURE — G2211 COMPLEX E/M VISIT ADD ON: HCPCS | Performed by: INTERNAL MEDICINE

## 2025-08-15 PROCEDURE — G8427 DOCREV CUR MEDS BY ELIG CLIN: HCPCS | Performed by: INTERNAL MEDICINE

## 2025-08-15 PROCEDURE — 3078F DIAST BP <80 MM HG: CPT | Performed by: INTERNAL MEDICINE

## 2025-08-15 PROCEDURE — 3017F COLORECTAL CA SCREEN DOC REV: CPT | Performed by: INTERNAL MEDICINE

## 2025-08-15 PROCEDURE — 3074F SYST BP LT 130 MM HG: CPT | Performed by: INTERNAL MEDICINE

## 2025-08-15 PROCEDURE — 1123F ACP DISCUSS/DSCN MKR DOCD: CPT | Performed by: INTERNAL MEDICINE

## 2025-08-15 PROCEDURE — 1036F TOBACCO NON-USER: CPT | Performed by: INTERNAL MEDICINE

## 2025-08-15 PROCEDURE — 1090F PRES/ABSN URINE INCON ASSESS: CPT | Performed by: INTERNAL MEDICINE

## 2025-08-15 RX ORDER — DILTIAZEM HCL 60 MG
60 TABLET ORAL PRN
COMMUNITY

## 2025-08-15 RX ORDER — FUROSEMIDE 40 MG/1
TABLET ORAL
Qty: 120 TABLET | Refills: 3 | Status: SHIPPED | OUTPATIENT
Start: 2025-08-15

## 2025-08-18 ENCOUNTER — OFFICE VISIT (OUTPATIENT)
Dept: INTERNAL MEDICINE CLINIC | Age: 75
End: 2025-08-18

## 2025-08-18 VITALS
HEART RATE: 82 BPM | HEIGHT: 63 IN | OXYGEN SATURATION: 97 % | WEIGHT: 245.4 LBS | SYSTOLIC BLOOD PRESSURE: 122 MMHG | DIASTOLIC BLOOD PRESSURE: 72 MMHG | BODY MASS INDEX: 43.48 KG/M2

## 2025-08-18 DIAGNOSIS — E78.00 PURE HYPERCHOLESTEROLEMIA: ICD-10-CM

## 2025-08-18 DIAGNOSIS — M79.10 MYALGIA: ICD-10-CM

## 2025-08-18 DIAGNOSIS — M79.671 PAIN IN BOTH FEET: ICD-10-CM

## 2025-08-18 DIAGNOSIS — I50.32 CHRONIC DIASTOLIC CONGESTIVE HEART FAILURE (HCC): ICD-10-CM

## 2025-08-18 DIAGNOSIS — M51.16 LUMBAR DISC DISEASE WITH RADICULOPATHY: Primary | ICD-10-CM

## 2025-08-18 DIAGNOSIS — I50.32 CHRONIC HEART FAILURE WITH PRESERVED EJECTION FRACTION (HCC): ICD-10-CM

## 2025-08-18 DIAGNOSIS — R06.02 SOB (SHORTNESS OF BREATH): ICD-10-CM

## 2025-08-18 DIAGNOSIS — I48.0 PAROXYSMAL ATRIAL FIBRILLATION (HCC): ICD-10-CM

## 2025-08-18 DIAGNOSIS — M79.672 PAIN IN BOTH FEET: ICD-10-CM

## 2025-08-18 LAB
6-ACETYLMORPHINE, UR: NORMAL
ALBUMIN SERPL-MCNC: 4.3 G/DL (ref 3.4–5)
ALBUMIN/GLOB SERPL: 2.2 {RATIO} (ref 1.1–2.2)
ALP SERPL-CCNC: 81 U/L (ref 40–129)
ALT SERPL-CCNC: 36 U/L (ref 10–40)
AMPHETAMINE SCREEN URINE: NORMAL
ANION GAP SERPL CALCULATED.3IONS-SCNC: 12 MMOL/L (ref 3–16)
AST SERPL-CCNC: 23 U/L (ref 15–37)
BARBITURATE SCREEN URINE: NORMAL
BENZODIAZEPINE SCREEN, URINE: NORMAL
BILIRUB SERPL-MCNC: 0.4 MG/DL (ref 0–1)
BUN SERPL-MCNC: 16 MG/DL (ref 7–20)
CALCIUM SERPL-MCNC: 9.4 MG/DL (ref 8.3–10.6)
CANNABINOID SCREEN URINE: NORMAL
CHLORIDE SERPL-SCNC: 100 MMOL/L (ref 99–110)
CHOLEST SERPL-MCNC: 204 MG/DL (ref 0–199)
CO2 SERPL-SCNC: 22 MMOL/L (ref 21–32)
COCAINE METABOLITE, URINE: NORMAL
CREAT SERPL-MCNC: 0.8 MG/DL (ref 0.6–1.2)
CREATININE URINE: NORMAL
DEPRECATED RDW RBC AUTO: 13.6 % (ref 12.4–15.4)
EDDP, URINE: NORMAL
ETHANOL URINE: NORMAL
FERRITIN SERPL IA-MCNC: 336 NG/ML (ref 15–150)
GFR SERPLBLD CREATININE-BSD FMLA CKD-EPI: 78 ML/MIN/{1.73_M2}
GLUCOSE SERPL-MCNC: 111 MG/DL (ref 70–99)
HCT VFR BLD AUTO: 35.7 % (ref 36–48)
HDLC SERPL-MCNC: 50 MG/DL (ref 40–60)
HGB BLD-MCNC: 12.6 G/DL (ref 12–16)
IRON SATN MFR SERPL: 30 % (ref 15–50)
IRON SERPL-MCNC: 107 UG/DL (ref 37–145)
LDL CHOLESTEROL: 114 MG/DL
MAGNESIUM SERPL-MCNC: 2.1 MG/DL (ref 1.8–2.4)
MCH RBC QN AUTO: 33.5 PG (ref 26–34)
MCHC RBC AUTO-ENTMCNC: 35.2 G/DL (ref 31–36)
MCV RBC AUTO: 95.3 FL (ref 80–100)
MDMA, URINE: NORMAL
METHADONE SCREEN, URINE: NORMAL
METHAMPHETAMINE, URINE: NORMAL
NT-PROBNP SERPL-MCNC: 160 PG/ML (ref 0–124)
OPIATES, URINE: NORMAL
OXYCODONE: NORMAL
PCP,URINE: NORMAL
PCP: NORMAL
PH, URINE: NORMAL
PLATELET # BLD AUTO: 211 K/UL (ref 135–450)
PMV BLD AUTO: 9.3 FL (ref 5–10.5)
POTASSIUM SERPL-SCNC: 4.6 MMOL/L (ref 3.5–5.1)
PROPOXYPHENE, URINE: NORMAL
PROT SERPL-MCNC: 6.3 G/DL (ref 6.4–8.2)
RBC # BLD AUTO: 3.74 M/UL (ref 4–5.2)
SODIUM SERPL-SCNC: 134 MMOL/L (ref 136–145)
TIBC SERPL-MCNC: 353 UG/DL (ref 260–445)
TRICYCLIC ANTIDEPRESSANTS, UR: NORMAL
TRIGL SERPL-MCNC: 199 MG/DL (ref 0–150)
URATE SERPL-MCNC: 6.9 MG/DL (ref 2.6–6)
VLDLC SERPL CALC-MCNC: 40 MG/DL
WBC # BLD AUTO: 4 K/UL (ref 4–11)

## 2025-08-18 RX ORDER — TRAMADOL HYDROCHLORIDE 50 MG/1
50 TABLET ORAL EVERY 8 HOURS PRN
Qty: 60 TABLET | Refills: 0 | Status: SHIPPED | OUTPATIENT
Start: 2025-08-18 | End: 2025-09-17

## 2025-08-18 SDOH — ECONOMIC STABILITY: FOOD INSECURITY: WITHIN THE PAST 12 MONTHS, YOU WORRIED THAT YOUR FOOD WOULD RUN OUT BEFORE YOU GOT MONEY TO BUY MORE.: NEVER TRUE

## 2025-08-18 SDOH — ECONOMIC STABILITY: FOOD INSECURITY: WITHIN THE PAST 12 MONTHS, THE FOOD YOU BOUGHT JUST DIDN'T LAST AND YOU DIDN'T HAVE MONEY TO GET MORE.: NEVER TRUE

## 2025-08-18 ASSESSMENT — PATIENT HEALTH QUESTIONNAIRE - PHQ9
SUM OF ALL RESPONSES TO PHQ QUESTIONS 1-9: 0
SUM OF ALL RESPONSES TO PHQ QUESTIONS 1-9: 0
1. LITTLE INTEREST OR PLEASURE IN DOING THINGS: NOT AT ALL
2. FEELING DOWN, DEPRESSED OR HOPELESS: NOT AT ALL
SUM OF ALL RESPONSES TO PHQ QUESTIONS 1-9: 0
SUM OF ALL RESPONSES TO PHQ QUESTIONS 1-9: 0

## 2025-08-19 DIAGNOSIS — M79.672 PAIN IN BOTH FEET: Primary | ICD-10-CM

## 2025-08-19 DIAGNOSIS — M51.16 LUMBAR DISC DISEASE WITH RADICULOPATHY: ICD-10-CM

## 2025-08-19 DIAGNOSIS — M79.671 PAIN IN BOTH FEET: Primary | ICD-10-CM

## 2025-08-19 RX ORDER — PREDNISONE 10 MG/1
TABLET ORAL
Qty: 30 TABLET | Refills: 0 | Status: SHIPPED | OUTPATIENT
Start: 2025-08-19

## (undated) DEVICE — ELECTRODE PT RET AD L9FT HI MOIST COND ADH HYDRGEL CORDED

## (undated) DEVICE — Device: Brand: NOMOLINE™ LH ADULT NASAL CO2 CANNULA WITH O2 4M

## (undated) DEVICE — DRAPE C ARM UNIV W41XL74IN CLR PLAS XR VELC CLSR POLY STRP

## (undated) DEVICE — NAVIO FLAT MARKERS: Brand: NAVIO

## (undated) DEVICE — SUTURE MCRYL SZ 3-0 L27IN ABSRB UD L24MM PS-1 3/8 CIR PRIM Y936H

## (undated) DEVICE — STERILE TOTAL KNEE DRAPE PACK: Brand: CARDINAL HEALTH

## (undated) DEVICE — INTENDED FOR TISSUE SEPARATION, AND OTHER PROCEDURES THAT REQUIRE A SHARP SURGICAL BLADE TO PUNCTURE OR CUT.: Brand: BARD-PARKER ® STAINLESS STEEL BLADES

## (undated) DEVICE — SOLUTION IRRIG 3000ML 0.9% SOD CHL USP UROMATIC PLAS CONT

## (undated) DEVICE — SUTURE STRATAFIX SZ 1 L14IN ABSRB VLT L36MM MO-4 TAPERPOINT SXPD2B400

## (undated) DEVICE — SCREW BNE L25MM DIA2.5MM KNEE FULL THRD HEX FEM PERSONA
Type: IMPLANTABLE DEVICE | Site: KNEE | Status: NON-FUNCTIONAL
Removed: 2023-02-28

## (undated) DEVICE — BLADE SAGITAL 18X90X1.27MM

## (undated) DEVICE — GOWN,AURORA,NONREINF,RAGLAN,XXL,STERILE: Brand: MEDLINE

## (undated) DEVICE — Device

## (undated) DEVICE — SUTURE VCRL SZ 0 L18IN ABSRB UD L36MM CT-1 1/2 CIR J840D

## (undated) DEVICE — RESTRAINT EXT ANK WRST LT BLU FOAM 2 STRP SIDE BCKL HK AND

## (undated) DEVICE — GLOVE ORANGE PI 8 1/2   MSG9085

## (undated) DEVICE — PADDING UNDERCAST W4INXL4YD 100% COT CRIMPED FINISH WBRL II

## (undated) DEVICE — GUIDEWIRE VASC ANGLED 035X150 M00146151B17

## (undated) DEVICE — SCREW BNE HD 3.5X48 MM HEX PERSONA
Type: IMPLANTABLE DEVICE | Site: KNEE | Status: NON-FUNCTIONAL
Removed: 2023-02-28

## (undated) DEVICE — SUTURE VCRL + SZ 2-0 L36IN ABSRB UD L36MM CT-1 1/2 CIR VCP945H

## (undated) DEVICE — APPLICATOR MEDICATED 26 CC SOLUTION HI LT ORNG CHLORAPREP

## (undated) DEVICE — PACEMAKER PACK: Brand: MEDLINE INDUSTRIES, INC.

## (undated) DEVICE — INTRODUCER SHTH L13CM OD7FR SH ORNG HUB SEAMLESS SAFSHTH

## (undated) DEVICE — SUTURE VCRL SZ 2-0 L18IN ABSRB UD CT-1 L36MM 1/2 CIR J839D

## (undated) DEVICE — SUTURE VCRL SZ 0 L36IN ABSRB UD CT-1 L36MM 1/2 CIR TAPR PNT VCP946H

## (undated) DEVICE — FAIRFIELD KNEE LF

## (undated) DEVICE — DEVICE POS W4INXL5YD KNEE SURG FOAM PD SELF ADH WRP DEMAYO

## (undated) DEVICE — SPONGE LAP W18XL18IN WHT COT 4 PLY FLD STRUNG RADPQ DISP ST

## (undated) DEVICE — SHEET,DRAPE,53X77,STERILE: Brand: MEDLINE

## (undated) DEVICE — PEN: MARKING STD 100/CS: Brand: MEDICAL ACTION INDUSTRIES

## (undated) DEVICE — DRAPE,U/ SHT,SPLIT,PLAS,STERIL: Brand: MEDLINE

## (undated) DEVICE — LIGHT HANDLE COVER: Brand: UNBRANDED

## (undated) DEVICE — STANDARD HYPODERMIC NEEDLE,POLYPROPYLENE HUB: Brand: MONOJECT

## (undated) DEVICE — Device: Brand: POWER-FLO®

## (undated) DEVICE — MEDTRONIC HIS SHEATH

## (undated) DEVICE — GLOVE ORANGE PI 7 1/2   MSG9075

## (undated) DEVICE — 2108 SERIES SAGITTAL BLADE, NO OFFSET  (12.4 X 1.19 X 82.1MM)

## (undated) DEVICE — SYSTEM SKIN CLSR 22CM DERMBND PRINEO

## (undated) DEVICE — HANDPIECE SET WITH HIGH FLOW TIP AND SUCTION TUBE: Brand: INTERPULSE

## (undated) DEVICE — DRESSING CNTCT 4X12IN IS THERABOND 3D

## (undated) DEVICE — COMPONENT KNEE LOWER EXTREMITIES. ANCIL ZIRCONIUM NAVIO DISP

## (undated) DEVICE — 3M™ IOBAN™ 2 ANTIMICROBIAL INCISE DRAPE 6650EZ: Brand: IOBAN™ 2

## (undated) DEVICE — STRIP,CLOSURE,WOUND,MEDI-STRIP,1/2X4: Brand: MEDLINE

## (undated) DEVICE — PAD, DEFIB, ADULT, RADIOTRANS, PHYSIO: Brand: MEDLINE

## (undated) DEVICE — COVER,MAYO STAND,XL,STERILE: Brand: MEDLINE

## (undated) DEVICE — TOTAL BASIC PK

## (undated) DEVICE — ZIMMER® STERILE DISPOSABLE TOURNIQUET CUFF WITH PLC, DUAL PORT, SINGLE BLADDER, 34 IN. (86 CM)

## (undated) DEVICE — HOOD: Brand: FLYTE

## (undated) DEVICE — DECANTER FLD 9IN ST BG FOR ASEP TRNSF OF FLD

## (undated) DEVICE — CONTAINER,SPECIMEN,OR STERILE,4OZ: Brand: MEDLINE

## (undated) DEVICE — SYRINGE MED 30ML STD CLR PLAS LUERLOCK TIP N CTRL DISP

## (undated) DEVICE — SUTURE MCRYL + SZ 3-0 L27IN ABSRB UD PS1 L24MM 3/8 CIR REV MCP936H

## (undated) DEVICE — HOOD, PEEL-AWAY: Brand: FLYTE

## (undated) DEVICE — ENVELOPE PACEMKR L W2.9XL3.3IN ABSRB ANTIBACT TYRX

## (undated) DEVICE — PADDING BNDG UNDERCAST 3 MRX10 CM N ABSORBENT N WOVEN ARTFLX

## (undated) DEVICE — SUTURE VCRL + 1 L27IN ABSRB UD CT-1 L36MM 1/2 CIR TAPR PNT VCP261H

## (undated) DEVICE — 450 ML BOTTLE OF 0.05% CHLORHEXIDINE GLUCONATE IN 99.95% STERILE WATER FOR IRRIGATION, USP AND APPLICATOR.: Brand: IRRISEPT ANTIMICROBIAL WOUND LAVAGE

## (undated) DEVICE — MASIMOSET LNCS ADTX SPO2 ADULT PULSE OXIMETER ADHESIVE SENSOR: Brand: MASIMOSET® LNCS® ADTX SPO2 ADULT PULSE OXIMETER ADHESIVE SENSOR

## (undated) DEVICE — GLOVE SURG SZ 8.5 L11.85IN FNGR THK9.8MIL STRW LTX POLYMER

## (undated) DEVICE — GENESIS TROCHLEAR PIN 1/8 X 3
Type: IMPLANTABLE DEVICE | Site: KNEE | Status: NON-FUNCTIONAL
Brand: GENESIS
Removed: 2021-03-16

## (undated) DEVICE — DRESSING WND 4X12 IN ANTIMICROBIAL PROTCT THERABOND 3D

## (undated) DEVICE — YANKAUER OPEN TIP, NO VENT: Brand: ARGYLE

## (undated) DEVICE — SYRINGE MED 10ML TRNSLUC BRL PLUNG BLK MRK POLYPR CTRL

## (undated) DEVICE — PROBE COVER KIT: Brand: MEDLINE INDUSTRIES, INC.

## (undated) DEVICE — STERILE POLYISOPRENE POWDER-FREE SURGICAL GLOVES WITH EMOLLIENT COATING: Brand: PROTEXIS

## (undated) DEVICE — HYPODERMIC SAFETY NEEDLE: Brand: MAGELLAN

## (undated) DEVICE — CHLORAPREP 26ML ORANGE

## (undated) DEVICE — KIT STEREOTAXIC POD DISPOSABLE IASSIST

## (undated) DEVICE — 35 ML SYRINGE LUER-LOCK TIP: Brand: MONOJECT

## (undated) DEVICE — DUAL CUT SAGITTAL BLADE